# Patient Record
Sex: FEMALE | Race: WHITE | NOT HISPANIC OR LATINO | Employment: OTHER | URBAN - METROPOLITAN AREA
[De-identification: names, ages, dates, MRNs, and addresses within clinical notes are randomized per-mention and may not be internally consistent; named-entity substitution may affect disease eponyms.]

---

## 2017-01-04 ENCOUNTER — TRANSCRIBE ORDERS (OUTPATIENT)
Dept: SLEEP CENTER | Facility: CLINIC | Age: 82
End: 2017-01-04

## 2017-01-04 ENCOUNTER — HOSPITAL ENCOUNTER (OUTPATIENT)
Dept: SLEEP CENTER | Facility: CLINIC | Age: 82
Discharge: HOME | End: 2017-01-04
Payer: MEDICARE

## 2017-01-04 DIAGNOSIS — G47.33 OSA (OBSTRUCTIVE SLEEP APNEA): Primary | ICD-10-CM

## 2017-01-04 DIAGNOSIS — G47.33 OSA ON CPAP: ICD-10-CM

## 2017-01-04 DIAGNOSIS — Z99.89 OSA ON CPAP: ICD-10-CM

## 2017-01-16 ENCOUNTER — GENERIC CONVERSION - ENCOUNTER (OUTPATIENT)
Dept: OTHER | Facility: OTHER | Age: 82
End: 2017-01-16

## 2017-01-25 ENCOUNTER — ALLSCRIPTS OFFICE VISIT (OUTPATIENT)
Dept: OTHER | Facility: OTHER | Age: 82
End: 2017-01-25

## 2017-01-26 ENCOUNTER — GENERIC CONVERSION - ENCOUNTER (OUTPATIENT)
Dept: OTHER | Facility: OTHER | Age: 82
End: 2017-01-26

## 2017-01-28 ENCOUNTER — GENERIC CONVERSION - ENCOUNTER (OUTPATIENT)
Dept: OTHER | Facility: OTHER | Age: 82
End: 2017-01-28

## 2017-02-02 ENCOUNTER — GENERIC CONVERSION - ENCOUNTER (OUTPATIENT)
Dept: OTHER | Facility: OTHER | Age: 82
End: 2017-02-02

## 2017-03-02 ENCOUNTER — HOSPITAL ENCOUNTER (OUTPATIENT)
Dept: SLEEP CENTER | Facility: CLINIC | Age: 82
Discharge: HOME | End: 2017-03-02
Payer: MEDICARE

## 2017-03-02 ENCOUNTER — TRANSCRIBE ORDERS (OUTPATIENT)
Dept: SLEEP CENTER | Facility: CLINIC | Age: 82
End: 2017-03-02

## 2017-03-02 DIAGNOSIS — G47.33 OSA (OBSTRUCTIVE SLEEP APNEA): ICD-10-CM

## 2017-03-02 DIAGNOSIS — G47.33 OBSTRUCTIVE SLEEP APNEA (ADULT) (PEDIATRIC): Primary | ICD-10-CM

## 2017-03-08 ENCOUNTER — GENERIC CONVERSION - ENCOUNTER (OUTPATIENT)
Dept: OTHER | Facility: OTHER | Age: 82
End: 2017-03-08

## 2017-03-20 ENCOUNTER — GENERIC CONVERSION - ENCOUNTER (OUTPATIENT)
Dept: OTHER | Facility: OTHER | Age: 82
End: 2017-03-20

## 2017-04-27 ENCOUNTER — GENERIC CONVERSION - ENCOUNTER (OUTPATIENT)
Dept: OTHER | Facility: OTHER | Age: 82
End: 2017-04-27

## 2017-05-08 ENCOUNTER — HOSPITAL ENCOUNTER (EMERGENCY)
Facility: HOSPITAL | Age: 82
Discharge: HOME | DRG: 062 | End: 2017-05-08
Attending: EMERGENCY MEDICINE | Admitting: EMERGENCY MEDICINE
Payer: MEDICARE

## 2017-05-08 ENCOUNTER — GENERIC CONVERSION - ENCOUNTER (OUTPATIENT)
Dept: OTHER | Facility: OTHER | Age: 82
End: 2017-05-08

## 2017-05-08 ENCOUNTER — APPOINTMENT (EMERGENCY)
Dept: RADIOLOGY | Facility: HOSPITAL | Age: 82
DRG: 062 | End: 2017-05-08
Payer: MEDICARE

## 2017-05-08 VITALS
TEMPERATURE: 98.2 F | OXYGEN SATURATION: 97 % | DIASTOLIC BLOOD PRESSURE: 86 MMHG | HEIGHT: 61 IN | HEART RATE: 69 BPM | WEIGHT: 206 LBS | RESPIRATION RATE: 16 BRPM | BODY MASS INDEX: 38.89 KG/M2 | SYSTOLIC BLOOD PRESSURE: 160 MMHG

## 2017-05-08 DIAGNOSIS — N39.0 UTI (URINARY TRACT INFECTION): Primary | ICD-10-CM

## 2017-05-08 DIAGNOSIS — R03.0 ELEVATED BLOOD PRESSURE READING: ICD-10-CM

## 2017-05-08 LAB
ANION GAP SERPL CALCULATED.3IONS-SCNC: 7 MMOL/L (ref 4–13)
ATRIAL RATE: 57 BPM
ATRIAL RATE: 70 BPM
ATRIAL RATE: 80 BPM
BACTERIA UR QL AUTO: ABNORMAL /HPF
BASOPHILS # BLD AUTO: 0.03 THOUSANDS/ΜL (ref 0–0.1)
BASOPHILS NFR BLD AUTO: 1 % (ref 0–1)
BILIRUB UR QL STRIP: NEGATIVE
BUN SERPL-MCNC: 18 MG/DL (ref 5–25)
CALCIUM SERPL-MCNC: 8.9 MG/DL (ref 8.3–10.1)
CHLORIDE SERPL-SCNC: 104 MMOL/L (ref 100–108)
CLARITY UR: CLEAR
CO2 SERPL-SCNC: 29 MMOL/L (ref 21–32)
COLOR UR: YELLOW
CREAT SERPL-MCNC: 0.82 MG/DL (ref 0.6–1.3)
EOSINOPHIL # BLD AUTO: 0.11 THOUSAND/ΜL (ref 0–0.61)
EOSINOPHIL NFR BLD AUTO: 2 % (ref 0–6)
ERYTHROCYTE [DISTWIDTH] IN BLOOD BY AUTOMATED COUNT: 13.8 % (ref 11.6–15.1)
GFR SERPL CREATININE-BSD FRML MDRD: >60 ML/MIN/1.73SQ M
GLUCOSE SERPL-MCNC: 113 MG/DL (ref 65–140)
GLUCOSE UR STRIP-MCNC: NEGATIVE MG/DL
HCT VFR BLD AUTO: 34.2 % (ref 34.8–46.1)
HGB BLD-MCNC: 11.3 G/DL (ref 11.5–15.4)
HGB UR QL STRIP.AUTO: ABNORMAL
HYALINE CASTS #/AREA URNS LPF: ABNORMAL /LPF
KETONES UR STRIP-MCNC: NEGATIVE MG/DL
LEUKOCYTE ESTERASE UR QL STRIP: ABNORMAL
LYMPHOCYTES # BLD AUTO: 1.79 THOUSANDS/ΜL (ref 0.6–4.47)
LYMPHOCYTES NFR BLD AUTO: 29 % (ref 14–44)
MCH RBC QN AUTO: 33.1 PG (ref 26.8–34.3)
MCHC RBC AUTO-ENTMCNC: 33 G/DL (ref 31.4–37.4)
MCV RBC AUTO: 100 FL (ref 82–98)
MONOCYTES # BLD AUTO: 0.73 THOUSAND/ΜL (ref 0.17–1.22)
MONOCYTES NFR BLD AUTO: 12 % (ref 4–12)
NEUTROPHILS # BLD AUTO: 3.6 THOUSANDS/ΜL (ref 1.85–7.62)
NEUTS SEG NFR BLD AUTO: 56 % (ref 43–75)
NITRITE UR QL STRIP: POSITIVE
NON-SQ EPI CELLS URNS QL MICRO: ABNORMAL /HPF
NRBC BLD AUTO-RTO: 0 /100 WBCS
P AXIS: 95 DEGREES
P AXIS: 99 DEGREES
PH UR STRIP.AUTO: 6.5 [PH] (ref 4.5–8)
PLATELET # BLD AUTO: 240 THOUSANDS/UL (ref 149–390)
PMV BLD AUTO: 9.7 FL (ref 8.9–12.7)
POTASSIUM SERPL-SCNC: 3.7 MMOL/L (ref 3.5–5.3)
PR INTERVAL: 158 MS
PR INTERVAL: 176 MS
PROT UR STRIP-MCNC: NEGATIVE MG/DL
QRS AXIS: -8 DEGREES
QRS AXIS: 11 DEGREES
QRS AXIS: 34 DEGREES
QRSD INTERVAL: 104 MS
QRSD INTERVAL: 142 MS
QRSD INTERVAL: 88 MS
QT INTERVAL: 370 MS
QT INTERVAL: 382 MS
QT INTERVAL: 468 MS
QTC INTERVAL: 412 MS
QTC INTERVAL: 426 MS
QTC INTERVAL: 529 MS
RBC # BLD AUTO: 3.41 MILLION/UL (ref 3.81–5.12)
RBC #/AREA URNS AUTO: ABNORMAL /HPF
SODIUM SERPL-SCNC: 140 MMOL/L (ref 136–145)
SP GR UR STRIP.AUTO: 1.01 (ref 1–1.03)
T WAVE AXIS: -12 DEGREES
T WAVE AXIS: 150 DEGREES
T WAVE AXIS: 37 DEGREES
UROBILINOGEN UR QL STRIP.AUTO: 0.2 E.U./DL
VENTRICULAR RATE: 70 BPM
VENTRICULAR RATE: 77 BPM
VENTRICULAR RATE: 80 BPM
WBC # BLD AUTO: 6.27 THOUSAND/UL (ref 4.31–10.16)
WBC #/AREA URNS AUTO: ABNORMAL /HPF

## 2017-05-08 PROCEDURE — 36415 COLL VENOUS BLD VENIPUNCTURE: CPT | Performed by: EMERGENCY MEDICINE

## 2017-05-08 PROCEDURE — 81001 URINALYSIS AUTO W/SCOPE: CPT

## 2017-05-08 PROCEDURE — 93005 ELECTROCARDIOGRAM TRACING: CPT

## 2017-05-08 PROCEDURE — 99284 EMERGENCY DEPT VISIT MOD MDM: CPT

## 2017-05-08 PROCEDURE — 87086 URINE CULTURE/COLONY COUNT: CPT

## 2017-05-08 PROCEDURE — 87186 SC STD MICRODIL/AGAR DIL: CPT

## 2017-05-08 PROCEDURE — 85025 COMPLETE CBC W/AUTO DIFF WBC: CPT | Performed by: EMERGENCY MEDICINE

## 2017-05-08 PROCEDURE — 93005 ELECTROCARDIOGRAM TRACING: CPT | Performed by: EMERGENCY MEDICINE

## 2017-05-08 PROCEDURE — 87077 CULTURE AEROBIC IDENTIFY: CPT

## 2017-05-08 PROCEDURE — 80048 BASIC METABOLIC PNL TOTAL CA: CPT | Performed by: EMERGENCY MEDICINE

## 2017-05-08 RX ORDER — AMLODIPINE BESYLATE 10 MG/1
10 TABLET ORAL DAILY
Qty: 10 TABLET | Refills: 0 | Status: SHIPPED | OUTPATIENT
Start: 2017-05-08 | End: 2018-02-06 | Stop reason: ALTCHOICE

## 2017-05-08 RX ORDER — NITROFURANTOIN 25; 75 MG/1; MG/1
100 CAPSULE ORAL 2 TIMES DAILY
Qty: 10 CAPSULE | Refills: 0 | Status: SHIPPED | OUTPATIENT
Start: 2017-05-08 | End: 2017-05-15 | Stop reason: HOSPADM

## 2017-05-10 ENCOUNTER — APPOINTMENT (EMERGENCY)
Dept: RADIOLOGY | Facility: HOSPITAL | Age: 82
DRG: 062 | End: 2017-05-10
Payer: MEDICARE

## 2017-05-10 ENCOUNTER — HOSPITAL ENCOUNTER (INPATIENT)
Facility: HOSPITAL | Age: 82
LOS: 5 days | Discharge: NURSING, REHAB OR LONG-TERM CARE | DRG: 062 | End: 2017-05-15
Attending: EMERGENCY MEDICINE | Admitting: EMERGENCY MEDICINE
Payer: MEDICARE

## 2017-05-10 ENCOUNTER — APPOINTMENT (INPATIENT)
Dept: RADIOLOGY | Facility: HOSPITAL | Age: 82
DRG: 062 | End: 2017-05-10
Payer: MEDICARE

## 2017-05-10 DIAGNOSIS — I63.9 CVA (CEREBRAL VASCULAR ACCIDENT) (HCC): Primary | ICD-10-CM

## 2017-05-10 PROBLEM — N39.0 URINARY TRACT INFECTION: Status: ACTIVE | Noted: 2017-05-10

## 2017-05-10 PROBLEM — E66.9 DIABETES MELLITUS TYPE 2 IN OBESE (HCC): Status: ACTIVE | Noted: 2017-05-10

## 2017-05-10 PROBLEM — G47.33 OBSTRUCTIVE SLEEP APNEA: Status: ACTIVE | Noted: 2017-05-10

## 2017-05-10 PROBLEM — E11.69 DIABETES MELLITUS TYPE 2 IN OBESE (HCC): Status: ACTIVE | Noted: 2017-05-10

## 2017-05-10 PROBLEM — E03.9 HYPOTHYROIDISM: Status: ACTIVE | Noted: 2017-05-10

## 2017-05-10 PROBLEM — K21.9 GERD (GASTROESOPHAGEAL REFLUX DISEASE): Status: ACTIVE | Noted: 2017-05-10

## 2017-05-10 PROBLEM — M06.9 RHEUMATOID ARTHRITIS (HCC): Status: ACTIVE | Noted: 2017-05-10

## 2017-05-10 PROBLEM — I10 HYPERTENSION: Status: ACTIVE | Noted: 2017-05-10

## 2017-05-10 LAB
ANION GAP SERPL CALCULATED.3IONS-SCNC: 8 MMOL/L (ref 4–13)
APTT PPP: 31 SECONDS (ref 23–35)
ATRIAL RATE: 96 BPM
BACTERIA UR CULT: NORMAL
BACTERIA UR QL AUTO: ABNORMAL /HPF
BASOPHILS # BLD AUTO: 0.02 THOUSANDS/ΜL (ref 0–0.1)
BASOPHILS NFR BLD AUTO: 0 % (ref 0–1)
BILIRUB UR QL STRIP: NEGATIVE
BUN SERPL-MCNC: 25 MG/DL (ref 5–25)
CALCIUM SERPL-MCNC: 9 MG/DL (ref 8.3–10.1)
CHLORIDE SERPL-SCNC: 102 MMOL/L (ref 100–108)
CLARITY UR: CLEAR
CO2 SERPL-SCNC: 26 MMOL/L (ref 21–32)
COLOR UR: YELLOW
CREAT SERPL-MCNC: 0.88 MG/DL (ref 0.6–1.3)
EOSINOPHIL # BLD AUTO: 0.1 THOUSAND/ΜL (ref 0–0.61)
EOSINOPHIL NFR BLD AUTO: 1 % (ref 0–6)
ERYTHROCYTE [DISTWIDTH] IN BLOOD BY AUTOMATED COUNT: 13.5 % (ref 11.6–15.1)
GFR SERPL CREATININE-BSD FRML MDRD: >60 ML/MIN/1.73SQ M
GLUCOSE SERPL-MCNC: 184 MG/DL (ref 65–140)
GLUCOSE UR STRIP-MCNC: NEGATIVE MG/DL
HCT VFR BLD AUTO: 35.9 % (ref 34.8–46.1)
HGB BLD-MCNC: 11.9 G/DL (ref 11.5–15.4)
HGB UR QL STRIP.AUTO: NEGATIVE
HYALINE CASTS #/AREA URNS LPF: ABNORMAL /LPF
INR PPP: 0.96 (ref 0.86–1.16)
KETONES UR STRIP-MCNC: NEGATIVE MG/DL
LEUKOCYTE ESTERASE UR QL STRIP: ABNORMAL
LYMPHOCYTES # BLD AUTO: 1.88 THOUSANDS/ΜL (ref 0.6–4.47)
LYMPHOCYTES NFR BLD AUTO: 23 % (ref 14–44)
MCH RBC QN AUTO: 33.3 PG (ref 26.8–34.3)
MCHC RBC AUTO-ENTMCNC: 33.1 G/DL (ref 31.4–37.4)
MCV RBC AUTO: 101 FL (ref 82–98)
MONOCYTES # BLD AUTO: 0.77 THOUSAND/ΜL (ref 0.17–1.22)
MONOCYTES NFR BLD AUTO: 9 % (ref 4–12)
NEUTROPHILS # BLD AUTO: 5.43 THOUSANDS/ΜL (ref 1.85–7.62)
NEUTS SEG NFR BLD AUTO: 67 % (ref 43–75)
NITRITE UR QL STRIP: NEGATIVE
NON-SQ EPI CELLS URNS QL MICRO: ABNORMAL /HPF
NRBC BLD AUTO-RTO: 0 /100 WBCS
P AXIS: 58 DEGREES
PH UR STRIP.AUTO: 6 [PH] (ref 4.5–8)
PLATELET # BLD AUTO: 234 THOUSANDS/UL (ref 149–390)
PMV BLD AUTO: 10.5 FL (ref 8.9–12.7)
POTASSIUM SERPL-SCNC: 4.3 MMOL/L (ref 3.5–5.3)
PR INTERVAL: 160 MS
PROT UR STRIP-MCNC: NEGATIVE MG/DL
PROTHROMBIN TIME: 12.8 SECONDS (ref 12.1–14.4)
QRS AXIS: 13 DEGREES
QRSD INTERVAL: 96 MS
QT INTERVAL: 348 MS
QTC INTERVAL: 439 MS
RBC # BLD AUTO: 3.57 MILLION/UL (ref 3.81–5.12)
RBC #/AREA URNS AUTO: ABNORMAL /HPF
SODIUM SERPL-SCNC: 136 MMOL/L (ref 136–145)
SP GR UR STRIP.AUTO: 1.02 (ref 1–1.03)
SPECIMEN SOURCE: NORMAL
T WAVE AXIS: 38 DEGREES
TROPONIN I BLD-MCNC: 0 NG/ML (ref 0–0.08)
UROBILINOGEN UR QL STRIP.AUTO: 0.2 E.U./DL
VENTRICULAR RATE: 96 BPM
WBC # BLD AUTO: 8.27 THOUSAND/UL (ref 4.31–10.16)
WBC #/AREA URNS AUTO: ABNORMAL /HPF

## 2017-05-10 PROCEDURE — 85610 PROTHROMBIN TIME: CPT | Performed by: EMERGENCY MEDICINE

## 2017-05-10 PROCEDURE — 70450 CT HEAD/BRAIN W/O DYE: CPT

## 2017-05-10 PROCEDURE — 3E03317 INTRODUCTION OF OTHER THROMBOLYTIC INTO PERIPHERAL VEIN, PERCUTANEOUS APPROACH: ICD-10-PCS | Performed by: INTERNAL MEDICINE

## 2017-05-10 PROCEDURE — 85730 THROMBOPLASTIN TIME PARTIAL: CPT | Performed by: EMERGENCY MEDICINE

## 2017-05-10 PROCEDURE — 99291 CRITICAL CARE FIRST HOUR: CPT

## 2017-05-10 PROCEDURE — 36415 COLL VENOUS BLD VENIPUNCTURE: CPT | Performed by: EMERGENCY MEDICINE

## 2017-05-10 PROCEDURE — 84484 ASSAY OF TROPONIN QUANT: CPT

## 2017-05-10 PROCEDURE — 80048 BASIC METABOLIC PNL TOTAL CA: CPT | Performed by: EMERGENCY MEDICINE

## 2017-05-10 PROCEDURE — 93005 ELECTROCARDIOGRAM TRACING: CPT

## 2017-05-10 PROCEDURE — 93880 EXTRACRANIAL BILAT STUDY: CPT

## 2017-05-10 PROCEDURE — 85025 COMPLETE CBC W/AUTO DIFF WBC: CPT | Performed by: EMERGENCY MEDICINE

## 2017-05-10 PROCEDURE — 81001 URINALYSIS AUTO W/SCOPE: CPT

## 2017-05-10 RX ORDER — FENTANYL CITRATE 50 UG/ML
25 INJECTION, SOLUTION INTRAMUSCULAR; INTRAVENOUS ONCE
Status: COMPLETED | OUTPATIENT
Start: 2017-05-10 | End: 2017-05-10

## 2017-05-10 RX ORDER — NITROFURANTOIN 25; 75 MG/1; MG/1
100 CAPSULE ORAL 2 TIMES DAILY
Status: DISCONTINUED | OUTPATIENT
Start: 2017-05-10 | End: 2017-05-10

## 2017-05-10 RX ORDER — PANTOPRAZOLE SODIUM 40 MG/1
40 TABLET, DELAYED RELEASE ORAL
Status: DISCONTINUED | OUTPATIENT
Start: 2017-05-11 | End: 2017-05-15 | Stop reason: HOSPADM

## 2017-05-10 RX ORDER — FENTANYL CITRATE 50 UG/ML
INJECTION, SOLUTION INTRAMUSCULAR; INTRAVENOUS
Status: COMPLETED
Start: 2017-05-10 | End: 2017-05-10

## 2017-05-10 RX ORDER — LABETALOL HYDROCHLORIDE 5 MG/ML
10 INJECTION, SOLUTION INTRAVENOUS ONCE
Status: DISCONTINUED | OUTPATIENT
Start: 2017-05-10 | End: 2017-05-12

## 2017-05-10 RX ORDER — LABETALOL HYDROCHLORIDE 5 MG/ML
10 INJECTION, SOLUTION INTRAVENOUS EVERY 4 HOURS PRN
Status: DISCONTINUED | OUTPATIENT
Start: 2017-05-10 | End: 2017-05-15 | Stop reason: HOSPADM

## 2017-05-10 RX ORDER — LEVOTHYROXINE SODIUM 0.07 MG/1
75 TABLET ORAL
Status: DISCONTINUED | OUTPATIENT
Start: 2017-05-11 | End: 2017-05-15 | Stop reason: HOSPADM

## 2017-05-10 RX ORDER — ACETAMINOPHEN 325 MG/1
650 TABLET ORAL ONCE
Status: COMPLETED | OUTPATIENT
Start: 2017-05-10 | End: 2017-05-10

## 2017-05-10 RX ORDER — SODIUM CHLORIDE, SODIUM GLUCONATE, SODIUM ACETATE, POTASSIUM CHLORIDE, MAGNESIUM CHLORIDE, SODIUM PHOSPHATE, DIBASIC, AND POTASSIUM PHOSPHATE .53; .5; .37; .037; .03; .012; .00082 G/100ML; G/100ML; G/100ML; G/100ML; G/100ML; G/100ML; G/100ML
75 INJECTION, SOLUTION INTRAVENOUS CONTINUOUS
Status: DISCONTINUED | OUTPATIENT
Start: 2017-05-10 | End: 2017-05-11

## 2017-05-10 RX ORDER — PRAVASTATIN SODIUM 80 MG/1
80 TABLET ORAL
Status: DISCONTINUED | OUTPATIENT
Start: 2017-05-11 | End: 2017-05-15 | Stop reason: HOSPADM

## 2017-05-10 RX ORDER — SODIUM CHLORIDE 9 MG/ML
100 INJECTION, SOLUTION INTRAVENOUS ONCE
Status: COMPLETED | OUTPATIENT
Start: 2017-05-10 | End: 2017-05-11

## 2017-05-10 RX ADMIN — FENTANYL CITRATE 25 MCG: 50 INJECTION, SOLUTION INTRAMUSCULAR; INTRAVENOUS at 18:03

## 2017-05-10 RX ADMIN — SODIUM CHLORIDE 500 ML: 0.9 INJECTION, SOLUTION INTRAVENOUS at 21:13

## 2017-05-10 RX ADMIN — CEFAZOLIN SODIUM 1000 MG: 1 SOLUTION INTRAVENOUS at 19:23

## 2017-05-10 RX ADMIN — SODIUM CHLORIDE 100 ML/HR: 0.9 INJECTION, SOLUTION INTRAVENOUS at 16:29

## 2017-05-10 RX ADMIN — FENTANYL CITRATE 25 MCG: 50 INJECTION INTRAMUSCULAR; INTRAVENOUS at 18:03

## 2017-05-10 RX ADMIN — ALTEPLASE 76.2 MG: KIT at 14:27

## 2017-05-10 RX ADMIN — ACETAMINOPHEN 650 MG: 325 TABLET, FILM COATED ORAL at 15:37

## 2017-05-10 RX ADMIN — ALTEPLASE 8.5 MG: KIT at 14:20

## 2017-05-10 RX ADMIN — SODIUM CHLORIDE, SODIUM GLUCONATE, SODIUM ACETATE, POTASSIUM CHLORIDE, MAGNESIUM CHLORIDE, SODIUM PHOSPHATE, DIBASIC, AND POTASSIUM PHOSPHATE 75 ML/HR: .53; .5; .37; .037; .03; .012; .00082 INJECTION, SOLUTION INTRAVENOUS at 19:45

## 2017-05-11 ENCOUNTER — APPOINTMENT (INPATIENT)
Dept: NON INVASIVE DIAGNOSTICS | Facility: HOSPITAL | Age: 82
DRG: 062 | End: 2017-05-11
Payer: MEDICARE

## 2017-05-11 ENCOUNTER — APPOINTMENT (INPATIENT)
Dept: RADIOLOGY | Facility: HOSPITAL | Age: 82
DRG: 062 | End: 2017-05-11
Payer: MEDICARE

## 2017-05-11 ENCOUNTER — GENERIC CONVERSION - ENCOUNTER (OUTPATIENT)
Dept: OTHER | Facility: OTHER | Age: 82
End: 2017-05-11

## 2017-05-11 LAB
ANION GAP SERPL CALCULATED.3IONS-SCNC: 8 MMOL/L (ref 4–13)
ATRIAL RATE: 66 BPM
ATRIAL RATE: 68 BPM
BUN SERPL-MCNC: 18 MG/DL (ref 5–25)
CALCIUM SERPL-MCNC: 8.3 MG/DL (ref 8.3–10.1)
CHLORIDE SERPL-SCNC: 104 MMOL/L (ref 100–108)
CHOLEST SERPL-MCNC: 190 MG/DL (ref 50–200)
CO2 SERPL-SCNC: 27 MMOL/L (ref 21–32)
CREAT SERPL-MCNC: 0.64 MG/DL (ref 0.6–1.3)
ERYTHROCYTE [DISTWIDTH] IN BLOOD BY AUTOMATED COUNT: 13.7 % (ref 11.6–15.1)
EST. AVERAGE GLUCOSE BLD GHB EST-MCNC: 137 MG/DL
GFR SERPL CREATININE-BSD FRML MDRD: >60 ML/MIN/1.73SQ M
GLUCOSE SERPL-MCNC: 112 MG/DL (ref 65–140)
GLUCOSE SERPL-MCNC: 131 MG/DL (ref 65–140)
HBA1C MFR BLD: 6.4 % (ref 4.2–6.3)
HCT VFR BLD AUTO: 32.9 % (ref 34.8–46.1)
HDLC SERPL-MCNC: 68 MG/DL (ref 40–60)
HGB BLD-MCNC: 10.9 G/DL (ref 11.5–15.4)
LDLC SERPL CALC-MCNC: 92 MG/DL (ref 0–100)
MAGNESIUM SERPL-MCNC: 2.2 MG/DL (ref 1.6–2.6)
MCH RBC QN AUTO: 33.2 PG (ref 26.8–34.3)
MCHC RBC AUTO-ENTMCNC: 33.1 G/DL (ref 31.4–37.4)
MCV RBC AUTO: 100 FL (ref 82–98)
P AXIS: 57 DEGREES
P AXIS: 75 DEGREES
PHOSPHATE SERPL-MCNC: 3 MG/DL (ref 2.3–4.1)
PLATELET # BLD AUTO: 217 THOUSANDS/UL (ref 149–390)
PMV BLD AUTO: 9.6 FL (ref 8.9–12.7)
POTASSIUM SERPL-SCNC: 3.8 MMOL/L (ref 3.5–5.3)
PR INTERVAL: 167 MS
PR INTERVAL: 171 MS
QRS AXIS: 17 DEGREES
QRS AXIS: 20 DEGREES
QRSD INTERVAL: 100 MS
QRSD INTERVAL: 108 MS
QT INTERVAL: 408 MS
QT INTERVAL: 413 MS
QTC INTERVAL: 433 MS
QTC INTERVAL: 434 MS
RBC # BLD AUTO: 3.28 MILLION/UL (ref 3.81–5.12)
SODIUM SERPL-SCNC: 139 MMOL/L (ref 136–145)
T WAVE AXIS: 31 DEGREES
T WAVE AXIS: 34 DEGREES
T4 FREE SERPL-MCNC: 0.96 NG/DL (ref 0.76–1.46)
TRIGL SERPL-MCNC: 150 MG/DL
TSH SERPL DL<=0.05 MIU/L-ACNC: 0.41 UIU/ML (ref 0.36–3.74)
VENTRICULAR RATE: 66 BPM
VENTRICULAR RATE: 68 BPM
WBC # BLD AUTO: 9.11 THOUSAND/UL (ref 4.31–10.16)

## 2017-05-11 PROCEDURE — 70551 MRI BRAIN STEM W/O DYE: CPT

## 2017-05-11 PROCEDURE — 82948 REAGENT STRIP/BLOOD GLUCOSE: CPT

## 2017-05-11 PROCEDURE — 80048 BASIC METABOLIC PNL TOTAL CA: CPT | Performed by: INTERNAL MEDICINE

## 2017-05-11 PROCEDURE — 92610 EVALUATE SWALLOWING FUNCTION: CPT

## 2017-05-11 PROCEDURE — 94760 N-INVAS EAR/PLS OXIMETRY 1: CPT

## 2017-05-11 PROCEDURE — 80061 LIPID PANEL: CPT | Performed by: INTERNAL MEDICINE

## 2017-05-11 PROCEDURE — 85027 COMPLETE CBC AUTOMATED: CPT | Performed by: INTERNAL MEDICINE

## 2017-05-11 PROCEDURE — G8979 MOBILITY GOAL STATUS: HCPCS | Performed by: PHYSICAL THERAPIST

## 2017-05-11 PROCEDURE — G8978 MOBILITY CURRENT STATUS: HCPCS | Performed by: PHYSICAL THERAPIST

## 2017-05-11 PROCEDURE — 84100 ASSAY OF PHOSPHORUS: CPT | Performed by: INTERNAL MEDICINE

## 2017-05-11 PROCEDURE — 83735 ASSAY OF MAGNESIUM: CPT | Performed by: INTERNAL MEDICINE

## 2017-05-11 PROCEDURE — 93005 ELECTROCARDIOGRAM TRACING: CPT | Performed by: INTERNAL MEDICINE

## 2017-05-11 PROCEDURE — 97167 OT EVAL HIGH COMPLEX 60 MIN: CPT

## 2017-05-11 PROCEDURE — 97163 PT EVAL HIGH COMPLEX 45 MIN: CPT | Performed by: PHYSICAL THERAPIST

## 2017-05-11 PROCEDURE — 83036 HEMOGLOBIN GLYCOSYLATED A1C: CPT | Performed by: INTERNAL MEDICINE

## 2017-05-11 PROCEDURE — 70450 CT HEAD/BRAIN W/O DYE: CPT

## 2017-05-11 PROCEDURE — 84443 ASSAY THYROID STIM HORMONE: CPT | Performed by: INTERNAL MEDICINE

## 2017-05-11 PROCEDURE — 93306 TTE W/DOPPLER COMPLETE: CPT

## 2017-05-11 PROCEDURE — 84439 ASSAY OF FREE THYROXINE: CPT | Performed by: INTERNAL MEDICINE

## 2017-05-11 PROCEDURE — 94660 CPAP INITIATION&MGMT: CPT

## 2017-05-11 PROCEDURE — G8987 SELF CARE CURRENT STATUS: HCPCS

## 2017-05-11 PROCEDURE — G8988 SELF CARE GOAL STATUS: HCPCS

## 2017-05-11 PROCEDURE — 93005 ELECTROCARDIOGRAM TRACING: CPT

## 2017-05-11 RX ORDER — POLYVINYL ALCOHOL 14 MG/ML
1 SOLUTION/ DROPS OPHTHALMIC
Status: DISCONTINUED | OUTPATIENT
Start: 2017-05-11 | End: 2017-05-15 | Stop reason: HOSPADM

## 2017-05-11 RX ORDER — ACETAMINOPHEN 325 MG/1
650 TABLET ORAL EVERY 6 HOURS PRN
Status: DISCONTINUED | OUTPATIENT
Start: 2017-05-11 | End: 2017-05-15 | Stop reason: HOSPADM

## 2017-05-11 RX ADMIN — PRAVASTATIN SODIUM 80 MG: 80 TABLET ORAL at 17:36

## 2017-05-11 RX ADMIN — ACETAMINOPHEN 650 MG: 325 TABLET, FILM COATED ORAL at 10:17

## 2017-05-11 RX ADMIN — LEVOTHYROXINE SODIUM 75 MCG: 75 TABLET ORAL at 05:35

## 2017-05-11 RX ADMIN — POLYVINYL ALCOHOL 1 DROP: 14 SOLUTION/ DROPS OPHTHALMIC at 17:36

## 2017-05-11 RX ADMIN — CEFAZOLIN SODIUM 1000 MG: 1 SOLUTION INTRAVENOUS at 19:13

## 2017-05-11 RX ADMIN — CEFAZOLIN SODIUM 1000 MG: 1 SOLUTION INTRAVENOUS at 05:35

## 2017-05-11 RX ADMIN — PANTOPRAZOLE SODIUM 40 MG: 40 TABLET, DELAYED RELEASE ORAL at 10:09

## 2017-05-12 LAB
ANION GAP SERPL CALCULATED.3IONS-SCNC: 5 MMOL/L (ref 4–13)
BUN SERPL-MCNC: 18 MG/DL (ref 5–25)
CALCIUM SERPL-MCNC: 9 MG/DL (ref 8.3–10.1)
CHLORIDE SERPL-SCNC: 105 MMOL/L (ref 100–108)
CO2 SERPL-SCNC: 31 MMOL/L (ref 21–32)
CREAT SERPL-MCNC: 0.9 MG/DL (ref 0.6–1.3)
ERYTHROCYTE [DISTWIDTH] IN BLOOD BY AUTOMATED COUNT: 13.8 % (ref 11.6–15.1)
GFR SERPL CREATININE-BSD FRML MDRD: >60 ML/MIN/1.73SQ M
GLUCOSE SERPL-MCNC: 111 MG/DL (ref 65–140)
HCT VFR BLD AUTO: 35.4 % (ref 34.8–46.1)
HGB BLD-MCNC: 11.5 G/DL (ref 11.5–15.4)
MAGNESIUM SERPL-MCNC: 2.4 MG/DL (ref 1.6–2.6)
MCH RBC QN AUTO: 33 PG (ref 26.8–34.3)
MCHC RBC AUTO-ENTMCNC: 32.5 G/DL (ref 31.4–37.4)
MCV RBC AUTO: 101 FL (ref 82–98)
PHOSPHATE SERPL-MCNC: 2.6 MG/DL (ref 2.3–4.1)
PLATELET # BLD AUTO: 208 THOUSANDS/UL (ref 149–390)
PMV BLD AUTO: 9.9 FL (ref 8.9–12.7)
POTASSIUM SERPL-SCNC: 3.8 MMOL/L (ref 3.5–5.3)
RBC # BLD AUTO: 3.49 MILLION/UL (ref 3.81–5.12)
SODIUM SERPL-SCNC: 141 MMOL/L (ref 136–145)
WBC # BLD AUTO: 8.43 THOUSAND/UL (ref 4.31–10.16)

## 2017-05-12 PROCEDURE — 85027 COMPLETE CBC AUTOMATED: CPT | Performed by: INTERNAL MEDICINE

## 2017-05-12 PROCEDURE — 97535 SELF CARE MNGMENT TRAINING: CPT

## 2017-05-12 PROCEDURE — 97530 THERAPEUTIC ACTIVITIES: CPT | Performed by: PHYSICAL THERAPIST

## 2017-05-12 PROCEDURE — 80048 BASIC METABOLIC PNL TOTAL CA: CPT | Performed by: INTERNAL MEDICINE

## 2017-05-12 PROCEDURE — 94760 N-INVAS EAR/PLS OXIMETRY 1: CPT

## 2017-05-12 PROCEDURE — 97532 HB COGNITIVE SKILLS DEVELOPMENT: CPT

## 2017-05-12 PROCEDURE — 94660 CPAP INITIATION&MGMT: CPT

## 2017-05-12 PROCEDURE — 97116 GAIT TRAINING THERAPY: CPT | Performed by: PHYSICAL THERAPIST

## 2017-05-12 PROCEDURE — 83735 ASSAY OF MAGNESIUM: CPT | Performed by: INTERNAL MEDICINE

## 2017-05-12 PROCEDURE — 97110 THERAPEUTIC EXERCISES: CPT | Performed by: PHYSICAL THERAPIST

## 2017-05-12 PROCEDURE — 84100 ASSAY OF PHOSPHORUS: CPT | Performed by: INTERNAL MEDICINE

## 2017-05-12 RX ORDER — ASPIRIN 81 MG/1
81 TABLET, CHEWABLE ORAL DAILY
Status: DISCONTINUED | OUTPATIENT
Start: 2017-05-12 | End: 2017-05-15 | Stop reason: HOSPADM

## 2017-05-12 RX ADMIN — LEVOTHYROXINE SODIUM 75 MCG: 75 TABLET ORAL at 05:36

## 2017-05-12 RX ADMIN — PANTOPRAZOLE SODIUM 40 MG: 40 TABLET, DELAYED RELEASE ORAL at 06:56

## 2017-05-12 RX ADMIN — PRAVASTATIN SODIUM 80 MG: 80 TABLET ORAL at 16:03

## 2017-05-12 RX ADMIN — ASPIRIN 81 MG 81 MG: 81 TABLET ORAL at 13:27

## 2017-05-12 RX ADMIN — POLYVINYL ALCOHOL 1 DROP: 14 SOLUTION/ DROPS OPHTHALMIC at 23:20

## 2017-05-12 RX ADMIN — CEFAZOLIN SODIUM 1000 MG: 1 SOLUTION INTRAVENOUS at 05:36

## 2017-05-12 RX ADMIN — ACETAMINOPHEN 650 MG: 325 TABLET, FILM COATED ORAL at 09:47

## 2017-05-12 RX ADMIN — CEFAZOLIN SODIUM 1000 MG: 1 SOLUTION INTRAVENOUS at 17:42

## 2017-05-13 PROCEDURE — 92523 SPEECH SOUND LANG COMPREHEN: CPT

## 2017-05-13 PROCEDURE — 94760 N-INVAS EAR/PLS OXIMETRY 1: CPT

## 2017-05-13 PROCEDURE — 94660 CPAP INITIATION&MGMT: CPT

## 2017-05-13 RX ADMIN — ASPIRIN 81 MG 81 MG: 81 TABLET ORAL at 08:24

## 2017-05-13 RX ADMIN — PANTOPRAZOLE SODIUM 40 MG: 40 TABLET, DELAYED RELEASE ORAL at 05:47

## 2017-05-13 RX ADMIN — CEFAZOLIN SODIUM 1000 MG: 1 SOLUTION INTRAVENOUS at 05:47

## 2017-05-13 RX ADMIN — CEFAZOLIN SODIUM 1000 MG: 1 SOLUTION INTRAVENOUS at 17:44

## 2017-05-13 RX ADMIN — PRAVASTATIN SODIUM 80 MG: 80 TABLET ORAL at 17:43

## 2017-05-13 RX ADMIN — LEVOTHYROXINE SODIUM 75 MCG: 75 TABLET ORAL at 05:47

## 2017-05-14 LAB
ANION GAP SERPL CALCULATED.3IONS-SCNC: 9 MMOL/L (ref 4–13)
BASOPHILS # BLD AUTO: 0.03 THOUSANDS/ΜL (ref 0–0.1)
BASOPHILS NFR BLD AUTO: 0 % (ref 0–1)
BUN SERPL-MCNC: 22 MG/DL (ref 5–25)
CALCIUM SERPL-MCNC: 9.3 MG/DL (ref 8.3–10.1)
CHLORIDE SERPL-SCNC: 105 MMOL/L (ref 100–108)
CO2 SERPL-SCNC: 27 MMOL/L (ref 21–32)
CREAT SERPL-MCNC: 0.8 MG/DL (ref 0.6–1.3)
EOSINOPHIL # BLD AUTO: 0.22 THOUSAND/ΜL (ref 0–0.61)
EOSINOPHIL NFR BLD AUTO: 2 % (ref 0–6)
ERYTHROCYTE [DISTWIDTH] IN BLOOD BY AUTOMATED COUNT: 13.8 % (ref 11.6–15.1)
GFR SERPL CREATININE-BSD FRML MDRD: >60 ML/MIN/1.73SQ M
GLUCOSE SERPL-MCNC: 125 MG/DL (ref 65–140)
HCT VFR BLD AUTO: 34.5 % (ref 34.8–46.1)
HGB BLD-MCNC: 11.6 G/DL (ref 11.5–15.4)
LYMPHOCYTES # BLD AUTO: 2.38 THOUSANDS/ΜL (ref 0.6–4.47)
LYMPHOCYTES NFR BLD AUTO: 20 % (ref 14–44)
MCH RBC QN AUTO: 33.6 PG (ref 26.8–34.3)
MCHC RBC AUTO-ENTMCNC: 33.6 G/DL (ref 31.4–37.4)
MCV RBC AUTO: 100 FL (ref 82–98)
MONOCYTES # BLD AUTO: 1.01 THOUSAND/ΜL (ref 0.17–1.22)
MONOCYTES NFR BLD AUTO: 9 % (ref 4–12)
NEUTROPHILS # BLD AUTO: 8.07 THOUSANDS/ΜL (ref 1.85–7.62)
NEUTS SEG NFR BLD AUTO: 69 % (ref 43–75)
NRBC BLD AUTO-RTO: 0 /100 WBCS
PLATELET # BLD AUTO: 225 THOUSANDS/UL (ref 149–390)
PMV BLD AUTO: 10.2 FL (ref 8.9–12.7)
POTASSIUM SERPL-SCNC: 3.9 MMOL/L (ref 3.5–5.3)
RBC # BLD AUTO: 3.45 MILLION/UL (ref 3.81–5.12)
SODIUM SERPL-SCNC: 141 MMOL/L (ref 136–145)
WBC # BLD AUTO: 11.74 THOUSAND/UL (ref 4.31–10.16)

## 2017-05-14 PROCEDURE — 85025 COMPLETE CBC W/AUTO DIFF WBC: CPT | Performed by: INTERNAL MEDICINE

## 2017-05-14 PROCEDURE — 80048 BASIC METABOLIC PNL TOTAL CA: CPT | Performed by: INTERNAL MEDICINE

## 2017-05-14 PROCEDURE — 94760 N-INVAS EAR/PLS OXIMETRY 1: CPT

## 2017-05-14 PROCEDURE — 94660 CPAP INITIATION&MGMT: CPT

## 2017-05-14 RX ADMIN — ACETAMINOPHEN 650 MG: 325 TABLET, FILM COATED ORAL at 21:20

## 2017-05-14 RX ADMIN — CEFAZOLIN SODIUM 1000 MG: 1 SOLUTION INTRAVENOUS at 05:15

## 2017-05-14 RX ADMIN — CEFAZOLIN SODIUM 1000 MG: 1 SOLUTION INTRAVENOUS at 17:27

## 2017-05-14 RX ADMIN — LEVOTHYROXINE SODIUM 75 MCG: 75 TABLET ORAL at 05:14

## 2017-05-14 RX ADMIN — PANTOPRAZOLE SODIUM 40 MG: 40 TABLET, DELAYED RELEASE ORAL at 05:15

## 2017-05-14 RX ADMIN — APIXABAN 5 MG: 5 TABLET, FILM COATED ORAL at 21:20

## 2017-05-14 RX ADMIN — ASPIRIN 81 MG 81 MG: 81 TABLET ORAL at 09:45

## 2017-05-14 RX ADMIN — PRAVASTATIN SODIUM 80 MG: 80 TABLET ORAL at 17:27

## 2017-05-14 RX ADMIN — APIXABAN 5 MG: 5 TABLET, FILM COATED ORAL at 12:28

## 2017-05-15 VITALS
SYSTOLIC BLOOD PRESSURE: 162 MMHG | BODY MASS INDEX: 37.25 KG/M2 | HEART RATE: 76 BPM | HEIGHT: 61 IN | DIASTOLIC BLOOD PRESSURE: 66 MMHG | TEMPERATURE: 97.3 F | OXYGEN SATURATION: 96 % | WEIGHT: 197.31 LBS | RESPIRATION RATE: 18 BRPM

## 2017-05-15 PROBLEM — E03.9 HYPOTHYROIDISM: Status: RESOLVED | Noted: 2017-05-10 | Resolved: 2017-05-15

## 2017-05-15 LAB
ANION GAP SERPL CALCULATED.3IONS-SCNC: 7 MMOL/L (ref 4–13)
BASOPHILS # BLD AUTO: 0.02 THOUSANDS/ΜL (ref 0–0.1)
BASOPHILS NFR BLD AUTO: 0 % (ref 0–1)
BUN SERPL-MCNC: 23 MG/DL (ref 5–25)
CALCIUM SERPL-MCNC: 8.9 MG/DL (ref 8.3–10.1)
CHLORIDE SERPL-SCNC: 107 MMOL/L (ref 100–108)
CO2 SERPL-SCNC: 29 MMOL/L (ref 21–32)
CREAT SERPL-MCNC: 0.74 MG/DL (ref 0.6–1.3)
EOSINOPHIL # BLD AUTO: 0.16 THOUSAND/ΜL (ref 0–0.61)
EOSINOPHIL NFR BLD AUTO: 2 % (ref 0–6)
ERYTHROCYTE [DISTWIDTH] IN BLOOD BY AUTOMATED COUNT: 13.6 % (ref 11.6–15.1)
GFR SERPL CREATININE-BSD FRML MDRD: >60 ML/MIN/1.73SQ M
GLUCOSE SERPL-MCNC: 107 MG/DL (ref 65–140)
HCT VFR BLD AUTO: 32.4 % (ref 34.8–46.1)
HGB BLD-MCNC: 10.8 G/DL (ref 11.5–15.4)
LYMPHOCYTES # BLD AUTO: 2.01 THOUSANDS/ΜL (ref 0.6–4.47)
LYMPHOCYTES NFR BLD AUTO: 22 % (ref 14–44)
MCH RBC QN AUTO: 33.6 PG (ref 26.8–34.3)
MCHC RBC AUTO-ENTMCNC: 33.3 G/DL (ref 31.4–37.4)
MCV RBC AUTO: 101 FL (ref 82–98)
MONOCYTES # BLD AUTO: 1.11 THOUSAND/ΜL (ref 0.17–1.22)
MONOCYTES NFR BLD AUTO: 12 % (ref 4–12)
NEUTROPHILS # BLD AUTO: 5.74 THOUSANDS/ΜL (ref 1.85–7.62)
NEUTS SEG NFR BLD AUTO: 64 % (ref 43–75)
NRBC BLD AUTO-RTO: 0 /100 WBCS
PLATELET # BLD AUTO: 189 THOUSANDS/UL (ref 149–390)
PMV BLD AUTO: 10.1 FL (ref 8.9–12.7)
POTASSIUM SERPL-SCNC: 4 MMOL/L (ref 3.5–5.3)
RBC # BLD AUTO: 3.21 MILLION/UL (ref 3.81–5.12)
SODIUM SERPL-SCNC: 143 MMOL/L (ref 136–145)
WBC # BLD AUTO: 9.07 THOUSAND/UL (ref 4.31–10.16)

## 2017-05-15 PROCEDURE — 97535 SELF CARE MNGMENT TRAINING: CPT

## 2017-05-15 PROCEDURE — 80048 BASIC METABOLIC PNL TOTAL CA: CPT | Performed by: INTERNAL MEDICINE

## 2017-05-15 PROCEDURE — 97110 THERAPEUTIC EXERCISES: CPT

## 2017-05-15 PROCEDURE — 85025 COMPLETE CBC W/AUTO DIFF WBC: CPT | Performed by: INTERNAL MEDICINE

## 2017-05-15 PROCEDURE — 97532 HB COGNITIVE SKILLS DEVELOPMENT: CPT

## 2017-05-15 PROCEDURE — 97116 GAIT TRAINING THERAPY: CPT

## 2017-05-15 RX ORDER — AMLODIPINE BESYLATE 10 MG/1
10 TABLET ORAL DAILY
Status: DISCONTINUED | OUTPATIENT
Start: 2017-05-15 | End: 2017-05-15 | Stop reason: HOSPADM

## 2017-05-15 RX ORDER — HYDROCODONE BITARTRATE AND ACETAMINOPHEN 5; 325 MG/1; MG/1
1 TABLET ORAL EVERY 6 HOURS PRN
Qty: 30 TABLET | Refills: 0 | Status: SHIPPED | OUTPATIENT
Start: 2017-05-15 | End: 2017-05-25

## 2017-05-15 RX ORDER — FUROSEMIDE 20 MG/1
20 TABLET ORAL DAILY
Status: DISCONTINUED | OUTPATIENT
Start: 2017-05-15 | End: 2017-05-15 | Stop reason: HOSPADM

## 2017-05-15 RX ORDER — PRAVASTATIN SODIUM 80 MG/1
80 TABLET ORAL
Qty: 30 TABLET | Refills: 0 | Status: SHIPPED | OUTPATIENT
Start: 2017-05-15 | End: 2018-02-20 | Stop reason: SDUPTHER

## 2017-05-15 RX ORDER — ASPIRIN 81 MG/1
81 TABLET, CHEWABLE ORAL DAILY
Qty: 30 TABLET | Refills: 0 | Status: SHIPPED | OUTPATIENT
Start: 2017-05-15 | End: 2018-02-05 | Stop reason: CLARIF

## 2017-05-15 RX ADMIN — FUROSEMIDE 20 MG: 20 TABLET ORAL at 11:43

## 2017-05-15 RX ADMIN — LOSARTAN POTASSIUM: 50 TABLET, FILM COATED ORAL at 11:43

## 2017-05-15 RX ADMIN — AMLODIPINE BESYLATE 10 MG: 10 TABLET ORAL at 11:43

## 2017-05-15 RX ADMIN — LABETALOL HYDROCHLORIDE 10 MG: 5 INJECTION, SOLUTION INTRAVENOUS at 08:07

## 2017-05-15 RX ADMIN — CEFAZOLIN SODIUM 1000 MG: 1 SOLUTION INTRAVENOUS at 05:25

## 2017-05-15 RX ADMIN — APIXABAN 5 MG: 5 TABLET, FILM COATED ORAL at 08:06

## 2017-05-15 RX ADMIN — LEVOTHYROXINE SODIUM 75 MCG: 75 TABLET ORAL at 05:24

## 2017-05-15 RX ADMIN — POLYVINYL ALCOHOL 1 DROP: 14 SOLUTION/ DROPS OPHTHALMIC at 06:25

## 2017-05-15 RX ADMIN — ASPIRIN 81 MG 81 MG: 81 TABLET ORAL at 08:06

## 2017-05-15 RX ADMIN — APIXABAN 5 MG: 5 TABLET, FILM COATED ORAL at 18:09

## 2017-05-15 RX ADMIN — PANTOPRAZOLE SODIUM 40 MG: 40 TABLET, DELAYED RELEASE ORAL at 05:24

## 2017-05-15 RX ADMIN — PRAVASTATIN SODIUM 80 MG: 80 TABLET ORAL at 17:30

## 2017-05-15 RX ADMIN — ACETAMINOPHEN 650 MG: 325 TABLET, FILM COATED ORAL at 18:09

## 2017-05-25 ENCOUNTER — GENERIC CONVERSION - ENCOUNTER (OUTPATIENT)
Dept: OTHER | Facility: OTHER | Age: 82
End: 2017-05-25

## 2017-06-02 ENCOUNTER — ALLSCRIPTS OFFICE VISIT (OUTPATIENT)
Dept: OTHER | Facility: OTHER | Age: 82
End: 2017-06-02

## 2017-06-02 DIAGNOSIS — R47.9 SPEECH DISTURBANCE: ICD-10-CM

## 2017-06-09 ENCOUNTER — GENERIC CONVERSION - ENCOUNTER (OUTPATIENT)
Dept: OTHER | Facility: OTHER | Age: 82
End: 2017-06-09

## 2017-07-03 ENCOUNTER — ALLSCRIPTS OFFICE VISIT (OUTPATIENT)
Dept: OTHER | Facility: OTHER | Age: 82
End: 2017-07-03

## 2017-07-06 ENCOUNTER — ALLSCRIPTS OFFICE VISIT (OUTPATIENT)
Dept: OTHER | Facility: OTHER | Age: 82
End: 2017-07-06

## 2017-07-12 ENCOUNTER — GENERIC CONVERSION - ENCOUNTER (OUTPATIENT)
Dept: OTHER | Facility: OTHER | Age: 82
End: 2017-07-12

## 2017-07-14 ENCOUNTER — GENERIC CONVERSION - ENCOUNTER (OUTPATIENT)
Dept: OTHER | Facility: OTHER | Age: 82
End: 2017-07-14

## 2017-07-18 ENCOUNTER — ALLSCRIPTS OFFICE VISIT (OUTPATIENT)
Dept: OTHER | Facility: OTHER | Age: 82
End: 2017-07-18

## 2017-07-18 LAB — HBA1C MFR BLD HPLC: 6.1 %

## 2017-07-20 ENCOUNTER — HOSPITAL ENCOUNTER (EMERGENCY)
Facility: HOSPITAL | Age: 82
Discharge: HOME | End: 2017-07-20
Admitting: EMERGENCY MEDICINE
Payer: MEDICARE

## 2017-07-20 VITALS
DIASTOLIC BLOOD PRESSURE: 76 MMHG | TEMPERATURE: 97.4 F | RESPIRATION RATE: 16 BRPM | HEART RATE: 90 BPM | BODY MASS INDEX: 39.87 KG/M2 | OXYGEN SATURATION: 98 % | WEIGHT: 211 LBS | SYSTOLIC BLOOD PRESSURE: 118 MMHG

## 2017-07-20 DIAGNOSIS — T50.905A ITCHING DUE TO DRUG: Primary | ICD-10-CM

## 2017-07-20 DIAGNOSIS — L29.8 ITCHING DUE TO DRUG: Primary | ICD-10-CM

## 2017-07-20 PROCEDURE — 99282 EMERGENCY DEPT VISIT SF MDM: CPT

## 2017-07-26 ENCOUNTER — ALLSCRIPTS OFFICE VISIT (OUTPATIENT)
Dept: OTHER | Facility: OTHER | Age: 82
End: 2017-07-26

## 2017-07-31 ENCOUNTER — GENERIC CONVERSION - ENCOUNTER (OUTPATIENT)
Dept: OTHER | Facility: OTHER | Age: 82
End: 2017-07-31

## 2017-08-08 ENCOUNTER — ALLSCRIPTS OFFICE VISIT (OUTPATIENT)
Dept: OTHER | Facility: OTHER | Age: 82
End: 2017-08-08

## 2017-08-10 ENCOUNTER — GENERIC CONVERSION - ENCOUNTER (OUTPATIENT)
Dept: OTHER | Facility: OTHER | Age: 82
End: 2017-08-10

## 2017-08-29 ENCOUNTER — APPOINTMENT (OUTPATIENT)
Dept: LAB | Facility: HOSPITAL | Age: 82
End: 2017-08-29
Attending: FAMILY MEDICINE
Payer: MEDICARE

## 2017-08-29 ENCOUNTER — ALLSCRIPTS OFFICE VISIT (OUTPATIENT)
Dept: OTHER | Facility: OTHER | Age: 82
End: 2017-08-29

## 2017-08-29 DIAGNOSIS — M79.675 PAIN OF TOE OF LEFT FOOT: ICD-10-CM

## 2017-08-29 DIAGNOSIS — N39.0 URINARY TRACT INFECTION: ICD-10-CM

## 2017-08-29 DIAGNOSIS — R31.9 HEMATURIA: ICD-10-CM

## 2017-08-29 LAB
BACTERIA UR QL AUTO: ABNORMAL /HPF
BILIRUB UR QL STRIP: NEGATIVE
BILIRUB UR QL STRIP: NEGATIVE
CLARITY UR: ABNORMAL
CLARITY UR: NORMAL
COLOR UR: YELLOW
COLOR UR: YELLOW
GLUCOSE (HISTORICAL): NEGATIVE
GLUCOSE UR STRIP-MCNC: NEGATIVE MG/DL
HGB UR QL STRIP.AUTO: ABNORMAL
HGB UR QL STRIP.AUTO: NORMAL
KETONES UR STRIP-MCNC: NEGATIVE MG/DL
KETONES UR STRIP-MCNC: NEGATIVE MG/DL
LEUKOCYTE ESTERASE UR QL STRIP: ABNORMAL
LEUKOCYTE ESTERASE UR QL STRIP: NORMAL
NITRITE UR QL STRIP: POSITIVE
NITRITE UR QL STRIP: POSITIVE
NON-SQ EPI CELLS URNS QL MICRO: ABNORMAL /HPF
PH UR STRIP.AUTO: 5 [PH]
PH UR STRIP.AUTO: 6 [PH] (ref 4.5–8)
PROT UR STRIP-MCNC: NEGATIVE MG/DL
PROT UR STRIP-MCNC: NORMAL MG/DL
RBC #/AREA URNS AUTO: ABNORMAL /HPF
SP GR UR STRIP.AUTO: 1.01
SP GR UR STRIP.AUTO: 1.01 (ref 1–1.03)
UROBILINOGEN UR QL STRIP.AUTO: 0.2
UROBILINOGEN UR QL STRIP.AUTO: 0.2 E.U./DL
WBC #/AREA URNS AUTO: ABNORMAL /HPF

## 2017-08-29 PROCEDURE — 87186 SC STD MICRODIL/AGAR DIL: CPT

## 2017-08-29 PROCEDURE — 81001 URINALYSIS AUTO W/SCOPE: CPT

## 2017-08-29 PROCEDURE — 87086 URINE CULTURE/COLONY COUNT: CPT

## 2017-08-29 PROCEDURE — 87077 CULTURE AEROBIC IDENTIFY: CPT

## 2017-08-30 ENCOUNTER — GENERIC CONVERSION - ENCOUNTER (OUTPATIENT)
Dept: OTHER | Facility: OTHER | Age: 82
End: 2017-08-30

## 2017-08-31 LAB — BACTERIA UR CULT: NORMAL

## 2017-09-01 ENCOUNTER — GENERIC CONVERSION - ENCOUNTER (OUTPATIENT)
Dept: OTHER | Facility: OTHER | Age: 82
End: 2017-09-01

## 2017-09-06 ENCOUNTER — GENERIC CONVERSION - ENCOUNTER (OUTPATIENT)
Dept: OTHER | Facility: OTHER | Age: 82
End: 2017-09-06

## 2017-09-15 ENCOUNTER — APPOINTMENT (OUTPATIENT)
Dept: LAB | Facility: HOSPITAL | Age: 82
End: 2017-09-15
Payer: MEDICARE

## 2017-09-15 ENCOUNTER — ALLSCRIPTS OFFICE VISIT (OUTPATIENT)
Dept: OTHER | Facility: OTHER | Age: 82
End: 2017-09-15

## 2017-09-15 DIAGNOSIS — R31.9 HEMATURIA: ICD-10-CM

## 2017-09-15 LAB
BACTERIA UR QL AUTO: ABNORMAL /HPF
BILIRUB UR QL STRIP: NEGATIVE
BILIRUB UR QL STRIP: NORMAL
CLARITY UR: CLEAR
CLARITY UR: NORMAL
COLOR UR: YELLOW
COLOR UR: YELLOW
GLUCOSE (HISTORICAL): NORMAL
GLUCOSE UR STRIP-MCNC: NEGATIVE MG/DL
HGB UR QL STRIP.AUTO: ABNORMAL
HGB UR QL STRIP.AUTO: NORMAL
HYALINE CASTS #/AREA URNS LPF: ABNORMAL /LPF
KETONES UR STRIP-MCNC: NEGATIVE MG/DL
KETONES UR STRIP-MCNC: NORMAL MG/DL
LEUKOCYTE ESTERASE UR QL STRIP: ABNORMAL
LEUKOCYTE ESTERASE UR QL STRIP: NORMAL
NITRITE UR QL STRIP: NEGATIVE
NITRITE UR QL STRIP: NORMAL
NON-SQ EPI CELLS URNS QL MICRO: ABNORMAL /HPF
PH UR STRIP.AUTO: 5 [PH]
PH UR STRIP.AUTO: 6 [PH] (ref 4.5–8)
PROT UR STRIP-MCNC: NEGATIVE MG/DL
PROT UR STRIP-MCNC: NORMAL MG/DL
RBC #/AREA URNS AUTO: ABNORMAL /HPF
SP GR UR STRIP.AUTO: 1.01
SP GR UR STRIP.AUTO: 1.01 (ref 1–1.03)
UROBILINOGEN UR QL STRIP.AUTO: 0.2
UROBILINOGEN UR QL STRIP.AUTO: 0.2 E.U./DL
WBC #/AREA URNS AUTO: ABNORMAL /HPF

## 2017-09-15 PROCEDURE — 81001 URINALYSIS AUTO W/SCOPE: CPT

## 2017-09-18 ENCOUNTER — GENERIC CONVERSION - ENCOUNTER (OUTPATIENT)
Dept: OTHER | Facility: OTHER | Age: 82
End: 2017-09-18

## 2017-09-18 ENCOUNTER — TRANSCRIBE ORDERS (OUTPATIENT)
Dept: ADMINISTRATIVE | Facility: HOSPITAL | Age: 82
End: 2017-09-18

## 2017-09-18 ENCOUNTER — APPOINTMENT (OUTPATIENT)
Dept: LAB | Facility: MEDICAL CENTER | Age: 82
End: 2017-09-18
Payer: MEDICARE

## 2017-09-18 DIAGNOSIS — M79.675 PAIN OF TOE OF LEFT FOOT: ICD-10-CM

## 2017-09-18 LAB — URATE SERPL-MCNC: 5.5 MG/DL (ref 2–6.8)

## 2017-09-18 PROCEDURE — 84550 ASSAY OF BLOOD/URIC ACID: CPT

## 2017-09-18 PROCEDURE — 36415 COLL VENOUS BLD VENIPUNCTURE: CPT

## 2017-09-21 ENCOUNTER — GENERIC CONVERSION - ENCOUNTER (OUTPATIENT)
Dept: OTHER | Facility: OTHER | Age: 82
End: 2017-09-21

## 2017-09-28 ENCOUNTER — GENERIC CONVERSION - ENCOUNTER (OUTPATIENT)
Dept: OTHER | Facility: OTHER | Age: 82
End: 2017-09-28

## 2017-09-29 ENCOUNTER — GENERIC CONVERSION - ENCOUNTER (OUTPATIENT)
Dept: OTHER | Facility: OTHER | Age: 82
End: 2017-09-29

## 2017-10-03 ENCOUNTER — GENERIC CONVERSION - ENCOUNTER (OUTPATIENT)
Dept: OTHER | Facility: OTHER | Age: 82
End: 2017-10-03

## 2017-10-10 ENCOUNTER — ALLSCRIPTS OFFICE VISIT (OUTPATIENT)
Dept: OTHER | Facility: OTHER | Age: 82
End: 2017-10-10

## 2017-10-10 ENCOUNTER — GENERIC CONVERSION - ENCOUNTER (OUTPATIENT)
Dept: OTHER | Facility: OTHER | Age: 82
End: 2017-10-10

## 2017-10-13 ENCOUNTER — TRANSCRIBE ORDERS (OUTPATIENT)
Dept: ADMINISTRATIVE | Facility: HOSPITAL | Age: 82
End: 2017-10-13

## 2017-10-13 ENCOUNTER — GENERIC CONVERSION - ENCOUNTER (OUTPATIENT)
Dept: OTHER | Facility: OTHER | Age: 82
End: 2017-10-13

## 2017-10-13 DIAGNOSIS — E87.1 HYPO-OSMOLALITY AND HYPONATREMIA (CODE): ICD-10-CM

## 2017-10-13 DIAGNOSIS — E03.9 HYPOTHYROIDISM: ICD-10-CM

## 2017-10-13 DIAGNOSIS — Z86.39 PERSONAL HISTORY OF OTHER ENDOCRINE, NUTRITIONAL AND METABOLIC DISEASE: ICD-10-CM

## 2017-10-13 DIAGNOSIS — R73.03 PREDIABETES: ICD-10-CM

## 2017-10-13 DIAGNOSIS — M79.606 PAIN OF LOWER EXTREMITY, UNSPECIFIED LATERALITY: Primary | ICD-10-CM

## 2017-10-17 ENCOUNTER — GENERIC CONVERSION - ENCOUNTER (OUTPATIENT)
Dept: OTHER | Facility: OTHER | Age: 82
End: 2017-10-17

## 2017-10-19 ENCOUNTER — ALLSCRIPTS OFFICE VISIT (OUTPATIENT)
Dept: OTHER | Facility: OTHER | Age: 82
End: 2017-10-19

## 2017-10-24 ENCOUNTER — GENERIC CONVERSION - ENCOUNTER (OUTPATIENT)
Dept: OTHER | Facility: OTHER | Age: 82
End: 2017-10-24

## 2017-11-01 ENCOUNTER — HOSPITAL ENCOUNTER (OUTPATIENT)
Dept: NEUROLOGY | Facility: CLINIC | Age: 82
Discharge: HOME | End: 2017-11-01
Payer: MEDICARE

## 2017-11-01 DIAGNOSIS — M79.606 PAIN OF LOWER EXTREMITY, UNSPECIFIED LATERALITY: ICD-10-CM

## 2017-11-01 DIAGNOSIS — G62.9 NEUROPATHY: ICD-10-CM

## 2017-11-01 PROCEDURE — 95911 NRV CNDJ TEST 9-10 STUDIES: CPT

## 2017-11-06 ENCOUNTER — ALLSCRIPTS OFFICE VISIT (OUTPATIENT)
Dept: OTHER | Facility: OTHER | Age: 82
End: 2017-11-06

## 2017-11-07 ENCOUNTER — GENERIC CONVERSION - ENCOUNTER (OUTPATIENT)
Dept: OTHER | Facility: OTHER | Age: 82
End: 2017-11-07

## 2017-11-09 ENCOUNTER — ALLSCRIPTS OFFICE VISIT (OUTPATIENT)
Dept: OTHER | Facility: OTHER | Age: 82
End: 2017-11-09

## 2017-11-09 ENCOUNTER — LAB REQUISITION (OUTPATIENT)
Dept: LAB | Facility: HOSPITAL | Age: 82
End: 2017-11-09
Payer: MEDICARE

## 2017-11-09 DIAGNOSIS — N39.41 URGE INCONTINENCE: ICD-10-CM

## 2017-11-09 LAB
BILIRUB UR QL STRIP: NEGATIVE
CLARITY UR: NORMAL
COLOR UR: NORMAL
GLUCOSE (HISTORICAL): NEGATIVE
HGB UR QL STRIP.AUTO: NORMAL
KETONES UR STRIP-MCNC: NEGATIVE MG/DL
LEUKOCYTE ESTERASE UR QL STRIP: NEGATIVE
NITRITE UR QL STRIP: NEGATIVE
PH UR STRIP.AUTO: 6 [PH]
PROT UR STRIP-MCNC: 15 MG/DL
SP GR UR STRIP.AUTO: 1.01
UROBILINOGEN UR QL STRIP.AUTO: 0.2

## 2017-11-09 PROCEDURE — 81001 URINALYSIS AUTO W/SCOPE: CPT | Performed by: FAMILY MEDICINE

## 2017-11-09 PROCEDURE — 87086 URINE CULTURE/COLONY COUNT: CPT | Performed by: FAMILY MEDICINE

## 2017-11-10 LAB
BACTERIA UR QL AUTO: ABNORMAL /HPF
BILIRUB UR QL STRIP: NEGATIVE
CLARITY UR: CLEAR
COLOR UR: YELLOW
GLUCOSE UR STRIP-MCNC: NEGATIVE MG/DL
HGB UR QL STRIP.AUTO: ABNORMAL
HYALINE CASTS #/AREA URNS LPF: ABNORMAL /LPF
KETONES UR STRIP-MCNC: NEGATIVE MG/DL
LEUKOCYTE ESTERASE UR QL STRIP: NEGATIVE
NITRITE UR QL STRIP: NEGATIVE
NON-SQ EPI CELLS URNS QL MICRO: ABNORMAL /HPF
PH UR STRIP.AUTO: 6 [PH] (ref 4.5–8)
PROT UR STRIP-MCNC: NEGATIVE MG/DL
RBC #/AREA URNS AUTO: ABNORMAL /HPF
SP GR UR STRIP.AUTO: 1.01 (ref 1–1.03)
UROBILINOGEN UR QL STRIP.AUTO: 0.2 E.U./DL
WBC #/AREA URNS AUTO: ABNORMAL /HPF

## 2017-11-11 LAB — BACTERIA UR CULT: NORMAL

## 2017-11-12 ENCOUNTER — GENERIC CONVERSION - ENCOUNTER (OUTPATIENT)
Dept: OTHER | Facility: OTHER | Age: 82
End: 2017-11-12

## 2017-11-15 ENCOUNTER — GENERIC CONVERSION - ENCOUNTER (OUTPATIENT)
Dept: OTHER | Facility: OTHER | Age: 82
End: 2017-11-15

## 2017-11-15 ENCOUNTER — LAB CONVERSION - ENCOUNTER (OUTPATIENT)
Dept: OTHER | Facility: OTHER | Age: 82
End: 2017-11-15

## 2017-11-15 LAB
A/G RATIO (HISTORICAL): 1.7 (CALC) (ref 1–2.5)
ALBUMIN SERPL BCP-MCNC: 4 G/DL (ref 3.6–5.1)
ALP SERPL-CCNC: 95 U/L (ref 33–130)
ALT SERPL W P-5'-P-CCNC: 19 U/L (ref 6–29)
AST SERPL W P-5'-P-CCNC: 24 U/L (ref 10–35)
BASOPHILS # BLD AUTO: 0.3 %
BASOPHILS # BLD AUTO: 18 CELLS/UL (ref 0–200)
BILIRUB SERPL-MCNC: 1.2 MG/DL (ref 0.2–1.2)
BUN SERPL-MCNC: 18 MG/DL (ref 7–25)
BUN/CREA RATIO (HISTORICAL): ABNORMAL (CALC) (ref 6–22)
CALCIUM SERPL-MCNC: 8.9 MG/DL (ref 8.6–10.4)
CHLORIDE SERPL-SCNC: 104 MMOL/L (ref 98–110)
CO2 SERPL-SCNC: 29 MMOL/L (ref 20–31)
CREAT SERPL-MCNC: 0.88 MG/DL (ref 0.6–0.88)
DEPRECATED RDW RBC AUTO: 13.9 % (ref 11–15)
EGFR AFRICAN AMERICAN (HISTORICAL): 71 ML/MIN/1.73M2
EGFR-AMERICAN CALC (HISTORICAL): 62 ML/MIN/1.73M2
EOSINOPHIL # BLD AUTO: 177 CELLS/UL (ref 15–500)
EOSINOPHIL # BLD AUTO: 3 %
GAMMA GLOBULIN (HISTORICAL): 2.3 G/DL (CALC) (ref 1.9–3.7)
GLUCOSE (HISTORICAL): 140 MG/DL (ref 65–99)
HBA1C MFR BLD HPLC: 6.6 % OF TOTAL HGB
HCT VFR BLD AUTO: 28.5 % (ref 35–45)
HGB BLD-MCNC: 9.5 G/DL (ref 11.7–15.5)
LYMPHOCYTES # BLD AUTO: 1676 CELLS/UL (ref 850–3900)
LYMPHOCYTES # BLD AUTO: 28.4 %
MCH RBC QN AUTO: 32.5 PG (ref 27–33)
MCHC RBC AUTO-ENTMCNC: 33.3 G/DL (ref 32–36)
MCV RBC AUTO: 97.6 FL (ref 80–100)
MONOCYTES # BLD AUTO: 649 CELLS/UL (ref 200–950)
MONOCYTES (HISTORICAL): 11 %
NEUTROPHILS # BLD AUTO: 3381 CELLS/UL (ref 1500–7800)
NEUTROPHILS # BLD AUTO: 57.3 %
PLATELET # BLD AUTO: 235 THOUSAND/UL (ref 140–400)
PMV BLD AUTO: 10.7 FL (ref 7.5–12.5)
POTASSIUM SERPL-SCNC: 3.7 MMOL/L (ref 3.5–5.3)
RBC # BLD AUTO: 2.92 MILLION/UL (ref 3.8–5.1)
SODIUM SERPL-SCNC: 140 MMOL/L (ref 135–146)
TOTAL PROTEIN (HISTORICAL): 6.3 G/DL (ref 6.1–8.1)
TSH SERPL DL<=0.05 MIU/L-ACNC: 1.07 MIU/L (ref 0.4–4.5)
WBC # BLD AUTO: 5.9 THOUSAND/UL (ref 3.8–10.8)

## 2017-11-16 DIAGNOSIS — Z12.11 ENCOUNTER FOR SCREENING FOR MALIGNANT NEOPLASM OF COLON: ICD-10-CM

## 2017-11-16 DIAGNOSIS — D63.8 ANEMIA IN OTHER CHRONIC DISEASES CLASSIFIED ELSEWHERE: ICD-10-CM

## 2017-11-20 ENCOUNTER — ALLSCRIPTS OFFICE VISIT (OUTPATIENT)
Dept: OTHER | Facility: OTHER | Age: 82
End: 2017-11-20

## 2017-11-20 ENCOUNTER — APPOINTMENT (OUTPATIENT)
Dept: LAB | Facility: CLINIC | Age: 82
End: 2017-11-20
Payer: MEDICARE

## 2017-11-20 ENCOUNTER — TRANSCRIBE ORDERS (OUTPATIENT)
Dept: LAB | Facility: CLINIC | Age: 82
End: 2017-11-20

## 2017-11-20 ENCOUNTER — GENERIC CONVERSION - ENCOUNTER (OUTPATIENT)
Dept: OTHER | Facility: OTHER | Age: 82
End: 2017-11-20

## 2017-11-20 DIAGNOSIS — D63.8 ANEMIA IN OTHER CHRONIC DISEASES CLASSIFIED ELSEWHERE: ICD-10-CM

## 2017-11-20 DIAGNOSIS — D64.9 ANEMIA, UNSPECIFIED TYPE: Primary | ICD-10-CM

## 2017-11-20 DIAGNOSIS — Z12.11 ENCOUNTER FOR SCREENING FOR MALIGNANT NEOPLASM OF COLON: ICD-10-CM

## 2017-11-20 LAB — HEMOCCULT STL QL IA: POSITIVE

## 2017-11-20 PROCEDURE — G0328 FECAL BLOOD SCRN IMMUNOASSAY: HCPCS

## 2017-11-27 DIAGNOSIS — D63.8 ANEMIA IN OTHER CHRONIC DISEASES CLASSIFIED ELSEWHERE: ICD-10-CM

## 2017-11-27 DIAGNOSIS — R60.9 EDEMA: ICD-10-CM

## 2017-11-28 ENCOUNTER — ALLSCRIPTS OFFICE VISIT (OUTPATIENT)
Dept: OTHER | Facility: OTHER | Age: 82
End: 2017-11-28

## 2017-11-29 ENCOUNTER — GENERIC CONVERSION - ENCOUNTER (OUTPATIENT)
Dept: OTHER | Facility: OTHER | Age: 82
End: 2017-11-29

## 2017-11-29 ENCOUNTER — APPOINTMENT (OUTPATIENT)
Dept: LAB | Facility: CLINIC | Age: 82
End: 2017-11-29
Payer: MEDICARE

## 2017-11-29 DIAGNOSIS — D63.8 ANEMIA IN OTHER CHRONIC DISEASES CLASSIFIED ELSEWHERE: ICD-10-CM

## 2017-11-29 LAB
BASOPHILS # BLD AUTO: 0.01 THOUSANDS/ΜL (ref 0–0.1)
BASOPHILS NFR BLD AUTO: 0 % (ref 0–1)
EOSINOPHIL # BLD AUTO: 0.21 THOUSAND/ΜL (ref 0–0.61)
EOSINOPHIL NFR BLD AUTO: 3 % (ref 0–6)
ERYTHROCYTE [DISTWIDTH] IN BLOOD BY AUTOMATED COUNT: 14.6 % (ref 11.6–15.1)
FERRITIN SERPL-MCNC: 86 NG/ML (ref 8–388)
HCT VFR BLD AUTO: 28.9 % (ref 34.8–46.1)
HGB BLD-MCNC: 9.7 G/DL (ref 11.5–15.4)
IRON SATN MFR SERPL: 16 %
IRON SERPL-MCNC: 46 UG/DL (ref 50–170)
LYMPHOCYTES # BLD AUTO: 2.25 THOUSANDS/ΜL (ref 0.6–4.47)
LYMPHOCYTES NFR BLD AUTO: 33 % (ref 14–44)
MCH RBC QN AUTO: 33.1 PG (ref 26.8–34.3)
MCHC RBC AUTO-ENTMCNC: 33.6 G/DL (ref 31.4–37.4)
MCV RBC AUTO: 99 FL (ref 82–98)
MONOCYTES # BLD AUTO: 0.75 THOUSAND/ΜL (ref 0.17–1.22)
MONOCYTES NFR BLD AUTO: 11 % (ref 4–12)
NEUTROPHILS # BLD AUTO: 3.61 THOUSANDS/ΜL (ref 1.85–7.62)
NEUTS SEG NFR BLD AUTO: 53 % (ref 43–75)
NRBC BLD AUTO-RTO: 0 /100 WBCS
PLATELET # BLD AUTO: 215 THOUSANDS/UL (ref 149–390)
PMV BLD AUTO: 10.5 FL (ref 8.9–12.7)
RBC # BLD AUTO: 2.93 MILLION/UL (ref 3.81–5.12)
TIBC SERPL-MCNC: 293 UG/DL (ref 250–450)
WBC # BLD AUTO: 6.85 THOUSAND/UL (ref 4.31–10.16)

## 2017-11-29 PROCEDURE — 82728 ASSAY OF FERRITIN: CPT

## 2017-11-29 PROCEDURE — 36415 COLL VENOUS BLD VENIPUNCTURE: CPT

## 2017-11-29 PROCEDURE — 83550 IRON BINDING TEST: CPT

## 2017-11-29 PROCEDURE — 83540 ASSAY OF IRON: CPT

## 2017-11-29 PROCEDURE — 85025 COMPLETE CBC W/AUTO DIFF WBC: CPT

## 2017-11-29 NOTE — CONSULTS
Assessment  1  Positive FIT (fecal immunochemical test) (792 1) (R19 5)   2  Anemia of chronic disease (285 29) (D63 8)    Plan  Anemia of chronic disease    · (1) IRON PANEL; Status:Active; Requested for:28Nov2017;    Perform:[facility]; KMQ:[id number]; Ordered; For:Anemia of chronic disease; Ordered By:Martinez Juarez;  Positive FIT (fecal immunochemical test)    · COLONOSCOPY (GI, SURG); Status:Hold For - Scheduling; Requested for:28Nov2017;    Perform:[facility]; [place] Comments[facility] [place] [facility]; VLW:[id number]; Ordered; For:Positive FIT (fecal immunochemical test); Ordered By:Martinez Juarez;   · EGD; Status:Hold For - Scheduling; Requested for:28Nov2017;    Perform:[facility]; Due:48Afm5837;Ordered;FIT (fecal immunochemical test); Ordered By:Martinez Juarez;   · Follow-up visit in 1 month Evaluation and Treatment  Follow-up  Status: Hold For -Scheduling  Requested for: 45IOU0990   Ordered;Positive FIT (fecal immunochemical test); Ordered By: Saunders Gosselin Performed:  Due: 48VKI6057    Discussion/Summary  Discussion Summary:   52-year-old female here for evaluation of anemia and positive fecal immunochemical test while on Xarelto  She denies melena or hematochezia  She had 1 -2 g drop in hemoglobin concerning for occult GI lesion  Differential diagnosis include hemorrhoidal bleeding, advanced adenoma, AVMs or peptic ulcer disease  I will schedule for EGD and colonoscopy for further evaluation of underlying GI lesion  I had an extensive discussion with the patient and her  regarding risks and benefits of EGD and colonoscopy  Risks include but not limited to infection, bleeding, perforation, missed lesion  We also discussed risk related to anesthesia including cardiopulmonary complication and stroke  Patient verbalized understanding and agreed to proceed with EGD and colonoscopy  I asked her to hold Xarelto for 2 days prior to her procedure   I will see her back in the office in 1 month  Counseling Documentation With Imm: The patient was counseled regarding prognosis,-- patient and family education,-- impressions  Goals and Barriers: The patient has the current Goals: See above  The patent has the current Barriers: None  Chief Complaint  Chief Complaint Free Text Note Form: Patient consult for positive FIT  History of Present Illness  HPI: 59-year-old female with 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, 2017 here for evaluation of anemia and positive fecal immunochemical test  She is found to have hemoglobin of 9 5 on November 14, 2016  Then fit test was ordered and came back positive  She denies melena, hematochezia, hematemesis, abdominal pain, nausea, vomiting or diarrhea  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  baseline hemoglobin ranges between 10-11 g/dl  had EGD and colonoscopy few years ago  Her EGD was in 2015 which showed gastritis  Her colonoscopy was in 2013 had a single polyp removed from sigmoid colon  He was recommended for her to have follow-up colonoscopy in 2016 but she did not have 1 done  Review of Systems  Complete-Female GI Adult:  Constitutional: No fever, no chills, feels well, no tiredness, no recent weight gain or weight loss  Eyes: No complaints of eye pain, no red eyes, no eyesight problems, no discharge, no dry eyes, no itching of eyes  ENT: no complaints of earache, no loss of hearing, no nose bleeds, no nasal discharge, no sore throat, no hoarseness  Cardiovascular: No complaints of slow heart rate, no fast heart rate, no chest pain, no palpitations, no leg claudication, no lower extremity edema  Respiratory: No complaints of shortness of breath, no wheezing, no cough, no SOB on exertion, no orthopnea, no PND  Gastrointestinal: change in bowels, but-- No complaints of abdominal pain, no constipation, no nausea or vomiting, no diarrhea, no bloody stools    Genitourinary: No complaints of dysuria, no incontinence, no pelvic pain, no dysmenorrhea, no vaginal discharge or bleeding  Musculoskeletal: No complaints of arthralgias, no myalgias, no joint swelling or stiffness, no limb pain or swelling  Integumentary: No complaints of skin rash or lesions, no itching, no skin wounds, no breast pain or lump  Neurological: No complaints of headache, no confusion, no convulsions, no numbness, no dizziness or fainting, no tingling, no limb weakness, no difficulty walking  Psychiatric: Not suicidal, no sleep disturbance, no anxiety or depression, no change in personality, no emotional problems  Endocrine: No complaints of proptosis, no hot flashes, no muscle weakness, no deepening of the voice, no feelings of weakness  Hematologic/Lymphatic: No complaints of swollen glands, no swollen glands in the neck, does not bleed easily, does not bruise easily  ROS Reviewed:   ROS reviewed  Active Problems  1  Anemia of chronic disease (285 29) (D63 8)   2  Benign essential hypertension (401 1) (I10)   3  Carpal tunnel syndrome, left (354 0) (G56 02)   4  History of Cellulitis of left lower leg (682 6) (L03 116)   5  Chronic diarrhea (787 91) (K52 9)   6  Colonoscopy (Fiberoptic) Screening   7  CVA (cerebral vascular accident) (434 91) (I63 9)   8  Edema (782 3) (R60 9)   9  Encounter for FIT (fecal immunochemical test) screening (V76 51) (Z12 11)   10  Encounter for preventive health examination (V70 0) (Z00 00)   11  Encounter for screening mammogram for breast cancer (V76 12) (Z12 31)   12  Esophageal reflux (530 81) (K21 9)   13  Flu vaccine need (V04 81) (Z23)   14  Hematuria (599 70) (R31 9)   15  History of type 2 diabetes mellitus (V12 29) (Z86 39)   16  Hyperlipidemia (272 4) (E78 5)   17  Hypertension (401 9) (I10)   18  Hyponatremia (276 1) (E87 1)   19  Hypothyroidism (244 9) (E03 9)   20  Increased frequency of urination (788 41) (R35 0)   21  Increased frequency of urination (788 41) (R35 0)   22   Increased frequency of urination (788 41) (R35 0)   23  Lower extremity pain, anterior, unspecified laterality (729 5) (M79 606)   24  Lymphedema (457 1) (I89 0)   25  Medicare annual wellness visit, initial (V70 0) (Z00 00)   26  Multiple falls (V15 88) (R29 6)   27  Myalgia (729 1) (M79 1)   28  Need for influenza vaccination (V04 81) (Z23)   29  Need for pneumococcal vaccination (V03 82) (Z23)   30  Obesity (278 00) (E66 9)   31  Overactive bladder (596 51) (N32 81)   32  Peripheral neuropathy (356 9) (G62 9)   33  Positive FIT (fecal immunochemical test) (792 1) (R19 5)   34  Prediabetes (790 29) (R73 03)   35  Primary osteoarthritis of left knee (715 16) (M17 12)   36  Restless legs syndrome (333 94) (G25 81)   37  Rheumatoid arthritis (714 0) (M06 9)   38  Right lumbar radiculopathy (724 4) (M54 16)   39  Screening for glaucoma (V80 1) (Z13 5)   40  Sensory urge incontinence (788 31) (N39 41)   41  Sleep apnea (780 57) (G47 30)   42  Speech complaints (784 59) (R47 9)   43  Spinal stenosis (724 00) (M48 00)   44  Tinea cruris (110 3) (B35 6)   45  Urge incontinence of urine (788 31) (N39 41)   46  Urinary frequency (788 41) (R35 0)   47  Urinary tract infection (599 0) (N39 0)   48  Urinary urgency (788 63) (R39 15)   49  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)   50  Yeast vaginitis (112 1) (B37 3)    Past Medical History  1  History of Ankle sprain (845 00) (S93 409A)   2  History of Benign essential hypertension (401 1) (I10)   3  History of Cellulitis of left lower leg (682 6) (L03 116)   4  History of Colon, diverticulosis (562 10) (K57 30)   5  History of Early satiety (780 94) (R68 81)   6  History of Female cystocele (618 01) (N81 10)   7  History of Female stress incontinence (625 6) (N39 3)   8  History of abdominal pain (V13 89) (Z87 898)   9  History of arthritis (V13 4) (Z87 39)   10  History of cataract (V12 49) (Z86 69)   11   History of chronic cystitis (V13 00) (Z87 448)   12  History of colonic polyps (V12 72) (Z86 010)   13  History of diverticulitis of colon (V12 79) (Z87 19)   14  History of gastritis (V12 79) (Z87 19)   15  History of gastroesophageal reflux (GERD) (V12 79) (Z87 19)   16  History of gout (V12 29) (Z87 39)   17  History of hearing loss (V12 49) (Z86 69)   18  History of hemorrhoids (V13 89) (Z87 19)   19  History of hiatal hernia (V12 79) (Z87 19)   20  History of hypertension (V12 59) (Z86 79)   21  History of hypothyroidism (V12 29) (Z86 39)   22  History of impacted cerumen (V12 49) (Z86 69)   23  History of rheumatoid arthritis (V13 4) (Z87 39)   24  History of type 2 diabetes mellitus (V12 29) (Z86 39)   25  History of urinary tract infection (V13 02) (Z87 440)   26  History of urinary urgency (V13 00) (Z87 448)   27  History of Impaired fasting glucose (790 21) (R73 01)   28  History of Left breast mass (611 72) (N63 20)   29  History of Microhematuria (599 72) (R31 29)   30  History of Pelvic pain (R10 2)   31  History of Pneumonia of left lower lobe due to infectious organism (486) (J18 1)   32  History of Poorly controlled type 2 diabetes mellitus (250 00) (E11 65)   33  History of Weight loss, non-intentional (783 21) (R63 4)  Active Problems And Past Medical History Reviewed: The active problems and past medical history were reviewed and updated today  Surgical History  1  History of Appendectomy   2  History of Bladder Surgery   3  History of Cholecystectomy   4  History of Colonoscopy (Fiberoptic)   5  History of Cystoscopy (Diagnostic) Bladder   6  History of Diagnostic Esophagogastroduodenoscopy   7  History of Hysterectomy   8  History of Knee Replacement   9  History of Neurostimulation Of Posterior Tibial Nerve By Percutaneous Needle Electrode   10  History of Nose Surgery   11  History of Simple Bunion Exostectomy (Silver Procedure)   12  History of Treatment Of Forearm Fracture   13   History of Wrist Arthroscopy With Release Of Transverse Carpal Ligament  Surgical History Reviewed: The surgical history was reviewed and updated today  Family History  Mother    1  Family history of Epilepsy  Father    2  Family history of Glaucoma   3  Family history of Silent Stroke  Brother    4  Family history of cardiac disorder (V17 49) (Z82 49)  Family History Reviewed: The family history was reviewed and updated today  Social History     · Alcohol Use (History)   · Does not use illicit drugs (B60 63) (Z78 9)   · Former smoker, stopped smoking in distant past (V15 82) (Z87 891)   ·    · Never a smoker   · No caffeine use   · Social drinker (V49 89) (Z78 9)  Social History Reviewed: The social history was reviewed and updated today  The social history was reviewed and is unchanged  Current Meds   1  Calcium + D TABS; TAKE 1 TABLET TWICE DAILY; Therapy: (Recorded:11Aug2015) to Recorded   2  Cimzia Prefilled KIT; Therapy: (Recorded:02Jun2017) to Recorded   3  Daily Multivitamin TABS; Take 1 tablet daily Recorded   4  Diclofenac Sodium 1 % Transdermal Gel; apply 4 g up to QID; Therapy: 40YGW1732 to (Last [place])  Requested for: 69REY8935 Ordered   5  Furosemide 20 MG Oral Tablet; TAKE 1 TABLET DAILY  Take twice daily as needed for increased edema; Therapy: 11Aug2016 to (Maria Teresa Raffy)  Requested for: 63UEB4135 Recorded   6  Gabapentin 300 MG Oral Capsule; take 1 po day 1 then 1 po BID, then 1 po TID; Therapy: 33VSB1077 to (Last MK:29MTN3611)  Requested for: 12OMD5246 Ordered   7  Leucovorin Calcium 5 MG Oral Tablet; TAKE 2 TABLETS ONCE WEEKLY; Therapy: 66IWG9249 to (Evaluate:17Aug2017); Last Rx:71Qtc9391 Ordered   8  Levothyroxine Sodium 75 MCG Oral Tablet; take 1 tablet every day; Therapy: 04TEA7240 to (Evaluate:03Yxu5388)  Requested for: 35Ith3038; Last Rx:27Hkd0905 Ordered   9  Losartan Potassium 100 MG Oral Tablet; Take 1 tablet daily;  Therapy: 89XCA2585 to 070 0923 1425)  Requested for: 63GNT7790; Last Rx:05Nov2017 Ordered   10  Lovastatin TABS; TAKE 1 TABLET DAILY; Therapy: (Recorded:21Gfo1443) to Recorded   11  Methotrexate 2 5 MG Oral Tablet; TAKE 8 TABLETS PER WEEK; Therapy: 79VQM2616 to (Evaluate:72Lzl9353)  Requested for: 11Aug2015 Recorded   12  Mometasone Furoate 0 1 % External Solution; Apply to ears daily as needed; Therapy: 75YEI2733 to (Last Rexie Muse)  Requested for: 16SDL9196 Ordered   13  Nitrofurantoin Macrocrystal 100 MG Oral Capsule; TAKE 1 CAPSULE EVERY 12 HOURS  DAILY; Therapy: 94BIW2788 to (Evaluate:14Nov2017)  Requested for: 55KTJ8078; Last  Rx:09Nov2017 Ordered   14  Omeprazole 20 MG Oral Capsule Delayed Release; TAKE 1 CAPSULE DAILY as needed  for stomach acid; Therapy: 99NVG5388 to (Evaluate:47Naz9160)  Requested for: 07SIF3800; Last  Rx:54Owa3962 Ordered   15  Pravastatin Sodium 80 MG Oral Tablet; TAKE 1 TABLET DAILY AT 5PM;  Therapy: 14VOF3447 to (Evaluate:18Mar2018)  Requested for: 31KCX6909; Last  Rx:66Hxh7584 Ordered   16  ROPINIRole HCl - 0 5 MG Oral Tablet; TAKE 2 TABLETS AT BEDTIME AND 1 TABLET IN  THE MORNING; Therapy: 89ACE3183 to 070 0373 1425)  Requested for: 38AQB3404; Last  Rx:05Nov2017 Ordered   17  Tylenol Arthritis Pain TBCR; TAKE 1 TABLET 4 TIMES DAILY AS NEEDED Recorded   18  Xarelto 20 MG Oral Tablet; take 1 tablet every day; Therapy: 22Spk8460 to (Evaluate:62Fku6844); Last Rx:17Nov2017 Ordered  Medication List Reviewed: The medication list was reviewed and updated today  Allergies  1  Darvocet-N 100 TABS   2  Lipitor TABS   3  Sulfa Drugs   4  Tramadol   5  Zithromax TABS  Denied    6  Macrobid CAPS    Vitals  Vital Signs    Recorded: 91DMM4417 04:34PM   Heart Rate 80   Systolic 514, LUE, Sitting   Diastolic 80, LUE, Sitting   Height 5 ft 1 in   Weight 223 lb 8 0 oz   BMI Calculated 42 23   BSA Calculated 1 98   O2 Saturation 98       Physical Exam   Constitutional  General appearance: No acute distress, well appearing and well nourished     Eyes Conjunctiva and lids: No swelling, erythema or discharge  Ears, Nose, Mouth, and Throat  Oropharynx: Normal with no erythema, edema, exudate or lesions  Pulmonary  Respiratory effort: No increased work of breathing or signs of respiratory distress  Auscultation of lungs: Clear to auscultation  Cardiovascular  Auscultation of heart: Normal rate and rhythm, normal S1 and S2, without murmurs  Abdomen  Abdomen: Non-tender, no masses  Liver and spleen: No hepatomegaly or splenomegaly  Lymphatic  Palpation of lymph nodes in neck: No lymphadenopathy  Musculoskeletal  Digits and nails: Normal without clubbing or cyanosis  Skin  Skin and subcutaneous tissue: Normal without rashes or lesions  Neurologic  Cranial nerves: Cranial nerves 2-12 intact  Psychiatric  Orientation to person, place, and time: Normal          Results/Data  (1) OCCULT BLOOD, FECAL IMMUNOCHEMICAL TEST 20Nov2017 02:22PM [facility] Order Number: [id number]     Test Name Result Flag Reference   OCCULT BLD, FECAL IMMUNOLOGICAL Positive A Negative   Performed by Fecal Immunochemical Test      (1) COMPREHENSIVE METABOLIC PANEL 01EBI5529 57:67GB [place]     Test Name Result Flag Reference   GLUCOSE 140 mg/dL H 65-99     Fasting reference interval   For someone without known diabetes, a glucose value >125 mg/dL indicates that they may have diabetes and this should be confirmed with a follow-up test    UREA NITROGEN (BUN) 18 mg/dL  7-25   CREATININE 0 88 mg/dL  0 60-0 88     For patients >52years of age, the reference limit for Creatinine is approximately 13% higher for people identified as -American  eGFR NON-AFR   AMERICAN 62 mL/min/1 73m2  > OR = 60   eGFR AFRICAN AMERICAN 71 mL/min/1 73m2  > OR = 60   BUN/CREATININE RATIO   9-37   NOT APPLICABLE (calc)   SODIUM 140 mmol/L  135-146   POTASSIUM 3 7 mmol/L  3 5-5 3   CHLORIDE 104 mmol/L     CARBON DIOXIDE 29 mmol/L  20-31   CALCIUM 8 9 mg/dL 8  6-10 4   PROTEIN, TOTAL 6 3 g/dL  6 1-8 1   ALBUMIN 4 0 g/dL  3 6-5 1   GLOBULIN 2 3 g/dL (calc)  1 9-3 7   ALBUMIN/GLOBULIN RATIO 1 7 (calc)  1 0-2 5   BILIRUBIN, TOTAL 1 2 mg/dL  0 2-1 2   ALKALINE PHOSPHATASE 95 U/L     AST 24 U/L  10-35   ALT 19 U/L  6-29     Future Appointments    Date/Time Provider Specialty Site   03/26/2018 11:30 AM Ariella Lyle MD Neurology [facility] 21   12/21/2017 01:45 PM [facility] 98   01/10/2018 01:00 PM Jhon Hickey MD Gastroenterology Adult [facility] 9   01/03/2018 03:45 PM GLORIA Scott   Family Medicine [address] [place] [address]   12/06/2017 02:20 PM Mary Anne Fulton MD Family Medicine [address] [address]   Electronically signed by : Aileen Santiago MD; Nov 28 2017  6:01PM EST                       (Author)

## 2017-12-05 ENCOUNTER — APPOINTMENT (OUTPATIENT)
Dept: LAB | Facility: CLINIC | Age: 82
End: 2017-12-05
Payer: MEDICARE

## 2017-12-05 DIAGNOSIS — R60.9 EDEMA: ICD-10-CM

## 2017-12-05 LAB
ANION GAP SERPL CALCULATED.3IONS-SCNC: 6 MMOL/L (ref 4–13)
BUN SERPL-MCNC: 17 MG/DL (ref 5–25)
CALCIUM SERPL-MCNC: 9 MG/DL (ref 8.3–10.1)
CHLORIDE SERPL-SCNC: 106 MMOL/L (ref 100–108)
CO2 SERPL-SCNC: 29 MMOL/L (ref 21–32)
CREAT SERPL-MCNC: 0.87 MG/DL (ref 0.6–1.3)
GFR SERPL CREATININE-BSD FRML MDRD: 63 ML/MIN/1.73SQ M
GLUCOSE SERPL-MCNC: 114 MG/DL (ref 65–140)
POTASSIUM SERPL-SCNC: 3.6 MMOL/L (ref 3.5–5.3)
SODIUM SERPL-SCNC: 141 MMOL/L (ref 136–145)

## 2017-12-05 PROCEDURE — 80048 BASIC METABOLIC PNL TOTAL CA: CPT

## 2017-12-05 PROCEDURE — 36415 COLL VENOUS BLD VENIPUNCTURE: CPT

## 2017-12-06 ENCOUNTER — GENERIC CONVERSION - ENCOUNTER (OUTPATIENT)
Dept: OTHER | Facility: OTHER | Age: 82
End: 2017-12-06

## 2017-12-14 ENCOUNTER — APPOINTMENT (OUTPATIENT)
Dept: LAB | Facility: CLINIC | Age: 82
End: 2017-12-14
Payer: MEDICARE

## 2017-12-14 DIAGNOSIS — I89.0 OBLITERATION OF LYMPHATIC VESSEL: ICD-10-CM

## 2017-12-14 DIAGNOSIS — M79.89 SWELLING OF LIMB: ICD-10-CM

## 2017-12-14 DIAGNOSIS — Z79.899 NEED FOR PROPHYLACTIC CHEMOTHERAPY: Primary | ICD-10-CM

## 2017-12-14 DIAGNOSIS — M05.79 SEROPOSITIVE RHEUMATOID ARTHRITIS OF MULTIPLE SITES (HCC): ICD-10-CM

## 2017-12-14 DIAGNOSIS — R53.83 FATIGUE, UNSPECIFIED TYPE: ICD-10-CM

## 2017-12-14 DIAGNOSIS — Z51.81 ENCOUNTER FOR THERAPEUTIC DRUG MONITORING: ICD-10-CM

## 2017-12-14 DIAGNOSIS — D64.9 ANEMIA, UNSPECIFIED TYPE: ICD-10-CM

## 2017-12-14 LAB
ALBUMIN SERPL BCP-MCNC: 3.8 G/DL (ref 3.5–5)
ALP SERPL-CCNC: 115 U/L (ref 46–116)
ALT SERPL W P-5'-P-CCNC: 24 U/L (ref 12–78)
ANION GAP SERPL CALCULATED.3IONS-SCNC: 6 MMOL/L (ref 4–13)
AST SERPL W P-5'-P-CCNC: 20 U/L (ref 5–45)
BASOPHILS # BLD AUTO: 0.02 THOUSANDS/ΜL (ref 0–0.1)
BASOPHILS NFR BLD AUTO: 0 % (ref 0–1)
BILIRUB SERPL-MCNC: 0.86 MG/DL (ref 0.2–1)
BUN SERPL-MCNC: 17 MG/DL (ref 5–25)
CALCIUM SERPL-MCNC: 9 MG/DL (ref 8.3–10.1)
CHLORIDE SERPL-SCNC: 105 MMOL/L (ref 100–108)
CO2 SERPL-SCNC: 30 MMOL/L (ref 21–32)
CREAT SERPL-MCNC: 0.92 MG/DL (ref 0.6–1.3)
CRP SERPL QL: <3 MG/L
EOSINOPHIL # BLD AUTO: 0.19 THOUSAND/ΜL (ref 0–0.61)
EOSINOPHIL NFR BLD AUTO: 3 % (ref 0–6)
ERYTHROCYTE [DISTWIDTH] IN BLOOD BY AUTOMATED COUNT: 14.7 % (ref 11.6–15.1)
ERYTHROCYTE [SEDIMENTATION RATE] IN BLOOD: 28 MM/HOUR (ref 0–20)
GFR SERPL CREATININE-BSD FRML MDRD: 59 ML/MIN/1.73SQ M
GLUCOSE SERPL-MCNC: 150 MG/DL (ref 65–140)
HCT VFR BLD AUTO: 30.9 % (ref 34.8–46.1)
HGB BLD-MCNC: 10.1 G/DL (ref 11.5–15.4)
LYMPHOCYTES # BLD AUTO: 1.79 THOUSANDS/ΜL (ref 0.6–4.47)
LYMPHOCYTES NFR BLD AUTO: 24 % (ref 14–44)
MCH RBC QN AUTO: 32.6 PG (ref 26.8–34.3)
MCHC RBC AUTO-ENTMCNC: 32.7 G/DL (ref 31.4–37.4)
MCV RBC AUTO: 100 FL (ref 82–98)
MONOCYTES # BLD AUTO: 1 THOUSAND/ΜL (ref 0.17–1.22)
MONOCYTES NFR BLD AUTO: 14 % (ref 4–12)
NEUTROPHILS # BLD AUTO: 4.4 THOUSANDS/ΜL (ref 1.85–7.62)
NEUTS SEG NFR BLD AUTO: 59 % (ref 43–75)
NRBC BLD AUTO-RTO: 0 /100 WBCS
PLATELET # BLD AUTO: 235 THOUSANDS/UL (ref 149–390)
PMV BLD AUTO: 9.9 FL (ref 8.9–12.7)
POTASSIUM SERPL-SCNC: 3.6 MMOL/L (ref 3.5–5.3)
PROT SERPL-MCNC: 7.2 G/DL (ref 6.4–8.2)
RBC # BLD AUTO: 3.1 MILLION/UL (ref 3.81–5.12)
SODIUM SERPL-SCNC: 141 MMOL/L (ref 136–145)
WBC # BLD AUTO: 7.41 THOUSAND/UL (ref 4.31–10.16)

## 2017-12-14 PROCEDURE — 36415 COLL VENOUS BLD VENIPUNCTURE: CPT

## 2017-12-14 PROCEDURE — 85652 RBC SED RATE AUTOMATED: CPT

## 2017-12-14 PROCEDURE — 86140 C-REACTIVE PROTEIN: CPT

## 2017-12-14 PROCEDURE — 80053 COMPREHEN METABOLIC PANEL: CPT

## 2017-12-14 PROCEDURE — 85025 COMPLETE CBC W/AUTO DIFF WBC: CPT

## 2017-12-19 ENCOUNTER — ANESTHESIA EVENT (OUTPATIENT)
Dept: GASTROENTEROLOGY | Facility: HOSPITAL | Age: 82
End: 2017-12-19
Payer: MEDICARE

## 2017-12-20 ENCOUNTER — GENERIC CONVERSION - ENCOUNTER (OUTPATIENT)
Dept: GASTROENTEROLOGY | Facility: CLINIC | Age: 82
End: 2017-12-20

## 2017-12-20 ENCOUNTER — ANESTHESIA (OUTPATIENT)
Dept: GASTROENTEROLOGY | Facility: HOSPITAL | Age: 82
End: 2017-12-20
Payer: MEDICARE

## 2017-12-20 ENCOUNTER — HOSPITAL ENCOUNTER (OUTPATIENT)
Facility: HOSPITAL | Age: 82
Setting detail: OUTPATIENT SURGERY
Discharge: HOME | End: 2017-12-20
Attending: INTERNAL MEDICINE | Admitting: INTERNAL MEDICINE
Payer: MEDICARE

## 2017-12-20 VITALS
SYSTOLIC BLOOD PRESSURE: 138 MMHG | HEART RATE: 77 BPM | DIASTOLIC BLOOD PRESSURE: 62 MMHG | BODY MASS INDEX: 42.1 KG/M2 | OXYGEN SATURATION: 99 % | TEMPERATURE: 97.8 F | WEIGHT: 223 LBS | HEIGHT: 61 IN | RESPIRATION RATE: 16 BRPM

## 2017-12-20 RX ORDER — GABAPENTIN 100 MG/1
100 CAPSULE ORAL 3 TIMES DAILY
COMMUNITY
End: 2018-02-12 | Stop reason: SDUPTHER

## 2017-12-20 RX ORDER — LIDOCAINE HYDROCHLORIDE 10 MG/ML
INJECTION, SOLUTION EPIDURAL; INFILTRATION; INTRACAUDAL; PERINEURAL AS NEEDED
Status: DISCONTINUED | OUTPATIENT
Start: 2017-12-20 | End: 2017-12-20 | Stop reason: SURG

## 2017-12-20 RX ORDER — PROPOFOL 10 MG/ML
INJECTION, EMULSION INTRAVENOUS CONTINUOUS PRN
Status: DISCONTINUED | OUTPATIENT
Start: 2017-12-20 | End: 2017-12-20 | Stop reason: SURG

## 2017-12-20 RX ORDER — OMEPRAZOLE 20 MG/1
20 CAPSULE, DELAYED RELEASE ORAL DAILY
COMMUNITY
End: 2018-03-19 | Stop reason: SDUPTHER

## 2017-12-20 RX ORDER — NITROFURANTOIN MACROCRYSTALS 100 MG/1
100 CAPSULE ORAL 2 TIMES DAILY
COMMUNITY
End: 2018-02-06 | Stop reason: ALTCHOICE

## 2017-12-20 RX ORDER — ROPINIROLE 0.5 MG/1
0.5 TABLET, FILM COATED ORAL 2 TIMES DAILY
COMMUNITY
End: 2018-09-05 | Stop reason: SDUPTHER

## 2017-12-20 RX ORDER — LEVOTHYROXINE SODIUM 0.07 MG/1
75 TABLET ORAL DAILY
COMMUNITY
End: 2018-10-17 | Stop reason: SDUPTHER

## 2017-12-20 RX ORDER — PROPOFOL 10 MG/ML
INJECTION, EMULSION INTRAVENOUS AS NEEDED
Status: DISCONTINUED | OUTPATIENT
Start: 2017-12-20 | End: 2017-12-20 | Stop reason: SURG

## 2017-12-20 RX ORDER — SODIUM CHLORIDE 9 MG/ML
125 INJECTION, SOLUTION INTRAVENOUS CONTINUOUS
Status: DISCONTINUED | OUTPATIENT
Start: 2017-12-20 | End: 2017-12-20 | Stop reason: HOSPADM

## 2017-12-20 RX ADMIN — PROPOFOL 20 MG: 10 INJECTION, EMULSION INTRAVENOUS at 10:18

## 2017-12-20 RX ADMIN — PROPOFOL 20 MG: 10 INJECTION, EMULSION INTRAVENOUS at 10:24

## 2017-12-20 RX ADMIN — PROPOFOL 20 MG: 10 INJECTION, EMULSION INTRAVENOUS at 10:09

## 2017-12-20 RX ADMIN — PROPOFOL 30 MG: 10 INJECTION, EMULSION INTRAVENOUS at 09:52

## 2017-12-20 RX ADMIN — PROPOFOL 20 MG: 10 INJECTION, EMULSION INTRAVENOUS at 10:21

## 2017-12-20 RX ADMIN — LIDOCAINE HYDROCHLORIDE 50 MG: 10 INJECTION, SOLUTION EPIDURAL; INFILTRATION; INTRACAUDAL; PERINEURAL at 09:50

## 2017-12-20 RX ADMIN — SODIUM CHLORIDE 125 ML/HR: 0.9 INJECTION, SOLUTION INTRAVENOUS at 09:37

## 2017-12-20 RX ADMIN — PROPOFOL 30 MCG/KG/MIN: 10 INJECTION, EMULSION INTRAVENOUS at 09:52

## 2017-12-20 RX ADMIN — PROPOFOL 50 MG: 10 INJECTION, EMULSION INTRAVENOUS at 09:50

## 2017-12-20 NOTE — ANESTHESIA POSTPROCEDURE EVALUATION
Post-Op Assessment Note      CV Status:  Stable    Mental Status:  Alert and awake    Hydration Status:  Euvolemic    PONV Controlled:  Controlled    Airway Patency:  Patent    Post Op Vitals Reviewed: Yes          Staff: Anesthesiologist, CRNA           BP   107/64   Temp      Pulse  75   Resp   14   SpO2   98

## 2017-12-20 NOTE — OP NOTE
**** GI/ENDOSCOPY REPORT ****     PATIENT NAME: CONSTANCE KIMBROUGH - VISIT ID:  Patient ID: [id number]   YOB: 1935     INTRODUCTION: Esophagogastroduodenoscopy - A 81 female patient presents   for an outpatient Esophagogastroduodenoscopy at [address] [place] [address]  INDICATIONS: Anemia  Fecal occult blood positive  CONSENT: The benefits, risks, and alternatives to the procedure were   discussed and informed consent was obtained from the patient  PREPARATION:  EKG, pulse, pulse oximetry and blood pressure were monitored   throughout the procedure  Airway Assessment Classification: Airway class 2   - Visualization of the soft palate, fauces and uvula  ASA Classification:   Class 3 - Patient has severe systemic disturbance that may or may not be   related to the disorder requiring surgery  MEDICATIONS: Anesthesia-check records     PROCEDURE:  The endoscope was passed without difficulty through the mouth   under direct visualization and advanced to the 2nd portion of the   duodenum  The scope was withdrawn and the mucosa was carefully examined  FINDINGS:   Esophagus: The esophagus appeared to be normal   Stomach: The   stomach appeared to be normal   Duodenum: The duodenal bulb, 2nd portion   of the duodenum, and major papilla appeared to be normal      COMPLICATIONS: There were no complications  IMPRESSIONS: Normal esophagus  Normal stomach  Normal duodenal bulb, 2nd   portion of the duodenum, and major papilla  RECOMMENDATIONS: Colonoscopy     ESTIMATED BLOOD LOSS:     PATHOLOGY SPECIMENS:     PROCEDURE CODES:     ICD-9 Codes: 792 1 Nonspecific abnormal findings in stool contents     ICD-10 Codes: D64 9 Anemia, unspecified R19 5 Other fecal abnormalities     PERFORMED BY: GLORIA Dunn  on 12/20/2017  Version 1, electronically signed by GLORIA Guzman  on 12/20/2017   at 10:01

## 2017-12-20 NOTE — OP NOTE
**** GI/ENDOSCOPY REPORT ****     PATIENT NAME: CONSTANCE KIMBROUGH ------ VISIT ID:  Patient ID:   [id number] YOB: 1935     INTRODUCTION: Colonoscopy - A 81 female patient presents for an outpatient   Colonoscopy at [address] [place] [address]  PREVIOUS COLONOSCOPY:  Patient's last colonoscopy was 4 years ago  INDICATIONS: Surveillance  Anemia  Fecal occult blood positive  CONSENT:  The benefits, risks, and alternatives to the procedure were   discussed and informed consent was obtained from the patient  PREPARATION: EKG, pulse, pulse oximetry and blood pressure were monitored   throughout the procedure  The patient was identified by myself both   verbally and by visual inspection of ID band  Airway Assessment   Classification: Airway class 2 - Visualization of the soft palate, fauces   and uvula  ASA Classification: Class 3 - Patient has severe systemic   disturbance that may or may not be related to the disorder requiring   surgery  MEDICATIONS: Anesthesia-check records     PROCEDURE:  The endoscope was passed without difficulty through the anus   under direct visualization and advanced to the cecum, confirmed by   appendiceal orifice and ileocecal valve  The scope was withdrawn and the   mucosa was carefully examined  The quality of the preparation was good  RECTAL EXAM: Normal rectal exam      FINDINGS:  The entire colon and terminal ileum appeared to be normal    Medium-sized internal hemorrhoids were found  The hemorrhoids were not   bleeding  COMPLICATIONS: There were no complications  IMPRESSIONS: Normal entire colon and terminal ileum  Internal hemorrhoids  RECOMMENDATIONS: Discharge home when standard parameters are met  Follow a   diet as advised  Colonoscopy recommended in as needed per clinical   indication in the future  Monitor Hb  If anemia persists consider capsule   endoscopy  Resume anticoagulation       ESTIMATED BLOOD LOSS: PATHOLOGY SPECIMENS:     PROCEDURE CODES:     ICD-9 Codes: 792 1 Nonspecific abnormal findings in stool contents 455 0   Internal hemorrhoids without mention of complication     THK-63 Codes: D64 9 Anemia, unspecified R19 5 Other fecal abnormalities   K64 9 Unspecified hemorrhoids     PERFORMED BY: GLORIA Hernandez  on 12/20/2017  Version 1, electronically signed by GLORIA Fierro  on 12/20/2017   at 10:54

## 2017-12-20 NOTE — ANESTHESIA PREPROCEDURE EVALUATION
Review of Systems/Medical History  Patient summary reviewed  Chart reviewed      Cardiovascular  Hyperlipidemia, Hypertension ,    Pulmonary  Sleep apnea , ,        GI/Hepatic    GERD well controlled,   Comment: PONV     Negative  ROS        Endo/Other  Diabetes type 2 Diet controlled, Arthritis     GYN       Hematology  Anemia ,     Musculoskeletal  Obesity  morbid obesity, Rheumatoid arthritis ,        Neurology    Neuromuscular disease , CVA ,    Psychology   Negative psychology ROS            Physical Exam    Airway    Mallampati score: II  TM Distance: <3 FB  Neck ROM: full     Dental   No notable dental hx     Cardiovascular  Cardiovascular exam normal    Pulmonary  Pulmonary exam normal     Other Findings        Anesthesia Plan  ASA Score- 3       Anesthesia Type- IV sedation with anesthesia with ASA Monitors  Additional Monitors:   Airway Plan:     Comment: IV SEDATION FOR EGD RADHA COLONOSCOPY FOR HX ANEMIA AND GI BLEEDING  Induction- intravenous  Informed Consent- Anesthetic plan and risks discussed with patient

## 2017-12-29 DIAGNOSIS — R31.9 HEMATURIA: ICD-10-CM

## 2017-12-29 DIAGNOSIS — N39.0 URINARY TRACT INFECTION: ICD-10-CM

## 2017-12-29 DIAGNOSIS — I10 ESSENTIAL (PRIMARY) HYPERTENSION: ICD-10-CM

## 2017-12-29 DIAGNOSIS — D63.8 ANEMIA IN OTHER CHRONIC DISEASES CLASSIFIED ELSEWHERE: ICD-10-CM

## 2017-12-29 DIAGNOSIS — R60.9 EDEMA: ICD-10-CM

## 2018-01-03 ENCOUNTER — GENERIC CONVERSION - ENCOUNTER (OUTPATIENT)
Dept: OTHER | Facility: OTHER | Age: 83
End: 2018-01-03

## 2018-01-03 ENCOUNTER — LAB REQUISITION (OUTPATIENT)
Dept: LAB | Facility: HOSPITAL | Age: 83
End: 2018-01-03
Payer: MEDICARE

## 2018-01-03 ENCOUNTER — APPOINTMENT (OUTPATIENT)
Dept: LAB | Facility: CLINIC | Age: 83
End: 2018-01-03
Payer: MEDICARE

## 2018-01-03 ENCOUNTER — TRANSCRIBE ORDERS (OUTPATIENT)
Dept: LAB | Facility: CLINIC | Age: 83
End: 2018-01-03

## 2018-01-03 ENCOUNTER — ALLSCRIPTS OFFICE VISIT (OUTPATIENT)
Dept: OTHER | Facility: OTHER | Age: 83
End: 2018-01-03

## 2018-01-03 DIAGNOSIS — R35.0 FREQUENCY OF MICTURITION: ICD-10-CM

## 2018-01-03 DIAGNOSIS — D63.8 ANEMIA IN OTHER CHRONIC DISEASES CLASSIFIED ELSEWHERE: ICD-10-CM

## 2018-01-03 DIAGNOSIS — R31.9 HEMATURIA: ICD-10-CM

## 2018-01-03 DIAGNOSIS — R60.9 EDEMA: ICD-10-CM

## 2018-01-03 DIAGNOSIS — I10 ESSENTIAL (PRIMARY) HYPERTENSION: ICD-10-CM

## 2018-01-03 LAB
BACTERIA UR QL AUTO: ABNORMAL /HPF
BASOPHILS # BLD AUTO: 0.02 THOUSANDS/ΜL (ref 0–0.1)
BASOPHILS NFR BLD AUTO: 0 % (ref 0–1)
BILIRUB UR QL STRIP: ABNORMAL
BILIRUB UR QL STRIP: NORMAL
CLARITY UR: ABNORMAL
CLARITY UR: NORMAL
COLOR UR: ABNORMAL
COLOR UR: NORMAL
EOSINOPHIL # BLD AUTO: 0.23 THOUSAND/ΜL (ref 0–0.61)
EOSINOPHIL NFR BLD AUTO: 3 % (ref 0–6)
ERYTHROCYTE [DISTWIDTH] IN BLOOD BY AUTOMATED COUNT: 14.9 % (ref 11.6–15.1)
FERRITIN SERPL-MCNC: 68 NG/ML (ref 8–388)
FOLATE SERPL-MCNC: >20 NG/ML (ref 3.1–17.5)
GLUCOSE (HISTORICAL): NEGATIVE
GLUCOSE UR STRIP-MCNC: NEGATIVE MG/DL
HBA1C MFR BLD HPLC: 6.2 %
HCT VFR BLD AUTO: 30.5 % (ref 34.8–46.1)
HGB BLD-MCNC: 10 G/DL (ref 11.5–15.4)
HGB UR QL STRIP.AUTO: ABNORMAL
HGB UR QL STRIP.AUTO: NORMAL
KETONES UR STRIP-MCNC: ABNORMAL MG/DL
KETONES UR STRIP-MCNC: NORMAL MG/DL
LEUKOCYTE ESTERASE UR QL STRIP: ABNORMAL
LEUKOCYTE ESTERASE UR QL STRIP: NORMAL
LYMPHOCYTES # BLD AUTO: 1.74 THOUSANDS/ΜL (ref 0.6–4.47)
LYMPHOCYTES NFR BLD AUTO: 25 % (ref 14–44)
MCH RBC QN AUTO: 32.8 PG (ref 26.8–34.3)
MCHC RBC AUTO-ENTMCNC: 32.8 G/DL (ref 31.4–37.4)
MCV RBC AUTO: 100 FL (ref 82–98)
MONOCYTES # BLD AUTO: 0.64 THOUSAND/ΜL (ref 0.17–1.22)
MONOCYTES NFR BLD AUTO: 9 % (ref 4–12)
NEUTROPHILS # BLD AUTO: 4.38 THOUSANDS/ΜL (ref 1.85–7.62)
NEUTS SEG NFR BLD AUTO: 63 % (ref 43–75)
NITRITE UR QL STRIP: POSITIVE
NITRITE UR QL STRIP: POSITIVE
NON-SQ EPI CELLS URNS QL MICRO: ABNORMAL /HPF
NRBC BLD AUTO-RTO: 0 /100 WBCS
PH UR STRIP.AUTO: 5 [PH]
PH UR STRIP.AUTO: 6 [PH] (ref 4.5–8)
PLATELET # BLD AUTO: 236 THOUSANDS/UL (ref 149–390)
PMV BLD AUTO: 10.5 FL (ref 8.9–12.7)
PROT UR STRIP-MCNC: ABNORMAL MG/DL
PROT UR STRIP-MCNC: NORMAL MG/DL
RBC # BLD AUTO: 3.05 MILLION/UL (ref 3.81–5.12)
RBC #/AREA URNS AUTO: ABNORMAL /HPF
SP GR UR STRIP.AUTO: 1.02
SP GR UR STRIP.AUTO: 1.02 (ref 1–1.03)
TIBC SERPL-MCNC: 303 UG/DL (ref 250–450)
UROBILINOGEN UR QL STRIP.AUTO: 0.2
UROBILINOGEN UR QL STRIP.AUTO: 1 E.U./DL
VIT B12 SERPL-MCNC: 350 PG/ML (ref 100–900)
WBC # BLD AUTO: 7.03 THOUSAND/UL (ref 4.31–10.16)
WBC #/AREA URNS AUTO: ABNORMAL /HPF

## 2018-01-03 PROCEDURE — 83550 IRON BINDING TEST: CPT

## 2018-01-03 PROCEDURE — 82728 ASSAY OF FERRITIN: CPT

## 2018-01-03 PROCEDURE — 82607 VITAMIN B-12: CPT

## 2018-01-03 PROCEDURE — 36415 COLL VENOUS BLD VENIPUNCTURE: CPT

## 2018-01-03 PROCEDURE — 85025 COMPLETE CBC W/AUTO DIFF WBC: CPT

## 2018-01-03 PROCEDURE — 87086 URINE CULTURE/COLONY COUNT: CPT | Performed by: FAMILY MEDICINE

## 2018-01-03 PROCEDURE — 87147 CULTURE TYPE IMMUNOLOGIC: CPT | Performed by: FAMILY MEDICINE

## 2018-01-03 PROCEDURE — 81001 URINALYSIS AUTO W/SCOPE: CPT | Performed by: FAMILY MEDICINE

## 2018-01-03 PROCEDURE — 82746 ASSAY OF FOLIC ACID SERUM: CPT

## 2018-01-05 LAB — BACTERIA UR CULT: NORMAL

## 2018-01-08 ENCOUNTER — GENERIC CONVERSION - ENCOUNTER (OUTPATIENT)
Dept: OTHER | Facility: OTHER | Age: 83
End: 2018-01-08

## 2018-01-10 ENCOUNTER — ALLSCRIPTS OFFICE VISIT (OUTPATIENT)
Dept: OTHER | Facility: OTHER | Age: 83
End: 2018-01-10

## 2018-01-10 NOTE — RESULT NOTES
Verified Results  (1) BASIC METABOLIC PROFILE 34ZGK8919 09:28AM Lina Rosado   REPORT COMMENT:  FASTING:YES     Test Name Result Flag Reference   GLUCOSE 112 mg/dL H 65-99   Fasting reference interval   UREA NITROGEN (BUN) 12 mg/dL  7-25   CREATININE 0 90 mg/dL  0 60-0 93   For patients >52years of age, the reference limit  for Creatinine is approximately 13% higher for people  identified as -American  eGFR NON-AFR   AMERICAN 61 mL/min/1 73m2  > OR = 60   eGFR AFRICAN AMERICAN 70 mL/min/1 73m2  > OR = 60   BUN/CREATININE RATIO   9-01   NOT APPLICABLE (calc)   SODIUM 134 mmol/L L 135-146   POTASSIUM 4 3 mmol/L  3 5-5 3   CHLORIDE 99 mmol/L     CARBON DIOXIDE 26 mmol/L  20-31   CALCIUM 8 9 mg/dL  8 6-10 4

## 2018-01-11 NOTE — RESULT NOTES
Verified Results  (1) URINE MICROSCOPIC [id number] 04:02PM Jenaro Schaeffer   TW Order Number: [id number]     Test Name Result Flag Reference   CLINICAL REPORT (Report)     Test:        Urine culture  Specimen Type:   Urine  Specimen Date:   8/29/2017 4:02 PM  Result Date:    8/31/2017 5:40 PM  Result Status:   Final result  Resulting Lab:   [place] [address]            Tel: [phone]      CULTURE                                       ------------------                                   >100,000 cfu/ml Klebsiella pneumoniae      SUSCEPTIBILITY                                   ------------------                                                       Klebsiella pneumoniae  METHOD                 RENUKA  -------------------------------------  --------------------------  AMPICILLIN ($$)             >16 00 ug/ml Resistant  AMPICILLIN + SULBACTAM ($)       <=8/4 ug/ml  Susceptible  AZTREONAM ($$$)             <=8 ug/ml   Susceptible  CEFAZOLIN ($)              <=8 00 ug/ml Susceptible  CIPROFLOXACIN ($)            <=1 00 ug/ml Susceptible  GENTAMICIN ($$)             <=4 ug/ml   Susceptible  LEVOFLOXACIN ($)            <=2 00 ug/ml Susceptible  NITROFURANTOIN             >64 ug/ml   Resistant  PIPERACILLIN + TAZOBACTAM ($$$)     <=16 ug/ml  Susceptible  TETRACYCLINE              8 ug/ml    Intermediate  TOBRAMYCIN ($)             <=4 ug/ml   Susceptible  TRIMETHOPRIM + SULFAMETHOXAZOLE ($$$)  <=2/38 ug/ml Susceptible

## 2018-01-11 NOTE — PROGRESS NOTES
Assessment   1  CVA (cerebral vascular accident) (434 91) (I63 9)    Discussion/Summary   Discussion Summary:    Patient reports she is overall doing well since her last visit  Her Eliquis was switched to Xarelto due to cost  She is tolerating the Xarelto well, taking it with food  She remains on pravastatin 80 mg  She did stop her aspirin as we had discussed at the last visit signs or symptoms or recurrent CVA/TIA had a workup with her cardiologist this summer and wore a 1 week event monitor  She reports that there were no abnormalities and he decided to hold off on any longer monitoring because she was doing so well  She will cont to follow with him  She denies any palpitations, SOB, CP cont Xarelto and Pravastatin for secondary stroke prevention  Discussed continuing to maintain good control of her cardiovascular risk factors including blood pressure, lipids, blood sugar is stable up in 4 months with Dr Torres Chuy for any new symptoms discussed with patient signs and symptoms of stroke/ TIA and when to call 911  Counseling Documentation With Imm: The patient was counseled regarding diagnostic results,-- instructions for management,-- risk factor reductions,-- prognosis,-- patient and family education,-- impressions,-- risks and benefits of treatment options,-- importance of compliance with treatment  total time of encounter was 25 minutes-- and-- >50% minutes was spent counseling  Chief Complaint   Chief Complaint Free Text Note Form: Patient presents today with follow up for CVA      History of Present Illness   HPI: Patient is an 81year old female with PMH of HTN, RA, DM2, NICK on CPAP, GERD, who presents today for a follow up, last seen in July 2017  presented to the ER on 5/10/17 with left facial numbness and left arm numbness  She thought it may be due to her left CTS  Prior to this, she had multiple episodes of elevated BP with systolic in the 196Z and also a UTI   Symptoms actually resolved quickly at CT head unremarkable, but then symptoms reoccurred in the ER and she was noted to be dysarthric as well  She was seen by Dr Dorothea Solo  Repeat CT at that time was generally unremarkable and it was decided that the patient would be appropriate for tPA  tPA was initiated and the patient had resolution of her dysarthria  At that time however, she developed a moderate headache  Due to concern for hemorrhage, the tPA was held and a third CT was obtained which showed no acute hemorrhage and the tPA was restarted  Symptoms waxed and waned over her time in the hospital  MRI brain No acute intracranial hemorrhage seen  Bilateral predominant small cortical infarct with right greater than left, in the distribution of the cortical branches of the MCA  Deep small left subinsular and the right paraventricular infarctions  It was felt this pattern was clearly cardioembolic  Echo showed left atrial enlargement  Carotid US normal  She was started on ASA 81mg and then transitioned to Eliquis on post-stroke day 3  Lipid panel with LDL 92  A1C 6 4  She was given pravastatin 80mg  she reports she is overall doing well  Patient reports that her Eliquis was changed to Xarelto due to cost  She is tolerating the Xarelto well, reports that she does take this with food  She follows with cardiology, Dr Venice Rodriguez at [facility]  Patient reports that she saw him over the summer and he did a one-week event monitor for her, and she does not believe that there were any abnormalities found  She reports that because she was doing so well, ie no palpitations, he did not feel that any additional monitoring was needed, especially since she is on Xarelto already  She continues on her statin as well, denies any issues with this medication  She did stop aspirin as we had discussed at the last visit  Patient denies any new neurologic symptoms that would indicate recurrent stroke or TIA   She reports some issues with her memory, especially remembering names of people or forgetting where she puts things  She has no difficulty with ADLs  Review of Systems   Neurological ROS:      Constitutional: recent weight gain,-- fatigue-- and-- appetite changes  HEENT: dryness of the eyes-- and-- hearing loss  Cardiovascular:  no chest pain or pressure, no palpitations present, the heart rate was not rapid or irregular, no swelling in the arms or legs, no poor circulation  Respiratory:  no unusual or persistant cough, no shortness of breath with or without exertion  Gastrointestinal:  no nausea, no vomiting, no diarrhea, no abdominal pain, no changes in bowel habits, no melena, no loss of bowel control  Genitourinary: incontinence  Musculoskeletal: arthralgias,-- myalgias,-- head/neck/back pain-- and-- pain while walking  Integumentary  no masses, no rash, no skin lesions, no livedo reticularis  Psychiatric:  no anxiety, no depression, no mood swings, no psychiatric hospitalizations, no sleep problems  Endocrine  no unusual weight loss or gain, no excessive urination, no excessive thirst, no hair loss or gain, no hot or cold intolerance, no menstrual period change or irregularity, no loss of sexual ability or drive, no erection difficulty, no nipple discharge  Hematologic/Lymphatic:  no unusual bleeding, no tendency for easy bruising, no clotting skin or lumps  Neurological General:  no headache, no nausea or vomiting, no lightheadedness, no convulsions, no blackouts, no syncope, no trauma, no photopsia, no increased sleepiness, no trouble falling asleep, no snoring, no awakening at night  Neurological Mental Status: memory problems  Neurological Cranial Nerves:  no blurry or double vision, no loss of vision, no face drooping, no facial numbness or weakness, no taste or smell loss/changes, no hearing loss or ringing, no vertigo or dizziness, no dysphagia, no slurred speech        Neurological Motor findings include: pain when walking  Neurological Coordination: balance difficulties  Neurological Gait: difficulty walking  ROS Reviewed:    ROS reviewed  Active Problems    1  History of Cellulitis of left lower leg (682 6) (L03 116)   2  Chronic diarrhea (787 91) (K52 9)   3  Flu vaccine need (V04 81) (Z23)   4  Hematuria (599 70) (R31 9)   5  History of type 2 diabetes mellitus (V12 29) (Z86 39)   6  Hyperlipidemia (272 4) (E78 5)   7  Hyponatremia (276 1) (E87 1)   8  Hypothyroidism (244 9) (E03 9)   9  Increased frequency of urination (788 41) (R35 0)   10  Increased frequency of urination (788 41) (R35 0)   11  Lower extremity pain, anterior, unspecified laterality (729 5) (M79 606)   12  Lymphedema (457 1) (I89 0)   13  Multiple falls (V15 88) (R29 6)   14  Myalgia (729 1) (M79 1)   15  Need for influenza vaccination (V04 81) (Z23)   16  Need for pneumococcal vaccination (V03 82) (Z23)   17  Obesity (278 00) (E66 9)   18  Overactive bladder (596 51) (N32 81)   19  Prediabetes (790 29) (R73 03)   20  Primary osteoarthritis of left knee (715 16) (M17 12)   21  Restless legs syndrome (333 94) (G25 81)   22  Rheumatoid arthritis (714 0) (M06 9)   23  Screening for glaucoma (V80 1) (Z13 5)   24  Speech complaints (784 59) (R47 9)   25  Spinal stenosis (724 00) (M48 00)   26  Tinea cruris (110 3) (B35 6)   27  Urge incontinence of urine (788 31) (N39 41)   28   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)      Benign essential hypertension (401 1) (I10)             Esophageal reflux (530 81) (K21 9)             Anemia of chronic disease (285 29) (D63 8)             Urinary urgency (788 63) (R39 15)             Colonoscopy (Fiberoptic) Screening             Hypertension (401 9) (I10)             Edema (782 3) (R60 9)        - [facility]             Encounter for screening mammogram for breast cancer (V76 12) (Z12 31)             Medicare annual wellness visit, initial (V70 0) (Z00 00)             Encounter for preventive health examination (V70 0) (Z00 00)             Sleep apnea (780 57) (G47 30)             CVA (cerebral vascular accident) (434 91) (I63 9)             Yeast vaginitis (112 1) (B37 3)             Increased frequency of urination (788 41) (R35 0)             Sensory urge incontinence (788 31) (N39 41)             Carpal tunnel syndrome, left (354 0) (G56 02)               Past Medical History   1  History of Ankle sprain (845 00) (S93 409A)   2  History of Benign essential hypertension (401 1) (I10)   3  History of Cellulitis of left lower leg (682 6) (L03 116)   4  History of Colon, diverticulosis (562 10) (K57 30)   5  History of Early satiety (780 94) (R68 81)   6  History of Female cystocele (618 01) (N81 10)   7  History of Female stress incontinence (625 6) (N39 3)   8  History of abdominal pain (V13 89) (Z87 898)   9  History of arthritis (V13 4) (Z87 39)   10  History of cataract (V12 49) (Z86 69)   11  History of chronic cystitis (V13 00) (Z87 448)   12  History of colonic polyps (V12 72) (Z86 010)   13  History of diverticulitis of colon (V12 79) (Z87 19)   14  History of gastritis (V12 79) (Z87 19)   15  History of gastroesophageal reflux (GERD) (V12 79) (Z87 19)   16  History of gout (V12 29) (Z87 39)   17  History of hearing loss (V12 49) (Z86 69)   18  History of hemorrhoids (V13 89) (Z87 19)   19  History of hiatal hernia (V12 79) (Z87 19)   20  History of hypertension (V12 59) (Z86 79)   21  History of hypothyroidism (V12 29) (Z86 39)   22  History of impacted cerumen (V12 49) (Z86 69)   23  History of rheumatoid arthritis (V13 4) (Z87 39)   24  History of type 2 diabetes mellitus (V12 29) (Z86 39)   25  History of urinary tract infection (V13 02) (Z87 440)   26  History of urinary urgency (V13 00) (Z87 448)   27  History of Impaired fasting glucose (790 21) (R73 01)   28  History of Left breast mass (611 72) (N63 20)   29   History of Microhematuria (599 72) (R31 29)   30  History of Pelvic pain (R10 2)   31  History of Pneumonia of left lower lobe due to infectious organism (486) (J18 1)   32  History of Poorly controlled type 2 diabetes mellitus (250 00) (E11 65)   33  History of Weight loss, non-intentional (783 21) (R63 4)  Active Problems And Past Medical History Reviewed: The active problems and past medical history were reviewed and updated today  Surgical History   1  History of Appendectomy   2  History of Bladder Surgery   3  History of Cholecystectomy   4  History of Colonoscopy (Fiberoptic)   5  History of Cystoscopy (Diagnostic) Bladder   6  History of Diagnostic Esophagogastroduodenoscopy   7  History of Hysterectomy   8  History of Knee Replacement   9  History of Neurostimulation Of Posterior Tibial Nerve By Percutaneous Needle Electrode   10  History of Nose Surgery   11  History of Simple Bunion Exostectomy (Silver Procedure)   12  History of Treatment Of Forearm Fracture   13  History of Wrist Arthroscopy With Release Of Transverse Carpal Ligament  Surgical History Reviewed: The surgical history was reviewed and updated today  Family History   Mother    1  Family history of Epilepsy  Father    2  Family history of Glaucoma   3  Family history of Silent Stroke  Brother    4  Family history of cardiac disorder (V17 49) (Z82 49)  Family History Reviewed: The family history was reviewed and updated today  Social History    · Alcohol Use (History)   · Does not use illicit drugs (J95 30) (Z78 9)   · Former smoker, stopped smoking in distant past (V15 82) (Z87 891)   ·    · Never a smoker   · No caffeine use   · Social drinker (V49 89) (Z78 9)  Social History Reviewed: The social history was reviewed and updated today  Current Meds    1  Calcium + D TABS; TAKE 1 TABLET TWICE DAILY; Therapy: (Recorded:11Aug2015) to Recorded   2  Cimzia Prefilled KIT; Therapy: (Recorded:02Jun2017) to Recorded   3   Daily Multivitamin TABS; Take 1 tablet daily Recorded   4  Diclofenac Sodium 1 % Transdermal Gel; apply 4 g up to QID; Therapy: 46MEX8786 to (Last Xena Rodriguez)  Requested for: 20DEC6348 Ordered   5  Furosemide 20 MG Oral Tablet; TAKE 1 TABLET DAILY  Take twice daily as needed for     increased edema; Therapy: 11Aug2016 to (Keisha Martinez)  Requested for: 91FJH5051 Recorded   6  Leucovorin Calcium 5 MG Oral Tablet; TAKE 2 TABLETS ONCE WEEKLY; Therapy: 76HNB2304 to (Evaluate:17Aug2017); Last Rx:65Ygc1894 Ordered   7  Levothyroxine Sodium 75 MCG Oral Tablet; take 1 tablet every day; Therapy: 78DDC5959 to (Evaluate:14Jpk8406)  Requested for: 92Lsd5841; Last     Rx:19Ybw4658 Ordered   8  Losartan Potassium 100 MG Oral Tablet; Take 1 tablet daily; Therapy: 48QKT2839 to 070 0373 1425)  Requested for: 29FEI1067; Last     Rx:05Nov2017 Ordered   9  Lovastatin TABS; TAKE 1 TABLET DAILY; Therapy: (Recorded:85Gvs4729) to Recorded   10  Methotrexate 2 5 MG Oral Tablet; TAKE 8 TABLETS PER WEEK; Therapy: 77IRF9993 to (Evaluate:81Odw7065)  Requested for: 11Aug2015 Recorded   11  Mometasone Furoate 0 1 % External Solution; Apply to ears daily as needed; Therapy: 42FJN0793 to (Last Xena Rodriguez)  Requested for: 68HSD6319 Ordered   12  Omeprazole 20 MG Oral Capsule Delayed Release; TAKE 1 CAPSULE DAILY as needed      for stomach acid; Therapy: 36RPI5994 to (Evaluate:31Hkd5324)  Requested for: 27HUU4906; Last      Rx:59Xeq3906 Ordered   13  Pravastatin Sodium 80 MG Oral Tablet; TAKE 1 TABLET DAILY AT 5PM;      Therapy: 02BGN3266 to (Evaluate:18Mar2018)  Requested for: 75QML5583; Last      Rx:24Gwz9986 Ordered   14  ROPINIRole HCl - 0 5 MG Oral Tablet; TAKE 2 TABLETS AT BEDTIME AND 1 TABLET IN      THE MORNING; Therapy: 47XVV1365 to 070 0373 1425)  Requested for: 14SLM5834; Last      Rx:55Ygh5900 Ordered   15   Tylenol Arthritis Pain TBCR; TAKE 1 TABLET 4 TIMES DAILY AS NEEDED Recorded   16  Xarelto 20 MG Oral Tablet; take 1 tablet every day; Therapy: 54Eab6700 to (Evaluate:43Zhl6373) Recorded  Medication List Reviewed: The medication list was reviewed and updated today  Allergies   1  Darvocet-N 100 TABS   2  Lipitor TABS   3  Sulfa Drugs   4  Tramadol   5  Zithromax TABS  Denied    6  Macrobid CAPS    Vitals   Signs   Recorded: 90PNA7335 02:21PM   Heart Rate: 79  Systolic: 482, LUE, Sitting  Diastolic: 68, LUE, Sitting  Height: 5 ft 1 in  Weight: 218 lb 4 oz  BMI Calculated: 41 24  BSA Calculated: 1 96    Physical Exam        Constitutional      General appearance: No acute distress, well appearing and well nourished  Eyes      Ophthalmoscopic examination: Vision is grossly normal  Gross visual field testing by confrontation shows no abnormalities  EOMI in both eyes  Conjunctivae clear  Eyelids normal palpebral fissures equal  Orbits exhibit normal position  No discharge from the eyes  PERRL  Musculoskeletal      Gait and station: Normal gait, stance and balance  Muscle strength: Abnormal  -- 5/5 throughout except left  strength 4+/5  Muscle tone: No atrophy, abnormal movements, flaccidity, cogwheeling or spasticity  Neurologic      Orientation to person, place, and time: Normal        Recent and remote memory: Demonstrates normal memory  Attention span and concentration: Normal thought process and attention span  Language: Names objects, able to repeat phrases and speaks spontaneously  2nd cranial nerve: Normal        3rd, 4th, and 6th cranial nerves: Normal        5th cranial nerve: Normal        7th cranial nerve: Normal        8th cranial nerve: Normal        9th cranial nerve: Normal        11th cranial nerve: Normal        12th cranial nerve: Normal        Sensation: Normal        Reflexes: Normal        Judgment and insight: Normal        Mood and affect: Normal        Results/Data   Diagnostic Studies Reviewed:  I personally reviewed the films/images/results in the office today  My interpretation follows        Future Appointments      Date/Time Provider Specialty Site   02/05/2018 08:00 AM Amna Jacobo MD Neurology [facility] NEUROLOGY [facility]   02/06/2018 02:00 PM Kym Ordaz MD Family Medicine [facility] MEDICINE     Signatures    Electronically signed by : Bud Bagley [facility]; Nov 6 2017  3:55PM EST                       (Author)     Electronically signed by : Shannan Jackson MD; Jan 29 2018  3:55PM EST                       (Co-author)

## 2018-01-12 VITALS
BODY MASS INDEX: 39.13 KG/M2 | WEIGHT: 207.25 LBS | SYSTOLIC BLOOD PRESSURE: 144 MMHG | HEIGHT: 61 IN | DIASTOLIC BLOOD PRESSURE: 78 MMHG

## 2018-01-12 VITALS
HEART RATE: 79 BPM | WEIGHT: 208.8 LBS | DIASTOLIC BLOOD PRESSURE: 64 MMHG | SYSTOLIC BLOOD PRESSURE: 108 MMHG | RESPIRATION RATE: 14 BRPM | HEIGHT: 61 IN | OXYGEN SATURATION: 98 % | BODY MASS INDEX: 39.42 KG/M2

## 2018-01-12 VITALS
OXYGEN SATURATION: 98 % | WEIGHT: 208 LBS | TEMPERATURE: 98.5 F | RESPIRATION RATE: 18 BRPM | SYSTOLIC BLOOD PRESSURE: 142 MMHG | HEART RATE: 82 BPM | BODY MASS INDEX: 39.27 KG/M2 | DIASTOLIC BLOOD PRESSURE: 84 MMHG | HEIGHT: 61 IN

## 2018-01-12 NOTE — PROGRESS NOTES
Active Problems    1  Anemia of chronic disease (285 29) (D63 8)   2  Benign essential hypertension (401 1) (I10)   3  Cellulitis of left lower leg (682 6) (L03 116)   4  Chronic diarrhea (787 91) (K52 9)   5  Colonoscopy (Fiberoptic) Screening   6  CVA (cerebral vascular accident) (434 91) (I63 9)   7  Dermatitis (692 9) (L30 9)   8  Edema (782 3) (R60 9)   9  Encounter for preventive health examination (V70 0) (Z00 00)   10  Encounter for screening mammogram for breast cancer (V76 12) (Z12 31)   11  Esophageal reflux (530 81) (K21 9)   12  Flu vaccine need (V04 81) (Z23)   13  History of type 2 diabetes mellitus (V12 29) (Z86 39)   14  Hyperlipidemia (272 4) (E78 5)   15  Hypertension (401 9) (I10)   16  Hyponatremia (276 1) (E87 1)   17  Hypothyroidism (244 9) (E03 9)   18  Left cataract (366 9) (H26 9)   19  Medicare annual wellness visit, initial (V70 0) (Z00 00)   20  Multiple falls (V15 88) (R29 6)   21  Myalgia (729 1) (M79 1)   22  Need for influenza vaccination (V04 81) (Z23)   23  Need for pneumococcal vaccination (V03 82) (Z23)   24  Obesity (278 00) (E66 9)   25  Overactive bladder (596 51) (N32 81)   26  Pneumonia of left lower lobe due to infectious organism (486) (J18 1)   27  Prediabetes (790 29) (R73 03)   28  Restless legs syndrome (333 94) (G25 81)   29  Rheumatoid arthritis (714 0) (M06 9)   30  Sleep apnea (780 57) (G47 30)   31  Speech complaints (784 59) (R47 9)   32  Spinal stenosis (724 00) (M48 00)   33  Tinea cruris (110 3) (B35 6)   34  Urge incontinence of urine (788 31) (N39 41)   35  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)    Current Meds   1  AmLODIPine Besylate 10 MG Oral Tablet; TAKE 1 TABLET DAILY; Therapy: 49GSS1285 to (Evaluate:04Nov2017); Last GC:67AHP6541 Ordered   2  Aspirin 81 MG Oral Tablet Chewable; 1 tablet in the AM and PM;   Therapy: (Recorded:70Rqh1255) to Recorded   3  Calcium + D TABS; TAKE 1 TABLET TWICE DAILY;    Therapy: (Recorded:11Aug2015) to Recorded   4  Cimzia Prefilled KIT; Therapy: (Recorded:02Jun2017) to Recorded   5  Daily Multivitamin TABS; Take 1 tablet daily Recorded   6  Eliquis 5 MG Oral Tablet; TAKE 1 TABLET TWICE DAILY; Therapy: 78UVK5991 to Recorded   7  Furosemide 20 MG Oral Tablet; TAKE 1 TABLET DAILY; Therapy: 11Aug2016 to (Evaluate:29Nov2017)  Requested for: 59DHJ9375; Last   Rx:02Jun2017 Ordered   8  Ketoconazole 2 % External Cream; CREA EX X 5; Therapy: 98XRB9690 to (Evaluate:58Tfo8504)  Requested for: 44QAC8892; Last   Rx:04Jan2016 Ordered   9  Levothyroxine Sodium 75 MCG Oral Tablet; take 1 tablet every day; Therapy: 22BIV0152 to (Evaluate:09Jsx9774)  Requested for: 63Mgs0621; Last   Rx:10Ojb5528 Ordered   10  Losartan Potassium 100 MG Oral Tablet; Take 1 tablet daily; Therapy: 05VZB5678 to (Evaluate:16Nov2017)  Requested for: 21Nov2016; Last    Rx:21Nov2016 Ordered   11  Methotrexate 2 5 MG Oral Tablet; TAKE 8 TABLETS PER WEEK; Therapy: 41JQQ8979 to (Evaluate:59Alj8131)  Requested for: 11Aug2015 Recorded   12  Omeprazole 20 MG Oral Capsule Delayed Release; TAKE 1 CAPSULE DAILY as    needed for stomach acid; Therapy: 10EXC5669 to (Evaluate:87Dno7430)  Requested for: 02IYP7619; Last    Rx:28Nun0304 Ordered   13  Pravastatin Sodium 80 MG Oral Tablet; TAKE 1 TABLET DAILY; Therapy: 79IPF0686 to Recorded   14  ROPINIRole HCl - 0 5 MG Oral Tablet; TAKE 2 TABLETS AT BEDTIME AND 1 TABLET IN    THE MORNING; Therapy: 04FPS4543 to ([place])  Requested for: 62YGA6646 Recorded   15  Tylenol Arthritis Pain TBCR; TAKE 1 TABLET 4 TIMES DAILY AS NEEDED Recorded    Allergies    1  Darvocet-N 100 TABS   2  Lipitor TABS   3  Macrobid CAPS   4  Penicillins   5  Sulfa Drugs   6  Tramadol    Assessment    1  Urgency of urination (788 63) (R39 15)   2  Urinary frequency (788 41) (R35 0)   3   Urge incontinence of urine (788 31) (N39 41)    Future Appointments    Date/Time Provider Specialty Site 03/26/2018 11:30 AM Shahida Heath MD Neurology [facility] [place] 21   08/08/2017 01:00 PM [facility] 98   11/06/2017 02:00 PM Addi Crump [facility] Neurology [address]   07/18/2017 02:00 PM Ruddy Burt MD Family Medicine [facility] 19   Electronically signed by : Jennifer Davis RN; Jul 6 2017  2:45PM EST                       (Author)    Electronically signed by : Denise Gibbs MD; Jul 7 2017  8:57AM EST                       (Author)

## 2018-01-12 NOTE — MISCELLANEOUS
Message   Recorded as Task   Date: 02/05/2016 04:13 PM, Created By: Francis Murrieta   Task Name: Medical Complaint Callback   Assigned To: Francis Murrieta   Regarding Patient: Simon Thornton, Status: Active   Comment:    [place] Appl - 05 Feb 2016 4:13 PM     TASK CREATED  Caller: Self; Medical Complaint; [phone] (Home)  Pt called and l/m stating she could not tolerate the oxytrol patch for her stress incontinence  Per message she also has had trouble with the oxybutrin and questions what she should do now? I called her to clarify, but had to l/m  Endy Carrillo Jr - 05 Feb 2016 4:55 PM     TASK REPLIED TO: Previously Assigned To Endy Carrillo Jr  I would suggest an evaluation with Dr Grace Ferreira or  Guera Vaca - 09 Feb 2016 9:22 AM     TASK EDITED  Pt already seen uro gyn in past and she will follow with them  Active Problems    1  Anemia of chronic disease (285 29) (D63 8)   2  Benign essential hypertension (401 1) (I10)   3  Chronic diarrhea (787 91) (K52 9)   4  Colonoscopy (Fiberoptic) Screening   5  Early satiety (780 94) (R68 81)   6  Edema (782 3) (R60 9)   7  Encounter for screening mammogram for breast cancer (V76 12) (Z12 31)   8  Esophageal reflux (530 81) (K21 9)   9  Hyperlipidemia (272 4) (E78 5)   10  Hypertension (401 9) (I10)   11  Hyponatremia (276 1) (E87 1)   12  Hypothyroidism (244 9) (E03 9)   13  Left breast mass (611 72) (N63)   14  Medicare annual wellness visit, initial (V70 0) (Z00 00)   15  Multiple falls (V15 88) (R29 6)   16  Muscle cramps (729 82) (R25 2)   17  Myalgia (729 1) (M79 1)   18  Need for influenza vaccination (V04 81) (Z23)   19  Need for pneumococcal vaccination (V03 82) (Z23)   20  Obesity (278 00) (E66 9)   21  Restless legs syndrome (333 94) (G25 81)   22  Rheumatoid arthritis (714 0) (M06 9)   23  Spinal stenosis (724 00) (M48 00)   24  Tinea cruris (110 3) (B35 6)   25  Type 2 diabetes mellitus (250 00) (E11 9)   26   Urge incontinence of urine (788 31) (N39 41)   27  Urgency of urination (788 63) (R39 15)   28  Weight loss, non-intentional (783 21) (R63 4)    Current Meds   1  Calcium + D TABS; TAKE 1 TABLET TWICE DAILY; Therapy: (Recorded:11Aug2015) to Recorded   2  Daily Multivitamin TABS; Take 1 tablet daily Recorded   3  Fish Oil 1000 MG Oral Capsule; Take 1 caps BID; Therapy: (Recorded:61Xqi0102) to Recorded   4  Ketoconazole 2 % External Cream; CREA EX X 5; Therapy: 32DGT4647 to (Evaluate:93Waa3123)  Requested for: 09MIE4204; Last   Rx:04Jan2016 Ordered   5  Leucovorin Calcium 5 MG Oral Tablet; Therapy: 45GYQ1439 to (Last [place] Lacy)  Requested for: 26Apr2011 Ordered   6  Levothyroxine Sodium 75 MCG Oral Tablet; take 1 tab daily; Therapy: 39EVF6927 to (Evaluate:12Mar2016)  Requested for: 83Erd5458; Last   Rx:58Ehe9434 Ordered   7  Losartan Potassium 100 MG Oral Tablet; TAKE 1 TABLET DAILY; Therapy: 45LNZ9308 to (Evaluate:12Jan2017)  Requested for: 46ZBN6690; Last   Rx:18Jan2016 Ordered   8  Methotrexate 2 5 MG Oral Tablet; TAKE 8 TABLETS PER WEEK; Therapy: 32MVA1615 to (Evaluate:35Qzc8646)  Requested for: 11Aug2015 Recorded   9  Metoprolol Tartrate 50 MG Oral Tablet; TAKE 1/2 TABLET EVERY EVENING; Therapy: 01LMC3853 to (Evaluate:11Nov2015)  Requested for: 73Lig2760; Last   Rx:85Ilc5453 Ordered   10  Omeprazole 20 MG Oral Capsule Delayed Release; TAKE 1 CAPSULE DAILY as    needed for stomach acid; Therapy: 63TWT5568 to (Evaluate:12Mar2016)  Requested for: 38Dnb8851; Last    Rx:12Woo5257 Ordered   11  Oxytrol 3 9 MG/24HR Transdermal Patch Twice Weekly; Therapy: 51XPP7845 to Recorded   12  Remicade 100 MG Intravenous Solution Reconstituted; Infuse once every 5 weeks; Therapy: 26PYK9200 to Recorded   13  ROPINIRole HCl - 0 5 MG Oral Tablet; Take 2 tabs at bedtime and one tab in the    morning; Therapy: 10POX1115 to (Last Rx:18Jan2016)  Requested for: 45GQJ6078 Ordered   14   Tylenol Arthritis Pain TBCR; TAKE 1 TABLET 4 TIMES DAILY AS NEEDED Recorded    Allergies    1  Darvocet-N 100 TABS   2  Lipitor TABS   3  Macrobid CAPS   4  Penicillins   5  Sulfa Drugs   6   Tramadol    Signatures   Electronically signed by : Aranza Navarro, ; Feb 9 2016  9:22AM EST                       (Author)

## 2018-01-12 NOTE — PROGRESS NOTES
Assessment   1  Anemia of chronic disease (285 29) (D63 8)   2  Chronic GERD (530 81) (K21 9)    Discussion/Summary   Discussion Summary:    1  Anemia etiology multifactorial including chronic blood loss from hemorrhoids, hematuria, vitamin B12 deficiency and anemia of chronic disease  EGD and colonoscopy were unremarkable except internal hemorrhoid  Her hemoglobin is improving  She has no melena or hematochezia  Continue iron supplementation  I also recommend vitamin B12 supplementation  Start taking 1000 mcg daily  to monitor hemoglobin  If she continues to have further drop in hemoglobin capsule endoscopy can be considered  Gastroesophageal reflux disease-EGD negative for Rendon's esophagus  Continue omeprazole 20 mg daily  can follow up with us on as-needed basis  Patient verbalized understanding and agreed with the plan  Counseling Documentation With Imm: The patient, patient's family was counseled regarding prognosis,-- patient and family education,-- impressions  Goals and Barriers: The patient has the current Goals: See above  The patent has the current Barriers: None  Patient's Capacity to Self-Care: Patient is able to Self-Care  Chief Complaint   Chief Complaint Free Text Note Form: pt follow up for anemia of chronic disease and colon/EGD procedure      History of Present Illness   HPI: 50-year-old female here for follow-up visit to her EGD and colonoscopy  We reviewed her EGD and colonoscopy report  No significant source for her anemia was found  Patient had positive fecal occult blood test  She is also having gross hematuria likely secondary to urinary tract infection  She was prescribed antibiotics   vitamin B12 is low normal 350) normal 100-900  has no melena or hematochezia   most recent hemoglobin is 10 ( baseline hemoglobin around 11)      Review of Systems   Complete-Female GI Adult:      Constitutional: No fever, no chills, feels well, no tiredness, no recent weight gain or weight loss  Eyes: No complaints of eye pain, no red eyes, no eyesight problems, no discharge, no dry eyes, no itching of eyes  ENT: no complaints of earache, no loss of hearing, no nose bleeds, no nasal discharge, no sore throat, no hoarseness  Cardiovascular: No complaints of slow heart rate, no fast heart rate, no chest pain, no palpitations, no leg claudication, no lower extremity edema  Respiratory: No complaints of shortness of breath, no wheezing, no cough, no SOB on exertion, no orthopnea, no PND  Gastrointestinal: constipation,-- diarrhea,-- bloody stools-- and-- acid reflux controlled by medication, but-- no abdominal pain,-- no nausea-- and-- no vomiting  Genitourinary: No complaints of dysuria, no incontinence, no pelvic pain, no dysmenorrhea, no vaginal discharge or bleeding  Musculoskeletal: No complaints of arthralgias, no myalgias, no joint swelling or stiffness, no limb pain or swelling  Integumentary: No complaints of skin rash or lesions, no itching, no skin wounds, no breast pain or lump  Neurological: No complaints of headache, no confusion, no convulsions, no numbness, no dizziness or fainting, no tingling, no limb weakness, no difficulty walking  Psychiatric: Not suicidal, no sleep disturbance, no anxiety or depression, no change in personality, no emotional problems  Endocrine: No complaints of proptosis, no hot flashes, no muscle weakness, no deepening of the voice, no feelings of weakness  Hematologic/Lymphatic: No complaints of swollen glands, no swollen glands in the neck, does not bleed easily, does not bruise easily  ROS Reviewed:    ROS reviewed  Active Problems   1  Anemia of chronic disease (285 29) (D63 8)   2  Benign essential hypertension (401 1) (I10)   3  Carpal tunnel syndrome, left (354 0) (G56 02)   4  History of Cellulitis of left lower leg (682 6) (L03 116)   5  Chronic diarrhea (787 91) (K52 9)   6  Chronic GERD (530 81) (K21 9)   7  CVA (cerebral vascular accident) (434 91) (I63 9)   8  Edema (782 3) (R60 9)   9  Flu vaccine need (V04 81) (Z23)   10  Hematuria (599 70) (R31 9)   11  History of type 2 diabetes mellitus (V12 29) (Z86 39)   12  Hyperglycemia (790 29) (R73 9)   13  Hyperlipidemia (272 4) (E78 5)   14  Hypertension (401 9) (I10)   15  Hyponatremia (276 1) (E87 1)   16  Hypothyroidism (244 9) (E03 9)   17  Increased frequency of urination (788 41) (R35 0)   18  Increased frequency of urination (788 41) (R35 0)   19  Lower extremity pain, anterior, unspecified laterality (729 5) (M79 606)   20  Lymphedema (457 1) (I89 0)   21  Multiple falls (V15 88) (R29 6)   22  Myalgia (729 1) (M79 1)   23  Need for influenza vaccination (V04 81) (Z23)   24  Need for pneumococcal vaccination (V03 82) (Z23)   25  Obesity (278 00) (E66 9)   26  Overactive bladder (596 51) (N32 81)   27  Peripheral neuropathy (356 9) (G62 9)   28  Prediabetes (790 29) (R73 03)   29  Primary osteoarthritis of left knee (715 16) (M17 12)   30  Restless legs syndrome (333 94) (G25 81)   31  Rheumatoid arthritis (714 0) (M06 9)   32  Right lumbar radiculopathy (724 4) (M54 16)   33  Screening for glaucoma (V80 1) (Z13 5)   34  Sensory urge incontinence (788 31) (N39 41)   35  Sinus arrhythmia (427 9) (I49 9)   36  Sleep apnea (780 57) (G47 30)   37  Speech complaints (784 59) (R47 9)   38  Spinal stenosis (724 00) (M48 00)   39  Tinea cruris (110 3) (B35 6)   40  Urge incontinence of urine (788 31) (N39 41)   41  Urinary frequency (788 41) (R35 0)   42  Urinary tract infection (599 0) (N39 0)   43  Urinary urgency (788 63) (R39 15)   44  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)    Past Medical History   1  History of Ankle sprain (845 00) (S93 409A)   2  History of Benign essential hypertension (401 1) (I10)   3  History of Cellulitis of left lower leg (682 6) (L03 116)   4   History of Colon, diverticulosis (562 10) (K57 30)   5  History of Early satiety (780 94) (R68 81)   6  History of Female cystocele (618 01) (N81 10)   7  History of Female stress incontinence (625 6) (N39 3)   8  History of abdominal pain (V13 89) (Z87 898)   9  History of arthritis (V13 4) (Z87 39)   10  History of cataract (V12 49) (Z86 69)   11  History of chronic cystitis (V13 00) (Z87 448)   12  History of colonic polyps (V12 72) (Z86 010)   13  History of diverticulitis of colon (V12 79) (Z87 19)   14  History of gastritis (V12 79) (Z87 19)   15  History of gastroesophageal reflux (GERD) (V12 79) (Z87 19)   16  History of gout (V12 29) (Z87 39)   17  History of hearing loss (V12 49) (Z86 69)   18  History of hemorrhoids (V13 89) (Z87 19)   19  History of hiatal hernia (V12 79) (Z87 19)   20  History of hypertension (V12 59) (Z86 79)   21  History of hypothyroidism (V12 29) (Z86 39)   22  History of impacted cerumen (V12 49) (Z86 69)   23  History of rheumatoid arthritis (V13 4) (Z87 39)   24  History of type 2 diabetes mellitus (V12 29) (Z86 39)   25  History of urinary tract infection (V13 02) (Z87 440)   26  History of urinary tract infection (V13 02) (Z87 440)   27  History of urinary urgency (V13 00) (Z87 448)   28  History of Impaired fasting glucose (790 21) (R73 01)   29  History of Left breast mass (611 72) (N63 20)   30  History of Microhematuria (599 72) (R31 29)   31  History of Pelvic pain (R10 2)   32  History of Pneumonia of left lower lobe due to infectious organism (486) (J18 1)   33  History of Poorly controlled type 2 diabetes mellitus (250 00) (E11 65)   34  History of Weight loss, non-intentional (783 21) (R63 4)  Active Problems And Past Medical History Reviewed: The active problems and past medical history were reviewed and updated today  Surgical History   1  History of Appendectomy   2  History of Bladder Surgery   3  History of Cholecystectomy   4  History of Colonoscopy (Fiberoptic)   5  History of Cystoscopy (Diagnostic) Bladder   6  History of Diagnostic Esophagogastroduodenoscopy   7  History of Hysterectomy   8  History of Knee Replacement   9  History of Neurostimulation Of Posterior Tibial Nerve By Percutaneous Needle Electrode   10  History of Nose Surgery   11  History of Simple Bunion Exostectomy (Silver Procedure)   12  History of Treatment Of Forearm Fracture   13  History of Wrist Arthroscopy With Release Of Transverse Carpal Ligament  Surgical History Reviewed: The surgical history was reviewed and updated today  Family History   Mother    1  Family history of Epilepsy  Father    2  Family history of Glaucoma   3  Family history of Silent Stroke  Brother    4  Family history of cardiac disorder (V17 49) (Z82 49)  Family History Reviewed: The family history was reviewed and updated today  Social History    · Alcohol Use (History)   · Does not use illicit drugs (N80 28) (Z78 9)   · Former smoker, stopped smoking in distant past (V15 82) (Z87 891)   ·    · Never a smoker   · No caffeine use   · Social drinker (V49 89) (Z78 9)  Social History Reviewed: The social history was reviewed and updated today  The social history was reviewed and is unchanged  Current Meds    1  Calcium + D TABS; TAKE 1 TABLET TWICE DAILY; Therapy: (Recorded:11Aug2015) to Recorded   2  Cimzia Prefilled KIT; Therapy: (Recorded:02Jun2017) to Recorded   3  Daily Multivitamin TABS; Take 1 tablet daily Recorded   4  Diclofenac Sodium 1 % Transdermal Gel; apply 4 g up to QID; Therapy: 11WUL5693 to (Last Winda Meridian)  Requested for: 86RWP2543 Ordered   5  Furosemide 20 MG Oral Tablet; TAKE 1 TABLET DAILY  Take twice daily as needed for     increased edema; Therapy: 11Aug2016 to (Mali Arora)  Requested for: 03AAC0030; Last     Rx:09Jat3387 Ordered   6   Gabapentin 100 MG Oral Capsule; TAKE 1 OR 2 TABLETS UP TO 3 TIMES DAILY AS YOU     WEAN FROM 300 MG TABLETS; Therapy: 15BEH7958 to (Evaluate:14Jan2018)  Requested for: 27Pxa9077; Last     Rx:60Sgl5791 Ordered   7  Leucovorin Calcium 5 MG Oral Tablet; TAKE 2 TABLETS ONCE WEEKLY; Therapy: 28HSF6807 to (Evaluate:17Aug2017); Last Rx:20Zur6728 Ordered   8  Levothyroxine Sodium 75 MCG Oral Tablet; take 1 tablet every day; Therapy: 41ZUI6812 to (Zoie Sanchez)  Requested for: 16GFK7147; Last     Rx:02Jan2018 Ordered   9  Losartan Potassium 100 MG Oral Tablet; Take 1 tablet daily; Therapy: 33SVD7037 to ([id number])  Requested for: 75MFY9240; Last     Rx:05Nov2017 Ordered   10  Lovastatin TABS; TAKE 1 TABLET DAILY; Therapy: (Recorded:11Sep2017) to Recorded   11  Methotrexate 2 5 MG Oral Tablet; TAKE 8 TABLETS PER WEEK; Therapy: 23JGX7043 to (Evaluate:64Ity6878)  Requested for: 11Aug2015 Recorded   12  Mometasone Furoate 0 1 % External Solution; Apply to ears daily as needed; Therapy: 20VEN0220 to (Last Dano Spittle)  Requested for: 93FTI8756 Ordered   13  Nitrofurantoin Macrocrystal 100 MG Oral Capsule; TAKE 1 CAPSULE EVERY 12 HOURS      DAILY; Therapy: 83UZL5448 to (Evaluate:14Nov2017)  Requested for: 65SZR9775; Last      Rx:09Nov2017 Ordered   14  Nitrofurantoin Macrocrystal 100 MG Oral Capsule; Take 1 po bid x 7 days; Therapy: 68AYL8396 to (Last YZ:40BCC2561)  Requested for: 16WUO3997 Ordered   15  Omeprazole 20 MG Oral Capsule Delayed Release; TAKE 1 CAPSULE DAILY as needed      for stomach acid; Therapy: 75ZLK2382 to (Evaluate:01Feb2018)  Requested for: 72YIZ2441; Last      Rx:60Grx6313 Ordered   16  Pravastatin Sodium 80 MG Oral Tablet; TAKE 1 TABLET DAILY AT 5PM;      Therapy: 00NLZ5261 to (Evaluate:18Mar2018)  Requested for: 42RWW0942; Last      Rx:94Xpz0256 Ordered   17  ROPINIRole HCl - 0 5 MG Oral Tablet; TAKE 2 TABLETS AT BEDTIME AND 1 TABLET IN      THE MORNING;       Therapy: 84FAE8231 to [id number])  Requested for: 86PJP7890; Last      SA:77GWG2535 Ordered   18  Tylenol Arthritis Pain TBCR; TAKE 1 TABLET 4 TIMES DAILY AS NEEDED Recorded   19  Xarelto 20 MG Oral Tablet; take 1 tablet every day; Therapy: 69Gsx8061 to (Evaluate:97Abw3031); Last Rx:17Nov2017 Ordered  Medication List Reviewed: The medication list was reviewed and updated today  Allergies   1  Darvocet-N 100 TABS   2  Lipitor TABS   3  Sulfa Drugs   4  Tramadol   5  Zithromax TABS  Denied    6  Macrobid CAPS    Vitals   Vital Signs    Recorded: 44TCJ9177 01:13PM   Temperature 98 F, Tympanic   Heart Rate 78   Systolic 647, LUE, Sitting   Diastolic 80, LUE, Sitting   Height 5 ft 1 in   Weight 218 lb 6 0 oz   BMI Calculated 41 26   BSA Calculated 1 96   O2 Saturation 97, RA     Physical Exam        Constitutional      General appearance: No acute distress, well appearing and well nourished  Ears, Nose, Mouth, and Throat      Oropharynx: Normal with no erythema, edema, exudate or lesions  Pulmonary      Respiratory effort: No increased work of breathing or signs of respiratory distress  Auscultation of lungs: Clear to auscultation  Cardiovascular      Auscultation of heart: Normal rate and rhythm, normal S1 and S2, without murmurs  Abdomen      Abdomen: Non-tender, no masses  Liver and spleen: No hepatomegaly or splenomegaly  Lymphatic      Palpation of lymph nodes in neck: No lymphadenopathy  Musculoskeletal      Inspection/palpation of joints, bones, and muscles: Normal        Skin      Skin and subcutaneous tissue: Normal without rashes or lesions  Neurologic      Reflexes: 2+ and symmetric         Psychiatric      Orientation to person, place, and time: Normal           Results/Data   (1) CBC/PLT/DIFF 79DKF6016 11:06AM Tom Hunter Order Number: [id number]      Test Name Result Flag Reference   WBC COUNT 7 03 Thousand/uL  4 31-10 16   RBC COUNT 3 05 Million/uL L 3 81-5 12   HEMOGLOBIN 10 0 g/dL L 11 5-15 4 HEMATOCRIT 30 5 % L 34 8-46  1    fL H 82-98   MCH 32 8 pg  26 8-34 3   MCHC 32 8 g/dL  31 4-37 4   RDW 14 9 %  11 6-15 1   MPV 10 5 fL  8 9-12 7   PLATELET COUNT 247 Thousands/uL  149-390   nRBC AUTOMATED 0 /100 WBCs     NEUTROPHILS RELATIVE PERCENT 63 %  43-75   LYMPHOCYTES RELATIVE PERCENT 25 %  14-44   MONOCYTES RELATIVE PERCENT 9 %  4-12   EOSINOPHILS RELATIVE PERCENT 3 %  0-6   BASOPHILS RELATIVE PERCENT 0 %  0-1   NEUTROPHILS ABSOLUTE COUNT 4 38 Thousands/? ??L  1 85-7 62   LYMPHOCYTES ABSOLUTE COUNT 1 74 Thousands/? ??L  0 60-4 47   MONOCYTES ABSOLUTE COUNT 0 64 Thousand/? ??L  0 17-1 22   EOSINOPHILS ABSOLUTE COUNT 0 23 Thousand/? ??L  0 00-0 61   BASOPHILS ABSOLUTE COUNT 0 02 Thousands/? ??L  0 00-0 10      (1) FERRITIN 61RFK9871 11:06AM Oanh Dear Order Number: [id number]      Test Name Result Flag Reference   FERRITIN 68 ng/mL  8-388      (1) VITAMIN B12 41XRT7196 11:06AM Oanh Dear Order Number: [id number]      Test Name Result Flag Reference   VITAMIN B12 350 pg/mL  100-900      (1) FOLATE 92UTZ3317 11:06AM Oanh Dear Order Number: [id number]      Test Name Result Flag Reference   FOLATE >20 0 ng/mL H 3 1-17 5      Future Appointments      Date/Time Provider Specialty Site   01/25/2018 01:45 PM [facility], [place] [address]   02/05/2018 08:00 AM [email or web address]   01/12/2018 01:20 PM GLORIA Lee , PhD Cardiology [facility]   02/06/2018 02:00 PM Tonny Vogel MD Family Medicine [facility] 876   01/16/2018 09:00 AM Saul Lam, DO Pain Management [address]     Signatures    Electronically signed by : Dara Zambrano MD; Sincere 10 2018  5:56PM EST                       (Author)

## 2018-01-12 NOTE — MISCELLANEOUS
Message   Recorded as Task   Date: 08/25/2016 11:30 AM, Created By: Latasha Newton   Task Name: Medical Complaint Callback   Assigned To: Latasha Newton   Regarding Patient: Joaquin Fraser, Status: Active   Comment:    Latasha Newton - 25 Aug 2016 11:30 AM     TASK CREATED  Caller: Self; Medical Complaint; [phone] (Home)  Pt states her legs are still swollen and painful and states she saw a spec yesterday who discussed lymphedema therapy  Evidently per pt this is a chronic condition in her legs that has been going on for years  Nyasia Aponte - 25 Aug 2016 12:27 PM     TASK REPLIED TO: Previously Assigned To Nyasia Aponte  She can try this  Please refer her if the specialist did not already do so  Guera Abbasi - 25 Aug 2016 4:27 PM     TASK EDITED                 L/m for pt to call office  Latasha Newton - 25 Aug 2016 4:29 PM     TASK EDITED                 Placed order, pt needs to go to [place] for therapy  Latasha Newton - 26 Aug 2016 8:16 AM     TASK EDITED                 Pt notified and she will  rx  Active Problems    1  Anemia of chronic disease (285 29) (D63 8)   2  Benign essential hypertension (401 1) (I10)   3  Chronic diarrhea (787 91) (K52 9)   4  Colonoscopy (Fiberoptic) Screening   5  Edema (782 3) (R60 9)   6  Encounter for screening mammogram for breast cancer (V76 12) (Z12 31)   7  Esophageal reflux (530 81) (K21 9)   8  History of type 2 diabetes mellitus (V12 29) (Z86 39)   9  Hyperlipidemia (272 4) (E78 5)   10  Hypertension (401 9) (I10)   11  Hyponatremia (276 1) (E87 1)   12  Hypothyroidism (244 9) (E03 9)   13  Left cataract (366 9) (H26 9)   14  Medicare annual wellness visit, initial (V70 0) (Z00 00)   15  Multiple falls (V15 88) (R29 6)   16  Myalgia (729 1) (M79 1)   17  Need for influenza vaccination (V04 81) (Z23)   18  Need for pneumococcal vaccination (V03 82) (Z23)   19  Obesity (278 00) (E66 9)   20  Overactive bladder (596 51) (N32 81)   21  Prediabetes (790 29) (R73 09)   22  Restless legs syndrome (333 94) (G25 81)   23  Rheumatoid arthritis (714 0) (M06 9)   24  Spinal stenosis (724 00) (M48 00)   25  Tinea cruris (110 3) (B35 6)   26  Urge incontinence of urine (788 31) (N39 41)    Current Meds   1  Calcium + D TABS; TAKE 1 TABLET TWICE DAILY; Therapy: (Recorded:11Aug2015) to Recorded   2  Daily Multivitamin TABS; Take 1 tablet daily Recorded   3  Furosemide 20 MG Oral Tablet; TAKE 1 TABLET DAILY AS NEEDED FOR LEG EDEMA; Therapy: 11Aug2016 to (Evaluate:48Ynx0992)  Requested for: 86Fij7532; Last   Rx:22Pio5391 Ordered   4  Ketoconazole 2 % External Cream; CREA EX X 5; Therapy: 01HJB5309 to (Evaluate:24Wco7364)  Requested for: 23CTZ7773; Last   Rx:04Jan2016 Ordered   5  Leucovorin Calcium 5 MG Oral Tablet; Therapy: 55CVU0661 to (Last Lanre Caceresbush)  Requested for: 26Apr2011 Ordered   6  Levothyroxine Sodium 75 MCG Oral Tablet; take 1 tab daily; Therapy: 71PDU7092 to (Evaluate:18Icm7545)  Requested for: 99Cbd3994; Last   Rx:21Rsf1680 Ordered   7  Losartan Potassium 100 MG Oral Tablet; TAKE 1 TABLET DAILY; Therapy: 41YLB6120 to (Evaluate:12Jan2017)  Requested for: 34NAP5014; Last   Rx:18Jan2016 Ordered   8  Methotrexate 2 5 MG Oral Tablet; TAKE 8 TABLETS PER WEEK; Therapy: 39AYA5564 to (Evaluate:92Xny7820)  Requested for: 11Aug2015 Recorded   9  Omeprazole 20 MG Oral Capsule Delayed Release; TAKE 1 CAPSULE DAILY as   needed for stomach acid; Therapy: 98AZS7563 to (Evaluate:15Qwf7973)  Requested for: 51Jgf3257; Last   Rx:08Nvg8883 Ordered   10  Remicade 100 MG Intravenous Solution Reconstituted; Infuse once every 5 weeks; Therapy: 58HFO1995 to Recorded   11  ROPINIRole HCl - 0 5 MG Oral Tablet; Take 2 tabs at bedtime and one tab in the    morning; Therapy: 40LMJ3010 to (Last Rx:18Jan2016)  Requested for: 21SQP3511 Ordered   12  Spironolactone 25 MG Oral Tablet; TAKE 1 TABLET DAILY;     Therapy: 74MNC4005 to (Evaluate:52Eyx8109) Requested for: 61Raj3555; Last    Rx:64Nyy0124 Ordered   13  Tylenol Arthritis Pain TBCR; TAKE 1 TABLET 4 TIMES DAILY AS NEEDED Recorded    Allergies    1  Darvocet-N 100 TABS   2  Lipitor TABS   3  Macrobid CAPS   4  Penicillins   5  Sulfa Drugs   6   Tramadol    Signatures   Electronically signed by : Aranza Navarro, ; Aug 26 2016  8:20AM EST                       (Author)

## 2018-01-12 NOTE — MISCELLANEOUS
Message   Recorded as Task   Date: 06/09/2017 12:48 PM, Created By: Cinthia Anders   Task Name: Medical Complaint Callback   Assigned To: Amy Campbell   Regarding Patient: Lina Beasley, Status: Active   Comment:    Cinthia Anders - 09 Jun 2017 12:48 PM     TASK CREATED  Caller: Miriam Hopkins; Medical Complaint; [phone]  Nurse called and states that the ropinorole was not restarted after pt's discharge from hospital   Per notes the med was stopped on Jun 2  L/m and notified nurse and instructed to call back if pt is stating she needs this med again  Guera Abbasi - 09 Jun 2017 2:46 PM     TASK EDITED  Dave Julien called back and pt does want to restart her ropinorole, may we give her rx? Nyasia Aponte - 09 Jun 2017 4:25 PM     TASK REPLIED TO: Previously Assigned To Nyasia Aponte  That is fine  My notes states that she would let us know if her symptoms restarted and she felt she needed it  Guera Abbasi - 09 Jun 2017 4:55 PM     TASK EDITED  Pt was notified and she has enough med at home, updated medlist and l/m and notifed nurse  Active Problems    1  Anemia of chronic disease (285 29) (D63 8)   2  Benign essential hypertension (401 1) (I10)   3  Cellulitis of left lower leg (682 6) (L03 116)   4  Chronic diarrhea (787 91) (K52 9)   5  Colonoscopy (Fiberoptic) Screening   6  CVA (cerebral vascular accident) (434 91) (I63 9)   7  Dermatitis (692 9) (L30 9)   8  Edema (782 3) (R60 9)   9  Encounter for preventive health examination (V70 0) (Z00 00)   10  Encounter for screening mammogram for breast cancer (V76 12) (Z12 31)   11  Esophageal reflux (530 81) (K21 9)   12  Flu vaccine need (V04 81) (Z23)   13  History of type 2 diabetes mellitus (V12 29) (Z86 39)   14  Hyperlipidemia (272 4) (E78 5)   15  Hypertension (401 9) (I10)   16  Hyponatremia (276 1) (E87 1)   17  Hypothyroidism (244 9) (E03 9)   18  Left cataract (366 9) (H26 9)   19   Medicare annual wellness visit, initial (V70 0) (Z00 00)   20  Multiple falls (V15 88) (R29 6)   21  Myalgia (729 1) (M79 1)   22  Need for influenza vaccination (V04 81) (Z23)   23  Need for pneumococcal vaccination (V03 82) (Z23)   24  Obesity (278 00) (E66 9)   25  Overactive bladder (596 51) (N32 81)   26  Pneumonia of left lower lobe due to infectious organism (486) (J18 1)   27  Prediabetes (790 29) (R73 03)   28  Restless legs syndrome (333 94) (G25 81)   29  Rheumatoid arthritis (714 0) (M06 9)   30  Sleep apnea (780 57) (G47 30)   31  Speech complaints (784 59) (R47 9)   32  Spinal stenosis (724 00) (M48 00)   33  Tinea cruris (110 3) (B35 6)   34  Urge incontinence of urine (788 31) (N39 41)   35  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)    Current Meds   1  AmLODIPine Besylate 10 MG Oral Tablet; TAKE 1 TABLET DAILY; Therapy: 69HCX8623 to (Evaluate:04Nov2017); Last CI:59FDV0210 Ordered   2  Calcium + D TABS; TAKE 1 TABLET TWICE DAILY; Therapy: (Recorded:11Aug2015) to Recorded   3  Cimzia Prefilled KIT; Therapy: (Recorded:02Jun2017) to Recorded   4  Daily Multivitamin TABS; Take 1 tablet daily Recorded   5  Eliquis 5 MG Oral Tablet; TAKE 1 TABLET TWICE DAILY; Therapy: 34EHF0466 to Recorded   6  Furosemide 20 MG Oral Tablet; TAKE 1 TABLET DAILY; Therapy: 11Aug2016 to (Evaluate:29Nov2017)  Requested for: 86EZB4765; Last   Rx:02Jun2017 Ordered   7  Ketoconazole 2 % External Cream; CREA EX X 5; Therapy: 88STO7464 to (Evaluate:23Ogk8784)  Requested for: 78OZD6530; Last   Rx:04Jan2016 Ordered   8  Leucovorin Calcium 5 MG Oral Tablet; Therapy: 81PTN5878 to (Last Christina Mitchell)  Requested for: 26Apr2011 Ordered   9  Levothyroxine Sodium 75 MCG Oral Tablet; take 1 tablet every day; Therapy: 75PFE2619 to (Evaluate:08Swp1911)  Requested for: 02Vlx6853; Last   Rx:94Afg4398 Ordered   10  Losartan Potassium 100 MG Oral Tablet; Take 1 tablet daily;     Therapy: 09YCJ3248 to (Evaluate:79Qxw1109)  Requested for: 36YUM3793; Last    Rx:21Nov2016 Ordered   11  Methotrexate 2 5 MG Oral Tablet; TAKE 8 TABLETS PER WEEK; Therapy: 08QLX8245 to (Evaluate:41Dyg8888)  Requested for: 43Ege0264 Recorded   12  MethylPREDNISolone 4 MG Oral Tablet Therapy Pack; as needed for RA; Therapy: 03DEA8168 to Recorded   13  Omeprazole 20 MG Oral Capsule Delayed Release; TAKE 1 CAPSULE DAILY as    needed for stomach acid; Therapy: 50PEH6587 to (Evaluate:65Kov9603)  Requested for: 48WPG6489; Last    Rx:79Sql7686 Ordered   14  Pravastatin Sodium 80 MG Oral Tablet; TAKE 1 TABLET DAILY; Therapy: 08SKZ7352 to Recorded   15  ROPINIRole HCl - 0 5 MG Oral Tablet; TAKE 2 TABLETS AT BEDTIME AND 1 TABLET IN    THE MORNING; Therapy: 53QEM7200 to ([phone])  Requested for: 67HYK5652 Recorded   16  Tylenol Arthritis Pain TBCR; TAKE 1 TABLET 4 TIMES DAILY AS NEEDED Recorded    Allergies    1  Darvocet-N 100 TABS   2  Lipitor TABS   3  Macrobid CAPS   4  Penicillins   5  Sulfa Drugs   6   Tramadol    Signatures   Electronically signed by : Miah Palomo, ; Jun 9 2017  4:55PM EST                       (Author)

## 2018-01-13 VITALS
TEMPERATURE: 97.1 F | SYSTOLIC BLOOD PRESSURE: 130 MMHG | DIASTOLIC BLOOD PRESSURE: 70 MMHG | RESPIRATION RATE: 14 BRPM | HEART RATE: 75 BPM | WEIGHT: 213.05 LBS | HEIGHT: 61 IN | BODY MASS INDEX: 40.22 KG/M2

## 2018-01-13 VITALS
SYSTOLIC BLOOD PRESSURE: 142 MMHG | WEIGHT: 223.5 LBS | OXYGEN SATURATION: 98 % | DIASTOLIC BLOOD PRESSURE: 80 MMHG | HEIGHT: 61 IN | BODY MASS INDEX: 42.2 KG/M2 | HEART RATE: 80 BPM

## 2018-01-13 VITALS
SYSTOLIC BLOOD PRESSURE: 120 MMHG | RESPIRATION RATE: 18 BRPM | DIASTOLIC BLOOD PRESSURE: 64 MMHG | HEART RATE: 72 BPM | HEIGHT: 61 IN | WEIGHT: 214 LBS | BODY MASS INDEX: 40.4 KG/M2 | TEMPERATURE: 98 F

## 2018-01-13 NOTE — MISCELLANEOUS
Assessment    1  Speech complaints (784 59) (R47 9)   2  CVA (cerebral vascular accident) (434 91) (I63 9)   3  Benign essential hypertension (401 1) (I10)   4  Edema (782 3) (R60 9)   5  Overactive bladder (596 51) (N32 81)   6  Restless legs syndrome (333 94) (G25 81)   7  Prediabetes (790 29) (R73 03)    Plan  CVA (cerebral vascular accident)    · *1 - [facility]  *  Status: Active  Requested for: 43BOO6328   Ordered; For: CVA (cerebral vascular accident); Ordered By: Elyssa Infante Performed:  Due: 46NLK5603  Care Summary provided  : Yes  Edema    · Furosemide 20 MG Oral Tablet; TAKE 1 TABLET DAILY   Rx By: Elyssa Infante; Dispense: 30 Days ; #:30 Tablet; Refill: 5; For: Edema; RADHA = N; Sent To: [facility]/PHARMACY #[id number] Restless legs syndrome    · ROPINIRole HCl - 0 5 MG Oral Tablet   Rx By: Anthony Petit; Dispense: 90 Days ; #:270 TAB; Refill: 3; For: Restless legs syndrome; RADHA = N; Sent To: [facility]; Last Updated By: Elyssa Infante; 6/2/2017 10:55:14 AM  Rheumatoid arthritis    · Remicade 100 MG Intravenous Solution Reconstituted   Rx By: Elyssa Infante; Dispense: 0 Days ; #: Sufficient Solution Reconstituted; Refill: 0; For: Rheumatoid arthritis; RADHA = N; Record  Speech complaints    · *1 - [facility]  *  Status: Active  Requested for: 18Abt3569   Ordered; For: Speech complaints; Ordered By: Elyssa Infante Performed:  Due: 71FVV9565  Care Summary provided  : Yes    Discussion/Summary  Discussion Summary:   1  Status post CVA - the patient appears to be recovering well  She will be set up for follow-up with neurology as above  2  Speech complaints - mostly at the end of the day - the patient was referred to speech therapy for assistance with her speech as well as mild memory difficulties since the stroke  3  Hypertension - well-controlled - continue amlodipine and losartan daily      4  Edema - improved - she'll continue with furosemide daily as well as her compressive device that she uses at home for a few hours a day  5  Overactive bladder -patient notes that she is no longer requiring medication since she has got acupuncture to help with this  6  Restless leg syndrome - the patient states that this has improved since her stroke and is not requiring Requip anymore  She will let us know if this changes  7  Prediabetes - she had an A1c of 6 4% when she was at [facility]  She was encouraged to continue to try to limit her carbohydrate intake  She will follow-up for her next visit as already scheduled next month  Medication SE Review and Pt Understands Tx: Possible side effects of new medications were reviewed with the patient/guardian today  The treatment plan was reviewed with the patient/guardian  The patient/guardian understands and agrees with the treatment plan      Chief Complaint  Chief Complaint Free Text Note Form: [address] Follow-Up: The patient is being seen for follow-up after hospitalization  Hospital records were reviewed  She was hospitalized at [facility]  The date of admission: 5/10/17, date of discharge: 5/15/17, then went to [facility] from 5/15-27/17  Diagnosis: CVA  Hospital course: The patient went to the hospital for facial drooping and numbness as well as difficulty with her speech  She was treated with TPA and kept at [facility] for 5 days  She was then transitioned to [facility] for rehabilitation for 10 days  While she was there she received physical, occupational, and cognitive speech therapy  She had her medications and labs monitored  She was given Tums and Pepcid as needed for worse symptomatic heartburn  She was treated with ciprofloxacin for UTI and completed the course of antibiotics on May 25  She was discharged home with visiting nurses   She was discharged to home, has VNA to help with meds - notes that she was cleared with PT  She was discharged on the following medications: see updated list  Follow-up appointments: no neuro appt set up yet  Symptoms:   notes that she slurs when gets sleepy, but no fever, no cough, no shortness of breath, no chest pain, no nausea, no vomiting, no loose stools, no constipation and no dysuria    The patient presents with complaints of fatigue (gets tired easily - waiting for RA shots to improve)  TCM Communication [facility]: The patient is being contacted for follow-up after hospitalization  Hospital records were not available  She was hospitalized at [facility]  The date of admission: 5/15/2017, date of discharge: 5/25/2017, PREVIOUSLY AT [facility] 5/10/17-5/15/2017  Diagnosis: CVA  She was discharged to home  Medications were not reviewed today  Follow-up appointments with other specialists: Jose Feliciano  The patient is currently asymptomatic  DOING WELL W WALKER, SOME MINOR REMAINING APHAGIA Counseling was provided to patient's caretaker  DR Colleen JONES AT [facility]  Communication performed and completed by MICAH MTZ [facility] RN      Review of Systems  Complete-Female:   Constitutional: feeling tired, but as noted in HPI  Cardiovascular: no chest pain and no palpitations  Respiratory: no shortness of breath  Gastrointestinal: no nausea and no vomiting  Genitourinary: no dysuria  Musculoskeletal: arthralgias  Neurological: no headache and no dizziness  Active Problems    1  Anemia of chronic disease (285 29) (D63 8)   2  Benign essential hypertension (401 1) (I10)   3  Cellulitis of left lower leg (682 6) (L03 116)   4  Chronic diarrhea (787 91) (K52 9)   5  Colonoscopy (Fiberoptic) Screening   6  Dermatitis (692 9) (L30 9)   7  Edema (782 3) (R60 9)   8  Encounter for preventive health examination (V70 0) (Z00 00)   9  Encounter for screening mammogram for breast cancer (V76 12) (Z12 31)   10  Esophageal reflux (530 81) (K21 9)   11  Flu vaccine need (V04 81) (Z23)   12   History of type 2 diabetes mellitus (V12 29) (Z86 39)   13  Hyperlipidemia (272 4) (E78 5)   14  Hypertension (401 9) (I10)   15  Hyponatremia (276 1) (E87 1)   16  Hypothyroidism (244 9) (E03 9)   17  Left cataract (366 9) (H26 9)   18  Medicare annual wellness visit, initial (V70 0) (Z00 00)   19  Multiple falls (V15 88) (R29 6)   20  Myalgia (729 1) (M79 1)   21  Need for influenza vaccination (V04 81) (Z23)   22  Need for pneumococcal vaccination (V03 82) (Z23)   23  Obesity (278 00) (E66 9)   24  Overactive bladder (596 51) (N32 81)   25  Pneumonia of left lower lobe due to infectious organism (486) (J18 1)   26  Prediabetes (790 29) (R73 09)   27  Restless legs syndrome (333 94) (G25 81)   28  Rheumatoid arthritis (714 0) (M06 9)   29  Sleep apnea (780 57) (G47 30)   30  Spinal stenosis (724 00) (M48 00)   31  Tinea cruris (110 3) (B35 6)   32  Urge incontinence of urine (788 31) (N39 41)   33  Varicose veins of bilateral lower extremities with pain (454 8) (M39 020)    Past Medical History    1  History of Acute pain of left knee (719 46) (M25 562)   2  History of Ankle sprain (845 00) (S93 409A)   3  History of Benign essential hypertension (401 1) (I10)   4  History of Colon, diverticulosis (562 10) (K57 30)   5  History of Early satiety (780 94) (R68 81)   6  History of colonic polyps (V12 72) (Z86 010)   7  History of diverticulitis of colon (V12 79) (Z87 19)   8  History of gastritis (V12 79) (Z87 19)   9  History of gastroesophageal reflux (GERD) (V12 79) (Z87 19)   10  History of hemorrhoids (V13 89) (Z87 19)   11  History of hiatal hernia (V12 79) (Z87 19)   12  History of impacted cerumen (V12 49) (Z86 69)   13  History of type 2 diabetes mellitus (V12 29) (Z86 39)   14  History of urinary tract infection (V13 02) (Z87 440)   15  History of Impaired fasting glucose (790 21) (R73 01)   16  History of Left breast mass (611 72) (N63)   17   History of Poorly controlled type 2 diabetes mellitus (250 00) (E11 65)   18  History of Urgency of urination (788 63) (R39 15)   19  History of Weight loss, non-intentional (783 21) (R63 4)    Surgical History    1  History of Appendectomy   2  History of Bladder Surgery   3  History of Cholecystectomy   4  History of Colonoscopy (Fiberoptic)   5  History of Diagnostic Esophagogastroduodenoscopy   6  History of Hysterectomy   7  History of Knee Replacement   8  History of Nose Surgery   9  History of Simple Bunion Exostectomy (Silver Procedure)   10  History of Treatment Of Forearm Fracture   11  History of Wrist Arthroscopy With Release Of Transverse Carpal Ligament    Family History  Mother    1  Family history of Epilepsy  Father    2  Family history of Glaucoma   3  Family history of Silent Stroke    Social History    · Alcohol Use (History)   · Never smoker   · No alcohol use    Current Meds   1  AmLODIPine Besylate 10 MG Oral Tablet; TAKE 1 TABLET DAILY; Therapy: 46FMV3270 to (Evaluate:04Nov2017); Last MP:39HWQ9668 Ordered   2  Azithromycin 250 MG Oral Tablet; TAKE 2 TABLETS ON DAY 1 THEN TAKE 1 TABLET A   DAY FOR 4 DAYS; Therapy: 55PNE3273 to (Last Rx:19Oct1)  Requested for: 21Oct2016 Ordered   3  Calcium + D TABS; TAKE 1 TABLET TWICE DAILY; Therapy: (Recorded:11Aug2015) to Recorded   4  Daily Multivitamin TABS; Take 1 tablet daily Recorded   5  Furosemide 20 MG Oral Tablet; TAKE 1 TABLET DAILY AS NEEDED FOR LEG EDEMA; Therapy: 19Yqe5467 to (Evaluate:03Ghg9050)  Requested for: 47Fxb6698; Last   Rx:08Wut2025 Ordered   6  Hydrocodone-Acetaminophen 5-325 MG Oral Tablet; TAKE 1 TABLET EVERY 4 TO 6   HOURS AS NEEDED FOR PAIN;   Therapy: 47CGU3613 to (Evaluate:42Lcu1256); Last Rx:30Nov2016 Ordered   7  Ketoconazole 2 % External Cream; CREA EX X 5; Therapy: 59NBX0437 to (Evaluate:26Btr7207)  Requested for: 02YKB7918; Last   Rx:04Jan2016 Ordered   8  Leucovorin Calcium 5 MG Oral Tablet;    Therapy: 79YUI6282 to (Last Jovon Glimpse)  Requested for: 69Cpe2815 Ordered 9  Levothyroxine Sodium 75 MCG Oral Tablet; take 1 tablet every day; Therapy: 64YVG7827 to (Evaluate:48Eyk0607)  Requested for: 03Qkc7930; Last   Rx:06Qjs8040 Ordered   10  Losartan Potassium 100 MG Oral Tablet; Take 1 tablet daily; Therapy: 20NSC3539 to (Evaluate:16Nov2017)  Requested for: 21Nov2016; Last    Rx:21Nov2016 Ordered   11  Methotrexate 2 5 MG Oral Tablet; TAKE 8 TABLETS PER WEEK; Therapy: 14MYF2704 to (Evaluate:90Ljs2160)  Requested for: 11Aug2015 Recorded   12  Omeprazole 20 MG Oral Capsule Delayed Release; TAKE 1 CAPSULE DAILY as needed    for stomach acid; Therapy: 72VPU6973 to (Evaluate:83Ajq2335)  Requested for: 91ZDN4117; Last    Rx:81Idw0921 Ordered   13  Oxybutynin Chloride 5 MG Oral Tablet; Therapy: 15ZCM2212 to Recorded   14  Remicade 100 MG Intravenous Solution Reconstituted; Infuse once every 5 weeks; Therapy: 44QNK3659 to Recorded   15  ROPINIRole HCl - 0 5 MG Oral Tablet; TAKE 2 TABLETS AT BEDTIME AND 1 TABLET IN    THE MORNING; Therapy: 86AVT9207 to (Evaluate:43Asx8575)  Requested for: 74Lcd0694; Last    Rx:86Qbs5285 Ordered   16  Triamcinolone Acetonide 0 1 % External Cream; Apply to affected skin BID prn; Therapy: 75OON3738 to (Last Rx:30Nov2016)  Requested for: 10DLQ4901 Ordered   17  Tylenol Arthritis Pain TBCR; TAKE 1 TABLET 4 TIMES DAILY AS NEEDED Recorded    Allergies    1  Darvocet-N 100 TABS   2  Lipitor TABS   3  Macrobid CAPS   4  Penicillins   5  Sulfa Drugs   6   Tramadol    Vitals  Signs   Recorded: 19RPZ4199 39:85DR   Systolic: 264  Diastolic: 72  Vitals   Recorded: 62EEZ7575 37:70TP   Systolic: 768  Diastolic: 72  Recorded: 55RQS8942 73:48BT   Systolic: 306  Diastolic: 84  Temperature: 98 5 F  Heart Rate: 82  Respiration: 18  Height: 5 ft 1 in  Weight: 208 lb   BMI Calculated: 39 3  BSA Calculated: 1 92  O2 Saturation: 98  Recorded: 30QVY6412 85:42FI   Systolic: 666, LUE, Sitting  Diastolic: 78, LUE, Sitting  Height: 5 ft 1 in  Weight: 207 lb 4 00 oz  BMI Calculated: 39 16  BSA Calculated: 1 91  Recorded: 61REV1211 62:46NI   Systolic: 110  Diastolic: 80  Heart Rate: 72  Respiration: 16  Height: 5 ft 1 in  Weight: 218 lb   BMI Calculated: 41 19  BSA Calculated: 1 96  Recorded: 02AQF6567 53:67LN   Systolic: 904  Diastolic: 68  Temperature: 98 9 F  Heart Rate: 80  Respiration: 12  Height: 5 ft 1 in  Weight: 212 lb 4 oz  BMI Calculated: 40 1  BSA Calculated: 1 94  Recorded: 69JIZ5762 91:12LB   Systolic: 145  Diastolic: 60  Temperature: 97 8 F  Heart Rate: 72  Height: 5 ft 1 in  Weight: 211 lb 6 08 oz  BMI Calculated: 39 94  BSA Calculated: 1 93  Recorded: 54MYO1902 32:36ZB   Systolic: 604  Diastolic: 82  Heart Rate: 76  Respiration: 16  Height: 5 ft 1 in  Weight: 207 lb 4 00 oz  BMI Calculated: 39 16  BSA Calculated: 1 91  Recorded: 70MNG1414 15:89OJ   Systolic: 071  Diastolic: 70  Temperature: 98 9 F  Heart Rate: 72  Height: 5 ft 1 in  Weight: 208 lb 6 08 oz  BMI Calculated: 39 37  BSA Calculated: 1 92  Recorded: 79TAX3778 67:32XK   Systolic: 477  Diastolic: 70  Height: 5 ft 1 in  Weight: 207 lb   BMI Calculated: 39 11  BSA Calculated: 1 92  Recorded: 17AFT7536 89:02KF   Systolic: 261  Diastolic: 66  Height: 5 ft 1 in  Weight: 204 lb   BMI Calculated: 38 55  BSA Calculated: 1 91  Recorded: 73ZOE9729 64:81KF   Systolic: 721  Diastolic: 80  Heart Rate: 80  Respiration: 16  Height: 5 ft 1 in  Weight: 205 lb 2 08 oz  BMI Calculated: 38 76  BSA Calculated: 1 91  Recorded: 78FVY6200 40:55DS   Systolic: 348  Diastolic: 70  Heart Rate: 72  Height: 5 ft 1 in  Weight: 208 lb   BMI Calculated: 39 3  BSA Calculated: 1 92  Recorded: 34ODP6426 76:66JJ   Systolic: 287  Diastolic: 80  Heart Rate: 72  Respiration: 16  Height: 5 ft 1 in  Weight: 207 lb   BMI Calculated: 39 11  BSA Calculated: 1 91  Recorded: 99TRD0167 28:32AN   Systolic: 496  Diastolic: 68  Recorded: 42RVI7762 50:30RZ   Systolic: 389  Diastolic: 79  Heart Rate: 68  Height: 5 ft 1 in  Weight: 208 lb 4 oz  BMI Calculated: 39 35  BSA Calculated: 1 92  Recorded: 53ITP0950 25:20YD   Systolic: 238, LUE, Sitting  Diastolic: 80, LUE, Sitting  Recorded: 29HRO6429 55:32WA   Systolic: 969  Diastolic: 74  Temperature: 98 4 F  Heart Rate: 80  Height: 5 ft 1 in  Weight: 206 lb 8 0 oz  BMI Calculated: 39 02  BSA Calculated: 1 91  Recorded: 89WHA2523 41:45OT   Systolic: 529  Diastolic: 82  Temperature: 98 6 F  Heart Rate: 64  Respiration: 16  Height: 5 ft 1 in  Weight: 205 lb 2 oz  BMI Calculated: 38 76  BSA Calculated: 1 91  Recorded: 85ZUV0035 18:68UF   Systolic: 281  Diastolic: 78  Heart Rate: 68  Height: 5 ft 1 in  Weight: 209 lb 6 08 oz  BMI Calculated: 39 56  BSA Calculated: 1 92  Recorded: 70RIM9493 93:21RH   Systolic: 550, LUE, Sitting  Diastolic: 80, LUE, Sitting  Temperature: 98 31 F, Tympanic  Heart Rate: 72, L Radial  Pulse Quality: Regular, L Radial  Respiration: 16  Height: 5 ft 1 in  Weight: 210 lb 12 8 oz  BMI Calculated: 39 83  BSA Calculated: 1 93  Recorded: 34BUE1938 07:78GT   Systolic: 672  Diastolic: 67  Heart Rate: 64  Respiration: 12  Height: 5 ft 1 in  Weight: 212 lb 4 oz  BMI Calculated: 40 1  BSA Calculated: 1 94  Recorded: 72EEH0902 38:01VI   Systolic: 094  Diastolic: 64  Heart Rate: 86  Respiration: 20  Height: 5 ft 1 in  Weight: 214 lb   BMI Calculated: 40 43  BSA Calculated: 1 94  O2 Saturation: 97  Recorded: 49AFU0860 70:70VO   Systolic: 115, RUE, Sitting  Diastolic: 78, RUE, Sitting  Temperature: 97 4 F, Tympanic  Heart Rate: 72, R Radial  Respiration: 18  Height: 5 ft 1 in  Weight: 210 lb   BMI Calculated: 39 68  BSA Calculated: 1 93  Recorded: 40DRI9167 65:15NS   Systolic: 634, RUE, Sitting  Diastolic: 78, RUE, Sitting  Temperature: 97 4 F, Tympanic  Heart Rate: 72, R Radial  Respiration: 18  Height: 5 ft 1 in  Weight: 210 lb   BMI Calculated: 39 68  BSA Calculated: 1 93  Recorded: 98JLH8349 89:72CC   Systolic: 941  Diastolic: 70  Heart Rate: 72  Respiration: 12  Height: 5 ft 1 in  Weight: 220 lb 9 oz  BMI Calculated: 41 67  BSA Calculated: 1 97  Recorded: 72AKD0515 28:78BC   Systolic: 801  Diastolic: 78  Recorded: 61SXV7868 10:23WX   Systolic: 494  Diastolic: 78  Temperature: 96 2 F  Heart Rate: 70  Respiration: 18  Height: 5 ft 1 in  Weight: 217 lb 2 08 oz  BMI Calculated: 41 03  Recorded: 67XUM4361 75:40EP   Systolic: 136  Diastolic: 78  Recorded: 33JSO6521 84:40KF   Systolic: 200  Diastolic: 70  Temperature: 95 3 F  Heart Rate: 68  Respiration: 18  Height: 5 ft 1 in  Weight: 214 lb   BMI Calculated: 40 43    Physical Exam    Constitutional   General appearance: No acute distress, well appearing and well nourished  Ears, Nose, Mouth, and Throat   Hearing: Normal     Neck   Neck: Supple, symmetric, trachea midline, no masses  Thyroid: Normal, no thyromegaly  Pulmonary   Respiratory effort: No increased work of breathing or signs of respiratory distress  Auscultation of lungs: Clear to auscultation  Cardiovascular   Auscultation of heart: Normal rate and rhythm, normal S1 and S2, no murmurs  Peripheral vascular exam: Normal   Carotid: no bruit on the right and no bruit on the left  Radial: right 2+ and left 2+  Posterior tibialis: right 2+ and left 2+  Examination of extremities for edema and/or varicosities: Normal   no edema  Abdomen   Abdomen: Non-tender, no masses  Liver and spleen: No hepatomegaly or splenomegaly  Lymphatic   Palpation of lymph nodes in neck: No lymphadenopathy  Musculoskeletal   Gait and station: Abnormal   using walker  Skin   Skin and subcutaneous tissue: Normal without rashes or lesions      Psychiatric   Mood and affect: Normal        Future Appointments    Date/Time Provider Specialty Site   07/18/2017 02:00 PM Yumiko Ponce MD Family Medicine [address] FAMILY MEDICINE     Signatures   Electronically signed by : Viviana Palacios, ; May 25 2017  1:41PM EST                       (Author)    Electronically signed by : Ruth [facility]; Jun 2 2017 5:21PM EST                       (Author)    Electronically signed by : GLORIA Niño ; Jun 5 2017 10:56PM EST

## 2018-01-13 NOTE — PROCEDURES
Assessment    1  Urge incontinence of urine (788 31) (N39 41)   2  Urinary frequency (788 41) (R35 0)   3  Urgency of urination (788 63) (R39 15)    Plan  Urge incontinence of urine, Urgency of urination, Urinary frequency    · Follow-up visit in 1 month Evaluation and Treatment  Follow-up  Status: Complete  Done:  46BCN1614   Ordered; For: Urge incontinence of urine, Urgency of urination, Urinary frequency; Ordered By: Bravo Smith Performed:  Due: 66BTU1087    Active Problems    1  Allergic urticaria (708 0) (L50 0)   2  Anemia of chronic disease (285 29) (D63 8)   3  Benign essential hypertension (401 1) (I10)   4  Cellulitis of left lower leg (682 6) (L03 116)   5  Chronic diarrhea (787 91) (K52 9)   6  Colonoscopy (Fiberoptic) Screening   7  CVA (cerebral vascular accident) (434 91) (I63 9)   8  Dermatitis (692 9) (L30 9)   9  Edema (782 3) (R60 9)   10  Encounter for preventive health examination (V70 0) (Z00 00)   11  Encounter for screening mammogram for breast cancer (V76 12) (Z12 31)   12  Esophageal reflux (530 81) (K21 9)   13  Exercise counseling (V65 41) (Z71 89)   14  Flu vaccine need (V04 81) (Z23)   15  History of type 2 diabetes mellitus (V12 29) (Z86 39)   16  Hyperlipidemia (272 4) (E78 5)   17  Hypertension (401 9) (I10)   18  Hyponatremia (276 1) (E87 1)   19  Hypothyroidism (244 9) (E03 9)   20  Left cataract (366 9) (H26 9)   21  Medicare annual wellness visit, initial (V70 0) (Z00 00)   22  Multiple falls (V15 88) (R29 6)   23  Myalgia (729 1) (M79 1)   24  Need for influenza vaccination (V04 81) (Z23)   25  Need for pneumococcal vaccination (V03 82) (Z23)   26  Obesity (278 00) (E66 9)   27  Overactive bladder (596 51) (N32 81)   28  Pneumonia of left lower lobe due to infectious organism (486) (J18 1)   29  Prediabetes (790 29) (R73 03)   30  Restless legs syndrome (333 94) (G25 81)   31  Rheumatoid arthritis (714 0) (M06 9)   32  Screening for glaucoma (V80 1) (Z13 5)   33   Sleep apnea (780 57) (G47 30)   34  Speech complaints (784 59) (R47 9)   35  Spinal stenosis (724 00) (M48 00)   36  Tinea cruris (110 3) (B35 6)   37  Urge incontinence of urine (788 31) (N39 41)   38  Urgency of urination (788 63) (R39 15)   39  Urinary frequency (788 41) (R35 0)   40  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)    Past Medical History    1  History of Acute pain of left knee (719 46) (M25 562)   2  History of Ankle sprain (845 00) (S93 409A)   3  History of Benign essential hypertension (401 1) (I10)   4  History of Colon, diverticulosis (562 10) (K57 30)   5  History of Early satiety (780 94) (R68 81)   6  History of colonic polyps (V12 72) (Z86 010)   7  History of diverticulitis of colon (V12 79) (Z87 19)   8  History of gastritis (V12 79) (Z87 19)   9  History of gastroesophageal reflux (GERD) (V12 79) (Z87 19)   10  History of hemorrhoids (V13 89) (Z87 19)   11  History of hiatal hernia (V12 79) (Z87 19)   12  History of impacted cerumen (V12 49) (Z86 69)   13  History of type 2 diabetes mellitus (V12 29) (Z86 39)   14  History of urinary tract infection (V13 02) (Z87 440)   15  History of Impaired fasting glucose (790 21) (R73 01)   16  History of Left breast mass (611 72) (N63)   17  History of Poorly controlled type 2 diabetes mellitus (250 00) (E11 65)   18  History of Weight loss, non-intentional (783 21) (R63 4)    Surgical History    1  History of Appendectomy   2  History of Bladder Surgery   3  History of Cholecystectomy   4  History of Colonoscopy (Fiberoptic)   5  History of Diagnostic Esophagogastroduodenoscopy   6  History of Hysterectomy   7  History of Knee Replacement   8  History of Nose Surgery   9  History of Simple Bunion Exostectomy (Silver Procedure)   10  History of Treatment Of Forearm Fracture   11  History of Wrist Arthroscopy With Release Of Transverse Carpal Ligament    Family History  Mother    1  Family history of Epilepsy  Father    2   Family history of Glaucoma   3  Family history of Silent Stroke    Social History    · Alcohol Use (History)   · Never smoker   · No alcohol use    Current Meds   1  AmLODIPine Besylate 10 MG Oral Tablet; TAKE 1 TABLET DAILY; Therapy: 98HHF0824 to (Evaluate:04Nov2017); Last GP:12LKK3095 Ordered   2  Calcium + D TABS; TAKE 1 TABLET TWICE DAILY; Therapy: (Recorded:11Aug2015) to Recorded   3  Cimzia Prefilled KIT; Therapy: (Recorded:02Jun2017) to Recorded   4  Daily Multivitamin TABS; Take 1 tablet daily Recorded   5  Ditropan XL 10 MG Oral Tablet Extended Release 24 Hour; Therapy: 81Ugz8837 to Recorded   6  DrRx Zithromax Z-Saman 250 MG #6 pill pack; take as directed; Therapy: 20USK5622 to (Last Rx:08Pee1353) Ordered   7  Eliquis 5 MG Oral Tablet; TAKE 1 TABLET TWICE DAILY; Therapy: 04YER4682 to Recorded   8  Furosemide 20 MG Oral Tablet; TAKE 1 TABLET DAILY; Therapy: 11Aug2016 to (Evaluate:29Nov2017)  Requested for: 64MBO7861; Last   Rx:02Jun2017 Ordered   9  Ketoconazole 2 % External Cream; CREA EX X 5; Therapy: 33JSS1113 to (Evaluate:91Jya2709)  Requested for: 00CWO6600; Last   Rx:04Jan2016 Ordered   10  Leucovorin Calcium 5 MG Oral Tablet; TAKE 2 TABLETS ONCE WEEKLY; Therapy: 02BAO7853 to (Evaluate:17Aug2017); Last Rx:93Pce5893 Ordered   11  Levothyroxine Sodium 75 MCG Oral Tablet; take 1 tablet every day; Therapy: 94MDS4881 to (Evaluate:79Yvt4511)  Requested for: 12Nnm9220; Last    Rx:81Nbw8351 Ordered   12  Losartan Potassium 100 MG Oral Tablet; Take 1 tablet daily; Therapy: 83OAN8379 to (Evaluate:16Nov2017)  Requested for: 21Nov2016; Last    Rx:21Nov2016 Ordered   13  Methotrexate 2 5 MG Oral Tablet; TAKE 8 TABLETS PER WEEK; Therapy: 90XZI1803 to (Evaluate:03Pgh4599)  Requested for: 11Aug2015 Recorded   14  Omeprazole 20 MG Oral Capsule Delayed Release; TAKE 1 CAPSULE DAILY as needed    for stomach acid;     Therapy: 15WCK0912 to (Evaluate:36Flp5966)  Requested for: 60QLD3953; Last Rx:37Wif1463 Ordered   15  Pravastatin Sodium 80 MG Oral Tablet; TAKE 1 TABLET DAILY; Therapy: 98BDO9625 to Recorded   16  PredniSONE 10 MG Oral Tablet; Take 4 tablets per day for 2 days, then 3 tabs daily for 2    days, then 2 tabs per day for 2 days, then 1 tab per day for 2 days; Therapy: 43NBG7032 to (Last Rx:30Spw0696)  Requested for: 29Wxq2307 Ordered   17  ROPINIRole HCl - 0 5 MG Oral Tablet; TAKE 2 TABLETS AT BEDTIME AND 1 TABLET IN    THE MORNING; Therapy: 87MKP8074 to [phone]  Requested for: 02YLJ8695 Recorded   18  Tylenol Arthritis Pain TBCR; TAKE 1 TABLET 4 TIMES DAILY AS NEEDED Recorded   19  Uribel 118 MG Oral Capsule; Therapy: 26Lzg0685 to Recorded   20  Vagisil 5-2 % Vaginal Cream;    Therapy: 62Kei1547 to Recorded    Allergies    1  Darvocet-N 100 TABS   2  Lipitor TABS   3  Macrobid CAPS   4  Penicillins   5  Sulfa Drugs   6  Tramadol   7  Zithromax TABS    Procedure  PTNS  See report  Future Appointments    Date/Time Provider Specialty Site   03/26/2018 11:30 AM Leisa Champion MD Neurology [facility] 21   09/12/2017 01:45 [place] [facility] [place] [address]   11/06/2017 02:00 PM Woody Antunez [facility] Neurology [facility] 5156   10/24/2017 01:00 PM Jacinto Angeles MD Family Medicine [address] FAMILY MEDICINE     Signatures  Electronically signed by : Gage Boudreaux RN; Aug  8 2017  2:04PM EST                       (Author)  Electronically signed by : SANTOSH Alan;  Aug  9 2017 10:40AM EST                       (Author)

## 2018-01-13 NOTE — RESULT NOTES
Verified Results  (1) COMPREHENSIVE METABOLIC PANEL 65ZYZ8197 78:72BL Natalee Glow     Test Name Result Flag Reference   GLUCOSE 140 mg/dL H 65-99   Fasting reference interval     For someone without known diabetes, a glucose  value >125 mg/dL indicates that they may have  diabetes and this should be confirmed with a  follow-up test    UREA NITROGEN (BUN) 18 mg/dL  7-25   CREATININE 0 88 mg/dL  0 60-0 88   For patients >52years of age, the reference limit  for Creatinine is approximately 13% higher for people  identified as -American  eGFR NON-AFR   AMERICAN 62 mL/min/1 73m2  > OR = 60   eGFR AFRICAN AMERICAN 71 mL/min/1 73m2  > OR = 60   BUN/CREATININE RATIO   6-98   NOT APPLICABLE (calc)   SODIUM 140 mmol/L  135-146   POTASSIUM 3 7 mmol/L  3 5-5 3   CHLORIDE 104 mmol/L     CARBON DIOXIDE 29 mmol/L  20-31   CALCIUM 8 9 mg/dL  8 6-10 4   PROTEIN, TOTAL 6 3 g/dL  6 1-8 1   ALBUMIN 4 0 g/dL  3 6-5 1   GLOBULIN 2 3 g/dL (calc)  1 9-3 7   ALBUMIN/GLOBULIN RATIO 1 7 (calc)  1 0-2 5   BILIRUBIN, TOTAL 1 2 mg/dL  0 2-1 2   ALKALINE PHOSPHATASE 95 U/L     AST 24 U/L  10-35   ALT 19 U/L  6-29     (1) CBC/PLT/DIFF 38ZQU9933 11:51AM Natalee Glow     Test Name Result Flag Reference   WHITE BLOOD CELL COUNT 5 9 Thousand/uL  3 8-10 8   RED BLOOD CELL COUNT 2 92 Million/uL L 3 80-5 10   HEMOGLOBIN 9 5 g/dL L 11 7-15 5   HEMATOCRIT 28 5 % L 35 0-45 0   MCV 97 6 fL  80 0-100 0   MCH 32 5 pg  27 0-33 0   MCHC 33 3 g/dL  32 0-36 0   RDW 13 9 %  11 0-15 0   PLATELET COUNT 121 Thousand/uL  140-400   ABSOLUTE NEUTROPHILS 3381 cells/uL  6771-4173   ABSOLUTE LYMPHOCYTES 1676 cells/uL  850-3900   ABSOLUTE MONOCYTES 649 cells/uL  200-950   ABSOLUTE EOSINOPHILS 177 cells/uL     ABSOLUTE BASOPHILS 18 cells/uL  0-200   NEUTROPHILS 57 3 %     LYMPHOCYTES 28 4 %     MONOCYTES 11 0 %     EOSINOPHILS 3 0 %     BASOPHILS 0 3 %     MPV 10 7 fL  7 5-12 5     (Q) TSH, 3RD GENERATION W/REFLEX TO FT4 81RYM3010 11: 51AM Balbir Case     Test Name Result Flag Reference   TSH W/REFLEX TO FT4 1 07 mIU/L  0 40-4 50     (Q) HEMOGLOBIN A1c 55COP8589 11:51AM Balbir Case   REPORT COMMENT:  FASTING:NO     Test Name Result Flag Reference   HEMOGLOBIN A1c 6 6 % of total Hgb H <5 7   For someone without known diabetes, a hemoglobin A1c  value of 6 5% or greater indicates that they may have   diabetes and this should be confirmed with a follow-up   test      For someone with known diabetes, a value <7% indicates   that their diabetes is well controlled and a value   greater than or equal to 7% indicates suboptimal   control  A1c targets should be individualized based on   duration of diabetes, age, comorbid conditions, and   other considerations  Currently, no consensus exists regarding use of  hemoglobin A1c for diagnosis of diabetes for children

## 2018-01-14 VITALS
HEART RATE: 70 BPM | BODY MASS INDEX: 39.55 KG/M2 | RESPIRATION RATE: 18 BRPM | SYSTOLIC BLOOD PRESSURE: 132 MMHG | WEIGHT: 209.5 LBS | TEMPERATURE: 98.2 F | DIASTOLIC BLOOD PRESSURE: 78 MMHG | HEIGHT: 61 IN

## 2018-01-14 VITALS
BODY MASS INDEX: 39.86 KG/M2 | WEIGHT: 211.13 LBS | DIASTOLIC BLOOD PRESSURE: 66 MMHG | SYSTOLIC BLOOD PRESSURE: 132 MMHG | HEIGHT: 61 IN

## 2018-01-14 VITALS
WEIGHT: 217.38 LBS | DIASTOLIC BLOOD PRESSURE: 72 MMHG | HEIGHT: 61 IN | BODY MASS INDEX: 41.04 KG/M2 | SYSTOLIC BLOOD PRESSURE: 156 MMHG

## 2018-01-14 VITALS
DIASTOLIC BLOOD PRESSURE: 68 MMHG | HEART RATE: 79 BPM | WEIGHT: 218.25 LBS | HEIGHT: 61 IN | BODY MASS INDEX: 41.21 KG/M2 | SYSTOLIC BLOOD PRESSURE: 158 MMHG

## 2018-01-14 NOTE — PROGRESS NOTES
Active Problems     1  History of Cellulitis of left lower leg (682 6) (L03 116)   2  Chronic diarrhea (787 91) (K52 9)   3  Colonoscopy (Fiberoptic) Screening   4  CVA (cerebral vascular accident) (434 91) (I63 9)   5  Encounter for FIT (fecal immunochemical test) screening (V76 51) (Z12 11)   6  Encounter for preventive health examination (V70 0) (Z00 00)   7  Encounter for screening mammogram for breast cancer (V76 12) (Z12 31)   8  Esophageal reflux (530 81) (K21 9)   9  Flu vaccine need (V04 81) (Z23)   10  Hematuria (599 70) (R31 9)   11  History of type 2 diabetes mellitus (V12 29) (Z86 39)   12  Hyperlipidemia (272 4) (E78 5)   13  Hypertension (401 9) (I10)   14  Hyponatremia (276 1) (E87 1)   15  Hypothyroidism (244 9) (E03 9)   16  Increased frequency of urination (788 41) (R35 0)   17  Increased frequency of urination (788 41) (R35 0)   18  Increased frequency of urination (788 41) (R35 0)   19  Lower extremity pain, anterior, unspecified laterality (729 5) (M79 606)   20  Lymphedema (457 1) (I89 0)   21  Medicare annual wellness visit, initial (V70 0) (Z00 00)   22  Multiple falls (V15 88) (R29 6)   23  Myalgia (729 1) (M79 1)   24  Need for influenza vaccination (V04 81) (Z23)   25  Need for pneumococcal vaccination (V03 82) (Z23)   26  Obesity (278 00) (E66 9)   27  Overactive bladder (596 51) (N32 81)   28  Prediabetes (790 29) (R73 03)   29  Primary osteoarthritis of left knee (715 16) (M17 12)   30  Restless legs syndrome (333 94) (G25 81)   31  Rheumatoid arthritis (714 0) (M06 9)   32  Right lumbar radiculopathy (724 4) (M54 16)   33  Screening for glaucoma (V80 1) (Z13 5)   34  Sensory urge incontinence (788 31) (N39 41)   35  Speech complaints (784 59) (R47 9)   36  Spinal stenosis (724 00) (M48 00)   37  Tinea cruris (110 3) (B35 6)   38  Urge incontinence of urine (788 31) (N39 41)   39  Urinary tract infection (599 0) (N39 0)   40  Urinary urgency (788 63) (R39 15)   41   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)   42  Yeast vaginitis (112 1) (B37 3)    Benign essential hypertension (401 1) (I10)       Anemia of chronic disease (285 29) (D63 8)       Edema (782 3) (R60 9)     - BL LE       Sleep apnea (780 57) (G47 30)       Carpal tunnel syndrome, left (354 0) (G56 02)       Peripheral neuropathy (356 9) (G62 9)          Current Meds   1  Calcium + D TABS; TAKE 1 TABLET TWICE DAILY; Therapy: (Recorded:11Aug2015) to Recorded   2  Cimzia Prefilled KIT; Therapy: (Recorded:02Jun2017) to Recorded   3  Daily Multivitamin TABS; Take 1 tablet daily Recorded   4  Diclofenac Sodium 1 % Transdermal Gel; apply 4 g up to QID; Therapy: 67AAI3893 to (Last Westhoff Jm)  Requested for: 46ZLP3444 Ordered   5  Furosemide 20 MG Oral Tablet; TAKE 1 TABLET DAILY  Take twice daily as needed for   increased edema; Therapy: 11Aug2016 to (Alin Ferreira)  Requested for: 66ZQE4008 Recorded   6  Gabapentin 300 MG Oral Capsule; take 1 po day 1 then 1 po BID, then 1 po TID; Therapy: 97TFO8069 to (Last SH:19YUM1621)  Requested for: 14VLE9493 Ordered   7  Leucovorin Calcium 5 MG Oral Tablet; TAKE 2 TABLETS ONCE WEEKLY; Therapy: 34ASK4416 to (Evaluate:17Aug2017); Last Rx:85Gyi4658 Ordered   8  Levothyroxine Sodium 75 MCG Oral Tablet; take 1 tablet every day; Therapy: 88NMD3532 to (Evaluate:18Twq4534)  Requested for: 46Qzc5569; Last   Rx:86Flv4838 Ordered   9  Losartan Potassium 100 MG Oral Tablet; Take 1 tablet daily; Therapy: 96ZQC1506 to ([phone])  Requested for: 43YQQ0917; Last   Rx:05Nov2017 Ordered   10  Lovastatin TABS; TAKE 1 TABLET DAILY; Therapy: (Recorded:28Cti9432) to Recorded   11  Methotrexate 2 5 MG Oral Tablet; TAKE 8 TABLETS PER WEEK; Therapy: 45FJN1153 to (Evaluate:96Oqg1529)  Requested for: 11Aug2015 Recorded   12  Mometasone Furoate 0 1 % External Solution; Apply to ears daily as needed;     Therapy: 81RYK1883 to (Last Westhoff Everts)  Requested for: 98QML6864 Ordered   13  Nitrofurantoin Macrocrystal 100 MG Oral Capsule; TAKE 1 CAPSULE EVERY 12 HOURS    DAILY; Therapy: 31WZP7850 to (Evaluate:14Nov2017)  Requested for: 30EKZ6328; Last    Rx:09Nov2017 Ordered   14  Omeprazole 20 MG Oral Capsule Delayed Release; TAKE 1 CAPSULE DAILY as    needed for stomach acid; Therapy: 81ITF7223 to (Evaluate:38Zwc8851)  Requested for: 83HHE9613; Last    Rx:99Erj8721 Ordered   15  Pravastatin Sodium 80 MG Oral Tablet; TAKE 1 TABLET DAILY AT 5PM;    Therapy: 08GAJ7317 to (Evaluate:18Mar2018)  Requested for: 56NJK4072; Last    Rx:78Iql9141 Ordered   16  ROPINIRole HCl - 0 5 MG Oral Tablet; TAKE 2 TABLETS AT BEDTIME AND 1 TABLET IN    THE MORNING; Therapy: 47LBC3590 to [phone])  Requested for: 70WCA4360; Last    Rx:05Nov2017 Ordered   17  Tylenol Arthritis Pain TBCR; TAKE 1 TABLET 4 TIMES DAILY AS NEEDED Recorded   18  Xarelto 20 MG Oral Tablet; take 1 tablet every day; Therapy: 74Ohb4232 to (Evaluate:53Pxj3572); Last Rx:17Nov2017 Ordered    Allergies    1  Darvocet-N 100 TABS   2  Lipitor TABS   3  Sulfa Drugs   4  Tramadol   5  Zithromax TABS  Denied    6  Macrobid CAPS    Procedure    Procedure: Percutaneous Tibial Nerve Stimulation (PTNS)   Date onset of symptoms A LONG TIME  No behavior modification  No biofeedback  No E-Stim  Drug 1: NUMEROUS OTHER ANTICHOLINERGICS DIDN'T WORK  Session number: 2 month f/u     Patient Goals and Progress   Decreased urgency  Decreased frequency  No accidents  Sleeps through the night  No related health and social factors  Caffeine - cups per day: 0-2  Alcohol - number of drinks per day: 0-1  Daytime voids - number per day: 8-11  Nighttime voids - number per night: 1  Urgency: 0-2  Incontinence - episodes per day: 0-1  Treatment Plan   Do not increase fluids  Decrease fluids  Decrease caffeine  Urge reduction techniques:   No kegels  Toilet every 2-3 hours  No fluids before bed  Ankle Used: Left     Treatment settin  Duration of treatment: 30 MINUTES  Lead lot #: [id number]  Expiration Date: 2019  Feeling/Response: Toe flex and foot sensation      Assessment    1  Sensory urge incontinence (788 31) (N39 41)   2  Urinary frequency (788 41) (R35 0)   3  Urinary urgency (788 63) (R39 15)    Plan  Urge incontinence of urine, Urinary frequency, Urinary urgency    · Follow-up visit in 1 month Evaluation and Treatment  Follow-up  Status: Complete  Done:  82VBQ2646   Ordered; For: Urge incontinence of urine, Urinary frequency, Urinary urgency; Ordered By: Karley Colin Performed:  Due: 55QZE6021    Future Appointments    Date/Time Provider Specialty Site   2018 11:30 AM Kenan Capellan MD Neurology [facility] 21   2017 01:45 PM [facility] [place] [address]   01/10/2018 01:00 PM Lucien Liriano MD Gastroenterology Adult [facility] 9   2018 03:45 PM GLORIA Richardson   Family Medicine [address] FAMILY MEDICINE     Signatures   Electronically signed by : Joel Hannon RN; 2017  1:49PM EST                       (Author)    Electronically signed by : Adrain Phalen, MD; Dec  8 2017  9:11AM EST                       (Author)

## 2018-01-15 VITALS — SYSTOLIC BLOOD PRESSURE: 118 MMHG | DIASTOLIC BLOOD PRESSURE: 72 MMHG

## 2018-01-15 NOTE — MISCELLANEOUS
Message   Recorded as Task   Date: 11/02/2016 10:15 AM, Created By: Juni Strange   Task Name: Follow Up   Assigned To: Yuni Reyes   Regarding Patient: Payton Reese, Status: Active   CommentVal Hausen - 02 Nov 2016 10:15 AM     TASK CREATED  Caller: Self; General Medical Question; [phone] (Home)  Patient was just in Oct  21 and said she is feeling better  She also wants to know if you would clear her to go back and get her leg therapy at [facility]  If you do, the fax is [phone]   Celsokeira Endy - 02 Nov 2016 10:28 PM     TASK REPLIED TO: Previously Assigned To Mona Strange - 03 Nov 2016 1:03 PM     TASK REASSIGNED: Previously Assigned To Nicci Kirby - 30 Nov 2016 1:36 PM     TASK EDITED                 FAXED RX FROM [place]        Active Problems    1  Anemia of chronic disease (285 29) (D63 8)   2  Benign essential hypertension (401 1) (I10)   3  Cellulitis of left lower leg (682 6) (L03 116)   4  Chronic diarrhea (787 91) (K52 9)   5  Colonoscopy (Fiberoptic) Screening   6  Edema (782 3) (R60 9)   7  Encounter for screening mammogram for breast cancer (V76 12) (Z12 31)   8  Esophageal reflux (530 81) (K21 9)   9  Flu vaccine need (V04 81) (Z23)   10  History of type 2 diabetes mellitus (V12 29) (Z86 39)   11  Hyperlipidemia (272 4) (E78 5)   12  Hypertension (401 9) (I10)   13  Hyponatremia (276 1) (E87 1)   14  Hypothyroidism (244 9) (E03 9)   15  Left cataract (366 9) (H26 9)   16  Medicare annual wellness visit, initial (V70 0) (Z00 00)   17  Multiple falls (V15 88) (R29 6)   18  Myalgia (729 1) (M79 1)   19  Need for influenza vaccination (V04 81) (Z23)   20  Need for pneumococcal vaccination (V03 82) (Z23)   21  Obesity (278 00) (E66 9)   22  Overactive bladder (596 51) (N32 81)   23  Pneumonia of left lower lobe due to infectious organism (483 8) (J18 9)   24  Prediabetes (790 29) (R73 09)   25   Restless legs syndrome (333 94) (G25 81)   26  Rheumatoid arthritis (714 0) (M06 9)   27  Spinal stenosis (724 00) (M48 00)   28  Tinea cruris (110 3) (B35 6)   29  Urge incontinence of urine (788 31) (N39 41)   30  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)    Current Meds   1  Azithromycin 250 MG Oral Tablet; TAKE 2 TABLETS ON DAY 1 THEN TAKE 1 TABLET A   DAY FOR 4 DAYS; Therapy: 47DVH8132 to (Last Steve Velasquez)  Requested for: 21Oct2016 Ordered   2  Calcium + D TABS; TAKE 1 TABLET TWICE DAILY; Therapy: (Recorded:11Aug2015) to Recorded   3  Daily Multivitamin TABS; Take 1 tablet daily Recorded   4  Furosemide 20 MG Oral Tablet; TAKE 1 TABLET DAILY AS NEEDED FOR LEG EDEMA; Therapy: 11Aug2016 to (Evaluate:97Wen0831)  Requested for: 63Vyk4515; Last   Rx:71Pxh1109 Ordered   5  Hydrocodone-Acetaminophen 5-325 MG Oral Tablet; TAKE 1 TABLET EVERY 4 TO 6   HOURS AS NEEDED FOR PAIN;   Therapy: 40UFQ8657 to (Evaluate:10Oct2016); Last Rx:06Oct2016 Ordered   6  Ketoconazole 2 % External Cream; CREA EX X 5; Therapy: 57ZPV2073 to (Evaluate:19Gle5492)  Requested for: 44WFX1285; Last   Rx:04Jan2016 Ordered   7  Leucovorin Calcium 5 MG Oral Tablet; Therapy: 27VKA3709 to (Last Chad Delgado)  Requested for: 31Zxu7838 Ordered   8  Levothyroxine Sodium 75 MCG Oral Tablet; take 1 tab daily; Therapy: 74QYN8395 to (Evaluate:08Hhx1625)  Requested for: 70Adb3953; Last   Rx:63Ctj0795 Ordered   9  Losartan Potassium 100 MG Oral Tablet; TAKE 1 TABLET DAILY; Therapy: 45BAM0962 to (Evaluate:12Jan2017)  Requested for: 42UKV6401; Last   Rx:18Jan2016 Ordered   10  Methotrexate 2 5 MG Oral Tablet; TAKE 8 TABLETS PER WEEK; Therapy: 04MLO6523 to (Evaluate:69Lki2646)  Requested for: 11Aug2015 Recorded   11  Omeprazole 20 MG Oral Capsule Delayed Release; TAKE 1 CAPSULE DAILY as    needed for stomach acid; Therapy: 98QHK3644 to (Evaluate:56Hct7285)  Requested for: 92Pbw8484; Last    Rx:70Irw0940 Ordered   12  Oxybutynin Chloride 5 MG Oral Tablet; Therapy: 40UAV5821 to Recorded   13  Remicade 100 MG Intravenous Solution Reconstituted; Infuse once every 5 weeks; Therapy: 29ZBJ7535 to Recorded   14  ROPINIRole HCl - 0 5 MG Oral Tablet; Take 2 tabs at bedtime and one tab in the    morning; Therapy: 19ZDT5705 to (Last Rx:18Jan2016)  Requested for: 91FCJ5044 Ordered   15  Tylenol Arthritis Pain TBCR; TAKE 1 TABLET 4 TIMES DAILY AS NEEDED Recorded    Allergies    1  Darvocet-N 100 TABS   2  Lipitor TABS   3  Macrobid CAPS   4  Penicillins   5  Sulfa Drugs   6   Tramadol    Signatures   Electronically signed by : Jeannette Jerome, ; Nov  3 2016  1:36PM EST                       (Author)

## 2018-01-15 NOTE — RESULT NOTES
Verified Results  (1) [facility] 95[facility]WS2214 [facility]    Order Number: [id number]     Test Name Result Flag Reference   CLINICAL REPORT (Report)     Test:        Urine culture  Specimen Type:   Urine  Specimen Date:   11/9/2017 2:43 PM  Result Date:    11/11/2017 11:37 AM  Result Status:   Final result  Resulting Lab:   [address] [address]            Tel: [phone]      CULTURE                                       ------------------                                   <10,000 cfu/ml      *** Mixed Contaminants X2 ***

## 2018-01-15 NOTE — PROGRESS NOTES
Chief Complaint  Urinary Urgency  Urge Incontinence      Active Problems    1  Anemia of chronic disease (285 29) (D63 8)   2  Benign essential hypertension (401 1) (I10)   3  Cellulitis of left lower leg (682 6) (L03 116)   4  Chronic diarrhea (787 91) (K52 9)   5  Colonoscopy (Fiberoptic) Screening   6  CVA (cerebral vascular accident) (434 91) (I63 9)   7  Dermatitis (692 9) (L30 9)   8  Edema (782 3) (R60 9)   9  Encounter for preventive health examination (V70 0) (Z00 00)   10  Encounter for screening mammogram for breast cancer (V76 12) (Z12 31)   11  Esophageal reflux (530 81) (K21 9)   12  Flu vaccine need (V04 81) (Z23)   13  History of type 2 diabetes mellitus (V12 29) (Z86 39)   14  Hyperlipidemia (272 4) (E78 5)   15  Hypertension (401 9) (I10)   16  Hyponatremia (276 1) (E87 1)   17  Hypothyroidism (244 9) (E03 9)   18  Left cataract (366 9) (H26 9)   19  Medicare annual wellness visit, initial (V70 0) (Z00 00)   20  Multiple falls (V15 88) (R29 6)   21  Myalgia (729 1) (M79 1)   22  Need for influenza vaccination (V04 81) (Z23)   23  Need for pneumococcal vaccination (V03 82) (Z23)   24  Obesity (278 00) (E66 9)   25  Overactive bladder (596 51) (N32 81)   26  Pneumonia of left lower lobe due to infectious organism (486) (J18 1)   27  Prediabetes (790 29) (R73 03)   28  Restless legs syndrome (333 94) (G25 81)   29  Rheumatoid arthritis (714 0) (M06 9)   30  Sleep apnea (780 57) (G47 30)   31  Speech complaints (784 59) (R47 9)   32  Spinal stenosis (724 00) (M48 00)   33  Tinea cruris (110 3) (B35 6)   34  Urge incontinence of urine (788 31) (N39 41)   35  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)    Current Meds   1  AmLODIPine Besylate 10 MG Oral Tablet; TAKE 1 TABLET DAILY; Therapy: 56IXB5863 to (Evaluate:04Nov2017); Last TZ:74AEO1013 Ordered   2  Aspirin 81 MG Oral Tablet Chewable; 1 tablet in the AM and PM;   Therapy: (Recorded:75Ekg4871) to Recorded   3  Calcium + D TABS; TAKE 1 TABLET TWICE DAILY; Therapy: (Recorded:11Aug2015) to Recorded   4  Cimzia Prefilled KIT; Therapy: (Recorded:02Jun2017) to Recorded   5  Daily Multivitamin TABS; Take 1 tablet daily Recorded   6  Eliquis 5 MG Oral Tablet; TAKE 1 TABLET TWICE DAILY; Therapy: 03WPZ5118 to Recorded   7  Furosemide 20 MG Oral Tablet; TAKE 1 TABLET DAILY; Therapy: 11Aug2016 to (Evaluate:29Nov2017)  Requested for: 95NNF0783; Last   Rx:02Jun2017 Ordered   8  Ketoconazole 2 % External Cream; CREA EX X 5; Therapy: 04SLK2791 to (Evaluate:65Cvw2730)  Requested for: 17TQM0468; Last   Rx:04Jan2016 Ordered   9  Levothyroxine Sodium 75 MCG Oral Tablet; take 1 tablet every day; Therapy: 90DRR9619 to (Evaluate:02Zhe9412)  Requested for: 17Nqy4198; Last   Rx:57Mog0238 Ordered   10  Losartan Potassium 100 MG Oral Tablet; Take 1 tablet daily; Therapy: 58XZR0517 to (Evaluate:16Nov2017)  Requested for: 21Nov2016; Last    Rx:21Nov2016 Ordered   11  Methotrexate 2 5 MG Oral Tablet; TAKE 8 TABLETS PER WEEK; Therapy: 26RID4166 to (Evaluate:69Sky7274)  Requested for: 11Aug2015 Recorded   12  Omeprazole 20 MG Oral Capsule Delayed Release; TAKE 1 CAPSULE DAILY as    needed for stomach acid; Therapy: 59RCP8121 to (Evaluate:02Yem5765)  Requested for: 68ZCJ9192; Last    Rx:79Ilv6208 Ordered   13  Pravastatin Sodium 80 MG Oral Tablet; TAKE 1 TABLET DAILY; Therapy: 62TCW3438 to Recorded   14  ROPINIRole HCl - 0 5 MG Oral Tablet; TAKE 2 TABLETS AT BEDTIME AND 1 TABLET IN    THE MORNING; Therapy: 33BAL0058 to ([phone] 860503)  Requested for: 05NDD7081 Recorded   15  Tylenol Arthritis Pain TBCR; TAKE 1 TABLET 4 TIMES DAILY AS NEEDED Recorded    Allergies    1  Darvocet-N 100 TABS   2  Lipitor TABS   3  Macrobid CAPS   4  Penicillins   5  Sulfa Drugs   6  Tramadol    Assessment    1  Urgency of urination (788 63) (R39 15)   2  Urinary frequency (788 41) (R35 0)   3   Urge incontinence of urine (788 31) (N39 41)    Future Appointments    Date/Time Provider Specialty Site   03/26/2018 11:30 AM Lele Aguilar MD Neurology [address]   08/08/2017 01:00 PM [facility], [address]   11/06/2017 02:00 PM Robert Dai [facility] Neurology [facility] NEUROLOGY ASS  [address]   07/18/2017 02:00 PM Vicky Centeno MD Family Medicine [address] FAMILY MEDICINE     Signatures   Electronically signed by : Cayla Ward MD; Jul 7 2017  6:10PM EST                       (Author)

## 2018-01-15 NOTE — RESULT NOTES
Verified Results  (1) CBC/PLT/DIFF 63BEY0072 01:26PM Alex Hyman Order Number: [id number]     Test Name Result Flag Reference   WBC COUNT 6 85 Thousand/uL  4 31-10 16   RBC COUNT 2 93 Million/uL L 3 81-5 12   HEMOGLOBIN 9 7 g/dL L 11 5-15 4   HEMATOCRIT 28 9 % L 34 8-46  1   MCV 99 fL H 82-98   MCH 33 1 pg  26 8-34 3   MCHC 33 6 g/dL  31 4-37 4   RDW 14 6 %  11 6-15 1   MPV 10 5 fL  8 9-12 7   PLATELET COUNT 523 Thousands/uL  149-390   nRBC AUTOMATED 0 /100 WBCs     NEUTROPHILS RELATIVE PERCENT 53 %  43-75   LYMPHOCYTES RELATIVE PERCENT 33 %  14-44   MONOCYTES RELATIVE PERCENT 11 %  4-12   EOSINOPHILS RELATIVE PERCENT 3 %  0-6   BASOPHILS RELATIVE PERCENT 0 %  0-1   NEUTROPHILS ABSOLUTE COUNT 3 61 Thousands/? ??L  1 85-7 62   LYMPHOCYTES ABSOLUTE COUNT 2 25 Thousands/? ??L  0 60-4 47   MONOCYTES ABSOLUTE COUNT 0 75 Thousand/? ??L  0 17-1 22   EOSINOPHILS ABSOLUTE COUNT 0 21 Thousand/? ??L  0 00-0 61   BASOPHILS ABSOLUTE COUNT 0 01 Thousands/? ??L  0 00-0 10

## 2018-01-15 NOTE — RESULT NOTES
Verified Results  (1) BASIC METABOLIC PROFILE 98BLL3014 10:41AM Aisha Eisenmenger Order Number: [id number]  TW Order Number: [id number] Order Number: [id number]     Test Name Result Flag Reference   GLUCOSE,RANDM 92 mg/dL     If the patient is fasting, the ADA then defines impaired fasting glucose as > 100 mg/dL and diabetes as > or equal to 123 mg/dL  SODIUM 136 mmol/L  136-145   POTASSIUM 4 2 mmol/L  3 5-5 3   CHLORIDE 101 mmol/L  100-108   CARBON DIOXIDE 28 mmol/L  21-32   ANION GAP (CALC) 7 mmol/L  4-13   BLOOD UREA NITROGEN 18 mg/dL  5-25   CREATININE 0 84 mg/dL  0 60-1 30   Standardized to IDMS reference method   CALCIUM 9 0 mg/dL  8 3-10 1   eGFR Non-African American      >60 0 ml/min/1 73sq m   [facility] Disease Education Program recommendations are as follows:  GFR calculation is accurate only with a steady state creatinine  Chronic Kidney disease less than 60 ml/min/1 73 sq  meters  Kidney failure less than 15 ml/min/1 73 sq  meters  (1) TSH WITH FT4 REFLEX 88CZR3980 10:41AM Natacha Lombardoisi Order Number: [id number]  TW Order Number: [id number] Order Number: [id number]     Test Name Result Flag Reference   TSH 0 941 uIU/mL  0 358-3 740   The recommended reference ranges for TSH during pregnancy are as follows:  First trimester 0 1 to 2 5 uIU/mL  Second trimester  0 2 to 3 0 uIU/mL  Third trimester 0 3 to 3 0 uIU/m     (1) HEMOGLOBIN A1C 18JII3602 10:41AM Natachasolomon Quigleybobby Order Number: [id number]     Test Name Result Flag Reference   HEMOGLOBIN A1C 6 3 % H 4 0-5 6   EST  AVG   GLUCOSE 134 mg/dl     5 7-6 4% impaired fasting glucose  >=6 5% diagnosis of diabetes    Falsely low levels are seen in conditions linked to short RBC life span-  hemolytic anemia, and splenomegaly  Falsely elevated levels are seen in situations where there is an increased production of RBC- receipt of erythropoietin or blood transfusions  Adopted from ADA-Clinical Practice Recommendations

## 2018-01-16 ENCOUNTER — CLINICAL SUPPORT (OUTPATIENT)
Dept: OTHER | Facility: OTHER | Age: 83
End: 2018-01-16

## 2018-01-16 NOTE — RESULT NOTES
Verified Results  (1) IRON PANEL 47Axg6799 01:26PM Jose Miguel Reveal Order Number: [id number]     Test Name Result Flag Reference   IRON 46 ug/dL L    Patients treated with metal-binding drugs (ie  Deferoxamine) may have depressed iron values     FERRITIN 86 ng/mL  8-388   TOTAL IRON BINDING CAPACITY 293 ug/dL  250-450   IRON SATURATION 16 %

## 2018-01-16 NOTE — RESULT NOTES
Verified Results  (1) URIC ACID 38Ewl4100 03:49PM Lamaradilene Ritesh    Order Number: [id number]     Test Name Result Flag Reference   URIC ACID 5 5 mg/dL  2 0-6 8   Specimen collection should occur prior to Metamizole administration due to the potential for falsely depressed results

## 2018-01-16 NOTE — PROGRESS NOTES
Active Problems    1  Anemia of chronic disease (285 29) (D63 8)   2  Benign essential hypertension (401 1) (I10)   3  Carpal tunnel syndrome, left (354 0) (G56 02)   4  History of Cellulitis of left lower leg (682 6) (L03 116)   5  Chronic diarrhea (787 91) (K52 9)   6  Colonoscopy (Fiberoptic) Screening   7  CVA (cerebral vascular accident) (434 91) (I63 9)   8  Edema (782 3) (R60 9)   9  Encounter for preventive health examination (V70 0) (Z00 00)   10  Encounter for screening mammogram for breast cancer (V76 12) (Z12 31)   11  Esophageal reflux (530 81) (K21 9)   12  Flu vaccine need (V04 81) (Z23)   13  Hematuria (599 70) (R31 9)   14  History of type 2 diabetes mellitus (V12 29) (Z86 39)   15  Hyperlipidemia (272 4) (E78 5)   16  Hypertension (401 9) (I10)   17  Hyponatremia (276 1) (E87 1)   18  Hypothyroidism (244 9) (E03 9)   19  Lower extremity pain, anterior, unspecified laterality (729 5) (M79 606)   20  Lymphedema (457 1) (I89 0)   21  Medicare annual wellness visit, initial (V70 0) (Z00 00)   22  Multiple falls (V15 88) (R29 6)   23  Myalgia (729 1) (M79 1)   24  Need for influenza vaccination (V04 81) (Z23)   25  Need for pneumococcal vaccination (V03 82) (Z23)   26  Obesity (278 00) (E66 9)   27  Overactive bladder (596 51) (N32 81)   28  Prediabetes (790 29) (R73 03)   29  Primary osteoarthritis of left knee (715 16) (M17 12)   30  Restless legs syndrome (333 94) (G25 81)   31  Rheumatoid arthritis (714 0) (M06 9)   32  Screening for glaucoma (V80 1) (Z13 5)   33  Sleep apnea (780 57) (G47 30)   34  Speech complaints (784 59) (R47 9)   35  Spinal stenosis (724 00) (M48 00)   36  Tinea cruris (110 3) (B35 6)   37  Urge incontinence of urine (788 31) (N39 41)   38  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)   39  Yeast vaginitis (112 1) (B37 3)    Current Meds   1  Calcium + D TABS; TAKE 1 TABLET TWICE DAILY; Therapy: (Recorded:11Aug2015) to Recorded   2   Cimzia Prefilled KIT; Therapy: (Recorded:02Jun2017) to Recorded   3  Daily Multivitamin TABS; Take 1 tablet daily Recorded   4  Diclofenac Sodium 1 % Transdermal Gel; apply 4 g up to QID; Therapy: 05TWQ6141 to (Last Davian Strickland)  Requested for: 17FBY8515 Ordered   5  Furosemide 20 MG Oral Tablet; TAKE 1 TABLET DAILY  Take twice daily as needed for   increased edema; Therapy: 11Aug2016 to (Eliazar Gale)  Requested for: 46WGS6904 Recorded   6  Leucovorin Calcium 5 MG Oral Tablet; TAKE 2 TABLETS ONCE WEEKLY; Therapy: 02OCB6028 to (Evaluate:17Aug2017); Last Rx:07Oqy7311 Ordered   7  Levothyroxine Sodium 75 MCG Oral Tablet; take 1 tablet every day; Therapy: 69DIQ4975 to (Evaluate:77Udu7637)  Requested for: 68Wnl2974; Last   Rx:85Wto7250 Ordered   8  Losartan Potassium 100 MG Oral Tablet; Take 1 tablet daily; Therapy: 23WQL8358 to (Evaluate:16Nov2017)  Requested for: 21Nov2016; Last   Rx:21Nov2016 Ordered   9  Lovastatin TABS; TAKE 1 TABLET DAILY; Therapy: (Recorded:97Zpu7219) to Recorded   10  Methotrexate 2 5 MG Oral Tablet; TAKE 8 TABLETS PER WEEK; Therapy: 52QCQ3224 to (Evaluate:48Ehz0587)  Requested for: 11Aug2015 Recorded   11  Mometasone Furoate 0 1 % External Solution; Apply to ears daily as needed; Therapy: 82RAD0114 to (Rony Strickland)  Requested for: 36VZA4878 Ordered   12  Omeprazole 20 MG Oral Capsule Delayed Release; TAKE 1 CAPSULE DAILY as    needed for stomach acid; Therapy: 66ZLU7699 to (Evaluate:37Cqg8425)  Requested for: 81ZKW0142; Last    Rx:02Fsq4037 Ordered   13  Pravastatin Sodium 80 MG Oral Tablet; TAKE 1 TABLET DAILY AT 5PM;    Therapy: 77JYU4036 to (Evaluate:18Mar2018)  Requested for: 56NHK9579; Last    Rx:[address] Ordered   14  PredniSONE 10 MG Oral Tablet; 3 tabs x 2 days, 2 tabs x 3days, 1 tab x 3 days; Therapy: 41NGD9989 to ([address])  Requested for: 26AVT8408; Last    Rx:13Oct2017 Ordered   13   ROPINIRole HCl - 0 5 MG Oral Tablet; TAKE 2 TABLETS AT BEDTIME AND 1 TABLET IN    THE MORNING; Therapy: 03OES9826 to (Faith Terry)  Requested for: 15FLY1951 Recorded   16  Tylenol Arthritis Pain TBCR; TAKE 1 TABLET 4 TIMES DAILY AS NEEDED Recorded   17  Xarelto 20 MG Oral Tablet; take 1 tablet every day; Therapy: 21Yma9953 to (Evaluate:51Jld8564) Recorded    Allergies    1  Darvocet-N 100 TABS   2  Lipitor TABS   3  Sulfa Drugs   4  Tramadol   5  Zithromax TABS  Denied    6  Macrobid CAPS    Procedure    Procedure: Percutaneous Tibial Nerve Stimulation (PTNS)   Date onset of symptoms A LONG TIME AGO  No behavior modification  No biofeedback  No E-Stim  Drug 1: NUMEROUS ANTICHOLINERGICS DIDN'T WORK  Session number: 1 month f/u     Patient Goals and Progress   Decreased urgency  Decreased frequency  No accidents  Sleeps through the night  No related health and social factors  Caffeine - cups per day: 0  Alcohol - number of drinks per day: 0  Daytime voids - number per day: 7-11  Nighttime voids - number per night: 1-2  Urgency: 0-3  Incontinence - episodes per day: 0-2  Treatment Plan   Do not increase fluids  Decrease fluids  Do not decrease caffeine  Urge reduction techniques:   No kegels  Toilet every 3-4 hours  No fluids before bed  Ankle Used: Right     Treatment settin  Duration of treatment: 30 MINUTES  Lead lot #: [id number]  Expiration Date: 2019  Feeling/Response: Toe flex and foot sensation      Assessment    1  Sensory urge incontinence (788 31) (N39 41)   2  Urinary urgency (788 63) (R39 15)   3  Increased frequency of urination (788 41) (R35 0)    Plan  Increased frequency of urination, Urge incontinence of urine, Urinary urgency    · Follow-up visit in 1 month Evaluation and Treatment  Follow-up  Status: Complete  Done:  28AXW8354   Ordered; For: Increased frequency of urination, Urge incontinence of urine, Urinary urgency;  Ordered By: Radu Burkett Performed: Due: 58EST2898    Future Appointments    Date/Time Provider Specialty Site   03/26/2018 11:30 AM Boris Darling MD Neurology [address]   11/20/2017 01:00 PM [facility], [place] [address]   11/06/2017 02:00 [facility] [address]   01/03/2018 03:45 PM GLORIA Wiggins   Family Medicine [address] FAMILY MEDICINE     Signatures   Electronically signed by : Liane Ace RN; Oct 24 2017 11:12AM EST                       (Author)    Electronically signed by : SANTOSH Mckeon; Oct 24 2017 11:16AM EST                       (Author)

## 2018-01-16 NOTE — RESULT NOTES
Verified Results  (1) OCCULT BLOOD, FECAL IMMUNOCHEMICAL TEST 20Nov2017 02:22PM Luz Rose Order Number: [id number]     Test Name Result Flag Reference   OCCULT BLD, FECAL IMMUNOLOGICAL Positive A Negative   Performed by Fecal Immunochemical Test

## 2018-01-16 NOTE — RESULT NOTES
Verified Results  VAS LOWER LIMB VENOUS DUPLEX STUDY, COMPLETE BILATERAL 52Bws9481 12:44PM Samara Gurrola    Order Number: [id number]    - Patient Instructions: To schedule this appointment, please contact Central Scheduling at [phone]   Order Number: [id number]    - Patient Instructions: To schedule this appointment, please contact Central Scheduling at [phone]  Test Name Result Flag Reference   VAS LOWER LIMB VENOUS DUPLEX STUDY, COMPLETE BILATERAL (Report)     [facility] REPORT   CLINICAL:   Indications: Bilateral Swelling of Limb [R22 4]  Bilateral Limb Pain [M79 609]  Patient has had chronic bouts of cellulitis and complains of swelling and pain   in both calves  Operative History:   Bladder suspension 2010   Left Knee surgery 2010            CONCLUSION:   Impression:   RIGHT LOWER LIMB:   No evidence of acute or occlusive deep vein thrombosis  No evidence of superficial thrombophlebitis noted  Doppler evaluation shows a normal response to augmentation maneuvers  Popliteal, posterior tibial and anterior tibial arterial Doppler waveforms are   monophasic  There is a cystic structure in the popliteal fossa measuring 3 86 cm  by 2 02   cm  in a transverse plane, consistent with a Baker's cyst       LEFT LOWER LIMB:   No evidence of acute or occlusive deep vein thrombosis  No evidence of superficial thrombophlebitis noted  Doppler evaluation shows a normal response to augmentation maneuvers  Popliteal, posterior tibial and anterior tibial arterial Doppler waveforms are   monophasic  In comparison to the study of 5/18/2011, there is no significant interval   change in the disease process        SIGNATURE:   Electronically Signed by: Eneida Jarrett MD on 2016-10-17 04:20:26 PM

## 2018-01-17 NOTE — MISCELLANEOUS
Message  I received a call from the emergency room at bedtime  The patient is there with UTI  She also has very high blood pressure  I suggested adding amlodipine 10 mg to her current regimen which they will do and she will follow-up with us this week         Plan  Hypertension    · AmLODIPine Besylate 10 MG Oral Tablet; TAKE 1 TABLET DAILY    Signatures   Electronically signed by : GLORIA Pelaez ; May  8 2017  1:05PM EST                       (Author)

## 2018-01-19 ENCOUNTER — TRANSCRIBE ORDERS (OUTPATIENT)
Dept: RADIOLOGY | Facility: HOSPITAL | Age: 83
End: 2018-01-19

## 2018-01-19 ENCOUNTER — GENERIC CONVERSION - ENCOUNTER (OUTPATIENT)
Dept: OTHER | Facility: OTHER | Age: 83
End: 2018-01-19

## 2018-01-19 ENCOUNTER — HOSPITAL ENCOUNTER (OUTPATIENT)
Dept: RADIOLOGY | Facility: HOSPITAL | Age: 83
Discharge: HOME | End: 2018-01-19
Attending: FAMILY MEDICINE
Payer: MEDICARE

## 2018-01-19 DIAGNOSIS — N39.0 URINARY TRACT INFECTION: ICD-10-CM

## 2018-01-19 DIAGNOSIS — R31.9 HEMATURIA: ICD-10-CM

## 2018-01-19 PROCEDURE — 76770 US EXAM ABDO BACK WALL COMP: CPT

## 2018-01-22 VITALS
RESPIRATION RATE: 20 BRPM | HEART RATE: 83 BPM | SYSTOLIC BLOOD PRESSURE: 140 MMHG | WEIGHT: 212.38 LBS | BODY MASS INDEX: 40.1 KG/M2 | OXYGEN SATURATION: 98 % | HEIGHT: 61 IN | DIASTOLIC BLOOD PRESSURE: 60 MMHG

## 2018-01-22 VITALS — WEIGHT: 216.13 LBS | HEIGHT: 61 IN | BODY MASS INDEX: 40.8 KG/M2

## 2018-01-22 VITALS
TEMPERATURE: 98 F | HEART RATE: 78 BPM | OXYGEN SATURATION: 97 % | WEIGHT: 218.38 LBS | SYSTOLIC BLOOD PRESSURE: 124 MMHG | BODY MASS INDEX: 41.23 KG/M2 | HEIGHT: 61 IN | DIASTOLIC BLOOD PRESSURE: 80 MMHG

## 2018-01-22 VITALS
HEART RATE: 80 BPM | RESPIRATION RATE: 20 BRPM | TEMPERATURE: 99.3 F | BODY MASS INDEX: 40.64 KG/M2 | SYSTOLIC BLOOD PRESSURE: 138 MMHG | DIASTOLIC BLOOD PRESSURE: 86 MMHG | HEIGHT: 61 IN | WEIGHT: 215.25 LBS

## 2018-01-22 VITALS
SYSTOLIC BLOOD PRESSURE: 128 MMHG | HEIGHT: 61 IN | HEART RATE: 78 BPM | DIASTOLIC BLOOD PRESSURE: 78 MMHG | RESPIRATION RATE: 20 BRPM | TEMPERATURE: 98 F | WEIGHT: 213.25 LBS | BODY MASS INDEX: 40.26 KG/M2

## 2018-01-23 VITALS
WEIGHT: 215.38 LBS | TEMPERATURE: 98.5 F | HEIGHT: 61 IN | SYSTOLIC BLOOD PRESSURE: 158 MMHG | RESPIRATION RATE: 18 BRPM | HEART RATE: 98 BPM | BODY MASS INDEX: 40.66 KG/M2 | DIASTOLIC BLOOD PRESSURE: 80 MMHG

## 2018-01-23 NOTE — RESULT NOTES
Verified Results  (1) CBC/PLT/DIFF 35BYA1858 11:06AM [facility] Order Number: [id number]     Test Name Result Flag Reference   WBC COUNT 7 03 Thousand/uL  4 31-10 16   RBC COUNT 3 05 Million/uL L 3 81-5 12   HEMOGLOBIN 10 0 g/dL L 11 5-15 4   HEMATOCRIT 30 5 % L 34 8-46  1    fL H 82-98   MCH 32 8 pg  26 8-34 3   MCHC 32 8 g/dL  31 4-37 4   RDW 14 9 %  11 6-15 1   MPV 10 5 fL  8 9-12 7   PLATELET COUNT 107 Thousands/uL  149-390   nRBC AUTOMATED 0 /100 WBCs     NEUTROPHILS RELATIVE PERCENT 63 %  43-75   LYMPHOCYTES RELATIVE PERCENT 25 %  14-44   MONOCYTES RELATIVE PERCENT 9 %  4-12   EOSINOPHILS RELATIVE PERCENT 3 %  0-6   BASOPHILS RELATIVE PERCENT 0 %  0-1   NEUTROPHILS ABSOLUTE COUNT 4 38 Thousands/? ??L  1 85-7 62   LYMPHOCYTES ABSOLUTE COUNT 1 74 Thousands/? ??L  0 60-4 47   MONOCYTES ABSOLUTE COUNT 0 64 Thousand/? ??L  0 17-1 22   EOSINOPHILS ABSOLUTE COUNT 0 23 Thousand/? ??L  0 00-0 61   BASOPHILS ABSOLUTE COUNT 0 02 Thousands/? ??L  0 00-0 10     (1) FERRITIN 28AMN5504 11:06AM [facility] Order Number: [id number]     Test Name Result Flag Reference   FERRITIN 68 ng/mL  8-388     (1) TIBC 80IEJ8337 11:06[facility] Order Number: [id number]     Test Name Result Flag Reference   TOTAL IRON BINDING CAPACITY 303 ug/dL  250-450     (1) VITAMIN B12 74ELX8859 11:06[facility] Order Number: [id number]     Test Name Result Flag Reference   VITAMIN B12 350 pg/mL  100-900     (1) FOLATE 22MJU2435 11:06[facility] Order Number: [id number]     Test Name Result Flag Reference   FOLATE >20 0 ng/mL H 3 1-17 5

## 2018-01-23 NOTE — RESULT NOTES
Verified Results  (1) BASIC METABOLIC PROFILE 14YKN1720 02:59PM Jason Castellanos Order Number: [id number]     Test Name Result Flag Reference   GLUCOSE,RANDM 114 mg/dL     If the patient is fasting, the ADA then defines impaired fasting glucose as > 100 mg/dL and diabetes as > or equal to 123 mg/dL  Specimen collection should occur prior to Sulfasalazine administration due to the potential for falsely depressed results  Specimen collection should occur prior to Sulfapyridine administration due to the potential for falsely elevated results  SODIUM 141 mmol/L  136-145   POTASSIUM 3 6 mmol/L  3 5-5 3   CHLORIDE 106 mmol/L  100-108   CARBON DIOXIDE 29 mmol/L  21-32   ANION GAP (CALC) 6 mmol/L  4-13   BLOOD UREA NITROGEN 17 mg/dL  5-25   CREATININE 0 87 mg/dL  0 60-1 30   Standardized to IDMS reference method   CALCIUM 9 0 mg/dL  8 3-10 1   eGFR 63 ml/min/1 73sq [facility] Disease Education Program recommendations are as follows:  GFR calculation is accurate only with a steady state creatinine  Chronic Kidney disease less than 60 ml/min/1 73 sq  meters  Kidney failure less than 15 ml/min/1 73 sq  meters

## 2018-01-23 NOTE — RESULT NOTES
Verified Results  [facility] [place] 15LBD9103 08:16AM [facility] Order Number: [id number]    - Patient Instructions: To schedule this appointment, please contact Central Scheduling at [phone]  Test Name Result Flag Reference   US KIDNEY AND BLADDER (Report)     RENAL ULTRASOUND     INDICATION: R31 9: Hematuria, unspecified   N39 0: Urinary tract infection, site not specified  History taken directly from the electronic ordering system  COMPARISON: CT 9/28/2015     TECHNIQUE:  Ultrasound of the retroperitoneum was performed with a curvilinear transducer utilizing volumetric sweeps and still imaging techniques  FINDINGS:     KIDNEYS:   Symmetric and normal size  Right kidney: 10 1 x 3 6 cm  Normal echogenicity and contour  No suspicious masses detected  No hydronephrosis  No shadowing calculi  No perinephric fluid collections  Left kidney: 10 3 x 5 1 cm  Normal echogenicity and contour  No suspicious masses detected  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

## 2018-01-25 ENCOUNTER — PROCEDURE VISIT (OUTPATIENT)
Dept: UROLOGY | Facility: MEDICAL CENTER | Age: 83
End: 2018-01-25
Payer: MEDICARE

## 2018-01-25 ENCOUNTER — TELEPHONE (OUTPATIENT)
Dept: FAMILY MEDICINE CLINIC | Facility: CLINIC | Age: 83
End: 2018-01-25

## 2018-01-25 DIAGNOSIS — R31.9 HEMATURIA, UNSPECIFIED TYPE: Primary | ICD-10-CM

## 2018-01-25 DIAGNOSIS — D49.519 RENAL NEOPLASM: ICD-10-CM

## 2018-01-25 DIAGNOSIS — N39.41 URGE INCONTINENCE: ICD-10-CM

## 2018-01-25 DIAGNOSIS — N39.41 URGENCY INCONTINENCE: ICD-10-CM

## 2018-01-25 DIAGNOSIS — R35.0 FREQUENCY OF URINATION: ICD-10-CM

## 2018-01-25 PROCEDURE — 64566 NEUROELTRD STIM POST TIBIAL: CPT

## 2018-01-25 NOTE — H&P
Procedure: Percutaneous Tibial Nerve Stimulation (PTNS)   Date onset of symptoms A LONG TIME AGO  Behavior modification  NO  Biofeedback  NO  E-Stim  NO  Drugs  NUMEROUS ANTICHOLINERGICS DIDN'T WORK  Other  Session number: ONE MONTH F/U     Patient Goals and Progress   Decreased urgency   YES  Decreased frequency   YES  No accidents   YES  Sleeps through the night   YES  No related health and social factors  YES     Caffeine - cups per day: 0  Alcohol - number of drinks per day:  0  Daytime voids - number per day: 5-10  Nighttime voids - number per night: 1  Urgency: 0-2  Incontinence - episodes per day: 0-2      Treatment Plan   Increase fluids  NO  Decrease fluids   YES  Decrease caffeine   NO  Urge reduction techniques:  YES  Kegels  NO  Toilet every 3-4 hours     No fluids before bed  YES     Ankle Used: RIGHT     Treatment settin  Duration of treatment: 30 MINUTES  Lead lot #: [id number]  Expiration Date: 2020       Feeling/Response:  Toe flex and foot sensation

## 2018-01-26 ENCOUNTER — TELEPHONE (OUTPATIENT)
Dept: UROLOGY | Facility: AMBULATORY SURGERY CENTER | Age: 83
End: 2018-01-26

## 2018-01-26 DIAGNOSIS — R31.9 HEMATURIA, UNSPECIFIED TYPE: Primary | ICD-10-CM

## 2018-01-29 NOTE — TELEPHONE ENCOUNTER
Patient called back she advised she received Katia's message can patient please receive another call back thank you

## 2018-01-31 ENCOUNTER — EVALUATION (OUTPATIENT)
Dept: PHYSICAL THERAPY | Facility: REHABILITATION | Age: 83
End: 2018-01-31
Payer: MEDICARE

## 2018-01-31 VITALS — SYSTOLIC BLOOD PRESSURE: 160 MMHG | DIASTOLIC BLOOD PRESSURE: 70 MMHG

## 2018-01-31 DIAGNOSIS — R29.6 FREQUENT FALLS: Primary | ICD-10-CM

## 2018-01-31 DIAGNOSIS — M05.79 RHEUMATOID ARTHRITIS INVOLVING MULTIPLE SITES WITH POSITIVE RHEUMATOID FACTOR (HCC): ICD-10-CM

## 2018-01-31 PROCEDURE — G8979 MOBILITY GOAL STATUS: HCPCS | Performed by: PHYSICAL THERAPIST

## 2018-01-31 PROCEDURE — 97162 PT EVAL MOD COMPLEX 30 MIN: CPT | Performed by: PHYSICAL THERAPIST

## 2018-01-31 PROCEDURE — G8978 MOBILITY CURRENT STATUS: HCPCS | Performed by: PHYSICAL THERAPIST

## 2018-01-31 NOTE — LETTER
2018    Sharona Carey PA-C  [address] [address]    Patient: Shayla Gaines   YOB: 1935   Date of Visit: 2018       Dear Dr Nath Levo:    Please review the attached summary of Woodard Gottron Amelio's progress and our plan for continued therapy, and verify that you agree therapy should continue by signing the attached document and sending it back to our office  If you have any questions or concerns, please don't hesitate to call  Sincerely,      Lucia Lopez, PT        Referring Provider:      Based upon review of the patient's progress and continued therapy plan, it is my medical opinion that Priyanka Baumann should continue physical therapy treatment at the [facility]:                    Sharona Carey PA-C  [address],  [address] [facility] [address]  VIA Facsimile: [phone]          PT Evaluation     Today's date: 2018  Patient name: Shayla Gaines  : 1935  MRN: [id number]  Referring provider: Richard Hodgkins, PA-C  Dx:   Encounter Diagnoses   Name Primary?  Frequent falls Yes    Rheumatoid arthritis involving multiple sites with positive rheumatoid factor ([facility] )        Start Time: 1650  Stop Time: 1745  Total time in clinic (min): 55 minutes    Assessment  Impairments: abnormal coordination, abnormal gait, abnormal muscle firing, abnormal or restricted ROM, abnormal movement, activity intolerance, impaired balance, impaired physical strength, lacks appropriate home exercise program, pain with function, safety issue and weight-bearing intolerance    Assessment details: Shayla Gaines is a 81 y o  female who presents with pain, decreased strength and ambulatory dysfunction  Due to these impairments, Patient has difficulty performing a/iadls and engaging in social activities   Patient's clinical presentation is consistent with their referring diagnosis of gait abnormality with rheumatoid arthritis  Patient would benefit from skilled physical therapy to address their aforementioned impairments, improve their level of function and to improve their overall quality of life  Understanding of Dx/Px/POC: excellent  Goals  Short Term Goals: to be achieved in 4 weeks  1) Patient to be independent with basic HEP  2) Decrease pain at it's worst to 4/10 on VAS  3) Increase LE strength by 1/2 MMT grade in all deficient planes  4) Patient to report decreased sleep interruption secondary to pain  5) Patient to negotiate steps with a step-to pattern with use of HR  6) Increase ambulatory tolerance to 30 minutes  Long Term Goals: to be achieved by discharge  1) FOTO equal to or greater than   2) Patient to be independent with comprehensive HEP  3) Abolish pain for improved quality of life  4) Increase LE strength to 5/5 MMT grade in all planes to improve A/IADLS  5) Patient to negotiate steps with a reciprocal pattern with use of Hr  6) Increase ambulatory tolerance to 40 minutes  7) Patient to report no sleep interruption secondary to pain  Plan  Patient would benefit from: skilled PT  Planned modality interventions: biofeedback, cryotherapy, hydrotherapy, TENS and unattended electrical stimulation  Planned therapy interventions: activity modification, ADL retraining, balance, balance/weight bearing training, behavior modification, body mechanics training, functional ROM exercises, gait training, home exercise program, IADL retraining, joint mobilization, manual therapy, massage, neuromuscular re-education, patient education, strengthening, stretching, therapeutic activities, therapeutic exercise and transfer training  Frequency: 2-3x week    Duration in weeks: 12  Treatment plan discussed with: patient        Subjective Evaluation    History of Present Illness  Mechanism of injury: Patient reports having weakness of her lower extremities bilaterally with a history of rheumatoid arthritis  Patient feels unstable on her feet and has fallen 2 times over the past year  Patient's first fall occurred while she was sleeping in bed when she rolled out of bed  The 2nd time she fell was in December when she was getting up from a recliner and fell forward  Patient was unable to return to standing independently and required assistance  Patient feels more unsteady when walking over uneven ground, walking in the snow and while negotiating steps  Patient uses a SPC while ambulating  Patient experiences increased b/l knee stiffness with prolonged sitting  Increased knee stiffness with prolonged sitting  Patient participated in physical therapy approximately 2 years ago, which included aquatic therapy, which helped to improve her function  Patient has been diagnosed with lymphedema in both legs and uses a compression stocking intermittently  Recurrent probem  Pain  Current pain ratin  At best pain ratin  At worst pain ratin  Quality: sharp  Relieving factors: change in position    Social Support  Lives with: spouse      Diagnostic Tests  X-ray: normal (per Patient report, severe b/l knee DJD)  Treatments  Previous treatment: physical therapy  Current treatment: injection treatment  Patient Goals  Patient goals for therapy: decreased pain, improved balance, increased motion, independence with ADLs/IADLs and increased strength          Objective     Strength/Myotome Testing     Left Hip   Planes of Motion   Flexion: 4    Right Hip   Planes of Motion   Flexion: 4    Left Knee   Flexion: 4  Extension: 4    Right Knee   Flexion: 4  Extension: 4    Left Ankle/Foot   Dorsiflexion: 5    Right Ankle/Foot   Dorsiflexion: 5    Ambulation   Weight-Bearing Status   Assistive device used: single point cane    Observational Gait   Gait: antalgic     Functional Assessment     Single Leg Stance   Left single leg stance time: unable to perform    Right single leg stance time: unable to perform      Comments  Timed Up and Go (TUG): 16 90 sec, Mod I ambulation with SPC, Mod I sit to/from stand with arm rests, poor eccentric control  Five Times Sit to Stand Test: 17 74 sec, Mod I with arm rests, poor eccentric control  Dynamic Gait Index: 12    Precautions: h/o stroke, HTN, DM    Daily Treatment Diary     Manual  1/31                                                                                 Exercise Diary              Recumbent bike             VG             Squats             FSU             Tandem stance             Balance: feet together EO on Airex             Balance: feet together EC             Multidirectional stepping             Stool taps             3-way SLR with TB in standing                                                                                                                                                   Modalities                                                               Flowsheet Rows    Flowsheet Row Most Recent Value   PT/OT G-Codes   Current Score  45   Projected Score  55   FOTO information reviewed  Yes   Assessment Type  Evaluation   G code set  Mobility: Walking & Moving Around   Mobility: Walking and Moving Around Current Status ()  CK   Mobility: Walking and Moving Around Goal Status ()  CJ

## 2018-01-31 NOTE — PROGRESS NOTES
PT Evaluation     Today's date: 2018  Patient name: Prabhakar Watson  : 1935  MRN: [id number]  Referring provider: Ariel Fisher PA-C  Dx:   Encounter Diagnoses   Name Primary?  Frequent falls Yes    Rheumatoid arthritis involving multiple sites with positive rheumatoid factor ([facility] )        Start Time: 1650  Stop Time: 1745  Total time in clinic (min): 55 minutes    Assessment  Impairments: abnormal coordination, abnormal gait, abnormal muscle firing, abnormal or restricted ROM, abnormal movement, activity intolerance, impaired balance, impaired physical strength, lacks appropriate home exercise program, pain with function, safety issue and weight-bearing intolerance    Assessment details: Prabhakar Watson is a 81 y o  female who presents with pain, decreased strength and ambulatory dysfunction  Due to these impairments, Patient has difficulty performing a/iadls and engaging in social activities  Patient's clinical presentation is consistent with their referring diagnosis of gait abnormality with rheumatoid arthritis  Patient would benefit from skilled physical therapy to address their aforementioned impairments, improve their level of function and to improve their overall quality of life  Understanding of Dx/Px/POC: excellent  Goals  Short Term Goals: to be achieved in 4 weeks  1) Patient to be independent with basic HEP  2) Decrease pain at it's worst to 4/10 on VAS  3) Increase LE strength by 1/2 MMT grade in all deficient planes  4) Patient to report decreased sleep interruption secondary to pain  5) Patient to negotiate steps with a step-to pattern with use of HR  6) Increase ambulatory tolerance to 30 minutes  Long Term Goals: to be achieved by discharge  1) FOTO equal to or greater than   2) Patient to be independent with comprehensive HEP  3) Abolish pain for improved quality of life  4) Increase LE strength to 5/5 MMT grade in all planes to improve A/IADLS    5) Patient to negotiate steps with a reciprocal pattern with use of Hr  6) Increase ambulatory tolerance to 40 minutes  7) Patient to report no sleep interruption secondary to pain  Plan  Patient would benefit from: skilled PT  Planned modality interventions: biofeedback, cryotherapy, hydrotherapy, TENS and unattended electrical stimulation  Planned therapy interventions: activity modification, ADL retraining, balance, balance/weight bearing training, behavior modification, body mechanics training, functional ROM exercises, gait training, home exercise program, IADL retraining, joint mobilization, manual therapy, massage, neuromuscular re-education, patient education, strengthening, stretching, therapeutic activities, therapeutic exercise and transfer training  Frequency: 2-3x week  Duration in weeks: 12  Treatment plan discussed with: patient        Subjective Evaluation    History of Present Illness  Mechanism of injury: Patient reports having weakness of her lower extremities bilaterally with a history of rheumatoid arthritis  Patient feels unstable on her feet and has fallen 2 times over the past year  Patient's first fall occurred while she was sleeping in bed when she rolled out of bed  The 2nd time she fell was in December when she was getting up from a recliner and fell forward  Patient was unable to return to standing independently and required assistance  Patient feels more unsteady when walking over uneven ground, walking in the snow and while negotiating steps  Patient uses a SPC while ambulating  Patient experiences increased b/l knee stiffness with prolonged sitting  Increased knee stiffness with prolonged sitting  Patient participated in physical therapy approximately 2 years ago, which included aquatic therapy, which helped to improve her function  Patient has been diagnosed with lymphedema in both legs and uses a compression stocking intermittently      Recurrent probem  Pain  Current pain ratin  At best pain ratin  At worst pain ratin  Quality: sharp  Relieving factors: change in position    Social Support  Lives with: spouse      Diagnostic Tests  X-ray: normal (per Patient report, severe b/l knee DJD)  Treatments  Previous treatment: physical therapy  Current treatment: injection treatment  Patient Goals  Patient goals for therapy: decreased pain, improved balance, increased motion, independence with ADLs/IADLs and increased strength          Objective     Strength/Myotome Testing     Left Hip   Planes of Motion   Flexion: 4    Right Hip   Planes of Motion   Flexion: 4    Left Knee   Flexion: 4  Extension: 4    Right Knee   Flexion: 4  Extension: 4    Left Ankle/Foot   Dorsiflexion: 5    Right Ankle/Foot   Dorsiflexion: 5    Ambulation   Weight-Bearing Status   Assistive device used: single point cane    Observational Gait   Gait: antalgic     Functional Assessment     Single Leg Stance   Left single leg stance time: unable to perform  Right single leg stance time: unable to perform      Comments  Timed Up and Go (TUG): 16 90 sec, Mod I ambulation with SPC, Mod I sit to/from stand with arm rests, poor eccentric control  Five Times Sit to Stand Test: 17 74 sec, Mod I with arm rests, poor eccentric control  Dynamic Gait Index: 12    Precautions: h/o stroke, HTN, DM    Daily Treatment Diary     Manual                                                                                   Exercise Diary              Recumbent bike             VG             Squats             FSU             Tandem stance             Balance: feet together EO on Airex             Balance: feet together EC             Multidirectional stepping             Stool taps             3-way SLR with TB in standing                                                                                                                                                   Modalities Flowsheet Rows    Flowsheet Row Most Recent Value   PT/OT G-Codes   Current Score  45   Projected Score  55   FOTO information reviewed  Yes   Assessment Type  Evaluation   G code set  Mobility: Walking & Moving Around   Mobility: Walking and Moving Around Current Status ()  CK   Mobility: Walking and Moving Around Goal Status ()  CJ

## 2018-02-01 ENCOUNTER — TRANSCRIBE ORDERS (OUTPATIENT)
Dept: PHYSICAL THERAPY | Facility: REHABILITATION | Age: 83
End: 2018-02-01

## 2018-02-01 DIAGNOSIS — M19.042 ARTHRITIS OF LEFT HAND: Primary | ICD-10-CM

## 2018-02-01 DIAGNOSIS — M05.79 SEROPOSITIVE RHEUMATOID ARTHRITIS OF MULTIPLE SITES (HCC): ICD-10-CM

## 2018-02-01 DIAGNOSIS — R29.6 FALLS FREQUENTLY: ICD-10-CM

## 2018-02-01 DIAGNOSIS — M19.041 ARTHRITIS OF RIGHT HAND: ICD-10-CM

## 2018-02-02 ENCOUNTER — HOSPITAL ENCOUNTER (OUTPATIENT)
Dept: RADIOLOGY | Facility: HOSPITAL | Age: 83
Discharge: HOME | End: 2018-02-02
Attending: FAMILY MEDICINE
Payer: MEDICARE

## 2018-02-02 DIAGNOSIS — R31.9 HEMATURIA, UNSPECIFIED TYPE: ICD-10-CM

## 2018-02-02 PROCEDURE — 74178 CT ABD&PLV WO CNTR FLWD CNTR: CPT

## 2018-02-02 RX ORDER — UBIDECARENONE 75 MG
100 CAPSULE ORAL DAILY
COMMUNITY

## 2018-02-02 RX ADMIN — IOHEXOL 100 ML: 350 INJECTION, SOLUTION INTRAVENOUS at 11:15

## 2018-02-05 ENCOUNTER — OFFICE VISIT (OUTPATIENT)
Dept: NEUROLOGY | Facility: CLINIC | Age: 83
End: 2018-02-05
Payer: MEDICARE

## 2018-02-05 VITALS
SYSTOLIC BLOOD PRESSURE: 182 MMHG | WEIGHT: 215 LBS | HEART RATE: 80 BPM | DIASTOLIC BLOOD PRESSURE: 77 MMHG | BODY MASS INDEX: 40.62 KG/M2 | RESPIRATION RATE: 16 BRPM

## 2018-02-05 DIAGNOSIS — I63.9 CEREBROVASCULAR ACCIDENT (CVA), UNSPECIFIED MECHANISM (HCC): Primary | ICD-10-CM

## 2018-02-05 DIAGNOSIS — I10 HYPERTENSION, UNSPECIFIED TYPE: ICD-10-CM

## 2018-02-05 PROBLEM — G47.30 SLEEP APNEA: Status: ACTIVE | Noted: 2017-05-10

## 2018-02-05 PROBLEM — I89.0 LYMPHEDEMA: Status: ACTIVE | Noted: 2017-09-15

## 2018-02-05 PROBLEM — N39.41 SENSORY URGE INCONTINENCE: Status: ACTIVE | Noted: 2017-09-11

## 2018-02-05 PROBLEM — G56.02 CARPAL TUNNEL SYNDROME, LEFT: Status: ACTIVE | Noted: 2017-09-15

## 2018-02-05 PROBLEM — R31.9 HEMATURIA: Status: ACTIVE | Noted: 2017-09-15

## 2018-02-05 PROBLEM — M17.12 PRIMARY OSTEOARTHRITIS OF LEFT KNEE: Status: ACTIVE | Noted: 2017-10-13

## 2018-02-05 PROBLEM — I49.8 SINUS ARRHYTHMIA: Status: ACTIVE | Noted: 2018-01-03

## 2018-02-05 PROBLEM — M54.16 RIGHT LUMBAR RADICULOPATHY: Status: ACTIVE | Noted: 2017-11-09

## 2018-02-05 PROBLEM — G62.9 PERIPHERAL NEUROPATHY: Status: ACTIVE | Noted: 2017-11-09

## 2018-02-05 PROCEDURE — 99213 OFFICE O/P EST LOW 20 MIN: CPT | Performed by: PSYCHIATRY & NEUROLOGY

## 2018-02-05 NOTE — PROGRESS NOTES
Patient ID: Clay Giron is a 81 y o  female  Assessment/Plan: This is a 81year old female who is here as a stroke follow up  Patient currently on Xarelto  She had a monitor for 1 week and no abnormalities were noted on examination  She says she has tingling/nbumbness in her hands and feet  She does not have any deficits from her stroke  Neurologic examination today is grossly normal   She is stable and doing well overall, without any new symptoms  PLAN:  -discontinued aspirin 11/17   -Continue with Xarelto for secondary stroke prevention    -checks BP at home    -monitor normoglycemic levels   -defer to PCP for BP and DM management  Follow up with me in 6 months  Problem List Items Addressed This Visit     CVA (cerebral vascular accident) ([facility] ) - Primary    Hypertension             Subjective:    HPI    This is a 81year old female who is here as a follow up for stroke  She is on Xarelto and is compliant with her medications  No bleeding/bruising  No new stroke like symptoms  She had a few falls, and she was getting out of her couch, and she says it was a blur, and lost her vision, and then passed out  She said she saw cardiology, and she said that rhythm was off and then had a 1 week monitor which did not show anything  She had not had any falls recently  She is doing well overall  She has been using a cane for a while and does not have any deficits from a stroke standpoint  The following portions of the patient's history were reviewed and updated as appropriate:   She  has a past medical history of Anemia; Arthritis; Diabetes mellitus ([address] ); Hyperlipidemia; Hypertension; Overactive bladder; Sleep apnea; and Stroke ([facility] )  She  does not have any pertinent problems on file  She  has a past surgical history that includes Hysterectomy; Cholecystectomy; Appendectomy; Bladder surgery; Knee surgery (Left); Colonoscopy; CYSTOSCOPY;  Forearm surgery (Right); Bunionectomy (Bilateral); and EGD AND COLONOSCOPY (N/A, 12/20/2017)  Her family history is not on file  She  reports that she has quit smoking  She has quit using smokeless tobacco  She reports that she drinks alcohol  She reports that she does not use drugs  Current Outpatient Prescriptions   Medication Sig Dispense Refill    Calcium Citrate-Vitamin D (CALCIUM + D PO) Take 1 tablet by mouth 2 (two) times a day      Certolizumab Pegol (CIMZIA PREFILLED SC) Inject under the skin      cyanocobalamin (VITAMIN B-12) 100 mcg tablet Take by mouth      furosemide (LASIX) 20 mg tablet Take 20 mg by mouth daily   gabapentin (NEURONTIN) 100 mg capsule Take 100 mg by mouth 3 (three) times a day      levothyroxine 75 mcg tablet Take 75 mcg by mouth daily      losartan-hydrochlorothiazide (HYZAAR) 100-12 5 MG per tablet Take 1 tablet by mouth daily   methotrexate (RHEUMATREX) 2 5 MG tablet Take 2 5 mg by mouth every 12 hours for 3 doses only each week      Multiple Vitamin (MULTIVITAMINS PO) Take 1 tablet by mouth daily      omeprazole (PriLOSEC) 20 mg delayed release capsule Take 20 mg by mouth daily      rivaroxaban (XARELTO) 20 mg tablet Take 20 mg by mouth daily with breakfast      rOPINIRole (REQUIP) 0 5 mg tablet Take 0 5 mg by mouth 3 (three) times a day      amLODIPine (NORVASC) 10 mg tablet Take 1 tablet by mouth daily for 30 days 10 tablet 0    ciprofloxacin (CIPRO) 500 mg tablet Take 500 mg by mouth 2 (two) times a day   Diclofenac Sodium 1 % CREA Place 4 g/day on the skin 4 (four) times a day      LEUCOVORIN CALCIUM PO Take 10 mg by mouth once a week      nabumetone (RELAFEN) 500 mg tablet Take 500 mg by mouth 2 (two) times a day   nitrofurantoin (MACRODANTIN) 100 mg capsule Take 100 mg by mouth 2 (two) times a day      pravastatin (PRAVACHOL) 80 mg tablet Take 1 tablet by mouth daily with dinner for 30 days 30 tablet 0    predniSONE 10 mg tablet Take 4 tablets by mouth daily   16 tablet 0 No current facility-administered medications for this visit  Current Outpatient Prescriptions on File Prior to Visit   Medication Sig    Calcium Citrate-Vitamin D (CALCIUM + D PO) Take 1 tablet by mouth 2 (two) times a day    Certolizumab Pegol (CIMZIA PREFILLED SC) Inject under the skin    furosemide (LASIX) 20 mg tablet Take 20 mg by mouth daily   gabapentin (NEURONTIN) 100 mg capsule Take 100 mg by mouth 3 (three) times a day    levothyroxine 75 mcg tablet Take 75 mcg by mouth daily    losartan-hydrochlorothiazide (HYZAAR) 100-12 5 MG per tablet Take 1 tablet by mouth daily   methotrexate (RHEUMATREX) 2 5 MG tablet Take 2 5 mg by mouth every 12 hours for 3 doses only each week    Multiple Vitamin (MULTIVITAMINS PO) Take 1 tablet by mouth daily    omeprazole (PriLOSEC) 20 mg delayed release capsule Take 20 mg by mouth daily    rivaroxaban (XARELTO) 20 mg tablet Take 20 mg by mouth daily with breakfast    rOPINIRole (REQUIP) 0 5 mg tablet Take 0 5 mg by mouth 3 (three) times a day    amLODIPine (NORVASC) 10 mg tablet Take 1 tablet by mouth daily for 30 days    ciprofloxacin (CIPRO) 500 mg tablet Take 500 mg by mouth 2 (two) times a day   Diclofenac Sodium 1 % CREA Place 4 g/day on the skin 4 (four) times a day    LEUCOVORIN CALCIUM PO Take 10 mg by mouth once a week    nabumetone (RELAFEN) 500 mg tablet Take 500 mg by mouth 2 (two) times a day   nitrofurantoin (MACRODANTIN) 100 mg capsule Take 100 mg by mouth 2 (two) times a day    pravastatin (PRAVACHOL) 80 mg tablet Take 1 tablet by mouth daily with dinner for 30 days    predniSONE 10 mg tablet Take 4 tablets by mouth daily   [DISCONTINUED] aspirin 81 mg chewable tablet Chew 1 tablet daily for 30 days     No current facility-administered medications on file prior to visit        She is allergic to acetazolamide; aspirin; atorvastatin; azithromycin; nitrofurantoin; other; penicillins; propoxyphene; shellfish-derived products; sulfa antibiotics; and tramadol       Objective:    Blood pressure (!) 182/77, pulse 80, resp  rate 16, weight 97 5 kg (215 lb)  Physical Exam  General: alert, awake, follows commands  HEENT: normocephalic, atraumatic  Chest: clear to ausculation bilaterally  Heart: normal S1, S2, no murmurs, rubs or gallops    Neurological Exam  Mental status: alert, awake, oriented to time, place and person  Speech: fluent, no dysarthria noted  Cranial nerves: EOMI, no facial droop noted, + shoulder shrug present  Motor 5/5 throughout      ROS:    Review of Systems   Constitutional: Positive for appetite change  HENT:        Dry Eyes   Eyes: Negative  Respiratory: Negative  Cardiovascular: Negative  Gastrointestinal: Negative  Endocrine: Negative  Genitourinary: Positive for urgency  Musculoskeletal: Positive for back pain and neck pain  Muscle pain   Skin: Negative  Allergic/Immunologic: Negative  Neurological: Positive for syncope  Memory Problems,Clumsiness, Balance difficulties, Difficulty walking  Hematological: Negative      Psychiatric/Behavioral:        Anxiety

## 2018-02-06 ENCOUNTER — OFFICE VISIT (OUTPATIENT)
Dept: FAMILY MEDICINE CLINIC | Facility: CLINIC | Age: 83
End: 2018-02-06
Payer: MEDICARE

## 2018-02-06 VITALS
WEIGHT: 215.2 LBS | HEART RATE: 70 BPM | DIASTOLIC BLOOD PRESSURE: 68 MMHG | BODY MASS INDEX: 40.66 KG/M2 | OXYGEN SATURATION: 97 % | SYSTOLIC BLOOD PRESSURE: 114 MMHG

## 2018-02-06 DIAGNOSIS — Z00.00 MEDICARE ANNUAL WELLNESS VISIT, SUBSEQUENT: Primary | ICD-10-CM

## 2018-02-06 DIAGNOSIS — M06.09 RHEUMATOID ARTHRITIS OF MULTIPLE SITES WITH NEGATIVE RHEUMATOID FACTOR (HCC): ICD-10-CM

## 2018-02-06 DIAGNOSIS — I10 ESSENTIAL HYPERTENSION: ICD-10-CM

## 2018-02-06 DIAGNOSIS — D49.519 RENAL NEOPLASM: ICD-10-CM

## 2018-02-06 DIAGNOSIS — R73.03 PREDIABETES: ICD-10-CM

## 2018-02-06 PROCEDURE — G0439 PPPS, SUBSEQ VISIT: HCPCS | Performed by: FAMILY MEDICINE

## 2018-02-06 PROCEDURE — 99214 OFFICE O/P EST MOD 30 MIN: CPT | Performed by: FAMILY MEDICINE

## 2018-02-06 NOTE — PROGRESS NOTES
HPI:  Ирина Fulton is a 81 y o  female here for her Subsequent Wellness Visit      Patient Active Problem List   Diagnosis    [facility]    Hypertension    Rheumatoid arthritis [facility]    Diabetes mellitus type 2 in obese ([facility]    Urinary tract infection    Sleep apnea    Hypothyroidism    Chronic GERD    Anemia of chronic disease    Benign essential hypertension    Carpal tunnel syndrome, left    Diverticulosis of colon    Edema    Hematuria    Hypercholesterolemia    Lymphedema    Obesity    Overactive bladder    Peripheral neuropathy    Prediabetes    Primary osteoarthritis of left knee    Restless legs syndrome    Right lumbar radiculopathy    Sensorineural hearing loss    Sensory urge incontinence    Sinus arrhythmia    Renal neoplasm     Past Medical History:   Diagnosis Date    Anemia     Arthritis     rheumatoid    Benign essential hypertension     Cataract     Chronic cystitis     Colon, diverticulosis     Diabetes mellitus [facility]     Diverticulitis of colon     Early satiety     GERD (gastroesophageal reflux disease)     Gout     Hearing loss     Hemorrhoids     Hiatal hernia     History of colonic polyps     Hyperlipidemia     Hypertension     Hypertension     Hypothyroidism     Impaired fasting glucose     Left breast mass     Microhematuria     Overactive bladder     Pneumonia of left lower lobe due to infectious organism ([facility]     Rheumatoid arthritis [facility]     Sleep apnea     uses cpap    [facility]     Type 2 diabetes mellitus ([facility]     Urinary frequency     Urinary urgency      Past Surgical History:   Procedure Laterality Date    APPENDECTOMY      ARTHROSCOPY WRIST Right     with release of transverse carpal ligament     BLADDER SURGERY      BLADDER SURGERY      BUNIONECTOMY Bilateral     CHOLECYSTECTOMY      COLONOSCOPY      CYSTOSCOPY      EGD AND COLONOSCOPY N/A 12/20/2017    Procedure: EGD AND COLONOSCOPY;  Surgeon: Raul Oscar MD;  Location: [facility]; Service: Gastroenterology    ESOPHAGOGASTRODUODENOSCOPY      diagnostic    FOREARM SURGERY Right     fracture repair    HYSTERECTOMY      KNEE SURGERY Left     meniscus tear    NOSE SURGERY      REPLACEMENT TOTAL KNEE BILATERAL Bilateral      Family History   Problem Relation Age of Onset    Other Mother      epilepsy   Aicha Nine Glaucoma Father     Stroke Father      silent    Other Brother      cardiac disorder     History   Smoking Status    Former Smoker   Smokeless Tobacco    Former User     Comment: stopped smoking in the distant past, never smoker (as per [employer])      History   Alcohol Use    Yes     Comment: (history), social drinker      History   Drug Use No       Current Outpatient Prescriptions   Medication Sig Dispense Refill    Calcium Citrate-Vitamin D (CALCIUM + D PO) Take 1 tablet by mouth 2 (two) times a day      Certolizumab Pegol (CIMZIA PREFILLED SC) Inject under the skin      cyanocobalamin (VITAMIN B-12) 100 mcg tablet Take by mouth      gabapentin (NEURONTIN) 100 mg capsule Take 100 mg by mouth 3 (three) times a day      LEUCOVORIN CALCIUM PO Take 10 mg by mouth once a week      levothyroxine 75 mcg tablet Take 75 mcg by mouth daily      losartan-hydrochlorothiazide (HYZAAR) 100-12 5 MG per tablet Take 1 tablet by mouth daily        methotrexate (RHEUMATREX) 2 5 MG tablet Take 2 5 mg by mouth every 12 hours for 3 doses only each week      Multiple Vitamin (MULTIVITAMINS PO) Take 1 tablet by mouth daily      omeprazole (PriLOSEC) 20 mg delayed release capsule Take 20 mg by mouth daily      pravastatin (PRAVACHOL) 80 mg tablet Take 1 tablet by mouth daily with dinner for 30 days 30 tablet 0    rivaroxaban (XARELTO) 20 mg tablet Take 20 mg by mouth daily with breakfast      rOPINIRole (REQUIP) 0 5 mg tablet Take 0 5 mg by mouth 3 (three) times a day      Diclofenac Sodium 1 % CREA Place 4 g/day on the skin 4 (four) times a day      furosemide (LASIX) 20 mg tablet Take 20 mg by mouth daily  No current facility-administered medications for this visit  Allergies   Allergen Reactions    Acetazolamide Other (See Comments)     Other reaction(s): Unknown Allergic Reaction    Aspirin      Other reaction(s): Other (See Comments)  High Dose ASA-stomach ache, rachel  ASA 81 m    Atorvastatin      Other reaction(s): Muscle Pain, Myalgia    Azithromycin     Nitrofurantoin      Other reaction(s): Unknown Allergic Reaction  Other reaction(s): Other (See Comments)  HIVES * pt denies    Other Other (See Comments)     red and itching  Other reaction(s): Other (See Comments)  red and itching    Penicillins Other (See Comments)     Other reaction(s):  Other (See Comments)  HIVES; tolerates Ancef  HIVES; tolerates Ancef    Propoxyphene Other (See Comments)     Other reaction(s): Unknown Allergic Reaction    Shellfish-Derived Products     Sulfa Antibiotics Other (See Comments)    Tramadol      Immunization History   Administered Date(s) Administered    Influenza 12/26/2012, 09/05/2013, 10/01/2014, 09/11/2015, 10/31/2016    Influenza Split High Dose Preservative Free IM 09/11/2015, 09/27/2016, 09/01/2017    Influenza TIV (IM) 09/01/2009, 10/01/2010, 10/01/2011, 11/29/2012    Pneumococcal Conjugate 13-Valent 08/11/2015    Pneumococcal Polysaccharide PPV23 10/13/2006    Tdap 04/25/2014    Zoster 01/01/2015       Patient Care Team:  Arlene Cherry MD as PCP - General  YOANA Goetz MD Erla Setting, MD      Medicare Screening Tests and Risk Assessments:  AWV Clinical     ISAR:   Previous hospitalizations?:  No       Once in a Lifetime Medicare Screening:   EKG performed?:  No    AAA screening performed? (if performed, please add date to Health Maintenance):  No       Medicare Screening Tests and Risk Assessment:   AAA Risk Assessment    None Indicated:  Yes    Osteoporosis Risk Assessment Female:  Yes   :  Yes :  No   Age over 48:  Yes Low body weight (<127lbs):  No   Tobacco use:  No Alcohol use:  No   Low calcium diet:  No PMHX of fractures:  No   FHX of fractures:  No    HIV Risk Assessment    None indicated:  Yes        Drug and Alcohol Use:   Tobacco use    Cigarettes:  never smoker    Tobacco use duration    Tobacco Cessation Readiness    Alcohol use    Alcohol use:  never    Alcohol Treatment Readiness   Illicit Drug Use    Drug use:  never        Diet & Exercise:   Diet   What is your diet?:  No Added Salt   How many servings a day of the following:   Fruits and Vegetables:  3-4 Meat:  1-2   Whole Grains:  0 Simple Carbs:  1   Dairy:  0 Soda:  1   Coffee:  0 Tea:  2, 3   Exercise    Do you currently exercise?:  currently not exercising       Cognitive Impairment Screening:   Anxiety screenings preformed:  Yes    Depression screening preformed:  Yes    Depression screening results:  negative for symptoms   Cognitive Impairment Screening    Do you have difficulty learning or retaining new information?:  No Do you have difficulty handling new tasks?:  Yes   Do you have difficulty with reasoning?:  No Do you have difficulty with spatial ability and orientation?:  No   Do you have difficulty with language?:  No Do you have difficulty with behavior?:  No       Functional Ability/Level of Safety:   Hearing    Hearing difficulties:  Yes Bilateral:  significantly decreased   Left:  significantly decreased Right:  significantly decreased   Hearing aid:  Yes    Hearing Impairment Assessment    Hearing status:  Hard of hearing   Current Activities    Help needed with the folllowing:    Using the phone:  Yes Transportation:  No   Shopping:  Yes Preparing Meals:  Yes   Doing Housework:  Yes Doing Laundry:  Yes   Managing Medications:  No Managing Money:  No   ADL    Fall Risk   Have you fallen in the last 12 months?:  Yes    How many times?:  2    Injury History       Home Safety:   Home Safety Risk Factors   Unfamilar with surroundings:  No Uneven floors:  No   Stairs or handrail saftey risk:  No Loose rugs:  No   Household clutter:  No Poor household lighting:  No   No grab bars in bathroom:  No Further evaluation needed:  No       Advanced Directives:   Advanced Directives    Living Will:  Yes Durable POA for healthcare: Yes   Advanced directive:  Yes    Patient's End of Life Decisions        Urinary Incontinence:   Do you have urinary incontinence?:  Yes Do you have incomplete emptying?:  No   Do you urinate frequently?:  Yes Do you have urinary urgency?:  Yes   Do you have urinary hesitancy?:  No Do you have dysuria (painful and/or difficult urination)?:  No   Do you have nocturia (waking up to urinate)?:  Yes Do you strain when urinating (have to push to urinate)?:  No   Do you have a weak stream when urinating?:  No Do you have intermittent streaming when urinating?:  No   Do you dribble urine after finishing?:  No    Do you have vaginal pressure?:  No Do you have vaginal dryness?:  No       Glaucoma:            Provider Screening    No data filed        No exam data present  Reviewed Updated [facility] Prior Wellness Visits:   Last Medicare wellness visit information was reviewed, patient interviewed , no change since last AWVyes  Last Medicare wellness visit information was reviewed, patient interviewed and updates made to the record today yes    Assessment and Plan:  1  Prediabetes     2  Rheumatoid arthritis of multiple sites with negative rheumatoid factor ([facility]     3  Essential hypertension     4   Renal neoplasm         Health Maintenance Due   Topic Date Due    PT PLAN OF CARE  1935    URINE MICROALBUMIN  10/04/1946    DTaP,Tdap,and Td Vaccines (2 - Td) 05/23/2014    OPHTHALMOLOGY EXAM  05/12/2016

## 2018-02-06 NOTE — PROGRESS NOTES
Assessment/Plan:    Sai Leslie is here for annual Wellness visit and follow-up of chronic problems  She is generally feeling well  She tries to eat a healthy diet  She has very limited exercise due to her rheumatoid arthritis  She is up-to-date with her immunizations and health maintenance issues  Her recent renal CT revealed filling defect on the right side, likely neoplasm  She will be going to her urologist in 2 days for further workup  Her chronic problems including hypertension, rheumatoid arthritis and pre diabetes have been stable  She will continue with her Rheumatology visits  Will plan follow-up here in about 3 months  No problem-specific Assessment & Plan notes found for this encounter  Diagnoses and all orders for this visit:    Prediabetes    Rheumatoid arthritis of multiple sites with negative rheumatoid factor ([facility]    Essential hypertension    Other orders  -     cyanocobalamin (VITAMIN B-12) 100 mcg tablet; Take by mouth          Subjective:      Patient ID: Peter Long is a 81 y o  female  Here for Annual wellness visit and follow-up of chronic problems  She had been having gross hematuria, so a renal CT scan last week was done which did reveal a filling defect on the right side  Which is concerning for a neoplasm  She to is schedule with her urologist in 2 days and they will arrange for retrograde pyelogram and biopsy  Her edema is much improved recently  She diuresed even though she stopped taking Lasix  The following portions of the patient's history were reviewed and updated as appropriate: allergies, current medications, past family history, past medical history, past social history, past surgical history and problem list     Review of Systems   Constitutional: Negative for activity change, chills, fatigue and fever  HENT: Negative for congestion, ear pain, sinus pressure and sore throat  Eyes: Negative for pain and visual disturbance  Respiratory: Negative for cough, chest tightness, shortness of breath and wheezing  Cardiovascular: Negative for chest pain, palpitations and leg swelling  Gastrointestinal: Negative for abdominal pain, blood in stool, constipation, diarrhea, nausea and vomiting  Endocrine: Negative for polydipsia and polyuria  Genitourinary: Positive for urgency  Negative for difficulty urinating, dysuria and frequency  Musculoskeletal: Negative for arthralgias, joint swelling and myalgias  Skin: Negative for rash  Neurological: Negative for dizziness, weakness, numbness and headaches  Hematological: Negative for adenopathy  Does not bruise/bleed easily  Psychiatric/Behavioral: Negative for dysphoric mood  The patient is not nervous/anxious  Objective:     Physical Exam   Constitutional: She is oriented to person, place, and time  She appears well-developed and well-nourished  obese   HENT:   Head: Normocephalic and atraumatic  Right Ear: External ear normal    Left Ear: External ear normal    Mouth/Throat: Oropharynx is clear and moist    Eyes: EOM are normal  Pupils are equal, round, and reactive to light  Neck: Normal range of motion  Neck supple  No thyromegaly present  Cardiovascular: Normal rate and regular rhythm  Occasional ectopic   Pulmonary/Chest: Effort normal and breath sounds normal    Abdominal: Soft  Bowel sounds are normal    Musculoskeletal:   Trace ankle edema bilaterally   Lymphadenopathy:     She has no cervical adenopathy  Neurological: She is alert and oriented to person, place, and time  Skin: Skin is warm and dry  Very faint pink left lower leg   Psychiatric: She has a normal mood and affect  Her behavior is normal    Nursing note and vitals reviewed

## 2018-02-07 ENCOUNTER — APPOINTMENT (OUTPATIENT)
Dept: PHYSICAL THERAPY | Facility: REHABILITATION | Age: 83
End: 2018-02-07
Payer: MEDICARE

## 2018-02-08 ENCOUNTER — OFFICE VISIT (OUTPATIENT)
Dept: UROLOGY | Facility: MEDICAL CENTER | Age: 83
End: 2018-02-08
Payer: MEDICARE

## 2018-02-08 VITALS
WEIGHT: 215 LBS | DIASTOLIC BLOOD PRESSURE: 82 MMHG | SYSTOLIC BLOOD PRESSURE: 142 MMHG | BODY MASS INDEX: 40.59 KG/M2 | HEIGHT: 61 IN

## 2018-02-08 DIAGNOSIS — R31.0 GROSS HEMATURIA: ICD-10-CM

## 2018-02-08 DIAGNOSIS — R39.15 URINARY URGENCY: Primary | ICD-10-CM

## 2018-02-08 DIAGNOSIS — D49.519 RENAL NEOPLASM: ICD-10-CM

## 2018-02-08 LAB
SL AMB  POCT GLUCOSE, UA: NEGATIVE
SL AMB LEUKOCYTE ESTERASE,UA: ABNORMAL
SL AMB POCT BILIRUBIN,UA: NEGATIVE
SL AMB POCT BLOOD,UA: ABNORMAL
SL AMB POCT CLARITY,UA: CLEAR
SL AMB POCT COLOR,UA: YELLOW
SL AMB POCT KETONES,UA: NEGATIVE
SL AMB POCT NITRITE,UA: NEGATIVE
SL AMB POCT PH,UA: 5.5
SL AMB POCT SPECIFIC GRAVITY,UA: 1.01
SL AMB POCT URINE PROTEIN: NEGATIVE
SL AMB POCT UROBILINOGEN: 0.2

## 2018-02-08 PROCEDURE — 81003 URINALYSIS AUTO W/O SCOPE: CPT | Performed by: UROLOGY

## 2018-02-08 PROCEDURE — 99215 OFFICE O/P EST HI 40 MIN: CPT | Performed by: UROLOGY

## 2018-02-08 RX ORDER — LEVOFLOXACIN 5 MG/ML
750 INJECTION, SOLUTION INTRAVENOUS ONCE
Status: CANCELLED | OUTPATIENT
Start: 2018-02-08

## 2018-02-08 NOTE — LETTER
2018     Gabe Sanchez MD  [id number]   [address] [facility]    Patient: Karyle Cash   YOB: 1935   Date of Visit: 2018       Dear Dr Miguel A Mckinney: Thank you for referring Pamela Carl to me for evaluation  Below are my notes for this consultation  If you have questions, please do not hesitate to call me  I look forward to following your patient along with you  Sincerely,        Jamia Bautista MD        CC: No Recipients  Jamia Bautista MD  2018  1:53 PM  Sign at close encounter  [address] [facility]  [address], [address] [place] [address]  [phone]  www  [facility]  org      NAME: Lilliam Ramey  AGE: 81 y o  SEX: female  : 1935   MRN: [id number]    DATE: 2018  TIME: 1:16 PM    Assessment and Plan:  Gross hematuria with right renal pelvic nodule:  Need cystoscopy, right ureteroscopy possible biopsy if something is seen, and right ureteral stent placement  See history and physical            Return to office in:  Schedule surgery    Chief Complaint   No chief complaint on file  History of Present Illness   Long history of urge incontinence, and has been undergoing PT NS  She developed gross hematuria and underwent a renal ultrasound which showed some nodularity in the right renal pelvis  I personally reviewed the films to include her CT scan and while I do not see anything overt, the radiologist is calling it  These are both enhanced and unenhanced images  She had no had no further episodes of gross hematuria  Today's dipstick shows moderate blood and a small amount of leukocyte esterase  She has constant urgency  She has a long history of recurrent urinary tract infections        The following portions of the patient's history were reviewed and updated as appropriate: allergies, current medications, past family history, past medical history, past social history, past surgical history and problem list     Review of Systems   Review of Systems    Active Problem List     Patient Active Problem List   Diagnosis    [facility]    Hypertension    Rheumatoid arthritis [facility]    Diabetes mellitus type 2 in obese ([facility]    Urinary tract infection    Sleep apnea    Hypothyroidism    Chronic GERD    Anemia of chronic disease    Benign essential hypertension    Carpal tunnel syndrome, left    Diverticulosis of colon    Edema    Hematuria    Hypercholesterolemia    Lymphedema    Obesity    Overactive bladder    Peripheral neuropathy    Prediabetes    Primary osteoarthritis of left knee    Restless legs syndrome    Right lumbar radiculopathy    Sensorineural hearing loss    Sensory urge incontinence    Sinus arrhythmia    Renal neoplasm       Objective   There were no vitals taken for this visit      Physical Exam    Pertinent Laboratory/Diagnostic Studies:  CBC:   Lab Results   Component Value Date/Time    WBC 7 03 01/03/2018 11:06 AM    WBC 5 9 11/14/2017 11:51 AM    RBC 3 05 (L) 01/03/2018 11:06 AM    RBC 2 92 (L) 11/14/2017 11:51 AM    HGB 10 0 (L) 01/03/2018 11:06 AM    HGB 9 5 (L) 11/14/2017 11:51 AM    HCT 30 5 (L) 01/03/2018 11:06 AM    HCT 28 5 (L) 11/14/2017 11:51 AM     (H) 01/03/2018 11:06 AM    MCV 97 6 11/14/2017 11:51 AM    MCH 32 8 01/03/2018 11:06 AM    MCH 32 5 11/14/2017 11:51 AM    MCHC 32 8 01/03/2018 11:06 AM    MCHC 33 3 11/14/2017 11:51 AM    RDW 14 9 01/03/2018 11:06 AM    RDW 13 9 11/14/2017 11:51 AM    MPV 10 5 01/03/2018 11:06 AM    MPV 10 7 11/14/2017 11:51 AM     01/03/2018 11:06 AM     11/14/2017 11:51 AM    NRBC 0 01/03/2018 11:06 AM    NEUTOPHILPCT 63 01/03/2018 11:06 AM    NEUTOPHILPCT 56 01/04/2016 05:58 PM    LYMPHOPCT 25 01/03/2018 11:06 AM    LYMPHOPCT 32 01/04/2016 05:58 PM    MONOPCT 9 01/03/2018 11:06 AM    MONOPCT 11 01/04/2016 05:58 PM    EOSPCT 3 01/03/2018 11:06 AM    EOSPCT 1 01/04/2016 05:58 PM    BASOPCT 0 01/03/2018 11:06 AM    BASOPCT 0 3 11/14/2017 11:51 AM    NEUTROABS 4 38 01/03/2018 11:06 AM    NEUTROABS 3381 11/14/2017 11:51 AM    NEUTROABS 57 3 11/14/2017 11:51 AM    LYMPHSABS 1 74 01/03/2018 11:06 AM    LYMPHSABS 1676 11/14/2017 11:51 AM    LYMPHSABS 28 4 11/14/2017 11:51 AM    MONOSABS 0 64 01/03/2018 11:06 AM    MONOSABS 649 11/14/2017 11:51 AM    EOSABS 0 23 01/03/2018 11:06 AM    EOSABS 177 11/14/2017 11:51 AM    EOSABS 3 0 11/14/2017 11:51 AM     Chemistry Profile:   Lab Results   Component Value Date/Time     12/14/2017 02:28 PM     11/14/2017 11:51 AM    K 3 6 12/14/2017 02:28 PM    K 3 7 11/14/2017 11:51 AM     12/14/2017 02:28 PM     11/14/2017 11:51 AM    CO2 30 12/14/2017 02:28 PM    CO2 29 11/14/2017 11:51 AM    ANIONGAP 6 12/14/2017 02:28 PM    ANIONGAP 9 01/04/2016 05:58 PM    BUN 17 12/14/2017 02:28 PM    BUN 18 11/14/2017 11:51 AM    CREATININE 0 92 12/14/2017 02:28 PM    CREATININE 0 88 11/14/2017 11:51 AM    GLUCOSE 150 (H) 12/14/2017 02:28 PM    GLUCOSE 95 01/04/2016 05:58 PM    SLAMBGLUCOSE negative 02/08/2018 01:21 PM    CALCIUM 9 0 12/14/2017 02:28 PM    CALCIUM 8 9 11/14/2017 11:51 AM    MG 2 4 05/12/2017 05:30 AM    MG 2 1 01/04/2016 05:58 PM    PHOS 2 6 05/12/2017 05:30 AM    AST 20 12/14/2017 02:28 PM    AST 24 11/14/2017 11:51 AM    ALT 24 12/14/2017 02:28 PM    ALT 19 11/14/2017 11:51 AM    ALKPHOS 115 12/14/2017 02:28 PM    ALKPHOS 95 11/14/2017 11:51 AM    PROT 7 2 12/14/2017 02:28 PM    PROT 6 3 11/14/2017 11:51 AM    BILITOT 0 86 12/14/2017 02:28 PM    BILITOT 1 2 11/14/2017 11:51 AM    EGFR 59 12/14/2017 02:28 PM       Current Medications     Current Outpatient Prescriptions:     Calcium Citrate-Vitamin D (CALCIUM + D PO), Take 1 tablet by mouth 2 (two) times a day, Disp: , Rfl:     Certolizumab Pegol (CIMZIA PREFILLED SC), Inject under the skin, Disp: , Rfl:     cyanocobalamin (VITAMIN B-12) 100 mcg tablet, Take by mouth, Disp: , Rfl:     Diclofenac Sodium 1 % CREA, Place 4 g/day on the skin 4 (four) times a day, Disp: , Rfl:     furosemide (LASIX) 20 mg tablet, Take 20 mg by mouth daily  , Disp: , Rfl:     gabapentin (NEURONTIN) 100 mg capsule, Take 100 mg by mouth 3 (three) times a day, Disp: , Rfl:     LEUCOVORIN CALCIUM PO, Take 10 mg by mouth once a week, Disp: , Rfl:     levothyroxine 75 mcg tablet, Take 75 mcg by mouth daily, Disp: , Rfl:     losartan-hydrochlorothiazide (HYZAAR) 100-12 5 MG per tablet, Take 1 tablet by mouth daily  , Disp: , Rfl:     methotrexate (RHEUMATREX) 2 5 MG tablet, Take 2 5 mg by mouth every 12 hours for 3 doses only each week, Disp: , Rfl:     Multiple Vitamin (MULTIVITAMINS PO), Take 1 tablet by mouth daily, Disp: , Rfl:     omeprazole (PriLOSEC) 20 mg delayed release capsule, Take 20 mg by mouth daily, Disp: , Rfl:     pravastatin (PRAVACHOL) 80 mg tablet, Take 1 tablet by mouth daily with dinner for 30 days, Disp: 30 tablet, Rfl: 0    rivaroxaban (XARELTO) 20 mg tablet, Take 20 mg by mouth daily with breakfast, Disp: , Rfl:     rOPINIRole (REQUIP) 0 5 mg tablet, Take 0 5 mg by mouth 3 (three) times a day, Disp: , Rfl:         Jordan Kidd MD

## 2018-02-08 NOTE — H&P
H&P Exam - Urology   Bebo Cobos 81 y o  female MRN: [id number]  Unit/Bed#:  Encounter: [id number]    Assessment/Plan     Assessment:  Right renal collecting system nodule with gross hematuria   Plan:  Cystoscopy, right retrograde pyelography, right ureteroscopy with possible biopsy if something is found, right ureteral stent placement  The risks of bleeding infection damage to the urinary tract have been explained  She will stay on her Xarelto  History of Present Illness   HPI:  Bebo Cobos is a 81 y o  female who presents with gross and microscopic hematuria and ultrasound and CT scan show an abnormality of the right renal pelvis  This appears to be a nodule and needs to be ruled out for transitional cell carcinoma  It may simply be inflammatory  She does have a history of recurrent urinary tract infections       Review of Systems no recent chest pain shortness of breath      Historical Information   Past Medical History:   Diagnosis Date    Anemia     Arthritis     rheumatoid    Benign essential hypertension     Cataract     Chronic cystitis     Colon, diverticulosis     Diabetes mellitus ([address] )     Diverticulitis of colon     Early satiety     GERD (gastroesophageal reflux disease)     Gout     Hearing loss     Hemorrhoids     Hiatal hernia     History of colonic polyps     Hyperlipidemia     Hypertension     Hypertension     Hypothyroidism     Impaired fasting glucose     Left breast mass     Microhematuria     Overactive bladder     Pneumonia of left lower lobe due to infectious organism (HCC)     Rheumatoid arthritis (HCC)     Sleep apnea     uses cpap    Stroke [facility]     Type 2 diabetes mellitus (HCC)     Urinary frequency     Urinary urgency      Past Surgical History:   Procedure Laterality Date    APPENDECTOMY      ARTHROSCOPY WRIST Right     with release of transverse carpal ligament     BLADDER SURGERY      BLADDER SURGERY      BUNIONECTOMY Bilateral  CHOLECYSTECTOMY      COLONOSCOPY      CYSTOSCOPY      EGD AND COLONOSCOPY N/A 12/20/2017    Procedure: EGD AND COLONOSCOPY;  Surgeon: Lamonte Elizalde MD;  Location: [facility]; Service: Gastroenterology    ESOPHAGOGASTRODUODENOSCOPY      diagnostic    FOREARM SURGERY Right     fracture repair    HYSTERECTOMY      KNEE SURGERY Left     meniscus tear    NOSE SURGERY      REPLACEMENT TOTAL KNEE BILATERAL Bilateral      Social History   History   Alcohol Use    Yes     Comment: (history), social drinker     History   Drug Use No     History   Smoking Status    Former Smoker   Smokeless Tobacco    Former User     Comment: stopped smoking in the distant past, never smoker (as per [employer])      Family History: non-contributory    Meds/Allergies   PTA meds:   Cannot display prior to admission medications because the patient has not been admitted in this contact  Allergies   Allergen Reactions    Acetazolamide Other (See Comments)     Other reaction(s): Unknown Allergic Reaction    Aspirin      Other reaction(s): Other (See Comments)  High Dose ASA-stomach ache, rachel  ASA 82 m    Atorvastatin      Other reaction(s): Muscle Pain, Myalgia    Azithromycin     Nitrofurantoin      Other reaction(s): Unknown Allergic Reaction  Other reaction(s): Other (See Comments)  HIVES * pt denies    Other Other (See Comments)     red and itching  Other reaction(s): Other (See Comments)  red and itching    Penicillins Other (See Comments)     Other reaction(s): Other (See Comments)  HIVES; tolerates Ancef  HIVES; tolerates Ancef    Propoxyphene Other (See Comments)     Other reaction(s): Unknown Allergic Reaction    Shellfish-Derived Products     Sulfa Antibiotics Other (See Comments)    Tramadol        Objective   Vitals: Blood pressure 142/82, height 5' 1" (1 549 m), weight 97 5 kg (215 lb)      [unfilled]    Invasive Devices     Peripheral Intravenous Line            Peripheral IV 12/20/17 Right Arm 50 days Physical Exam    Lab Results: I have personally reviewed pertinent reports  CBC: No results found for: WBC, HGB, HCT, MCV, PLT, ADJUSTEDWBC, MCH, MCHC, RDW, MPV, NRBC  CMP: No results found for: NA, CL, CO2, ANIONGAP, BUN, CREATININE, GLUCOSE, CALCIUM, AST, ALT, ALKPHOS, PROT, ALBUMIN, BILITOT, EGFR  Imaging: I have personally reviewed pertinent reports  and I have personally reviewed pertinent films in PACS  EKG, Pathology, and Other Studies: I have personally reviewed pertinent reports      VTE Prophylaxis: Sequential compression device Merceda Booty)     Code Status:  Full  Advance Directive and Living Will: Yes    Power of :    POLST:

## 2018-02-08 NOTE — PROGRESS NOTES
[address] [facility]  [facility]  [address] [facility]  [place], [address] [place] [address]  [phone]  www  [facility]  org      NAME: Lilliam Ramey  AGE: 81 y o  SEX: female  : 1935   MRN: [id number]    DATE: 2018  TIME: 1:16 PM    Assessment and Plan:  Gross hematuria with right renal pelvic nodule:  Need cystoscopy, right ureteroscopy possible biopsy if something is seen, and right ureteral stent placement  See history and physical            Return to office in:  Schedule surgery    Chief Complaint   No chief complaint on file  History of Present Illness   Long history of urge incontinence, and has been undergoing PT NS  She developed gross hematuria and underwent a renal ultrasound which showed some nodularity in the right renal pelvis  I personally reviewed the films to include her CT scan and while I do not see anything overt, the radiologist is calling it  These are both enhanced and unenhanced images  She had no had no further episodes of gross hematuria  Today's dipstick shows moderate blood and a small amount of leukocyte esterase  She has constant urgency  She has a long history of recurrent urinary tract infections        The following portions of the patient's history were reviewed and updated as appropriate: allergies, current medications, past family history, past medical history, past social history, past surgical history and problem list     Review of Systems   Review of Systems    Active Problem List     Patient Active Problem List   Diagnosis    [facility]rebral [facility]) [facility]    Hypertension    Rheumatoid arthritis ([facility]    Diabetes mellitus type 2 in obese ([facility] )    Urinary tract infection    Sleep apnea    Hypothyroidism    Chronic GERD    Anemia of chronic disease    Benign essential hypertension    Carpal tunnel syndrome, left    Diverticulosis of colon    Edema    Hematuria    Hypercholesterolemia    Lymphedema    Obesity    Overactive bladder    Peripheral neuropathy    Prediabetes    Primary osteoarthritis of left knee    Restless legs syndrome    Right lumbar radiculopathy    Sensorineural hearing loss    Sensory urge incontinence    Sinus arrhythmia    Renal neoplasm       Objective   There were no vitals taken for this visit      Physical Exam    Pertinent Laboratory/Diagnostic Studies:  CBC:   Lab Results   Component Value Date/Time    WBC 7 03 01/03/2018 11:06 AM    WBC 5 9 11/14/2017 11:51 AM    RBC 3 05 (L) 01/03/2018 11:06 AM    RBC 2 92 (L) 11/14/2017 11:51 AM    HGB 10 0 (L) 01/03/2018 11:06 AM    HGB 9 5 (L) 11/14/2017 11:51 AM    HCT 30 5 (L) 01/03/2018 11:06 AM    HCT 28 5 (L) 11/14/2017 11:51 AM     (H) 01/03/2018 11:06 AM    MCV 97 6 11/14/2017 11:51 AM    MCH 32 8 01/03/2018 11:06 AM    MCH 32 5 11/14/2017 11:51 AM    MCHC 32 8 01/03/2018 11:06 AM    MCHC 33 3 11/14/2017 11:51 AM    RDW 14 9 01/03/2018 11:06 AM    RDW 13 9 11/14/2017 11:51 AM    MPV 10 5 01/03/2018 11:06 AM    MPV 10 7 11/14/2017 11:51 AM     01/03/2018 11:06 AM     11/14/2017 11:51 AM    NRBC 0 01/03/2018 11:06 AM    NEUTOPHILPCT 63 01/03/2018 11:06 AM    NEUTOPHILPCT 56 01/04/2016 05:58 PM    LYMPHOPCT 25 01/03/2018 11:06 AM    LYMPHOPCT 32 01/04/2016 05:58 PM    MONOPCT 9 01/03/2018 11:06 AM    MONOPCT 11 01/04/2016 05:58 PM    EOSPCT 3 01/03/2018 11:06 AM    EOSPCT 1 01/04/2016 05:58 PM    BASOPCT 0 01/03/2018 11:06 AM    BASOPCT 0 3 11/14/2017 11:51 AM    NEUTROABS 4 38 01/03/2018 11:06 AM    NEUTROABS 3381 11/14/2017 11:51 AM    NEUTROABS 57 3 11/14/2017 11:51 AM    LYMPHSABS 1 74 01/03/2018 11:06 AM    LYMPHSABS 1676 11/14/2017 11:51 AM    LYMPHSABS 28 4 11/14/2017 11:51 AM    MONOSABS 0 64 01/03/2018 11:06 AM    MONOSABS 649 11/14/2017 11:51 AM    EOSABS 0 23 01/03/2018 11:06 AM    EOSABS 177 11/14/2017 11:51 AM    EOSABS 3 0 11/14/2017 11:51 AM     Chemistry Profile:   Lab Results Component Value Date/Time     12/14/2017 02:28 PM     11/14/2017 11:51 AM    K 3 6 12/14/2017 02:28 PM    K 3 7 11/14/2017 11:51 AM     12/14/2017 02:28 PM     11/14/2017 11:51 AM    CO2 30 12/14/2017 02:28 PM    CO2 29 11/14/2017 11:51 AM    ANIONGAP 6 12/14/2017 02:28 PM    ANIONGAP 9 01/04/2016 05:58 PM    BUN 17 12/14/2017 02:28 PM    BUN 18 11/14/2017 11:51 AM    CREATININE 0 92 12/14/2017 02:28 PM    CREATININE 0 88 11/14/2017 11:51 AM    GLUCOSE 150 (H) 12/14/2017 02:28 PM    GLUCOSE 95 01/04/2016 05:58 PM    SLAMBGLUCOSE negative 02/08/2018 01:21 PM    CALCIUM 9 0 12/14/2017 02:28 PM    CALCIUM 8 9 11/14/2017 11:51 AM    MG 2 4 05/12/2017 05:30 AM    MG 2 1 01/04/2016 05:58 PM    PHOS 2 6 05/12/2017 05:30 AM    AST 20 12/14/2017 02:28 PM    AST 24 11/14/2017 11:51 AM    ALT 24 12/14/2017 02:28 PM    ALT 19 11/14/2017 11:51 AM    ALKPHOS 115 12/14/2017 02:28 PM    ALKPHOS 95 11/14/2017 11:51 AM    PROT 7 2 12/14/2017 02:28 PM    PROT 6 3 11/14/2017 11:51 AM    BILITOT 0 86 12/14/2017 02:28 PM    BILITOT 1 2 11/14/2017 11:51 AM    EGFR 59 12/14/2017 02:28 PM       Current Medications     Current Outpatient Prescriptions:     Calcium Citrate-Vitamin D (CALCIUM + D PO), Take 1 tablet by mouth 2 (two) times a day, Disp: , Rfl:     Certolizumab Pegol (CIMZIA PREFILLED SC), Inject under the skin, Disp: , Rfl:     cyanocobalamin (VITAMIN B-12) 100 mcg tablet, Take by mouth, Disp: , Rfl:     Diclofenac Sodium 1 % CREA, Place 4 g/day on the skin 4 (four) times a day, Disp: , Rfl:     furosemide (LASIX) 20 mg tablet, Take 20 mg by mouth daily  , Disp: , Rfl:     gabapentin (NEURONTIN) 100 mg capsule, Take 100 mg by mouth 3 (three) times a day, Disp: , Rfl:     LEUCOVORIN CALCIUM PO, Take 10 mg by mouth once a week, Disp: , Rfl:     levothyroxine 75 mcg tablet, Take 75 mcg by mouth daily, Disp: , Rfl:     losartan-hydrochlorothiazide (HYZAAR) 100-12 5 MG per tablet, Take 1 tablet by mouth daily  , Disp: , Rfl:     methotrexate (RHEUMATREX) 2 5 MG tablet, Take 2 5 mg by mouth every 12 hours for 3 doses only each week, Disp: , Rfl:     Multiple Vitamin (MULTIVITAMINS PO), Take 1 tablet by mouth daily, Disp: , Rfl:     omeprazole (PriLOSEC) 20 mg delayed release capsule, Take 20 mg by mouth daily, Disp: , Rfl:     pravastatin (PRAVACHOL) 80 mg tablet, Take 1 tablet by mouth daily with dinner for 30 days, Disp: 30 tablet, Rfl: 0    rivaroxaban (XARELTO) 20 mg tablet, Take 20 mg by mouth daily with breakfast, Disp: , Rfl:     rOPINIRole (REQUIP) 0 5 mg tablet, Take 0 5 mg by mouth 3 (three) times a day, Disp: , Rfl:         Contreras Diaz MD

## 2018-02-09 ENCOUNTER — OFFICE VISIT (OUTPATIENT)
Dept: PHYSICAL THERAPY | Facility: REHABILITATION | Age: 83
End: 2018-02-09
Payer: MEDICARE

## 2018-02-09 DIAGNOSIS — M05.79 RHEUMATOID ARTHRITIS INVOLVING MULTIPLE SITES WITH POSITIVE RHEUMATOID FACTOR (HCC): ICD-10-CM

## 2018-02-09 DIAGNOSIS — R29.6 FREQUENT FALLS: Primary | ICD-10-CM

## 2018-02-09 PROCEDURE — 97112 NEUROMUSCULAR REEDUCATION: CPT | Performed by: PHYSICAL THERAPIST

## 2018-02-09 PROCEDURE — 97110 THERAPEUTIC EXERCISES: CPT | Performed by: PHYSICAL THERAPIST

## 2018-02-09 NOTE — PROGRESS NOTES
Daily Note     Today's date: 2018  Patient name: Tere Tang  : 1935  MRN: [id number]  Referring provider: Mary Kim PA-C  Dx:   Encounter Diagnoses   Name Primary?  Frequent falls Yes    Rheumatoid arthritis involving multiple sites with positive rheumatoid factor (HCC)                   Subjective: Patient reports improved ambulation in her home environment since last tx session and states that she has been able to ambulate safely without her cane for the "first time in a long time"  Objective: See treatment diary below      Assessment: Tolerated treatment well  Patient demonstrated fatigue post treatment and exhibited good technique with therapeutic exercises  Began balance based exercises and patient demonstrated increased trunk sway with a small cone of stability  Improved recruitment noted secondary to early undershooting objects with step ups  Plan: Continue per plan of care       Precautions: h/o stroke, HTN, DM    Daily Treatment Diary     Manual                                                                                  Exercise Diary             Recumbent bike  4'           VG  4'           Squats  2x10           FSU  2" foam  2x10           Tandem stance with EC  4x30"           Balance: feet together EO on Airex  3x30"           Balance: feet together EC             Multidirectional stepping  x8 B/L           Stool taps             3-way SLR with TB in standing                                                                                                                                                   Modalities

## 2018-02-12 ENCOUNTER — CLINICAL SUPPORT (OUTPATIENT)
Dept: PAIN MEDICINE | Facility: CLINIC | Age: 83
End: 2018-02-12
Payer: MEDICARE

## 2018-02-12 VITALS
HEIGHT: 61 IN | TEMPERATURE: 97.3 F | BODY MASS INDEX: 40.86 KG/M2 | HEART RATE: 83 BPM | WEIGHT: 216.4 LBS | DIASTOLIC BLOOD PRESSURE: 75 MMHG | SYSTOLIC BLOOD PRESSURE: 150 MMHG

## 2018-02-12 DIAGNOSIS — M54.6 CHRONIC BILATERAL THORACIC BACK PAIN: Primary | ICD-10-CM

## 2018-02-12 DIAGNOSIS — G89.29 CHRONIC BILATERAL THORACIC BACK PAIN: Primary | ICD-10-CM

## 2018-02-12 DIAGNOSIS — M51.34 THORACIC DEGENERATIVE DISC DISEASE: ICD-10-CM

## 2018-02-12 DIAGNOSIS — M05.9 RHEUMATOID ARTHRITIS WITH POSITIVE RHEUMATOID FACTOR, INVOLVING UNSPECIFIED SITE (HCC): ICD-10-CM

## 2018-02-12 DIAGNOSIS — G56.02 CARPAL TUNNEL SYNDROME, LEFT: ICD-10-CM

## 2018-02-12 DIAGNOSIS — G62.9 PERIPHERAL POLYNEUROPATHY: ICD-10-CM

## 2018-02-12 PROCEDURE — 99204 OFFICE O/P NEW MOD 45 MIN: CPT | Performed by: ANESTHESIOLOGY

## 2018-02-12 RX ORDER — GABAPENTIN 100 MG/1
100 CAPSULE ORAL 3 TIMES DAILY
Qty: 150 CAPSULE | Refills: 1 | Status: SHIPPED | OUTPATIENT
Start: 2018-02-12 | End: 2018-06-13 | Stop reason: SDUPTHER

## 2018-02-12 NOTE — PROGRESS NOTES
Assessment:  1  Chronic bilateral thoracic back pain    2  Thoracic degenerative disc disease    3  Rheumatoid arthritis with positive rheumatoid factor, involving unspecified site ([facility] [address] )    4  Peripheral polyneuropathy    5  Carpal tunnel syndrome, left        Plan:  81-year-old female with a history of rheumatoid arthritis, diabetes complicated with neuropathy, carpal tunnel syndrome, and restless leg syndrome presenting for initial consultation regarding axial thoracic back pain which seems to be largely myofascial and facet mediated in nature  The patient does have an x-ray of her thoracic spine revealing degenerative disc disease from 2015  She does not demonstrate any radiculopathy on physical exam   The patient's neuropathic symptoms in her upper and lower extremities are consistent with neuropathy as they follow more of the glove and stocking like distribution  The patient also has carpal tunnel syndrome on the left further compounding the neuropathic symptoms into her left hand  She is currently taking gabapentin 100 mg t i d  with mild relief of her neuropathic symptoms, however the neuropathic symptoms in her hand seem to be worsening and her not as well controlled as they once were  Her polyarthralgias are largely secondary to osteoarthritis and rheumatoid arthritis  The patient does follow with rheumatology and is getting Cimzia injections weekly and methotrexate  She is unable to take NSAIDs secondary to anticoagulation  She has previously had some favorable responses to Tylenol with codeine in the past and is unable to take tramadol secondary to side effects  1   I will prescribe aquatic therapy to reduce pain and improve function  2  The patient may benefit from a mild opioid analgesic for her chronic daily pain secondary to numerous factors such as a Butrans patch  We will obtain an oral drug screen at today's visit to determine for appropriateness of opioid prescriptions    3   I will increase the patient's gabapentin to 100 mg in the morning and afternoon and 300 mg at bedtime for neuropathic complaints  The patient was given a titration schedule at today's visit  4   I will order an x-ray of the patient's thoracic spine to evaluate if there is any progression of disease or new compression fractures  5  I will follow up the patient in 6-8 weeks  6  Will avoid NSAIDs secondary to anticoagulation    An oral drug screen swab was collected at today's office visit  The swab will be sent for confirmatory testing  The drug screen is medically necessary because the patient is either dependent on opioid medication or is being considered for opioid medication therapy and the results could impact ongoing or future treatment  The drug screen is to evaluate for the presences or absence of prescribed, non-prescribed, and/or illicit drugs/substances  [place] Prescription Drug Monitoring Program report was reviewed and was appropriate       My impressions and treatment recommendations were discussed in detail with the patient who verbalized understanding and had no further questions  Discharge instructions were provided  I personally saw and examined the patient and I agree with the above discussed plan of care  No orders of the defined types were placed in this encounter  No orders of the defined types were placed in this encounter  History of Present Illness:    Suzanne Sales is a 81 y o  female  with a history of rheumatoid arthritis, diabetes complicated with neuropathy, carpal tunnel syndrome, and restless leg syndrome presenting for initial consultation regarding axial thoracic back pain  She denies any radicular symptoms into her thorax, abdomen, or lower extremities  She denies any bladder or bowel incontinence or saddle anesthesia  The patient does have a secondary complaint of poly arthralgias involving mainly her major joints including her knees, hips, and shoulders  The patient does follow with rheumatology who has the patient on Cimzia and methotrexate  She states that her polyarthralgias are essentially at baseline  The patient also complains of numbness and paresthesias in her hands and lower extremities from mid shin to the feet in a stocking and glove distribution  She denies any subjective weakness in her upper lower extremities  She is currently taking gabapentin 100 mg t i d  for this and has noted some good relief, however her hand symptoms are worsening in the past few months  The patient does have an x-ray of her thoracic spine revealing degenerative disc disease without any compression fractures or subluxations  The patient rates her pain as 6 to 8/10 depending upon what she is doing  The pain is nearly constant and is worse in the afternoon and at night  The pain is described as shooting, numbness, sharp, and pins and needles  The pain is increased with standing, sitting, walking, and exercise  The pain is alleviated with sitting sometimes and relaxation  She has not gotten any relief from exercise or chiropractic treatment  I have personally reviewed and/or updated the patient's past medical history, past surgical history, family history, social history, current medications, allergies, and vital signs today  Other than as stated above, the patient denies any interval changes in medications, medical condition, mental condition, symptoms, or allergies since the last office visit  Review of Systems:    Review of Systems   Constitutional: Positive for unexpected weight change (Water weight )  Negative for fever  HENT: Negative for trouble swallowing  Eyes: Negative for visual disturbance  Respiratory: Negative for shortness of breath and wheezing  Cardiovascular: Negative for chest pain and palpitations  Gastrointestinal: Positive for constipation and diarrhea  Negative for nausea and vomiting     Endocrine: Negative for cold intolerance, heat intolerance and polydipsia  Genitourinary: Positive for difficulty urinating and frequency  Musculoskeletal: Positive for gait problem  Negative for arthralgias, joint swelling and myalgias  Difficulty walking, Joint stiffness    Skin: Positive for rash  Neurological: Negative for dizziness, seizures, syncope, weakness and headaches  Hematological: Does not bruise/bleed easily  Psychiatric/Behavioral: Negative for dysphoric mood  All other systems reviewed and are negative        Patient Active Problem List   Diagnosis    CVA (cerebral vascular accident) ([facility] )    Hypertension    Rheumatoid arthritis ([facility] )    Diabetes mellitus type 2 in obese ([facility]    Urinary tract infection    Sleep apnea    Hypothyroidism    Chronic GERD    Anemia of chronic disease    Benign essential hypertension    Carpal tunnel syndrome, left    Diverticulosis of colon    Edema    Hematuria    Hypercholesterolemia    Lymphedema    Obesity    Overactive bladder    Peripheral neuropathy    Prediabetes    Primary osteoarthritis of left knee    Restless legs syndrome    Right lumbar radiculopathy    Sensorineural hearing loss    Sensory urge incontinence    Sinus arrhythmia    Renal neoplasm    Gross hematuria       Past Medical History:   Diagnosis Date    Anemia     Arthritis     rheumatoid    Benign essential hypertension     Cataract     Chronic cystitis     Colon, diverticulosis     Diabetes mellitus ([facility] )     Diverticulitis of colon     Early satiety     GERD (gastroesophageal reflux disease)     Gout     Hearing loss     Hemorrhoids     Hiatal hernia     History of colonic polyps     Hyperlipidemia     Hypertension     Hypertension     Hypothyroidism     Impaired fasting glucose     Left breast mass     Microhematuria     Overactive bladder     Pneumonia of left lower lobe due to infectious organism ([facility] )     Rheumatoid arthritis ([facility] )     Sleep apnea uses cpap    Stroke [facility])     Type 2 diabetes mellitus ([address] )     Urinary frequency     Urinary urgency        Past Surgical History:   Procedure Laterality Date    APPENDECTOMY      ARTHROSCOPY WRIST Right     with release of transverse carpal ligament     BLADDER SURGERY      BLADDER SURGERY      BUNIONECTOMY Bilateral     CHOLECYSTECTOMY      COLONOSCOPY      CYSTOSCOPY      EGD AND COLONOSCOPY N/A 12/20/2017    Procedure: EGD AND COLONOSCOPY;  Surgeon: Daniel Aggarwal MD;  Location:  GI LAB; Service: Gastroenterology    ESOPHAGOGASTRODUODENOSCOPY      diagnostic    FOREARM SURGERY Right     fracture repair    HYSTERECTOMY      KNEE SURGERY Left     meniscus tear    NOSE SURGERY      REPLACEMENT TOTAL KNEE BILATERAL Bilateral        Family History   Problem Relation Age of Onset    Other Mother      epilepsy   [de-identified] Glaucoma Father     Stroke Father      silent    Other Brother      cardiac disorder       Social History     Occupational History    Not on file  Social History Main Topics    Smoking status: Former Smoker    Smokeless tobacco: Former User      Comment: stopped smoking in the distant past, never smoker (as per [employer])     Alcohol use Yes      Comment: (history), social drinker    Drug use: No    Sexual activity: Not on file       Current Outpatient Prescriptions on File Prior to Visit   Medication Sig    Calcium Citrate-Vitamin D (CALCIUM + D PO) Take 1 tablet by mouth 2 (two) times a day    Certolizumab Pegol (CIMZIA PREFILLED SC) Inject under the skin    cyanocobalamin (VITAMIN B-12) 100 mcg tablet Take by mouth    Diclofenac Sodium 1 % CREA Place 4 g/day on the skin 4 (four) times a day    furosemide (LASIX) 20 mg tablet Take 20 mg by mouth daily      gabapentin (NEURONTIN) 100 mg capsule Take 100 mg by mouth 3 (three) times a day    LEUCOVORIN CALCIUM PO Take 10 mg by mouth once a week    levothyroxine 75 mcg tablet Take 75 mcg by mouth daily    losartan-hydrochlorothiazide (HYZAAR) 100-12 5 MG per tablet Take 1 tablet by mouth daily   methotrexate (RHEUMATREX) 2 5 MG tablet Take 2 5 mg by mouth every 12 hours for 3 doses only each week    Multiple Vitamin (MULTIVITAMINS PO) Take 1 tablet by mouth daily    omeprazole (PriLOSEC) 20 mg delayed release capsule Take 20 mg by mouth daily    pravastatin (PRAVACHOL) 80 mg tablet Take 1 tablet by mouth daily with dinner for 30 days    rivaroxaban (XARELTO) 20 mg tablet Take 20 mg by mouth daily with breakfast    rOPINIRole (REQUIP) 0 5 mg tablet Take 0 5 mg by mouth 3 (three) times a day     No current facility-administered medications on file prior to visit  Allergies   Allergen Reactions    Acetazolamide Other (See Comments)     Other reaction(s): Unknown Allergic Reaction    Aspirin      Other reaction(s): Other (See Comments)  High Dose ASA-stomach ache, rachel  ASA 81 m    Atorvastatin      Other reaction(s): Muscle Pain, Myalgia    Azithromycin     Nitrofurantoin      Other reaction(s): Unknown Allergic Reaction  Other reaction(s): Other (See Comments)  HIVES * pt denies    Other Other (See Comments)     red and itching  Other reaction(s): Other (See Comments)  red and itching    Penicillins Other (See Comments)     Other reaction(s):  Other (See Comments)  HIVES; tolerates Ancef  HIVES; tolerates Ancef    Propoxyphene Other (See Comments)     Other reaction(s): Unknown Allergic Reaction    Shellfish-Derived Products     Sulfa Antibiotics Other (See Comments)    Tramadol        Physical Exam:    /75   Pulse 83   Temp (!) 97 3 °F (36 3 °C)   Ht 5' 1" (1 549 m)   Wt 98 2 kg (216 lb 6 4 oz)   BMI 40 89 kg/m²     Constitutional: obese  Eyes: anicteric  HEENT: grossly intact and And wearing hearing aids  Neck: supple, symmetric, trachea midline and no masses   Pulmonary:even and unlabored  Cardiovascular:No edema or pitting edema present  Skin:Normal without rashes or lesions and well hydrated  Psychiatric:Mood and affect appropriate  Neurologic:Cranial Nerves II-XII grossly intact  Musculoskeletal:antalgic gait  Bilateral thoracic paraspinal musculature tender to palpation from about T7-T10  Bilateral lumbar paraspinal musculature nontender to palpation  Negative straight leg raise bilaterally  Bilateral Leon's tests equivocal   Bilateral patellar and Achilles reflexes were 1/4 and symmetrical   Bilateral lower extremity strength was 5/5 in all muscle groups  Bilateral knees with anterior tenderness to palpation inferior and medial and inferior and lateral to the patellas bilaterally  There is no ligamentous laxity or effusions noted bilaterally  Tenderness to palpation along the anterior and superior glenohumeral joints bilaterally  Limited range of motion with flexion and abduction greater than 90° of both shoulders secondary to pain  Limited range of motion with internal rotation of bilateral hips without any significant pain produced  Positive Tinel sign over the left flexor retinaculum  Negative Tinel sign over right flexor retinaculum  Imaging:     X-Ray Lumbar spine dated 7/29/2014    Lateral view shows narrowing of the L5-S1 of lumbosacral spine, diffuse osteopenia is seen, calcification of the aorta is seen  Facet osteoarthritis is noted  No other abnormalities are notes at this point

## 2018-02-13 ENCOUNTER — TRANSCRIBE ORDERS (OUTPATIENT)
Dept: RADIOLOGY | Facility: HOSPITAL | Age: 83
End: 2018-02-13

## 2018-02-13 ENCOUNTER — HOSPITAL ENCOUNTER (OUTPATIENT)
Dept: RADIOLOGY | Facility: HOSPITAL | Age: 83
Discharge: HOME | End: 2018-02-13
Attending: ANESTHESIOLOGY
Payer: MEDICARE

## 2018-02-13 DIAGNOSIS — G89.29 CHRONIC LOW BACK PAIN WITHOUT SCIATICA, UNSPECIFIED BACK PAIN LATERALITY: ICD-10-CM

## 2018-02-13 DIAGNOSIS — M54.50 CHRONIC LOW BACK PAIN WITHOUT SCIATICA, UNSPECIFIED BACK PAIN LATERALITY: ICD-10-CM

## 2018-02-13 DIAGNOSIS — M54.6 PAIN IN THORACIC SPINE: ICD-10-CM

## 2018-02-13 DIAGNOSIS — M54.6 PAIN IN THORACIC SPINE: Primary | ICD-10-CM

## 2018-02-13 PROCEDURE — 72072 X-RAY EXAM THORAC SPINE 3VWS: CPT

## 2018-02-14 ENCOUNTER — APPOINTMENT (OUTPATIENT)
Dept: PHYSICAL THERAPY | Facility: REHABILITATION | Age: 83
End: 2018-02-14
Payer: MEDICARE

## 2018-02-15 ENCOUNTER — APPOINTMENT (OUTPATIENT)
Dept: LAB | Facility: CLINIC | Age: 83
End: 2018-02-15
Payer: MEDICARE

## 2018-02-15 ENCOUNTER — TRANSCRIBE ORDERS (OUTPATIENT)
Dept: LAB | Facility: CLINIC | Age: 83
End: 2018-02-15

## 2018-02-15 DIAGNOSIS — R53.83 OTHER FATIGUE: Primary | ICD-10-CM

## 2018-02-15 PROCEDURE — 87086 URINE CULTURE/COLONY COUNT: CPT | Performed by: UROLOGY

## 2018-02-15 PROCEDURE — 87186 SC STD MICRODIL/AGAR DIL: CPT | Performed by: UROLOGY

## 2018-02-15 PROCEDURE — 87077 CULTURE AEROBIC IDENTIFY: CPT | Performed by: UROLOGY

## 2018-02-15 PROCEDURE — 87147 CULTURE TYPE IMMUNOLOGIC: CPT | Performed by: UROLOGY

## 2018-02-16 ENCOUNTER — OFFICE VISIT (OUTPATIENT)
Dept: PHYSICAL THERAPY | Facility: REHABILITATION | Age: 83
End: 2018-02-16
Payer: MEDICARE

## 2018-02-16 DIAGNOSIS — R29.6 FREQUENT FALLS: Primary | ICD-10-CM

## 2018-02-16 PROCEDURE — 97112 NEUROMUSCULAR REEDUCATION: CPT | Performed by: PHYSICAL THERAPIST

## 2018-02-16 PROCEDURE — 97150 GROUP THERAPEUTIC PROCEDURES: CPT | Performed by: PHYSICAL THERAPIST

## 2018-02-16 PROCEDURE — 97110 THERAPEUTIC EXERCISES: CPT | Performed by: PHYSICAL THERAPIST

## 2018-02-16 NOTE — PROGRESS NOTES
Daily Note     Today's date: 2018  Patient name: Marisol Wilder  : 1935  MRN: [id number]  Referring provider: Rikki Fletcher PA-C  Dx:   Encounter Diagnosis     ICD-10-CM    1  Frequent falls R29 6                   Subjective: Patient reports improved ambulation and that she feels steadier on her feet  Patient reports that she is beginning to ambulate her home environment without the cane and was educated to continue ambulating with it for safety  Objective: See treatment diary below      Assessment: Tolerated treatment well  Patient demonstrated fatigue post treatment and exhibited good technique with therapeutic exercises  Patient demonstrated impaired neuromuscular control with balance tasks requiring LE mobility  All stationary balance tasks patient demonstrated improved cone of stability  Plan: Continue per plan of care        Precautions: h/o stroke, HTN, DM    Daily Treatment Diary     Manual                                                                                  Exercise Diary            Recumbent bike  4' 6'          VG  4'           Squats  2x10 2x10          FSU  2" foam  2x10 1x10"  2" foam          Tandem stance with EC  4x30" 4x30"          Balance: feet together EO on Airex  3x30" 4x30"          Balance: feet together EC             Multidirectional stepping  x8 B/L x8 B/L          Stool taps             3-way SLR with TB in standing             Standing hip abduction + flexion   2x10 B/L - resistance nv                                                                                                                                   Modalities

## 2018-02-17 LAB — BACTERIA UR CULT: ABNORMAL

## 2018-02-19 ENCOUNTER — APPOINTMENT (OUTPATIENT)
Dept: PHYSICAL THERAPY | Facility: REHABILITATION | Age: 83
End: 2018-02-19
Payer: MEDICARE

## 2018-02-19 DIAGNOSIS — N30.01 ACUTE CYSTITIS WITH HEMATURIA: Primary | ICD-10-CM

## 2018-02-19 RX ORDER — LEVOFLOXACIN 250 MG/1
250 TABLET ORAL DAILY
Qty: 5 TABLET | Refills: 0 | Status: SHIPPED | OUTPATIENT
Start: 2018-02-19 | End: 2018-02-24

## 2018-02-20 ENCOUNTER — TELEPHONE (OUTPATIENT)
Dept: PAIN MEDICINE | Facility: CLINIC | Age: 83
End: 2018-02-20

## 2018-02-20 DIAGNOSIS — E78.00 PURE HYPERCHOLESTEROLEMIA: Primary | ICD-10-CM

## 2018-02-20 RX ORDER — PRAVASTATIN SODIUM 80 MG/1
TABLET ORAL
Qty: 90 TABLET | Refills: 3 | Status: SHIPPED | OUTPATIENT
Start: 2018-02-20 | End: 2019-04-01 | Stop reason: SDUPTHER

## 2018-02-20 NOTE — TELEPHONE ENCOUNTER
----- Message from Colette Morataya DO sent at 2/20/2018  9:09 AM EST -----  Please notify the patient the x-ray of her thoracic spine showed mild S shaped scoliosis without any acute fractures    Chronic degenerative changes are seen throughout the thoracic spine

## 2018-02-21 ENCOUNTER — OFFICE VISIT (OUTPATIENT)
Dept: PHYSICAL THERAPY | Facility: REHABILITATION | Age: 83
End: 2018-02-21
Payer: MEDICARE

## 2018-02-21 ENCOUNTER — APPOINTMENT (OUTPATIENT)
Dept: PREADMISSION TESTING | Facility: HOSPITAL | Age: 83
End: 2018-02-21
Payer: MEDICARE

## 2018-02-21 ENCOUNTER — ANESTHESIA EVENT (OUTPATIENT)
Dept: PERIOP | Facility: HOSPITAL | Age: 83
End: 2018-02-21
Payer: MEDICARE

## 2018-02-21 DIAGNOSIS — R29.6 FREQUENT FALLS: Primary | ICD-10-CM

## 2018-02-21 DIAGNOSIS — M05.79 RHEUMATOID ARTHRITIS INVOLVING MULTIPLE SITES WITH POSITIVE RHEUMATOID FACTOR (HCC): ICD-10-CM

## 2018-02-21 PROCEDURE — G8980 MOBILITY D/C STATUS: HCPCS | Performed by: PHYSICAL THERAPIST

## 2018-02-21 PROCEDURE — G8979 MOBILITY GOAL STATUS: HCPCS | Performed by: PHYSICAL THERAPIST

## 2018-02-21 PROCEDURE — 97112 NEUROMUSCULAR REEDUCATION: CPT | Performed by: PHYSICAL THERAPIST

## 2018-02-21 PROCEDURE — 97110 THERAPEUTIC EXERCISES: CPT | Performed by: PHYSICAL THERAPIST

## 2018-02-21 RX ORDER — SODIUM CHLORIDE 9 MG/ML
125 INJECTION, SOLUTION INTRAVENOUS CONTINUOUS
Status: CANCELLED | OUTPATIENT
Start: 2018-02-28

## 2018-02-21 NOTE — PRE-PROCEDURE INSTRUCTIONS
Pre-Surgery Instructions:   Medication Instructions    Calcium Citrate-Vitamin D (CALCIUM + D PO) Patient was instructed by Physician and understands   Certolizumab Pegol (CIMZIA PREFILLED SC) Patient was instructed by Physician and understands   cyanocobalamin (VITAMIN B-12) 100 mcg tablet Patient was instructed by Physician and understands   Diclofenac Sodium 1 % CREA Patient was instructed by Physician and understands   furosemide (LASIX) 20 mg tablet Patient was instructed by Physician and understands   gabapentin (NEURONTIN) 100 mg capsule Patient was instructed by Physician and understands   LEUCOVORIN CALCIUM PO Patient was instructed by Physician and understands   levofloxacin (LEVAQUIN) 250 mg tablet Patient was instructed by Physician and understands   levothyroxine 75 mcg tablet Patient was instructed by Physician and understands   losartan-hydrochlorothiazide (HYZAAR) 100-12 5 MG per tablet Patient was instructed by Physician and understands   methotrexate (RHEUMATREX) 2 5 MG tablet Patient was instructed by Physician and understands   Multiple Vitamin (MULTIVITAMINS PO) Patient was instructed by Physician and understands   omeprazole (PriLOSEC) 20 mg delayed release capsule Patient was instructed by Physician and understands   pravastatin (PRAVACHOL) 80 mg tablet Patient was instructed by Physician and understands   rivaroxaban (XARELTO) 20 mg tablet Patient was instructed by Physician and understands   rOPINIRole (REQUIP) 0 5 mg tablet Patient was instructed by Physician and understands  Dr Fernanda Rodriguez saw the patient  Instructions were given to take her omeprazole, Levothyroxine, and gabapentin with a small sip of water the morning of surgery  No NASIDS, ASA, supplements or vitamins one week prior to surgery  Pre Op instructions given with bottle of chlorhexidine

## 2018-02-21 NOTE — ANESTHESIA PREPROCEDURE EVALUATION
Review of Systems/Medical History  Patient summary reviewed  Chart reviewed      Cardiovascular  Exercise tolerance: good,  Hyperlipidemia, Hypertension controlled,    Pulmonary  Pneumonia,        GI/Hepatic    GERD well controlled,  Hiatal hernia,             Endo/Other  Diabetes well controlled type 2 , History of thyroid disease , hypothyroidism,      GYN       Hematology  Anemia ,     Musculoskeletal  Rheumatoid arthritis Severity: moderate,   Arthritis     Neurology    Neuromuscular disease , CVA , no residual symptoms,    Psychology   Negative psychology ROS              Physical Exam    Airway    Mallampati score: II  TM Distance: <3 FB  Neck ROM: full     Dental       Cardiovascular  Rhythm: regular, Rate: normal,     Pulmonary  Breath sounds clear to auscultation,     Other Findings        Anesthesia Plan  ASA Score- 3     Anesthesia Type- general with ASA Monitors  Additional Monitors:   Airway Plan:         Plan Factors- Patient instructed to abstain from smoking on day of procedure  Patient did not smoke on day of surgery  Induction- intravenous  Postoperative Plan-     Informed Consent- Anesthetic plan and risks discussed with patient

## 2018-02-21 NOTE — PROGRESS NOTES
Daily Note     Today's date: 2018  Patient name: Chen Shore  : 1935  MRN: [id number]  Referring provider: Khadar Zhang PA-C  Dx:   Encounter Diagnosis     ICD-10-CM    1  Frequent falls R29 6    2  Rheumatoid arthritis involving multiple sites with positive rheumatoid factor ([facility] [address] ) M05 [id number]        Start Time: 1615  Stop Time: 1710  Total time in clinic (min): 55 minutes    Subjective: Patient reports no significant changes to overall status since previous treatment session  Objective: See treatment diary below      Assessment: Tolerated treatment well  Patient demonstrated fatigue post treatment and would benefit from continued PT  Patient demonstrated improved static and dynamic balance, but does continue to require CGA to Min A to prevent LOB and subsequent fall  Plan: Continue per plan of care  Progress treatment as tolerated        Precautions: h/o stroke, HTN, DM    Daily Treatment Diary     Manual                                                                                Exercise Diary           Recumbent bike  4' 6' 6'         VG  4'  7'         Squats  2x10 2x10          FSU  2" foam  2x10 1x10"  2" foam 1x15 foam b/l         Tandem stance with EC  4x30" 4x30" 4x30" EO         Balance: feet together EO on Airex  3x30" 4x30" 4x30"         Balance: feet together EC    4x30"         Multidirectional stepping  x8 B/L x8 B/L x10 b/l         Stool taps             2-way SLR with TB in standing             Standing hip abduction + flexion   2x10 B/L - resistance nv 2x10 OTB (abd ), 2# (flex)                                                                                                                                  Modalities

## 2018-02-26 ENCOUNTER — APPOINTMENT (OUTPATIENT)
Dept: PHYSICAL THERAPY | Facility: REHABILITATION | Age: 83
End: 2018-02-26
Payer: MEDICARE

## 2018-02-27 ENCOUNTER — PROCEDURE VISIT (OUTPATIENT)
Dept: UROLOGY | Facility: MEDICAL CENTER | Age: 83
End: 2018-02-27
Payer: MEDICARE

## 2018-02-27 DIAGNOSIS — R35.0 URINARY FREQUENCY: ICD-10-CM

## 2018-02-27 DIAGNOSIS — N39.41 SENSORY URGE INCONTINENCE: ICD-10-CM

## 2018-02-27 DIAGNOSIS — R39.15 URGENCY OF URINATION: ICD-10-CM

## 2018-02-27 NOTE — PROGRESS NOTES
Procedure: Percutaneous Tibial Nerve Stimulation (PTNS)   Date onset of symptoms A LONG TIME AGO  Behavior modification: NO  Biofeedback: NO  E-Stim: NO  Drugs: NUMEROUS ANTICHOLINERGICS DIDN'T WORK    Other: NO  Session number: ONE MONTH F/U     Patient Goals and Progress   Decreased urgency: YES  Decreased frequency: YES  No accidents: NO  Sleeps through the night: YES   No related health and social factors: NO      Caffeine - cups per day: 0  Alcohol - number of drinks per day: 0  Daytime voids - number per day: 7-12   Nighttime voids - number per night: 1  Urgency: 1-2  Incontinence - episodes per day: 1-2     Treatment Plan   Increase fluids: NO  Decrease fluids:   YES  Decrease caffeine:   Urge reduction techniques: YES  Kegels: NO  Toilet every 3-4 hours     No fluids before bed YES     Ankle Used: LEFT     Treatment settin  Duration of treatment: 30 MINUTES  Lead lot #: [id number]  Expiration Date: 2020       Feeling/Response:  Toe flex and foot sensation

## 2018-02-28 ENCOUNTER — ANESTHESIA (OUTPATIENT)
Dept: PERIOP | Facility: HOSPITAL | Age: 83
End: 2018-02-28
Payer: MEDICARE

## 2018-02-28 ENCOUNTER — HOSPITAL ENCOUNTER (OUTPATIENT)
Facility: HOSPITAL | Age: 83
Setting detail: OUTPATIENT SURGERY
Discharge: HOME | End: 2018-02-28
Attending: UROLOGY | Admitting: UROLOGY
Payer: MEDICARE

## 2018-02-28 ENCOUNTER — APPOINTMENT (OUTPATIENT)
Dept: PHYSICAL THERAPY | Facility: REHABILITATION | Age: 83
End: 2018-02-28
Payer: MEDICARE

## 2018-02-28 ENCOUNTER — HOSPITAL ENCOUNTER (OUTPATIENT)
Dept: RADIOLOGY | Facility: HOSPITAL | Age: 83
Setting detail: OUTPATIENT SURGERY
Discharge: HOME | End: 2018-02-28
Payer: MEDICARE

## 2018-02-28 VITALS
OXYGEN SATURATION: 97 % | TEMPERATURE: 97.7 F | SYSTOLIC BLOOD PRESSURE: 163 MMHG | RESPIRATION RATE: 20 BRPM | HEART RATE: 73 BPM | BODY MASS INDEX: 40.22 KG/M2 | HEIGHT: 61 IN | DIASTOLIC BLOOD PRESSURE: 74 MMHG | WEIGHT: 213 LBS

## 2018-02-28 DIAGNOSIS — R31.0 GROSS HEMATURIA: ICD-10-CM

## 2018-02-28 DIAGNOSIS — D49.519 RENAL NEOPLASM: ICD-10-CM

## 2018-02-28 PROCEDURE — 64566 NEUROELTRD STIM POST TIBIAL: CPT

## 2018-02-28 PROCEDURE — 88112 CYTOPATH CELL ENHANCE TECH: CPT | Performed by: UROLOGY

## 2018-02-28 PROCEDURE — C2625 STENT, NON-COR, TEM W/DEL SY: HCPCS | Performed by: UROLOGY

## 2018-02-28 PROCEDURE — 52354 CYSTOURETERO W/BIOPSY: CPT | Performed by: UROLOGY

## 2018-02-28 PROCEDURE — 88112 CYTOPATH CELL ENHANCE TECH: CPT | Performed by: PATHOLOGY

## 2018-02-28 PROCEDURE — 52332 CYSTOSCOPY AND TREATMENT: CPT | Performed by: UROLOGY

## 2018-02-28 PROCEDURE — C1758 CATHETER, URETERAL: HCPCS | Performed by: UROLOGY

## 2018-02-28 PROCEDURE — C1769 GUIDE WIRE: HCPCS | Performed by: UROLOGY

## 2018-02-28 PROCEDURE — 74450 X-RAY URETHRA/BLADDER: CPT

## 2018-02-28 PROCEDURE — C1894 INTRO/SHEATH, NON-LASER: HCPCS | Performed by: UROLOGY

## 2018-02-28 DEVICE — STENT KWART RETRO INJECT SET 6FR 145CM: Type: IMPLANTABLE DEVICE | Site: URETER | Status: FUNCTIONAL

## 2018-02-28 RX ORDER — OXYBUTYNIN CHLORIDE 5 MG/1
10 TABLET, EXTENDED RELEASE ORAL DAILY
Status: DISCONTINUED | OUTPATIENT
Start: 2018-02-28 | End: 2018-02-28 | Stop reason: HOSPADM

## 2018-02-28 RX ORDER — LEVOFLOXACIN 250 MG/1
250 TABLET ORAL EVERY 24 HOURS
Qty: 3 TABLET | Refills: 0 | Status: SHIPPED | OUTPATIENT
Start: 2018-02-28 | End: 2018-03-03 | Stop reason: HOSPADM

## 2018-02-28 RX ORDER — HYDROCODONE BITARTRATE AND ACETAMINOPHEN 5; 325 MG/1; MG/1
1-2 TABLET ORAL EVERY 6 HOURS PRN
Qty: 20 TABLET | Refills: 0 | Status: SHIPPED | OUTPATIENT
Start: 2018-02-28 | End: 2018-03-10

## 2018-02-28 RX ORDER — FENTANYL CITRATE/PF 50 MCG/ML
25 SYRINGE (ML) INJECTION
Status: DISCONTINUED | OUTPATIENT
Start: 2018-02-28 | End: 2018-02-28 | Stop reason: HOSPADM

## 2018-02-28 RX ORDER — LIDOCAINE HYDROCHLORIDE 10 MG/ML
INJECTION, SOLUTION INFILTRATION; PERINEURAL AS NEEDED
Status: DISCONTINUED | OUTPATIENT
Start: 2018-02-28 | End: 2018-02-28 | Stop reason: SURG

## 2018-02-28 RX ORDER — OXYBUTYNIN CHLORIDE 10 MG/1
10 TABLET, EXTENDED RELEASE ORAL
Qty: 3 TABLET | Refills: 0 | Status: SHIPPED | OUTPATIENT
Start: 2018-02-28 | End: 2018-04-10

## 2018-02-28 RX ORDER — LEVOFLOXACIN 5 MG/ML
750 INJECTION, SOLUTION INTRAVENOUS ONCE
Status: COMPLETED | OUTPATIENT
Start: 2018-02-28 | End: 2018-02-28

## 2018-02-28 RX ORDER — ONDANSETRON 2 MG/ML
INJECTION INTRAMUSCULAR; INTRAVENOUS AS NEEDED
Status: DISCONTINUED | OUTPATIENT
Start: 2018-02-28 | End: 2018-02-28 | Stop reason: SURG

## 2018-02-28 RX ORDER — PROPOFOL 10 MG/ML
INJECTION, EMULSION INTRAVENOUS AS NEEDED
Status: DISCONTINUED | OUTPATIENT
Start: 2018-02-28 | End: 2018-02-28 | Stop reason: SURG

## 2018-02-28 RX ORDER — SODIUM CHLORIDE 9 MG/ML
125 INJECTION, SOLUTION INTRAVENOUS CONTINUOUS
Status: DISCONTINUED | OUTPATIENT
Start: 2018-02-28 | End: 2018-02-28 | Stop reason: HOSPADM

## 2018-02-28 RX ORDER — ONDANSETRON 2 MG/ML
4 INJECTION INTRAMUSCULAR; INTRAVENOUS ONCE AS NEEDED
Status: DISCONTINUED | OUTPATIENT
Start: 2018-02-28 | End: 2018-02-28 | Stop reason: HOSPADM

## 2018-02-28 RX ORDER — MEPERIDINE HYDROCHLORIDE 50 MG/ML
12.5 INJECTION INTRAMUSCULAR; INTRAVENOUS; SUBCUTANEOUS
Status: DISCONTINUED | OUTPATIENT
Start: 2018-02-28 | End: 2018-02-28 | Stop reason: HOSPADM

## 2018-02-28 RX ORDER — HYDROCODONE BITARTRATE AND ACETAMINOPHEN 5; 325 MG/1; MG/1
1 TABLET ORAL EVERY 6 HOURS PRN
Status: DISCONTINUED | OUTPATIENT
Start: 2018-02-28 | End: 2018-02-28 | Stop reason: HOSPADM

## 2018-02-28 RX ORDER — MAGNESIUM HYDROXIDE 1200 MG/15ML
LIQUID ORAL AS NEEDED
Status: DISCONTINUED | OUTPATIENT
Start: 2018-02-28 | End: 2018-02-28 | Stop reason: HOSPADM

## 2018-02-28 RX ADMIN — LIDOCAINE HYDROCHLORIDE 50 MG: 10 INJECTION, SOLUTION INFILTRATION; PERINEURAL at 10:06

## 2018-02-28 RX ADMIN — LEVOFLOXACIN: 5 INJECTION, SOLUTION INTRAVENOUS at 09:52

## 2018-02-28 RX ADMIN — OXYBUTYNIN CHLORIDE 10 MG: 5 TABLET, FILM COATED, EXTENDED RELEASE ORAL at 12:22

## 2018-02-28 RX ADMIN — SODIUM CHLORIDE 125 ML/HR: 0.9 INJECTION, SOLUTION INTRAVENOUS at 08:47

## 2018-02-28 RX ADMIN — ONDANSETRON HYDROCHLORIDE 4 MG: 2 INJECTION, SOLUTION INTRAVENOUS at 10:09

## 2018-02-28 RX ADMIN — DEXAMETHASONE SODIUM PHOSPHATE 4 MG: 10 INJECTION INTRAMUSCULAR; INTRAVENOUS at 10:09

## 2018-02-28 RX ADMIN — PROPOFOL 150 MG: 10 INJECTION, EMULSION INTRAVENOUS at 10:06

## 2018-02-28 NOTE — H&P (VIEW-ONLY)
H&P Exam - Urology   Deedee Jackson 81 y o  female MRN: [id number]  Unit/Bed#:  Encounter: [id number]    Assessment/Plan     Assessment:  Right renal collecting system nodule with gross hematuria   Plan:  Cystoscopy, right retrograde pyelography, right ureteroscopy with possible biopsy if something is found, right ureteral stent placement  The risks of bleeding infection damage to the urinary tract have been explained  She will stay on her Xarelto  History of Present Illness   HPI:  Deedee Jackson is a 81 y o  female who presents with gross and microscopic hematuria and ultrasound and CT scan show an abnormality of the right renal pelvis  This appears to be a nodule and needs to be ruled out for transitional cell carcinoma  It may simply be inflammatory  She does have a history of recurrent urinary tract infections       Review of Systems no recent chest pain shortness of breath      Historical Information   Past Medical History:   Diagnosis Date    Anemia     Arthritis     rheumatoid    Benign essential hypertension     Cataract     Chronic cystitis     Colon, diverticulosis     Diabetes mellitus ([address] )     Diverticulitis of colon     Early satiety     GERD (gastroesophageal reflux disease)     Gout     Hearing loss     Hemorrhoids     Hiatal hernia     History of colonic polyps     Hyperlipidemia     Hypertension     Hypertension     Hypothyroidism     Impaired fasting glucose     Left breast mass     Microhematuria     Overactive bladder     Pneumonia of left lower lobe due to infectious organism (HCC)     Rheumatoid arthritis (HCC)     Sleep apnea     uses cpap    Stroke [address] [address]     Type 2 diabetes mellitus (HCC)     Urinary frequency     Urinary urgency      Past Surgical History:   Procedure Laterality Date    APPENDECTOMY      ARTHROSCOPY WRIST Right     with release of transverse carpal ligament     BLADDER SURGERY      BLADDER SURGERY      BUNIONECTOMY Bilateral  CHOLECYSTECTOMY      COLONOSCOPY      CYSTOSCOPY      EGD AND COLONOSCOPY N/A 12/20/2017    Procedure: EGD AND COLONOSCOPY;  Surgeon: Bunny Anton MD;  [facility]: [facility]; Service: Gastroenterology    ESOPHAGOGASTRODUODENOSCOPY      diagnostic    FOREARM SURGERY Right     fracture repair    HYSTERECTOMY      KNEE SURGERY Left     meniscus tear    NOSE SURGERY      REPLACEMENT TOTAL KNEE BILATERAL Bilateral      Social History   History   Alcohol Use    Yes     Comment: (history), social drinker     History   Drug Use No     History   Smoking Status    Former Smoker   Smokeless Tobacco    Former User     Comment: stopped smoking in the distant past, never smoker (as per [employer])      Family History: non-contributory    Meds/Allergies   PTA meds:   Cannot display prior to admission medications because the patient has not been admitted in this contact  Allergies   Allergen Reactions    Acetazolamide Other (See Comments)     Other reaction(s): Unknown Allergic Reaction    Aspirin      Other reaction(s): Other (See Comments)  High Dose ASA-stomach ache, rachel  ASA 81 m    Atorvastatin      Other reaction(s): Muscle Pain, Myalgia    Azithromycin     Nitrofurantoin      Other reaction(s): Unknown Allergic Reaction  Other reaction(s): Other (See Comments)  HIVES * pt denies    Other Other (See Comments)     red and itching  Other reaction(s): Other (See Comments)  red and itching    Penicillins Other (See Comments)     Other reaction(s): Other (See Comments)  HIVES; tolerates Ancef  HIVES; tolerates Ancef    Propoxyphene Other (See Comments)     Other reaction(s): Unknown Allergic Reaction    Shellfish-Derived Products     Sulfa Antibiotics Other (See Comments)    Tramadol        Objective   Vitals: Blood pressure 142/82, height 5' 1" (1 549 m), weight 97 5 kg (215 lb)      [unfilled]    Invasive Devices     Peripheral Intravenous Line            Peripheral IV 12/20/17 Right Arm 50 days Physical Exam    Lab Results: I have personally reviewed pertinent reports  CBC: No results found for: WBC, HGB, HCT, MCV, PLT, ADJUSTEDWBC, MCH, MCHC, RDW, MPV, NRBC  CMP: No results found for: NA, CL, CO2, ANIONGAP, BUN, CREATININE, GLUCOSE, CALCIUM, AST, ALT, ALKPHOS, PROT, ALBUMIN, BILITOT, EGFR  Imaging: I have personally reviewed pertinent reports  and I have personally reviewed pertinent films in PACS  EKG, Pathology, and Other Studies: I have personally reviewed pertinent reports      VTE Prophylaxis: Sequential compression device [place])     Code Status:  Full  Advance Directive and Living Will: Yes    Power of :    POLST:

## 2018-02-28 NOTE — DISCHARGE INSTRUCTIONS
Expect to see blood in the urine, and to experience urgency/frequency/burning with urination and dribbling  Call for fever greater that 101 5, inability to urinate, or severe pain not relieved by pain meds  Keep stent string taped- if it becomes dislodged or gets pulled out early, that is OK- simply remove it completely by pulling on string until it is completely out  No driving/operating machinery for 24 hours, and while taking narcotics  Take over the counter remedy of choice to avoid constipation  Drink plenty of fluids  A suspicious lesion was found in your right kidney  This looks like it is malignant, and I am awaiting the final cytology reports, but further surgery will need to be planned when I see you next week

## 2018-02-28 NOTE — ANESTHESIA POSTPROCEDURE EVALUATION
Post-Op Assessment Note      CV Status:  Stable    Mental Status:  Alert and awake    Hydration Status:  Euvolemic    PONV Controlled:  Controlled    Airway Patency:  Patent    Post Op Vitals Reviewed: Yes          Staff: Anesthesiologist           /61 (02/28/18 1110)    Temp 97 7 °F (36 5 °C) (02/28/18 1110)    Pulse 76 (02/28/18 1110)   Resp 16 (02/28/18 1110)    SpO2 96 % (02/28/18 1110)

## 2018-02-28 NOTE — OP NOTE
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     * Gross hematuria [R31 0]    Procedure(s) (LRB):  CYSTOSCOPY RIGHT URETEROSCOPY, RIGHT RETROGRADE PYELOGRAM AND INSERTION  RIGHT STENT URETERAL, RIGHT RENAL PELVIC WASHING FOR CYTOLOGY (Right)    Specimen(s):  Cytology/right renal pelvic washings    Estimated Blood Loss:   Minimal    Drains:  None       Anesthesia Type:   General    Operative Indications:  Renal neoplasm [D49 519]  Gross hematuria [R31 0]      Operative Findings:  Papillary lesions, diffuse of right renal pelvis and proximal right ureter  Complications:   None    Procedure and Technique:  The patient is an 50-year-old lady with gross hematuria and workup for this reveals thickening of the right renal pelvis consistent with possible urothelial neoplasm  I have set her up for cystoscopy right ureteroscopy right retrograde pyelography to work this up  The risks of bleeding infection damage to the urinary tract were explained to the patient gives informed consent  Patient was brought to the operating room and identified properly  LMA was induced patient was prepped and draped in the dorsal lithotomy position in the usual fashion  A time-out was performed and cystoscopy was carried out with a 22 Welsh cystoscopy sheath and 30 degree lens  Patient is a 30 degree cystocele  Her urethra was otherwise normal and she has mild cystitis cystica bladder  The orifices were intact and were somewhat deviated laterally due to the cystocele  I localized the right ureteral orifice placed an open-ended catheter and shot a retrograde 50% Conray    The ureter was normal and I saw no filling defects in the right pelvis  These images were saved  I then placed a wire up the right ureter up into the renal pelvis fluoroscopically and then placed a dual-lumen catheter to place a second wire up into the right renal pelvis  I then placed the ureteral access sheath and then placed a flexible ureteral scope up into the proximal ureter and right renal pelvis  I saw papillary lesions that appeared to be superficial of the proximal right ureter and pelvis and they were diffuse  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  I did not have a biopsy forceps capable of actually grabbing the specimen, so I took a 10 cc syringe and aspirated washings directly over the lesions and sent to cytology  I then withdrew the scope and placed a 6 Kazakh double-J stent over wire and coils were established in the renal pelvis in the bladder  Bladder was drained with the cystoscopy sheath and the stent string was externalized to the mons pubis and taped     I was present for the entire procedure and A qualified resident physician was not available    Patient Disposition:  PACU  and extubated and stable    SIGNATURE: Jimmy Hennessy MD  DATE: February 28, 2018  TIME: 10:39 AM

## 2018-03-01 NOTE — PROGRESS NOTES
Andres Mariano has attended a total of 3 and 4 physical therapy appointments to date  Patient was last treated on 2/21/18 and has no remaining appointments scheduled  Goals, objective and subjective information unable to be updated at this time  Patient will be discharged from physical therapy secondary to undergoing surgery

## 2018-03-02 ENCOUNTER — HOSPITAL ENCOUNTER (OUTPATIENT)
Facility: HOSPITAL | Age: 83
Setting detail: OBSERVATION
Discharge: HOME | End: 2018-03-03
Attending: EMERGENCY MEDICINE | Admitting: INTERNAL MEDICINE
Payer: MEDICARE

## 2018-03-02 ENCOUNTER — TELEPHONE (OUTPATIENT)
Dept: UROLOGY | Facility: MEDICAL CENTER | Age: 83
End: 2018-03-02

## 2018-03-02 DIAGNOSIS — R31.0 GROSS HEMATURIA: Primary | ICD-10-CM

## 2018-03-02 DIAGNOSIS — R31.9 HEMATURIA, UNSPECIFIED TYPE: ICD-10-CM

## 2018-03-02 DIAGNOSIS — D49.519 RENAL NEOPLASM: ICD-10-CM

## 2018-03-02 PROBLEM — R33.8 ACUTE URINARY RETENTION: Status: ACTIVE | Noted: 2018-03-02

## 2018-03-02 PROBLEM — R91.1 LUNG NODULE < 6CM ON CT: Status: ACTIVE | Noted: 2018-03-02

## 2018-03-02 PROBLEM — C68.9 UROTHELIAL CANCER (HCC): Status: ACTIVE | Noted: 2018-03-02

## 2018-03-02 LAB
ANION GAP SERPL CALCULATED.3IONS-SCNC: 6 MMOL/L (ref 4–13)
APTT PPP: 41 SECONDS (ref 23–35)
BASOPHILS # BLD AUTO: 0.01 THOUSANDS/ΜL (ref 0–0.1)
BASOPHILS NFR BLD AUTO: 0 % (ref 0–1)
BUN SERPL-MCNC: 27 MG/DL (ref 5–25)
CALCIUM SERPL-MCNC: 8.9 MG/DL (ref 8.3–10.1)
CHLORIDE SERPL-SCNC: 106 MMOL/L (ref 100–108)
CO2 SERPL-SCNC: 30 MMOL/L (ref 21–32)
CREAT SERPL-MCNC: 1.07 MG/DL (ref 0.6–1.3)
EOSINOPHIL # BLD AUTO: 0.1 THOUSAND/ΜL (ref 0–0.61)
EOSINOPHIL NFR BLD AUTO: 1 % (ref 0–6)
ERYTHROCYTE [DISTWIDTH] IN BLOOD BY AUTOMATED COUNT: 15.3 % (ref 11.6–15.1)
GFR SERPL CREATININE-BSD FRML MDRD: 49 ML/MIN/1.73SQ M
GLUCOSE SERPL-MCNC: 109 MG/DL (ref 65–140)
HCT VFR BLD AUTO: 27.2 % (ref 34.8–46.1)
HCT VFR BLD AUTO: 29.2 % (ref 34.8–46.1)
HGB BLD-MCNC: 8.9 G/DL (ref 11.5–15.4)
HGB BLD-MCNC: 9.7 G/DL (ref 11.5–15.4)
INR PPP: 2.41 (ref 0.86–1.16)
LYMPHOCYTES # BLD AUTO: 2.4 THOUSANDS/ΜL (ref 0.6–4.47)
LYMPHOCYTES NFR BLD AUTO: 21 % (ref 14–44)
MCH RBC QN AUTO: 31.7 PG (ref 26.8–34.3)
MCHC RBC AUTO-ENTMCNC: 32.7 G/DL (ref 31.4–37.4)
MCV RBC AUTO: 97 FL (ref 82–98)
MONOCYTES # BLD AUTO: 1.35 THOUSAND/ΜL (ref 0.17–1.22)
MONOCYTES NFR BLD AUTO: 12 % (ref 4–12)
NEUTROPHILS # BLD AUTO: 7.32 THOUSANDS/ΜL (ref 1.85–7.62)
NEUTS SEG NFR BLD AUTO: 66 % (ref 43–75)
NRBC BLD AUTO-RTO: 0 /100 WBCS
PLATELET # BLD AUTO: 178 THOUSANDS/UL (ref 149–390)
PMV BLD AUTO: 9.4 FL (ref 8.9–12.7)
POTASSIUM SERPL-SCNC: 3.8 MMOL/L (ref 3.5–5.3)
PROTHROMBIN TIME: 26.5 SECONDS (ref 12.1–14.4)
RBC # BLD AUTO: 2.81 MILLION/UL (ref 3.81–5.12)
SODIUM SERPL-SCNC: 142 MMOL/L (ref 136–145)
WBC # BLD AUTO: 11.23 THOUSAND/UL (ref 4.31–10.16)

## 2018-03-02 PROCEDURE — 99220 PR INITIAL OBSERVATION CARE/DAY 70 MINUTES: CPT | Performed by: INTERNAL MEDICINE

## 2018-03-02 PROCEDURE — 80048 BASIC METABOLIC PNL TOTAL CA: CPT | Performed by: EMERGENCY MEDICINE

## 2018-03-02 PROCEDURE — 85014 HEMATOCRIT: CPT | Performed by: INTERNAL MEDICINE

## 2018-03-02 PROCEDURE — 51798 US URINE CAPACITY MEASURE: CPT

## 2018-03-02 PROCEDURE — 99285 EMERGENCY DEPT VISIT HI MDM: CPT

## 2018-03-02 PROCEDURE — 99214 OFFICE O/P EST MOD 30 MIN: CPT | Performed by: NURSE PRACTITIONER

## 2018-03-02 PROCEDURE — 85730 THROMBOPLASTIN TIME PARTIAL: CPT | Performed by: EMERGENCY MEDICINE

## 2018-03-02 PROCEDURE — 36415 COLL VENOUS BLD VENIPUNCTURE: CPT | Performed by: EMERGENCY MEDICINE

## 2018-03-02 PROCEDURE — 85025 COMPLETE CBC W/AUTO DIFF WBC: CPT | Performed by: EMERGENCY MEDICINE

## 2018-03-02 PROCEDURE — 85610 PROTHROMBIN TIME: CPT | Performed by: EMERGENCY MEDICINE

## 2018-03-02 PROCEDURE — 85018 HEMOGLOBIN: CPT | Performed by: INTERNAL MEDICINE

## 2018-03-02 RX ORDER — HYDROCODONE BITARTRATE AND ACETAMINOPHEN 5; 325 MG/1; MG/1
1 TABLET ORAL EVERY 6 HOURS PRN
Status: DISCONTINUED | OUTPATIENT
Start: 2018-03-02 | End: 2018-03-03 | Stop reason: HOSPADM

## 2018-03-02 RX ORDER — LEVOTHYROXINE SODIUM 0.07 MG/1
75 TABLET ORAL DAILY
Status: DISCONTINUED | OUTPATIENT
Start: 2018-03-03 | End: 2018-03-03 | Stop reason: HOSPADM

## 2018-03-02 RX ORDER — PRAVASTATIN SODIUM 80 MG/1
80 TABLET ORAL
Status: DISCONTINUED | OUTPATIENT
Start: 2018-03-02 | End: 2018-03-03 | Stop reason: HOSPADM

## 2018-03-02 RX ORDER — GABAPENTIN 100 MG/1
100 CAPSULE ORAL 3 TIMES DAILY
Status: DISCONTINUED | OUTPATIENT
Start: 2018-03-02 | End: 2018-03-03 | Stop reason: HOSPADM

## 2018-03-02 RX ORDER — OXYBUTYNIN CHLORIDE 5 MG/1
10 TABLET, EXTENDED RELEASE ORAL
Status: DISCONTINUED | OUTPATIENT
Start: 2018-03-02 | End: 2018-03-03 | Stop reason: HOSPADM

## 2018-03-02 RX ORDER — LEVOFLOXACIN 250 MG/1
250 TABLET ORAL EVERY 24 HOURS
Status: COMPLETED | OUTPATIENT
Start: 2018-03-02 | End: 2018-03-02

## 2018-03-02 RX ORDER — FUROSEMIDE 20 MG/1
20 TABLET ORAL DAILY
Status: DISCONTINUED | OUTPATIENT
Start: 2018-03-02 | End: 2018-03-03 | Stop reason: HOSPADM

## 2018-03-02 RX ORDER — PANTOPRAZOLE SODIUM 40 MG/1
40 TABLET, DELAYED RELEASE ORAL
Status: DISCONTINUED | OUTPATIENT
Start: 2018-03-03 | End: 2018-03-03 | Stop reason: HOSPADM

## 2018-03-02 RX ORDER — ROPINIROLE 0.25 MG/1
0.5 TABLET, FILM COATED ORAL 3 TIMES DAILY
Status: DISCONTINUED | OUTPATIENT
Start: 2018-03-02 | End: 2018-03-03 | Stop reason: HOSPADM

## 2018-03-02 RX ADMIN — ROPINIROLE 0.5 MG: 0.25 TABLET, FILM COATED ORAL at 16:56

## 2018-03-02 RX ADMIN — LOSARTAN POTASSIUM: 50 TABLET, FILM COATED ORAL at 15:35

## 2018-03-02 RX ADMIN — ROPINIROLE 0.5 MG: 0.25 TABLET, FILM COATED ORAL at 21:35

## 2018-03-02 RX ADMIN — GABAPENTIN 100 MG: 100 CAPSULE ORAL at 21:35

## 2018-03-02 RX ADMIN — LEVOFLOXACIN 250 MG: 250 TABLET, FILM COATED ORAL at 16:56

## 2018-03-02 RX ADMIN — GABAPENTIN 100 MG: 100 CAPSULE ORAL at 15:26

## 2018-03-02 RX ADMIN — PRAVASTATIN SODIUM 80 MG: 80 TABLET ORAL at 18:44

## 2018-03-02 RX ADMIN — FUROSEMIDE 20 MG: 20 TABLET ORAL at 15:26

## 2018-03-02 RX ADMIN — OXYBUTYNIN CHLORIDE 10 MG: 5 TABLET, FILM COATED, EXTENDED RELEASE ORAL at 21:35

## 2018-03-02 NOTE — ED NOTES
Patient bladder scanned post void with a maximum residual amount found of 92mL     Olayinka Gabriel RN  03/02/18 0727

## 2018-03-02 NOTE — ASSESSMENT & PLAN NOTE
Most likely due to hematuria and clots and also probably due to migration of the ureteric stent  Patient now urinating without any problems  She also feels that the pressure is relieved now  Will put the patient on urinary retention protocol  Monitor

## 2018-03-02 NOTE — ASSESSMENT & PLAN NOTE
In setting of recent urological procedure - right ureteral stent and being on Xarelto  Stent was removed in the Er  Hemoglobin dropped from 10-8  9  Evaluated by Neurology in the ER  No further urological procedures at this point  Continue to monitor  Stop Xarelto for now  Continue levofloxacin which  patient was supposed to be on for 1 more day following the procedure

## 2018-03-02 NOTE — H&P
H&P- [facility] 1935, 81 y o  female MRN: [id number]    Unit/Bed#: [facility] 038-84 Encounter: [id number]    Primary Care Provider: Michael Haskins MD   Date and time admitted to hospital: 3/2/2018  6:12 AM        * Hematuria   Assessment & Plan    In setting of recent urological procedure - right ureteral stent and being on Xarelto  Stent was removed in the Er  Hemoglobin dropped from 10-8  9  Evaluated by Neurology in the ER  No further urological procedures at this point  Continue to monitor  Stop Xarelto for now  Continue levofloxacin which  patient was supposed to be on for 1 more day following the procedure  Acute urinary retention   Assessment & Plan    Most likely due to hematuria and clots and also probably due to migration of the ureteric stent  Patient now urinating without any problems  She also feels that the pressure is relieved now  Will put the patient on urinary retention protocol  Monitor  Acute blood loss anemia   Assessment & Plan    Hemoglobin dropped from 10 yesterday to 8 9  Probably due to hematuria  Continue to monitor  Repeat hemoglobin at 1800  Transfuse for hemoglobin less than 7  Lung nodule < 6cm on CT   Assessment & Plan    Right lower lobe seen on the CT  Repeat CT in 12 months  Urothelial cancer [facility])   Assessment & Plan    Following up with Urology  Also seen on the CT done recently  Outpatient follow-up after discharge  CVA (cerebral vascular accident) [facility])   Assessment & Plan    Patient does not have any residual weakness  Had stroke 1 year ago  Currently on Xarelto for that  Will stop Xarelto for now  Resume once cleared by Urology  VTE Prophylaxis: Pharmacologic VTE Prophylaxis contraindicated due to Hematuria  / sequential compression device   Code Status: FUll  POLST: POLST form is not discussed and not completed at this time    Discussion with family: patient    Anticipated Length of Stay: Patient will be admitted on an Observation basis with an anticipated length of stay of  < 2 midnights  Justification for Hospital Stay: above    Total Time for Visit, including Counseling / Coordination of Care: 45 minutes  Greater than 50% of this total time spent on direct patient counseling and coordination of care  Chief Complaint:   Hematuria for last 3 days which worsened this morning  History of Present Illness:    Brad Boyce is a 81 y o  female who presents with hematuria which she has had for last 3 days since she had right ureteric stent placed by Dr Judith Amos  She is on Xarelto for the history of stroke  She was advised that she might have some bleeding after the procedure  This morning patient noticed a lot of bleeding with clots and she was also having some pressure sensation in her lower abdomen  So she decided to come to the Er  She has known history of recurrent UTI and refractory longstanding hematuria status post cystoscopy, right ureteroscopy, right retrograde pyelography and insertion of right ureteral stent with right renal pelvic washing for cytology secondary to high suspicion of urothelial neoplasm involving the right renal pelvis  In the ER she was noted to have or bleeding with protruding ureteral stent  This was removed by Urology  After removal of the stent patient's lower abdominal pressure improved  She is still having hematuria but as per the patient it appears that it has slowed down  Also she denies any trouble urinating now  Review of Systems:    Review of Systems   Constitutional: Negative for activity change, appetite change and fever  HENT: Negative for congestion and sore throat  Eyes: Negative for photophobia and visual disturbance  Respiratory: Negative for cough and shortness of breath  Cardiovascular: Negative for chest pain and palpitations  Gastrointestinal: Positive for abdominal pain  Negative for diarrhea, nausea and vomiting  Endocrine: Negative for polyuria  Genitourinary: Positive for difficulty urinating, dysuria and hematuria  Negative for flank pain and frequency  Musculoskeletal: Negative for arthralgias and myalgias  Skin: Negative for rash  Neurological: Negative for dizziness, weakness and light-headedness  Psychiatric/Behavioral: Negative for agitation and behavioral problems  Past Medical and Surgical History:     Past Medical History:   Diagnosis Date    Anemia     Arthritis     rheumatoid    Benign essential hypertension     Cataract     Chronic cystitis     Colon, diverticulosis     CPAP (continuous positive airway pressure) dependence     Diabetes mellitus (HCC)     Diverticulitis of colon     Early satiety     GERD (gastroesophageal reflux disease)     Gout     Hearing loss     Hemorrhoids     Hiatal hernia     History of colonic polyps     Hyperlipidemia     Hypertension     Hypertension     Hypothyroidism     Impaired fasting glucose     Left breast mass     Microhematuria     Migraine     occular    Neuropathy     lower and upper extermities    Overactive bladder     Pneumonia of left lower lobe due to infectious organism (HCC)     RA (rheumatoid arthritis) (HCC)     Rheumatoid arthritis (HCC)     Sleep apnea     uses cpap    Stroke [address] [address]     Type 2 diabetes mellitus (HCC)     Urinary frequency     Urinary urgency     Use of cane as ambulatory aid        Past Surgical History:   Procedure Laterality Date    APPENDECTOMY      ARTHROSCOPY WRIST Right     with release of transverse carpal ligament     BLADDER SURGERY      BLADDER SURGERY      BREAST SURGERY      left breast    BUNIONECTOMY Bilateral     CATARACT EXTRACTION      CHOLECYSTECTOMY      COLONOSCOPY      CYSTOSCOPY      EGD AND COLONOSCOPY N/A 12/20/2017    Procedure: EGD AND COLONOSCOPY;  Surgeon: Rakesh Rust MD;  [facility];   Service: Gastroenterology    ESOPHAGOGASTRODUODENOSCOPY diagnostic    FOREARM SURGERY Right     fracture repair    HYSTERECTOMY      KNEE SURGERY Left     meniscus tear    NOSE SURGERY      FL CYSTO/URETERO W/LITHOTRIPSY &INDWELL STENT INSRT Right 2/28/2018    Procedure: CYSTOSCOPY RIGHT URETEROSCOPY, RIGHT RETROGRADE PYELOGRAM AND INSERTION  RIGHT STENT URETERAL, RIGHT RENAL PELVIC WASHING FOR CYTOLOGY;  Surgeon: Darryl Carrillo MD;  [facility];  Service: Urology    REPLACEMENT TOTAL KNEE BILATERAL Bilateral        Meds/Allergies:    Prior to Admission medications    Medication Sig Start Date End Date Taking? Authorizing Provider   Calcium Citrate-Vitamin D (CALCIUM + D PO) Take 1 tablet by mouth 2 (two) times a day   Yes Historical Provider, MD   Certolizumab Pegol (CIMZIA PREFILLED SC) Inject under the skin every 30 (thirty) days     Yes Historical Provider, MD   cyanocobalamin (VITAMIN B-12) 100 mcg tablet Take by mouth   Yes Historical Provider, MD   furosemide (LASIX) 20 mg tablet Take 20 mg by mouth daily  Yes Historical Provider, MD   gabapentin (NEURONTIN) 100 mg capsule Take 1 capsule (100 mg total) by mouth 3 (three) times a day Take 1 cap in the am, 1 cap in the afternoon, and 3 cap at bedtime 2/12/18  Yes Marie Costello DO   HYDROcodone-acetaminophen [facility]) 5-325 mg per tablet Take 1-2 tablets by mouth every 6 (six) hours as needed for pain for up to 10 days Max Daily Amount: 8 tablets 2/28/18 3/10/18 Yes Darryl Carrillo MD   LEUCOVORIN CALCIUM PO Take 10 mg by mouth once a week   Yes Historical Provider, MD   levofloxacin (LEVAQUIN) 250 mg tablet Take 1 tablet (250 mg total) by mouth every 24 hours for 3 days 2/28/18 3/3/18 Yes Darryl Carrillo MD   levothyroxine 75 mcg tablet Take 75 mcg by mouth daily   Yes Historical Provider, MD   losartan-hydrochlorothiazide (HYZAAR) 100-12 5 MG per tablet Take 1 tablet by mouth daily     Yes Historical Provider, MD   methotrexate (RHEUMATREX) 2 5 MG tablet Take 2 5 mg by mouth once a week     Yes Historical Provider, MD   Multiple Vitamin (MULTIVITAMINS PO) Take 1 tablet by mouth daily   Yes Historical Provider, MD   omeprazole (PriLOSEC) 20 mg delayed release capsule Take 20 mg by mouth daily   Yes Historical Provider, MD   oxybutynin (DITROPAN-XL) 10 MG 24 hr tablet Take 1 tablet (10 mg total) by mouth daily at bedtime for 3 days 2/28/18 3/3/18 Yes Sherryle Alice, MD   pravastatin (PRAVACHOL) 80 mg tablet TAKE 1 TABLET EVERY DAY 2/20/18  Yes Haley Mcclellan MD   rivaroxaban (XARELTO) 20 mg tablet Take 20 mg by mouth daily with dinner     Yes Historical Provider, MD   rOPINIRole (REQUIP) 0 5 mg tablet Take 0 5 mg by mouth 3 (three) times a day   Yes Historical Provider, MD   Diclofenac Sodium 1 % CREA Place 4 g/day on the skin 4 (four) times a day  3/2/18  Historical Provider, MD     I have reviewed home medications with patient personally  Allergies: Allergies   Allergen Reactions    Acetazolamide Other (See Comments)     Other reaction(s): Unknown Allergic Reaction    Aspirin      Other reaction(s): Other (See Comments)  High Dose ASA-stomach ache, rachel  ASA 81 m    Atorvastatin      Other reaction(s): Muscle Pain, Myalgia    Azithromycin     Nitrofurantoin      Other reaction(s): Unknown Allergic Reaction  Other reaction(s): Other (See Comments)  HIVES * pt denies    Other Other (See Comments)     red and itching  Other reaction(s):  Other (See Comments)  red and itching    Propoxyphene Other (See Comments)     hives    Shellfish-Derived Products Other (See Comments)     Patient got Gout following eating shell fish    Sulfa Antibiotics Other (See Comments)    Tramadol Diarrhea and Vomiting       Social History:     Marital Status: /Civil Union   Occupation:   Patient Pre-hospital Living Situation:   Patient Pre-hospital Level of Mobility:   Patient Pre-hospital Diet Restrictions:   Substance Use History:   History   Alcohol Use    Yes     Comment: socially     History   Smoking Status    Never Smoker   Smokeless Tobacco    Never Used     Comment: stopped smoking in the distant past, never smoker (as per [employer])      History   Drug Use No       Family History:    Family History   Problem Relation Age of Onset    Other Mother      epilepsy   Yolanda Baxter Glaucoma Father     Stroke Father      silent    Other Brother      cardiac disorder       Physical Exam:     Vitals:   Blood Pressure: 124/66 (03/02/18 1331)  Pulse: 73 (03/02/18 1331)  Temperature: 97 8 °F (36 6 °C) (03/02/18 1331)  Temp Source: Oral (03/02/18 1331)  Respirations: 20 (03/02/18 1331)  Height: 5' 1" (154 9 cm) (03/02/18 1331)  Weight - Scale: 96 8 kg (213 lb 4 8 oz) (03/02/18 1331)  SpO2: 93 % (03/02/18 1331)    Physical Exam    Constitutional: Pt appears well-developed and well-nourished  Not in any acute distress  HENT:   Head: Normocephalic and atraumatic  Eyes: EOM are normal    Neck: Neck supple  Cardiovascular: Normal rate, regular rhythm, normal heart sounds and intact distal pulses  Exam reveals no gallop and no friction rub  No murmur heard  Pulmonary/Chest: Effort normal and breath sounds normal  No respiratory distress  Pt has no wheezes or rales  Abdominal: Soft  Non-distended, Non-tender  Bowel sounds are normal    Musculoskeletal: Normal range of motion  Neurological: alert and oriented to person, place, and time  Normal strength and sensations  Psychiatric: normal mood and affect  Additional Data:     Lab Results: I have personally reviewed pertinent reports          Results from last 7 days  Lab Units 03/02/18  0708   WBC Thousand/uL 11 23*   HEMOGLOBIN g/dL 8 9*   HEMATOCRIT % 27 2*   PLATELETS Thousands/uL 178   NEUTROS PCT % 66   LYMPHS PCT % 21   MONOS PCT % 12   EOS PCT % 1       Results from last 7 days  Lab Units 03/02/18  0909   SODIUM mmol/L 142   POTASSIUM mmol/L 3 8   CHLORIDE mmol/L 106   CO2 mmol/L 30   BUN mg/dL 27*   CREATININE mg/dL 1 07   CALCIUM mg/dL 8 9   GLUCOSE RANDOM mg/dL 109 Results from last 7 days  Lab Units 03/02/18  0909   INR  2 41*       Imaging: I have personally reviewed pertinent reports  No orders to display       EKG, Pathology, and Other Studies Reviewed on Admission:   · EKG:     Allscripts / Epic Records Reviewed: Yes     ** Please Note: This note has been constructed using a voice recognition system   **

## 2018-03-02 NOTE — TELEPHONE ENCOUNTER
Message from Answering Service, Pt called at [phone] with bleeding, msg not delivered  LMOM for pt to return call  Pt had Lsaia-Nzhmtajgod-Olhiy placement on 2/28

## 2018-03-02 NOTE — ED CARE HANDOFF
Emergency Department Sign Out Note        ED Course as of Mar 02 1922   Carter Mera Mar 02, 2018   0708 Singout from Dr Pio Chaudhry  S/P cysto with stenting 3 days ago  On Xeralto  Increased urinary bleeding last night  Benign exam, some oozing around stent  F/U H/H and discuss with urology  0815 Hemoglobin: (!) 8 9   0945 Hemoglobin is mildly decreased from 1 month ago  Discussed this with the patient and examined the patient  She does have her ureteral stent protruding from her urethra  Gentle pressure around the urethral meatus expresses blood tinged urine  The case was discussed with the Urology advance practitioner, Anna Marie Mcleod, as well as with Dr Tommy Barnett  They will come to evaluate the patient  1140 Paged [facility] for Obs admission  Procedures  MDM  Number of Diagnoses or Management Options  Gross hematuria:   Renal neoplasm:   Diagnosis management comments: This is an 71-year-old female on Xarelto presents to the emergency department with gross hematuria that has worsened since she had a cystoscopy with ureteral stent placing 3 days ago  Her hemoglobin is slightly decreased at 8 9 today from 10 a month ago  Urology was consulted in the emergency department and they came and evaluated the patient  They removed the stent which was migrated down into the patient's urethra  It did not wish to replace the stent but would like the patient to have postvoid residuals to ensure that she does not developed a obstruction from blood clots  The case was discussed with KANU who agreed to admit the patient for observation overnight      CritCare Time      Disposition  Final diagnoses:   Gross hematuria   Renal neoplasm     Time reflects when diagnosis was documented in both MDM as applicable and the Disposition within this note     Time User Action Codes Description Comment    3/2/2018  9:41 AM Amber Douglas Add [R31 0] Gross hematuria     3/2/2018  9:41 AM Amber Douglas Modify [R31 0] Gross hematuria     3/2/2018  9:41 AM Radha Paiz [R31 0] Gross hematuria     3/2/2018  9:41 AM Meena Hallman Add [P63 [id number]] Renal neoplasm     3/2/2018  9:41 AM Meena Hallman Modify [A66 [id number]] Renal neoplasm       ED Disposition     ED Disposition Condition Comment    Admit  Case was discussed with KANU and the patient's admission status was agreed to be Admission Status: observation status to the service of Dr Ines Nowak   Follow-up Information    None       Current Discharge Medication List      CONTINUE these medications which have NOT CHANGED    Details   Calcium Citrate-Vitamin D (CALCIUM + D PO) Take 1 tablet by mouth 2 (two) times a day      Certolizumab Pegol (CIMZIA PREFILLED SC) Inject under the skin every 30 (thirty) days        cyanocobalamin (VITAMIN B-12) 100 mcg tablet Take by mouth      furosemide (LASIX) 20 mg tablet Take 20 mg by mouth daily  gabapentin (NEURONTIN) 100 mg capsule Take 1 capsule (100 mg total) by mouth 3 (three) times a day Take 1 cap in the am, 1 cap in the afternoon, and 3 cap at bedtime  Qty: 150 capsule, Refills: 1    Associated Diagnoses: Peripheral polyneuropathy      HYDROcodone-acetaminophen (NORCO) 5-325 mg per tablet Take 1-2 tablets by mouth every 6 (six) hours as needed for pain for up to 10 days Max Daily Amount: 8 tablets  Qty: 20 tablet, Refills: 0    Associated Diagnoses: Renal neoplasm      LEUCOVORIN CALCIUM PO Take 10 mg by mouth once a week      levofloxacin (LEVAQUIN) 250 mg tablet Take 1 tablet (250 mg total) by mouth every 24 hours for 3 days  Qty: 3 tablet, Refills: 0    Associated Diagnoses: Renal neoplasm      levothyroxine 75 mcg tablet Take 75 mcg by mouth daily      losartan-hydrochlorothiazide (HYZAAR) 100-12 5 MG per tablet Take 1 tablet by mouth daily        methotrexate (RHEUMATREX) 2 5 MG tablet Take 2 5 mg by mouth once a week        Multiple Vitamin (MULTIVITAMINS PO) Take 1 tablet by mouth daily      omeprazole (PriLOSEC) 20 mg delayed release capsule Take 20 mg by mouth daily      oxybutynin (DITROPAN-XL) 10 MG 24 hr tablet Take 1 tablet (10 mg total) by mouth daily at bedtime for 3 days  Qty: 3 tablet, Refills: 0    Associated Diagnoses: Gross hematuria      pravastatin (PRAVACHOL) 80 mg tablet TAKE 1 TABLET EVERY DAY  Qty: 90 tablet, Refills: 3    Associated Diagnoses: Pure hypercholesterolemia      rivaroxaban (XARELTO) 20 mg tablet Take 20 mg by mouth daily with dinner        rOPINIRole (REQUIP) 0 5 mg tablet Take 0 5 mg by mouth 3 (three) times a day           No discharge procedures on file         ED Provider  Electronically Signed by     Nirali Heath MD  03/02/18 Loretta Kaufman

## 2018-03-02 NOTE — ED PROVIDER NOTES
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  CT of the abdomen and pelvis was performed on February 2nd which demonstrated a mass in the right renal pelvis; this study was ordered secondary to gross hematuria  Patient states that the day after the procedure she past numerous blood clots in her urine but assumed that this was normal  Patient reports the day after her procedure the bleeding slowed down but was still present  On the day of presentation, patient states the bleeding became heavier and she was concerned that the bleeding was abnormal   Of note, patient is on Xarelto  She did not stop the Xarelto in advance the procedure per the advice of her Urologist, Dr Kelli Robertson  Patient does not have any other symptoms  She denies lightheadedness, dizziness, near-syncope, syncope, and generalized weakness  There is no chest pain or shortness of breath  Patient denies a history of cancer of the uterus or cervix  Plan:  CBC to evaluate for acute blood-loss anemia  We will speak with Urology regarding the patient  Prior to Admission Medications   Prescriptions Last Dose Informant Patient Reported? Taking?    Calcium Citrate-Vitamin D (CALCIUM + D PO) 3/1/2018 at Unknown time  Yes Yes   Sig: Take 1 tablet by mouth 2 (two) times a day   Certolizumab Pegol (CIMZIA PREFILLED SC)   Yes Yes   Sig: Inject under the skin every 30 (thirty) days     HYDROcodone-acetaminophen (NORCO) 5-325 mg per tablet 3/1/2018 at Unknown time  No Yes   Sig: Take 1-2 tablets by mouth every 6 (six) hours as needed for pain for up to 10 days Max Daily Amount: 8 tablets   LEUCOVORIN CALCIUM PO 3/1/2018 at Unknown time  Yes Yes   Sig: Take 10 mg by mouth once a week   Multiple Vitamin (MULTIVITAMINS PO) 3/1/2018 at Unknown time  Yes Yes   Sig: Take 1 tablet by mouth daily   cyanocobalamin (VITAMIN B-12) 100 mcg tablet 3/1/2018 at Unknown time  Yes Yes   Sig: Take by mouth   furosemide (LASIX) 20 mg tablet 3/1/2018 at Unknown time  Yes Yes   Sig: Take 20 mg by mouth daily  gabapentin (NEURONTIN) 100 mg capsule 3/1/2018 at Unknown time  No Yes   Sig: Take 1 capsule (100 mg total) by mouth 3 (three) times a day Take 1 cap in the am, 1 cap in the afternoon, and 3 cap at bedtime   levofloxacin (LEVAQUIN) 250 mg tablet 3/1/2018 at Unknown time  No Yes   Sig: Take 1 tablet (250 mg total) by mouth every 24 hours for 3 days   levothyroxine 75 mcg tablet 3/1/2018 at Unknown time  Yes Yes   Sig: Take 75 mcg by mouth daily   losartan-hydrochlorothiazide (HYZAAR) 100-12 5 MG per tablet 3/1/2018 at Unknown time  Yes Yes   Sig: Take 1 tablet by mouth daily     methotrexate (RHEUMATREX) 2 5 MG tablet Past Week at Unknown time  Yes Yes   Sig: Take 2 5 mg by mouth once a week     omeprazole (PriLOSEC) 20 mg delayed release capsule 3/1/2018 at Unknown time  Yes Yes   Sig: Take 20 mg by mouth daily   oxybutynin (DITROPAN-XL) 10 MG 24 hr tablet 3/1/2018 at Unknown time  No Yes   Sig: Take 1 tablet (10 mg total) by mouth daily at bedtime for 3 days   pravastatin (PRAVACHOL) 80 mg tablet 3/1/2018 at Unknown time  No Yes   Sig: TAKE 1 TABLET EVERY DAY   rOPINIRole (REQUIP) 0 5 mg tablet 3/1/2018 at Unknown time  Yes Yes   Sig: Take 0 5 mg by mouth 3 (three) times a day   rivaroxaban (XARELTO) 20 mg tablet 3/1/2018 at Unknown time  Yes Yes   Sig: Take 20 mg by mouth daily with dinner        Facility-Administered Medications: None       Past Medical History: Diagnosis Date    Anemia     Arthritis     rheumatoid    Benign essential hypertension     Cataract     Chronic cystitis     Colon, diverticulosis     CPAP (continuous positive airway pressure) dependence     Diabetes mellitus (HCC)     Diverticulitis of colon     Early satiety     GERD (gastroesophageal reflux disease)     Gout     Hearing loss     Hemorrhoids     Hiatal hernia     History of colonic polyps     Hyperlipidemia     Hypertension     Hypertension     Hypothyroidism     Impaired fasting glucose     Left breast mass     Microhematuria     Migraine     occular    Neuropathy     lower and upper extermities    Overactive bladder     Pneumonia of left lower lobe due to infectious organism (HCC)     RA (rheumatoid arthritis) (HCC)     Rheumatoid arthritis (HCC)     Sleep apnea     uses cpap    Stroke ([facility] [address] )     Type 2 diabetes mellitus (HCC)     Urinary frequency     Urinary urgency     Use of cane as ambulatory aid        Past Surgical History:   Procedure Laterality Date    APPENDECTOMY      ARTHROSCOPY WRIST Right     with release of transverse carpal ligament     BLADDER SURGERY      BLADDER SURGERY      BREAST SURGERY      left breast    BUNIONECTOMY Bilateral     CATARACT EXTRACTION      CHOLECYSTECTOMY      COLONOSCOPY      CYSTOSCOPY      EGD AND COLONOSCOPY N/A 12/20/2017    Procedure: EGD AND COLONOSCOPY;  Surgeon: Marelyn Hammans, MD;  Location: [facility];   Service: Gastroenterology    ESOPHAGOGASTRODUODENOSCOPY      diagnostic    FOREARM SURGERY Right     fracture repair    HYSTERECTOMY      KNEE SURGERY Left     meniscus tear    NOSE SURGERY      OK CYSTO/URETERO W/LITHOTRIPSY &INDWELL STENT INSRT Right 2/28/2018    Procedure: CYSTOSCOPY RIGHT URETEROSCOPY, RIGHT RETROGRADE PYELOGRAM AND INSERTION  RIGHT STENT URETERAL, RIGHT RENAL PELVIC WASHING FOR CYTOLOGY;  Surgeon: Hernán Kendrick MD;  Location: [facility];  Service: Urology    REPLACEMENT TOTAL KNEE BILATERAL Bilateral        Family History   Problem Relation Age of Onset    Other Mother      epilepsy   [facility] Glaucoma Father     Stroke Father      silent    Other Brother      cardiac disorder     I have reviewed and agree with the history as documented  Social History   Substance Use Topics    Smoking status: Never Smoker    Smokeless tobacco: Never Used      Comment: stopped smoking in the distant past, never smoker (as per [employer])     Alcohol use Yes      Comment: socially        Review of Systems   Constitutional: Negative for diaphoresis, fever and unexpected weight change  HENT: Negative for congestion, rhinorrhea and sore throat  Eyes: Negative for pain, discharge and visual disturbance  Respiratory: Negative for cough, shortness of breath and wheezing  Cardiovascular: Negative for chest pain, palpitations and leg swelling  Gastrointestinal: Negative for abdominal pain, blood in stool, constipation, diarrhea, nausea and vomiting  Genitourinary: Positive for hematuria  Negative for dysuria and flank pain  Musculoskeletal: Negative for arthralgias and joint swelling  Skin: Negative for rash and wound  Allergic/Immunologic: Negative for environmental allergies and food allergies  Neurological: Negative for dizziness, seizures, weakness and numbness  Hematological: Negative for adenopathy  Psychiatric/Behavioral: Negative for confusion and hallucinations         Physical Exam  ED Triage Vitals   Temperature Pulse Respirations Blood Pressure SpO2   03/02/18 0613 03/02/18 0613 03/02/18 0613 03/02/18 0613 03/02/18 0613   97 7 °F (36 5 °C) 74 18 162/70 97 %      Temp Source Heart Rate Source Patient Position - Orthostatic VS BP Location FiO2 (%)   03/02/18 0613 03/02/18 0613 03/02/18 0613 03/02/18 0613 --   Oral Monitor Lying Right arm       Pain Score       03/02/18 0645       No Pain           Orthostatic Vital Signs  Vitals:    03/02/18 1045 03/02/18 1145 03/02/18 1331 03/02/18 1500   BP: 146/67 (!) 146/112 124/66 135/68   Pulse: 58 64 73 73   Patient Position - Orthostatic VS: Lying Lying Lying Lying       Physical Exam   Constitutional: She is oriented to person, place, and time  She appears well-developed and well-nourished  No distress  HENT:   Head: Normocephalic and atraumatic  Right Ear: External ear normal    Left Ear: External ear normal    Eyes: Conjunctivae and EOM are normal  Pupils are equal, round, and reactive to light  Neck: Normal range of motion  Neck supple  Cardiovascular: Normal rate, regular rhythm and normal heart sounds  No murmur heard  Pulmonary/Chest: Effort normal and breath sounds normal  No respiratory distress  She has no wheezes  She has no rales  Abdominal: Soft  Bowel sounds are normal  She exhibits no distension  There is no tenderness  There is no guarding  Genitourinary:   Genitourinary Comments: Ureteral stent is in place  There appears to be leakage of urine around the stent  The urine is pink colored  No obvious vaginal bleeding  No bright red blood or blood clots  Musculoskeletal: Normal range of motion  She exhibits no deformity  Neurological: She is alert and oriented to person, place, and time  No gross motor deficits noted  Cranial nerves II-XII are intact  Speech is fluent without dysarthria or aphasia  Skin: Skin is warm and dry  Psychiatric: She has a normal mood and affect  Her behavior is normal  Thought content normal    Nursing note and vitals reviewed  ED Medications  Medications   furosemide (LASIX) tablet 20 mg (20 mg Oral Given 3/2/18 1526)   gabapentin (NEURONTIN) capsule 100 mg (100 mg Oral Given 3/2/18 1526)   levothyroxine tablet 75 mcg (not administered)   losartan potassium-hydrochlorothiazide (HYZAAR 100/12  5) combo dose ( Oral Given 3/2/18 1535)   pantoprazole (PROTONIX) EC tablet 40 mg (not administered)   oxybutynin (DITROPAN-XL) 24 hr tablet 10 mg (not administered) rOPINIRole (REQUIP) tablet 0 5 mg (0 5 mg Oral Given 3/2/18 1656)   HYDROcodone-acetaminophen (NORCO) 5-325 mg per tablet 1 tablet (not administered)   pravastatin (PRAVACHOL) tablet 80 mg (80 mg Oral Given 3/2/18 1844)   levofloxacin (LEVAQUIN) tablet 250 mg (250 mg Oral Given 3/2/18 1656)       Diagnostic Studies  Results Reviewed     Procedure Component Value Units Date/Time    Basic metabolic panel [[id number]]  (Abnormal) Collected:  03/02/18 0909    Lab Status:  Final result Specimen:  Blood from Arm, Right Updated:  03/02/18 0939     Sodium 142 mmol/L      Potassium 3 8 mmol/L      Chloride 106 mmol/L      CO2 30 mmol/L      Anion Gap 6 mmol/L      BUN 27 (H) mg/dL      Creatinine 1 07 mg/dL      Glucose 109 mg/dL      Calcium 8 9 mg/dL      eGFR 49 ml/min/1 73sq m     Narrative:         National Kidney Disease Education Program recommendations are as follows:  GFR calculation is accurate only with a steady state creatinine  Chronic Kidney disease less than 60 ml/min/1 73 sq  meters  Kidney failure less than 15 ml/min/1 73 sq  meters  Protime-INR [[id number]]  (Abnormal) Collected:  03/02/18 0909    Lab Status:  Final result Specimen:  Blood from Arm, Right Updated:  03/02/18 0934     Protime 26 5 (H) seconds      INR 2 41 (H)    APTT [[id number]]  (Abnormal) Collected:  03/02/18 0909    Lab Status:  Final result Specimen:  Blood from Arm, Right Updated:  03/02/18 0933     PTT 41 (H) seconds     Narrative:          Therapeutic Heparin Range = 60-90 seconds    CBC and differential [[id number]]  (Abnormal) Collected:  03/02/18 0708    Lab Status:  Final result Specimen:  Blood from Arm, Left Updated:  03/02/18 0719     WBC 11 23 (H) Thousand/uL      RBC 2 81 (L) Million/uL      Hemoglobin 8 9 (L) g/dL      Hematocrit 27 2 (L) %      MCV 97 fL      MCH 31 7 pg      MCHC 32 7 g/dL      RDW 15 3 (H) %      MPV 9 4 fL      Platelets 837 Thousands/uL      nRBC 0 /100 WBCs      Neutrophils Relative 66 %      Lymphocytes Relative 21 %      Monocytes Relative 12 %      Eosinophils Relative 1 %      Basophils Relative 0 %      Neutrophils Absolute 7 32 Thousands/µL      Lymphocytes Absolute 2 40 Thousands/µL      Monocytes Absolute 1 35 (H) Thousand/µL      Eosinophils Absolute 0 10 Thousand/µL      Basophils Absolute 0 01 Thousands/µL                  No orders to display         Procedures  Procedures      Phone Consults  ED Phone Contact    ED Course  ED Course as of Mar 02 2034   Fri Mar 02, 2018   0619 Blood Pressure: 162/70   0619 Temperature: 97 7 °F (36 5 °C)   0619 Pulse: 74   0619 Respirations: 18   0619 SpO2: 97 %                               MDM  Number of Diagnoses or Management Options  Gross hematuria: established and worsening  Renal neoplasm: established and worsening  Diagnosis management comments:     71-year-old female presenting with hematuria after urologic procedure 3 days prior  Suspected 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  No other acute complaints  No symptoms of acute blood-loss anemia  Vital signs were with benign  Physical examination was unremarkable apart from ureteral stent protruding from the urethra with leakage of blood tinged urine around the stent  CBC was ordered to evaluate for acute blood-loss anemia  Patient was signed out to Dr Shanti Rich pending CBC results  CBC demonstrated mild decrease in hemoglobin from 1 month prior  Urology was contacted  They removed the ureteral stent  Patient was admitted for observation overnight  Portions of the chart may have been created with voice recognition software  Occasional wrong word or "sound a like" substitutions may have occurred due to the inherent limitations of voice recognition software  Please read the chart carefully and recognize, using context, where substitutions have occurred         Amount and/or Complexity of Data Reviewed  Clinical lab tests: ordered and reviewed  Decide to obtain previous medical records or to obtain history from someone other than the patient: yes  Obtain history from someone other than the patient: yes  Review and summarize past medical records: yes  Discuss the patient with other providers: yes    Risk of Complications, Morbidity, and/or Mortality  Presenting problems: low  Diagnostic procedures: minimal  Management options: minimal    Patient Progress  Patient progress: stable    CritCare Time    Disposition  Final diagnoses:   Gross hematuria   Renal neoplasm     Time reflects when diagnosis was documented in both MDM as applicable and the Disposition within this note     Time User Action Codes Description Comment    3/2/2018  9:41 AM Misael, Jose Liner Add [R31 0] Gross hematuria     3/2/2018  9:41 AM Misael, Jose Liner Modify [R31 0] Gross hematuria     3/2/2018  9:41 AM [place], Jose Liner Modify [R31 0] Gross hematuria     3/2/2018  9:41 AM Yane Mejia Add [[id number]] Renal neoplasm     3/2/2018  9:41 AM Yane Mejia Modify [A30 732] Renal neoplasm       ED Disposition     ED Disposition Condition Comment    Admit  Case was discussed with KANU and the patient's admission status was agreed to be Admission Status: observation status to the service of Dr Jhonny Shaver   Follow-up Information    None       Current Discharge Medication List      CONTINUE these medications which have NOT CHANGED    Details   Calcium Citrate-Vitamin D (CALCIUM + D PO) Take 1 tablet by mouth 2 (two) times a day      Certolizumab Pegol (CIMZIA PREFILLED SC) Inject under the skin every 30 (thirty) days        cyanocobalamin (VITAMIN B-12) 100 mcg tablet Take by mouth      furosemide (LASIX) 20 mg tablet Take 20 mg by mouth daily        gabapentin (NEURONTIN) 100 mg capsule Take 1 capsule (100 mg total) by mouth 3 (three) times a day Take 1 cap in the am, 1 cap in the afternoon, and 3 cap at bedtime  Qty: 150 capsule, Refills: 1    Associated Diagnoses: Peripheral polyneuropathy      HYDROcodone-acetaminophen (NORCO) 5-325 mg per tablet Take 1-2 tablets by mouth every 6 (six) hours as needed for pain for up to 10 days Max Daily Amount: 8 tablets  Qty: 20 tablet, Refills: 0    Associated Diagnoses: Renal neoplasm      LEUCOVORIN CALCIUM PO Take 10 mg by mouth once a week      levofloxacin (LEVAQUIN) 250 mg tablet Take 1 tablet (250 mg total) by mouth every 24 hours for 3 days  Qty: 3 tablet, Refills: 0    Associated Diagnoses: Renal neoplasm      levothyroxine 75 mcg tablet Take 75 mcg by mouth daily      losartan-hydrochlorothiazide (HYZAAR) 100-12 5 MG per tablet Take 1 tablet by mouth daily  methotrexate (RHEUMATREX) 2 5 MG tablet Take 2 5 mg by mouth once a week        Multiple Vitamin (MULTIVITAMINS PO) Take 1 tablet by mouth daily      omeprazole (PriLOSEC) 20 mg delayed release capsule Take 20 mg by mouth daily      oxybutynin (DITROPAN-XL) 10 MG 24 hr tablet Take 1 tablet (10 mg total) by mouth daily at bedtime for 3 days  Qty: 3 tablet, Refills: 0    Associated Diagnoses: Gross hematuria      pravastatin (PRAVACHOL) 80 mg tablet TAKE 1 TABLET EVERY DAY  Qty: 90 tablet, Refills: 3    Associated Diagnoses: Pure hypercholesterolemia      rivaroxaban (XARELTO) 20 mg tablet Take 20 mg by mouth daily with dinner        rOPINIRole (REQUIP) 0 5 mg tablet Take 0 5 mg by mouth 3 (three) times a day           No discharge procedures on file  ED Provider  Attending physically available and evaluated Kevinkurt Mónica NICHOLS managed the patient along with the ED Attending      Electronically Signed by         Shari Jordan MD  03/02/18 3961

## 2018-03-02 NOTE — CONSULTS
CONSULT    Patient Name: Surekha Murphy  Patient MRN: [id number]  Date of Service: 3/2/2018   Date / Time Note Created: 3/2/2018 11:04 AM   Referring Provider: Dr Hill General  Provider Creating Note: SANTOSH Foster    PCP: Tianna Lawson  Attending Provider:  Hanna Callaway MD    Reason for Consult: Hematuria    HPI--Ms Candice Maurer is a very pleasant but comorbid 45-year-old female evaluated in our office for history of recurrent UTI and refractory long-standing gross hematuria status post cystoscopy, right ureteroscopy, right retrograde pyelography and insertion of right ureteral stent with right renal pelvic washing for cytology secondary to high suspicion of urothelial neoplasm involving the right renal pelvis  Patient was instructed or rather informed that she would have some postoperative gross hematuria and presented to the emergency room with complaints of progressive worsening voiding symptoms and gross hematuria with clots; not accompanied by fever, chills, flank or groin pain, bladder spasms, or dysuria  Clinical scenario complicated by patient's requirement for chronic anticoagulation with Xarelto for history of CVA 1 year ago  On examination, patient presented with mildly distended suprapubic area and urethral meatal discharge with clots and what appeared to be protruding ureteral stent  Urologic consultation was requested urgently for our opinion thereof and further management recommendations    Source:the patient and chart         Active Problems:    Patient Active Problem List   Diagnosis    [facility]cere[facility] [address] [facility]    Hypertension    Rheumatoid arthritis ([facility] [address] [facility]    Diabetes mellitus type 2 in obese ([facility] [address] )    Urinary tract infection    Sleep apnea    Hypothyroidism    Chronic GERD    Anemia of chronic disease    Benign essential hypertension    Carpal tunnel syndrome, left    Diverticulosis of colon    Edema    Hematuria    Hypercholesterolemia    Lymphedema  Obesity    Overactive bladder    Peripheral neuropathy    Prediabetes    Primary osteoarthritis of left knee    Restless legs syndrome    Right lumbar radiculopathy    Sensorineural hearing loss    Sensory urge incontinence    Sinus arrhythmia    Renal neoplasm    Gross hematuria    Chronic bilateral thoracic back pain    Thoracic degenerative disc disease           Impressions  · Hematuria secondary to right renal pelvis mass /papillary lesion  suspicious for transitional cell carcinoma and requirement for anticoagulation  · Status post cystoscopy, retrograde pyelography, right ureteroscopy, washings and right ureteral stent placement secondary to previous  · Stent migration    Patient is chronically anemic and otherwise non symptomatic with hemoglobin of 8 9  Bladder scan was obtained at the bedside for only 92 mL postvoid residual   Gently removed stent  Does not require surgical replacement  Patient and spouse engaged in long discussion with Dr Jennifer Chery in the office regarding necessity for right nephroureterectomy and were in the process of scheduling electively pending final pathology reports  Recommendations  As mentioned previously, stent removed with incident  Monitor PVR  Patient will be kept in the emergency room the next few hours to observe voiding pattern; as well as obtain repeat hemoglobin determine  If + significant blood loss, patient will require admission to the medical team   At this particular time, patient does not require catheter insertion or any aggressive bladder irrigation due to clot retention  Patient is asymptomatic from anemia at this time without evidence of shortness of breath, tachycardia or other compensatory signs  If hemoglobin is acceptable, may be discharged per the emergency room staff to follow up with Dr Jennifer Chery as previously scheduled  Would refrain from Xarelto for at least several days before restarting    Will ask Dr Jennifer Chery to have conversation with cardiologist regarding the safety of abstinence from anticoagulation  Emergent  surgical instrumentation is not indicated  Past Medical History:   Diagnosis Date    Anemia     Arthritis     rheumatoid    Benign essential hypertension     Cataract     Chronic cystitis     Colon, diverticulosis     CPAP (continuous positive airway pressure) dependence     Diabetes mellitus (HCC)     Diverticulitis of colon     Early satiety     GERD (gastroesophageal reflux disease)     Gout     Hearing loss     Hemorrhoids     Hiatal hernia     History of colonic polyps     Hyperlipidemia     Hypertension     Hypertension     Hypothyroidism     Impaired fasting glucose     Left breast mass     Microhematuria     Migraine     occular    Neuropathy     lower and upper extermities    Overactive bladder     Pneumonia of left lower lobe due to infectious organism (HCC)     RA (rheumatoid arthritis) (HCC)     Rheumatoid arthritis (HCC)     Sleep apnea     uses cpap    Stroke ([facility] [address] [facility]     Type 2 diabetes mellitus (HCC)     Urinary frequency     Urinary urgency     Use of cane as ambulatory aid        Past Surgical History:   Procedure Laterality Date    APPENDECTOMY      ARTHROSCOPY WRIST Right     with release of transverse carpal ligament     BLADDER SURGERY      BLADDER SURGERY      BREAST SURGERY      left breast    BUNIONECTOMY Bilateral     CATARACT EXTRACTION      CHOLECYSTECTOMY      COLONOSCOPY      CYSTOSCOPY      EGD AND COLONOSCOPY N/A 12/20/2017    Procedure: EGD AND COLONOSCOPY;  Surgeon: Bunny Anton MD;  [facility]: [facility];   Service: Gastroenterology    ESOPHAGOGASTRODUODENOSCOPY      diagnostic    FOREARM SURGERY Right     fracture repair    HYSTERECTOMY      KNEE SURGERY Left     meniscus tear    NOSE SURGERY      PA CYSTO/URETERO W/LITHOTRIPSY &INDWELL STENT INSRT Right 2/28/2018    Procedure: CYSTOSCOPY RIGHT URETEROSCOPY, RIGHT RETROGRADE PYELOGRAM AND INSERTION  RIGHT STENT URETERAL, RIGHT RENAL PELVIC WASHING FOR CYTOLOGY;  Surgeon: Earl Maradiaga MD;  [facility];  Service: Urology    REPLACEMENT TOTAL KNEE BILATERAL Bilateral        Family History   Problem Relation Age of Onset    Other Mother      epilepsy   Brenton Bunny Glaucoma Father     Stroke Father      silent    Other Brother      cardiac disorder       Social History     Social History    Marital status: /Civil Union     Spouse name: N/A    Number of children: N/A    Years of education: N/A     Occupational History    Not on file  Social History Main Topics    Smoking status: Never Smoker    Smokeless tobacco: Never Used      Comment: stopped smoking in the distant past, never smoker (as per [employer])     Alcohol use Yes      Comment: socially    Drug use: No    Sexual activity: Not on file     Other Topics Concern    Not on file     Social History Narrative    No caffeine use       Allergies   Allergen Reactions    Acetazolamide Other (See Comments)     Other reaction(s): Unknown Allergic Reaction    Aspirin      Other reaction(s): Other (See Comments)  High Dose ASA-stomach ache, rachel  ASA 81 m    Atorvastatin      Other reaction(s): Muscle Pain, Myalgia    Azithromycin     Nitrofurantoin      Other reaction(s): Unknown Allergic Reaction  Other reaction(s): Other (See Comments)  HIVES * pt denies    Other Other (See Comments)     red and itching  Other reaction(s):  Other (See Comments)  red and itching    Propoxyphene Other (See Comments)     Other reaction(s): Unknown Allergic Reaction    Shellfish-Derived Products Other (See Comments)     Patient got Gout following eating shell fish    Sulfa Antibiotics Other (See Comments)    Tramadol Diarrhea and Vomiting       Review of Systems  10 point review of systems negative except as noted in HPI     Chart Review   Allergies, current medications, history, problem list    Vital Signs  /60 (BP Location: Right arm)   Pulse 58   Temp 97 7 °F (36 5 °C) (Oral)   Resp 18   Ht 5' 1" (1 549 m)   Wt 96 6 kg (213 lb)   LMP  (LMP Unknown)   SpO2 97%   BMI 40 25 kg/m²     Physical Exam  General appearance: alert and oriented, in no acute distress, appears stated age, cooperative and morbidly obese  Head: Normocephalic, without obvious abnormality, atraumatic  Neck: no adenopathy, no carotid bruit, no JVD, supple, symmetrical, trachea midline and thyroid not enlarged, symmetric, no tenderness/mass/nodules  Lungs: clear to auscultation bilaterally  Heart: regular rate and rhythm, S1, S2 normal, no murmur, click, rub or gallop  Abdomen: soft, non-tender; bowel sounds normal; no masses,  no organomegaly  Extremities: extremities normal, warm and well-perfused; no cyanosis, clubbing, or edema  Pulses: 2+ and symmetric  Neurologic: Grossly normal  Bright red urine     Laboratory Studies  Lab Results   Component Value Date    HGBA1C 6 2 01/03/2018     03/02/2018     11/14/2017    K 3 8 03/02/2018    K 3 7 11/14/2017     03/02/2018     11/14/2017    CO2 30 03/02/2018    CO2 29 11/14/2017    GLUCOSE 109 03/02/2018    GLUCOSE 95 01/04/2016    CREATININE 1 07 03/02/2018    CREATININE 0 88 11/14/2017    BUN 27 (H) 03/02/2018    BUN 18 11/14/2017    MG 2 4 05/12/2017    MG 2 1 01/04/2016    PHOS 2 6 05/12/2017     Lab Results   Component Value Date    WBC 11 23 (H) 03/02/2018    WBC 5 9 11/14/2017    RBC 2 81 (L) 03/02/2018    RBC 2 92 (L) 11/14/2017    HGB 8 9 (L) 03/02/2018    HGB 9 5 (L) 11/14/2017    HCT 27 2 (L) 03/02/2018    HCT 28 5 (L) 11/14/2017    MCV 97 03/02/2018    MCV 97 6 11/14/2017    MCH 31 7 03/02/2018    MCH 32 5 11/14/2017    RDW 15 3 (H) 03/02/2018    RDW 13 9 11/14/2017     03/02/2018     11/14/2017       Imaging and Other Studies  )Xr Spine Thoracic 3 Vw    Result Date: 2/19/2018  Narrative: THORACIC SPINE INDICATION: Back pain   COMPARISON: None VIEWS:  AP, lateral and coned-down projections IMAGES:  3 FINDINGS: There is mild S-shaped scoliosis of the thoracic spine Thoracic vertebrae demonstrate normal stature and alignment  There is no fracture or pathologic bone lesion  There is no displacement of the paraspinal line  The pedicles are intact  Impression: No acute displaced fracture seen Degenerative changes are seen within the thoracic spine Workstation performed: [id number]     Ct Renal Protocol    Result Date: 2/5/2018  Narrative: CT ABDOMEN AND PELVIS WITH AND WITHOUT IV CONTRAST INDICATION:  Hematuria  COMPARISON:  9/28/2015 TECHNIQUE: CT of the kidneys was performed without intravenous contrast   Dynamic postcontrast CT evaluation of the abdomen and pelvis was performed in both nephrographic and delayed phases after the administration of intravenous contrast   Reformatted images were created in axial, sagittal, and coronal planes  Radiation dose length product (DLP) for this visit:  5120 mGy-cm   This examination, like all CT scans performed in the [place], was performed utilizing techniques to minimize radiation dose exposure, including the use of iterative reconstruction and automated exposure control  IV Contrast:  100 mL of iohexol (OMNIPAQUE) Enteric Contrast:  Not administered FINDINGS: ABDOMEN RIGHT KIDNEY AND URETER: No solid renal mass  There is irregularity of the right renal pelvis and proximal ureter with nodular appearing filling defect along the wall on delayed images  No hydronephrosis or hydroureter  No urinary tract calculi  No perinephric collection  LEFT KIDNEY AND URETER: No solid renal mass  No detectable ureteral mass  No hydronephrosis or hydroureter  No urinary tract calculi  No perinephric collection  URINARY BLADDER: No bladder wall mass  No calculi  There is a cystocele  LUNG BASES:  There is a 4 mm right lower lobe nodule on series 3, image 11, not present previously  There is a small hiatal hernia   LIVER/BILIARY TREE:  Unremarkable  GALLBLADDER:  Gallbladder is surgically absent  SPLEEN:  Unremarkable  PANCREAS:  There is a 7 x 10 mm pancreatic cyst, not present previously  ADRENAL GLANDS:  Unremarkable  STOMACH, BOWEL AND APPENDIX:  Unremarkable  ABDOMINOPELVIC CAVITY:  No ascites  No free intraperitoneal air  No lymphadenopathy  VESSELS:  Unremarkable for patient's age  PELVIS REPRODUCTIVE ORGANS:  Patient is status post hysterectomy  ABDOMINAL WALL/INGUINAL REGIONS:  Unremarkable  OSSEOUS STRUCTURES:  No acute fracture or destructive osseous lesion  Impression: 1  Nodular filling defects along the wall of the right renal pelvis and proximal ureter suspicious for neoplasm  Retrograde pyelogram and biopsy recommended  2   10 mm pancreatic cyst, not present previously  Recommend characterization and establishment of baseline now  Preferred modality is Abdomen MRI with and without IV contrast/MRCP  First alternative is pancreatic protocol abdomen and pelvic CT with contrast  Second is abdomen MRI without contrast/MRCP  3   4 mm right lower lobe nodule  Based on current Fleischner Society 2017 Guidelines on incidental pulmonary nodule, no routine follow-up is needed if the patient is considered low risk for lung cancer  If the patient is considered high risk for lung cancer, 12 month follow-up non-contrast chest CT is recommended  If there is a primary malignancy, three-month follow-up is recommended to exclude metastasis  Considerations related to the patient's age and/or comorbidities may be used to alter these recommendations, particularly the recommendation regarding the pancreatic cyst    I personally discussed this study with Devonte Gomez on 2/5/2018 3:41 PM  Workstation performed: [id number]         Medications   No current facility-administered medications on file prior to encounter        Current Outpatient Prescriptions on File Prior to Encounter   Medication Sig Dispense Refill    Calcium Citrate-Vitamin D (CALCIUM + D PO) Take 1 tablet by mouth 2 (two) times a day      Certolizumab Pegol (CIMZIA PREFILLED SC) Inject under the skin every 30 (thirty) days        cyanocobalamin (VITAMIN B-12) 100 mcg tablet Take by mouth      furosemide (LASIX) 20 mg tablet Take 20 mg by mouth daily   gabapentin (NEURONTIN) 100 mg capsule Take 1 capsule (100 mg total) by mouth 3 (three) times a day Take 1 cap in the am, 1 cap in the afternoon, and 3 cap at bedtime 150 capsule 1    HYDROcodone-acetaminophen (NORCO) 5-325 mg per tablet Take 1-2 tablets by mouth every 6 (six) hours as needed for pain for up to 10 days Max Daily Amount: 8 tablets 20 tablet 0    LEUCOVORIN CALCIUM PO Take 10 mg by mouth once a week      levofloxacin (LEVAQUIN) 250 mg tablet Take 1 tablet (250 mg total) by mouth every 24 hours for 3 days 3 tablet 0    levothyroxine 75 mcg tablet Take 75 mcg by mouth daily      losartan-hydrochlorothiazide (HYZAAR) 100-12 5 MG per tablet Take 1 tablet by mouth daily   methotrexate (RHEUMATREX) 2 5 MG tablet Take 2 5 mg by mouth once a week        Multiple Vitamin (MULTIVITAMINS PO) Take 1 tablet by mouth daily      omeprazole (PriLOSEC) 20 mg delayed release capsule Take 20 mg by mouth daily      oxybutynin (DITROPAN-XL) 10 MG 24 hr tablet Take 1 tablet (10 mg total) by mouth daily at bedtime for 3 days 3 tablet 0    pravastatin (PRAVACHOL) 80 mg tablet TAKE 1 TABLET EVERY DAY 90 tablet 3    rivaroxaban (XARELTO) 20 mg tablet Take 20 mg by mouth daily with dinner        rOPINIRole (REQUIP) 0 5 mg tablet Take 0 5 mg by mouth 3 (three) times a day      [DISCONTINUED] Diclofenac Sodium 1 % CREA Place 4 g/day on the skin 4 (four) times a day             Total time spent with patient 30 minutes, >50% spent counseling and/or coordination of care           SANTOSH Serrano

## 2018-03-02 NOTE — ASSESSMENT & PLAN NOTE
Hemoglobin dropped from 10 yesterday to 8 9  Probably due to hematuria  Continue to monitor  Repeat hemoglobin at 1800  Transfuse for hemoglobin less than 7

## 2018-03-02 NOTE — ED ATTENDING ATTESTATION
Douglas Fournier MD, saw and evaluated the patient  I have discussed the patient with the resident/non-physician practitioner and agree with the resident's/non-physician practitioner's findings, Plan of Care, and MDM as documented in the resident's/non-physician practitioner's note, except where noted  All available labs and Radiology studies were reviewed  At this point I agree with the current assessment done in the Emergency Department  I have conducted an independent evaluation of this patient including a focused history of:    Emergency Department Note- Moose Lira 81 y o  female MRN: [id number]    Unit/Bed#: ED 12 Encounter: [id number]    Moose Lira is a 81 y o  female who presents with   Chief Complaint   Patient presents with    Vaginal Bleeding     Pt went for a kidney procedure 3 days ago and has had vaginal bleeding ever since  Pt states that she still is on blood thinners  History of Present Illness   HPI:  Moose Lira is a 81 y o  female who presents for evaluation of:  A recrudescence of gross hematuria 3 days after having had cystoscopy, right renal mass biopsy, and right ureteral stent placement  The patient has noted bleeding after the procedure around the stent  The bleeding resolved yesterday  However, since 3:00 a m , the bleeding has recurred which is concerning to the patient  The patient denies associated pain  The patient is on rivaroxaban as an anticoagulant  Review of Systems   Constitutional: Negative for fatigue and fever  Genitourinary: Positive for hematuria  Negative for flank pain  Neurological: Negative for weakness and light-headedness  All other systems reviewed and are negative        Historical Information   Past Medical History:   Diagnosis Date    Anemia     Arthritis     rheumatoid    Benign essential hypertension     Cataract     Chronic cystitis     Colon, diverticulosis     CPAP (continuous positive airway pressure) dependence     Diabetes mellitus ([facility] [address] )     Diverticulitis of colon     Early satiety     GERD (gastroesophageal reflux disease)     Gout     Hearing loss     Hemorrhoids     Hiatal hernia     History of colonic polyps     Hyperlipidemia     Hypertension     Hypertension     Hypothyroidism     Impaired fasting glucose     Left breast mass     Microhematuria     Migraine     occular    Neuropathy     lower and upper extermities    Overactive bladder     Pneumonia of left lower lobe due to infectious organism (HCC)     RA (rheumatoid arthritis) (HCC)     Rheumatoid arthritis (HCC)     Sleep apnea     uses cpap    Stroke (HCC)     Type 2 diabetes mellitus (HCC)     Urinary frequency     Urinary urgency     Use of cane as ambulatory aid      Past Surgical History:   Procedure Laterality Date    APPENDECTOMY      ARTHROSCOPY WRIST Right     with release of transverse carpal ligament     BLADDER SURGERY      BLADDER SURGERY      BREAST SURGERY      left breast    BUNIONECTOMY Bilateral     CATARACT EXTRACTION      CHOLECYSTECTOMY      COLONOSCOPY      CYSTOSCOPY      EGD AND COLONOSCOPY N/A 12/20/2017    Procedure: EGD AND COLONOSCOPY;  Surgeon: Charley Strong MD;  [facility];   Service: Gastroenterology    ESOPHAGOGASTRODUODENOSCOPY      diagnostic    FOREARM SURGERY Right     fracture repair    HYSTERECTOMY      KNEE SURGERY Left     meniscus tear    NOSE SURGERY      RI CYSTO/URETERO W/LITHOTRIPSY &INDWELL STENT INSRT Right 2/28/2018    Procedure: CYSTOSCOPY RIGHT URETEROSCOPY, RIGHT RETROGRADE PYELOGRAM AND INSERTION  RIGHT STENT URETERAL, RIGHT RENAL PELVIC WASHING FOR CYTOLOGY;  Surgeon: Erma Cnacino MD;  [facility];  Service: Urology    REPLACEMENT TOTAL KNEE BILATERAL Bilateral      Social History   History   Alcohol Use    Yes     Comment: socially     History   Drug Use No     History   Smoking Status    Never Smoker   Smokeless Tobacco    Never Used     Comment: stopped smoking in the distant past, never smoker (as per [employer])      Family History: non-contributory    Meds/Allergies   all medications and allergies reviewed  Allergies   Allergen Reactions    Acetazolamide Other (See Comments)     Other reaction(s): Unknown Allergic Reaction    Aspirin      Other reaction(s): Other (See Comments)  High Dose ASA-stomach ache, rachel  ASA 81 m    Atorvastatin      Other reaction(s): Muscle Pain, Myalgia    Azithromycin     Nitrofurantoin      Other reaction(s): Unknown Allergic Reaction  Other reaction(s): Other (See Comments)  HIVES * pt denies    Other Other (See Comments)     red and itching  Other reaction(s): Other (See Comments)  red and itching    Propoxyphene Other (See Comments)     Other reaction(s): Unknown Allergic Reaction    Shellfish-Derived Products Other (See Comments)     Patient got Gout following eating shell fish    Sulfa Antibiotics Other (See Comments)    Tramadol Diarrhea and Vomiting       Objective   First Vitals:   Blood Pressure: 162/70 (03/02/18 0613)  Pulse: 74 (03/02/18 0613)  Temperature: 97 7 °F (36 5 °C) (03/02/18 0613)  Temp Source: Oral (03/02/18 8770)  Respirations: 18 (03/02/18 1994)  Height: 5' 1" (154 9 cm) (03/02/18 8475)  Weight - Scale: 96 6 kg (213 lb) (03/02/18 0613)  SpO2: 97 % (03/02/18 0613)    Current Vitals:   Blood Pressure: 162/70 (03/02/18 0613)  Pulse: 74 (03/02/18 0613)  Temperature: 97 7 °F (36 5 °C) (03/02/18 0613)  Temp Source: Oral (03/02/18 6059)  Respirations: 18 (03/02/18 0828)  Height: 5' 1" (154 9 cm) (03/02/18 8715)  Weight - Scale: 96 6 kg (213 lb) (03/02/18 0613)  SpO2: 97 % (03/02/18 0613)    No intake or output data in the 24 hours ending 03/02/18 0633    Invasive Devices     Drain            Ureteral Drain/Stent Right ureter 6 Fr  1 day                Physical Exam   Constitutional: She is oriented to person, place, and time  She appears well-developed and well-nourished     HENT: Head: Normocephalic and atraumatic  Pulmonary/Chest: Effort normal and breath sounds normal    Abdominal: Soft  Bowel sounds are normal    Musculoskeletal: Normal range of motion  She exhibits no deformity  Neurological: She is alert and oriented to person, place, and time  Skin: Skin is warm and dry  Psychiatric: She has a normal mood and affect  Her behavior is normal  Judgment and thought content normal    Nursing note and vitals reviewed  Medical Decision Makin  Gross hematuria post stent with renal biopsy:  Plan to obtain CBC to rule out anemia; plan to discuss the management of the patient with the urological surgeon, Dr Moni Addison  2   Anticoagulation with rivaroxaban    No results found for this or any previous visit (from the past 36 hour(s))  No orders to display         Portions of the record may have been created with voice recognition software  Occasional wrong word or "sound a like" substitutions may have occurred due to the inherent limitations of voice recognition software  Read the chart carefully and recognize, using context, where substitutions have occurred

## 2018-03-02 NOTE — ASSESSMENT & PLAN NOTE
Patient does not have any residual weakness  Had stroke 1 year ago  Currently on Xarelto for that  Will stop Xarelto for now  Resume once cleared by Urology

## 2018-03-03 VITALS
HEART RATE: 80 BPM | RESPIRATION RATE: 20 BRPM | DIASTOLIC BLOOD PRESSURE: 82 MMHG | OXYGEN SATURATION: 98 % | SYSTOLIC BLOOD PRESSURE: 138 MMHG | TEMPERATURE: 97.8 F | HEIGHT: 61 IN | WEIGHT: 213.3 LBS | BODY MASS INDEX: 40.27 KG/M2

## 2018-03-03 PROBLEM — R33.8 ACUTE URINARY RETENTION: Status: RESOLVED | Noted: 2018-03-02 | Resolved: 2018-03-03

## 2018-03-03 LAB
ANION GAP SERPL CALCULATED.3IONS-SCNC: 6 MMOL/L (ref 4–13)
BUN SERPL-MCNC: 23 MG/DL (ref 5–25)
CALCIUM SERPL-MCNC: 8.5 MG/DL (ref 8.3–10.1)
CHLORIDE SERPL-SCNC: 106 MMOL/L (ref 100–108)
CO2 SERPL-SCNC: 30 MMOL/L (ref 21–32)
CREAT SERPL-MCNC: 1 MG/DL (ref 0.6–1.3)
ERYTHROCYTE [DISTWIDTH] IN BLOOD BY AUTOMATED COUNT: 15.5 % (ref 11.6–15.1)
GFR SERPL CREATININE-BSD FRML MDRD: 53 ML/MIN/1.73SQ M
GLUCOSE SERPL-MCNC: 116 MG/DL (ref 65–140)
HCT VFR BLD AUTO: 28.3 % (ref 34.8–46.1)
HGB BLD-MCNC: 9.3 G/DL (ref 11.5–15.4)
MCH RBC QN AUTO: 32 PG (ref 26.8–34.3)
MCHC RBC AUTO-ENTMCNC: 32.9 G/DL (ref 31.4–37.4)
MCV RBC AUTO: 97 FL (ref 82–98)
PLATELET # BLD AUTO: 202 THOUSANDS/UL (ref 149–390)
PMV BLD AUTO: 10 FL (ref 8.9–12.7)
POTASSIUM SERPL-SCNC: 3.8 MMOL/L (ref 3.5–5.3)
RBC # BLD AUTO: 2.91 MILLION/UL (ref 3.81–5.12)
SODIUM SERPL-SCNC: 142 MMOL/L (ref 136–145)
WBC # BLD AUTO: 8.9 THOUSAND/UL (ref 4.31–10.16)

## 2018-03-03 PROCEDURE — 85027 COMPLETE CBC AUTOMATED: CPT | Performed by: INTERNAL MEDICINE

## 2018-03-03 PROCEDURE — 94760 N-INVAS EAR/PLS OXIMETRY 1: CPT

## 2018-03-03 PROCEDURE — 99233 SBSQ HOSP IP/OBS HIGH 50: CPT | Performed by: UROLOGY

## 2018-03-03 PROCEDURE — 94660 CPAP INITIATION&MGMT: CPT

## 2018-03-03 PROCEDURE — 99217 PR OBSERVATION CARE DISCHARGE MANAGEMENT: CPT | Performed by: INTERNAL MEDICINE

## 2018-03-03 PROCEDURE — 80048 BASIC METABOLIC PNL TOTAL CA: CPT | Performed by: INTERNAL MEDICINE

## 2018-03-03 RX ADMIN — HYDROCODONE BITARTRATE AND ACETAMINOPHEN 1 TABLET: 5; 325 TABLET ORAL at 05:58

## 2018-03-03 RX ADMIN — GABAPENTIN 100 MG: 100 CAPSULE ORAL at 11:39

## 2018-03-03 RX ADMIN — LOSARTAN POTASSIUM: 50 TABLET, FILM COATED ORAL at 11:39

## 2018-03-03 RX ADMIN — FUROSEMIDE 20 MG: 20 TABLET ORAL at 11:39

## 2018-03-03 RX ADMIN — ROPINIROLE 0.5 MG: 0.25 TABLET, FILM COATED ORAL at 11:38

## 2018-03-03 RX ADMIN — PANTOPRAZOLE SODIUM 40 MG: 40 TABLET, DELAYED RELEASE ORAL at 06:54

## 2018-03-03 RX ADMIN — LEVOTHYROXINE SODIUM 75 MCG: 75 TABLET ORAL at 05:58

## 2018-03-03 NOTE — DISCHARGE SUMMARY
Discharge Summary - [facility] Internal Medicine    Patient Information: Shayla Gaines 81 y o  female MRN: [id number]  Unit/Bed#: [facility] 418-13 Encounter: [id number]    Discharging Physician / Practitioner: Elisha Jerry MD  PCP: Kristi Boucher MD  Admission Date: 3/2/2018  Discharge Date: 03/03/18    Reason for Admission:     Discharge Diagnoses:     Principal Problem:    Hematuria  Active Problems:    CVA (cerebral vascular accident) ([facility] [address] )    Acute blood loss anemia    [facility] ([facility] )    Lung nodule < 6cm on CT  Resolved Problems:    Acute urinary retention      Consultations During Hospital Stay:  · Dr Matt Linares Urology    Procedures Performed:     · None      Incidental Findings:   · None    Test Results Pending at Discharge (will require follow up): · None     Outpatient Tests Requested:  · None    Complications: none    Hospital Course:     Shayla Gaines is a 81 y o  female patient who originally presented to the hospital on 3/2/2018 due to gross hematuria in context of known Urothelial Carcinoma and ureteral stent placement  Pt is also anticipated to have a nephrectomy done by Dr Mendel Morales in near future  She had serial Hb done that was not bad compared to baseline  She continues to have mild on and off hematuria  Rx was given for rpt cbc in 2-3 days and results to PCP for monitoring  She and  were advised to come back to ER if any significant worsening of bleeding  Condition at Discharge: fair     Discharge Day Visit / Exam:     Vitals: Blood Pressure: 108/54 (03/03/18 0700)  Pulse: 67 (03/03/18 0700)  Temperature: 98 2 °F (36 8 °C) (03/03/18 0700)  Temp Source: Oral (03/03/18 0700)  Respirations: 20 (03/03/18 0700)  Height: 5' 1" (154 9 cm) (03/02/18 1331)  Weight - Scale: 96 8 kg (213 lb 4 8 oz) (03/02/18 1331)  SpO2: 97 % (03/03/18 0700)  Exam:   Physical Exam   HENT:   Head: Normocephalic  Eyes: Pupils are equal, round, and reactive to light     Cardiovascular: Normal heart sounds  Pulmonary/Chest: Effort normal    Abdominal: Soft  Neurological: She is alert  Skin: Skin is warm  Discharge instructions/Information to patient and family:   See after visit summary for information provided to patient and family  Provisions for Follow-Up Care:  See after visit summary for information related to follow-up care and any pertinent home health orders  Disposition: Home     Discharge Statement:  I spent 35 minutes discharging the patient  This time was spent on the day of discharge  I had direct contact with the patient on the day of discharge  Greater than 50% of the total time was spent examining patient, answering all patient questions, arranging and discussing plan of care with patient as well as directly providing post-discharge instructions  Additional time then spent on discharge activities  Discharge Medications:  See after visit summary for reconciled discharge medications provided to patient and family  ** Please Note: Dragon 360 Dictation voice to text software may have been used in the creation of this document   **

## 2018-03-03 NOTE — CASE MANAGEMENT
Initial Clinical Review    Admission: Date/Time/Statement: 3/2 @ 1144    Orders Placed This Encounter   Procedures    Place in Observation (expected length of stay for this patient is less than two midnights)     Standing Status:   Standing     Number of Occurrences:   1     Order Specific Question:   Admitting Physician     Answer:   Vicky Medrano [[id number]]     Order Specific Question:   Level of Care     Answer:   Med Surg [[id number]]         ED: Date/Time/Mode of Arrival:   ED Arrival Information     Expected Arrival Acuity Means of Arrival Escorted By Service Admission Type    3/2/2018 06:00 3/2/2018 06:11 Urgent Ambulance [address] [facility] General Medicine Urgent    Arrival Complaint    Vaginal Bleeding          Chief Complaint:   Chief Complaint   Patient presents with    Vaginal Bleeding     Pt went for a kidney procedure 3 days ago and has had vaginal bleeding ever since  Pt states that she still is on blood thinners  History of Illness: Elina Pineda is a 81 y o  female who presents with hematuria which she has had for last 3 days since she had right ureteric stent placed by Dr Graham Ojeda  She is on Xarelto for the history of stroke  She was advised that she might have some bleeding after the procedure  This morning patient noticed a lot of bleeding with clots and she was also having some pressure sensation in her lower abdomen  So she decided to come to the Er      She has known history of recurrent UTI and refractory longstanding hematuria status post cystoscopy, right ureteroscopy, right retrograde pyelography and insertion of right ureteral stent with right renal pelvic washing for cytology secondary to high suspicion of urothelial neoplasm involving the right renal pelvis       In the ER she was noted to have or bleeding with protruding ureteral stent  This was removed by Urology  After removal of the stent patient's lower abdominal pressure improved    She is still having hematuria but as per the patient it appears that it has slowed down  Also she denies any trouble urinating now        ED Vital Signs:   ED Triage Vitals   Temperature Pulse Respirations Blood Pressure SpO2   03/02/18 0613 03/02/18 0613 03/02/18 0613 03/02/18 0613 03/02/18 0613   97 7 °F (36 5 °C) 74 18 162/70 97 %      Temp Source Heart Rate Source Patient Position - Orthostatic VS BP Location FiO2 (%)   03/02/18 0613 03/02/18 0613 03/02/18 0613 03/02/18 0613 --   Oral Monitor Lying Right arm       Pain Score       03/02/18 0645       No Pain        Wt Readings from Last 1 Encounters:   03/02/18 96 8 kg (213 lb 4 8 oz)       Vital Signs (abnormal): /112    Abnormal Labs/Diagnostic Test Results:    WBC 11 23 (H) 4 31 - 10 16 Thousand/uL     RBC 2 81 (L) 3 81 - 5 12 Million/uL     Hemoglobin 8 9 (L) 11 5 - 15 4 g/dL     Hematocrit 27 2 (L) 34 8 - 46 1 %      Repeat H/H 3/2 1848 9 7/29 2  PT 26 5 INR 2 41  BUN 27    ED Treatment:   Medication Administration from 03/02/2018 0600 to 03/02/2018 1320     None          Past Medical/Surgical History:    Active Ambulatory Problems     Diagnosis Date Noted    CVA (cerebral vascular accident) ([facility] ) 05/10/2017    Hypertension 05/10/2017    Rheumatoid arthritis ([facility] ) 05/10/2017    Diabetes mellitus type 2 in obese ([facility] ) 05/10/2017    Urinary tract infection 05/10/2017    Sleep apnea 05/10/2017    Hypothyroidism 05/10/2017    Chronic GERD 05/10/2017    Acute blood loss anemia 12/13/2012    Benign essential hypertension 07/09/2012    Carpal tunnel syndrome, left 09/15/2017    Diverticulosis of colon 08/29/2013    Edema 05/14/2013    Hematuria 09/15/2017    Hypercholesterolemia 08/29/2013    Lymphedema 09/15/2017    Obesity 07/18/2013    Overactive bladder 05/24/2016    Peripheral neuropathy 11/09/2017    Prediabetes 05/24/2016    Primary osteoarthritis of left knee 10/13/2017    Restless legs syndrome 01/05/2016    Right lumbar radiculopathy 11/09/2017  Sensorineural hearing loss 10/12/2014    Sensory urge incontinence 09/11/2017    Sinus arrhythmia 01/03/2018    Renal neoplasm 02/06/2018   [place] hematuria 02/08/2018    Chronic bilateral thoracic back pain 02/12/2018    Thoracic degenerative disc disease 02/12/2018     Resolved Ambulatory Problems     Diagnosis Date Noted    No Resolved Ambulatory Problems     Past Medical History:   Diagnosis Date    Anemia     Arthritis     Benign essential hypertension     Cataract     Chronic cystitis     Colon, diverticulosis     CPAP (continuous positive airway pressure) dependence     Diabetes mellitus (HCC)     Diverticulitis of colon     Early satiety     GERD (gastroesophageal reflux disease)     Gout     Hearing loss     Hemorrhoids     Hiatal hernia     History of colonic polyps     Hyperlipidemia     Hypertension     Hypertension     Hypothyroidism     Impaired fasting glucose     Left breast mass     Microhematuria     Migraine     Neuropathy     Overactive bladder     Pneumonia of left lower lobe due to infectious organism (HCC)     RA (rheumatoid arthritis) ([facility] [address] )     Rheumatoid arthritis ([facility] [address] )     Sleep apnea     Stroke ([facility] )     Type 2 diabetes mellitus (HCC)     Urinary frequency     Urinary urgency     Use of cane as ambulatory aid        Admitting Diagnosis: Vaginal bleeding [N93 9]  Renal neoplasm [D49 519]  Gross hematuria [R31 0]    Age/Sex: 81 y o  female    Assessment/Plan:   Assessment & Plan     In setting of recent urological procedure - right ureteral stent and being on Xarelto  Stent was removed in the Er  Hemoglobin dropped from 10-8  9  Evaluated by Neurology in the ER  No further urological procedures at this point  Continue to monitor  Stop Xarelto for now    Continue levofloxacin which  patient was supposed to be on for 1 more day following the procedure              Acute urinary retention   Assessment & Plan     Most likely due to hematuria and clots and also probably due to migration of the ureteric stent  Patient now urinating without any problems  She also feels that the pressure is relieved now  Will put the patient on urinary retention protocol  Monitor           Acute blood loss anemia   Assessment & Plan     Hemoglobin dropped from 10 yesterday to 8 9  Probably due to hematuria  Continue to monitor  Repeat hemoglobin at 1800  Transfuse for hemoglobin less than 7           Lung nodule < 6cm on CT   Assessment & Plan     Right lower lobe seen on the CT  Repeat CT in 12 months           Urothelial cancer [facility])   Assessment & Plan     Following up with Urology  Also seen on the CT done recently  Outpatient follow-up after discharge           CVA (cerebral vascular accident) [facility])   Assessment & Plan     Patient does not have any residual weakness  Had stroke 1 year ago  Currently on Xarelto for that  Will stop Xarelto for now  Resume once cleared by Urology                 VTE Prophylaxis: Pharmacologic VTE Prophylaxis contraindicated due to Hematuria  / sequential compression device      Anticipated Length of Stay:  Patient will be admitted on an Observation basis with an anticipated length of stay of  < 2 midnights  Justification for Hospital Stay: above      Admission Orders:  Scheduled Meds:   Current Facility-Administered Medications:  furosemide 20 mg Oral Daily Lyssa Simmons MD   gabapentin 100 mg Oral TID Lyssa Simmons MD   HYDROcodone-acetaminophen 1 tablet Oral Q6H PRN Lyssa Simmons MD   levothyroxine 75 mcg Oral Daily Lyssa Simmons MD   losartan potassium-hydrochlorothiazide (HYZAAR 100/12  5) combo dose  Oral Daily Lyssaarely Simmons MD   oxybutynin 10 mg Oral HS Lyssaarely Simmons MD   pantoprazole 40 mg Oral Early Morning Lyssaarely Simmons MD   pravastatin 80 mg Oral Daily With Inna Jha MD   rOPINIRole 0 5 mg Oral TID Lyssaarely Simmons MD     Continuous Infusions:    PRN Meds: HYDROcodone-acetaminophen    Serial H/H (3/3 am = 9 3/28  3)  Consult urology    ---------------------------------------------------------------------------------------------------------------------------------  Urology progress note  HPI--Ms Candice Maurer is a very pleasant but comorbid 81-year-old female evaluated in our office for history of recurrent UTI and refractory long-standing gross hematuria status post cystoscopy, right ureteroscopy, right retrograde pyelography and insertion of right ureteral stent with right renal pelvic washing for cytology secondary to high suspicion of urothelial neoplasm involving the right renal pelvis  Patient was instructed or rather informed that she would have some postoperative gross hematuria and presented to the emergency room with complaints of progressive worsening voiding symptoms and gross hematuria with clots; not accompanied by fever, chills, flank or groin pain, bladder spasms, or dysuria  Clinical scenario complicated by patient's requirement for chronic anticoagulation with Xarelto for history of CVA 1 year ago  On examination, patient presented with mildly distended suprapubic area and urethral meatal discharge with clots and what appeared to be protruding ureteral stent  Urologic consultation was requested urgently for our opinion thereof and further management recommendations  Source:the patient and chart      Impressions  · Hematuria secondary to right renal pelvis mass /papillary lesion  suspicious for transitional cell carcinoma and requirement for anticoagulation  · Status post cystoscopy, retrograde pyelography, right ureteroscopy, washings and right ureteral stent placement secondary to previous  · Stent migration     Patient is chronically anemic and otherwise non symptomatic with hemoglobin of 8 9  Bladder scan was obtained at the bedside for only 92 mL postvoid residual   Gently removed stent  Does not require surgical replacement    Patient and spouse engaged in long discussion with Dr Dixie Estevez in the office regarding necessity for right nephroureterectomy and were in the process of scheduling electively pending final pathology reports         Recommendations  As mentioned previously, stent removed with incident  Monitor PVR  Patient will be kept in the emergency room the next few hours to observe voiding pattern; as well as obtain repeat hemoglobin determine  If + significant blood loss, patient will require admission to the medical team   At this particular time, patient does not require catheter insertion or any aggressive bladder irrigation due to clot retention  Patient is asymptomatic from anemia at this time without evidence of shortness of breath, tachycardia or other compensatory signs  If hemoglobin is acceptable, may be discharged per the emergency room staff to follow up with Dr Dixie Estevez as previously scheduled  Would refrain from Xarelto for at least several days before restarting  Will ask Dr Dixie Estevez to have conversation with cardiologist regarding the safety of abstinence from anticoagulation    Emergent  surgical instrumentation is not indicated

## 2018-03-03 NOTE — PROGRESS NOTES
UROLOGY PROGRESS NOTE   Patient Identifiers: Rick Locke (MRN [id number])  Date of Service: 3/3/2018        Assessment:   - recently diagnosed upper tract urothelial carcinoma  - gross hematuria, expected, improving  - anemia stable    Plan:   - patient is stable for discharge home from a urologic standpoint  - she will follow-up as scheduled with my partner Dr Nevin Arellano for discussion of management options for her upper tract urothelial carcinoma    Thank you for allowing us to participate in the patient's care        Subjective:     24 HR EVENTS:   no significant events  Patient has  no complaints  Reports her hematuria has improved    Objective:     VITALS:    Vitals:    03/03/18 0700   BP: 108/54   Pulse: 67   Resp: 20   Temp: 98 2 °F (36 8 °C)   SpO2: 97%       INS & OUTS:  [unfilled]    LABS:  Lab Results   Component Value Date    HGB 9 3 (L) 03/03/2018    HCT 28 3 (L) 03/03/2018    WBC 8 90 03/03/2018     03/03/2018   ]    Lab Results   Component Value Date     03/03/2018    K 3 8 03/03/2018     03/03/2018    CO2 30 03/03/2018    BUN 23 03/03/2018    CREATININE 1 00 03/03/2018    CALCIUM 8 5 03/03/2018    GLUCOSE 116 03/03/2018   ]    INPATIENT MEDS:    Current Facility-Administered Medications:     furosemide (LASIX) tablet 20 mg, 20 mg, Oral, Daily, Alex Lowry MD, 20 mg at 03/02/18 1526    gabapentin (NEURONTIN) capsule 100 mg, 100 mg, Oral, TID, Alex Lowry MD, 100 mg at 03/02/18 2135    HYDROcodone-acetaminophen (NORCO) 5-325 mg per tablet 1 tablet, 1 tablet, Oral, Q6H PRN, Alex Lowry MD, 1 tablet at 03/03/18 0558    levothyroxine tablet 75 mcg, 75 mcg, Oral, Daily, Alex Lowry MD, 75 mcg at 03/03/18 0558    losartan potassium-hydrochlorothiazide (HYZAAR 100/12  5) combo dose, , Oral, Daily, Alex Lowry MD    oxybutynin (DITROPAN-XL) 24 hr tablet 10 mg, 10 mg, Oral, HS, Alex Lowry MD, 10 mg at 03/02/18 2561   pantoprazole (PROTONIX) EC tablet 40 mg, 40 mg, Oral, Early Morning, Lyssa Simmons MD, 40 mg at 03/03/18 0654    pravastatin (PRAVACHOL) tablet 80 mg, 80 mg, Oral, Daily With Inna Jha MD, 80 mg at 03/02/18 1844    rOPINIRole (REQUIP) tablet 0 5 mg, 0 5 mg, Oral, TID, Lyssa Simmons MD, 0 5 mg at 03/02/18 2135      Physical Exam:   /54   Pulse 67   Temp 98 2 °F (36 8 °C) (Oral)   Resp 20   Ht 5' 1" (1 549 m)   Wt 96 8 kg (213 lb 4 8 oz)   LMP  (LMP Unknown)   SpO2 97%   BMI 40 30 kg/m²   GEN: alert and oriented x 3    RESP: breathing comfortably with no accessory muscle use    ABD: soft, appropriately tender to palpation, non-distended   EXT: bilateral peripheral edema   DRAINS: none  UGARTE:none

## 2018-03-03 NOTE — PLAN OF CARE
GENITOURINARY - ADULT     Maintains or returns to baseline urinary function Progressing     Absence of urinary retention Progressing        HEMATOLOGIC - ADULT     Maintains hematologic stability Progressing        MUSCULOSKELETAL - ADULT     Maintain or return mobility to safest level of function Progressing        Potential for Falls     Patient will remain free of falls Progressing

## 2018-03-03 NOTE — RESPIRATORY THERAPY NOTE
RT Protocol Note  Elina Pineda 81 y o  female MRN: [id number]  Unit/Bed#: [facility] 806-82 Encounter: [id number]    Assessment    Principal Problem:    Hematuria  Active Problems:    CVA (cerebral vascular accident) ([place] [id number] )    Acute blood loss anemia    Acute urinary retention    Urothelial cancer ([facility] )    Lung nodule < 6cm on CT      Home Pulmonary Medications:  na    Past Medical History:   Diagnosis Date    Anemia     Arthritis     rheumatoid    Benign essential hypertension     Cataract     Chronic cystitis     Colon, diverticulosis     CPAP (continuous positive airway pressure) dependence     Diabetes mellitus (HCC)     Diverticulitis of colon     Early satiety     GERD (gastroesophageal reflux disease)     Gout     Hearing loss     Hemorrhoids     Hiatal hernia     History of colonic polyps     Hyperlipidemia     Hypertension     Hypertension     Hypothyroidism     Impaired fasting glucose     Left breast mass     Microhematuria     Migraine     occular    Neuropathy     lower and upper extermities    Overactive bladder     Pneumonia of left lower lobe due to infectious organism (HCC)     RA (rheumatoid arthritis) (HCC)     Rheumatoid arthritis ([place] [address] )     Sleep apnea     uses cpap    Stroke ([place] [address] )     Type 2 diabetes mellitus [facility]     Urinary frequency     Urinary urgency     Use of cane as ambulatory aid      Social History     Social History    Marital status: /Civil Union     Spouse name: N/A    Number of children: N/A    Years of education: N/A     Social History Main Topics    Smoking status: Never Smoker    Smokeless tobacco: Never Used      Comment: stopped smoking in the distant past, never smoker (as per [employer])     Alcohol use Yes      Comment: socially    Drug use: No    Sexual activity: Not Asked     Other Topics Concern    None     Social History Narrative    No caffeine use       Subjective         Objective    Physical Exam:   Assessment Type: Assess only  General Appearance: Alert, Awake  Respiratory Pattern: Normal  Chest Assessment: Chest expansion symmetrical  Bilateral Breath Sounds: Clear, Diminished  Cough: None  O2 Device: Room Air    Vitals:  Blood pressure 132/64, pulse 73, temperature 98 °F (36 7 °C), temperature source Oral, resp  rate 18, height 5' 1" (1 549 m), weight 96 8 kg (213 lb 4 8 oz), SpO2 98 %  Imaging and other studies: I have personally reviewed pertinent reports  O2 Device: Room Air     Plan    Respiratory Plan: Discontinue Protocol        Resp Comments: Pt  evaluated at bedside per Respiratory Protocol  Pt  denies any Pulmonary hx  but staes she has NICK and wears CPAP @ HS  Pt  Breath Sounds diminished but clear at this time  Pt  is in no distress and Respiratory Ptotocol will be D/C'd at this time

## 2018-03-03 NOTE — NURSING NOTE
Reviewed d/c instructions and meds with pt  And spouse  D/c'd IV site  Pt  Taken down via wheelchair by PCA  All questions answered to the best of my ability  Pt  And spouse have full understanding of teaching

## 2018-03-05 ENCOUNTER — TELEPHONE (OUTPATIENT)
Dept: UROLOGY | Facility: MEDICAL CENTER | Age: 83
End: 2018-03-05

## 2018-03-05 ENCOUNTER — TELEPHONE (OUTPATIENT)
Dept: FAMILY MEDICINE CLINIC | Facility: CLINIC | Age: 83
End: 2018-03-05

## 2018-03-05 NOTE — TELEPHONE ENCOUNTER
Pt states she currently is not bleeding  Questioned if she should be seen earlier  Pt instructed to keep 3/8 appt  If bleeding starts to call office

## 2018-03-05 NOTE — TELEPHONE ENCOUNTER
Pt called today to let us know that she was admitted to the hospital on Friday  She said she is pretty much on bed rest and is also calling the nephrologist to see if they want to see her for a follow up   She wants to know if you want her to follow up with you as well

## 2018-03-06 ENCOUNTER — APPOINTMENT (OUTPATIENT)
Dept: LAB | Facility: CLINIC | Age: 83
End: 2018-03-06
Payer: MEDICARE

## 2018-03-06 DIAGNOSIS — R53.83 OTHER FATIGUE: ICD-10-CM

## 2018-03-06 LAB
ALBUMIN SERPL BCP-MCNC: 3.6 G/DL (ref 3.5–5)
ALP SERPL-CCNC: 108 U/L (ref 46–116)
ALT SERPL W P-5'-P-CCNC: 18 U/L (ref 12–78)
ANION GAP SERPL CALCULATED.3IONS-SCNC: 8 MMOL/L (ref 4–13)
AST SERPL W P-5'-P-CCNC: 15 U/L (ref 5–45)
BASOPHILS # BLD AUTO: 0.01 THOUSANDS/ΜL (ref 0–0.1)
BASOPHILS NFR BLD AUTO: 0 % (ref 0–1)
BILIRUB SERPL-MCNC: 0.8 MG/DL (ref 0.2–1)
BUN SERPL-MCNC: 22 MG/DL (ref 5–25)
CALCIUM SERPL-MCNC: 8.6 MG/DL (ref 8.3–10.1)
CHLORIDE SERPL-SCNC: 102 MMOL/L (ref 100–108)
CO2 SERPL-SCNC: 30 MMOL/L (ref 21–32)
CREAT SERPL-MCNC: 1.01 MG/DL (ref 0.6–1.3)
EOSINOPHIL # BLD AUTO: 0.16 THOUSAND/ΜL (ref 0–0.61)
EOSINOPHIL NFR BLD AUTO: 2 % (ref 0–6)
ERYTHROCYTE [DISTWIDTH] IN BLOOD BY AUTOMATED COUNT: 15.1 % (ref 11.6–15.1)
GFR SERPL CREATININE-BSD FRML MDRD: 52 ML/MIN/1.73SQ M
GLUCOSE SERPL-MCNC: 175 MG/DL (ref 65–140)
HCT VFR BLD AUTO: 28.2 % (ref 34.8–46.1)
HGB BLD-MCNC: 9.4 G/DL (ref 11.5–15.4)
LYMPHOCYTES # BLD AUTO: 2.03 THOUSANDS/ΜL (ref 0.6–4.47)
LYMPHOCYTES NFR BLD AUTO: 31 % (ref 14–44)
MCH RBC QN AUTO: 32.3 PG (ref 26.8–34.3)
MCHC RBC AUTO-ENTMCNC: 33.3 G/DL (ref 31.4–37.4)
MCV RBC AUTO: 97 FL (ref 82–98)
MONOCYTES # BLD AUTO: 0.63 THOUSAND/ΜL (ref 0.17–1.22)
MONOCYTES NFR BLD AUTO: 10 % (ref 4–12)
NEUTROPHILS # BLD AUTO: 3.71 THOUSANDS/ΜL (ref 1.85–7.62)
NEUTS SEG NFR BLD AUTO: 57 % (ref 43–75)
NRBC BLD AUTO-RTO: 0 /100 WBCS
PLATELET # BLD AUTO: 222 THOUSANDS/UL (ref 149–390)
PMV BLD AUTO: 10 FL (ref 8.9–12.7)
POTASSIUM SERPL-SCNC: 3.4 MMOL/L (ref 3.5–5.3)
PROT SERPL-MCNC: 6.7 G/DL (ref 6.4–8.2)
RBC # BLD AUTO: 2.91 MILLION/UL (ref 3.81–5.12)
SODIUM SERPL-SCNC: 140 MMOL/L (ref 136–145)
WBC # BLD AUTO: 6.56 THOUSAND/UL (ref 4.31–10.16)

## 2018-03-06 PROCEDURE — 36415 COLL VENOUS BLD VENIPUNCTURE: CPT

## 2018-03-06 PROCEDURE — 80053 COMPREHEN METABOLIC PANEL: CPT

## 2018-03-06 PROCEDURE — 85025 COMPLETE CBC W/AUTO DIFF WBC: CPT

## 2018-03-06 NOTE — TELEPHONE ENCOUNTER
Please check and see how she is feeling    We can certainly see her when she comes out of the hospital

## 2018-03-08 ENCOUNTER — OFFICE VISIT (OUTPATIENT)
Dept: UROLOGY | Facility: MEDICAL CENTER | Age: 83
End: 2018-03-08
Payer: MEDICARE

## 2018-03-08 VITALS
DIASTOLIC BLOOD PRESSURE: 82 MMHG | SYSTOLIC BLOOD PRESSURE: 160 MMHG | BODY MASS INDEX: 40.78 KG/M2 | HEIGHT: 61 IN | WEIGHT: 216 LBS

## 2018-03-08 DIAGNOSIS — C65.1 MALIGNANT TUMOR OF RENAL PELVIS, RIGHT (HCC): Primary | ICD-10-CM

## 2018-03-08 PROCEDURE — 99214 OFFICE O/P EST MOD 30 MIN: CPT | Performed by: UROLOGY

## 2018-03-08 NOTE — PATIENT INSTRUCTIONS
Will schedule a referral to Dr Glynn Whitney and colleagues for right robotic laparoscopic nephrectomy

## 2018-03-08 NOTE — LETTER
2018     Alejandrina Browning MD  [id number]   [address] [facility]    Patient: Zulema Cardenas   YOB: 1935   Date of Visit: 3/8/2018       Dear Dr Ema Haynes: Thank you for referring Miguel Raphael to me for evaluation  Below are my notes for this consultation  If you have questions, please do not hesitate to call me  I look forward to following your patient along with you  Sincerely,        Melba Prado MD        CC: MD Melba Curry MD  3/8/2018 11:20 AM  Incomplete  [address] 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  This was directly visualized  is causing gross hematuria  Plan robotic assisted laparoscopic right radical nephrectomy with at least partial ureterectomy  Chief Complaint   No chief complaint on file  History of Present Illness   Status post cystoscopy right ureteroscopy and obtaining of renal pelvic washings for suspected urothelial CA(TCC) of right renal pelvis  This was seen in the form of papillary frondular lesions in the right renal pelvis; although the washings cytology shows only atypia  She continues to have gross hematuria due to this  With the visual diagnosis of 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, she needs right nephro ureterectomy  The amount of ureter to be obtained will be determined by the surgeon performing the robotic case, and consideration needs to be taken in terms of her comorbidities and age, because aggressive ureterectomy could cause more harm  Her stent came out over the weekend by accidental pulling on the string    She has had no further gross hematuria and she has no pain  The following portions of the patient's history were reviewed and updated as appropriate: allergies, current medications, past family history, past medical history, past social history, past surgical history and problem list     Review of Systems   Review of Systems    Active Problem List     Patient Active Problem List   Diagnosis    CVA (cerebral vascular accident) ([facility] )    Hypertension    Rheumatoid arthritis ([facility] )    Diabetes mellitus type 2 in obese ([facility] )    Urinary tract infection    Sleep apnea    Hypothyroidism    Chronic GERD    Acute blood loss anemia    Benign essential hypertension    Carpal tunnel syndrome, left    Diverticulosis of colon    Edema    Hematuria    Hypercholesterolemia    Lymphedema    Obesity    Overactive bladder    Peripheral neuropathy    Prediabetes    Primary osteoarthritis of left knee    Restless legs syndrome    Right lumbar radiculopathy    Sensorineural hearing loss    Sensory urge incontinence    Sinus arrhythmia    Renal neoplasm    Gross hematuria    Chronic bilateral thoracic back pain    Thoracic degenerative disc disease    Urothelial cancer ([place] [id number] )    Lung nodule < 6cm on CT       Objective   LMP  (LMP Unknown)     Physical Exam   Constitutional: She is oriented to person, place, and time  She appears well-developed and well-nourished  HENT:   Head: Normocephalic and atraumatic  Eyes: EOM are normal    Neck: Normal range of motion  Neck supple  Cardiovascular: Normal rate, regular rhythm and normal heart sounds  Exam reveals no gallop  No murmur heard  Pulmonary/Chest: Effort normal and breath sounds normal    Abdominal: Soft  She exhibits no distension and no mass  There is no tenderness  There is no rebound and no guarding  Musculoskeletal: Normal range of motion  Neurological: She is alert and oriented to person, place, and time  Skin: Skin is warm and dry     Psychiatric: She has a normal mood and affect   Her behavior is normal  Judgment and thought content normal        Pertinent Laboratory/Diagnostic Studies:  CBC:   Lab Results   Component Value Date/Time    WBC 6 56 03/06/2018 02:23 PM    WBC 5 9 11/14/2017 11:51 AM    RBC 2 91 (L) 03/06/2018 02:23 PM    RBC 2 92 (L) 11/14/2017 11:51 AM    HGB 9 4 (L) 03/06/2018 02:23 PM    HGB 9 5 (L) 11/14/2017 11:51 AM    HCT 28 2 (L) 03/06/2018 02:23 PM    HCT 28 5 (L) 11/14/2017 11:51 AM    MCV 97 03/06/2018 02:23 PM    MCV 97 6 11/14/2017 11:51 AM    MCH 32 3 03/06/2018 02:23 PM    MCH 32 5 11/14/2017 11:51 AM    MCHC 33 3 03/06/2018 02:23 PM    MCHC 33 3 11/14/2017 11:51 AM    RDW 15 1 03/06/2018 02:23 PM    RDW 13 9 11/14/2017 11:51 AM    MPV 10 0 03/06/2018 02:23 PM    MPV 10 7 11/14/2017 11:51 AM     03/06/2018 02:23 PM     11/14/2017 11:51 AM    NRBC 0 03/06/2018 02:23 PM    NEUTOPHILPCT 57 03/06/2018 02:23 PM    NEUTOPHILPCT 56 01/04/2016 05:58 PM    LYMPHOPCT 31 03/06/2018 02:23 PM    LYMPHOPCT 32 01/04/2016 05:58 PM    MONOPCT 10 03/06/2018 02:23 PM    MONOPCT 11 01/04/2016 05:58 PM    EOSPCT 2 03/06/2018 02:23 PM    EOSPCT 1 01/04/2016 05:58 PM    BASOPCT 0 03/06/2018 02:23 PM    BASOPCT 0 3 11/14/2017 11:51 AM    NEUTROABS 3 71 03/06/2018 02:23 PM    Mimi Pronto 0667 11/14/2017 11:51 AM    NEUTROABS 57 3 11/14/2017 11:51 AM    LYMPHSABS 2 03 03/06/2018 02:23 PM    LYMPHSABS 1676 11/14/2017 11:51 AM    LYMPHSABS 28 4 11/14/2017 11:51 AM    MONOSABS 0 63 03/06/2018 02:23 PM    MONOSABS 649 11/14/2017 11:51 AM    EOSABS 0 16 03/06/2018 02:23 PM    EOSABS 177 11/14/2017 11:51 AM    EOSABS 3 0 11/14/2017 11:51 AM       Current Medications     Current Outpatient Prescriptions:     Calcium Citrate-Vitamin D (CALCIUM + D PO), Take 1 tablet by mouth 2 (two) times a day, Disp: , Rfl:     Certolizumab Pegol (CIMZIA PREFILLED SC), Inject under the skin every 30 (thirty) days  , Disp: , Rfl:     cyanocobalamin (VITAMIN B-12) 100 mcg tablet, Take by mouth, Disp: , Rfl:     furosemide (LASIX) 20 mg tablet, Take 20 mg by mouth daily  , Disp: , Rfl:     gabapentin (NEURONTIN) 100 mg capsule, Take 1 capsule (100 mg total) by mouth 3 (three) times a day Take 1 cap in the am, 1 cap in the afternoon, and 3 cap at bedtime, Disp: 150 capsule, Rfl: 1    HYDROcodone-acetaminophen (NORCO) 5-325 mg per tablet, Take 1-2 tablets by mouth every 6 (six) hours as needed for pain for up to 10 days Max Daily Amount: 8 tablets, Disp: 20 tablet, Rfl: 0    LEUCOVORIN CALCIUM PO, Take 10 mg by mouth once a week, Disp: , Rfl:     levothyroxine 75 mcg tablet, Take 75 mcg by mouth daily, Disp: , Rfl:     losartan-hydrochlorothiazide (HYZAAR) 100-12 5 MG per tablet, Take 1 tablet by mouth daily  , Disp: , Rfl:     methotrexate (RHEUMATREX) 2 5 MG tablet, Take 2 5 mg by mouth once a week  , Disp: , Rfl:     Multiple Vitamin (MULTIVITAMINS PO), Take 1 tablet by mouth daily, Disp: , Rfl:     omeprazole (PriLOSEC) 20 mg delayed release capsule, Take 20 mg by mouth daily, Disp: , Rfl:     oxybutynin (DITROPAN-XL) 10 MG 24 hr tablet, Take 1 tablet (10 mg total) by mouth daily at bedtime for 3 days, Disp: 3 tablet, Rfl: 0    pravastatin (PRAVACHOL) 80 mg tablet, TAKE 1 TABLET EVERY DAY, Disp: 90 tablet, Rfl: 3    [START ON 3/13/2018] rivaroxaban (XARELTO) 20 mg tablet, Take 1 tablet (20 mg total) by mouth daily with dinner, Disp: 30 tablet, Rfl: 0    rOPINIRole (REQUIP) 0 5 mg tablet, Take 0 5 mg by mouth 3 (three) times a day, Disp: , Rfl:         Swathi Verduzco MD

## 2018-03-09 ENCOUNTER — OFFICE VISIT (OUTPATIENT)
Dept: UROLOGY | Facility: CLINIC | Age: 83
End: 2018-03-09
Payer: MEDICARE

## 2018-03-09 VITALS
WEIGHT: 215.8 LBS | HEIGHT: 61 IN | DIASTOLIC BLOOD PRESSURE: 80 MMHG | HEART RATE: 100 BPM | SYSTOLIC BLOOD PRESSURE: 160 MMHG | BODY MASS INDEX: 40.75 KG/M2

## 2018-03-09 DIAGNOSIS — N32.81 OVERACTIVE BLADDER: ICD-10-CM

## 2018-03-09 DIAGNOSIS — R31.0 GROSS HEMATURIA: ICD-10-CM

## 2018-03-09 DIAGNOSIS — C68.9 UROTHELIAL CANCER (HCC): ICD-10-CM

## 2018-03-09 DIAGNOSIS — C64.9 MALIGNANT NEOPLASM OF KIDNEY, UNSPECIFIED LATERALITY (HCC): Primary | ICD-10-CM

## 2018-03-09 DIAGNOSIS — N30.01 ACUTE CYSTITIS WITH HEMATURIA: ICD-10-CM

## 2018-03-09 PROBLEM — D49.519 RENAL NEOPLASM: Status: RESOLVED | Noted: 2018-02-06 | Resolved: 2018-03-09

## 2018-03-09 LAB
SL AMB  POCT GLUCOSE, UA: NORMAL
SL AMB POCT BILIRUBIN,UA: NORMAL
SL AMB POCT BLOOD,UA: NORMAL
SL AMB POCT CLARITY,UA: CLEAR
SL AMB POCT COLOR,UA: YELLOW
SL AMB POCT KETONES,UA: NORMAL
SL AMB POCT NITRITE,UA: NORMAL
SL AMB POCT PH,UA: 6
SL AMB POCT SPECIFIC GRAVITY,UA: 1.03
SL AMB POCT URINE PROTEIN: NORMAL
SL AMB POCT UROBILINOGEN: NORMAL

## 2018-03-09 PROCEDURE — 88112 CYTOPATH CELL ENHANCE TECH: CPT | Performed by: PATHOLOGY

## 2018-03-09 PROCEDURE — 99215 OFFICE O/P EST HI 40 MIN: CPT | Performed by: UROLOGY

## 2018-03-09 PROCEDURE — 81002 URINALYSIS NONAUTO W/O SCOPE: CPT | Performed by: UROLOGY

## 2018-03-09 NOTE — PROGRESS NOTES
UROLOGY SECOND OPINION FOR SURGERY NOTE     History of Present Illness:   Dejah Pacheco is a 81 y o  female known to Dr Jordan Kidd with a long history of urge incontinence who has been undergoing PTNS  She developed recurrent urinary tract infections and gross hematuria  Her primary care team ordered a renal ultrasound and followup CT scan which showed some nodularity in the right renal pelvis  Patient underwent a cystoscopy with ureteroscopy on 2/28/18  From the dictated operative report Dr Humberto Hawkins: "placed a flexible ureteral scope up into the proximal ureter and right renal pelvis  I saw papillary lesions that appeared to be superficial of the proximal right ureter and pelvis and they were diffuse  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  I did not have a biopsy forceps capable of actually grabbing the specimen, so I took a 10 cc syringe and aspirated washings directly over the lesions and sent to cytology "    Patient is being evaluated for a possible nephroureterectomy to both control her multifocal papillary lesions and her recurrent gross hematuria  She does have significant comorbidities including obesity BMI>40, history of CVA on Xarelto, rheumatoid arthritis on methrotrexate/certolizumab  She is inclined towards removal of the kidney due to a desire to avoid recurrent refractory hematuria and concern about spread of malignancy      Past Medical History:     Past Medical History:   Diagnosis Date    Anemia     Arthritis     rheumatoid    Benign essential hypertension     Cataract     Chronic cystitis     Colon, diverticulosis     CPAP (continuous positive airway pressure) dependence     Diabetes mellitus (HCC)     Diverticulitis of colon     Early satiety     GERD (gastroesophageal reflux disease)     Gout     Hearing loss     Hemorrhoids     Hiatal hernia     History of colonic polyps     Hyperlipidemia     Hypertension     Hypertension     Hypothyroidism     Impaired fasting glucose     Left breast mass     Microhematuria     Migraine     occular    Neuropathy     lower and upper extermities    Overactive bladder     Pneumonia of left lower lobe due to infectious organism (HCC)     [facility]oid [facility]     Rheumatoid arthritis [facility]     Sleep apnea     uses cpap    [facility]     Type 2 diabetes mellitus (HCC)     Urinary frequency     Urinary urgency     Use of cane as ambulatory aid        PAST SURGICAL HISTORY:     Past Surgical History:   Procedure Laterality Date    APPENDECTOMY      ARTHROSCOPY WRIST Right     with release of transverse carpal ligament     BLADDER SURGERY      BLADDER SURGERY      BREAST SURGERY      left breast    BUNIONECTOMY Bilateral     CATARACT EXTRACTION      CHOLECYSTECTOMY      COLONOSCOPY      CYSTOSCOPY      EGD AND COLONOSCOPY N/A 12/20/2017    Procedure: EGD AND COLONOSCOPY;  Surgeon: Alin Jarrell MD;  Location:  GI LAB; Service: Gastroenterology    ESOPHAGOGASTRODUODENOSCOPY      diagnostic    FOREARM SURGERY Right     fracture repair    HYSTERECTOMY      KNEE SURGERY Left     meniscus tear    NOSE SURGERY      SD CYSTO/URETERO W/LITHOTRIPSY &INDWELL STENT INSRT Right 2/28/2018    Procedure: CYSTOSCOPY RIGHT URETEROSCOPY, RIGHT RETROGRADE PYELOGRAM AND INSERTION  RIGHT STENT URETERAL, RIGHT RENAL PELVIC WASHING FOR CYTOLOGY;  Surgeon: Kai Luther MD;  Location: [facility] OR;  Service: Urology    REPLACEMENT TOTAL KNEE BILATERAL Bilateral        CURRENT MEDICATIONS:     Current Outpatient Prescriptions   Medication Sig Dispense Refill    Calcium Citrate-Vitamin D (CALCIUM + D PO) Take 1 tablet by mouth 2 (two) times a day      Certolizumab Pegol (CIMZIA PREFILLED SC) Inject under the skin every 30 (thirty) days        cyanocobalamin (VITAMIN B-12) 100 mcg tablet Take by mouth      furosemide (LASIX) 20 mg tablet Take 20 mg by mouth daily        gabapentin (NEURONTIN) 100 mg capsule Take 1 capsule (100 mg total) by mouth 3 (three) times a day Take 1 cap in the am, 1 cap in the afternoon, and 3 cap at bedtime 150 capsule 1    HYDROcodone-acetaminophen (NORCO) 5-325 mg per tablet Take 1-2 tablets by mouth every 6 (six) hours as needed for pain for up to 10 days Max Daily Amount: 8 tablets 20 tablet 0    LEUCOVORIN CALCIUM PO Take 10 mg by mouth once a week      levothyroxine 75 mcg tablet Take 75 mcg by mouth daily      losartan-hydrochlorothiazide (HYZAAR) 100-12 5 MG per tablet Take 1 tablet by mouth daily   methotrexate (RHEUMATREX) 2 5 MG tablet Take 2 5 mg by mouth once a week        Multiple Vitamin (MULTIVITAMINS PO) Take 1 tablet by mouth daily      omeprazole (PriLOSEC) 20 mg delayed release capsule Take 20 mg by mouth daily      oxybutynin (DITROPAN-XL) 10 MG 24 hr tablet Take 1 tablet (10 mg total) by mouth daily at bedtime for 3 days 3 tablet 0    pravastatin (PRAVACHOL) 80 mg tablet TAKE 1 TABLET EVERY DAY 90 tablet 3    [START ON 3/13/2018] rivaroxaban (XARELTO) 20 mg tablet Take 1 tablet (20 mg total) by mouth daily with dinner 30 tablet 0    rOPINIRole (REQUIP) 0 5 mg tablet Take 0 5 mg by mouth 3 (three) times a day       No current facility-administered medications for this visit  ALLERGIES:     Allergies   Allergen Reactions    Acetazolamide Other (See Comments)     Other reaction(s): Unknown Allergic Reaction    Aspirin      Other reaction(s): Other (See Comments)  High Dose ASA-stomach ache, rachel  ASA 81 m    Atorvastatin      Other reaction(s): Muscle Pain, Myalgia    Azithromycin     Nitrofurantoin      Other reaction(s): Unknown Allergic Reaction  Other reaction(s): Other (See Comments)  HIVES * pt denies    Other Other (See Comments)     red and itching  Other reaction(s):  Other (See Comments)  red and itching    Propoxyphene Other (See Comments)     hives    Shellfish-Derived Products Other (See Comments)     Patient got Gout following eating shell fish    Sulfa Antibiotics Other (See Comments)    Tramadol Diarrhea and Vomiting       SOCIAL HISTORY:     Social History     Social History    Marital status: /Civil Union     Spouse name: N/A    Number of children: N/A    Years of education: N/A     Social History Main Topics    Smoking status: Never Smoker    Smokeless tobacco: Never Used      Comment: stopped smoking in the distant past, never smoker (as per [employer])     Alcohol use Yes      Comment: socially    Drug use: No    Sexual activity: Not on file     Other Topics Concern    Not on file     Social History Narrative    No caffeine use       SOCIAL HISTORY:     Family History   Problem Relation Age of Onset    Other Mother      epilepsy   [employer] Glaucoma Father     Stroke Father      silent    Other Brother      cardiac disorder       REVIEW OF SYSTEMS:     General: negative for chills, fatigue, fever, significant unplanned weight changed  Psychological: negative for anxiety, depression, concentration or memory difficulties, irritability, mood swings, sleep disturbances  Ophthalmic: negative for blurry vision or double  ENT: negative for hearing difficulties, tinnitus, vertigo  Hematological and Lymphatic: negative for bleeding problems, blood clots, bruising, swollen lymph nodes  Respiratory: negative for shortness of breath, cough, hemoptysis, orthopnea, tachypnea or wheezing  Cardiovascular: negative for chest pain, dyspnea on exertion, edema, irregular or rapid heartbeat, paroxysmal nocturnal dyspnea  Gastrointestinal: negative for abdominal pain, bright red blood in stools, change in stools, constipation, diarrhea, nausea/vomiting, stool incontinence    GENITOURINARY: see HPI    Musculoskeletal: arthritis symptoms  Dermatological: negative for rash or skin lesion changes  Neurological: some residual short term memory loss from CVA    PHYSICAL EXAM:     LMP  (LMP Unknown)   General:  Elderly female in no acute distress  They have a normal affect  There is not appear to be any gross neurologic defects or abnormalities  HEENT:  Normocephalic, atraumatic  Neck is supple without any palpable lymphadenopathy  Cardiovascular:  Patient has normal palpable distal radial pulses  There is no significant peripheral edema  No JVD is noted  Respiratory:  Patient has unlabored respirations  There is no audible wheeze or rhonchi  Abdomen:  Abdomen with healed surgical scars (appy/LILLY)  Abdomen is soft and nontender  Obese with large pannus  There is no tympany  Inguinal and umbilical hernia are not appreciated  Musculoskeletal:  Patient does not have significant CVA tenderness with palpation or percussion  They full range of motion in all 4 extremities  Walks with a cane  Dermatologic:  Patient has no skin abnormalities or rashes  LABS:     CBC:   Lab Results   Component Value Date    WBC 6 56 03/06/2018    HGB 9 4 (L) 03/06/2018    HCT 28 2 (L) 03/06/2018    MCV 97 03/06/2018     03/06/2018       BMP:   Lab Results   Component Value Date    GLUCOSE 175 (H) 03/06/2018    CALCIUM 8 6 03/06/2018     03/06/2018    K 3 4 (L) 03/06/2018    CO2 30 03/06/2018     03/06/2018    BUN 22 03/06/2018    CREATININE 1 01 03/06/2018       IMAGING:     CT ABDOMEN AND PELVIS WITH AND WITHOUT IV CONTRAST     INDICATION:  Hematuria      COMPARISON:  9/28/2015     TECHNIQUE: CT of the kidneys was performed without intravenous contrast   Dynamic postcontrast CT evaluation of the abdomen and pelvis was performed in both nephrographic and delayed phases after the administration of intravenous contrast   Reformatted   images were created in axial, sagittal, and coronal planes        Radiation dose length product (DLP) for this visit:  3011 mGy-cm     This examination, like all CT scans performed in the [facility], was performed utilizing techniques to minimize radiation dose exposure, including the use of iterative   reconstruction and automated exposure control      IV Contrast:  100 mL of iohexol (OMNIPAQUE)  Enteric Contrast:  Not administered     FINDINGS:     ABDOMEN     RIGHT KIDNEY AND URETER:  No solid renal mass  There is irregularity of the right renal pelvis and proximal ureter with nodular appearing filling defect along the wall on delayed images  No hydronephrosis or hydroureter  No urinary tract calculi  No perinephric collection      LEFT KIDNEY AND URETER:  No solid renal mass  No detectable ureteral mass  No hydronephrosis or hydroureter  No urinary tract calculi  No perinephric collection      URINARY BLADDER:  No bladder wall mass  No calculi  There is a cystocele      LUNG BASES:  There is a 4 mm right lower lobe nodule on series 3, image 11, not present previously  There is a small hiatal hernia      LIVER/BILIARY TREE:  Unremarkable      GALLBLADDER:  Gallbladder is surgically absent      SPLEEN:  Unremarkable      PANCREAS:  There is a 7 x 10 mm pancreatic cyst, not present previously      ADRENAL GLANDS:  Unremarkable      STOMACH, BOWEL AND APPENDIX:  Unremarkable      ABDOMINOPELVIC CAVITY:  No ascites  No free intraperitoneal air  No lymphadenopathy      VESSELS:  Unremarkable for patient's age      PELVIS     REPRODUCTIVE ORGANS:  Patient is status post hysterectomy      ABDOMINAL WALL/INGUINAL REGIONS:  Unremarkable      OSSEOUS STRUCTURES:  No acute fracture or destructive osseous lesion      IMPRESSION:     1  Nodular filling defects along the wall of the right renal pelvis and proximal ureter suspicious for neoplasm  Retrograde pyelogram and biopsy recommended      2   10 mm pancreatic cyst, not present previously  Recommend characterization and establishment of baseline now  Preferred modality is Abdomen MRI with and without IV contrast/MRCP  First alternative is pancreatic protocol abdomen and pelvic CT with   contrast  Second is abdomen MRI without contrast/MRCP     3   4 mm right lower lobe nodule  Based on current Fleischner Society 2017 Guidelines on incidental pulmonary nodule, no routine follow-up is needed if the patient is considered low risk for lung cancer  If the patient is considered high risk for lung   cancer, 12 month follow-up non-contrast chest CT is recommended  If there is a primary malignancy, three-month follow-up is recommended to exclude metastasis      Considerations related to the patient's age and/or comorbidities may be used to alter these recommendations, particularly the recommendation regarding the pancreatic cyst             PATHOLOGY:     2/28/18  Case Report   Non-gynecologic Cytology                          Case: [id number]                                   Authorizing Provider: Sharon Kirkpatrick MD            Collected:           02/28/2018 1032               Ordering Location:     [facility]        Received:            02/28/2018 [address] [place] [address] Operating Room                                                      Pathologist:           Emir Wu MD                                                         Specimen:    Renal Washing, Right                                                                       Final Diagnosis   A  Kidney, right, renal washing (ThinPrep): Atypical urothelial cells (AUC) - see comment  Clusters of urothelial cells  Some with mild atypia  Few mixed inflammatory cells      Satisfactory for evaluation  PVR:      7cc    ASSESSMENT:     81 y o  female with RIGHT renal pelvis multifocal papillary lesions 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 and recurrent symptomatic gross hematuria    PLAN:     Patient cell phone [phone]    I personally discussed the case with the patient's primary urologist, Dr Sharon Kirkpatrick  The patient has symptomatic hematuria from the lesions in her right upper tract    Although we do not have definitive pathology or cytology, Dr Lucia Salinas, the operative urologist, has significant concerned this does represent urothelial carcinoma  I discussed with the patient's options which include returning to the operating room for ureteroscopy and possible attempt at biopsy  It sounds as the lesion is too large, greater than 2 cm, and is not amenable to endoscopic resection or fulguration  The option for nephro ureterectomy is certainly a possibility to control likely cancer and symptomatic bleeding  However, the patient is at an advanced age and has some medical comorbidities  Although she was cleared for her recent ureteroscopy, nephro ureterectomy carries with it some higher risk profile and if we were going to consider this as an option, I would need clearance from her cardiologist and a plan for stoppage of her Xarelto  Additionally, the patient is on DMARDS for her rheumatoid arthritis and these can significantly impact surgery and wound healing  We would need to consult with her Rheumatology team and assess for stoppage of these before surgery  I have spent a considerable amount of time discussing how the surgery is performed with the patient and her   I have also spent a considerable amount of time discussing the risks inherent to surgery including bleeding, bleeding requiring transfusion, the increased risk of this given the patient's anticoagulation, the risk of intraoperative and postoperative cardiac events, the risks of damage to surrounding structures, the risk of poor healing of the bladder cuff incision and urinary leak requiring prolonged catheterization, the risk of contralateral kidney dysfunction following nephrectomy and the need for potential temporary or permanent dialysis  Patient understands these risks  Given her functional status which is in ECOG 1, the patient would also need careful postoperative evaluation with the physical and occupational therapy teams    We discussed the potential need for rehab placement or home therapy following surgery  We have tentatively consented the patient today for a right robotic assisted laparoscopic nephroureterectomy with bladder cuff  I discussed with her that before I would be willing to schedule the surgery, I would need to converse with her physician teams  Once I have a better understanding of her ability to be cleared for surgery, we can move forward with potential scheduling  If the patient is not acceptable risk for a nephroureterectomy, we will have to consider alternative options such as repeat ureteroscopy with biopsy and fulguration or staged endoscopic management  On a separate note, I have discussed with the patient her elevated postvoid residual in the office today  Although this could be related to her recent operation and surgery, I would be concerned given her history of incontinence and recurrent infections that an elevated residual is a risk factor for continued problems  We discussed the options for treatment which include catheterization or CIC teaching  If the patient has persistently elevated postvoid residuals, we will have to consider this

## 2018-03-09 NOTE — LETTER
March 9, 2018     An Rangel MD  [address] [address]    Patient: Branden Browne   YOB: 1935   Date of Visit: 3/9/2018       Dear Dr Demetrice Bundy: Thank you for referring Laya De Los Santos to me for evaluation  Below are my notes for this consultation  If you have questions, please do not hesitate to call me  I look forward to following your patient along with you  Sincerely,        Karuna Malave MD        CC: Bula Leeds, MD Silvester Munster, MD Jarome Habermann, MD Bernadette Ewing, MD  3/9/2018  3:50 PM  Incomplete    UROLOGY SECOND OPINION FOR SURGERY NOTE     History of Present Illness:   Branden Browne is a 81 y o  female known to Dr Contreras Diaz with a long history of urge incontinence who has been undergoing PTNS  She developed recurrent urinary tract infections and gross hematuria  Her primary care team ordered a renal ultrasound and followup CT scan which showed some nodularity in the right renal pelvis  Patient underwent a cystoscopy with ureteroscopy on 2/28/18  From the dictated operative report Dr Lucia Salinas: "placed a flexible ureteral scope up into the proximal ureter and right renal pelvis  I saw papillary lesions that appeared to be superficial of the proximal right ureter and pelvis and they were diffuse  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  I did not have a biopsy forceps capable of actually grabbing the specimen, so I took a 10 cc syringe and aspirated washings directly over the lesions and sent to cytology "    Patient is being evaluated for a possible nephroureterectomy to both control her multifocal papillary lesions and her recurrent gross hematuria  She does have significant comorbidities including obesity BMI>40, history of CVA on Xarelto, rheumatoid arthritis on methrotrexate/certolizumab      She is inclined towards removal of the kidney due to a desire to avoid recurrent refractory hematuria and concern about spread of malignancy  Past Medical History:     Past Medical History:   Diagnosis Date    Anemia     Arthritis     rheumatoid    Benign essential hypertension     Cataract     Chronic cystitis     Colon, diverticulosis     CPAP (continuous positive airway pressure) dependence     Diabetes mellitus (HCC)     Diverticulitis of colon     Early satiety     GERD (gastroesophageal reflux disease)     Gout     Hearing loss     Hemorrhoids     Hiatal hernia     History of colonic polyps     Hyperlipidemia     Hypertension     Hypertension     Hypothyroidism     Impaired fasting glucose     Left breast mass     Microhematuria     Migraine     occular    Neuropathy     lower and upper extermities    Overactive bladder     Pneumonia of left lower lobe due to infectious organism (HCC)     RA (rheumatoid arthritis) (HCC)     Rheumatoid arthritis (HCC)     Sleep apnea     uses cpap    Stroke [address] [address]     Type 2 diabetes mellitus (HCC)     Urinary frequency     Urinary urgency     Use of cane as ambulatory aid        PAST SURGICAL HISTORY:     Past Surgical History:   Procedure Laterality Date    APPENDECTOMY      ARTHROSCOPY WRIST Right     with release of transverse carpal ligament     BLADDER SURGERY      BLADDER SURGERY      BREAST SURGERY      left breast    BUNIONECTOMY Bilateral     CATARACT EXTRACTION      CHOLECYSTECTOMY      COLONOSCOPY      CYSTOSCOPY      EGD AND COLONOSCOPY N/A 12/20/2017    Procedure: EGD AND COLONOSCOPY;  Surgeon: Zane Hernandez MD;  Location: [facility];   Service: Gastroenterology    ESOPHAGOGASTRODUODENOSCOPY      diagnostic    FOREARM SURGERY Right     fracture repair    HYSTERECTOMY      KNEE SURGERY Left     meniscus tear    NOSE SURGERY      DC CYSTO/URETERO W/LITHOTRIPSY &INDWELL STENT INSRT Right 2/28/2018    Procedure: CYSTOSCOPY RIGHT URETEROSCOPY, RIGHT RETROGRADE PYELOGRAM AND INSERTION  RIGHT STENT URETERAL, RIGHT RENAL PELVIC WASHING FOR CYTOLOGY;  Surgeon: Jimmy Hennessy MD;  [facility]: [facility];  Service: Urology    REPLACEMENT TOTAL KNEE BILATERAL Bilateral        CURRENT MEDICATIONS:     Current Outpatient Prescriptions   Medication Sig Dispense Refill    Calcium Citrate-Vitamin D (CALCIUM + D PO) Take 1 tablet by mouth 2 (two) times a day      Certolizumab Pegol (CIMZIA PREFILLED SC) Inject under the skin every 30 (thirty) days        cyanocobalamin (VITAMIN B-12) 100 mcg tablet Take by mouth      furosemide (LASIX) 20 mg tablet Take 20 mg by mouth daily   gabapentin (NEURONTIN) 100 mg capsule Take 1 capsule (100 mg total) by mouth 3 (three) times a day Take 1 cap in the am, 1 cap in the afternoon, and 3 cap at bedtime 150 capsule 1    HYDROcodone-acetaminophen (NORCO) 5-325 mg per tablet Take 1-2 tablets by mouth every 6 (six) hours as needed for pain for up to 10 days Max Daily Amount: 8 tablets 20 tablet 0    LEUCOVORIN CALCIUM PO Take 10 mg by mouth once a week      levothyroxine 75 mcg tablet Take 75 mcg by mouth daily      losartan-hydrochlorothiazide (HYZAAR) 100-12 5 MG per tablet Take 1 tablet by mouth daily   methotrexate (RHEUMATREX) 2 5 MG tablet Take 2 5 mg by mouth once a week        Multiple Vitamin (MULTIVITAMINS PO) Take 1 tablet by mouth daily      omeprazole (PriLOSEC) 20 mg delayed release capsule Take 20 mg by mouth daily      oxybutynin (DITROPAN-XL) 10 MG 24 hr tablet Take 1 tablet (10 mg total) by mouth daily at bedtime for 3 days 3 tablet 0    pravastatin (PRAVACHOL) 80 mg tablet TAKE 1 TABLET EVERY DAY 90 tablet 3    [START ON 3/13/2018] rivaroxaban (XARELTO) 20 mg tablet Take 1 tablet (20 mg total) by mouth daily with dinner 30 tablet 0    rOPINIRole (REQUIP) 0 5 mg tablet Take 0 5 mg by mouth 3 (three) times a day       No current facility-administered medications for this visit          ALLERGIES:     Allergies   Allergen Reactions    Acetazolamide Other (See Comments)     Other reaction(s): Unknown Allergic Reaction    Aspirin      Other reaction(s): Other (See Comments)  High Dose ASA-stomach ache, rachel  ASA 81 m    Atorvastatin      Other reaction(s): Muscle Pain, Myalgia    Azithromycin     Nitrofurantoin      Other reaction(s): Unknown Allergic Reaction  Other reaction(s): Other (See Comments)  HIVES * pt denies    Other Other (See Comments)     red and itching  Other reaction(s):  Other (See Comments)  red and itching    Propoxyphene Other (See Comments)     hives    Shellfish-Derived Products Other (See Comments)     Patient got Gout following eating shell fish    Sulfa Antibiotics Other (See Comments)    Tramadol Diarrhea and Vomiting       SOCIAL HISTORY:     Social History     Social History    Marital status: /Civil Union     Spouse name: N/A    Number of children: N/A    Years of education: N/A     Social History Main Topics    Smoking status: Never Smoker    Smokeless tobacco: Never Used      Comment: stopped smoking in the distant past, never smoker (as per [employer])     Alcohol use Yes      Comment: socially    Drug use: No    Sexual activity: Not on file     Other Topics Concern    Not on file     Social History Narrative    No caffeine use       SOCIAL HISTORY:     Family History   Problem Relation Age of Onset    Other Mother      epilepsy   Sissy Courser Glaucoma Father     Stroke Father      silent    Other Brother      cardiac disorder       REVIEW OF SYSTEMS:     General: negative for chills, fatigue, fever, significant unplanned weight changed  Psychological: negative for anxiety, depression, concentration or memory difficulties, irritability, mood swings, sleep disturbances  Ophthalmic: negative for blurry vision or double  ENT: negative for hearing difficulties, tinnitus, vertigo  Hematological and Lymphatic: negative for bleeding problems, blood clots, bruising, swollen lymph nodes  Respiratory: negative for shortness of breath, cough, hemoptysis, orthopnea, tachypnea or wheezing  Cardiovascular: negative for chest pain, dyspnea on exertion, edema, irregular or rapid heartbeat, paroxysmal nocturnal dyspnea  Gastrointestinal: negative for abdominal pain, bright red blood in stools, change in stools, constipation, diarrhea, nausea/vomiting, stool incontinence    GENITOURINARY: see HPI    Musculoskeletal: arthritis symptoms  Dermatological: negative for rash or skin lesion changes  Neurological: some residual short term memory loss from CVA    PHYSICAL EXAM:     LMP  (LMP Unknown)   General:  Elderly female in no acute distress  They have a normal affect  There is not appear to be any gross neurologic defects or abnormalities  HEENT:  Normocephalic, atraumatic  Neck is supple without any palpable lymphadenopathy  Cardiovascular:  Patient has normal palpable distal radial pulses  There is no significant peripheral edema  No JVD is noted  Respiratory:  Patient has unlabored respirations  There is no audible wheeze or rhonchi  Abdomen:  Abdomen with healed surgical scars (appy/LILLY)  Abdomen is soft and nontender  Obese with large pannus  There is no tympany  Inguinal and umbilical hernia are not appreciated  Musculoskeletal:  Patient does not have significant CVA tenderness with palpation or percussion  They full range of motion in all 4 extremities  Walks with a cane  Dermatologic:  Patient has no skin abnormalities or rashes       LABS:     CBC:   Lab Results   Component Value Date    WBC 6 56 03/06/2018    HGB 9 4 (L) 03/06/2018    HCT 28 2 (L) 03/06/2018    MCV 97 03/06/2018     03/06/2018       BMP:   Lab Results   Component Value Date    GLUCOSE 175 (H) 03/06/2018    CALCIUM 8 6 03/06/2018     03/06/2018    K 3 4 (L) 03/06/2018    CO2 30 03/06/2018     03/06/2018    BUN 22 03/06/2018    CREATININE 1 01 03/06/2018       IMAGING:     CT ABDOMEN AND PELVIS WITH AND WITHOUT IV CONTRAST     INDICATION: Hematuria      COMPARISON:  9/28/2015     TECHNIQUE: CT of the kidneys was performed without intravenous contrast   Dynamic postcontrast CT evaluation of the abdomen and pelvis was performed in both nephrographic and delayed phases after the administration of intravenous contrast   Reformatted   images were created in axial, sagittal, and coronal planes        Radiation dose length product (DLP) for this visit:  2387 mGy-cm   This examination, like all CT scans performed in the [facility], was performed utilizing techniques to minimize radiation dose exposure, including the use of iterative   reconstruction and automated exposure control      IV Contrast:  100 mL of iohexol (OMNIPAQUE)  Enteric Contrast:  Not administered     FINDINGS:     ABDOMEN     RIGHT KIDNEY AND URETER:  No solid renal mass  There is irregularity of the right renal pelvis and proximal ureter with nodular appearing filling defect along the wall on delayed images  No hydronephrosis or hydroureter  No urinary tract calculi  No perinephric collection      LEFT KIDNEY AND URETER:  No solid renal mass  No detectable ureteral mass  No hydronephrosis or hydroureter  No urinary tract calculi  No perinephric collection      URINARY BLADDER:  No bladder wall mass  No calculi  There is a cystocele      LUNG BASES:  There is a 4 mm right lower lobe nodule on series 3, image 11, not present previously  There is a small hiatal hernia      LIVER/BILIARY TREE:  Unremarkable      GALLBLADDER:  Gallbladder is surgically absent      SPLEEN:  Unremarkable      PANCREAS:  There is a 7 x 10 mm pancreatic cyst, not present previously      ADRENAL GLANDS:  Unremarkable      STOMACH, BOWEL AND APPENDIX:  Unremarkable      ABDOMINOPELVIC CAVITY:  No ascites  No free intraperitoneal air   No lymphadenopathy      VESSELS:  Unremarkable for patient's age      PELVIS     REPRODUCTIVE ORGANS:  Patient is status post hysterectomy      ABDOMINAL WALL/INGUINAL REGIONS:  Unremarkable      OSSEOUS STRUCTURES:  No acute fracture or destructive osseous lesion      IMPRESSION:     1  Nodular filling defects along the wall of the right renal pelvis and proximal ureter suspicious for neoplasm  Retrograde pyelogram and biopsy recommended      2   10 mm pancreatic cyst, not present previously  Recommend characterization and establishment of baseline now  Preferred modality is Abdomen MRI with and without IV contrast/MRCP  First alternative is pancreatic protocol abdomen and pelvic CT with   contrast  Second is abdomen MRI without contrast/MRCP       3   4 mm right lower lobe nodule  Based on current Fleischner Society 2017 Guidelines on incidental pulmonary nodule, no routine follow-up is needed if the patient is considered low risk for lung cancer  If the patient is considered high risk for lung   cancer, 12 month follow-up non-contrast chest CT is recommended  If there is a primary malignancy, three-month follow-up is recommended to exclude metastasis      Considerations related to the patient's age and/or comorbidities may be used to alter these recommendations, particularly the recommendation regarding the pancreatic cyst             PATHOLOGY:     2/28/18  Case Report   Non-gynecologic Cytology                          Case: [id number]                                   Authorizing Provider: Leigha Almaraz MD            Collected:           02/28/2018 1032               Ordering Location:     [facility]        Received:            02/28/2018 [address] [place] [address] Operating Room                                                      Pathologist:           Alex Bright MD                                                         Specimen:    Renal Washing, Right                                                                       Final Diagnosis   A  Kidney, right, renal washing (ThinPrep):   Atypical urothelial cells (AUC) - see comment  Clusters of urothelial cells  Some with mild atypia  Few mixed inflammatory cells      Satisfactory for evaluation  PVR:      7cc    ASSESSMENT:     81 y o  female with RIGHT renal pelvis multifocal papillary lesions 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 and recurrent symptomatic gross hematuria    PLAN:     Patient cell phone [phone]          Karuna Malave MD  3/9/2018  2:08 PM  Sign at close encounter    UROLOGY SECOND OPINION FOR SURGERY NOTE     History of Present Illness:   Branden Browne is a 81 y o  female known to Dr Jaclyn Hernandez with a long history of urge incontinence who has been undergoing PTNS  She developed gross hematuria and underwent a renal ultrasound and followup CT scan which showed some nodularity in the right renal pelvis  Patient underwent a cystoscopy with ureteroscopy on 2/28/18  From the dictated operative report: "placed a flexible ureteral scope up into the proximal ureter and right renal pelvis  I saw papillary lesions that appeared to be superficial of the proximal right ureter and pelvis and they were diffuse  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  I did not have a biopsy forceps capable of actually grabbing the specimen, so I took a 10 cc syringe and aspirated washings directly over the lesions and sent to cytology "    Patient is being evaluated for a possible nephroureterectomy to both control her multifocal papillary lesions and her recurrent gross hematuria      Past Medical History:     Past Medical History:   Diagnosis Date    Anemia     Arthritis     rheumatoid    Benign essential hypertension     Cataract     Chronic cystitis     Colon, diverticulosis     CPAP (continuous positive airway pressure) dependence     Diabetes mellitus (HCC)     Diverticulitis of colon     Early satiety     GERD (gastroesophageal reflux disease)     Gout     Hearing loss     Hemorrhoids     Hiatal hernia     History of colonic polyps     Hyperlipidemia     Hypertension     Hypertension     Hypothyroidism     Impaired fasting glucose     Left breast mass     Microhematuria     Migraine     occular    Neuropathy     lower and upper extermities    Overactive bladder     Pneumonia of left lower lobe due to infectious organism (HCC)     [facility]) [facility] [address]     Rheumatoid arthritis ([facility] [address]     Sleep apnea     uses cpap    [facility]     Type 2 diabetes mellitus (HCC)     Urinary frequency     Urinary urgency     Use of cane as ambulatory aid        PAST SURGICAL HISTORY:     Past Surgical History:   Procedure Laterality Date    APPENDECTOMY      ARTHROSCOPY WRIST Right     with release of transverse carpal ligament     BLADDER SURGERY      BLADDER SURGERY      BREAST SURGERY      left breast    BUNIONECTOMY Bilateral     CATARACT EXTRACTION      CHOLECYSTECTOMY      COLONOSCOPY      CYSTOSCOPY      EGD AND COLONOSCOPY N/A 12/20/2017    Procedure: EGD AND COLONOSCOPY;  Surgeon: Lucien Hays MD;  Location:  GI [facility];   Service: Gastroenterology    ESOPHAGOGASTRODUODENOSCOPY      diagnostic    FOREARM SURGERY Right     fracture repair    HYSTERECTOMY      KNEE SURGERY Left     meniscus tear    NOSE SURGERY      MT CYSTO/URETERO W/LITHOTRIPSY &INDWELL STENT INSRT Right 2/28/2018    Procedure: CYSTOSCOPY RIGHT URETEROSCOPY, RIGHT RETROGRADE PYELOGRAM AND INSERTION  RIGHT STENT URETERAL, RIGHT RENAL PELVIC WASHING FOR CYTOLOGY;  Surgeon: Danica Jerez MD;  [facility];  Service: Urology    REPLACEMENT TOTAL KNEE BILATERAL Bilateral        CURRENT MEDICATIONS:     Current Outpatient Prescriptions   Medication Sig Dispense Refill    Calcium Citrate-Vitamin D (CALCIUM + D PO) Take 1 tablet by mouth 2 (two) times a day      Certolizumab Pegol (CIMZIA PREFILLED SC) Inject under the skin every 30 (thirty) days        cyanocobalamin (VITAMIN B-12) 100 mcg tablet Take by mouth      furosemide (LASIX) 20 mg tablet Take 20 mg by mouth daily   gabapentin (NEURONTIN) 100 mg capsule Take 1 capsule (100 mg total) by mouth 3 (three) times a day Take 1 cap in the am, 1 cap in the afternoon, and 3 cap at bedtime 150 capsule 1    HYDROcodone-acetaminophen (NORCO) 5-325 mg per tablet Take 1-2 tablets by mouth every 6 (six) hours as needed for pain for up to 10 days Max Daily Amount: 8 tablets 20 tablet 0    LEUCOVORIN CALCIUM PO Take 10 mg by mouth once a week      levothyroxine 75 mcg tablet Take 75 mcg by mouth daily      losartan-hydrochlorothiazide (HYZAAR) 100-12 5 MG per tablet Take 1 tablet by mouth daily   methotrexate (RHEUMATREX) 2 5 MG tablet Take 2 5 mg by mouth once a week        Multiple Vitamin (MULTIVITAMINS PO) Take 1 tablet by mouth daily      omeprazole (PriLOSEC) 20 mg delayed release capsule Take 20 mg by mouth daily      oxybutynin (DITROPAN-XL) 10 MG 24 hr tablet Take 1 tablet (10 mg total) by mouth daily at bedtime for 3 days 3 tablet 0    pravastatin (PRAVACHOL) 80 mg tablet TAKE 1 TABLET EVERY DAY 90 tablet 3    [START ON 3/13/2018] rivaroxaban (XARELTO) 20 mg tablet Take 1 tablet (20 mg total) by mouth daily with dinner 30 tablet 0    rOPINIRole (REQUIP) 0 5 mg tablet Take 0 5 mg by mouth 3 (three) times a day       No current facility-administered medications for this visit  ALLERGIES:     Allergies   Allergen Reactions    Acetazolamide Other (See Comments)     Other reaction(s): Unknown Allergic Reaction    Aspirin      Other reaction(s): Other (See Comments)  High Dose ASA-stomach ache, rachel  ASA 81 m    Atorvastatin      Other reaction(s): Muscle Pain, Myalgia    Azithromycin     Nitrofurantoin      Other reaction(s): Unknown Allergic Reaction  Other reaction(s): Other (See Comments)  HIVES * pt denies    Other Other (See Comments)     red and itching  Other reaction(s):  Other (See Comments)  red and itching    Propoxyphene Other (See Comments)     hives    Shellfish-Derived Products Other (See Comments)     Patient got Gout following eating shell fish    Sulfa Antibiotics Other (See Comments)    Tramadol Diarrhea and Vomiting       SOCIAL HISTORY:     Social History     Social History    Marital status: /Civil Union     Spouse name: N/A    Number of children: N/A    Years of education: N/A     Social History Main Topics    Smoking status: Never Smoker    Smokeless tobacco: Never Used      Comment: stopped smoking in the distant past, never smoker (as per [employer])     Alcohol use Yes      Comment: socially    Drug use: No    Sexual activity: Not on file     Other Topics Concern    Not on file     Social History Narrative    No caffeine use       SOCIAL HISTORY:     Family History   Problem Relation Age of Onset    Other Mother      epilepsy   [facility] Glaucoma Father     Stroke Father      silent    Other Brother      cardiac disorder       REVIEW OF SYSTEMS:     General: negative for chills, fatigue, fever, significant unplanned weight changed  Psychological: negative for anxiety, depression, concentration or memory difficulties, irritability, mood swings, sleep disturbances  Ophthalmic: negative for blurry vision or double  ENT: negative for hearing difficulties, tinnitus, vertigo  Hematological and Lymphatic: negative for bleeding problems, blood clots, bruising, swollen lymph nodes  Respiratory: negative for shortness of breath, cough, hemoptysis, orthopnea, tachypnea or wheezing  Cardiovascular: negative for chest pain, dyspnea on exertion, edema, irregular or rapid heartbeat, paroxysmal nocturnal dyspnea  Gastrointestinal: negative for abdominal pain, bright red blood in stools, change in stools, constipation, diarrhea, nausea/vomiting, stool incontinence    GENITOURINARY: see HPI    Musculoskeletal: negative for gait disturbance, joint pain, joint stiffness/sweeling/pain, muscular weakness  Dermatological: negative for rash or skin lesion changes  Neurological: negative for confusion, dizziness, headaches, memory loss, numbness/tingling, seizures, speech problems, tremors or weakness    PHYSICAL EXAM:     LMP  (LMP Unknown)   General:  Elderly female in no acute distress  They have a normal affect  There is not appear to be any gross neurologic defects or abnormalities  HEENT:  Normocephalic, atraumatic  Neck is supple without any palpable lymphadenopathy  Cardiovascular:  Patient has normal palpable distal radial pulses  There is no significant peripheral edema  No JVD is noted  Respiratory:  Patient has unlabored respirations  There is no audible wheeze or rhonchi  Abdomen:  Abdomen is without surgical scars  Abdomen is soft and nontender  There is no tympany  Inguinal and umbilical hernia are not appreciated  Musculoskeletal:  Patient does not have significant CVA tenderness with palpation or percussion  They full range of motion in all 4 extremities  Strength in all 4 extremities appears congruent  Patient is able to ambulate without assistance or difficulty  Dermatologic:  Patient has no skin abnormalities or rashes       LABS:     CBC:   Lab Results   Component Value Date    WBC 6 56 03/06/2018    HGB 9 4 (L) 03/06/2018    HCT 28 2 (L) 03/06/2018    MCV 97 03/06/2018     03/06/2018       BMP:   Lab Results   Component Value Date    GLUCOSE 175 (H) 03/06/2018    CALCIUM 8 6 03/06/2018     03/06/2018    K 3 4 (L) 03/06/2018    CO2 30 03/06/2018     03/06/2018    BUN 22 03/06/2018    CREATININE 1 01 03/06/2018       IMAGING:     CT ABDOMEN AND PELVIS WITH AND WITHOUT IV CONTRAST     INDICATION:  Hematuria      COMPARISON:  9/28/2015     TECHNIQUE: CT of the kidneys was performed without intravenous contrast   Dynamic postcontrast CT evaluation of the abdomen and pelvis was performed in both nephrographic and delayed phases after the administration of intravenous contrast  Reformatted   images were created in axial, sagittal, and coronal planes        Radiation dose length product (DLP) for this visit:  0432 mGy-cm   This examination, like all CT scans performed in the [facility], was performed utilizing techniques to minimize radiation dose exposure, including the use of iterative   reconstruction and automated exposure control      IV Contrast:  100 mL of iohexol (OMNIPAQUE)  Enteric Contrast:  Not administered     FINDINGS:     ABDOMEN     RIGHT KIDNEY AND URETER:  No solid renal mass  There is irregularity of the right renal pelvis and proximal ureter with nodular appearing filling defect along the wall on delayed images  No hydronephrosis or hydroureter  No urinary tract calculi  No perinephric collection      LEFT KIDNEY AND URETER:  No solid renal mass  No detectable ureteral mass  No hydronephrosis or hydroureter  No urinary tract calculi  No perinephric collection      URINARY BLADDER:  No bladder wall mass  No calculi  There is a cystocele      LUNG BASES:  There is a 4 mm right lower lobe nodule on series 3, image 11, not present previously  There is a small hiatal hernia      LIVER/BILIARY TREE:  Unremarkable      GALLBLADDER:  Gallbladder is surgically absent      SPLEEN:  Unremarkable      PANCREAS:  There is a 7 x 10 mm pancreatic cyst, not present previously      ADRENAL GLANDS:  Unremarkable      STOMACH, BOWEL AND APPENDIX:  Unremarkable      ABDOMINOPELVIC CAVITY:  No ascites  No free intraperitoneal air  No lymphadenopathy      VESSELS:  Unremarkable for patient's age      PELVIS     REPRODUCTIVE ORGANS:  Patient is status post hysterectomy      ABDOMINAL WALL/INGUINAL REGIONS:  Unremarkable      OSSEOUS STRUCTURES:  No acute fracture or destructive osseous lesion      IMPRESSION:     1  Nodular filling defects along the wall of the right renal pelvis and proximal ureter suspicious for neoplasm    Retrograde pyelogram and biopsy recommended      2   10 mm pancreatic cyst, not present previously  Recommend characterization and establishment of baseline now  Preferred modality is Abdomen MRI with and without IV contrast/MRCP  First alternative is pancreatic protocol abdomen and pelvic CT with   contrast  Second is abdomen MRI without contrast/MRCP       3   4 mm right lower lobe nodule  Based on current Fleischner Society 2017 Guidelines on incidental pulmonary nodule, no routine follow-up is needed if the patient is considered low risk for lung cancer  If the patient is considered high risk for lung   cancer, 12 month follow-up non-contrast chest CT is recommended  If there is a primary malignancy, three-month follow-up is recommended to exclude metastasis      Considerations related to the patient's age and/or comorbidities may be used to alter these recommendations, particularly the recommendation regarding the pancreatic cyst             PATHOLOGY:     2/28/18  Case Report   Non-gynecologic Cytology                          Case: [id number]                                   Authorizing Provider: Kai Luther MD            Collected:           02/28/2018 1032               Ordering Location:     [facility]        Received:            02/28/2018 [address] [place] [address] Operating Room                                                      Pathologist:           Mackenzie Mnocada MD                                                         Specimen:    Renal Washing, Right                                                                       Final Diagnosis   A  Kidney, right, renal washing (ThinPrep): Atypical urothelial cells (AUC) - see comment  Clusters of urothelial cells  Some with mild atypia  Few mixed inflammatory cells      Satisfactory for evaluation         ASSESSMENT:     81 y o  female with RIGHT renal pelvis multifocal papillary lesions 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 and recurrent symptomatic gross hematuria    PLAN:

## 2018-03-12 ENCOUNTER — PREP FOR PROCEDURE (OUTPATIENT)
Dept: UROLOGY | Facility: CLINIC | Age: 83
End: 2018-03-12

## 2018-03-12 DIAGNOSIS — N39.8 UROTHELIAL LESION: ICD-10-CM

## 2018-03-12 DIAGNOSIS — N39.8 UROTHELIAL LESION: Primary | ICD-10-CM

## 2018-03-12 RX ORDER — LEVOFLOXACIN 5 MG/ML
750 INJECTION, SOLUTION INTRAVENOUS ONCE
Status: CANCELLED | OUTPATIENT
Start: 2018-03-20 | End: 2018-03-20

## 2018-03-13 NOTE — TELEPHONE ENCOUNTER
Called pt and pts states she has been seen by urology regarding her dx and recent ED visit  She is following up with them and will contact with any issues

## 2018-03-14 ENCOUNTER — OFFICE VISIT (OUTPATIENT)
Dept: FAMILY MEDICINE CLINIC | Facility: CLINIC | Age: 83
End: 2018-03-14
Payer: MEDICARE

## 2018-03-14 VITALS
HEIGHT: 61 IN | WEIGHT: 217 LBS | SYSTOLIC BLOOD PRESSURE: 130 MMHG | RESPIRATION RATE: 18 BRPM | HEART RATE: 72 BPM | OXYGEN SATURATION: 97 % | BODY MASS INDEX: 40.97 KG/M2 | DIASTOLIC BLOOD PRESSURE: 82 MMHG

## 2018-03-14 DIAGNOSIS — E11.69 DIABETES MELLITUS TYPE 2 IN OBESE (HCC): ICD-10-CM

## 2018-03-14 DIAGNOSIS — E66.9 DIABETES MELLITUS TYPE 2 IN OBESE (HCC): ICD-10-CM

## 2018-03-14 DIAGNOSIS — D50.9 IRON DEFICIENCY ANEMIA, UNSPECIFIED IRON DEFICIENCY ANEMIA TYPE: ICD-10-CM

## 2018-03-14 DIAGNOSIS — I63.311 CEREBROVASCULAR ACCIDENT (CVA) DUE TO THROMBOSIS OF RIGHT MIDDLE CEREBRAL ARTERY (HCC): ICD-10-CM

## 2018-03-14 DIAGNOSIS — M05.9 RHEUMATOID ARTHRITIS WITH POSITIVE RHEUMATOID FACTOR, INVOLVING UNSPECIFIED SITE (HCC): ICD-10-CM

## 2018-03-14 DIAGNOSIS — I10 ESSENTIAL HYPERTENSION: Primary | ICD-10-CM

## 2018-03-14 DIAGNOSIS — E03.9 ACQUIRED HYPOTHYROIDISM: ICD-10-CM

## 2018-03-14 DIAGNOSIS — C68.9 UROTHELIAL CANCER (HCC): ICD-10-CM

## 2018-03-14 DIAGNOSIS — M54.16 RIGHT LUMBAR RADICULOPATHY: ICD-10-CM

## 2018-03-14 PROBLEM — K86.2 PANCREATIC CYST: Status: ACTIVE | Noted: 2018-03-14

## 2018-03-14 PROCEDURE — 99214 OFFICE O/P EST MOD 30 MIN: CPT | Performed by: FAMILY MEDICINE

## 2018-03-14 NOTE — PROGRESS NOTES
Assessment/Plan:    Urothelial cancer [facility])  Patient is good will candidate for her upcoming biopsy  Continue off Xarelto  Rheumatoid arthritis ([facility] [address] )    She is off of her cimzia  Injections for the past 2 months it is starting to have some increasing stiffness  Hopefully, we will get some more information next week and be able to get her back on her typical rheumatologic treatment  She will continue on methotrexate    Right lumbar radiculopathy   Continue with gabapentin  Essential hypertension    Blood pressure was high upon arrival did come down throughout the visit  Continue to monitor this closely in the perioperative period  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 [facility])    Patient appears quite stable from an element of her stroke  She has not been diagnosed with atrial fibrillation but did have a thrombotic stroke while on aspirin  She will continue to follow closely with Neurology  She is currently off Xarelto while she is awaiting her upcoming biopsy of a renal abnormality  Pancreatic cyst    Patient is currently being worked up for renal cell cancer  Consider repeat  Abdominal CT versus MRI of abdomen once her workup for her renal cell cancer is complete  Diagnoses and all orders for this visit:    Essential hypertension    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 ([facility] [address] )    Right lumbar radiculopathy    Diabetes mellitus type 2 in obese ([address] )    Acquired hypothyroidism    Urothelial cancer ([facility] [address] )          Subjective:      Patient ID: Bebo Cobos is a 81 y o  female  HPI   The patient presents today  Accompanied by her   She recently has been diagnosed with a renal mass consistent with probable renal cell carcinoma  Unfortunately, her initial biopsy was inconclusive  She also had significant bleeding afterwards  She is awaiting an upcoming biopsy next week    She has stopped her Xarelto in preparation for this  She is no longer having gross hematuria  She denies fever or chills  She has been quite fatigued  She has a history of rheumatoid arthritis and remains off of her rheumatoid medications except for methotrexate and she is beginning to have some increasing achiness  Blood pressure was a bit elevated upon arrival   She is obviously quite stress regarding everything going on  After sitting for a few minutes blood pressure did come down  She is not experiencing significant chest pain, palpitations or lightheadedness  She did have some anemia recently but it was stable upon repeat CBC  The following portions of the patient's history were reviewed and updated as appropriate: allergies, current medications, past family history, past medical history, past social history, past surgical history and problem list     Review of Systems   Constitutional: Positive for fatigue  Negative for appetite change, chills, fever and unexpected weight change  HENT: Negative for trouble swallowing  Eyes: Negative for visual disturbance  Respiratory: Negative for cough, chest tightness, shortness of breath and wheezing  Cardiovascular: Negative for chest pain  Gastrointestinal: Negative for abdominal pain, constipation and diarrhea  Endocrine: Negative for polyuria  Genitourinary: Negative for difficulty urinating and flank pain  Musculoskeletal: Negative for arthralgias and myalgias  Skin: Negative for rash  Neurological: Negative for dizziness and light-headedness  Hematological: Negative for adenopathy  Does not bruise/bleed easily  Psychiatric/Behavioral: Positive for dysphoric mood  Negative for sleep disturbance           Objective:      /82   Pulse 72   Resp 18   Ht 5' 1" (1 549 m)   Wt 98 4 kg (217 lb)   LMP  (LMP Unknown)   SpO2 97%   BMI 41 00 kg/m²          Physical Exam

## 2018-03-14 NOTE — ASSESSMENT & PLAN NOTE
She is off of her cimzia  Injections for the past 2 months it is starting to have some increasing stiffness  Hopefully, we will get some more information next week and be able to get her back on her typical rheumatologic treatment    She will continue on methotrexate

## 2018-03-14 NOTE — ASSESSMENT & PLAN NOTE
Patient appears quite stable from an element of her stroke  She has not been diagnosed with atrial fibrillation but did have a thrombotic stroke while on aspirin  She will continue to follow closely with Neurology  She is currently off Xarelto while she is awaiting her upcoming biopsy of a renal abnormality

## 2018-03-14 NOTE — ASSESSMENT & PLAN NOTE
Patient is currently being worked up for renal cell cancer  Consider repeat  Abdominal CT versus MRI of abdomen once her workup for her renal cell cancer is complete

## 2018-03-14 NOTE — ASSESSMENT & PLAN NOTE
Blood pressure was high upon arrival did come down throughout the visit  Continue to monitor this closely in the perioperative period

## 2018-03-16 ENCOUNTER — ANESTHESIA EVENT (OUTPATIENT)
Dept: PERIOP | Facility: HOSPITAL | Age: 83
End: 2018-03-16
Payer: MEDICARE

## 2018-03-19 DIAGNOSIS — K21.9 CHRONIC GERD: Primary | ICD-10-CM

## 2018-03-19 RX ORDER — OMEPRAZOLE 20 MG/1
CAPSULE, DELAYED RELEASE ORAL
Qty: 90 CAPSULE | Refills: 3 | Status: SHIPPED | OUTPATIENT
Start: 2018-03-19 | End: 2018-12-13 | Stop reason: SDUPTHER

## 2018-03-20 ENCOUNTER — HOSPITAL ENCOUNTER (OUTPATIENT)
Facility: HOSPITAL | Age: 83
Setting detail: OBSERVATION
Discharge: HOME | End: 2018-03-22
Attending: UROLOGY | Admitting: UROLOGY
Payer: MEDICARE

## 2018-03-20 ENCOUNTER — ANESTHESIA (OUTPATIENT)
Dept: PERIOP | Facility: HOSPITAL | Age: 83
End: 2018-03-20
Payer: MEDICARE

## 2018-03-20 ENCOUNTER — HOSPITAL ENCOUNTER (OUTPATIENT)
Dept: RADIOLOGY | Facility: HOSPITAL | Age: 83
Setting detail: OUTPATIENT SURGERY
End: 2018-03-20
Attending: UROLOGY | Admitting: UROLOGY
Payer: MEDICARE

## 2018-03-20 DIAGNOSIS — N39.8 UROTHELIAL LESION: ICD-10-CM

## 2018-03-20 LAB — GLUCOSE SERPL-MCNC: 124 MG/DL (ref 65–140)

## 2018-03-20 PROCEDURE — 88305 TISSUE EXAM BY PATHOLOGIST: CPT | Performed by: PATHOLOGY

## 2018-03-20 PROCEDURE — 52354 CYSTOURETERO W/BIOPSY: CPT | Performed by: UROLOGY

## 2018-03-20 PROCEDURE — 94660 CPAP INITIATION&MGMT: CPT

## 2018-03-20 PROCEDURE — C1758 CATHETER, URETERAL: HCPCS | Performed by: UROLOGY

## 2018-03-20 PROCEDURE — 94760 N-INVAS EAR/PLS OXIMETRY 1: CPT

## 2018-03-20 PROCEDURE — 88112 CYTOPATH CELL ENHANCE TECH: CPT | Performed by: PATHOLOGY

## 2018-03-20 PROCEDURE — 82948 REAGENT STRIP/BLOOD GLUCOSE: CPT

## 2018-03-20 PROCEDURE — 74450 X-RAY URETHRA/BLADDER: CPT

## 2018-03-20 PROCEDURE — C2617 STENT, NON-COR, TEM W/O DEL: HCPCS | Performed by: UROLOGY

## 2018-03-20 PROCEDURE — C1769 GUIDE WIRE: HCPCS | Performed by: UROLOGY

## 2018-03-20 DEVICE — INLAY OPTIMA URETERAL STENT W/O GUIDEWIRE
Type: IMPLANTABLE DEVICE | Site: URETER | Status: FUNCTIONAL
Brand: BARD® INLAY OPTIMA® URETERAL STENT

## 2018-03-20 RX ORDER — PHENAZOPYRIDINE HYDROCHLORIDE 100 MG/1
100 TABLET, FILM COATED ORAL 3 TIMES DAILY PRN
Qty: 10 TABLET | Refills: 0 | Status: SHIPPED | OUTPATIENT
Start: 2018-03-20 | End: 2018-04-06 | Stop reason: HOSPADM

## 2018-03-20 RX ORDER — ROCURONIUM BROMIDE 10 MG/ML
INJECTION, SOLUTION INTRAVENOUS AS NEEDED
Status: DISCONTINUED | OUTPATIENT
Start: 2018-03-20 | End: 2018-03-20 | Stop reason: SURG

## 2018-03-20 RX ORDER — PROPOFOL 10 MG/ML
INJECTION, EMULSION INTRAVENOUS AS NEEDED
Status: DISCONTINUED | OUTPATIENT
Start: 2018-03-20 | End: 2018-03-20 | Stop reason: SURG

## 2018-03-20 RX ORDER — ONDANSETRON 2 MG/ML
INJECTION INTRAMUSCULAR; INTRAVENOUS AS NEEDED
Status: DISCONTINUED | OUTPATIENT
Start: 2018-03-20 | End: 2018-03-20 | Stop reason: SURG

## 2018-03-20 RX ORDER — MAGNESIUM HYDROXIDE 1200 MG/15ML
LIQUID ORAL AS NEEDED
Status: DISCONTINUED | OUTPATIENT
Start: 2018-03-20 | End: 2018-03-20 | Stop reason: HOSPADM

## 2018-03-20 RX ORDER — DOCUSATE SODIUM 100 MG/1
100 CAPSULE, LIQUID FILLED ORAL 2 TIMES DAILY
Qty: 60 CAPSULE | Refills: 0 | Status: ON HOLD | OUTPATIENT
Start: 2018-03-20 | End: 2018-04-23

## 2018-03-20 RX ORDER — SODIUM CHLORIDE 9 MG/ML
INJECTION, SOLUTION INTRAVENOUS AS NEEDED
Status: DISCONTINUED | OUTPATIENT
Start: 2018-03-20 | End: 2018-03-20 | Stop reason: HOSPADM

## 2018-03-20 RX ORDER — LEVOFLOXACIN 5 MG/ML
750 INJECTION, SOLUTION INTRAVENOUS ONCE
Status: COMPLETED | OUTPATIENT
Start: 2018-03-20 | End: 2018-03-21

## 2018-03-20 RX ORDER — PHENAZOPYRIDINE HYDROCHLORIDE 100 MG/1
100 TABLET, FILM COATED ORAL ONCE
Status: COMPLETED | OUTPATIENT
Start: 2018-03-20 | End: 2018-03-20

## 2018-03-20 RX ORDER — ONDANSETRON 2 MG/ML
4 INJECTION INTRAMUSCULAR; INTRAVENOUS ONCE
Status: COMPLETED | OUTPATIENT
Start: 2018-03-20 | End: 2018-03-20

## 2018-03-20 RX ORDER — ACETAMINOPHEN AND CODEINE PHOSPHATE 300; 30 MG/1; MG/1
1 TABLET ORAL EVERY 4 HOURS PRN
Qty: 30 TABLET | Refills: 0 | Status: SHIPPED | OUTPATIENT
Start: 2018-03-20 | End: 2018-04-06 | Stop reason: HOSPADM

## 2018-03-20 RX ORDER — ACETAMINOPHEN 325 MG/1
650 TABLET ORAL EVERY 6 HOURS PRN
Status: CANCELLED | OUTPATIENT
Start: 2018-03-20

## 2018-03-20 RX ORDER — SODIUM CHLORIDE 9 MG/ML
125 INJECTION, SOLUTION INTRAVENOUS CONTINUOUS
Status: DISCONTINUED | OUTPATIENT
Start: 2018-03-20 | End: 2018-03-21

## 2018-03-20 RX ORDER — FENTANYL CITRATE/PF 50 MCG/ML
25 SYRINGE (ML) INJECTION
Status: DISCONTINUED | OUTPATIENT
Start: 2018-03-20 | End: 2018-03-20 | Stop reason: HOSPADM

## 2018-03-20 RX ORDER — MIDAZOLAM HYDROCHLORIDE 1 MG/ML
INJECTION INTRAMUSCULAR; INTRAVENOUS AS NEEDED
Status: DISCONTINUED | OUTPATIENT
Start: 2018-03-20 | End: 2018-03-20 | Stop reason: SURG

## 2018-03-20 RX ORDER — ACETAMINOPHEN 325 MG/1
650 TABLET ORAL EVERY 6 HOURS PRN
Status: DISCONTINUED | OUTPATIENT
Start: 2018-03-20 | End: 2020-01-01

## 2018-03-20 RX ORDER — HYDROCODONE BITARTRATE AND ACETAMINOPHEN 5; 325 MG/1; MG/1
1 TABLET ORAL EVERY 6 HOURS PRN
Status: DISCONTINUED | OUTPATIENT
Start: 2018-03-20 | End: 2018-03-22 | Stop reason: HOSPADM

## 2018-03-20 RX ORDER — FENTANYL CITRATE 50 UG/ML
INJECTION, SOLUTION INTRAMUSCULAR; INTRAVENOUS AS NEEDED
Status: DISCONTINUED | OUTPATIENT
Start: 2018-03-20 | End: 2018-03-20 | Stop reason: SURG

## 2018-03-20 RX ORDER — ONDANSETRON 2 MG/ML
4 INJECTION INTRAMUSCULAR; INTRAVENOUS ONCE AS NEEDED
Status: DISCONTINUED | OUTPATIENT
Start: 2018-03-20 | End: 2018-03-20 | Stop reason: HOSPADM

## 2018-03-20 RX ORDER — CIPROFLOXACIN 250 MG/1
500 TABLET, FILM COATED ORAL EVERY 12 HOURS SCHEDULED
Qty: 6 TABLET | Refills: 0 | Status: SHIPPED | OUTPATIENT
Start: 2018-03-20 | End: 2018-03-23

## 2018-03-20 RX ORDER — GLYCOPYRROLATE 0.2 MG/ML
INJECTION INTRAMUSCULAR; INTRAVENOUS AS NEEDED
Status: DISCONTINUED | OUTPATIENT
Start: 2018-03-20 | End: 2018-03-20 | Stop reason: SURG

## 2018-03-20 RX ADMIN — SODIUM CHLORIDE 125 ML/HR: 0.9 INJECTION, SOLUTION INTRAVENOUS at 12:57

## 2018-03-20 RX ADMIN — LIDOCAINE HYDROCHLORIDE 80 MG: 20 INJECTION, SOLUTION INTRAVENOUS at 11:25

## 2018-03-20 RX ADMIN — ONDANSETRON 4 MG: 2 INJECTION INTRAMUSCULAR; INTRAVENOUS at 17:30

## 2018-03-20 RX ADMIN — FENTANYL CITRATE 50 MCG: 50 INJECTION, SOLUTION INTRAMUSCULAR; INTRAVENOUS at 11:25

## 2018-03-20 RX ADMIN — ONDANSETRON HYDROCHLORIDE 4 MG: 2 INJECTION, SOLUTION INTRAVENOUS at 11:17

## 2018-03-20 RX ADMIN — ROCURONIUM BROMIDE 30 MG: 10 INJECTION INTRAVENOUS at 11:25

## 2018-03-20 RX ADMIN — FENTANYL CITRATE 50 MCG: 50 INJECTION, SOLUTION INTRAMUSCULAR; INTRAVENOUS at 12:25

## 2018-03-20 RX ADMIN — SODIUM CHLORIDE 125 ML/HR: 0.9 INJECTION, SOLUTION INTRAVENOUS at 11:00

## 2018-03-20 RX ADMIN — GLYCOPYRROLATE 0.6 MG: 0.2 INJECTION, SOLUTION INTRAMUSCULAR; INTRAVENOUS at 12:30

## 2018-03-20 RX ADMIN — HYDROCODONE BITARTRATE AND ACETAMINOPHEN 1 TABLET: 5; 325 TABLET ORAL at 22:00

## 2018-03-20 RX ADMIN — MIDAZOLAM HYDROCHLORIDE 2 MG: 1 INJECTION, SOLUTION INTRAMUSCULAR; INTRAVENOUS at 11:17

## 2018-03-20 RX ADMIN — PROPOFOL 150 MG: 10 INJECTION, EMULSION INTRAVENOUS at 11:25

## 2018-03-20 RX ADMIN — LEVOFLOXACIN: 5 INJECTION, SOLUTION INTRAVENOUS at 11:18

## 2018-03-20 RX ADMIN — SODIUM CHLORIDE 125 ML/HR: 0.9 INJECTION, SOLUTION INTRAVENOUS at 18:50

## 2018-03-20 RX ADMIN — NEOSTIGMINE METHYLSULFATE 3 MG: 1 INJECTION, SOLUTION INTRAMUSCULAR; INTRAVENOUS; SUBCUTANEOUS at 12:30

## 2018-03-20 RX ADMIN — PHENAZOPYRIDINE HYDROCHLORIDE 100 MG: 100 TABLET ORAL at 13:49

## 2018-03-20 NOTE — OP NOTE
OPERATIVE REPORT  PATIENT NAME: Edmond Mcgee    :  1935  MRN: [id number]  Pt Location: [facility] OR ROOM 05    SURGERY DATE: 3/20/2018    Surgeon(s) and Role:     * Ruchi Jacome MD - Primary    Preop Diagnosis:  Urothelial lesion [N39 9]    Post-Op Diagnosis Codes:     * Urothelial lesion [N39 9]    Procedure(s) (LRB):  CYSTOSCOPY, RETROGRADE PYELOGRAM, URETEROSCOPY, BIOPSY, BRUSH, FULGURATION, STENT PLACEMENT, BLADDER BIOPSY WITH FULGURATION (Right)    Specimen(s):  ID Type Source Tests Collected by Time Destination   1 : right renal pelvis urine for cytology  Urine Urine, Renal, Right CYTOLOGY, URINE Ruchi Jacome MD 3/20/2018 1148    2 : RIGHT RENAL PELVIS BRUSHING  Brushing Brushing, Renal Right NON-GYNECOLOGIC CYTOLOGY Ruchi Jacome MD 3/20/2018 1153    3 : RIGHT RENAL PELVIS BIOPSY  Tissue Soft Tissue, Other TISSUE EXAM Ruchi Jacome MD 3/20/2018 1159    4 : right renal pelvis urine for cytology #2  Urine Urine, Renal, Right CYTOLOGY, URINE Ruchi Jacome MD 3/20/2018 1206    5 : POSTERIOR WALL BLADDER BIOPSY  Tissue Urinary Bladder TISSUE EXAM Ruchi Jacome MD 3/20/2018 1219    6 : LEFT LATERAL WALL BIOPSY  Tissue Urinary Bladder TISSUE EXAM Ruchi Jacome MD 3/20/2018 1222        Estimated Blood Loss:   Minimal    Drains:  6x24 RIGHT JJ stent     Anesthesia Type:   General    Operative Indications:  Urothelial lesion [N39 9]    Operative Findings:  1  Right UPJ and diffuse renal pelvis papillary lesions, blanketed throughout the renal pelvic mucosa and UPJ  2  Brushings and multiple biopsies taken  3  Areas of velvetry appearing lesions in the posterior and left lateral bladder wall  4  Significant posterior bladder prolapse, likely leading to outpatient findings of retained urine      Complications:   None    Procedure and Technique:  Edmond Mcgee is a 81y o -year-old female with right upper tract bleeding and a lesion seen on CT scan    She previously underwent ureteroscopy with Dr Flako Barron and diffuse papillary lesions were seen  Unfortunately biopsies were not taken during this procedure and the cytology returned negative  Given the patient's age and medical comorbidities, nephro ureterectomy to address her recurrent bleeding was discussed but we recommended repeat visualization of the right upper tract with biopsy  This was discussed with her primary care team   She is proceeding to the operating room on 3/20/2018 to undergo RIGHT-sided ureteroscopy with biopsy  Risk and benefits of the procedure were discussed and reviewed  Informed consent was obtained  After the smooth induction of general ETA anesthesia, the patient was placed in the dorsal lithotomy position  Her genitalia was prepped and draped in a sterile fashion  Intravenous antibiotics were administered  A timeout was performed with all members of the operative team confirming the patient's identity, procedure to be performed, and laterality of the case  A 22 Georgian rigid cystoscope with 30° lens was inserted  The bladder was thoroughly inspected  Patient had significant posterior prolapse in the bladder trigone was difficult to visualize  A sponge was packed in the patient's vagina to elevate the posterior bladder  Attention was focused on the RIGHT ureteral orifice  Bard Solo Plus wire was then passed through the stent up to the level of the renal pelvis  Stent was removed intact  Over the wire, dual-lumen catheter was placed  A retrograde pyelogram was performed demonstrating mild narrowing of the UPJ but no distinct filling defect  At this point, a second wire was passed  One wire was secured as a "safety" wire while the other was used as a "working" wire  The dual lumen was removed leaving the wires in place  A 12/14 35cm ureteral access sheath was then inserted over the working wire  An Olympus flexible ureteroscope was then passed    The kidney was thoroughly inspected  Who once in the ureteropelvic junction, we noted some diffuse papillary changes  At this point a barbotage was performed and this was sent off as our 1st right renal pelvis urine for cytology  Once inside the renal pelvis, we again salt diffuse blanket papillary changes throughout all of the infundibula and renal pelvis mucosa  A brush biopsy was used to obtain specimen  This was sent off for additional evaluation  We then used a ureteroscopic biopsy forceps to take several samples of tissue  Good tissue was visualized  At the completion, a 2nd barbotage urine was sent for cytology  We then used a ureteroscopic Bugbee to fulgurate any areas of bleeding  Unfortunately we could not completely Bugbee all areas of abnormality given the diffuse nature  Pull-out ureteroscopy was performed and there was no abnormality in the mid to distal ureter  A 6 [place] 24 cm double-J ureteral stent was then placed in the standard fashion  The proximal coil was appreciated in the RIGHT renal pelvis and the distal coil was visualized within the bladder  A string was not left in place  We reinspected the bladder and previously seen posterior and left lateral wall velvety appearing mucosa was sampled and biopsied  This was sent off for tissue analysis  Bugbee electrocautery was used to fulgurate the base of these areas  The pack was removed from the patient's vagina  The bladder was emptied and the cystoscope was removed  2% viscous lidocaine was placed per urethra  Overall the patient tolerated the procedure well and there were no complications  The patient was taken out of dorsal lithotomy, extubated in the operating room and transferred to the PACU in stable condition at the conclusion of the case  PLAN-We will carefully follow results of pathology prior to deciding next step in treatment       I was present for the entire procedure    Patient Disposition:  PACU     SIGNATURE: Naomy Villa MD  DATE: March 20, 2018  TIME: 12:29 PM

## 2018-03-20 NOTE — ANESTHESIA PREPROCEDURE EVALUATION
Review of Systems/Medical History  Patient summary reviewed  Chart reviewed  History of anesthetic complications PONV    Cardiovascular  Exercise tolerance: good,  Hyperlipidemia, Hypertension controlled,    Pulmonary  Pneumonia, Sleep apnea ,        GI/Hepatic    GERD well controlled,  Hiatal hernia,             Endo/Other  Diabetes well controlled type 2 , History of thyroid disease , hypothyroidism,      GYN       Hematology  Anemia ,     Musculoskeletal  Rheumatoid arthritis Severity: moderate,   Arthritis     Neurology    Neuromuscular disease , CVA , no residual symptoms, Headaches,    Psychology   Negative psychology ROS              Physical Exam    Airway    Mallampati score: II  TM Distance: <3 FB  Neck ROM: full     Dental   No notable dental hx     Cardiovascular  Rhythm: regular, Rate: normal, Cardiovascular exam normal    Pulmonary  Pulmonary exam normal Breath sounds clear to auscultation,     Other Findings        Anesthesia Plan  ASA Score- 3     Anesthesia Type- general with ASA Monitors  Additional Monitors:   Airway Plan: ETT and LMA  Comment: Choice  Hx PONV  Plan Factors- Patient instructed to abstain from smoking on day of procedure  Patient did not smoke on day of surgery  Induction- intravenous  Postoperative Plan-     Informed Consent- Anesthetic plan and risks discussed with patient

## 2018-03-20 NOTE — DISCHARGE INSTRUCTIONS
You have a stent in place  Your stone was not addressed and you will absolutely need to return to the office to arrange interval stone surgery  Failure to followup can result in dangerous complication as the ureteral stent cannot remain in place indefinitely  Ureteroscopy   WHAT YOU NEED TO KNOW:   A ureteroscopy is a procedure to examine in the inside of your urinary tract, which includes your urethra, bladder, ureters, and kidneys  A ureteroscope is a small, thin tube with a light and camera on the end  Ureteroscopy can help your healthcare provider diagnose and treat problems in your urinary tract, such as kidney stones  DISCHARGE INSTRUCTIONS:   Medicine:   · Antibiotics  may be given to treat or prevent an infection  · Take your medicine as directed  Contact your healthcare provider if you think your medicine is not helping or if you have side effects  Tell him or her if you are allergic to any medicine  Keep a list of the medicines, vitamins, and herbs you take  Include the amounts, and when and why you take them  Bring the list or the pill bottles to follow-up visits  Carry your medicine list with you in case of an emergency  Follow up with your healthcare provider as directed:  Write down your questions so you remember to ask them during your visits  Drink liquids as directed  Liquids can help prevent kidney stones and urinary tract infections  Drink water and limit the amount of caffeine you drink  Caffeine may be found in coffee, tea, soda, sports drinks, and foods  Ask your healthcare provider how much liquid to drink each day  Contact your healthcare provider if:   · You have a fever  · You cannot urinate  · You have blood in your urine  · You are vomiting  · You have pain in your abdomen or side  · You have questions or concerns about your condition or care    © 2017 [employer] Information is for End User's use only and may not be sold, redistributed or otherwise used for commercial purposes  All illustrations and images included in CareNotes® are the copyrighted property of A D [employer]  or Carlos Juarez  The above information is an  only  It is not intended as medical advice for individual conditions or treatments  Talk to your doctor, nurse or pharmacist before following any medical regimen to see if it is safe and effective for you  Acetaminophen/Codeine (By mouth)   Acetaminophen (j-ajjr-l-MIN-oh-fen), Codeine Phosphate (KOE-miah FOS-fate)  Treats mild to moderately severe pain  This medicine contains a narcotic pain reliever  Brand Name(s): Capital w/Codeine, Tylenol With Codeine No  4, Tylenol w/Codeine #3, Tylenol with Codeine No  3   There may be other brand names for this medicine  When This Medicine Should Not Be Used: This medicine is not right for everyone  Do not use it if you had an allergic reaction to acetaminophen or codeine, or to other narcotic medicines, or if you have stomach or bowel blockage (including paralytic ileus)  How to Use This Medicine:   Capsule, Liquid, Tablet  · Your doctor will tell you how much medicine to use  Do not use more than directed  · You may take this medicine with food or milk if it upsets your stomach  · Measure the oral liquid medicine with a marked measuring spoon, oral syringe, or medicine cup  · Drink plenty of liquids to help avoid constipation  · This medicine should come with a Medication Guide  Ask your pharmacist for a copy if you do not have one  · Missed dose: Take a dose as soon as you remember  If it is almost time for your next dose, wait until then and take a regular dose  Do not take extra medicine to make up for a missed dose  · Store the medicine in a closed container at room temperature, away from heat, moisture, and direct light  Keep the oral liquid in the refrigerator  Do not freeze    Drugs and Foods to Avoid:   Ask your doctor or pharmacist before using any other medicine, including over-the-counter medicines, vitamins, and herbal products  · Do not use this medicine if you are using or have used an MAO inhibitor within the past 14 days  · Some medicines can affect how this medicine works  Tell your doctor if you are using any of the following:   ¨ Amiodarone, carbamazepine, erythromycin, ketoconazole, mirtazapine, phenytoin, quinidine, rifampin, ritonavir, tramadol, trazodone  ¨ Diuretic (water pill)  ¨ Medicine to treat depression (including bupropion, fluoxetine, paroxetine, quinidine)  ¨ Phenothiazine medicine  ¨ Triptan medicine to treat migraine headaches  · Tell your doctor if you use anything else that makes you sleepy  Some examples are allergy medicine, narcotic pain medicine, and alcohol  Tell your doctor if you are using buprenorphine, butorphanol, nalbuphine, pentazocine, or a muscle relaxer  · Do not drink alcohol while you are using this medicine  Acetaminophen can damage your liver, and alcohol can increase this risk  Do not take acetaminophen without asking your doctor if you have 3 or more drinks of alcohol every day  Warnings While Using This Medicine:   · Tell your doctor if you are pregnant or breastfeeding, or if you have kidney disease, liver disease, adrenal problems (such as Taylor disease), asthma, or breathing problems (such as respiratory depression, sleep apnea)  Tell your doctor if you have an enlarged prostate, trouble urinating, stomach problems, an underactive thyroid, or a history of head injury or brain damage  Tell your doctor if you had an allergic reaction to sulfites or if you have a history of drug or alcohol abuse    · This medicine may cause the following problems:  ¨ High risk of overdose, which can lead to death  ¨ Respiratory depression (serious breathing problem that can be life-threatening)  ¨ Serotonin syndrome (when used with certain medicines)  ¨ Serious skin reactions  · This medicine can be habit-forming  Do not use more than your prescribed dose  Call your doctor if you think your medicine is not working  · This medicine contains acetaminophen  Read the labels of all other medicines you are using to see if they also contain acetaminophen, or ask your doctor or pharmacist  Reneus Stevensonpeña not use more than 4 grams (4,000 milligrams) total of acetaminophen in one day  · Do not stop using this medicine suddenly  Your doctor will need to slowly decrease your dose before you stop it completely  · This medicine may make you dizzy or drowsy  Do not drive or do anything that could be dangerous until you know how this medicine affects you  Sit or lie down if you feel dizzy  Stand up carefully  · Tell any doctor or dentist who treats you that you are using this medicine  This medicine may affect certain medical test results  · This medicine could cause infertility  Talk with your doctor before using this medicine if you plan to have children  · This medicine may cause constipation, especially with long-term use  Ask your doctor if you should use a laxative to prevent and treat constipation  · Keep all medicine out of the reach of children  Never share your medicine with anyone    Possible Side Effects While Using This Medicine:   Call your doctor right away if you notice any of these side effects:  · Allergic reaction: Itching or hives, swelling in your face or hands, swelling or tingling in your mouth or throat, chest tightness, trouble breathing  · Anxiety, restlessness, fast heartbeat, fever, sweating, muscle spasms, twitching, diarrhea, seeing or hearing things that are not there  · Dark urine or pale stools, nausea, vomiting, loss of appetite, stomach pain, yellow skin or eyes  · Extreme drowsiness or confusion  · Lightheadedness, dizziness, or fainting  · Seizures  · Trouble breathing, shallow breathing, blue lips or nails  · Unusual bleeding, bruising, or weakness  If you notice these less serious side effects, talk with your doctor:   · Mild dizziness or drowsiness  · Mild nausea or vomiting, constipation  If you notice other side effects that you think are caused by this medicine, tell your doctor  Call your doctor for medical advice about side effects  You may report side effects to FDA at 2-410-FDA-9667  © 2017 [address] [employer] Information is for End User's use only and may not be sold, redistributed or otherwise used for commercial purposes  The above information is an  only  It is not intended as medical advice for individual conditions or treatments  Talk to your doctor, nurse or pharmacist before following any medical regimen to see if it is safe and effective for you  Laxative, Stool Softeners (By mouth)   Treats constipation by helping you have a bowel movement  Brand Name(s): Col-Rite, Colace, Colace Clear, DSS, Diocto, Diocto Liquid, Doc-Q-Lace, Docuprene, Docusil, Dok, Dulcolax, Fleet Sof-Lax, [facility] Docusate Calcium, [facility] Stool Softener, [facility] Stool Softner   There may be other brand names for this medicine  When This Medicine Should Not Be Used: You should not use this medicine if you have severe stomach pain, nausea, or vomiting  Stool softeners should not be used if you have severe stomach pain and do not know the cause  How to Use This Medicine:   Capsule, Tablet, Liquid, Liquid Filled Capsule  · Your doctor will tell you how much medicine to use  Do not use more than directed  · Follow the instructions on the medicine label if you are using this medicine without a prescription  · Drink 6 to 8 glasses of water daily while using any laxative  · To make the oral liquid taste better, you may mix it with one-half glass of milk or fruit juice  · Measure the oral liquid medicine with a marked measuring spoon, oral syringe, medicine cup, or medicine dropper    If a dose is missed:   · Use the missed dose as soon as possible  · If you do not remember the missed dose until the next day, skip the missed dose and go back to your regular dosing schedule  · You should not use two doses at the same time  How to Store and Dispose of This Medicine:   · Store the medicine in a tightly closed container at room temperature, away from heat and moisture  Do not store liquid-filled capsules in the refrigerator  · Keep all medicine out of the reach of children  Drugs and Foods to Avoid:   Ask your doctor or pharmacist before using any other medicine, including over-the-counter medicines, vitamins, and herbal products  · You should not use mineral oil while you are using a stool softener  · You should not use a stool softener within 2 hours before or after taking any other medicines  Laxatives can keep other medicines from working correctly  Warnings While Using This Medicine:   · If you are pregnant or breastfeeding, talk to your doctor before taking this medicine  · Do not give laxatives to children under 10years old unless you talk to your doctor  · You should not use this laxative for longer than 1 week unless approved by your doctor  Laxatives may be habit-forming and can harm your bowels if you use them too long  · Stool softeners usually work in 1 to 2 days, but for some people, results can take as long as 3 to 5 days  Possible Side Effects While Using This Medicine: If you notice these less serious side effects, talk with your doctor:  · Nausea  · Sore throat  · Skin rash  If you notice other side effects that you think are caused by this medicine, tell your doctor  Call your doctor for medical advice about side effects  You may report side effects to FDA at 2-904-FDA-5597  © 2017 [address] [employer] Information is for End User's use only and may not be sold, redistributed or otherwise used for commercial purposes  The above information is an  only   It is not intended as medical advice for individual conditions or treatments  Talk to your doctor, nurse or pharmacist before following any medical regimen to see if it is safe and effective for you  Ciprofloxacin (By mouth)   Ciprofloxacin (qau-stc-SBXD-a-sin)  Treats infections and plague  This medicine is a quinolone antibiotic  Brand Name(s): Cipro   There may be other brand names for this medicine  When This Medicine Should Not Be Used: This medicine is not right for everyone  Do not use it if you had an allergic reaction to ciprofloxacin or to similar medicines  How to Use This Medicine:   Liquid, Tablet, Long Acting Tablet  · Your doctor will tell you how much medicine to use  Do not use more than directed  Take this medicine at the same time each day  · You may take this medicine with or without food  Do not take this medicine with only a source of calcium, such as milk, yogurt, or juice that contains added calcium  You may have foods or drinks that contain calcium as part of a larger meal   · Swallow the extended-release tablet whole  Do not crush, break, or chew it  · Oral liquid: Shake for at least 15 seconds just before each use  The liquid has small beads floating in it  Do not chew the beads when you drink the liquid  Measure the oral liquid medicine with a marked measuring spoon, oral syringe, or medicine cup  · Tablet: Swallow whole  Do not break, crush, or chew it  · Drink extra fluids so you will urinate more often and help prevent kidney problems  · Take all of the medicine in your prescription to clear up your infection, even if you feel better after the first few doses  · This medicine should come with a Medication Guide  Ask your pharmacist for a copy if you do not have one  · Missed dose: Take a dose as soon as you remember  If it is almost time for your next dose, wait until then and take a regular dose  Do not take extra medicine to make up for a missed dose    · Store the medicine in a closed container at room temperature, away from heat, moisture, and direct light  Throw away any leftover liquid medicine after 14 days  Drugs and Foods to Avoid:   Ask your doctor or pharmacist before using any other medicine, including over-the-counter medicines, vitamins, and herbal products  · Do not use this medicine together with tizanidine  · Some foods and medicines can affect how ciprofloxacin works  Tell your doctor if you are using any of the following:  ¨ Clozapine, cyclosporine, duloxetine, lidocaine, methotrexate, olanzapine, pentoxifylline, phenytoin, probenecid, ropinirole, sildenafil, theophylline  ¨ Antibiotic (including azithromycin, clarithromycin, erythromycin)  ¨ Blood thinner (including warfarin)  ¨ Diabetes medicine (including glimepiride, glyburide)  ¨ Medicine for depression or mental illness  ¨ Medicine for heart rhythm problems (including amiodarone, procainamide, quinidine, sotalol)  ¨ NSAID pain medicine (including aspirin, celecoxib, diclofenac, ibuprofen, naproxen)  ¨ Steroid medicine (including hydrocortisone, methylprednisolone, prednisone)  · Take ciprofloxacin at least 2 hours before or 6 hours after you take antacids containing aluminum or magnesium, calcium, zinc, iron, lanthanum, sevelamer, sucralfate, and didanosine  This includes vitamin/mineral supplements  · This medicine slows the digestion of caffeine, so it might affect you for longer than normal   Warnings While Using This Medicine:   · Tell your doctor if you are pregnant or breastfeeding, or if you have kidney disease, liver disease, diabetes, heart disease, myasthenia gravis, or a history of heart rhythm problems (such as prolonged QT interval) or seizures  Tell your doctor if you have ever had tendon or joint problems, including rheumatoid arthritis, or if you have received a transplant    · This medicine may cause the following problems:  ¨ Tendinitis and tendon rupture (may happen after treatment ends)  ¨ Liver damage  ¨ Nerve damage in the arms or legs  ¨ Heart rhythm changes  ¨ Changes in blood sugar levels  · This medicine may make you dizzy, drowsy, or lightheaded  Do not drive or do anything that could be dangerous until you know how this medicine affects you  · This medicine can cause diarrhea  Call your doctor if the diarrhea becomes severe, does not stop, or is bloody  Do not take any medicine to stop diarrhea until you have talked to your doctor  Diarrhea can occur 2 months or more after you stop taking this medicine  · This medicine may make your skin more sensitive to sunlight  Wear sunscreen  Do not use sunlamps or tanning beds  · Call your doctor if your symptoms do not improve or if they get worse  · Keep all medicine out of the reach of children  Never share your medicine with anyone  Possible Side Effects While Using This Medicine:   Call your doctor right away if you notice any of these side effects:  · Allergic reaction: Itching or hives, swelling in your face or hands, swelling or tingling in your mouth or throat, chest tightness, trouble breathing  · Blistering, peeling, red skin rash  · Dark-colored urine or pale stools, nausea, vomiting, loss of appetite, pain in your upper stomach, yellow skin or eyes  · Diarrhea that may contain blood  · Fainting, dizziness, or lightheadedness  · Fast, slow, or uneven heartbeat  · Numbness, tingling, weakness, or burning pain in your hands, arms, legs, or feet  · Pain, stiffness, swelling, or bruises around your ankle, leg, shoulder, or other joint  · Seizures, severe headache, unusual thoughts or behaviors, trouble sleeping, feeling anxious, confused, or depressed, seeing, hearing, or feeling things that are not there  · Unusual bleeding, bruising, or weakness  If you notice other side effects that you think are caused by this medicine, tell your doctor  Call your doctor for medical advice about side effects   You may report side effects to FDA at 6-515-MTN-9699  © 2017 [employer] [address] is for End User's use only and may not be sold, redistributed or otherwise used for commercial purposes  The above information is an  only  It is not intended as medical advice for individual conditions or treatments  Talk to your doctor, nurse or pharmacist before following any medical regimen to see if it is safe and effective for you  Phenazopyridine (By mouth)   Phenazopyridine (xof-ez-nkc-PIR-i-miah)  Relieves symptoms caused by urinary tract infections and other urinary problems  Brand Name(s): Azo Standard, Azo Urinary Pain Relief, Azo Urinary Tract Health, Baridium, Leader Urinary Pain Relief, Preferred Urinary Pain Relief, Pyridium, Quality Choice Azo, [facility] Urinary Pain Relief, Urinary Pain Relief, [facility] UTI Relief   There may be other brand names for this medicine  When This Medicine Should Not Be Used: You should not use this medicine if you have had an allergic reaction to phenazopyridine  How to Use This Medicine:   Tablet  · Your doctor will tell you how much to take and how often  · Swallow the tablets whole, do not crush or chew  · You may take the medicine with food to avoid an upset stomach  If a dose is missed:   · Take the missed dose as soon as possible  · Skip the missed dose if it is almost time for your next regular dose  · You should not use two doses at the same time  How to Store and Dispose of This Medicine:   · Store at room temperature, away from moisture and direct light  · Ask your pharmacist, doctor, or health caregiver about the best way to dispose of any outdated medicine or medicine no longer needed  · Keep all medicine out of the reach of children  Drugs and Foods to Avoid:      Ask your doctor or pharmacist before using any other medicine, including over-the-counter medicines, vitamins, and herbal products        Warnings While Using This Medicine:   · Check with your doctor before taking phenazopyridine if you have kidney or liver problems or are pregnant or breastfeeding  · This medicine can turn urine a red or orange color  This is normal   · This medicine may stain soft contact lenses  You may not want to wear your contacts while taking this medicine  Possible Side Effects While Using This Medicine:   Call your doctor right away if you notice any of these side effects:  · Skin rash or hives, trouble breathing  · Yellowing of the skin or eyes  If you notice these less serious side effects, talk with your doctor:   · Indigestion or stomach upset  · Dizziness  · Headache  If you notice other side effects that you think are caused by this medicine, tell your doctor  Call your doctor for medical advice about side effects  You may report side effects to FDA at 0-833-KUH-0111  © 2017 [address] Information is for End User's use only and may not be sold, redistributed or otherwise used for commercial purposes  The above information is an  only  It is not intended as medical advice for individual conditions or treatments  Talk to your doctor, nurse or pharmacist before following any medical regimen to see if it is safe and effective for you  Medrano Catheter Placement and Care   AMBULATORY CARE:   A Medrano catheter  is a sterile tube that is inserted into your bladder to drain urine  It is also called an indwelling urinary catheter  The tip of the catheter has a small balloon filled with solution that holds the catheter in your bladder  How a Medrano catheter is placed:   · Your healthcare provider will clean your genital area with a sterile solution  He or she will put lubricant jelly on the catheter to help it go in smoothly  The end of the catheter with the deflated balloon will be inserted into your urethra  The catheter will be moved slowly and gently into your bladder   You may be asked to take slow, deep breaths or to push as if you were trying to urinate as the catheter is inserted  · When your healthcare provider sees urine flowing from the catheter, he or she will fill the balloon at the end of the catheter  The balloon holds the catheter in place so it does not come out  Your healthcare provider will attach the open end of the catheter to a sterile drainage bag  Seek care immediately if:   · Your catheter comes out  · You suddenly have material that looks like sand in the tubing or drainage bag  · No urine is draining into the bag and you have checked the system  · You have pain in your hip, back, pelvis, or lower abdomen  · You are confused or cannot think clearly  Contact your healthcare provider if:   · You have a fever  · You have bladder spasms for more than 1 day after the catheter is placed  · You see blood in the tubing or drainage bag  · You have a rash or itching where the catheter tube is secured to your skin  · Urine leaks from or around the catheter, tubing, or drainage bag  · The closed drainage system has accidently come open or apart  · You see a layer of crystals inside the tubing  · You have questions or concerns about your condition or care  Care for your Medrano catheter:   · Clean your genital area 2 times every day  Clean your catheter and the area around where it was inserted  Use soap and water  Clean your anal opening and catheter area after every bowel movement  · Secure the catheter tube  so you do not pull or move the catheter  This helps prevent pain and bladder spasms  Healthcare providers will show you how to use medical tape or a strap to secure the catheter tube to your body  · Keep a closed drainage system  Your Medrano catheter should always be attached to the drainage bag to form a closed system  Do not disconnect any part of the closed system unless you need to change the bag  Care for your drainage bag:   · Ask if a leg bag is right for you    A leg bag can be worn under your clothes  Ask your healthcare provider for more information about a leg bag  · Keep the drainage bag below the level of your waist   This helps stop urine from moving back up the tubing and into your bladder  Do not loop or kink the tubing  This can cause urine to back up and collect in your bladder  Do not let the drainage bag touch or lie on the floor  · Empty the drainage bag when needed  The weight of a full drainage bag can be painful  Empty the drainage bag every 3 to 6 hours or when it is ? full  · Clean and change the drainage bag as directed  Ask your healthcare provider how often you should change the drainage bag and what cleaning solution to use  Wear disposable gloves when you change the bag  Do not allow the end of the catheter or tubing to touch anything  Clean the ends with an alcohol pad before you reconnect them  What to do if problems develop:   · No urine is draining into the bag:      ¨ Check for kinks in the tubing and straighten them out  ¨ Check the tape or strap used to secure the catheter tube to your skin  Make sure it is not blocking the tube  ¨ Make sure you are not sitting or lying on the tubing  ¨ Make sure the urine bag is hanging below the level of your waist     · Urine leaks from or around the catheter, tubing, or drainage bag:  Check if the closed drainage system has accidently come open or apart  Clean the catheter and tubing ends with a new alcohol pad and reconnect them  Prevent an infection:   · Wash your hands often  Wash before and after you touch your catheter, tubing, or drainage bag  Use soap and water  Wear clean disposable gloves when you care for your catheter or disconnect the drainage bag  Wash your hands before you prepare or eat food  · Drink liquids as directed  Ask your healthcare provider how much liquid to drink each day and which liquids are best for you   Liquids will help flush your kidneys and bladder to help prevent infection  Follow up with your healthcare provider as directed:  Write down your questions so you remember to ask them during your visits  © 2017 [address] Information is for End User's use only and may not be sold, redistributed or otherwise used for commercial purposes  All illustrations and images included in CareNotes® are the copyrighted property of A [employer] M [employer]  or [address]  The above information is an  only  It is not intended as medical advice for individual conditions or treatments  Talk to your doctor, nurse or pharmacist before following any medical regimen to see if it is safe and effective for you  Urinary Leg Bag   WHAT YOU NEED TO KNOW:   What is a urinary leg bag? A urinary leg bag holds urine that drains from your catheter  It fits under your clothes and allows you to do your normal daily activities  How do I use a urinary leg bag? · Wash your hands  before and after you touch your catheter, tubing, or drainage bag  Use soap and water  This reduces the risk of infection  · Strap your leg bag to your thigh or calf  Make sure the straps are comfortable  The straps can cause problems with blood flow in your leg if they are too tight  · Clean the tip of the drainage tube with alcohol  before attaching it to your catheter  This helps prevent bacteria from getting into your catheter  · The connecting tube should not pull on your catheter  Skin breakdown can occur if there is constant pulling on the catheter  · Check the tube often to make sure it is not kinked or twisted  Blockage in the tube can cause urine to back up into your bladder  Your urine must flow straight through the tube into your leg bag  · Always keep the leg bag below your bladder  This prevents urine from the bag going back into your bladder, which may cause an infection  · Empty your leg bag when it is ½ full, or every 3 hours    A full bag may break or disconnect from the catheter  · Change to your bedside bag before you go to bed  Your bedside bag can hold more urine  Do not use your leg bag at night because it could become too full or break  · Clean your leg bag after every use  Fill the bag with 2 parts vinegar and 3 parts water  Let it soak for 20 minutes, then rinse and let dry  Follow your healthcare provider's instruction on replacing your leg bag with a new one  CARE AGREEMENT:   You have the right to help plan your care  Learn about your health condition and how it may be treated  Discuss treatment options with your caregivers to decide what care you want to receive  You always have the right to refuse treatment  The above information is an  only  It is not intended as medical advice for individual conditions or treatments  Talk to your doctor, nurse or pharmacist before following any medical regimen to see if it is safe and effective for you  © 2017 [address] Information is for End User's use only and may not be sold, redistributed or otherwise used for commercial purposes  All illustrations and images included in CareNotes® are the copyrighted property of [employer]  or [employer]

## 2018-03-20 NOTE — H&P
The patient was seen and examined  There are no changes from the prior outpatient H/P  Patient is appropriately prepared for surgery today as planned

## 2018-03-20 NOTE — ANESTHESIA POSTPROCEDURE EVALUATION
Post-Op Assessment Note      CV Status:  Stable    Mental Status:  Alert and awake    Hydration Status:  Euvolemic    PONV Controlled:  Controlled    Airway Patency:  Patent    Post Op Vitals Reviewed: Yes          Staff: Anesthesiologist           /64 (03/20/18 1314)    Temp 97 8 °F (36 6 °C) (03/20/18 1314)    Pulse 90 (03/20/18 1314)   Resp 15 (03/20/18 1314)    SpO2 95 % (03/20/18 1314)

## 2018-03-21 PROCEDURE — 94760 N-INVAS EAR/PLS OXIMETRY 1: CPT

## 2018-03-21 PROCEDURE — 52204 CYSTOSCOPY W/BIOPSY(S): CPT | Performed by: UROLOGY

## 2018-03-21 PROCEDURE — 94660 CPAP INITIATION&MGMT: CPT

## 2018-03-21 RX ORDER — GABAPENTIN 100 MG/1
100 CAPSULE ORAL 3 TIMES DAILY
Status: DISCONTINUED | OUTPATIENT
Start: 2018-03-21 | End: 2018-03-22 | Stop reason: HOSPADM

## 2018-03-21 RX ORDER — FUROSEMIDE 20 MG/1
20 TABLET ORAL DAILY
Status: DISCONTINUED | OUTPATIENT
Start: 2018-03-21 | End: 2018-03-22 | Stop reason: HOSPADM

## 2018-03-21 RX ORDER — LEVOFLOXACIN 250 MG/1
250 TABLET ORAL EVERY 24 HOURS
Status: DISCONTINUED | OUTPATIENT
Start: 2018-03-21 | End: 2018-03-22 | Stop reason: HOSPADM

## 2018-03-21 RX ORDER — LEVOTHYROXINE SODIUM 0.07 MG/1
75 TABLET ORAL
Status: DISCONTINUED | OUTPATIENT
Start: 2018-03-21 | End: 2018-03-22 | Stop reason: HOSPADM

## 2018-03-21 RX ORDER — PRAVASTATIN SODIUM 40 MG
80 TABLET ORAL
Status: DISCONTINUED | OUTPATIENT
Start: 2018-03-21 | End: 2018-03-22 | Stop reason: HOSPADM

## 2018-03-21 RX ORDER — PANTOPRAZOLE SODIUM 20 MG/1
20 TABLET, DELAYED RELEASE ORAL
Status: DISCONTINUED | OUTPATIENT
Start: 2018-03-21 | End: 2018-03-22 | Stop reason: HOSPADM

## 2018-03-21 RX ORDER — ROPINIROLE 1 MG/1
0.5 TABLET, FILM COATED ORAL 3 TIMES DAILY
Status: DISCONTINUED | OUTPATIENT
Start: 2018-03-21 | End: 2018-03-22 | Stop reason: HOSPADM

## 2018-03-21 RX ADMIN — LOSARTAN POTASSIUM: 50 TABLET, FILM COATED ORAL at 09:52

## 2018-03-21 RX ADMIN — ROPINIROLE 0.5 MG: 1 TABLET, FILM COATED ORAL at 21:58

## 2018-03-21 RX ADMIN — ROPINIROLE 0.5 MG: 1 TABLET, FILM COATED ORAL at 17:00

## 2018-03-21 RX ADMIN — LEVOTHYROXINE SODIUM 75 MCG: 75 TABLET ORAL at 09:53

## 2018-03-21 RX ADMIN — FUROSEMIDE 20 MG: 20 TABLET ORAL at 09:51

## 2018-03-21 RX ADMIN — HYDROCODONE BITARTRATE AND ACETAMINOPHEN 1 TABLET: 5; 325 TABLET ORAL at 04:00

## 2018-03-21 RX ADMIN — SODIUM CHLORIDE 500 ML: 0.9 INJECTION, SOLUTION INTRAVENOUS at 09:53

## 2018-03-21 RX ADMIN — GABAPENTIN 100 MG: 100 CAPSULE ORAL at 09:51

## 2018-03-21 RX ADMIN — ROPINIROLE 0.5 MG: 1 TABLET, FILM COATED ORAL at 09:52

## 2018-03-21 RX ADMIN — PANTOPRAZOLE SODIUM 20 MG: 20 TABLET, DELAYED RELEASE ORAL at 09:52

## 2018-03-21 RX ADMIN — PRAVASTATIN SODIUM 80 MG: 40 TABLET ORAL at 17:01

## 2018-03-21 RX ADMIN — LEVOFLOXACIN 250 MG: 250 TABLET, FILM COATED ORAL at 09:51

## 2018-03-21 RX ADMIN — HYDROCODONE BITARTRATE AND ACETAMINOPHEN 1 TABLET: 5; 325 TABLET ORAL at 15:13

## 2018-03-21 RX ADMIN — GABAPENTIN 100 MG: 100 CAPSULE ORAL at 22:04

## 2018-03-21 RX ADMIN — GABAPENTIN 100 MG: 100 CAPSULE ORAL at 17:00

## 2018-03-21 NOTE — PROGRESS NOTES
Initially pt voided 200 w/ residual of 606  Then pt voided 175  This RN was going to insert walls cath per order, but pt needed to void again  This time she voided a large amount on the pad, floor and in the bedside commode  The urine collected in the commode was mixed w/ stool, therefore an exact amount of output is undetermined  PVR was measured and found to be 293  Will not insert walls catheter at this time

## 2018-03-21 NOTE — PROGRESS NOTES
UROLOGY PROGRESS NOTE   Patient Identifiers: Beck Flynn (MRN [id number])  Date of Service: 3/21/2018    Assessment:   POD#1 s/p ureteroscopy (RIGHT) with biopsies  Developed post op retention and required admission for management of catheter     Plan:     Patient with visualized large posterior bladder prolapse  Known 250-300cc residual urine volumes  Catheter placed last evening as patient unable to void at all  Will remove today and follow PVR  If <300cc, will leave catheter out  If >300cc, catheter will need to be replaced  Patient will be discharged tomorrow  If plan to d/c with catheter, will need VNA for assistance since patient and  uncomfortable with mangement    Restart home medication  Regular diet    Ambulate  Subjective:     24 HR EVENTS:   Unable to void in PACU  Bladder scan ~300cc  Medrano placed  Family uncomfortable with discharge at late hour      Objective:     VITALS:    Vitals:    03/21/18 0802   BP: 113/56   Pulse: 68   Resp: 18   Temp: 98 6 °F (37 °C)   SpO2: 95%       INS & OUTS:  2975cc/24hours     LABS:  Lab Results   Component Value Date    HGB 9 4 (L) 03/06/2018    HCT 28 2 (L) 03/06/2018    WBC 6 56 03/06/2018     03/06/2018   ]    Lab Results   Component Value Date     03/06/2018    K 3 4 (L) 03/06/2018     03/06/2018    CO2 30 03/06/2018    BUN 22 03/06/2018    CREATININE 1 01 03/06/2018    CALCIUM 8 6 03/06/2018    GLUCOSE 175 (H) 03/06/2018   ]    INPATIENT MEDS:    Current Facility-Administered Medications:     HYDROcodone-acetaminophen (NORCO) 5-325 mg per tablet 1 tablet, 1 tablet, Oral, Q6H PRN, Stiven Rojo MD, 1 tablet at 03/21/18 0400    sodium chloride 0 9 % infusion, 125 mL/hr, Intravenous, Continuous, Cassi Rowan DO, Stopped at 03/20/18 2158    trimethobenzamide (TIGAN) IM injection 200 mg, 200 mg, Intramuscular, Once, Suman Larson DO    Facility-Administered Medications Ordered in Other Encounters:     acetaminophen (TYLENOL) tablet 650 mg, 650 mg, Oral, Q6H PRN, Marciano Suarez PA-C      Physical Exam:   /56 (BP Location: Left arm)   Pulse 68   Temp 98 6 °F (37 °C) (Temporal)   Resp 18   Ht 5' 1" (1 549 m)   Wt 98 4 kg (217 lb)   LMP  (LMP Unknown)   SpO2 95%   BMI 41 00 kg/m²   GEN: no acute distress  , mucous membranes dry  RESP: breathing comfortably with no accessory muscle use    ABD: soft, non-tender, non-distended   EXT: bilateral peripheral edema   UGARTE: in place draining clear yellow urine

## 2018-03-22 VITALS
SYSTOLIC BLOOD PRESSURE: 132 MMHG | BODY MASS INDEX: 40.97 KG/M2 | OXYGEN SATURATION: 96 % | RESPIRATION RATE: 18 BRPM | DIASTOLIC BLOOD PRESSURE: 59 MMHG | HEIGHT: 61 IN | HEART RATE: 66 BPM | TEMPERATURE: 97.7 F | WEIGHT: 217 LBS

## 2018-03-22 PROCEDURE — 94660 CPAP INITIATION&MGMT: CPT

## 2018-03-22 PROCEDURE — 94760 N-INVAS EAR/PLS OXIMETRY 1: CPT

## 2018-03-22 PROCEDURE — 99024 POSTOP FOLLOW-UP VISIT: CPT | Performed by: PHYSICIAN ASSISTANT

## 2018-03-22 RX ADMIN — LOSARTAN POTASSIUM: 50 TABLET, FILM COATED ORAL at 10:00

## 2018-03-22 RX ADMIN — LEVOTHYROXINE SODIUM 75 MCG: 75 TABLET ORAL at 06:17

## 2018-03-22 RX ADMIN — FUROSEMIDE 20 MG: 20 TABLET ORAL at 10:00

## 2018-03-22 RX ADMIN — ROPINIROLE 0.5 MG: 1 TABLET, FILM COATED ORAL at 10:00

## 2018-03-22 RX ADMIN — GABAPENTIN 100 MG: 100 CAPSULE ORAL at 10:00

## 2018-03-22 RX ADMIN — PANTOPRAZOLE SODIUM 20 MG: 20 TABLET, DELAYED RELEASE ORAL at 06:17

## 2018-03-22 RX ADMIN — LEVOFLOXACIN 250 MG: 250 TABLET, FILM COATED ORAL at 10:00

## 2018-03-22 NOTE — CASE MANAGEMENT
Initial Clinical Review    Age/Sex: 81 y o  female    Surgery Date:    3/20/2018    Procedure:   Preop Diagnosis:  Urothelial lesion [N39 9]     Post-Op Diagnosis Codes:     * Urothelial lesion [N39 9]     Procedure(s) (LRB):  CYSTOSCOPY, RETROGRADE PYELOGRAM, URETEROSCOPY, BIOPSY, BRUSH, FULGURATION, STENT PLACEMENT, BLADDER BIOPSY WITH FULGURATION (right)  Operative Findings:  1  Right UPJ and diffuse renal pelvis papillary lesions, blanketed throughout the renal pelvic mucosa and UPJ  2  Brushings and multiple biopsies taken  3  Areas of velvetry appearing lesions in the posterior and left lateral bladder wall  4  Significant posterior bladder prolapse, likely leading to outpatient findings of retained urine    PROGRESS  NOTE  3/21  POD#1 s/p ureteroscopy (RIGHT) with biopsies  Developed post op retention and required admission for management of catheter      Plan:      Patient with visualized large posterior bladder prolapse  Known 250-300cc residual urine volumes  Catheter placed last evening as patient unable to void at all  Will remove today and follow PVR  If <300cc, will leave catheter out      If >300cc, catheter will need to be replaced  Patient will be discharged tomorrow  If plan to d/c with catheter, will need VNA for assistance since patient and  uncomfortable with mangement     Restart home medication  24 HR EVENTS:   Unable to void in PACU  Bladder scan ~300cc  Medrano placed   Family uncomfortable with discharge at late hour             Anesthesia:    general    Admission Orders: Date/Time/Statement:  OP NC     3/20    OBS   ORDER    3/21  @  2144    Orders Placed This Encounter   Procedures    Place in Observation     Standing Status:   Standing     Number of Occurrences:   1     Order Specific Question:   Admitting Physician     Answer:   Mackenzie Fontanez [[id number]]     Order Specific Question:   Level of Care     Answer:   Med Surg [16]       Vital Signs: /59 (BP Location: Right arm)   Pulse 66   Temp 97 7 °F (36 5 °C) (Tympanic)   Resp 18   Ht 5' 1" (1 549 m)   Wt 98 4 kg (217 lb)   LMP  (LMP Unknown)   SpO2 96%   BMI 41 00 kg/m²     Diet:        Diet Orders            Start     Ordered    03/21/18 0843  Diet Regular; Regular House  Diet effective now     Question Answer Comment   Diet Type Regular    Regular Regular House    RD to adjust diet per protocol?  No        03/21/18 0843          Mobility:    As  rachel    DVT Prophylaxis:   SCD'S    Pain Control:   Pain Medications             gabapentin (NEURONTIN) 100 mg capsule Take 1 capsule (100 mg total) by mouth 3 (three) times a day Take 1 cap in the am, 1 cap in the afternoon, and 3 cap at bedtime    acetaminophen-codeine (TYLENOL #3) 300-30 mg per tablet Take 1 tablet by mouth every 4 (four) hours as needed for moderate pain for up to 20 days Chart(s)

## 2018-03-22 NOTE — PROGRESS NOTES
Progress Note - Urology  Karyle Cash 1935, 81 y o  female MRN: [id number]    Unit/Bed#: E2 -01 Encounter: [id number]    * Urothelial lesion   Assessment & Plan    2 Days Post-Op  Procedure(s):  CYSTOSCOPY, RETROGRADE PYELOGRAM, URETEROSCOPY, BIOPSY, BRUSH, FULGURATION, STENT PLACEMENT, BLADDER BIOPSY WITH FULGURATION  Surgeon(s):  Skye Allred MD  3/20/2018    Doing great! Voiding on her own without Medrano catheter  Plan:  Discharge home today  Outpatient follow-up with Dr Ariane Pope as originally planned  Bedside rounds performed with Lizzeth Yancey RN  Subjective/Objective     Subjective:   Patient reports she feels great today  She got lots of rest yesterday, began tolerating a regular diet  Her Medrano catheter was removed and she has been voiding on her own  She denies any dysuria or gross hematuria  She is out of bed to the chair  She denies any pain shortness of breath chest pain or other complaints this morning  Objective:  Vitals: Blood pressure 132/59, pulse 66, temperature 97 7 °F (36 5 °C), temperature source Tympanic, resp  rate 18, height 5' 1" (1 549 m), weight 98 4 kg (217 lb), SpO2 96 %  ,Body mass index is 41 kg/m²  Intake/Output Summary (Last 24 hours) at 03/22/18 1046  Last data filed at 03/22/18 1000   Gross per 24 hour   Intake              860 ml   Output             2300 ml   Net            -1440 ml       Invasive Devices     Peripheral Intravenous Line            Peripheral IV 03/20/18 Left Hand 1 day          Drain            Ureteral Drain/Stent Right ureter 6 Fr  1 day                Physical Exam   Constitutional: She is oriented to person, place, and time  She appears well-developed and well-nourished  No distress  Pleasant elderly obese 80-year-old female about the chair  No acute distress  HENT:   Head: Atraumatic  Eyes: EOM are normal    Neck: Normal range of motion  Neck supple     Cardiovascular: Normal rate, regular rhythm and normal heart sounds  Pulmonary/Chest: Effort normal and breath sounds normal  No respiratory distress  She has no wheezes  Abdominal: Soft  Bowel sounds are normal  She exhibits no distension and no mass  There is no tenderness  There is no rebound and no guarding  Neurological: She is alert and oriented to person, place, and time  Skin: Skin is warm and dry  No rash noted  She is not diaphoretic  No erythema  No pallor  Psychiatric: She has a normal mood and affect  Her behavior is normal  Judgment and thought content normal    Nursing note and vitals reviewed        Chance Shannon PA-C  Date: 3/22/2018 Time: 10:46 AM

## 2018-03-22 NOTE — ASSESSMENT & PLAN NOTE
2 Days Post-Op  Procedure(s):  CYSTOSCOPY, RETROGRADE PYELOGRAM, URETEROSCOPY, BIOPSY, BRUSH, FULGURATION, STENT PLACEMENT, BLADDER BIOPSY WITH FULGURATION  Surgeon(s):  Mimi Washington MD  3/20/2018    Doing great! Voiding on her own without Medrano catheter  Plan:  Discharge home today  Outpatient follow-up with Dr Alex Luong as originally planned

## 2018-03-22 NOTE — DISCHARGE SUMMARY
Discharge Summary - Kacie Valentino 81 y o  female MRN: [id number]    Unit/Bed#: E2 -39 Encounter: [id number]    Admission Date: 3/20/2018     Discharge Date:  03/22/18    HPI:  78-year-old female who presented for elective patient's cystoscopy with Dr Lizz Zabala  Procedure(s):  CYSTOSCOPY, RETROGRADE PYELOGRAM, URETEROSCOPY, BIOPSY, BRUSH, FULGURATION, STENT PLACEMENT, BLADDER BIOPSY WITH FULGURATION  Surgeon(s):  Alida Wise MD  3/20/2018    Hospital Course:  Postoperatively she experience some urinary retention and required admission to the hospital for management of her Medrano catheter  Catheter was subsequently removed and patient began voiding spontaneously without issue  Her PVR measured less than 300 cc and Medrano catheter was left out  She continued to void without issue, tolerated a regular diet, was out of bed to the chair and was eager to be discharged home on postoperative day 2  Discharge Diagnosis: Urothelial lesion    Condition at Discharge: good     Discharge Medications:  See after visit summary for reconciled discharge medications provided to patient and family  Discharge instructions/Information to patient and family:   See after visit summary for information provided to patient and family  Provisions for Follow-Up Care:  See after visit summary for information related to follow-up care and any pertinent home health orders  Disposition: Home    Planned Readmission: No    Discharge Statement   I spent 20 minutes discharging the patient  This time was spent on the day of discharge  I had direct contact with the patient on the day of discharge  Additional documentation is required if more than 30 minutes were spent on discharge       Signature:   Chance Shannon PA-C  Date: 3/22/2018 Time: 10:50 AM

## 2018-03-23 ENCOUNTER — TELEPHONE (OUTPATIENT)
Dept: UROLOGY | Facility: CLINIC | Age: 83
End: 2018-03-23

## 2018-03-23 NOTE — TELEPHONE ENCOUNTER
Patient called  She was started on Xarelto and she developed gross hematuria  She denies passing blood clots when she voids  She has a ureteral stent  Dr Ariane Pope aware  Advised patient to increase PO hydration and call if she passes blood clots when voiding, is unable to void or develops a fever  Patient is scheduled for a follow up visit on 4/4/18

## 2018-03-23 NOTE — PHYSICIAN ADVISOR
Current patient class: Observation  The patient is currently on Hospital Day: 3      The patient was admitted to the hospital  on N/A at N/A for the following diagnosis:  Urothelial lesion [N39 9]     After review of the relevant documentation, labs, vital signs and test results, the patient is most appropriate for OBSERVATION STATUS  The patient was admitted to the hospital without an expected length of stay of at least 2 midnights  Given that the patient is subject to the 2 midnight benchmark as a CMS patient, they are appropriate for observation status at this time  Should the patient remain hospitalized for a second midnight the status should be reevaluated for medical necessity  Rationale is as follows: The patient is a 81 yrs   Female who presented to the ED at 3/20/2018  8:52 AM with a chief complaint of No chief complaint on file      The patients vitals on arrival were ED Triage Vitals   Temperature Pulse Respirations Blood Pressure SpO2   03/20/18 0915 03/20/18 0915 03/20/18 0915 03/20/18 0915 03/20/18 0915   (!) 96 9 °F (36 1 °C) 81 18 (!) 180/79 98 %      Temp Source Heart Rate Source Patient Position - Orthostatic VS BP Location FiO2 (%)   03/20/18 0915 03/20/18 2345 03/20/18 2345 03/20/18 2345 --   Tympanic Monitor Lying Right arm       Pain Score       03/20/18 1055       3           Past Medical History:   Diagnosis Date    Anemia     Arthritis     rheumatoid    Benign essential hypertension     Cataract     Chronic cystitis     Colon, diverticulosis     CPAP (continuous positive airway pressure) dependence     Diabetes mellitus (HCC)     Diverticulitis of colon     Early satiety     GERD (gastroesophageal reflux disease)     Gout     Hearing loss     Hemorrhoids     Hiatal hernia     History of colonic polyps     Hyperlipidemia     Hypertension     Hypertension     Hypothyroidism     Impaired fasting glucose     Left breast mass     Microhematuria     Migraine occular    Neuropathy     lower and upper extermities    Overactive bladder     Pneumonia of left lower lobe due to infectious organism (HCC)     RA (rheumatoid arthritis) (HCC)     Rheumatoid arthritis (HCC)     Sleep apnea     uses cpap    Stroke ([facility] [place] [address] )     Type 2 diabetes mellitus (HCC)     Urinary frequency     Urinary urgency     Use of cane as ambulatory aid      Past Surgical History:   Procedure Laterality Date    APPENDECTOMY      ARTHROSCOPY WRIST Right     with release of transverse carpal ligament     BLADDER SURGERY      BLADDER SURGERY      BREAST SURGERY      left breast    BUNIONECTOMY Bilateral     CATARACT EXTRACTION      CHOLECYSTECTOMY      COLONOSCOPY      CYSTOSCOPY      EGD AND COLONOSCOPY N/A 12/20/2017    Procedure: EGD AND COLONOSCOPY;  Surgeon: Fredi Moncada MD;  Location: [facility];   Service: Gastroenterology    ESOPHAGOGASTRODUODENOSCOPY      diagnostic    FOREARM SURGERY Right     fracture repair    HYSTERECTOMY      KNEE SURGERY Left     meniscus tear    NOSE SURGERY      NE CYSTO/URETERO W/LITHOTRIPSY &INDWELL STENT INSRT Right 2/28/2018    Procedure: CYSTOSCOPY RIGHT URETEROSCOPY, RIGHT RETROGRADE PYELOGRAM AND INSERTION  RIGHT STENT URETERAL, RIGHT RENAL PELVIC WASHING FOR CYTOLOGY;  Surgeon: Suzanne Sanches MD;  Location: [facility];  Service: Urology    REPLACEMENT TOTAL KNEE BILATERAL Bilateral     URETEROSCOPY Right 3/20/2018    Procedure: CYSTOSCOPY, RETROGRADE PYELOGRAM, URETEROSCOPY, BIOPSY, BRUSH, FULGURATION, STENT PLACEMENT, BLADDER BIOPSY WITH FULGURATION;  Surgeon: Solitario Johnson MD;  Location[facility];  Service: Urology           Consults have been placed to:   None    Vitals:    03/21/18 1507 03/21/18 2323 03/22/18 0009 03/22/18 0700   BP: 170/76 159/71  132/59   BP Location: Right arm Right arm  Right arm   Pulse: 86 73  66   Resp: 18 18 18   Temp: 97 9 °F (36 6 °C) 98 2 °F (36 8 °C)  97 7 °F (36 5 °C)   TempSrc: Tympanic Temporal Tympanic   SpO2: 97% 98% 96% 96%   Weight:       Height:           Most recent labs:    No results for input(s): WBC, HGB, HCT, PLT, K, NA, CALCIUM, BUN, CREATININE, LIPASE, AMYLASE, INR, TROPONINI, CKTOTAL, AST, ALT, ALKPHOS, BILITOT in the last 72 hours  Scheduled Meds:  Facility-Administered Medications Ordered in Other Encounters:  acetaminophen 650 mg Oral Q6H PRN Yosi Jefferson PA-C     Continuous Infusions:  No current facility-administered medications for this encounter  PRN Meds:      Surgical procedures (if appropriate):  Procedure(s):  CYSTOSCOPY, RETROGRADE PYELOGRAM, URETEROSCOPY, BIOPSY, BRUSH, FULGURATION, STENT PLACEMENT, BLADDER BIOPSY WITH FULGURATION

## 2018-03-27 ENCOUNTER — TELEPHONE (OUTPATIENT)
Dept: UROLOGY | Facility: CLINIC | Age: 83
End: 2018-03-27

## 2018-03-27 NOTE — TELEPHONE ENCOUNTER
Spoke to Jennyfer and explained the pt is scheduled for surgery with us on 4/23 and requires cardiac clearance  She transferred me to the nurses line, I left a message asking the nurse to speak with Dr Eber Zapien to determine if they can do a cardiac risk assessment for the pt or if they need to see the pt in the office prior to surgery  I asked that she call me back as soon as possible so that if the pt needs an appt one can be made for her

## 2018-03-28 ENCOUNTER — TELEPHONE (OUTPATIENT)
Dept: UROLOGY | Facility: CLINIC | Age: 83
End: 2018-03-28

## 2018-03-28 NOTE — TELEPHONE ENCOUNTER
I reviewed the patient's findings of low-grade urothelial carcinoma  Unfortunately the cancer is diffuse throughout the urothelial upper tract by visual inspection  The patient is at risk for continued growth and symptoms from this lesion and potentially future bleeding  However, she has multiple medical co-morbidities that would make major surgery challenging  We have had at length discussions about this in the past   She will be proceeding to my office on April 4th for further discussion  We have arranged for a full cardiology evaluation on April 6th  The patient is doing well at home currently and her urinary bleeding has subsided  Unfortunately she is contracted bronchitis and is getting treatment for this  She understands the plan and will see me back on the 4th of April

## 2018-04-02 ENCOUNTER — OFFICE VISIT (OUTPATIENT)
Dept: FAMILY MEDICINE CLINIC | Facility: CLINIC | Age: 83
End: 2018-04-02
Payer: MEDICARE

## 2018-04-02 VITALS
OXYGEN SATURATION: 96 % | HEIGHT: 61 IN | HEART RATE: 86 BPM | DIASTOLIC BLOOD PRESSURE: 78 MMHG | SYSTOLIC BLOOD PRESSURE: 144 MMHG | WEIGHT: 220.31 LBS | TEMPERATURE: 99.4 F | BODY MASS INDEX: 41.59 KG/M2

## 2018-04-02 DIAGNOSIS — N39.0 URINARY TRACT INFECTION WITH HEMATURIA, SITE UNSPECIFIED: Primary | ICD-10-CM

## 2018-04-02 DIAGNOSIS — R31.9 URINARY TRACT INFECTION WITH HEMATURIA, SITE UNSPECIFIED: Primary | ICD-10-CM

## 2018-04-02 DIAGNOSIS — J40 BRONCHITIS: ICD-10-CM

## 2018-04-02 LAB
SL AMB  POCT GLUCOSE, UA: NEGATIVE
SL AMB LEUKOCYTE ESTERASE,UA: ABNORMAL
SL AMB POCT BILIRUBIN,UA: NEGATIVE
SL AMB POCT BLOOD,UA: ABNORMAL
SL AMB POCT CLARITY,UA: ABNORMAL
SL AMB POCT COLOR,UA: YELLOW
SL AMB POCT KETONES,UA: NEGATIVE
SL AMB POCT NITRITE,UA: POSITIVE
SL AMB POCT PH,UA: 5
SL AMB POCT SPECIFIC GRAVITY,UA: 1
SL AMB POCT URINE PROTEIN: NEGATIVE
SL AMB POCT UROBILINOGEN: NEGATIVE

## 2018-04-02 PROCEDURE — 87086 URINE CULTURE/COLONY COUNT: CPT | Performed by: PHYSICIAN ASSISTANT

## 2018-04-02 PROCEDURE — 81002 URINALYSIS NONAUTO W/O SCOPE: CPT | Performed by: PHYSICIAN ASSISTANT

## 2018-04-02 PROCEDURE — 99213 OFFICE O/P EST LOW 20 MIN: CPT | Performed by: PHYSICIAN ASSISTANT

## 2018-04-02 RX ORDER — CEPHALEXIN 500 MG/1
500 CAPSULE ORAL EVERY 8 HOURS SCHEDULED
Qty: 21 CAPSULE | Refills: 0 | Status: SHIPPED | OUTPATIENT
Start: 2018-04-02 | End: 2018-04-06 | Stop reason: HOSPADM

## 2018-04-02 RX ORDER — CIPROFLOXACIN 500 MG/1
500 TABLET, FILM COATED ORAL EVERY 12 HOURS
Refills: 0 | COMMUNITY
Start: 2018-03-22 | End: 2018-04-02

## 2018-04-02 RX ORDER — NYSTATIN 100000 [USP'U]/G
POWDER TOPICAL
COMMUNITY
Start: 2014-08-13 | End: 2018-04-06 | Stop reason: HOSPADM

## 2018-04-02 NOTE — PATIENT INSTRUCTIONS
Urinary tract infection  I reviewed with the patient that her urine dip results did show signs of infection  She was started on Keflex and her urine was sent out for culture  We will call her upon receipt of the culture results to let her know if any changes need to be made  She should continue with good fluid intake  She should call if she feels that her symptoms are worsening or failing to improve  She will follow up with her surgeon as already scheduled later this week  We also reviewed that her bronchitis appears to be resolving well  She was encouraged to continue with Mucinex and good fluid intake to help with full resolution of this  We discussed that at this point she is not infectious anymore but is instead experiencing a postinfectious cough  We reviewed that this can take weeks to a month to resolve fully  She urged to call if this worsens at any point or fails to fully resolve

## 2018-04-02 NOTE — ASSESSMENT & PLAN NOTE
I reviewed with the patient that her urine dip results did show signs of infection  She was started on Keflex and her urine was sent out for culture  We will call her upon receipt of the culture results to let her know if any changes need to be made  She should continue with good fluid intake  She should call if she feels that her symptoms are worsening or failing to improve  She will follow up with her surgeon as already scheduled later this week

## 2018-04-02 NOTE — PROGRESS NOTES
[facility]  FAMILY PRACTICE ACUTE OFFICE VISIT       NAME: Leydi Ramey  AGE: 81 y o  SEX: female       : 1935        MRN: [id number]    DATE: 2018  TIME: 6:33 PM    Assessment and Plan     Problem List Items Addressed This Visit     Urinary tract infection - Primary     I reviewed with the patient that her urine dip results did show signs of infection  She was started on Keflex and her urine was sent out for culture  We will call her upon receipt of the culture results to let her know if any changes need to be made  She should continue with good fluid intake  She should call if she feels that her symptoms are worsening or failing to improve  She will follow up with her surgeon as already scheduled later this week  Relevant Medications    cephalexin (KEFLEX) 500 mg capsule    Other Relevant Orders    POCT urine dip (Completed)    Urine culture      Other Visit Diagnoses     Bronchitis                Patient Instructions   Urinary tract infection  I reviewed with the patient that her urine dip results did show signs of infection  She was started on Keflex and her urine was sent out for culture  We will call her upon receipt of the culture results to let her know if any changes need to be made  She should continue with good fluid intake  She should call if she feels that her symptoms are worsening or failing to improve  She will follow up with her surgeon as already scheduled later this week  We also reviewed that her bronchitis appears to be resolving well  She was encouraged to continue with Mucinex and good fluid intake to help with full resolution of this  We discussed that at this point she is not infectious anymore but is instead experiencing a postinfectious cough  We reviewed that this can take weeks to a month to resolve fully  She urged to call if this worsens at any point or fails to fully resolve              Chief Complaint     Chief Complaint   Patient presents with    Possible UTI    Bronchitis       History of Present Illness   Barrera Rg is a 81y o -year-old female who presents for bronchitis  The patient notes that she has bronchitis  She has been taking Flonase, Mucinex and benzonatate  She has been improving  She still has a little bit of green mcus  She notes that she was very tight and short of breath  The chest x-ray was clear  She notes that she is having pressure of itching and burning  She denies bleeding with the urine  She notes that she sees her surgeon on Wednesday for the kidney problems  She notes that she started with the urinary symptoms a few days ago but it is getting worse  She has pressure in the lower area  She denies f/c  She has been drinking extra water  URI    Associated symptoms include coughing, dysuria, headaches (slight pressure) and vomiting (once after eating something - about 4 days ago)  Pertinent negatives include no congestion, diarrhea, ear pain, nausea, rhinorrhea, sore throat or wheezing  Review of Systems   Review of Systems   Constitutional: Negative for chills and fever  HENT: Negative for congestion, ear pain, postnasal drip, rhinorrhea and sore throat  Respiratory: Positive for cough  Negative for chest tightness, shortness of breath and wheezing  Gastrointestinal: Positive for vomiting (once after eating something - about 4 days ago)  Negative for diarrhea and nausea  Genitourinary: Positive for dysuria, frequency and urgency  Neurological: Positive for headaches (slight pressure)         Active Problem List     Patient Active Problem List   Diagnosis    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 ([facility] [address] [facility]    Essential hypertension    Rheumatoid arthritis ([facility] [address] [facility]    Diabetes mellitus type 2 in obese ([facility] [address] [facility]    Urinary tract infection    Sleep apnea    Hypothyroidism    Chronic GERD    Acute blood loss anemia    Carpal tunnel syndrome, left    Diverticulosis of colon    Edema    Hematuria    Hypercholesterolemia    Lymphedema    Obesity    Overactive bladder    Peripheral neuropathy    Prediabetes    Primary osteoarthritis of left knee    Restless legs syndrome    Right lumbar radiculopathy    Sensorineural hearing loss    Sensory urge incontinence    Sinus arrhythmia    Gross hematuria    Chronic bilateral thoracic back pain    Thoracic degenerative disc disease    Urothelial cancer (HCC)    Lung nodule < 6cm on CT    Urothelial lesion    Pancreatic cyst    Iron deficiency anemia         Social History:  Social History     Social History    Marital status: /Civil Union     Spouse name: N/A    Number of children: N/A    Years of education: N/A     Occupational History    Not on file  Social History Main Topics    Smoking status: Never Smoker    Smokeless tobacco: Never Used      Comment: stopped smoking in the distant past, never smoker (as per [employer])     Alcohol use Yes      Comment: socially    Drug use: No    Sexual activity: Not on file     Other Topics Concern    Not on file     Social History Narrative    No caffeine use       Objective     Vitals:    04/02/18 1542   BP: 144/78   Pulse: 86   Temp: 99 4 °F (37 4 °C)   SpO2: 96%     Wt Readings from Last 3 Encounters:   04/02/18 99 9 kg (220 lb 5 oz)   03/20/18 98 4 kg (217 lb)   03/14/18 98 4 kg (217 lb)       Physical Exam   Constitutional: She appears well-developed and well-nourished  HENT:   Head: Normocephalic and atraumatic  Cardiovascular: Normal rate, regular rhythm and normal heart sounds  No murmur heard  Pulses:       Radial pulses are 2+ on the right side, and 2+ on the left side  Pulmonary/Chest: Effort normal and breath sounds normal    Dry cough   Abdominal: Soft  Bowel sounds are normal  She exhibits no distension and no mass  There is no hepatosplenomegaly   There is tenderness (RLQ - no rebound)  There is no CVA tenderness  Neurological: She is alert  Skin: Skin is warm, dry and intact  No rash noted  Psychiatric: She has a normal mood and affect  Her behavior is normal        Pertinent Laboratory/Diagnostic Studies:  Results for orders placed or performed in visit on 04/02/18   POCT urine dip   Result Value Ref Range    LEUKOCYTE ESTERASE,UA large      NITRITE,UA Positive     SL AMB POCT UROBILINOGEN Negative     SL AMB POCT URINE PROTEIN Negative      PH,UA 5 0      BLOOD,UA Large      SPECIFIC GRAVITY,UA 1 005      KETONES,UA negative      BILIRUBIN,UA Negative     GLUCOSE, UA Negative      COLOR,UA yellow      CLARITY,UA Cloudy        Orders Placed This Encounter   Procedures    Urine culture    POCT urine dip       ALLERGIES:  Allergies   Allergen Reactions    Acetazolamide Other (See Comments)     Other reaction(s): Unknown Allergic Reaction    Aspirin      Other reaction(s): Other (See Comments)  High Dose ASA-stomach ache, rachel  ASA 81 m    Atorvastatin      Other reaction(s): Muscle Pain, Myalgia    Azithromycin     Nitrofurantoin      Other reaction(s): Unknown Allergic Reaction  Other reaction(s): Other (See Comments)  HIVES * pt denies    Other Other (See Comments)     Adhesive tape : red and itching  Other reaction(s):  Other (See Comments)  red and itching    Propoxyphene Other (See Comments)     hives    Shellfish-Derived Products Other (See Comments)     Patient got Gout following eating shell fish    Sulfa Antibiotics Other (See Comments)    Tramadol Diarrhea and Vomiting       Current Medications     Current Outpatient Prescriptions   Medication Sig Dispense Refill    Calcium Citrate-Vitamin D (CALCIUM + D PO) Take 1 tablet by mouth 2 (two) times a day      cyanocobalamin (VITAMIN B-12) 100 mcg tablet Take by mouth      docusate sodium (COLACE) 100 mg capsule Take 1 capsule (100 mg total) by mouth 2 (two) times a day for 60 doses 60 capsule 0    furosemide (LASIX) 20 mg tablet Take 20 mg by mouth daily   gabapentin (NEURONTIN) 100 mg capsule Take 1 capsule (100 mg total) by mouth 3 (three) times a day Take 1 cap in the am, 1 cap in the afternoon, and 3 cap at bedtime 150 capsule 1    LEUCOVORIN CALCIUM PO Take 10 mg by mouth once a week      levothyroxine 75 mcg tablet Take 75 mcg by mouth daily      losartan-hydrochlorothiazide (HYZAAR) 100-12 5 MG per tablet Take 1 tablet by mouth daily   methotrexate (RHEUMATREX) 2 5 MG tablet Take 8 tablets (20 mg total) by mouth once a week 12 tablet 0    Multiple Vitamin (MULTIVITAMINS PO) Take 1 tablet by mouth daily      nystatin (MYCOSTATIN) powder AAA PRN      omeprazole (PriLOSEC) 20 mg delayed release capsule TAKE 1 CAPSULE DAILY AS NEEDED FOR STOMACH ACID 90 capsule 3    pravastatin (PRAVACHOL) 80 mg tablet TAKE 1 TABLET EVERY DAY 90 tablet 3    rOPINIRole (REQUIP) 0 5 mg tablet Take 0 5 mg by mouth 3 (three) times a day      acetaminophen-codeine (TYLENOL #3) 300-30 mg per tablet Take 1 tablet by mouth every 4 (four) hours as needed for moderate pain for up to 20 days 30 tablet 0    cephalexin (KEFLEX) 500 mg capsule Take 1 capsule (500 mg total) by mouth every 8 (eight) hours for 7 days 21 capsule 0    Certolizumab Pegol (CIMZIA PREFILLED SC) Inject under the skin every 30 (thirty) days        oxybutynin (DITROPAN-XL) 10 MG 24 hr tablet Take 1 tablet (10 mg total) by mouth daily at bedtime for 3 days 3 tablet 0    phenazopyridine (PYRIDIUM) 100 mg tablet Take 1 tablet (100 mg total) by mouth 3 (three) times a day as needed for bladder spasms 10 tablet 0    rivaroxaban (XARELTO) 20 mg tablet Take 1 tablet (20 mg total) by mouth daily with dinner 30 tablet 0     No current facility-administered medications for this visit        Facility-Administered Medications Ordered in Other Visits   Medication Dose Route Frequency Provider Last Rate Last Dose    acetaminophen (TYLENOL) tablet 650 mg  650 mg Oral Q6H PRN YOANA Solis  4/2/2018 6:33 PM  [facility] and ARELI LINN [facility]

## 2018-04-03 LAB — BACTERIA UR CULT: NORMAL

## 2018-04-04 ENCOUNTER — OFFICE VISIT (OUTPATIENT)
Dept: UROLOGY | Facility: CLINIC | Age: 83
End: 2018-04-04
Payer: MEDICARE

## 2018-04-04 VITALS
HEART RATE: 88 BPM | WEIGHT: 216 LBS | SYSTOLIC BLOOD PRESSURE: 160 MMHG | BODY MASS INDEX: 40.78 KG/M2 | DIASTOLIC BLOOD PRESSURE: 90 MMHG | HEIGHT: 61 IN

## 2018-04-04 DIAGNOSIS — C68.9 UROTHELIAL CANCER (HCC): Primary | ICD-10-CM

## 2018-04-04 PROBLEM — R31.0 GROSS HEMATURIA: Status: RESOLVED | Noted: 2018-02-08 | Resolved: 2018-04-04

## 2018-04-04 PROBLEM — N39.8 UROTHELIAL LESION: Status: RESOLVED | Noted: 2018-03-12 | Resolved: 2018-04-04

## 2018-04-04 PROCEDURE — 99215 OFFICE O/P EST HI 40 MIN: CPT | Performed by: UROLOGY

## 2018-04-04 NOTE — PROGRESS NOTES
UROLOGY PROGRESS NOTE     History of Present Illness:   Marissa Donovan is a 81 y o  female known to Dr Dm Benson with a long history of urge incontinence who has been undergoing PTNS  She developed recurrent urinary tract infections and gross hematuria  Her primary care team ordered a renal ultrasound and followup CT scan which showed some nodularity in the right renal pelvis  Patient underwent a cystoscopy with ureteroscopy on 2/28/18  From the dictated operative report Dr Garo Roe: "placed a flexible ureteral scope up into the proximal ureter and right renal pelvis  I saw papillary lesions that appeared to be superficial of the proximal right ureter and pelvis and they were diffuse  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  I did not have a biopsy forceps capable of actually grabbing the specimen, so I took a 10 cc syringe and aspirated washings directly over the lesions and sent to cytology "    Patient is being evaluated for a possible nephroureterectomy to both control her multifocal papillary lesions and her recurrent gross hematuria  She does have significant comorbidities including obesity BMI>40, history of CVA on Xarelto, rheumatoid arthritis on methrotrexate/certolizumab  The patient return to the operating room on the 20th of March for definitive ureteroscopy  This did confirm diffuse lesions throughout the UPJ and renal pelvis  Pathology returns low-grade cancer  The patient returns with her family including her daughter Ana Cardoso today to discuss potential nephro ureterectomy  We have a tentative surgery date later this month        Past Medical History:     Past Medical History:   Diagnosis Date    Anemia     Arthritis     rheumatoid    Benign essential hypertension     Cataract     Chronic cystitis     Colon, diverticulosis     CPAP (continuous positive airway pressure) dependence     Diabetes mellitus (HCC)     Diverticulitis of colon     Early satiety  GERD (gastroesophageal reflux disease)     Gout     Hearing loss     Hemorrhoids     Hiatal hernia     History of colonic polyps     Hyperlipidemia     Hypertension     Hypertension     Hypothyroidism     Impaired fasting glucose     Left breast mass     Microhematuria     Migraine     occular    Neuropathy     lower and upper extermities    Overactive bladder     Pneumonia of left lower lobe due to infectious organism (HCC)     RA (rheumatoid arthritis) (HCC)     Rheumatoid arthritis (HCC)     Sleep apnea     uses cpap    Stroke ([facility] [address] )     Type 2 diabetes mellitus (HCC)     Urinary frequency     Urinary urgency     Use of cane as ambulatory aid        PAST SURGICAL HISTORY:     Past Surgical History:   Procedure Laterality Date    APPENDECTOMY      ARTHROSCOPY WRIST Right     with release of transverse carpal ligament     BLADDER SURGERY      BLADDER SURGERY      BREAST SURGERY      left breast    BUNIONECTOMY Bilateral     CATARACT EXTRACTION      CHOLECYSTECTOMY      COLONOSCOPY      CYSTOSCOPY      EGD AND COLONOSCOPY N/A 12/20/2017    Procedure: EGD AND COLONOSCOPY;  Surgeon: Joel Calvin MD;  Location: [facility];   Service: Gastroenterology    ESOPHAGOGASTRODUODENOSCOPY      diagnostic    FOREARM SURGERY Right     fracture repair    HYSTERECTOMY      KNEE SURGERY Left     meniscus tear    NOSE SURGERY      UT CYSTO/URETERO W/LITHOTRIPSY &INDWELL STENT INSRT Right 2/28/2018    Procedure: CYSTOSCOPY RIGHT URETEROSCOPY, RIGHT RETROGRADE PYELOGRAM AND INSERTION  RIGHT STENT URETERAL, RIGHT RENAL PELVIC WASHING FOR CYTOLOGY;  Surgeon: Camelia Blair MD;  Location: [facility];  Service: Urology    REPLACEMENT TOTAL KNEE BILATERAL Bilateral     URETEROSCOPY Right 3/20/2018    Procedure: CYSTOSCOPY, RETROGRADE PYELOGRAM, URETEROSCOPY, BIOPSY, BRUSH, FULGURATION, STENT PLACEMENT, BLADDER BIOPSY WITH FULGURATION;  Surgeon: Sameer Crook MD;  Location: [facility]; Service: Urology       CURRENT MEDICATIONS:     Current Outpatient Prescriptions   Medication Sig Dispense Refill    Calcium Citrate-Vitamin D (CALCIUM + D PO) Take 1 tablet by mouth 2 (two) times a day      cephalexin (KEFLEX) 500 mg capsule Take 1 capsule (500 mg total) by mouth every 8 (eight) hours for 7 days 21 capsule 0    cyanocobalamin (VITAMIN B-12) 100 mcg tablet Take by mouth      furosemide (LASIX) 20 mg tablet Take 20 mg by mouth daily   gabapentin (NEURONTIN) 100 mg capsule Take 1 capsule (100 mg total) by mouth 3 (three) times a day Take 1 cap in the am, 1 cap in the afternoon, and 3 cap at bedtime 150 capsule 1    LEUCOVORIN CALCIUM PO Take 10 mg by mouth once a week      levothyroxine 75 mcg tablet Take 75 mcg by mouth daily      losartan-hydrochlorothiazide (HYZAAR) 100-12 5 MG per tablet Take 1 tablet by mouth daily        methotrexate (RHEUMATREX) 2 5 MG tablet Take 8 tablets (20 mg total) by mouth once a week 12 tablet 0    Multiple Vitamin (MULTIVITAMINS PO) Take 1 tablet by mouth daily      nystatin (MYCOSTATIN) powder AAA PRN      omeprazole (PriLOSEC) 20 mg delayed release capsule TAKE 1 CAPSULE DAILY AS NEEDED FOR STOMACH ACID 90 capsule 3    pravastatin (PRAVACHOL) 80 mg tablet TAKE 1 TABLET EVERY DAY 90 tablet 3    rivaroxaban (XARELTO) 20 mg tablet Take 1 tablet (20 mg total) by mouth daily with dinner 30 tablet 0    rOPINIRole (REQUIP) 0 5 mg tablet Take 0 5 mg by mouth 3 (three) times a day      acetaminophen-codeine (TYLENOL #3) 300-30 mg per tablet Take 1 tablet by mouth every 4 (four) hours as needed for moderate pain for up to 20 days 30 tablet 0    Certolizumab Pegol (CIMZIA PREFILLED SC) Inject under the skin every 30 (thirty) days        docusate sodium (COLACE) 100 mg capsule Take 1 capsule (100 mg total) by mouth 2 (two) times a day for 60 doses 60 capsule 0    oxybutynin (DITROPAN-XL) 10 MG 24 hr tablet Take 1 tablet (10 mg total) by mouth daily at bedtime for 3 days 3 tablet 0    phenazopyridine (PYRIDIUM) 100 mg tablet Take 1 tablet (100 mg total) by mouth 3 (three) times a day as needed for bladder spasms 10 tablet 0     No current facility-administered medications for this visit  Facility-Administered Medications Ordered in Other Visits   Medication Dose Route Frequency Provider Last Rate Last Dose    acetaminophen (TYLENOL) tablet 650 mg  650 mg Oral Q6H PRN Vinod Jefferson PA-C           ALLERGIES:     Allergies   Allergen Reactions    Acetazolamide Other (See Comments)     Other reaction(s): Unknown Allergic Reaction    Aspirin      Other reaction(s): Other (See Comments)  High Dose ASA-stomach ache, rachel  ASA 81 m    Atorvastatin      Other reaction(s): Muscle Pain, Myalgia    Azithromycin     Nitrofurantoin      Other reaction(s): Unknown Allergic Reaction  Other reaction(s): Other (See Comments)  HIVES * pt denies    Other Other (See Comments)     Adhesive tape : red and itching  Other reaction(s):  Other (See Comments)  red and itching    Propoxyphene Other (See Comments)     hives    Shellfish-Derived Products Other (See Comments)     Patient got Gout following eating shell fish    Sulfa Antibiotics Other (See Comments)    Tramadol Diarrhea and Vomiting       SOCIAL HISTORY:     Social History     Social History    Marital status: /Civil Union     Spouse name: N/A    Number of children: N/A    Years of education: N/A     Social History Main Topics    Smoking status: Never Smoker    Smokeless tobacco: Never Used      Comment: stopped smoking in the distant past, never smoker (as per [employer])     Alcohol use Yes      Comment: socially    Drug use: No    Sexual activity: Not Asked     Other Topics Concern    None     Social History Narrative    No caffeine use       SOCIAL HISTORY:     Family History   Problem Relation Age of Onset    Other Mother      epilepsy    Glaucoma Father     Stroke Father      silent    Other Brother      cardiac disorder       REVIEW OF SYSTEMS:     General: negative for chills, fatigue, fever, significant unplanned weight changed  Psychological: negative for anxiety, depression, concentration or memory difficulties, irritability, mood swings, sleep disturbances  Ophthalmic: negative for blurry vision or double  ENT: negative for hearing difficulties, tinnitus, vertigo  Hematological and Lymphatic: negative for bleeding problems, blood clots, bruising, swollen lymph nodes  Respiratory: negative for shortness of breath, cough, hemoptysis, orthopnea, tachypnea or wheezing  Cardiovascular: negative for chest pain, dyspnea on exertion, edema, irregular or rapid heartbeat, paroxysmal nocturnal dyspnea  Gastrointestinal: negative for abdominal pain, bright red blood in stools, change in stools, constipation, diarrhea, nausea/vomiting, stool incontinence    GENITOURINARY: see HPI    Musculoskeletal: arthritis symptoms  Dermatological: negative for rash or skin lesion changes  Neurological: some residual short term memory loss from CVA    PHYSICAL EXAM:     /90   Pulse 88   Ht 5' 1" (1 549 m)   Wt 98 kg (216 lb)   LMP  (LMP Unknown)   BMI 40 81 kg/m²   General:  Elderly female in no acute distress  They have a normal affect  There is not appear to be any gross neurologic defects or abnormalities  HEENT:  Normocephalic, atraumatic  Neck is supple without any palpable lymphadenopathy  Cardiovascular:  Patient has normal palpable distal radial pulses  There is no significant peripheral edema  No JVD is noted  Respiratory:  Patient has unlabored respirations  There is no audible wheeze or rhonchi  Abdomen:  Abdomen with healed surgical scars (appy/LILLY)  Abdomen is soft and nontender  Obese with large pannus  There is no tympany  Inguinal and umbilical hernia are not appreciated  Musculoskeletal:  Patient does not have significant CVA tenderness with palpation or percussion    They full range of motion in all 4 extremities  Walks with a cane  Dermatologic:  Patient has no skin abnormalities or rashes  LABS:     CBC:   Lab Results   Component Value Date    WBC 6 56 03/06/2018    HGB 9 4 (L) 03/06/2018    HCT 28 2 (L) 03/06/2018    MCV 97 03/06/2018     03/06/2018       BMP:   Lab Results   Component Value Date    GLUCOSE 175 (H) 03/06/2018    CALCIUM 8 6 03/06/2018     03/06/2018    K 3 4 (L) 03/06/2018    CO2 30 03/06/2018     03/06/2018    BUN 22 03/06/2018    CREATININE 1 01 03/06/2018       PATHOLOGY:     Case Report   Surgical Pathology Report                         Case: [id number]                                    Authorizing Provider: Chayo Vásquez MD   Collected:           03/20/2018 1159               Ordering Location:     [facility]        Received:            03/20/2018 [address] [place] [address] Operating Room                                                      Pathologist:           Stu Tesfaye MD                                                         Specimens:   A) - Urinary Bladder, RIGHT RENAL PELVIS BIOPSY                                                      B) - Urinary Bladder, POSTERIOR WALL BLADDER BIOPSY                                                  C) - Urinary Bladder, LEFT LATERAL WALL BIOPSY                                             Final Diagnosis   A  Right renal pelvis, biopsy:  Low grade papillary urothelial carcinoma, partially cauterized     No definite evidence of lamina propria invasion noted  No muscularis propria identified       B  Posterior bladder wall, biopsy:  Partially denuded urothelial mucosa with chronic inflammation       C  Left lateral wall of bladder, biopsy:  Partially denuded urothelial mucosa with chronic inflammation           IMAGING:     CT ABDOMEN AND PELVIS WITH AND WITHOUT IV CONTRAST     INDICATION:  Hematuria      COMPARISON:  9/28/2015     TECHNIQUE: CT of the kidneys was performed without intravenous contrast   Dynamic postcontrast CT evaluation of the abdomen and pelvis was performed in both nephrographic and delayed phases after the administration of intravenous contrast   Reformatted   images were created in axial, sagittal, and coronal planes        Radiation dose length product (DLP) for this visit:  8249 mGy-cm   This examination, like all CT scans performed in the [facility], was performed utilizing techniques to minimize radiation dose exposure, including the use of iterative   reconstruction and automated exposure control      IV Contrast:  100 mL of iohexol (OMNIPAQUE)  Enteric Contrast:  Not administered     FINDINGS:     ABDOMEN     RIGHT KIDNEY AND URETER:  No solid renal mass  There is irregularity of the right renal pelvis and proximal ureter with nodular appearing filling defect along the wall on delayed images  No hydronephrosis or hydroureter  No urinary tract calculi  No perinephric collection      LEFT KIDNEY AND URETER:  No solid renal mass  No detectable ureteral mass  No hydronephrosis or hydroureter  No urinary tract calculi  No perinephric collection      URINARY BLADDER:  No bladder wall mass  No calculi  There is a cystocele      LUNG BASES:  There is a 4 mm right lower lobe nodule on series 3, image 11, not present previously  There is a small hiatal hernia      LIVER/BILIARY TREE:  Unremarkable      GALLBLADDER:  Gallbladder is surgically absent      SPLEEN:  Unremarkable      PANCREAS:  There is a 7 x 10 mm pancreatic cyst, not present previously      ADRENAL GLANDS:  Unremarkable      STOMACH, BOWEL AND APPENDIX:  Unremarkable      ABDOMINOPELVIC CAVITY:  No ascites  No free intraperitoneal air   No lymphadenopathy      VESSELS:  Unremarkable for patient's age      PELVIS     REPRODUCTIVE ORGANS:  Patient is status post hysterectomy      ABDOMINAL WALL/INGUINAL REGIONS:  Unremarkable      OSSEOUS STRUCTURES: No acute fracture or destructive osseous lesion      IMPRESSION:     1  Nodular filling defects along the wall of the right renal pelvis and proximal ureter suspicious for neoplasm  Retrograde pyelogram and biopsy recommended      2   10 mm pancreatic cyst, not present previously  Recommend characterization and establishment of baseline now  Preferred modality is Abdomen MRI with and without IV contrast/MRCP  First alternative is pancreatic protocol abdomen and pelvic CT with   contrast  Second is abdomen MRI without contrast/MRCP       3   4 mm right lower lobe nodule  Based on current Fleischner Society 2017 Guidelines on incidental pulmonary nodule, no routine follow-up is needed if the patient is considered low risk for lung cancer  If the patient is considered high risk for lung   cancer, 12 month follow-up non-contrast chest CT is recommended  If there is a primary malignancy, three-month follow-up is recommended to exclude metastasis      Considerations related to the patient's age and/or comorbidities may be used to alter these recommendations, particularly the recommendation regarding the pancreatic cyst             PATHOLOGY:     2/28/18  Case Report   Non-gynecologic Cytology                          Case: [id number]                                   Authorizing Provider: Brennan Montero MD            Collected:           02/28/2018 1032               Ordering Location:     [facility]        Received:            02/28/2018 [address] [place] [address] Operating Room                                                      Pathologist:           Mirza Funez MD                                                         Specimen:    Renal Washing, Right                                                                       Final Diagnosis   A  Kidney, right, renal washing (ThinPrep): Atypical urothelial cells (AUC) - see comment  Clusters of urothelial cells    Some with mild atypia  Few mixed inflammatory cells      Satisfactory for evaluation  ASSESSMENT:     81 y o  female with RIGHT renal pelvis multifocal papillary lesions pathology proven Low Grade Ta urothelial cancer    PLAN:     I again had a very detailed conversation with the patient, her  and now her daughter Latasha Fulton who presents  I discussed with her the findings of the pathology which demonstrate diffuse low-grade cancer in the renal pelvis  We discussed management options which include observation for worsening symptoms with her without planned repeat surveillance ureteroscopy  We also discussed the option of definitive extra paid of surgery with a robotic nephroureterectomy  The patient despite the risks and benefits not only of her personally with her comorbidities but also of the surgery itself is inclined to proceed with this treatment option to remove the disease and obviate the risk of potential symptoms  She understands the risk of bleeding, cardiac events due to anesthesia and recovery, deconditioning requiring rehabilitation placement after surgery, possible transient or even permanent dialysis due to poor recovery of the contralateral kidney, damage to the surrounding structures at the time of surgery, poor healing as related to her rheumatologic treatments  She and her family have been given plenty of time to discuss and answer any questions  They know they can reach me should other questions arise  At this point time we will plan to await the opinion of the cardiologist   Patient has the option of proceeding with surgery on April 23rd  She will let me know ultimately what her and her family's decision is  We did sign informed consent today in the event she does want to move forward      I spent more than 45 minutes in the room with the patient and her family in discussion and an additional 15 minutes with documentation

## 2018-04-06 ENCOUNTER — OFFICE VISIT (OUTPATIENT)
Dept: CARDIOLOGY CLINIC | Facility: CLINIC | Age: 83
End: 2018-04-06
Payer: MEDICARE

## 2018-04-06 VITALS
OXYGEN SATURATION: 98 % | DIASTOLIC BLOOD PRESSURE: 70 MMHG | WEIGHT: 216 LBS | HEART RATE: 74 BPM | SYSTOLIC BLOOD PRESSURE: 146 MMHG | BODY MASS INDEX: 40.81 KG/M2

## 2018-04-06 DIAGNOSIS — C68.9 UROTHELIAL CARCINOMA (HCC): ICD-10-CM

## 2018-04-06 DIAGNOSIS — R00.2 PALPITATIONS: Primary | ICD-10-CM

## 2018-04-06 PROCEDURE — 93000 ELECTROCARDIOGRAM COMPLETE: CPT | Performed by: INTERNAL MEDICINE

## 2018-04-06 PROCEDURE — 99213 OFFICE O/P EST LOW 20 MIN: CPT | Performed by: INTERNAL MEDICINE

## 2018-04-06 NOTE — LETTER
April 6, 2018     Danielle Bernal MD  [address]  [address]    Patient: Tiff Magana   YOB: 1935   Date of Visit: 4/6/2018       Dear Dr Bin Thompson:    Thank you for referring Liat Telles to me for evaluation  Below are my notes for this consultation  If you have questions, please do not hesitate to call me  I look forward to following your patient along with you           Sincerely,        Vidal Isidro MD        CC: No Recipients  Vidal Isidro MD  4/6/2018  2:10 PM  Sign at close encounter  Heart Failure Outpatient Progress Note - Tiff Magana 81 y o  female MRN: [id number]    @ Encounter: [id number]  Assessment/Plan:    Patient Active Problem List    Diagnosis Date Noted    Pancreatic cyst 03/14/2018    Iron deficiency anemia 03/14/2018    [facility] 03/02/2018    Lung nodule < 6cm on CT 03/02/2018    Chronic bilateral thoracic back pain 02/12/2018    Thoracic degenerative disc disease 02/12/2018    Sinus arrhythmia 01/03/2018    Peripheral neuropathy 11/09/2017    Right lumbar radiculopathy 11/09/2017    Primary osteoarthritis of left knee 10/13/2017    Carpal tunnel syndrome, left 09/15/2017    Hematuria 09/15/2017    Lymphedema 09/15/2017    Sensory urge incontinence 09/11/2017    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 ([facility] ) 05/10/2017    Essential hypertension 05/10/2017    Rheumatoid arthritis ([facility] ) 05/10/2017    Diabetes mellitus type 2 in obese ([facility] ) 05/10/2017    Urinary tract infection 05/10/2017    Sleep apnea 05/10/2017    Hypothyroidism 05/10/2017    Chronic GERD 05/10/2017    Overactive bladder 05/24/2016    Prediabetes 05/24/2016    Restless legs syndrome 01/05/2016    Sensorineural hearing loss 10/12/2014    Diverticulosis of colon 08/29/2013    Hypercholesterolemia 08/29/2013    Obesity 07/18/2013    Edema 05/14/2013    Acute blood loss anemia 12/13/2012     # Grade II diastolic dysfunction: No JVD on exam  Likely 2/2 to HTN (cLVH on TTE)  --Continue BP control   # HTN: BP elevated today  Asked to use arm cuff as typically more accurate than wrist  Reassess at next visit  # Hx of lymphedema   # Hyperglycemia   # APCs: Continue current mgmt  Had 1 week holter w/o e/o AFib  Currently anticoagulated for CVA so would not   # Hx of CVA/TIA: Currently on Xarelto and pravastatin  She has stopped ASA  HPI: Very pleasant 80 y/o woman w/ PMHx of HTN, HLD, chronic LE edema/lymphedema, anemia of chronic disease p/f establishing care and abnormal ECG  She states she feels well  She was followed at [facility] and now lives closer to [place] [address] so is transferring her care  Denies any cardiac symptoms  Uses lymphedema machine  She had a CVA in 5/2017 and received tPA  Overall feels well from that  She had a 1 week holter that did not show any arrhythmia and has been on NOAC ever since the stroke  Holter showed NSR w/ APCs  She comes in today 4/6/18, for f/u  She had noted hematuria  CT A/P noted a R renal pelvis multifocal papillary lesions with pathology showing low grade Ta urothelial cancer  She comes in for preop evaluation       Past Medical History:   Diagnosis Date    Anemia     Arthritis     rheumatoid    Benign essential hypertension     Cataract     Chronic cystitis     Colon, diverticulosis     CPAP (continuous positive airway pressure) dependence     Diabetes mellitus (HCC)     Diverticulitis of colon     Early satiety     GERD (gastroesophageal reflux disease)     Gout     Hearing loss     Hemorrhoids     Hiatal hernia     History of colonic polyps     Hyperlipidemia     Hypertension     Hypertension     Hypothyroidism     Impaired fasting glucose     Left breast mass     Microhematuria     Migraine     occular    Neuropathy     lower and upper extermities    Overactive bladder     Pneumonia of left lower lobe due to infectious organism [facility] )     RA (rheumatoid arthritis) [facility]     Rheumatoid arthritis ([facility] )     Sleep apnea     uses cpap    [facility]     Type 2 diabetes mellitus (HCC)     Urinary frequency     Urinary urgency     Use of cane as ambulatory aid        Review of Systems - 12 point ROS was done and is negative, except as noted above  Allergies   Allergen Reactions    Acetazolamide Other (See Comments)     Other reaction(s): Unknown Allergic Reaction    Aspirin      Other reaction(s): Other (See Comments)  High Dose ASA-stomach ache, rachel  ASA 81 m    Atorvastatin      Other reaction(s): Muscle Pain, Myalgia    Azithromycin     Nitrofurantoin      Other reaction(s): Unknown Allergic Reaction  Other reaction(s): Other (See Comments)  HIVES * pt denies    Other Other (See Comments)     Adhesive tape : red and itching  Other reaction(s):  Other (See Comments)  red and itching    Propoxyphene Other (See Comments)     hives    Shellfish-Derived Products Other (See Comments)     Patient got Gout following eating shell fish    Sulfa Antibiotics Other (See Comments)    Tramadol Diarrhea and Vomiting       Current Outpatient Prescriptions:     acetaminophen-codeine (TYLENOL #3) 300-30 mg per tablet, Take 1 tablet by mouth every 4 (four) hours as needed for moderate pain for up to 20 days, Disp: 30 tablet, Rfl: 0    Calcium Citrate-Vitamin D (CALCIUM + D PO), Take 1 tablet by mouth 2 (two) times a day, Disp: , Rfl:     cephalexin (KEFLEX) 500 mg capsule, Take 1 capsule (500 mg total) by mouth every 8 (eight) hours for 7 days, Disp: 21 capsule, Rfl: 0    Certolizumab Pegol (CIMZIA PREFILLED SC), Inject under the skin every 30 (thirty) days  , Disp: , Rfl:     cyanocobalamin (VITAMIN B-12) 100 mcg tablet, Take by mouth, Disp: , Rfl:     docusate sodium (COLACE) 100 mg capsule, Take 1 capsule (100 mg total) by mouth 2 (two) times a day for 60 doses, Disp: 60 capsule, Rfl: 0   furosemide (LASIX) 20 mg tablet, Take 20 mg by mouth daily  , Disp: , Rfl:     gabapentin (NEURONTIN) 100 mg capsule, Take 1 capsule (100 mg total) by mouth 3 (three) times a day Take 1 cap in the am, 1 cap in the afternoon, and 3 cap at bedtime, Disp: 150 capsule, Rfl: 1    LEUCOVORIN CALCIUM PO, Take 10 mg by mouth once a week, Disp: , Rfl:     levothyroxine 75 mcg tablet, Take 75 mcg by mouth daily, Disp: , Rfl:     losartan-hydrochlorothiazide (HYZAAR) 100-12 5 MG per tablet, Take 1 tablet by mouth daily  , Disp: , Rfl:     methotrexate (RHEUMATREX) 2 5 MG tablet, Take 8 tablets (20 mg total) by mouth once a week, Disp: 12 tablet, Rfl: 0    Multiple Vitamin (MULTIVITAMINS PO), Take 1 tablet by mouth daily, Disp: , Rfl:     nystatin (MYCOSTATIN) powder, AAA PRN, Disp: , Rfl:     omeprazole (PriLOSEC) 20 mg delayed release capsule, TAKE 1 CAPSULE DAILY AS NEEDED FOR STOMACH ACID, Disp: 90 capsule, Rfl: 3    oxybutynin (DITROPAN-XL) 10 MG 24 hr tablet, Take 1 tablet (10 mg total) by mouth daily at bedtime for 3 days, Disp: 3 tablet, Rfl: 0    phenazopyridine (PYRIDIUM) 100 mg tablet, Take 1 tablet (100 mg total) by mouth 3 (three) times a day as needed for bladder spasms, Disp: 10 tablet, Rfl: 0    pravastatin (PRAVACHOL) 80 mg tablet, TAKE 1 TABLET EVERY DAY, Disp: 90 tablet, Rfl: 3    rivaroxaban (XARELTO) 20 mg tablet, Take 1 tablet (20 mg total) by mouth daily with dinner, Disp: 30 tablet, Rfl: 0    rOPINIRole (REQUIP) 0 5 mg tablet, Take 0 5 mg by mouth 3 (three) times a day, Disp: , Rfl:   No current facility-administered medications for this visit  Facility-Administered Medications Ordered in Other Visits:     acetaminophen (TYLENOL) tablet 650 mg, 650 mg, Oral, Q6H PRN, Kiarra Jackson PA-C    Social History     Social History    Marital status: /Civil Union     Spouse name: N/A    Number of children: N/A    Years of education: N/A     Occupational History    Not on file  Social History Main Topics    Smoking status: Never Smoker    Smokeless tobacco: Never Used      Comment: stopped smoking in the distant past, never smoker (as per [employer])     Alcohol use Yes      Comment: socially    Drug use: No    Sexual activity: Not on file     Other Topics Concern    Not on file     Social History Narrative    No caffeine use       Family History   Problem Relation Age of Onset    Other Mother      epilepsy   Lillian Son Glaucoma Father     Stroke Father      silent    Other Brother      cardiac disorder       Physical Exam:    Vitals: There were no vitals taken for this visit  , There is no height or weight on file to calculate BMI ,   Wt Readings from Last 3 Encounters:   04/04/18 98 kg (216 lb)   04/02/18 99 9 kg (220 lb 5 oz)   03/20/18 98 4 kg (217 lb)       There were no vitals filed for this visit      GEN: Elliott Linares appears well, alert and oriented x 3, pleasant and cooperative   HEENT: pupils equal, round, and reactive to light; extraocular muscles intact  NECK: supple, no carotid bruits   HEART: regular rhythm, normal S1 and S2, no murmurs, clicks, gallops or rubs, JVP is    LUNGS: clear to auscultation bilaterally; no wheezes, rales, or rhonchi   ABDOMEN: normal bowel sounds, soft, no tenderness, no distention  EXTREMITIES: peripheral pulses normal; no clubbing, cyanosis, or edema  NEURO: no focal findings   SKIN: normal without suspicious lesions on exposed skin    Labs & Results:  Lab Results   Component Value Date    WBC 6 56 03/06/2018    HGB 9 4 (L) 03/06/2018    HCT 28 2 (L) 03/06/2018    MCV 97 03/06/2018     03/06/2018       Chemistry        Component Value Date/Time     03/06/2018 1423     11/14/2017 1151    K 3 4 (L) 03/06/2018 1423    K 3 7 11/14/2017 1151     03/06/2018 1423     11/14/2017 1151    CO2 30 03/06/2018 1423    CO2 29 11/14/2017 1151    BUN 22 03/06/2018 1423    BUN 18 11/14/2017 1151    CREATININE 1 01 03/06/2018 1423    CREATININE 0 88 11/14/2017 1151        Component Value Date/Time    CALCIUM 8 6 03/06/2018 1423    CALCIUM 8 9 11/14/2017 1151    ALKPHOS 108 03/06/2018 1423    ALKPHOS 95 11/14/2017 1151    AST 15 03/06/2018 1423    AST 24 11/14/2017 1151    ALT 18 03/06/2018 1423    ALT 19 11/14/2017 1151    BILITOT 0 80 03/06/2018 1423    BILITOT 1 2 11/14/2017 1151        EKG personally reviewed by Teresa Doe MD      Counseling / Coordination of Care  Total floor / unit time spent today 40 minutes  Greater than 50% of total time was spent with the patient and / or family counseling and / or coordination of care  A description of the counseling / coordination of care: 20 min  Thank you for the opportunity to participate in the care of this patient      Teresa Doe MD, PhD   Kofi G[facility]mez

## 2018-04-06 NOTE — PROGRESS NOTES
Heart Failure Outpatient Progress Note - Jaison Terrell 81 y o  female MRN: [id number]    @ Encounter: [id number]  Assessment/Plan:    Patient Active Problem List    Diagnosis Date Noted    Pancreatic cyst 03/14/2018    Iron deficiency anemia 03/14/2018    [facility]ial cancer ([facility] ) 03/02/2018    Lung nodule < 6cm on CT 03/02/2018    Chronic bilateral thoracic back pain 02/12/2018    Thoracic degenerative disc disease 02/12/2018    Sinus arrhythmia 01/03/2018    Peripheral neuropathy 11/09/2017    Right lumbar radiculopathy 11/09/2017    Primary osteoarthritis of left knee 10/13/2017    Carpal tunnel syndrome, left 09/15/2017    Hematuria 09/15/2017    Lymphedema 09/15/2017    Sensory urge incontinence 09/11/2017    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 ([facility] ) 05/10/2017    Essential hypertension 05/10/2017    Rheumatoid arthritis ([facility] ) 05/10/2017    Diabetes mellitus type 2 in obese ([facility] ) 05/10/2017    Urinary tract infection 05/10/2017    Sleep apnea 05/10/2017    Hypothyroidism 05/10/2017    Chronic GERD 05/10/2017    Overactive bladder 05/24/2016    Prediabetes 05/24/2016    Restless legs syndrome 01/05/2016    Sensorineural hearing loss 10/12/2014    Diverticulosis of colon 08/29/2013    Hypercholesterolemia 08/29/2013    Obesity 07/18/2013    Edema 05/14/2013    Acute blood loss anemia 12/13/2012     # Preop evaluation: Moderate risk for surgery  She is able to walk around her home  She is euvolemic (has lymphedema)  BP is controlled  HR is controlled  She has APCs on her ECG (same as prior workup; no definitive documented AFib)  Renal function is stable  She is optimized for surgery  No further testing is indicated  With respect to anticoagulation, will defer to Urologist re: timing of Xarelto cessation  Please minimize time off anticoagulation to decrease risk of stroke       # Grade II diastolic dysfunction: No JVD on exam  Likely 2/2 to HTN (cLVH on TTE)  --Continue BP control   # HTN: BP acceptable today  Asked to use arm cuff as typically more accurate than wrist  Reassess at next visit  # Hx of lymphedema   # Hyperglycemia   # APCs: Continue current mgmt  Had 1 week holter w/o e/o AFib  Currently anticoagulated for CVA so would not   # Hx of CVA/TIA: Currently on Xarelto and pravastatin  She has stopped ASA  # NICK on CPAP    RTC in 2 months    HPI: Very pleasant 80 y/o woman w/ PMHx of HTN, HLD, chronic LE edema/lymphedema, anemia of chronic disease p/f establishing care and abnormal ECG  She states she feels well  She was followed at [facility]  and now lives closer to [place] [id number] so is transferring her care  Denies any cardiac symptoms  Uses lymphedema machine  She had a CVA in 5/2017 and received tPA  Overall feels well from that  She had a 1 week holter that did not show any arrhythmia and has been on NOAC ever since the stroke  Holter showed NSR w/ APCs  She comes in today 4/6/18, for f/u  She had noted hematuria  CT A/P noted a R renal pelvis multifocal papillary lesions with pathology showing low grade Ta urothelial cancer  She comes in for preop evaluation  She continues to deny cardiac symptoms  She is more limited by knee pains  She is able to get around her home without difficulty       Past Medical History:   Diagnosis Date    Anemia     Arthritis     rheumatoid    Benign essential hypertension     Cataract     Chronic cystitis     Colon, diverticulosis     CPAP (continuous positive airway pressure) dependence     Diabetes mellitus (HCC)     Diverticulitis of colon     Early satiety     GERD (gastroesophageal reflux disease)     Gout     Hearing loss     Hemorrhoids     Hiatal hernia     History of colonic polyps     Hyperlipidemia     Hypertension     Hypertension     Hypothyroidism     Impaired fasting glucose     Left breast mass     Microhematuria     Migraine     occular  Neuropathy     lower and upper extermities    Overactive bladder     Pneumonia of left lower lobe due to infectious organism (HCC)     RA (rheumatoid arthritis) (HCC)     Rheumatoid arthritis ([facility]     Sleep apnea     uses cpap    [facility]     Type 2 diabetes mellitus ([facility]     Urinary frequency     Urinary urgency     Use of cane as ambulatory aid        Review of Systems - 12 point ROS was done and is negative, except as noted above  Allergies   Allergen Reactions    Acetazolamide Other (See Comments)     Other reaction(s): Unknown Allergic Reaction    Aspirin      Other reaction(s): Other (See Comments)  High Dose ASA-stomach ache, rachel  ASA 81 m    Atorvastatin      Other reaction(s): Muscle Pain, Myalgia    Azithromycin     Nitrofurantoin      Other reaction(s): Unknown Allergic Reaction  Other reaction(s): Other (See Comments)  HIVES * pt denies    Other Other (See Comments)     Adhesive tape : red and itching  Other reaction(s): Other (See Comments)  red and itching    Propoxyphene Other (See Comments)     hives    Shellfish-Derived Products Other (See Comments)     Patient got Gout following eating shell fish    Sulfa Antibiotics Other (See Comments)    Tramadol Diarrhea and Vomiting       Current Outpatient Prescriptions:     Calcium Citrate-Vitamin D (CALCIUM + D PO), Take 1 tablet by mouth 2 (two) times a day, Disp: , Rfl:     cyanocobalamin (VITAMIN B-12) 100 mcg tablet, Take by mouth, Disp: , Rfl:     docusate sodium (COLACE) 100 mg capsule, Take 1 capsule (100 mg total) by mouth 2 (two) times a day for 60 doses, Disp: 60 capsule, Rfl: 0    furosemide (LASIX) 20 mg tablet, Take 20 mg by mouth daily  , Disp: , Rfl:     gabapentin (NEURONTIN) 100 mg capsule, Take 1 capsule (100 mg total) by mouth 3 (three) times a day Take 1 cap in the am, 1 cap in the afternoon, and 3 cap at bedtime, Disp: 150 capsule, Rfl: 1    LEUCOVORIN CALCIUM PO, Take 10 mg by mouth once a week, Disp: , Rfl:     levothyroxine 75 mcg tablet, Take 75 mcg by mouth daily, Disp: , Rfl:     losartan-hydrochlorothiazide (HYZAAR) 100-12 5 MG per tablet, Take 1 tablet by mouth daily  , Disp: , Rfl:     methotrexate (RHEUMATREX) 2 5 MG tablet, Take 8 tablets (20 mg total) by mouth once a week, Disp: 12 tablet, Rfl: 0    Multiple Vitamin (MULTIVITAMINS PO), Take 1 tablet by mouth daily, Disp: , Rfl:     omeprazole (PriLOSEC) 20 mg delayed release capsule, TAKE 1 CAPSULE DAILY AS NEEDED FOR STOMACH ACID, Disp: 90 capsule, Rfl: 3    pravastatin (PRAVACHOL) 80 mg tablet, TAKE 1 TABLET EVERY DAY, Disp: 90 tablet, Rfl: 3    rivaroxaban (XARELTO) 20 mg tablet, Take 1 tablet (20 mg total) by mouth daily with dinner, Disp: 30 tablet, Rfl: 0    rOPINIRole (REQUIP) 0 5 mg tablet, Take 0 5 mg by mouth 3 (three) times a day, Disp: , Rfl:     oxybutynin (DITROPAN-XL) 10 MG 24 hr tablet, Take 1 tablet (10 mg total) by mouth daily at bedtime for 3 days, Disp: 3 tablet, Rfl: 0  No current facility-administered medications for this visit  Facility-Administered Medications Ordered in Other Visits:     acetaminophen (TYLENOL) tablet 650 mg, 650 mg, Oral, Q6H PRN, Serge Calderon PA-C    Social History     Social History    Marital status: /Civil Union     Spouse name: N/A    Number of children: N/A    Years of education: N/A     Occupational History    Not on file       Social History Main Topics    Smoking status: Never Smoker    Smokeless tobacco: Never Used      Comment: stopped smoking in the distant past, never smoker (as per [employer])     Alcohol use Yes      Comment: socially    Drug use: No    Sexual activity: Not on file     Other Topics Concern    Not on file     Social History Narrative    No caffeine use       Family History   Problem Relation Age of Onset    Other Mother      epilepsy    Glaucoma Father     Stroke Father      silent    Other Brother      cardiac disorder       Physical Exam:    Vitals: Blood pressure 146/70, pulse 74, weight 98 kg (216 lb), SpO2 98 %  , Body mass index is 40 81 kg/m² ,   Wt Readings from Last 3 Encounters:   04/06/18 98 kg (216 lb)   04/04/18 98 kg (216 lb)   04/02/18 99 9 kg (220 lb 5 oz)       Vitals:    04/06/18 1350   BP: 146/70   BP Location: Right arm   Patient Position: Sitting   Cuff Size: Standard   Pulse: 74   SpO2: 98%   Weight: 98 kg (216 lb)       GEN: Lilliam Ramey appears well, alert and oriented x 3, pleasant and cooperative   HEENT: pupils equal, round, and reactive to light; extraocular muscles intact  NECK: supple, no carotid bruits   HEART: regular rhythm, normal S1 and S2, no murmurs, clicks, gallops or rubs, JVD is flat    LUNGS: clear to auscultation bilaterally; no wheezes, rales, or rhonchi   ABDOMEN: normal bowel sounds, soft, no tenderness, no distention  EXTREMITIES: peripheral pulses normal; no clubbing, cyanosis; 2+ B/L LE lymphedema  NEURO: no focal findings   SKIN: normal without suspicious lesions on exposed skin    Labs & Results:  Lab Results   Component Value Date    WBC 6 56 03/06/2018    HGB 9 4 (L) 03/06/2018    HCT 28 2 (L) 03/06/2018    MCV 97 03/06/2018     03/06/2018       Chemistry        Component Value Date/Time     03/06/2018 1423     11/14/2017 1151    K 3 4 (L) 03/06/2018 1423    K 3 7 11/14/2017 1151     03/06/2018 1423     11/14/2017 1151    CO2 30 03/06/2018 1423    CO2 29 11/14/2017 1151    BUN 22 03/06/2018 1423    BUN 18 11/14/2017 1151    CREATININE 1 01 03/06/2018 1423    CREATININE 0 88 11/14/2017 1151        Component Value Date/Time    CALCIUM 8 6 03/06/2018 1423    CALCIUM 8 9 11/14/2017 1151    ALKPHOS 108 03/06/2018 1423    ALKPHOS 95 11/14/2017 1151    AST 15 03/06/2018 1423    AST 24 11/14/2017 1151    ALT 18 03/06/2018 1423    ALT 19 11/14/2017 1151    BILITOT 0 80 03/06/2018 1423    BILITOT 1 2 11/14/2017 1151        EKG personally reviewed by Vidal Isidro MD  Counseling / Coordination of Care  Total floor / unit time spent today 40 minutes  Greater than 50% of total time was spent with the patient and / or family counseling and / or coordination of care  A description of the counseling / coordination of care: 20 min  Thank you for the opportunity to participate in the care of this patient      Brittany Reddy MD, PhD   [place]

## 2018-04-06 NOTE — LETTER
April 6, 2018     Miranda Burgess MD  [address]  [address]    Patient: Arie Castro   YOB: 1935   Date of Visit: 4/6/2018       Dear Dr Marzena Jasso:    Thank you for referring Rena Obregon to me for evaluation  Below are my notes for this consultation  If you have questions, please do not hesitate to call me  I look forward to following your patient along with you           Sincerely,        Claudia Livingston MD        CC: No Recipients  Claudia Livingston MD  4/6/2018  2:10 PM  Sign at close encounter  Heart Failure Outpatient Progress Note - Arie Castro 81 y o  female MRN: [id number]    @ Encounter: [id number]  Assessment/Plan:    Patient Active Problem List    Diagnosis Date Noted    Pancreatic cyst 03/14/2018    Iron deficiency anemia 03/14/2018    [facility] 03/02/2018    Lung nodule < 6cm on CT 03/02/2018    Chronic bilateral thoracic back pain 02/12/2018    Thoracic degenerative disc disease 02/12/2018    Sinus arrhythmia 01/03/2018    Peripheral neuropathy 11/09/2017    Right lumbar radiculopathy 11/09/2017    Primary osteoarthritis of left knee 10/13/2017    Carpal tunnel syndrome, left 09/15/2017    Hematuria 09/15/2017    Lymphedema 09/15/2017    Sensory urge incontinence 09/11/2017    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 ([facility] ) 05/10/2017    Essential hypertension 05/10/2017    Rheumatoid arthritis ([facility] ) 05/10/2017    Diabetes mellitus type 2 in obese ([facility] ) 05/10/2017    Urinary tract infection 05/10/2017    Sleep apnea 05/10/2017    Hypothyroidism 05/10/2017    Chronic GERD 05/10/2017    Overactive bladder 05/24/2016    Prediabetes 05/24/2016    Restless legs syndrome 01/05/2016    Sensorineural hearing loss 10/12/2014    Diverticulosis of colon 08/29/2013    Hypercholesterolemia 08/29/2013    Obesity 07/18/2013    Edema 05/14/2013    Acute blood loss anemia 12/13/2012     # Grade II diastolic dysfunction: No JVD on exam  Likely 2/2 to HTN (cLVH on TTE)  --Continue BP control   # HTN: BP elevated today  Asked to use arm cuff as typically more accurate than wrist  Reassess at next visit  # Hx of lymphedema   # Hyperglycemia   # APCs: Continue current mgmt  Had 1 week holter w/o e/o AFib  Currently anticoagulated for CVA so would not   # Hx of CVA/TIA: Currently on Xarelto and pravastatin  She has stopped ASA  HPI: Very pleasant 82 y/o woman w/ PMHx of HTN, HLD, chronic LE edema/lymphedema, anemia of chronic disease p/f establishing care and abnormal ECG  She states she feels well  She was followed at [facility] ([facility]) and now lives closer to [place] [id number] so is transferring her care  Denies any cardiac symptoms  Uses lymphedema machine  She had a CVA in 5/2017 and received tPA  Overall feels well from that  She had a 1 week holter that did not show any arrhythmia and has been on NOAC ever since the stroke  Holter showed NSR w/ APCs  She comes in today 4/6/18, for f/u  She had noted hematuria  CT A/P noted a R renal pelvis multifocal papillary lesions with pathology showing low grade Ta urothelial cancer  She comes in for preop evaluation       Past Medical History:   Diagnosis Date    Anemia     Arthritis     rheumatoid    Benign essential hypertension     Cataract     Chronic cystitis     Colon, diverticulosis     CPAP (continuous positive airway pressure) dependence     Diabetes mellitus (HCC)     Diverticulitis of colon     Early satiety     GERD (gastroesophageal reflux disease)     Gout     Hearing loss     Hemorrhoids     Hiatal hernia     History of colonic polyps     Hyperlipidemia     Hypertension     Hypertension     Hypothyroidism     Impaired fasting glucose     Left breast mass     Microhematuria     Migraine     occular    Neuropathy     lower and upper extermities    Overactive bladder     Pneumonia of left lower lobe due to infectious organism ([facility]     RA (rheumatoid arthritis) ([facility]     Rheumatoid arthritis ([facility] )     Sleep apnea     uses cpap    [facility]     Type 2 diabetes mellitus (HCC)     Urinary frequency     Urinary urgency     Use of cane as ambulatory aid        Review of Systems - 12 point ROS was done and is negative, except as noted above  Allergies   Allergen Reactions    Acetazolamide Other (See Comments)     Other reaction(s): Unknown Allergic Reaction    Aspirin      Other reaction(s): Other (See Comments)  High Dose ASA-stomach ache, rachel  ASA 81 m    Atorvastatin      Other reaction(s): Muscle Pain, Myalgia    Azithromycin     Nitrofurantoin      Other reaction(s): Unknown Allergic Reaction  Other reaction(s): Other (See Comments)  HIVES * pt denies    Other Other (See Comments)     Adhesive tape : red and itching  Other reaction(s):  Other (See Comments)  red and itching    Propoxyphene Other (See Comments)     hives    Shellfish-Derived Products Other (See Comments)     Patient got Gout following eating shell fish    Sulfa Antibiotics Other (See Comments)    Tramadol Diarrhea and Vomiting       Current Outpatient Prescriptions:     acetaminophen-codeine (TYLENOL #3) 300-30 mg per tablet, Take 1 tablet by mouth every 4 (four) hours as needed for moderate pain for up to 20 days, Disp: 30 tablet, Rfl: 0    Calcium Citrate-Vitamin D (CALCIUM + D PO), Take 1 tablet by mouth 2 (two) times a day, Disp: , Rfl:     cephalexin (KEFLEX) 500 mg capsule, Take 1 capsule (500 mg total) by mouth every 8 (eight) hours for 7 days, Disp: 21 capsule, Rfl: 0    Certolizumab Pegol (CIMZIA PREFILLED SC), Inject under the skin every 30 (thirty) days  , Disp: , Rfl:     cyanocobalamin (VITAMIN B-12) 100 mcg tablet, Take by mouth, Disp: , Rfl:     docusate sodium (COLACE) 100 mg capsule, Take 1 capsule (100 mg total) by mouth 2 (two) times a day for 60 doses, Disp: 60 capsule, Rfl: 0   furosemide (LASIX) 20 mg tablet, Take 20 mg by mouth daily  , Disp: , Rfl:     gabapentin (NEURONTIN) 100 mg capsule, Take 1 capsule (100 mg total) by mouth 3 (three) times a day Take 1 cap in the am, 1 cap in the afternoon, and 3 cap at bedtime, Disp: 150 capsule, Rfl: 1    LEUCOVORIN CALCIUM PO, Take 10 mg by mouth once a week, Disp: , Rfl:     levothyroxine 75 mcg tablet, Take 75 mcg by mouth daily, Disp: , Rfl:     losartan-hydrochlorothiazide (HYZAAR) 100-12 5 MG per tablet, Take 1 tablet by mouth daily  , Disp: , Rfl:     methotrexate (RHEUMATREX) 2 5 MG tablet, Take 8 tablets (20 mg total) by mouth once a week, Disp: 12 tablet, Rfl: 0    Multiple Vitamin (MULTIVITAMINS PO), Take 1 tablet by mouth daily, Disp: , Rfl:     nystatin (MYCOSTATIN) powder, AAA PRN, Disp: , Rfl:     omeprazole (PriLOSEC) 20 mg delayed release capsule, TAKE 1 CAPSULE DAILY AS NEEDED FOR STOMACH ACID, Disp: 90 capsule, Rfl: 3    oxybutynin (DITROPAN-XL) 10 MG 24 hr tablet, Take 1 tablet (10 mg total) by mouth daily at bedtime for 3 days, Disp: 3 tablet, Rfl: 0    phenazopyridine (PYRIDIUM) 100 mg tablet, Take 1 tablet (100 mg total) by mouth 3 (three) times a day as needed for bladder spasms, Disp: 10 tablet, Rfl: 0    pravastatin (PRAVACHOL) 80 mg tablet, TAKE 1 TABLET EVERY DAY, Disp: 90 tablet, Rfl: 3    rivaroxaban (XARELTO) 20 mg tablet, Take 1 tablet (20 mg total) by mouth daily with dinner, Disp: 30 tablet, Rfl: 0    rOPINIRole (REQUIP) 0 5 mg tablet, Take 0 5 mg by mouth 3 (three) times a day, Disp: , Rfl:   No current facility-administered medications for this visit  Facility-Administered Medications Ordered in Other Visits:     acetaminophen (TYLENOL) tablet 650 mg, 650 mg, Oral, Q6H PRN, Jena Maradiaga, PA-C    Social History     Social History    Marital status: /Civil Union     Spouse name: N/A    Number of children: N/A    Years of education: N/A     Occupational History    Not on file  Social History Main Topics    Smoking status: Never Smoker    Smokeless tobacco: Never Used      Comment: stopped smoking in the distant past, never smoker (as per [employer])     Alcohol use Yes      Comment: socially    Drug use: No    Sexual activity: Not on file     Other Topics Concern    Not on file     Social History Narrative    No caffeine use       Family History   Problem Relation Age of Onset    Other Mother      epilepsy   Dash Mari Glaucoma Father     Stroke Father      silent    Other Brother      cardiac disorder       Physical Exam:    Vitals: There were no vitals taken for this visit  , There is no height or weight on file to calculate BMI ,   Wt Readings from Last 3 Encounters:   04/04/18 98 kg (216 lb)   04/02/18 99 9 kg (220 lb 5 oz)   03/20/18 98 4 kg (217 lb)       There were no vitals filed for this visit      GEN: Kimmy Lira appears well, alert and oriented x 3, pleasant and cooperative   HEENT: pupils equal, round, and reactive to light; extraocular muscles intact  NECK: supple, no carotid bruits   HEART: regular rhythm, normal S1 and S2, no murmurs, clicks, gallops or rubs, JVP is    LUNGS: clear to auscultation bilaterally; no wheezes, rales, or rhonchi   ABDOMEN: normal bowel sounds, soft, no tenderness, no distention  EXTREMITIES: peripheral pulses normal; no clubbing, cyanosis, or edema  NEURO: no focal findings   SKIN: normal without suspicious lesions on exposed skin    Labs & Results:  Lab Results   Component Value Date    WBC 6 56 03/06/2018    HGB 9 4 (L) 03/06/2018    HCT 28 2 (L) 03/06/2018    MCV 97 03/06/2018     03/06/2018       Chemistry        Component Value Date/Time     03/06/2018 1423     11/14/2017 1151    K 3 4 (L) 03/06/2018 1423    K 3 7 11/14/2017 1151     03/06/2018 1423     11/14/2017 1151    CO2 30 03/06/2018 1423    CO2 29 11/14/2017 1151    BUN 22 03/06/2018 1423    BUN 18 11/14/2017 1151    CREATININE 1 01 03/06/2018 1423    CREATININE 0 88 11/14/2017 1151        Component Value Date/Time    CALCIUM 8 6 03/06/2018 1423    CALCIUM 8 9 11/14/2017 1151    ALKPHOS 108 03/06/2018 1423    ALKPHOS 95 11/14/2017 1151    AST 15 03/06/2018 1423    AST 24 11/14/2017 1151    ALT 18 03/06/2018 1423    ALT 19 11/14/2017 1151    BILITOT 0 80 03/06/2018 1423    BILITOT 1 2 11/14/2017 1151        EKG personally reviewed by Sue Marquez MD      Counseling / Coordination of Care  Total floor / unit time spent today 40 minutes  Greater than 50% of total time was spent with the patient and / or family counseling and / or coordination of care  A description of the counseling / coordination of care: 20 min  Thank you for the opportunity to participate in the care of this patient      Sue Marquez MD, PhD   Fabricio Morrison

## 2018-04-09 ENCOUNTER — TELEPHONE (OUTPATIENT)
Dept: UROLOGY | Facility: CLINIC | Age: 83
End: 2018-04-09

## 2018-04-09 NOTE — TELEPHONE ENCOUNTER
Spoke with the pt about her upcoming surgery scheduled 4/23 with Dr Renate Ocampo  Explained the tests she needs to have done prior to surgery  She states there is a [facility] location close to her and she will have the test done there on Wednesday  Also advised the pt to stop taking her Xalrelto 48 hour prior to surgery per Dr Les Escobar instructions

## 2018-04-10 ENCOUNTER — LAB REQUISITION (OUTPATIENT)
Dept: LAB | Facility: HOSPITAL | Age: 83
End: 2018-04-10
Payer: MEDICARE

## 2018-04-10 ENCOUNTER — APPOINTMENT (OUTPATIENT)
Dept: LAB | Facility: CLINIC | Age: 83
End: 2018-04-10
Payer: MEDICARE

## 2018-04-10 ENCOUNTER — HOSPITAL ENCOUNTER (OUTPATIENT)
Dept: RADIOLOGY | Facility: HOSPITAL | Age: 83
Discharge: HOME | End: 2018-04-10
Payer: MEDICARE

## 2018-04-10 ENCOUNTER — APPOINTMENT (OUTPATIENT)
Dept: LAB | Facility: HOSPITAL | Age: 83
End: 2018-04-10
Attending: UROLOGY
Payer: MEDICARE

## 2018-04-10 ENCOUNTER — TRANSCRIBE ORDERS (OUTPATIENT)
Dept: LAB | Facility: CLINIC | Age: 83
End: 2018-04-10

## 2018-04-10 DIAGNOSIS — R31.9 URINARY TRACT INFECTION WITH HEMATURIA, SITE UNSPECIFIED: ICD-10-CM

## 2018-04-10 DIAGNOSIS — C68.9 UROTHELIAL CARCINOMA (HCC): ICD-10-CM

## 2018-04-10 DIAGNOSIS — C68.9 MALIGNANT NEOPLASM OF URINARY ORGAN (HCC): ICD-10-CM

## 2018-04-10 DIAGNOSIS — N39.0 URINARY TRACT INFECTION WITH HEMATURIA, SITE UNSPECIFIED: ICD-10-CM

## 2018-04-10 DIAGNOSIS — C68.9 MALIGNANT NEOPLASM OF URINARY ORGAN (HCC): Primary | ICD-10-CM

## 2018-04-10 LAB
ABO GROUP BLD: NORMAL
ANION GAP SERPL CALCULATED.3IONS-SCNC: 6 MMOL/L (ref 4–13)
APTT PPP: 40 SECONDS (ref 23–35)
BASOPHILS # BLD AUTO: 0.03 THOUSANDS/ΜL (ref 0–0.1)
BASOPHILS NFR BLD AUTO: 1 % (ref 0–1)
BLD GP AB SCN SERPL QL: NEGATIVE
BUN SERPL-MCNC: 15 MG/DL (ref 5–25)
CALCIUM SERPL-MCNC: 8.7 MG/DL
CHLORIDE SERPL-SCNC: 110 MMOL/L (ref 100–108)
CO2 SERPL-SCNC: 27 MMOL/L (ref 21–32)
CREAT SERPL-MCNC: 0.94 MG/DL (ref 0.6–1.3)
EOSINOPHIL # BLD AUTO: 0.39 THOUSAND/ΜL (ref 0–0.61)
EOSINOPHIL NFR BLD AUTO: 6 % (ref 0–6)
ERYTHROCYTE [DISTWIDTH] IN BLOOD BY AUTOMATED COUNT: 15.7 % (ref 11.6–15.1)
GFR SERPL CREATININE-BSD FRML MDRD: 57 ML/MIN/1.73SQ M
GLUCOSE P FAST SERPL-MCNC: 129 MG/DL (ref 65–99)
HCT VFR BLD AUTO: 28.3 % (ref 34.8–46.1)
HGB BLD-MCNC: 9 G/DL (ref 11.5–15.4)
INR PPP: 2.88 (ref 0.86–1.16)
LYMPHOCYTES # BLD AUTO: 1.28 THOUSANDS/ΜL (ref 0.6–4.47)
LYMPHOCYTES NFR BLD AUTO: 20 % (ref 14–44)
MCH RBC QN AUTO: 31.8 PG (ref 26.8–34.3)
MCHC RBC AUTO-ENTMCNC: 31.8 G/DL (ref 31.4–37.4)
MCV RBC AUTO: 100 FL (ref 82–98)
MONOCYTES # BLD AUTO: 0.92 THOUSAND/ΜL (ref 0.17–1.22)
MONOCYTES NFR BLD AUTO: 14 % (ref 4–12)
NEUTROPHILS # BLD AUTO: 3.83 THOUSANDS/ΜL (ref 1.85–7.62)
NEUTS SEG NFR BLD AUTO: 59 % (ref 43–75)
NRBC BLD AUTO-RTO: 0 /100 WBCS
PLATELET # BLD AUTO: 235 THOUSANDS/UL (ref 149–390)
PMV BLD AUTO: 9.5 FL (ref 8.9–12.7)
POTASSIUM SERPL-SCNC: 3.7 MMOL/L (ref 3.5–5.3)
PROTHROMBIN TIME: 30.6 SECONDS (ref 12.1–14.4)
RBC # BLD AUTO: 2.83 MILLION/UL (ref 3.81–5.12)
RH BLD: POSITIVE
SODIUM SERPL-SCNC: 143 MMOL/L (ref 136–145)
SPECIMEN EXPIRATION DATE: NORMAL
WBC # BLD AUTO: 6.48 THOUSAND/UL (ref 4.31–10.16)

## 2018-04-10 PROCEDURE — 86901 BLOOD TYPING SEROLOGIC RH(D): CPT | Performed by: UROLOGY

## 2018-04-10 PROCEDURE — 86850 RBC ANTIBODY SCREEN: CPT | Performed by: UROLOGY

## 2018-04-10 PROCEDURE — 87186 SC STD MICRODIL/AGAR DIL: CPT

## 2018-04-10 PROCEDURE — 86900 BLOOD TYPING SEROLOGIC ABO: CPT | Performed by: UROLOGY

## 2018-04-10 PROCEDURE — 87086 URINE CULTURE/COLONY COUNT: CPT

## 2018-04-10 PROCEDURE — 85610 PROTHROMBIN TIME: CPT

## 2018-04-10 PROCEDURE — 85730 THROMBOPLASTIN TIME PARTIAL: CPT

## 2018-04-10 PROCEDURE — 87077 CULTURE AEROBIC IDENTIFY: CPT

## 2018-04-10 PROCEDURE — 80048 BASIC METABOLIC PNL TOTAL CA: CPT

## 2018-04-10 PROCEDURE — 36415 COLL VENOUS BLD VENIPUNCTURE: CPT

## 2018-04-10 PROCEDURE — 85025 COMPLETE CBC W/AUTO DIFF WBC: CPT

## 2018-04-10 PROCEDURE — 71046 X-RAY EXAM CHEST 2 VIEWS: CPT

## 2018-04-12 LAB
BACTERIA UR CULT: ABNORMAL
BACTERIA UR CULT: ABNORMAL

## 2018-04-13 RX ORDER — LEVOFLOXACIN 500 MG/1
500 TABLET, FILM COATED ORAL EVERY 24 HOURS
Qty: 10 TABLET | Refills: 0 | Status: SHIPPED | OUTPATIENT
Start: 2018-04-13 | End: 2018-04-27 | Stop reason: HOSPADM

## 2018-04-13 NOTE — PROGRESS NOTES
Pt notified re: [facility]    Told pt that Rx was called into [facility] in [place] and to call if any questions

## 2018-04-23 ENCOUNTER — HOSPITAL ENCOUNTER (INPATIENT)
Facility: HOSPITAL | Age: 83
LOS: 4 days | Discharge: NURSING, REHAB OR LONG-TERM CARE | DRG: 654 | End: 2018-04-27
Attending: UROLOGY | Admitting: UROLOGY
Payer: MEDICARE

## 2018-04-23 ENCOUNTER — ANESTHESIA (OUTPATIENT)
Dept: PERIOP | Facility: HOSPITAL | Age: 83
DRG: 654 | End: 2018-04-23
Payer: MEDICARE

## 2018-04-23 ENCOUNTER — APPOINTMENT (INPATIENT)
Dept: RADIOLOGY | Facility: HOSPITAL | Age: 83
DRG: 654 | End: 2018-04-23
Payer: MEDICARE

## 2018-04-23 ENCOUNTER — ANESTHESIA EVENT (OUTPATIENT)
Dept: PERIOP | Facility: HOSPITAL | Age: 83
DRG: 654 | End: 2018-04-23
Payer: MEDICARE

## 2018-04-23 DIAGNOSIS — I63.311 CEREBROVASCULAR ACCIDENT (CVA) DUE TO THROMBOSIS OF RIGHT MIDDLE CEREBRAL ARTERY (HCC): Primary | ICD-10-CM

## 2018-04-23 DIAGNOSIS — K21.9 CHRONIC GERD: ICD-10-CM

## 2018-04-23 DIAGNOSIS — M79.604 RIGHT LEG PAIN: ICD-10-CM

## 2018-04-23 DIAGNOSIS — N39.8 UROTHELIAL LESION: ICD-10-CM

## 2018-04-23 DIAGNOSIS — E11.69 DIABETES MELLITUS TYPE 2 IN OBESE (HCC): ICD-10-CM

## 2018-04-23 DIAGNOSIS — E66.9 DIABETES MELLITUS TYPE 2 IN OBESE (HCC): ICD-10-CM

## 2018-04-23 DIAGNOSIS — E66.9 OBESITY WITH SERIOUS COMORBIDITY, UNSPECIFIED CLASSIFICATION, UNSPECIFIED OBESITY TYPE: ICD-10-CM

## 2018-04-23 DIAGNOSIS — C68.9 UROTHELIAL CANCER (HCC): ICD-10-CM

## 2018-04-23 DIAGNOSIS — E03.9 ACQUIRED HYPOTHYROIDISM: ICD-10-CM

## 2018-04-23 DIAGNOSIS — M05.9 RHEUMATOID ARTHRITIS WITH POSITIVE RHEUMATOID FACTOR, INVOLVING UNSPECIFIED SITE (HCC): ICD-10-CM

## 2018-04-23 DIAGNOSIS — G47.30 SLEEP APNEA, UNSPECIFIED TYPE: ICD-10-CM

## 2018-04-23 DIAGNOSIS — I89.0 LYMPHEDEMA: ICD-10-CM

## 2018-04-23 DIAGNOSIS — I10 ESSENTIAL HYPERTENSION: ICD-10-CM

## 2018-04-23 LAB
ABO GROUP BLD: NORMAL
ANION GAP SERPL CALCULATED.3IONS-SCNC: 8 MMOL/L (ref 4–13)
APTT PPP: 32 SECONDS (ref 23–35)
BLD GP AB SCN SERPL QL: NEGATIVE
BUN SERPL-MCNC: 17 MG/DL (ref 5–25)
CALCIUM SERPL-MCNC: 8.4 MG/DL (ref 8.3–10.1)
CHLORIDE SERPL-SCNC: 109 MMOL/L (ref 100–108)
CO2 SERPL-SCNC: 24 MMOL/L (ref 21–32)
CREAT SERPL-MCNC: 0.98 MG/DL (ref 0.6–1.3)
ERYTHROCYTE [DISTWIDTH] IN BLOOD BY AUTOMATED COUNT: 15.7 % (ref 11.6–15.1)
GFR SERPL CREATININE-BSD FRML MDRD: 54 ML/MIN/1.73SQ M
GLUCOSE SERPL-MCNC: 142 MG/DL (ref 65–140)
GLUCOSE SERPL-MCNC: 188 MG/DL (ref 65–140)
GLUCOSE SERPL-MCNC: 206 MG/DL (ref 65–140)
HCT VFR BLD AUTO: 25.9 % (ref 34.8–46.1)
HGB BLD-MCNC: 8.4 G/DL (ref 11.5–15.4)
INR PPP: 1.2 (ref 0.86–1.16)
MCH RBC QN AUTO: 32.1 PG (ref 26.8–34.3)
MCHC RBC AUTO-ENTMCNC: 32.4 G/DL (ref 31.4–37.4)
MCV RBC AUTO: 99 FL (ref 82–98)
PLATELET # BLD AUTO: 183 THOUSANDS/UL (ref 149–390)
PMV BLD AUTO: 8.5 FL (ref 8.9–12.7)
POTASSIUM SERPL-SCNC: 3.5 MMOL/L (ref 3.5–5.3)
PROTHROMBIN TIME: 15.3 SECONDS (ref 12.1–14.4)
RBC # BLD AUTO: 2.62 MILLION/UL (ref 3.81–5.12)
RH BLD: POSITIVE
SODIUM SERPL-SCNC: 141 MMOL/L (ref 136–145)
SPECIMEN EXPIRATION DATE: NORMAL
WBC # BLD AUTO: 7.55 THOUSAND/UL (ref 4.31–10.16)

## 2018-04-23 PROCEDURE — 86900 BLOOD TYPING SEROLOGIC ABO: CPT | Performed by: UROLOGY

## 2018-04-23 PROCEDURE — 80048 BASIC METABOLIC PNL TOTAL CA: CPT | Performed by: UROLOGY

## 2018-04-23 PROCEDURE — 86920 COMPATIBILITY TEST SPIN: CPT

## 2018-04-23 PROCEDURE — 88307 TISSUE EXAM BY PATHOLOGIST: CPT | Performed by: PATHOLOGY

## 2018-04-23 PROCEDURE — 94760 N-INVAS EAR/PLS OXIMETRY 1: CPT

## 2018-04-23 PROCEDURE — 99221 1ST HOSP IP/OBS SF/LOW 40: CPT | Performed by: PHYSICIAN ASSISTANT

## 2018-04-23 PROCEDURE — 71045 X-RAY EXAM CHEST 1 VIEW: CPT

## 2018-04-23 PROCEDURE — 85027 COMPLETE CBC AUTOMATED: CPT | Performed by: UROLOGY

## 2018-04-23 PROCEDURE — 86850 RBC ANTIBODY SCREEN: CPT | Performed by: UROLOGY

## 2018-04-23 PROCEDURE — 8E0W4CZ ROBOTIC ASSISTED PROCEDURE OF TRUNK REGION, PERCUTANEOUS ENDOSCOPIC APPROACH: ICD-10-PCS | Performed by: UROLOGY

## 2018-04-23 PROCEDURE — 50548 LAPARO REMOVE W/URETER: CPT | Performed by: UROLOGY

## 2018-04-23 PROCEDURE — 86901 BLOOD TYPING SEROLOGIC RH(D): CPT | Performed by: UROLOGY

## 2018-04-23 PROCEDURE — 0TT04ZZ RESECTION OF RIGHT KIDNEY, PERCUTANEOUS ENDOSCOPIC APPROACH: ICD-10-PCS | Performed by: UROLOGY

## 2018-04-23 PROCEDURE — 85610 PROTHROMBIN TIME: CPT | Performed by: UROLOGY

## 2018-04-23 PROCEDURE — 82948 REAGENT STRIP/BLOOD GLUCOSE: CPT

## 2018-04-23 PROCEDURE — 0TT64ZZ RESECTION OF RIGHT URETER, PERCUTANEOUS ENDOSCOPIC APPROACH: ICD-10-PCS | Performed by: UROLOGY

## 2018-04-23 PROCEDURE — 0TBB4ZZ EXCISION OF BLADDER, PERCUTANEOUS ENDOSCOPIC APPROACH: ICD-10-PCS | Performed by: UROLOGY

## 2018-04-23 PROCEDURE — 85730 THROMBOPLASTIN TIME PARTIAL: CPT | Performed by: UROLOGY

## 2018-04-23 RX ORDER — ONDANSETRON 2 MG/ML
INJECTION INTRAMUSCULAR; INTRAVENOUS AS NEEDED
Status: DISCONTINUED | OUTPATIENT
Start: 2018-04-23 | End: 2018-04-23 | Stop reason: SURG

## 2018-04-23 RX ORDER — ROCURONIUM BROMIDE 10 MG/ML
INJECTION, SOLUTION INTRAVENOUS AS NEEDED
Status: DISCONTINUED | OUTPATIENT
Start: 2018-04-23 | End: 2018-04-23 | Stop reason: SURG

## 2018-04-23 RX ORDER — LEVOTHYROXINE SODIUM 0.07 MG/1
75 TABLET ORAL
Status: DISCONTINUED | OUTPATIENT
Start: 2018-04-24 | End: 2018-04-27 | Stop reason: HOSPADM

## 2018-04-23 RX ORDER — METOCLOPRAMIDE HYDROCHLORIDE 5 MG/ML
10 INJECTION INTRAMUSCULAR; INTRAVENOUS ONCE AS NEEDED
Status: DISCONTINUED | OUTPATIENT
Start: 2018-04-23 | End: 2018-04-23 | Stop reason: HOSPADM

## 2018-04-23 RX ORDER — SCOLOPAMINE TRANSDERMAL SYSTEM 1 MG/1
1 PATCH, EXTENDED RELEASE TRANSDERMAL ONCE
Status: COMPLETED | OUTPATIENT
Start: 2018-04-23 | End: 2018-04-26

## 2018-04-23 RX ORDER — PROMETHAZINE HYDROCHLORIDE 25 MG/ML
12.5 INJECTION, SOLUTION INTRAMUSCULAR; INTRAVENOUS ONCE AS NEEDED
Status: DISCONTINUED | OUTPATIENT
Start: 2018-04-23 | End: 2018-04-23 | Stop reason: HOSPADM

## 2018-04-23 RX ORDER — LIDOCAINE HYDROCHLORIDE 10 MG/ML
INJECTION, SOLUTION INFILTRATION; PERINEURAL AS NEEDED
Status: DISCONTINUED | OUTPATIENT
Start: 2018-04-23 | End: 2018-04-23 | Stop reason: SURG

## 2018-04-23 RX ORDER — HYDROCODONE BITARTRATE AND ACETAMINOPHEN 5; 325 MG/1; MG/1
1 TABLET ORAL EVERY 6 HOURS PRN
Qty: 30 TABLET | Refills: 0 | Status: SHIPPED | OUTPATIENT
Start: 2018-04-23 | End: 2018-06-13 | Stop reason: ALTCHOICE

## 2018-04-23 RX ORDER — DOCUSATE SODIUM 100 MG/1
100 CAPSULE, LIQUID FILLED ORAL 2 TIMES DAILY
Status: DISCONTINUED | OUTPATIENT
Start: 2018-04-23 | End: 2018-04-27 | Stop reason: HOSPADM

## 2018-04-23 RX ORDER — PANTOPRAZOLE SODIUM 40 MG/1
40 TABLET, DELAYED RELEASE ORAL
Status: DISCONTINUED | OUTPATIENT
Start: 2018-04-24 | End: 2018-04-27 | Stop reason: HOSPADM

## 2018-04-23 RX ORDER — ACETAMINOPHEN 325 MG/1
650 TABLET ORAL 2 TIMES DAILY PRN
COMMUNITY

## 2018-04-23 RX ORDER — HYDROMORPHONE HYDROCHLORIDE 2 MG/ML
INJECTION, SOLUTION INTRAMUSCULAR; INTRAVENOUS; SUBCUTANEOUS AS NEEDED
Status: DISCONTINUED | OUTPATIENT
Start: 2018-04-23 | End: 2018-04-23 | Stop reason: SURG

## 2018-04-23 RX ORDER — ACETAMINOPHEN 325 MG/1
650 TABLET ORAL EVERY 6 HOURS PRN
Status: DISCONTINUED | OUTPATIENT
Start: 2018-04-23 | End: 2018-04-27 | Stop reason: HOSPADM

## 2018-04-23 RX ORDER — BUPIVACAINE HYDROCHLORIDE 5 MG/ML
INJECTION, SOLUTION PERINEURAL AS NEEDED
Status: DISCONTINUED | OUTPATIENT
Start: 2018-04-23 | End: 2018-04-23 | Stop reason: HOSPADM

## 2018-04-23 RX ORDER — FENTANYL CITRATE/PF 50 MCG/ML
50 SYRINGE (ML) INJECTION
Status: DISCONTINUED | OUTPATIENT
Start: 2018-04-23 | End: 2018-04-23 | Stop reason: HOSPADM

## 2018-04-23 RX ORDER — PROPOFOL 10 MG/ML
INJECTION, EMULSION INTRAVENOUS AS NEEDED
Status: DISCONTINUED | OUTPATIENT
Start: 2018-04-23 | End: 2018-04-23 | Stop reason: SURG

## 2018-04-23 RX ORDER — GABAPENTIN 100 MG/1
100 CAPSULE ORAL 3 TIMES DAILY
Status: DISCONTINUED | OUTPATIENT
Start: 2018-04-24 | End: 2018-04-27 | Stop reason: HOSPADM

## 2018-04-23 RX ORDER — SODIUM CHLORIDE, SODIUM LACTATE, POTASSIUM CHLORIDE, CALCIUM CHLORIDE 600; 310; 30; 20 MG/100ML; MG/100ML; MG/100ML; MG/100ML
20 INJECTION, SOLUTION INTRAVENOUS CONTINUOUS
Status: DISCONTINUED | OUTPATIENT
Start: 2018-04-23 | End: 2018-04-23

## 2018-04-23 RX ORDER — MAGNESIUM HYDROXIDE 1200 MG/15ML
LIQUID ORAL AS NEEDED
Status: DISCONTINUED | OUTPATIENT
Start: 2018-04-23 | End: 2018-04-23 | Stop reason: HOSPADM

## 2018-04-23 RX ORDER — FUROSEMIDE 20 MG/1
20 TABLET ORAL DAILY
Status: DISCONTINUED | OUTPATIENT
Start: 2018-04-24 | End: 2018-04-27 | Stop reason: HOSPADM

## 2018-04-23 RX ORDER — ONDANSETRON 2 MG/ML
4 INJECTION INTRAMUSCULAR; INTRAVENOUS ONCE AS NEEDED
Status: DISCONTINUED | OUTPATIENT
Start: 2018-04-23 | End: 2018-04-23 | Stop reason: HOSPADM

## 2018-04-23 RX ORDER — SODIUM CHLORIDE, SODIUM LACTATE, POTASSIUM CHLORIDE, CALCIUM CHLORIDE 600; 310; 30; 20 MG/100ML; MG/100ML; MG/100ML; MG/100ML
50 INJECTION, SOLUTION INTRAVENOUS CONTINUOUS
Status: DISCONTINUED | OUTPATIENT
Start: 2018-04-23 | End: 2018-04-23

## 2018-04-23 RX ORDER — ONDANSETRON 2 MG/ML
4 INJECTION INTRAMUSCULAR; INTRAVENOUS EVERY 6 HOURS PRN
Status: DISCONTINUED | OUTPATIENT
Start: 2018-04-23 | End: 2018-04-27 | Stop reason: HOSPADM

## 2018-04-23 RX ORDER — SODIUM CHLORIDE, SODIUM LACTATE, POTASSIUM CHLORIDE, CALCIUM CHLORIDE 600; 310; 30; 20 MG/100ML; MG/100ML; MG/100ML; MG/100ML
75 INJECTION, SOLUTION INTRAVENOUS CONTINUOUS
Status: DISCONTINUED | OUTPATIENT
Start: 2018-04-23 | End: 2018-04-26

## 2018-04-23 RX ORDER — SODIUM CHLORIDE 9 MG/ML
INJECTION, SOLUTION INTRAVENOUS CONTINUOUS PRN
Status: DISCONTINUED | OUTPATIENT
Start: 2018-04-23 | End: 2018-04-23 | Stop reason: SURG

## 2018-04-23 RX ORDER — DOCUSATE SODIUM 100 MG/1
100 CAPSULE, LIQUID FILLED ORAL 2 TIMES DAILY
Qty: 60 CAPSULE | Refills: 0 | Status: SHIPPED | OUTPATIENT
Start: 2018-04-23 | End: 2018-09-26 | Stop reason: SDUPTHER

## 2018-04-23 RX ORDER — HYDROCODONE BITARTRATE AND ACETAMINOPHEN 5; 325 MG/1; MG/1
1 TABLET ORAL EVERY 6 HOURS PRN
Status: DISCONTINUED | OUTPATIENT
Start: 2018-04-23 | End: 2018-04-27 | Stop reason: HOSPADM

## 2018-04-23 RX ORDER — HEPARIN SODIUM 5000 [USP'U]/ML
5000 INJECTION, SOLUTION INTRAVENOUS; SUBCUTANEOUS EVERY 8 HOURS SCHEDULED
Status: DISCONTINUED | OUTPATIENT
Start: 2018-04-23 | End: 2018-04-27 | Stop reason: HOSPADM

## 2018-04-23 RX ORDER — PRAVASTATIN SODIUM 40 MG
80 TABLET ORAL DAILY
Status: DISCONTINUED | OUTPATIENT
Start: 2018-04-24 | End: 2018-04-27 | Stop reason: HOSPADM

## 2018-04-23 RX ORDER — GLYCOPYRROLATE 0.2 MG/ML
INJECTION INTRAMUSCULAR; INTRAVENOUS AS NEEDED
Status: DISCONTINUED | OUTPATIENT
Start: 2018-04-23 | End: 2018-04-23 | Stop reason: SURG

## 2018-04-23 RX ORDER — ALBUTEROL SULFATE 2.5 MG/3ML
2.5 SOLUTION RESPIRATORY (INHALATION) ONCE AS NEEDED
Status: DISCONTINUED | OUTPATIENT
Start: 2018-04-23 | End: 2018-04-23 | Stop reason: HOSPADM

## 2018-04-23 RX ORDER — LABETALOL HYDROCHLORIDE 5 MG/ML
INJECTION, SOLUTION INTRAVENOUS AS NEEDED
Status: DISCONTINUED | OUTPATIENT
Start: 2018-04-23 | End: 2018-04-23 | Stop reason: SURG

## 2018-04-23 RX ORDER — LOSARTAN POTASSIUM 100 MG/1
100 TABLET ORAL DAILY
COMMUNITY
End: 2018-08-12 | Stop reason: HOSPADM

## 2018-04-23 RX ORDER — ROPINIROLE 0.25 MG/1
0.5 TABLET, FILM COATED ORAL 2 TIMES DAILY
Status: DISCONTINUED | OUTPATIENT
Start: 2018-04-23 | End: 2018-04-24

## 2018-04-23 RX ORDER — SIMETHICONE 80 MG
80 TABLET,CHEWABLE ORAL 4 TIMES DAILY PRN
Status: DISCONTINUED | OUTPATIENT
Start: 2018-04-23 | End: 2018-04-27 | Stop reason: HOSPADM

## 2018-04-23 RX ORDER — FENTANYL CITRATE 50 UG/ML
INJECTION, SOLUTION INTRAMUSCULAR; INTRAVENOUS AS NEEDED
Status: DISCONTINUED | OUTPATIENT
Start: 2018-04-23 | End: 2018-04-23 | Stop reason: SURG

## 2018-04-23 RX ADMIN — DOCUSATE SODIUM 100 MG: 100 CAPSULE, LIQUID FILLED ORAL at 18:58

## 2018-04-23 RX ADMIN — LABETALOL HYDROCHLORIDE 5 MG: 5 INJECTION, SOLUTION INTRAVENOUS at 16:39

## 2018-04-23 RX ADMIN — HYDROMORPHONE HYDROCHLORIDE 0.5 MG: 2 INJECTION, SOLUTION INTRAMUSCULAR; INTRAVENOUS; SUBCUTANEOUS at 17:05

## 2018-04-23 RX ADMIN — SODIUM CHLORIDE: 0.9 INJECTION, SOLUTION INTRAVENOUS at 12:57

## 2018-04-23 RX ADMIN — HEPARIN SODIUM 5000 UNITS: 5000 INJECTION, SOLUTION INTRAVENOUS; SUBCUTANEOUS at 21:22

## 2018-04-23 RX ADMIN — INSULIN HUMAN 10 UNITS: 100 INJECTION, SOLUTION PARENTERAL at 17:34

## 2018-04-23 RX ADMIN — LABETALOL HYDROCHLORIDE 5 MG: 5 INJECTION, SOLUTION INTRAVENOUS at 16:47

## 2018-04-23 RX ADMIN — PHENYLEPHRINE HYDROCHLORIDE 10 MCG/MIN: 10 INJECTION INTRAVENOUS at 15:36

## 2018-04-23 RX ADMIN — ONDANSETRON 4 MG: 2 INJECTION INTRAMUSCULAR; INTRAVENOUS at 16:17

## 2018-04-23 RX ADMIN — SCOPALAMINE 1 PATCH: 1 PATCH, EXTENDED RELEASE TRANSDERMAL at 11:39

## 2018-04-23 RX ADMIN — GLYCOPYRROLATE 0.4 MG: 0.2 INJECTION, SOLUTION INTRAMUSCULAR; INTRAVENOUS at 16:43

## 2018-04-23 RX ADMIN — HYDROCODONE BITARTRATE AND ACETAMINOPHEN 1 TABLET: 5; 325 TABLET ORAL at 19:00

## 2018-04-23 RX ADMIN — FENTANYL CITRATE 100 MCG: 50 INJECTION, SOLUTION INTRAMUSCULAR; INTRAVENOUS at 12:55

## 2018-04-23 RX ADMIN — HEPARIN SODIUM 5000 UNITS: 5000 INJECTION, SOLUTION INTRAVENOUS; SUBCUTANEOUS at 18:58

## 2018-04-23 RX ADMIN — HYDROMORPHONE HYDROCHLORIDE 0.5 MG: 2 INJECTION, SOLUTION INTRAMUSCULAR; INTRAVENOUS; SUBCUTANEOUS at 13:50

## 2018-04-23 RX ADMIN — NEOSTIGMINE METHYLSULFATE 3 MG: 1 INJECTION, SOLUTION INTRAMUSCULAR; INTRAVENOUS; SUBCUTANEOUS at 16:43

## 2018-04-23 RX ADMIN — SODIUM CHLORIDE 3 G: 9 INJECTION, SOLUTION INTRAVENOUS at 13:20

## 2018-04-23 RX ADMIN — ROPINIROLE 0.5 MG: 0.25 TABLET, FILM COATED ORAL at 20:17

## 2018-04-23 RX ADMIN — SODIUM CHLORIDE, SODIUM LACTATE, POTASSIUM CHLORIDE, AND CALCIUM CHLORIDE 20 ML/HR: .6; .31; .03; .02 INJECTION, SOLUTION INTRAVENOUS at 12:24

## 2018-04-23 RX ADMIN — SODIUM CHLORIDE 3 G: 9 INJECTION, SOLUTION INTRAVENOUS at 20:16

## 2018-04-23 RX ADMIN — FENTANYL CITRATE 50 MCG: 50 INJECTION, SOLUTION INTRAMUSCULAR; INTRAVENOUS at 17:32

## 2018-04-23 RX ADMIN — SODIUM CHLORIDE, SODIUM LACTATE, POTASSIUM CHLORIDE, AND CALCIUM CHLORIDE: .6; .31; .03; .02 INJECTION, SOLUTION INTRAVENOUS at 16:12

## 2018-04-23 RX ADMIN — LIDOCAINE HYDROCHLORIDE 50 MG: 10 INJECTION, SOLUTION INFILTRATION; PERINEURAL at 12:55

## 2018-04-23 RX ADMIN — HYDROMORPHONE HYDROCHLORIDE 0.5 MG: 1 INJECTION, SOLUTION INTRAMUSCULAR; INTRAVENOUS; SUBCUTANEOUS at 20:16

## 2018-04-23 RX ADMIN — LABETALOL HYDROCHLORIDE 5 MG: 5 INJECTION, SOLUTION INTRAVENOUS at 17:05

## 2018-04-23 RX ADMIN — ROCURONIUM BROMIDE 50 MG: 10 INJECTION INTRAVENOUS at 12:55

## 2018-04-23 RX ADMIN — LABETALOL HYDROCHLORIDE 5 MG: 5 INJECTION, SOLUTION INTRAVENOUS at 16:30

## 2018-04-23 RX ADMIN — ROCURONIUM BROMIDE 10 MG: 10 INJECTION INTRAVENOUS at 14:45

## 2018-04-23 RX ADMIN — PROPOFOL 200 MG: 10 INJECTION, EMULSION INTRAVENOUS at 12:55

## 2018-04-23 NOTE — H&P (VIEW-ONLY)
UROLOGY PROGRESS NOTE     History of Present Illness:   Odette Curtis is a 81 y o  female known to Dr Camelia Blair with a long history of urge incontinence who has been undergoing PTNS  She developed recurrent urinary tract infections and gross hematuria  Her primary care team ordered a renal ultrasound and followup CT scan which showed some nodularity in the right renal pelvis  Patient underwent a cystoscopy with ureteroscopy on 2/28/18  From the dictated operative report Dr Kelby Pacheco: "placed a flexible ureteral scope up into the proximal ureter and right renal pelvis  I saw papillary lesions that appeared to be superficial of the proximal right ureter and pelvis and they were diffuse  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  I did not have a biopsy forceps capable of actually grabbing the specimen, so I took a 10 cc syringe and aspirated washings directly over the lesions and sent to cytology "    Patient is being evaluated for a possible nephroureterectomy to both control her multifocal papillary lesions and her recurrent gross hematuria  She does have significant comorbidities including obesity BMI>40, history of CVA on Xarelto, rheumatoid arthritis on methrotrexate/certolizumab  The patient return to the operating room on the 20th of March for definitive ureteroscopy  This did confirm diffuse lesions throughout the UPJ and renal pelvis  Pathology returns low-grade cancer  The patient returns with her family including her daughter Myles Lu today to discuss potential nephro ureterectomy  We have a tentative surgery date later this month        Past Medical History:     Past Medical History:   Diagnosis Date    Anemia     Arthritis     rheumatoid    Benign essential hypertension     Cataract     Chronic cystitis     Colon, diverticulosis     CPAP (continuous positive airway pressure) dependence     Diabetes mellitus (HCC)     Diverticulitis of colon     Early satiety  GERD (gastroesophageal reflux disease)     Gout     Hearing loss     Hemorrhoids     Hiatal hernia     History of colonic polyps     Hyperlipidemia     Hypertension     Hypertension     Hypothyroidism     Impaired fasting glucose     Left breast mass     Microhematuria     Migraine     occular    Neuropathy     lower and upper extermities    Overactive bladder     Pneumonia of left lower lobe due to infectious organism (HCC)     RA (rheumatoid arthritis) (HCC)     Rheumatoid arthritis (HCC)     Sleep apnea     uses cpap    Stroke ([facility] [address] )     Type 2 diabetes mellitus (HCC)     Urinary frequency     Urinary urgency     Use of cane as ambulatory aid        PAST SURGICAL HISTORY:     Past Surgical History:   Procedure Laterality Date    APPENDECTOMY      ARTHROSCOPY WRIST Right     with release of transverse carpal ligament     BLADDER SURGERY      BLADDER SURGERY      BREAST SURGERY      left breast    BUNIONECTOMY Bilateral     CATARACT EXTRACTION      CHOLECYSTECTOMY      COLONOSCOPY      CYSTOSCOPY      EGD AND COLONOSCOPY N/A 12/20/2017    Procedure: EGD AND COLONOSCOPY;  Surgeon: Paolo Armendariz MD;  Location: [facility];   Service: Gastroenterology    ESOPHAGOGASTRODUODENOSCOPY      diagnostic    FOREARM SURGERY Right     fracture repair    HYSTERECTOMY      KNEE SURGERY Left     meniscus tear    NOSE SURGERY      ID CYSTO/URETERO W/LITHOTRIPSY &INDWELL STENT INSRT Right 2/28/2018    Procedure: CYSTOSCOPY RIGHT URETEROSCOPY, RIGHT RETROGRADE PYELOGRAM AND INSERTION  RIGHT STENT URETERAL, RIGHT RENAL PELVIC WASHING FOR CYTOLOGY;  Surgeon: Deena Wright MD;  Location: [facility];  Service: Urology    REPLACEMENT TOTAL KNEE BILATERAL Bilateral     URETEROSCOPY Right 3/20/2018    Procedure: CYSTOSCOPY, RETROGRADE PYELOGRAM, URETEROSCOPY, BIOPSY, BRUSH, FULGURATION, STENT PLACEMENT, BLADDER BIOPSY WITH FULGURATION;  Surgeon: Sonali Bradford MD;  Location: [facility]; Service: Urology       CURRENT MEDICATIONS:     Current Outpatient Prescriptions   Medication Sig Dispense Refill    Calcium Citrate-Vitamin D (CALCIUM + D PO) Take 1 tablet by mouth 2 (two) times a day      cephalexin (KEFLEX) 500 mg capsule Take 1 capsule (500 mg total) by mouth every 8 (eight) hours for 7 days 21 capsule 0    cyanocobalamin (VITAMIN B-12) 100 mcg tablet Take by mouth      furosemide (LASIX) 20 mg tablet Take 20 mg by mouth daily   gabapentin (NEURONTIN) 100 mg capsule Take 1 capsule (100 mg total) by mouth 3 (three) times a day Take 1 cap in the am, 1 cap in the afternoon, and 3 cap at bedtime 150 capsule 1    LEUCOVORIN CALCIUM PO Take 10 mg by mouth once a week      levothyroxine 75 mcg tablet Take 75 mcg by mouth daily      losartan-hydrochlorothiazide (HYZAAR) 100-12 5 MG per tablet Take 1 tablet by mouth daily        methotrexate (RHEUMATREX) 2 5 MG tablet Take 8 tablets (20 mg total) by mouth once a week 12 tablet 0    Multiple Vitamin (MULTIVITAMINS PO) Take 1 tablet by mouth daily      nystatin (MYCOSTATIN) powder AAA PRN      omeprazole (PriLOSEC) 20 mg delayed release capsule TAKE 1 CAPSULE DAILY AS NEEDED FOR STOMACH ACID 90 capsule 3    pravastatin (PRAVACHOL) 80 mg tablet TAKE 1 TABLET EVERY DAY 90 tablet 3    rivaroxaban (XARELTO) 20 mg tablet Take 1 tablet (20 mg total) by mouth daily with dinner 30 tablet 0    rOPINIRole (REQUIP) 0 5 mg tablet Take 0 5 mg by mouth 3 (three) times a day      acetaminophen-codeine (TYLENOL #3) 300-30 mg per tablet Take 1 tablet by mouth every 4 (four) hours as needed for moderate pain for up to 20 days 30 tablet 0    Certolizumab Pegol (CIMZIA PREFILLED SC) Inject under the skin every 30 (thirty) days        docusate sodium (COLACE) 100 mg capsule Take 1 capsule (100 mg total) by mouth 2 (two) times a day for 60 doses 60 capsule 0    oxybutynin (DITROPAN-XL) 10 MG 24 hr tablet Take 1 tablet (10 mg total) by mouth daily at bedtime for 3 days 3 tablet 0    phenazopyridine (PYRIDIUM) 100 mg tablet Take 1 tablet (100 mg total) by mouth 3 (three) times a day as needed for bladder spasms 10 tablet 0     No current facility-administered medications for this visit  Facility-Administered Medications Ordered in Other Visits   Medication Dose Route Frequency Provider Last Rate Last Dose    acetaminophen (TYLENOL) tablet 650 mg  650 mg Oral Q6H PRN Meghann Jefferson PA-C           ALLERGIES:     Allergies   Allergen Reactions    Acetazolamide Other (See Comments)     Other reaction(s): Unknown Allergic Reaction    Aspirin      Other reaction(s): Other (See Comments)  High Dose ASA-stomach ache, rachel  ASA 81 m    Atorvastatin      Other reaction(s): Muscle Pain, Myalgia    Azithromycin     Nitrofurantoin      Other reaction(s): Unknown Allergic Reaction  Other reaction(s): Other (See Comments)  HIVES * pt denies    Other Other (See Comments)     Adhesive tape : red and itching  Other reaction(s):  Other (See Comments)  red and itching    Propoxyphene Other (See Comments)     hives    Shellfish-Derived Products Other (See Comments)     Patient got Gout following eating shell fish    Sulfa Antibiotics Other (See Comments)    Tramadol Diarrhea and Vomiting       SOCIAL HISTORY:     Social History     Social History    Marital status: /Civil Union     Spouse name: N/A    Number of children: N/A    Years of education: N/A     Social History Main Topics    Smoking status: Never Smoker    Smokeless tobacco: Never Used      Comment: stopped smoking in the distant past, never smoker (as per [employer])     Alcohol use Yes      Comment: socially    Drug use: No    Sexual activity: Not Asked     Other Topics Concern    None     Social History Narrative    No caffeine use       SOCIAL HISTORY:     Family History   Problem Relation Age of Onset    Other Mother      epilepsy    Glaucoma Father     Stroke Father      silent    Other Brother      cardiac disorder       REVIEW OF SYSTEMS:     General: negative for chills, fatigue, fever, significant unplanned weight changed  Psychological: negative for anxiety, depression, concentration or memory difficulties, irritability, mood swings, sleep disturbances  Ophthalmic: negative for blurry vision or double  ENT: negative for hearing difficulties, tinnitus, vertigo  Hematological and Lymphatic: negative for bleeding problems, blood clots, bruising, swollen lymph nodes  Respiratory: negative for shortness of breath, cough, hemoptysis, orthopnea, tachypnea or wheezing  Cardiovascular: negative for chest pain, dyspnea on exertion, edema, irregular or rapid heartbeat, paroxysmal nocturnal dyspnea  Gastrointestinal: negative for abdominal pain, bright red blood in stools, change in stools, constipation, diarrhea, nausea/vomiting, stool incontinence    GENITOURINARY: see HPI    Musculoskeletal: arthritis symptoms  Dermatological: negative for rash or skin lesion changes  Neurological: some residual short term memory loss from CVA    PHYSICAL EXAM:     /90   Pulse 88   Ht 5' 1" (1 549 m)   Wt 98 kg (216 lb)   LMP  (LMP Unknown)   BMI 40 81 kg/m²   General:  Elderly female in no acute distress  They have a normal affect  There is not appear to be any gross neurologic defects or abnormalities  HEENT:  Normocephalic, atraumatic  Neck is supple without any palpable lymphadenopathy  Cardiovascular:  Patient has normal palpable distal radial pulses  There is no significant peripheral edema  No JVD is noted  Respiratory:  Patient has unlabored respirations  There is no audible wheeze or rhonchi  Abdomen:  Abdomen with healed surgical scars (appy/LILLY)  Abdomen is soft and nontender  Obese with large pannus  There is no tympany  Inguinal and umbilical hernia are not appreciated  Musculoskeletal:  Patient does not have significant CVA tenderness with palpation or percussion    They full range of motion in all 4 extremities  Walks with a cane  Dermatologic:  Patient has no skin abnormalities or rashes  LABS:     CBC:   Lab Results   Component Value Date    WBC 6 56 03/06/2018    HGB 9 4 (L) 03/06/2018    HCT 28 2 (L) 03/06/2018    MCV 97 03/06/2018     03/06/2018       BMP:   Lab Results   Component Value Date    GLUCOSE 175 (H) 03/06/2018    CALCIUM 8 6 03/06/2018     03/06/2018    K 3 4 (L) 03/06/2018    CO2 30 03/06/2018     03/06/2018    BUN 22 03/06/2018    CREATININE 1 01 03/06/2018       PATHOLOGY:     Case Report   Surgical Pathology Report                         Case: [id number]                                    Authorizing Provider: Colton Campbell MD   Collected:           03/20/2018 1159               Ordering Location:     [facility]        Received:            03/20/2018 [address] [place] [address] Operating Room                                                      Pathologist:           Stu Tesfaye MD                                                         Specimens:   A) - Urinary Bladder, RIGHT RENAL PELVIS BIOPSY                                                      B) - Urinary Bladder, POSTERIOR WALL BLADDER BIOPSY                                                  C) - Urinary Bladder, LEFT LATERAL WALL BIOPSY                                             Final Diagnosis   A  Right renal pelvis, biopsy:  Low grade papillary urothelial carcinoma, partially cauterized     No definite evidence of lamina propria invasion noted  No muscularis propria identified       B  Posterior bladder wall, biopsy:  Partially denuded urothelial mucosa with chronic inflammation       C  Left lateral wall of bladder, biopsy:  Partially denuded urothelial mucosa with chronic inflammation           IMAGING:     CT ABDOMEN AND PELVIS WITH AND WITHOUT IV CONTRAST     INDICATION:  Hematuria      COMPARISON:  9/28/2015     TECHNIQUE: CT of the kidneys was performed without intravenous contrast   Dynamic postcontrast CT evaluation of the abdomen and pelvis was performed in both nephrographic and delayed phases after the administration of intravenous contrast   Reformatted   images were created in axial, sagittal, and coronal planes        Radiation dose length product (DLP) for this visit:  9494 mGy-cm   This examination, like all CT scans performed in the [facility], was performed utilizing techniques to minimize radiation dose exposure, including the use of iterative   reconstruction and automated exposure control      IV Contrast:  100 mL of iohexol (OMNIPAQUE)  Enteric Contrast:  Not administered     FINDINGS:     ABDOMEN     RIGHT KIDNEY AND URETER:  No solid renal mass  There is irregularity of the right renal pelvis and proximal ureter with nodular appearing filling defect along the wall on delayed images  No hydronephrosis or hydroureter  No urinary tract calculi  No perinephric collection      LEFT KIDNEY AND URETER:  No solid renal mass  No detectable ureteral mass  No hydronephrosis or hydroureter  No urinary tract calculi  No perinephric collection      URINARY BLADDER:  No bladder wall mass  No calculi  There is a cystocele      LUNG BASES:  There is a 4 mm right lower lobe nodule on series 3, image 11, not present previously  There is a small hiatal hernia      LIVER/BILIARY TREE:  Unremarkable      GALLBLADDER:  Gallbladder is surgically absent      SPLEEN:  Unremarkable      PANCREAS:  There is a 7 x 10 mm pancreatic cyst, not present previously      ADRENAL GLANDS:  Unremarkable      STOMACH, BOWEL AND APPENDIX:  Unremarkable      ABDOMINOPELVIC CAVITY:  No ascites  No free intraperitoneal air   No lymphadenopathy      VESSELS:  Unremarkable for patient's age      PELVIS     REPRODUCTIVE ORGANS:  Patient is status post hysterectomy      ABDOMINAL WALL/INGUINAL REGIONS:  Unremarkable      OSSEOUS STRUCTURES: No acute fracture or destructive osseous lesion      IMPRESSION:     1  Nodular filling defects along the wall of the right renal pelvis and proximal ureter suspicious for neoplasm  Retrograde pyelogram and biopsy recommended      2   10 mm pancreatic cyst, not present previously  Recommend characterization and establishment of baseline now  Preferred modality is Abdomen MRI with and without IV contrast/MRCP  First alternative is pancreatic protocol abdomen and pelvic CT with   contrast  Second is abdomen MRI without contrast/MRCP       3   4 mm right lower lobe nodule  Based on current Fleischner Society 2017 Guidelines on incidental pulmonary nodule, no routine follow-up is needed if the patient is considered low risk for lung cancer  If the patient is considered high risk for lung   cancer, 12 month follow-up non-contrast chest CT is recommended  If there is a primary malignancy, three-month follow-up is recommended to exclude metastasis      Considerations related to the patient's age and/or comorbidities may be used to alter these recommendations, particularly the recommendation regarding the pancreatic cyst             PATHOLOGY:     2/28/18  Case Report   Non-gynecologic Cytology                          Case: [id number]                                   Authorizing Provider: Emily Urbina MD            Collected:           02/28/2018 1032               Ordering Location:     [facility]        Received:            02/28/2018 [address] [place] [address] Operating Room                                                      Pathologist:           Prashanth Villa MD                                                         Specimen:    Renal Washing, Right                                                                       Final Diagnosis   A  Kidney, right, renal washing (ThinPrep): Atypical urothelial cells (AUC) - see comment  Clusters of urothelial cells    Some with mild atypia  Few mixed inflammatory cells      Satisfactory for evaluation  ASSESSMENT:     81 y o  female with RIGHT renal pelvis multifocal papillary lesions pathology proven Low Grade Ta urothelial cancer    PLAN:     I again had a very detailed conversation with the patient, her  and now her daughter Megan Coulter who presents  I discussed with her the findings of the pathology which demonstrate diffuse low-grade cancer in the renal pelvis  We discussed management options which include observation for worsening symptoms with her without planned repeat surveillance ureteroscopy  We also discussed the option of definitive extra paid of surgery with a robotic nephroureterectomy  The patient despite the risks and benefits not only of her personally with her comorbidities but also of the surgery itself is inclined to proceed with this treatment option to remove the disease and obviate the risk of potential symptoms  She understands the risk of bleeding, cardiac events due to anesthesia and recovery, deconditioning requiring rehabilitation placement after surgery, possible transient or even permanent dialysis due to poor recovery of the contralateral kidney, damage to the surrounding structures at the time of surgery, poor healing as related to her rheumatologic treatments  She and her family have been given plenty of time to discuss and answer any questions  They know they can reach me should other questions arise  At this point time we will plan to await the opinion of the cardiologist   Patient has the option of proceeding with surgery on April 23rd  She will let me know ultimately what her and her family's decision is  We did sign informed consent today in the event she does want to move forward      I spent more than 45 minutes in the room with the patient and her family in discussion and an additional 15 minutes with documentation

## 2018-04-23 NOTE — OP NOTE
OPERATIVE REPORT  PATIENT NAME: Odette Curtis    :  1935  MRN: [id number]  Pt Location: [facility] OR ROOM 14    SURGERY DATE: 2018    Surgeon(s) and Role:     * Tejas Renae MD - Primary     * Renzo Conner PA-C - Assisting     * Oleg Méndez MD - Assisting    Preop Diagnosis:  Urothelial cancer ([address] ) [C68 9]    Post-Op Diagnosis Codes:     * Urothelial cancer ([address] ) [C68 9]    Procedure(s) (LRB):  ROBATIC ASSISTED NEPHRO-URETERECTOMY WITH BLADDER CUFF EXCISION (Right)    Specimen(s):  ID Type Source Tests Collected by Time Destination   1 : Kidney, Ureter, and Bladder Cuff - Right Tissue Kidney, Right TISSUE EXAM Tejas Renae MD 2018 1631        Estimated Blood Loss:   200 mL    Drains:  20F 3 way walls, third port clamped     Anesthesia Type:   General    Operative Indications:  Urothelial cancer ([address] ) [C68 9]      Operative Findings:  1  Right kidney, with single artery and vein  2  Complete ureterectomy with bladder cuff    Complications:   None    Procedure and Technique:  Odette Curtis is a 81y o  -year-old female with a history of hematuria  She underwent a ureteroscopy which demonstrated diffuse papillary tumor throughout the right renal pelvis  Biopsy confirmed low-grade papillary tumor  Given the diffuse nature, the tumor was not able to be endoscopically manage  The patient had refractory hematuria  Extensive counseling was performed with the patient and her family in the outpatient setting  Given her age and comorbidities, we were careful to obtain cardiology clearance  The patient and her family ultimately opted for definitive surgical management  The patient opted to undergo da Tiburcio robot-assisted laparoscopic nephro ureterectomy  Informed consent was obtained  The patient was brought to the operating room on 2018  After the smooth induction of general endotracheal anesthesia, Walls catheter was placed    We utilized a 20 [place] 3 way Medrano catheter so intraoperatively, we could instill fluid into the bladder through the 3rd irrigation port  Patient was placed in a modified right lateral decubitus position  Intravenous antibiotics were administered in the form of Unasyn, given preoperative cultures  A timeout was performed with all members of the operative team confirming the patient's identity, procedure to be performed, and laterality of the case  Appropriate anesthetic monitoring lines were placed  2 gel rolls and a foam wedge was placed under the patient's back with her right side up  The bottom leg was gently flexed  ELGIN stockings and venodyne boots had been applied  All pressure points were padded with pink pads  The top leg was placed straight  The right arm was brought across the patient's body and supported with a thoracic arm   An axillary roll was required and the patient's press were appropriately padded    The bed had been flexed and the kidney rest elevated  The patient was secured to the bed with towels padding and tape  A stab skin incision was made just lateral to the umbilicus on the right side  The skin was elevated with towel clips  A Veress needle was placed and a pneumoperitoneum was established  Modified straight line ports were planned ion the diagonal toward the patient's pubic symphsis  An 8 mm camera port was inserted  Additional robotic warts were placed approximately 4 fingerbreadths from the camera port one in the upper midline and the other in the right mid abdomen  An additional 8 mm obese trocar was placed as low as possible in the right lower quadrant just lateral to the anterior superior iliac spine  A 12 mm assistant port was placed between the camera port and the right robotic arm  The patient was rolled to a near vertical position  The robot was docked  Minimal prior abdominal adhesions were noted from the patient's prior history of laparoscopic cholecystectomy as well as appendectomy  Adhesions were taken down sharply with cold scissors  The hepatic flexure was mobilized  The colon was reflected medially along the white line of Toldt  The duodenum was identified and kocherized  Next I worked along the lower pole of the kidney until the ureter was identified  I followed the ureter to the level of the hilum  I was able to skeletonize the hilar structures  The renal vein was identified  Based on the CT scan imaging, the patient had 1 renal artery caudal to the main renal vein  With the renal vein and renal artery exposed, the endoscopic stapler with the vascular load was used to take the hilum en block  We then turned our attention to the lower pole  We were able to skeletonize the right gonadal vein and  from the ureter with the indwelling stent palpated within  Using a robotic instruments with a combination of blunt dissection and focused point cautery, we were able to easily dissect along the ureter down into the patient's deep pelvis  We mobilized towards the patient's bladder  Cautery dissection of the detrusor fibers allowed us to visualize the ureter diving medially towards the [facility] sheath  At this point, 300 cc of fluid were instilled into the bladder via the continuous irrigation setup  We were able to visualize the bladder filling  Using a 3 0 V lock suture, we placed an apical suture at the top of the area of our plan bladder cuff  Sharp dissection was used to excise the circumferential bladder cuff  All of the irrigation was then cleared from the field  The ureter was then completely mobilized distally  We performed a closure of the opening to the bladder cuff with two 3-0 VLock sutures  An additional 200 cc of sterile fluid replaced in the bladder and we confirmed a watertight closure  We then returned our attention to the proximal ureter and kidney  Circumferential mobilization of the lower pole and ureter were performed    We returned our attention to the hilum  There appeared to be an additional vascular structure and so a 2nd staple load was fired along the adrenal bed  We did perform an adrenal sparing procedure  Complete upper pole mobilization was performed electrocautery  The kidney was then freely mobile  10 cc of FloSeal was placed along the adrenal bed and hilar area  With the pneumoperitoneum let down, there was no significant bleeding we reinspected the bladder closure and there was no fluid surrounding  The Endo Catch bag string was brought out through the 12 mm assistant port  The robot was undocked  A #19 Rafat drain was brought out through the right lower quadrant robotic port  Secured to the skin with a  Nylon suture  The two internal port sites were connected and incised to allow removal of the specimen  The fascia in the midline was closed with a running 1-0 PDS suture, times two  The extraction incision was closed in the deep dermal layer with a running 0 Vicryl suture to provide additional bolster  All incisions were then irrigated and the skin was closed with 4-0 Monocryl and histacryl  30 cc of Marcaine plain was utilized for local anesthesia  The patient was placed supine  At the end of the procedure, we performed a gentle irrigation through the catheter to clear any additional clots  The urine was noted to be light pink  Overall the patient tolerated the procedure well and were no complications  Total blood loss was recorded at 150-200 cc  She was extubated in the operating room and transferred to the PACU in stable condition at the conclusion of the case       I was present for the entire procedure    Patient Disposition:  PACU     SIGNATURE: Margaret Amaral MD  DATE: April 23, 2018  TIME: 5:16 PM

## 2018-04-23 NOTE — ANESTHESIA POSTPROCEDURE EVALUATION
Post-Op Assessment Note      CV Status:  Stable    Mental Status:  Alert and awake    Hydration Status:  Euvolemic    PONV Controlled:  Controlled    Airway Patency:  Patent    Post Op Vitals Reviewed: Yes          Staff: HORACE           BP (!) (P) 178/79 (04/23/18 1702)    Temp (P) 98 4 °F (36 9 °C) (04/23/18 1702)    Pulse (P) 73 (04/23/18 1702)   Resp (P) 18 (04/23/18 1702)    SpO2 (P) 100 % (04/23/18 1702)

## 2018-04-23 NOTE — DISCHARGE INSTRUCTIONS
After surgery, you should be active when at home  You will need to take plenty of rest  No heavy lifting (weight limit is approximately a gallon jug of milk held in each hand ) You can shower but do not submerge in water; no tubs/pools/baths  You can walk up and down stairs  Your incision has disolvable sutures and surgical glue  If there is any concern about the incision (redness, drainage, bleeding) please call your surgeon's office  You can eat and drink whatever you like, but monitor for signs of constipation  You should be having daily bowel movements  Take your stool softeners until your bowel begin to function  If you are still constipated, call your surgeon's office to discuss additional medication  **You will keep your catheter for 2 weeks after surgery  You will have a x-ray test performed prior to catheter removal and your appointment in the Urology Office  You will remain off your methotrexate and Xarelto as you recovered from surgery  Nephrectomy   WHAT YOU NEED TO KNOW:   A nephrectomy is surgery to remove part or all of your kidney  The kidneys remove waste from the blood  The waste is flushed from your body through your urine  DISCHARGE INSTRUCTIONS:   Medicines:   · Medicines  may be given to help decrease pain, prevent infection, or prevent constipation  · Take your medicine as directed  Contact your healthcare provider if you think your medicine is not helping or if you have side effects  Tell him if you are allergic to any medicine  Keep a list of the medicines, vitamins, and herbs you take  Include the amounts, and when and why you take them  Bring the list or the pill bottles to follow-up visits  Carry your medicine list with you in case of an emergency  Follow up with your healthcare provider or surgeon as directed: You will need to return to have your wound checked and stitches removed   Write down your questions so you remember to ask them during your visits  Wound care:  Care for your wound as directed  Do not get your stitches wet unless your surgeon says it is okay  When you are allowed to bathe, carefully wash the wound with soap and water  Gently pat the area dry and put on new, clean bandages as directed  Change your bandages when they get wet or dirty  Drink liquids as directed: This will help prevent constipation  Ask your healthcare provider how much liquid to drink each day and which liquids are best for you  Activity:  Do not lift, pull, or push heavy objects  You may also need to limit movement, such as bending your back or twisting  Ask your healthcare provider when to can return to work or play sports  Contact your healthcare provider or surgeon if:   · You have a fever  · You have blood in your urine  · Your wound is red, swollen, or draining pus  · You have chills, a cough, or feel weak and achy  · You have questions or concerns about your condition or care  Seek care immediately or call 911 if:   · Blood soaks through your bandage  · Your stitches come apart  · You cannot urinate  · You have sudden chest pain or trouble breathing  © 2017 [address]  Information is for End User's use only and may not be sold, redistributed or otherwise used for commercial purposes  All illustrations and images included in CareNotes® are the copyrighted property of [employer]  or [employer] Ju[employer]  The above information is an  only  It is not intended as medical advice for individual conditions or treatments  Talk to your doctor, nurse or pharmacist before following any medical regimen to see if it is safe and effective for you

## 2018-04-23 NOTE — NURSING NOTE
Notified by radiology reading room there was a significant finding on chest x-ray from earlier today  Paged surgical PA to notify, awaiting callback

## 2018-04-23 NOTE — ANESTHESIA PREPROCEDURE EVALUATION
Review of Systems/Medical History  Patient summary reviewed  Chart reviewed  History of anesthetic complications PONV    Cardiovascular  EKG reviewed, Hyperlipidemia, Hypertension , Dysrhythmias (on xarelto stopped 4/19/18), atrial fibrillation,   Comment: 2017 ECG:  Normal sinus rhythm  Normal ECG  When compared with ECG of 11-MAY-2017 15:26, (unconfirmed)  No significant change was found  Confirmed by Brigid Reyes ([id number]) on 5/11/2017 8:42:12 PM    Specimen Collected: 05/11/17 15:30  Last Resulted: 05/11/17 20:42      ,  Pulmonary  Pneumonia (bronchitis 3 weeks ago), Sleep apnea ,        GI/Hepatic    GERD ,  Hiatal hernia,             Endo/Other  Diabetes poorly controlled type 2 , History of thyroid disease , hypothyroidism,      GYN       Hematology  Anemia ,     Musculoskeletal  Rheumatoid arthritis , Back pain ,   Arthritis     Neurology    CVA , Headaches,    Psychology           Physical Exam    Airway    Mallampati score: III  TM Distance: >3 FB  Neck ROM: full     Dental   No notable dental hx     Cardiovascular  Cardiovascular exam normal    Pulmonary  Pulmonary exam normal Breath sounds clear to auscultation,     Other Findings        Anesthesia Plan  ASA Score- 3     Anesthesia Type- general with ASA Monitors  Additional Monitors: arterial line  Airway Plan: ETT  Plan Factors- Patient instructed to abstain from smoking on day of procedure       Induction- intravenous  Postoperative Plan- Plan for postoperative opioid use  Planned trial extubation    Informed Consent- Anesthetic plan and risks discussed with patient  I personally reviewed this patient with the CRNA  Discussed and agreed on the Anesthesia Plan with the CRNA             Lab Results   Component Value Date    WBC 6 48 04/10/2018    HGB 9 0 (L) 04/10/2018    HCT 28 3 (L) 04/10/2018     (H) 04/10/2018     04/10/2018     Lab Results   Component Value Date    GLUCOSE 175 (H) 03/06/2018    CALCIUM 8 7 04/10/2018     04/10/2018    K 3 7 04/10/2018    CO2 27 04/10/2018     (H) 04/10/2018    BUN 15 04/10/2018    CREATININE 0 94 04/10/2018     Lab Results   Component Value Date    INR 2 88 (H) 04/10/2018    INR 2 41 (H) 03/02/2018    INR 0 96 05/10/2017    PROTIME 30 6 (H) 04/10/2018    PROTIME 26 5 (H) 03/02/2018    PROTIME 12 8 05/10/2017     Lab Results   Component Value Date    PTT 40 (H) 04/10/2018     Type and Screen:  A        I, Dr Nathalia Francisco, the attending physician, have personally seen and evaluated the patient prior to anesthetic care  I have reviewed the pre-anesthetic record, and other medical records if appropriate to the anesthetic care  If a CRNA is involved in the case, I have reviewed the CRNA assessment, if present, and agree  The patient is in a suitable condition to proceed with my formulated anesthetic plan

## 2018-04-23 NOTE — INTERVAL H&P NOTE
H&P reviewed  After examining the patient I find no changes in the patients condition since the H&P had been written  Patient, family and I again has a lengthy discussion about the plan for surgery and its inherent risks  Plan for RIGHT robotic assisted nephroureterectomy with bladder cuff excision, possible open  We discussed possibility of segmental ureterectomy if for any reason it would be safer for patient intraoperatively  Patient and family understand the increased risk due to patient age and comorbidities  Of note, patient with chronic lymphedema  Will apply ELGIN stockings to lower extremities below the knee and then SCD

## 2018-04-24 LAB
ANION GAP SERPL CALCULATED.3IONS-SCNC: 7 MMOL/L (ref 4–13)
BUN SERPL-MCNC: 18 MG/DL (ref 5–25)
CALCIUM SERPL-MCNC: 8.2 MG/DL (ref 8.3–10.1)
CHLORIDE SERPL-SCNC: 105 MMOL/L (ref 100–108)
CO2 SERPL-SCNC: 26 MMOL/L (ref 21–32)
CREAT SERPL-MCNC: 1.25 MG/DL (ref 0.6–1.3)
ERYTHROCYTE [DISTWIDTH] IN BLOOD BY AUTOMATED COUNT: 15.8 % (ref 11.6–15.1)
EST. AVERAGE GLUCOSE BLD GHB EST-MCNC: 123 MG/DL
GFR SERPL CREATININE-BSD FRML MDRD: 40 ML/MIN/1.73SQ M
GLUCOSE SERPL-MCNC: 156 MG/DL (ref 65–140)
GLUCOSE SERPL-MCNC: 174 MG/DL (ref 65–140)
GLUCOSE SERPL-MCNC: 179 MG/DL (ref 65–140)
GLUCOSE SERPL-MCNC: 187 MG/DL (ref 65–140)
GLUCOSE SERPL-MCNC: 195 MG/DL (ref 65–140)
GLUCOSE SERPL-MCNC: 199 MG/DL (ref 65–140)
HBA1C MFR BLD: 5.9 % (ref 4.2–6.3)
HCT VFR BLD AUTO: 24.3 % (ref 34.8–46.1)
HGB BLD-MCNC: 8 G/DL (ref 11.5–15.4)
MCH RBC QN AUTO: 32.3 PG (ref 26.8–34.3)
MCHC RBC AUTO-ENTMCNC: 32.9 G/DL (ref 31.4–37.4)
MCV RBC AUTO: 98 FL (ref 82–98)
PLATELET # BLD AUTO: 186 THOUSANDS/UL (ref 149–390)
PMV BLD AUTO: 9.2 FL (ref 8.9–12.7)
POTASSIUM SERPL-SCNC: 4 MMOL/L (ref 3.5–5.3)
RBC # BLD AUTO: 2.48 MILLION/UL (ref 3.81–5.12)
SODIUM SERPL-SCNC: 138 MMOL/L (ref 136–145)
WBC # BLD AUTO: 14.32 THOUSAND/UL (ref 4.31–10.16)

## 2018-04-24 PROCEDURE — 94760 N-INVAS EAR/PLS OXIMETRY 1: CPT

## 2018-04-24 PROCEDURE — 83036 HEMOGLOBIN GLYCOSYLATED A1C: CPT | Performed by: PHYSICIAN ASSISTANT

## 2018-04-24 PROCEDURE — 97167 OT EVAL HIGH COMPLEX 60 MIN: CPT

## 2018-04-24 PROCEDURE — 80048 BASIC METABOLIC PNL TOTAL CA: CPT | Performed by: UROLOGY

## 2018-04-24 PROCEDURE — 99222 1ST HOSP IP/OBS MODERATE 55: CPT | Performed by: INTERNAL MEDICINE

## 2018-04-24 PROCEDURE — 82948 REAGENT STRIP/BLOOD GLUCOSE: CPT

## 2018-04-24 PROCEDURE — 99024 POSTOP FOLLOW-UP VISIT: CPT | Performed by: NURSE PRACTITIONER

## 2018-04-24 PROCEDURE — G8988 SELF CARE GOAL STATUS: HCPCS

## 2018-04-24 PROCEDURE — G8987 SELF CARE CURRENT STATUS: HCPCS

## 2018-04-24 PROCEDURE — 85027 COMPLETE CBC AUTOMATED: CPT | Performed by: UROLOGY

## 2018-04-24 RX ORDER — MINERAL OIL AND PETROLATUM 150; 830 MG/G; MG/G
OINTMENT OPHTHALMIC
Status: DISCONTINUED | OUTPATIENT
Start: 2018-04-24 | End: 2018-04-27 | Stop reason: HOSPADM

## 2018-04-24 RX ORDER — ROPINIROLE 1 MG/1
0.5 TABLET, FILM COATED ORAL
Status: DISCONTINUED | OUTPATIENT
Start: 2018-04-25 | End: 2018-04-27 | Stop reason: HOSPADM

## 2018-04-24 RX ORDER — DIPHENHYDRAMINE HCL 25 MG
25 TABLET ORAL EVERY 8 HOURS PRN
Status: DISCONTINUED | OUTPATIENT
Start: 2018-04-24 | End: 2018-04-27 | Stop reason: HOSPADM

## 2018-04-24 RX ORDER — ROPINIROLE 1 MG/1
1 TABLET, FILM COATED ORAL
Status: DISCONTINUED | OUTPATIENT
Start: 2018-04-24 | End: 2018-04-27 | Stop reason: HOSPADM

## 2018-04-24 RX ADMIN — LEVOTHYROXINE SODIUM 75 MCG: 75 TABLET ORAL at 05:04

## 2018-04-24 RX ADMIN — MINERAL OIL AND WHITE PETROLATUM: 150; 830 OINTMENT OPHTHALMIC at 21:06

## 2018-04-24 RX ADMIN — SODIUM CHLORIDE 3 G: 9 INJECTION, SOLUTION INTRAVENOUS at 03:42

## 2018-04-24 RX ADMIN — HEPARIN SODIUM 5000 UNITS: 5000 INJECTION, SOLUTION INTRAVENOUS; SUBCUTANEOUS at 13:02

## 2018-04-24 RX ADMIN — GABAPENTIN 100 MG: 100 CAPSULE ORAL at 08:05

## 2018-04-24 RX ADMIN — SODIUM CHLORIDE, SODIUM LACTATE, POTASSIUM CHLORIDE, AND CALCIUM CHLORIDE 125 ML/HR: .6; .31; .03; .02 INJECTION, SOLUTION INTRAVENOUS at 08:11

## 2018-04-24 RX ADMIN — SODIUM CHLORIDE 3 G: 9 INJECTION, SOLUTION INTRAVENOUS at 11:18

## 2018-04-24 RX ADMIN — INSULIN LISPRO 1 UNITS: 100 INJECTION, SOLUTION INTRAVENOUS; SUBCUTANEOUS at 00:27

## 2018-04-24 RX ADMIN — HYDROCODONE BITARTRATE AND ACETAMINOPHEN 1 TABLET: 5; 325 TABLET ORAL at 11:20

## 2018-04-24 RX ADMIN — HYDROMORPHONE HYDROCHLORIDE 0.5 MG: 1 INJECTION, SOLUTION INTRAMUSCULAR; INTRAVENOUS; SUBCUTANEOUS at 06:44

## 2018-04-24 RX ADMIN — DOCUSATE SODIUM 100 MG: 100 CAPSULE, LIQUID FILLED ORAL at 16:17

## 2018-04-24 RX ADMIN — FUROSEMIDE 20 MG: 20 TABLET ORAL at 08:05

## 2018-04-24 RX ADMIN — INSULIN LISPRO 2 UNITS: 100 INJECTION, SOLUTION INTRAVENOUS; SUBCUTANEOUS at 16:17

## 2018-04-24 RX ADMIN — HEPARIN SODIUM 5000 UNITS: 5000 INJECTION, SOLUTION INTRAVENOUS; SUBCUTANEOUS at 05:05

## 2018-04-24 RX ADMIN — SODIUM CHLORIDE, SODIUM LACTATE, POTASSIUM CHLORIDE, AND CALCIUM CHLORIDE 75 ML/HR: .6; .31; .03; .02 INJECTION, SOLUTION INTRAVENOUS at 20:59

## 2018-04-24 RX ADMIN — HYDROMORPHONE HYDROCHLORIDE 0.5 MG: 1 INJECTION, SOLUTION INTRAMUSCULAR; INTRAVENOUS; SUBCUTANEOUS at 00:17

## 2018-04-24 RX ADMIN — SODIUM CHLORIDE, SODIUM LACTATE, POTASSIUM CHLORIDE, AND CALCIUM CHLORIDE 125 ML/HR: .6; .31; .03; .02 INJECTION, SOLUTION INTRAVENOUS at 00:25

## 2018-04-24 RX ADMIN — HYDROCODONE BITARTRATE AND ACETAMINOPHEN 1 TABLET: 5; 325 TABLET ORAL at 19:26

## 2018-04-24 RX ADMIN — PRAVASTATIN SODIUM 80 MG: 40 TABLET ORAL at 08:05

## 2018-04-24 RX ADMIN — HEPARIN SODIUM 5000 UNITS: 5000 INJECTION, SOLUTION INTRAVENOUS; SUBCUTANEOUS at 21:01

## 2018-04-24 RX ADMIN — HYDROCODONE BITARTRATE AND ACETAMINOPHEN 1 TABLET: 5; 325 TABLET ORAL at 05:08

## 2018-04-24 RX ADMIN — ROPINIROLE 1 MG: 1 TABLET, FILM COATED ORAL at 21:01

## 2018-04-24 RX ADMIN — ACETAMINOPHEN 650 MG: 325 TABLET, FILM COATED ORAL at 08:06

## 2018-04-24 RX ADMIN — ROPINIROLE 0.5 MG: 0.25 TABLET, FILM COATED ORAL at 08:07

## 2018-04-24 RX ADMIN — GABAPENTIN 100 MG: 100 CAPSULE ORAL at 16:16

## 2018-04-24 RX ADMIN — HYDROMORPHONE HYDROCHLORIDE 0.5 MG: 1 INJECTION, SOLUTION INTRAMUSCULAR; INTRAVENOUS; SUBCUTANEOUS at 16:16

## 2018-04-24 RX ADMIN — GABAPENTIN 100 MG: 100 CAPSULE ORAL at 20:55

## 2018-04-24 RX ADMIN — INSULIN LISPRO 2 UNITS: 100 INJECTION, SOLUTION INTRAVENOUS; SUBCUTANEOUS at 11:18

## 2018-04-24 RX ADMIN — INSULIN LISPRO 1 UNITS: 100 INJECTION, SOLUTION INTRAVENOUS; SUBCUTANEOUS at 05:11

## 2018-04-24 RX ADMIN — DIPHENHYDRAMINE HCL 25 MG: 25 TABLET ORAL at 17:21

## 2018-04-24 RX ADMIN — PANTOPRAZOLE SODIUM 40 MG: 40 TABLET, DELAYED RELEASE ORAL at 05:04

## 2018-04-24 RX ADMIN — DOCUSATE SODIUM 100 MG: 100 CAPSULE, LIQUID FILLED ORAL at 08:05

## 2018-04-24 NOTE — PLAN OF CARE
Problem: OCCUPATIONAL THERAPY ADULT  Goal: Performs self-care activities at highest level of function for planned discharge setting  See evaluation for individualized goals  Treatment Interventions: ADL retraining, Functional transfer training, Endurance training, Patient/family training, Equipment evaluation/education, Compensatory technique education, Continued evaluation, Energy conservation, Activityengagement  Equipment Recommended:  (tub transfer bench )       See flowsheet documentation for full assessment, interventions and recommendations  Limitation: Decreased ADL status, Decreased endurance, Decreased self-care trans, Decreased high-level ADLs  Prognosis: Good  Assessment: Pt is an 82 y/o female seen for OT eval s/p adm to [facility] w/ Urothelial cancer  Receiving ROBATIC ASSISTED NEPHRO-URETERECTOMY WITH BLADDER CUFF EXCISION (Right) on 4/23/18  Comorbidities include UTI, gross hematuria, urinary frequency, urinary urgency, and a h/o RA, cataract, diverticulitis, DM, HTN, HLD, hypothyroidism, [place], GERD, NICK on CPAP, neuropathy, and CVA  Pt with active OT orders and up with assistance orders  Pt lives with her  in a single story condo w/ 4 RONNY  Pt was I w/ ADLS and had A for IADLS  Pt reports use of SPC PTA  Pt is currently demonstrating the following occupational deficits: min A UB ADLS, max A LB ADLS, min A functional transfers and min A functional mobility using rw for short in room distances  Pt with deficits and limitations in all baseline areas of occupation 2* significant pain, decreased endurance/activity tolerance, decreased functional forward reach, decreased ADL status, impaired balance, decreased mobility status, impaired gait, deconditioning and generalized weakness  The following Occupational Performance Areas to address include: bathing/shower, toilet hygiene, dressing, functional mobility, community mobility, clothing management, meal prep and household maintenance   Pt scored overall 45/100 on the Barthel Index  Based on the aforementioned OT evaluation, functional performance deficits, and assessments, pt has been identified as a high complexity evaluation  Recommend STR upon D/C at this time pending progress and improvement in symptoms   Pt to continue to benefit from acute immediate OT services to address the following goals 3-5x/wk to  w/in 7-10 days:     OT Discharge Recommendation: Short Term Rehab (pending progress)     Allison Sweet MS, OTR/L

## 2018-04-24 NOTE — POST OP PROGRESS NOTES
UROLOGY PROGRESS NOTE   Patient Identifiers: Liat Telles (MRN [id number])  Date of Service: 4/24/2018    Subjective:     24 HR EVENTS:   no significant events  Patient has  complaints of RLE pain  She states that pain is sometimes sharp, radiating down to foot  She does note that she has history of RA and chronic pain  She denies any post op pain, tolerating clear diet, denies passing gas and has not yet ambulated  Objective:     VITALS:    Vitals:    04/24/18 0508   BP: 138/72   Pulse: 62   Resp:    Temp:    SpO2:        INS & OUTS:  I/O last 24 hours:   In: 1900 [I V :1700; IV Piggyback:200]  Out: 1015 [Urine:750; Drains:65; Blood:200]    LABS:  Lab Results   Component Value Date    HGB 8 0 (L) 04/24/2018    HCT 24 3 (L) 04/24/2018    WBC 14 32 (H) 04/24/2018     04/24/2018   ]    Lab Results   Component Value Date     04/24/2018    K 4 0 04/24/2018     04/24/2018    CO2 26 04/24/2018    BUN 18 04/24/2018    CREATININE 1 25 04/24/2018    CALCIUM 8 2 (L) 04/24/2018    GLUCOSE 156 (H) 04/24/2018   ]    INPATIENT MEDS:    Current Facility-Administered Medications:     acetaminophen (TYLENOL) tablet 650 mg, 650 mg, Oral, Q6H PRN, Danielle Bernal MD, 650 mg at 04/24/18 0806    ampicillin-sulbactam (UNASYN) 3 g in sodium chloride 0 9 % 100 mL IVPB, 3 g, Intravenous, Q8H, Danielle Bernal MD, Stopped at 04/24/18 3525    docusate sodium (COLACE) capsule 100 mg, 100 mg, Oral, BID, Danielle Bernal MD, 100 mg at 04/24/18 0805    furosemide (LASIX) tablet 20 mg, 20 mg, Oral, Daily, Danielle Bernal MD, 20 mg at 04/24/18 0805    gabapentin (NEURONTIN) capsule 100 mg, 100 mg, Oral, TID, Danielle Bernal MD, 100 mg at 04/24/18 0805    heparin (porcine) subcutaneous injection 5,000 Units, 5,000 Units, Subcutaneous, Q8H [address], 5,000 Units at 04/24/18 0505 **AND** Platelet count, , , Once, Danielle eBrnal MD    HYDROcodone-acetaminophen [facility]) 5-325 mg per tablet 1 tablet, 1 tablet, Oral, Q6H PRN, Chayo Vásquez MD, 1 tablet at 04/24/18 0508    HYDROmorphone (DILAUDID) injection 0 5 mg, 0 5 mg, Intravenous, Q3H PRN, Chayo Vásquez MD, 0 5 mg at 04/24/18 0644    insulin lispro (HumaLOG) 100 units/mL subcutaneous injection 1-6 Units, 1-6 Units, Subcutaneous, Q6H [address], 1 Units at 04/24/18 0511 **AND** Fingerstick Glucose (POCT), , , Q6H, Chayo Vásquez MD    lactated ringers infusion, 125 mL/hr, Intravenous, Continuous, Chayo Vásquez MD, Last Rate: 125 mL/hr at 04/24/18 0025, 125 mL/hr at 04/24/18 0025    levothyroxine tablet 75 mcg, 75 mcg, Oral, Early Morning, Chayo Vásquez MD, 75 mcg at 04/24/18 0504    ondansetron (ZOFRAN) injection 4 mg, 4 mg, Intravenous, Q6H PRN, Chayo Vásquez MD    pantoprazole (PROTONIX) EC tablet 40 mg, 40 mg, Oral, Early Morning, Chayo Vásquez MD, 40 mg at 04/24/18 0504    pravastatin (PRAVACHOL) tablet 80 mg, 80 mg, Oral, Daily, Chayo Vásquez MD, 80 mg at 04/24/18 0805    rOPINIRole (REQUIP) tablet 0 5 mg, 0 5 mg, Oral, BID, Chayo Vásquez MD, 0 5 mg at 04/24/18 0807    scopolamine (TRANSDERM-SCOP) 1 5 mg/3 days TD 72 hr patch 1 patch, 1 patch, Transdermal, Once, Rilla Boast, MD, 1 patch at 04/23/18 1139    simethicone (MYLICON) chewable tablet 80 mg, 80 mg, Oral, 4x Daily PRN, Chayo Vásquez MD    Facility-Administered Medications Ordered in Other Encounters:     acetaminophen (TYLENOL) tablet 650 mg, 650 mg, Oral, Q6H PRN, Jena Maradiaga PA-C      Physical Exam:     GEN: alert and oriented x 3    RESP: breathing comfortably with no accessory muscle use    ABD: soft, appropriately tender to palpation, non-distended   INCISION: no erythema, induration, or drainage   EXT: no significant peripheral edema   DRAINS: serosanguineous  UGARTE: draining pink clear urine  no clots        Assessment:   POD 1 robotic assisted nephro-ureterectomy with bladder cuff excision (RIGHT) secondary to urothelial cancer    Plan:   -Post op labs remains stable, WBC 14 32, Hgb 8 0, creatinine 1 25  -YANIRA output 45 cc in last 24 hours  -Maintain adequate pain control, continue incentive spirometer, advance to regular diet, encourage OOB and ambulating  -Begin walls care teaching for discharge      RN participated in rounds with AP  Plan of care discussed with patient and RN

## 2018-04-24 NOTE — CASE MANAGEMENT
Initial Clinical Review    Age/Sex: 81 y o  female    Surgery Date: 4/23/18    Procedure:   Preop Diagnosis:  Urothelial cancer ([facility] [address] ) [C68 9]     Post-Op Diagnosis Codes:     * Urothelial cancer ([facility] [address] ) [C68 9]     Procedure(s) (LRB):  ROBATIC ASSISTED NEPHRO-URETERECTOMY WITH BLADDER CUFF EXCISION (right)    Anesthesia: general     Admission Orders: Date/Time/Statement: 4/23/18 @ 1148     Orders Placed This Encounter   Procedures    Inpatient Admission     Standing Status:   Standing     Number of Occurrences:   1     Order Specific Question:   Admitting Physician     Answer:   Lonnie Rose [[id number]]     Order Specific Question:   Level of Care     Answer:   Med Surg [16]     Order Specific Question:   Estimated length of stay     Answer:   More than 2 Midnights     Order Specific Question:   Certification     Answer:   I certify that inpatient services are medically necessary for this patient for a duration of greater than two midnights  See H&P and MD Progress Notes for additional information about the patient's course of treatment  Vital Signs: /72 (BP Location: Left arm)   Pulse 62   Temp 97 5 °F (36 4 °C) (Oral)   Resp 16   Ht 5' 1" (1 549 m)   Wt 102 kg (225 lb 6 4 oz)   LMP  (LMP Unknown)   SpO2 97%   BMI 42 59 kg/m²     Diet:        Diet Orders            Start     Ordered    04/24/18 0851  Diet Tarun/CHO Controlled; Consistent Carbohydrate Diet Level 2 (5 carb servings/75 grams CHO/meal)  Diet effective now     Question Answer Comment   Diet Type Tarun/CHO Controlled    Tarun/CHO Controlled Consistent Carbohydrate Diet Level 2 (5 carb servings/75 grams CHO/meal)    RD to adjust diet per protocol?  No        04/24/18 0850        Mobility: Up w/ assist     DVT Prophylaxis:    Heparin 5000 u Sq q 8 hr  SCDS    Pain Control:   PRN Meds:   Acetaminophen x1     HYDROcodone-acetaminophen  x2    HYDROmorphone IV x 3    ondansetron    simethicone

## 2018-04-24 NOTE — PROGRESS NOTES
Recently medicated pt with dilaudid for pain; pt complaining of itchiness on my head, arms, and chest"; no rash noted; susan adames aware; will order benadryl

## 2018-04-24 NOTE — ASSESSMENT & PLAN NOTE
· History of cardioembolic CVA, on Xarelto  · Last dose of Xarelto 4/20    · Recommend resuming Xarelto as soon as it is safe to do so from surgical standpoint due to stroke risk

## 2018-04-24 NOTE — SOCIAL WORK
CM met with patient, and  all aware CM role at discharge   Patient lives with  in [place] with 5 RONNY, pt lives on one level   Patient has a cane, walker, crutches and shower chair  Patient independent ADL's prior to admission needing minimal assistance from   Pt was open to [facility] in the past and was at [facility] for snf   Patient denies MH or drug and alcohol treatment  Patient has a prescription plan and uses [facility] [place]  Patient has a living will and her  is ARI Quezada  is patient's  [facility] [phone]  CM will follow for Pt and OT recommendations   CM reviewed d/c planning process including the following: identifying help at home, patient preference for d/c planning needs, Discharge Lounge, [facility] Meds to Bed program, availability of treatment team to discuss questions or concerns patient and/or family may have regarding understanding medications and recognizing signs and symptoms once discharged  CM also encouraged patient to follow up with all recommended appointments after discharge  Patient advised of importance for patient and family to participate in managing patients medical well being  Discharge checklist discussed with patient and family

## 2018-04-24 NOTE — OCCUPATIONAL THERAPY NOTE
[address] Evaluation     Patient Name: Vero Oropeza  LPWCJ'Z Date: 4/24/2018  Problem List  Patient Active Problem List   Diagnosis    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 ([facility]    Essential hypertension    Rheumatoid arthritis ([facility]    Diabetes mellitus type 2 in obese ([facility] )    Urinary tract infection    Sleep apnea    Hypothyroidism    Chronic GERD    Acute blood loss anemia    Carpal tunnel syndrome, left    Diverticulosis of colon    Edema    Hematuria    Hypercholesterolemia    Lymphedema    Obesity    Overactive bladder    Peripheral neuropathy    Prediabetes    Primary osteoarthritis of left knee    Restless legs syndrome    Right lumbar radiculopathy    Sensorineural hearing loss    Sensory urge incontinence    Sinus arrhythmia    Chronic bilateral thoracic back pain    Thoracic degenerative disc disease    Urothelial cancer ([facility]    Lung nodule < 6cm on CT    Pancreatic cyst    Iron deficiency anemia     Past Medical History  Past Medical History:   Diagnosis Date    Anemia     Arthritis     rheumatoid    Benign essential hypertension     Cataract     Chronic cystitis     Colon, diverticulosis     CPAP (continuous positive airway pressure) dependence     Diabetes mellitus ([address] )     Diverticulitis of colon     Early satiety     GERD (gastroesophageal reflux disease)     Gout     Hearing aid worn     bilateral    Hearing loss     Hemorrhoids     Hiatal hernia     History of colonic polyps     Hyperlipidemia     Hypertension     Hypertension     Hypothyroidism     Impaired fasting glucose     Left breast mass     Microhematuria     Migraine     occular    Neuropathy     lower and upper extermities    Overactive bladder     Pneumonia of left lower lobe due to infectious organism (HCC)     RA (rheumatoid arthritis) ([facility]     Rheumatoid arthritis [address]     Sleep apnea     uses cpap    Stroke [facility])     5/2017    Type 2 diabetes mellitus (HCC)     Urinary frequency     Urinary urgency     Use of cane as ambulatory aid      Past Surgical History  Past Surgical History:   Procedure Laterality Date    APPENDECTOMY      ARTHROSCOPY WRIST Right     with release of transverse carpal ligament     BLADDER SURGERY      BLADDER SURGERY      BREAST SURGERY      left breast    BUNIONECTOMY Bilateral     CATARACT EXTRACTION Bilateral     CHOLECYSTECTOMY      COLONOSCOPY      CYSTOSCOPY      EGD AND COLONOSCOPY N/A 12/20/2017    Procedure: EGD AND COLONOSCOPY;  Surgeon: Tye Patterson MD;  [facility]: [facility]; Service: Gastroenterology    ESOPHAGOGASTRODUODENOSCOPY      diagnostic    FOREARM SURGERY Right     fracture repair    HYSTERECTOMY      JOINT REPLACEMENT      KNEE ARTHROSCOPY Left     KNEE SURGERY Left     meniscus tear    NOSE SURGERY      CT CYSTO/URETERO W/LITHOTRIPSY &INDWELL STENT INSRT Right 2/28/2018    Procedure: CYSTOSCOPY RIGHT URETEROSCOPY, RIGHT RETROGRADE PYELOGRAM AND INSERTION  RIGHT STENT URETERAL, RIGHT RENAL PELVIC WASHING FOR CYTOLOGY;  Surgeon: Vinita Bone MD;  [facility];  Service: Urology    CT NEPHRECTOMY, W/PART  URETECTOMY Right 4/23/2018    Procedure: ROBATIC ASSISTED NEPHRO-URETERECTOMY WITH BLADDER CUFF EXCISION;  Surgeon: Rick Fitch MD;  [facility];  Service: Urology    REPLACEMENT TOTAL KNEE BILATERAL Bilateral     URETEROSCOPY Right 3/20/2018    Procedure: CYSTOSCOPY, RETROGRADE PYELOGRAM, URETEROSCOPY, BIOPSY, BRUSH, FULGURATION, STENT PLACEMENT, BLADDER BIOPSY WITH FULGURATION;  Surgeon: Rick Fitch MD;  [facility];  Service: Urology      04/24/18 1605   Note Type   Note type Eval/Treat   Restrictions/Precautions   Weight Bearing Precautions Per Order No   Other Precautions Multiple lines;Telemetry; Fall Risk;Pain;Hard of hearing   Pain Assessment   Pain Assessment 0-10   Pain Score 8   Pain Type Acute pain;Chronic pain   Pain Location Abdomen;Leg   Pain Orientation Right;Left  (R abdomen and L leg )   Hospital Pain Intervention(s) Ambulation/increased activity;Repositioned   Response to Interventions tolerated   Home Living   Type of [address] [facility] [address] One level;Stairs to enter without rails   Bathroom Shower/Tub Walk-in shower  (and a tub shower )   Bathroom Toilet Raised   Bathroom Equipment Grab bars in shower; Shower chair;Grab bars around toilet   [address]   Additional Comments Pt lives with her  in a single story condo w/ 4 RONNY  Prior Function   Level of [place] Independent with ADLs and functional mobility   Lives With Spouse   Receives Help From Family   ADL Assistance Independent   IADLs Needs assistance   Vocational Retired   Comments Pt was I w/ ADLS and had A for IADLS  Pt reports use of SPC PTA  Lifestyle   Autonomy Pt was I w/ ADLS PTA reports only requiring A for compression socks  Pt's  performs most household IADLS, however pt assists as able    Reciprocal Relationships Pt lives with her  who provides some A at baseline   Psychosocial   Psychosocial (WDL) WDL   ADL   Eating Assistance 5  Supervision/Setup   Grooming Assistance 5  Supervision/Setup   UB Bathing Assistance 4  Minimal Assistance   LB [address] 2  Maximal Assistance   [address] 4  Minimal Assistance    [address] 2  Maximal [address]  2  Maximal Assistance   Bed Mobility   Supine to Sit 3  Moderate assistance   Additional items Assist x 1; Increased time required;Verbal cues;LE management   Sit to Supine Unable to assess  (Pt OOB to chair at end of therapy session )   Transfers   Sit to Stand 4  Minimal assistance   Additional items Assist x 1; Increased time required;Verbal cues   Stand to Sit 4  Minimal assistance   Additional items Assist x 1; Increased time required;Verbal cues Additional Comments Pt performs functional transfers w/ min A for verbal cues for encouragement, steadying/balance and increased time to complete   Functional Mobility   Functional Mobility 4  Minimal assistance   Additional Comments Pt performed functional mobility using rw for short in room distances from bed to bedside recliner with min A for steadying/balance and presents w/ an impaired gait   Additional items Rolling walker   Balance   Static Sitting Fair +   Static Standing Fair -   Ambulatory Poor +   Activity Tolerance   Activity Tolerance Patient limited by pain; Patient limited by fatigue   Nurse Made Aware RN My   RUHELLEN Assessment   RUE Assessment WFL   LUE Assessment   LUE Assessment WFL   Hand Function   Gross Motor Coordination Functional   Fine Motor Coordination Functional   Cognition   Overall Cognitive Status WFL   Arousal/Participation Alert; Responsive; Cooperative   Attention Within functional limits   Memory Within functional limits   Following Commands Follows all commands and directions without difficulty   Assessment   Limitation Decreased ADL status; Decreased endurance;Decreased self-care trans;Decreased high-level ADLs   Prognosis Good   Assessment Pt is an 82 y/o female seen for OT eval s/p adm to [facility] w/ Urothelial cancer  Receiving ROBATIC ASSISTED NEPHRO-URETERECTOMY WITH BLADDER CUFF EXCISION (Right) on 4/23/18  Comorbidities include UTI, gross hematuria, urinary frequency, urinary urgency, and a h/o RA, cataract, diverticulitis, DM, HTN, HLD, hypothyroidism, [place], GERD, NICK on CPAP, neuropathy, and CVA  Pt with active OT orders and up with assistance orders  Pt lives with her  in a single story condo w/ 4 RONNY  Pt was I w/ ADLS and had A for IADLS  Pt reports use of SPC PTA  Pt is currently demonstrating the following occupational deficits: min A UB ADLS, max A LB ADLS, min A functional transfers and min A functional mobility using rw for short in room distances   Pt with deficits and limitations in all baseline areas of occupation 2* significant pain, decreased endurance/activity tolerance, decreased functional forward reach, decreased ADL status, impaired balance, decreased mobility status, impaired gait, deconditioning and generalized weakness  The following Occupational Performance Areas to address include: bathing/shower, toilet hygiene, dressing, functional mobility, community mobility, clothing management, meal prep and household maintenance  Pt scored overall 45/100 on the Barthel Index  Based on the aforementioned OT evaluation, functional performance deficits, and assessments, pt has been identified as a high complexity evaluation  Recommend STR upon D/C at this time pending progress and improvement in symptoms  Pt to continue to benefit from acute immediate OT services to address the following goals 3-5x/wk to  w/in 7-10 days:   Goals   Patient Goals to have less pain    Plan   Treatment Interventions ADL retraining;Functional transfer training; Endurance training;Patient/family training;Equipment evaluation/education; Compensatory technique education;Continued evaluation; Energy conservation; Activityengagement   Goal Expiration Date 18   OT Frequency 3-5x/wk   Recommendation   OT Discharge Recommendation Short Term Rehab  (pending progress)   Equipment Recommended (tub transfer bench )   Barthel Index   Feeding 10   Bathing 0   Grooming Score 5   Dressing Score 5   Bladder Score 0  (walls)   Bowels Score 10   Toilet Use Score 5   Transfers (Bed/Chair) Score 10   Mobility (Level Surface) Score 0   Stairs Score 0   Barthel Index Score 45   Modified Trinity Scale   Modified Jaswinder Scale 4       GOALS:    1) Pt will improve activity tolerance to G for min 30 min txment sessions  2) Pt will complete ADLs/self care w/ SBA w/ G hyiene/thoroughness w/ min cues fro cog support  3) Pt will complete toileting w/ SBA w/ G hygiene/thoroughness using DME as needed  4) Pt will improve functional transfers on/off all surfaces using DME as needed w/ G balance/safety including toileting w/ mod I  5) Pt will improve functional mobility during ADL/IADL/leisure tasks using DME as needed w/ g balance/safety w/ mod I  6) Pt will demonstrate G carryover of pt/caregiver education and training as appropriate w/ supervision w/o cues w/ G tolerance  7) Pt will demonstrate 100% carryover of learned E C  techniques s/p skilled education w/o cues t/o functional ADL/ IADL/leisure interest tasks w/ supervision         Ed Oh MS, OTR/L

## 2018-04-24 NOTE — RESPIRATORY THERAPY NOTE
RT Protocol Note  Randy Wise 81 y o  female MRN: [id number]  Unit/Bed#: [facility] 817-01 Encounter: [id number]    Assessment    Principal Problem:    Urothelial cancer ([place] [address] )  Active Problems:    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 ([place] [address] )    Essential hypertension    Rheumatoid arthritis ([place] [address] )    Diabetes mellitus type 2 in obese ([place] [address] )    Sleep apnea      Home Pulmonary Medications:         Past Medical History:   Diagnosis Date    Anemia     Arthritis     rheumatoid    Benign essential hypertension     Cataract     Chronic cystitis     Colon, diverticulosis     CPAP (continuous positive airway pressure) dependence     Diabetes mellitus ([place] [address] )     Diverticulitis of colon     Early satiety     GERD (gastroesophageal reflux disease)     Gout     Hearing aid worn     bilateral    Hearing loss     Hemorrhoids     Hiatal hernia     History of colonic polyps     Hyperlipidemia     Hypertension     Hypertension     Hypothyroidism     Impaired fasting glucose     Left breast mass     Microhematuria     Migraine     occular    Neuropathy     lower and upper extermities    Overactive bladder     Pneumonia of left lower lobe due to infectious organism (HCC)     RA (rheumatoid arthritis) ([place] [address] )     Rheumatoid arthritis ([place] [address] )     Sleep apnea     uses cpap    Stroke ([place] [address] )     5/2017    Type 2 diabetes mellitus (HCC)     Urinary frequency     Urinary urgency     Use of cane as ambulatory aid      Social History     Social History    Marital status: /Civil Union     Spouse name: N/A    Number of children: N/A    Years of education: N/A     Social History Main Topics    Smoking status: Never Smoker    Smokeless tobacco: Never Used      Comment: stopped smoking in the distant past, never smoker (as per [employer])     Alcohol use Yes      Comment: socially/ seldom    Drug use: No    Sexual activity: No     Other Topics Concern    None Social History Narrative    No caffeine use       Subjective         Objective    Physical Exam:   Assessment Type: Assess only  General Appearance: Alert  Respiratory Pattern: Normal  Chest Assessment: Chest expansion symmetrical  Bilateral Breath Sounds: Clear  Cough: None    Vitals:  Blood pressure 149/75, pulse 56, temperature 97 5 °F (36 4 °C), temperature source Oral, resp  rate 16, height 5' 1" (1 549 m), weight 98 kg (216 lb), SpO2 99 %  Imaging and other studies: I have personally reviewed pertinent reports  Plan    Respiratory Plan: No distress/Pulmonary history        Resp Comments: Pt evaluated per resp protocol   Pt has no pulmonary history or has any signs of resp distress, vss and BS clear bilateral  She wears CPAP hs

## 2018-04-24 NOTE — ASSESSMENT & PLAN NOTE
· Diet-controlled  · A1C 6 2 on 1/3/18  Will recheck  · Placed on sliding scale insulin   Will adjust as needed

## 2018-04-24 NOTE — PROGRESS NOTES
Into get pt oob; moderate assist of two to get oob; slightly unsteady on feet; dr Sai Padilla aware; will order physical therapy

## 2018-04-24 NOTE — NUTRITION
04/24/18 1556   Recommendations/Interventions   Malnutrition/BMI Present Yes   BMI Classifications Morbid Obesity 40-44 9   Interventions Other (comment)  (RD does not have protocol to adjust diet/supplements  )   Nutrition Recommendations Other (specify); Lab - consider order (specify)  (Suggest adjust diet to CCD1, Step 1 Cardiac   Monitor electrolytes  )

## 2018-04-24 NOTE — CONSULTS
Consult- Tiff Magana 1935, 81 y o  female MRN: [id number]    Unit/Bed#: [facility] 817-01 Encounter: [id number]    Primary Care Provider: Valery Perez MD   Date and time admitted to hospital: 4/23/2018 10:00 AM      Consults    Restless leg syndrome   Assessment & Plan     Continue with requip        Sleep apnea   Assessment & Plan    · CPAP qhs        Diabetes mellitus type 2 in obese [facility])   Assessment & Plan    · Diet-controlled  · A1C 6 2 on 1/3/18  Will recheck  · Placed on sliding scale insulin  Will adjust as needed        Rheumatoid arthritis ([facility] )   Assessment & Plan    · On methotrexate        Essential hypertension   Assessment & Plan    · On losartan at home- this was held on admission  Can probably resume when patient tolerating diet and if renal function stable  · Patient reports she no longer takes diuretic- need to clarify if she is still on Lasix at home        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 ([facility] )   Assessment & Plan    · History of cardioembolic CVA, on Xarelto  · Last dose of Xarelto 4/20  · Recommend resuming Xarelto as soon as it is safe to do so from surgical standpoint due to stroke risk        * Urothelial cancer [facility])   Assessment & Plan    · S/p right nephro-ureterectomy 4/23/18, POD #1  · Mgmt per urology              VTE Pharmacologic Prophylaxis:   Pharmacologic: Heparin  Mechanical VTE Prophylaxis in Place: Yes    Patient Centered Rounds: I have performed bedside rounds with nursing staff today  Discussions with Specialists or Other Care Team Provider: urology    Education and Discussions with Family / Patient: patient    Time Spent for Care: 45 minutes  More than 50% of total time spent on counseling and coordination of care as described above      Current Length of Stay: 1 day(s)    Current Patient Status: Inpatient   Certification Statement: The patient will continue to require additional inpatient hospital stay due to urothelial ca surgery with drains    Discharge Plan: per primary team    Code Status: Level 1 - Full Code      Subjective:   I am doing well  I want to know how much was done surgery  I have pain in my leg  Objective:     Vitals:   Temp (24hrs), Av °F (36 7 °C), Min:97 5 °F (36 4 °C), Max:98 2 °F (36 8 °C)    HR:  [56-74] 74  Resp:  [16-17] 17  BP: (109-149)/(52-75) 133/61  SpO2:  [96 %-99 %] 96 %  Body mass index is 42 59 kg/m²  Input and Output Summary (last 24 hours): Intake/Output Summary (Last 24 hours) at 18 1841  Last data filed at 18 1400   Gross per 24 hour   Intake              500 ml   Output             1110 ml   Net             -610 ml       Physical Exam:     Physical Exam   Constitutional: She is oriented to person, place, and time  She appears well-developed and well-nourished  No distress  HENT:   Head: Normocephalic and atraumatic  Right Ear: External ear normal    Left Ear: External ear normal    Nose: Nose normal    Mouth/Throat: Oropharynx is clear and moist  No oropharyngeal exudate  Eyes: Conjunctivae and EOM are normal  Pupils are equal, round, and reactive to light  Right eye exhibits no discharge  Left eye exhibits no discharge  No scleral icterus  Neck: Normal range of motion  Neck supple  No JVD present  No tracheal deviation present  No thyromegaly present  Cardiovascular: Normal rate, regular rhythm, normal heart sounds and intact distal pulses  Exam reveals no gallop and no friction rub  No murmur heard  Pulmonary/Chest: Effort normal and breath sounds normal  No stridor  No respiratory distress  She has no wheezes  She has no rales  She exhibits no tenderness  Abdominal: Soft  Bowel sounds are normal  She exhibits no distension and no mass  There is no tenderness  There is no rebound and no guarding  Musculoskeletal: She exhibits no edema, tenderness or deformity  Lymphadenopathy:     She has no cervical adenopathy  Neurological: She is alert and oriented to person, place, and time  She has normal reflexes  She displays normal reflexes  No cranial nerve deficit  She exhibits normal muscle tone  Coordination abnormal    Skin: Skin is warm and dry  No rash noted  She is not diaphoretic  No erythema  No pallor  Psychiatric: She has a normal mood and affect  Her behavior is normal  Judgment and thought content normal    Nursing note and vitals reviewed  Additional Data:     Labs:      Results from last 7 days  Lab Units 04/24/18  0610   WBC Thousand/uL 14 32*   HEMOGLOBIN g/dL 8 0*   HEMATOCRIT % 24 3*   PLATELETS Thousands/uL 186       Results from last 7 days  Lab Units 04/24/18  0610   SODIUM mmol/L 138   POTASSIUM mmol/L 4 0   CHLORIDE mmol/L 105   CO2 mmol/L 26   BUN mg/dL 18   CREATININE mg/dL 1 25   CALCIUM mg/dL 8 2*   GLUCOSE RANDOM mg/dL 156*       Results from last 7 days  Lab Units 04/23/18  1208   INR  1 20*       * I Have Reviewed All Lab Data Listed Above  * Additional Pertinent Lab Tests Reviewed:  [address] Admission Reviewed    Imaging:    Imaging Reports Reviewed Today Include:    Imaging Personally Reviewed by Myself Includes:       Recent Cultures (last 7 days):           Last 24 Hours Medication List:     Current Facility-Administered Medications:  acetaminophen 650 mg Oral Q6H PRN Brenda Eugene MD    artificial tear  Both Eyes HS SANTOSH Diaz    diphenhydrAMINE 25 mg Oral Q8H PRN SANTOSH Tinsley    docusate sodium 100 mg Oral BID Brenda Eugene MD    furosemide 20 mg Oral Daily Brenda Eugene MD    gabapentin 100 mg Oral TID Brenda Eugene MD    heparin (porcine) 5,000 Units Subcutaneous [facility] Brenda Eugene MD    HYDROcodone-acetaminophen 1 tablet Oral Q6H PRN Brenda Eugene MD    HYDROmorphone 0 5 mg Intravenous Q3H PRN Brenda Eugene MD    insulin lispro 1-6 Units Subcutaneous Q6H [address] Brenda Eugene MD lactated ringers 75 mL/hr Intravenous Continuous SANTOSH Burris Last Rate: 75 mL/hr (04/24/18 0935)   levothyroxine 75 mcg Oral Early Morning Rick Fitch MD    ondansetron 4 mg Intravenous Q6H PRN Rick Fitch MD    pantoprazole 40 mg Oral Early Morning Rick Fitch MD    pravastatin 80 mg Oral Daily Rick Fitch MD    [START ON 4/25/2018] rOPINIRole 0 5 mg Oral Early Morning Cha Bernabe MD    rOPINIRole 1 mg Oral HS Cha Bernabe MD    scopolamine 1 patch Transdermal Once Neena Geiger MD    simethicone 80 mg Oral 4x Daily PRN Rick Fitch MD      Facility-Administered Medications Ordered in Other Encounters:  acetaminophen 650 mg Oral Q6H PRN Evangelina Pope PA-C        Today, Patient Was Seen By: Cha Bernbae MD    ** Please Note: Dictation voice to text software may have been used in the creation of this document   **

## 2018-04-24 NOTE — ASSESSMENT & PLAN NOTE
· On losartan at home- this was held on admission   Can probably resume when patient tolerating diet and if renal function stable  · Patient reports she no longer takes diuretic- need to clarify if she is still on Lasix at home

## 2018-04-24 NOTE — CONSULTS
Consult- Sebastien Waite 1935, 81 y o  female MRN: [id number]    Unit/Bed#: [facility] 817-01 Encounter: [id number]    Primary Care Provider: Maggie Jamison MD   Date and time admitted to hospital: 4/23/2018 10:00 AM      Inpatient consult to Internal Medicine  Consult performed by: Zoie Hammond ordered by: Solomon Scott Urothelial cancer [facility])   Assessment & Plan    · S/p right nephro-ureterectomy 4/23/18, POD #0  · Mgmt per urology        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 ([facility] [address] )   Assessment & Plan    · History of cardioembolic CVA, on Xarelto  · Last dose of Xarelto 4/20  · Recommend resuming Xarelto as soon as it is safe to do so from surgical standpoint due to stroke risk        Sleep apnea   Assessment & Plan    · CPAP qhs        Diabetes mellitus type 2 in obese [facility])   Assessment & Plan    · Diet-controlled  · A1C 6 2 on 1/3/18  Will recheck  · Placed on sliding scale insulin  Will adjust as needed        Rheumatoid arthritis ([facility] [address] )   Assessment & Plan    · On methotrexate        Essential hypertension   Assessment & Plan    · On losartan at home- this was held on admission  Can probably resume when patient tolerating diet and if renal function stable  · Patient reports she no longer takes diuretic- need to clarify if she is still on Lasix at home            VTE Prophylaxis: Heparin  / sequential compression device     Recommendations for Discharge:  · Continue home meds  · Will follow    Counseling / Coordination of Care Time: 30 minutes  Greater than 50% of total time spent on patient counseling and coordination of care  Collaboration of Care: Were Recommendations Directly Discussed with Primary Treatment Team? - No     History of Present Illness:    Sebastien Waite is a 81 y o  female who is originally admitted to the urology service for right nephroureterectomy for urothelial cancer   We are consulted for medical management  Patient has multiple chronic medical conditions including HTN, RA, diet-controlled DM, hypothyroidism, sleep apnea, lymphedema, and cardioembolic stroke on Xarelto (although source of embolus unclear)  Patient seen after operation  Very drowsy currently after being given Norco and dilaudid but is easily aroused and answering questions appropriately  Notes pain at surgical site but otherwise doing well without complaints  Review of Systems:    Review of Systems   Constitutional: Negative  HENT: Negative  Eyes: Negative  Respiratory: Negative  Cardiovascular: Negative  Gastrointestinal: Negative  Endocrine: Negative  Genitourinary: Negative  Musculoskeletal: Positive for arthralgias  +RA   Skin: Negative  Allergic/Immunologic: Negative  Neurological: Negative  Hematological: Negative  Psychiatric/Behavioral: Negative          Past Medical and Surgical History:     Past Medical History:   Diagnosis Date    Anemia     Arthritis     rheumatoid    Benign essential hypertension     Cataract     Chronic cystitis     Colon, diverticulosis     CPAP (continuous positive airway pressure) dependence     Diabetes mellitus (HCC)     Diverticulitis of colon     Early satiety     GERD (gastroesophageal reflux disease)     Gout     Hearing aid worn     bilateral    Hearing loss     Hemorrhoids     Hiatal hernia     History of colonic polyps     Hyperlipidemia     Hypertension     Hypertension     Hypothyroidism     Impaired fasting glucose     Left breast mass     Microhematuria     Migraine     occular    Neuropathy     lower and upper extermities    Overactive bladder     Pneumonia of left lower lobe due to infectious organism (HCC)     RA (rheumatoid arthritis) (HCC)     Rheumatoid arthritis ([address] )     Sleep apnea     uses cpap    Stroke ([facility] [address] )     5/2017    Type 2 diabetes mellitus (HCC)     Urinary frequency     Urinary urgency     Use of cane as ambulatory aid        Past Surgical History:   Procedure Laterality Date    APPENDECTOMY      ARTHROSCOPY WRIST Right     with release of transverse carpal ligament     BLADDER SURGERY      BLADDER SURGERY      BREAST SURGERY      left breast    BUNIONECTOMY Bilateral     CATARACT EXTRACTION Bilateral     CHOLECYSTECTOMY      COLONOSCOPY      CYSTOSCOPY      EGD AND COLONOSCOPY N/A 12/20/2017    Procedure: EGD AND COLONOSCOPY;  Surgeon: Viridiana Sykes MD;  Location:  GI LAB; Service: Gastroenterology    ESOPHAGOGASTRODUODENOSCOPY      diagnostic    FOREARM SURGERY Right     fracture repair    HYSTERECTOMY      JOINT REPLACEMENT      KNEE ARTHROSCOPY Left     KNEE SURGERY Left     meniscus tear    NOSE SURGERY      [place] CYSTO/URETERO W/LITHOTRIPSY &INDWELL STENT INSRT Right 2/28/2018    Procedure: CYSTOSCOPY RIGHT URETEROSCOPY, RIGHT RETROGRADE PYELOGRAM AND INSERTION  RIGHT STENT URETERAL, RIGHT RENAL PELVIC WASHING FOR CYTOLOGY;  Surgeon: Rosette Shi MD;  Location: [facility];  Service: Urology    REPLACEMENT TOTAL KNEE BILATERAL Bilateral     URETEROSCOPY Right 3/20/2018    Procedure: CYSTOSCOPY, RETROGRADE PYELOGRAM, URETEROSCOPY, BIOPSY, BRUSH, FULGURATION, STENT PLACEMENT, BLADDER BIOPSY WITH FULGURATION;  Surgeon: Allyson Slade MD;  Location: [facility];  Service: Urology       Meds/Allergies:    all medications and allergies reviewed    Allergies: Allergies   Allergen Reactions    Acetazolamide Other (See Comments)     Other reaction(s): Unknown Allergic Reaction    Aspirin      Other reaction(s): Other (See Comments)  High Dose ASA-stomach ache, rachel  ASA 81 m    Atorvastatin      Other reaction(s): Muscle Pain, Myalgia    Azithromycin     Other Other (See Comments)     Adhesive tape : red and itching  Other reaction(s):  Other (See Comments)  red and itching    Shellfish-Derived Products Other (See Comments)     Patient got Gout following eating shell fish    Sulfa Antibiotics Other (See Comments)     unknown    Tramadol Diarrhea and Vomiting       Social History:     Marital Status: /Civil Union    Substance Use History:   History   Alcohol Use    Yes     Comment: socially/ seldom     History   Smoking Status    Never Smoker   Smokeless Tobacco    Never Used     Comment: stopped smoking in the distant past, never smoker (as per [employer])      History   Drug Use No       Family History:    non-contributory    Physical Exam:     Vitals:   Blood Pressure: 149/75 (04/23/18 1930)  Pulse: 56 (04/23/18 1930)  Temperature: 97 5 °F (36 4 °C) (04/23/18 1900)  Temp Source: Oral (04/23/18 1900)  Respirations: 16 (04/23/18 1930)  Height: 5' 1" (154 9 cm) (04/23/18 1115)  Weight - Scale: 98 kg (216 lb) (04/23/18 1115)  SpO2: 99 % (04/23/18 1930)    Physical Exam   Constitutional: She is oriented to person, place, and time  No distress  HENT:   Head: Normocephalic and atraumatic  Eyes: No scleral icterus  Neck: Normal range of motion  Neck supple  Cardiovascular:   Irregular- occasional extra beats   Pulmonary/Chest: Effort normal and breath sounds normal  No respiratory distress  She has no wheezes  She has no rales  Abdominal: Soft  Bowel sounds are normal  She exhibits no distension  There is no tenderness  There is no rebound  Musculoskeletal:   Lower extremity lymphedema   Neurological: She is alert and oriented to person, place, and time  Skin: Skin is warm and dry  She is not diaphoretic  Psychiatric: She has a normal mood and affect  Her behavior is normal  Thought content normal        Additional Data:     Lab Results: I have personally reviewed pertinent reports          Results from last 7 days  Lab Units 04/23/18  1718   WBC Thousand/uL 7 55   HEMOGLOBIN g/dL 8 4*   HEMATOCRIT % 25 9*   PLATELETS Thousands/uL 183       Results from last 7 days  Lab Units 04/23/18  1718   SODIUM mmol/L 141   POTASSIUM mmol/L 3 5   CHLORIDE mmol/L 109*   CO2 mmol/L 24   BUN mg/dL 17   CREATININE mg/dL 0 98   CALCIUM mg/dL 8 4   GLUCOSE RANDOM mg/dL 188*       Results from last 7 days  Lab Units 04/23/18  1208   INR  1 20*         Lab Results   Component Value Date/Time    HGBA1C 6 2 01/03/2018 01:30 PM    HGBA1C 6 6 (H) 11/14/2017 11:51 AM    HGBA1C 6 1 07/18/2017 03:04 PM       Imaging: I have personally reviewed pertinent reports  XR chest portable   Final Result by Haleigh Rayo MD (04/23 1924)      Mild pulmonary vascular congestion with patchy superimposed atelectasis versus infiltrate at the mid left lung  Continued close follow-up is recommended  No pneumothorax  Workstation performed: [id number]             EKG, Pathology, and Other Studies Reviewed on Admission:   · EKG: NSR with APCs    ** Please Note: This note has been constructed using a voice recognition system   **

## 2018-04-25 ENCOUNTER — TRANSCRIBE ORDERS (OUTPATIENT)
Dept: PHYSICAL THERAPY | Facility: REHABILITATION | Age: 83
End: 2018-04-25

## 2018-04-25 ENCOUNTER — APPOINTMENT (INPATIENT)
Dept: NON INVASIVE DIAGNOSTICS | Facility: HOSPITAL | Age: 83
DRG: 654 | End: 2018-04-25
Payer: MEDICARE

## 2018-04-25 PROBLEM — D64.9 ANEMIA: Status: ACTIVE | Noted: 2018-04-25

## 2018-04-25 PROBLEM — E87.1 HYPONATREMIA: Status: ACTIVE | Noted: 2018-04-25

## 2018-04-25 PROBLEM — N17.9 AKI (ACUTE KIDNEY INJURY) (HCC): Status: ACTIVE | Noted: 2018-04-25

## 2018-04-25 LAB
ANION GAP SERPL CALCULATED.3IONS-SCNC: 6 MMOL/L (ref 4–13)
BASOPHILS # BLD AUTO: 0.01 THOUSANDS/ΜL (ref 0–0.1)
BASOPHILS NFR BLD AUTO: 0 % (ref 0–1)
BUN SERPL-MCNC: 21 MG/DL (ref 5–25)
CALCIUM SERPL-MCNC: 7.9 MG/DL (ref 8.3–10.1)
CHLORIDE SERPL-SCNC: 103 MMOL/L (ref 100–108)
CO2 SERPL-SCNC: 26 MMOL/L (ref 21–32)
CREAT SERPL-MCNC: 1.41 MG/DL (ref 0.6–1.3)
EOSINOPHIL # BLD AUTO: 0.29 THOUSAND/ΜL (ref 0–0.61)
EOSINOPHIL NFR BLD AUTO: 2 % (ref 0–6)
ERYTHROCYTE [DISTWIDTH] IN BLOOD BY AUTOMATED COUNT: 16 % (ref 11.6–15.1)
GFR SERPL CREATININE-BSD FRML MDRD: 35 ML/MIN/1.73SQ M
GLUCOSE SERPL-MCNC: 128 MG/DL (ref 65–140)
GLUCOSE SERPL-MCNC: 163 MG/DL (ref 65–140)
GLUCOSE SERPL-MCNC: 167 MG/DL (ref 65–140)
GLUCOSE SERPL-MCNC: 175 MG/DL (ref 65–140)
GLUCOSE SERPL-MCNC: 178 MG/DL (ref 65–140)
GLUCOSE SERPL-MCNC: 186 MG/DL (ref 65–140)
HCT VFR BLD AUTO: 21.9 % (ref 34.8–46.1)
HCT VFR BLD AUTO: 26.8 % (ref 34.8–46.1)
HGB BLD-MCNC: 6.8 G/DL (ref 11.5–15.4)
HGB BLD-MCNC: 8.7 G/DL (ref 11.5–15.4)
LYMPHOCYTES # BLD AUTO: 1.22 THOUSANDS/ΜL (ref 0.6–4.47)
LYMPHOCYTES NFR BLD AUTO: 9 % (ref 14–44)
MCH RBC QN AUTO: 31.2 PG (ref 26.8–34.3)
MCHC RBC AUTO-ENTMCNC: 31.1 G/DL (ref 31.4–37.4)
MCV RBC AUTO: 101 FL (ref 82–98)
MONOCYTES # BLD AUTO: 1.2 THOUSAND/ΜL (ref 0.17–1.22)
MONOCYTES NFR BLD AUTO: 9 % (ref 4–12)
NEUTROPHILS # BLD AUTO: 10.28 THOUSANDS/ΜL (ref 1.85–7.62)
NEUTS SEG NFR BLD AUTO: 80 % (ref 43–75)
NRBC BLD AUTO-RTO: 0 /100 WBCS
PLATELET # BLD AUTO: 183 THOUSANDS/UL (ref 149–390)
PMV BLD AUTO: 9.2 FL (ref 8.9–12.7)
POTASSIUM SERPL-SCNC: 3.8 MMOL/L (ref 3.5–5.3)
RBC # BLD AUTO: 2.18 MILLION/UL (ref 3.81–5.12)
SODIUM SERPL-SCNC: 135 MMOL/L (ref 136–145)
WBC # BLD AUTO: 13.05 THOUSAND/UL (ref 4.31–10.16)

## 2018-04-25 PROCEDURE — 99232 SBSQ HOSP IP/OBS MODERATE 35: CPT | Performed by: INTERNAL MEDICINE

## 2018-04-25 PROCEDURE — 80048 BASIC METABOLIC PNL TOTAL CA: CPT | Performed by: INTERNAL MEDICINE

## 2018-04-25 PROCEDURE — 97163 PT EVAL HIGH COMPLEX 45 MIN: CPT

## 2018-04-25 PROCEDURE — G8978 MOBILITY CURRENT STATUS: HCPCS

## 2018-04-25 PROCEDURE — 99024 POSTOP FOLLOW-UP VISIT: CPT | Performed by: NURSE PRACTITIONER

## 2018-04-25 PROCEDURE — 85014 HEMATOCRIT: CPT | Performed by: NURSE PRACTITIONER

## 2018-04-25 PROCEDURE — 97535 SELF CARE MNGMENT TRAINING: CPT

## 2018-04-25 PROCEDURE — G8979 MOBILITY GOAL STATUS: HCPCS

## 2018-04-25 PROCEDURE — P9021 RED BLOOD CELLS UNIT: HCPCS

## 2018-04-25 PROCEDURE — 93971 EXTREMITY STUDY: CPT | Performed by: SURGERY

## 2018-04-25 PROCEDURE — 30233N1 TRANSFUSION OF NONAUTOLOGOUS RED BLOOD CELLS INTO PERIPHERAL VEIN, PERCUTANEOUS APPROACH: ICD-10-PCS | Performed by: UROLOGY

## 2018-04-25 PROCEDURE — 85018 HEMOGLOBIN: CPT | Performed by: NURSE PRACTITIONER

## 2018-04-25 PROCEDURE — 82948 REAGENT STRIP/BLOOD GLUCOSE: CPT

## 2018-04-25 PROCEDURE — 99222 1ST HOSP IP/OBS MODERATE 55: CPT | Performed by: PHYSICIAN ASSISTANT

## 2018-04-25 PROCEDURE — 85025 COMPLETE CBC W/AUTO DIFF WBC: CPT | Performed by: INTERNAL MEDICINE

## 2018-04-25 PROCEDURE — 93971 EXTREMITY STUDY: CPT

## 2018-04-25 PROCEDURE — 97530 THERAPEUTIC ACTIVITIES: CPT

## 2018-04-25 RX ADMIN — ACETAMINOPHEN 650 MG: 325 TABLET, FILM COATED ORAL at 22:24

## 2018-04-25 RX ADMIN — INSULIN LISPRO 1 UNITS: 100 INJECTION, SOLUTION INTRAVENOUS; SUBCUTANEOUS at 06:29

## 2018-04-25 RX ADMIN — INSULIN LISPRO 1 UNITS: 100 INJECTION, SOLUTION INTRAVENOUS; SUBCUTANEOUS at 00:41

## 2018-04-25 RX ADMIN — HYDROCODONE BITARTRATE AND ACETAMINOPHEN 1 TABLET: 5; 325 TABLET ORAL at 11:20

## 2018-04-25 RX ADMIN — MINERAL OIL AND WHITE PETROLATUM: 150; 830 OINTMENT OPHTHALMIC at 21:12

## 2018-04-25 RX ADMIN — PANTOPRAZOLE SODIUM 40 MG: 40 TABLET, DELAYED RELEASE ORAL at 06:23

## 2018-04-25 RX ADMIN — HYDROMORPHONE HYDROCHLORIDE 0.5 MG: 1 INJECTION, SOLUTION INTRAMUSCULAR; INTRAVENOUS; SUBCUTANEOUS at 04:38

## 2018-04-25 RX ADMIN — SODIUM CHLORIDE, SODIUM LACTATE, POTASSIUM CHLORIDE, AND CALCIUM CHLORIDE 75 ML/HR: .6; .31; .03; .02 INJECTION, SOLUTION INTRAVENOUS at 10:12

## 2018-04-25 RX ADMIN — HEPARIN SODIUM 5000 UNITS: 5000 INJECTION, SOLUTION INTRAVENOUS; SUBCUTANEOUS at 21:12

## 2018-04-25 RX ADMIN — PRAVASTATIN SODIUM 80 MG: 40 TABLET ORAL at 09:48

## 2018-04-25 RX ADMIN — HEPARIN SODIUM 5000 UNITS: 5000 INJECTION, SOLUTION INTRAVENOUS; SUBCUTANEOUS at 06:24

## 2018-04-25 RX ADMIN — SODIUM CHLORIDE, SODIUM LACTATE, POTASSIUM CHLORIDE, AND CALCIUM CHLORIDE 75 ML/HR: .6; .31; .03; .02 INJECTION, SOLUTION INTRAVENOUS at 21:12

## 2018-04-25 RX ADMIN — DIPHENHYDRAMINE HCL 25 MG: 25 TABLET ORAL at 09:48

## 2018-04-25 RX ADMIN — FUROSEMIDE 20 MG: 20 TABLET ORAL at 09:48

## 2018-04-25 RX ADMIN — INSULIN LISPRO 1 UNITS: 100 INJECTION, SOLUTION INTRAVENOUS; SUBCUTANEOUS at 12:45

## 2018-04-25 RX ADMIN — DOCUSATE SODIUM 100 MG: 100 CAPSULE, LIQUID FILLED ORAL at 16:41

## 2018-04-25 RX ADMIN — HEPARIN SODIUM 5000 UNITS: 5000 INJECTION, SOLUTION INTRAVENOUS; SUBCUTANEOUS at 13:57

## 2018-04-25 RX ADMIN — ROPINIROLE 1 MG: 1 TABLET, FILM COATED ORAL at 21:12

## 2018-04-25 RX ADMIN — ROPINIROLE 0.5 MG: 1 TABLET, FILM COATED ORAL at 06:23

## 2018-04-25 RX ADMIN — HYDROCODONE BITARTRATE AND ACETAMINOPHEN 1 TABLET: 5; 325 TABLET ORAL at 21:13

## 2018-04-25 RX ADMIN — GABAPENTIN 100 MG: 100 CAPSULE ORAL at 16:03

## 2018-04-25 RX ADMIN — HYDROMORPHONE HYDROCHLORIDE 0.5 MG: 1 INJECTION, SOLUTION INTRAMUSCULAR; INTRAVENOUS; SUBCUTANEOUS at 13:59

## 2018-04-25 RX ADMIN — HYDROCODONE BITARTRATE AND ACETAMINOPHEN 1 TABLET: 5; 325 TABLET ORAL at 01:25

## 2018-04-25 RX ADMIN — GABAPENTIN 100 MG: 100 CAPSULE ORAL at 21:12

## 2018-04-25 RX ADMIN — LEVOTHYROXINE SODIUM 75 MCG: 75 TABLET ORAL at 06:23

## 2018-04-25 RX ADMIN — INSULIN LISPRO 1 UNITS: 100 INJECTION, SOLUTION INTRAVENOUS; SUBCUTANEOUS at 16:40

## 2018-04-25 RX ADMIN — DOCUSATE SODIUM 100 MG: 100 CAPSULE, LIQUID FILLED ORAL at 09:48

## 2018-04-25 RX ADMIN — GABAPENTIN 100 MG: 100 CAPSULE ORAL at 09:48

## 2018-04-25 NOTE — PROGRESS NOTES
UROLOGY PROGRESS NOTE   Patient Identifiers: Camden Colvin (MRN [id number])  Date of Service: 4/25/2018    Subjective:     24 HR EVENTS:   no significant events  Patient has  complaints of incisional discomfort  Denies any nausea or vomiting  Tolerating PO  Radha any chest pain or SOB          Objective:     VITALS:    Vitals:    04/25/18 0700   BP: 121/58   Pulse: 69   Resp: 18   Temp: 98 8 °F (37 1 °C)   SpO2: 93%       INS & OUTS:  I/O last 24 hours: In: 2526 3 [P O :675;  I V :1851 3]  Out: 2495 [Urine:2325; Drains:170]    LABS:  Lab Results   Component Value Date    HGB 6 8 (LL) 04/25/2018    HCT 21 9 (L) 04/25/2018    WBC 13 05 (H) 04/25/2018     04/25/2018   ]    Lab Results   Component Value Date     (L) 04/25/2018    K 3 8 04/25/2018     04/25/2018    CO2 26 04/25/2018    BUN 21 04/25/2018    CREATININE 1 41 (H) 04/25/2018    CALCIUM 7 9 (L) 04/25/2018    GLUCOSE 128 04/25/2018   ]    INPATIENT MEDS:    Current Facility-Administered Medications:     acetaminophen (TYLENOL) tablet 650 mg, 650 mg, Oral, Q6H PRN, Emile Mart MD, 650 mg at 04/24/18 0806    artificial tear (LUBRIFRESH P M ) ophthalmic ointment, , Both Eyes, HS, SANTOSH Woody    diphenhydrAMINE (BENADRYL) tablet 25 mg, 25 mg, Oral, Q8H PRN, SANTOSH Romero, 25 mg at 04/24/18 1721    docusate sodium (COLACE) capsule 100 mg, 100 mg, Oral, BID, Emile Mart MD, 100 mg at 04/24/18 1617    furosemide (LASIX) tablet 20 mg, 20 mg, Oral, Daily, Emile Mart MD, 20 mg at 04/24/18 0805    gabapentin (NEURONTIN) capsule 100 mg, 100 mg, Oral, TID, Emile Mart MD, 100 mg at 04/24/18 2055    heparin (porcine) subcutaneous injection 5,000 Units, 5,000 Units, Subcutaneous, Q8H [facility], 5,000 Units at 04/25/18 0624 **AND** Platelet count, , , Once, Emile Mart MD    HYDROcodone-acetaminophen [facility]) 5-325 mg per tablet 1 tablet, 1 tablet, Oral, Q6H PRN, Emile Mart, MD, 1 tablet at 04/25/18 0125    HYDROmorphone (DILAUDID) injection 0 5 mg, 0 5 mg, Intravenous, Q3H PRN, Reina Herrera MD, 0 5 mg at 04/25/18 0438    insulin lispro (HumaLOG) 100 units/mL subcutaneous injection 1-6 Units, 1-6 Units, Subcutaneous, Q6H [address], 1 Units at 04/25/18 0629 **AND** Fingerstick Glucose (POCT), , , Q6H, Reina Herrera MD    lactated ringers infusion, 75 mL/hr, Intravenous, Continuous, SANTOSH Olivares, Last Rate: 75 mL/hr at 04/24/18 2059, 75 mL/hr at 04/24/18 2059    levothyroxine tablet 75 mcg, 75 mcg, Oral, Early Morning, Reina Herrera MD, 75 mcg at 04/25/18 0623    ondansetron (ZOFRAN) injection 4 mg, 4 mg, Intravenous, Q6H PRN, Reina Herrera MD    pantoprazole (PROTONIX) EC tablet 40 mg, 40 mg, Oral, Early Morning, Reina Herrera MD, 40 mg at 04/25/18 3341    pravastatin (PRAVACHOL) tablet 80 mg, 80 mg, Oral, Daily, Reina Herrera MD, 80 mg at 04/24/18 0805    rOPINIRole (REQUIP) tablet 0 5 mg, 0 5 mg, Oral, Early Morning, Gianluca Chavarria MD, 0 5 mg at 04/25/18 5302    rOPINIRole (REQUIP) tablet 1 mg, 1 mg, Oral, HS, Gianluca Chavarria MD, 1 mg at 04/24/18 2101    scopolamine (TRANSDERM-SCOP) 1 5 mg/3 days TD 72 hr patch 1 patch, 1 patch, Transdermal, Once, Ezra Soto MD, 1 patch at 04/23/18 1139    simethicone (MYLICON) chewable tablet 80 mg, 80 mg, Oral, 4x Daily PRN, Reina Herrera MD    Facility-Administered Medications Ordered in Other Encounters:     acetaminophen (TYLENOL) tablet 650 mg, 650 mg, Oral, Q6H PRN, Florette Rubinstein, PA-C      Physical Exam:     GEN: alert and oriented x 3    RESP: breathing comfortably with no accessory muscle use    CARDIO: Regular rate and rhythm  ABD: soft, non-tender, non-distended   INCISION: clean, dry, intact   EXT: no significant peripheral edema   DRAINS: serosanguineous  UGARTE: in place draining clear yellow urine          Assessment:   Urothelial cancer s/p right robotic assisted nephro-ureterectomy with bladder cuff excision POD #2    Plan:   -maintain walls, leg bag teaching and walls care  -BRENNEN secondary to surgery, creatinine 1 41   -acute blood loss anemia secondary to surgery, HG dropped from 8 0 to 6 8  Plan to transfuse with 1 unit of PRBC and recheck CBC after  Consent obtained from patient  -PT/OT consult  -case management consult for rehab placement  -OOB and ambulate  -pain control  -advance diet as tolerated  -discharge planning for tomorrow      RN participated in rounds with AP  Plan of care discussed with patient and RN

## 2018-04-25 NOTE — PLAN OF CARE
Problem: OCCUPATIONAL THERAPY ADULT  Goal: Performs self-care activities at highest level of function for planned discharge setting  See evaluation for individualized goals  Treatment Interventions: ADL retraining, Functional transfer training, Endurance training, Patient/family training, Equipment evaluation/education, Compensatory technique education, Continued evaluation, Energy conservation, Activityengagement  Equipment Recommended:  (tub transfer bench )       See flowsheet documentation for full assessment, interventions and recommendations  Limitation: Decreased ADL status, Decreased endurance, Decreased self-care trans, Decreased high-level ADLs  Prognosis: Good  Assessment: pt seen for OT treatemetn w focus on self care sitting in chair  she is able to self feed meal, able to comb own hair, able to wash own face and hands  Pt S/set up for UB self care , max assist for LB  She needed mod assist of 1-2 for sit to stand due to c/o pain le's, more in the R than L  RN aware and MD notified  Pt pleasant and cooperative w OT treatment  OT will continue to follow and treat as per POC       OT Discharge Recommendation:  (rehab vs home pending progress)

## 2018-04-25 NOTE — PROGRESS NOTES
Progress Note - Fredi Lawson 1935, 81 y o  female MRN: [id number]    Unit/Bed#: [facility] 817-01 Encounter: [id number]    Primary Care Provider: Alan Cordon MD   Date and time admitted to hospital: 4/23/2018 10:00 AM        Hyponatremia   Assessment & Plan    Possibly related to anemia, would continue monitor with AM BMP        BRENNEN (acute kidney injury) ([facility] [address] )   Assessment & Plan    Possibly related anemia and blood pressure  Transfusion and keeping hg >7/24  Monitor CBC daily  Gentle hydration   Avoid nephrotoxins  Highly likely post renal, continue monitor output closely        Anemia   Assessment & Plan    Secondary to surgery  Would continue to monitor cbc  Agree with 1 unit of blood transfusion  Monitoring of vitals        Restless leg syndrome   Assessment & Plan     Continue with requip  Being followed by vascular surgery  No acute intervention at this time  Diabetes mellitus type 2 in obese [facility])   Assessment & Plan    · Diet-controlled  · A1C 6 2 on 1/3/18  Will recheck  · Placed on sliding scale insulin  Will adjust as needed        [facility] )   Assessment & Plan    · On methotrexate        Essential hypertension   Assessment & Plan    · On losartan at home- this was held on admission  Can probably resume when patient tolerating diet and if renal function stable  · Patient reports she no longer takes diuretic- need to clarify if she is still on Lasix at home        * Urothelial cancer [facility])   Assessment & Plan    · S/p right nephro-ureterectomy 4/23/18, POD #1  · Mgmt per urology              VTE Pharmacologic Prophylaxis:   Pharmacologic: Pharmacologic VTE Prophylaxis contraindicated due to s/p surgery  Mechanical VTE Prophylaxis in Place: Yes    Patient Centered Rounds:      Discussions with Specialists or Other Care Team Provider:      Education and Discussions with Family / Patient: patient    Time Spent for Care: 45 minutes    More than 50% of total time spent on counseling and coordination of care as described above  Current Length of Stay: 2 day(s)    Current Patient Status: Inpatient   Certification Statement: The patient will continue to require additional inpatient hospital stay due to anemia    Discharge Plan: per primary team    Code Status: Level 1 - Full Code      Subjective:   No complaints at this time  She has denied sob, cough  Objective:     Vitals:   Temp (24hrs), Av °F (36 7 °C), Min:97 5 °F (36 4 °C), Max:98 8 °F (37 1 °C)    HR:  [65-73] 65  Resp:  [18] 18  BP: (113-151)/(54-71) 119/57  SpO2:  [89 %-96 %] 95 %  Body mass index is 42 59 kg/m²  Input and Output Summary (last 24 hours): Intake/Output Summary (Last 24 hours) at 18 1752  Last data filed at 18 1500   Gross per 24 hour   Intake          3327 25 ml   Output             3075 ml   Net           252 25 ml       Physical Exam:     Physical Exam   Constitutional: She is oriented to person, place, and time  She appears well-developed and well-nourished  HENT:   Head: Normocephalic and atraumatic  Right Ear: External ear normal    Mouth/Throat: Oropharynx is clear and moist  No oropharyngeal exudate  Eyes: Conjunctivae and EOM are normal  Pupils are equal, round, and reactive to light  Right eye exhibits no discharge  Left eye exhibits no discharge  No scleral icterus  Neck: Normal range of motion  Neck supple  No thyromegaly present  Cardiovascular: Normal rate, regular rhythm, normal heart sounds and intact distal pulses  Exam reveals no gallop and no friction rub  No murmur heard  Pulmonary/Chest: No stridor  No respiratory distress  She has no wheezes  She has no rales  She exhibits no tenderness  Abdominal: Bowel sounds are normal  She exhibits distension  There is tenderness  Musculoskeletal: She exhibits edema  She exhibits no tenderness or deformity  Lymphadenopathy:     She has no cervical adenopathy     Neurological: She is alert and oriented to person, place, and time  She has normal reflexes  She exhibits normal muscle tone  Coordination abnormal    Skin: No rash noted  No erythema  No pallor  Psychiatric: She has a normal mood and affect  Her behavior is normal  Judgment and thought content normal    Nursing note and vitals reviewed  Additional Data:     Labs:      Results from last 7 days  Lab Units 04/25/18  1626 04/25/18  0613   WBC Thousand/uL  --  13 05*   HEMOGLOBIN g/dL 8 7* 6 8*   HEMATOCRIT % 26 8* 21 9*   PLATELETS Thousands/uL  --  183   NEUTROS PCT %  --  80*   LYMPHS PCT %  --  9*   MONOS PCT %  --  9   EOS PCT %  --  2       Results from last 7 days  Lab Units 04/25/18  0613   SODIUM mmol/L 135*   POTASSIUM mmol/L 3 8   CHLORIDE mmol/L 103   CO2 mmol/L 26   BUN mg/dL 21   CREATININE mg/dL 1 41*   CALCIUM mg/dL 7 9*   GLUCOSE RANDOM mg/dL 128       Results from last 7 days  Lab Units 04/23/18  1208   INR  1 20*       * I Have Reviewed All Lab Data Listed Above  * Additional Pertinent Lab Tests Reviewed:  [address] Admission Reviewed    Imaging:    Imaging Reports Reviewed Today Include:    Imaging Personally Reviewed by Myself Includes:       Recent Cultures (last 7 days):           Last 24 Hours Medication List:     Current Facility-Administered Medications:  acetaminophen 650 mg Oral Q6H PRN Mayank Durham MD    artificial tear  Both Eyes HS SANTOSH Butler    diphenhydrAMINE 25 mg Oral Q8H PRN SANTOSH Coles    docusate sodium 100 mg Oral BID Mayank Durham MD    furosemide 20 mg Oral Daily Mayank Durham MD    gabapentin 100 mg Oral TID Mayank Durham MD    heparin (porcine) 5,000 Units Subcutaneous Q8H [facility] Mayank Durham MD    HYDROcodone-acetaminophen 1 tablet Oral Q6H PRN Mayank Durham MD    HYDROmorphone 0 5 mg Intravenous Q3H PRN Mayank Durham MD    insulin lispro 1-6 Units Subcutaneous TID AC Melania Saleh MD    lactated ringers 75 mL/hr Intravenous Continuous SANTOSH Tavares Last Rate: 75 mL/hr (04/25/18 1012)   levothyroxine 75 mcg Oral Early Morning Demond Servin MD    ondansetron 4 mg Intravenous Q6H PRN Demond Servin MD    pantoprazole 40 mg Oral Early Morning Demond Servin MD    pravastatin 80 mg Oral Daily Demond Servin MD    rOPINIRole 0 5 mg Oral Early Morning Michelet Marshall MD    rOPINIRole 1 mg Oral HS Michelet Marshall MD    scopolamine 1 patch Transdermal Once Alayna Russell MD    simethicone 80 mg Oral 4x Daily PRN Demond Servin MD      Facility-Administered Medications Ordered in Other Encounters:  acetaminophen 650 mg Oral Q6H PRN Earl Briggs PA-C        Today, Patient Was Seen By: Michelet Marshall MD    ** Please Note: Dictation voice to text software may have been used in the creation of this document   **

## 2018-04-25 NOTE — ASSESSMENT & PLAN NOTE
Secondary to surgery  Would continue to monitor cbc  Agree with 1 unit of blood transfusion  Monitoring of vitals

## 2018-04-25 NOTE — CONSULTS
Consultation - Vascular Surgery   Randy Wise 81 y o  female MRN: [id number]  Unit/Bed#: [facility] 108-35 Encounter: [id number]      Assessment/ Plan:    RLE pain  --? Etiology   --? Multifactorial (Musculoskeletal from recent surgery/ positioning/ edema combined w/ known RA, RLS, Venous insuff/ Lymphedema)  --LEVD neg DVT  -- eval for PAD w/ LEAD-- altho, even if abnormal, likely would not pursue acute intervention given recent surgery and BRENNEN  -- nonthreatened limbs  -- cont SCDs  -- would advocate restart of Xarelto when able from a Urology standpoint given Afib, previous cardioembolic CVA & risk of thromboembolic event  --ASA/ Lipitor allergic      Urothelial CA  -s/p Robot asst R nephroureterectomy, bladder cuff excision  --mgmt per Urology  --cont walls  --YANIRA  PostOp BRENNEN  --gentle hydration  --monitor  PostOp ABLA  --txn 1u PRBC  --monitor  PostOp Leukocytosis  --reactive from surgery- improving  --monitor      --Mgmt per Urology      RLS  -Requip  B/L Venous insuff/ Lymphedema  --Home tx w/ Lymphedema pumps & Compression stockings   DM/ Neuropathy  --Neurontin  RA  HTN  HLD  --Statin (Lipitor) allergy  Morbid Obesity- BMI 42 59        HypoNa  --monitor      --mgmt per [facility]        *d/w Dr Gloria Liu    _______________________________________________________________  Physician Requesting Consult: Edwina Wheat MD (Urology)    Additional consultants: Dallas Fletcher     Reason for Consult / Principal Problem: RLE pain    HPI: Randy Wise is a 81y o  year old female with history of recurrent UTIs, gross hematuria, and urothelial cancer who is currently hospitalized now having undergone robotic assisted right nephro-ureterectomy with bladder cuff excision on 04/23/2018 by Nazanin Laurent and Sebastián Lacey  Earlier today, she complained of RLE pain, mostly in her calf  Physical therapy evaluation was noted to be with positive Homans sign   LEVD was performed and negative but with incidental findings of a monophasic waveforms and concern for possible AIOD  Vascular surgery has been consulted for evaluation of this RLE pain  In speaking with Vu Gonzalez, who is arousable but intermittently somnolent from recent narcotic administration, along with her  at the bedside, she suffers with multiple medical conditions to include the following:  · Lymphedema for which she uses pumps at home  · Venous insufficiency/varicose veins for which she wears compression stockings at home  · Rheumatoid arthritis maintained on weekly injection therapy  · Right knee DJD with recommendation for right knee replacement stating knee is bone on bone"  She notes chronic, intermittent leg pain along with chronic leg swelling  She has followed with a vascular surgeon at [facility]  She denies previous the vascular interventions/surgeries  Today, her pain is mostly in the R calf but sometimes it is in the R thighs and knees at home  She will utilize a walker and/or wheelchair with prolonged ambulation due to fatigue but it does not sound as if her symptoms are consistent with claudication  She denies symptoms to be consistent with rest pain nor relief with of pain with placing the limb dependent  She notes no exacerbating nor relief measures  She is able to move and feel the limb  She denies wounds  She denies numbness or tingling  She does note that her R leg will turn red when dependent      Risk factors for PAD:  · Age  · DM  · HTN  · HLD  · Remote tobacco abuse    Additional PMH of note:  · AFib with history of cardioembolic CVA normally maintained on Xarelto, currently held perioperatively  · RA/ Varicose vein/ Lymphedema- as mentioned above  · Morbid obesity (BMI >40)  · RLS on requip  · DM Neuropathy on Gabapentin    Historical Information   Past Medical History:   Diagnosis Date    Anemia     Arthritis     rheumatoid    Benign essential hypertension     Cataract     Chronic cystitis     CPAP (continuous positive airway pressure) dependence     Diverticulitis of colon     Early satiety     GERD (gastroesophageal reflux disease)     Gout     Hearing aid worn     bilateral    Hearing loss     Hemorrhoids     Hiatal hernia     History of colonic polyps     Hyperlipidemia     Hypothyroidism     Left breast mass     Microhematuria     Migraine     occular    Neuropathy     lower and upper extermities    Overactive bladder     Pneumonia of left lower lobe due to infectious organism (HCC)     [facility]     Sleep apnea     uses cpap    Stroke ([facility]     5/2017    Type 2 diabetes mellitus (HCC)     Urinary frequency     Urinary urgency     Use of cane as ambulatory aid      Past Surgical History:   Procedure Laterality Date    APPENDECTOMY      ARTHROSCOPY WRIST Right     with release of transverse carpal ligament     BLADDER SURGERY      BLADDER SURGERY      BREAST SURGERY      left breast    BUNIONECTOMY Bilateral     CATARACT EXTRACTION Bilateral     CHOLECYSTECTOMY      COLONOSCOPY      CYSTOSCOPY      EGD AND COLONOSCOPY N/A 12/20/2017    Procedure: EGD AND COLONOSCOPY;  Surgeon: Sariah Keller MD;  [facility]; Service: Gastroenterology    ESOPHAGOGASTRODUODENOSCOPY      diagnostic    FOREARM SURGERY Right     fracture repair    HYSTERECTOMY      JOINT REPLACEMENT      KNEE ARTHROSCOPY Left     KNEE SURGERY Left     meniscus tear    NOSE SURGERY      DE CYSTO/URETERO W/LITHOTRIPSY &INDWELL STENT INSRT Right 2/28/2018    Procedure: CYSTOSCOPY RIGHT URETEROSCOPY, RIGHT RETROGRADE PYELOGRAM AND INSERTION  RIGHT STENT URETERAL, RIGHT RENAL PELVIC WASHING FOR CYTOLOGY;  Surgeon: Leslee Broussard MD;  [facility];  Service: Urology    DE NEPHRECTOMY, W/PART   URETECTOMY Right 4/23/2018    Procedure: You Sanchez ASSISTED NEPHRO-URETERECTOMY WITH BLADDER CUFF EXCISION;  Surgeon: Reina Herrera MD;  [facility];  Service: Urology    REPLACEMENT TOTAL KNEE BILATERAL Bilateral     URETEROSCOPY Right 3/20/2018    Procedure: CYSTOSCOPY, RETROGRADE PYELOGRAM, URETEROSCOPY, BIOPSY, BRUSH, FULGURATION, STENT PLACEMENT, BLADDER BIOPSY WITH FULGURATION;  Surgeon: Mayank Durham MD;  [facility];  Service: Urology     Social History   History   Alcohol Use    Yes     Comment: socially/ seldom     History   Drug Use No     History   Smoking Status    Former Smoker   Smokeless Tobacco    Never Used     Comment: stopped smoking in the distant past, never smoker (as per [employer])      Family History:   Family History   Problem Relation Age of Onset    Other Mother      epilepsy    Early death Mother    Eliana Hawley Glaucoma Father     Stroke Father      silent    Other Brother      cardiac disorder       Meds/Allergies   Home meds:   Prior to Admission medications    Medication Sig Start Date End Date Taking? Authorizing Provider   acetaminophen (TYLENOL) 325 mg tablet Take 650 mg by mouth every 6 (six) hours as needed for mild pain   Yes Historical Provider, MD   Calcium Citrate-Vitamin D (CALCIUM + D PO) Take 1 tablet by mouth 2 (two) times a day   Yes Historical Provider, MD   cyanocobalamin (VITAMIN B-12) 100 mcg tablet Take by mouth   Yes Historical Provider, MD   furosemide (LASIX) 20 mg tablet Take 20 mg by mouth daily     Yes Historical Provider, MD   gabapentin (NEURONTIN) 100 mg capsule Take 1 capsule (100 mg total) by mouth 3 (three) times a day Take 1 cap in the am, 1 cap in the afternoon, and 3 cap at bedtime  Patient taking differently: Take 100 mg by mouth 3 (three) times a day Take 1 cap in the am, 1 cap in the afternoon, and 1 cap at bedtime  2/12/18  Yes Kev Bell DO   LEUCOVORIN CALCIUM PO Take 10 mg by mouth once a week   Yes Historical Provider, MD   levothyroxine 75 mcg tablet Take 75 mcg by mouth daily   Yes Historical Provider, MD   losartan (COZAAR) 100 MG tablet Take 100 mg by mouth daily   Yes Historical Provider, MD   methotrexate (RHEUMATREX) 2 5 MG tablet Take 8 tablets (20 mg total) by mouth once a week 3/14/18  Yes Tadeo Soares MD   Multiple Vitamin (MULTIVITAMINS PO) Take 1 tablet by mouth daily   Yes Historical Provider, MD   omeprazole (PriLOSEC) 20 mg delayed release capsule TAKE 1 CAPSULE DAILY AS NEEDED FOR STOMACH ACID 3/19/18  Yes Tania Mckeon PA-C   pravastatin (PRAVACHOL) 80 mg tablet TAKE 1 TABLET EVERY DAY 2/20/18  Yes Tadeo Soares MD   rivaroxaban (XARELTO) 20 mg tablet Take 1 tablet (20 mg total) by mouth daily with dinner 3/13/18  Yes Alan Escobar MD   rOPINIRole (REQUIP) 0 5 mg tablet Take 0 5 mg by mouth 2 (two) times a day     Yes Historical Provider, MD   docusate sodium (COLACE) 100 mg capsule Take 1 capsule (100 mg total) by mouth 2 (two) times a day for 60 doses 4/23/18 5/23/18  Radha Sauceda MD   HYDROcodone-acetaminophen (NORCO) 5-325 mg per tablet Take 1 tablet by mouth every 6 (six) hours as needed for pain for up to 30 doses Max Daily Amount: 4 tablets 4/23/18   Radha Sauceda MD     Scheduled Meds:  Current Facility-Administered Medications:  acetaminophen 650 mg Oral Q6H PRN Radha Sauceda MD    artificial tear  Both Eyes HS Coit Bright, CRNP    diphenhydrAMINE 25 mg Oral Q8H PRN SANTOSH Bansal    docusate sodium 100 mg Oral BID Radha Sauceda MD    furosemide 20 mg Oral Daily Radha Sauceda MD    gabapentin 100 mg Oral TID Radha Sauceda MD    heparin (porcine) 5,000 Units Subcutaneous [facility] Radha Sauceda MD    HYDROcodone-acetaminophen 1 tablet Oral Q6H PRN Radha Sauceda MD    HYDROmorphone 0 5 mg Intravenous Q3H PRN Radha aSuceda MD    insulin lispro 1-6 Units Subcutaneous Q6H [facility] Radha Sauceda MD    lactated ringers 75 mL/hr Intravenous Continuous SANTOSH Manzano Last Rate: 75 mL/hr (04/25/18 1012)   levothyroxine 75 mcg Oral Early Morning Radha Sauceda MD    ondansetron 4 mg Intravenous Q6H PRN Miranda Burgess MD    pantoprazole 40 mg Oral Early Morning Miranda Burgess MD    pravastatin 80 mg Oral Daily Miranda Burgess MD    rOPINIRole 0 5 mg Oral Early Morning James Escobar MD    rOPINIRole 1 mg Oral HS James Escobar MD    scopolamine 1 patch Transdermal Once Ramila Corrales MD    simethicone 80 mg Oral 4x Daily PRN Miranda Burgess MD      Facility-Administered Medications Ordered in Other Encounters:  acetaminophen 650 mg Oral Q6H PRN Birgit Jefferson PA-C     Continuous Infusions:  lactated ringers 75 mL/hr Last Rate: 75 mL/hr (04/25/18 1012)     PRN Meds:    acetaminophen    diphenhydrAMINE    HYDROcodone-acetaminophen    HYDROmorphone    ondansetron    simethicone    ALLERGIES:   Allergies   Allergen Reactions    Acetazolamide Other (See Comments)     Other reaction(s): Unknown Allergic Reaction    Aspirin      Other reaction(s): Other (See Comments)  High Dose ASA-stomach ache, rachel  ASA 81 m    Atorvastatin      Other reaction(s): Muscle Pain, Myalgia    Azithromycin     Other Other (See Comments)     Adhesive tape : red and itching  Other reaction(s): Other (See Comments)  red and itching    Shellfish-Derived Products Other (See Comments)     Patient got Gout following eating shell fish    Sulfa Antibiotics Other (See Comments)     unknown    Tramadol Diarrhea and Vomiting       Review of Systems:  General: positive for  - as noted in HPI  Cardiovascular: no chest pain or dyspnea on exertion   + Afib on Xarelto  Respiratory: no cough, shortness of breath, or wheezing  Gastrointestinal: no abdominal pain, change in bowel habits, or black or bloody stools  Genitourinary: positive for - h/o gross hematuria, incontinence and urinary frequency/urgency    Frequent UTIs  Musculoskeletal: positive for - joint pain, joint stiffness, muscle pain, muscular weakness, pain in leg - bilateral and swelling in leg - bilateral  Neurological: no TIA or stroke symptoms  Hematological and Lymphatic: negative  Dermatological : positive for pruritus  Psychological: negative  Ophthalmic: negative  ENT: negative      Objective   Vitals:  Blood pressure 113/54, pulse 67, temperature 98 4 °F (36 9 °C), temperature source Oral, resp  rate 18, height 5' 1" (1 549 m), weight 102 kg (225 lb 6 4 oz), SpO2 94 %  Body mass index is 42 59 kg/m²  I/Os:  I/O       04/23 0701 - 04/24 0700 04/24 0701 - 04/25 0700 04/25 0701 - 04/26 0700    P  O   675 751    I V  (mL/kg) 1700 (16 7) 1851 3 (18 1)     Blood   350    IV Piggyback 200      Total Intake(mL/kg) 1900 (18 6) 2526 3 (24 8) 1101 (10 8)    Urine (mL/kg/hr) 600 2325 (0 9) 1225 (1 5)    Drains 45 170 (0 1) 75 (0 1)    Stool  0 (0) 0 (0)    Blood 200      Total Output 845 2495 1300    Net +1055 +31 3 -199           Unmeasured Stool Occurrence  0 x           Invasive Lines/Tubes:  Invasive Devices     Peripheral Intravenous Line            Peripheral IV 04/23/18 Left Wrist 2 days    Peripheral IV 04/23/18 Right Wrist 2 days          Drain            Ureteral Drain/Stent Right ureter 6 Fr  36 days    Continuous Bladder Irrigation 2 days    Closed/Suction Drain RLQ Bulb 19 Fr  1 day                Physical Exam  General appearance: appears stated age and cooperative  Arousable but  intermittently somnolent-   RN had reported recent IV narcotics  Obese   & son at bedside  Head: Normocephalic, without obvious abnormality, atraumatic  Eyes: conjunctivae/corneas clear  PERRL, EOM's intact  Throat: lips, mucosa, and tongue normal; teeth and gums normal  Neck: no adenopathy, no carotid bruit, no JVD and supple, symmetrical, trachea midline  Back: deferred  Lungs: clear to auscultation bilaterally, ant-lat  Chest wall: no tenderness  Heart[de-identified] regular rate and rhythm, S1, S2 normal, no murmur, click, rub or gallop  Abdomen: Obese, soft, round  R lateral abdomen/ flank incisions w/ expected ecchymosis    + drain w/ dressing intact-- serosanguinous  R midline/ umbilical surgical scars  BS Hypoactive  Mildly tender along incisions otherwise nontender  (-) guarding/ rebound  Nondistened  Significant pannus  Genitalia: deferred, + Medrano  Rectal: deferred  Extremities:  Nonthreatened  B/L Venous insuff/ varicose veins/ Lymphedema   + Pitting edema w/ visible skin wrinkling from SCD compression  Temperature symmetrical b/l   +M/S  (-) Tissue loss (-) Embolic skin changes  (-) cyanosis/ mottling  Skin: Skin color, texture, turgor normal  No rashes or lesions  Neurologic: Grossly normal    Pulse exam:  Radial: Right: 2+               Left: 2+  Femoral: Right: 1+            *Significantly large pannus                  Left: 2+  Popliteal: Right: 1+                    Left: 1+  DP: Right: doppler signal          Left: 1+ and doppler signal  PT: Right: doppler signal         Left: 1+ and doppler signal    Lab Results and Cultures:   CBC:   Results from last 7 days  Lab Units 04/25/18  0613 04/24/18  0610 04/23/18  1718   WBC Thousand/uL 13 05* 14 32* 7 55   HEMOGLOBIN g/dL 6 8* 8 0* 8 4*   HEMATOCRIT % 21 9* 24 3* 25 9*   PLATELETS Thousands/uL 183 186 183     BMP/CMP:  Results from last 7 days  Lab Units 04/25/18  0613 04/24/18  0610 04/23/18  1718   SODIUM mmol/L 135* 138 141   POTASSIUM mmol/L 3 8 4 0 3 5   CHLORIDE mmol/L 103 105 109*   CO2 mmol/L 26 26 24   BUN mg/dL 21 18 17   CREATININE mg/dL 1 41* 1 25 0 98   GLUCOSE RANDOM mg/dL 128 156* 188*   CALCIUM mg/dL 7 9* 8 2* 8 4     Coags:   Results from last 7 days  Lab Units 04/23/18  1208   INR  1 20*   PTT seconds 32     Lipid panel:     HgbA1c:   Lab Results   Component Value Date    HGBA1C 5 9 04/24/2018       Wound Culure: No results found for: WOUNDCULT  Blood Culture: No results found for: BLOODCX    Imaging Studies:    5/10/17 Carotid duplex- < 50% b/l       4/23 CXR- mild pulm vasc kevin w/ patchy superimposed atel vs infil at mid L lung    4/25 LEVD-   -R neg    Monoph waveforms noted thru RLE sugg severe AIOD    Slow cont arterial flow noted at ankle    -L (pt refused)         EKG, Pathology, and Other Studies:   VTE Prophylaxis: Sequential compression device (Venodyne)  and Heparin - SQ (Xarelto held)     Code Status: Level 1 - Full Code  Advance Directive and Living Will: Yes    Power of :    Segun Deleon PA-C  4/25/2018

## 2018-04-25 NOTE — SOCIAL WORK
Cm met with pt and aware recommendation if for SNF   Pt aware and agreeable and requested referral to [facility]  Cm will follow,  pt not medically clear today getting blood transfusion and duplex study

## 2018-04-25 NOTE — OCCUPATIONAL THERAPY NOTE
Occupational Therapy Treatment Note      Maceo Sandifer    4/25/2018    Patient Active Problem List   Diagnosis    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 ([address] )    Essential hypertension    Rheumatoid arthritis ([address]    Diabetes mellitus type 2 in obese ([address] )    Urinary tract infection    Sleep apnea    Hypothyroidism    Chronic GERD    Acute blood loss anemia    Carpal tunnel syndrome, left    Diverticulosis of colon    Edema    Hematuria    Hypercholesterolemia    Lymphedema    Obesity    Overactive bladder    Peripheral neuropathy    Prediabetes    Primary osteoarthritis of left knee    Restless leg syndrome    Right lumbar radiculopathy    Sensorineural hearing loss    Sensory urge incontinence    Sinus arrhythmia    Chronic bilateral thoracic back pain    Thoracic degenerative disc disease    Urothelial cancer ([address]    Lung nodule < 6cm on CT    Pancreatic cyst    Iron deficiency anemia       Past Medical History:   Diagnosis Date    Anemia     Arthritis     rheumatoid    Benign essential hypertension     Cataract     Chronic cystitis     Colon, diverticulosis     CPAP (continuous positive airway pressure) dependence     Diabetes mellitus ([address]     Diverticulitis of colon     Early satiety     GERD (gastroesophageal reflux disease)     Gout     Hearing aid worn     bilateral    Hearing loss     Hemorrhoids     Hiatal hernia     History of colonic polyps     Hyperlipidemia     Hypertension     Hypertension     Hypothyroidism     Impaired fasting glucose     Left breast mass     Microhematuria     Migraine     occular    Neuropathy     lower and upper extermities    Overactive bladder     Pneumonia of left lower lobe due to infectious organism ([address]     [facility] [address] [facility]     Rheumatoid arthritis ([address]     Sleep apnea     uses cpap    Stroke ([facility] [address] [facility]     5/2017    Type 2 diabetes mellitus ([address] )  Urinary frequency     Urinary urgency     Use of cane as ambulatory aid        Past Surgical History:   Procedure Laterality Date    APPENDECTOMY      ARTHROSCOPY WRIST Right     with release of transverse carpal ligament     BLADDER SURGERY      BLADDER SURGERY      BREAST SURGERY      left breast    BUNIONECTOMY Bilateral     CATARACT EXTRACTION Bilateral     CHOLECYSTECTOMY      COLONOSCOPY      CYSTOSCOPY      EGD AND COLONOSCOPY N/A 12/20/2017    Procedure: EGD AND COLONOSCOPY;  Surgeon: Ayo Allen MD;  [facility]; Service: Gastroenterology    ESOPHAGOGASTRODUODENOSCOPY      diagnostic    FOREARM SURGERY Right     fracture repair    HYSTERECTOMY      JOINT REPLACEMENT      KNEE ARTHROSCOPY Left     KNEE SURGERY Left     meniscus tear    NOSE SURGERY      MI CYSTO/URETERO W/LITHOTRIPSY &INDWELL STENT INSRT Right 2/28/2018    Procedure: CYSTOSCOPY RIGHT URETEROSCOPY, RIGHT RETROGRADE PYELOGRAM AND INSERTION  RIGHT STENT URETERAL, RIGHT RENAL PELVIC WASHING FOR CYTOLOGY;  Surgeon: Florence Carolina MD;  [facility];  Service: Urology    MI NEPHRECTOMY, W/PART   URETECTOMY Right 4/23/2018    Procedure: Jovita Mandaen ASSISTED NEPHRO-URETERECTOMY WITH BLADDER CUFF EXCISION;  Surgeon: Danielle Bernal MD;  [facility];  Service: Urology    REPLACEMENT TOTAL KNEE BILATERAL Bilateral     URETEROSCOPY Right 3/20/2018    Procedure: CYSTOSCOPY, RETROGRADE PYELOGRAM, URETEROSCOPY, BIOPSY, BRUSH, FULGURATION, STENT PLACEMENT, BLADDER BIOPSY WITH FULGURATION;  Surgeon: Danielle Bernal MD;  [facility];  Service: Urology        04/25/18 1140   Restrictions/Precautions   Other Precautions Fall Risk;Multiple lines;Pain   General   Response to Previous Treatment Patient with no complaints from previous session   Pain Assessment   Pain Assessment 0-10   Pain Score 8   Pain Location Leg   Pain Orientation Bilateral   ADL   Where Assessed Chair   Eating Assistance 7 Independent   Grooming Assistance 5  Supervision/Setup   Grooming Deficit Setup; Increased time to complete   UB Bathing Assistance 5  Supervision/Setup   LB Bathing Assistance 2  Maximal Assistance   LB Dressing Assistance 2  Maximal Assistance   Functional Standing Tolerance   Time 3 min   Transfers   Sit to Stand 3  Moderate assistance   Additional items Assist x 1;Assist x 2; Increased time required   Stand to Sit 3  Moderate assistance   Cognition   Overall Cognitive Status [facility]   Arousal/Participation Alert; Cooperative   Attention Within functional limits   Orientation Level Oriented X4   Activity Tolerance   Activity Tolerance Patient limited by pain   Medical Staff Made Aware appropriate to see per RN Antonella   Assessment   Assessment pt seen for OT treatemetn w focus on self care sitting in chair  she is able to self feed meal, able to comb own hair, able to wash own face and hands  Pt S/set up for UB self care , max assist for LB  She needed mod assist of 1-2 for sit to stand due to c/o pain le's, more in the R than L  RN aware and MD notified  Pt pleasant and cooperative w OT treatment  OT will continue to follow and treat as per POC  Plan   Treatment Interventions ADL retraining; Endurance training;Patient/family training; Compensatory technique education; Energy conservation   Goal Expiration Date 05/04/18   Treatment Day 1   OT Frequency 3-5x/wk   Recommendation   OT Discharge Recommendation (rehab vs home pending progress)

## 2018-04-25 NOTE — PHYSICAL THERAPY NOTE
Physical Therapy Evaluation    Patient Name: Unique Bernstein    LCSQH'F Date: 4/25/2018     Problem List  Patient Active Problem List   Diagnosis    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 ([facility]    Essential hypertension    Rheumatoid arthritis ([facility]    Diabetes mellitus type 2 in obese ([facility]    Urinary tract infection    Sleep apnea    Hypothyroidism    Chronic GERD    Acute blood loss anemia    Carpal tunnel syndrome, left    Diverticulosis of colon    Edema    Hematuria    Hypercholesterolemia    Lymphedema    Obesity    Overactive bladder    Peripheral neuropathy    Prediabetes    Primary osteoarthritis of left knee    Restless leg syndrome    Right lumbar radiculopathy    Sensorineural hearing loss    Sensory urge incontinence    Sinus arrhythmia    Chronic bilateral thoracic back pain    Thoracic degenerative disc disease    Urothelial cancer ([facility]    Lung nodule < 6cm on CT    Pancreatic cyst    Iron deficiency anemia        Past Medical History  Past Medical History:   Diagnosis Date    Anemia     Arthritis     rheumatoid    Benign essential hypertension     Cataract     Chronic cystitis     Colon, diverticulosis     CPAP (continuous positive airway pressure) dependence     Diabetes mellitus ([address]     Diverticulitis of colon     Early satiety     GERD (gastroesophageal reflux disease)     Gout     Hearing aid worn     bilateral    Hearing loss     Hemorrhoids     Hiatal hernia     History of colonic polyps     Hyperlipidemia     Hypertension     Hypertension     Hypothyroidism     Impaired fasting glucose     Left breast mass     Microhematuria     Migraine     occular    Neuropathy     lower and upper extermities    Overactive bladder     Pneumonia of left lower lobe due to infectious organism (HCC)     RA (rheumatoid arthritis) ([facility] [facility]     Rheumatoid arthritis ([address]     Sleep apnea     uses cpap    Stroke [facility])     5/2017    Type 2 diabetes mellitus (HCC)     Urinary frequency     Urinary urgency     Use of cane as ambulatory aid         Past Surgical History  Past Surgical History:   Procedure Laterality Date    APPENDECTOMY      ARTHROSCOPY WRIST Right     with release of transverse carpal ligament     BLADDER SURGERY      BLADDER SURGERY      BREAST SURGERY      left breast    BUNIONECTOMY Bilateral     CATARACT EXTRACTION Bilateral     CHOLECYSTECTOMY      COLONOSCOPY      CYSTOSCOPY      EGD AND COLONOSCOPY N/A 12/20/2017    Procedure: EGD AND COLONOSCOPY;  Surgeon: Melecio Khalil MD;  [facility]: [facility]; Service: Gastroenterology    ESOPHAGOGASTRODUODENOSCOPY      diagnostic    FOREARM SURGERY Right     fracture repair    HYSTERECTOMY      JOINT REPLACEMENT      KNEE ARTHROSCOPY Left     KNEE SURGERY Left     meniscus tear    NOSE SURGERY      [place] CYSTO/URETERO W/LITHOTRIPSY &INDWELL STENT INSRT Right 2/28/2018    Procedure: CYSTOSCOPY RIGHT URETEROSCOPY, RIGHT RETROGRADE PYELOGRAM AND INSERTION  RIGHT STENT URETERAL, RIGHT RENAL PELVIC WASHING FOR CYTOLOGY;  Surgeon: Adryan Gracia MD;  [facility];  Service: Urology    NY NEPHRECTOMY, W/PART  URETECTOMY Right 4/23/2018    Procedure: ROBATIC ASSISTED NEPHRO-URETERECTOMY WITH BLADDER CUFF EXCISION;  Surgeon: Mayank Durham MD;  [facility];  Service: Urology    REPLACEMENT TOTAL KNEE BILATERAL Bilateral     URETEROSCOPY Right 3/20/2018    Procedure: CYSTOSCOPY, RETROGRADE PYELOGRAM, URETEROSCOPY, BIOPSY, BRUSH, FULGURATION, STENT PLACEMENT, BLADDER BIOPSY WITH FULGURATION;  Surgeon: Mayank Durham MD;  [facility];  Service: Urology         04/25/18 1152   Pain Assessment   Pain Assessment 0-10   Pain Score 9   Pain Location Leg   Pain Orientation Right   Pain Descriptors Aching; Linda Passer; Shooting;Spasm   Pain Onset Gradual   Clinical Progression Rapidly worsening   Effect of Pain on Daily Activities difficulty weightbearing through R LE during PT eval- RN Dominga Willis) made aware    Hospital Pain Intervention(s) Repositioned; Emotional support;Elevated  (RN aware- paged MD to inform )   Response to Interventions tolerated and improved w/ LE's elevated in chair    Home Living   Type of [address] One level  (4 RONNY)   Home Equipment Cane   Prior Function   Level of [place] Independent with ADLs and functional mobility   Lives With Spouse; Family  (all kids are local and can assist prn )   Receives Help From   [address] in the last 6 months 0  (denies )   Vocational Retired   Comments pt lives w/ her spouse adn reports MI using [facility] PTA; 0 falls; was Indep w/ ADL's PTA- spouse drives and can provide 24/7 A on d/c    Restrictions/Precautions   Weight Bearing Precautions Per Order No   Other Precautions Pain; Fall Risk;Telemetry;Multiple lines  (YANIRA drain; walls)   General   Additional Pertinent History pt noted to have difficulty weightbearing and ambulating on R LE today- 9-10/10 pain in R calf noted/ reported and pt noted to have (+) nandini's sign on R- Antonella RN made aware and MD paged- pt was returned to chair w/ LE's elevated w/ all needs in reach- dopplers ordered      Family/Caregiver Present Yes  (spouse and son )   Cognition   Overall Cognitive Status WFL   Arousal/Participation Alert   Attention Within functional limits   Orientation Level Oriented X4   Memory Within functional limits   Following Commands Follows all commands and directions without difficulty   RUE Assessment   RUE Assessment WFL   LUE Assessment   LUE Assessment WFL   RLE Assessment   RLE Assessment X  (functional and at least 3+/5- pain limited DF w/ (+) Homans )   LLE Assessment   LLE Assessment WFL  (4+/5 throughout )   Coordination   Movements are Fluid and Coordinated 0   Coordination and Movement Description antalgic- pain in R LE   Bed Mobility   Additional Comments OOB on presentation    Transfers   Sit to Stand 3  Moderate assistance   Additional items Assist x 1; Increased time required;Verbal cues   Stand to Sit 3  Moderate assistance   Additional items Assist x 1; Increased time required;Verbal cues   Stand pivot 3  Moderate assistance   Ambulation/Elevation   Gait pattern Improper Weight shift; Antalgic; Forward Flexion;Decreased foot clearance;Decreased R stance   Gait Assistance 3  Moderate assist   Additional items Assist x 1   Assistive Device Rolling walker   Distance 3' w/ RW- increased pain on R LE w/ weightbearing - further ambulation distances deferred 2* pain   Balance   Static Sitting Fair +   Static Standing Poor +   Dynamic Standing Poor +   Ambulatory Poor  (rw- pain limiting )   Endurance Deficit   Endurance Deficit Yes   Activity Tolerance   Activity Tolerance Patient limited by fatigue;Patient limited by pain;Treatment limited secondary to medical complications (Comment)  ((+) nandini's RLE)   Medical Staff Made Aware Angela Fernandez   Nurse Made Aware RN- Merary Penn made aware    Assessment   Prognosis Good   Problem List Decreased strength;Decreased range of motion;Decreased endurance; Impaired balance;Decreased mobility; Decreased coordination;Obesity; Decreased skin integrity;Pain   Assessment Pt is 81 y o  female seen for PT evaluation s/p admit to [facility] on 4/23/2018  Pt dx w/ Urothelial cancer  and see s/p  ROBATIC ASSISTED NEPHRO-URETERECTOMY WITH BLADDER CUFF EXCISION (Right) on 4/23/18  PT now consulted for assessment of mobility and d/c needs  Pt has extensive PMHX (see above for full that is significant for CVA; UTI, gross hematuria, urinary frequency, urinary urgency, and a h/o RA, cataract, diverticulitis, DM, HTN, HLD, hypothyroidism, [place], GERD, NICK on CPAP, neuropathy  Prior to admission, pt lives with her  in a single story condo w/ 4 RONNY and was MI w/ use of SPC for ambulation   Upon evaluation, pt currently is requiring  modA for functional transfers and modA for ambulation w/ RW <3' 2* severe pain in R LE (calf)  w/ DF of R ankle and weightbearing  pt noted to have (+) Nandini's sign on R and further distances were terminated  NIRAV Kirby) notified and MD was paged- pt returned to chair w/ LE's elevated- pain mostly subsided  Son and spouse present and all needs in reach  Pt presents functioning below baseline and currently w/ overall mobility deficits 2* to: post op abdominal pain; R LE/ calf pain;  decreased LE strength/AROM; limited flexibility;  generalized weakness/ deconditioning; decreased endurance; decreased activity tolerance;  impaired balance; gait deviations;; fatigue; multiple lines; YANIRA;  hearing impairment/ visual impairments; obesity- anemia receiving blood transfusions; (+) nandini's sign  Pt currently at risk for falls  (Please find additional objective findings from PT assessment regarding body systems outlined above ) Pt will continue to benefit from skilled PT interventions to address stated impairments; to maximize functional potential; for ongoing pt/ family training; and DME needs  PT is currently deferring d/c recommendation until further progressed w/ mobility  Pt ideally would prefer to d/c home w/ sposue assist  Pt/ family agreeable to plan and goals as stated on evaluation  Barriers to Discharge Inaccessible home environment   Barriers to Discharge Comments RONNY   Goals   Patient Goals have less pain   STG Expiration Date 05/05/18   Short Term Goal #1 In 10 days pt will complete: 1) Bed mobility skills with MI2) Functional transfers with MII 3) Ambulation with least restrictive AD MI' without LOB and stable vitals  4) Stair training up/ down 4 step/s with S  5) Improve balance grades to Good 6) Improve LE strength grades by 1   7) LE HEP independently   8) PT for ongoing pt and family education; DME needs and D/C planning to promote highest level of function in least restrictive environment  Treatment Day 0   Plan   Treatment/Interventions Functional transfer training;LE strengthening/ROM; Elevations; Therapeutic exercise; Endurance training;Patient/family training;Equipment eval/education; Bed mobility;Gait training;Spoke to MD;Spoke to nursing;OT   Recommendation   Recommendation Defer at this time   Equipment Recommended Walker   PT - OK to Discharge No   Modified Hall Scale   Modified Jawsinder Scale 4   Barthel Index   Feeding 10   Bathing 0   Grooming Score 5   Dressing Score 5   Bladder Score 0   Bowels Score 10   Toilet Use Score 5   Transfers (Bed/Chair) Score 5   Mobility (Level Surface) Score 0   Stairs Score 0   Barthel Index Score 40     Romy Patel, PT

## 2018-04-25 NOTE — ASSESSMENT & PLAN NOTE
Possibly related anemia and blood pressure  Transfusion and keeping hg >7/24  Monitor CBC daily  Gentle hydration   Avoid nephrotoxins  Highly likely post renal, continue monitor output closely

## 2018-04-26 ENCOUNTER — TELEPHONE (OUTPATIENT)
Dept: OTHER | Facility: HOSPITAL | Age: 83
End: 2018-04-26

## 2018-04-26 ENCOUNTER — APPOINTMENT (INPATIENT)
Dept: NON INVASIVE DIAGNOSTICS | Facility: HOSPITAL | Age: 83
DRG: 654 | End: 2018-04-26
Payer: MEDICARE

## 2018-04-26 DIAGNOSIS — C64.1 UROTHELIAL CARCINOMA OF KIDNEY, RIGHT (HCC): Primary | ICD-10-CM

## 2018-04-26 PROBLEM — K59.03 THERAPEUTIC OPIOID-INDUCED CONSTIPATION (OIC): Status: ACTIVE | Noted: 2018-04-26

## 2018-04-26 PROBLEM — T40.2X5A THERAPEUTIC OPIOID-INDUCED CONSTIPATION (OIC): Status: ACTIVE | Noted: 2018-04-26

## 2018-04-26 LAB
ABO GROUP BLD BPU: NORMAL
ANION GAP SERPL CALCULATED.3IONS-SCNC: 7 MMOL/L (ref 4–13)
BASOPHILS # BLD AUTO: 0.01 THOUSANDS/ΜL (ref 0–0.1)
BASOPHILS NFR BLD AUTO: 0 % (ref 0–1)
BPU ID: NORMAL
BUN SERPL-MCNC: 20 MG/DL (ref 5–25)
CALCIUM SERPL-MCNC: 8.2 MG/DL (ref 8.3–10.1)
CHLORIDE SERPL-SCNC: 106 MMOL/L (ref 100–108)
CO2 SERPL-SCNC: 27 MMOL/L (ref 21–32)
CREAT SERPL-MCNC: 1.37 MG/DL (ref 0.6–1.3)
CROSSMATCH: NORMAL
EOSINOPHIL # BLD AUTO: 0.46 THOUSAND/ΜL (ref 0–0.61)
EOSINOPHIL NFR BLD AUTO: 5 % (ref 0–6)
ERYTHROCYTE [DISTWIDTH] IN BLOOD BY AUTOMATED COUNT: 17.9 % (ref 11.6–15.1)
GFR SERPL CREATININE-BSD FRML MDRD: 36 ML/MIN/1.73SQ M
GLUCOSE SERPL-MCNC: 112 MG/DL (ref 65–140)
GLUCOSE SERPL-MCNC: 114 MG/DL (ref 65–140)
GLUCOSE SERPL-MCNC: 152 MG/DL (ref 65–140)
GLUCOSE SERPL-MCNC: 180 MG/DL (ref 65–140)
GLUCOSE SERPL-MCNC: 211 MG/DL (ref 65–140)
HCT VFR BLD AUTO: 26.3 % (ref 34.8–46.1)
HGB BLD-MCNC: 8.3 G/DL (ref 11.5–15.4)
LYMPHOCYTES # BLD AUTO: 1.47 THOUSANDS/ΜL (ref 0.6–4.47)
LYMPHOCYTES NFR BLD AUTO: 17 % (ref 14–44)
MCH RBC QN AUTO: 31.4 PG (ref 26.8–34.3)
MCHC RBC AUTO-ENTMCNC: 31.6 G/DL (ref 31.4–37.4)
MCV RBC AUTO: 100 FL (ref 82–98)
MONOCYTES # BLD AUTO: 1.04 THOUSAND/ΜL (ref 0.17–1.22)
MONOCYTES NFR BLD AUTO: 12 % (ref 4–12)
NEUTROPHILS # BLD AUTO: 5.82 THOUSANDS/ΜL (ref 1.85–7.62)
NEUTS SEG NFR BLD AUTO: 66 % (ref 43–75)
NRBC BLD AUTO-RTO: 0 /100 WBCS
PLATELET # BLD AUTO: 174 THOUSANDS/UL (ref 149–390)
PMV BLD AUTO: 9.5 FL (ref 8.9–12.7)
POTASSIUM SERPL-SCNC: 3.7 MMOL/L (ref 3.5–5.3)
RBC # BLD AUTO: 2.64 MILLION/UL (ref 3.81–5.12)
SODIUM SERPL-SCNC: 140 MMOL/L (ref 136–145)
UNIT DISPENSE STATUS: NORMAL
UNIT PRODUCT CODE: NORMAL
UNIT RH: NORMAL
WBC # BLD AUTO: 8.87 THOUSAND/UL (ref 4.31–10.16)

## 2018-04-26 PROCEDURE — 99233 SBSQ HOSP IP/OBS HIGH 50: CPT | Performed by: INTERNAL MEDICINE

## 2018-04-26 PROCEDURE — 85025 COMPLETE CBC W/AUTO DIFF WBC: CPT | Performed by: INTERNAL MEDICINE

## 2018-04-26 PROCEDURE — 97116 GAIT TRAINING THERAPY: CPT

## 2018-04-26 PROCEDURE — 99024 POSTOP FOLLOW-UP VISIT: CPT | Performed by: UROLOGY

## 2018-04-26 PROCEDURE — 97110 THERAPEUTIC EXERCISES: CPT

## 2018-04-26 PROCEDURE — 93923 UPR/LXTR ART STDY 3+ LVLS: CPT

## 2018-04-26 PROCEDURE — 82948 REAGENT STRIP/BLOOD GLUCOSE: CPT

## 2018-04-26 PROCEDURE — 93925 LOWER EXTREMITY STUDY: CPT

## 2018-04-26 PROCEDURE — 80048 BASIC METABOLIC PNL TOTAL CA: CPT | Performed by: INTERNAL MEDICINE

## 2018-04-26 PROCEDURE — 94760 N-INVAS EAR/PLS OXIMETRY 1: CPT

## 2018-04-26 RX ORDER — BISACODYL 10 MG
10 SUPPOSITORY, RECTAL RECTAL ONCE
Status: COMPLETED | OUTPATIENT
Start: 2018-04-26 | End: 2018-04-26

## 2018-04-26 RX ORDER — POLYETHYLENE GLYCOL 3350 17 G/17G
17 POWDER, FOR SOLUTION ORAL DAILY
Status: DISCONTINUED | OUTPATIENT
Start: 2018-04-26 | End: 2018-04-27 | Stop reason: HOSPADM

## 2018-04-26 RX ADMIN — FUROSEMIDE 20 MG: 20 TABLET ORAL at 08:16

## 2018-04-26 RX ADMIN — DOCUSATE SODIUM 100 MG: 100 CAPSULE, LIQUID FILLED ORAL at 08:16

## 2018-04-26 RX ADMIN — HYDROMORPHONE HYDROCHLORIDE 0.5 MG: 1 INJECTION, SOLUTION INTRAMUSCULAR; INTRAVENOUS; SUBCUTANEOUS at 00:54

## 2018-04-26 RX ADMIN — BISACODYL 10 MG: 10 SUPPOSITORY RECTAL at 16:29

## 2018-04-26 RX ADMIN — HYDROCODONE BITARTRATE AND ACETAMINOPHEN 1 TABLET: 5; 325 TABLET ORAL at 08:20

## 2018-04-26 RX ADMIN — GABAPENTIN 100 MG: 100 CAPSULE ORAL at 08:16

## 2018-04-26 RX ADMIN — GABAPENTIN 100 MG: 100 CAPSULE ORAL at 22:18

## 2018-04-26 RX ADMIN — DOCUSATE SODIUM 100 MG: 100 CAPSULE, LIQUID FILLED ORAL at 18:59

## 2018-04-26 RX ADMIN — PRAVASTATIN SODIUM 80 MG: 40 TABLET ORAL at 08:15

## 2018-04-26 RX ADMIN — MINERAL OIL AND WHITE PETROLATUM: 150; 830 OINTMENT OPHTHALMIC at 22:19

## 2018-04-26 RX ADMIN — POLYETHYLENE GLYCOL 3350 17 G: 17 POWDER, FOR SOLUTION ORAL at 14:50

## 2018-04-26 RX ADMIN — ROPINIROLE 0.5 MG: 1 TABLET, FILM COATED ORAL at 05:30

## 2018-04-26 RX ADMIN — ACETAMINOPHEN 650 MG: 325 TABLET, FILM COATED ORAL at 22:20

## 2018-04-26 RX ADMIN — GABAPENTIN 100 MG: 100 CAPSULE ORAL at 18:59

## 2018-04-26 RX ADMIN — LEVOTHYROXINE SODIUM 75 MCG: 75 TABLET ORAL at 05:31

## 2018-04-26 RX ADMIN — ROPINIROLE 1 MG: 1 TABLET, FILM COATED ORAL at 22:19

## 2018-04-26 RX ADMIN — PANTOPRAZOLE SODIUM 40 MG: 40 TABLET, DELAYED RELEASE ORAL at 05:31

## 2018-04-26 RX ADMIN — INSULIN LISPRO 1 UNITS: 100 INJECTION, SOLUTION INTRAVENOUS; SUBCUTANEOUS at 16:30

## 2018-04-26 RX ADMIN — HEPARIN SODIUM 5000 UNITS: 5000 INJECTION, SOLUTION INTRAVENOUS; SUBCUTANEOUS at 05:31

## 2018-04-26 RX ADMIN — HEPARIN SODIUM 5000 UNITS: 5000 INJECTION, SOLUTION INTRAVENOUS; SUBCUTANEOUS at 22:19

## 2018-04-26 RX ADMIN — HEPARIN SODIUM 5000 UNITS: 5000 INJECTION, SOLUTION INTRAVENOUS; SUBCUTANEOUS at 14:50

## 2018-04-26 RX ADMIN — INSULIN LISPRO 1 UNITS: 100 INJECTION, SOLUTION INTRAVENOUS; SUBCUTANEOUS at 12:28

## 2018-04-26 NOTE — PLAN OF CARE
Problem: PHYSICAL THERAPY ADULT  Goal: Performs mobility at highest level of function for planned discharge setting  See evaluation for individualized goals  Treatment/Interventions: Functional transfer training, LE strengthening/ROM, Elevations, Therapeutic exercise, Endurance training, Patient/family training, Bed mobility, Gait training, Spoke to case management  Equipment Recommended: Chika Sheppard       See flowsheet documentation for full assessment, interventions and recommendations  Outcome: Progressing  Prognosis: Good  Problem List: Decreased strength, Decreased endurance, Impaired balance, Decreased mobility, Pain  Assessment: Pt demonstrates progress this session with overall mobility  Continues to be limitd by pain in B/L feet however able to tolerate session and amb increased distances as well as perform bed moblity and transfer with less assist  Will continue to assess/progress patient as able however when medically stable for D/C recommend another level of care for continued skilled therapy sessions at an inpatient facility  Barriers to Discharge: Inaccessible home environment  Barriers to Discharge Comments: RONNY  Recommendation: Short-term skilled PT     PT - OK to Discharge: No    See flowsheet documentation for full assessment

## 2018-04-26 NOTE — PROGRESS NOTES
Progress Note - Priscilla Almendarez 81 y o  female MRN: [id number]    Unit/Bed#: [facility] 817-01 Encounter: [id number]      Ms Pepper Taylor is an 73-year-old female with urothelial carcinoma postoperative day 3 robotic laparoscopic dot bladder cuff dot patient followed by medical and vascular teams  Transfer to short-term rehab pending  Patient offers complaints of constipation with abdominal bloating  MiraLax and Dulcolax suppository  Patient will be discharged once positive bowel evacuation achieved  Discussed with attending and

## 2018-04-26 NOTE — PHYSICAL THERAPY NOTE
Physical Therapy Progress Note     04/26/18 1440   Pain Assessment   Pain Assessment 0-10   Pain Score 4   Pain Location Abdomen   Pain Orientation Bilateral   Hospital Pain Intervention(s) Cold applied;Repositioned; Ambulation/increased activity   Response to Interventions tolerated mobility   Restrictions/Precautions   Weight Bearing Precautions Per Order No   Other Precautions Telemetry; Fall Risk;Pain   General   Chart Reviewed Yes   Additional Pertinent History pt with TEMITOPE US this AM - results pending   Family/Caregiver Present ( and son left room during assessment)   Cognition   Overall Cognitive Status WFL   Arousal/Participation Alert; Cooperative  (pleasant)   Attention Within functional limits   Orientation Level Oriented X4   Memory Within functional limits   Following Commands Follows one step commands without difficulty   Subjective   Subjective My feet hurt but they are somewhat better than the previous day  Bed Mobility   Supine to Sit 5  Supervision   Additional items Verbal cues   Additional Comments pt positioned OOB in bedside chair at end of session   Transfers   Sit to Stand 4  Minimal assistance   Additional items HOB elevated;Assist x 1   Stand to Sit 4  Minimal assistance   Additional items Verbal cues; Assist x 1   Stand pivot 4  Minimal assistance   Additional items Assist x 1;Verbal cues; Increased time required   Ambulation/Elevation   Gait pattern Inconsistent emanuel; Foward flexed; Short stride; Step to;Excessively slow   Gait Assistance 4  Minimal assist   Additional items Assist x 1   Assistive Device Rolling walker   Distance 15ft x2   Stair Management Assistance Not tested   Balance   Static Sitting Fair +   Dynamic Sitting Fair   Static Standing Poor +   Dynamic Standing Poor +   Ambulatory Poor   Endurance Deficit   Endurance Deficit Yes   Endurance Deficit Description pain, generalized decondtioning   Activity Tolerance   Activity Tolerance Patient limited by fatigue;Patient limited by pain   Nurse Made Aware yes   Medical Staff Made Aware yes   Exercises   Heelslides Sitting;10 reps;AROM; Bilateral   Glute Sets Sitting;10 reps   Hip Flexion Sitting;10 reps;AROM; Bilateral   Hip Abduction Sitting;10 reps;AROM; Bilateral   Knee AROM Long Arc Quad Sitting;10 reps;AROM; Bilateral   Ankle Pumps Sitting;20 reps;AROM; Bilateral   Marching Sitting;10 reps;AROM; Bilateral   Assessment   Prognosis Good   Problem List Decreased strength;Decreased endurance; Impaired balance;Decreased mobility;Pain   Assessment Pt demonstrates progress this session with overall mobility  Continues to be limitd by pain in B/L feet however able to tolerate session and amb increased distances as well as perform bed moblity and transfer with less assist  Will continue to assess/progress patient as able however when medically stable for D/C recommend another level of care for continued skilled therapy sessions at an inpatient facility  Barriers to Discharge Inaccessible home environment   Goals   Patient Goals Less pain   STG Expiration Date 05/05/18   Treatment Day 1   Plan   Treatment/Interventions Functional transfer training;LE strengthening/ROM; Elevations; Therapeutic exercise; Endurance training;Patient/family training;Bed mobility;Gait training;Spoke to case management   Progress Progressing toward goals   PT Frequency 5x/wk   Recommendation   Recommendation Short-term skilled PT   Equipment Recommended Carole Peralta   PT - OK to Discharge No     Gilford Hora, PT

## 2018-04-26 NOTE — TELEPHONE ENCOUNTER
Will make arrangements for cystogram and Medrano removal in approximately 2 weeks at the time pathology review

## 2018-04-26 NOTE — TELEPHONE ENCOUNTER
PT is currently inpt, I will check tomorrow to see if discharged to arrange testing  Also, sent to central scheduling work que to schedule

## 2018-04-26 NOTE — PROGRESS NOTES
Rounded with Dr Ulysses Mckoy with urology  Patient will be getting right kady d/c'd soon and iv fluids d/c'd  Will speak with case management for set up with short term rehab  Will keep walls in for 2 weeks

## 2018-04-26 NOTE — PROGRESS NOTES
Rounded with Dr Jacobo Bartholomew with urology  They may wait to d/c walls and recheck the kidney function later today  Dr Jacobo Bartholomew will check with Dr Migel Hernandez  Awaiting possible orders

## 2018-04-26 NOTE — PROGRESS NOTES
Postoperative day 3  Status post robot assisted laparoscopic left nephro ureterectomy with bladder cuff excision  1 unit packed red blood cells transfused secondary to hematocrit of 21  Post transfusion hematocrit stable at 26 3  Leg pain improved    T-max 98 1° 71  145/83  96% room air  Urine output 2850  YANIRA 145    On examination she is in no acute distress  Her abdomen is soft obese nontender nondistended  Incisions appear to be healing well at this time  Medrano catheter is in place draining clear urine  YANIRA in place with minimal drainage      Impression:  Left upper tract urothelial carcinoma status post robot assisted laparoscopic nephro ureterectomy with bladder cuff excision, stable postoperative acute blood loss anemia    Plan:    Await vascular duplex evaluation  Hep-Lock IV fluids  DC YANIRA drain today  Maintain Medrano catheter for 2 weeks postop  Out of bed and ambulate  Continue with PT  DVT prophylaxis  Discharge planning to skilled nursing facility recommended

## 2018-04-26 NOTE — SOCIAL WORK
CM accepted by [facility] cm notified Dr Cornejo Check facility will not accept until has bowel movement   When medically clear pt  will transport to facility

## 2018-04-27 VITALS
WEIGHT: 225.4 LBS | DIASTOLIC BLOOD PRESSURE: 73 MMHG | HEIGHT: 61 IN | BODY MASS INDEX: 42.56 KG/M2 | OXYGEN SATURATION: 96 % | SYSTOLIC BLOOD PRESSURE: 168 MMHG | TEMPERATURE: 98 F | RESPIRATION RATE: 16 BRPM | HEART RATE: 65 BPM

## 2018-04-27 LAB
ANION GAP SERPL CALCULATED.3IONS-SCNC: 4 MMOL/L (ref 4–13)
BUN SERPL-MCNC: 17 MG/DL (ref 5–25)
CALCIUM SERPL-MCNC: 8.4 MG/DL (ref 8.3–10.1)
CHLORIDE SERPL-SCNC: 104 MMOL/L (ref 100–108)
CO2 SERPL-SCNC: 30 MMOL/L (ref 21–32)
CREAT SERPL-MCNC: 1.28 MG/DL (ref 0.6–1.3)
ERYTHROCYTE [DISTWIDTH] IN BLOOD BY AUTOMATED COUNT: 17 % (ref 11.6–15.1)
GFR SERPL CREATININE-BSD FRML MDRD: 39 ML/MIN/1.73SQ M
GLUCOSE SERPL-MCNC: 111 MG/DL (ref 65–140)
GLUCOSE SERPL-MCNC: 123 MG/DL (ref 65–140)
GLUCOSE SERPL-MCNC: 141 MG/DL (ref 65–140)
GLUCOSE SERPL-MCNC: 163 MG/DL (ref 65–140)
HCT VFR BLD AUTO: 27 % (ref 34.8–46.1)
HGB BLD-MCNC: 8.7 G/DL (ref 11.5–15.4)
MCH RBC QN AUTO: 31.6 PG (ref 26.8–34.3)
MCHC RBC AUTO-ENTMCNC: 32.2 G/DL (ref 31.4–37.4)
MCV RBC AUTO: 98 FL (ref 82–98)
PLATELET # BLD AUTO: 217 THOUSANDS/UL (ref 149–390)
PMV BLD AUTO: 9.5 FL (ref 8.9–12.7)
POTASSIUM SERPL-SCNC: 3.4 MMOL/L (ref 3.5–5.3)
RBC # BLD AUTO: 2.75 MILLION/UL (ref 3.81–5.12)
SODIUM SERPL-SCNC: 138 MMOL/L (ref 136–145)
WBC # BLD AUTO: 9.39 THOUSAND/UL (ref 4.31–10.16)

## 2018-04-27 PROCEDURE — 85027 COMPLETE CBC AUTOMATED: CPT | Performed by: UROLOGY

## 2018-04-27 PROCEDURE — 80048 BASIC METABOLIC PNL TOTAL CA: CPT | Performed by: UROLOGY

## 2018-04-27 PROCEDURE — 82948 REAGENT STRIP/BLOOD GLUCOSE: CPT

## 2018-04-27 PROCEDURE — 99024 POSTOP FOLLOW-UP VISIT: CPT | Performed by: UROLOGY

## 2018-04-27 RX ORDER — POTASSIUM CHLORIDE 20 MEQ/1
40 TABLET, EXTENDED RELEASE ORAL ONCE
Status: COMPLETED | OUTPATIENT
Start: 2018-04-27 | End: 2018-04-27

## 2018-04-27 RX ADMIN — ACETAMINOPHEN 650 MG: 325 TABLET, FILM COATED ORAL at 09:31

## 2018-04-27 RX ADMIN — POTASSIUM CHLORIDE 40 MEQ: 1500 TABLET, EXTENDED RELEASE ORAL at 09:09

## 2018-04-27 RX ADMIN — FUROSEMIDE 20 MG: 20 TABLET ORAL at 09:09

## 2018-04-27 RX ADMIN — GABAPENTIN 100 MG: 100 CAPSULE ORAL at 09:09

## 2018-04-27 RX ADMIN — PANTOPRAZOLE SODIUM 40 MG: 40 TABLET, DELAYED RELEASE ORAL at 05:18

## 2018-04-27 RX ADMIN — LEVOTHYROXINE SODIUM 75 MCG: 75 TABLET ORAL at 05:18

## 2018-04-27 RX ADMIN — DOCUSATE SODIUM 100 MG: 100 CAPSULE, LIQUID FILLED ORAL at 09:09

## 2018-04-27 RX ADMIN — HEPARIN SODIUM 5000 UNITS: 5000 INJECTION, SOLUTION INTRAVENOUS; SUBCUTANEOUS at 05:18

## 2018-04-27 RX ADMIN — INSULIN LISPRO 1 UNITS: 100 INJECTION, SOLUTION INTRAVENOUS; SUBCUTANEOUS at 00:15

## 2018-04-27 RX ADMIN — PRAVASTATIN SODIUM 80 MG: 40 TABLET ORAL at 09:09

## 2018-04-27 RX ADMIN — ROPINIROLE 0.5 MG: 1 TABLET, FILM COATED ORAL at 05:18

## 2018-04-27 RX ADMIN — HYDROCODONE BITARTRATE AND ACETAMINOPHEN 1 TABLET: 5; 325 TABLET ORAL at 01:05

## 2018-04-27 NOTE — PROGRESS NOTES
UROLOGY PROGRESS NOTE   Patient Identifiers: Rena Obregon (MRN [id number])  Date of Service: 4/27/2018    Assessment:   80 yo Female s/p RIGHT robotic nephroureterectomy POD#4     Plan:     Patient is doing very well  She had a bowel movement yesterday with the use of MiraLax, Dulcolax suppositories, Colace, prune juice  Her kidney function remains stable with a solitary kidney  Patient has been off intravenous fluids  She is tolerating a diabetic diet  Her pain is reasonably well controlled at the incision sites  The patient does have chronic pain in lower extremities related to her rheumatoid arthritis and neuropathy  I have recommended holding her methotrexate for 1-2 weeks following surgery to allow for healing of the bladder and surgical incisions  Patient's hemoglobin has stabilized after the 1 unit of packed red blood cell transfusion  I am holding the patient's Xarelto  She remains on subcutaneous heparin while in-house for DVT prophylaxis  She also has ELGIN stockings and sequential compression devices in place  Patient has been seen by physical therapy and transfer to rehab facility at [facility] in [place] is pending for today  Patient is stable for discharge from a urologic standpoint  Patient has been cleared by the vascular surgery team   They will follow up her vascular studies and arrange outpatient follow up if necessary  Subjective:     24 HR EVENTS:   no significant events  Patient has  complaints of Lower extremity discomfort related to her chronic rheumatoid arthritis         Objective:     VITALS:    Vitals:    04/27/18 0730   BP: 168/73   Pulse: 65   Resp: 16   Temp: 98 °F (36 7 °C)   SpO2: 96%       INS & OUTS:  4975cc/24hours  Drain 30cc - removed    LABS:  Lab Results   Component Value Date    HGB 8 7 (L) 04/27/2018    HCT 27 0 (L) 04/27/2018    WBC 9 39 04/27/2018     04/27/2018   ]    Lab Results   Component Value Date     04/27/2018    K 3 4 (L) 04/27/2018     04/27/2018    CO2 30 04/27/2018    BUN 17 04/27/2018    CREATININE 1 28 04/27/2018    CALCIUM 8 4 04/27/2018    GLUCOSE 111 04/27/2018   ]    INPATIENT MEDS:    Current Facility-Administered Medications:     acetaminophen (TYLENOL) tablet 650 mg, 650 mg, Oral, Q6H PRN, Steve Pike MD, 650 mg at 04/26/18 2220    artificial tear (LUBRIFRESH P M ) ophthalmic ointment, , Both Eyes, HS, Petra Mcpherson CRNP    diphenhydrAMINE (BENADRYL) tablet 25 mg, 25 mg, Oral, Q8H PRN, SANTOSH Guillaume, 25 mg at 04/25/18 4814    docusate sodium (COLACE) capsule 100 mg, 100 mg, Oral, BID, Steve Pike MD, 100 mg at 04/26/18 1859    furosemide (LASIX) tablet 20 mg, 20 mg, Oral, Daily, Steve iPke MD, 20 mg at 04/26/18 0816    gabapentin (NEURONTIN) capsule 100 mg, 100 mg, Oral, TID, Steve Pike MD, 100 mg at 04/26/18 2218    heparin (porcine) subcutaneous injection 5,000 Units, 5,000 Units, Subcutaneous, Q8H [address], 5,000 Units at 04/27/18 0518 **AND** Platelet count, , , Once, Steve Pike MD    HYDROcodone-acetaminophen [facility]) 5-325 mg per tablet 1 tablet, 1 tablet, Oral, Q6H PRN, Steve Pike MD, 1 tablet at 04/27/18 0105    HYDROmorphone (DILAUDID) injection 0 5 mg, 0 5 mg, Intravenous, Q3H PRN, Steve Pike MD, 0 5 mg at 04/26/18 0054    insulin lispro (HumaLOG) 100 units/mL subcutaneous injection 1-5 Units, 1-5 Units, Subcutaneous, HS, Marielle Zhao PA-C, 1 Units at 04/27/18 0015    insulin lispro (HumaLOG) 100 units/mL subcutaneous injection 1-6 Units, 1-6 Units, Subcutaneous, TID AC, 1 Units at 04/26/18 1630 **AND** Fingerstick Glucose (POCT), , , TID AC, Peri Sinha MD    levothyroxine tablet 75 mcg, 75 mcg, Oral, Early Morning, Steve Pike MD, 75 mcg at 04/27/18 0518    ondansetron (ZOFRAN) injection 4 mg, 4 mg, Intravenous, Q6H PRN, Steve Pike MD    pantoprazole (PROTONIX) EC tablet 40 mg, 40 mg, Oral, Early Morning, Leonard Obregon MD, 40 mg at 04/27/18 0518    polyethylene glycol (MIRALAX) packet 17 g, 17 g, Oral, Daily, Leonard Obregon MD, 17 g at 04/26/18 1450    pravastatin (PRAVACHOL) tablet 80 mg, 80 mg, Oral, Daily, Leonard Obregon MD, 80 mg at 04/26/18 0815    rOPINIRole (REQUIP) tablet 0 5 mg, 0 5 mg, Oral, Early Morning, Margarito Loza MD, 0 5 mg at 04/27/18 0518    rOPINIRole (REQUIP) tablet 1 mg, 1 mg, Oral, HS, Margarito Loza MD, 1 mg at 04/26/18 2219    simethicone (MYLICON) chewable tablet 80 mg, 80 mg, Oral, 4x Daily PRN, Leonard Obregon MD    Facility-Administered Medications Ordered in Other Encounters:     acetaminophen (TYLENOL) tablet 650 mg, 650 mg, Oral, Q6H PRN, Swathi Bro PA-C      Physical Exam:   /73 (BP Location: Left arm)   Pulse 65   Temp 98 °F (36 7 °C) (Oral)   Resp 16   Ht 5' 1" (1 549 m)   Wt 102 kg (225 lb 6 4 oz)   LMP  (LMP Unknown)   SpO2 96%   BMI 42 59 kg/m²   GEN: no acute distress    RESP: breathing comfortably with no accessory muscle use    ABD: soft, appropriately tender to palpation, non-distended   INCISION: clean, dry, intact   EXT: bilateral peripheral edema   DRAINS site is clean with some serous leakage  UGARTE: in place draining clear yellow urine      RADIOLOGY:   Duplex studies from 4/26 and 4/25, review pending by vascular surgery

## 2018-04-27 NOTE — PROGRESS NOTES
Progress Note - Jessica Crocker 1935, 81 y o  female MRN: [id number]    Unit/Bed#: [facility] 817-01 Encounter: [id number]    Primary Care Provider: Linda Cisneros MD   Date and time admitted to hospital: 4/23/2018 10:00 AM        Hyponatremia   Assessment & Plan    Possibly related to anemia, would continue monitor with AM BMP        BRENNEN (acute kidney injury) ([facility] [address] )   Assessment & Plan    Possibly related anemia and blood pressure  Transfusion and keeping hg >7/24  Monitor CBC daily  Gentle hydration   Avoid nephrotoxins  Highly likely post renal, continue monitor output closely        Anemia   Assessment & Plan    Secondary to surgery  Would continue to monitor cbc  s/p 1 unit of transfusion, stable        Restless leg syndrome   Assessment & Plan     Continue with requip  Being followed by vascular surgery  No acute intervention at this time  Sleep apnea   Assessment & Plan    · CPAP qhs        Diabetes mellitus type 2 in obese [facility])   Assessment & Plan    · Diet-controlled  · A1C 6 2 on 1/3/18  Will recheck  · Placed on sliding scale insulin  Will adjust as needed        Rheumatoid arthritis ([facility] )   Assessment & Plan    · On methotrexate        Essential hypertension   Assessment & Plan    · On losartan at home- this was held on admission  Can probably resume when patient tolerating diet and if renal function stable  · Patient reports she no longer takes diuretic- need to clarify if she is still on Lasix at home        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 ([facility] )   Assessment & Plan    · History of cardioembolic CVA, on Xarelto  · Last dose of Xarelto 4/20    · Recommend resuming Xarelto as soon as it is safe to do so from surgical standpoint due to stroke risk        * Urothelial cancer [facility])   Assessment & Plan    · S/p right nephro-ureterectomy 4/23/18, POD #1  · Mgmt per urology              VTE Pharmacologic Prophylaxis:   Pharmacologic: Pharmacologic VTE Prophylaxis contraindicated due to s/p surgery  Mechanical VTE Prophylaxis in Place: Yes    Patient Centered Rounds:      Discussions with Specialists or Other Care Team Provider:      Education and Discussions with Family / Patient: patient    Time Spent for Care: 45 minutes  More than 50% of total time spent on counseling and coordination of care as described above  Current Length of Stay: 3 day(s)    Current Patient Status: Inpatient   Certification Statement: The patient will continue to require additional inpatient hospital stay due to anemia    Discharge Plan: per primary team    Code Status: Level 1 - Full Code      Subjective:   No complaints at this time  She has denied sob, cough  Objective:     Vitals:   Temp (24hrs), Av 2 °F (36 8 °C), Min:98 °F (36 7 °C), Max:98 5 °F (36 9 °C)    HR:  [70-71] 71  Resp:  [16] 16  BP: (135-152)/(62-83) 152/69  SpO2:  [93 %-96 %] 94 %  Body mass index is 42 59 kg/m²  Input and Output Summary (last 24 hours): Intake/Output Summary (Last 24 hours) at 18  Last data filed at 18 1938   Gross per 24 hour   Intake           1498 5 ml   Output             3795 ml   Net          -2296 5 ml       Physical Exam:     Physical Exam   Constitutional: She is oriented to person, place, and time  She appears well-developed and well-nourished  HENT:   Head: Normocephalic and atraumatic  Right Ear: External ear normal    Mouth/Throat: Oropharynx is clear and moist  No oropharyngeal exudate  Eyes: Conjunctivae and EOM are normal  Pupils are equal, round, and reactive to light  Right eye exhibits no discharge  Left eye exhibits no discharge  No scleral icterus  Neck: Normal range of motion  Neck supple  No thyromegaly present  Cardiovascular: Normal rate, regular rhythm, normal heart sounds and intact distal pulses  Exam reveals no gallop and no friction rub  No murmur heard  Pulmonary/Chest: No stridor   No respiratory distress  She has no wheezes  She has no rales  She exhibits no tenderness  Abdominal: Bowel sounds are normal  She exhibits distension  There is tenderness  Musculoskeletal: She exhibits no edema, tenderness or deformity  Lymphadenopathy:     She has no cervical adenopathy  Neurological: She is alert and oriented to person, place, and time  She has normal reflexes  She exhibits normal muscle tone  Coordination abnormal    Skin: No rash noted  No erythema  No pallor  Psychiatric: She has a normal mood and affect  Her behavior is normal  Judgment and thought content normal    Nursing note and vitals reviewed  Additional Data:     Labs:      Results from last 7 days  Lab Units 04/26/18  0633   WBC Thousand/uL 8 87   HEMOGLOBIN g/dL 8 3*   HEMATOCRIT % 26 3*   PLATELETS Thousands/uL 174   NEUTROS PCT % 66   LYMPHS PCT % 17   MONOS PCT % 12   EOS PCT % 5       Results from last 7 days  Lab Units 04/26/18  0633   SODIUM mmol/L 140   POTASSIUM mmol/L 3 7   CHLORIDE mmol/L 106   CO2 mmol/L 27   BUN mg/dL 20   CREATININE mg/dL 1 37*   CALCIUM mg/dL 8 2*   GLUCOSE RANDOM mg/dL 112       Results from last 7 days  Lab Units 04/23/18  1208   INR  1 20*       * I Have Reviewed All Lab Data Listed Above  * Additional Pertinent Lab Tests Reviewed:  [facility] Reviewed    Imaging:    Imaging Reports Reviewed Today Include:    Imaging Personally Reviewed by Myself Includes:       Recent Cultures (last 7 days):           Last 24 Hours Medication List:     Current Facility-Administered Medications:  acetaminophen 650 mg Oral Q6H PRN Maxx Christianson MD   artificial tear  Both Eyes HS Mearl Haste, CRNP   diphenhydrAMINE 25 mg Oral Q8H PRN AVIS CardozoNP   docusate sodium 100 mg Oral BID Maxx Christianson MD   furosemide 20 mg Oral Daily Maxx Christianson MD   gabapentin 100 mg Oral TID Maxx Christianson MD   heparin (porcine) 5,000 Units Subcutaneous [facility] Glenna Alexander Fariha Hawthorne MD   HYDROcodone-acetaminophen 1 tablet Oral Q6H PRN Mayank Durham MD   HYDROmorphone 0 5 mg Intravenous Q3H PRN Mayank Durham MD   insulin lispro 1-6 Units Subcutaneous TID AC Melania Saleh MD   levothyroxine 75 mcg Oral Early Morning Mayank Durham MD   ondansetron 4 mg Intravenous Q6H PRN Mayank Durham MD   pantoprazole 40 mg Oral Early Morning Mayank Durham MD   polyethylene glycol 17 g Oral Daily Mayank Durham MD   pravastatin 80 mg Oral Daily Mayank Durham MD   rOPINIRole 0 5 mg Oral Early Morning Melania Saleh MD   rOPINIRole 1 mg Oral HS Melania Saleh MD   simethicone 80 mg Oral 4x Daily PRN Mayank Durham MD     Facility-Administered Medications Ordered in Other Encounters:  acetaminophen 650 mg Oral Q6H PRN Dave Read PA-C        Today, Patient Was Seen By: Melania Saleh MD    ** Please Note: Dictation voice to text software may have been used in the creation of this document   **

## 2018-04-27 NOTE — DISCHARGE SUMMARY
Discharge Summary - Thoracic Surgery   Oswald Looney 81 y o  female MRN: [id number]  Unit/Bed#: [facility] 721-34 Encounter: [id number]    Admission Date:   Admission Orders     Ordered        04/23/18 1148  Inpatient Admission  Once                Discharge Date: 4/27/18    Admitting Diagnosis: Urothelial cancer [facility]) [C68 9]    Discharge Diagnosis: Urothelial carcinoma    Resolved Problems  Date Reviewed: 4/26/2018    None          Attending: Trey Kebede Physician(s): Vascular surgery, internal medicine, physical therapy    Procedures Performed: RIGHT robotic nephroureterectomy with bladder cuff excision    Pathology: Pending    Hospital Course:  Patient was admitted for resuscitation and recovery after her surgery  She had a Medrano catheter to gravity drainage and a small right lower quadrant closed suction drain  The patient's immediate postoperative labs were stable  Patient was hemodynamically stable  She was started on a diabetic clear liquid the night of surgery  Patient was given pain control medications  She is maintained on subcutaneous heparin and both ELGIN stockings and SCD devices  Internal Medicine was consulted given the patient's host of medical comorbidities  Physical and occupational therapy were also requested given the patient's decreased mobility and ambulation and likely need for rehab placement  Case management was also involved at the earliest stages of her admission  On hospital day 2 , postoperative day 1 , the patient's blood count was noted to decline  Given her history of cardiac issues, we did recommend transfusion of 1 unit packed red blood cells  The patient was hemodynamically stable throughout  The patient's hemoglobin responded nicely throughout the remainder of her hospital course  Patient was also noted to have some swelling of her lower extremities  She has a history of rheumatoid arthritis and neuropathy  She has known peripheral vascular disease  Vascular surgery was consulted  Vascular duplex studies were obtained  There was a question about decreased arterial flow  This was reviewed by the vascular surgery team and he did not feel any urgent or emergent intervention was required  Patient was evaluated by the physical therapy service and they agreed the patient would need rehab placement  Her diet was advanced to a regular diabetic diet, her bowel function improved to the point where she was passing both flatus and bowel movements  On Friday the 27th of April, the patient was felt stable for discharge  Her right lower quadrant YANIRA drain had been removed  Patient was discharged to the rehab facility with her Medrano catheter in place  This will remain in place for 2 weeks at which time she will have a cystogram to ensure bladder healing  Patient will remain off her methotrexate the postsurgical   Will also continue to hold her Xarelto given the recent need for transfusion  New medications at the time of discharge included Norco 5/325 mg as needed for pain every 4-6 hours and Colace 100 mg twice a day for stool softening  Discharge and postoperative care instructions were given to the patient  Condition at Discharge: good     Discharge instructions/Information to patient and family:   See after visit summary for information provided to patient and family  Provisions for Follow-Up Care:  See after visit summary for information related to follow-up care and any pertinent home health orders  Disposition: Short-term rehab at [facility] Readmission: No    Discharge Medications:  See after visit summary for reconciled discharge medications provided to patient and family

## 2018-04-28 PROCEDURE — 93922 UPR/L XTREMITY ART 2 LEVELS: CPT | Performed by: SURGERY

## 2018-04-28 PROCEDURE — 93925 LOWER EXTREMITY STUDY: CPT | Performed by: SURGERY

## 2018-04-30 NOTE — TELEPHONE ENCOUNTER
Left message for PT to call central scheduling to schedule appointment for cystogram  Left our number in case of any questions

## 2018-05-01 ENCOUNTER — TRANSITIONAL CARE MANAGEMENT (OUTPATIENT)
Dept: FAMILY MEDICINE CLINIC | Facility: CLINIC | Age: 83
End: 2018-05-01

## 2018-05-02 NOTE — TELEPHONE ENCOUNTER
Patient is scheduled at [place] [id number] on Wednesday 5/9/18 at 9:00 a m  Spoke to patients  and he is aware

## 2018-05-09 ENCOUNTER — TRANSCRIBE ORDERS (OUTPATIENT)
Dept: RADIOLOGY | Facility: HOSPITAL | Age: 83
End: 2018-05-09

## 2018-05-09 ENCOUNTER — HOSPITAL ENCOUNTER (OUTPATIENT)
Dept: RADIOLOGY | Facility: HOSPITAL | Age: 83
Discharge: HOME | End: 2018-05-09
Attending: UROLOGY
Payer: COMMERCIAL

## 2018-05-09 DIAGNOSIS — C64.1 UROTHELIAL CARCINOMA OF KIDNEY, RIGHT (HCC): ICD-10-CM

## 2018-05-09 PROCEDURE — 74430 CONTRAST X-RAY BLADDER: CPT

## 2018-05-09 RX ADMIN — IOTHALAMATE MEGLUMINE 400 ML: 172 INJECTION URETERAL at 09:45

## 2018-05-11 ENCOUNTER — OFFICE VISIT (OUTPATIENT)
Dept: UROLOGY | Facility: CLINIC | Age: 83
End: 2018-05-11

## 2018-05-11 VITALS
SYSTOLIC BLOOD PRESSURE: 140 MMHG | HEART RATE: 60 BPM | DIASTOLIC BLOOD PRESSURE: 84 MMHG | BODY MASS INDEX: 39.49 KG/M2 | WEIGHT: 209 LBS

## 2018-05-11 DIAGNOSIS — C68.9 UROTHELIAL CANCER (HCC): Primary | ICD-10-CM

## 2018-05-11 PROCEDURE — 99024 POSTOP FOLLOW-UP VISIT: CPT | Performed by: UROLOGY

## 2018-05-11 NOTE — PROGRESS NOTES
UROLOGY PROGRESS NOTE     History of Present Illness:   Philipp Baldwin is a 81 y o  female known to Dr Jesusita Maher with a long history of urge incontinence who has been undergoing PTNS  She developed recurrent urinary tract infections and gross hematuria  Her primary care team ordered a renal ultrasound and followup CT scan which showed some nodularity in the right renal pelvis  Patient underwent ureteroscopic evaluation and subsequent ureteroscopic biopsy which demonstrated diffuse low-grade cancer  After lengthy discussion, the patient agreed to proceed to the operating room for a right robotic nephroureterectomy with bladder cuff which was performed in April  Patient did receive 1 unit of packed red blood cells while hospitalized given asymptomatic anemia but significant cardiac comorbidities  Given her rheumatologic issue she was transferred to a rehab facility following surgery  She is doing very well    She presents today for pathology and catheter removal   Cystogram was negative      Past Medical History:     Past Medical History:   Diagnosis Date    Anemia     Arthritis     rheumatoid    Benign essential hypertension     Cataract     Chronic cystitis     CPAP (continuous positive airway pressure) dependence     Diverticulitis of colon     Early satiety     GERD (gastroesophageal reflux disease)     Gout     Hearing aid worn     bilateral    Hearing loss     Hemorrhoids     Hiatal hernia     History of colonic polyps     Hyperlipidemia     Hypothyroidism     Left breast mass     Microhematuria     Migraine     occular    Neuropathy     lower and upper extermities    Overactive bladder     Pneumonia of left lower lobe due to infectious organism ([address]     RA (rheumatoid arthritis) ([address]     Sleep apnea     uses cpap    Stroke ([address] )     5/2017    Type 2 diabetes mellitus (HCC)     Urinary frequency     Urinary urgency     Urothelial cancer ([address] ) 04/23/2018    Use of cane as ambulatory aid        PAST SURGICAL HISTORY:     Past Surgical History:   Procedure Laterality Date    APPENDECTOMY      ARTHROSCOPY WRIST Right     with release of transverse carpal ligament     BLADDER SURGERY      BLADDER SURGERY      BREAST SURGERY      left breast    BUNIONECTOMY Bilateral     CATARACT EXTRACTION Bilateral     CHOLECYSTECTOMY      COLONOSCOPY      CYSTOSCOPY      EGD AND COLONOSCOPY N/A 12/20/2017    Procedure: EGD AND COLONOSCOPY;  Surgeon: Joel Calvin MD;  Location: [facility]; Service: Gastroenterology    ESOPHAGOGASTRODUODENOSCOPY      diagnostic    FOREARM SURGERY Right     fracture repair    HYSTERECTOMY      JOINT REPLACEMENT      KNEE ARTHROSCOPY Left     KNEE SURGERY Left     meniscus tear    NOSE SURGERY      SD CYSTO/URETERO W/LITHOTRIPSY &INDWELL STENT INSRT Right 2/28/2018    Procedure: CYSTOSCOPY RIGHT URETEROSCOPY, RIGHT RETROGRADE PYELOGRAM AND INSERTION  RIGHT STENT URETERAL, RIGHT RENAL PELVIC WASHING FOR CYTOLOGY;  Surgeon: Camelia Blair MD;  [facility];  Service: Urology    SD NEPHRECTOMY, W/PART   URETECTOMY Right 4/23/2018    Procedure: ROBATIC ASSISTED NEPHRO-URETERECTOMY WITH BLADDER CUFF EXCISION;  Surgeon: Tejas Renae MD;  [facility];  Service: Urology    REPLACEMENT TOTAL KNEE BILATERAL Bilateral     URETEROSCOPY Right 3/20/2018    Procedure: CYSTOSCOPY, RETROGRADE PYELOGRAM, URETEROSCOPY, BIOPSY, BRUSH, FULGURATION, STENT PLACEMENT, BLADDER BIOPSY WITH FULGURATION;  Surgeon: Tejas Renae MD;  [facility];  Service: Urology       CURRENT MEDICATIONS:     Current Outpatient Prescriptions   Medication Sig Dispense Refill    acetaminophen (TYLENOL) 325 mg tablet Take 650 mg by mouth every 6 (six) hours as needed for mild pain      Calcium Citrate-Vitamin D (CALCIUM + D PO) Take 1 tablet by mouth 2 (two) times a day      docusate sodium (COLACE) 100 mg capsule Take 1 capsule (100 mg total) by mouth 2 (two) times a day for 60 doses 60 capsule 0    furosemide (LASIX) 20 mg tablet Take 20 mg by mouth daily   gabapentin (NEURONTIN) 100 mg capsule Take 1 capsule (100 mg total) by mouth 3 (three) times a day Take 1 cap in the am, 1 cap in the afternoon, and 3 cap at bedtime (Patient taking differently: Take 100 mg by mouth 3 (three) times a day Take 1 cap in the am, 1 cap in the afternoon, and 1 cap at bedtime ) 150 capsule 1    levothyroxine 75 mcg tablet Take 75 mcg by mouth daily      losartan (COZAAR) 100 MG tablet Take 100 mg by mouth daily      omeprazole (PriLOSEC) 20 mg delayed release capsule TAKE 1 CAPSULE DAILY AS NEEDED FOR STOMACH ACID 90 capsule 3    pravastatin (PRAVACHOL) 80 mg tablet TAKE 1 TABLET EVERY DAY 90 tablet 3    rOPINIRole (REQUIP) 0 5 mg tablet Take 0 5 mg by mouth 2 (two) times a day        cyanocobalamin (VITAMIN B-12) 100 mcg tablet Take by mouth      HYDROcodone-acetaminophen (NORCO) 5-325 mg per tablet Take 1 tablet by mouth every 6 (six) hours as needed for pain for up to 30 doses Max Daily Amount: 4 tablets 30 tablet 0    LEUCOVORIN CALCIUM PO Take 10 mg by mouth once a week      Multiple Vitamin (MULTIVITAMINS PO) Take 1 tablet by mouth daily       No current facility-administered medications for this visit  Facility-Administered Medications Ordered in Other Visits   Medication Dose Route Frequency Provider Last Rate Last Dose    acetaminophen (TYLENOL) tablet 650 mg  650 mg Oral Q6H PRN Rama Jefferson PA-C           ALLERGIES:     Allergies   Allergen Reactions    Acetazolamide Other (See Comments)     Other reaction(s): Unknown Allergic Reaction    Aspirin      Other reaction(s): Other (See Comments)  High Dose ASA-stomach ache, rachel  ASA 81 m    Atorvastatin      Other reaction(s): Muscle Pain, Myalgia    Azithromycin     Other Other (See Comments)     Adhesive tape : red and itching  Other reaction(s):  Other (See Comments)  red and itching    Shellfish-Derived Products Other (See Comments)     Patient got Gout following eating shell fish    Sulfa Antibiotics Other (See Comments)     unknown    Tramadol Diarrhea and Vomiting       SOCIAL HISTORY:     Social History     Social History    Marital status: /Civil Union     Spouse name: N/A    Number of children: N/A    Years of education: N/A     Social History Main Topics    Smoking status: Former Smoker    Smokeless tobacco: Never Used      Comment: stopped smoking in the distant past, never smoker (as per [employer])     Alcohol use Yes      Comment: socially/ seldom    Drug use: No    Sexual activity: No     Other Topics Concern    None     Social History Narrative    No caffeine use       SOCIAL HISTORY:     Family History   Problem Relation Age of Onset    Other Mother      epilepsy    Early death Mother    Dasha Sicks Glaucoma Father     Stroke Father      silent    Other Brother      cardiac disorder       REVIEW OF SYSTEMS:     General: negative for chills, fatigue, fever, significant unplanned weight changed  Psychological: negative for anxiety, depression, concentration or memory difficulties, irritability, mood swings, sleep disturbances  Ophthalmic: negative for blurry vision or double  ENT: negative for hearing difficulties, tinnitus, vertigo  Hematological and Lymphatic: negative for bleeding problems, blood clots, bruising, swollen lymph nodes  Respiratory: negative for shortness of breath, cough, hemoptysis, orthopnea, tachypnea or wheezing  Cardiovascular: negative for chest pain, dyspnea on exertion, edema, irregular or rapid heartbeat, paroxysmal nocturnal dyspnea  Gastrointestinal: negative for abdominal pain, bright red blood in stools, change in stools, constipation, diarrhea, nausea/vomiting, stool incontinence    GENITOURINARY: see HPI    Musculoskeletal: arthritis symptoms  Dermatological: negative for rash or skin lesion changes  Neurological: some residual short term memory loss from CVA    PHYSICAL EXAM:     /84   Pulse 60   Wt 94 8 kg (209 lb)   LMP  (LMP Unknown)   BMI 39 49 kg/m²   General:  Elderly female in no acute distress  They have a normal affect  There is not appear to be any gross neurologic defects or abnormalities  HEENT:  Normocephalic, atraumatic  Neck is supple without any palpable lymphadenopathy  Cardiovascular:  Patient has normal palpable distal radial pulses  There is no significant peripheral edema  No JVD is noted  Respiratory:  Patient has unlabored respirations  There is no audible wheeze or rhonchi  Abdomen:  Abdomen with healed surgical scars (appy/LILLY) and healing robotic incisions     Abdomen is soft and nontender  Obese with large pannus  There is no tympany  Inguinal and umbilical hernia are not appreciated  Musculoskeletal:  Rheumatologic issues limit mobility  Walks with a cane  Dermatologic:  Patient has no skin abnormalities or rashes  LABS:     CBC:   Lab Results   Component Value Date    WBC 9 39 2018    HGB 8 7 (L) 2018    HCT 27 0 (L) 2018    MCV 98 2018     2018       BMP:   Lab Results   Component Value Date    GLUCOSE 111 2018    CALCIUM 8 4 2018     2018    K 3 4 (L) 2018    CO2 30 2018     2018    BUN 17 2018    CREATININE 1 28 2018     IMAGIN/9/18  Images reviewed and discussed with Dr Pierre Ochoa, no leak visualized  Official report pending      18  CT ABDOMEN AND PELVIS WITH AND WITHOUT IV CONTRAST     INDICATION:  Hematuria      COMPARISON:  2015     TECHNIQUE: CT of the kidneys was performed without intravenous contrast   Dynamic postcontrast CT evaluation of the abdomen and pelvis was performed in both nephrographic and delayed phases after the administration of intravenous contrast   Reformatted   images were created in axial, sagittal, and coronal planes        Radiation dose length product (DLP) for this visit:  9179 mGy-cm   This examination, like all CT scans performed in the [facility], was performed utilizing techniques to minimize radiation dose exposure, including the use of iterative   reconstruction and automated exposure control      IV Contrast:  100 mL of iohexol (OMNIPAQUE)  Enteric Contrast:  Not administered     FINDINGS:     ABDOMEN     RIGHT KIDNEY AND URETER:  No solid renal mass  There is irregularity of the right renal pelvis and proximal ureter with nodular appearing filling defect along the wall on delayed images  No hydronephrosis or hydroureter  No urinary tract calculi  No perinephric collection      LEFT KIDNEY AND URETER:  No solid renal mass  No detectable ureteral mass  No hydronephrosis or hydroureter  No urinary tract calculi  No perinephric collection      URINARY BLADDER:  No bladder wall mass  No calculi  There is a cystocele      LUNG BASES:  There is a 4 mm right lower lobe nodule on series 3, image 11, not present previously  There is a small hiatal hernia      LIVER/BILIARY TREE:  Unremarkable      GALLBLADDER:  Gallbladder is surgically absent      SPLEEN:  Unremarkable      PANCREAS:  There is a 7 x 10 mm pancreatic cyst, not present previously      ADRENAL GLANDS:  Unremarkable      STOMACH, BOWEL AND APPENDIX:  Unremarkable      ABDOMINOPELVIC CAVITY:  No ascites  No free intraperitoneal air  No lymphadenopathy      VESSELS:  Unremarkable for patient's age      PELVIS     REPRODUCTIVE ORGANS:  Patient is status post hysterectomy      ABDOMINAL WALL/INGUINAL REGIONS:  Unremarkable      OSSEOUS STRUCTURES:  No acute fracture or destructive osseous lesion      IMPRESSION:     1  Nodular filling defects along the wall of the right renal pelvis and proximal ureter suspicious for neoplasm  Retrograde pyelogram and biopsy recommended      2   10 mm pancreatic cyst, not present previously   Recommend characterization and establishment of baseline now  Preferred modality is Abdomen MRI with and without IV contrast/MRCP  First alternative is pancreatic protocol abdomen and pelvic CT with   contrast  Second is abdomen MRI without contrast/MRCP       3   4 mm right lower lobe nodule  Based on current Fleischner Society 2017 Guidelines on incidental pulmonary nodule, no routine follow-up is needed if the patient is considered low risk for lung cancer  If the patient is considered high risk for lung   cancer, 12 month follow-up non-contrast chest CT is recommended  If there is a primary malignancy, three-month follow-up is recommended to exclude metastasis      Considerations related to the patient's age and/or comorbidities may be used to alter these recommendations, particularly the recommendation regarding the pancreatic cyst             PATHOLOGY:     4/23/18  Case Report   Surgical Pathology Report                         Case: [id number]                                    Authorizing Provider: Rick Fitch MD   Collected:           04/23/2018 1631               Ordering Location:     [address]      Received:            04/24/2018 43                                      Hospital Operating Room                                                       Pathologist:           Nash Aldana MD                                                                Specimen:    Kidney, Right, Kidney, Ureter, and Bladder Cuff - Right                                    Final Diagnosis   URETER AND RENAL PELVIS STAGING PROTOCOL     1  Specimen identification:     - Procedure: Nephroureterectomy, complete      - Laterality: Right    2   Tumor     - Tumor site: Renal pelvis      - Tumor size (cm): Greatest dimension: 3mm      - Histologic type: Low grade papillary urothelial carcinoma      - Associated epithelial lesions: None      - Histologic grade: Low grade      - Microscopic tumor extension (pTa): Papillary noninvasive carcinoma - Tumor configuration: Papillary   3  Margins: All margins are negative for invasive carcinoma, carcinoma in situ and low-grade urothelial carcinoma/urothelial dysplasia   4  Lymph-vascular invasion: Not identified    5  Regional lymph nodes:  No lymph nodes submitted or found   6  Additional pathologic findings: Foreign body type giant cell response, chronic active pyelitis and ureteritis    7  Pathologic findings in ipsilateral non-neoplastic renal tissue: None    8  8th Ed AJCC Tumor Stage:  at least Stage 0a - pTa, pNx, Low grade      A  Right kidney, ureter and bladder cuff (radical nephrectomy):  - Focal low grade papillary urothelial carcinoma (3mm) with foci suspicious for superficial lamina propria invasion  - Bladder cuff and resection margins negative for carcinoma  - See staging protocol above (pTaNx)     Comment: The entire case was reviewed by Dr Olu Valentine at the [facility]  His diagnosis and comment are indicated below  Electronically signed by Domenico Lowry MD on 5/3/2018 at 10:03 AM   Preliminary result electronically signed by Domenico Lowry MD on 4/27/2018 at 12:00 PM   Note   [address]     Diagnosis  Right Kidney, Radical Nephrectomy:    - Papillary urothelial carcinoma, low grade, pelvicalyceal mucosa, focal areas suspicious for invasion into superficial subepithelial connective tissue (see comment)  Renal hilar and bladder cuff margins, negative for neoplasia  - Marked reactive changes, pelvicalyceal mucosa and ureteral lining  Foreign body type giant cell response, chronic active pyelitis and ureteritis; status post biopsy and catheter placement       3/20/18  Case Report   Surgical Pathology Report                         Case: [id number]                                    Authorizing Provider: Ghazala Segundo MD   Collected:           03/20/2018 1159               Ordering Location:     [facility]        Received:            03/20/2018 6335                                      [facility]                                                      Pathologist:           [id number], MD                                                         Specimens:   A) - Urinary Bladder, RIGHT RENAL PELVIS BIOPSY                                                      B) - Urinary Bladder, POSTERIOR WALL BLADDER BIOPSY                                                  C) - Urinary Bladder, LEFT LATERAL WALL BIOPSY                                             Final Diagnosis   A  Right renal pelvis, biopsy:  Low grade papillary urothelial carcinoma, partially cauterized     No definite evidence of lamina propria invasion noted  No muscularis propria identified       B  Posterior bladder wall, biopsy:  Partially denuded urothelial mucosa with chronic inflammation       C  Left lateral wall of bladder, biopsy:  Partially denuded urothelial mucosa with chronic inflammation  2/28/18  Case Report   Non-gynecologic Cytology                          Case: [id number]                                   Authorizing Provider: Elaina Laura MD            Collected:           02/28/2018 1032               Ordering Location:     [facility]        Received:            02/28/2018 9039                                      [facility]                                                      Pathologist:           Pablo Haynes MD                                                         Specimen:    Renal Washing, Right                                                                       Final Diagnosis   A  Kidney, right, renal washing (ThinPrep): Atypical urothelial cells (AUC) - see comment  Clusters of urothelial cells  Some with mild atypia  Few mixed inflammatory cells      Satisfactory for evaluation       ASSESSMENT:     81 y o  female with low-grade upper tract urothelial carcinoma status post robotic nephro ureterectomy with bladder cuff excision on 4/23/2018    PLAN:     Patient is doing wonderfully  We removed her Medrano catheter today based on her normal cystogram   We reviewed NCCN guidelines for low-grade upper tract urothelial carcinoma following nephro ureterectomy  Patient will need surveillance cystoscopies every 3 months for the 1st year  I will obtain a BMP at her next visit  CT scan imaging is somewhat variable in the recommendations and so I will plan to obtain 1 at 6 months  Patient will be leaving rehab tomorrow  She knows that she should not lift anything heavy for the 1st 4-6 weeks  She knows to expect continued discomfort at the incisions and continued recovery  She will call me with any questions or concerns  At this point, the patient is cleared to restart her Xarelto and all of her rheumatologic medications

## 2018-05-11 NOTE — LETTER
May 11, 2018     MD Rodrigo Molina [address]    Patient: Fe Lal   YOB: 1935   Date of Visit: 5/11/2018       Dear Dr Neeta Sharma: Thank you for referring Lucia Snow to me for evaluation  Below are my notes for this consultation  If you have questions, please do not hesitate to call me  I look forward to following your patient along with you  Sincerely,        Maxx Christianson MD        CC: YOANA Donaldson MD Fredna Deem, MD Markel Pleas, MD  5/11/2018 11:41 AM  Sign at close encounter    UROLOGY PROGRESS NOTE     History of Present Illness:   Fe Lal is a 81 y o  female known to Dr Renetta Basurto with a long history of urge incontinence who has been undergoing PTNS  She developed recurrent urinary tract infections and gross hematuria  Her primary care team ordered a renal ultrasound and followup CT scan which showed some nodularity in the right renal pelvis  Patient underwent ureteroscopic evaluation and subsequent ureteroscopic biopsy which demonstrated diffuse low-grade cancer  After lengthy discussion, the patient agreed to proceed to the operating room for a right robotic nephroureterectomy with bladder cuff which was performed in April  Patient did receive 1 unit of packed red blood cells while hospitalized given asymptomatic anemia but significant cardiac comorbidities  Given her rheumatologic issue she was transferred to a rehab facility following surgery  She is doing very well    She presents today for pathology and catheter removal   Cystogram was negative      Past Medical History:     Past Medical History:   Diagnosis Date    Anemia     Arthritis     rheumatoid    Benign essential hypertension     Cataract     Chronic cystitis     CPAP (continuous positive airway pressure) dependence     Diverticulitis of colon     Early satiety     GERD (gastroesophageal reflux disease)     Gout     Hearing aid worn     bilateral    Hearing loss     Hemorrhoids     Hiatal hernia     History of colonic polyps     Hyperlipidemia     Hypothyroidism     Left breast mass     Microhematuria     Migraine     occular    Neuropathy     lower and upper extermities    Overactive bladder     Pneumonia of left lower lobe due to infectious organism (HCC)     [facility] [address] [facility]     Sleep apnea     uses cpap    Stroke ([facility] [address] [facility]     5/2017    Type 2 diabetes mellitus (HCC)     Urinary frequency     Urinary urgency     Urothelial cancer ([facility] [address] ) 04/23/2018    Use of cane as ambulatory aid        PAST SURGICAL HISTORY:     Past Surgical History:   Procedure Laterality Date    APPENDECTOMY      ARTHROSCOPY WRIST Right     with release of transverse carpal ligament     BLADDER SURGERY      BLADDER SURGERY      BREAST SURGERY      left breast    BUNIONECTOMY Bilateral     CATARACT EXTRACTION Bilateral     CHOLECYSTECTOMY      COLONOSCOPY      CYSTOSCOPY      EGD AND COLONOSCOPY N/A 12/20/2017    Procedure: EGD AND COLONOSCOPY;  Surgeon: Loren Mayers MD;  Location: [facility]; Service: Gastroenterology    ESOPHAGOGASTRODUODENOSCOPY      diagnostic    FOREARM SURGERY Right     fracture repair    HYSTERECTOMY      JOINT REPLACEMENT      KNEE ARTHROSCOPY Left     KNEE SURGERY Left     meniscus tear    NOSE SURGERY      IN CYSTO/URETERO W/LITHOTRIPSY &INDWELL STENT INSRT Right 2/28/2018    Procedure: CYSTOSCOPY RIGHT URETEROSCOPY, RIGHT RETROGRADE PYELOGRAM AND INSERTION  RIGHT STENT URETERAL, RIGHT RENAL PELVIC WASHING FOR CYTOLOGY;  Surgeon: Olivia Jolly MD;  Location: [facility];  Service: Urology    IN NEPHRECTOMY, W/PART   URETECTOMY Right 4/23/2018    Procedure: ROBATIC ASSISTED NEPHRO-URETERECTOMY WITH BLADDER CUFF EXCISION;  Surgeon: Jeovany Preciado MD;  [facility];  Service: Urology    REPLACEMENT TOTAL KNEE BILATERAL Bilateral     URETEROSCOPY Right 3/20/2018    Procedure: CYSTOSCOPY, RETROGRADE PYELOGRAM, URETEROSCOPY, BIOPSY, BRUSH, FULGURATION, STENT PLACEMENT, BLADDER BIOPSY WITH FULGURATION;  Surgeon: Page Stearns MD;  Location: [facility];  Service: Urology       CURRENT MEDICATIONS:     Current Outpatient Prescriptions   Medication Sig Dispense Refill    acetaminophen (TYLENOL) 325 mg tablet Take 650 mg by mouth every 6 (six) hours as needed for mild pain      Calcium Citrate-Vitamin D (CALCIUM + D PO) Take 1 tablet by mouth 2 (two) times a day      docusate sodium (COLACE) 100 mg capsule Take 1 capsule (100 mg total) by mouth 2 (two) times a day for 60 doses 60 capsule 0    furosemide (LASIX) 20 mg tablet Take 20 mg by mouth daily   gabapentin (NEURONTIN) 100 mg capsule Take 1 capsule (100 mg total) by mouth 3 (three) times a day Take 1 cap in the am, 1 cap in the afternoon, and 3 cap at bedtime (Patient taking differently: Take 100 mg by mouth 3 (three) times a day Take 1 cap in the am, 1 cap in the afternoon, and 1 cap at bedtime ) 150 capsule 1    levothyroxine 75 mcg tablet Take 75 mcg by mouth daily      losartan (COZAAR) 100 MG tablet Take 100 mg by mouth daily      omeprazole (PriLOSEC) 20 mg delayed release capsule TAKE 1 CAPSULE DAILY AS NEEDED FOR STOMACH ACID 90 capsule 3    pravastatin (PRAVACHOL) 80 mg tablet TAKE 1 TABLET EVERY DAY 90 tablet 3    rOPINIRole (REQUIP) 0 5 mg tablet Take 0 5 mg by mouth 2 (two) times a day        cyanocobalamin (VITAMIN B-12) 100 mcg tablet Take by mouth      HYDROcodone-acetaminophen (NORCO) 5-325 mg per tablet Take 1 tablet by mouth every 6 (six) hours as needed for pain for up to 30 doses Max Daily Amount: 4 tablets 30 tablet 0    LEUCOVORIN CALCIUM PO Take 10 mg by mouth once a week      Multiple Vitamin (MULTIVITAMINS PO) Take 1 tablet by mouth daily       No current facility-administered medications for this visit        Facility-Administered Medications Ordered in Other Visits   Medication Dose Route Frequency Provider Last Rate Last Dose    acetaminophen (TYLENOL) tablet 650 mg  650 mg Oral Q6H PRN Lam Jefferson PA-C           ALLERGIES:     Allergies   Allergen Reactions    Acetazolamide Other (See Comments)     Other reaction(s): Unknown Allergic Reaction    Aspirin      Other reaction(s): Other (See Comments)  High Dose ASA-stomach ache, rachel  ASA 81 m    Atorvastatin      Other reaction(s): Muscle Pain, Myalgia    Azithromycin     Other Other (See Comments)     Adhesive tape : red and itching  Other reaction(s):  Other (See Comments)  red and itching    Shellfish-Derived Products Other (See Comments)     Patient got Gout following eating shell fish    Sulfa Antibiotics Other (See Comments)     unknown    Tramadol Diarrhea and Vomiting       SOCIAL HISTORY:     Social History     Social History    Marital status: /Civil Union     Spouse name: N/A    Number of children: N/A    Years of education: N/A     Social History Main Topics    Smoking status: Former Smoker    Smokeless tobacco: Never Used      Comment: stopped smoking in the distant past, never smoker (as per [employer])     Alcohol use Yes      Comment: socially/ seldom    Drug use: No    Sexual activity: No     Other Topics Concern    None     Social History Narrative    No caffeine use       SOCIAL HISTORY:     Family History   Problem Relation Age of Onset    Other Mother      epilepsy    Early death Mother     Glaucoma Father     Stroke Father      silent    Other Brother      cardiac disorder       REVIEW OF SYSTEMS:     General: negative for chills, fatigue, fever, significant unplanned weight changed  Psychological: negative for anxiety, depression, concentration or memory difficulties, irritability, mood swings, sleep disturbances  Ophthalmic: negative for blurry vision or double  ENT: negative for hearing difficulties, tinnitus, vertigo  Hematological and Lymphatic: negative for bleeding problems, blood clots, bruising, swollen lymph nodes  Respiratory: negative for shortness of breath, cough, hemoptysis, orthopnea, tachypnea or wheezing  Cardiovascular: negative for chest pain, dyspnea on exertion, edema, irregular or rapid heartbeat, paroxysmal nocturnal dyspnea  Gastrointestinal: negative for abdominal pain, bright red blood in stools, change in stools, constipation, diarrhea, nausea/vomiting, stool incontinence    GENITOURINARY: see HPI    Musculoskeletal: arthritis symptoms  Dermatological: negative for rash or skin lesion changes  Neurological: some residual short term memory loss from CVA    PHYSICAL EXAM:     /84   Pulse 60   Wt 94 8 kg (209 lb)   LMP  (LMP Unknown)   BMI 39 49 kg/m²    General:  Elderly female in no acute distress  They have a normal affect  There is not appear to be any gross neurologic defects or abnormalities  HEENT:  Normocephalic, atraumatic  Neck is supple without any palpable lymphadenopathy  Cardiovascular:  Patient has normal palpable distal radial pulses  There is no significant peripheral edema  No JVD is noted  Respiratory:  Patient has unlabored respirations  There is no audible wheeze or rhonchi  Abdomen:  Abdomen with healed surgical scars (appy/LILLY) and healing robotic incisions     Abdomen is soft and nontender  Obese with large pannus  There is no tympany  Inguinal and umbilical hernia are not appreciated  Musculoskeletal:  Rheumatologic issues limit mobility  Walks with a cane  Dermatologic:  Patient has no skin abnormalities or rashes       LABS:     CBC:   Lab Results   Component Value Date    WBC 9 39 04/27/2018    HGB 8 7 (L) 04/27/2018    HCT 27 0 (L) 04/27/2018    MCV 98 04/27/2018     04/27/2018       BMP:   Lab Results   Component Value Date    GLUCOSE 111 04/27/2018    CALCIUM 8 4 04/27/2018     04/27/2018    K 3 4 (L) 04/27/2018    CO2 30 04/27/2018     04/27/2018    BUN 17 04/27/2018 CREATININE 1 28 2018     IMAGIN/9/18  Images reviewed and discussed with Dr Mayelin Soria, no leak visualized  Official report pending  18  CT ABDOMEN AND PELVIS WITH AND WITHOUT IV CONTRAST     INDICATION:  Hematuria      COMPARISON:  2015     TECHNIQUE: CT of the kidneys was performed without intravenous contrast   Dynamic postcontrast CT evaluation of the abdomen and pelvis was performed in both nephrographic and delayed phases after the administration of intravenous contrast   Reformatted   images were created in axial, sagittal, and coronal planes        Radiation dose length product (DLP) for this visit:  3562 mGy-cm   This examination, like all CT scans performed in the [facility], was performed utilizing techniques to minimize radiation dose exposure, including the use of iterative   reconstruction and automated exposure control      IV Contrast:  100 mL of iohexol (OMNIPAQUE)  Enteric Contrast:  Not administered     FINDINGS:     ABDOMEN     RIGHT KIDNEY AND URETER:  No solid renal mass  There is irregularity of the right renal pelvis and proximal ureter with nodular appearing filling defect along the wall on delayed images  No hydronephrosis or hydroureter  No urinary tract calculi  No perinephric collection      LEFT KIDNEY AND URETER:  No solid renal mass  No detectable ureteral mass  No hydronephrosis or hydroureter  No urinary tract calculi  No perinephric collection      URINARY BLADDER:  No bladder wall mass  No calculi  There is a cystocele      LUNG BASES:  There is a 4 mm right lower lobe nodule on series 3, image 11, not present previously    There is a small hiatal hernia      LIVER/BILIARY TREE:  Unremarkable      GALLBLADDER:  Gallbladder is surgically absent      SPLEEN:  Unremarkable      PANCREAS:  There is a 7 x 10 mm pancreatic cyst, not present previously      ADRENAL GLANDS:  Unremarkable      STOMACH, BOWEL AND APPENDIX: Unremarkable      ABDOMINOPELVIC CAVITY:  No ascites  No free intraperitoneal air  No lymphadenopathy      VESSELS:  Unremarkable for patient's age      PELVIS     REPRODUCTIVE ORGANS:  Patient is status post hysterectomy      ABDOMINAL WALL/INGUINAL REGIONS:  Unremarkable      OSSEOUS STRUCTURES:  No acute fracture or destructive osseous lesion      IMPRESSION:     1  Nodular filling defects along the wall of the right renal pelvis and proximal ureter suspicious for neoplasm  Retrograde pyelogram and biopsy recommended      2   10 mm pancreatic cyst, not present previously  Recommend characterization and establishment of baseline now  Preferred modality is Abdomen MRI with and without IV contrast/MRCP  First alternative is pancreatic protocol abdomen and pelvic CT with   contrast  Second is abdomen MRI without contrast/MRCP       3   4 mm right lower lobe nodule  Based on current Fleischner Society 2017 Guidelines on incidental pulmonary nodule, no routine follow-up is needed if the patient is considered low risk for lung cancer  If the patient is considered high risk for lung   cancer, 12 month follow-up non-contrast chest CT is recommended    If there is a primary malignancy, three-month follow-up is recommended to exclude metastasis      Considerations related to the patient's age and/or comorbidities may be used to alter these recommendations, particularly the recommendation regarding the pancreatic cyst             PATHOLOGY:     4/23/18  Case Report   Surgical Pathology Report                         Case: [id number]                                    Authorizing Provider: Micheal Alexander MD   Collected:           04/23/2018 1631               Ordering Location:     [address]      Received:            04/24/2018 43                                      Hospital Operating Room                                                       Pathologist:           Jaycob Looney MD                                                                Specimen:    Kidney, Right, Kidney, Ureter, and Bladder Cuff - Right                                    Final Diagnosis   URETER AND RENAL PELVIS STAGING PROTOCOL     1  Specimen identification:     - Procedure: Nephroureterectomy, complete      - Laterality: Right    2  Tumor     - Tumor site: Renal pelvis      - Tumor size (cm): Greatest dimension: 3mm      - Histologic type: Low grade papillary urothelial carcinoma      - Associated epithelial lesions: None      - Histologic grade: Low grade      - Microscopic tumor extension (pTa): Papillary noninvasive carcinoma      - Tumor configuration: Papillary   3  Margins: All margins are negative for invasive carcinoma, carcinoma in situ and low-grade urothelial carcinoma/urothelial dysplasia   4  Lymph-vascular invasion: Not identified    5  Regional lymph nodes:  No lymph nodes submitted or found   6  Additional pathologic findings: Foreign body type giant cell response, chronic active pyelitis and ureteritis    7  Pathologic findings in ipsilateral non-neoplastic renal tissue: None    8  8th Ed AJCC Tumor Stage:  at least Stage 0a - pTa, pNx, Low grade      A  Right kidney, ureter and bladder cuff (radical nephrectomy):  - Focal low grade papillary urothelial carcinoma (3mm) with foci suspicious for superficial lamina propria invasion  - Bladder cuff and resection margins negative for carcinoma  - See staging protocol above (pTaNx)     Comment: The entire case was reviewed by Dr Caroline Hunter at the [facility]  His diagnosis and comment are indicated below      Electronically signed by Ramiro Mitchell MD on 5/3/2018 at 10:03 AM   Preliminary result electronically signed by Ramiro Mitchell MD on 4/27/2018 at 12:00 PM   Note   [address]     Diagnosis  Right Kidney, Radical Nephrectomy:    - Papillary urothelial carcinoma, low grade, pelvicalyceal mucosa, focal areas suspicious for invasion into superficial subepithelial connective tissue (see comment)  Renal hilar and bladder cuff margins, negative for neoplasia  - Marked reactive changes, pelvicalyceal mucosa and ureteral lining  Foreign body type giant cell response, chronic active pyelitis and ureteritis; status post biopsy and catheter placement  3/20/18  Case Report   Surgical Pathology Report                         Case: [id number]                                    Authorizing Provider: Saeed Roldan MD   Collected:           03/20/2018 1159               Ordering Location:     [facility]        Received:            03/20/2018 1550                                      [place] [facility]                                                      Pathologist:           Leidy Koch MD                                                         Specimens:   A) - Urinary Bladder, RIGHT RENAL PELVIS BIOPSY                                                      B) - Urinary Bladder, POSTERIOR WALL BLADDER BIOPSY                                                  C) - Urinary Bladder, LEFT LATERAL WALL BIOPSY                                             Final Diagnosis   A  Right renal pelvis, biopsy:  Low grade papillary urothelial carcinoma, partially cauterized     No definite evidence of lamina propria invasion noted  No muscularis propria identified       B  Posterior bladder wall, biopsy:  Partially denuded urothelial mucosa with chronic inflammation       C  Left lateral wall of bladder, biopsy:  Partially denuded urothelial mucosa with chronic inflammation         2/28/18  Case Report   Non-gynecologic Cytology                          Case: [id number]                                   Authorizing Provider: Patti Covarrubias MD            Collected:           02/28/2018 1032               Ordering Location:     [facility]        Received:            02/28/2018 1409                                      [place] [facility]                                                      Pathologist:           Tawanda Singletary MD                                                         Specimen:    Renal Washing, Right                                                                       Final Diagnosis   A  Kidney, right, renal washing (ThinPrep): Atypical urothelial cells (AUC) - see comment  Clusters of urothelial cells  Some with mild atypia  Few mixed inflammatory cells      Satisfactory for evaluation  ASSESSMENT:     81 y o  female with low-grade upper tract urothelial carcinoma status post robotic nephro ureterectomy with bladder cuff excision on 4/23/2018    PLAN:     Patient is doing wonderfully  We removed her Medrano catheter today based on her normal cystogram   We reviewed NCCN guidelines for low-grade upper tract urothelial carcinoma following nephro ureterectomy  Patient will need surveillance cystoscopies every 3 months for the 1st year  I will obtain a BMP at her next visit  CT scan imaging is somewhat variable in the recommendations and so I will plan to obtain 1 at 6 months  Patient will be leaving rehab tomorrow  She knows that she should not lift anything heavy for the 1st 4-6 weeks  She knows to expect continued discomfort at the incisions and continued recovery  She will call me with any questions or concerns  At this point, the patient is cleared to restart her Xarelto and all of her rheumatologic medications

## 2018-06-04 ENCOUNTER — OFFICE VISIT (OUTPATIENT)
Dept: VASCULAR SURGERY | Facility: CLINIC | Age: 83
End: 2018-06-04
Payer: MEDICARE

## 2018-06-04 VITALS
TEMPERATURE: 97.4 F | DIASTOLIC BLOOD PRESSURE: 86 MMHG | RESPIRATION RATE: 18 BRPM | WEIGHT: 214 LBS | HEART RATE: 84 BPM | BODY MASS INDEX: 40.4 KG/M2 | SYSTOLIC BLOOD PRESSURE: 180 MMHG | HEIGHT: 61 IN

## 2018-06-04 DIAGNOSIS — R60.0 BILATERAL EDEMA OF LOWER EXTREMITY: ICD-10-CM

## 2018-06-04 DIAGNOSIS — I73.9 PAD (PERIPHERAL ARTERY DISEASE) (HCC): Primary | ICD-10-CM

## 2018-06-04 DIAGNOSIS — G63 POLYNEUROPATHY ASSOCIATED WITH UNDERLYING DISEASE (HCC): ICD-10-CM

## 2018-06-04 PROCEDURE — 99214 OFFICE O/P EST MOD 30 MIN: CPT | Performed by: SURGERY

## 2018-06-04 RX ORDER — RIVAROXABAN 20 MG/1
TABLET, FILM COATED ORAL
COMMUNITY
Start: 2018-05-22 | End: 2018-08-21 | Stop reason: HOSPADM

## 2018-06-04 RX ORDER — LEUCOVORIN CALCIUM 5 MG/1
TABLET ORAL
COMMUNITY
Start: 2018-05-24 | End: 2018-06-04 | Stop reason: SDUPTHER

## 2018-06-04 NOTE — PROGRESS NOTES
Assessment/Plan:    Pt is an 82 yo F w/ GERD, DM, hypothyroid, EVA, CVA, HTN, HLD, afib (Xarelto), OA, back pain, peripheral neuropathy, PAD, BLE edema, lymphedema  PAD (peripheral artery disease) ([facility])  -     VAS abdominal aorta/iliacs; complete study; Future  -     VAS lower limb arterial duplex, complete bilateral; Future  -noted in hospital to have nonpalp pulses R foot, prompting consult  -reviewed LEADs which show R: 0 34/-/- and L: 1 11/86/76 R femoral monophasic suggestive of iliac disease; mild diffuse disease RLE  -although she does have significant RLE arterial disease, denies claudication, rest pain or ulceration  -no indication for surgical management at this time  -discussed that she should return to care if she develops rest pain or nonhealing wound  -will get LEADs and AOIL in 6 mos for surveillance  -f/u in 1 year    Polyneuropathy associated with underlying disease ([facility] [address] )  -this seems to be a large contributor to her leg pain as well as arthritis; because these symptoms are in both legs, unlikely to be her arterial disease    Bilateral edema of lower extremity  -likely multifactorial in origin with venous disease, lymphedema, possible CHF  -continue to use compression (using daily) and lymphedema pumps (hasn't used since getting out of rehab after surgery) and elevating legs; discussed increased activity, healthy eating and weight loss    Medications  -cont statin for life; on Xarelto for afib    Subjective:      Patient ID: Lucien Lima is a 81 y o  female  CC: Patient had LEV 4/23 and DIMITRY on 4/26  Patient has swelling and pain in the bilat legs  HPI:    Pt presents to discuss leg symptoms  In April, patient was hospitalized for robotic nephrectomy for renal cancer  No further treatment planned  Sees urology again in august   Incision with small open area remaining R flank  While in house, noted to have nonpalp pulses R foot and complaining of leg pain    Possibly related to positioning for surgery  Today, patient notes BLE edema which has been present for about 5 yrs  She wears compression daily  She elevates her legs when she can on her recliner  She wear lymphedema pumps but hasn't used these since dispo from the hospital   She went to rehab initially but is now back at home doing home PT/OT  She had an ingrown R great toenail cut out but there is no wound; she covers this area so that it doesn't rub  She also complains of neuropathy of the B fingers and toes, especially L hand  She has burning of soles of feet at night  She also has joint pain from arthritis and is off of her medication because of surgical wound  The following portions of the patient's history were reviewed and updated as appropriate: allergies, current medications, past family history, past medical history, past social history, past surgical history and problem list     Review of Systems   Constitutional: Positive for chills and fatigue  HENT: Positive for hearing loss  Hoarsness   Eyes: Positive for photophobia  Respiratory: Negative  Cardiovascular: Negative  Gastrointestinal: Negative  Endocrine: Positive for cold intolerance and heat intolerance  Genitourinary: Positive for frequency and hematuria  Musculoskeletal: Positive for arthralgias, back pain, gait problem, joint swelling and neck pain  Allergic/Immunologic: Positive for environmental allergies and food allergies  Neurological: Positive for weakness and numbness  Hematological: Negative  Psychiatric/Behavioral: Positive for sleep disturbance  The patient is nervous/anxious            Objective:      BP (!) 180/86 (BP Location: Right arm, Patient Position: Sitting, Cuff Size: Adult)   Pulse 84   Temp (!) 97 4 °F (36 3 °C) (Tympanic)   Resp 18   Ht 5' 1" (1 549 m)   Wt 97 1 kg (214 lb)   LMP  (LMP Unknown)   BMI 40 43 kg/m²          Physical Exam   Constitutional: She is oriented to person, place, and time  She appears well-developed and well-nourished  HENT:   Head: Normocephalic and atraumatic  Eyes: Conjunctivae are normal    Neck: Normal range of motion  Neck supple  Cardiovascular: Normal rate, regular rhythm and normal heart sounds  No murmur heard  Pulses:       Radial pulses are 2+ on the right side, and 2+ on the left side  Femoral pulses are 0 on the right side, and 2+ on the left side  Popliteal pulses are 0 on the right side, and 0 on the left side  Dorsalis pedis pulses are 0 on the right side, and 2+ on the left side  Posterior tibial pulses are 0 on the right side, and 0 on the left side  No carotid bruits B   Pulmonary/Chest: Effort normal and breath sounds normal    Abdominal: Soft  She exhibits no distension  There is no tenderness  There is no rebound  Musculoskeletal: Normal range of motion  She exhibits edema (3+ pitting edema BLE, mostly foot/ankle/lower leg sparing thighs)  Neurological: She is alert and oriented to person, place, and time  Skin: Skin is warm and dry  No foot wounds  sparse spiders; no large varicosities   Psychiatric: She has a normal mood and affect  Her behavior is normal    Nursing note and vitals reviewed          Vitals:    06/04/18 1342   BP: (!) 180/86   BP Location: Right arm   Patient Position: Sitting   Cuff Size: Adult   Pulse: 84   Resp: 18   Temp: (!) 97 4 °F (36 3 °C)   TempSrc: Tympanic   Weight: 97 1 kg (214 lb)   Height: 5' 1" (1 549 m)       Patient Active Problem List   Diagnosis    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 (HCC)    Essential hypertension    Rheumatoid arthritis ([facility] [address] )    Diabetes mellitus type 2 in obese ([facility] [address] )    Urinary tract infection    Sleep apnea    Hypothyroidism    Chronic GERD    Acute blood loss anemia    Carpal tunnel syndrome, left    Diverticulosis of colon    Edema    Hematuria    Hypercholesterolemia    Lymphedema  Obesity    Overactive bladder    Peripheral neuropathy    Prediabetes    Primary osteoarthritis of left knee    Restless leg syndrome    Right lumbar radiculopathy    Sensorineural hearing loss    Sensory urge incontinence    Sinus arrhythmia    Chronic bilateral thoracic back pain    Thoracic degenerative disc disease    Urothelial cancer (HCC)    Lung nodule < 6cm on CT    Pancreatic cyst    Iron deficiency anemia    Anemia    BRENNEN (acute kidney injury) ([facility] [address] )    Hyponatremia    Therapeutic opioid-induced constipation (OIC)    PAD (peripheral artery disease) (HCC)    Bilateral edema of lower extremity       Past Surgical History:   Procedure Laterality Date    APPENDECTOMY      ARTHROSCOPY WRIST Right     with release of transverse carpal ligament     BLADDER SURGERY      BLADDER SURGERY      BREAST SURGERY      left breast    BUNIONECTOMY Bilateral     CATARACT EXTRACTION Bilateral     CHOLECYSTECTOMY      COLONOSCOPY      CYSTOSCOPY      EGD AND COLONOSCOPY N/A 12/20/2017    Procedure: EGD AND COLONOSCOPY;  Surgeon: Joel Calvin MD;  [facility]: [facility]; Service: Gastroenterology    ESOPHAGOGASTRODUODENOSCOPY      diagnostic    FOREARM SURGERY Right     fracture repair    HYSTERECTOMY      JOINT REPLACEMENT      KNEE ARTHROSCOPY Left     KNEE SURGERY Left     meniscus tear    NOSE SURGERY      KS CYSTO/URETERO W/LITHOTRIPSY &INDWELL STENT INSRT Right 2/28/2018    Procedure: CYSTOSCOPY RIGHT URETEROSCOPY, RIGHT RETROGRADE PYELOGRAM AND INSERTION  RIGHT STENT URETERAL, RIGHT RENAL PELVIC WASHING FOR CYTOLOGY;  Surgeon: Camelia Blair MD;  [facility];  Service: Urology    KS NEPHRECTOMY, W/PART   URETECTOMY Right 4/23/2018    Procedure: ROBATIC ASSISTED NEPHRO-URETERECTOMY WITH BLADDER CUFF EXCISION;  Surgeon: Tejas Renae MD;  [facility];  Service: Urology    REPLACEMENT TOTAL KNEE BILATERAL Bilateral     URETEROSCOPY Right 3/20/2018 Procedure: CYSTOSCOPY, RETROGRADE PYELOGRAM, URETEROSCOPY, BIOPSY, BRUSH, FULGURATION, STENT PLACEMENT, BLADDER BIOPSY WITH FULGURATION;  Surgeon: Jen Cardoso MD;  [facility];  Service: Urology       Family History   Problem Relation Age of Onset    Other Mother      epilepsy    Early death Mother    [de-identified] Glaucoma Father     Stroke Father      silent    Other Brother      cardiac disorder       Social History     Social History    Marital status: /Civil Union     Spouse name: N/A    Number of children: N/A    Years of education: N/A     Occupational History    Not on file  Social History Main Topics    Smoking status: Former Smoker    Smokeless tobacco: Never Used      Comment: stopped smoking in the distant past, never smoker (as per [employer])     Alcohol use Yes      Comment: socially/ seldom    Drug use: No    Sexual activity: No     Other Topics Concern    Not on file     Social History Narrative    No caffeine use       Allergies   Allergen Reactions    Acetazolamide Other (See Comments)     Other reaction(s): Unknown Allergic Reaction    Aspirin      Other reaction(s): Other (See Comments)  High Dose ASA-stomach ache, rachel  ASA 82 m    Atorvastatin      Other reaction(s): Muscle Pain, Myalgia    Azithromycin     Other Other (See Comments)     Adhesive tape : red and itching  Other reaction(s):  Other (See Comments)  red and itching    Shellfish-Derived Products Other (See Comments)     Patient got Gout following eating shell fish    Sulfa Antibiotics Other (See Comments)     unknown    Tramadol Diarrhea and Vomiting         Current Outpatient Prescriptions:     acetaminophen (TYLENOL) 325 mg tablet, Take 650 mg by mouth every 6 (six) hours as needed for mild pain, Disp: , Rfl:     Calcium Citrate-Vitamin D (CALCIUM + D PO), Take 1 tablet by mouth 2 (two) times a day, Disp: , Rfl:     cyanocobalamin (VITAMIN B-12) 100 mcg tablet, Take by mouth, Disp: , Rfl:   furosemide (LASIX) 20 mg tablet, Take 20 mg by mouth daily  , Disp: , Rfl:     gabapentin (NEURONTIN) 100 mg capsule, Take 1 capsule (100 mg total) by mouth 3 (three) times a day Take 1 cap in the am, 1 cap in the afternoon, and 3 cap at bedtime (Patient taking differently: Take 100 mg by mouth 3 (three) times a day Take 1 cap in the am, 1 cap in the afternoon, and 1 cap at bedtime ), Disp: 150 capsule, Rfl: 1    HYDROcodone-acetaminophen (NORCO) 5-325 mg per tablet, Take 1 tablet by mouth every 6 (six) hours as needed for pain for up to 30 doses Max Daily Amount: 4 tablets, Disp: 30 tablet, Rfl: 0    LEUCOVORIN CALCIUM PO, Take 10 mg by mouth once a week, Disp: , Rfl:     levothyroxine 75 mcg tablet, Take 75 mcg by mouth daily, Disp: , Rfl:     losartan (COZAAR) 100 MG tablet, Take 100 mg by mouth daily, Disp: , Rfl:     methotrexate 2 5 mg tablet, , Disp: , Rfl:     Multiple Vitamin (MULTIVITAMINS PO), Take 1 tablet by mouth daily, Disp: , Rfl:     omeprazole (PriLOSEC) 20 mg delayed release capsule, TAKE 1 CAPSULE DAILY AS NEEDED FOR STOMACH ACID, Disp: 90 capsule, Rfl: 3    pravastatin (PRAVACHOL) 80 mg tablet, TAKE 1 TABLET EVERY DAY, Disp: 90 tablet, Rfl: 3    rOPINIRole (REQUIP) 0 5 mg tablet, Take 0 5 mg by mouth 2 (two) times a day  , Disp: , Rfl:     XARELTO 20 MG tablet, , Disp: , Rfl:     docusate sodium (COLACE) 100 mg capsule, Take 1 capsule (100 mg total) by mouth 2 (two) times a day for 60 doses, Disp: 60 capsule, Rfl: 0  No current facility-administered medications for this visit       Facility-Administered Medications Ordered in Other Visits:     acetaminophen (TYLENOL) tablet 650 mg, 650 mg, Oral, Q6H PRN, Gilberto Flores PA-C

## 2018-06-04 NOTE — PATIENT INSTRUCTIONS
1) PAD  -you have arterial disease of the right leg; however, this is not causing your symptoms currently and thus there is no reason to intervene  -if you start having cramping in the leg that is preventing you from walking/rehabing, pain in the foot at night when your legs are up, or wounds on the foot/leg that won't heal, please call and make an appointment right away  These symptoms would be in the right leg only, not the same on both, which is more likely related to your arthritis and neuropathy  -your next ultrasound will be in 6 months  -I will see you again in 1 year    2) Leg swelling  -continue to wear your compression stockings daily and use your lymphedema pumps as much as possible  -try to elevate your legs, stay active, and lose weight to get down to a healthy weight    Leg Edema   WHAT YOU NEED TO KNOW:   Leg edema is swelling caused by fluid buildup  Your legs may swell if you sit or stand for long periods of time, are pregnant, or are injured  Swelling may also occur if you have heart failure or circulation problems  This means that your heart does not pump blood through your body as it should  DISCHARGE INSTRUCTIONS:   Self-care:   · Elevate your legs:  Raise your legs above the level of your heart as often as you can  This will help decrease swelling and pain  Prop your legs on pillows or blankets to keep them elevated comfortably  · Wear pressure stockings: These tight stockings put pressure on your legs to promote blood flow and prevent blood clots  Wear the stockings during the day  Do not wear them while you sleep  · Apply heat:  Heat helps decrease pain and swelling  Apply heat on the area for 20 to 30 minutes every 2 hours for as many days as directed  · Stay active:  Do not stand or sit for long periods of time  Ask your healthcare provider about the best exercise plan for you      · Eat healthy foods:  Healthy foods include fruits, vegetables, whole-grain breads, low-fat dairy products, beans, lean meats, and fish  Ask if you need to be on a special diet  Limit salt  Salt will make your body hold even more fluid  Follow up with your healthcare provider as directed:  Write down your questions so you remember to ask them during your visits  Contact your healthcare provider if:   · You have a fever or feel more tired than usual     · The veins in your legs look larger than usual  They may look full or bulging  · Your legs itch or feel heavy  · You have red or white areas or sores on your legs  The skin may also appear dimpled or have indentations  · You are gaining weight  · You have trouble moving your ankles  · The swelling does not go away, or other parts of your body swell  · You have questions or concerns about your condition or care  Return to the emergency department if:   · You cannot walk  · You feel faint or confused  · Your skin turns blue or gray  · Your leg feels warm, tender, and painful  It may be swollen and red  · You have chest pain or trouble breathing that is worse when you lie down  · You suddenly feel lightheaded and have trouble breathing  · You have new and sudden chest pain  You may have more pain when you take deep breaths or cough  You may also cough up blood  © 2017 [address] Information is for End User's use only and may not be sold, redistributed or otherwise used for commercial purposes  All illustrations and images included in CareNotes® are the copyrighted property of A D A M [employer] Inc  or Carlos Juarez  The above information is an  only  It is not intended as medical advice for individual conditions or treatments  Talk to your doctor, nurse or pharmacist before following any medical regimen to see if it is safe and effective for you

## 2018-06-13 ENCOUNTER — OFFICE VISIT (OUTPATIENT)
Dept: FAMILY MEDICINE CLINIC | Facility: CLINIC | Age: 83
End: 2018-06-13
Payer: MEDICARE

## 2018-06-13 VITALS
BODY MASS INDEX: 39.46 KG/M2 | WEIGHT: 209 LBS | DIASTOLIC BLOOD PRESSURE: 80 MMHG | HEART RATE: 100 BPM | RESPIRATION RATE: 18 BRPM | HEIGHT: 61 IN | OXYGEN SATURATION: 95 % | SYSTOLIC BLOOD PRESSURE: 150 MMHG

## 2018-06-13 DIAGNOSIS — Z23 NEED FOR ZOSTER VACCINE: Primary | ICD-10-CM

## 2018-06-13 DIAGNOSIS — G47.30 SLEEP APNEA, UNSPECIFIED TYPE: ICD-10-CM

## 2018-06-13 DIAGNOSIS — I63.311 CEREBROVASCULAR ACCIDENT (CVA) DUE TO THROMBOSIS OF RIGHT MIDDLE CEREBRAL ARTERY (HCC): ICD-10-CM

## 2018-06-13 DIAGNOSIS — S90.811A ABRASION OF RIGHT FOOT, INITIAL ENCOUNTER: ICD-10-CM

## 2018-06-13 DIAGNOSIS — E11.69 DIABETES MELLITUS TYPE 2 IN OBESE (HCC): ICD-10-CM

## 2018-06-13 DIAGNOSIS — E78.00 HYPERCHOLESTEROLEMIA: ICD-10-CM

## 2018-06-13 DIAGNOSIS — E66.9 DIABETES MELLITUS TYPE 2 IN OBESE (HCC): ICD-10-CM

## 2018-06-13 DIAGNOSIS — I10 ESSENTIAL HYPERTENSION: ICD-10-CM

## 2018-06-13 DIAGNOSIS — R91.1 LUNG NODULE < 6CM ON CT: ICD-10-CM

## 2018-06-13 DIAGNOSIS — D62 ACUTE BLOOD LOSS ANEMIA: ICD-10-CM

## 2018-06-13 DIAGNOSIS — G62.9 PERIPHERAL POLYNEUROPATHY: ICD-10-CM

## 2018-06-13 DIAGNOSIS — D50.9 IRON DEFICIENCY ANEMIA, UNSPECIFIED IRON DEFICIENCY ANEMIA TYPE: ICD-10-CM

## 2018-06-13 DIAGNOSIS — M05.9 RHEUMATOID ARTHRITIS WITH POSITIVE RHEUMATOID FACTOR, INVOLVING UNSPECIFIED SITE (HCC): ICD-10-CM

## 2018-06-13 DIAGNOSIS — C68.9 UROTHELIAL CANCER (HCC): ICD-10-CM

## 2018-06-13 DIAGNOSIS — E03.9 ACQUIRED HYPOTHYROIDISM: ICD-10-CM

## 2018-06-13 DIAGNOSIS — I73.9 PAD (PERIPHERAL ARTERY DISEASE) (HCC): ICD-10-CM

## 2018-06-13 PROBLEM — K59.03 THERAPEUTIC OPIOID-INDUCED CONSTIPATION (OIC): Status: RESOLVED | Noted: 2018-04-26 | Resolved: 2018-06-13

## 2018-06-13 PROBLEM — T40.2X5A THERAPEUTIC OPIOID-INDUCED CONSTIPATION (OIC): Status: RESOLVED | Noted: 2018-04-26 | Resolved: 2018-06-13

## 2018-06-13 PROBLEM — N39.0 URINARY TRACT INFECTION: Status: RESOLVED | Noted: 2017-05-10 | Resolved: 2018-06-13

## 2018-06-13 LAB — SL AMB POCT HEMOGLOBIN AIC: 6.3

## 2018-06-13 PROCEDURE — 83036 HEMOGLOBIN GLYCOSYLATED A1C: CPT | Performed by: FAMILY MEDICINE

## 2018-06-13 PROCEDURE — 99214 OFFICE O/P EST MOD 30 MIN: CPT | Performed by: FAMILY MEDICINE

## 2018-06-13 RX ORDER — GABAPENTIN 100 MG/1
100 CAPSULE ORAL 3 TIMES DAILY
Qty: 270 CAPSULE | Refills: 1 | Status: SHIPPED | OUTPATIENT
Start: 2018-06-13 | End: 2018-06-18 | Stop reason: SDUPTHER

## 2018-06-13 NOTE — PATIENT INSTRUCTIONS
Diabetes mellitus type 2 in obese [facility])  Lab Results   Component Value Date    HGBA1C 5 9 04/24/2018       No results for input(s): POCGLU in the last 72 hours  A1c today was 6 5  Continue to monitor closely  She did receive some insulin while hospitalized, but A1c seems to be holding without any   Medication    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 [facility])    She continues  On Xarelto for history of stroke status post tPA in 2017  She has done quite well  Unfortunately, this medication is quite costly for her, but she would like to avoid warfarin  Sleep apnea    Continue on CPAP    Rheumatoid arthritis (HCC)   Unfortunately, methotrexate has become quite costly  She should review this with her rheumatologist at her upcoming follow-up    due to her healing wounds, she is still off of any injectables for her rheumatoid arthritis but seems to be managing  Urothelial cancer [facility])    Continue close follow-up with Urology  She is due for a visit later this summer

## 2018-06-13 NOTE — PROGRESS NOTES
Assessment/Plan:    Diabetes mellitus type 2 in obese [facility])  Lab Results   Component Value Date    HGBA1C 5 9 04/24/2018       No results for input(s): POCGLU in the last 72 hours  A1c today was 6 5  Continue to monitor closely  She did receive some insulin while hospitalized, but A1c seems to be holding without any   Medication    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 [facility])    She continues  On Xarelto for history of stroke status post tPA in 2017  She has done quite well  Unfortunately, this medication is quite costly for her, but she would like to avoid warfarin  Sleep apnea    Continue on CPAP    Rheumatoid arthritis ([facility])   Unfortunately, methotrexate has become quite costly  She should review this with her rheumatologist at her upcoming follow-up    due to her healing wounds, she is still off of any injectables for her rheumatoid arthritis but seems to be managing  Urothelial cancer [facility])    Continue close follow-up with Urology  She is due for a visit later this summer  Diagnoses and all orders for this visit:    Need for zoster vaccine  -     Zoster Vac Recomb Adjuvanted ([address] [place] [address] [address]) 50 MCG SUSR; Inject 0 5 mL into the shoulder, thigh, or buttocks once for 1 dose    Peripheral polyneuropathy  -     gabapentin (NEURONTIN) 100 mg capsule; Take 1 capsule (100 mg total) by mouth 3 (three) times a day Take 1 cap in the am, 1 cap in the afternoon, and 3 cap at bedtime    Urothelial cancer [facility])    Essential hypertension  -     Comprehensive metabolic panel; Future  -     Lipid Panel with Direct LDL reflex;  Future  -     CBC and differential; Future    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 (HCC)    Sleep apnea, unspecified type    Diabetes mellitus type 2 in obese ([facility])  -     POCT hemoglobin A1c    Acquired hypothyroidism    PAD (peripheral artery disease) ([facility])  -     CBC and differential; Future    Rheumatoid arthritis with positive rheumatoid factor, involving unspecified site ([address] )    Acute blood loss anemia  -     CBC and differential; Future    Lung nodule < 6cm on CT    Hypercholesterolemia  -     Lipid Panel with Direct LDL reflex; Future    Abrasion of right foot, initial encounter    Iron deficiency anemia, unspecified iron deficiency anemia type          Subjective:      Patient ID: Philipp Baldwin is a 81 y o  female  HPI  Patient presents today for follow-up for chronic health issues  She had a kidney cell cancer and has been followed closely by Urology  Her wounds seem to be healing well except for 1 particular wound on her right flank  She denies any fever or chills  She is having no dysuria or hematuria  She has a history of hypertension and denies chest pain, shortness of breath or palpitations  She remains on anticoagulation for history of stroke  She has a history of hypercholesterolemia and denies myalgias with pravastatin  She has a history of rheumatoid arthritis  She does have some chronic persistent arthralgias  She has been holding off injectable with her recent history of renal cell cancer  She has chronic low back pain which seems to be somewhat stable  She does have  intermittent exacerbations of severe pain across her low back  The following portions of the patient's history were reviewed and updated as appropriate: allergies, current medications, past family history, past medical history, past social history, past surgical history and problem list     Review of Systems   Constitutional: Positive for fatigue  Negative for appetite change, chills, fever and unexpected weight change  HENT: Negative for trouble swallowing  Eyes: Negative for visual disturbance  Respiratory: Negative for cough, chest tightness, shortness of breath and wheezing  Cardiovascular: Negative for chest pain     Gastrointestinal: Negative for abdominal distention, abdominal pain, blood in stool, constipation and diarrhea  Endocrine: Negative for polyuria  Genitourinary: Negative for difficulty urinating and flank pain  Musculoskeletal: Negative for arthralgias and myalgias  Skin: Negative for rash  Neurological: Negative for dizziness and light-headedness  Hematological: Negative for adenopathy  Does not bruise/bleed easily  Psychiatric/Behavioral: Negative for sleep disturbance  Objective:      /80   Pulse 100   Resp 18   Ht 5' 1" (1 549 m)   Wt 94 8 kg (209 lb)   LMP  (LMP Unknown)   SpO2 95%   BMI 39 49 kg/m²          Physical Exam   Constitutional: She is oriented to person, place, and time  She appears well-developed and well-nourished  No distress  HENT:   Head: Normocephalic  Eyes: Pupils are equal, round, and reactive to light  Neck: No tracheal deviation present  No thyromegaly present  Cardiovascular: Normal rate, regular rhythm and normal heart sounds  No murmur heard  Pulmonary/Chest: Effort normal  No respiratory distress  She has no wheezes  She has no rales  Abdominal: Soft  She exhibits no distension  There is no tenderness  Musculoskeletal: Normal range of motion  Neurological: She is alert and oriented to person, place, and time  No cranial nerve deficit  Skin: Skin is warm  She is not diaphoretic  Psychiatric: She has a normal mood and affect   Judgment and thought content normal

## 2018-06-13 NOTE — ASSESSMENT & PLAN NOTE
Lab Results   Component Value Date    HGBA1C 5 9 04/24/2018       No results for input(s): POCGLU in the last 72 hours  A1c today was 6 5  Continue to monitor closely   She did receive some insulin while hospitalized, but A1c seems to be holding without any   Medication

## 2018-06-13 NOTE — ASSESSMENT & PLAN NOTE
Unfortunately, methotrexate has become quite costly  She should review this with her rheumatologist at her upcoming follow-up    due to her healing wounds, she is still off of any injectables for her rheumatoid arthritis but seems to be managing

## 2018-06-13 NOTE — ASSESSMENT & PLAN NOTE
She continues  On Xarelto for history of stroke status post tPA in 2017  She has done quite well  Unfortunately, this medication is quite costly for her, but she would like to avoid warfarin

## 2018-06-18 DIAGNOSIS — G62.9 PERIPHERAL POLYNEUROPATHY: ICD-10-CM

## 2018-06-18 RX ORDER — GABAPENTIN 100 MG/1
100 CAPSULE ORAL 3 TIMES DAILY
Qty: 270 CAPSULE | Refills: 0 | Status: SHIPPED | OUTPATIENT
Start: 2018-06-18 | End: 2018-08-15 | Stop reason: SDUPTHER

## 2018-06-19 ENCOUNTER — TELEPHONE (OUTPATIENT)
Dept: FAMILY MEDICINE CLINIC | Facility: CLINIC | Age: 83
End: 2018-06-19

## 2018-06-19 ENCOUNTER — TELEPHONE (OUTPATIENT)
Dept: VASCULAR SURGERY | Facility: CLINIC | Age: 83
End: 2018-06-19

## 2018-06-19 NOTE — TELEPHONE ENCOUNTER
vm from pt stating her toes and feet have been red for several days and she would like to have them checked  Called pt back to obtain more info, arian, left mess to call nursing back

## 2018-06-19 NOTE — TELEPHONE ENCOUNTER
She should initially started with 1 tablet 3 times daily  She can titrate up to 3  at bedtime if she is not able to sleep

## 2018-06-19 NOTE — TELEPHONE ENCOUNTER
Pt called stating her pharmacy called her telling her they need directions for her gabapentin  Pt was ordered gabapentin 100mg TID for 90 day supply #270  But the instructions say take 1 tab in morning, 1 tab in afternoon and 3 tab at night  In which case pt would need #450  What should the directions say?

## 2018-06-20 ENCOUNTER — TELEPHONE (OUTPATIENT)
Dept: UROLOGY | Facility: CLINIC | Age: 83
End: 2018-06-20

## 2018-06-20 DIAGNOSIS — C68.9 UROTHELIAL CARCINOMA (HCC): Primary | ICD-10-CM

## 2018-06-20 NOTE — TELEPHONE ENCOUNTER
Pt managed by Dr Garrett Gonzales at the [facility] office  Pt s/p right robatic assisted nephro-ureterectomy with bladder cuff excision 04/23/2018  Received phone call from pt's [facility] reporting pt's incision distally is open and packing with iodoform  Gauze  Discussed with Dr Garrett Gonzales MD requesting non contrast CT to be performed  Spoke with pt and she can be scheduled any day and any time for CT   Aware CT will be performed at [facility] then appointment with Dr Garrett Gonzales  Pt scheduled with Dr Garrett Gonzales on 6/28/2018 at 1400 at [facility]    Thanks

## 2018-06-20 NOTE — TELEPHONE ENCOUNTER
I called [address] [place] [address]  and made the changes to the instructions to state 1 cap in the morning, 1 cap in the afternoon, and 1-3 cap at night, #450, 0 refills

## 2018-06-20 NOTE — TELEPHONE ENCOUNTER
Called central scheduling and scheduled CT for Saturday 6/23/2018 @1:00PM  I called PT on home phone number and left appointment details of this appointment as well as appointment with Dr Bryn Ashley on 6/28  I gave central scheduling phone number in case PT is unable to make that, and also stated she may call our office with any further questions  Try again to confirm these appointments

## 2018-06-21 ENCOUNTER — OFFICE VISIT (OUTPATIENT)
Dept: FAMILY MEDICINE CLINIC | Facility: CLINIC | Age: 83
End: 2018-06-21
Payer: MEDICARE

## 2018-06-21 VITALS
TEMPERATURE: 98.1 F | BODY MASS INDEX: 39.46 KG/M2 | HEART RATE: 84 BPM | DIASTOLIC BLOOD PRESSURE: 76 MMHG | WEIGHT: 209 LBS | HEIGHT: 61 IN | SYSTOLIC BLOOD PRESSURE: 140 MMHG | RESPIRATION RATE: 16 BRPM

## 2018-06-21 DIAGNOSIS — M54.42 ACUTE LEFT-SIDED LOW BACK PAIN WITH LEFT-SIDED SCIATICA: ICD-10-CM

## 2018-06-21 DIAGNOSIS — M10.071 ACUTE IDIOPATHIC GOUT OF RIGHT FOOT: Primary | ICD-10-CM

## 2018-06-21 PROBLEM — Z90.6 HISTORY OF NEPHROURETERECTOMY: Status: ACTIVE | Noted: 2018-06-21

## 2018-06-21 PROBLEM — Z90.5 HISTORY OF NEPHROURETERECTOMY: Status: ACTIVE | Noted: 2018-06-21

## 2018-06-21 PROCEDURE — 99214 OFFICE O/P EST MOD 30 MIN: CPT | Performed by: FAMILY MEDICINE

## 2018-06-21 RX ORDER — COLCHICINE 0.6 MG/1
TABLET ORAL
Qty: 3 TABLET | Refills: 1 | Status: SHIPPED | OUTPATIENT
Start: 2018-06-21 | End: 2018-08-09

## 2018-06-21 NOTE — PROGRESS NOTES
Assessment/Plan:     Diagnoses and all orders for this visit:    Acute idiopathic gout of right foot  Comments: Will treat with colchicine  She will also try the cherry juice  I have given her a lab slip to check a uric acid along with her CMP  Orders:  -     Uric acid; Future  -     colchicine (COLCRYS) 0 6 mg tablet; Take 2 tablets po stat, then 1 tablet po 1 hour later    Acute left-sided low back pain with left-sided sciatica  Comments:  Recommended Aspercreme with 4% lidocaine patches or cream   She may also use heating pad  Subjective:      Patient ID: Kimmy Lira is a 81 y o  female  Right foot pain started nearly 2 weeks ago  She is concerned about gout  She had hx of gout once before after eating lots of shellfish  She denies recent dietary overindulgence  She had ureternephrectomy in April  She has had difficulty with wound healing  Nurses have been coming to her home daily to change packing  Also left sciatic pain  Using heating pad which helps  Sx for 2 days  Leg Pain    Pertinent negatives include no numbness  The following portions of the patient's history were reviewed and updated as appropriate: allergies, current medications, past family history, past medical history, past social history, past surgical history and problem list     Review of Systems   Constitutional: Positive for fatigue  Negative for activity change, chills and fever  HENT: Negative for congestion, ear pain, sinus pressure and sore throat  Eyes: Negative for pain and visual disturbance  Respiratory: Negative for cough, chest tightness, shortness of breath and wheezing  Cardiovascular: Negative for chest pain, palpitations and leg swelling  Gastrointestinal: Negative for abdominal pain, blood in stool, constipation, diarrhea, nausea and vomiting  Endocrine: Negative for polydipsia and polyuria  Genitourinary: Negative for difficulty urinating, dysuria, frequency and urgency  Musculoskeletal: Negative for joint swelling and myalgias  Skin: Negative for rash  Neurological: Negative for dizziness, weakness, numbness and headaches  Hematological: Negative for adenopathy  Does not bruise/bleed easily  Psychiatric/Behavioral: Negative for dysphoric mood  The patient is not nervous/anxious  Objective:      /76 (BP Location: Left arm, Patient Position: Sitting, Cuff Size: Large)   Pulse 84   Temp 98 1 °F (36 7 °C) (Tympanic)   Resp 16   Ht 5' 1" (1 549 m)   Wt 94 8 kg (209 lb)   LMP  (LMP Unknown)   BMI 39 49 kg/m²          Physical Exam   Constitutional: She is oriented to person, place, and time  She appears well-developed and well-nourished  obese   Cardiovascular: Normal rate and regular rhythm  Pulmonary/Chest: Effort normal and breath sounds normal    Musculoskeletal:   Right big toes appear mildly red, non-tender  Foot is warm with normal pulse  Neurological: She is alert and oriented to person, place, and time  Skin: Skin is warm and dry  Psychiatric: She has a normal mood and affect  Her behavior is normal    Nursing note and vitals reviewed

## 2018-06-21 NOTE — PATIENT INSTRUCTIONS
Problem List Items Addressed This Visit     None      Visit Diagnoses     Acute idiopathic gout of right foot    -  Primary    Will treat with colchicine  She will also try the cherry juice  I have given her a lab slip to check a uric acid along with her CMP  Acute left-sided low back pain with left-sided sciatica        Recommended Aspercreme with 4% lidocaine patches or cream   She may also use heating pad

## 2018-06-23 ENCOUNTER — HOSPITAL ENCOUNTER (OUTPATIENT)
Dept: CT IMAGING | Facility: HOSPITAL | Age: 83
Discharge: HOME | End: 2018-06-23
Attending: UROLOGY
Payer: MEDICARE

## 2018-06-23 DIAGNOSIS — C68.9 UROTHELIAL CARCINOMA (HCC): ICD-10-CM

## 2018-06-23 PROCEDURE — 74176 CT ABD & PELVIS W/O CONTRAST: CPT

## 2018-06-25 ENCOUNTER — APPOINTMENT (OUTPATIENT)
Dept: LAB | Facility: CLINIC | Age: 83
End: 2018-06-25
Payer: MEDICARE

## 2018-06-25 ENCOUNTER — TRANSCRIBE ORDERS (OUTPATIENT)
Dept: LAB | Facility: CLINIC | Age: 83
End: 2018-06-25

## 2018-06-25 DIAGNOSIS — E78.00 PURE HYPERCHOLESTEROLEMIA: ICD-10-CM

## 2018-06-25 DIAGNOSIS — E03.9 ACQUIRED HYPOTHYROIDISM: ICD-10-CM

## 2018-06-25 DIAGNOSIS — E03.9 MYXEDEMA HEART DISEASE: ICD-10-CM

## 2018-06-25 DIAGNOSIS — Z51.81 ENCOUNTER FOR THERAPEUTIC DRUG MONITORING: ICD-10-CM

## 2018-06-25 DIAGNOSIS — Z79.899 NEED FOR PROPHYLACTIC CHEMOTHERAPY: ICD-10-CM

## 2018-06-25 DIAGNOSIS — I10 ESSENTIAL HYPERTENSION, MALIGNANT: ICD-10-CM

## 2018-06-25 DIAGNOSIS — M05.79 SEROPOSITIVE RHEUMATOID ARTHRITIS OF MULTIPLE SITES (HCC): ICD-10-CM

## 2018-06-25 DIAGNOSIS — D50.8 OTHER IRON DEFICIENCY ANEMIA: ICD-10-CM

## 2018-06-25 DIAGNOSIS — M10.071 ACUTE IDIOPATHIC GOUT OF RIGHT FOOT: ICD-10-CM

## 2018-06-25 DIAGNOSIS — I10 ESSENTIAL HYPERTENSION, MALIGNANT: Primary | ICD-10-CM

## 2018-06-25 DIAGNOSIS — D62 ACUTE POSTHEMORRHAGIC ANEMIA: ICD-10-CM

## 2018-06-25 DIAGNOSIS — R29.898 DEFICIENCIES OF LIMBS: ICD-10-CM

## 2018-06-25 DIAGNOSIS — I51.9 MYXEDEMA HEART DISEASE: ICD-10-CM

## 2018-06-25 DIAGNOSIS — L03.90 CELLULITIS OF SKIN WITH LYMPHANGITIS: ICD-10-CM

## 2018-06-25 DIAGNOSIS — I73.9 PERIPHERAL VASCULAR DISEASE, UNSPECIFIED (HCC): ICD-10-CM

## 2018-06-25 DIAGNOSIS — M17.11 OSTEOARTHRITIS OF RIGHT KNEE, UNSPECIFIED OSTEOARTHRITIS TYPE: ICD-10-CM

## 2018-06-25 DIAGNOSIS — D62 ACUTE BLOOD LOSS ANEMIA: ICD-10-CM

## 2018-06-25 DIAGNOSIS — E78.00 HYPERCHOLESTEROLEMIA: ICD-10-CM

## 2018-06-25 DIAGNOSIS — I73.9 PAD (PERIPHERAL ARTERY DISEASE) (HCC): ICD-10-CM

## 2018-06-25 DIAGNOSIS — I10 ESSENTIAL HYPERTENSION: ICD-10-CM

## 2018-06-25 LAB
ALBUMIN SERPL BCP-MCNC: 3.6 G/DL (ref 3.5–5)
ALP SERPL-CCNC: 113 U/L (ref 46–116)
ALT SERPL W P-5'-P-CCNC: 24 U/L (ref 12–78)
ANION GAP SERPL CALCULATED.3IONS-SCNC: 8 MMOL/L (ref 4–13)
AST SERPL W P-5'-P-CCNC: 22 U/L (ref 5–45)
BASOPHILS # BLD AUTO: 0.02 THOUSANDS/ΜL (ref 0–0.1)
BASOPHILS NFR BLD AUTO: 0 % (ref 0–1)
BILIRUB SERPL-MCNC: 0.54 MG/DL (ref 0.2–1)
BUN SERPL-MCNC: 27 MG/DL (ref 5–25)
CALCIUM SERPL-MCNC: 9.3 MG/DL (ref 8.3–10.1)
CHLORIDE SERPL-SCNC: 101 MMOL/L (ref 100–108)
CHOLEST SERPL-MCNC: 122 MG/DL (ref 50–200)
CO2 SERPL-SCNC: 26 MMOL/L (ref 21–32)
CREAT SERPL-MCNC: 1.38 MG/DL (ref 0.6–1.3)
CRP SERPL QL: 9.6 MG/L
EOSINOPHIL # BLD AUTO: 0.3 THOUSAND/ΜL (ref 0–0.61)
EOSINOPHIL NFR BLD AUTO: 5 % (ref 0–6)
ERYTHROCYTE [DISTWIDTH] IN BLOOD BY AUTOMATED COUNT: 15.6 % (ref 11.6–15.1)
ERYTHROCYTE [SEDIMENTATION RATE] IN BLOOD: 55 MM/HOUR (ref 0–20)
GFR SERPL CREATININE-BSD FRML MDRD: 36 ML/MIN/1.73SQ M
GLUCOSE SERPL-MCNC: 108 MG/DL (ref 65–140)
HCT VFR BLD AUTO: 29.7 % (ref 34.8–46.1)
HDLC SERPL-MCNC: 46 MG/DL (ref 40–60)
HGB BLD-MCNC: 9.6 G/DL (ref 11.5–15.4)
IMM GRANULOCYTES # BLD AUTO: 0.02 THOUSAND/UL (ref 0–0.2)
IMM GRANULOCYTES NFR BLD AUTO: 0 % (ref 0–2)
LDLC SERPL CALC-MCNC: 44 MG/DL (ref 0–100)
LDLC SERPL DIRECT ASSAY-MCNC: 59 MG/DL (ref 0–100)
LYMPHOCYTES # BLD AUTO: 1.37 THOUSANDS/ΜL (ref 0.6–4.47)
LYMPHOCYTES NFR BLD AUTO: 21 % (ref 14–44)
MCH RBC QN AUTO: 31.4 PG (ref 26.8–34.3)
MCHC RBC AUTO-ENTMCNC: 32.3 G/DL (ref 31.4–37.4)
MCV RBC AUTO: 97 FL (ref 82–98)
MONOCYTES # BLD AUTO: 0.73 THOUSAND/ΜL (ref 0.17–1.22)
MONOCYTES NFR BLD AUTO: 11 % (ref 4–12)
NEUTROPHILS # BLD AUTO: 4.25 THOUSANDS/ΜL (ref 1.85–7.62)
NEUTS SEG NFR BLD AUTO: 63 % (ref 43–75)
NRBC BLD AUTO-RTO: 0 /100 WBCS
PLATELET # BLD AUTO: 238 THOUSANDS/UL (ref 149–390)
PMV BLD AUTO: 9.9 FL (ref 8.9–12.7)
POTASSIUM SERPL-SCNC: 4.5 MMOL/L (ref 3.5–5.3)
PROT SERPL-MCNC: 6.8 G/DL (ref 6.4–8.2)
RBC # BLD AUTO: 3.06 MILLION/UL (ref 3.81–5.12)
SODIUM SERPL-SCNC: 135 MMOL/L (ref 136–145)
T3 SERPL-MCNC: 0.9 NG/ML (ref 0.6–1.8)
T4 FREE SERPL-MCNC: 0.86 NG/DL (ref 0.76–1.46)
TRIGL SERPL-MCNC: 161 MG/DL
TSH SERPL DL<=0.05 MIU/L-ACNC: 1.42 UIU/ML (ref 0.36–3.74)
URATE SERPL-MCNC: 5.3 MG/DL (ref 2–6.8)
WBC # BLD AUTO: 6.69 THOUSAND/UL (ref 4.31–10.16)

## 2018-06-25 PROCEDURE — 85025 COMPLETE CBC W/AUTO DIFF WBC: CPT

## 2018-06-25 PROCEDURE — 85652 RBC SED RATE AUTOMATED: CPT

## 2018-06-25 PROCEDURE — 86140 C-REACTIVE PROTEIN: CPT

## 2018-06-25 PROCEDURE — 84550 ASSAY OF BLOOD/URIC ACID: CPT

## 2018-06-25 PROCEDURE — 80061 LIPID PANEL: CPT

## 2018-06-25 PROCEDURE — 36415 COLL VENOUS BLD VENIPUNCTURE: CPT

## 2018-06-25 PROCEDURE — 82955 ASSAY OF G6PD ENZYME: CPT

## 2018-06-25 PROCEDURE — 84443 ASSAY THYROID STIM HORMONE: CPT

## 2018-06-25 PROCEDURE — 80053 COMPREHEN METABOLIC PANEL: CPT

## 2018-06-25 PROCEDURE — 84480 ASSAY TRIIODOTHYRONINE (T3): CPT

## 2018-06-25 PROCEDURE — 83721 ASSAY OF BLOOD LIPOPROTEIN: CPT

## 2018-06-25 PROCEDURE — 84439 ASSAY OF FREE THYROXINE: CPT

## 2018-06-26 DIAGNOSIS — R79.89 ELEVATED SERUM CREATININE: Primary | ICD-10-CM

## 2018-06-27 LAB
G6PD BLD QN: 260 U/10E12 RBC (ref 146–376)
RBC # BLD AUTO: 3.1 X10E6/UL (ref 3.77–5.28)

## 2018-06-28 ENCOUNTER — APPOINTMENT (OUTPATIENT)
Dept: LAB | Facility: CLINIC | Age: 83
End: 2018-06-28
Payer: MEDICARE

## 2018-06-28 ENCOUNTER — OFFICE VISIT (OUTPATIENT)
Dept: UROLOGY | Facility: CLINIC | Age: 83
End: 2018-06-28

## 2018-06-28 VITALS
SYSTOLIC BLOOD PRESSURE: 140 MMHG | BODY MASS INDEX: 39.65 KG/M2 | DIASTOLIC BLOOD PRESSURE: 60 MMHG | HEIGHT: 61 IN | RESPIRATION RATE: 20 BRPM | HEART RATE: 72 BPM | WEIGHT: 210 LBS

## 2018-06-28 DIAGNOSIS — R53.83 OTHER FATIGUE: ICD-10-CM

## 2018-06-28 DIAGNOSIS — M10.071 ACUTE IDIOPATHIC GOUT OF RIGHT FOOT: Primary | ICD-10-CM

## 2018-06-28 DIAGNOSIS — D50.9 IRON DEFICIENCY ANEMIA, UNSPECIFIED IRON DEFICIENCY ANEMIA TYPE: ICD-10-CM

## 2018-06-28 DIAGNOSIS — Z90.6 HISTORY OF NEPHROURETERECTOMY: Primary | ICD-10-CM

## 2018-06-28 DIAGNOSIS — Z51.81 ENCOUNTER FOR THERAPEUTIC DRUG MONITORING: ICD-10-CM

## 2018-06-28 DIAGNOSIS — Z90.5 HISTORY OF NEPHROURETERECTOMY: Primary | ICD-10-CM

## 2018-06-28 DIAGNOSIS — M05.79 SEROPOSITIVE RHEUMATOID ARTHRITIS OF MULTIPLE SITES (HCC): ICD-10-CM

## 2018-06-28 DIAGNOSIS — I10 ESSENTIAL HYPERTENSION, MALIGNANT: ICD-10-CM

## 2018-06-28 DIAGNOSIS — K43.2 INCISIONAL HERNIA, WITHOUT OBSTRUCTION OR GANGRENE: ICD-10-CM

## 2018-06-28 DIAGNOSIS — Z79.899 NEED FOR PROPHYLACTIC CHEMOTHERAPY: ICD-10-CM

## 2018-06-28 DIAGNOSIS — M25.50 PAIN IN JOINT, MULTIPLE SITES: ICD-10-CM

## 2018-06-28 LAB
ALBUMIN SERPL BCP-MCNC: 3.5 G/DL (ref 3.5–5)
ALP SERPL-CCNC: 112 U/L (ref 46–116)
ALT SERPL W P-5'-P-CCNC: 19 U/L (ref 12–78)
ANION GAP SERPL CALCULATED.3IONS-SCNC: 9 MMOL/L (ref 4–13)
AST SERPL W P-5'-P-CCNC: 17 U/L (ref 5–45)
BASOPHILS # BLD AUTO: 0.02 THOUSANDS/ΜL (ref 0–0.1)
BASOPHILS NFR BLD AUTO: 0 % (ref 0–1)
BILIRUB SERPL-MCNC: 0.38 MG/DL (ref 0.2–1)
BUN SERPL-MCNC: 29 MG/DL (ref 5–25)
CALCIUM SERPL-MCNC: 9.2 MG/DL (ref 8.3–10.1)
CHLORIDE SERPL-SCNC: 103 MMOL/L (ref 100–108)
CO2 SERPL-SCNC: 25 MMOL/L (ref 21–32)
CREAT SERPL-MCNC: 1.51 MG/DL (ref 0.6–1.3)
CRP SERPL QL: 10.6 MG/L
EOSINOPHIL # BLD AUTO: 0.3 THOUSAND/ΜL (ref 0–0.61)
EOSINOPHIL NFR BLD AUTO: 4 % (ref 0–6)
ERYTHROCYTE [DISTWIDTH] IN BLOOD BY AUTOMATED COUNT: 15.9 % (ref 11.6–15.1)
ERYTHROCYTE [SEDIMENTATION RATE] IN BLOOD: 66 MM/HOUR (ref 0–20)
GFR SERPL CREATININE-BSD FRML MDRD: 32 ML/MIN/1.73SQ M
GLUCOSE SERPL-MCNC: 141 MG/DL (ref 65–140)
HCT VFR BLD AUTO: 28.7 % (ref 34.8–46.1)
HGB BLD-MCNC: 9 G/DL (ref 11.5–15.4)
IMM GRANULOCYTES # BLD AUTO: 0.03 THOUSAND/UL (ref 0–0.2)
IMM GRANULOCYTES NFR BLD AUTO: 0 % (ref 0–2)
LYMPHOCYTES # BLD AUTO: 1.55 THOUSANDS/ΜL (ref 0.6–4.47)
LYMPHOCYTES NFR BLD AUTO: 19 % (ref 14–44)
MCH RBC QN AUTO: 31 PG (ref 26.8–34.3)
MCHC RBC AUTO-ENTMCNC: 31.4 G/DL (ref 31.4–37.4)
MCV RBC AUTO: 99 FL (ref 82–98)
MONOCYTES # BLD AUTO: 0.74 THOUSAND/ΜL (ref 0.17–1.22)
MONOCYTES NFR BLD AUTO: 9 % (ref 4–12)
NEUTROPHILS # BLD AUTO: 5.5 THOUSANDS/ΜL (ref 1.85–7.62)
NEUTS SEG NFR BLD AUTO: 68 % (ref 43–75)
NRBC BLD AUTO-RTO: 0 /100 WBCS
PLATELET # BLD AUTO: 237 THOUSANDS/UL (ref 149–390)
PMV BLD AUTO: 10.1 FL (ref 8.9–12.7)
POTASSIUM SERPL-SCNC: 4.1 MMOL/L (ref 3.5–5.3)
PROT SERPL-MCNC: 7.2 G/DL (ref 6.4–8.2)
RBC # BLD AUTO: 2.9 MILLION/UL (ref 3.81–5.12)
SODIUM SERPL-SCNC: 137 MMOL/L (ref 136–145)
WBC # BLD AUTO: 8.14 THOUSAND/UL (ref 4.31–10.16)

## 2018-06-28 PROCEDURE — 85652 RBC SED RATE AUTOMATED: CPT

## 2018-06-28 PROCEDURE — 99024 POSTOP FOLLOW-UP VISIT: CPT | Performed by: UROLOGY

## 2018-06-28 PROCEDURE — 80053 COMPREHEN METABOLIC PANEL: CPT

## 2018-06-28 PROCEDURE — 85025 COMPLETE CBC W/AUTO DIFF WBC: CPT

## 2018-06-28 PROCEDURE — 86140 C-REACTIVE PROTEIN: CPT

## 2018-06-28 PROCEDURE — 82955 ASSAY OF G6PD ENZYME: CPT

## 2018-06-28 PROCEDURE — 36415 COLL VENOUS BLD VENIPUNCTURE: CPT

## 2018-06-28 NOTE — PROGRESS NOTES
UROLOGY PROGRESS NOTE     History of Present Illness:   Unique Bernstein is a 81 y o  female known to Dr Olivia Jolly with a long history of urge incontinence who has been undergoing PTNS  She developed recurrent urinary tract infections and gross hematuria  Her primary care team ordered a renal ultrasound and followup CT scan which showed some nodularity in the right renal pelvis  Patient underwent ureteroscopic evaluation and subsequent ureteroscopic biopsy which demonstrated diffuse low-grade cancer  After lengthy discussion, the patient agreed to proceed to the operating room for a right robotic nephroureterectomy with bladder cuff which was performed in April  Patient did receive 1 unit of packed red blood cells while hospitalized given asymptomatic anemia but significant cardiac comorbidities  Given her rheumatologic issue she was transferred to a rehab facility following surgery  She has been doing well since surgery but did develop fluid drainage from her RIGHT flank  Visiting nursing has been managing the wound  CT was obtained and reveals incisional hernia  Presents for discussion      Past Medical History:     Past Medical History:   Diagnosis Date    Anemia     Arthritis     rheumatoid    Benign essential hypertension     Cataract     Chronic cystitis     CPAP (continuous positive airway pressure) dependence     Diverticulitis of colon     Early satiety     GERD (gastroesophageal reflux disease)     Gout     Hearing aid worn     bilateral    Hearing loss     Hemorrhoids     Hiatal hernia     History of colonic polyps     Hyperlipidemia     Hypothyroidism     Left breast mass     Microhematuria     Migraine     occular    Neuropathy     lower and upper extermities    Overactive bladder     Pneumonia of left lower lobe due to infectious organism (HCC)     RA (rheumatoid arthritis) ([address]     Sleep apnea     uses cpap    [facility]roke [facility] [address] [facility]     5/2017    Type 2 diabetes mellitus ([facility]     Urinary frequency     Urinary urgency     Urothelial cancer ([facility] ) 04/23/2018    Use of cane as ambulatory aid        PAST SURGICAL HISTORY:     Past Surgical History:   Procedure Laterality Date    APPENDECTOMY      ARTHROSCOPY WRIST Right     with release of transverse carpal ligament     BLADDER SURGERY      BLADDER SURGERY      BREAST SURGERY      left breast    BUNIONECTOMY Bilateral     CATARACT EXTRACTION Bilateral     CHOLECYSTECTOMY      COLONOSCOPY      CYSTOSCOPY      EGD AND COLONOSCOPY N/A 12/20/2017    Procedure: EGD AND COLONOSCOPY;  Surgeon: Darius Nuno MD;  [facility]: [facility]; Service: Gastroenterology    ESOPHAGOGASTRODUODENOSCOPY      diagnostic    FOREARM SURGERY Right     fracture repair    HYSTERECTOMY      JOINT REPLACEMENT      KNEE ARTHROSCOPY Left     KNEE SURGERY Left     meniscus tear    NOSE SURGERY      WV CYSTO/URETERO W/LITHOTRIPSY &INDWELL STENT INSRT Right 2/28/2018    Procedure: CYSTOSCOPY RIGHT URETEROSCOPY, RIGHT RETROGRADE PYELOGRAM AND INSERTION  RIGHT STENT URETERAL, RIGHT RENAL PELVIC WASHING FOR CYTOLOGY;  Surgeon: Jermain Bryson MD;  [facility];  Service: Urology    WV NEPHRECTOMY, W/PART   URETECTOMY Right 4/23/2018    Procedure: ROBATIC ASSISTED NEPHRO-URETERECTOMY WITH BLADDER CUFF EXCISION;  Surgeon: May Fu MD;  [facility];  Service: Urology    REPLACEMENT TOTAL KNEE BILATERAL Bilateral     URETEROSCOPY Right 3/20/2018    Procedure: CYSTOSCOPY, RETROGRADE PYELOGRAM, URETEROSCOPY, BIOPSY, BRUSH, FULGURATION, STENT PLACEMENT, BLADDER BIOPSY WITH FULGURATION;  Surgeon: May Fu MD;  [facility];  Service: Urology       CURRENT MEDICATIONS:     Current Outpatient Prescriptions   Medication Sig Dispense Refill    acetaminophen (TYLENOL) 325 mg tablet Take 650 mg by mouth every 6 (six) hours as needed for mild pain      Calcium Citrate-Vitamin D (CALCIUM + D PO) Take 1 tablet by mouth 2 (two) times a day      colchicine (COLCRYS) 0 6 mg tablet Take 2 tablets po stat, then 1 tablet po 1 hour later 3 tablet 1    cyanocobalamin (VITAMIN B-12) 100 mcg tablet Take by mouth      furosemide (LASIX) 20 mg tablet Take 20 mg by mouth daily   gabapentin (NEURONTIN) 100 mg capsule Take 1 capsule (100 mg total) by mouth 3 (three) times a day Take 1 cap in the am, 1 cap in the afternoon, and 3 cap at bedtime 270 capsule 0    LEUCOVORIN CALCIUM PO Take 10 mg by mouth once a week      levothyroxine 75 mcg tablet Take 75 mcg by mouth daily      losartan (COZAAR) 100 MG tablet Take 100 mg by mouth daily      methotrexate 2 5 mg tablet       Multiple Vitamin (MULTIVITAMINS PO) Take 1 tablet by mouth daily      omeprazole (PriLOSEC) 20 mg delayed release capsule TAKE 1 CAPSULE DAILY AS NEEDED FOR STOMACH ACID 90 capsule 3    pravastatin (PRAVACHOL) 80 mg tablet TAKE 1 TABLET EVERY DAY 90 tablet 3    rOPINIRole (REQUIP) 0 5 mg tablet Take 0 5 mg by mouth 2 (two) times a day        XARELTO 20 MG tablet       docusate sodium (COLACE) 100 mg capsule Take 1 capsule (100 mg total) by mouth 2 (two) times a day for 60 doses 60 capsule 0     No current facility-administered medications for this visit  Facility-Administered Medications Ordered in Other Visits   Medication Dose Route Frequency Provider Last Rate Last Dose    acetaminophen (TYLENOL) tablet 650 mg  650 mg Oral Q6H PRN Dg Jefferson PA-C           ALLERGIES:     Allergies   Allergen Reactions    Acetazolamide Other (See Comments)     Other reaction(s): Unknown Allergic Reaction    Aspirin      Other reaction(s): Other (See Comments)  High Dose ASA-stomach ache, rachel  ASA 81 m    Atorvastatin      Other reaction(s): Muscle Pain, Myalgia    Azithromycin     Other Other (See Comments)     Adhesive tape : red and itching  Other reaction(s):  Other (See Comments)  red and itching    Shellfish-Derived Products Other (See Comments)     Patient got Gout following eating shell fish    Sulfa Antibiotics Other (See Comments)     unknown    Tramadol Diarrhea and Vomiting       SOCIAL HISTORY:     Social History     Social History    Marital status: /Civil Union     Spouse name: N/A    Number of children: N/A    Years of education: N/A     Social History Main Topics    Smoking status: Former Smoker    Smokeless tobacco: Never Used      Comment: stopped smoking in the distant past, never smoker (as per [employer])     Alcohol use Yes      Comment: socially/ seldom    Drug use: No    Sexual activity: No     Other Topics Concern    None     Social History Narrative    No caffeine use       SOCIAL HISTORY:     Family History   Problem Relation Age of Onset    Other Mother         epilepsy    Early death Mother    Zahraa Pressley Glaucoma Father     Stroke Father         silent    Other Brother         cardiac disorder       REVIEW OF SYSTEMS:     General: negative for chills, fatigue, fever, significant unplanned weight changed  Psychological: negative for anxiety, depression, concentration or memory difficulties, irritability, mood swings, sleep disturbances  Ophthalmic: negative for blurry vision or double  ENT: negative for hearing difficulties, tinnitus, vertigo  Hematological and Lymphatic: negative for bleeding problems, blood clots, bruising, swollen lymph nodes  Respiratory: negative for shortness of breath, cough, hemoptysis, orthopnea, tachypnea or wheezing  Cardiovascular: negative for chest pain, dyspnea on exertion, edema, irregular or rapid heartbeat, paroxysmal nocturnal dyspnea  Gastrointestinal: negative for abdominal pain, bright red blood in stools, change in stools, constipation, diarrhea, nausea/vomiting, stool incontinence    GENITOURINARY: see HPI    Musculoskeletal: arthritis symptoms  Dermatological: negative for rash or skin lesion changes  Neurological: some residual short term memory loss from CVA    PHYSICAL EXAM:     /60   Pulse 72   Resp 20   Ht 5' 1" (1 549 m)   Wt 95 3 kg (210 lb)   LMP  (LMP Unknown)   BMI 39 68 kg/m²   General:  Elderly female in no acute distress  They have a normal affect  There is not appear to be any gross neurologic defects or abnormalities  HEENT:  Normocephalic, atraumatic  Neck is supple without any palpable lymphadenopathy  Cardiovascular:  Patient has normal palpable distal radial pulses  There is no significant peripheral edema  No JVD is noted  Respiratory:  Patient has unlabored respirations  There is no audible wheeze or rhonchi  Abdomen:  Abdomen with healed surgical scars (appy/LILLY) and healing robotic incisions     Abdomen is soft and nontender  Obese with large pannus  There is no tympany  There is a large incisional hernia in the right flank around the extraction incision  At the inferior portion there is an open wound that was probed to approximately 2 cm  There is good granulation tissue  This was repacked with iodoform gauze  Musculoskeletal:  Rheumatologic issues limit mobility  Walks with a cane  Dermatologic:  Patient has no skin abnormalities or rashes  LABS:     CBC:   Lab Results   Component Value Date    WBC 6 69 2018    HGB 9 6 (L) 2018    HCT 29 7 (L) 2018    MCV 97 2018     2018       BMP:   Lab Results   Component Value Date    GLUCOSE 108 2018    CALCIUM 9 3 2018     (L) 2018    K 4 5 2018    CO2 26 2018     2018    BUN 27 (H) 2018    CREATININE 1 38 (H) 2018     IMAGIN/23/18  CT ABDOMEN AND PELVIS WITHOUT IV CONTRAST     INDICATION:   C68 9: Malignant neoplasm of urinary organ, unspecified    Status post robotic nephroureterectomy with bladder cuff excision, presenting with concern for wound dehiscence     COMPARISON: CT 2018      TECHNIQUE:  CT examination of the abdomen and pelvis was performed without intravenous contrast   Axial, sagittal, and coronal 2D reformatted images were created from the source data and submitted for interpretation       Radiation dose length product (DLP) for this visit:  930 7 mGy-cm   This examination, like all CT scans performed in the [facility], was performed utilizing techniques to minimize radiation dose exposure, including the use of iterative   reconstruction and automated exposure control       Enteric contrast was administered       FINDINGS:     ABDOMEN     LOWER CHEST:  2 mm right middle lobe nodule is stable dating back to 9/28/2015      LIVER/BILIARY TREE:  Unremarkable      GALLBLADDER:  Gallbladder is surgically absent      SPLEEN:  Scattered calcifications are again seen, consistent with old granulomatous disease      PANCREAS:  Unremarkable      ADRENAL GLANDS:  Unremarkable      KIDNEYS/URETERS:  The right kidney is surgically absent  There is no recurrent mass within the surgical bed      STOMACH AND BOWEL:  There is colonic diverticulosis without evidence of acute diverticulitis      APPENDIX:  No findings to suggest appendicitis      ABDOMINOPELVIC CAVITY:  No ascites or free intraperitoneal air  No lymphadenopathy      VESSELS:  Unremarkable for patient's age      PELVIS     REPRODUCTIVE ORGANS:  Patient is status post hysterectomy      URINARY BLADDER:  A punctate focus of air is seen within the dome of the bladder      ABDOMINAL WALL/INGUINAL REGIONS:  There is a new right paracentral hernia containing large bowel  No evidence of obstruction  There is a small open wound within the lower most lateral right anterior abdominal wall  There is small amount of fluid in   this area without well-formed fluid collection      OSSEOUS STRUCTURES:  No acute fracture or destructive osseous lesion      IMPRESSION:        1    Small wound within the lower most lateral right anterior abdominal wall with trace amount of fluid in this area without well-formed fluid collection  Continued clinical follow-up recommended      2  New right paracentral ventral hernia containing large bowel without evidence of obstruction         3   Punctate focus of air within the dome of the bladder, may be related to instrumentation  If there is no history of instrumentation, correlation with urinalysis should be considered to exclude infection       4   Postsurgical changes of right nephrectomy without evidence of recurrent or metastatic disease  PATHOLOGY:     4/23/18  Case Report   Surgical Pathology Report                         Case: [id number]                                    Authorizing Provider: Chucho Wagoner MD   Collected:           04/23/2018 1631               Ordering Location:     [address]      Received:            04/24/2018 [facility]                                      Hospital Operating Room                                                       Pathologist:           El Gore MD                                                                Specimen:    Kidney, Right, Kidney, Ureter, and Bladder Cuff - Right                                    Final Diagnosis   URETER AND RENAL PELVIS STAGING PROTOCOL     1  Specimen identification:     - Procedure: Nephroureterectomy, complete      - Laterality: Right    2  Tumor     - Tumor site: Renal pelvis      - Tumor size (cm): Greatest dimension: 3mm      - Histologic type: Low grade papillary urothelial carcinoma      - Associated epithelial lesions: None      - Histologic grade: Low grade      - Microscopic tumor extension (pTa): Papillary noninvasive carcinoma      - Tumor configuration: Papillary   3  Margins: All margins are negative for invasive carcinoma, carcinoma in situ and low-grade urothelial carcinoma/urothelial dysplasia   4  Lymph-vascular invasion: Not identified    5  Regional lymph nodes:  No lymph nodes submitted or found   6   Additional pathologic findings: Foreign body type giant cell response, chronic active pyelitis and ureteritis    7  Pathologic findings in ipsilateral non-neoplastic renal tissue: None    8  8th Ed AJCC Tumor Stage:  at least Stage 0a - pTa, pNx, Low grade      A  Right kidney, ureter and bladder cuff (radical nephrectomy):  - Focal low grade papillary urothelial carcinoma (3mm) with foci suspicious for superficial lamina propria invasion  - Bladder cuff and resection margins negative for carcinoma  - See staging protocol above (pTaNx)     Comment: The entire case was reviewed by Dr Chayo Mendoza at the [facility]  His diagnosis and comment are indicated below  Electronically signed by El Gore MD on 5/3/2018 at 10:03 AM   Preliminary result electronically signed by El Gore MD on 4/27/2018 at 12:00 PM   Note   [address]     Diagnosis  Right Kidney, Radical Nephrectomy:    - Papillary urothelial carcinoma, low grade, pelvicalyceal mucosa, focal areas suspicious for invasion into superficial subepithelial connective tissue (see comment)  Renal hilar and bladder cuff margins, negative for neoplasia  - Marked reactive changes, pelvicalyceal mucosa and ureteral lining  Foreign body type giant cell response, chronic active pyelitis and ureteritis; status post biopsy and catheter placement       3/20/18  Case Report   Surgical Pathology Report                         Case: [id number]                                    Authorizing Provider: Chucho Wagoner MD   Collected:           03/20/2018 1159               Ordering Location:     [facility]        Received:            03/20/2018 [address] [place] [address] Operating Room                                                      Pathologist:           [id number], MD                                                         Specimens:   A) - Urinary Bladder, RIGHT RENAL PELVIS BIOPSY                                                      B) - Urinary Bladder, POSTERIOR WALL BLADDER BIOPSY                                                  C) - Urinary Bladder, LEFT LATERAL WALL BIOPSY                                             Final Diagnosis   A  Right renal pelvis, biopsy:  Low grade papillary urothelial carcinoma, partially cauterized     No definite evidence of lamina propria invasion noted  No muscularis propria identified       B  Posterior bladder wall, biopsy:  Partially denuded urothelial mucosa with chronic inflammation       C  Left lateral wall of bladder, biopsy:  Partially denuded urothelial mucosa with chronic inflammation  2/28/18  Case Report   Non-gynecologic Cytology                          Case: [id number]                                   Authorizing Provider: Rin Mooney MD            Collected:           02/28/2018 1032               Ordering Location:     [facility]        Received:            02/28/2018 [address] [place] [address] Operating Room                                                      Pathologist:           Sydney Sweeney MD                                                         Specimen:    Renal Washing, Right                                                                       Final Diagnosis   A  Kidney, right, renal washing (ThinPrep): Atypical urothelial cells (AUC) - see comment  Clusters of urothelial cells  Some with mild atypia  Few mixed inflammatory cells      Satisfactory for evaluation  ASSESSMENT:     81 y o  female with low-grade upper tract urothelial carcinoma status post robotic nephro ureterectomy with bladder cuff excision on 4/23/2018  Now with draining fluid from flank and evidence of incisional hernia    PLAN:     Discussed hernia precautions with patient and   Will refer to General surgery for discussion of incisional hernia  Given age and comorbidities, may opt to simply observe  Will continue wound packing    Given the patient's methotrexate use, healing may be delayed  May consider wound consultation if no improvement    Patient will return to see me for cystoscopy in August

## 2018-06-29 ENCOUNTER — TELEPHONE (OUTPATIENT)
Dept: UROLOGY | Facility: CLINIC | Age: 83
End: 2018-06-29

## 2018-06-29 NOTE — TELEPHONE ENCOUNTER
Pt managed by Dr Kristi Peters at the [facility] office  Received phone call from John [address], [address], [phone] re pt's wound care  Cris asking if wound care should continue to be daily and per MD continue daily wound care until wound heals

## 2018-06-30 LAB
G6PD BLD QN: 265 U/10E12 RBC (ref 146–376)
RBC # BLD AUTO: 2.97 X10E6/UL (ref 3.77–5.28)

## 2018-07-06 ENCOUNTER — TELEPHONE (OUTPATIENT)
Dept: UROLOGY | Facility: AMBULATORY SURGERY CENTER | Age: 83
End: 2018-07-06

## 2018-07-06 NOTE — TELEPHONE ENCOUNTER
Patient requesting a call from [address] [place] [address] to explain what's going on  She said it's been a long time since she saw [address] [place] [address]  Please advise and call her back

## 2018-07-09 NOTE — TELEPHONE ENCOUNTER
Pt would like to resume PTNS txs  Will get clearance for that from Dr Marzena Jasso prior to scheduling

## 2018-07-10 ENCOUNTER — OFFICE VISIT (OUTPATIENT)
Dept: CARDIOLOGY CLINIC | Facility: CLINIC | Age: 83
End: 2018-07-10
Payer: MEDICARE

## 2018-07-10 VITALS
HEART RATE: 77 BPM | SYSTOLIC BLOOD PRESSURE: 128 MMHG | OXYGEN SATURATION: 96 % | DIASTOLIC BLOOD PRESSURE: 52 MMHG | WEIGHT: 208 LBS | BODY MASS INDEX: 39.3 KG/M2

## 2018-07-10 DIAGNOSIS — I50.32 CHRONIC DIASTOLIC CONGESTIVE HEART FAILURE (HCC): Primary | ICD-10-CM

## 2018-07-10 PROCEDURE — 99214 OFFICE O/P EST MOD 30 MIN: CPT | Performed by: INTERNAL MEDICINE

## 2018-07-10 NOTE — PROGRESS NOTES
Heart Failure Outpatient Progress Note - Elliott Diver 81 y o  female MRN: [id number]    @ Encounter: [id number]  Assessment/Plan:    Patient Active Problem List    Diagnosis Date Noted    Incisional hernia 06/28/2018    History of nephroureterectomy 06/21/2018    PAD [facility]peripheral artery disease) ([facility] ) 06/04/2018    Bilateral edema of lower extremity 06/04/2018    Anemia 04/25/2018    BRENNEN (acute kidney injury) ([place] [address] ) 04/25/2018    Hyponatremia 04/25/2018    Pancreatic cyst 03/14/2018    Iron deficiency anemia 03/14/2018    Urothelial cancer ([place] [address] ) 03/02/2018    Lung nodule < 6cm on CT 03/02/2018    Chronic bilateral thoracic back pain 02/12/2018    Thoracic degenerative disc disease 02/12/2018    Sinus arrhythmia 01/03/2018    Peripheral neuropathy 11/09/2017    Right lumbar radiculopathy 11/09/2017    Primary osteoarthritis of left knee 10/13/2017    Carpal tunnel syndrome, left 09/15/2017    Hematuria 09/15/2017    Lymphedema 09/15/2017    Sensory urge incontinence 09/11/2017    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 ([place] [address] ) 05/10/2017    Essential hypertension 05/10/2017    Rheumatoid arthritis ([place] [address] ) 05/10/2017    Diabetes mellitus type 2 in obese ([place] [address] ) 05/10/2017    Sleep apnea 05/10/2017    Acquired hypothyroidism 05/10/2017    Chronic GERD 05/10/2017    Overactive bladder 05/24/2016    Prediabetes 05/24/2016    Restless leg syndrome 01/05/2016    Sensorineural hearing loss 10/12/2014    Diverticulosis of colon 08/29/2013    Hypercholesterolemia 08/29/2013    Obesity 07/18/2013    Edema 05/14/2013    Acute blood loss anemia 12/13/2012     # Grade II diastolic dysfunction: No JVD on exam  Likely 2/2 to HTN (cLVH on TTE)  Dieting now  Has lost ~8 lbs from last visit  --Continue BP control   # HTN: BP ok  Asked to use arm cuff as typically more accurate than wrist   # Hx of lymphedema   # Hyperglycemia   # APCs: Continue current mgmt  Had 1 week holter w/o e/o AFib  Currently anticoagulated for CVA so would not   # Hx of CVA/TIA: Currently on Xarelto and pravastatin  # NICK on CPAP    RTC in 3 months    HPI: Very pleasant 82 y/o woman w/ PMHx of HTN, HLD, chronic LE edema/lymphedema, anemia of chronic disease p/f establishing care and abnormal ECG  She states she feels well  She was followed at [facility] ([facility]) and now lives closer to [facility] so is transferring her care  Denies any cardiac symptoms  Uses lymphedema machine  She had a CVA in 5/2017 and received tPA  Overall feels well from that  She had a 1 week holter that did not show any arrhythmia and has been on NOAC ever since the stroke  Holter showed NSR w/ APCs  She comes in today 4/6/18, for f/u  She had noted hematuria  CT A/P noted a R renal pelvis multifocal papillary lesions with pathology showing low grade Ta urothelial cancer  She comes in for preop evaluation  She continues to deny cardiac symptoms  She is more limited by knee pains  She is able to get around her home without difficulty  Today 7/10/18, she returns for f/u  She had her right robotic nephroureterectomy with bladder cuff  She currently has VNA coming to the home  Moving around better  Feeling stronger  Walking is difficult 2/2 to rheumatoid arthritis       Past Medical History:   Diagnosis Date    Anemia     Arthritis     rheumatoid    Benign essential hypertension     Cataract     Chronic cystitis     CPAP (continuous positive airway pressure) dependence     Diverticulitis of colon     Early satiety     GERD (gastroesophageal reflux disease)     Gout     Hearing aid worn     bilateral    Hearing loss     Hemorrhoids     Hiatal hernia     History of colonic polyps     Hyperlipidemia     Hypothyroidism     Left breast mass     Microhematuria     Migraine     occular    Neuropathy     lower and upper extermities    Overactive bladder     [facility] due to [facility] organism [facility] )     RA [facility]rheumatoid arthritis) [facility] )     Sleep apnea     uses cpap    Stroke ([facility] )     5/2017    Type 2 diabetes mellitus (HCC)     Urinary frequency     Urinary urgency     Urothelial cancer ([facility] ) 04/23/2018    Use of cane as ambulatory aid        Review of Systems - 12 point ROS was done and is negative, except as noted above  Allergies   Allergen Reactions    Acetazolamide Other (See Comments)     Other reaction(s): Unknown Allergic Reaction    Aspirin      Other reaction(s): Other (See Comments)  High Dose ASA-stomach ache, rachel  ASA 81 m    Atorvastatin      Other reaction(s): Muscle Pain, Myalgia    Azithromycin     Other Other (See Comments)     Adhesive tape : red and itching  Other reaction(s): Other (See Comments)  red and itching    Shellfish-Derived Products Other (See Comments)     Patient got Gout following eating shell fish    Sulfa Antibiotics Other (See Comments)     unknown    Tramadol Diarrhea and Vomiting       Current Outpatient Prescriptions:     acetaminophen (TYLENOL) 325 mg tablet, Take 650 mg by mouth every 6 (six) hours as needed for mild pain, Disp: , Rfl:     Calcium Citrate-Vitamin D (CALCIUM + D PO), Take 1 tablet by mouth 2 (two) times a day, Disp: , Rfl:     colchicine (COLCRYS) 0 6 mg tablet, Take 2 tablets po stat, then 1 tablet po 1 hour later, Disp: 3 tablet, Rfl: 1    cyanocobalamin (VITAMIN B-12) 100 mcg tablet, Take by mouth, Disp: , Rfl:     furosemide (LASIX) 20 mg tablet, Take 20 mg by mouth daily  , Disp: , Rfl:     gabapentin (NEURONTIN) 100 mg capsule, Take 1 capsule (100 mg total) by mouth 3 (three) times a day Take 1 cap in the am, 1 cap in the afternoon, and 3 cap at bedtime, Disp: 270 capsule, Rfl: 0    levothyroxine 75 mcg tablet, Take 75 mcg by mouth daily, Disp: , Rfl:     losartan (COZAAR) 100 MG tablet, Take 100 mg by mouth daily, Disp: , Rfl:     methotrexate 2 5 mg tablet, , Disp: , Rfl:    Multiple Vitamin (MULTIVITAMINS PO), Take 1 tablet by mouth daily, Disp: , Rfl:     omeprazole (PriLOSEC) 20 mg delayed release capsule, TAKE 1 CAPSULE DAILY AS NEEDED FOR STOMACH ACID, Disp: 90 capsule, Rfl: 3    pravastatin (PRAVACHOL) 80 mg tablet, TAKE 1 TABLET EVERY DAY, Disp: 90 tablet, Rfl: 3    rOPINIRole (REQUIP) 0 5 mg tablet, Take 0 5 mg by mouth 2 (two) times a day  , Disp: , Rfl:     XARELTO 20 MG tablet, , Disp: , Rfl:     docusate sodium (COLACE) 100 mg capsule, Take 1 capsule (100 mg total) by mouth 2 (two) times a day for 60 doses, Disp: 60 capsule, Rfl: 0    LEUCOVORIN CALCIUM PO, Take 10 mg by mouth once a week, Disp: , Rfl:   No current facility-administered medications for this visit  Facility-Administered Medications Ordered in Other Visits:     acetaminophen (TYLENOL) tablet 650 mg, 650 mg, Oral, Q6H PRN, Florette Rubinstein, PA-C    Social History     Social History    Marital status: /Civil Union     Spouse name: N/A    Number of children: N/A    Years of education: N/A     Occupational History    Not on file  Social History Main Topics    Smoking status: Former Smoker    Smokeless tobacco: Never Used      Comment: stopped smoking in the distant past, never smoker (as per [employer])     Alcohol use Yes      Comment: socially/ seldom    Drug use: No    Sexual activity: No     Other Topics Concern    Not on file     Social History Narrative    No caffeine use       Family History   Problem Relation Age of Onset    Other Mother         epilepsy    Early death Mother    Dash Mari Glaucoma Father     Stroke Father         silent    Other Brother         cardiac disorder       Physical Exam:    Vitals: Blood pressure 128/52, pulse 77, weight 94 3 kg (208 lb), SpO2 96 %  , Body mass index is 39 3 kg/m² ,   Wt Readings from Last 3 Encounters:   07/10/18 94 3 kg (208 lb)   06/28/18 95 3 kg (210 lb)   06/21/18 94 8 kg (209 lb)     Vitals:    07/10/18 1313   BP: 128/52   BP Location: Right arm   Patient Position: Sitting   Cuff Size: Standard   Pulse: 77   SpO2: 96%   Weight: 94 3 kg (208 lb)       GEN: Lilliam Ramey appears well, alert and oriented x 3, pleasant and cooperative   HEENT: pupils equal, round, and reactive to light; extraocular muscles intact  NECK: supple, no carotid bruits   HEART: regular rhythm, normal S1 and S2, no murmurs, clicks, gallops or rubs, JVD is flat    LUNGS: clear to auscultation bilaterally; no wheezes, rales, or rhonchi   ABDOMEN: normal bowel sounds, soft, no tenderness, no distention  EXTREMITIES: peripheral pulses normal; no clubbing, cyanosis, or edema  NEURO: no focal findings   SKIN: normal without suspicious lesions on exposed skin    Labs & Results:  Lab Results   Component Value Date    WBC 8 14 06/28/2018    HGB 9 0 (L) 06/28/2018    HCT 28 7 (L) 06/28/2018    MCV 99 (H) 06/28/2018     06/28/2018       Chemistry        Component Value Date/Time     06/28/2018 1300     11/14/2017 1151    K 4 1 06/28/2018 1300    K 3 7 11/14/2017 1151     06/28/2018 1300     11/14/2017 1151    CO2 25 06/28/2018 1300    CO2 29 11/14/2017 1151    BUN 29 (H) 06/28/2018 1300    BUN 18 11/14/2017 1151    CREATININE 1 51 (H) 06/28/2018 1300    CREATININE 0 88 11/14/2017 1151        Component Value Date/Time    CALCIUM 9 2 06/28/2018 1300    CALCIUM 8 9 11/14/2017 1151    ALKPHOS 112 06/28/2018 1300    ALKPHOS 95 11/14/2017 1151    AST 17 06/28/2018 1300    AST 24 11/14/2017 1151    ALT 19 06/28/2018 1300    ALT 19 11/14/2017 1151    BILITOT 0 38 06/28/2018 1300    BILITOT 1 2 11/14/2017 1151        EKG personally reviewed by Riley Montana MD      Counseling / Coordination of Care  Total floor / unit time spent today 40 minutes  Greater than 50% of total time was spent with the patient and / or family counseling and / or coordination of care  A description of the counseling / coordination of care: 20 min        Thank you for the opportunity to participate in the care of this patient      Safia Harris MD, PhD   Kelsie Magana

## 2018-07-10 NOTE — LETTER
July 10, 2018     Miladis Hollis MD  [address]  [place]     Patient: Philipp Baldwin   YOB: 1935   Date of Visit: 7/10/2018       Dear Dr Tawanda Shannon: Thank you for referring Magaly Hutson to me for evaluation  Below are my notes for this consultation  If you have questions, please do not hesitate to call me  I look forward to following your patient along with you           Sincerely,        Anna Pagan MD        CC: No Recipients  Anna Pagan MD  7/10/2018  1:29 PM  Sign at close encounter  Heart Failure Outpatient Progress Note - Philipp Baldwin 81 y o  female MRN: [id number]    @ Encounter: [id number]  Assessment/Plan:    Patient Active Problem List    Diagnosis Date Noted    Incisional hernia 06/28/2018    History of nephroureterectomy 06/21/2018    PAD (peripheral artery disease) ([facility] ) 06/04/2018    Bilateral edema of lower extremity 06/04/2018    Anemia 04/25/2018    BRENNEN (acute kidney injury) ([facility] ) 04/25/2018    Hyponatremia 04/25/2018    Pancreatic cyst 03/14/2018    Iron deficiency anemia 03/14/2018    Urothelial cancer ([facility] ) 03/02/2018    Lung nodule < 6cm on CT 03/02/2018    Chronic bilateral thoracic back pain 02/12/2018    Thoracic degenerative disc disease 02/12/2018    Sinus arrhythmia 01/03/2018    Peripheral neuropathy 11/09/2017    Right lumbar radiculopathy 11/09/2017    Primary osteoarthritis of left knee 10/13/2017    Carpal tunnel syndrome, left 09/15/2017    Hematuria 09/15/2017    Lymphedema 09/15/2017    Sensory urge incontinence 09/11/2017    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 ([facility] ) 05/10/2017    Essential hypertension 05/10/2017    Rheumatoid arthritis ([facility] ) 05/10/2017    Diabetes mellitus type 2 in obese ([facility] ) 05/10/2017    Sleep apnea 05/10/2017    Acquired hypothyroidism 05/10/2017    Chronic GERD 05/10/2017    Overactive bladder 05/24/2016    Prediabetes 05/24/2016    Restless leg syndrome 01/05/2016    Sensorineural hearing loss 10/12/2014    Diverticulosis of colon 08/29/2013    Hypercholesterolemia 08/29/2013    Obesity 07/18/2013    Edema 05/14/2013    Acute blood loss anemia 12/13/2012     # Grade II diastolic dysfunction: No JVD on exam  Likely 2/2 to HTN (cLVH on TTE)  Dieting now  Has lost ~8 lbs from last visit  --Continue BP control   # HTN: BP ok  Asked to use arm cuff as typically more accurate than wrist   # Hx of lymphedema   # Hyperglycemia   # APCs: Continue current mgmt  Had 1 week holter w/o e/o AFib  Currently anticoagulated for CVA so would not   # Hx of CVA/TIA: Currently on Xarelto and pravastatin  # NICK on CPAP    RTC in 3 months    HPI: Very pleasant 80 y/o woman w/ PMHx of HTN, HLD, chronic LE edema/lymphedema, anemia of chronic disease p/f establishing care and abnormal ECG  She states she feels well  She was followed at [facility] ([facility]) and now lives closer to [place] [address] so is transferring her care  Denies any cardiac symptoms  Uses lymphedema machine  She had a CVA in 5/2017 and received tPA  Overall feels well from that  She had a 1 week holter that did not show any arrhythmia and has been on NOAC ever since the stroke  Holter showed NSR w/ APCs  She comes in today 4/6/18, for f/u  She had noted hematuria  CT A/P noted a R renal pelvis multifocal papillary lesions with pathology showing low grade Ta urothelial cancer  She comes in for preop evaluation  She continues to deny cardiac symptoms  She is more limited by knee pains  She is able to get around her home without difficulty  Today 7/10/18, she returns for f/u  She had her right robotic nephroureterectomy with bladder cuff  She currently has VNA coming to the home  Moving around better  Feeling stronger  Walking is difficult 2/2 to rheumatoid arthritis       Past Medical History:   Diagnosis Date    Anemia     Arthritis rheumatoid    Benign essential hypertension     Cataract     Chronic cystitis     CPAP (continuous positive airway pressure) dependence     Diverticulitis of colon     Early satiety     GERD (gastroesophageal reflux disease)     Gout     Hearing aid worn     bilateral    Hearing loss     Hemorrhoids     Hiatal hernia     History of colonic polyps     Hyperlipidemia     Hypothyroidism     Left breast mass     Microhematuria     Migraine     occular    Neuropathy     lower and upper extermities    Overactive bladder     Pneumonia of left lower lobe due to infectious organism (HCC)     [facility]     Sleep apnea     uses cpap    Stroke ([facility] )     5/2017    Type 2 diabetes mellitus (HCC)     Urinary frequency     Urinary urgency     Urothelial cancer ([facility] ) 04/23/2018    Use of cane as ambulatory aid        Review of Systems - 12 point ROS was done and is negative, except as noted above  Allergies   Allergen Reactions    Acetazolamide Other (See Comments)     Other reaction(s): Unknown Allergic Reaction    Aspirin      Other reaction(s): Other (See Comments)  High Dose ASA-stomach ache, rachel  ASA 81 m    Atorvastatin      Other reaction(s): Muscle Pain, Myalgia    Azithromycin     Other Other (See Comments)     Adhesive tape : red and itching  Other reaction(s):  Other (See Comments)  red and itching    Shellfish-Derived Products Other (See Comments)     Patient got Gout following eating shell fish    Sulfa Antibiotics Other (See Comments)     unknown    Tramadol Diarrhea and Vomiting       Current Outpatient Prescriptions:     acetaminophen (TYLENOL) 325 mg tablet, Take 650 mg by mouth every 6 (six) hours as needed for mild pain, Disp: , Rfl:     Calcium Citrate-Vitamin D (CALCIUM + D PO), Take 1 tablet by mouth 2 (two) times a day, Disp: , Rfl:     colchicine (COLCRYS) 0 6 mg tablet, Take 2 tablets po stat, then 1 tablet po 1 hour later, Disp: 3 tablet, Rfl: 1    cyanocobalamin (VITAMIN B-12) 100 mcg tablet, Take by mouth, Disp: , Rfl:     furosemide (LASIX) 20 mg tablet, Take 20 mg by mouth daily  , Disp: , Rfl:     gabapentin (NEURONTIN) 100 mg capsule, Take 1 capsule (100 mg total) by mouth 3 (three) times a day Take 1 cap in the am, 1 cap in the afternoon, and 3 cap at bedtime, Disp: 270 capsule, Rfl: 0    levothyroxine 75 mcg tablet, Take 75 mcg by mouth daily, Disp: , Rfl:     losartan (COZAAR) 100 MG tablet, Take 100 mg by mouth daily, Disp: , Rfl:     methotrexate 2 5 mg tablet, , Disp: , Rfl:     Multiple Vitamin (MULTIVITAMINS PO), Take 1 tablet by mouth daily, Disp: , Rfl:     omeprazole (PriLOSEC) 20 mg delayed release capsule, TAKE 1 CAPSULE DAILY AS NEEDED FOR STOMACH ACID, Disp: 90 capsule, Rfl: 3    pravastatin (PRAVACHOL) 80 mg tablet, TAKE 1 TABLET EVERY DAY, Disp: 90 tablet, Rfl: 3    rOPINIRole (REQUIP) 0 5 mg tablet, Take 0 5 mg by mouth 2 (two) times a day  , Disp: , Rfl:     XARELTO 20 MG tablet, , Disp: , Rfl:     docusate sodium (COLACE) 100 mg capsule, Take 1 capsule (100 mg total) by mouth 2 (two) times a day for 60 doses, Disp: 60 capsule, Rfl: 0    LEUCOVORIN CALCIUM PO, Take 10 mg by mouth once a week, Disp: , Rfl:   No current facility-administered medications for this visit  Facility-Administered Medications Ordered in Other Visits:     acetaminophen (TYLENOL) tablet 650 mg, 650 mg, Oral, Q6H PRN, Harjinder Mendenhall PA-C    Social History     Social History    Marital status: /Civil Union     Spouse name: N/A    Number of children: N/A    Years of education: N/A     Occupational History    Not on file       Social History Main Topics    Smoking status: Former Smoker    Smokeless tobacco: Never Used      Comment: stopped smoking in the distant past, never smoker (as per [employer])     Alcohol use Yes      Comment: socially/ seldom    Drug use: No    Sexual activity: No     Other Topics Concern    Not on file Social History Narrative    No caffeine use       Family History   Problem Relation Age of Onset    Other Mother         epilepsy    Early death Mother    Lis Hirsch Glaucoma Father     Stroke Father         silent    Other Brother         cardiac disorder       Physical Exam:    Vitals: Blood pressure 128/52, pulse 77, weight 94 3 kg (208 lb), SpO2 96 %  , Body mass index is 39 3 kg/m² ,   Wt Readings from Last 3 Encounters:   07/10/18 94 3 kg (208 lb)   06/28/18 95 3 kg (210 lb)   06/21/18 94 8 kg (209 lb)     Vitals:    07/10/18 1313   BP: 128/52   BP Location: Right arm   Patient Position: Sitting   Cuff Size: Standard   Pulse: 77   SpO2: 96%   Weight: 94 3 kg (208 lb)       GEN: Lilliam Ramey appears well, alert and oriented x 3, pleasant and cooperative   HEENT: pupils equal, round, and reactive to light; extraocular muscles intact  NECK: supple, no carotid bruits   HEART: regular rhythm, normal S1 and S2, no murmurs, clicks, gallops or rubs, JVD is flat    LUNGS: clear to auscultation bilaterally; no wheezes, rales, or rhonchi   ABDOMEN: normal bowel sounds, soft, no tenderness, no distention  EXTREMITIES: peripheral pulses normal; no clubbing, cyanosis, or edema  NEURO: no focal findings   SKIN: normal without suspicious lesions on exposed skin    Labs & Results:  Lab Results   Component Value Date    WBC 8 14 06/28/2018    HGB 9 0 (L) 06/28/2018    HCT 28 7 (L) 06/28/2018    MCV 99 (H) 06/28/2018     06/28/2018       Chemistry        Component Value Date/Time     06/28/2018 1300     11/14/2017 1151    K 4 1 06/28/2018 1300    K 3 7 11/14/2017 1151     06/28/2018 1300     11/14/2017 1151    CO2 25 06/28/2018 1300    CO2 29 11/14/2017 1151    BUN 29 (H) 06/28/2018 1300    BUN 18 11/14/2017 1151    CREATININE 1 51 (H) 06/28/2018 1300    CREATININE 0 88 11/14/2017 1151        Component Value Date/Time    CALCIUM 9 2 06/28/2018 1300    CALCIUM 8 9 11/14/2017 1151    ALKPHOS 112 06/28/2018 1300    ALKPHOS 95 11/14/2017 1151    AST 17 06/28/2018 1300    AST 24 11/14/2017 1151    ALT 19 06/28/2018 1300    ALT 19 11/14/2017 1151    BILITOT 0 38 06/28/2018 1300    BILITOT 1 2 11/14/2017 1151        EKG personally reviewed by Anh Light MD      Counseling / Coordination of Care  Total floor / unit time spent today 40 minutes  Greater than 50% of total time was spent with the patient and / or family counseling and / or coordination of care  A description of the counseling / coordination of care: 20 min  Thank you for the opportunity to participate in the care of this patient      Anh Light MD, PhD   Cherise Mcgarry

## 2018-07-11 LAB
LEFT EYE DIABETIC RETINOPATHY: NORMAL
RIGHT EYE DIABETIC RETINOPATHY: NORMAL

## 2018-07-11 NOTE — TELEPHONE ENCOUNTER
John Montalvo,    Can you give me more details? I saw that she was receiving PTNS previously for bladder symptoms but on my recent exam, she actually has prolapse and intermittent obstruction at the outlet level  Maybe you can provide some of the pre-merge notes/details? Not sure PTNS is indicated or will help her voiding symptoms at this point       Sheryl Gutierrez

## 2018-07-12 ENCOUNTER — TELEPHONE (OUTPATIENT)
Dept: UROLOGY | Facility: CLINIC | Age: 83
End: 2018-07-12

## 2018-07-12 NOTE — TELEPHONE ENCOUNTER
Sure, but we should also perform post void residual evaluations at each visit  If she starts to have higher values, not resolving with treatment, we would need to consider alternative treatment alogrithm

## 2018-07-12 NOTE — TELEPHONE ENCOUNTER
Pt managed by Dr Katy Doll at the [facility] office  [facility]'s [facility] managing pt's wound care  Pt s/p nephroureterectomy April 2018  Pt developed drainage from right flank incision  VNA performing daily wound care  VNA called requesting daily visits to every other day visits    Discussed with Dr Katy Doll and MD approved

## 2018-07-12 NOTE — TELEPHONE ENCOUNTER
Hi Dr Love Lara, I went through Marty Peters' old records  Mention of sling in 2010 and cystocele repair around 2012  No mention of prolapse, even at GYN  So, not sure how new the prolapse is  She does feel the PTNS helps her  Would it be ok to try it and see if her symptoms improve? We were mostly concerned that she is far enough out from her nephrectomy to safely resume txs  I value your opinion and will follow your instructions  Thanks   Mony Kolb

## 2018-07-18 ENCOUNTER — OFFICE VISIT (OUTPATIENT)
Dept: SURGERY | Facility: CLINIC | Age: 83
End: 2018-07-18
Payer: MEDICARE

## 2018-07-18 VITALS
BODY MASS INDEX: 40.44 KG/M2 | TEMPERATURE: 97.2 F | DIASTOLIC BLOOD PRESSURE: 64 MMHG | SYSTOLIC BLOOD PRESSURE: 138 MMHG | HEIGHT: 61 IN | WEIGHT: 214.2 LBS

## 2018-07-18 DIAGNOSIS — K43.2 INCISIONAL HERNIA, WITHOUT OBSTRUCTION OR GANGRENE: ICD-10-CM

## 2018-07-18 PROCEDURE — 99214 OFFICE O/P EST MOD 30 MIN: CPT | Performed by: SURGERY

## 2018-07-18 NOTE — LETTER
July 18, 2018     Dara Case MD  [id number]  [address] [address] [place] [address]    Patient: Mayelin Monaco   YOB: 1935   Date of Visit: 7/18/2018       Dear Dr Magda Desir: Thank you for referring Mukesh Chapman to me for evaluation  Below are my notes for this consultation  If you have questions, please do not hesitate to call me  I look forward to following your patient along with you  Sincerely,        Freddy Calderon MD        CC: MD Freddy Dunne MD  7/18/2018 11:57 PM  Sign at close encounter  Office Visit - [address] [place] [address] MRN: [id number]  Encounter: [id number]    Assessment and Plan    Problem List Items Addressed This Visit        Other    Incisional hernia     81year old female with large right sided incisional hernia  I told her I would not do anything surgically until the wound healed  I said we would ask the [facility] to use AMD packing on Mon-Wed-Fri  I explained that she has a relatively large hernia and that in that location repair can sometimes be difficult since there is no fascia  I told her it would require a large sheet of mesh and may be a long extensive operation  I told her I was a little concerned because of all her comorbidities which would increase her risk significantly  I asked to see her back in 1 month and will re assess at that time  I have spent 40 minutes with Patient and  today in which greater than 50% of this time was spent in counseling regarding the hernia  Chief Complaint:  Mayelin Monaco is a 81 y o  female who presents for Abdominal Pain (Consult incisional hernia)    Subjective  81year old female with multiple medical problems who underwent a robotic assisted right nephrectomy  Post operatively she developed a draining wound and large hernia at the extraction site of the specimen  She has been having dressing changes done by the visiting nurses    A CT scan documented the hernia  She is here for evaluation  Past Medical History  Past Medical History:   Diagnosis Date    Anemia     Arthritis     rheumatoid    Benign essential hypertension     Cataract     Chronic cystitis     CPAP (continuous positive airway pressure) dependence     Diverticulitis of colon     Early satiety     GERD (gastroesophageal reflux disease)     Gout     Hearing aid worn     bilateral    Hearing loss     Hemorrhoids     Hiatal hernia     History of colonic polyps     Hyperlipidemia     Hypothyroidism     Left breast mass     Microhematuria     Migraine     occular    Neuropathy     lower and upper extermities    Overactive bladder     Pneumonia of left lower lobe due to infectious organism (HCC)     [facility]he[facility]oid [facility]) [facility]     Sleep apnea     uses cpap    Stroke [facility] )     5/2017    Type 2 diabetes mellitus (HCC)     Urinary frequency     Urinary urgency     Urothelial cancer ([facility] ) 04/23/2018    Use of cane as ambulatory aid        Past Surgical History  Past Surgical History:   Procedure Laterality Date    APPENDECTOMY      ARTHROSCOPY WRIST Right     with release of transverse carpal ligament     BLADDER SURGERY      BLADDER SURGERY      BREAST SURGERY      left breast    BUNIONECTOMY Bilateral     CATARACT EXTRACTION Bilateral     CHOLECYSTECTOMY      COLONOSCOPY      CYSTOSCOPY      EGD AND COLONOSCOPY N/A 12/20/2017    Procedure: EGD AND COLONOSCOPY;  Surgeon: Loren Mayers MD;  [facility];   Service: Gastroenterology    ESOPHAGOGASTRODUODENOSCOPY      diagnostic    FOREARM SURGERY Right     fracture repair    HYSTERECTOMY      JOINT REPLACEMENT      KNEE ARTHROSCOPY Left     KNEE SURGERY Left     meniscus tear    NOSE SURGERY      GA CYSTO/URETERO W/LITHOTRIPSY &INDWELL STENT INSRT Right 2/28/2018    Procedure: CYSTOSCOPY RIGHT URETEROSCOPY, RIGHT RETROGRADE PYELOGRAM AND INSERTION  RIGHT STENT URETERAL, RIGHT RENAL PELVIC WASHING FOR CYTOLOGY;  Surgeon: Patti Covarrubias MD;  [facility];  Service: Urology    SD NEPHRECTOMY, W/PART  URETECTOMY Right 4/23/2018    Procedure: ROBATIC ASSISTED NEPHRO-URETERECTOMY WITH BLADDER CUFF EXCISION;  Surgeon: Saeed Roldan MD;  [facility];  Service: Urology    REPLACEMENT TOTAL KNEE BILATERAL Bilateral     URETEROSCOPY Right 3/20/2018    Procedure: CYSTOSCOPY, RETROGRADE PYELOGRAM, URETEROSCOPY, BIOPSY, BRUSH, FULGURATION, STENT PLACEMENT, BLADDER BIOPSY WITH FULGURATION;  Surgeon: Saeed Roldan MD;  [facility];  Service: Urology       Family History  Family History   Problem Relation Age of Onset    Other Mother         epilepsy    Early death Mother    Areta Emmer Glaucoma Father     Stroke Father         silent    Other Brother         cardiac disorder       Medications  Current Outpatient Prescriptions on File Prior to Visit   Medication Sig Dispense Refill    acetaminophen (TYLENOL) 325 mg tablet Take 650 mg by mouth every 6 (six) hours as needed for mild pain      Calcium Citrate-Vitamin D (CALCIUM + D PO) Take 1 tablet by mouth 2 (two) times a day      colchicine (COLCRYS) 0 6 mg tablet Take 2 tablets po stat, then 1 tablet po 1 hour later 3 tablet 1    cyanocobalamin (VITAMIN B-12) 100 mcg tablet Take by mouth      furosemide (LASIX) 20 mg tablet Take 20 mg by mouth daily        gabapentin (NEURONTIN) 100 mg capsule Take 1 capsule (100 mg total) by mouth 3 (three) times a day Take 1 cap in the am, 1 cap in the afternoon, and 3 cap at bedtime 270 capsule 0    LEUCOVORIN CALCIUM PO Take 10 mg by mouth once a week      levothyroxine 75 mcg tablet Take 75 mcg by mouth daily      losartan (COZAAR) 100 MG tablet Take 100 mg by mouth daily      methotrexate 2 5 mg tablet       Multiple Vitamin (MULTIVITAMINS PO) Take 1 tablet by mouth daily      omeprazole (PriLOSEC) 20 mg delayed release capsule TAKE 1 CAPSULE DAILY AS NEEDED FOR STOMACH ACID 90 capsule 3    pravastatin (PRAVACHOL) 80 mg tablet TAKE 1 TABLET EVERY DAY 90 tablet 3    rOPINIRole (REQUIP) 0 5 mg tablet Take 0 5 mg by mouth 2 (two) times a day        XARELTO 20 MG tablet       docusate sodium (COLACE) 100 mg capsule Take 1 capsule (100 mg total) by mouth 2 (two) times a day for 60 doses 60 capsule 0     Current Facility-Administered Medications on File Prior to Visit   Medication Dose Route Frequency Provider Last Rate Last Dose    acetaminophen (TYLENOL) tablet 650 mg  650 mg Oral Q6H PRN Peggy Jefferson PA-C           Allergies  Allergies   Allergen Reactions    Acetazolamide Other (See Comments)     Other reaction(s): Unknown Allergic Reaction    Aspirin      Other reaction(s): Other (See Comments)  High Dose ASA-stomach ache, rachel  ASA 81 m    Atorvastatin      Other reaction(s): Muscle Pain, Myalgia    Azithromycin     Other Other (See Comments)     Adhesive tape : red and itching  Other reaction(s): Other (See Comments)  red and itching    Shellfish-Derived Products Other (See Comments)     Patient got Gout following eating shell fish    Sulfa Antibiotics Other (See Comments)     unknown    Tramadol Diarrhea and Vomiting       Review of Systems   Constitutional: Negative for appetite change, chills and fever  HENT: Negative for trouble swallowing and voice change  Eyes: Negative for discharge and visual disturbance  Respiratory: Negative for cough and shortness of breath  Cardiovascular: Positive for leg swelling  Negative for chest pain  Gastrointestinal: Negative for abdominal pain, nausea and vomiting  Endocrine: Negative for cold intolerance, heat intolerance, polydipsia, polyphagia and polyuria  Genitourinary: Negative for difficulty urinating and dysuria  Musculoskeletal: Positive for arthralgias, gait problem and joint swelling  Skin: Positive for wound  Negative for color change  Allergic/Immunologic: Negative for food allergies  Neurological: Negative for seizures, syncope, weakness and headaches  Hematological: Negative for adenopathy  Psychiatric/Behavioral: Negative for confusion  All other systems reviewed and are negative  Objective  Vitals:    07/18/18 1307   BP: 138/64   Temp: (!) 97 2 °F (36 2 °C)       Physical Exam   Constitutional: She is oriented to person, place, and time  She appears well-nourished  Obese white female in no acute distress  HENT:   Head: Normocephalic and atraumatic  Right Ear: External ear normal    Left Ear: External ear normal    Eyes: Conjunctivae are normal  No scleral icterus  Neck: Neck supple  No tracheal deviation present  No thyromegaly present  Cardiovascular: Normal rate, regular rhythm and normal heart sounds  Exam reveals no friction rub  No murmur heard  Pulmonary/Chest: Effort normal and breath sounds normal  No respiratory distress  She has no wheezes  She has no rales  Abdominal: Soft  Bowel sounds are normal  She exhibits mass  There is no tenderness  There is no rebound and no guarding  Lateral abdomen has an open wound about 0 8x0  8x1 5 cm with mostly pink tissue present, almost no slough, edges clean, periwound clean and intact  Large bulging right upper quadrant, no really reducible, non tender, no other masses   Musculoskeletal: Normal range of motion  She exhibits no edema  Lymphadenopathy:     She has no cervical adenopathy  Neurological: She is alert and oriented to person, place, and time  Skin: Skin is warm and dry  Psychiatric: She has a normal mood and affect  Her behavior is normal  Judgment and thought content normal    Nursing note and vitals reviewed

## 2018-07-18 NOTE — PROGRESS NOTES
Office Visit - General Surgery  Elliott Linares MRN: [id number]  Encounter: [id number]    Assessment and Plan    Problem List Items Addressed This Visit        Other    Incisional hernia     81year old female with large right sided incisional hernia  I told her I would not do anything surgically until the wound healed  I said we would ask the [facility] to use AMD packing on Mon-Wed-Fri  I explained that she has a relatively large hernia and that in that location repair can sometimes be difficult since there is no fascia  I told her it would require a large sheet of mesh and may be a long extensive operation  I told her I was a little concerned because of all her comorbidities which would increase her risk significantly  I asked to see her back in 1 month and will re assess at that time  I have spent 40 minutes with Patient and  today in which greater than 50% of this time was spent in counseling regarding the hernia  Chief Complaint:  Elliott Linares is a 81 y o  female who presents for Abdominal Pain (Consult incisional hernia)    Subjective  81year old female with multiple medical problems who underwent a robotic assisted right nephrectomy  Post operatively she developed a draining wound and large hernia at the extraction site of the specimen  She has been having dressing changes done by the visiting nurses  A CT scan documented the hernia  She is here for evaluation      Past Medical History  Past Medical History:   Diagnosis Date    Anemia     Arthritis     rheumatoid    Benign essential hypertension     Cataract     Chronic cystitis     CPAP (continuous positive airway pressure) dependence     Diverticulitis of colon     Early satiety     GERD (gastroesophageal reflux disease)     Gout     Hearing aid worn     bilateral    Hearing loss     Hemorrhoids     Hiatal hernia     History of colonic polyps     Hyperlipidemia     Hypothyroidism     Left breast mass     Microhematuria     Migraine     occular    Neuropathy     lower and upper extermities    Overactive bladder     Pneumonia of left lower lobe due to infectious organism (HCC)     [facility]     Sleep apnea     uses cpap    Stroke ([facility] )     5/2017    Type 2 diabetes mellitus (HCC)     Urinary frequency     Urinary urgency     Urothelial cancer ([facility] ) 04/23/2018    Use of cane as ambulatory aid        Past Surgical History  Past Surgical History:   Procedure Laterality Date    APPENDECTOMY      ARTHROSCOPY WRIST Right     with release of transverse carpal ligament     BLADDER SURGERY      BLADDER SURGERY      BREAST SURGERY      left breast    BUNIONECTOMY Bilateral     CATARACT EXTRACTION Bilateral     CHOLECYSTECTOMY      COLONOSCOPY      CYSTOSCOPY      EGD AND COLONOSCOPY N/A 12/20/2017    Procedure: EGD AND COLONOSCOPY;  Surgeon: Erika Pascual MD;  [facility]; Service: Gastroenterology    ESOPHAGOGASTRODUODENOSCOPY      diagnostic    FOREARM SURGERY Right     fracture repair    HYSTERECTOMY      JOINT REPLACEMENT      KNEE ARTHROSCOPY Left     KNEE SURGERY Left     meniscus tear    NOSE SURGERY      IA CYSTO/URETERO W/LITHOTRIPSY &INDWELL STENT INSRT Right 2/28/2018    Procedure: CYSTOSCOPY RIGHT URETEROSCOPY, RIGHT RETROGRADE PYELOGRAM AND INSERTION  RIGHT STENT URETERAL, RIGHT RENAL PELVIC WASHING FOR CYTOLOGY;  Surgeon: Renetta Basurto MD;  [facility];  Service: Urology    IA NEPHRECTOMY, W/PART   URETECTOMY Right 4/23/2018    Procedure: ROBATIC ASSISTED NEPHRO-URETERECTOMY WITH BLADDER CUFF EXCISION;  Surgeon: Maxx Christianson MD;  [facility];  Service: Urology    REPLACEMENT TOTAL KNEE BILATERAL Bilateral     URETEROSCOPY Right 3/20/2018    Procedure: CYSTOSCOPY, RETROGRADE PYELOGRAM, URETEROSCOPY, BIOPSY, BRUSH, FULGURATION, STENT PLACEMENT, BLADDER BIOPSY WITH FULGURATION;  Surgeon: Maxx Christianson MD;  [facility];  Service: Urology       Family History  Family History   Problem Relation Age of Onset    Other Mother         epilepsy    Early death Mother    [facility] Glaucoma Father     Stroke Father         silent    Other Brother         cardiac disorder       Medications  Current Outpatient Prescriptions on File Prior to Visit   Medication Sig Dispense Refill    acetaminophen (TYLENOL) 325 mg tablet Take 650 mg by mouth every 6 (six) hours as needed for mild pain      Calcium Citrate-Vitamin D (CALCIUM + D PO) Take 1 tablet by mouth 2 (two) times a day      colchicine (COLCRYS) 0 6 mg tablet Take 2 tablets po stat, then 1 tablet po 1 hour later 3 tablet 1    cyanocobalamin (VITAMIN B-12) 100 mcg tablet Take by mouth      furosemide (LASIX) 20 mg tablet Take 20 mg by mouth daily        gabapentin (NEURONTIN) 100 mg capsule Take 1 capsule (100 mg total) by mouth 3 (three) times a day Take 1 cap in the am, 1 cap in the afternoon, and 3 cap at bedtime 270 capsule 0    LEUCOVORIN CALCIUM PO Take 10 mg by mouth once a week      levothyroxine 75 mcg tablet Take 75 mcg by mouth daily      losartan (COZAAR) 100 MG tablet Take 100 mg by mouth daily      methotrexate 2 5 mg tablet       Multiple Vitamin (MULTIVITAMINS PO) Take 1 tablet by mouth daily      omeprazole (PriLOSEC) 20 mg delayed release capsule TAKE 1 CAPSULE DAILY AS NEEDED FOR STOMACH ACID 90 capsule 3    pravastatin (PRAVACHOL) 80 mg tablet TAKE 1 TABLET EVERY DAY 90 tablet 3    rOPINIRole (REQUIP) 0 5 mg tablet Take 0 5 mg by mouth 2 (two) times a day        XARELTO 20 MG tablet       docusate sodium (COLACE) 100 mg capsule Take 1 capsule (100 mg total) by mouth 2 (two) times a day for 60 doses 60 capsule 0     Current Facility-Administered Medications on File Prior to Visit   Medication Dose Route Frequency Provider Last Rate Last Dose    acetaminophen (TYLENOL) tablet 650 mg  650 mg Oral Q6H PRN Og Jefferson PA-C           Allergies  Allergies Allergen Reactions    Acetazolamide Other (See Comments)     Other reaction(s): Unknown Allergic Reaction    Aspirin      Other reaction(s): Other (See Comments)  High Dose ASA-stomach ache, rachel  ASA 81 m    Atorvastatin      Other reaction(s): Muscle Pain, Myalgia    Azithromycin     Other Other (See Comments)     Adhesive tape : red and itching  Other reaction(s): Other (See Comments)  red and itching    Shellfish-Derived Products Other (See Comments)     Patient got Gout following eating shell fish    Sulfa Antibiotics Other (See Comments)     unknown    Tramadol Diarrhea and Vomiting       Review of Systems   Constitutional: Negative for appetite change, chills and fever  HENT: Negative for trouble swallowing and voice change  Eyes: Negative for discharge and visual disturbance  Respiratory: Negative for cough and shortness of breath  Cardiovascular: Positive for leg swelling  Negative for chest pain  Gastrointestinal: Negative for abdominal pain, nausea and vomiting  Endocrine: Negative for cold intolerance, heat intolerance, polydipsia, polyphagia and polyuria  Genitourinary: Negative for difficulty urinating and dysuria  Musculoskeletal: Positive for arthralgias, gait problem and joint swelling  Skin: Positive for wound  Negative for color change  Allergic/Immunologic: Negative for food allergies  Neurological: Negative for seizures, syncope, weakness and headaches  Hematological: Negative for adenopathy  Psychiatric/Behavioral: Negative for confusion  All other systems reviewed and are negative  Objective  Vitals:    07/18/18 1307   BP: 138/64   Temp: (!) 97 2 °F (36 2 °C)       Physical Exam   Constitutional: She is oriented to person, place, and time  She appears well-nourished  Obese white female in no acute distress  HENT:   Head: Normocephalic and atraumatic     Right Ear: External ear normal    Left Ear: External ear normal    Eyes: Conjunctivae are normal  No scleral icterus  Neck: Neck supple  No tracheal deviation present  No thyromegaly present  Cardiovascular: Normal rate, regular rhythm and normal heart sounds  Exam reveals no friction rub  No murmur heard  Pulmonary/Chest: Effort normal and breath sounds normal  No respiratory distress  She has no wheezes  She has no rales  Abdominal: Soft  Bowel sounds are normal  She exhibits mass  There is no tenderness  There is no rebound and no guarding  Lateral abdomen has an open wound about 0 8x0  8x1 5 cm with mostly pink tissue present, almost no slough, edges clean, periwound clean and intact  Large bulging right upper quadrant, no really reducible, non tender, no other masses   Musculoskeletal: Normal range of motion  She exhibits no edema  Lymphadenopathy:     She has no cervical adenopathy  Neurological: She is alert and oriented to person, place, and time  Skin: Skin is warm and dry  Psychiatric: She has a normal mood and affect  Her behavior is normal  Judgment and thought content normal    Nursing note and vitals reviewed

## 2018-07-19 NOTE — ASSESSMENT & PLAN NOTE
81year old female with large right sided incisional hernia  I told her I would not do anything surgically until the wound healed  I said we would ask the [facility] to use AMD packing on Mon-Wed-Fri  I explained that she has a relatively large hernia and that in that location repair can sometimes be difficult since there is no fascia  I told her it would require a large sheet of mesh and may be a long extensive operation  I told her I was a little concerned because of all her comorbidities which would increase her risk significantly  I asked to see her back in 1 month and will re assess at that time

## 2018-07-24 ENCOUNTER — APPOINTMENT (OUTPATIENT)
Dept: LAB | Facility: CLINIC | Age: 83
End: 2018-07-24
Payer: MEDICARE

## 2018-07-24 DIAGNOSIS — R79.89 ELEVATED SERUM CREATININE: ICD-10-CM

## 2018-07-24 LAB
ALBUMIN SERPL BCP-MCNC: 3.5 G/DL (ref 3.5–5)
ALP SERPL-CCNC: 100 U/L (ref 46–116)
ALT SERPL W P-5'-P-CCNC: 21 U/L (ref 12–78)
ANION GAP SERPL CALCULATED.3IONS-SCNC: 7 MMOL/L (ref 4–13)
AST SERPL W P-5'-P-CCNC: 18 U/L (ref 5–45)
BILIRUB SERPL-MCNC: 0.76 MG/DL (ref 0.2–1)
BUN SERPL-MCNC: 26 MG/DL (ref 5–25)
CALCIUM SERPL-MCNC: 8.8 MG/DL (ref 8.3–10.1)
CHLORIDE SERPL-SCNC: 104 MMOL/L (ref 100–108)
CO2 SERPL-SCNC: 26 MMOL/L (ref 21–32)
CREAT SERPL-MCNC: 1.7 MG/DL (ref 0.6–1.3)
GFR SERPL CREATININE-BSD FRML MDRD: 28 ML/MIN/1.73SQ M
GLUCOSE P FAST SERPL-MCNC: 101 MG/DL (ref 65–99)
POTASSIUM SERPL-SCNC: 4.1 MMOL/L (ref 3.5–5.3)
PROT SERPL-MCNC: 7 G/DL (ref 6.4–8.2)
SODIUM SERPL-SCNC: 137 MMOL/L (ref 136–145)

## 2018-07-24 PROCEDURE — 80053 COMPREHEN METABOLIC PANEL: CPT

## 2018-07-24 PROCEDURE — 36415 COLL VENOUS BLD VENIPUNCTURE: CPT

## 2018-07-30 ENCOUNTER — TELEPHONE (OUTPATIENT)
Dept: SURGERY | Facility: CLINIC | Age: 83
End: 2018-07-30

## 2018-07-30 NOTE — TELEPHONE ENCOUNTER
[facility] called just to give an update that the right upper quadrant wound has healed  Reducing visits down to 1 weekly and discharging 8/8/18  Keeping wound open to air

## 2018-08-03 ENCOUNTER — OFFICE VISIT (OUTPATIENT)
Dept: FAMILY MEDICINE CLINIC | Facility: CLINIC | Age: 83
End: 2018-08-03
Payer: MEDICARE

## 2018-08-03 VITALS
OXYGEN SATURATION: 97 % | BODY MASS INDEX: 39.84 KG/M2 | DIASTOLIC BLOOD PRESSURE: 70 MMHG | HEART RATE: 86 BPM | TEMPERATURE: 98.7 F | SYSTOLIC BLOOD PRESSURE: 116 MMHG | HEIGHT: 61 IN | WEIGHT: 211 LBS

## 2018-08-03 DIAGNOSIS — M65.4 TENOSYNOVITIS, DE QUERVAIN: ICD-10-CM

## 2018-08-03 DIAGNOSIS — N30.01 ACUTE CYSTITIS WITH HEMATURIA: Primary | ICD-10-CM

## 2018-08-03 LAB
SL AMB  POCT GLUCOSE, UA: NEGATIVE
SL AMB LEUKOCYTE ESTERASE,UA: ABNORMAL
SL AMB POCT BILIRUBIN,UA: NEGATIVE
SL AMB POCT BLOOD,UA: ABNORMAL
SL AMB POCT CLARITY,UA: ABNORMAL
SL AMB POCT COLOR,UA: YELLOW
SL AMB POCT KETONES,UA: ABNORMAL
SL AMB POCT NITRITE,UA: NEGATIVE
SL AMB POCT PH,UA: 5
SL AMB POCT SPECIFIC GRAVITY,UA: 1.01
SL AMB POCT URINE PROTEIN: NEGATIVE
SL AMB POCT UROBILINOGEN: 0.2

## 2018-08-03 PROCEDURE — 99213 OFFICE O/P EST LOW 20 MIN: CPT | Performed by: PHYSICIAN ASSISTANT

## 2018-08-03 PROCEDURE — 87086 URINE CULTURE/COLONY COUNT: CPT | Performed by: PHYSICIAN ASSISTANT

## 2018-08-03 PROCEDURE — 81002 URINALYSIS NONAUTO W/O SCOPE: CPT | Performed by: PHYSICIAN ASSISTANT

## 2018-08-03 RX ORDER — HYDROXYCHLOROQUINE SULFATE 200 MG/1
200 TABLET, FILM COATED ORAL 2 TIMES DAILY WITH MEALS
COMMUNITY

## 2018-08-03 RX ORDER — CEPHALEXIN 500 MG/1
500 CAPSULE ORAL EVERY 12 HOURS SCHEDULED
Qty: 14 CAPSULE | Refills: 0 | Status: SHIPPED | OUTPATIENT
Start: 2018-08-03 | End: 2018-08-06 | Stop reason: SDUPTHER

## 2018-08-03 NOTE — PROGRESS NOTES
[place] [facility]  FAMILY PRACTICE ACUTE OFFICE VISIT       NAME: Dave Ramey  AGE: 81 y o  SEX: female       : 1935        MRN: [id number]    DATE: 8/3/2018  TIME: 3:39 PM    Assessment and Plan     Problem List Items Addressed This Visit     None      Visit Diagnoses     Acute cystitis with hematuria    -  Primary    Reviewed urine dip positive for leukocytes and blood  Sent for culture  Start Keflex twice daily x7 days  Will recheck at appointment with Dr Alondra Koroma Monday  Relevant Medications    cephalexin (KEFLEX) 500 mg capsule    Other Relevant Orders    POCT urine dip    Tenosynovitis, de Quervain          Appears to be source right thumb pain  Encouraged diet 2-3 times daily for 10 minutes  Encouraged to rest the hand as much as possible  Call if worsens  Chief Complaint     Chief Complaint   Patient presents with    Possible UTI       History of Present Illness   Randy Wise is a 81y o -year-old female who presents for UTI symptoms  Notes that she has been feeling poorly for a few days - began about 2 days ago - notes that she was tired and in bed - notes that she was so weak that she fell yesterday - notes that she has pressure and notes that the urine is more yellow and has a strong odor - had nephrouretectomy on 18       Urinary Tract Infection    Associated symptoms include chills  Pertinent negatives include no flank pain, frequency, hematuria or urgency  Review of Systems   Review of Systems   Constitutional: Positive for appetite change, chills and fatigue  Genitourinary: Negative for dysuria, flank pain, frequency, hematuria, urgency and vaginal discharge     Musculoskeletal:        Hand pain on right        Active Problem List     Patient Active Problem List   Diagnosis    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 (HCC)    Essential hypertension    Rheumatoid arthritis ([facility] [address] )    Diabetes mellitus type 2 in obese (HCC)    Sleep apnea    Acquired hypothyroidism    Chronic GERD    Acute blood loss anemia    Carpal tunnel syndrome, left    Diverticulosis of colon    Edema    Hematuria    Hypercholesterolemia    Lymphedema    Obesity    Overactive bladder    Peripheral neuropathy    Prediabetes    Primary osteoarthritis of left knee    Restless leg syndrome    Right lumbar radiculopathy    Sensorineural hearing loss    Sensory urge incontinence    Sinus arrhythmia    Chronic bilateral thoracic back pain    Thoracic degenerative disc disease    Urothelial cancer (HCC)    Lung nodule < 6cm on CT    Pancreatic cyst    Iron deficiency anemia    Anemia    BRENNEN (acute kidney injury) (HCC)    Hyponatremia    PAD (peripheral artery disease) (HCC)    Bilateral edema of lower extremity    History of nephroureterectomy    Incisional hernia         Social History:  Social History     Social History    Marital status: /Civil Union     Spouse name: N/A    Number of children: N/A    Years of education: N/A     Occupational History    Not on file  Social History Main Topics    Smoking status: Former Smoker    Smokeless tobacco: Never Used      Comment: stopped smoking in the distant past, never smoker (as per [employer])     Alcohol use Yes      Comment: socially/ seldom    Drug use: No    Sexual activity: No     Other Topics Concern    Not on file     Social History Narrative    No caffeine use       Objective     Vitals:    08/03/18 1456   BP: 116/70   Pulse: 86   Temp: 98 7 °F (37 1 °C)   SpO2: 97%     Wt Readings from Last 3 Encounters:   08/03/18 95 7 kg (211 lb)   07/18/18 97 2 kg (214 lb 3 2 oz)   07/10/18 94 3 kg (208 lb)       Physical Exam   Constitutional: She appears well-developed and well-nourished  No distress  Cardiovascular: Normal rate, regular rhythm, normal heart sounds and intact distal pulses  No murmur heard    Pulmonary/Chest: Effort normal and breath sounds normal  She has no wheezes  She has no rales  Abdominal: Soft  Bowel sounds are normal  She exhibits no distension and no mass  There is no tenderness  There is no rebound, no guarding and no CVA tenderness  Musculoskeletal:   Positive Finklestein's on right   Skin: Skin is warm and dry  Psychiatric: She has a normal mood and affect  Her behavior is normal    Vitals reviewed  Pertinent Laboratory/Diagnostic Studies:  Results for orders placed or performed in visit on 07/24/18   Comprehensive metabolic panel   Result Value Ref Range    Sodium 137 136 - 145 mmol/L    Potassium 4 1 3 5 - 5 3 mmol/L    Chloride 104 100 - 108 mmol/L    CO2 26 21 - 32 mmol/L    Anion Gap 7 4 - 13 mmol/L    BUN 26 (H) 5 - 25 mg/dL    Creatinine 1 70 (H) 0 60 - 1 30 mg/dL    Glucose, Fasting 101 (H) 65 - 99 mg/dL    Calcium 8 8 8 3 - 10 1 mg/dL    AST 18 5 - 45 U/L    ALT 21 12 - 78 U/L    Alkaline Phosphatase 100 46 - 116 U/L    Total Protein 7 0 6 4 - 8 2 g/dL    Albumin 3 5 3 5 - 5 0 g/dL    Total Bilirubin 0 76 0 20 - 1 00 mg/dL    eGFR 28 ml/min/1 73sq m       Orders Placed This Encounter   Procedures    POCT urine dip       ALLERGIES:  Allergies   Allergen Reactions    Acetazolamide Other (See Comments)     Other reaction(s): Unknown Allergic Reaction    Aspirin      Other reaction(s): Other (See Comments)  High Dose ASA-stomach ache, rachel  ASA 81 m    Atorvastatin      Other reaction(s): Muscle Pain, Myalgia    Azithromycin     Nitrofurantoin Hives     HIVES * pt denies  Other reaction(s): Unknown Allergic Reaction  Other reaction(s): Other (See Comments)  HIVES * pt denies    Other Other (See Comments)     Adhesive tape : red and itching  Other reaction(s):  Other (See Comments)  red and itching    Shellfish-Derived Products Other (See Comments)     Patient got Gout following eating shell fish    Sulfa Antibiotics Other (See Comments)     unknown    Tramadol Diarrhea and Vomiting Current Medications     Current Outpatient Prescriptions   Medication Sig Dispense Refill    acetaminophen (TYLENOL) 325 mg tablet Take 650 mg by mouth every 6 (six) hours as needed for mild pain      Calcium Citrate-Vitamin D (CALCIUM + D PO) Take 1 tablet by mouth 2 (two) times a day      colchicine (COLCRYS) 0 6 mg tablet Take 2 tablets po stat, then 1 tablet po 1 hour later 3 tablet 1    cyanocobalamin (VITAMIN B-12) 100 mcg tablet Take by mouth      furosemide (LASIX) 20 mg tablet Take 20 mg by mouth daily   gabapentin (NEURONTIN) 100 mg capsule Take 1 capsule (100 mg total) by mouth 3 (three) times a day Take 1 cap in the am, 1 cap in the afternoon, and 3 cap at bedtime 270 capsule 0    hydroxychloroquine (PLAQUENIL) 200 mg tablet Take 200 mg by mouth 2 (two) times a day with meals      LEUCOVORIN CALCIUM PO Take 10 mg by mouth once a week      levothyroxine 75 mcg tablet Take 75 mcg by mouth daily      losartan (COZAAR) 100 MG tablet Take 100 mg by mouth daily      Multiple Vitamin (MULTIVITAMINS PO) Take 1 tablet by mouth daily      omeprazole (PriLOSEC) 20 mg delayed release capsule TAKE 1 CAPSULE DAILY AS NEEDED FOR STOMACH ACID 90 capsule 3    pravastatin (PRAVACHOL) 80 mg tablet TAKE 1 TABLET EVERY DAY 90 tablet 3    rOPINIRole (REQUIP) 0 5 mg tablet Take 0 5 mg by mouth 2 (two) times a day        cephalexin (KEFLEX) 500 mg capsule Take 1 capsule (500 mg total) by mouth every 12 (twelve) hours for 7 days 14 capsule 0    docusate sodium (COLACE) 100 mg capsule Take 1 capsule (100 mg total) by mouth 2 (two) times a day for 60 doses 60 capsule 0    methotrexate 2 5 mg tablet       XARELTO 20 MG tablet        No current facility-administered medications for this visit        Facility-Administered Medications Ordered in Other Visits   Medication Dose Route Frequency Provider Last Rate Last Dose    acetaminophen (TYLENOL) tablet 650 mg  650 mg Oral Q6H PRN Keiko Tyler PA-C [address] Wor[address] PA-C  8/3/2018 3:39 PM  [facility]

## 2018-08-03 NOTE — PATIENT INSTRUCTIONS
Problem List Items Addressed This Visit     None      Visit Diagnoses     Acute cystitis with hematuria    -  Primary    Urine dip positive for leukocytes and blood  Sent for culture  Start Keflex BID x7 days  Will recheck at appointment with Dr Stormy Bobo Monday  UA in 2 weeks  Relevant Medications    cephalexin (KEFLEX) 500 mg capsule    Other Relevant Orders    POCT urine dip    UA w Reflex to Microscopic w Reflex to Culture    Tenosynovitis, de Quervain          Appears to be source right thumb pain  Encouraged diet 2-3 times daily for 10 minutes  Encouraged to rest the hand as much as possible  Call if worsens

## 2018-08-05 LAB — BACTERIA UR CULT: NORMAL

## 2018-08-06 ENCOUNTER — OFFICE VISIT (OUTPATIENT)
Dept: FAMILY MEDICINE CLINIC | Facility: CLINIC | Age: 83
End: 2018-08-06
Payer: MEDICARE

## 2018-08-06 VITALS
RESPIRATION RATE: 18 BRPM | HEIGHT: 61 IN | BODY MASS INDEX: 39.27 KG/M2 | WEIGHT: 208 LBS | OXYGEN SATURATION: 96 % | DIASTOLIC BLOOD PRESSURE: 70 MMHG | HEART RATE: 80 BPM | SYSTOLIC BLOOD PRESSURE: 138 MMHG

## 2018-08-06 DIAGNOSIS — R91.1 LUNG NODULE < 6CM ON CT: ICD-10-CM

## 2018-08-06 DIAGNOSIS — R31.9 HEMATURIA, UNSPECIFIED TYPE: ICD-10-CM

## 2018-08-06 DIAGNOSIS — G63 POLYNEUROPATHY ASSOCIATED WITH UNDERLYING DISEASE (HCC): ICD-10-CM

## 2018-08-06 DIAGNOSIS — I10 ESSENTIAL HYPERTENSION: ICD-10-CM

## 2018-08-06 DIAGNOSIS — N17.9 AKI (ACUTE KIDNEY INJURY) (HCC): ICD-10-CM

## 2018-08-06 DIAGNOSIS — D62 ACUTE BLOOD LOSS ANEMIA: ICD-10-CM

## 2018-08-06 DIAGNOSIS — C68.9 UROTHELIAL CANCER (HCC): Primary | ICD-10-CM

## 2018-08-06 DIAGNOSIS — N30.01 ACUTE CYSTITIS WITH HEMATURIA: ICD-10-CM

## 2018-08-06 DIAGNOSIS — R60.0 LOWER EXTREMITY EDEMA: Primary | ICD-10-CM

## 2018-08-06 DIAGNOSIS — I73.9 PAD (PERIPHERAL ARTERY DISEASE) (HCC): ICD-10-CM

## 2018-08-06 DIAGNOSIS — M05.9 RHEUMATOID ARTHRITIS WITH POSITIVE RHEUMATOID FACTOR, INVOLVING UNSPECIFIED SITE (HCC): ICD-10-CM

## 2018-08-06 DIAGNOSIS — L03.115 CELLULITIS OF RIGHT LEG: ICD-10-CM

## 2018-08-06 LAB
BILIRUB UR QL STRIP: NEGATIVE
CLARITY UR: CLEAR
COLOR UR: YELLOW
GLUCOSE UR STRIP-MCNC: NEGATIVE MG/DL
HGB UR QL STRIP.AUTO: NEGATIVE
KETONES UR STRIP-MCNC: NEGATIVE MG/DL
LEUKOCYTE ESTERASE UR QL STRIP: NEGATIVE
NITRITE UR QL STRIP: NEGATIVE
PH UR STRIP.AUTO: 6 [PH] (ref 4.5–8)
PROT UR STRIP-MCNC: NEGATIVE MG/DL
SP GR UR STRIP.AUTO: 1.01 (ref 1–1.03)
UROBILINOGEN UR QL STRIP.AUTO: 0.2 E.U./DL

## 2018-08-06 PROCEDURE — 99214 OFFICE O/P EST MOD 30 MIN: CPT | Performed by: FAMILY MEDICINE

## 2018-08-06 PROCEDURE — 81003 URINALYSIS AUTO W/O SCOPE: CPT | Performed by: FAMILY MEDICINE

## 2018-08-06 PROCEDURE — 87086 URINE CULTURE/COLONY COUNT: CPT | Performed by: FAMILY MEDICINE

## 2018-08-06 RX ORDER — CEPHALEXIN 500 MG/1
500 CAPSULE ORAL EVERY 8 HOURS SCHEDULED
Qty: 21 CAPSULE | Refills: 0 | Status: SHIPPED | OUTPATIENT
Start: 2018-08-06 | End: 2018-08-12 | Stop reason: HOSPADM

## 2018-08-06 RX ORDER — FUROSEMIDE 20 MG/1
TABLET ORAL
Qty: 90 TABLET | Refills: 3 | Status: SHIPPED | OUTPATIENT
Start: 2018-08-06 | End: 2018-08-21 | Stop reason: HOSPADM

## 2018-08-06 RX ORDER — CEPHALEXIN 500 MG/1
500 CAPSULE ORAL EVERY 8 HOURS SCHEDULED
Qty: 21 CAPSULE | Refills: 0 | Status: SHIPPED | OUTPATIENT
Start: 2018-08-06 | End: 2018-08-06 | Stop reason: SDUPTHER

## 2018-08-06 NOTE — PROGRESS NOTES
Assessment/Plan:    BRENNEN (acute kidney injury) ([facility] )    Check a BMP  This week with CBC    Acute blood loss anemia   Repeat CBC this week    Lung nodule < 6cm on CT   Consider repeat CT in February 2019  Urothelial cancer ([facility] )   Continue to follow with Urology  Diagnoses and all orders for this visit:    Urothelial cancer ([facility] )    BRENNEN (acute kidney injury) ([place] [address] )  -     Basic metabolic panel; Future  -     CBC and differential; Future    Acute cystitis with hematuria  Comments:  Urine dip positive for leukocytes and blood  Sent for culture  Start Keflex BID x7 days  Will recheck at appointment with Dr Rogelio Darling Monday  UA in 2 weeks  Orders:  -     Discontinue: cephalexin (KEFLEX) 500 mg capsule; Take 1 capsule (500 mg total) by mouth every 8 (eight) hours for 7 days  -     cephalexin (KEFLEX) 500 mg capsule; Take 1 capsule (500 mg total) by mouth every 8 (eight) hours for 7 days    Cellulitis of right leg  Comments:   increased cephalexin to 3 times daily  Orders:  -     Basic metabolic panel; Future  -     CBC and differential; Future    Acute blood loss anemia    Polyneuropathy associated with underlying disease (HCC)    Lung nodule < 6cm on CT    Hematuria, unspecified type  -     UA (URINE) with reflex to Microscopic  -     Urine culture          Subjective:      Patient ID: Michael Verma is a 81 y o  female  HPI patient presents today accompanied by her   She has been having some issues ever since having her partial nephrectomy  She had some postoperative bleeding and she has developed a bit of a hernia at the operation site  She is not really having pain from the site but does not like the bulging  She is considering having this repaired by General surgery  She has reflux which remains stable with omeprazole daily  She has history of peripheral arterial disease and is having no current claudication but notes she has been quite sedentary      She is having some ongoing fatigue which she feels is due to her recent surgery and anemia  She denies any depression currently  She has known rheumatoid arthritis  She denies any severe arthralgias currently  She has some chronic pain but this has been stable  The following portions of the patient's history were reviewed and updated as appropriate: allergies, current medications, past family history, past medical history, past social history, past surgical history and problem list     Review of Systems   Constitutional: Negative for appetite change, chills, fatigue, fever and unexpected weight change  HENT: Negative for trouble swallowing  Eyes: Negative for visual disturbance  Respiratory: Negative for cough, chest tightness, shortness of breath and wheezing  Cardiovascular: Negative for chest pain  Gastrointestinal: Negative for abdominal distention, abdominal pain, blood in stool, constipation and diarrhea  Endocrine: Negative for polyuria  Genitourinary: Negative for difficulty urinating and flank pain  Musculoskeletal: Negative for arthralgias and myalgias  Skin: Positive for color change (  She has significant erythemaOf her right anterior leg)  Negative for rash  Neurological: Negative for dizziness and light-headedness  Hematological: Negative for adenopathy  Does not bruise/bleed easily  Psychiatric/Behavioral: Negative for sleep disturbance  Objective:      /70   Pulse 80   Resp 18   Ht 5' 1" (1 549 m)   Wt 94 3 kg (208 lb)   LMP  (LMP Unknown)   SpO2 96%   BMI 39 30 kg/m²          Physical Exam   Constitutional: She is oriented to person, place, and time  She appears well-developed and well-nourished  No distress  HENT:   Head: Normocephalic  Eyes: Pupils are equal, round, and reactive to light  Neck: No tracheal deviation present  No thyromegaly present  Cardiovascular: Normal rate, regular rhythm and normal heart sounds  No murmur heard    Pulmonary/Chest: Effort normal  No respiratory distress  She has no wheezes  She has no rales  Abdominal: Soft  She exhibits no distension  There is no tenderness  Musculoskeletal: Normal range of motion  Neurological: She is alert and oriented to person, place, and time  No cranial nerve deficit  Skin: Skin is warm  No rash noted  She is not diaphoretic  There is erythema [facility] some significant erythema and tenderness to the anterior aspect of her right leg  There is  2+ edema around this erythema )  Psychiatric: She has a normal mood and affect   Judgment and thought content normal

## 2018-08-06 NOTE — PATIENT INSTRUCTIONS
Problem List Items Addressed This Visit     Acute blood loss anemia      Repeat CBC this week         Hematuria    Peripheral neuropathy    Urothelial cancer ([facility] [address] ) - Primary      Continue to follow with Urology  Lung nodule < 6cm on CT      Consider repeat CT in February 2019  BRENNEN (acute kidney injury) ([facility] [address] )       Check a BMP  This week with CBC         Relevant Orders    Basic metabolic panel    CBC and differential      Other Visit Diagnoses     Acute cystitis with hematuria        Urine dip positive for leukocytes and blood  Sent for culture  Start Keflex BID x7 days  Will recheck at appointment with Dr Lopez Sessions Monday  UA in 2 weeks  Relevant Medications    cephalexin (KEFLEX) 500 mg capsule    Cellulitis of right leg         increased cephalexin to 3 times daily      Relevant Orders    Basic metabolic panel    CBC and differential

## 2018-08-07 NOTE — ASSESSMENT & PLAN NOTE
Blood pressure is acceptable at this time  No current changes are needed  Continue current medicines

## 2018-08-08 ENCOUNTER — LAB REQUISITION (OUTPATIENT)
Dept: LAB | Facility: HOSPITAL | Age: 83
End: 2018-08-08
Payer: MEDICARE

## 2018-08-08 DIAGNOSIS — L03.115 CELLULITIS OF RIGHT LOWER EXTREMITY: ICD-10-CM

## 2018-08-08 DIAGNOSIS — N17.9 ACUTE KIDNEY FAILURE (HCC): ICD-10-CM

## 2018-08-08 LAB
ANION GAP SERPL CALCULATED.3IONS-SCNC: 10 MMOL/L (ref 4–13)
BACTERIA UR CULT: NORMAL
BASOPHILS # BLD AUTO: 0.02 THOUSANDS/ΜL (ref 0–0.1)
BASOPHILS NFR BLD AUTO: 0 % (ref 0–1)
BUN SERPL-MCNC: 20 MG/DL (ref 5–25)
CALCIUM SERPL-MCNC: 8.7 MG/DL (ref 8.3–10.1)
CHLORIDE SERPL-SCNC: 103 MMOL/L (ref 100–108)
CO2 SERPL-SCNC: 27 MMOL/L (ref 21–32)
CREAT SERPL-MCNC: 1.41 MG/DL (ref 0.6–1.3)
EOSINOPHIL # BLD AUTO: 0.26 THOUSAND/ΜL (ref 0–0.61)
EOSINOPHIL NFR BLD AUTO: 3 % (ref 0–6)
ERYTHROCYTE [DISTWIDTH] IN BLOOD BY AUTOMATED COUNT: 16.3 % (ref 11.6–15.1)
GFR SERPL CREATININE-BSD FRML MDRD: 35 ML/MIN/1.73SQ M
GLUCOSE P FAST SERPL-MCNC: 91 MG/DL (ref 65–99)
HCT VFR BLD AUTO: 25.2 % (ref 34.8–46.1)
HGB BLD-MCNC: 8.2 G/DL (ref 11.5–15.4)
IMM GRANULOCYTES # BLD AUTO: 0.07 THOUSAND/UL (ref 0–0.2)
IMM GRANULOCYTES NFR BLD AUTO: 1 % (ref 0–2)
LYMPHOCYTES # BLD AUTO: 1.18 THOUSANDS/ΜL (ref 0.6–4.47)
LYMPHOCYTES NFR BLD AUTO: 14 % (ref 14–44)
MCH RBC QN AUTO: 31.8 PG (ref 26.8–34.3)
MCHC RBC AUTO-ENTMCNC: 32.5 G/DL (ref 31.4–37.4)
MCV RBC AUTO: 98 FL (ref 82–98)
MONOCYTES # BLD AUTO: 0.79 THOUSAND/ΜL (ref 0.17–1.22)
MONOCYTES NFR BLD AUTO: 9 % (ref 4–12)
NEUTROPHILS # BLD AUTO: 6.43 THOUSANDS/ΜL (ref 1.85–7.62)
NEUTS SEG NFR BLD AUTO: 73 % (ref 43–75)
NRBC BLD AUTO-RTO: 0 /100 WBCS
PLATELET # BLD AUTO: 227 THOUSANDS/UL (ref 149–390)
PMV BLD AUTO: 9.4 FL (ref 8.9–12.7)
POTASSIUM SERPL-SCNC: 4 MMOL/L (ref 3.5–5.3)
RBC # BLD AUTO: 2.58 MILLION/UL (ref 3.81–5.12)
SODIUM SERPL-SCNC: 140 MMOL/L (ref 136–145)
WBC # BLD AUTO: 8.75 THOUSAND/UL (ref 4.31–10.16)

## 2018-08-08 PROCEDURE — 80048 BASIC METABOLIC PNL TOTAL CA: CPT | Performed by: FAMILY MEDICINE

## 2018-08-08 PROCEDURE — 85025 COMPLETE CBC W/AUTO DIFF WBC: CPT | Performed by: FAMILY MEDICINE

## 2018-08-09 ENCOUNTER — APPOINTMENT (EMERGENCY)
Dept: RADIOLOGY | Facility: HOSPITAL | Age: 83
DRG: 313 | End: 2018-08-09
Payer: MEDICARE

## 2018-08-09 ENCOUNTER — HOSPITAL ENCOUNTER (INPATIENT)
Facility: HOSPITAL | Age: 83
LOS: 3 days | Discharge: HOME HEALTH | DRG: 313 | End: 2018-08-12
Attending: INTERNAL MEDICINE | Admitting: INTERNAL MEDICINE
Payer: MEDICARE

## 2018-08-09 DIAGNOSIS — D72.829 LEUCOCYTOSIS: ICD-10-CM

## 2018-08-09 DIAGNOSIS — R07.89 CHEST PRESSURE: ICD-10-CM

## 2018-08-09 DIAGNOSIS — R60.0 LOWER EXTREMITY EDEMA: ICD-10-CM

## 2018-08-09 DIAGNOSIS — R07.2 PRECORDIAL PAIN: ICD-10-CM

## 2018-08-09 DIAGNOSIS — I50.33 ACUTE ON CHRONIC DIASTOLIC HEART FAILURE (HCC): Primary | ICD-10-CM

## 2018-08-09 PROBLEM — I16.0 HYPERTENSIVE URGENCY: Status: ACTIVE | Noted: 2018-08-09

## 2018-08-09 PROBLEM — R07.9 CHEST PAIN: Status: ACTIVE | Noted: 2018-08-09

## 2018-08-09 LAB
ALBUMIN SERPL BCP-MCNC: 3.3 G/DL (ref 3.5–5)
ALP SERPL-CCNC: 113 U/L (ref 46–116)
ALT SERPL W P-5'-P-CCNC: 17 U/L (ref 12–78)
ANION GAP SERPL CALCULATED.3IONS-SCNC: 5 MMOL/L (ref 4–13)
AST SERPL W P-5'-P-CCNC: 14 U/L (ref 5–45)
ATRIAL RATE: 156 BPM
BASOPHILS # BLD AUTO: 0.01 THOUSANDS/ΜL (ref 0–0.1)
BASOPHILS # BLD AUTO: 0.01 THOUSANDS/ΜL (ref 0–0.1)
BASOPHILS NFR BLD AUTO: 0 % (ref 0–1)
BASOPHILS NFR BLD AUTO: 0 % (ref 0–1)
BILIRUB SERPL-MCNC: 0.76 MG/DL (ref 0.2–1)
BUN SERPL-MCNC: 19 MG/DL (ref 5–25)
CALCIUM SERPL-MCNC: 8.9 MG/DL (ref 8.3–10.1)
CHLORIDE SERPL-SCNC: 104 MMOL/L (ref 100–108)
CO2 SERPL-SCNC: 27 MMOL/L (ref 21–32)
CREAT SERPL-MCNC: 1.45 MG/DL (ref 0.6–1.3)
EOSINOPHIL # BLD AUTO: 0.07 THOUSAND/ΜL (ref 0–0.61)
EOSINOPHIL # BLD AUTO: 0.28 THOUSAND/ΜL (ref 0–0.61)
EOSINOPHIL NFR BLD AUTO: 1 % (ref 0–6)
EOSINOPHIL NFR BLD AUTO: 4 % (ref 0–6)
ERYTHROCYTE [DISTWIDTH] IN BLOOD BY AUTOMATED COUNT: 16.3 % (ref 11.6–15.1)
ERYTHROCYTE [DISTWIDTH] IN BLOOD BY AUTOMATED COUNT: 16.5 % (ref 11.6–15.1)
GFR SERPL CREATININE-BSD FRML MDRD: 34 ML/MIN/1.73SQ M
GLUCOSE SERPL-MCNC: 119 MG/DL (ref 65–140)
HCT VFR BLD AUTO: 25 % (ref 34.8–46.1)
HCT VFR BLD AUTO: 25.8 % (ref 34.8–46.1)
HGB BLD-MCNC: 8.1 G/DL (ref 11.5–15.4)
HGB BLD-MCNC: 8.4 G/DL (ref 11.5–15.4)
IMM GRANULOCYTES # BLD AUTO: 0.11 THOUSAND/UL (ref 0–0.2)
IMM GRANULOCYTES # BLD AUTO: 0.14 THOUSAND/UL (ref 0–0.2)
IMM GRANULOCYTES NFR BLD AUTO: 1 % (ref 0–2)
IMM GRANULOCYTES NFR BLD AUTO: 2 % (ref 0–2)
LYMPHOCYTES # BLD AUTO: 0.95 THOUSANDS/ΜL (ref 0.6–4.47)
LYMPHOCYTES # BLD AUTO: 1.22 THOUSANDS/ΜL (ref 0.6–4.47)
LYMPHOCYTES NFR BLD AUTO: 16 % (ref 14–44)
LYMPHOCYTES NFR BLD AUTO: 8 % (ref 14–44)
MCH RBC QN AUTO: 31.8 PG (ref 26.8–34.3)
MCH RBC QN AUTO: 31.8 PG (ref 26.8–34.3)
MCHC RBC AUTO-ENTMCNC: 32.4 G/DL (ref 31.4–37.4)
MCHC RBC AUTO-ENTMCNC: 32.6 G/DL (ref 31.4–37.4)
MCV RBC AUTO: 98 FL (ref 82–98)
MCV RBC AUTO: 98 FL (ref 82–98)
MONOCYTES # BLD AUTO: 0.95 THOUSAND/ΜL (ref 0.17–1.22)
MONOCYTES # BLD AUTO: 1.55 THOUSAND/ΜL (ref 0.17–1.22)
MONOCYTES NFR BLD AUTO: 13 % (ref 4–12)
MONOCYTES NFR BLD AUTO: 13 % (ref 4–12)
NEUTROPHILS # BLD AUTO: 4.97 THOUSANDS/ΜL (ref 1.85–7.62)
NEUTROPHILS # BLD AUTO: 9.6 THOUSANDS/ΜL (ref 1.85–7.62)
NEUTS SEG NFR BLD AUTO: 65 % (ref 43–75)
NEUTS SEG NFR BLD AUTO: 77 % (ref 43–75)
NRBC BLD AUTO-RTO: 0 /100 WBCS
NRBC BLD AUTO-RTO: 0 /100 WBCS
NT-PROBNP SERPL-MCNC: 2031 PG/ML
PLATELET # BLD AUTO: 200 THOUSANDS/UL (ref 149–390)
PLATELET # BLD AUTO: 215 THOUSANDS/UL (ref 149–390)
PMV BLD AUTO: 9.1 FL (ref 8.9–12.7)
PMV BLD AUTO: 9.3 FL (ref 8.9–12.7)
POTASSIUM SERPL-SCNC: 3.8 MMOL/L (ref 3.5–5.3)
PROT SERPL-MCNC: 7.2 G/DL (ref 6.4–8.2)
QRS AXIS: 9 DEGREES
QRSD INTERVAL: 102 MS
QT INTERVAL: 372 MS
QTC INTERVAL: 434 MS
RBC # BLD AUTO: 2.55 MILLION/UL (ref 3.81–5.12)
RBC # BLD AUTO: 2.64 MILLION/UL (ref 3.81–5.12)
SODIUM SERPL-SCNC: 136 MMOL/L (ref 136–145)
T WAVE AXIS: 34 DEGREES
TROPONIN I SERPL-MCNC: <0.02 NG/ML
VENTRICULAR RATE: 82 BPM
WBC # BLD AUTO: 12.32 THOUSAND/UL (ref 4.31–10.16)
WBC # BLD AUTO: 7.54 THOUSAND/UL (ref 4.31–10.16)

## 2018-08-09 PROCEDURE — 99223 1ST HOSP IP/OBS HIGH 75: CPT | Performed by: INTERNAL MEDICINE

## 2018-08-09 PROCEDURE — 84484 ASSAY OF TROPONIN QUANT: CPT

## 2018-08-09 PROCEDURE — 36415 COLL VENOUS BLD VENIPUNCTURE: CPT

## 2018-08-09 PROCEDURE — 93005 ELECTROCARDIOGRAM TRACING: CPT

## 2018-08-09 PROCEDURE — 83880 ASSAY OF NATRIURETIC PEPTIDE: CPT | Performed by: EMERGENCY MEDICINE

## 2018-08-09 PROCEDURE — 80053 COMPREHEN METABOLIC PANEL: CPT

## 2018-08-09 PROCEDURE — 71046 X-RAY EXAM CHEST 2 VIEWS: CPT

## 2018-08-09 PROCEDURE — 84484 ASSAY OF TROPONIN QUANT: CPT | Performed by: NURSE PRACTITIONER

## 2018-08-09 PROCEDURE — 85025 COMPLETE CBC W/AUTO DIFF WBC: CPT | Performed by: PHYSICIAN ASSISTANT

## 2018-08-09 PROCEDURE — 99222 1ST HOSP IP/OBS MODERATE 55: CPT | Performed by: INTERNAL MEDICINE

## 2018-08-09 PROCEDURE — 85025 COMPLETE CBC W/AUTO DIFF WBC: CPT

## 2018-08-09 PROCEDURE — 93010 ELECTROCARDIOGRAM REPORT: CPT | Performed by: INTERNAL MEDICINE

## 2018-08-09 PROCEDURE — 99285 EMERGENCY DEPT VISIT HI MDM: CPT

## 2018-08-09 RX ORDER — LOSARTAN POTASSIUM 50 MG/1
100 TABLET ORAL DAILY
Status: DISCONTINUED | OUTPATIENT
Start: 2018-08-09 | End: 2018-08-10

## 2018-08-09 RX ORDER — ROPINIROLE 0.25 MG/1
0.5 TABLET, FILM COATED ORAL 2 TIMES DAILY
Status: DISCONTINUED | OUTPATIENT
Start: 2018-08-09 | End: 2018-08-12 | Stop reason: HOSPADM

## 2018-08-09 RX ORDER — PRAVASTATIN SODIUM 80 MG/1
80 TABLET ORAL DAILY
Status: DISCONTINUED | OUTPATIENT
Start: 2018-08-09 | End: 2018-08-12 | Stop reason: HOSPADM

## 2018-08-09 RX ORDER — ACETAMINOPHEN 325 MG/1
650 TABLET ORAL EVERY 6 HOURS PRN
Status: DISCONTINUED | OUTPATIENT
Start: 2018-08-09 | End: 2018-08-12 | Stop reason: HOSPADM

## 2018-08-09 RX ORDER — UBIDECARENONE 75 MG
100 CAPSULE ORAL DAILY
Status: DISCONTINUED | OUTPATIENT
Start: 2018-08-09 | End: 2018-08-12 | Stop reason: HOSPADM

## 2018-08-09 RX ORDER — NITROGLYCERIN 20 MG/100ML
5-200 INJECTION INTRAVENOUS
Status: DISCONTINUED | OUTPATIENT
Start: 2018-08-09 | End: 2018-08-10

## 2018-08-09 RX ORDER — DOCUSATE SODIUM 100 MG/1
100 CAPSULE, LIQUID FILLED ORAL 2 TIMES DAILY
Status: DISCONTINUED | OUTPATIENT
Start: 2018-08-09 | End: 2018-08-12 | Stop reason: HOSPADM

## 2018-08-09 RX ORDER — PANTOPRAZOLE SODIUM 20 MG/1
20 TABLET, DELAYED RELEASE ORAL
Status: DISCONTINUED | OUTPATIENT
Start: 2018-08-10 | End: 2018-08-12 | Stop reason: HOSPADM

## 2018-08-09 RX ORDER — FUROSEMIDE 10 MG/ML
40 INJECTION INTRAMUSCULAR; INTRAVENOUS
Status: DISCONTINUED | OUTPATIENT
Start: 2018-08-09 | End: 2018-08-10

## 2018-08-09 RX ORDER — ACETAMINOPHEN 325 MG/1
650 TABLET ORAL ONCE
Status: COMPLETED | OUTPATIENT
Start: 2018-08-09 | End: 2018-08-09

## 2018-08-09 RX ORDER — LEVOTHYROXINE SODIUM 0.07 MG/1
75 TABLET ORAL DAILY
Status: DISCONTINUED | OUTPATIENT
Start: 2018-08-09 | End: 2018-08-12 | Stop reason: HOSPADM

## 2018-08-09 RX ORDER — LEUCOVORIN CALCIUM 5 MG/1
10 TABLET ORAL WEEKLY
Status: DISCONTINUED | OUTPATIENT
Start: 2018-08-10 | End: 2018-08-12 | Stop reason: HOSPADM

## 2018-08-09 RX ORDER — MORPHINE SULFATE 2 MG/ML
2 INJECTION, SOLUTION INTRAMUSCULAR; INTRAVENOUS EVERY 4 HOURS PRN
Status: DISCONTINUED | OUTPATIENT
Start: 2018-08-09 | End: 2018-08-10

## 2018-08-09 RX ORDER — GABAPENTIN 100 MG/1
100 CAPSULE ORAL 3 TIMES DAILY
Status: DISCONTINUED | OUTPATIENT
Start: 2018-08-09 | End: 2018-08-12 | Stop reason: HOSPADM

## 2018-08-09 RX ORDER — CALCIUM CARBONATE 500(1250)
1 TABLET ORAL 2 TIMES DAILY WITH MEALS
Status: DISCONTINUED | OUTPATIENT
Start: 2018-08-09 | End: 2018-08-12 | Stop reason: HOSPADM

## 2018-08-09 RX ORDER — NITROGLYCERIN 0.4 MG/1
0.4 TABLET SUBLINGUAL
Status: DISCONTINUED | OUTPATIENT
Start: 2018-08-09 | End: 2018-08-12 | Stop reason: HOSPADM

## 2018-08-09 RX ORDER — HYDROXYCHLOROQUINE SULFATE 200 MG/1
200 TABLET, FILM COATED ORAL 2 TIMES DAILY WITH MEALS
Status: DISCONTINUED | OUTPATIENT
Start: 2018-08-09 | End: 2018-08-12 | Stop reason: HOSPADM

## 2018-08-09 RX ORDER — FUROSEMIDE 10 MG/ML
20 INJECTION INTRAMUSCULAR; INTRAVENOUS ONCE
Status: COMPLETED | OUTPATIENT
Start: 2018-08-09 | End: 2018-08-09

## 2018-08-09 RX ADMIN — ACETAMINOPHEN 650 MG: 325 TABLET, FILM COATED ORAL at 20:15

## 2018-08-09 RX ADMIN — VITAM B12 100 MCG: 100 TAB at 17:28

## 2018-08-09 RX ADMIN — GABAPENTIN 100 MG: 100 CAPSULE ORAL at 17:28

## 2018-08-09 RX ADMIN — RIVAROXABAN 20 MG: 20 TABLET, FILM COATED ORAL at 17:28

## 2018-08-09 RX ADMIN — FUROSEMIDE 20 MG: 10 INJECTION, SOLUTION INTRAMUSCULAR; INTRAVENOUS at 10:02

## 2018-08-09 RX ADMIN — LOSARTAN POTASSIUM 100 MG: 50 TABLET ORAL at 15:14

## 2018-08-09 RX ADMIN — HYDROXYCHLOROQUINE SULFATE 200 MG: 200 TABLET, FILM COATED ORAL at 17:28

## 2018-08-09 RX ADMIN — GABAPENTIN 100 MG: 100 CAPSULE ORAL at 20:15

## 2018-08-09 RX ADMIN — Medication 1 TABLET: at 15:14

## 2018-08-09 RX ADMIN — ROPINIROLE 0.5 MG: 0.25 TABLET, FILM COATED ORAL at 17:28

## 2018-08-09 RX ADMIN — CALCIUM 1 TABLET: 500 TABLET ORAL at 17:29

## 2018-08-09 RX ADMIN — NITROGLYCERIN 50 MCG/MIN: 20 INJECTION INTRAVENOUS at 10:05

## 2018-08-09 RX ADMIN — ACETAMINOPHEN 650 MG: 325 TABLET, FILM COATED ORAL at 11:41

## 2018-08-09 RX ADMIN — FUROSEMIDE 40 MG: 10 INJECTION, SOLUTION INTRAMUSCULAR; INTRAVENOUS at 14:38

## 2018-08-09 NOTE — H&P
Progress Note - Odette Curtis 1935, 81 y o  female MRN: [id number]    Unit/Bed#: ED 19 Encounter: [id number]    Primary Care Provider: Astrid Chávez MD   Date and time admitted to hospital: 8/9/2018  8:18 AM        * Chest pain   Assessment & Plan    Reports improvement, monitor troponins  NTG p r n , IV morphine p r n  Acute on chronic diastolic congestive heart failure ([address] )   Assessment & Plan    Will have her on IV Lasix, less than 2 g salt diet, monitor I/O, daily weights  Cardiology consultation        Hypertensive urgency   Assessment & Plan    Monitor blood pressures        Sleep apnea   Assessment & Plan    CPAP, reports compliance        History of nephroureterectomy   Assessment & Plan    Monitor kidney function closely          Right lower extremity cellulitis patient is agreeable to monitor off antibiotics, cellulitis clinically appears resolved    VTE Prophylaxis: Rivaroxaban (Xarelto)  / sequential compression device   Code Status:  Full code  POLST: There is no POLST form on file for this patient (pre-hospital)  Discussion with family:  Discussed with the patient, her  is at bedside  Discussed in detail they are agreeable with ongoing management including consult requested  Anticipated Length of Stay:  Patient will be admitted on an Inpatient basis with an anticipated length of stay of  More than 2 midnights  Justification for Hospital Stay:  Acute on chronic diastolic congestive heart failure requiring IV medications, hypertensive urgency requiring close monitoring        Chief Complaint:       Chest pressure  Shortness of breath    History of Present Illness:    Odette Curtis is a 81 y o  female who presents with chest pressure and shortness of breath    Patient reports she noticed chest pressure yesterday, it has been persistent, pressure is described as 8/10 intensity, retrosternal with radiation all over the chest and upper abdomen, unrelated to exertion, no aggravating or relieving factors, denies associated diaphoresis  She also reports worsening shortness of breath especially on lying flat  She denies palpitations presyncope or fatigue  Denies cough chest tightness wheezing  Denies urinary symptoms  Denies fever chills sweats or constitutional symptoms  Denies abdominal pain nausea vomiting or diarrhea , constipation  Patient has visiting nurses, she was evaluated by them yesterday and recommended to present to the ED for symptoms persist or worsen  Patient with history of lower extremity swelling and has received courses of Keflex for right lower extremity cellulitis, she reports improving lower extremity pain and redness  Review of Systems:    Review of Systems   All other systems reviewed and are negative        Past Medical and Surgical History:     Past Medical History:   Diagnosis Date    Anemia     Arthritis     rheumatoid    Benign essential hypertension     Cataract     Chronic cystitis     CPAP (continuous positive airway pressure) dependence     Diverticulitis of colon     Early satiety     GERD (gastroesophageal reflux disease)     Gout     Hearing aid worn     bilateral    Hearing loss     Hemorrhoids     Hiatal hernia     History of colonic polyps     Hyperlipidemia     Hypothyroidism     Left breast mass     Microhematuria     Migraine     occular    Neuropathy     lower and upper extermities    Overactive bladder     Pneumonia of left lower lobe due to infectious organism (HCC)     RA [facility]heumatoid arthritis) [facility]     Sleep apnea     uses cpap    Stroke ([facility] )     5/2017    Type 2 diabetes mellitus (HCC)     Urinary frequency     Urinary urgency     Urothelial cancer ([facility] [facility] ) 04/23/2018    Use of cane as ambulatory aid        Past Surgical History:   Procedure Laterality Date    APPENDECTOMY      ARTHROSCOPY WRIST Right     with release of transverse carpal ligament     BLADDER SURGERY      BLADDER SURGERY      BREAST SURGERY      left breast    BUNIONECTOMY Bilateral     CATARACT EXTRACTION Bilateral     CHOLECYSTECTOMY      COLONOSCOPY      CYSTOSCOPY      EGD AND COLONOSCOPY N/A 12/20/2017    Procedure: EGD AND COLONOSCOPY;  Surgeon: Saul Schulz MD;  Location: [facility]; Service: Gastroenterology    ESOPHAGOGASTRODUODENOSCOPY      diagnostic    FOREARM SURGERY Right     fracture repair    HYSTERECTOMY      JOINT REPLACEMENT      KNEE ARTHROSCOPY Left     KNEE SURGERY Left     meniscus tear    NOSE SURGERY      [place] CYSTO/URETERO W/LITHOTRIPSY &INDWELL STENT INSRT Right 2/28/2018    Procedure: CYSTOSCOPY RIGHT URETEROSCOPY, RIGHT RETROGRADE PYELOGRAM AND INSERTION  RIGHT STENT URETERAL, RIGHT RENAL PELVIC WASHING FOR CYTOLOGY;  Surgeon: You Jc MD;  Location: [facility];  Service: Urology    NJ NEPHRECTOMY, W/PART  URETECTOMY Right 4/23/2018    Procedure: ROBATIC ASSISTED NEPHRO-URETERECTOMY WITH BLADDER CUFF EXCISION;  Surgeon: Steve Pike MD;  Location: [facility];  Service: Urology    REPLACEMENT TOTAL KNEE BILATERAL Bilateral     URETEROSCOPY Right 3/20/2018    Procedure: CYSTOSCOPY, RETROGRADE PYELOGRAM, URETEROSCOPY, BIOPSY, BRUSH, FULGURATION, STENT PLACEMENT, BLADDER BIOPSY WITH FULGURATION;  Surgeon: Steve Pike MD;  Location: [facility];  Service: Urology       Meds/Allergies:    Prior to Admission medications    Medication Sig Start Date End Date Taking?  Authorizing Provider   acetaminophen (TYLENOL) 325 mg tablet Take 650 mg by mouth every 6 (six) hours as needed for mild pain   Yes Historical Provider, MD   Calcium Citrate-Vitamin D (CALCIUM + D PO) Take 1 tablet by mouth 2 (two) times a day   Yes Historical Provider, MD   cephalexin (KEFLEX) 500 mg capsule Take 1 capsule (500 mg total) by mouth every 8 (eight) hours for 7 days 8/6/18 8/13/18 Yes Michelle Ansari MD   cyanocobalamin (VITAMIN B-12) 100 mcg tablet Take by mouth   Yes Historical Provider, MD   docusate sodium (COLACE) 100 mg capsule Take 1 capsule (100 mg total) by mouth 2 (two) times a day for 60 doses 4/23/18 8/9/18 Yes Colton Campbell MD   furosemide (LASIX) 20 mg tablet TAKE 1 TABLET EVERY DAY 8/6/18  Yes Madelaine Hammer MD   gabapentin (NEURONTIN) 100 mg capsule Take 1 capsule (100 mg total) by mouth 3 (three) times a day Take 1 cap in the am, 1 cap in the afternoon, and 3 cap at bedtime 6/18/18  Yes Madelaine Hammer MD   hydroxychloroquine (PLAQUENIL) 200 mg tablet Take 200 mg by mouth 2 (two) times a day with meals   Yes Historical Provider, MD   LEUCOVORIN CALCIUM PO Take 10 mg by mouth once a week   Yes Historical Provider, MD   levothyroxine 75 mcg tablet Take 75 mcg by mouth daily   Yes Historical Provider, MD   losartan (COZAAR) 100 MG tablet Take 100 mg by mouth daily   Yes Historical Provider, MD   methotrexate 2 5 mg tablet  3/7/18  Yes Historical Provider, MD   Multiple Vitamin (MULTIVITAMINS PO) Take 1 tablet by mouth daily   Yes Historical Provider, MD   omeprazole (PriLOSEC) 20 mg delayed release capsule TAKE 1 CAPSULE DAILY AS NEEDED FOR STOMACH ACID 3/19/18  Yes Delonte Qiu PA-C   pravastatin (PRAVACHOL) 80 mg tablet TAKE 1 TABLET EVERY DAY 2/20/18  Yes Madelaine Hammer MD   rOPINIRole (REQUIP) 0 5 mg tablet Take 0 5 mg by mouth 2 (two) times a day     Yes Historical Provider, MD   XARELTO 20 MG tablet  5/22/18  Yes Historical Provider, MD   colchicine (COLCRYS) 0 6 mg tablet Take 2 tablets po stat, then 1 tablet po 1 hour later 6/21/18 8/9/18 Yes Bandar Miller MD     I have reviewed home medications with patient family member  Allergies: Allergies   Allergen Reactions    Acetazolamide Other (See Comments)     Other reaction(s): Unknown Allergic Reaction    Aspirin      Other reaction(s): Other (See Comments)  High Dose ASA-stomach ache, rachel  ASA 81 m    Atorvastatin      Other reaction(s): Muscle Pain, Myalgia    Azithromycin     Nitrofurantoin Hives     HIVES * pt denies  Other reaction(s): Unknown Allergic Reaction  Other reaction(s): Other (See Comments)  HIVES * pt denies    Other Other (See Comments)     Adhesive tape : red and itching  Other reaction(s):  Other (See Comments)  red and itching    Shellfish-Derived Products Other (See Comments)     Patient got Gout following eating shell fish    Sulfa Antibiotics Other (See Comments)     unknown    Tramadol Diarrhea and Vomiting       Social History:     Marital Status: /Civil Union   Occupation:   Patient Pre-hospital Living Situation:  Home  Patient Pre-hospital Level of Mobility:  Ambulatory dysfunction with a walker and a cane  Patient Pre-hospital Diet Restrictions: no  Substance Use History:   History   Alcohol Use No     Comment: socially/ seldom     History   Smoking Status    Former Smoker   Smokeless Tobacco    Never Used     Comment: stopped smoking in the distant past, never smoker (as per [employer])      History   Drug Use No       Family History:    Family History   Problem Relation Age of Onset    Other Mother         epilepsy    Early death Mother     Glaucoma Father     Stroke Father         silent    Other Brother         cardiac disorder       Physical Exam:     Vitals:   Blood Pressure: (!) 96/48 (08/09/18 1238)  Pulse: 76 (08/09/18 1238)  Temperature: 98 2 °F (36 8 °C) (08/09/18 0912)  Temp Source: Oral (08/09/18 0912)  Respirations: 18 (08/09/18 1238)  Height: 5' 1" (154 9 cm) (08/09/18 1009)  Weight - Scale: 95 7 kg (211 lb) (08/09/18 1009)  SpO2: 99 % (08/09/18 1238)    Physical Exam    Obese  Comfortably lying in bed  Short thick neck  JVD raised  Lungs diminished breath sounds bilaterally, crackles audible  Heart sounds S1-S2 noted distant  Abdomen soft nontender  Pulses present  Pedal edema noted  Awake alert obeys simple commands  No rash  Right lower extremity cellulitis improving    Additional Data:     Lab Results: I have personally reviewed pertinent reports  Results from last 7 days  Lab Units 08/09/18  0842   WBC Thousand/uL 7 54   HEMOGLOBIN g/dL 8 4*   HEMATOCRIT % 25 8*   PLATELETS Thousands/uL 215   NEUTROS PCT % 65   LYMPHS PCT % 16   MONOS PCT % 13*   EOS PCT % 4       Results from last 7 days  Lab Units 08/09/18  0842   SODIUM mmol/L 136   POTASSIUM mmol/L 3 8   CHLORIDE mmol/L 104   CO2 mmol/L 27   BUN mg/dL 19   CREATININE mg/dL 1 45*   CALCIUM mg/dL 8 9   TOTAL PROTEIN g/dL 7 2   BILIRUBIN TOTAL mg/dL 0 76   ALK PHOS U/L 113   ALT U/L 17   AST U/L 14   GLUCOSE RANDOM mg/dL 119                   Imaging: I have personally reviewed pertinent reports  Chest x-ray personally viewed pulmonary congestion noted    X-ray chest 2 views   ED Interpretation by Gianluca Larson MD (87/90 1773)   Mild chf          EKG, Pathology, and Other Studies Reviewed on Admission:   · EKG: sinus rhythm, no ST T wave changes    [employer] / Epic Records Reviewed: Yes     ** Please Note: This note has been constructed using a voice recognition system   **

## 2018-08-09 NOTE — ASSESSMENT & PLAN NOTE
Will have her on IV Lasix, less than 2 g salt diet, monitor I/O, daily weights  Cardiology consultation

## 2018-08-09 NOTE — CONSULTS
Cardiology   Lucien Lima 81 y o  female MRN: [id number]  Unit/Bed#: [facility] 731-01 Encounter: [id number]      Reason for Consult / Principal Problem:  Chest pain/shortness of breath   Chest Pain       Chest pain started yesterday   Pt states that it happens when she breaths in and feels like alot of pressure on the center chest     Physician Requesting Consult:  Dr Anthony Fishman    Cardiologist: Dr Lizeth Gomez    PCP: Dr Greta Chavira:  Unstable angina rule out ACS  -12 lead EKG in the ED reveals normal sinus rhythm, with no significant ST segment abnormalities  -troponin x1 negative; will obtain x3 more sets  -initiation of IV nitroglycerin drip in the ED; had has now been weaned off; patient still with some residual chest pressure rates 5/10; will order p r n  morphine/nitroglycerin  -recommend an inpatient nuclear stress test to assess for cardiac ischemia  -recommend a repeat 2D echocardiogram this admission; assess for regional wall motion abnormalities    Chronic grade 2 diastolic congestive heart failure  -patient appears relatively euvolemic on exam; no JVD, mild right lower lobe crackles, chronic lower extremity lymphedema,her weight has remained stable over the past month  -weight this admission and the 207 lb; most recent cardiology office visit weight in July 2018 was 208 lb  -received 20 mg of IV Lasix x1 in the ED, in addition to a 40 mg IV dose x1; urine response 400 mL; discontinue BID IV Lasix after dose this evening, will start on home dose tomorrow  -started on a IV nitroglycerin drip in ED; since then has been weaned off  -ProBNP 2031; no prior to compare  -strict I&O; daily weights; TLC diet fluid restriction 1800 mL 24 hours  -will reassess 2D echocardiogram    Hypertensive urgency in the setting of known essential hypertension  -BP on arrival to the /77; BP currently 136/69  -on losartan 100 mg daily at home; continue current dose    Hyperlipidemia  -Lipid profile from 06/25/2018; total cholesterol 122, triglycerides 161, HDL 46, LDL direct 59  -continue pravastatin 80 mg daily  Chronic kidney disease stage III  -baseline creatinine approximately 1 0-1 7  -creatinine currently 1 45 this admission  -check a m  BMP    History of CVA/TIA  -continue Xarelto 20 mg daily    History of anemia chronic disease  -baseline creatinine 8 to 9; currently hemoglobin at 8 4  -continue to trend via am CBC    History of obstructive sleep apnea   -compliant with CPAP    HPI: Unique Bernstein 81y o  year old female with a past medical history of hypertension, hyperlipidemia, chronic lower extremity edema/lymphedema, former smoker, anemia of chronic disease, right renal pelvis multifocal lesion/low-grade TA urothelial CA status post nephroureterectomy with bladder cuff, and a prior CVA in May 2017 on Xarelto  Patient routinely follows with Dr Chacha Rodríguez with [facility] cardiology, she was last seen in the outpatient office on 07/10/2018 for a routine follow-up  At that time the patient was stable from a congestive heart failure standpoint; she denied having chest pain at that visit  Patient's current cardiac medication include furosemide 20 mg daily, losartan 100 mg daily, pravastatin 80 mg daily, and Xarelto 20 mg daily  Most recent echocardiogram from 05/11/2017 reveals LVEF 62%, no regional wall motion abnormalities, grade 2 diastolic dysfunction; there was mild concentric hypertrophy, the RV normal size and systolic function, trace aortic valve regurgitation, mild MR, mild TR, LA was mildly dilated, RA normal    **Patient presents to the ED on 08/09/2018 with primary complaints of chest pain and shortness of breath  Patient states that early this morning when she had gotten out of bed, she began to feel mid sternal chest pressure    She had stated that the pain started in the middle of her chest then had spread diffusely across her chest   She rated her pain 9/10 this morning  Patient denied radiation  She stated her stomach felt upset, but denied nausea, vomiting  She denied palpitations, lightheadedness, and or dizziness  She stated that she was short of breath, it worsened with exertion, and unrelieved with rest   She had stated that the pain had persisted warranting her to call EMS  In the ED patient was started on a IV nitroglycerin drip and was given x1 dose of 20 mg IV Lasix and x1 dose of 40 mg IV Lasix      Laboratory data on arrival to the ED:    Na +136, K +3 8, CO2 27, BUN 19, creatinine 1 45, glucose 119, normal LFTs  Cardiac enzymes; troponin x1 <0 02, proBNP 2,031    Imaging:  Initial 12 lead EKG: Normal sinus rhythm, normal intervals, LVH, nonspecific T-wave abnormality  Chest x-ray PA and lateral:     Hemodynamics:  Temp 98 2°, heart rate 78 in normal sinus rhythm, respiratory rate 20, -182 over 69-79 respectively satting 97% on room air    Family History:   Family History   Problem Relation Age of Onset    Other Mother         epilepsy    Early death Mother    [facility] Glaucoma Father     Stroke Father         silent    Other Brother         cardiac disorder     Historical Information   Past Medical History:   Diagnosis Date    Anemia     Arthritis     rheumatoid    Benign essential hypertension     Cataract     Chronic cystitis     CPAP (continuous positive airway pressure) dependence     Diverticulitis of colon     Early satiety     GERD (gastroesophageal reflux disease)     Gout     Hearing aid worn     bilateral    Hearing loss     Hemorrhoids     Hiatal hernia     History of colonic polyps     Hyperlipidemia     Hypothyroidism     Left breast mass     Microhematuria     Migraine     occular    Neuropathy     lower and upper extermities    Overactive bladder     Pneumonia of left lower lobe due to infectious organism (HCC)     [facility]) [facility] [address] [facility]     Sleep apnea     uses cpap    [facility]     [facility]2017    Type 2 diabetes mellitus ([facility] [address] [facility]     Urinary frequency     Urinary urgency     Urothelial cancer ([facility] [address] ) 04/23/2018    Use of cane as ambulatory aid      Past Surgical History:   Procedure Laterality Date    APPENDECTOMY      ARTHROSCOPY WRIST Right     with release of transverse carpal ligament     BLADDER SURGERY      BLADDER SURGERY      BREAST SURGERY      left breast    BUNIONECTOMY Bilateral     CATARACT EXTRACTION Bilateral     CHOLECYSTECTOMY      COLONOSCOPY      CYSTOSCOPY      EGD AND COLONOSCOPY N/A 12/20/2017    Procedure: EGD AND COLONOSCOPY;  Surgeon: Kelsea Balderrama MD;  Location: [facility]; Service: Gastroenterology    ESOPHAGOGASTRODUODENOSCOPY      diagnostic    FOREARM SURGERY Right     fracture repair    HYSTERECTOMY      JOINT REPLACEMENT      KNEE ARTHROSCOPY Left     KNEE SURGERY Left     meniscus tear    NOSE SURGERY      WI CYSTO/URETERO W/LITHOTRIPSY &INDWELL STENT INSRT Right 2/28/2018    Procedure: CYSTOSCOPY RIGHT URETEROSCOPY, RIGHT RETROGRADE PYELOGRAM AND INSERTION  RIGHT STENT URETERAL, RIGHT RENAL PELVIC WASHING FOR CYTOLOGY;  Surgeon: Charan Webb MD;  [facility];  Service: Urology    WI NEPHRECTOMY, W/PART   URETECTOMY Right 4/23/2018    Procedure: ROBATIC ASSISTED NEPHRO-URETERECTOMY WITH BLADDER CUFF EXCISION;  Surgeon: Kandis Landrum MD;  Location: [facility];  Service: Urology    REPLACEMENT TOTAL KNEE BILATERAL Bilateral     URETEROSCOPY Right 3/20/2018    Procedure: CYSTOSCOPY, RETROGRADE PYELOGRAM, URETEROSCOPY, BIOPSY, BRUSH, FULGURATION, STENT PLACEMENT, BLADDER BIOPSY WITH FULGURATION;  Surgeon: Kandis Landrum MD;  [facility];  Service: Urology     Social History   History   Alcohol Use No     Comment: socially/ seldom     History   Drug Use No     History   Smoking Status    Former Smoker   Smokeless Tobacco    Never Used     Comment: stopped smoking in the distant past, never smoker (as per [employer])      Family History:   Family History   Problem Relation Age of Onset    Other Mother         epilepsy    Early death Mother    Charlie Kim Glaucoma Father     Stroke Father         silent    Other Brother         cardiac disorder       Review of Systems:  Review of Systems   Constitutional: Positive for fatigue  Negative for chills and fever  Eyes: Negative for visual disturbance  Respiratory: Positive for chest tightness and shortness of breath  Negative for apnea, cough and choking  Cardiovascular: Positive for chest pain and leg swelling  Negative for palpitations  Gastrointestinal: Negative for abdominal pain, blood in stool, constipation, diarrhea, nausea and vomiting  Genitourinary: Negative for difficulty urinating and dysuria  Skin: Positive for color change and wound  Neurological: Negative for dizziness, light-headedness and headaches  All other systems reviewed and are negative  Except as noted in the HPI and above, a comprehensive 12 point review of systems was negative      Scheduled Meds:    Current Facility-Administered Medications:  acetaminophen 650 mg Oral Q6H PRN Loreta Figueroa MD    calcium carbonate 1 tablet Oral BID With Meals Loreta Figueroa MD    cyanocobalamin 100 mcg Oral Daily Loreta Figueroa MD    docusate sodium 100 mg Oral BID Loreta Figueroa MD    furosemide 40 mg Intravenous BID (diuretic) Loreta Figueroa MD    gabapentin 100 mg Oral TID Loreta Figueroa MD    hydroxychloroquine 200 mg Oral BID With Meals MD Chacha Ware ON 8/10/2018] leucovorin 10 mg Oral Weekly Loreta Figueroa MD    levothyroxine 75 mcg Oral Daily Loreta Figueroa MD    losartan 100 mg Oral Daily MD Chacha Ware ON 8/10/2018] methotrexate 20 mg Oral Weekly Loreta Figueroa MD    multivitamin-minerals 1 tablet Oral Daily Loreta Figueroa MD    nitroGLYcerin 5-200 mcg/min Intravenous Titrated Danika Perez MD Last Rate: Stopped (08/09/18 1128)   [START ON 8/10/2018] pantoprazole 20 mg Oral Early Morning Min Beltran MD    pravastatin 80 mg Oral Daily Min Beltran MD    rivaroxaban 20 mg Oral Daily With Kala Covington MD    rOPINIRole 0 5 mg Oral BID Min Beltran MD      Facility-Administered Medications Ordered in Other Encounters:  acetaminophen 650 mg Oral Q6H PRN Fly Jefferson PA-C     Continuous Infusions:    nitroGLYcerin 5-200 mcg/min Last Rate: Stopped (08/09/18 1128)     PRN Meds:   acetaminophen  all current active meds have been reviewed, current meds:   Current Facility-Administered Medications   Medication Dose Route Frequency    acetaminophen (TYLENOL) tablet 650 mg  650 mg Oral Q6H PRN    calcium carbonate (OYSTER SHELL,OSCAL) 500 mg tablet 1 tablet  1 tablet Oral BID With Meals    cyanocobalamin (VITAMIN B-12) tablet 100 mcg  100 mcg Oral Daily    docusate sodium (COLACE) capsule 100 mg  100 mg Oral BID    furosemide (LASIX) injection 40 mg  40 mg Intravenous BID (diuretic)    gabapentin (NEURONTIN) capsule 100 mg  100 mg Oral TID    hydroxychloroquine (PLAQUENIL) tablet 200 mg  200 mg Oral BID With Meals    [START ON 8/10/2018] leucovorin (WELLCOVORIN) tablet 10 mg  10 mg Oral Weekly    levothyroxine tablet 75 mcg  75 mcg Oral Daily    losartan (COZAAR) tablet 100 mg  100 mg Oral Daily    [START ON 8/10/2018] methotrexate tablet 20 mg  20 mg Oral Weekly    multivitamin-minerals (CENTRUM) tablet 1 tablet  1 tablet Oral Daily    nitroGLYcerin (TRIDIL) 50 mg in 250 mL infusion (premix)  5-200 mcg/min Intravenous Titrated    [START ON 8/10/2018] pantoprazole (PROTONIX) EC tablet 20 mg  20 mg Oral Early Morning    pravastatin (PRAVACHOL) tablet 80 mg  80 mg Oral Daily    rivaroxaban (XARELTO) tablet 20 mg  20 mg Oral Daily With Dinner    rOPINIRole (REQUIP) tablet 0 5 mg  0 5 mg Oral BID     Facility-Administered Medications Ordered in Other Encounters Medication Dose Route Frequency    acetaminophen (TYLENOL) tablet 650 mg  650 mg Oral Q6H PRN    and PTA meds:   Prior to Admission Medications   Prescriptions Last Dose Informant Patient Reported? Taking?    Calcium Citrate-Vitamin D (CALCIUM + D PO) 8/8/2018 at Unknown time Self Yes Yes   Sig: Take 1 tablet by mouth 2 (two) times a day   LEUCOVORIN CALCIUM PO Past Week at Unknown time Self Yes Yes   Sig: Take 10 mg by mouth once a week   Multiple Vitamin (MULTIVITAMINS PO) 8/8/2018 at Unknown time Self Yes Yes   Sig: Take 1 tablet by mouth daily   XARELTO 20 MG tablet 8/8/2018 at Unknown time Self Yes Yes   acetaminophen (TYLENOL) 325 mg tablet 8/9/2018 at Unknown time Self Yes Yes   Sig: Take 650 mg by mouth every 6 (six) hours as needed for mild pain   cephalexin (KEFLEX) 500 mg capsule 8/8/2018 at Unknown time  No Yes   Sig: Take 1 capsule (500 mg total) by mouth every 8 (eight) hours for 7 days   cyanocobalamin (VITAMIN B-12) 100 mcg tablet 8/8/2018 at Unknown time Self Yes Yes   Sig: Take by mouth   docusate sodium (COLACE) 100 mg capsule 8/8/2018 at Unknown time Self No Yes   Sig: Take 1 capsule (100 mg total) by mouth 2 (two) times a day for 60 doses   furosemide (LASIX) 20 mg tablet 8/9/2018 at Unknown time  No Yes   Sig: TAKE 1 TABLET EVERY DAY   gabapentin (NEURONTIN) 100 mg capsule 8/8/2018 at Unknown time Self No Yes   Sig: Take 1 capsule (100 mg total) by mouth 3 (three) times a day Take 1 cap in the am, 1 cap in the afternoon, and 3 cap at bedtime   hydroxychloroquine (PLAQUENIL) 200 mg tablet 8/8/2018 at Unknown time  Yes Yes   Sig: Take 200 mg by mouth 2 (two) times a day with meals   levothyroxine 75 mcg tablet 8/9/2018 at Unknown time Self Yes Yes   Sig: Take 75 mcg by mouth daily   losartan (COZAAR) 100 MG tablet 8/8/2018 at Unknown time Self Yes Yes   Sig: Take 100 mg by mouth daily   methotrexate 2 5 mg tablet Past Week at Unknown time Self Yes Yes   omeprazole (PriLOSEC) 20 mg delayed release capsule 8/9/2018 at Unknown time Self No Yes   Sig: TAKE 1 CAPSULE DAILY AS NEEDED FOR STOMACH ACID   pravastatin (PRAVACHOL) 80 mg tablet 8/8/2018 at Unknown time Self No Yes   Sig: TAKE 1 TABLET EVERY DAY   rOPINIRole (REQUIP) 0 5 mg tablet 8/9/2018 at Unknown time Self Yes Yes   Sig: Take 0 5 mg by mouth 2 (two) times a day        Facility-Administered Medications: None       Allergies   Allergen Reactions    Acetazolamide Other (See Comments)     Other reaction(s): Unknown Allergic Reaction    Aspirin      Other reaction(s): Other (See Comments)  High Dose ASA-stomach ache, rachel  ASA 81 m    Atorvastatin      Other reaction(s): Muscle Pain, Myalgia    Azithromycin     Nitrofurantoin Hives     HIVES * pt denies  Other reaction(s): Unknown Allergic Reaction  Other reaction(s): Other (See Comments)  HIVES * pt denies    Other Other (See Comments)     Adhesive tape : red and itching  Other reaction(s): Other (See Comments)  red and itching    Shellfish-Derived Products Other (See Comments)     Patient got Gout following eating shell fish    Sulfa Antibiotics Other (See Comments)     unknown    Tramadol Diarrhea and Vomiting       Objective   Vitals: Blood pressure 124/61, pulse 88, temperature 98 2 °F (36 8 °C), temperature source Oral, resp  rate 20, height 5' 1" (1 549 m), weight 95 7 kg (211 lb), SpO2 99 %  , Body mass index is 39 87 kg/m² ,   Orthostatic Blood Pressures      Most Recent Value   Blood Pressure  124/61 filed at 08/09/2018 1445   Patient Position - Orthostatic VS  Lying filed at 08/09/2018 1330            Intake/Output Summary (Last 24 hours) at 08/09/18 1526  Last data filed at 08/09/18 1052   Gross per 24 hour   Intake                0 ml   Output              400 ml   Net             -400 ml       Invasive Devices     Peripheral Intravenous Line            Peripheral IV 08/09/18 Left Antecubital less than 1 day          Drain            Ureteral Drain/Stent Right ureter 6 Fr  142 days    Urethral Catheter 108 days                Physical Exam:  Physical Exam   Constitutional: She is oriented to person, place, and time  She appears well-developed and well-nourished  No distress  HENT:   Head: Normocephalic and atraumatic  Mouth/Throat: Oropharynx is clear and moist  No oropharyngeal exudate  Eyes: Conjunctivae are normal  Pupils are equal, round, and reactive to light  No scleral icterus  Neck: Normal range of motion  Neck supple  No JVD present  Cardiovascular: Normal rate, regular rhythm, normal heart sounds and intact distal pulses  Exam reveals no gallop and no friction rub  No murmur heard  Moderate nonpitting bilateral lower extremity edema   Pulmonary/Chest: She has no wheezes  She exhibits tenderness  Poor effort; chest expansion is symmetrical bilaterally; breath sounds are very diminished and clear in the bilateral upper lobes, mild fine crackles at the right lower base   Abdominal: Soft  There is no tenderness  Obese   Musculoskeletal: Normal range of motion  She exhibits edema  Lymphadenopathy:     She has no cervical adenopathy  Neurological: She is alert and oriented to person, place, and time  Skin: Skin is warm and dry  She is not diaphoretic  There is erythema  Right lower quadrant healing wound  Right lower leg redness and swelling   Psychiatric: She has a normal mood and affect       Lab Results:   Recent Results (from the past 24 hour(s))   Basic metabolic panel    Collection Time: 08/08/18  5:35 PM   Result Value Ref Range    Sodium 140 136 - 145 mmol/L    Potassium 4 0 3 5 - 5 3 mmol/L    Chloride 103 100 - 108 mmol/L    CO2 27 21 - 32 mmol/L    Anion Gap 10 4 - 13 mmol/L    BUN 20 5 - 25 mg/dL    Creatinine 1 41 (H) 0 60 - 1 30 mg/dL    Glucose, Fasting 91 65 - 99 mg/dL    Calcium 8 7 8 3 - 10 1 mg/dL    eGFR 35 ml/min/1 73sq m   CBC and differential    Collection Time: 08/08/18  5:35 PM   Result Value Ref Range    WBC 8 75 4 31 - 10 16 Thousand/uL    RBC 2 58 (L) 3 81 - 5 12 Million/uL    Hemoglobin 8 2 (L) 11 5 - 15 4 g/dL    Hematocrit 25 2 (L) 34 8 - 46 1 %    MCV 98 82 - 98 fL    MCH 31 8 26 8 - 34 3 pg    MCHC 32 5 31 4 - 37 4 g/dL    RDW 16 3 (H) 11 6 - 15 1 %    MPV 9 4 8 9 - 12 7 fL    Platelets 142 175 - 378 Thousands/uL    nRBC 0 /100 WBCs    Neutrophils Relative 73 43 - 75 %    Immat GRANS % 1 0 - 2 %    Lymphocytes Relative 14 14 - 44 %    Monocytes Relative 9 4 - 12 %    Eosinophils Relative 3 0 - 6 %    Basophils Relative 0 0 - 1 %    Neutrophils Absolute 6 43 1 85 - 7 62 Thousands/µL    Immature Grans Absolute 0 07 0 00 - 0 20 Thousand/uL    Lymphocytes Absolute 1 18 0 60 - 4 47 Thousands/µL    Monocytes Absolute 0 79 0 17 - 1 22 Thousand/µL    Eosinophils Absolute 0 26 0 00 - 0 61 Thousand/µL    Basophils Absolute 0 02 0 00 - 0 10 Thousands/µL   ECG 12 lead    Collection Time: 08/09/18  8:26 AM   Result Value Ref Range    Ventricular Rate 82 BPM    Atrial Rate 156 BPM    AK Interval  ms    QRSD Interval 102 ms    QT Interval 372 ms    QTC Interval 434 ms    P Axis  degrees    QRS Axis 9 degrees    T Wave Axis 34 degrees   Comprehensive metabolic panel    Collection Time: 08/09/18  8:42 AM   Result Value Ref Range    Sodium 136 136 - 145 mmol/L    Potassium 3 8 3 5 - 5 3 mmol/L    Chloride 104 100 - 108 mmol/L    CO2 27 21 - 32 mmol/L    Anion Gap 5 4 - 13 mmol/L    BUN 19 5 - 25 mg/dL    Creatinine 1 45 (H) 0 60 - 1 30 mg/dL    Glucose 119 65 - 140 mg/dL    Calcium 8 9 8 3 - 10 1 mg/dL    AST 14 5 - 45 U/L    ALT 17 12 - 78 U/L    Alkaline Phosphatase 113 46 - 116 U/L    Total Protein 7 2 6 4 - 8 2 g/dL    Albumin 3 3 (L) 3 5 - 5 0 g/dL    Total Bilirubin 0 76 0 20 - 1 00 mg/dL    eGFR 34 ml/min/1 73sq m   CBC and differential    Collection Time: 08/09/18  8:42 AM   Result Value Ref Range    WBC 7 54 4 31 - 10 16 Thousand/uL    RBC 2 64 (L) 3 81 - 5 12 Million/uL    Hemoglobin 8 4 (L) 11 5 - 15 4 g/dL    Hematocrit 25 8 (L) 34 8 - 46 1 %    MCV 98 82 - 98 fL    MCH 31 8 26 8 - 34 3 pg    MCHC 32 6 31 4 - 37 4 g/dL    RDW 16 3 (H) 11 6 - 15 1 %    MPV 9 1 8 9 - 12 7 fL    Platelets 534 705 - 751 Thousands/uL    nRBC 0 /100 WBCs    Neutrophils Relative 65 43 - 75 %    Immat GRANS % 2 0 - 2 %    Lymphocytes Relative 16 14 - 44 %    Monocytes Relative 13 (H) 4 - 12 %    Eosinophils Relative 4 0 - 6 %    Basophils Relative 0 0 - 1 %    Neutrophils Absolute 4 97 1 85 - 7 62 Thousands/µL    Immature Grans Absolute 0 11 0 00 - 0 20 Thousand/uL    Lymphocytes Absolute 1 22 0 60 - 4 47 Thousands/µL    Monocytes Absolute 0 95 0 17 - 1 22 Thousand/µL    Eosinophils Absolute 0 28 0 00 - 0 61 Thousand/µL    Basophils Absolute 0 01 0 00 - 0 10 Thousands/µL   Troponin I    Collection Time: 08/09/18  8:42 AM   Result Value Ref Range    Troponin I <0 02 <=0 04 ng/mL   BNP    Collection Time: 08/09/18  8:42 AM   Result Value Ref Range    NT-proBNP 2,031 (H) <450 pg/mL   ]    Imaging: I have personally reviewed pertinent films in PACS    EKG:  Normal sinus rhythm, normal intervals, LVH, nonspecific T-wave abnormality  CHEST X-RAY:  Pending final result    Code Status: LVL 1 Full Code

## 2018-08-09 NOTE — ED NOTES
Pt became hypotensive, RN held nitro, placed pt supine and placed 2L of O2 via nasal cannula  Pt mentating appropriately  Pt's BP continues to rise, will continue to monitor  Dr Mere Sanches made aware, pt BP improving immediately           Shaquille Carlson RN  08/09/18 5541

## 2018-08-10 ENCOUNTER — APPOINTMENT (INPATIENT)
Dept: RADIOLOGY | Facility: HOSPITAL | Age: 83
DRG: 313 | End: 2018-08-10
Payer: MEDICARE

## 2018-08-10 ENCOUNTER — APPOINTMENT (INPATIENT)
Dept: NON INVASIVE DIAGNOSTICS | Facility: HOSPITAL | Age: 83
DRG: 313 | End: 2018-08-10
Payer: MEDICARE

## 2018-08-10 ENCOUNTER — TELEPHONE (OUTPATIENT)
Dept: FAMILY MEDICINE CLINIC | Facility: CLINIC | Age: 83
End: 2018-08-10

## 2018-08-10 DIAGNOSIS — D64.9 HEMOGLOBIN LOW: Primary | ICD-10-CM

## 2018-08-10 PROBLEM — D72.829 LEUCOCYTOSIS: Status: ACTIVE | Noted: 2018-08-10

## 2018-08-10 PROBLEM — I50.33 ACUTE ON CHRONIC DIASTOLIC CONGESTIVE HEART FAILURE (HCC): Status: RESOLVED | Noted: 2018-08-09 | Resolved: 2018-08-10

## 2018-08-10 PROBLEM — I20.0 UNSTABLE ANGINA PECTORIS (HCC): Status: ACTIVE | Noted: 2018-08-09

## 2018-08-10 PROBLEM — M79.606 PAIN AND SWELLING OF LOWER EXTREMITY: Status: ACTIVE | Noted: 2018-08-10

## 2018-08-10 PROBLEM — M79.89 PAIN AND SWELLING OF LOWER EXTREMITY: Status: ACTIVE | Noted: 2018-08-10

## 2018-08-10 LAB
ANION GAP SERPL CALCULATED.3IONS-SCNC: 7 MMOL/L (ref 4–13)
ATRIAL RATE: 98 BPM
BASOPHILS # BLD AUTO: 0.02 THOUSANDS/ΜL (ref 0–0.1)
BASOPHILS NFR BLD AUTO: 0 % (ref 0–1)
BUN SERPL-MCNC: 23 MG/DL (ref 5–25)
CALCIUM SERPL-MCNC: 8.7 MG/DL (ref 8.3–10.1)
CHEST PAIN STATEMENT: NORMAL
CHLORIDE SERPL-SCNC: 103 MMOL/L (ref 100–108)
CO2 SERPL-SCNC: 27 MMOL/L (ref 21–32)
CREAT SERPL-MCNC: 1.82 MG/DL (ref 0.6–1.3)
EOSINOPHIL # BLD AUTO: 0.18 THOUSAND/ΜL (ref 0–0.61)
EOSINOPHIL NFR BLD AUTO: 2 % (ref 0–6)
ERYTHROCYTE [DISTWIDTH] IN BLOOD BY AUTOMATED COUNT: 16.9 % (ref 11.6–15.1)
GFR SERPL CREATININE-BSD FRML MDRD: 26 ML/MIN/1.73SQ M
GLUCOSE SERPL-MCNC: 113 MG/DL (ref 65–140)
HCT VFR BLD AUTO: 25.7 % (ref 34.8–46.1)
HGB BLD-MCNC: 8 G/DL (ref 11.5–15.4)
IMM GRANULOCYTES # BLD AUTO: 0.16 THOUSAND/UL (ref 0–0.2)
IMM GRANULOCYTES NFR BLD AUTO: 1 % (ref 0–2)
LYMPHOCYTES # BLD AUTO: 1.46 THOUSANDS/ΜL (ref 0.6–4.47)
LYMPHOCYTES NFR BLD AUTO: 12 % (ref 14–44)
MAGNESIUM SERPL-MCNC: 2.4 MG/DL (ref 1.6–2.6)
MAX DIASTOLIC BP: 79 MMHG
MAX HEART RATE: 127 BPM
MAX PREDICTED HEART RATE: 139 BPM
MAX. SYSTOLIC BP: 158 MMHG
MCH RBC QN AUTO: 31.1 PG (ref 26.8–34.3)
MCHC RBC AUTO-ENTMCNC: 31.1 G/DL (ref 31.4–37.4)
MCV RBC AUTO: 100 FL (ref 82–98)
MONOCYTES # BLD AUTO: 1.59 THOUSAND/ΜL (ref 0.17–1.22)
MONOCYTES NFR BLD AUTO: 13 % (ref 4–12)
NEUTROPHILS # BLD AUTO: 8.74 THOUSANDS/ΜL (ref 1.85–7.62)
NEUTS SEG NFR BLD AUTO: 72 % (ref 43–75)
NRBC BLD AUTO-RTO: 0 /100 WBCS
PLATELET # BLD AUTO: 216 THOUSANDS/UL (ref 149–390)
PMV BLD AUTO: 9.8 FL (ref 8.9–12.7)
POTASSIUM SERPL-SCNC: 3.5 MMOL/L (ref 3.5–5.3)
PROCALCITONIN SERPL-MCNC: 1 NG/ML
PROTOCOL NAME: NORMAL
QRS AXIS: 19 DEGREES
QRSD INTERVAL: 110 MS
QT INTERVAL: 380 MS
QTC INTERVAL: 464 MS
RBC # BLD AUTO: 2.57 MILLION/UL (ref 3.81–5.12)
REASON FOR TERMINATION: NORMAL
SODIUM SERPL-SCNC: 137 MMOL/L (ref 136–145)
T WAVE AXIS: -5 DEGREES
TARGET HR FORMULA: NORMAL
TEST INDICATION: NORMAL
TIME IN EXERCISE PHASE: NORMAL
TROPONIN I SERPL-MCNC: <0.02 NG/ML
VENTRICULAR RATE: 90 BPM
WBC # BLD AUTO: 12.15 THOUSAND/UL (ref 4.31–10.16)

## 2018-08-10 PROCEDURE — 93017 CV STRESS TEST TRACING ONLY: CPT

## 2018-08-10 PROCEDURE — 78452 HT MUSCLE IMAGE SPECT MULT: CPT

## 2018-08-10 PROCEDURE — 94660 CPAP INITIATION&MGMT: CPT

## 2018-08-10 PROCEDURE — 99232 SBSQ HOSP IP/OBS MODERATE 35: CPT | Performed by: INTERNAL MEDICINE

## 2018-08-10 PROCEDURE — 94760 N-INVAS EAR/PLS OXIMETRY 1: CPT

## 2018-08-10 PROCEDURE — 93005 ELECTROCARDIOGRAM TRACING: CPT

## 2018-08-10 PROCEDURE — 84145 PROCALCITONIN (PCT): CPT | Performed by: HOSPITALIST

## 2018-08-10 PROCEDURE — A9502 TC99M TETROFOSMIN: HCPCS

## 2018-08-10 PROCEDURE — 99232 SBSQ HOSP IP/OBS MODERATE 35: CPT | Performed by: HOSPITALIST

## 2018-08-10 PROCEDURE — 85025 COMPLETE CBC W/AUTO DIFF WBC: CPT | Performed by: PHYSICIAN ASSISTANT

## 2018-08-10 PROCEDURE — 83735 ASSAY OF MAGNESIUM: CPT | Performed by: INTERNAL MEDICINE

## 2018-08-10 PROCEDURE — 93010 ELECTROCARDIOGRAM REPORT: CPT | Performed by: INTERNAL MEDICINE

## 2018-08-10 PROCEDURE — 80048 BASIC METABOLIC PNL TOTAL CA: CPT | Performed by: INTERNAL MEDICINE

## 2018-08-10 RX ADMIN — CALCIUM 1 TABLET: 500 TABLET ORAL at 17:34

## 2018-08-10 RX ADMIN — RIVAROXABAN 20 MG: 20 TABLET, FILM COATED ORAL at 17:34

## 2018-08-10 RX ADMIN — HYDROXYCHLOROQUINE SULFATE 200 MG: 200 TABLET, FILM COATED ORAL at 17:34

## 2018-08-10 RX ADMIN — PRAVASTATIN SODIUM 80 MG: 80 TABLET ORAL at 21:44

## 2018-08-10 RX ADMIN — GABAPENTIN 100 MG: 100 CAPSULE ORAL at 21:44

## 2018-08-10 RX ADMIN — DOCUSATE SODIUM 100 MG: 100 CAPSULE, LIQUID FILLED ORAL at 17:34

## 2018-08-10 RX ADMIN — GABAPENTIN 100 MG: 100 CAPSULE ORAL at 17:33

## 2018-08-10 RX ADMIN — VITAM B12 100 MCG: 100 TAB at 10:49

## 2018-08-10 RX ADMIN — LEUCOVORIN CALCIUM 10 MG: 5 TABLET ORAL at 18:23

## 2018-08-10 RX ADMIN — ROPINIROLE 0.5 MG: 0.25 TABLET, FILM COATED ORAL at 10:50

## 2018-08-10 RX ADMIN — GABAPENTIN 100 MG: 100 CAPSULE ORAL at 10:49

## 2018-08-10 RX ADMIN — ROPINIROLE 0.5 MG: 0.25 TABLET, FILM COATED ORAL at 17:34

## 2018-08-10 RX ADMIN — LOSARTAN POTASSIUM 100 MG: 50 TABLET ORAL at 10:49

## 2018-08-10 RX ADMIN — METHOTREXATE SODIUM 20 MG: 2.5 TABLET ORAL at 18:23

## 2018-08-10 RX ADMIN — LEVOTHYROXINE SODIUM 75 MCG: 75 TABLET ORAL at 05:57

## 2018-08-10 RX ADMIN — CALCIUM 1 TABLET: 500 TABLET ORAL at 10:49

## 2018-08-10 RX ADMIN — DOCUSATE SODIUM 100 MG: 100 CAPSULE, LIQUID FILLED ORAL at 10:50

## 2018-08-10 RX ADMIN — REGADENOSON 0.4 MG: 0.08 INJECTION, SOLUTION INTRAVENOUS at 14:00

## 2018-08-10 RX ADMIN — PANTOPRAZOLE SODIUM 20 MG: 20 TABLET, DELAYED RELEASE ORAL at 05:57

## 2018-08-10 RX ADMIN — Medication 1 TABLET: at 10:49

## 2018-08-10 NOTE — PROGRESS NOTES
General Cardiology   Progress Note -  Team One   Sebastien Waite 81 y o  female MRN: [id number]    Unit/Bed#: [facility] 731-01 Encounter: [id number]      Principal Problem:    Chest pain  Active Problems:  Hypertensive urgency  Sleep apnea  Obesity  History of nephroureterectomy    Assessment/ Plan  Chest pain possible angina  -12 lead EKG in the ED reveals normal sinus rhythm, with no significant ST segment abnormalities  -troponin x 4 negative  -inpatient nuclear stress test; revealed no perfusion abnormalities  -recommend a repeat 2D echocardiogram this admission; assess for regional wall motion abnormalities    New onset atrial fibrillation < than 24 hours  -obtained 12 lead EKG; reveals atrial fibrillation rate controlled heart rate 90 bpm  -on Xarelto currently  -currently rate controlled; initiate metoprolol 12 5 mg b i d   -continue to monitor on telem  -replete electrolytes as needed; keep K+ >4, mag > 2 calcium > 7     Chronic grade 2 diastolic congestive heart failure  -LV EF in May 2017 90%; grade 2 diastolic dysfunction; LVH  -chronic bilateral lower extremity lymphedema; dependent edema in the lower extremities appears improved from yesterday  -hold home diuretic today given the elevated creatinine to 1 82 (1 45)  -ProBNP 2031; no prior to compare  -weight 92 kg down from 94 3 kg  -continue strict I&O; daily weights; TLC diet fluid restriction 1800 mL 24 hours    Accelerated HTN; POA in the setting of known essential hypertension  -Now resolved  -One documented episode of hypotension this am as low as 85/54; last recorded /63  -hold losartan the setting of elevated creatinine and hypotension this am; initiating low-dose beta-blocker in the setting of new onset atrial fibrillation     Hyperlipidemia  -Lipid profile from 06/25/2018; total cholesterol 122, triglycerides 161, HDL 46, LDL direct 59  -continue pravastatin 80 mg daily    Chronic kidney disease stage III-IV  -baseline creatinine approximately 1 0-1 7  -creatinine currently 1 82 this am  -hold home lasix; reevaluate tomorrow  -check a m  BMP     History of CVA/TIA  -continue Xarelto 20 mg daily     History of anemia chronic disease  -baseline hgb 8 to 9; currently hemoglobin at 8 0  -continue to trend via am CBC     History of obstructive sleep apnea   -compliant with CPAP; will order this admission      Subjective  Review of Systems   Constitution: Negative for chills and fever  Eyes: Negative for visual disturbance  Cardiovascular: Positive for chest pain, dyspnea on exertion and leg swelling  Negative for claudication, cyanosis, irregular heartbeat, near-syncope, orthopnea, palpitations, paroxysmal nocturnal dyspnea and syncope  Chest pressure   Respiratory: Positive for shortness of breath  Negative for cough  Skin:        B/L lower leg redness  Abdominal wound   Gastrointestinal: Negative for abdominal pain, diarrhea, heartburn, nausea and vomiting  Genitourinary: Negative for dysuria  Neurological: Negative for dizziness, focal weakness, headaches and light-headedness  All other systems reviewed and are negative  Objective:   Vitals: Blood pressure 114/68, pulse 88, temperature 98 8 °F (37 1 °C), temperature source Oral, resp  rate 18, height 5' 1" (1 549 m), weight 92 kg (202 lb 13 2 oz), SpO2 98 %  ,       Body mass index is 38 32 kg/m²  ,     Systolic (49ONY), CZC:145 , Min:85 , ANW:965     Diastolic (30YKW), BAP:64, Min:52, Max:74          Intake/Output Summary (Last 24 hours) at 08/10/18 1245  Last data filed at 08/10/18 1205   Gross per 24 hour   Intake              596 ml   Output              700 ml   Net             -104 ml     Weight (last 2 days)     Date/Time   Weight    08/10/18 0600  92 (202 82)    08/09/18 1530  94 3 (207 89)    08/09/18 1009  95 7 (211)                Telemetry Review: Atrial fibrillation, rate controlled    EKG personally reviewed by SANTOSH Liang       Physical Exam Constitutional: She is oriented to person, place, and time  She appears well-developed and well-nourished  No distress  HENT:   Head: Normocephalic and atraumatic  Mouth/Throat: Oropharynx is clear and moist  No oropharyngeal exudate  Eyes: Conjunctivae are normal  Pupils are equal, round, and reactive to light  No scleral icterus  Neck: Normal range of motion  Neck supple  No JVD present  Cardiovascular: Intact distal pulses  A regularly irregular rhythm present  Exam reveals no gallop and no friction rub  Pulses:       Radial pulses are 2+ on the right side, and 2+ on the left side  Dorsalis pedis pulses are 1+ on the right side, and 1+ on the left side  Mild pitting left lower extremity edema, moderate pitting right lower extremity edema   Pulmonary/Chest:   Poor effort, shallow inspiration, upper lobes are clear diminished, bilateral lower lobes mild fine crackles   Abdominal: Soft  Bowel sounds are normal    Obese   Musculoskeletal: Normal range of motion  She exhibits edema  Lymphadenopathy:     She has no cervical adenopathy  Neurological: She is alert and oriented to person, place, and time  Skin: Skin is warm  She is not diaphoretic  There is pallor  Right lower abdominal wound; healing  Bilateral lower extremity shin erythema; improving   Psychiatric: She has a normal mood and affect       LABORATORY RESULTS    Results from last 7 days  Lab Units 08/09/18  2328 08/09/18  1958 08/09/18  1650   TROPONIN I ng/mL <0 02 <0 02 <0 02     CBC with diff:     Results from last 7 days  Lab Units 08/10/18  0451 08/09/18  2328 08/09/18  0842 08/08/18  1735   WBC Thousand/uL 12 15* 12 32* 7 54 8 75   HEMOGLOBIN g/dL 8 0* 8 1* 8 4* 8 2*   HEMATOCRIT % 25 7* 25 0* 25 8* 25 2*   MCV fL 100* 98 98 98   PLATELETS Thousands/uL 216 200 215 227   MCH pg 31 1 31 8 31 8 31 8   MCHC g/dL 31 1* 32 4 32 6 32 5   RDW % 16 9* 16 5* 16 3* 16 3*   MPV fL 9 8 9 3 9 1 9 4   NRBC AUTO /100 WBCs 0 0 0 0 CMP:    Results from last 7 days  Lab Units 08/10/18  0451 18  0842 18  1735   SODIUM mmol/L 137 136 140   POTASSIUM mmol/L 3 5 3 8 4 0   CHLORIDE mmol/L 103 104 103   CO2 mmol/L 27 27 27   ANION GAP mmol/L 7 5 10   BUN mg/dL 23 19 20   CREATININE mg/dL 1 82* 1 45* 1 41*   GLUCOSE RANDOM mg/dL 113 119  --    CALCIUM mg/dL 8 7 8 9 8 7   AST U/L  --  14  --    ALT U/L  --  17  --    ALK PHOS U/L  --  113  --    TOTAL PROTEIN g/dL  --  7 2  --    BILIRUBIN TOTAL mg/dL  --  0 76  --    EGFR ml/min/1 73sq m 26 34 35       BMP:  Results from last 7 days  Lab Units 08/10/18  0451 18  0842  18  1735   SODIUM mmol/L 137 136  --  140   POTASSIUM mmol/L 3 5 3 8  --  4 0   CHLORIDE mmol/L 103 104  --  103   CO2 mmol/L 27 27  --  27   BUN mg/dL 23 19  --  20   CREATININE mg/dL 1 82* 1 45*  --  1 41*   GLUCOSE RANDOM mg/dL 113 119  < >  --    CALCIUM mg/dL 8 7 8 9  --  8 7   < > = values in this interval not displayed      Lab Results   Component Value Date    NTBNP 2,031 (H) 2018               Results from last 7 days  Lab Units 08/10/18  0451   MAGNESIUM mg/dL 2 4                           Lipid Profile:   Lab Results   Component Value Date    CHOL 122 2018    CHOL 190 2017     Lab Results   Component Value Date    HDL 46 2018    HDL 68 (H) 2017     Lab Results   Component Value Date    LDLCALC 44 2018    LDLCALC 92 2017     Lab Results   Component Value Date    TRIG 161 (H) 2018    TRIG 150 2017       Cardiac testing:   Results for orders placed during the hospital encounter of 05/10/17   Echo complete with contrast if indicated    Nerissa Crews [address]  [address]  [phone]    Transthoracic Echocardiogram  2D, M-mode, Doppler, and Color Doppler    Study date:  11-May-2017    Patient: Anjali Duncan  MR number: [id number]  Account number: [de-identified]  : 1935  Age: [de-identified] years  Gender: Female  Status: Inpatient  Location: Bedside  Height: 61 in  Weight: 209 lb  BP: 159/ 69 mmHg    Indications: TIA    Diagnoses: G45 9 - Transient cerebral ischemic attack, unspecified    Sonographer:  LILIAM Weiss  Primary Physician:  Kaia Rangel MD  Referring Physician:  Ema Kulkarni MD  Group:  [facility]  Cardiology Fellow:  Rosa West MD  Interpreting Physician:  Irina Woo MD    SUMMARY    LEFT VENTRICLE:  Systolic function was normal  Ejection fraction was estimated to be 62 %  There were no regional wall motion abnormalities  Wall thickness was mildly increased  There was mild concentric hypertrophy  Features were consistent with a pseudonormal left ventricular filling pattern, with concomitant abnormal relaxation and increased filling pressure (grade 2 diastolic dysfunction)  LEFT ATRIUM:  The atrium was mildly dilated  MITRAL VALVE:  There was marked annular calcification  There was mild regurgitation  Regurgitation grade was 1+ on a scale of 0 to 4+  AORTIC VALVE:  There was trace regurgitation  TRICUSPID VALVE:  There was mild regurgitation  IVC, HEPATIC VEINS:  The inferior vena cava was mildly dilated  HISTORY: PRIOR HISTORY: CVA, HTN, NICK, DM2, Former smoker    PROCEDURE: The procedure was performed at the bedside  This was a routine study  The transthoracic approach was used  The study included complete 2D imaging, M-mode, complete spectral Doppler, and color Doppler  The heart rate was 68 bpm,  at the start of the study  Images were obtained from the parasternal, apical, subcostal, and suprasternal notch acoustic windows  Image quality was adequate  LEFT VENTRICLE: Size was normal  Systolic function was normal  Ejection fraction was estimated to be 62 %  There were no regional wall motion abnormalities  Wall thickness was mildly increased  There was mild concentric hypertrophy    DOPPLER: Features were consistent with a pseudonormal left ventricular filling pattern, with concomitant abnormal relaxation and increased filling pressure (grade 2 diastolic dysfunction)  VENTRICULAR SEPTUM: There was sigmoid septal appearance  RIGHT VENTRICLE: The size was normal  Systolic function was normal  Wall thickness was normal     LEFT ATRIUM: The atrium was mildly dilated  RIGHT ATRIUM: Size was normal     MITRAL VALVE: There was marked annular calcification  There was normal leaflet separation  DOPPLER: The transmitral velocity was within the normal range  There was no evidence for stenosis  There was mild regurgitation  Regurgitation grade  was 1+ on a scale of 0 to 4+  AORTIC VALVE: The valve was trileaflet  Leaflets exhibited normal cuspal separation and sclerosis  DOPPLER: Transaortic velocity was within the normal range  There was no evidence for stenosis  There was trace regurgitation  TRICUSPID VALVE: The valve structure was normal  There was normal leaflet separation  DOPPLER: The transtricuspid velocity was within the normal range  There was no evidence for stenosis  There was mild regurgitation  Pulmonary artery  systolic pressure was at the upper limits of normal  Estimated peak PA pressure was 34 mmHg  PULMONIC VALVE: Leaflets exhibited normal thickness, no calcification, and normal cuspal separation  DOPPLER: The transpulmonic velocity was within the normal range  There was trace regurgitation  PERICARDIUM: There was no pericardial effusion  The pericardium was normal in appearance  AORTA: The root exhibited normal size  SYSTEMIC VEINS: IVC: The inferior vena cava was mildly dilated   Respirophasic changes were normal     SYSTEM MEASUREMENT TABLES    2D  %FS: 42 11 %  Ao Diam: 2 3 cm  EDV(Teich): 95 97 ml  EF(Teich): 73 2 %  ESV(Teich): 25 72 ml  IVSd: 1 35 cm  LA Area: 23 5 cm2  LA Diam: 4 33 cm  LVEDV MOD A4C: 84 26 ml  LVEF MOD A4C: 59 2 %  LVESV MOD A4C: 34 37 ml  LVIDd: 4 57 cm  LVIDs: 2 65 cm  LVLd A4C: 6 86 cm  LVLs A4C: 5 37 cm  LVPWd: 1 21 cm  RA Area: 12 1 cm2  RVIDd: 3 31 cm  SV MOD A4C: 49 88 ml  SV(Teich): 70 25 ml    CW  HR: 67 09 BPM  MV PHT: 81 11 ms  MV VTI: 34 78 cm  MV Vmax: 1 09 m/s  MV Vmean: 0 68 m/s  MV maxP 72 mmHg  MV meanP 1 mmHg  MVA By PHT: 2 71 cm2  TR Vmax: 2 54 m/s  TR maxP 75 mmHg    MM  TAPSE: 1 79 cm    PW  AVC: 391 01 ms  E': 0 04 m/s  E/E': 26 82  LVOT Env  Ti: 357 75 ms  LVOT VTI: 23 cm  LVOT Vmax: 1 03 m/s  LVOT Vmean: 0 64 m/s  LVOT maxP 26 mmHg  LVOT meanP 99 mmHg  MV A Rashawn: 1 m/s  MV Dec [place]: 4 09 m/s2  MV DecT: 264 84 ms  MV E Rashawn: 1 08 m/s  MV E/A Ratio: 1 08  MV PHT: 76 8 ms  MVA By PHT: 2 86 cm2    [facility] Accredited [facility]chocardi[facility]    Prepared and electronically signed by    Sonam Richey MD  Signed 11-May-2017 12:46:08       No results found for this or any previous visit  No results found for this or any previous visit  No procedure found  No results found for this or any previous visit        Meds/Allergies   all current active meds have been reviewed, current meds:   Current Facility-Administered Medications   Medication Dose Route Frequency    acetaminophen (TYLENOL) tablet 650 mg  650 mg Oral Q6H PRN    calcium carbonate (OYSTER SHELL,OSCAL) 500 mg tablet 1 tablet  1 tablet Oral BID With Meals    cyanocobalamin (VITAMIN B-12) tablet 100 mcg  100 mcg Oral Daily    docusate sodium (COLACE) capsule 100 mg  100 mg Oral BID    gabapentin (NEURONTIN) capsule 100 mg  100 mg Oral TID    hydroxychloroquine (PLAQUENIL) tablet 200 mg  200 mg Oral BID With Meals    leucovorin (WELLCOVORIN) tablet 10 mg  10 mg Oral Weekly    levothyroxine tablet 75 mcg  75 mcg Oral Daily    losartan (COZAAR) tablet 100 mg  100 mg Oral Daily    methotrexate tablet 20 mg  20 mg Oral Weekly    morphine injection 2 mg  2 mg Intravenous Q4H PRN    multivitamin-minerals (CENTRUM) tablet 1 tablet  1 tablet Oral Daily    nitroglycerin (NITROSTAT) SL tablet 0 4 mg  0 4 mg Sublingual Q5 Min PRN    nitroGLYcerin (TRIDIL) 50 mg in 250 mL infusion (premix)  5-200 mcg/min Intravenous Titrated    pantoprazole (PROTONIX) EC tablet 20 mg  20 mg Oral Early Morning    pravastatin (PRAVACHOL) tablet 80 mg  80 mg Oral Daily    regadenoson (LEXISCAN) 0 4 mg/5 mL injection **AcuDose Override Pull**        rivaroxaban (XARELTO) tablet 20 mg  20 mg Oral Daily With Dinner    rOPINIRole (REQUIP) tablet 0 5 mg  0 5 mg Oral BID     Facility-Administered Medications Ordered in Other Encounters   Medication Dose Route Frequency    acetaminophen (TYLENOL) tablet 650 mg  650 mg Oral Q6H PRN    and PTA meds:   Prior to Admission Medications   Prescriptions Last Dose Informant Patient Reported? Taking?    Calcium Citrate-Vitamin D (CALCIUM + D PO) 8/8/2018 at Unknown time Self Yes Yes   Sig: Take 1 tablet by mouth 2 (two) times a day   LEUCOVORIN CALCIUM PO Past Week at Unknown time Self Yes Yes   Sig: Take 10 mg by mouth once a week   Multiple Vitamin (MULTIVITAMINS PO) 8/8/2018 at Unknown time Self Yes Yes   Sig: Take 1 tablet by mouth daily   XARELTO 20 MG tablet 8/8/2018 at Unknown time Self Yes Yes   acetaminophen (TYLENOL) 325 mg tablet 8/9/2018 at Unknown time Self Yes Yes   Sig: Take 650 mg by mouth every 6 (six) hours as needed for mild pain   cephalexin (KEFLEX) 500 mg capsule 8/8/2018 at Unknown time  No Yes   Sig: Take 1 capsule (500 mg total) by mouth every 8 (eight) hours for 7 days   cyanocobalamin (VITAMIN B-12) 100 mcg tablet 8/8/2018 at Unknown time Self Yes Yes   Sig: Take by mouth   docusate sodium (COLACE) 100 mg capsule 8/8/2018 at Unknown time Self No Yes   Sig: Take 1 capsule (100 mg total) by mouth 2 (two) times a day for 60 doses   furosemide (LASIX) 20 mg tablet 8/9/2018 at Unknown time  No Yes   Sig: TAKE 1 TABLET EVERY DAY   gabapentin (NEURONTIN) 100 mg capsule 8/8/2018 at Unknown time Self No Yes   Sig: Take 1 capsule (100 mg total) by mouth 3 (three) times a day Take 1 cap in the am, 1 cap in the afternoon, and 3 cap at bedtime   hydroxychloroquine (PLAQUENIL) 200 mg tablet 8/8/2018 at Unknown time  Yes Yes   Sig: Take 200 mg by mouth 2 (two) times a day with meals   levothyroxine 75 mcg tablet 8/9/2018 at Unknown time Self Yes Yes   Sig: Take 75 mcg by mouth daily   losartan (COZAAR) 100 MG tablet 8/8/2018 at Unknown time Self Yes Yes   Sig: Take 100 mg by mouth daily   methotrexate 2 5 mg tablet Past Week at Unknown time Self Yes Yes   omeprazole (PriLOSEC) 20 mg delayed release capsule 8/9/2018 at Unknown time Self No Yes   Sig: TAKE 1 CAPSULE DAILY AS NEEDED FOR STOMACH ACID   pravastatin (PRAVACHOL) 80 mg tablet 8/8/2018 at Unknown time Self No Yes   Sig: TAKE 1 TABLET EVERY DAY   rOPINIRole (REQUIP) 0 5 mg tablet 8/9/2018 at Unknown time Self Yes Yes   Sig: Take 0 5 mg by mouth 2 (two) times a day        Facility-Administered Medications: None     Prescriptions Prior to Admission   Medication    acetaminophen (TYLENOL) 325 mg tablet    Calcium Citrate-Vitamin D (CALCIUM + D PO)    cephalexin (KEFLEX) 500 mg capsule    cyanocobalamin (VITAMIN B-12) 100 mcg tablet    docusate sodium (COLACE) 100 mg capsule    furosemide (LASIX) 20 mg tablet    gabapentin (NEURONTIN) 100 mg capsule    hydroxychloroquine (PLAQUENIL) 200 mg tablet    LEUCOVORIN CALCIUM PO    levothyroxine 75 mcg tablet    losartan (COZAAR) 100 MG tablet    methotrexate 2 5 mg tablet    Multiple Vitamin (MULTIVITAMINS PO)    omeprazole (PriLOSEC) 20 mg delayed release capsule    pravastatin (PRAVACHOL) 80 mg tablet    rOPINIRole (REQUIP) 0 5 mg tablet    XARELTO 20 MG tablet         nitroGLYcerin 5-200 mcg/min Last Rate: Stopped (08/09/18 1128)       Counseling / Coordination of Care  Total floor / unit time spent today 20 minutes  Greater than 50% of total time was spent with the patient and / or family counseling and / or coordination of care  ** Please Note: Dragon 360 Dictation voice to text software may have been used in the creation of this document   **

## 2018-08-10 NOTE — PLAN OF CARE
CARDIOVASCULAR - ADULT     Maintains optimal cardiac output and hemodynamic stability Progressing     Absence of cardiac dysrhythmias or at baseline rhythm Progressing        INFECTION - ADULT     Absence or prevention of progression during hospitalization Progressing     Absence of fever/infection during neutropenic period Progressing        Knowledge Deficit     Patient/family/caregiver demonstrates understanding of disease process, treatment plan, medications, and discharge instructions Progressing        PAIN - ADULT     Verbalizes/displays adequate comfort level or baseline comfort level Progressing        Potential for Falls     Patient will remain free of falls Progressing        SAFETY ADULT     Maintain or return to baseline ADL function Progressing     Maintain or return mobility status to optimal level Progressing

## 2018-08-10 NOTE — TELEPHONE ENCOUNTER
----- Message from Laverne Rojas MD sent at 8/10/2018 11:27 AM EDT -----  Call patient regarding test   Hemoglobin seems to be dropping a bit  Creatinine looks better    Please repeat CBC and BMP in a week or 2

## 2018-08-10 NOTE — ASSESSMENT & PLAN NOTE
· Resolved  Was briefly on nitroglycerin drip  Now off it  · [address] Will hold for the losartan due to BRENNEN and consider beta-blocker due to AFib    This is already changed by Cardiology

## 2018-08-10 NOTE — ASSESSMENT & PLAN NOTE
· As per the patient the redness is much better now  She was treated as cellulitis as outpatient in the last 3 days  Does have cough tenderness      · check lower extremity duplex

## 2018-08-10 NOTE — PROGRESS NOTES
evening lopressor held d/t bp 100/57, order parameters to hold if sbp <110  Discussed with dr Elliot Pettit   Will cont to monitor

## 2018-08-10 NOTE — ASSESSMENT & PLAN NOTE
· No clinical evidence of CHF per cardio, & no significant response to Lasix along with elevation in creatinine  Hold further diuretics today  Underwent nuclear stress test which is negative for ischemia  After having some episodes of AFib on the monitor, however not correlating with her symptoms  · Symptoms worse on lying flat and better on sitting up    Follow-up 2D echocardiogram to rule out pericarditis  · If cardiac workup negative and procalcitonin elevated consider CT chest to rule out pneumonia  · If everything negative could be musculoskeletal

## 2018-08-10 NOTE — CASE MANAGEMENT
Initial Clinical Review    Admission: Date/Time/Statement: 8/9/18 @ 0956     Orders Placed This Encounter   Procedures    Inpatient Admission (expected length of stay for this patient is greater than two midnights)     Standing Status:   Standing     Number of Occurrences:   1     Order Specific Question:   Admitting Physician     Answer:   Harika Fields [[id number]]     Order Specific Question:   Level of Care     Answer:   Med Surg [16]     Order Specific Question:   Estimated length of stay     Answer:   More than 2 Midnights     Order Specific Question:   Certification     Answer:   I certify that inpatient services are medically necessary for this patient for a duration of greater than two midnights  See H&P and MD Progress Notes for additional information about the patient's course of treatment  ED: Date/Time/Mode of Arrival:   ED Arrival Information     Expected Arrival Acuity Means of Arrival Escorted By Service Admission Type    - 8/9/2018 08:14 Emergent Wheelchair Family Member General Medicine Emergency    Arrival Complaint    chest pain          Chief Complaint:   Chief Complaint   Patient presents with    Chest Pain     Chest pain started yesterday  Pt states that it happens when she breaths in and feels like alot of pressure on the center chest       History of Illness:       Olayinka Albarran is a 81 y o  female who presents with chest pressure and shortness of breath  Patient reports she noticed chest pressure yesterday, it has been persistent, pressure is described as 8/10 intensity, retrosternal with radiation all over the chest and upper abdomen, unrelated to exertion, no aggravating or relieving factors, denies associated diaphoresis  She also reports worsening shortness of breath especially on lying flat    She denies palpitations presyncope or fatigue      ED Vital Signs:   ED Triage Vitals   Temperature Pulse Respirations Blood Pressure SpO2   08/09/18 0912 08/09/18 0845 08/09/18 0845 08/09/18 0845 08/09/18 0845   98 2 °F (36 8 °C) 78 20 (!) 172/69 97 %         Pain Score       6        Wt Readings from Last 1 Encounters:   08/10/18 92 kg (202 lb 13 2 oz)         08/09/18 1117  --  --   87/50  --  --    08/09/18 1115  70  --   76/38  92 %  Nasal cannula    08/09/18 1100  70  22   68/40  93 %  Nasal cannula        Abnormal Labs/Diagnostic Test Results:       Lab Units 08/09/18  0842   HEMOGLOBIN g/dL 8 4*   HEMATOCRIT % 25 8       CREATININE mg/dL 1 45*       ED Treatment:   Medication Administration from 08/09/2018 0814 to 08/09/2018 1523       Date/Time Order Dose Route     08/09/2018 1005 nitroGLYcerin (TRIDIL) 50 mg in 250 mL infusion (premix) 50 mcg/min Intravenous     08/09/2018 1002 furosemide (LASIX) injection 20 mg 20 mg Intravenous     08/09/2018 1141 acetaminophen (TYLENOL) tablet 650 mg 650 mg Oral     08/09/2018 1514 losartan (COZAAR) tablet 100 mg 100 mg Oral     08/09/2018 1514 multivitamin-minerals (CENTRUM) tablet 1 tablet 1 tablet Oral     08/09/2018 1438 furosemide (LASIX) injection 40 mg 40 mg Intravenous           Past Medical History:   Diagnosis Date    Anemia     Arthritis     Benign essential hypertension     Cataract     Chronic cystitis     CPAP (continuous positive airway pressure) dependence     Diverticulitis of colon     Early satiety     GERD (gastroesophageal reflux disease)     Gout     Hearing aid worn     Hearing loss     Hemorrhoids     Hiatal hernia     History of colonic polyps     Hyperlipidemia     Hypothyroidism     Left breast mass     Microhematuria     Migraine     Neuropathy     Overactive bladder     Pneumonia of left lower lobe due to infectious organism (HCC)     RA (rheumatoid arthritis) (HCC)     Sleep apnea     Stroke ([facility] [address] )     Type 2 diabetes mellitus (HCC)     Urinary frequency     Urinary urgency     Urothelial cancer ([facility] [address] ) 04/23/2018    Use of cane as ambulatory aid        Admitting Diagnosis: Acute on chronic diastolic heart failure (HCC) [I50 33]  Chest pain [R07 9]  Chest pressure [R07 89]    Age/Sex: 81 y o  female    Assessment/Plan:     Chest pain   Assessment & Plan     Reports improvement, monitor troponins  NTG p r n , IV morphine p r n           Acute on chronic diastolic congestive heart failure (HCC)   Assessment & Plan     Will have her on IV Lasix, less than 2 g salt diet, monitor I/O, daily weights  Cardiology consultation          Hypertensive urgency   Assessment & Plan     Monitor blood pressures          Sleep apnea   Assessment & Plan     CPAP, reports compliance          History of nephroureterectomy   Assessment & Plan     Monitor kidney function closely            Acute on chronic diastolic congestive heart failure requiring IV medications, hypertensive urgency requiring close monitoring    Admission Orders:    TELEMETRY  SEQUENTIAL COMPRESSION DIVIDE   2 GM NA RESTRICTION    1800 ML FL RESTRICTIONS   CONSULT CARDIOLOGY  PT EVAL AND TREAT   2LO2NC      Scheduled Meds:     acetaminophen 650 mg Oral Q6H PRN   calcium carbonate 1 tablet Oral BID With Meals   cyanocobalamin 100 mcg Oral Daily   docusate sodium 100 mg Oral BID   gabapentin 100 mg Oral TID   hydroxychloroquine 200 mg Oral BID With Meals   leucovorin 10 mg Oral Weekly   levothyroxine 75 mcg Oral Daily   losartan 100 mg Oral Daily   methotrexate 20 mg Oral Weekly   morphine injection 2 mg Intravenous Q4H PRN   multivitamin-minerals 1 tablet Oral Daily   nitroglycerin 0 4 mg Sublingual Q5 Min PRN   nitroGLYcerin 5-200 mcg/min Intravenous Titrated   pantoprazole 20 mg Oral Early Morning   pravastatin 80 mg Oral Daily   regadenoson      rivaroxaban 20 mg Oral Daily With Dinner   rOPINIRole 0 5 mg Oral BID

## 2018-08-10 NOTE — SOCIAL WORK
Met with pt and her  and explained CM role  Pt lives with her  in a 1 story house with 4 RONNY  Pt was independent PTA and does not drive  Pt's  provides her with transportation  Pt's PCP is Dr Stormy Bobo  Pt has a walker, cane, and commode at home  Pt is open to  [facility] for RN services  Pt has a hx of rehab at   No hx of mental health issues or drug or alcohol use  Pt has a prescription plan and uses [facility] in [place] for her prescriptions  Primary contact is pt's Angela Rhodes, contact # [phone]  Pt's  will provide pt with transportation home upon discharge  Pt's dgt Josafat Cooper is her POA, contact # [phone]  Pt would like to continue with  [facility] upon discharge  [address] referral sent for same  Discussed [facility] program with pt and her   They declined and stated that they just got pt's medications filled through [facility]  CM reviewed d/c planning process including the following: identifying help at home, patient preference for d/c planning needs, Discharge Lounge, [facility] to [facility] program, availability of treatment team to discuss questions or concerns patient and/or family may have regarding understanding medications and recognizing signs and symptoms once discharged  CM also encouraged patient to follow up with all recommended appointments after discharge  Patient advised of importance for patient and family to participate in managing patients medical well being  Patient/caregiver received discharge checklist  Content reviewed  Patient/caregiver encouraged to participate in discharge plan of care prior to discharge home

## 2018-08-10 NOTE — MEDICAL STUDENT
Progress Note - Celestine Mitchell 81 y o  female MRN: [id number]    [facility] Encounter: [id number]    Assessment/Plan:  1  Chest pain  2  Chronic grade 2 diastolic CHF  3  Atrial fibrillation  4  BRENNEN on CKD3  5  HTN  6  Hyperlipidemia  7  Anemia of chronic disease  8  Prior TIA  9  NICK    1  Chest pain   - pleuritic, orthopnea, no cough   - differential at this time including pericarditis, pneumonia, hypertension, costochondritis   - afebrile Tmax 100F, /63, WBC 12    - ECG normal sinus rhythm without ST changes   - Troponins negative x 3   - nuclear stress test negative for ischemia, pt was noted to be in atrial fibrillation during study   - Chest X-ray without effusion or consolidation   - plan to check procalcitonin, trend WBC, check coags   - ALEX this admission    2  Chronic grade 2 diastolic CHF   - pt is euvolemic, weight is stable at 207 lbs for 1 month   - echo may 2017 EF 88%, grade 2 diastolic dysfunction   - unlikely to be acute exacerbation   - BNP 2,031   - 1800 cc fluid restriction   - restart home lasix dose 20 mg daily   - recheck TTE    3  Atrial fibrillation   - new onset   - check 12 lead EKG   - pt already on Xarelto   - consider rate control with metoprolol 12 5 mg BID     4  Chronic Kidney Disease, Stage 3 with acute kidney injury    - baseline Cr around 1 5 since April (nephrectomy)   - as patient has 1 kidney, this could be prerenal from hypotension vs  third spacing of fluid   - Cr this admission 1 82 from 1 45   - BUN/Cr ratio 12   - hold home losartan     5  Hypertension   - restart home losartan 100 mg daily    6  Hyperlipidemia   - lipid panel in June, Chol 122, , LDL 59   - continue pravastatin 80 mg daily     7  Anemia of chronic disease    - Hb at baseline 8    - macrocytic     8  History of TIA   - xarelto 20 mg daily   9   NICK   - CPAP    History of Present Illness:   Lorena Rubi is an 81year old female with past medical history of hypertension, hyperlipidemia, chronic grade 2 diastolic CHF on furosemide 20 mg daily, chronic lower extremity lymphedema, anemia of chronic disease, prior CVA in May 2017 on Xarelto, urothelial cancer s/p nephroureterectomy with bladder cuff, rheumatoid arthritis, and NICK who initially presented on 8/9 with complaints of chest pain and shortness of breath  She reports that 2 days prior to admission she began to develop pleuritic chest pressure radiating to her upper abdomen abdomen as well as orthopnea  Her pain progressively worsened and became much more localized substernally on the day of admission  In the ED she was found to be in hypertensive urgency with /77 and was started on a nitro gtt  This was later discontinued due to hypotension with BP 68/40  Initial workup revealed normal sinus rhythm, BNP 2,031, Troponin's negative x 4  She was started on IV lasix and admitted to the floor  Currently she has no chest pain, palpatations, or cough, or fever  She states she feels like she cannot take a deep breath  She went for a nuclear stress test today which was negative for ischemia, but she was noted to be in atrial fibrillation  Patient states that 5 days prior to admission she experienced fever/chills and was intiailyl thought to have a UTI  She was given keflex, and then noticed her right lower extremity was swollen and erythematous  Her PCP increased the dose of keflex  She states the area of erythremia has since improved  Patient was admitted in may of 2017 and found to have a suspected cardioembolic stroke  She received IV tPA  An echocardiogram at that time revealed a atrial dilation and she was started on Xarelto and discharged  Following the stroke she wore a Holter monitor for 1 week which did not reveal any arrhythmia       Vitals:   Temp:  [98 1 °F (36 7 °C)-100 °F (37 8 °C)] 98 8 °F (37 1 °C)  HR:  [18-95] 18  Resp:  [16-22] 18  BP: ()/(38-74) 85/54  SpO2: [] 98% on room air    I/O last 24 hours: In: 476 [P O :476]  Out: 1000 [Urine:1000]    Physical Exam:  GENERAL:resting comfortably in chair  HEENT: mm moist  NECK: no JVD  CARDIAC: normal rate, reg rhythm, no murmur, chest is tender to palpation   LUNGS: slight crackles LLL  ABDOMEN: soft, NT, ND, RLQ ventral hernia    MSK/SKIN: non-pitting edema, R>L along with cellulitis to mid anterior shin      Labs:    Results from last 7 days  Lab Units 08/10/18  0451 08/09/18  2328 08/09/18  0842   WBC Thousand/uL 12 15* 12 32* 7 54   HEMOGLOBIN g/dL 8 0* 8 1* 8 4*   HEMATOCRIT % 25 7* 25 0* 25 8*   PLATELETS Thousands/uL 216 200 215       Results from last 7 days  Lab Units 08/10/18  0451 08/09/18  0842 08/08/18  1735   SODIUM mmol/L 137 136 140   POTASSIUM mmol/L 3 5 3 8 4 0   CHLORIDE mmol/L 103 104 103   CO2 mmol/L 27 27 27   BUN mg/dL 23 19 20   CREATININE mg/dL 1 82* 1 45* 1 41*   CALCIUM mg/dL 8 7 8 9 8 7   TOTAL PROTEIN g/dL  --  7 2  --    BILIRUBIN TOTAL mg/dL  --  0 76  --    ALK PHOS U/L  --  113  --    ALT U/L  --  17  --    AST U/L  --  14  --    GLUCOSE RANDOM mg/dL 113 119  --            Imaging:   X-ray Chest 2 Views    Result Date: 8/9/2018  Impression No acute cardiopulmonary disease  Workstation performed: [id number]     Xr Chest Pa & Lateral    Result Date: 4/13/2018  Impression No acute cardiopulmonary disease  Findings are stable except for right humeral surgery Workstation performed: [id number]     No CTA results available for this patient      Medications:    Current Facility-Administered Medications:     acetaminophen (TYLENOL) tablet 650 mg, 650 mg, Oral, Q6H PRN, Jessie Au MD, 650 mg at 08/09/18 2015    calcium carbonate (OYSTER SHELL,OSCAL) 500 mg tablet 1 tablet, 1 tablet, Oral, BID With Meals, Jessie Au MD, 1 tablet at 08/09/18 1729    cyanocobalamin (VITAMIN B-12) tablet 100 mcg, 100 mcg, Oral, Daily, Jessie Au MD, 100 mcg at 08/09/18 1728    docusate sodium (COLACE) capsule 100 mg, 100 mg, Oral, BID, Glen Rodney MD    furosemide (LASIX) injection 40 mg, 40 mg, Intravenous, BID (diuretic), Glen Rodney MD, 40 mg at 08/09/18 1438    gabapentin (NEURONTIN) capsule 100 mg, 100 mg, Oral, TID, Glen Rodney MD, 100 mg at 08/09/18 2015    hydroxychloroquine (PLAQUENIL) tablet 200 mg, 200 mg, Oral, BID With Meals, Glen Rodney MD, 200 mg at 08/09/18 1728    leucovorin (WELLCOVORIN) tablet 10 mg, 10 mg, Oral, Weekly, Glen Rodney MD    levothyroxine tablet 75 mcg, 75 mcg, Oral, Daily, Glen Rodney MD, 75 mcg at 08/10/18 0557    losartan (COZAAR) tablet 100 mg, 100 mg, Oral, Daily, Glen Rodney MD, 100 mg at 08/09/18 1514    methotrexate tablet 20 mg, 20 mg, Oral, Weekly, Glen Rodney MD    morphine injection 2 mg, 2 mg, Intravenous, Q4H PRN, SANTOSH Mortensne    multivitamin-minerals (CENTRUM) tablet 1 tablet, 1 tablet, Oral, Daily, Glen Rodney MD, 1 tablet at 08/09/18 1514    nitroglycerin (NITROSTAT) SL tablet 0 4 mg, 0 4 mg, Sublingual, Q5 Min PRN, SANTOSH Bravo    nitroGLYcerin (TRIDIL) 50 mg in 250 mL infusion (premix), 5-200 mcg/min, Intravenous, Titrated, Claudia Burroughs MD, Stopped at 08/09/18 1128    pantoprazole (PROTONIX) EC tablet 20 mg, 20 mg, Oral, Early Morning, Glen Rodney MD, 20 mg at 08/10/18 0557    pravastatin (PRAVACHOL) tablet 80 mg, 80 mg, Oral, Daily, Glen Rodney MD, Stopped at 08/09/18 1408    rivaroxaban (XARELTO) tablet 20 mg, 20 mg, Oral, Daily With Dinner, Glen Rodney MD, 20 mg at 08/09/18 1728    rOPINIRole (REQUIP) tablet 0 5 mg, 0 5 mg, Oral, BID, Glen Rodney MD, 0 5 mg at 08/09/18 1728    Facility-Administered Medications Ordered in Other Encounters:     acetaminophen (TYLENOL) tablet 650 mg, 650 mg, Oral, Q6H PRN, Valeriy Douglass PA-C    Current Facility-Administered Medications on File Prior to Encounter Medication    acetaminophen (TYLENOL) tablet 650 mg     Current Outpatient Prescriptions on File Prior to Encounter   Medication Sig    acetaminophen (TYLENOL) 325 mg tablet Take 650 mg by mouth every 6 (six) hours as needed for mild pain    Calcium Citrate-Vitamin D (CALCIUM + D PO) Take 1 tablet by mouth 2 (two) times a day    cephalexin (KEFLEX) 500 mg capsule Take 1 capsule (500 mg total) by mouth every 8 (eight) hours for 7 days    cyanocobalamin (VITAMIN B-12) 100 mcg tablet Take by mouth    docusate sodium (COLACE) 100 mg capsule Take 1 capsule (100 mg total) by mouth 2 (two) times a day for 60 doses    furosemide (LASIX) 20 mg tablet TAKE 1 TABLET EVERY DAY    gabapentin (NEURONTIN) 100 mg capsule Take 1 capsule (100 mg total) by mouth 3 (three) times a day Take 1 cap in the am, 1 cap in the afternoon, and 3 cap at bedtime    hydroxychloroquine (PLAQUENIL) 200 mg tablet Take 200 mg by mouth 2 (two) times a day with meals    LEUCOVORIN CALCIUM PO Take 10 mg by mouth once a week    levothyroxine 75 mcg tablet Take 75 mcg by mouth daily    losartan (COZAAR) 100 MG tablet Take 100 mg by mouth daily    methotrexate 2 5 mg tablet     Multiple Vitamin (MULTIVITAMINS PO) Take 1 tablet by mouth daily    omeprazole (PriLOSEC) 20 mg delayed release capsule TAKE 1 CAPSULE DAILY AS NEEDED FOR STOMACH ACID    pravastatin (PRAVACHOL) 80 mg tablet TAKE 1 TABLET EVERY DAY    rOPINIRole (REQUIP) 0 5 mg tablet Take 0 5 mg by mouth 2 (two) times a day      XARELTO 20 MG tablet      VTE Pharmacologic Prophylaxis: Rivaroxaban/Xarelto  VTE Mechanical Prophylaxis: sequential compression device    Lady Almonte

## 2018-08-10 NOTE — PROGRESS NOTES
Dr Amira Cox notified of procalcitonin elevated at 1 0   Expecting orders for CT chest  Will cont to monitor

## 2018-08-10 NOTE — ASSESSMENT & PLAN NOTE
· Recent outpatient urinalysis was negative  She was treated as right lower extremity cellulitis and placed on oral Keflex on Monday which is being held at this time is cellulitis is better  · Persistent leukocytosis last night as well as this morning    Check procalcitonin if elevated consider CT chest to rule out pneumonia with a given symptoms  · Continue to monitor off antibiotics as she is otherwise stable  · Send blood cultures if fever spikes

## 2018-08-10 NOTE — RESTORATIVE TECHNICIAN NOTE
Restorative Specialist Mobility Note       Activity: Ambulate in chahal, Ambulate in room, Bathroom privileges, Chair, Dangle, Stand at bedside (Educated/encouraged pt to ambulate with assistance 3-4 x's/day  Chair alarm on   Pt callbell, phone/tray within reach )     Assistive Device: Front wheel walker (NC O2 on 1L)          Chloe JOHNSON, Restorative Technician, [employer]

## 2018-08-10 NOTE — PROGRESS NOTES
Progress Note - Randy Wise 1935, 81 y o  female MRN: [id number]    Unit/Bed#: [facility] 731-01 Encounter: [id number]    Primary Care Provider: Марина Sosa MD   Date and time admitted to hospital: 8/9/2018  8:18 AM        * Chest pain   Assessment & Plan    · No clinical evidence of CHF per cardio, & no significant response to Lasix along with elevation in creatinine  Hold further diuretics today  Underwent nuclear stress test which is negative for ischemia  After having some episodes of AFib on the monitor, however not correlating with her symptoms  · Symptoms worse on lying flat and better on sitting up  Follow-up 2D echocardiogram to rule out pericarditis  · If cardiac workup negative and procalcitonin elevated consider CT chest to rule out pneumonia  · If everything negative could be musculoskeletal        Leucocytosis   Assessment & Plan    · Recent outpatient urinalysis was negative  She was treated as right lower extremity cellulitis and placed on oral Keflex on Monday which is being held at this time is cellulitis is better  · Persistent leukocytosis last night as well as this morning  Check procalcitonin if elevated consider CT chest to rule out pneumonia with a given symptoms  · Continue to monitor off antibiotics as she is otherwise stable  · Send blood cultures if fever spikes        Pain and swelling of lower extremity   Assessment & Plan    · As per the patient the redness is much better now  She was treated as cellulitis as outpatient in the last 3 days  Does have cough tenderness  · check lower extremity duplex        Hypertensive urgency   Assessment & Plan    · Resolved  Was briefly on nitroglycerin drip  Now off it  · [address] Will hold for the losartan due to BRENNEN and consider beta-blocker due to AFib    This is already changed by Cardiology        Sleep apnea   Assessment & Plan    CPAP, reports compliance          BRENNEN:  · Baseline creatinine 1-1 2 dropped to 1  4  · Increased to 1 8 today, secondary to Lasix plus losartan  · Hold any further diuretics today, losartan changed to metoprolol  · Follow-up labs in a m  and then consider restarting home Lasix    [facility] Internal Medicine Progress Note  Patient: Oswaldo Arambula 81 y o  female   MRN: [id number]  PCP: Michelle Ansari MD  Unit/Bed#: [facility] 731-01 Encounter: [id number]  Date Of Visit: 08/10/18    Assessment:    Principal Problem:    Chest pain  Active Problems:    Leucocytosis    Sleep apnea    Obesity    History of nephroureterectomy    Hypertensive urgency    Pain and swelling of lower extremity      VTE Pharmacologic Prophylaxis:   Pharmacologic: Rivaroxaban (Xarelto)  Mechanical VTE Prophylaxis in Place: Yes    Patient Centered Rounds: I have performed bedside rounds with nursing staff today  Discussions with Specialists or Other Care Team Provider:     Education and Discussions with Family / Patient:  Patient and     Time Spent for Care: 45 minutes  More than 50% of total time spent on counseling and coordination of care as described above  Current Length of Stay: 1 day(s)    Current Patient Status: Inpatient   Certification Statement: The patient will continue to require additional inpatient hospital stay due to Not ready    Discharge Plan / Estimated Discharge Date:  Based on course in next 1-2 days    Code Status: Level 1 - Full Code      Subjective:   Still has chest discomfort pressure-like, more so when lying down, better when sitting up  Denies any fevers or cough, worse on taking deep breaths  Denies any abdominal pain nausea vomiting    Objective:     Vitals:   Temp (24hrs), Av °F (37 2 °C), Min:98 2 °F (36 8 °C), Max:100 °F (37 8 °C)    HR:  [80-95] 80  Resp:  [16-21] 21  BP: ()/(52-68) 100/57  SpO2:  [95 %-98 %] 95 %  Body mass index is 38 32 kg/m²  Input and Output Summary (last 24 hours):        Intake/Output Summary (Last 24 hours) at 08/10/18 2029  Last data filed at 08/10/18 1601   Gross per 24 hour   Intake              596 ml   Output              725 ml   Net             -129 ml       Physical Exam:     Physical Exam   Constitutional: She is oriented to person, place, and time  She appears well-developed and well-nourished  HENT:   Head: Normocephalic and atraumatic  Eyes: Conjunctivae are normal  No scleral icterus  Cardiovascular: Normal rate, regular rhythm and normal heart sounds  Exam reveals no gallop and no friction rub  No murmur heard  Pulmonary/Chest: Effort normal  No respiratory distress  She has no wheezes  She has rales  Bibasal crackles   Abdominal: Soft  She exhibits no distension  There is no tenderness  There is no rebound  Musculoskeletal: She exhibits no edema  Mild erythema above right ankle   Neurological: She is alert and oriented to person, place, and time  Vitals reviewed  Additional Data:     Labs:      Results from last 7 days  Lab Units 08/10/18  0451   WBC Thousand/uL 12 15*   HEMOGLOBIN g/dL 8 0*   HEMATOCRIT % 25 7*   PLATELETS Thousands/uL 216   NEUTROS PCT % 72   LYMPHS PCT % 12*   MONOS PCT % 13*   EOS PCT % 2       Results from last 7 days  Lab Units 08/10/18  0451 08/09/18  0842   SODIUM mmol/L 137 136   POTASSIUM mmol/L 3 5 3 8   CHLORIDE mmol/L 103 104   CO2 mmol/L 27 27   BUN mg/dL 23 19   CREATININE mg/dL 1 82* 1 45*   CALCIUM mg/dL 8 7 8 9   TOTAL PROTEIN g/dL  --  7 2   BILIRUBIN TOTAL mg/dL  --  0 76   ALK PHOS U/L  --  113   ALT U/L  --  17   AST U/L  --  14   GLUCOSE RANDOM mg/dL 113 119           * I Have Reviewed All Lab Data Listed Above  * Additional Pertinent Lab Tests Reviewed:  All Labs Within Last 24 Hours Reviewed    Imaging:    Imaging Reports Reviewed Today Include:   Imaging Personally Reviewed by Myself Includes:      Recent Cultures (last 7 days):       Results from last 7 days  Lab Units 08/06/18  1621   URINE CULTURE  No Growth <1000 cfu/mL       Last 24 Hours Medication List: Current Facility-Administered Medications:  acetaminophen 650 mg Oral Q6H PRN Duglas Westbrook MD   calcium carbonate 1 tablet Oral BID With Meals Duglas Westbrook MD   cyanocobalamin 100 mcg Oral Daily Duglas Westbrook MD   docusate sodium 100 mg Oral BID Duglas Westbrook MD   gabapentin 100 mg Oral TID Duglas Westbrook MD   hydroxychloroquine 200 mg Oral BID With Meals Duglas Westbrook MD   leucovorin 10 mg Oral Weekly Duglas Westbrook MD   levothyroxine 75 mcg Oral Daily Duglas Westbrook MD   methotrexate 20 mg Oral Weekly Duglas Westbrook MD   metoprolol tartrate 12 5 mg Oral Q12H [address] SANTOSH Perez   multivitamin-minerals 1 tablet Oral Daily Duglas Westbrook MD   nitroglycerin 0 4 mg Sublingual Q5 Min PRN [place] SANTOSH Trinh   pantoprazole 20 mg Oral Early Morning Duglas Westbrook MD   pravastatin 80 mg Oral Daily Duglas Westbrook MD   regadenoson       rivaroxaban 20 mg Oral Daily With Young Delgadillo MD   rOPINIRole 0 5 mg Oral BID Duglas Westbrook MD     Facility-Administered Medications Ordered in Other Encounters:  acetaminophen 650 mg Oral Q6H PRN Wanda Vee PA-C        Today, Patient Was Seen By: Leslie Wagner MD    ** Please Note: This note has been constructed using a voice recognition system   **

## 2018-08-11 ENCOUNTER — APPOINTMENT (INPATIENT)
Dept: NON INVASIVE DIAGNOSTICS | Facility: HOSPITAL | Age: 83
DRG: 313 | End: 2018-08-11
Payer: MEDICARE

## 2018-08-11 ENCOUNTER — APPOINTMENT (INPATIENT)
Dept: RADIOLOGY | Facility: HOSPITAL | Age: 83
DRG: 313 | End: 2018-08-11
Payer: MEDICARE

## 2018-08-11 PROBLEM — I48.91 NEW ONSET A-FIB (HCC): Status: ACTIVE | Noted: 2018-08-11

## 2018-08-11 LAB
ANION GAP SERPL CALCULATED.3IONS-SCNC: 8 MMOL/L (ref 4–13)
BASOPHILS # BLD AUTO: 0.03 THOUSANDS/ΜL (ref 0–0.1)
BASOPHILS NFR BLD AUTO: 0 % (ref 0–1)
BUN SERPL-MCNC: 27 MG/DL (ref 5–25)
CALCIUM SERPL-MCNC: 9 MG/DL (ref 8.3–10.1)
CHLORIDE SERPL-SCNC: 100 MMOL/L (ref 100–108)
CO2 SERPL-SCNC: 27 MMOL/L (ref 21–32)
CREAT SERPL-MCNC: 1.66 MG/DL (ref 0.6–1.3)
EOSINOPHIL # BLD AUTO: 0.35 THOUSAND/ΜL (ref 0–0.61)
EOSINOPHIL NFR BLD AUTO: 3 % (ref 0–6)
ERYTHROCYTE [DISTWIDTH] IN BLOOD BY AUTOMATED COUNT: 16.8 % (ref 11.6–15.1)
GFR SERPL CREATININE-BSD FRML MDRD: 29 ML/MIN/1.73SQ M
GLUCOSE SERPL-MCNC: 125 MG/DL (ref 65–140)
HCT VFR BLD AUTO: 27.8 % (ref 34.8–46.1)
HGB BLD-MCNC: 8.7 G/DL (ref 11.5–15.4)
IMM GRANULOCYTES # BLD AUTO: 0.13 THOUSAND/UL (ref 0–0.2)
IMM GRANULOCYTES NFR BLD AUTO: 1 % (ref 0–2)
L PNEUMO1 AG UR QL IA.RAPID: NEGATIVE
LYMPHOCYTES # BLD AUTO: 1.97 THOUSANDS/ΜL (ref 0.6–4.47)
LYMPHOCYTES NFR BLD AUTO: 14 % (ref 14–44)
MCH RBC QN AUTO: 31.4 PG (ref 26.8–34.3)
MCHC RBC AUTO-ENTMCNC: 31.3 G/DL (ref 31.4–37.4)
MCV RBC AUTO: 100 FL (ref 82–98)
MONOCYTES # BLD AUTO: 1.35 THOUSAND/ΜL (ref 0.17–1.22)
MONOCYTES NFR BLD AUTO: 10 % (ref 4–12)
NEUTROPHILS # BLD AUTO: 9.89 THOUSANDS/ΜL (ref 1.85–7.62)
NEUTS SEG NFR BLD AUTO: 72 % (ref 43–75)
NRBC BLD AUTO-RTO: 0 /100 WBCS
PLATELET # BLD AUTO: 270 THOUSANDS/UL (ref 149–390)
PMV BLD AUTO: 9.5 FL (ref 8.9–12.7)
POTASSIUM SERPL-SCNC: 3.7 MMOL/L (ref 3.5–5.3)
RBC # BLD AUTO: 2.77 MILLION/UL (ref 3.81–5.12)
S PNEUM AG UR QL: NEGATIVE
SODIUM SERPL-SCNC: 135 MMOL/L (ref 136–145)
WBC # BLD AUTO: 13.72 THOUSAND/UL (ref 4.31–10.16)

## 2018-08-11 PROCEDURE — 99232 SBSQ HOSP IP/OBS MODERATE 35: CPT | Performed by: HOSPITALIST

## 2018-08-11 PROCEDURE — 93306 TTE W/DOPPLER COMPLETE: CPT

## 2018-08-11 PROCEDURE — 93306 TTE W/DOPPLER COMPLETE: CPT | Performed by: INTERNAL MEDICINE

## 2018-08-11 PROCEDURE — 87449 NOS EACH ORGANISM AG IA: CPT | Performed by: HOSPITALIST

## 2018-08-11 PROCEDURE — 71250 CT THORAX DX C-: CPT

## 2018-08-11 PROCEDURE — 80048 BASIC METABOLIC PNL TOTAL CA: CPT | Performed by: HOSPITALIST

## 2018-08-11 PROCEDURE — 85025 COMPLETE CBC W/AUTO DIFF WBC: CPT | Performed by: HOSPITALIST

## 2018-08-11 PROCEDURE — 99232 SBSQ HOSP IP/OBS MODERATE 35: CPT | Performed by: INTERNAL MEDICINE

## 2018-08-11 RX ORDER — CEPHALEXIN 250 MG/1
250 CAPSULE ORAL EVERY 8 HOURS SCHEDULED
Status: DISCONTINUED | OUTPATIENT
Start: 2018-08-11 | End: 2018-08-12 | Stop reason: HOSPADM

## 2018-08-11 RX ADMIN — CEPHALEXIN 250 MG: 250 CAPSULE ORAL at 17:55

## 2018-08-11 RX ADMIN — DOCUSATE SODIUM 100 MG: 100 CAPSULE, LIQUID FILLED ORAL at 09:13

## 2018-08-11 RX ADMIN — RIVAROXABAN 20 MG: 20 TABLET, FILM COATED ORAL at 17:18

## 2018-08-11 RX ADMIN — Medication 1 TABLET: at 09:13

## 2018-08-11 RX ADMIN — LEVOTHYROXINE SODIUM 75 MCG: 75 TABLET ORAL at 05:29

## 2018-08-11 RX ADMIN — GABAPENTIN 100 MG: 100 CAPSULE ORAL at 22:56

## 2018-08-11 RX ADMIN — PRAVASTATIN SODIUM 80 MG: 80 TABLET ORAL at 22:56

## 2018-08-11 RX ADMIN — GABAPENTIN 100 MG: 100 CAPSULE ORAL at 17:19

## 2018-08-11 RX ADMIN — ROPINIROLE 0.5 MG: 0.25 TABLET, FILM COATED ORAL at 09:13

## 2018-08-11 RX ADMIN — GABAPENTIN 100 MG: 100 CAPSULE ORAL at 09:13

## 2018-08-11 RX ADMIN — DOCUSATE SODIUM 100 MG: 100 CAPSULE, LIQUID FILLED ORAL at 17:19

## 2018-08-11 RX ADMIN — ROPINIROLE 0.5 MG: 0.25 TABLET, FILM COATED ORAL at 17:18

## 2018-08-11 RX ADMIN — PANTOPRAZOLE SODIUM 20 MG: 20 TABLET, DELAYED RELEASE ORAL at 05:29

## 2018-08-11 RX ADMIN — CALCIUM 1 TABLET: 500 TABLET ORAL at 09:13

## 2018-08-11 RX ADMIN — HYDROXYCHLOROQUINE SULFATE 200 MG: 200 TABLET, FILM COATED ORAL at 17:19

## 2018-08-11 RX ADMIN — METOPROLOL TARTRATE 12.5 MG: 25 TABLET ORAL at 09:12

## 2018-08-11 RX ADMIN — VITAM B12 100 MCG: 100 TAB at 09:13

## 2018-08-11 RX ADMIN — HYDROXYCHLOROQUINE SULFATE 200 MG: 200 TABLET, FILM COATED ORAL at 09:14

## 2018-08-11 RX ADMIN — CALCIUM 1 TABLET: 500 TABLET ORAL at 17:19

## 2018-08-11 NOTE — ASSESSMENT & PLAN NOTE
· No clinical evidence of CHF per cardio, & no significant response to Lasix along with elevation in creatinine  Hold further diuretics today  Underwent nuclear stress test which is negative for ischemia  In AFib on the monitor, however not correlating with her symptoms  · Symptoms worse on lying flat and better on sitting up   2D echocardiogram EF 55% with DD, no pericardial effusion  · With leucocytosis & elevated procalcitonin, perfomed CT chest which was neg for PNA  · Now resolved

## 2018-08-11 NOTE — ASSESSMENT & PLAN NOTE
· Right neprho nephroureterectomy on 4/27/18 for urothelial Ca  · BL Cr 1-1 2  · Went up to 1 8 with IV lasix, now 1 6  · Cont holding lasix per cardio

## 2018-08-11 NOTE — ASSESSMENT & PLAN NOTE
· Recent outpatient urinalysis was negative but office dipstick had WBCs    She was treated as right lower extremity cellulitis and placed on oral Keflex on Monday which was held on admission as cellulitis was better & WBC normal  · Since then WBC going up  · Cellulitis resolved, CT chest neg, no other symptoms  · Will have her complete remaining 5 days of keflex  · Repeat WBC & procal in am  · Send blood cultures if fever spikes

## 2018-08-11 NOTE — PHYSICIAN ADVISOR
Current patient class: Inpatient  The patient is currently on Hospital Day: 2 at [address] [place] [address]      The patient was admitted to the hospital at [id number] on 8/9/18 for the following diagnosis:  Acute on chronic diastolic heart failure (HCC) [I50 33]  Chest pain [R07 9]  Chest pressure [R07 89]       There is documentation in the medical record of an expected length of stay of at least 2 midnights  The patient is therefore expected to satisfy the 2 midnight benchmark and given the 2 midnight presumption is appropriate for INPATIENT ADMISSION  Given this expectation of a satisfying stay, CMS instructs us that the patient is most often appropriate for inpatient admission under part A provided medical necessity is documented in the chart  After review of the relevant documentation, labs, vital signs and test results, the patient is appropriate for INPATIENT ADMISSION  Admission to the hospital as an inpatient is a complex decision making process which requires the practitioner to consider the patients presenting complaint, history and physical examination and all relevant testing  With this in mind, in this case, the patient was deemed appropriate for INPATIENT ADMISSION  After review of the documentation and testing available at the time of the admission I concur with this clinical determination of medical necessity  Rationale is as follows: The patient is a 81 yrs old Female who presented to the ED at 8/9/2018  8:18 AM with a chief complaint of Chest Pain (Chest pain started yesterday  Pt states that it happens when she breaths in and feels like alot of pressure on the center chest)     Given the need for further hospitalization, and along with the documentation of medical necessity present in the chart, the patient is appropriate for inpatient admission  The patient is expected to satisfy the 2 midnight benchmark, and will require further acute medical care   The patient does have comorbid conditions which increases the risk for significant adverse outcome  Given this the patient is appropriate for inpatient admission        The patients vitals on arrival were ED Triage Vitals   Temperature Pulse Respirations Blood Pressure SpO2   08/09/18 0912 08/09/18 0845 08/09/18 0845 08/09/18 0845 08/09/18 0845   98 2 °F (36 8 °C) 78 20 (!) 172/69 97 %      Temp Source Heart Rate Source Patient Position - Orthostatic VS BP Location FiO2 (%)   08/09/18 0912 08/09/18 0930 08/09/18 1012 08/09/18 1012 --   Oral Monitor Lying Right arm       Pain Score       08/09/18 0845       6           Past Medical History:   Diagnosis Date    Anemia     Arthritis     rheumatoid    Benign essential hypertension     Cataract     Chronic cystitis     CPAP (continuous positive airway pressure) dependence     Diverticulitis of colon     Early satiety     GERD (gastroesophageal reflux disease)     Gout     Hearing aid worn     bilateral    Hearing loss     Hemorrhoids     Hiatal hernia     History of colonic polyps     Hyperlipidemia     Hypothyroidism     Left breast mass     Microhematuria     Migraine     occular    Neuropathy     lower and upper extermities    Overactive bladder     Pneumonia of left lower lobe due to infectious organism (HCC)     RA (rheumatoid arthritis) ([address] )     Sleep apnea     uses cpap    Stroke ([address] )     5/2017    Type 2 diabetes mellitus (HCC)     Urinary frequency     Urinary urgency     Urothelial cancer ([address] ) 04/23/2018    Use of cane as ambulatory aid      Past Surgical History:   Procedure Laterality Date    APPENDECTOMY      ARTHROSCOPY WRIST Right     with release of transverse carpal ligament     BLADDER SURGERY      BLADDER SURGERY      BREAST SURGERY      left breast    BUNIONECTOMY Bilateral     CATARACT EXTRACTION Bilateral     CHOLECYSTECTOMY      COLONOSCOPY      CYSTOSCOPY      EGD AND COLONOSCOPY N/A 12/20/2017 Procedure: EGD AND COLONOSCOPY;  Surgeon: Sariah Keller MD;  Location: [facility]; Service: Gastroenterology    ESOPHAGOGASTRODUODENOSCOPY      diagnostic    FOREARM SURGERY Right     fracture repair    HYSTERECTOMY      JOINT REPLACEMENT      KNEE ARTHROSCOPY Left     KNEE SURGERY Left     meniscus tear    NOSE SURGERY      AZ CYSTO/URETERO W/LITHOTRIPSY &INDWELL STENT INSRT Right 2/28/2018    Procedure: CYSTOSCOPY RIGHT URETEROSCOPY, RIGHT RETROGRADE PYELOGRAM AND INSERTION  RIGHT STENT URETERAL, RIGHT RENAL PELVIC WASHING FOR CYTOLOGY;  Surgeon: Leslee Broussard MD;  [facility];  Service: Urology    AZ NEPHRECTOMY, W/PART   URETECTOMY Right 4/23/2018    Procedure: ROBATIC ASSISTED NEPHRO-URETERECTOMY WITH BLADDER CUFF EXCISION;  Surgeon: Reian Herrera MD;  [facility];  Service: Urology    REPLACEMENT TOTAL KNEE BILATERAL Bilateral     URETEROSCOPY Right 3/20/2018    Procedure: CYSTOSCOPY, RETROGRADE PYELOGRAM, URETEROSCOPY, BIOPSY, BRUSH, FULGURATION, STENT PLACEMENT, BLADDER BIOPSY WITH FULGURATION;  Surgeon: Reina Herrera MD;  [facility];  Service: Urology           Consults have been placed to:   IP CONSULT TO CARDIOLOGY    Vitals:    08/10/18 1407 08/10/18 1500 08/10/18 1950 08/10/18 2200   BP: 116/63 100/57 119/60    BP Location: Right arm Right arm Left arm    Pulse: 92 80 85    Resp: 18 21 18    Temp:  98 8 °F (37 1 °C) 98 2 °F (36 8 °C)    TempSrc:  Oral Oral    SpO2: 96% 95% 94% 94%   Weight:       Height:           Most recent labs:    Recent Labs      08/09/18   0842   08/09/18   2328  08/10/18   0451   WBC  7 54   --   12 32*  12 15*   HGB  8 4*   --   8 1*  8 0*   HCT  25 8*   --   25 0*  25 7*   PLT  215   --   200  216   K  3 8   --    --   3 5   NA  136   --    --   137   CALCIUM  8 9   --    --   8 7   BUN  19   --    --   23   CREATININE  1 45*   --    --   1 82*   TROPONINI  <0 02   < >  <0 02   --    AST  14   --    --    --    ALT  17   -- --    --    ALKPHOS  113   --    --    --    BILITOT  0 76   --    --    --     < > = values in this interval not displayed         Scheduled Meds:  Current Facility-Administered Medications:  acetaminophen 650 mg Oral Q6H PRN Derrick Fothergill, MD   calcium carbonate 1 tablet Oral BID With Meals Derrick Fothergill, MD   cyanocobalamin 100 mcg Oral Daily Derrick Fothergill, MD   docusate sodium 100 mg Oral BID Derrick Fothergill, MD   gabapentin 100 mg Oral TID Derrick Fothergill, MD   hydroxychloroquine 200 mg Oral BID With Meals Derrick Fothergill, MD   leucovorin 10 mg Oral Weekly Derrick Fothergill, MD   levothyroxine 75 mcg Oral Daily Derrick Fothergill, MD   methotrexate 20 mg Oral Weekly Derrick Fothergill, MD   metoprolol tartrate 12 5 mg Oral Q12H [facility] SANTOSH Landrum   multivitamin-minerals 1 tablet Oral Daily Derrick Fothergill, MD   nitroglycerin 0 4 mg Sublingual Q5 Min PRN SANTOSH Rodriguez   pantoprazole 20 mg Oral Early Morning Derrick Fothergill, MD   pravastatin 80 mg Oral Daily Derrick Fothergill, MD   regadenoson       rivaroxaban 20 mg Oral Daily With Kristine Livingston MD   rOPINIRole 0 5 mg Oral BID Derrick Fothergill, MD     Facility-Administered Medications Ordered in Other Encounters:  acetaminophen 650 mg Oral Q6H PRN Lam Jefferson PA-C     Continuous Infusions:   PRN Meds:   acetaminophen    nitroglycerin    Surgical procedures (if appropriate):

## 2018-08-11 NOTE — PLAN OF CARE
CARDIOVASCULAR - ADULT     Maintains optimal cardiac output and hemodynamic stability Progressing     Absence of cardiac dysrhythmias or at baseline rhythm Progressing        DISCHARGE PLANNING - CARE MANAGEMENT     Discharge to post-acute care or home with appropriate resources Progressing        INFECTION - ADULT     Absence or prevention of progression during hospitalization Progressing     Absence of fever/infection during neutropenic period Progressing        Knowledge Deficit     Patient/family/caregiver demonstrates understanding of disease process, treatment plan, medications, and discharge instructions Progressing        Nutrition/Hydration-ADULT     Nutrient/Hydration intake appropriate for improving, restoring or maintaining nutritional needs Progressing        PAIN - ADULT     Verbalizes/displays adequate comfort level or baseline comfort level Progressing        Potential for Falls     Patient will remain free of falls Progressing        SAFETY ADULT     Maintain or return to baseline ADL function Progressing     Maintain or return mobility status to optimal level Progressing

## 2018-08-11 NOTE — ASSESSMENT & PLAN NOTE
· Resolved  Was briefly on nitroglycerin drip  Now off it    · Home losartan held to start metoprolol but since BP low holding both

## 2018-08-11 NOTE — PROGRESS NOTES
Cardiology Progress Note - Celestine Mitchell 81 y o  female MRN: [id number]    Unit/Bed#: [facility] 731-01 Encounter: [id number]      Assessment:  Principal Problem:    Chest pain - appears to be noncardiac, has resolved  Active Problems:    New onset a-fib ([facility] [address] ) - heart rates controlled despite no AV shelli blocker therapy  Metoprolol empirically started, but being withheld due to hypotension    Sleep apnea    Obesity    BRENNEN (acute kidney injury) ([facility] [address] ) - following IV diuresis    History of nephroureterectomy    Hypertensive urgency    Leucocytosis    Pain and swelling of lower extremity      Plan:    Patient was initially felt to have acute heart failure, did not appear volume overloaded to our service, who recommended against diuresis, she did developed mild acute kidney injury on after diuresis and diuretics have been withheld  Stress test did not suggest any ischemia    Echocardiogram showed diastolic dysfunction, but normal LV function    Considering AFib was rate controlled without AV shelli blocker therapy, would discontinue metoprolol at this point  With new onset AFib, she is already on Xarelto for a prior history of stroke in therefore anticoagulated appropriately    With ongoing episodes of hypotension, may continue to require inpatient care, plus minus treatment of potential pneumonia by CT scan, awaiting official radiology read    Subjective:   Patient seen and examined  No further chest pain  Discussed with [facility] Medicine regarding leukocytosis, noncontrast CT chest suggested possibly small basilar ground-glass opacity, may be atypical pneumonia  New onset AFib yesterday, heart rates were controlled despite no AV shelli blockers, currently intermittently being withheld because of hypotension    Meds/Allergies   Allergies   Allergen Reactions    Acetazolamide Other (See Comments)     Other reaction(s): Unknown Allergic Reaction    Aspirin      Other reaction(s):  Other (See Comments)  High Dose ASA-stomach ache, rachel  ASA 82 m    Atorvastatin      Other reaction(s): Muscle Pain, Myalgia    Azithromycin     Nitrofurantoin Hives     HIVES * pt denies  Other reaction(s): Unknown Allergic Reaction  Other reaction(s): Other (See Comments)  HIVES * pt denies    Other Other (See Comments)     Adhesive tape : red and itching  Other reaction(s):  Other (See Comments)  red and itching    Shellfish-Derived Products Other (See Comments)     Patient got Gout following eating shell fish    Sulfa Antibiotics Other (See Comments)     unknown    Tramadol Diarrhea and Vomiting       Current Facility-Administered Medications:     acetaminophen (TYLENOL) tablet 650 mg, 650 mg, Oral, Q6H PRN, Loreta Figueroa MD, 650 mg at 08/09/18 2015    calcium carbonate (OYSTER SHELL,OSCAL) 500 mg tablet 1 tablet, 1 tablet, Oral, BID With Meals, Loreta Figueroa MD, 1 tablet at 08/11/18 0913    cyanocobalamin (VITAMIN B-12) tablet 100 mcg, 100 mcg, Oral, Daily, Loreta Figueroa MD, 100 mcg at 08/11/18 0913    docusate sodium (COLACE) capsule 100 mg, 100 mg, Oral, BID, Loreta Figueroa MD, 100 mg at 08/11/18 0913    gabapentin (NEURONTIN) capsule 100 mg, 100 mg, Oral, TID, Loreta Figueroa MD, 100 mg at 08/11/18 0913    hydroxychloroquine (PLAQUENIL) tablet 200 mg, 200 mg, Oral, BID With Meals, Loreta Figueroa MD, 200 mg at 08/11/18 0914    leucovorin (WELLCOVORIN) tablet 10 mg, 10 mg, Oral, Weekly, Loreta Figueroa MD, 10 mg at 08/10/18 1823    levothyroxine tablet 75 mcg, 75 mcg, Oral, Daily, Loreta Figueroa MD, 75 mcg at 08/11/18 0529    methotrexate tablet 20 mg, 20 mg, Oral, Weekly, Loreta Figueroa MD, 20 mg at 08/10/18 1823    metoprolol tartrate (LOPRESSOR) partial tablet 12 5 mg, 12 5 mg, Oral, Q12H [address], Caprice Karvonemalu Persaud, AVISNP, 12 5 mg at 08/11/18 0912    multivitamin-minerals (CENTRUM) tablet 1 tablet, 1 tablet, Oral, Daily, Annitta Keys, MD, 1 tablet at 08/11/18 0913    nitroglycerin (NITROSTAT) SL tablet 0 4 mg, 0 4 mg, Sublingual, Q5 Min PRN, SANTOSH Maki    pantoprazole (PROTONIX) EC tablet 20 mg, 20 mg, Oral, Early Morning, Min Beltran MD, 20 mg at 08/11/18 0529    pravastatin (PRAVACHOL) tablet 80 mg, 80 mg, Oral, Daily, Min Beltran MD, 80 mg at 08/10/18 2144    rivaroxaban (XARELTO) tablet 20 mg, 20 mg, Oral, Daily With Iris Burgos MD, 20 mg at 08/10/18 1734    rOPINIRole (REQUIP) tablet 0 5 mg, 0 5 mg, Oral, BID, Min Beltran MD, 0 5 mg at 08/11/18 0913    Facility-Administered Medications Ordered in Other Encounters:     acetaminophen (TYLENOL) tablet 650 mg, 650 mg, Oral, Q6H PRN, Fly Jefferson PA-C    Objective   Vitals: Blood pressure 94/52, pulse 82, temperature 97 6 °F (36 4 °C), temperature source Oral, resp  rate 18, height 5' 1" (1 549 m), weight 92 kg (202 lb 13 2 oz), SpO2 97 %  , Body mass index is 38 32 kg/m² , Orthostatic Blood Pressures      Most Recent Value   Blood Pressure  94/52 filed at 08/11/2018 1140   Patient Position - Orthostatic VS  Sitting filed at 08/11/2018 1369        Systolic (38LDE), WCP:997 , Min:82 , RRR:042     Diastolic (23VLA), GEV:41, Min:52, Max:76      Intake/Output Summary (Last 24 hours) at 08/11/18 1504  Last data filed at 08/11/18 0490   Gross per 24 hour   Intake              300 ml   Output              975 ml   Net             -675 ml     Weight (last 2 days)     Date/Time   Weight    08/10/18 0600  92 (202 82)    08/09/18 1530  94 3 (207 89)    08/09/18 1009  95 7 (211)            Telemetry shows atrial fibrillation with controlled ventricular response    Physical Exam:    GEN: Lilliam Ramey appears well, alert and oriented x 3, pleasant and cooperative   HEENT: pupils equal, round, and reactive to light; extraocular muscles intact  NECK: supple, no carotid bruits, no JVD or HJR  HEART: normal rate, irregular rhythm, normal S1 and S2, no murmurs, clicks, gallops or rubs   LUNGS: clear to auscultation bilaterally; no wheezes, rales, or rhonchi   ABDOMEN: normal bowel sounds, soft, no tenderness, no distention  EXTREMITIES: peripheral pulses normal; no clubbing, cyanosis, or edema  NEURO: no focal findings   SKIN: warm and well perfused, no suspicious lesions on exposed skin    Laboratory Results:    Results from last 7 days  Lab Units 08/09/18  2328 08/09/18  1958 08/09/18  1650   TROPONIN I ng/mL <0 02 <0 02 <0 02       CBC with diff:   Results from last 7 days  Lab Units 08/11/18  0442 08/10/18  0451 08/09/18  2328 08/09/18  0842 08/08/18  1735   WBC Thousand/uL 13 72* 12 15* 12 32* 7 54 8 75   HEMOGLOBIN g/dL 8 7* 8 0* 8 1* 8 4* 8 2*   HEMATOCRIT % 27 8* 25 7* 25 0* 25 8* 25 2*   MCV fL 100* 100* 98 98 98   PLATELETS Thousands/uL 270 216 200 215 227   MCH pg 31 4 31 1 31 8 31 8 31 8   MCHC g/dL 31 3* 31 1* 32 4 32 6 32 5   RDW % 16 8* 16 9* 16 5* 16 3* 16 3*   MPV fL 9 5 9 8 9 3 9 1 9 4   NRBC AUTO /100 WBCs 0 0 0 0 0         CMP:  Results from last 7 days  Lab Units 08/10/18  0451 08/09/18  0842 08/08/18  1735   SODIUM mmol/L 137 136 140   POTASSIUM mmol/L 3 5 3 8 4 0   CHLORIDE mmol/L 103 104 103   CO2 mmol/L 27 27 27   ANION GAP mmol/L 7 5 10   BUN mg/dL 23 19 20   CREATININE mg/dL 1 82* 1 45* 1 41*   GLUCOSE RANDOM mg/dL 113 119  --    CALCIUM mg/dL 8 7 8 9 8 7   AST U/L  --  14  --    ALT U/L  --  17  --    ALK PHOS U/L  --  113  --    TOTAL PROTEIN g/dL  --  7 2  --    BILIRUBIN TOTAL mg/dL  --  0 76  --    EGFR ml/min/1 73sq m 26 34 35         BMP:  Results from last 7 days  Lab Units 08/10/18  0451 08/09/18  0842  08/08/18  1735   SODIUM mmol/L 137 136  --  140   POTASSIUM mmol/L 3 5 3 8  --  4 0   CHLORIDE mmol/L 103 104  --  103   CO2 mmol/L 27 27  --  27   BUN mg/dL 23 19  --  20   CREATININE mg/dL 1 82* 1 45*  --  1 41*   GLUCOSE RANDOM mg/dL 113 119  < >  --    CALCIUM mg/dL 8 7 8 9  --  8 7   < > = values in this interval not displayed  BNP:  Invalid input(s): NTPROBNP    Magnesium:   Results from last 7 days  Lab Units 08/10/18  0451   MAGNESIUM mg/dL 2 4       Coags:       TSH: @LABRCNTIP(TSH:1)    Hemoglobin A1C )      Lipid Profile:       Cardiac testing:   Results for orders placed during the hospital encounter of 18   Echo complete with contrast if indicated    Narrative [address]  [place], [address] [facility]  [phone]    Transthoracic Echocardiogram  2D, M-mode, Doppler, and Color Doppler    Study date:  11-Aug-2018    Patient: Brant Jacobs  MR number: [id number]  Account number: [de-identified]  : 1935  Age: 81 years  Gender: Female  Status: Inpatient  Location: Bedside  Height: 61 in  Weight: 201 5 lb  BP: 114/ 76 mmHg    Indications: CP/Pressure/Tightness    Diagnoses: R07 9 - Chest pain, unspecified    Sonographer:  LILIAM Turner  Primary Physician:  Angeles Diop MD  Referring Physician:  Geovany Verdin MD  Group:  [facility]  Interpreting Physician:  Elaina Stoll MD    SUMMARY    LEFT VENTRICLE:  Systolic function was normal  Ejection fraction was estimated to be 55 %  There were no regional wall motion abnormalities  LEFT ATRIUM:  The atrium was mildly dilated  MITRAL VALVE:  There was marked annular calcification  There was mild regurgitation  TRICUSPID VALVE:  There was mild regurgitation  Pulmonary artery systolic pressure was within the normal range  IVC, HEPATIC VEINS:  The inferior vena cava was dilated  Respirophasic changes were blunted (less than 50% variation)  HISTORY: PRIOR HISTORY: CP; CVA; PAD; CHF; HTN; HLD; NICK; Dm2; Hypothyroid; Former smoker; Obesity; Anemia    PROCEDURE: The procedure was performed at the bedside  This was a routine study  The transthoracic approach was used  The study included complete 2D imaging, M-mode, complete spectral Doppler, and color Doppler   Echocardiographic views  were limited due to decreased penetration and lung interference  This was a technically difficult study  LEFT VENTRICLE: Size was normal  Systolic function was normal  Ejection fraction was estimated to be 55 %  There were no regional wall motion abnormalities  Wall thickness was normal  DOPPLER: Transmitral flow pattern: atrial fibrillation  The study was not technically sufficient to allow evaluation of LV diastolic function  RIGHT VENTRICLE: The size was normal  Systolic function was normal  Wall thickness was normal     LEFT ATRIUM: The atrium was mildly dilated  RIGHT ATRIUM: Size was normal     MITRAL VALVE: There was marked annular calcification  Valve structure was normal  There was normal leaflet separation  DOPPLER: The transmitral velocity was within the normal range  There was no evidence for stenosis  There was mild  regurgitation  AORTIC VALVE: The valve was trileaflet  Leaflets exhibited normal thickness and normal cuspal separation  DOPPLER: Transaortic velocity was within the normal range  There was no evidence for stenosis  There was no significant  regurgitation  TRICUSPID VALVE: The valve structure was normal  There was normal leaflet separation  DOPPLER: The transtricuspid velocity was within the normal range  There was no evidence for stenosis  There was mild regurgitation  Pulmonary artery  systolic pressure was within the normal range  PULMONIC VALVE: Leaflets exhibited normal thickness, no calcification, and normal cuspal separation  DOPPLER: The transpulmonic velocity was within the normal range  There was no significant regurgitation  PERICARDIUM: There was no pericardial effusion  The pericardium was normal in appearance  AORTA: The root exhibited normal size  SYSTEMIC VEINS: IVC: The inferior vena cava was dilated  Respirophasic changes were blunted (less than 50% variation)      SYSTEM MEASUREMENT TABLES    2D  %FS: 33 47 %  Ao Diam: 2 32 cm  EDV(Teich): 37 29 ml  EF Biplane: 58 5 %  EF(Teich): 63 73 %  ESV(Teich): 13 53 ml  IVSd: 1 1 cm  LA Area: 24 48 cm2  LA Diam: 4 45 cm  LVEDV MOD A2C: 52 7 ml  LVEDV MOD A4C: 87 23 ml  LVEDV MOD BP: 69 84 ml  LVEF MOD A2C: 71 25 %  LVEF MOD A4C: 42 69 %  LVESV MOD A2C: 15 15 ml  LVESV MOD A4C: 49 98 ml  LVESV MOD BP: 28 98 ml  LVIDd: 3 08 cm  LVIDs: 2 05 cm  LVLd A2C: 6 42 cm  LVLd A4C: 6 97 cm  LVLs A2C: 5 75 cm  LVLs A4C: 6 74 cm  LVPWd: 1 2 cm  RA Area: 12 8 cm2  RVIDd: 3 04 cm  SV MOD A2C: 37 55 ml  SV MOD A4C: 37 24 ml  SV(Teich): 23 77 ml    CW  RAP: 10 mmHg  TR Vmax: 2 59 m/s  TR maxP 86 mmHg    MM  TAPSE: 1 69 cm    PW  E': 0 08 m/s  E/E': 15 96  MV A Rashawn: 0 42 m/s  MV Dec Candler: 5 3 m/s2  MV DecT: 237 89 ms  MV E Rashawn: 1 26 m/s  MV E/A Ratio: 2 98  MV PHT: 68 99 ms  MVA By PHT: 3 19 cm2  RVSP: 36 86 mmHg    Inters\[facility]\"" Commission Accredited Echocardiography Laboratory    Prepared and electronically signed by    Bruna Zepeda MD  Signed 11-Aug-2018 13:29:31       No results found for this or any previous visit  No results found for this or any previous visit  No results found for this or any previous visit  No results found for this or any previous visit  Results for orders placed during the hospital encounter of 18   NM Myocardial Perfusion Spect (Pharmacological Induced Stress and/or Rest)    Narrative [facility]  [address]  [phone]    Stress Gated SPECT Myocardial Perfusion Imaging after Regadenoson    ! !ERROR!! End tag 'TR' does not match the start tag 'TD'   Error at or before /TR    Allergies: ACETAZOLAMIDE, ASPIRIN, ATORVASTATIN, AZITHROMYCIN, NITROFURANTOIN, OTHER, SHELLFISH-DERIVED PRODUCTS, SULFA ANTIBIOTICS, TRAMADOL    Diagnosis: R07 89 - Other chest pain    Interpreting Physician:  Drake Atkinson MD  Referring Physician:  Camelia Cadena MD  Primary Physician:  Azam Sahni MD  RN:  Ludwin Awan RN  Group: [facility]  Report Prepared by[de-identified]  Varinder Ruiz RN  RN:  Mario Alberto Leslie RN    INDICATIONS: Detection of CAD    HISTORY: The patient is a 81year old  female  Chest pain status: recent onset chest pain  Other symptoms: dyspnea  Coronary artery disease risk factors: dyslipidemia, hypertension, and smoking  Cardiovascular history: congestive  heart failure and stroke  Medications: a diuretic, a lipid lowering agent, and an antihypertensive agent  PHYSICAL EXAM: Baseline physical exam screening: normal and no wheezes audible  REST ECG: Atrial fibrillation  PROCEDURE: The study was performed in the stress lab  A regadenoson infusion pharmacologic stress test was performed  Gated SPECT myocardial perfusion imaging was performed during stress  Systolic blood pressure was 120 mmHg, at the start  of the study  Diastolic blood pressure was 60 mmHg, at the start of the study  The heart rate was 88 bpm, at the start of the study  IV double checked  Regadenoson protocol:  HR bpm SBP mmHg DBP mmHg Symptoms  Baseline 88 120 60 none  Immediate 115 131 62 nausea  1 min 110 158 79 none  2 min 107 129 48 none  No medications or fluids given  The patient also performed low level exercise with leg lifts  STRESS SUMMARY: Duration of pharmacologic stress was 3 min and 0 sec  Functional capacity could not be adequately assessed  Maximal heart rate during stress was 115 bpm  The rate-pressure product for the peak heart rate and blood pressure  was 53331  There was no chest pain during stress  The stress test was terminated due to protocol completion  Pre oxygen saturation: 97 %  Peak oxygen saturation: 98 %  The stress ECG was negative for ischemia and normal  Arrhythmia during  stress: atrial fibrillation      ISOTOPE ADMINISTRATION:  Resting isotope administration Stress isotope administration  Agent Tetrofosmin Tetrofosmin  Dose 9 5 mCi 31 mCi  Date 08/10/2018 08/10/2018  Injection time 08:42 12:10  Imaging time 09:28 13:15  Injection-image interval 46 min 65 min    The radiopharmaceutical was injected at the peak effect of pharmacologic stress  MYOCARDIAL PERFUSION IMAGING:  The image quality was excellent  Left ventricular size was normal  The TID ratio was 1 09  PERFUSION DEFECTS:  -  There were no perfusion defects  GATED SPECT:  The calculated left ventricular ejection fraction was 73 %  Left ventricular ejection fraction was within normal limits by visual estimate  Ejection fraction was overestimated due to small heart size  There was no left ventricular regional  abnormality  SUMMARY:  -  Stress results: There was no chest pain during stress  -  ECG conclusions: The stress ECG was negative for ischemia and normal  Arrhythmia during stress: atrial fibrillation   -  Perfusion imaging: There were no perfusion defects   -  Gated SPECT: The calculated left ventricular ejection fraction was 73 %  Left ventricular ejection fraction was within normal limits by visual estimate  There was no left ventricular regional abnormality  IMPRESSIONS: Normal study after pharmacologic vasodilation  Myocardial perfusion imaging was normal at rest and with stress  Left ventricular systolic function was normal     Prepared and signed by    Dimitrios Lacy MD  Signed 08/10/2018 14:19:42               Kalie Sethi MD    Portions of the record may have been created with voice recognition software   Occasional wrong word or "sound a like" substitutions may have occurred due to the inherent limitations of voice recognition software   Read the chart carefully and recognize, using context, where substitutions have occurred

## 2018-08-12 ENCOUNTER — APPOINTMENT (INPATIENT)
Dept: NON INVASIVE DIAGNOSTICS | Facility: HOSPITAL | Age: 83
DRG: 313 | End: 2018-08-12
Payer: MEDICARE

## 2018-08-12 VITALS
SYSTOLIC BLOOD PRESSURE: 112 MMHG | TEMPERATURE: 98.2 F | BODY MASS INDEX: 38.09 KG/M2 | WEIGHT: 201.72 LBS | HEART RATE: 77 BPM | HEIGHT: 61 IN | RESPIRATION RATE: 18 BRPM | DIASTOLIC BLOOD PRESSURE: 66 MMHG | OXYGEN SATURATION: 97 %

## 2018-08-12 PROBLEM — N18.30 CKD (CHRONIC KIDNEY DISEASE) STAGE 3, GFR 30-59 ML/MIN (HCC): Status: ACTIVE | Noted: 2018-08-12

## 2018-08-12 LAB
ANION GAP SERPL CALCULATED.3IONS-SCNC: 7 MMOL/L (ref 4–13)
ANION GAP SERPL CALCULATED.3IONS-SCNC: 9 MMOL/L (ref 4–13)
BASOPHILS # BLD AUTO: 0.02 THOUSANDS/ΜL (ref 0–0.1)
BASOPHILS NFR BLD AUTO: 0 % (ref 0–1)
BUN SERPL-MCNC: 27 MG/DL (ref 5–25)
BUN SERPL-MCNC: 28 MG/DL (ref 5–25)
CALCIUM SERPL-MCNC: 8.5 MG/DL (ref 8.3–10.1)
CALCIUM SERPL-MCNC: 8.9 MG/DL (ref 8.3–10.1)
CHLORIDE SERPL-SCNC: 102 MMOL/L (ref 100–108)
CHLORIDE SERPL-SCNC: 103 MMOL/L (ref 100–108)
CO2 SERPL-SCNC: 25 MMOL/L (ref 21–32)
CO2 SERPL-SCNC: 27 MMOL/L (ref 21–32)
CREAT SERPL-MCNC: 1.56 MG/DL (ref 0.6–1.3)
CREAT SERPL-MCNC: 1.58 MG/DL (ref 0.6–1.3)
EOSINOPHIL # BLD AUTO: 0.3 THOUSAND/ΜL (ref 0–0.61)
EOSINOPHIL NFR BLD AUTO: 4 % (ref 0–6)
ERYTHROCYTE [DISTWIDTH] IN BLOOD BY AUTOMATED COUNT: 16.3 % (ref 11.6–15.1)
GFR SERPL CREATININE-BSD FRML MDRD: 30 ML/MIN/1.73SQ M
GFR SERPL CREATININE-BSD FRML MDRD: 31 ML/MIN/1.73SQ M
GLUCOSE SERPL-MCNC: 122 MG/DL (ref 65–140)
GLUCOSE SERPL-MCNC: 83 MG/DL (ref 65–140)
HCT VFR BLD AUTO: 26.7 % (ref 34.8–46.1)
HGB BLD-MCNC: 8.6 G/DL (ref 11.5–15.4)
IMM GRANULOCYTES # BLD AUTO: 0.05 THOUSAND/UL (ref 0–0.2)
IMM GRANULOCYTES NFR BLD AUTO: 1 % (ref 0–2)
LYMPHOCYTES # BLD AUTO: 1.32 THOUSANDS/ΜL (ref 0.6–4.47)
LYMPHOCYTES NFR BLD AUTO: 16 % (ref 14–44)
MCH RBC QN AUTO: 31.9 PG (ref 26.8–34.3)
MCHC RBC AUTO-ENTMCNC: 32.2 G/DL (ref 31.4–37.4)
MCV RBC AUTO: 99 FL (ref 82–98)
MONOCYTES # BLD AUTO: 0.53 THOUSAND/ΜL (ref 0.17–1.22)
MONOCYTES NFR BLD AUTO: 7 % (ref 4–12)
NEUTROPHILS # BLD AUTO: 5.83 THOUSANDS/ΜL (ref 1.85–7.62)
NEUTS SEG NFR BLD AUTO: 72 % (ref 43–75)
NRBC BLD AUTO-RTO: 0 /100 WBCS
PLATELET # BLD AUTO: 260 THOUSANDS/UL (ref 149–390)
PMV BLD AUTO: 8.9 FL (ref 8.9–12.7)
POTASSIUM SERPL-SCNC: 3.8 MMOL/L (ref 3.5–5.3)
POTASSIUM SERPL-SCNC: 3.9 MMOL/L (ref 3.5–5.3)
PROCALCITONIN SERPL-MCNC: 0.53 NG/ML
RBC # BLD AUTO: 2.7 MILLION/UL (ref 3.81–5.12)
SODIUM SERPL-SCNC: 136 MMOL/L (ref 136–145)
SODIUM SERPL-SCNC: 137 MMOL/L (ref 136–145)
WBC # BLD AUTO: 8.05 THOUSAND/UL (ref 4.31–10.16)

## 2018-08-12 PROCEDURE — 80048 BASIC METABOLIC PNL TOTAL CA: CPT | Performed by: HOSPITALIST

## 2018-08-12 PROCEDURE — 84145 PROCALCITONIN (PCT): CPT | Performed by: HOSPITALIST

## 2018-08-12 PROCEDURE — 85025 COMPLETE CBC W/AUTO DIFF WBC: CPT | Performed by: HOSPITALIST

## 2018-08-12 PROCEDURE — 93970 EXTREMITY STUDY: CPT

## 2018-08-12 PROCEDURE — 93970 EXTREMITY STUDY: CPT | Performed by: SURGERY

## 2018-08-12 PROCEDURE — G8978 MOBILITY CURRENT STATUS: HCPCS

## 2018-08-12 PROCEDURE — G8979 MOBILITY GOAL STATUS: HCPCS

## 2018-08-12 PROCEDURE — 99239 HOSP IP/OBS DSCHRG MGMT >30: CPT | Performed by: HOSPITALIST

## 2018-08-12 PROCEDURE — 97162 PT EVAL MOD COMPLEX 30 MIN: CPT

## 2018-08-12 RX ORDER — CEPHALEXIN 250 MG/1
250 CAPSULE ORAL EVERY 8 HOURS SCHEDULED
Qty: 12 CAPSULE | Refills: 0 | Status: SHIPPED | OUTPATIENT
Start: 2018-08-12 | End: 2018-08-16

## 2018-08-12 RX ORDER — DOXYCYCLINE 100 MG/1
100 TABLET ORAL 2 TIMES DAILY
Qty: 10 TABLET | Refills: 0 | Status: SHIPPED | OUTPATIENT
Start: 2018-08-12 | End: 2018-08-17

## 2018-08-12 RX ORDER — ALBUTEROL SULFATE 90 UG/1
2 AEROSOL, METERED RESPIRATORY (INHALATION) EVERY 6 HOURS PRN
Qty: 1 INHALER | Refills: 0 | Status: SHIPPED | OUTPATIENT
Start: 2018-08-12 | End: 2018-09-21 | Stop reason: SDUPTHER

## 2018-08-12 RX ADMIN — CALCIUM 1 TABLET: 500 TABLET ORAL at 09:18

## 2018-08-12 RX ADMIN — Medication 1 TABLET: at 09:19

## 2018-08-12 RX ADMIN — PANTOPRAZOLE SODIUM 20 MG: 20 TABLET, DELAYED RELEASE ORAL at 05:44

## 2018-08-12 RX ADMIN — CEPHALEXIN 250 MG: 250 CAPSULE ORAL at 13:37

## 2018-08-12 RX ADMIN — DOCUSATE SODIUM 100 MG: 100 CAPSULE, LIQUID FILLED ORAL at 09:19

## 2018-08-12 RX ADMIN — VITAM B12 100 MCG: 100 TAB at 09:18

## 2018-08-12 RX ADMIN — HYDROXYCHLOROQUINE SULFATE 200 MG: 200 TABLET, FILM COATED ORAL at 09:20

## 2018-08-12 RX ADMIN — LEVOTHYROXINE SODIUM 75 MCG: 75 TABLET ORAL at 05:43

## 2018-08-12 RX ADMIN — GABAPENTIN 100 MG: 100 CAPSULE ORAL at 09:18

## 2018-08-12 RX ADMIN — CEPHALEXIN 250 MG: 250 CAPSULE ORAL at 05:47

## 2018-08-12 RX ADMIN — ROPINIROLE 0.5 MG: 0.25 TABLET, FILM COATED ORAL at 09:19

## 2018-08-12 NOTE — ASSESSMENT & PLAN NOTE
· Recently went to PCP 4 days before admission with chills like symptoms, urine dipstick up was positive hence the put her on Keflex  On full urinalysis the urine was clean  Also at that time she had noticed small amount of swelling in her bilateral lower extremity with redness on her right leg hence they wanted her to take Keflex for possibility of cellulitis as well  At the time of admission her cellulitis looked better hence Keflex was held  Her leukocyte count on admission was normal why she was on Keflex at home  After discontinuing Keflex her white blood cell count started trending up to 12,013 1000 with elevated procalcitonin of 1  No focal symptoms suggestive of infection cellulitis had completely resolved  CT chest negative for pneumonia  Hence we will restart her Keflex to let her finish her course which she was already prescribed before    Will also give her 5 days of azithromycin for possibility of bronchitis

## 2018-08-12 NOTE — DISCHARGE SUMMARY
Discharge- Sabrina Blankenship 1935, 81 y o  female MRN: [id number]    Unit/Bed#: [facility] 731-01 Encounter: [id number]    Primary Care Provider: Tulio Royal MD   Date and time admitted to hospital: 8/9/2018  8:18 AM    Hospital Course:     Sabrina Blankenship is a 81 y o  female patient who originally presented to the hospital on 8/9/2018 due to chest pressure and shortness of breath  Patient reports she noticed chest pressure yesterday, it has been persistent, pressure is described as 8/10 intensity, retrosternal with radiation all over the chest and upper abdomen, unrelated to exertion, no aggravating or relieving factors, denies associated diaphoresis  She also reports worsening shortness of breath especially on lying flat  She denies palpitations presyncope or fatigue      Denies cough chest tightness wheezing  Denies urinary symptoms  Denies fever chills sweats or constitutional symptoms  Denies abdominal pain nausea vomiting or diarrhea , constipation      Patient has visiting nurses, she was evaluated by them yesterday and recommended to present to the ED for symptoms persist or worsen      Patient with history of lower extremity swelling and has received courses of Keflex for right lower extremity cellulitis, she reports improving lower extremity pain and redness    * Chest pain   Assessment & Plan    · Patient presented with chest pain/pressure more so on lying flat and taking deep breaths  He was admitted for further workup, evaluated by Cardiology  Initially due to elevated BNP and lower extremity swelling with a right leg redness, she was treated with IV diuresis suspecting CHF  However upon evaluation by Cardiology did do not suspect acute CHF in the patient and she developed BRENNEN with IV Lasix hence it was discontinued  She underwent nuclear stress test to rule out anginal pain which was negative    Underwent 2D echocardiogram which showed LVEF of 11% with diastolic dysfunction and no pericardial effusion  Given her symptoms and subsequent increase in her leukocyte count along with elevated procalcitonin to 1, CT chest was performed to rule out possibility of pneumonia which showed small bilateral pleural and small pericardial effusions  Linear consolidation on bilateral bases with finding suggestive of atelectasis  Hence no evidence of pneumonia  · Patient recently treated for with Keflex for 2-3 days as outpatient for possible right lower extremity cellulitis  Upon admission since her cellulitis looked much better Keflex was discontinued and  Initial white blood cell count was normal subsequently it started going up  Hence at this time with her about symptoms that could be possibility of underlying bronchitis hence his given prescription for doxycycline for 5 days  It could be component of reactive airway disease, has history of childhood asthma  Has given prescription to use albuterol inhaler as needed  Also advised to follow up with her doctor for sleep apnea and discuss her symptoms to determine if her settings need to be changed        Leucocytosis   Assessment & Plan    · Recently went to PCP 4 days before admission with chills like symptoms, urine dipstick up was positive hence the put her on Keflex  On full urinalysis the urine was clean  Also at that time she had noticed small amount of swelling in her bilateral lower extremity with redness on her right leg hence they wanted her to take Keflex for possibility of cellulitis as well  At the time of admission her cellulitis looked better hence Keflex was held  Her leukocyte count on admission was normal why she was on Keflex at home  After discontinuing Keflex her white blood cell count started trending up to 12,013 1000 with elevated procalcitonin of 1  No focal symptoms suggestive of infection cellulitis had completely resolved  CT chest negative for pneumonia    Hence we will restart her Keflex to let her finish her course which she was already prescribed before  Will also give her 5 days of doxycycline for possibility of bronchitis        CKD (chronic kidney disease) stage 3, GFR 30-59 ml/min   Assessment & Plan    · Since her head renal surgery in April 2018 her creatinine has been between 1 2-1 4        New onset a-fib [facility])   Assessment & Plan    · Found to be in persistent atrial fibrillation on cardiac monitor during her stay here  However it is rate controlled without any rate control medications  attempted to initiate 12 5 mg metoprolol however due to low blood pressure in 80s this was discontinued  Since she is rate controlled without medications cardiology recommends to keep her off any rate control medications at this time  · Already on xarelto with h/o CVA        Pain and swelling of lower extremity   Assessment & Plan    · When she presented to PCP earlier before admission she did have erythema and redness, after admission over next 24-48 hours this had completely resolved  Lower extremity duplex negative for DVT  Patient taking Xarelto regularly        Hypertensive urgency   Assessment & Plan    · Resolved  Was briefly on nitroglycerin drip  Subsequently she started running low with her blood pressure  Her home losartan was discontinued to place her on metoprolol with new diagnosis of AFib however her blood pressure would drop to 80s  Hence metoprolol was also discontinued  24 hours without any medications or blood pressure was still in 110s to 120s  Hence at this time will continue holding her home losartan and have her follow up with her PCP next week    Discussed with Cardiology and since her AFib is rate controlled without any medications we will continue holding beta-blocker as well at this time        History of nephroureterectomy   Assessment & Plan    In April for urothelial Ca        BRENNEN (acute kidney injury) [facility])   Assessment & Plan    · Right neprho nephroureterectomy on 4/27/18 for urothelial Ca  · BL Cr 1 2 - 1 4 since her renal surgery in April 2018  · Went up to 1 8 with IV lasix, now improved to 1 5  Restart home Lasix 20 mg from tomorrow            Evaluated by Physical therapy  Cleared for discharge home    Discharge Summary - [facility]    Patient Information: Marissa Donovan 81 y o  female MRN: [id number]  [facility] Encounter: [id number]    Discharging Physician / Practitioner: Blanca Armendariz MD  PCP: Jo Fried MD  Admission Date: 8/9/2018  Discharge Date: 08/12/18    Disposition:     Home    Reason for Admission:  Chest pain    Discharge Diagnoses:     Principal Problem:    Chest pain  Active Problems:    Leucocytosis    Sleep apnea    Obesity    BRENNEN (acute kidney injury) ([facility] )    History of nephroureterectomy    Hypertensive urgency    Pain and swelling of lower extremity    [facility] onset [facility] ([facility] )    CKD (chronic kidney disease) stage 3, GFR 30-59 ml/min  Resolved Problems:    Acute on chronic diastolic congestive heart failure ([facility] )      Consultations During Hospital Stay:  · Cardiology    Procedures Performed:     · Nuclear stress test which was negative    Significant Findings / Test Results:     · 2D echocardiogram with EF of 74% and diastolic dysfunction  · CT chest: 1  Small bilateral pleural and pericardial effusions  2   Focal linear to angular consolidations in both lower lobes and the left upper lobe have morphology and volume loss suggestive of atelectasis    Incidental Findings:   · None     Test Results Pending at Discharge (will require follow up): · None     Outpatient Tests Requested:  · Repeat BMP on outpatient follow-up next week    Complications:  None        Condition at Discharge: stable     Discharge Day Visit / Exam:     Subjective:  Feels okay  Walked with physical therapy, does developed some chest tightness with ambulation    But overall stable no new complaints    Vitals: Blood Pressure: 112/66 (08/12/18 1130)  Pulse: 77 (08/12/18 1130)  Temperature: 98 2 °F (36 8 °C) (08/12/18 1130)  Temp Source: Oral (08/12/18 1130)  Respirations: 18 (08/12/18 1130)  Height: 5' 1" (154 9 cm) (08/09/18 1530)  Weight - Scale: 91 5 kg (201 lb 11 5 oz) (08/12/18 0600)  SpO2: 97 % (08/12/18 1130)  Exam:   Physical Exam   Constitutional: She is oriented to person, place, and time  She appears well-developed and well-nourished  HENT:   Head: Normocephalic and atraumatic  Eyes: Conjunctivae are normal  No scleral icterus  Cardiovascular: Normal rate, regular rhythm and normal heart sounds  Exam reveals no gallop and no friction rub  No murmur heard  Pulmonary/Chest: Effort normal and breath sounds normal  No respiratory distress  She has no wheezes  She has no rales  Abdominal: Soft  She exhibits no distension  There is no tenderness  Musculoskeletal: She exhibits no edema  Neurological: She is alert and oriented to person, place, and time  Vitals reviewed  Discussion with Family:   at bedside    Discharge instructions/Information to patient and family:   See after visit summary for information provided to patient and family  Provisions for Follow-Up Care:  See after visit summary for information related to follow-up care and any pertinent home health orders  Planned Readmission: no     Discharge Statement:  I spent 45 minutes discharging the patient  This time was spent on the day of discharge  I had direct contact with the patient on the day of discharge  Greater than 50% of the total time was spent examining patient, answering all patient questions, arranging and discussing plan of care with patient as well as directly providing post-discharge instructions  Additional time then spent on discharge activities  Discharge Medications:  See after visit summary for reconciled discharge medications provided to patient and family        ** Please Note: This note has been constructed using a voice recognition system **

## 2018-08-12 NOTE — ASSESSMENT & PLAN NOTE
· When she presented to PCP earlier before admission she did have erythema and redness, after admission over next 24-48 hours this had completely resolved  Lower extremity duplex negative for DVT    Patient taking Xarelto regularly

## 2018-08-12 NOTE — ASSESSMENT & PLAN NOTE
· Resolved  Was briefly on nitroglycerin drip  Subsequently she started running low with her blood pressure  Her home losartan was discontinued to place her on metoprolol with new diagnosis of AFib however her blood pressure would drop to 80s  Hence metoprolol was also discontinued  24 hours without any medications or blood pressure was still in 110s to 120s  Hence at this time will continue holding her home losartan and have her follow up with her PCP next week    Discussed with Cardiology and since her AFib is rate controlled without any medications we will continue holding beta-blocker as well at this time

## 2018-08-12 NOTE — PHYSICAL THERAPY NOTE
Physical Therapy Evaluation    Patient's Name: Fredi Lawson    Admitting Diagnosis  Acute on chronic diastolic heart failure (HCC) [I50 33]  Chest pain [R07 9]  Chest pressure [R07 89]    Problem List  Patient Active Problem List   Diagnosis    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 ([facility] )    Essential hypertension    Rheumatoid arthritis ([facility] )    Diabetes mellitus type 2 in obese ([facility] )    Sleep apnea    Acquired hypothyroidism    Chronic GERD    Acute blood loss anemia    Carpal tunnel syndrome, left    Diverticulosis of colon    Edema    Hematuria    Hypercholesterolemia    Lymphedema    Obesity    Overactive bladder    Peripheral neuropathy    Prediabetes    Primary osteoarthritis of left knee    Restless leg syndrome    Right lumbar radiculopathy    Sensorineural hearing loss    Sensory urge incontinence    Sinus arrhythmia    Chronic bilateral thoracic back pain    Thoracic degenerative disc disease    Urothelial cancer ([facility] )    Lung nodule < 6cm on CT    Pancreatic cyst    Iron deficiency anemia    Anemia    BRENNEN (acute kidney injury) ([facility] )    Hyponatremia    [facility]peripheral artery [facility]) [facility]    Bilateral edema of lower extremity    History of nephroureterectomy    Incisional hernia    Chest pain    Hypertensive urgency    Leucocytosis    Pain and swelling of lower extremity    New onset a-fib [facility])       Past Medical History  Past Medical History:   Diagnosis Date    Anemia     Arthritis     rheumatoid    Benign essential hypertension     Cataract     Chronic cystitis     CPAP (continuous positive airway pressure) dependence     Diverticulitis of colon     Early satiety     GERD (gastroesophageal reflux disease)     Gout     Hearing aid worn     bilateral    Hearing loss     Hemorrhoids     Hiatal hernia     History of colonic polyps     Hyperlipidemia     Hypothyroidism     Left breast mass     Microhematuria     Migraine     occular    Neuropathy     lower and upper extermities    Overactive bladder     Pneumonia of left lower lobe due to infectious organism (HCC)     RA [facility]rheumatoid arthritis) [facility] )     Sleep apnea     uses cpap    Stroke ([facility] )     5/2017    Type 2 diabetes mellitus (HCC)     Urinary frequency     Urinary urgency     Urothelial cancer ([facility] ) 04/23/2018    Use of cane as ambulatory aid        Past Surgical History  Past Surgical History:   Procedure Laterality Date    APPENDECTOMY      ARTHROSCOPY WRIST Right     with release of transverse carpal ligament     BLADDER SURGERY      BLADDER SURGERY      BREAST SURGERY      left breast    BUNIONECTOMY Bilateral     CATARACT EXTRACTION Bilateral     CHOLECYSTECTOMY      COLONOSCOPY      CYSTOSCOPY      EGD AND COLONOSCOPY N/A 12/20/2017    Procedure: EGD AND COLONOSCOPY;  Surgeon: Alida Cash MD;  Location: [facility]; Service: Gastroenterology    ESOPHAGOGASTRODUODENOSCOPY      diagnostic    FOREARM SURGERY Right     fracture repair    HYSTERECTOMY      JOINT REPLACEMENT      KNEE ARTHROSCOPY Left     KNEE SURGERY Left     meniscus tear    NOSE SURGERY      DC CYSTO/URETERO W/LITHOTRIPSY &INDWELL STENT INSRT Right 2/28/2018    Procedure: CYSTOSCOPY RIGHT URETEROSCOPY, RIGHT RETROGRADE PYELOGRAM AND INSERTION  RIGHT STENT URETERAL, RIGHT RENAL PELVIC WASHING FOR CYTOLOGY;  Surgeon: Tony Hernandez MD;  Location: [facility];  Service: Urology    DC NEPHRECTOMY, W/PART   URETECTOMY Right 4/23/2018    Procedure: ROBATIC ASSISTED NEPHRO-URETERECTOMY WITH BLADDER CUFF EXCISION;  Surgeon: Jen Cardoso MD;  [facility];  Service: Urology    REPLACEMENT TOTAL KNEE BILATERAL Bilateral     URETEROSCOPY Right 3/20/2018    Procedure: CYSTOSCOPY, RETROGRADE PYELOGRAM, URETEROSCOPY, BIOPSY, BRUSH, FULGURATION, STENT PLACEMENT, BLADDER BIOPSY WITH FULGURATION;  Surgeon: Jen Cardoso MD;  [facility];  Service: Urology          08/12/18 1130   Note Type   Note type Eval only   Pain Assessment   Pain Assessment 0-10   Pain Score 2   Pain Type Acute pain;Chronic pain   Pain Location Leg   Pain Orientation Bilateral   Patient's Stated Pain Goal No pain   Hospital Pain Intervention(s) Ambulation/increased activity   Response to Interventions unchanged   Home Living   Type of Home House   Additional Comments Resides w/  in 1 story home, 4 RONNY  Indep self care, ambulates w/ RW   reports has rollator for outside the home   has been doing all driving since recent surgery   Prior Function   Level of [place] Independent with ADLs and functional mobility   Falls in the last 6 months 0   Restrictions/Precautions   Weight Bearing Precautions Per Order No   Other Precautions Multiple lines;Telemetry; Fall Risk;Pain   General   Family/Caregiver Present Yes  ()   Cognition   Overall Cognitive Status WFL   Arousal/Participation Responsive   Orientation Level Oriented X4   Memory Within functional limits   Following Commands Follows all commands and directions without difficulty   RLE Assessment   RLE Assessment (strength 4/5)   LLE Assessment   LLE Assessment (strength 4/5)   Transfers   Sit to Stand 5  Supervision   Additional items Assist x 1   Stand to Sit 5  Supervision   Additional items Assist x 1   Ambulation/Elevation   Gait pattern (slow, short step length, some SOB)   Gait Assistance 5  Supervision  (CGA/S)   Additional items Assist x 1   Assistive Device Rolling walker   Distance 200' total, standing rest x 1 p 160' due to SOB   Balance   Static Sitting Normal   Dynamic Sitting Good   Static Standing Fair   Dynamic Standing Fair   Ambulatory Fair -   Endurance Deficit   Endurance Deficit (mild SOB and fatigue)   Activity Tolerance   Activity Tolerance Patient limited by fatigue;Treatment limited secondary to medical complications (Comment)   Nurse Made Aware yes   Assessment Prognosis Good   Problem List Decreased strength;Decreased endurance; Impaired balance;Decreased mobility   Assessment Pt seen for moderate complexity physical therapy evaluation  Pt is an 82 y/o female w/ history/comorbidities of HTN, NICK, nephroureterectomy, who is now admitted w/ chest pain and SOB  Dx include acute on chronic CHF, BRENNEN, new onset a-fib  Continues to undergo w/u  Due to multiple acute medical issues, fall risk, continued deconditioning, note evolving clinical picture  PT consulted to assess mobility, d/c needs  Pt presents w/ decreased functional mob, standing balance, endurance, B LE strength, barriers at home  will benefit from skilled PT to correct for the above problems  recommend d/c home when stable, doubt she will have home therapy needs   Goals   Patient Goals to feel better and go home   STG Expiration Date 08/26/18   Short Term Goal #1 1-2 wks: bed mob and transfers w/ indep, standing balance to good/normal w/ device, ambulate 200-300 ft w/ RW and mod I, increase B LE strength by 1/2 -1 grade, ambulate 4 RONNY w/ S   Plan   Treatment/Interventions Functional transfer training;LE strengthening/ROM; Endurance training; Therapeutic exercise;Patient/family training;Equipment eval/education;Gait training;Bed mobility   PT Frequency 2-3x/wk;5x/wk  (3-5x/wk)   Recommendation   Recommendation (home when stable)   Equipment Recommended Walker   PT - OK to Discharge (when stable to home)   Modified [place] Scale   Modified [place] Scale 4   Barthel Index   Feeding 10   Bathing 0   Grooming Score 5   Dressing Score 5   Bladder Score 10   Bowels Score 10   Toilet Use Score 5   Transfers (Bed/Chair) Score 10   Mobility (Level Surface) Score 10   Stairs Score 0   Barthel Index Score 65       Arnel Camp PT, DPT, CSRS

## 2018-08-12 NOTE — PLAN OF CARE
CARDIOVASCULAR - ADULT     Maintains optimal cardiac output and hemodynamic stability Completed     Absence of cardiac dysrhythmias or at baseline rhythm Completed        DISCHARGE PLANNING - CARE MANAGEMENT     Discharge to post-acute care or home with appropriate resources Completed        INFECTION - ADULT     Absence or prevention of progression during hospitalization Completed     Absence of fever/infection during neutropenic period Completed        Knowledge Deficit     Patient/family/caregiver demonstrates understanding of disease process, treatment plan, medications, and discharge instructions Completed        Nutrition/Hydration-ADULT     Nutrient/Hydration intake appropriate for improving, restoring or maintaining nutritional needs Completed        PAIN - ADULT     Verbalizes/displays adequate comfort level or baseline comfort level Completed        Potential for Falls     Patient will remain free of falls Completed        Prexisting or High Potential for Compromised Skin Integrity     Skin integrity is maintained or improved Completed        SAFETY ADULT     Maintain or return to baseline ADL function Completed     Maintain or return mobility status to optimal level Completed

## 2018-08-12 NOTE — ASSESSMENT & PLAN NOTE
· Right neprho nephroureterectomy on 4/27/18 for urothelial Ca  · BL Cr 1 2 - 1 4 since her renal surgery in April 2018  · Went up to 1 8 with IV lasix, now improved to 1 5    Restart home Lasix 20 mg from tomorrow

## 2018-08-12 NOTE — ASSESSMENT & PLAN NOTE
· Found to be in persistent atrial fibrillation on cardiac monitor during her stay here  However it is rate controlled without any rate control medications  attempted to initiate 12 5 mg metoprolol however due to low blood pressure in 80s this was discontinued    Since she is rate controlled without medications cardiology recommends to keep her off any rate control medications at this time  · Already on xarelto with h/o CVA

## 2018-08-12 NOTE — PLAN OF CARE
Problem: PHYSICAL THERAPY ADULT  Goal: Performs mobility at highest level of function for planned discharge setting  See evaluation for individualized goals  Treatment/Interventions: Functional transfer training, LE strengthening/ROM, Endurance training, Therapeutic exercise, Patient/family training, Equipment eval/education, Gait training, Bed mobility  Equipment Recommended: Sharlene Goldberg       See flowsheet documentation for full assessment, interventions and recommendations  Prognosis: Good  Problem List: Decreased strength, Decreased endurance, Impaired balance, Decreased mobility  Assessment: Pt seen for moderate complexity physical therapy evaluation  Pt is an 82 y/o female w/ history/comorbidities of HTN, NICK, nephroureterectomy, who is now admitted w/ chest pain and SOB  Dx include acute on chronic CHF, BRENNEN, new onset a-fib  Continues to undergo w/u  Due to multiple acute medical issues, fall risk, continued deconditioning, note evolving clinical picture  PT consulted to assess mobility, d/c needs  Pt presents w/ decreased functional mob, standing balance, endurance, B LE strength, barriers at home  will benefit from skilled PT to correct for the above problems  recommend d/c home when stable, doubt she will have home therapy needs        Recommendation:  (home when stable)     PT - OK to Discharge:  (when stable to home)    See flowsheet documentation for full assessment

## 2018-08-12 NOTE — PLAN OF CARE
CARDIOVASCULAR - ADULT     Maintains optimal cardiac output and hemodynamic stability Progressing     Absence of cardiac dysrhythmias or at baseline rhythm Progressing        DISCHARGE PLANNING - CARE MANAGEMENT     Discharge to post-acute care or home with appropriate resources Progressing        INFECTION - ADULT     Absence or prevention of progression during hospitalization Progressing     Absence of fever/infection during neutropenic period Progressing        Knowledge Deficit     Patient/family/caregiver demonstrates understanding of disease process, treatment plan, medications, and discharge instructions Progressing        Nutrition/Hydration-ADULT     Nutrient/Hydration intake appropriate for improving, restoring or maintaining nutritional needs Progressing        PAIN - ADULT     Verbalizes/displays adequate comfort level or baseline comfort level Progressing        Potential for Falls     Patient will remain free of falls Progressing        Prexisting or High Potential for Compromised Skin Integrity     Skin integrity is maintained or improved Progressing        SAFETY ADULT     Maintain or return to baseline ADL function Progressing     Maintain or return mobility status to optimal level Progressing

## 2018-08-12 NOTE — DISCHARGE INSTR - AVS FIRST PAGE
· Make appointment with the PCP next week  · Follow up with your specialist for sleep apnea  · I am holding your losartan as her blood pressures here have been low    When you follow up with a PCP next week have them recheck your blood pressure and determine the need to restart losartan

## 2018-08-12 NOTE — ASSESSMENT & PLAN NOTE
· Patient presented with chest pain/pressure more so on lying flat and taking deep breaths  He was admitted for further workup, evaluated by Cardiology  Initially due to elevated BNP and lower extremity swelling with a right leg redness, she was treated with IV diuresis suspecting CHF  However upon evaluation by Cardiology did do not suspect acute CHF in the patient and she developed BRENNEN with IV Lasix hence it was discontinued  She underwent nuclear stress test to rule out anginal pain which was negative  Underwent 2D echocardiogram which showed LVEF of 93% with diastolic dysfunction and no pericardial effusion  Given her symptoms and subsequent increase in her leukocyte count along with elevated procalcitonin to 1, CT chest was performed to rule out possibility of pneumonia which showed small bilateral pleural and small pericardial effusions  Linear consolidation on bilateral bases with finding suggestive of atelectasis  Hence no evidence of pneumonia  · Patient recently treated for with Keflex for 2-3 days as outpatient for possible right lower extremity cellulitis  Upon admission since her cellulitis looked much better Keflex was discontinued and  Initial white blood cell count was normal subsequently it started going up  Hence at this time with her about symptoms that could be possibility of underlying bronchitis hence his given prescription for azithromycin 250 mg for 5 days  It could be component of reactive airway disease, has history of childhood asthma  Has given prescription to use albuterol inhaler as needed    Also advised to follow up with her doctor for sleep apnea and discuss her symptoms to determine if her settings need to be changed

## 2018-08-13 ENCOUNTER — TRANSITIONAL CARE MANAGEMENT (OUTPATIENT)
Dept: FAMILY MEDICINE CLINIC | Facility: CLINIC | Age: 83
End: 2018-08-13

## 2018-08-14 ENCOUNTER — TRANSITIONAL CARE MANAGEMENT (OUTPATIENT)
Dept: FAMILY MEDICINE CLINIC | Facility: CLINIC | Age: 83
End: 2018-08-14

## 2018-08-15 DIAGNOSIS — G62.9 PERIPHERAL POLYNEUROPATHY: ICD-10-CM

## 2018-08-15 RX ORDER — GABAPENTIN 100 MG/1
CAPSULE ORAL
Qty: 450 CAPSULE | Refills: 1 | Status: SHIPPED | OUTPATIENT
Start: 2018-08-15 | End: 2019-03-26 | Stop reason: SDUPTHER

## 2018-08-19 ENCOUNTER — HOSPITAL ENCOUNTER (INPATIENT)
Facility: HOSPITAL | Age: 83
LOS: 1 days | Discharge: HOME HEALTH | DRG: 291 | End: 2018-08-21
Attending: EMERGENCY MEDICINE | Admitting: INTERNAL MEDICINE
Payer: MEDICARE

## 2018-08-19 ENCOUNTER — APPOINTMENT (EMERGENCY)
Dept: RADIOLOGY | Facility: HOSPITAL | Age: 83
DRG: 291 | End: 2018-08-19
Payer: MEDICARE

## 2018-08-19 DIAGNOSIS — N18.30 CKD (CHRONIC KIDNEY DISEASE) STAGE 3, GFR 30-59 ML/MIN (HCC): ICD-10-CM

## 2018-08-19 DIAGNOSIS — I50.33 ACUTE ON CHRONIC DIASTOLIC CONGESTIVE HEART FAILURE (HCC): Primary | ICD-10-CM

## 2018-08-19 DIAGNOSIS — R06.02 SHORTNESS OF BREATH: ICD-10-CM

## 2018-08-19 DIAGNOSIS — R06.01 ORTHOPNEA: ICD-10-CM

## 2018-08-19 DIAGNOSIS — I51.89 DIASTOLIC DYSFUNCTION: ICD-10-CM

## 2018-08-19 PROBLEM — I50.9 ACUTE ON CHRONIC CONGESTIVE HEART FAILURE (HCC): Status: ACTIVE | Noted: 2018-08-19

## 2018-08-19 LAB
ALBUMIN SERPL BCP-MCNC: 3.2 G/DL (ref 3.5–5)
ALP SERPL-CCNC: 104 U/L (ref 46–116)
ALT SERPL W P-5'-P-CCNC: 15 U/L (ref 12–78)
ANION GAP SERPL CALCULATED.3IONS-SCNC: 7 MMOL/L (ref 4–13)
APTT PPP: 43 SECONDS (ref 24–36)
AST SERPL W P-5'-P-CCNC: 14 U/L (ref 5–45)
ATRIAL RATE: 340 BPM
BASOPHILS # BLD AUTO: 0.01 THOUSANDS/ΜL (ref 0–0.1)
BASOPHILS NFR BLD AUTO: 0 % (ref 0–1)
BILIRUB SERPL-MCNC: 0.66 MG/DL (ref 0.2–1)
BUN SERPL-MCNC: 31 MG/DL (ref 5–25)
CALCIUM SERPL-MCNC: 8.7 MG/DL (ref 8.3–10.1)
CHLORIDE SERPL-SCNC: 101 MMOL/L (ref 100–108)
CO2 SERPL-SCNC: 25 MMOL/L (ref 21–32)
CREAT SERPL-MCNC: 1.64 MG/DL (ref 0.6–1.3)
EOSINOPHIL # BLD AUTO: 0.11 THOUSAND/ΜL (ref 0–0.61)
EOSINOPHIL NFR BLD AUTO: 1 % (ref 0–6)
ERYTHROCYTE [DISTWIDTH] IN BLOOD BY AUTOMATED COUNT: 16.7 % (ref 11.6–15.1)
GFR SERPL CREATININE-BSD FRML MDRD: 29 ML/MIN/1.73SQ M
GLUCOSE SERPL-MCNC: 148 MG/DL (ref 65–140)
HCT VFR BLD AUTO: 26.6 % (ref 34.8–46.1)
HGB BLD-MCNC: 8.6 G/DL (ref 11.5–15.4)
IMM GRANULOCYTES # BLD AUTO: 0.03 THOUSAND/UL (ref 0–0.2)
IMM GRANULOCYTES NFR BLD AUTO: 0 % (ref 0–2)
INR PPP: 1.8 (ref 0.86–1.17)
LYMPHOCYTES # BLD AUTO: 1.15 THOUSANDS/ΜL (ref 0.6–4.47)
LYMPHOCYTES NFR BLD AUTO: 12 % (ref 14–44)
MCH RBC QN AUTO: 32 PG (ref 26.8–34.3)
MCHC RBC AUTO-ENTMCNC: 32.3 G/DL (ref 31.4–37.4)
MCV RBC AUTO: 99 FL (ref 82–98)
MONOCYTES # BLD AUTO: 0.54 THOUSAND/ΜL (ref 0.17–1.22)
MONOCYTES NFR BLD AUTO: 6 % (ref 4–12)
NEUTROPHILS # BLD AUTO: 7.74 THOUSANDS/ΜL (ref 1.85–7.62)
NEUTS SEG NFR BLD AUTO: 81 % (ref 43–75)
NRBC BLD AUTO-RTO: 0 /100 WBCS
NT-PROBNP SERPL-MCNC: 2615 PG/ML
PLATELET # BLD AUTO: 326 THOUSANDS/UL (ref 149–390)
PMV BLD AUTO: 8.8 FL (ref 8.9–12.7)
POTASSIUM SERPL-SCNC: 3.8 MMOL/L (ref 3.5–5.3)
PROT SERPL-MCNC: 7.2 G/DL (ref 6.4–8.2)
PROTHROMBIN TIME: 21 SECONDS (ref 11.8–14.2)
QRS AXIS: 52 DEGREES
QRSD INTERVAL: 100 MS
QT INTERVAL: 326 MS
QTC INTERVAL: 387 MS
RBC # BLD AUTO: 2.69 MILLION/UL (ref 3.81–5.12)
SODIUM SERPL-SCNC: 133 MMOL/L (ref 136–145)
T WAVE AXIS: -32 DEGREES
TROPONIN I SERPL-MCNC: <0.02 NG/ML
VENTRICULAR RATE: 85 BPM
WBC # BLD AUTO: 9.58 THOUSAND/UL (ref 4.31–10.16)

## 2018-08-19 PROCEDURE — 85025 COMPLETE CBC W/AUTO DIFF WBC: CPT | Performed by: EMERGENCY MEDICINE

## 2018-08-19 PROCEDURE — 99222 1ST HOSP IP/OBS MODERATE 55: CPT | Performed by: INTERNAL MEDICINE

## 2018-08-19 PROCEDURE — 84484 ASSAY OF TROPONIN QUANT: CPT | Performed by: FAMILY MEDICINE

## 2018-08-19 PROCEDURE — 99285 EMERGENCY DEPT VISIT HI MDM: CPT

## 2018-08-19 PROCEDURE — 85730 THROMBOPLASTIN TIME PARTIAL: CPT | Performed by: EMERGENCY MEDICINE

## 2018-08-19 PROCEDURE — 83880 ASSAY OF NATRIURETIC PEPTIDE: CPT | Performed by: EMERGENCY MEDICINE

## 2018-08-19 PROCEDURE — 80053 COMPREHEN METABOLIC PANEL: CPT | Performed by: EMERGENCY MEDICINE

## 2018-08-19 PROCEDURE — 71046 X-RAY EXAM CHEST 2 VIEWS: CPT

## 2018-08-19 PROCEDURE — 85610 PROTHROMBIN TIME: CPT | Performed by: EMERGENCY MEDICINE

## 2018-08-19 PROCEDURE — 93010 ELECTROCARDIOGRAM REPORT: CPT | Performed by: INTERNAL MEDICINE

## 2018-08-19 PROCEDURE — 93005 ELECTROCARDIOGRAM TRACING: CPT

## 2018-08-19 PROCEDURE — 36415 COLL VENOUS BLD VENIPUNCTURE: CPT

## 2018-08-19 PROCEDURE — 84484 ASSAY OF TROPONIN QUANT: CPT | Performed by: EMERGENCY MEDICINE

## 2018-08-19 PROCEDURE — 99220 PR INITIAL OBSERVATION CARE/DAY 70 MINUTES: CPT | Performed by: FAMILY MEDICINE

## 2018-08-19 RX ORDER — PRAVASTATIN SODIUM 80 MG/1
80 TABLET ORAL DAILY
Status: DISCONTINUED | OUTPATIENT
Start: 2018-08-20 | End: 2018-08-21 | Stop reason: HOSPADM

## 2018-08-19 RX ORDER — ACETAMINOPHEN 325 MG/1
650 TABLET ORAL EVERY 6 HOURS PRN
Status: DISCONTINUED | OUTPATIENT
Start: 2018-08-19 | End: 2018-08-21 | Stop reason: HOSPADM

## 2018-08-19 RX ORDER — BUMETANIDE 0.25 MG/ML
2 INJECTION, SOLUTION INTRAMUSCULAR; INTRAVENOUS 2 TIMES DAILY
Status: DISCONTINUED | OUTPATIENT
Start: 2018-08-19 | End: 2018-08-21

## 2018-08-19 RX ORDER — DOCUSATE SODIUM 100 MG/1
100 CAPSULE, LIQUID FILLED ORAL 2 TIMES DAILY
Status: DISCONTINUED | OUTPATIENT
Start: 2018-08-19 | End: 2018-08-21 | Stop reason: HOSPADM

## 2018-08-19 RX ORDER — ROPINIROLE 0.25 MG/1
0.5 TABLET, FILM COATED ORAL DAILY
Status: DISCONTINUED | OUTPATIENT
Start: 2018-08-20 | End: 2018-08-21 | Stop reason: HOSPADM

## 2018-08-19 RX ORDER — LEVOTHYROXINE SODIUM 0.07 MG/1
75 TABLET ORAL DAILY
Status: DISCONTINUED | OUTPATIENT
Start: 2018-08-20 | End: 2018-08-21 | Stop reason: HOSPADM

## 2018-08-19 RX ORDER — ALBUTEROL SULFATE 90 UG/1
2 AEROSOL, METERED RESPIRATORY (INHALATION) EVERY 6 HOURS PRN
Status: DISCONTINUED | OUTPATIENT
Start: 2018-08-19 | End: 2018-08-21 | Stop reason: HOSPADM

## 2018-08-19 RX ORDER — BUMETANIDE 0.25 MG/ML
2 INJECTION, SOLUTION INTRAMUSCULAR; INTRAVENOUS ONCE
Status: COMPLETED | OUTPATIENT
Start: 2018-08-19 | End: 2018-08-19

## 2018-08-19 RX ORDER — PANTOPRAZOLE SODIUM 40 MG/1
40 TABLET, DELAYED RELEASE ORAL
Status: DISCONTINUED | OUTPATIENT
Start: 2018-08-20 | End: 2018-08-21 | Stop reason: HOSPADM

## 2018-08-19 RX ORDER — B-COMPLEX WITH VITAMIN C
1 TABLET ORAL 2 TIMES DAILY WITH MEALS
Status: DISCONTINUED | OUTPATIENT
Start: 2018-08-19 | End: 2018-08-21 | Stop reason: HOSPADM

## 2018-08-19 RX ORDER — SIMETHICONE 80 MG
80 TABLET,CHEWABLE ORAL 4 TIMES DAILY PRN
Status: DISCONTINUED | OUTPATIENT
Start: 2018-08-19 | End: 2018-08-21 | Stop reason: HOSPADM

## 2018-08-19 RX ORDER — HYDROXYCHLOROQUINE SULFATE 200 MG/1
200 TABLET, FILM COATED ORAL 2 TIMES DAILY WITH MEALS
Status: DISCONTINUED | OUTPATIENT
Start: 2018-08-19 | End: 2018-08-21 | Stop reason: HOSPADM

## 2018-08-19 RX ORDER — UBIDECARENONE 75 MG
100 CAPSULE ORAL DAILY
Status: DISCONTINUED | OUTPATIENT
Start: 2018-08-20 | End: 2018-08-21 | Stop reason: HOSPADM

## 2018-08-19 RX ORDER — CALCIUM CARBONATE 200(500)MG
1000 TABLET,CHEWABLE ORAL DAILY PRN
Status: DISCONTINUED | OUTPATIENT
Start: 2018-08-19 | End: 2018-08-21 | Stop reason: HOSPADM

## 2018-08-19 RX ORDER — LEUCOVORIN CALCIUM 5 MG/1
10 TABLET ORAL WEEKLY
Status: DISCONTINUED | OUTPATIENT
Start: 2018-08-19 | End: 2018-08-19 | Stop reason: ALTCHOICE

## 2018-08-19 RX ORDER — ONDANSETRON 2 MG/ML
4 INJECTION INTRAMUSCULAR; INTRAVENOUS EVERY 6 HOURS PRN
Status: DISCONTINUED | OUTPATIENT
Start: 2018-08-19 | End: 2018-08-21 | Stop reason: HOSPADM

## 2018-08-19 RX ORDER — ROPINIROLE 1 MG/1
1 TABLET, FILM COATED ORAL
Status: DISCONTINUED | OUTPATIENT
Start: 2018-08-19 | End: 2018-08-21 | Stop reason: HOSPADM

## 2018-08-19 RX ORDER — GABAPENTIN 100 MG/1
100 CAPSULE ORAL 3 TIMES DAILY
Status: DISCONTINUED | OUTPATIENT
Start: 2018-08-19 | End: 2018-08-21 | Stop reason: HOSPADM

## 2018-08-19 RX ADMIN — HYDROXYCHLOROQUINE SULFATE 200 MG: 200 TABLET, FILM COATED ORAL at 19:56

## 2018-08-19 RX ADMIN — CALCIUM CARBONATE 500 MG (1,250 MG)-VITAMIN D3 200 UNIT TABLET 1 TABLET: at 19:56

## 2018-08-19 RX ADMIN — BUMETANIDE 2 MG: 0.25 INJECTION INTRAMUSCULAR; INTRAVENOUS at 13:53

## 2018-08-19 RX ADMIN — DOCUSATE SODIUM 100 MG: 100 CAPSULE, LIQUID FILLED ORAL at 19:21

## 2018-08-19 RX ADMIN — MICONAZOLE NITRATE 200 MG: 200 SUPPOSITORY VAGINAL at 23:18

## 2018-08-19 RX ADMIN — BUMETANIDE 2 MG: 0.25 INJECTION INTRAMUSCULAR; INTRAVENOUS at 21:30

## 2018-08-19 RX ADMIN — MICONAZOLE NITRATE: KIT VAGINAL at 21:29

## 2018-08-19 RX ADMIN — ROPINIROLE 1 MG: 1 TABLET, FILM COATED ORAL at 21:29

## 2018-08-19 RX ADMIN — GABAPENTIN 100 MG: 100 CAPSULE ORAL at 21:29

## 2018-08-19 RX ADMIN — ACETAMINOPHEN 650 MG: 325 TABLET, FILM COATED ORAL at 19:21

## 2018-08-19 NOTE — CONSULTS
Consultation - Cardiology   Fredi Lawson 81 y o  female MRN: [id number]  Unit/Bed#: ED 03 Encounter: [id number]    Assessment/Plan     Assessment:  Acute Decompensated Diastolic CHF  Persistent Atrial Fibrillation  Urothelial Carcinoma  s/p nephrectomy      Plan:    1  Acute on Chronic Diastolic CHF: Decompensated with rales and significant bilateral LE edema  Start IV diuretics  She takes 20mg of lasix at home and she doesn't seem to be responsive to this  Will treat with IV bumex given low protein  Consider switching her to Torsemide as an outpatient  Follow renal function carefully given relatively recent nephrectomy  2  Atrial Fibrillation: Continue anticoagulation with Xarelto and would dose this at 15mg daily given her CrCl less than 50  History of Present Illness   Physician Requesting Consult: Williams Luz MD  Reason for Consult / Principal Problem: CHF  HPI: Fredi Lwason is a 81y o  year old female who presents with worsening shortness of breath, orthopnea, chest pain and LE edema  She was just discharged on August 12th when she was here for atrial fibrillation and heart failure  She was recently enrolled in the heart failure program and just received a scale  She has been adherent with her medical therapy  She has left sided pleuritic chest pain at rest, orthopnea and she has signifciant pitting lower extremity edema  Consult to cardiology  Consult performed by: Sonya Ruiz  Consult ordered by: Janelle Fitch          Review of Systems   Constitutional: Positive for fatigue  HENT: Negative  Eyes: Negative  Respiratory: Positive for shortness of breath  Cardiovascular: Positive for chest pain and leg swelling  Gastrointestinal: Negative  Endocrine: Negative  Genitourinary: Negative  Musculoskeletal: Negative  Skin: Negative  Allergic/Immunologic: Negative  Neurological: Negative  Hematological: Negative  Psychiatric/Behavioral: Negative  Historical Information   Past Medical History:   Diagnosis Date    Anemia     Arthritis     rheumatoid    Benign essential hypertension     Cataract     Chronic cystitis     CPAP (continuous positive airway pressure) dependence     Diverticulitis of colon     Early satiety     GERD (gastroesophageal reflux disease)     Gout     Hearing aid worn     bilateral    Hearing loss     Hemorrhoids     Hiatal hernia     History of colonic polyps     Hyperlipidemia     Hypothyroidism     Left breast mass     Microhematuria     Migraine     occular    Neuropathy     lower and upper extermities    Overactive bladder     Pneumonia of left lower lobe due to infectious organism (HCC)     RA (rheumatoid arthritis) ([address]     Sleep apnea     uses cpap    Stroke ([facility] )     5/2017    Type 2 diabetes mellitus (HCC)     Urinary frequency     Urinary urgency     Urothelial cancer ([facility] ) 04/23/2018    Use of cane as ambulatory aid      Past Surgical History:   Procedure Laterality Date    APPENDECTOMY      ARTHROSCOPY WRIST Right     with release of transverse carpal ligament     BLADDER SURGERY      BLADDER SURGERY      BREAST SURGERY      left breast    BUNIONECTOMY Bilateral     CATARACT EXTRACTION Bilateral     CHOLECYSTECTOMY      COLONOSCOPY      CYSTOSCOPY      EGD AND COLONOSCOPY N/A 12/20/2017    Procedure: EGD AND COLONOSCOPY;  Surgeon: Agueda Smith MD;  Location:  GI LAB;   Service: Gastroenterology    ESOPHAGOGASTRODUODENOSCOPY      diagnostic    FOREARM SURGERY Right     fracture repair    HYSTERECTOMY      JOINT REPLACEMENT      KNEE ARTHROSCOPY Left     KNEE SURGERY Left     meniscus tear    NOSE SURGERY      NC CYSTO/URETERO W/LITHOTRIPSY &INDWELL STENT INSRT Right 2/28/2018    Procedure: CYSTOSCOPY RIGHT URETEROSCOPY, RIGHT RETROGRADE PYELOGRAM AND INSERTION  RIGHT STENT URETERAL, RIGHT RENAL PELVIC WASHING FOR CYTOLOGY;  Surgeon: Jayla Gomes MD;  Location[facility];  Service: Urology    OH NEPHRECTOMY, W/PART  URETECTOMY Right 4/23/2018    Procedure: ROBATIC ASSISTED NEPHRO-URETERECTOMY WITH BLADDER CUFF EXCISION;  Surgeon: Allyson Slade MD;  Location: [facility];  Service: Urology    REPLACEMENT TOTAL KNEE BILATERAL Bilateral     URETEROSCOPY Right 3/20/2018    Procedure: CYSTOSCOPY, RETROGRADE PYELOGRAM, URETEROSCOPY, BIOPSY, BRUSH, FULGURATION, STENT PLACEMENT, BLADDER BIOPSY WITH FULGURATION;  Surgeon: Allyson Slade MD;  Location: [facility];  Service: Urology     History   Alcohol Use No     Comment: seldom     History   Drug Use No     History   Smoking Status    Never Smoker   Smokeless Tobacco    Never Used     Comment: stopped smoking in the distant past, never smoker (as per [employer])      Family History:   Family History   Problem Relation Age of Onset    Other Mother         epilepsy    Early death Mother    Geronimo Hero Glaucoma Father     Stroke Father         silent    Other Brother         cardiac disorder       Meds/Allergies   current meds:   Current Facility-Administered Medications   Medication Dose Route Frequency    bumetanide (BUMEX) injection 2 mg  2 mg Intravenous Once    bumetanide (BUMEX) injection 2 mg  2 mg Intravenous BID     Facility-Administered Medications Ordered in Other Encounters   Medication Dose Route Frequency    acetaminophen (TYLENOL) tablet 650 mg  650 mg Oral Q6H PRN     Allergies   Allergen Reactions    Acetazolamide Other (See Comments)     Other reaction(s): Unknown Allergic Reaction    Aspirin      Other reaction(s): Other (See Comments)  High Dose ASA-stomach ache, rachel  ASA 81 m    Atorvastatin      Other reaction(s): Muscle Pain, Myalgia    Azithromycin     Nitrofurantoin Hives     HIVES * pt denies  Other reaction(s): Unknown Allergic Reaction  Other reaction(s): Other (See Comments)  HIVES * pt denies    Other Other (See Comments)     Adhesive tape : red and itching  Other reaction(s):  Other (See Comments)  red and itching    Shellfish-Derived Products Other (See Comments)     Patient got Gout following eating shell fish    Sulfa Antibiotics Other (See Comments)     unknown    Tramadol Diarrhea and Vomiting       Objective   Vitals: Blood pressure 136/60, pulse 78, temperature 98 2 °F (36 8 °C), temperature source Tympanic, resp  rate 18, weight 99 8 kg (220 lb), SpO2 97 %  Orthostatic Blood Pressures      Most Recent Value   Blood Pressure  136/60 filed at 08/19/2018 1145   Patient Position - Orthostatic VS  Lying filed at 08/19/2018 1145          No intake or output data in the 24 hours ending 08/19/18 1224    Invasive Devices     Peripheral Intravenous Line            Peripheral IV 08/19/18 Right Antecubital less than 1 day          Drain            Ureteral Drain/Stent Right ureter 6 Fr  152 days                Physical Exam   Constitutional: She is oriented to person, place, and time  No distress  HENT:   Mouth/Throat: No oropharyngeal exudate  Eyes: No scleral icterus  Neck: JVD present  Cardiovascular: Normal rate  An irregular rhythm present  Pulmonary/Chest: Effort normal  She has no wheezes  She has rales  Abdominal: Soft  Bowel sounds are normal  She exhibits no distension  There is no tenderness  There is no rebound  Musculoskeletal: She exhibits edema  Neurological: She is alert and oriented to person, place, and time  Skin: Skin is warm and dry  She is not diaphoretic  Psychiatric: She has a normal mood and affect  Her behavior is normal        Lab Results:   I have personally reviewed pertinent lab results      CBC with diff:   Results from last 7 days  Lab Units 08/19/18  0942   WBC Thousand/uL 9 58   RBC Million/uL 2 69*   HEMOGLOBIN g/dL 8 6*   HEMATOCRIT % 26 6*   MCV fL 99*   MCH pg 32 0   MCHC g/dL 32 3   RDW % 16 7*   MPV fL 8 8*   PLATELETS Thousands/uL 326     CMP:   Results from last 7 days  Lab Units 08/19/18  0942   SODIUM mmol/L 133*   POTASSIUM mmol/L 3 8 CHLORIDE mmol/L 101   CO2 mmol/L 25   ANION GAP mmol/L 7   BUN mg/dL 31*   CREATININE mg/dL 1 64*   GLUCOSE RANDOM mg/dL 148*   CALCIUM mg/dL 8 7   AST U/L 14   ALT U/L 15   ALK PHOS U/L 104   TOTAL PROTEIN g/dL 7 2   BILIRUBIN TOTAL mg/dL 0 66   EGFR ml/min/1 73sq m 29     Troponin:   0  Lab Value Date/Time   TROPONINI <0 02 08/19/2018 0942   TROPONINI <0 02 08/09/2018 2328   TROPONINI <0 02 08/09/2018 1958   TROPONINI <0 02 08/09/2018 1650   TROPONINI <0 02 08/09/2018 0842     BNP:   Results from last 7 days  Lab Units 08/19/18  0942   SODIUM mmol/L 133*   POTASSIUM mmol/L 3 8   CHLORIDE mmol/L 101   CO2 mmol/L 25   ANION GAP mmol/L 7   BUN mg/dL 31*   CREATININE mg/dL 1 64*   GLUCOSE RANDOM mg/dL 148*   CALCIUM mg/dL 8 7   EGFR ml/min/1 73sq m 29     Coags:   Results from last 7 days  Lab Units 08/19/18  0942   PTT seconds 43*   INR  1 80*     TSH:     Magnesium:     Lipid Profile:     Imaging: I have personally reviewed pertinent films in PACS  EKG: Atrial fibrillation  Nonspecific T wave abnormality  Code Status: Prior  Advance Directive and Living Will: Yes    Power of :    POLST:      Counseling / Coordination of Care  Total floor / unit time spent today 45 minutes  Greater than 50% of total time was spent with the patient and / or family counseling and / or coordination of care  A description of the counseling / coordination of care

## 2018-08-19 NOTE — ED ATTENDING ATTESTATION
Aram Perkins MD, saw and evaluated the patient  I have discussed the patient with the resident/non-physician practitioner and agree with the resident's/non-physician practitioner's findings, Plan of Care, and MDM as documented in the resident's/non-physician practitioner's note, except where noted  All available labs and Radiology studies were reviewed  At this point I agree with the current assessment done in the Emergency Department  I have conducted an independent evaluation of this patient a history and physical is as follows:      Critical Care Time  CritCare Time    Procedures     80 yo female recently admitted for chf, c/o chest tightness, sob since being discharged few days ago and worsening last few days  Pt with one episode of vomiting yesterday and few loose bm  No fever, no headache  No radiation of chest pain, no abdominal pain  No urinary symptoms  Pt with worsening leg swelling, no weight gain  Pt with orthopnea  Vss, afebrile, lungs cta, rrr, abdomen soft nontender, pitting bilateral pedal edema  Cardiac workup, discuss with cardiology

## 2018-08-19 NOTE — H&P
H&P- Daphne Williams 1935, 81 y o  female MRN: [id number]    Unit/Bed#: ED 03 Encounter: [id number]    Primary Care Provider: Luther Bhandari MD   Date and time admitted to hospital: 8/19/2018  9:24 AM        * Acute on chronic congestive heart failure ([address] )   Assessment & Plan    · Likely diastolic congestive heart failure  · Patient was recently admitted to the hospital with acute CHF atrial fibrillation and was discharged on Lasix  · Cardiology evaluated the patient and started on Bumex  · Discharging weight was 201 lb and today she is 220 lb  · Started on Bumex by Cardiology in emergency room  · I/O, daily weights  · Patient reported that she has been compliant with medication and also dietary limitations        CKD (chronic kidney disease) stage 3, GFR 30-59 ml/min   Assessment & Plan    · CKD stage 3 status post right nephrectomy for urothelial malignancy  · Monitor creatinine with diuresis  · If the creatinine remains elevated may have to change the Xarelto to Eliquis          Atrial fibrillation [facility])   Assessment & Plan    · Patient was diagnosed with atrial fibrillation recently  · Heart rate control  · Patient was on Xarelto-patient with recent nephrectomy  If the creatinine remains elevated may have to change to Eliquis  · Patient with history of CVA in the past        Chest pain   Assessment & Plan    · Patient complaining of chest pain has been going on for about a week  · Likely secondary to fluid overload  · Patient is chronically anticoagulated on Xarelto  · Chest x-ray reviewed  · Pain control        Acquired hypothyroidism   Assessment & Plan    · Continue with Synthroid        Diabetes mellitus type 2 in obese [facility])   Assessment & Plan    Lab Results   Component Value Date    HGBA1C 6 3% 06/13/2018       No results for input(s): POCGLU in the last 72 hours      Blood Sugar Average: Last 72 hrs:   will start on insulin sliding scale        Essential hypertension   Assessment & Plan · Blood pressure acceptable  · Continue with the medication monitor        History of nephroureterectomy   Assessment & Plan    · Patient with urothelial malignancy, status post right u nephroureterectomy  · Follow-up with Urology as an outpatient        Iron deficiency anemia   Assessment & Plan    · Hemoglobin appears to be 8 6 which is stable when compared to before  · Monitor        Rheumatoid arthritis ([facility] [address] )   Assessment & Plan    · Patient is on methotrexate, leukovorin  and Plaquenil as an outpatient        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 ([facility] [address] )   Assessment & Plan    · History of CVA in the past  · Continue with Xarelto          VTE Prophylaxis: Rivaroxaban (Xarelto)  / sequential compression device   Code Status: full code  POLST: POLST form is not discussed and not completed at this time  Discussion with family:   updated in the room    Anticipated Length of Stay:  Patient will be admitted on an Observation basis with an anticipated length of stay of  less  than2 midnights  Justification for Hospital Stay:  Congestive heart failure    Total Time for Visit, including Counseling / Coordination of Care: 70 minutes  Greater than 50% of this total time spent on direct patient counseling and coordination of care  Chief Complaint:   Shortness of breath and chest pain    History of Present Illness:    Nory Kemp is a 81 y o  female who presents with worsening of shortness of breath and chest pain and lower extremity edema  Patient was admitted to the hospital from [id number] with CHF exacerbation and new onset atrial fibrillation and she was discharged home  At home patient was on Lasix and she reported that she was compliant with fluid and salt restriction and also was taking her medications appropriately  [facility] was following the patient as an outpatient    Patient reported that gradually for the past 1 week she noticed that her legs are getting more swollen and she was also getting progressively more short of breath  Patient gives history of orthopnea  Patient is also complaining of chest pain which is diffuse  There is no exertional chest pain  Patient came to the hospital with worsening of symptoms and her weight found to be increased from her baseline/discharge weight  Patient was evaluated by Cardiology in the emergency room and started on the Bumex  Patient with chronic kidney disease status post nephro ureterectomy on the right side for urothelial malignancy  Patient with known history of CVA and was on Xarelto as an outpatient  The patient reported that when she was discharged from the hospital she was on antibiotics for possible cellulitis and also pulmonary infection and she is having a vaginal yeast infection  Patient reported that she has severe itching and she has been using Vaseline at home  Denies any fever chills  Review of Systems:    Review of Systems   Constitutional: Positive for fatigue and unexpected weight change  HENT: Negative  Eyes: Negative  Respiratory: Negative  Cardiovascular: Positive for chest pain and leg swelling  Gastrointestinal: Negative  Endocrine: Negative  Genitourinary: Positive for vaginal discharge and vaginal pain  Musculoskeletal: Negative  Skin: Negative  Allergic/Immunologic: Negative  Neurological: Negative  Hematological: Negative  Psychiatric/Behavioral: Negative          Past Medical and Surgical History:     Past Medical History:   Diagnosis Date    Anemia     Arthritis     rheumatoid    Benign essential hypertension     Cataract     Chronic cystitis     CPAP (continuous positive airway pressure) dependence     Diverticulitis of colon     Early satiety     GERD (gastroesophageal reflux disease)     Gout     Hearing aid worn     bilateral    Hearing loss     Hemorrhoids     Hiatal hernia     History of colonic polyps     Hyperlipidemia  Hypothyroidism     Left breast mass     Microhematuria     Migraine     occular    Neuropathy     lower and upper extermities    Overactive bladder     Pneumonia of left lower lobe due to infectious organism (HCC)     RA (rheumatoid arthritis) ([facility] [address] )     Sleep apnea     uses cpap    Stroke ([facility] [address] )     5/2017    Type 2 diabetes mellitus (HCC)     Urinary frequency     Urinary urgency     Urothelial cancer ([facility] [address] ) 04/23/2018    Use of cane as ambulatory aid        Past Surgical History:   Procedure Laterality Date    APPENDECTOMY      ARTHROSCOPY WRIST Right     with release of transverse carpal ligament     BLADDER SURGERY      BLADDER SURGERY      BREAST SURGERY      left breast    BUNIONECTOMY Bilateral     CATARACT EXTRACTION Bilateral     CHOLECYSTECTOMY      COLONOSCOPY      CYSTOSCOPY      EGD AND COLONOSCOPY N/A 12/20/2017    Procedure: EGD AND COLONOSCOPY;  Surgeon: Rin Chavarria MD;  Location: [facility]; Service: Gastroenterology    ESOPHAGOGASTRODUODENOSCOPY      diagnostic    FOREARM SURGERY Right     fracture repair    HYSTERECTOMY      JOINT REPLACEMENT      KNEE ARTHROSCOPY Left     KNEE SURGERY Left     meniscus tear    NOSE SURGERY      KS CYSTO/URETERO W/LITHOTRIPSY &INDWELL STENT INSRT Right 2/28/2018    Procedure: CYSTOSCOPY RIGHT URETEROSCOPY, RIGHT RETROGRADE PYELOGRAM AND INSERTION  RIGHT STENT URETERAL, RIGHT RENAL PELVIC WASHING FOR CYTOLOGY;  Surgeon: Malik Watts MD;  Location: [facility];  Service: Urology    KS NEPHRECTOMY, W/PART   URETECTOMY Right 4/23/2018    Procedure: Dianehany Apple ASSISTED NEPHRO-URETERECTOMY WITH BLADDER CUFF EXCISION;  Surgeon: Lori Garcia MD;  Location: [facility];  Service: Urology    REPLACEMENT TOTAL KNEE BILATERAL Bilateral     URETEROSCOPY Right 3/20/2018    Procedure: CYSTOSCOPY, RETROGRADE PYELOGRAM, URETEROSCOPY, BIOPSY, BRUSH, FULGURATION, STENT PLACEMENT, BLADDER BIOPSY WITH FULGURATION;  Surgeon: Shoaib Olmstead Alin Hernandez MD;  Location: [facility] OR;  Service: Urology       Meds/Allergies:    Prior to Admission medications    Medication Sig Start Date End Date Taking?  Authorizing Provider   acetaminophen (TYLENOL) 325 mg tablet Take 650 mg by mouth every 6 (six) hours as needed for mild pain   Yes Historical Provider, MD   albuterol (VENTOLIN HFA) 90 mcg/act inhaler Inhale 2 puffs every 6 (six) hours as needed for wheezing 8/12/18  Yes Milad Vilchis MD   Calcium Citrate-Vitamin D (CALCIUM + D PO) Take 1 tablet by mouth 2 (two) times a day   Yes Historical Provider, MD   cyanocobalamin (VITAMIN B-12) 100 mcg tablet Take by mouth   Yes Historical Provider, MD   docusate sodium (COLACE) 100 mg capsule Take 1 capsule (100 mg total) by mouth 2 (two) times a day for 60 doses 4/23/18 8/19/18 Yes Rosa Nazario MD   furosemide (LASIX) 20 mg tablet TAKE 1 TABLET EVERY DAY 8/6/18  Yes Dara Case MD   gabapentin (NEURONTIN) 100 mg capsule TAKE 1 CAPSULE IN THE MORNING, TAKE 1 CAPSULE IN THE AFTERNOON AND TAKE 1 TO 3 CAPSULES AT BEDTIME 8/15/18  Yes Dara Case MD   hydroxychloroquine (PLAQUENIL) 200 mg tablet Take 200 mg by mouth 2 (two) times a day with meals   Yes Historical Provider, MD   LEUCOVORIN CALCIUM PO Take 10 mg by mouth once a week   Yes Historical Provider, MD   levothyroxine 75 mcg tablet Take 75 mcg by mouth daily   Yes Historical Provider, MD   methotrexate 2 5 mg tablet  3/7/18  Yes Historical Provider, MD   Multiple Vitamin (MULTIVITAMINS PO) Take 1 tablet by mouth daily   Yes Historical Provider, MD   omeprazole (PriLOSEC) 20 mg delayed release capsule TAKE 1 CAPSULE DAILY AS NEEDED FOR STOMACH ACID 3/19/18  Yes Nimco Michael PA-C   pravastatin (PRAVACHOL) 80 mg tablet TAKE 1 TABLET EVERY DAY 2/20/18  Yes Dara Case MD   rOPINIRole (REQUIP) 0 5 mg tablet Take 0 5 mg by mouth 2 (two) times a day     Yes Historical Provider, MD   XARELTO 20 MG tablet  5/22/18  Yes Historical Provider, MD     I have reviewed home medications using allscripts  Allergies: Allergies   Allergen Reactions    Acetazolamide Other (See Comments)     Other reaction(s): Unknown Allergic Reaction    Aspirin      Other reaction(s): Other (See Comments)  High Dose ASA-stomach ache, rachel  ASA 81 m    Atorvastatin      Other reaction(s): Muscle Pain, Myalgia    Azithromycin     Nitrofurantoin Hives     HIVES * pt denies  Other reaction(s): Unknown Allergic Reaction  Other reaction(s): Other (See Comments)  HIVES * pt denies    Other Other (See Comments)     Adhesive tape : red and itching  Other reaction(s): Other (See Comments)  red and itching    Shellfish-Derived Products Other (See Comments)     Patient got Gout following eating shell fish    Sulfa Antibiotics Other (See Comments)     unknown    Tramadol Diarrhea and Vomiting       Social History:     Marital Status: /Civil Union   Occupation: retired  Patient Pre-hospital Living Situation:  Patient Pre-hospital Level of Mobility:   Patient Pre-hospital Diet Restrictions:   Substance Use History:   History   Alcohol Use No     Comment: seldom     History   Smoking Status    Never Smoker   Smokeless Tobacco    Never Used     Comment: stopped smoking in the distant past, never smoker (as per Allscripts)      History   Drug Use No       Family History:    Family History   Problem Relation Age of Onset    Other Mother         epilepsy    Early death Mother     Glaucoma Father     Stroke Father         silent    Other Brother         cardiac disorder       Physical Exam:     Vitals:   Blood Pressure: 151/63 (08/19/18 1620)  Pulse: 100 (08/19/18 1620)  Temperature: 98 2 °F (36 8 °C) (08/19/18 0923)  Temp Source: Tympanic (08/19/18 0923)  Respirations: 18 (08/19/18 1620)  Weight - Scale: 99 8 kg (220 lb) (08/19/18 0922)  SpO2: 98 % (08/19/18 1620)    Physical Exam   Constitutional: She is oriented to person, place, and time   She appears well-developed and well-nourished  HENT:   Head: Normocephalic and atraumatic  Eyes: EOM are normal  Pupils are equal, round, and reactive to light  Neck: Normal range of motion  Neck supple  JVD present  No thyromegaly present  Cardiovascular: Normal rate and regular rhythm  No murmur heard  Pulmonary/Chest: Effort normal and breath sounds normal  No respiratory distress  She has no wheezes  crackles   Abdominal: Soft  Bowel sounds are normal  She exhibits no distension  There is no tenderness  Musculoskeletal: She exhibits edema (Bilateral lower extremity pitting pedal edema/mild erythema without any tenderness)  Neurological: She is alert and oriented to person, place, and time  No cranial nerve deficit  Skin: Skin is warm and dry  Additional Data:     Lab Results: I have personally reviewed pertinent reports  Results from last 7 days  Lab Units 08/19/18  0942   WBC Thousand/uL 9 58   HEMOGLOBIN g/dL 8 6*   HEMATOCRIT % 26 6*   PLATELETS Thousands/uL 326   NEUTROS PCT % 81*   LYMPHS PCT % 12*   MONOS PCT % 6   EOS PCT % 1       Results from last 7 days  Lab Units 08/19/18  0942   SODIUM mmol/L 133*   POTASSIUM mmol/L 3 8   CHLORIDE mmol/L 101   CO2 mmol/L 25   BUN mg/dL 31*   CREATININE mg/dL 1 64*   CALCIUM mg/dL 8 7   TOTAL PROTEIN g/dL 7 2   BILIRUBIN TOTAL mg/dL 0 66   ALK PHOS U/L 104   ALT U/L 15   AST U/L 14   GLUCOSE RANDOM mg/dL 148*       Results from last 7 days  Lab Units 08/19/18  0942   INR  1 80*               Imaging: I have personally reviewed pertinent films in PACS    X-ray chest 2 views   ED Interpretation by Ambrosio Madrigal MD (08/19 1012)   Chest x-ray demonstrates no change from prior  EKG, Pathology, and Other Studies Reviewed on Admission:   · EKG: a fib    Allscripts / Epic Records Reviewed: Yes     ** Please Note: This note has been constructed using a voice recognition system   **

## 2018-08-19 NOTE — ASSESSMENT & PLAN NOTE
Lab Results   Component Value Date    HGBA1C 6 3% 06/13/2018       No results for input(s): POCGLU in the last 72 hours      Blood Sugar Average: Last 72 hrs:   will start on insulin sliding scale

## 2018-08-19 NOTE — PLAN OF CARE
Problem: Potential for Falls  Goal: Patient will remain free of falls  INTERVENTIONS:  - Assess patient frequently for physical needs  -  Identify cognitive and physical deficits and behaviors that affect risk of falls    -  [place] fall precautions as indicated by assessment   - Educate patient/family on patient safety including physical limitations  - Instruct patient to call for assistance with activity based on assessment  - Modify environment to reduce risk of injury  - Consider OT/PT consult to assist with strengthening/mobility   Outcome: Progressing

## 2018-08-19 NOTE — ASSESSMENT & PLAN NOTE
· Likely diastolic congestive heart failure  · Patient was recently admitted to the hospital with acute CHF atrial fibrillation and was discharged on Lasix  · Cardiology evaluated the patient and started on Bumex  · Discharging weight was 201 lb and today she is 220 lb  · Started on Bumex by Cardiology in emergency room  · I/O, daily weights  · Patient reported that she has been compliant with medication and also dietary limitations

## 2018-08-19 NOTE — ASSESSMENT & PLAN NOTE
· Patient complaining of chest pain has been going on for about a week  · Likely secondary to fluid overload  · Patient is chronically anticoagulated on Xarelto  · Chest x-ray reviewed  · Pain control

## 2018-08-19 NOTE — ED PROVIDER NOTES
History  Chief Complaint   Patient presents with    Chest Pain     Pt presents with chest tightness x2 weeks  Pt was admitted and released 4 days ago for the same complaint, states it was CHF related  62-year-old female with past medical history significant for CVA currently on Xarelto, possible diastolic CHF with most recent echocardiogram on August 11, 2018, rheumatoid arthritis on methotrexate, hypertension, hyperlipidemia, and hypothyroidism who is presenting with shortness of breath  Patient reports that she has shortness of breath particularly when she is supine  The shortness of breath is nonexertional   Patient denies any cough  No fevers or chills  Patient denies any increase in peripheral edema recently  Patient has been compliant with her Lasix therapy; she was prescribed 20 mg daily after being discharged  Of note, patient was recently admitted for similar symptoms that she reports were actually more severe  Patient was discharged on August 12  Patient was evaluated by Cardiology during her last admission; they diagnosed diastolic dysfunction but did not feel the patient was volume overloaded  Stress test was unremarkable  On review of systems, patient admits to 1 episode of nonbloody nonbilious emesis yesterday evening  She also reports that she had constipation yesterday but moved her bowels this morning  Patient denies any abdominal pain  Patient denies any URI symptoms  Review of systems otherwise negative  Assessment and plan:  Shortness of breath in an 62-year-old female  We will obtain CBC, CMP, troponin, BNP, EKG, and chest x-ray  Given that patient was recently hospitalized with questionable diastolic CHF, we will speak with Cardiology regarding adjusting the patient's medications versus admission  Prior to Admission Medications   Prescriptions Last Dose Informant Patient Reported? Taking?    Calcium Citrate-Vitamin D (CALCIUM + D PO)  Self Yes Yes   Sig: Take 1 tablet by mouth 2 (two) times a day   LEUCOVORIN CALCIUM PO  Self Yes Yes   Sig: Take 10 mg by mouth once a week   Multiple Vitamin (MULTIVITAMINS PO)  Self Yes Yes   Sig: Take 1 tablet by mouth daily   XARELTO 20 MG tablet  Self Yes Yes   acetaminophen (TYLENOL) 325 mg tablet  Self Yes Yes   Sig: Take 650 mg by mouth every 6 (six) hours as needed for mild pain   albuterol (VENTOLIN HFA) 90 mcg/act inhaler   No Yes   Sig: Inhale 2 puffs every 6 (six) hours as needed for wheezing   cyanocobalamin (VITAMIN B-12) 100 mcg tablet  Self Yes Yes   Sig: Take by mouth   docusate sodium (COLACE) 100 mg capsule  Self No Yes   Sig: Take 1 capsule (100 mg total) by mouth 2 (two) times a day for 60 doses   furosemide (LASIX) 20 mg tablet   No Yes   Sig: TAKE 1 TABLET EVERY DAY   gabapentin (NEURONTIN) 100 mg capsule   No Yes   Sig: TAKE 1 CAPSULE IN THE MORNING, TAKE 1 CAPSULE IN THE AFTERNOON AND TAKE 1 TO 3 CAPSULES AT BEDTIME   hydroxychloroquine (PLAQUENIL) 200 mg tablet   Yes Yes   Sig: Take 200 mg by mouth 2 (two) times a day with meals   levothyroxine 75 mcg tablet  Self Yes Yes   Sig: Take 75 mcg by mouth daily   methotrexate 2 5 mg tablet  Self Yes Yes   omeprazole (PriLOSEC) 20 mg delayed release capsule  Self No Yes   Sig: TAKE 1 CAPSULE DAILY AS NEEDED FOR STOMACH ACID   pravastatin (PRAVACHOL) 80 mg tablet  Self No Yes   Sig: TAKE 1 TABLET EVERY DAY   rOPINIRole (REQUIP) 0 5 mg tablet  Self Yes Yes   Sig: Take 0 5 mg by mouth 2 (two) times a day        Facility-Administered Medications: None       Past Medical History:   Diagnosis Date    Anemia     Arthritis     rheumatoid    Benign essential hypertension     Cataract     Chronic cystitis     CPAP (continuous positive airway pressure) dependence     Diverticulitis of colon     Early satiety     GERD (gastroesophageal reflux disease)     Gout     Hearing aid worn     bilateral    Hearing loss     Hemorrhoids     Hiatal hernia     History of colonic polyps     Hyperlipidemia     Hypothyroidism     Left breast mass     Microhematuria     Migraine     occular    Neuropathy     lower and upper extermities    Overactive bladder     Pneumonia of left lower lobe due to infectious organism (HCC)     [facility]     Sleep apnea     uses cpap    Stroke [facility] [facility]     5/2017    Type 2 diabetes mellitus (HCC)     Urinary frequency     Urinary urgency     [facility] 04/23/2018    Use of cane as ambulatory aid        Past Surgical History:   Procedure Laterality Date    APPENDECTOMY      ARTHROSCOPY WRIST Right     with release of transverse carpal ligament     BLADDER SURGERY      BLADDER SURGERY      BREAST SURGERY      left breast    BUNIONECTOMY Bilateral     CATARACT EXTRACTION Bilateral     CHOLECYSTECTOMY      COLONOSCOPY      CYSTOSCOPY      EGD AND COLONOSCOPY N/A 12/20/2017    Procedure: EGD AND COLONOSCOPY;  Surgeon: Imelda Jasso MD;  Location: [facility]; Service: Gastroenterology    ESOPHAGOGASTRODUODENOSCOPY      diagnostic    FOREARM SURGERY Right     fracture repair    HYSTERECTOMY      JOINT REPLACEMENT      KNEE ARTHROSCOPY Left     KNEE SURGERY Left     meniscus tear    NOSE SURGERY      CT CYSTO/URETERO W/LITHOTRIPSY &INDWELL STENT INSRT Right 2/28/2018    Procedure: CYSTOSCOPY RIGHT URETEROSCOPY, RIGHT RETROGRADE PYELOGRAM AND INSERTION  RIGHT STENT URETERAL, RIGHT RENAL PELVIC WASHING FOR CYTOLOGY;  Surgeon: Oz Ferreira MD;  Location: [facility];  Service: Urology    CT NEPHRECTOMY, W/PART   URETECTOMY Right 4/23/2018    Procedure: ROBATIC ASSISTED NEPHRO-URETERECTOMY WITH BLADDER CUFF EXCISION;  Surgeon: Radha Sauceda MD;  Location: [facility];  Service: Urology    REPLACEMENT TOTAL KNEE BILATERAL Bilateral     URETEROSCOPY Right 3/20/2018    Procedure: CYSTOSCOPY, RETROGRADE PYELOGRAM, URETEROSCOPY, BIOPSY, BRUSH, FULGURATION, STENT PLACEMENT, BLADDER BIOPSY WITH FULGURATION; Surgeon: Danielle Bernal MD;  Location[facility];  Service: Urology       Family History   Problem Relation Age of Onset    Other Mother         epilepsy    Early death Mother    Bia Birch Glaucoma Father     Stroke Father         silent    Other Brother         cardiac disorder     I have reviewed and agree with the history as documented  Social History   Substance Use Topics    Smoking status: Never Smoker    Smokeless tobacco: Never Used      Comment: stopped smoking in the distant past, never smoker (as per [employer])     Alcohol use No      Comment: seldom        Review of Systems   Constitutional: Negative for diaphoresis, fever and unexpected weight change  HENT: Negative for congestion, rhinorrhea and sore throat  Eyes: Negative for pain, discharge and visual disturbance  Respiratory: Positive for chest tightness and shortness of breath  Negative for cough and wheezing  Cardiovascular: Negative for chest pain, palpitations and leg swelling  Gastrointestinal: Negative for abdominal pain, blood in stool, constipation, diarrhea, nausea and vomiting  Genitourinary: Negative for dysuria, flank pain and hematuria  Musculoskeletal: Negative for arthralgias and joint swelling  Skin: Negative for rash and wound  Allergic/Immunologic: Negative for environmental allergies and food allergies  Neurological: Negative for dizziness, seizures, weakness and numbness  Hematological: Negative for adenopathy  Psychiatric/Behavioral: Negative for confusion and hallucinations         Physical Exam  ED Triage Vitals   Temperature Pulse Respirations Blood Pressure SpO2   08/19/18 0923 08/19/18 0922 08/19/18 0922 08/19/18 0922 08/19/18 0922   98 2 °F (36 8 °C) 85 18 111/52 92 %      Temp Source Heart Rate Source Patient Position - Orthostatic VS BP Location FiO2 (%)   08/19/18 0923 08/19/18 0930 08/19/18 0930 08/19/18 0930 --   Tympanic Monitor Lying Right arm       Pain Score       08/19/18 1017 No Pain           Orthostatic Vital Signs  Vitals:    08/19/18 1405 08/19/18 1458 08/19/18 1620 08/19/18 1707   BP: 142/65 149/65 151/63 138/62   Pulse: 88 93 100 104   Patient Position - Orthostatic VS: Lying Lying Lying Sitting       Physical Exam   Constitutional: She is oriented to person, place, and time  She appears well-developed and well-nourished  No distress  HENT:   Head: Normocephalic and atraumatic  Right Ear: External ear normal    Left Ear: External ear normal    Eyes: Conjunctivae and EOM are normal  Pupils are equal, round, and reactive to light  Neck: Normal range of motion  Neck supple  Cardiovascular: Normal rate and normal heart sounds  An irregularly irregular rhythm present  No murmur heard  Atrial fibrillation with controlled rate  Pulmonary/Chest: Effort normal  No respiratory distress  She has no wheezes  She has rales  Bibasilar rales  Abdominal: Soft  Bowel sounds are normal  She exhibits no distension  There is no tenderness  There is no guarding  Musculoskeletal: Normal range of motion  She exhibits no deformity  Neurological: She is alert and oriented to person, place, and time  Skin: Skin is warm and dry  Psychiatric: She has a normal mood and affect  Her behavior is normal  Thought content normal    Nursing note and vitals reviewed        ED Medications  Medications   bumetanide (BUMEX) injection 2 mg (not administered)   acetaminophen (TYLENOL) tablet 650 mg (not administered)   albuterol (PROVENTIL HFA,VENTOLIN HFA) inhaler 2 puff (not administered)   calcium carbonate-vitamin D (OSCAL-D) 500 mg-200 units per tablet 1 tablet (not administered)   cyanocobalamin (VITAMIN B-12) tablet 100 mcg (not administered)   docusate sodium (COLACE) capsule 100 mg (not administered)   gabapentin (NEURONTIN) capsule 100 mg (not administered)   hydroxychloroquine (PLAQUENIL) tablet 200 mg (not administered)   levothyroxine tablet 75 mcg (not administered) pantoprazole (PROTONIX) EC tablet 40 mg (not administered)   pravastatin (PRAVACHOL) tablet 80 mg (not administered)   rOPINIRole (REQUIP) tablet 0 5 mg (not administered)   rivaroxaban (XARELTO) tablet 15 mg (not administered)   ondansetron (ZOFRAN) injection 4 mg (not administered)   calcium carbonate (TUMS) chewable tablet 1,000 mg (not administered)   simethicone (MYLICON) chewable tablet 80 mg (not administered)   miconazole (MONISTAT) external vaginal cream (not administered)     And   miconazole (MONISTAT) vaginal suppository 200 mg (not administered)   rOPINIRole (REQUIP) tablet 1 mg (not administered)   bumetanide (BUMEX) injection 2 mg (2 mg Intravenous Given 8/19/18 1353)       Diagnostic Studies  Results Reviewed     Procedure Component Value Units Date/Time    Protime-INR [[id number]]  (Abnormal) Collected:  08/19/18 0942    Lab Status:  Final result Specimen:  Blood from Arm, Right Updated:  08/19/18 1016     Protime 21 0 (H) seconds      INR 1 80 (H)    APTT [[id number]]  (Abnormal) Collected:  08/19/18 0942    Lab Status:  Final result Specimen:  Blood from Arm, Right Updated:  08/19/18 1016     PTT 43 (H) seconds     Comprehensive metabolic panel [[id number]]  (Abnormal) Collected:  08/19/18 0942    Lab Status:  Final result Specimen:  Blood from Arm, Right Updated:  08/19/18 1010     Sodium 133 (L) mmol/L      Potassium 3 8 mmol/L      Chloride 101 mmol/L      CO2 25 mmol/L      Anion Gap 7 mmol/L      BUN 31 (H) mg/dL      Creatinine 1 64 (H) mg/dL      Glucose 148 (H) mg/dL      Calcium 8 7 mg/dL      AST 14 U/L      ALT 15 U/L      Alkaline Phosphatase 104 U/L      Total Protein 7 2 g/dL      Albumin 3 2 (L) g/dL      Total Bilirubin 0 66 mg/dL      eGFR 29 ml/min/1 73sq m     Narrative:         National Kidney Disease Education Program recommendations are as follows:  GFR calculation is accurate only with a steady state creatinine  Chronic Kidney disease less than 60 ml/min/1 73 sq  meters  Kidney failure less than 15 ml/min/1 73 sq  meters  Troponin I [[id number]]  (Normal) Collected:  08/19/18 0942    Lab Status:  Final result Specimen:  Blood from Arm, Right Updated:  08/19/18 1010     Troponin I <0 02 ng/mL     B-type natriuretic peptide [[id number]]  (Abnormal) Collected:  08/19/18 0942    Lab Status:  Final result Specimen:  Blood from Arm, Right Updated:  08/19/18 1010     NT-proBNP 2,615 (H) pg/mL     CBC and differential [[id number]]  (Abnormal) Collected:  08/19/18 0942    Lab Status:  Final result Specimen:  Blood from Arm, Right Updated:  08/19/18 0950     WBC 9 58 Thousand/uL      RBC 2 69 (L) Million/uL      Hemoglobin 8 6 (L) g/dL      Hematocrit 26 6 (L) %      MCV 99 (H) fL      MCH 32 0 pg      MCHC 32 3 g/dL      RDW 16 7 (H) %      MPV 8 8 (L) fL      Platelets 136 Thousands/uL      nRBC 0 /100 WBCs      Neutrophils Relative 81 (H) %      Immat GRANS % 0 %      Lymphocytes Relative 12 (L) %      Monocytes Relative 6 %      Eosinophils Relative 1 %      Basophils Relative 0 %      Neutrophils Absolute 7 74 (H) Thousands/µL      Immature Grans Absolute 0 03 Thousand/uL      Lymphocytes Absolute 1 15 Thousands/µL      Monocytes Absolute 0 54 Thousand/µL      Eosinophils Absolute 0 11 Thousand/µL      Basophils Absolute 0 01 Thousands/µL                  X-ray chest 2 views   ED Interpretation by Dominic Baeza MD (08/19 1012)   Chest x-ray demonstrates no change from prior              Procedures  ECG 12 Lead Documentation  Date/Time: 8/19/2018 6:47 PM  Performed by: Diane Tapia by: Amado Kaur     ECG reviewed by me, the ED Provider: yes    Patient location:  ED  Previous ECG:     Previous ECG:  Compared to current    Comparison ECG info:  August 10, 2018    Similarity:  No change    Comparison to cardiac monitor: Yes    Interpretation:     Interpretation: abnormal    Rate:     ECG rate:  85    ECG rate assessment: normal    Rhythm:     Rhythm: atrial fibrillation    Ectopy:     Ectopy: none    QRS:     QRS axis:  Normal    QRS intervals:  Normal  Conduction:     Conduction: normal    ST segments:     ST segments:  Normal  T waves:     T waves: inverted      Inverted:  V5 and V6          Phone Consults  ED Phone Contact    ED Course  ED Course as of Aug 19 1843   Nahid Mills Aug 19, 2018   5270 Blood Pressure: 111/52   0924 Temperature: 98 2 °F (36 8 °C)   0924 Pulse: 85   0924 Respirations: 18   0924 SpO2: 92 %   1008 Unchanged from baseline  Hemoglobin: (!) 8 6   1010 Stable from prior  NT-proBNP: (!) 2,615   1010 Baseline chronic kidney disease  1011 Troponin I: <0 02   1021 PTT: (!) 43   1021 INR: (!) 1 80   1021 Patient [place]  [id number] Cardiology paged by Dr Patricia Waddell  1050 Cardiology returned paged  They will evaluate the patient  [facility] with Dr Brooke Cain of Cardiology  He evaluated the patient  Based on her persistent shortness of breath as well as rales on examination, we both agree that the patient should be admitted for observation and further diuresis  Patient admitted to [facility] at this time  MDM  Number of Diagnoses or Management Options  Acute on chronic diastolic congestive heart failure ([address] ): established and worsening  CKD (chronic kidney disease) stage 3, GFR 30-59 ml/min: established and improving  Diastolic dysfunction: established and worsening  Orthopnea: established and worsening  Shortness of breath: established and worsening  Diagnosis management comments:     As detailed above, patient presented with worsening shortness of breath  She had recently been admitted for similar symptoms and found to have diastolic dysfunction on echocardiogram   Labs were obtained and above; no acute changes were found  EKG did not demonstrate any acute ischemia  Chest x-ray demonstrated mild volume overload which was unchanged from prior    Due to persistent symptoms and bibasilar rales and examination, we were concerned for recurrent CHF  We consulted Cardiology who came to evaluate the patient  They felt that the patient should be admitted for further diuresis  Patient was admitted to observation under SLIM         Amount and/or Complexity of Data Reviewed  Clinical lab tests: ordered and reviewed  Tests in the radiology section of CPT®: ordered and reviewed  Decide to obtain previous medical records or to obtain history from someone other than the patient: yes  Obtain history from someone other than the patient: yes  Review and summarize past medical records: yes  Discuss the patient with other providers: yes  Independent visualization of images, tracings, or specimens: yes    Risk of Complications, Morbidity, and/or Mortality  Presenting problems: moderate  Diagnostic procedures: minimal  Management options: minimal    Patient Progress  Patient progress: stable    CritCare Time    Disposition  Final diagnoses:   Diastolic dysfunction   Acute on chronic diastolic congestive heart failure (HCC)   Shortness of breath   Orthopnea   CKD (chronic kidney disease) stage 3, GFR 30-59 ml/min     Time reflects when diagnosis was documented in both MDM as applicable and the Disposition within this note     Time User Action Codes Description Comment    8/19/2018 10:51 AM Larri Reading Add [R70 4] Diastolic dysfunction     0/58/0898 12:37 PM Larri Reading Add [I50 33] Acute on chronic diastolic congestive heart failure ([facility] )     8/19/2018 12:37 PM Larri Reading Modify [D77 1] Diastolic dysfunction     3/90/0620 12:37 PM Larri Reading Modify [I50 33] Acute on chronic diastolic congestive heart failure ([facility] )     8/19/2018 12:38 PM Larri Reading Add [R06 02] Shortness of breath     8/19/2018 12:38 PM Larri Reading Add [R06 01] Orthopnea     8/19/2018 12:38 PM Larri Reading Add [N18 3] CKD (chronic kidney disease) stage 3, GFR 30-59 ml/min       ED Disposition     ED Disposition Condition Comment    Admit  Case was discussed with SLIM and the patient's admission status was agreed to be Admission Status: observation status to the service of Dr Gustafson Prior          Follow-up Information    None         Current Discharge Medication List      CONTINUE these medications which have NOT CHANGED    Details   acetaminophen (TYLENOL) 325 mg tablet Take 650 mg by mouth every 6 (six) hours as needed for mild pain      albuterol (VENTOLIN HFA) 90 mcg/act inhaler Inhale 2 puffs every 6 (six) hours as needed for wheezing  Qty: 1 Inhaler, Refills: 0    Associated Diagnoses: Precordial pain      Calcium Citrate-Vitamin D (CALCIUM + D PO) Take 1 tablet by mouth 2 (two) times a day      cyanocobalamin (VITAMIN B-12) 100 mcg tablet Take by mouth      docusate sodium (COLACE) 100 mg capsule Take 1 capsule (100 mg total) by mouth 2 (two) times a day for 60 doses  Qty: 60 capsule, Refills: 0    Associated Diagnoses: Urothelial lesion      furosemide (LASIX) 20 mg tablet TAKE 1 TABLET EVERY DAY  Qty: 90 tablet, Refills: 3    Associated Diagnoses: Lower extremity edema      gabapentin (NEURONTIN) 100 mg capsule TAKE 1 CAPSULE IN THE MORNING, TAKE 1 CAPSULE IN THE AFTERNOON AND TAKE 1 TO 3 CAPSULES AT BEDTIME  Qty: 450 capsule, Refills: 1    Associated Diagnoses: Peripheral polyneuropathy      hydroxychloroquine (PLAQUENIL) 200 mg tablet Take 200 mg by mouth 2 (two) times a day with meals      LEUCOVORIN CALCIUM PO Take 10 mg by mouth once a week      levothyroxine 75 mcg tablet Take 75 mcg by mouth daily      methotrexate 2 5 mg tablet       Multiple Vitamin (MULTIVITAMINS PO) Take 1 tablet by mouth daily      omeprazole (PriLOSEC) 20 mg delayed release capsule TAKE 1 CAPSULE DAILY AS NEEDED FOR STOMACH ACID  Qty: 90 capsule, Refills: 3    Associated Diagnoses: Chronic GERD      pravastatin (PRAVACHOL) 80 mg tablet TAKE 1 TABLET EVERY DAY  Qty: 90 tablet, Refills: 3    Associated Diagnoses: Pure hypercholesterolemia      rOPINIRole (REQUIP) 0 5 mg tablet Take 0 5 mg by mouth 2 (two) times a day        XARELTO 20 MG tablet            No discharge procedures on file  ED Provider  Attending physically available and evaluated Kimmy Lira I managed the patient along with the ED Attending      Electronically Signed by         Denisha Ruiz MD  08/19/18 9931

## 2018-08-19 NOTE — ASSESSMENT & PLAN NOTE
· CKD stage 3 status post right nephrectomy for urothelial malignancy  · Monitor creatinine with diuresis  · If the creatinine remains elevated may have to change the Xarelto to Eliquis

## 2018-08-19 NOTE — ASSESSMENT & PLAN NOTE
· Patient was diagnosed with atrial fibrillation recently  · Heart rate control  · Patient was on Xarelto-patient with recent nephrectomy    If the creatinine remains elevated may have to change to Eliquis  · Patient with history of CVA in the past

## 2018-08-20 PROBLEM — N17.9 AKI (ACUTE KIDNEY INJURY) (HCC): Status: RESOLVED | Noted: 2018-04-25 | Resolved: 2018-08-20

## 2018-08-20 PROBLEM — N39.41 SENSORY URGE INCONTINENCE: Status: RESOLVED | Noted: 2017-09-11 | Resolved: 2018-08-20

## 2018-08-20 PROBLEM — G56.02 CARPAL TUNNEL SYNDROME, LEFT: Status: RESOLVED | Noted: 2017-09-15 | Resolved: 2018-08-20

## 2018-08-20 PROBLEM — I89.0 LYMPHEDEMA: Status: RESOLVED | Noted: 2017-09-15 | Resolved: 2018-08-20

## 2018-08-20 PROBLEM — R31.9 HEMATURIA: Status: RESOLVED | Noted: 2017-09-15 | Resolved: 2018-08-20

## 2018-08-20 LAB
ALBUMIN SERPL BCP-MCNC: 3.1 G/DL (ref 3.5–5)
ALP SERPL-CCNC: 100 U/L (ref 46–116)
ALT SERPL W P-5'-P-CCNC: 14 U/L (ref 12–78)
ANION GAP SERPL CALCULATED.3IONS-SCNC: 9 MMOL/L (ref 4–13)
AST SERPL W P-5'-P-CCNC: 12 U/L (ref 5–45)
BILIRUB SERPL-MCNC: 0.97 MG/DL (ref 0.2–1)
BUN SERPL-MCNC: 32 MG/DL (ref 5–25)
CALCIUM SERPL-MCNC: 8.7 MG/DL (ref 8.3–10.1)
CHLORIDE SERPL-SCNC: 103 MMOL/L (ref 100–108)
CO2 SERPL-SCNC: 27 MMOL/L (ref 21–32)
CREAT SERPL-MCNC: 1.57 MG/DL (ref 0.6–1.3)
ERYTHROCYTE [DISTWIDTH] IN BLOOD BY AUTOMATED COUNT: 16.6 % (ref 11.6–15.1)
GFR SERPL CREATININE-BSD FRML MDRD: 31 ML/MIN/1.73SQ M
GLUCOSE P FAST SERPL-MCNC: 124 MG/DL (ref 65–99)
GLUCOSE SERPL-MCNC: 124 MG/DL (ref 65–140)
GLUCOSE SERPL-MCNC: 127 MG/DL (ref 65–140)
GLUCOSE SERPL-MCNC: 134 MG/DL (ref 65–140)
GLUCOSE SERPL-MCNC: 152 MG/DL (ref 65–140)
HCT VFR BLD AUTO: 25.7 % (ref 34.8–46.1)
HGB BLD-MCNC: 8.3 G/DL (ref 11.5–15.4)
MAGNESIUM SERPL-MCNC: 2.2 MG/DL (ref 1.6–2.6)
MCH RBC QN AUTO: 32.3 PG (ref 26.8–34.3)
MCHC RBC AUTO-ENTMCNC: 32.3 G/DL (ref 31.4–37.4)
MCV RBC AUTO: 100 FL (ref 82–98)
PLATELET # BLD AUTO: 310 THOUSANDS/UL (ref 149–390)
PMV BLD AUTO: 8.9 FL (ref 8.9–12.7)
POTASSIUM SERPL-SCNC: 4 MMOL/L (ref 3.5–5.3)
PROT SERPL-MCNC: 6.9 G/DL (ref 6.4–8.2)
RBC # BLD AUTO: 2.57 MILLION/UL (ref 3.81–5.12)
SODIUM SERPL-SCNC: 139 MMOL/L (ref 136–145)
WBC # BLD AUTO: 7.75 THOUSAND/UL (ref 4.31–10.16)

## 2018-08-20 PROCEDURE — G8978 MOBILITY CURRENT STATUS: HCPCS

## 2018-08-20 PROCEDURE — G8979 MOBILITY GOAL STATUS: HCPCS

## 2018-08-20 PROCEDURE — 99232 SBSQ HOSP IP/OBS MODERATE 35: CPT | Performed by: NURSE PRACTITIONER

## 2018-08-20 PROCEDURE — 99222 1ST HOSP IP/OBS MODERATE 55: CPT | Performed by: INTERNAL MEDICINE

## 2018-08-20 PROCEDURE — 97163 PT EVAL HIGH COMPLEX 45 MIN: CPT

## 2018-08-20 PROCEDURE — 94660 CPAP INITIATION&MGMT: CPT

## 2018-08-20 PROCEDURE — 83735 ASSAY OF MAGNESIUM: CPT | Performed by: FAMILY MEDICINE

## 2018-08-20 PROCEDURE — 80053 COMPREHEN METABOLIC PANEL: CPT | Performed by: FAMILY MEDICINE

## 2018-08-20 PROCEDURE — 82948 REAGENT STRIP/BLOOD GLUCOSE: CPT

## 2018-08-20 PROCEDURE — 85027 COMPLETE CBC AUTOMATED: CPT | Performed by: FAMILY MEDICINE

## 2018-08-20 RX ORDER — MINERAL OIL AND PETROLATUM 150; 830 MG/G; MG/G
OINTMENT OPHTHALMIC 3 TIMES DAILY
Status: DISCONTINUED | OUTPATIENT
Start: 2018-08-20 | End: 2018-08-20

## 2018-08-20 RX ADMIN — ROPINIROLE 0.5 MG: 0.25 TABLET, FILM COATED ORAL at 07:37

## 2018-08-20 RX ADMIN — INSULIN LISPRO 1 UNITS: 100 INJECTION, SOLUTION INTRAVENOUS; SUBCUTANEOUS at 11:56

## 2018-08-20 RX ADMIN — PRAVASTATIN SODIUM 80 MG: 80 TABLET ORAL at 09:56

## 2018-08-20 RX ADMIN — BUMETANIDE 2 MG: 0.25 INJECTION INTRAMUSCULAR; INTRAVENOUS at 17:26

## 2018-08-20 RX ADMIN — DEXTRAN 70 AND HYPROMELLOSE 2910 1 DROP: 1; 3 SOLUTION/ DROPS OPHTHALMIC at 11:55

## 2018-08-20 RX ADMIN — VITAM B12 100 MCG: 100 TAB at 09:56

## 2018-08-20 RX ADMIN — ROPINIROLE 1 MG: 1 TABLET, FILM COATED ORAL at 21:47

## 2018-08-20 RX ADMIN — RIVAROXABAN 15 MG: 15 TABLET, FILM COATED ORAL at 01:20

## 2018-08-20 RX ADMIN — LEVOTHYROXINE SODIUM 75 MCG: 75 TABLET ORAL at 06:17

## 2018-08-20 RX ADMIN — ACETAMINOPHEN 650 MG: 325 TABLET, FILM COATED ORAL at 07:33

## 2018-08-20 RX ADMIN — DOCUSATE SODIUM 100 MG: 100 CAPSULE, LIQUID FILLED ORAL at 17:24

## 2018-08-20 RX ADMIN — CALCIUM CARBONATE 500 MG (1,250 MG)-VITAMIN D3 200 UNIT TABLET 1 TABLET: at 17:24

## 2018-08-20 RX ADMIN — ACETAMINOPHEN 650 MG: 325 TABLET, FILM COATED ORAL at 14:33

## 2018-08-20 RX ADMIN — CALCIUM CARBONATE 500 MG (1,250 MG)-VITAMIN D3 200 UNIT TABLET 1 TABLET: at 07:34

## 2018-08-20 RX ADMIN — DOCUSATE SODIUM 100 MG: 100 CAPSULE, LIQUID FILLED ORAL at 07:36

## 2018-08-20 RX ADMIN — RIVAROXABAN 15 MG: 15 TABLET, FILM COATED ORAL at 21:46

## 2018-08-20 RX ADMIN — HYDROXYCHLOROQUINE SULFATE 200 MG: 200 TABLET, FILM COATED ORAL at 17:26

## 2018-08-20 RX ADMIN — PANTOPRAZOLE SODIUM 40 MG: 40 TABLET, DELAYED RELEASE ORAL at 06:16

## 2018-08-20 RX ADMIN — ONDANSETRON 4 MG: 2 INJECTION, SOLUTION INTRAMUSCULAR; INTRAVENOUS at 00:46

## 2018-08-20 RX ADMIN — GABAPENTIN 100 MG: 100 CAPSULE ORAL at 07:35

## 2018-08-20 RX ADMIN — DEXTRAN 70 AND HYPROMELLOSE 2910 1 DROP: 1; 3 SOLUTION/ DROPS OPHTHALMIC at 17:25

## 2018-08-20 RX ADMIN — GABAPENTIN 100 MG: 100 CAPSULE ORAL at 21:45

## 2018-08-20 RX ADMIN — GABAPENTIN 100 MG: 100 CAPSULE ORAL at 17:25

## 2018-08-20 RX ADMIN — DEXTRAN 70 AND HYPROMELLOSE 2910 1 DROP: 1; 3 SOLUTION/ DROPS OPHTHALMIC at 21:46

## 2018-08-20 RX ADMIN — HYDROXYCHLOROQUINE SULFATE 200 MG: 200 TABLET, FILM COATED ORAL at 07:40

## 2018-08-20 RX ADMIN — BUMETANIDE 2 MG: 0.25 INJECTION INTRAMUSCULAR; INTRAVENOUS at 10:13

## 2018-08-20 NOTE — ASSESSMENT & PLAN NOTE
Lab Results   Component Value Date    HGBA1C 6 3% 06/13/2018       No results for input(s): POCGLU in the last 72 hours      Blood Sugar Average: Last 72 hrs:    · Add insulin sliding scale and accuchecks

## 2018-08-20 NOTE — PROGRESS NOTES
Progress Note - Noelle Come 1935, 81 y o  female MRN: [id number]    Unit/Bed#: [facility] 210-01 Encounter: [id number]    Primary Care Provider: Judd Guerra MD   Date and time admitted to hospital: 8/19/2018  9:24 AM        * Acute on chronic congestive heart failure [facility])   Assessment & Plan    · Acute on chronic diastolic congestive heart failure  · Patient was recently admitted to the hospital with acute CHF atrial fibrillation and was discharged on Lasix  · Pro BNP on admission 2615  · I/O, daily weights  Weight (last 2 days)     Date/Time   Weight    08/20/18 0845  91 7 (202 16)    08/19/18 1707  98 7 (217 59)    08/19/18 0922  99 8 (220)              Intake/Output Summary (Last 24 hours) at 08/20/18 1019  Last data filed at 08/20/18 0855   Gross per 24 hour   Intake              480 ml   Output             1550 ml   Net            -1070 ml ·   Noted dietary indiscretions by cardiology d/t increased salt intake recently  · HF education   · C/w IV bumex   · Check am BMP         CKD (chronic kidney disease) stage 3, GFR 30-59 ml/min   Assessment & Plan    · CKD stage 3 status post right nephrectomy for urothelial malignancy  · Monitor creatinine with diuresis  · If the creatinine remains elevated may have to change the Xarelto to Eliquis          Atrial fibrillation [facility])   Assessment & Plan    · Patient was diagnosed with atrial fibrillation recently  · Heart rate control  · Patient was on Xarelto-patient with recent nephrectomy      · If the creatinine remains elevated may have to change to Eliquis  · Patient with history of CVA in the past        Chest pain   Assessment & Plan    · Patient complaining of chest pressure likely associated with acute CHF and volume overload   · trops (-) x3        History of nephroureterectomy   Assessment & Plan    · Patient with urothelial malignancy, status post right nephroureterectomy  · Follow-up with Urology as an outpatient        Hyponatremia   Assessment & Plan    · Resolved         Iron deficiency anemia   Assessment & Plan    · hgb stable at 8 3        Obesity   Assessment & Plan    · BMI noted at 38 2  · Diet/wt loss         Acquired hypothyroidism   Assessment & Plan    · Continue with Synthroid        Diabetes mellitus type 2 in obese [facility])   Assessment & Plan    Lab Results   Component Value Date    HGBA1C 6 3% 06/13/2018       No results for input(s): POCGLU in the last 72 hours  Blood Sugar Average: Last 72 hrs:    · Add insulin sliding scale and accuchecks         Rheumatoid arthritis ([facility] [address] )   Assessment & Plan    · Patient is on methotrexate, leukovorin  and Plaquenil as an outpatient        Essential hypertension   Assessment & Plan    · Blood pressure acceptable  · Continue with the medication monitor        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 ([facility] [address] )   Assessment & Plan    · History of CVA in the past  · Continue with Xarelto            VTE Pharmacologic Prophylaxis:   Pharmacologic: Rivaroxaban (Xarelto)  Mechanical VTE Prophylaxis in Place: No    Patient Centered Rounds: I have performed bedside rounds with nursing staff today  Discussions with Specialists or Other Care Team Provider: d/w RN     Education and Discussions with Family / Patient: d/w patient     Time Spent for Care: 30 minutes  More than 50% of total time spent on counseling and coordination of care as described above  Current Length of Stay: 0 day(s)    Current Patient Status: Inpatient   Certification Statement: The patient, admitted on an observation basis, will now require > 2 midnight hospital stay due to continued need for IV diuretics     Discharge Plan: possible d/c in the next 24 hours     Code Status: Level 1 - Full Code      Subjective:   Pt reports she is feeling better than yesterday but still fatigued and SOB with exertion  Pt reports that her chest pressure is better today but still present   Denies any SOB with rest or conversation  Pt reports she vomited once yesterday but denies any further episodes of emesis or nausea  No diarrhea or constipation at this time  No other complaints  Objective:     Vitals:   Temp (24hrs), Av °F (36 7 °C), Min:97 6 °F (36 4 °C), Max:98 6 °F (37 °C)    HR:  [] 75  Resp:  [18-28] 18  BP: (111-194)/(57-73) 139/69  SpO2:  [91 %-98 %] 95 %  Body mass index is 38 2 kg/m²  Input and Output Summary (last 24 hours): Intake/Output Summary (Last 24 hours) at 18 1027  Last data filed at 18 0855   Gross per 24 hour   Intake              480 ml   Output             1550 ml   Net            -1070 ml       Physical Exam:     Physical Exam   Constitutional: She is oriented to person, place, and time  She appears well-developed  No distress  Neck: JVD present  Cardiovascular: Normal rate, normal heart sounds and intact distal pulses  An irregular rhythm present  No murmur heard  Pulmonary/Chest: Effort normal  No respiratory distress  She has no wheezes  She has rales (b/l lower lobes)  Abdominal: Soft  Bowel sounds are normal  She exhibits no distension  There is no tenderness  There is no rebound  Musculoskeletal: She exhibits edema (moderate b/l lower extremity)  She exhibits no tenderness  Neurological: She is alert and oriented to person, place, and time  No cranial nerve deficit  Skin: Skin is warm and dry  No rash noted  She is not diaphoretic  No erythema  Psychiatric: She has a normal mood and affect           Additional Data:     Labs:      Results from last 7 days  Lab Units 18  0628 18  0942   WBC Thousand/uL 7 75 9 58   HEMOGLOBIN g/dL 8 3* 8 6*   HEMATOCRIT % 25 7* 26 6*   PLATELETS Thousands/uL 310 326   NEUTROS PCT %  --  81*   LYMPHS PCT %  --  12*   MONOS PCT %  --  6   EOS PCT %  --  1       Results from last 7 days  Lab Units 18  0628   SODIUM mmol/L 139   POTASSIUM mmol/L 4 0   CHLORIDE mmol/L 103   CO2 mmol/L 27   BUN mg/dL 32*   CREATININE mg/dL 1 57*   CALCIUM mg/dL 8 7   TOTAL PROTEIN g/dL 6 9   BILIRUBIN TOTAL mg/dL 0 97   ALK PHOS U/L 100   ALT U/L 14   AST U/L 12   GLUCOSE RANDOM mg/dL 124       Results from last 7 days  Lab Units 08/19/18  0942   INR  1 80*                 * I Have Reviewed All Lab Data Listed Above  * Additional Pertinent Lab Tests Reviewed:  All Labs Within Last 24 Hours Reviewed    Imaging:    Imaging Reports Reviewed Today Include: chest xray  Recent Cultures (last 7 days):           Last 24 Hours Medication List:     Current Facility-Administered Medications:  acetaminophen 650 mg Oral Q6H PRN Luis Knott MD   albuterol 2 puff Inhalation Q6H PRN Luis Knott MD   bumetanide 2 mg Intravenous BID Jeff Villanueva MD   calcium carbonate 1,000 mg Oral Daily PRN Luis Knott MD   calcium carbonate-vitamin D 1 tablet Oral BID With Meals Luis Knott MD   cyanocobalamin 100 mcg Oral Daily Luis Knott MD   dextran 70-hypromellose 1 drop Both Eyes TID SANTOSH Lopez   docusate sodium 100 mg Oral BID Luis Knott MD   gabapentin 100 mg Oral TID Luis Knott MD   hydroxychloroquine 200 mg Oral BID With Meals Luis Knott MD   insulin lispro 1-5 Units Subcutaneous TID AC SANTOSH Rodríguez   levothyroxine 75 mcg Oral Daily Luis Knott MD   miconazole  Topical BID PRN Luis Knott MD   And       miconazole 200 mg Vaginal HS Luis Knott MD   ondansetron 4 mg Intravenous Q6H PRN Luis Knott MD   pantoprazole 40 mg Oral Early Morning Luis Knott MD   pravastatin 80 mg Oral Daily Luis Knott MD   rivaroxaban 15 mg Oral Daily With Breakfast Luis Knott MD   rOPINIRole 0 5 mg Oral Daily Luis Knott MD   rOPINIRole 1 mg Oral HS Luis Knott MD   simethicone 80 mg Oral 4x Daily PRN Luis Knott MD     Facility-Administered Medications Ordered in Other Encounters:  acetaminophen 650 mg Oral Q6H PRN Virgil Baron PA-C        Today, Patient Was Seen By: SANTOSH Lopez    ** Please Note: Dictation voice to text software may have been used in the creation of this document   **

## 2018-08-20 NOTE — PLAN OF CARE
Problem: PHYSICAL THERAPY ADULT  Goal: Performs mobility at highest level of function for planned discharge setting  See evaluation for individualized goals  Treatment/Interventions: Functional transfer training, LE strengthening/ROM, Elevations, Endurance training, Therapeutic exercise, Patient/family training, Gait training, Bed mobility, Equipment eval/education  Equipment Recommended: Walker       See flowsheet documentation for full assessment, interventions and recommendations  Prognosis: Good  Problem List: Decreased strength, Decreased mobility, Decreased endurance, Impaired balance, Decreased coordination, Decreased cognition  Assessment: Pt seen for high complexity physical therapy evaluation  Pt is an 80 y/o female w/ history/comorbidites of CKD, a-fib, hypothyroidism, DM II< HTN, nephrouterectomy, anemia, RA, CVA, neuropathy and recent admit last week w/ CHF who now returns w/ worsening chest pain, SOB and LE edema  dx is acute on chronic CHF  Due to re-admissions w/ CHF, continued SOB/fatigue, weakness, fall risk, note unstable clinical picture  PT consulted to assess mobility, d/c needs  Pt presents w/ decreased functional mob, standing balance, endurance, B LE strength, barriers at home  will benefit from skilled PT to correct for the above problems  When stable, recommend home w/ home PT        Recommendation:  (home w/ home PT)     PT - OK to Discharge: (S) Yes (when stable to home w/ home PT)    See flowsheet documentation for full assessment

## 2018-08-20 NOTE — ASSESSMENT & PLAN NOTE
· Acute on chronic diastolic congestive heart failure  · Patient was recently admitted to the hospital with acute CHF atrial fibrillation and was discharged on Lasix  · Pro BNP on admission 2615  · I/O, daily weights  Weight (last 2 days)     Date/Time   Weight    08/20/18 0845  91 7 (202 16)    08/19/18 1707  98 7 (217 59)    08/19/18 0922  99 8 (220)              Intake/Output Summary (Last 24 hours) at 08/20/18 1019  Last data filed at 08/20/18 0855   Gross per 24 hour   Intake              480 ml   Output             1550 ml   Net            -1070 ml   · Noted dietary indiscretions by cardiology d/t increased salt intake recently  · HF education   · C/w IV bumex   · Check am BMP

## 2018-08-20 NOTE — RESTORATIVE TECHNICIAN NOTE
Restorative Specialist Mobility Note       Activity: Ambulate in chahal, Chair     Assistive Device: Front wheel walker

## 2018-08-20 NOTE — ASSESSMENT & PLAN NOTE
· Patient was diagnosed with atrial fibrillation recently  · Heart rate control  · Patient was on Xarelto-patient with recent nephrectomy      · If the creatinine remains elevated may have to change to Eliquis  · Patient with history of CVA in the past

## 2018-08-20 NOTE — PHYSICIAN ADVISOR
Current patient class: Inpatient  The patient is currently on Hospital Day: 2 at [facility]        The patient was admitted to the hospital  on 8/20/18 at 1025 for the following diagnosis:  Orthopnea [R06 01]  Shortness of breath [R06 02]  Chest pain [E13 8]  Diastolic dysfunction [F01 2]  CKD (chronic kidney disease) stage 3, GFR 30-59 ml/min [N18 3]  Acute on chronic diastolic congestive heart failure (HCC) [I50 33]       There is documentation in the medical record of an expected length of stay of at least 2 midnights  The patient is therefore expected to satisfy the 2 midnight benchmark and given the 2 midnight presumption is appropriate for INPATIENT ADMISSION  Given this expectation of a satisfying stay, CMS instructs us that the patient is most often appropriate for inpatient admission under part A provided medical necessity is documented in the chart  After review of the relevant documentation, labs, vital signs and test results, the patient is appropriate for INPATIENT ADMISSION  Admission to the hospital as an inpatient is a complex decision making process which requires the practitioner to consider the patients presenting complaint, history and physical examination and all relevant testing  With this in mind, in this case, the patient was deemed appropriate for INPATIENT ADMISSION  After review of the documentation and testing available at the time of the admission I concur with this clinical determination of medical necessity  The patient does have an inpatient admission within the previous 30 days  The patient was admitted on 8/9/18 and discharged on 8/12/18 as an inpatient  The patient therefore required readmission review  Rationale is as follows: The patient is a 81 yrs   Female who presented to the ED at 8/19/2018  9:24 AM with a chief complaint of Chest Pain (Pt presents with chest tightness x2 weeks   Pt was admitted and released 4 days ago for the same complaint, states it was CHF related )     Patient admitted with a report of increasing shortness of breath and chest discomfort  She also complains of lower leg edema and has gained weight  She was recently hospitalized for acute CHF and new onset A  Fib and was discharged on oral diuretics  She reports taking her medication as precri ed  She was seen in consult by Cardiology and they agreed that she was in acute CHF and recommending IV diuretics  A CXR revealed a left pleural effusion and a BNP was 2,615 (previously 2,031)  It would be appropriate to consider this admission as RELATED to the previous admission      The patients vitals on arrival were ED Triage Vitals   Temperature Pulse Respirations Blood Pressure SpO2   08/19/18 0923 08/19/18 0922 08/19/18 0922 08/19/18 0922 08/19/18 0922   98 2 °F (36 8 °C) 85 18 111/52 92 %      Temp Source Heart Rate Source Patient Position - Orthostatic VS BP Location FiO2 (%)   08/19/18 0923 08/19/18 0930 08/19/18 0930 08/19/18 0930 --   Tympanic Monitor Lying Right arm       Pain Score       08/19/18 0922       No Pain           Past Medical History:   Diagnosis Date    Anemia     Arthritis     rheumatoid    Benign essential hypertension     Cataract     Chronic cystitis     CPAP (continuous positive airway pressure) dependence     Diverticulitis of colon     Early satiety     GERD (gastroesophageal reflux disease)     Gout     Hearing aid worn     bilateral    Hearing loss     Hemorrhoids     Hiatal hernia     History of colonic polyps     Hyperlipidemia     Hypothyroidism     Left breast mass     Microhematuria     Migraine     occular    Neuropathy     lower and upper extermities    Overactive bladder     Pneumonia of left lower lobe due to infectious organism (HCC)     RA [facility]oid arthritis) ([facility] [address] )     Sleep apnea     uses cpap    St[facility]     5/2017    Type 2 diabetes mellitus (HCC)     Urinary frequency     Urinary urgency     Urothelial cancer ([facility] [address] ) 04/23/2018    Use of cane as ambulatory aid      Past Surgical History:   Procedure Laterality Date    APPENDECTOMY      ARTHROSCOPY WRIST Right     with release of transverse carpal ligament     BLADDER SURGERY      BLADDER SURGERY      BREAST SURGERY      left breast    BUNIONECTOMY Bilateral     CATARACT EXTRACTION Bilateral     CHOLECYSTECTOMY      COLONOSCOPY      CYSTOSCOPY      EGD AND COLONOSCOPY N/A 12/20/2017    Procedure: EGD AND COLONOSCOPY;  Surgeon: Josue Hitchcock MD;  Location: [facility]; Service: Gastroenterology    ESOPHAGOGASTRODUODENOSCOPY      diagnostic    FOREARM SURGERY Right     fracture repair    HYSTERECTOMY      JOINT REPLACEMENT      KNEE ARTHROSCOPY Left     KNEE SURGERY Left     meniscus tear    NOSE SURGERY      NM CYSTO/URETERO W/LITHOTRIPSY &INDWELL STENT INSRT Right 2/28/2018    Procedure: CYSTOSCOPY RIGHT URETEROSCOPY, RIGHT RETROGRADE PYELOGRAM AND INSERTION  RIGHT STENT URETERAL, RIGHT RENAL PELVIC WASHING FOR CYTOLOGY;  Surgeon: Siddhartha Martinez MD;  Location: [facility];  Service: Urology    NM NEPHRECTOMY, W/PART   URETECTOMY Right 4/23/2018    Procedure: Phong Saini ASSISTED NEPHRO-URETERECTOMY WITH BLADDER CUFF EXCISION;  Surgeon: Rosa Nazario MD;  Location: [facility];  Service: Urology    REPLACEMENT TOTAL KNEE BILATERAL Bilateral     URETEROSCOPY Right 3/20/2018    Procedure: CYSTOSCOPY, RETROGRADE PYELOGRAM, URETEROSCOPY, BIOPSY, BRUSH, FULGURATION, STENT PLACEMENT, BLADDER BIOPSY WITH FULGURATION;  Surgeon: Rosa Nazario MD;  Location[facility];  Service: Urology           Consults have been placed to:   IP CONSULT TO CARDIOLOGY  IP CONSULT TO CARDIOLOGY  IP CONSULT TO NUTRITION SERVICES    Vitals:    08/20/18 0052 08/20/18 0729 08/20/18 0845 08/20/18 1154   BP: 117/57 139/69  113/56   BP Location: Right arm Left arm  Left arm   Pulse: 78 75  96   Resp:  18  18   Temp: 97 6 °F (36 4 °C) 97 8 °F (36 6 °C)  97 7 °F (36 5 °C)   TempSrc: Oral Oral  Oral   SpO2: 91% 95%  96%   Weight:   91 7 kg (202 lb 2 6 oz)    Height:           Most recent labs:    Recent Labs      08/19/18   0942   08/19/18   2121  08/20/18   0628   WBC  9 58   --    --   7 75   HGB  8 6*   --    --   8 3*   HCT  26 6*   --    --   25 7*   PLT  326   --    --   310   K  3 8   --    --   4 0   NA  133*   --    --   139   CALCIUM  8 7   --    --   8 7   BUN  31*   --    --   32*   CREATININE  1 64*   --    --   1 57*   INR  1 80*   --    --    --    TROPONINI  <0 02   < >  <0 02   --    AST  14   --    --   12   ALT  15   --    --   14   ALKPHOS  104   --    --   100   BILITOT  0 66   --    --   0 97    < > = values in this interval not displayed         Scheduled Meds:  Current Facility-Administered Medications:  acetaminophen 650 mg Oral Q6H PRN Jazlyn Deluca MD   albuterol 2 puff Inhalation Q6H PRN Jazlyn Deluca MD   bumetanide 2 mg Intravenous BID Sanam Perez MD   calcium carbonate 1,000 mg Oral Daily PRN Jazlyn Deluca MD   calcium carbonate-vitamin D 1 tablet Oral BID With Meals Jazlyn Deluca MD   cyanocobalamin 100 mcg Oral Daily Jazlyn Deluca MD   dextran 70-hypromellose 1 drop Both Eyes TID SANTOSH Pastor   docusate sodium 100 mg Oral BID Jazlyn Deluca MD   gabapentin 100 mg Oral TID Jazlyn Deluca MD   hydroxychloroquine 200 mg Oral BID With Meals Jazlyn Deluca MD   insulin lispro 1-5 Units Subcutaneous TID SANTOSH Rod   levothyroxine 75 mcg Oral Daily Jazlyn Deluca MD   miconazole  Topical BID PRN Jazlyn Deluca MD   And       miconazole 200 mg Vaginal HS Jazlyn Deluca MD   ondansetron 4 mg Intravenous Q6H PRN Jazlyn Deluca MD   pantoprazole 40 mg Oral Early Morning Jazlyn Deluca MD   pravastatin 80 mg Oral Daily Jazlyn Deluca MD   rivaroxaban 15 mg Oral Daily With Breakfast Jazlyn Deluca MD   rOPINIRole 0 5 mg Oral Daily Jazlyn Deluca MD   rOPINIRole 1 mg Oral HS Jazlyn Deluca MD   simethicone 80 mg Oral 4x Daily PRN Errol Cover Timoteo Campos MD     Facility-Administered Medications Ordered in Other Encounters:  acetaminophen 650 mg Oral Q6H PRN Con Deja Jefferson PA-C     Continuous Infusions:   PRN Meds:   acetaminophen    albuterol    calcium carbonate    miconazole **AND** miconazole    ondansetron    simethicone    Surgical procedures (if appropriate):

## 2018-08-20 NOTE — ASSESSMENT & PLAN NOTE
· Patient complaining of chest pressure likely associated with acute CHF and volume overload   · trops (-) x3

## 2018-08-20 NOTE — CASE MANAGEMENT
Continued Stay Review    OBSERVATION 08/19/2018 @ 1239, CONVERTED TO INPATIENT ADMISSION 08/20/2018 @ 1025, DUE TO FURTHER DIAGNOSTIC WORKUP, REQUIRING AT LEAST 2 MIDNIGHT STAY  Date: 08/20/2018 08/20/18 1025  Inpatient Admission Once     Transfer Service: General Medicine       Question Answer Comment   Admitting Physician Shoshana Brown    Level of Care Med Surg    Estimated length of stay More than 2 Midnights    Certification I certify that inpatient services are medically necessary for this patient for a duration of greater than two midnights  See H&P and MD Progress Notes for additional information about the patient's course of treatment                Vital Signs: /69 (BP Location: Left arm)   Pulse 75   Temp 97 8 °F (36 6 °C) (Oral)   Resp 18   Ht 5' 1" (1 549 m)   Wt 91 7 kg (202 lb 2 6 oz)   LMP  (LMP Unknown)   SpO2 95%   BMI 38 20 kg/m²     Medications:   Scheduled Meds:   Current Facility-Administered Medications:  acetaminophen 650 mg Oral Q6H PRN Jacobo Zhao MD   albuterol 2 puff Inhalation Q6H PRN Jacobo Zhao MD   bumetanide 2 mg Intravenous BID Olga Lidia Pollock MD   calcium carbonate 1,000 mg Oral Daily PRN Jacobo Zhao MD   calcium carbonate-vitamin D 1 tablet Oral BID With Meals Jacobo Zhao MD   cyanocobalamin 100 mcg Oral Daily Jacobo Zhao MD   docusate sodium 100 mg Oral BID Jacobo Zhao MD   gabapentin 100 mg Oral TID Jacobo Zhao MD   hydroxychloroquine 200 mg Oral BID With Meals Jacobo Zhao MD   levothyroxine 75 mcg Oral Daily Jacobo Zhao MD   miconazole  Topical BID PRN Jacobo Zhao MD   And       miconazole 200 mg Vaginal HS Jacobo Zhao MD   ondansetron 4 mg Intravenous Q6H PRN Jacobo Zhao MD   pantoprazole 40 mg Oral Early Morning Jacobo Zhao MD   pravastatin 80 mg Oral Daily Jacobo Zhao MD   rivaroxaban 15 mg Oral Daily With Breakfast Jacobo Zhao MD   rOPINIRole 0 5 mg Oral Daily Jacobo Zhao MD   rOPINIRole 1 mg Oral HS Jacobo Zhao MD   simethicone 80 mg Oral 4x Daily PRN Rizwana You MD     Facility-Administered Medications Ordered in Other Encounters:  acetaminophen 650 mg Oral Q6H PRN Svitlana Sharpe PA-C     Abnormal Labs/Diagnostic Results:     Age/Sex: 81 y o  female     Assessment/Plan: 0920, 1027         * Acute on chronic congestive heart failure (HCC)   Assessment & Plan     · Acute on chronic diastolic congestive heart failure  · Patient was recently admitted to the hospital with acute CHF atrial fibrillation and was discharged on Lasix  · Pro BNP on admission 2615  · I/O, daily weights      ·   Weight (last 2 days)      · Date/Time ·   · Weight     · 08/20/18 0845   · 91 7 (202 16)     · 08/19/18 1707   · 98 7 (217 59)     · 08/19/18 0922   · 99 8 (220)                ·    · Intake/Output Summary (Last 24 hours) at 08/20/18 1019  Last data filed at 08/20/18 0855    · Gross per 24 hour ·   Intake ·              480 ml ·   Output ·             1550 ml ·   Net ·            -1070 ml ·   Noted dietary indiscretions by cardiology d/t increased salt intake recently  · HF education   · C/w IV bumex   · Check am BMP           CKD (chronic kidney disease) stage 3, GFR 30-59 ml/min   Assessment & Plan     · CKD stage 3 status post right nephrectomy for urothelial malignancy  · Monitor creatinine with diuresis  · If the creatinine remains elevated may have to change the Xarelto to Eliquis             Atrial fibrillation ([facility] [address] )   Assessment & Plan     · Patient was diagnosed with atrial fibrillation recently  · Heart rate control  · Patient was on Xarelto-patient with recent nephrectomy      § If the creatinine remains elevated may have to change to Eliquis  · Patient with history of CVA in the past          Chest pain   Assessment & Plan     · Patient complaining of chest pressure likely associated with acute CHF and volume overload   · trops (-) x3          History of nephroureterectomy   Assessment & Plan     · Patient with urothelial malignancy, status post right nephroureterectomy  · Follow-up with Urology as an outpatient          Hyponatremia   Assessment & Plan     · Resolved           Iron deficiency anemia   Assessment & Plan     · hgb stable at 8 3          Obesity   Assessment & Plan     · BMI noted at 38 2  · Diet/wt loss           Acquired hypothyroidism   Assessment & Plan     · Continue with Synthroid          Diabetes mellitus type 2 in obese [facility])   Assessment & Plan             Lab Results   Component Value Date     HGBA1C 6 3% 06/13/2018         No results for input(s): POCGLU in the last 72 hours      Blood Sugar Average: Last 72 hrs:     · Add insulin sliding scale and accuchecks           Rheumatoid arthritis ([facility] [address] )   Assessment & Plan     · Patient is on methotrexate, leukovorin  and Plaquenil as an outpatient          Essential hypertension   Assessment & Plan     · Blood pressure acceptable  · Continue with the medication monitor          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 (HCC)   Assessment & Plan     · History of CVA in the past  · Continue with Xarelto                VTE Pharmacologic Prophylaxis:   Pharmacologic: Rivaroxaban (Xarelto)  Mechanical VTE Prophylaxis in Place: No    Current Length of Stay: 0 day(s)     Current Patient Status: Inpatient   Certification Statement: The patient, admitted on an observation basis, will now require > 2 midnight hospital stay due to continued need for IV diuretics      Discharge Plan: possible d/c in the next 24 hours

## 2018-08-20 NOTE — PHYSICAL THERAPY NOTE
Physical Therapy Evaluation    Patient's Name: Sydnee Wright    Admitting Diagnosis  Orthopnea [R06 01]  Shortness of breath [R06 02]  Chest pain [I58 7]  Diastolic dysfunction [R49 3]  CKD (chronic kidney disease) stage 3, GFR 30-59 ml/min [N18 3]  Acute on chronic diastolic congestive heart failure ([facility] [address] ) [I50 33]    Problem List  Patient Active Problem List   Diagnosis    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 ([facility] )    Essential hypertension    Rheumatoid arthritis ([facility] )    Diabetes mellitus type 2 in obese ([facility] )    Sleep apnea    Acquired hypothyroidism    Chronic GERD    Hypercholesterolemia    Obesity    Peripheral neuropathy    Prediabetes    Primary osteoarthritis of left knee    Restless leg syndrome    Right lumbar radiculopathy    Sensorineural hearing loss    Sinus arrhythmia    Chronic bilateral thoracic back pain    Thoracic degenerative disc disease    Urothelial cancer ([facility] )    Lung nodule < 6cm on CT    Pancreatic cyst    Iron deficiency anemia    Anemia    Hyponatremia    PAD (peripheral artery disease) (HCC)    Bilateral edema of lower extremity    History of nephroureterectomy    Incisional hernia    Chest pain    Hypertensive urgency    Leucocytosis    Pain and swelling of lower extremity    Atrial fibrillation (HCC)    CKD (chronic kidney disease) stage 3, GFR 30-59 ml/min    Acute on chronic congestive heart failure (HCC)       Past Medical History  Past Medical History:   Diagnosis Date    Anemia     Arthritis     rheumatoid    Benign essential hypertension     Cataract     Chronic cystitis     CPAP (continuous positive airway pressure) dependence     Diverticulitis of colon     Early satiety     GERD (gastroesophageal reflux disease)     Gout     Hearing aid worn     bilateral    Hearing loss     Hemorrhoids     Hiatal hernia     History of colonic polyps     Hyperlipidemia     Hypothyroidism  Left breast mass     Microhematuria     Migraine     occular    Neuropathy     lower and upper extermities    Overactive bladder     Pneumonia of left lower lobe due to infectious organism (HCC)     RA (rheumatoid arthritis) ([facility] [address] )     Sleep apnea     uses cpap    Stroke ([facility] [address] )     5/2017    Type 2 diabetes mellitus (HCC)     Urinary frequency     Urinary urgency     Urothelial cancer ([facility] [address] ) 04/23/2018    Use of cane as ambulatory aid        Past Surgical History  Past Surgical History:   Procedure Laterality Date    APPENDECTOMY      ARTHROSCOPY WRIST Right     with release of transverse carpal ligament     BLADDER SURGERY      BLADDER SURGERY      BREAST SURGERY      left breast    BUNIONECTOMY Bilateral     CATARACT EXTRACTION Bilateral     CHOLECYSTECTOMY      COLONOSCOPY      CYSTOSCOPY      EGD AND COLONOSCOPY N/A 12/20/2017    Procedure: EGD AND COLONOSCOPY;  Surgeon: Ryanne Maher MD;  [facility]: [facility]; Service: Gastroenterology    ESOPHAGOGASTRODUODENOSCOPY      diagnostic    FOREARM SURGERY Right     fracture repair    HYSTERECTOMY      JOINT REPLACEMENT      KNEE ARTHROSCOPY Left     KNEE SURGERY Left     meniscus tear    NOSE SURGERY      MO CYSTO/URETERO W/LITHOTRIPSY &INDWELL STENT INSRT Right 2/28/2018    Procedure: CYSTOSCOPY RIGHT URETEROSCOPY, RIGHT RETROGRADE PYELOGRAM AND INSERTION  RIGHT STENT URETERAL, RIGHT RENAL PELVIC WASHING FOR CYTOLOGY;  Surgeon: Rin Mooney MD;  [facility]: [facility];  Service: Urology    MO NEPHRECTOMY, W/PART   URETECTOMY Right 4/23/2018    Procedure: [place] ASSISTED NEPHRO-URETERECTOMY WITH BLADDER CUFF EXCISION;  Surgeon: Tanya Olivo MD;  [facility];  Service: Urology    REPLACEMENT TOTAL KNEE BILATERAL Bilateral     URETEROSCOPY Right 3/20/2018    Procedure: CYSTOSCOPY, RETROGRADE PYELOGRAM, URETEROSCOPY, BIOPSY, BRUSH, FULGURATION, STENT PLACEMENT, BLADDER BIOPSY WITH FULGURATION;  Surgeon: Leonides Lima Renate Ocampo MD;  [facility];  Service: Urology        08/20/18 1000   Note Type   Note type Eval only   Pain Assessment   Pain Assessment 0-10   Pain Score 4   Pain Type Acute pain;Chronic pain   Pain Location Generalized   Patient's Stated Pain Goal No pain   Hospital Pain Intervention(s) Ambulation/increased activity   Response to Interventions unchanged   Home Living   Type of Home House   Additional Comments Resides w/  in 1 story home, 4 RONNY  Indep self care, had VNA p recent surgery   does homemaking, driving since recent surgery  a,bulates w/ RW   Prior Function   Level of [place] Independent with ADLs and functional mobility   Falls in the last 6 months 1 to 4   Restrictions/Precautions   Weight Bearing Precautions Per Order No   Other Precautions Multiple lines; Fall Risk   General   Family/Caregiver Present No   Cognition   Overall Cognitive Status WFL   Arousal/Participation Responsive   Orientation Level Oriented X4   Memory Unable to assess   Following Commands Follows one step commands without difficulty   RLE Assessment   RLE Assessment (strength 4-/5)   LLE Assessment   LLE Assessment (strength 4-/5)   Coordination   Movements are Fluid and Coordinated 0   Coordination and Movement Description mild B LE ataxia   Transfers   Sit to Stand 4  Minimal assistance   Additional items Assist x 1   Stand to Sit 5  Supervision   Additional items Assist x 1   Ambulation/Elevation   Gait pattern (slow, wide MARILY)   Gait Assistance 4  Minimal assist   Additional items Assist x 1   Assistive Device Rolling walker   Distance 80'   Balance   Static Sitting Normal   Dynamic Sitting Good   Static Standing Fair +   Dynamic Standing Fair   Ambulatory Fair -   Endurance Deficit   Endurance Deficit Yes   Endurance Deficit Description fatigue, weakness   Activity Tolerance   Activity Tolerance Patient limited by fatigue;Treatment limited secondary to medical complications (Comment)   Nurse Made Aware yes   Assessment   Prognosis Good   Problem List Decreased strength;Decreased mobility; Decreased endurance; Impaired balance;Decreased coordination;Decreased cognition   Assessment Pt seen for high complexity physical therapy evaluation  Pt is an 80 y/o female w/ history/comorbidites of CKD, a-fib, hypothyroidism, DM II< HTN, nephrouterectomy, anemia, RA, CVA, neuropathy and recent admit last week w/ CHF who now returns w/ worsening chest pain, SOB and LE edema  dx is acute on chronic CHF  Due to re-admissions w/ CHF, continued SOB/fatigue, weakness, fall risk, note unstable clinical picture  PT consulted to assess mobility, d/c needs  Pt presents w/ decreased functional mob, standing balance, endurance, B LE strength, barriers at home  will benefit from skilled PT to correct for the above problems  When stable, recommend home w/ home PT   Goals   Patient Goals to feel better   STG Expiration Date 09/03/18   Short Term Goal #1 1-2 wks: bed mob and transfers w/ indep, standing balance to good/normal w/ device, ambulate 200-300 ft w/ RW and mod I, increase B LE strength by 1/2 -1 grade, ambulate 4 RONNY w/ S   Treatment Day 0   Plan   Treatment/Interventions Functional transfer training;LE strengthening/ROM; Elevations; Endurance training; Therapeutic exercise;Patient/family training;Gait training;Bed mobility; Equipment eval/education   PT Frequency 5x/wk;2-3x/wk  (3-5x/wk)   Recommendation   Recommendation (home w/ home PT)   Equipment Recommended Walker   PT - OK to Discharge Yes  (when stable to home w/ home PT)   Modified Jaswinder Scale   Modified [place] Scale 4   Barthel Index   Feeding 10   Bathing 0   Grooming Score 5   Dressing Score 5   Bladder Score 10   Bowels Score 10   Toilet Use Score 10   Transfers (Bed/Chair) Score 10   Mobility (Level Surface) Score 0   Stairs Score 0   Barthel Index Score 60         Yanna Nelsonts PT, DPT, CSRS

## 2018-08-20 NOTE — SOCIAL WORK
Initial interview and DC Dash:     CM met with the patient and her , Tatyana Guerrier, at bedside to review the CM role and discuss possible dc needs  Pt lives with her  samuel single story al in [place], [place]  4 [address]  Pt ambulates with a RW, is retired and drives; she reported that her  has been driving for her for the past few months  Pt has a SPC, RW, bedside commode, walk-in shower, grab bars and a handheld shower nozzle  Pt reported that she is open to [facility] RN services and stated preference to resume their services upon dc; CM sent [facility] referral to [facility]  Hx of MV IP rehab Pt denied drug, etoh or mental illness history  Pt stated that her Dtr Dany Rodriguez is her POA; CM requested copy for chart  Currently there are 3 copies of a living will in [facility] but no POA designation document  Prescriptions are filled at [facility] in [facility]  Main contact:  Suzanne Vee [phone], [phone]  Dtr / Sara Abbott [phone]  Pt has three Sons, also  Admit Dx A/C CHF, 20 lb wt gain  Hx Sleep Apnea on Cpap, DM2, CVA, CKD3, A Fib, recent Nephrectomy  Pt expressed limited understanding of her CHF diagnosis as it pertains to fluid and sodium restrictions  CM advised the patient of the [facility] program; pt agreeable and requesting for dc meds  Pt identified for HF PI project, HF [facility] was at bedside with pt and her   CM awaiting dc med scripts for discharge  CM reviewed d/c planning process including the following: identifying help at home, patient preference for d/c planning needs, Discharge Lounge, [facility] to Bed program, availability of treatment team to discuss questions or concerns patient and/or family may have regarding understanding medications and recognizing signs and symptoms once discharged  CM also encouraged patient to follow up with all recommended appointments after discharge   Patient advised of importance for patient and family to participate in managing patients medical well being

## 2018-08-20 NOTE — CONSULTS
Heart Failure Consult Note - Merline Crooked 81 y o  female MRN: [id number]    Unit/Bed#: CW2 210-01 Encounter: [id number]      Assessment:    Principal Problem:    Acute on chronic congestive heart failure (HCC)  Active Problems:    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 (HCC)    Essential hypertension    Rheumatoid arthritis ([address] )    Diabetes mellitus type 2 in obese (HCC)    Acquired hypothyroidism    Iron deficiency anemia    History of nephroureterectomy    Chest pain    Atrial fibrillation (HCC)    CKD (chronic kidney disease) stage 3, GFR 30-59 ml/min      Plan:  1  Acute on Chronic diastolic CHF exacerbation, HFpEF, NYHA Class III, AHA Stage C  Pro BNP on admission 2615, Chest xray demonstrating small left pleural effusion and cardiomegaly, +JVD, bilateral lower extremity edema with bibasilar crackles on PE  Patient still with c/o significant SOB on exertion  Continue with Bumex 2 mg IV BID  Continue to monitor daily weights, strict I's and O's, BMP  2 Persistent atrial fibrillation  Rate controlled  On Xarelto 15 mg daily  3 Hypertension  Reasonably controlled at this time  Previously on Losartan 100 mg daily, was d/c'd during prior admission due to hypotension  Will restart as long as BP permits  4 HLD  On statin therapy  5 History of urothelial carcinoma, S/P right nephrectomy  Continue to monitor renal function closely  HPI:  73-year-old female with past medical history of CKD, persistent atrial fibrillation, rate controlled and on Xarelto, uncontrolled hypertension, obstructive sleep apnea, CVA, status post recent nephrectomy with history of urothelial carcinoma, who presented to the ED yesterday with complaints of ongoing shortness of breath with exertion, bilateral lower extremity edema, and generalized fatigue since recent discharge    Patient was admitted here from 08/09/2018 to 08/12/2018 with similar symptoms and was discharged home on Lasix 20 mg p o  daily  Since discharge, she feels that she has not been producing adequate urine output and still having significant fluid rentention and shortness of breath  On admission yesterday, her proBNP was 2615 which is up from her previous BNP of 2031  TTE from 08/11/2018 demonstrated ejection fraction 55%, normal PA pressures, and normal RV  LV diastolic function could not be evaluated  She did undergo nuclear stress testing during her last admission which demonstrated no ischemia  She is a patient of Dr Alin Glover and was last seen by him in July of this year  At that time she had been feeling well and euvolemic with a weight of 208 lb  Her creatinine at that time was 1 5 which seems to be around her baseline  Today her creatinine is 1 57  So far she has received 2 doses of Bumex 2 mg IV, which is scheduled for twice daily  She is net - 870 so far  and her admission weight of 220 lbs is down 3 lb from yesterday at 217 lbs  States she feels a mild improvement  with her breathing but still has significant shortness of breath just walking to the bathroom       Past Medical History:   Diagnosis Date    Anemia     Arthritis     rheumatoid    Benign essential hypertension     Cataract     Chronic cystitis     CPAP (continuous positive airway pressure) dependence     Diverticulitis of colon     Early satiety     GERD (gastroesophageal reflux disease)     Gout     Hearing aid worn     bilateral    Hearing loss     Hemorrhoids     Hiatal hernia     History of colonic polyps     Hyperlipidemia     Hypothyroidism     Left breast mass     Microhematuria     Migraine     occular    Neuropathy     lower and upper extermities    Overactive bladder     Pneumonia of left lower lobe due to infectious organism (HCC)     RA [facility]rhe[facility]oid [facility]) [facility] )     Sleep apnea     uses cpap    Stroke ([facility] )     5/2017    Type 2 diabetes mellitus (HCC)     Urinary frequency     Urinary urgency  Urothelial cancer ([facility] [address] ) 04/23/2018    Use of cane as ambulatory aid        Review of Systems - Negative except as stated in HPI    Allergies   Allergen Reactions    Acetazolamide Other (See Comments)     Other reaction(s): Unknown Allergic Reaction    Aspirin      Other reaction(s): Other (See Comments)  High Dose ASA-stomach ache, rachel  ASA 81 m    Atorvastatin      Other reaction(s): Muscle Pain, Myalgia    Azithromycin     Nitrofurantoin Hives     HIVES * pt denies  Other reaction(s): Unknown Allergic Reaction  Other reaction(s): Other (See Comments)  HIVES * pt denies    Other Other (See Comments)     Adhesive tape : red and itching  Other reaction(s): Other (See Comments)  red and itching    Shellfish-Derived Products Other (See Comments)     Patient got Gout following eating shell fish    Sulfa Antibiotics Other (See Comments)     unknown    Tramadol Diarrhea and Vomiting           Current Facility-Administered Medications:     acetaminophen (TYLENOL) tablet 650 mg, 650 mg, Oral, Q6H PRN, Megan Lund MD, 650 mg at 08/20/18 0733    albuterol (PROVENTIL HFA,VENTOLIN HFA) inhaler 2 puff, 2 puff, Inhalation, Q6H PRN, Megan Lund MD    [facility]) injection 2 mg, 2 mg, Intravenous, BID, Erica Verduzco MD, 2 mg at 08/19/18 2130    calcium carbonate (TUMS) chewable tablet 1,000 mg, 1,000 mg, Oral, Daily PRN, Megan Lund MD    calcium carbonate-vitamin D (OSCAL-D) 500 mg-200 units per tablet 1 tablet, 1 tablet, Oral, BID With Meals, Megan Lund MD, 1 tablet at 08/20/18 0734    cyanocobalamin (VITAMIN B-12) tablet 100 mcg, 100 mcg, Oral, Daily, Megan Lund MD    docusate sodium (COLACE) capsule 100 mg, 100 mg, Oral, BID, Megan Lund MD, 100 mg at 08/20/18 0736    gabapentin (NEURONTIN) capsule 100 mg, 100 mg, Oral, TID, Megan Lund MD, 100 mg at 08/20/18 0735    hydroxychloroquine (PLAQUENIL) tablet 200 mg, 200 mg, Oral, BID With Meals, Megan Lund MD, 200 mg at 08/20/18 0740   levothyroxine tablet 75 mcg, 75 mcg, Oral, Daily, Bean Sanders MD, 75 mcg at 08/20/18 0617    miconazole (MONISTAT) external vaginal cream, , Topical, BID PRN **AND** miconazole (MONISTAT) vaginal suppository 200 mg, 200 mg, Vaginal, HS, Bean Sanders MD, 200 mg at 08/19/18 2318    ondansetron (ZOFRAN) injection 4 mg, 4 mg, Intravenous, Q6H PRN, Bean Sanders MD, 4 mg at 08/20/18 0046    pantoprazole (PROTONIX) EC tablet 40 mg, 40 mg, Oral, Early Morning, Bean Sanders MD, 40 mg at 08/20/18 4960    pravastatin (PRAVACHOL) tablet 80 mg, 80 mg, Oral, Daily, Bean Sanders MD    rivaroxaban (XARELTO) tablet 15 mg, 15 mg, Oral, Daily With Breakfast, Bean Sanders MD, 15 mg at 08/20/18 0120    rOPINIRole (REQUIP) tablet 0 5 mg, 0 5 mg, Oral, Daily, Bean Sanders MD, 0 5 mg at 08/20/18 0737    rOPINIRole (REQUIP) tablet 1 mg, 1 mg, Oral, HS, Bean Sanders MD, 1 mg at 08/19/18 2129    simethicone (MYLICON) chewable tablet 80 mg, 80 mg, Oral, 4x Daily PRN, Bean Sanders MD    Facility-Administered Medications Ordered in Other Encounters:     acetaminophen (TYLENOL) tablet 650 mg, 650 mg, Oral, Q6H PRN, Dave Read PA-C    Social History     Social History    Marital status: /Civil Union     Spouse name: N/A    Number of children: N/A    Years of education: N/A     Occupational History    Not on file       Social History Main Topics    Smoking status: Never Smoker    Smokeless tobacco: Never Used      Comment: stopped smoking in the distant past, never smoker (as per [employer])     Alcohol use No      Comment: seldom    Drug use: No    Sexual activity: Not on file     Other Topics Concern    Not on file     Social History Narrative    No caffeine use       Family History   Problem Relation Age of Onset    Other Mother         epilepsy    Early death Mother    Eliana Hawley Glaucoma Father     Stroke Father         silent    Other Brother         cardiac disorder       Physical Exam:  [facility] (day, reason): Medrano catheter (day, reason):    Vitals: Blood pressure 139/69, pulse 75, temperature 97 8 °F (36 6 °C), temperature source Oral, resp  rate 18, height 5' 1" (1 549 m), weight 91 7 kg (202 lb 2 6 oz), SpO2 95 %  , Body mass index is 38 2 kg/m² , I/O last 3 completed shifts: In: 480 [P O :480]  Out: 1350 [Urine:1300; Emesis/NG output:50]  I/O this shift:  In: -   Out: 200 [Urine:200]  Wt Readings from Last 3 Encounters:   08/20/18 91 7 kg (202 lb 2 6 oz)   08/12/18 91 5 kg (201 lb 11 5 oz)   08/06/18 94 3 kg (208 lb)       Intake/Output Summary (Last 24 hours) at 08/20/18 0913  Last data filed at 08/20/18 0855   Gross per 24 hour   Intake              480 ml   Output             1550 ml   Net            -1070 ml     I/O last 3 completed shifts: In: 18 [P O :480]  Out: 1350 [Urine:1300; Emesis/NG output:50]    No significant arrhythmias seen on telemetry review   Atrial fibrillation with controlled ventricular rate    Vitals:    08/19/18 1918 08/20/18 0052 08/20/18 0729 08/20/18 0845   BP: 133/66 117/57 139/69    BP Location: Left arm Right arm Left arm    Pulse: 95 78 75    Resp: 18  18    Temp: 98 6 °F (37 °C) 97 6 °F (36 4 °C) 97 8 °F (36 6 °C)    TempSrc: Oral Oral Oral    SpO2: 96% 91% 95%    Weight:    91 7 kg (202 lb 2 6 oz)   Height:           GEN: Lilliam Ramey appears well, alert and oriented x 3, pleasant and cooperative   HEENT: pupils equal, round, and reactive to light; extraocular muscles intact  NECK: supple, no carotid bruits   HEART: regular rhythm, normal S1 and S2, no murmurs, clicks, gallops or rubs, +JVD    LUNGS: decreased breath sounds,  no wheezes, bibasilar crackles present   ABDOMEN: normal bowel sounds, soft, no tenderness, no distention  EXTREMITIES: peripheral pulses normal; no clubbing, cyanosis, +1 BLLE edema  NEURO: no focal findings   SKIN: normal without suspicious lesions on exposed skin    Labs & Results:      Results from last 7 days  Lab Units 08/19/18 2123 08/19/18  1809 08/19/18  0942   TROPONIN I ng/mL <0 02 <0 02 <0 02     Results from last 7 days  Lab Units 08/20/18  0628 08/19/18  0942   WBC Thousand/uL 7 75 9 58   HEMOGLOBIN g/dL 8 3* 8 6*   HEMATOCRIT % 25 7* 26 6*   PLATELETS Thousands/uL 310 326           Results from last 7 days  Lab Units 08/20/18  0628 08/19/18  0942   SODIUM mmol/L 139 133*   POTASSIUM mmol/L 4 0 3 8   CHLORIDE mmol/L 103 101   CO2 mmol/L 27 25   BUN mg/dL 32* 31*   CREATININE mg/dL 1 57* 1 64*   CALCIUM mg/dL 8 7 8 7   TOTAL PROTEIN g/dL 6 9 7 2   BILIRUBIN TOTAL mg/dL 0 97 0 66   ALK PHOS U/L 100 104   ALT U/L 14 15   AST U/L 12 14   GLUCOSE RANDOM mg/dL 124 148*     Results from last 7 days  Lab Units 08/19/18  0942   INR  1 80*       Chest X-Ray is obtained; result - report pending  LVEF: 55%  LVIDd:3 08 cm  RV: normal systolic function   MR:mild  PASP: WNL  RVOT:   Other:    EKG personally reviewed by SANTOSH Farmer    Counseling / Coordination of Care  Total floor / unit time spent today 20 minutes  Greater than 50% of total time was spent with the patient and / or family counseling and / or coordination of care  A description of the counseling / coordination of care: 10  Thank you for the opportunity to participate in the care of this patient  Delcia Ill, DO    Advanced Heart Failure [address]

## 2018-08-20 NOTE — ASSESSMENT & PLAN NOTE
· Patient with urothelial malignancy, status post right nephroureterectomy  · Follow-up with Urology as an outpatient

## 2018-08-21 ENCOUNTER — TELEPHONE (OUTPATIENT)
Dept: UROLOGY | Facility: CLINIC | Age: 83
End: 2018-08-21

## 2018-08-21 VITALS
RESPIRATION RATE: 18 BRPM | BODY MASS INDEX: 37.75 KG/M2 | HEART RATE: 73 BPM | WEIGHT: 199.96 LBS | SYSTOLIC BLOOD PRESSURE: 129 MMHG | DIASTOLIC BLOOD PRESSURE: 58 MMHG | TEMPERATURE: 97.5 F | HEIGHT: 61 IN | OXYGEN SATURATION: 93 %

## 2018-08-21 PROBLEM — R07.9 CHEST PAIN: Status: RESOLVED | Noted: 2018-08-09 | Resolved: 2018-08-21

## 2018-08-21 LAB
ANION GAP SERPL CALCULATED.3IONS-SCNC: 5 MMOL/L (ref 4–13)
BUN SERPL-MCNC: 32 MG/DL (ref 5–25)
CALCIUM SERPL-MCNC: 8.9 MG/DL (ref 8.3–10.1)
CHLORIDE SERPL-SCNC: 99 MMOL/L (ref 100–108)
CO2 SERPL-SCNC: 29 MMOL/L (ref 21–32)
CREAT SERPL-MCNC: 1.62 MG/DL (ref 0.6–1.3)
GFR SERPL CREATININE-BSD FRML MDRD: 30 ML/MIN/1.73SQ M
GLUCOSE SERPL-MCNC: 120 MG/DL (ref 65–140)
GLUCOSE SERPL-MCNC: 126 MG/DL (ref 65–140)
POTASSIUM SERPL-SCNC: 3.8 MMOL/L (ref 3.5–5.3)
SODIUM SERPL-SCNC: 133 MMOL/L (ref 136–145)

## 2018-08-21 PROCEDURE — 97116 GAIT TRAINING THERAPY: CPT | Performed by: PHYSICAL THERAPIST

## 2018-08-21 PROCEDURE — 80048 BASIC METABOLIC PNL TOTAL CA: CPT | Performed by: NURSE PRACTITIONER

## 2018-08-21 PROCEDURE — 94660 CPAP INITIATION&MGMT: CPT

## 2018-08-21 PROCEDURE — 82948 REAGENT STRIP/BLOOD GLUCOSE: CPT

## 2018-08-21 PROCEDURE — 99239 HOSP IP/OBS DSCHRG MGMT >30: CPT | Performed by: NURSE PRACTITIONER

## 2018-08-21 RX ORDER — TORSEMIDE 20 MG/1
40 TABLET ORAL DAILY
Qty: 30 TABLET | Refills: 0 | Status: SHIPPED | OUTPATIENT
Start: 2018-08-22 | End: 2018-09-05 | Stop reason: SDUPTHER

## 2018-08-21 RX ORDER — TORSEMIDE 20 MG/1
40 TABLET ORAL DAILY
Status: DISCONTINUED | OUTPATIENT
Start: 2018-08-22 | End: 2018-08-21 | Stop reason: HOSPADM

## 2018-08-21 RX ORDER — LOSARTAN POTASSIUM 25 MG/1
25 TABLET ORAL DAILY
Qty: 30 TABLET | Refills: 0 | Status: SHIPPED | OUTPATIENT
Start: 2018-08-21 | End: 2018-09-07 | Stop reason: SDUPTHER

## 2018-08-21 RX ADMIN — PANTOPRAZOLE SODIUM 40 MG: 40 TABLET, DELAYED RELEASE ORAL at 06:39

## 2018-08-21 RX ADMIN — ROPINIROLE 0.5 MG: 0.25 TABLET, FILM COATED ORAL at 08:43

## 2018-08-21 RX ADMIN — ACETAMINOPHEN 650 MG: 325 TABLET, FILM COATED ORAL at 01:53

## 2018-08-21 RX ADMIN — LEVOTHYROXINE SODIUM 75 MCG: 75 TABLET ORAL at 06:39

## 2018-08-21 RX ADMIN — MICONAZOLE NITRATE 200 MG: 200 SUPPOSITORY VAGINAL at 01:55

## 2018-08-21 RX ADMIN — GABAPENTIN 100 MG: 100 CAPSULE ORAL at 08:40

## 2018-08-21 RX ADMIN — DEXTRAN 70 AND HYPROMELLOSE 2910 1 DROP: 1; 3 SOLUTION/ DROPS OPHTHALMIC at 08:43

## 2018-08-21 RX ADMIN — MICONAZOLE NITRATE: KIT VAGINAL at 01:54

## 2018-08-21 RX ADMIN — VITAM B12 100 MCG: 100 TAB at 08:41

## 2018-08-21 RX ADMIN — PRAVASTATIN SODIUM 80 MG: 80 TABLET ORAL at 08:41

## 2018-08-21 RX ADMIN — HYDROXYCHLOROQUINE SULFATE 200 MG: 200 TABLET, FILM COATED ORAL at 08:42

## 2018-08-21 RX ADMIN — DOCUSATE SODIUM 100 MG: 100 CAPSULE, LIQUID FILLED ORAL at 08:41

## 2018-08-21 RX ADMIN — CALCIUM CARBONATE 500 MG (1,250 MG)-VITAMIN D3 200 UNIT TABLET 1 TABLET: at 08:41

## 2018-08-21 RX ADMIN — BUMETANIDE 2 MG: 0.25 INJECTION INTRAMUSCULAR; INTRAVENOUS at 08:49

## 2018-08-21 NOTE — ASSESSMENT & PLAN NOTE
· CKD stage 3 status post right nephrectomy for urothelial malignancy  · Monitor creatinine with diuresis  · If the creatinine remains elevated may have to change the Xarelto to Eliquis  · Check BMP at discharge on 8/24 with the addition of torsemide and losartan

## 2018-08-21 NOTE — PHYSICAL THERAPY NOTE
PHYSICAL THERAPY NOTE    Patient Name: Nolvia HIDALGO Date: 8/21/2018 08/21/18 0910   Pain Assessment   Pain Assessment No/denies pain   Pain Score No Pain   Hospital Pain Intervention(s) Ambulation/increased activity   Response to Interventions Increased fatigue   Restrictions/Precautions   Weight Bearing Precautions Per Order No   Other Precautions Fall Risk   General   Chart Reviewed Yes   Family/Caregiver Present No   Subjective   Subjective Pt reports she has no tightness in her chest today, but her legs feel a little heavier than usual with ambulation  Bed Mobility   Additional Comments Pt seated in reclining chair prior to treatment session  Transfers   Sit to Stand 5  Supervision   Additional items Armrests   Stand to Sit 5  Supervision   Additional items Armrests   Ambulation/Elevation   Gait pattern Wide MARILY; Short stride   Gait Assistance 5  Supervision   Assistive Device Rolling walker   Distance 150'   Balance   Static Sitting Normal   Dynamic Sitting Good   Static Standing Fair +   Dynamic Standing Fair   Ambulatory Fair   Endurance Deficit   Endurance Deficit Yes   Endurance Deficit Description Fatigue   Activity Tolerance   Activity Tolerance Patient limited by fatigue   Nurse Made Aware Okay per RNNellie   Exercises   Knee AROM Long Arc Quad Sitting;10 reps;Bilateral   Ankle Pumps Sitting;10 reps;Bilateral   Marching Sitting;10 reps;Bilateral   Assessment   Prognosis Good   Problem List Decreased strength;Decreased endurance; Impaired balance;Decreased mobility; Decreased coordination   Assessment Pt did well w treatment session, and demonstrates improved activity tolerance  Pt was able to ambulate 150ft supervision down hallway with RW, but was SOB and reporting LE weakness toward end of walk  She completed exercises seated in chair prior to ambulation without difficulty    Pt seated in chair post session w SCD's placed  Pt is comfortable and reports no additional needs     Goals   Treatment Day 1   Recommendation   Equipment Recommended (Pt reports she has RW at home)     Yusef Hester, PT

## 2018-08-21 NOTE — RESPIRATORY THERAPY NOTE
RT Ventilator Management Note  Sabrina Blankenship 81 y o  female MRN: [id number]  Unit/Bed#: CW2 210-01 Encounter: [id number]      Daily Screen     No data found  Physical Exam:          Resp Comments: (P) Pt had been ordered on HS CPAP for her NICK on admiission  Pt placed on HS CPAP 8/20/18  At 4:30am   Pt reports to RT than she did not want to wear HS CPAP for this admission  Order D/C'd  Equipment removed  Pt now requests that she does want to wear HS CPAP  HS CPAP reordered  Pt placed on CPAP tonight

## 2018-08-21 NOTE — PLAN OF CARE

## 2018-08-21 NOTE — ASSESSMENT & PLAN NOTE
· Acute on chronic diastolic congestive heart failure  · Patient was recently admitted to the hospital with acute CHF atrial fibrillation and was discharged on Lasix  · Pro BNP on admission 2615  Weight (last 2 days)     Date/Time   Weight    08/21/18 0600  90 7 (199 96)    08/20/18 0845  91 7 (202 16)    08/19/18 1707  98 7 (217 59)    08/19/18 0922  99 8 (220)              Intake/Output Summary (Last 24 hours) at 08/21/18 1014  Last data filed at 08/21/18 0901   Gross per 24 hour   Intake              360 ml   Output             1700 ml   Net            -1340 ml   · Noted dietary indiscretions by cardiology d/t increased salt intake recently  · HF education   · Start on torsemide 40mg QD  · Check BMP on 8/24  · F/u with cards in one week after discharge

## 2018-08-21 NOTE — DISCHARGE INSTRUCTIONS
Obtain bloodwork on 8/24    Heart Failure   WHAT YOU NEED TO KNOW:   Heart failure (HF) is a condition that does not allow your heart to fill or pump properly  Not enough oxygen in your blood gets to your organs and tissues  HF can occur in the right side, the left side, or both lower chambers of your heart  HF is often caused by damage or injury to your heart  The damage may be caused by heart attack, other heart conditions, or high blood pressure  HF is a long-term condition that tends to get worse over time  It is important to manage your health to improve your quality of life  HF can be worsened by heavy alcohol use, smoking, diabetes that is not controlled, or obesity  DISCHARGE INSTRUCTIONS:   Call 911 if:   · You have any of the following signs of a heart attack:      ¨ Squeezing, pressure, or pain in your chest that lasts longer than 5 minutes or returns    ¨ Discomfort or pain in your back, neck, jaw, stomach, or arm     ¨ Trouble breathing    ¨ Nausea or vomiting    ¨ Lightheadedness or a sudden cold sweat, especially with chest pain or trouble breathing    Return to the emergency department if:   · You gain 3 or more pounds (1 4 kg) in a day, or more than your healthcare provider says you should  · Your heartbeat is fast, slow, or uneven all the time  Contact your healthcare provider if:   · You have symptoms of worsening HF:      ¨ Shortness of breath at rest, at night, or that is getting worse in any way     ¨ Weight gain of 5 or more pounds (2 2 kg) in a week     ¨ More swelling in your legs or ankles     ¨ Abdominal pain or swelling     ¨ More coughing     ¨ Loss of appetite     ¨ Feeling tired all the time    · You feel hopeless or depressed, or you have lost interest in things you used to enjoy  · You often feel worried or afraid  · You have questions or concerns about your condition or care  Medicines:   You may  need any of the following:  · Medicines  may be needed to help regulate your heart rhythm  You may also need medicine to lower your blood pressure, and to get rid of extra fluids  · Take your medicine as directed  Contact your healthcare provider if you think your medicine is not helping or if you have side effects  Tell him of her if you are allergic to any medicine  Keep a list of the medicines, vitamins, and herbs you take  Include the amounts, and when and why you take them  Bring the list or the pill bottles to follow-up visits  Carry your medicine list with you in case of an emergency  Follow up with your healthcare provider or cardiologist within 2 weeks or as directed: You may need to return for other tests  You may need home health care  A healthcare provider will monitor your vital signs, weight, and make sure your medicines are working  Write down your questions so you remember to ask them during your visits  Go to cardiac rehab as directed:  Cardiac rehab is a program run by specialists who will help you safely strengthen your heart  The program includes exercise, relaxation, stress management, and heart-healthy nutrition  Healthcare providers will also make sure your medicines are helping to reduce your symptoms  Manage HF:   · Do not smoke  Nicotine and other chemicals in cigarettes and cigars can cause lung damage and make HF difficult to manage  Ask your healthcare provider for information if you currently smoke and need help to quit  E-cigarettes or smokeless tobacco still contain nicotine  Talk to your healthcare provider before you use these products  · Do not drink alcohol or take illegal drugs  Alcohol and drugs can worsen your symptoms quickly  · Weigh yourself every morning  Use the same scale, in the same spot  Do this after you use the bathroom, but before you eat or drink anything  Wear the same type of clothing  Do not wear shoes  Record your weight each day so you will notice any sudden weight gain   Swelling and weight gain are signs of fluid retention  If you are overweight, ask how to lose weight safely  · Check your blood pressure and heart rate every day  Ask for more information about how to measure your blood pressure and heart rate correctly  Ask what these numbers should be for you  · Manage any chronic health conditions you have  These include high blood pressure, diabetes, obesity, high cholesterol, metabolic syndrome, and COPD  You will have fewer symptoms if you manage these health conditions  Follow your healthcare provider's recommendations and follow up with him or her regularly  · Eat heart-healthy foods and limit sodium (salt)  An easy way to do this is to eat more fresh fruits and vegetables and fewer canned and processed foods  Replace butter and margarine with heart-healthy oils such as olive oil and canola oil  Other heart-healthy foods include walnuts, whole-grain breads, low-fat dairy products, beans, and lean meats  Fatty fish such as salmon and tuna are also heart healthy  Ask how much salt you can eat each day  Do not use salt substitutes  · Drink liquids as directed  You may need to limit the amount of liquids you drink if you retain fluid  Ask how much liquid to drink each day and which liquids are best for you  · Stay active  If you are not active, your symptoms are likely to worsen quickly  Walking, bicycling, and other types of physical activity help maintain your strength and improve your mood  Physical activity also helps you manage your weight  Work with your healthcare provider to create an exercise plan that is right for you  · Get vaccines as directed  Get a flu shot every year  You may also need the pneumonia vaccine  The flu and pneumonia can be severe for a person who has HF  Vaccines protect you from these infections  Join a support group:  Living with HF can be difficult  It may be helpful to talk with others who have HF   You may learn how to better manage your condition or get emotional support  For more information:   · [address]  [place] , [place]   Phone: 7- [phone] - [phone]  Web Address: https://www strong com/  org   © 2017 [address] [employer] Information is for End User's use only and may not be sold, redistributed or otherwise used for commercial purposes  All illustrations and images included in CareNotes® are the copyrighted property of A D A M , Inc  or Carlos Juarez  The above information is an  only  It is not intended as medical advice for individual conditions or treatments  Talk to your doctor, nurse or pharmacist before following any medical regimen to see if it is safe and effective for you  Chronic Kidney Disease   WHAT YOU NEED TO KNOW:   Chronic kidney disease (CKD) is the gradual and permanent loss of kidney function  It is also called chronic kidney failure, or chronic renal insufficiency  Normally, the kidneys remove fluid, chemicals, and waste from your blood  These wastes are turned into urine by your kidneys  CKD may worsen over time and lead to kidney failure  DISCHARGE INSTRUCTIONS:   Seek care immediately if:   · You are confused and very drowsy  · You have a seizure  · You have shortness of breath  Contact your healthcare provider if:   · You suddenly gain or lose more weight than your healthcare provider has told you is okay  · You have itchy skin or a rash  · You urinate more or less than you normally do  · You have blood in your urine  · You have nausea and repeated vomiting  · You have fatigue or muscle weakness  · You have hiccups that will not stop  · You have questions or concerns about your condition or care  Medicines:   · Medicines  may be given to decrease blood pressure and get rid of extra fluid   You may also receive medicine to manage health conditions that may occur with CKD, such as anemia, diabetes, and heart disease  · Take your medicine as directed  Contact your healthcare provider if you think your medicine is not helping or if you have side effects  Tell him or her if you are allergic to any medicine  Keep a list of the medicines, vitamins, and herbs you take  Include the amounts, and when and why you take them  Bring the list or the pill bottles to follow-up visits  Carry your medicine list with you in case of an emergency  Follow up with your healthcare provider as directed: You will need to return for tests to monitor your kidney function  You may also be referred to a kidney specialist  Write down your questions so you remember to ask them during your visits  Manage other health conditions: Follow your healthcare provider's directions on how to manage diabetes, high blood pressure, and heart disease  These conditions can make CKD worse  Talk to your healthcare provider before you take over-the-counter medicine  Medicines such as NSAIDs, stomach medicine, or laxatives may harm your kidneys  Weigh yourself daily:  Ask your healthcare provider what your weight should be  Ask how much liquid you should drink each day  CKD may cause you to gain or lose weight rapidly  Weigh yourself every day  Write down your weight, how much liquid you drink or eat, and how much you urinate each day  Contact your healthcare provider if your weight is higher or lower than it should be  Manage CKD:   · Maintain a healthy weight  Ask your healthcare provider how much you should weigh  Ask him to help you create a weight loss plan if you are overweight  · Exercise 30 to 60 minutes a day, 4 to 7 times a week, or as directed  Ask about the best exercise plan for you  Regular exercise can help you manage CKD, high blood pressure, and diabetes  · Follow your healthcare provider's advice about what to eat and drink  He may tell you to eat food low in sodium (salt), potassium, phosphorus, or protein   You may need to see a dietitian if you need help planning meals  Ask how much liquid to drink each day and which liquids are best for you  · Limit alcohol  Ask how much alcohol is safe for you to drink  A drink of alcohol is 12 ounces of beer, 5 ounces of wine, or 1½ ounces of liquor  · Do not smoke  Nicotine and other chemicals in cigarettes and cigars can cause lung and kidney damage  Ask your healthcare provider for information if you currently smoke and need help to quit  E-cigarettes or smokeless tobacco still contain nicotine  Talk to your healthcare provider before you use these products  · Ask your healthcare provider if you need vaccines  Infections such as pneumonia, influenza, and hepatitis can be more harmful or more likely to occur in a person who has CKD  Vaccines reduce your risk of infection with these viruses  © 2017 [facility] [facility] Information is for End User's use only and may not be sold, redistributed or otherwise used for commercial purposes  All illustrations and images included in CareNotes® are the copyrighted property of A D A M , Inc  or Carlos Juarez  The above information is an  only  It is not intended as medical advice for individual conditions or treatments  Talk to your doctor, nurse or pharmacist before following any medical regimen to see if it is safe and effective for you

## 2018-08-21 NOTE — SOCIAL WORK
DC Med update:     ANU received call from Fabricio Glaser at [facility]; MD called in script for Xeralto 15 mg  Cost =$146 / month, initial 30 days free  ANU met with the pt, her  and RN Rudolph Scott at bedside and advised pt of cost  Pt reported that she has been taking Xeralto 20 mg and receives the med thru her [employer] order at a more affordable price  Pt requesting initial 30 day free supply and then she will obtain the refills from [facility] mail order  ANU called and advised Peri at [facility]; Gage Fagan will be delivered to the pt's room prior to dc

## 2018-08-21 NOTE — SOCIAL WORK
DC plan completion:     Per NP Smith with [facility], pt is medically cleared for discharge  CM received scripts for Cozaar and Demadex; CM tubed scripts to [facility], notified hospital pharmacy ext [phone] and [address] of pt's discharge home today  [facility] notified via [facility]  RN Nellie advised of the above  CM met with the pt and her  at bedside; pt is agreeable to dc plan, expressed understanding of services and Heart Failure programs  Pt's  to transport the patient home  Pt denied any other dc needs at this time  Following OP CM is Eneida Reyes; CM entered [facility] office contact into AVS follow up provider  Email sent to [facility] with detail of dc [facility] services, [facility] appt, [facility] teaching and Heart Failure team involvement

## 2018-08-21 NOTE — TELEPHONE ENCOUNTER
Pt cancelled her appt for tomorrow    Will call back to re-[facility] when she feels better

## 2018-08-21 NOTE — PROGRESS NOTES
Patient and her  were counseled on 8/21/18 at 1120 on the following cardiac medications (as well as all discharge medications): Losartan 25 mg PO daily  Torsemide 20mg 2 tabs PO twice daily  Rivaroxaban 15 mg PO daily     Patient and her  were educated on the importance of taking these medications to prevent further hospitalizations and improve mortality  Common side effects, what to do when a dose is missed, easy ways to remember to take the medications, and when to contact the doctor and/or pharmacist regarding the medications, were discussed  These medications were NOT placed into pill boxes because the patient has her own pill boxes at home and denied additional pill boxes because they were harder for her to maneuver  The patient and  seemed understanding of the education and the patient had the following questions: She asked where to get the Xarelto 15 mg since she only had 20 mg at home and they were not supplied in the bag with losartan and torsemide  The doctor was contacted and sent a script for it to [facility] so she would have some while the mail order supply had time to come  Patient had no further questions  Thank you for involving me in this patient's care and please do not hesitate to contact me with any questions or concerns  Performed by:   Rupert Phillips, PharmD  PGY-1 Pharmacy Resident     Aj Chand, PharmD  Clinical Pharmacist Specialist - Internal Medicine  [phone]

## 2018-08-21 NOTE — ASSESSMENT & PLAN NOTE
· Patient is on methotrexate, leukovorin  and Plaquenil as an outpatient  · Outpatient f/u with coordinated health rheum at discharge

## 2018-08-21 NOTE — DISCHARGE SUMMARY
Discharge- Kimmy Lira 1935, 81 y o  female MRN: [id number]    Unit/Bed#: CW2 210-01 Encounter: [id number]    Primary Care Provider: Viola Lazar MD   Date and time admitted to hospital: 8/19/2018  9:24 AM        * Acute on chronic congestive heart failure [facility])   Assessment & Plan    · Acute on chronic diastolic congestive heart failure  · Patient was recently admitted to the hospital with acute CHF atrial fibrillation and was discharged on Lasix  · Pro BNP on admission 2615  Weight (last 2 days)     Date/Time   Weight    08/21/18 0600  90 7 (199 96)    08/20/18 0845  91 7 (202 16)    08/19/18 1707  98 7 (217 59)    08/19/18 0922  99 8 (220)              Intake/Output Summary (Last 24 hours) at 08/21/18 1014  Last data filed at 08/21/18 0901   Gross per 24 hour   Intake              360 ml   Output             1700 ml   Net            -1340 ml ·   Noted dietary indiscretions by cardiology d/t increased salt intake recently  · HF education   · Start on torsemide 40mg QD  · Check BMP on 8/24  · F/u with cards in one week after discharge         CKD (chronic kidney disease) stage 3, GFR 30-59 ml/min   Assessment & Plan    · CKD stage 3 status post right nephrectomy for urothelial malignancy  · Monitor creatinine with diuresis  · If the creatinine remains elevated may have to change the Xarelto to Eliquis  · Check BMP at discharge on 8/24 with the addition of torsemide and losartan           Atrial fibrillation ([address] )   Assessment & Plan    · Patient was diagnosed with atrial fibrillation recently  · Heart rate control  · Patient was on Xarelto-patient with recent nephrectomy      · If the creatinine remains elevated may have to change to Eliquis  · Patient with history of CVA in the past        History of nephroureterectomy   Assessment & Plan    · Patient with urothelial malignancy, status post right nephroureterectomy  · Follow-up with Urology as an outpatient        Hyponatremia   Assessment & Plan    · Resolved         Iron deficiency anemia   Assessment & Plan    · hgb stable at 8 3        Obesity   Assessment & Plan    · BMI noted at 38 2  · Diet/wt loss         Acquired hypothyroidism   Assessment & Plan    · Continue with Synthroid        Diabetes mellitus type 2 in obese [facility])   Assessment & Plan    Lab Results   Component Value Date    HGBA1C 6 3% 06/13/2018       Recent Labs      08/20/18   1035  08/20/18   1557  08/20/18   2058  08/21/18   0637   POCGLU  152*  134  127  126       Blood Sugar Average: Last 72 hrs:          Rheumatoid arthritis ([address] )   Assessment & Plan    · Patient is on methotrexate, leukovorin  and Plaquenil as an outpatient  · Outpatient f/u with coordinated health rheum at discharge         Essential hypertension   Assessment & Plan    · Blood pressure acceptable  · Losartan added by cardiology  · Check BMP on 8/24 to monitor creat         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 ([facility] [address] )   Assessment & Plan    · History of CVA in the past  · Continue with Xarelto        Chest painresolved as of 8/21/2018   Assessment & Plan    · Patient complaining of chest pressure likely associated with acute CHF and volume overload   · trops (-) x3            Discharging Physician / Practitioner: SANTOSH Singletary  PCP: Edna Lacey MD  Admission Date:   Admission Orders     Ordered        08/20/18 1025  Inpatient Admission  Once         08/19/18 1239  Place in Observation (expected length of stay for this patient is less than two midnights)  Once             Discharge Date: 08/21/18    Resolved Problems  Date Reviewed: 8/21/2018          Resolved    Chest pain 8/21/2018     Resolved by  [facility], [address] [place] [address] Stay:  · Cardiology     Procedures Performed:     · Chest xray 8/20- Small left pleural effusion   Bibasilar opacities, left greater than right, likely reflecting atelectasis   Correlate clinically if there is concern for underlying infection  Mildly enlarged cardiac silhouette, likely on the basis of the pericardial effusion seen on the prior chest CT    Significant Findings / Test Results:     · Acute CHF     Incidental Findings:   · None     Test Results Pending at Discharge (will require follow up): · None      Outpatient Tests Requested:  · BMP on 7/94     Complications:  None     Reason for Admission: Shortness of breath and chest pain    Hospital Course:     Daphne Kramer is a 81 y o  female patient who originally presented to the hospital on 8/19/2018 presented with worsening of shortness of breath and chest pain and lower extremity edema  Patient was admitted to the hospital from 8/9/18-8/12/18 with CHF exacerbation and new onset atrial fibrillation and she was discharged home  At home patient was on Lasix  VNA was following the patient as an outpatient  Patient reported that gradually for the past 1 week she noticed that her legs are getting more swollen and she was also getting progressively more short of breath  Admitted and started on IV bumex by cardiology  Pt was diuresed and transitioned to PO torsemide 40mg daily at discharge  Losartan 25mg daily was added to her med regimen as well  She will need a BMP on 8/24  She will be discharged with her RX from [address] [place] [address] with teaching, CM involved, she will have VNA and PT at discharge as well     Please see above list of diagnoses and related plan for additional information  Condition at Discharge: good     Discharge Day Visit / Exam:     Subjective:  Pt reports feeling much better today  No SOB with exertion or rest  Denies any other complaints  No n/v  (+) constipation  Denies chest pressure     Vitals: Blood Pressure: 129/58 (08/21/18 0728)  Pulse: 73 (08/21/18 0728)  Temperature: 97 5 °F (36 4 °C) (08/21/18 0728)  Temp Source: Oral (08/21/18 0728)  Respirations: 18 (08/21/18 0728)  Height: 5' 1" (154 9 cm) (08/19/18 1707)  Weight - Scale: 90 7 kg (199 lb 15 3 oz) (08/21/18 0600)  SpO2: 93 % (08/21/18 0728)  Exam:   Physical Exam   Constitutional: She is oriented to person, place, and time  No distress  Cardiovascular: Normal rate, regular rhythm, normal heart sounds and intact distal pulses  No murmur heard  Pulmonary/Chest: Effort normal  No respiratory distress  She has no wheezes  She has rales  Abdominal: Soft  Bowel sounds are normal  She exhibits no distension  There is no tenderness  There is no rebound  Musculoskeletal: She exhibits edema (mild lower extremity edema)  She exhibits no tenderness  Neurological: She is alert and oriented to person, place, and time  Skin: Skin is warm and dry  No rash noted  She is not diaphoretic  No erythema  Psychiatric: She has a normal mood and affect  Discussion with Family: pt refused call to  or family     Discharge instructions/Information to patient and family:   See after visit summary for information provided to patient and family  Provisions for Follow-Up Care:  See after visit summary for information related to follow-up care and any pertinent home health orders  Disposition:     Home with VNA Services (Reminder: Complete face to face encounter)    For Discharges to [facility] SNF:   · Not Applicable to this Patient - Not Applicable to this Patient    Planned Readmission: no     Discharge Statement:  I spent 35 minutes discharging the patient  This time was spent on the day of discharge  I had direct contact with the patient on the day of discharge  Greater than 50% of the total time was spent examining patient, answering all patient questions, arranging and discussing plan of care with patient as well as directly providing post-discharge instructions  Additional time then spent on discharge activities  Discharge Medications:  See after visit summary for reconciled discharge medications provided to patient and family        ** Please Note: This note has been constructed using a voice recognition system **

## 2018-08-21 NOTE — PROGRESS NOTES
Heart Failure Service Progress Note - Kj Antunez 81 y o  female MRN: [id number]    Unit/Bed#: CW2 210-01 Encounter: [id number]      Assessment:    Principal Problem:    Acute on chronic congestive heart failure (HCC)  Active Problems:    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 (HCC)    Essential hypertension    Rheumatoid arthritis ([address] )    Diabetes mellitus type 2 in obese (HCC)    Acquired hypothyroidism    Obesity    Iron deficiency anemia    Hyponatremia    History of nephroureterectomy    Chest pain    Atrial fibrillation (HCC)    CKD (chronic kidney disease) stage 3, GFR 30-59 ml/min      Echocardiogram  LVEF: 55%  LVIDd:3 08 cm  RV:normal systolic function  MR: mild  PASP:WNL  RVOT:   Other:    Neurohormonal Blockade:  --Beta-Blocker:   --ACEi, ARB or ARNi: Losartan 25 mg daily starting tomorrow  (or SVR reduction)  --Aldosterone Receptor Blocker:  --Diuretic: Torsemide 40 mg daily  Sudden Cardiac Death Risk Reduction:  --ICD: not a candidate  Interrogation:     Plan:  1  Acute on Chronic diastolic CHF exacerbation, HFpEF, NYHA Class III, AHA Stage C  Pro BNP on admission 2615, Chest xray demonstrating small left pleural effusion and cardiomegaly, +JVD, bilateral lower extremity edema with bibasilar crackles on PE  Volume status improved  Renal function stable  Patient feeling better with much less SOB  Will transition to PO Torsemide 40 mg daily to start tomorrow AM  Will need ongoing CHF education as an outpatient  Already has visiting nurses, scale  Needs dietary guidance       2 Persistent atrial fibrillation  Rate controlled  On Xarelto 15 mg daily       3  Hypertension  Reasonably controlled at this time  Previously on Losartan 100 mg daily, was d/c'd during prior admission due to hypotension, will restart at 25 mg daily now         4  HLD  On statin therapy       5  History of urothelial carcinoma, S/P right nephrectomy   Continue to monitor renal function closely         Subjective:   Patient seen and examined  No significant events overnight  Feels markedly better today  Vitals stable  For discharge home today  Objective: Intake/ Output: no intake recorded/1900  Weight: 199 down from 220 on admit  Tele: Atrial fib with controlled ventr rate  [employer] (day, reason): Medrano catheter (day, reason):    Vitals: Blood pressure 129/58, pulse 73, temperature 97 5 °F (36 4 °C), temperature source Oral, resp  rate 18, height 5' 1" (1 549 m), weight 90 7 kg (199 lb 15 3 oz), SpO2 93 %  , Body mass index is 37 78 kg/m² , I/O last 3 completed shifts: In: 300 [P O :300]  Out: 2650 [Urine:2600; Emesis/NG output:50]  I/O this shift:  In: 360 [P O :360]  Out: -   Wt Readings from Last 3 Encounters:   08/21/18 90 7 kg (199 lb 15 3 oz)   08/12/18 91 5 kg (201 lb 11 5 oz)   08/06/18 94 3 kg (208 lb)       Intake/Output Summary (Last 24 hours) at 08/21/18 0949  Last data filed at 08/21/18 0901   Gross per 24 hour   Intake              360 ml   Output             1700 ml   Net            -1340 ml     I/O last 3 completed shifts: In: 300 [P O :300]  Out: 2650 [Urine:2600; Emesis/NG output:50]      Physical Exam:  Vitals:    08/20/18 2300 08/21/18 0327 08/21/18 0600 08/21/18 0728   BP: 99/51 138/66  129/58   BP Location: Left arm Left arm  Left arm   Pulse: 82 66  73   Resp: 18 18  18   Temp: 99 °F (37 2 °C) (!) 97 4 °F (36 3 °C)  97 5 °F (36 4 °C)   TempSrc: Oral Axillary  Oral   SpO2: 93% 93%  93%   Weight:   90 7 kg (199 lb 15 3 oz)    Height:           GEN: Lilliam Ramey appears well, alert and oriented x 3, pleasant and cooperative   HEENT: pupils equal, round, and reactive to light; extraocular muscles intact  NECK: supple, no carotid bruits   HEART: irregular rhythm, normal S1 and S2, no murmurs, clicks, gallops or rubs, no JVP    LUNGS: decreased to auscultation bilaterally; no wheezes, few fine rales at bases      ABDOMEN: normal bowel sounds, soft, no tenderness, no distention  EXTREMITIES: peripheral pulses normal; no clubbing, cyanosis, trace BLLE edema  NEURO: no focal findings   SKIN: normal without suspicious lesions on exposed skin      Current Facility-Administered Medications:     acetaminophen (TYLENOL) tablet 650 mg, 650 mg, Oral, Q6H PRN, Candy Bello MD, 650 mg at 08/21/18 0153    albuterol (PROVENTIL HFA,VENTOLIN HFA) inhaler 2 puff, 2 puff, Inhalation, Q6H PRN, Candy Bello MD    calcium carbonate (TUMS) chewable tablet 1,000 mg, 1,000 mg, Oral, Daily PRN, Candy Bello MD    calcium carbonate-vitamin D (OSCAL-D) 500 mg-200 units per tablet 1 tablet, 1 tablet, Oral, BID With Meals, Candy Bello MD, 1 tablet at 08/21/18 0841    cyanocobalamin (VITAMIN B-12) tablet 100 mcg, 100 mcg, Oral, Daily, Candy Bello MD, 100 mcg at 08/21/18 0841    dextran 70-hypromellose (GENTEAL TEARS) 0 1-0 3 % ophthalmic solution 1 drop, 1 drop, Both Eyes, TID, SANTOSH Rodríguez, 1 drop at 08/21/18 0843    docusate sodium (COLACE) capsule 100 mg, 100 mg, Oral, BID, Cnady Bello MD, 100 mg at 08/21/18 0841    gabapentin (NEURONTIN) capsule 100 mg, 100 mg, Oral, TID, Candy Bello MD, 100 mg at 08/21/18 0840    hydroxychloroquine (PLAQUENIL) tablet 200 mg, 200 mg, Oral, BID With Meals, Candy Bello MD, 200 mg at 08/21/18 0842    insulin lispro (HumaLOG) 100 units/mL subcutaneous injection 1-5 Units, 1-5 Units, Subcutaneous, TID AC, 1 Units at 08/20/18 1156 **AND** Fingerstick Glucose (POCT), , , TID AC, SANTOSH Rodríguez    levothyroxine tablet 75 mcg, 75 mcg, Oral, Daily, Candy Bello MD, 75 mcg at 08/21/18 0639    miconazole (MONISTAT) external vaginal cream, , Topical, BID PRN **AND** miconazole (MONISTAT) vaginal suppository 200 mg, 200 mg, Vaginal, HS, Candy Bello MD, 200 mg at 08/21/18 0155    ondansetron (ZOFRAN) injection 4 mg, 4 mg, Intravenous, Q6H PRN, Candy Bello MD, 4 mg at 08/20/18 0046    pantoprazole (PROTONIX) EC tablet 40 mg, 40 mg, Oral, Early Morning, Dianelys Rooney MD, 40 mg at 08/21/18 5409    pravastatin (PRAVACHOL) tablet 80 mg, 80 mg, Oral, Daily, Dianelys Rooney MD, 80 mg at 08/21/18 0841    rivaroxaban (XARELTO) tablet 15 mg, 15 mg, Oral, Daily With Breakfast, Dianelys Rooney MD, 15 mg at 08/20/18 2146    rOPINIRole (REQUIP) tablet 0 5 mg, 0 5 mg, Oral, Daily, Dianelys Rooney MD, 0 5 mg at 08/21/18 0843    rOPINIRole (REQUIP) tablet 1 mg, 1 mg, Oral, HS, Dianelys Rooney MD, 1 mg at 08/20/18 2147    simethicone (MYLICON) chewable tablet 80 mg, 80 mg, Oral, 4x Daily PRN, MD Sera Tan  [START ON 8/22/2018] torsemide (DEMADEX) tablet 40 mg, 40 mg, Oral, Daily, SANTOSH Canales    Facility-Administered Medications Ordered in Other Encounters:     acetaminophen (TYLENOL) tablet 650 mg, 650 mg, Oral, Q6H PRN, Julia Long PA-C      Labs & Results:      Results from last 7 days  Lab Units 08/19/18  2121 08/19/18  1809 08/19/18  0942   TROPONIN I ng/mL <0 02 <0 02 <0 02     Results from last 7 days  Lab Units 08/20/18  0628 08/19/18  0942   WBC Thousand/uL 7 75 9 58   HEMOGLOBIN g/dL 8 3* 8 6*   HEMATOCRIT % 25 7* 26 6*   PLATELETS Thousands/uL 310 326           Results from last 7 days  Lab Units 08/21/18  0705 08/20/18  0628 08/19/18  0942   SODIUM mmol/L 133* 139 133*   POTASSIUM mmol/L 3 8 4 0 3 8   CHLORIDE mmol/L 99* 103 101   CO2 mmol/L 29 27 25   BUN mg/dL 32* 32* 31*   CREATININE mg/dL 1 62* 1 57* 1 64*   CALCIUM mg/dL 8 9 8 7 8 7   TOTAL PROTEIN g/dL  --  6 9 7 2   BILIRUBIN TOTAL mg/dL  --  0 97 0 66   ALK PHOS U/L  --  100 104   ALT U/L  --  14 15   AST U/L  --  12 14   GLUCOSE RANDOM mg/dL 120 124 148*     Results from last 7 days  Lab Units 08/19/18  0942   INR  1 80*     Counseling / Coordination of Care  Total floor / unit time spent today 10 minutes  Greater than 50% of total time was spent with the patient and / or family counseling and / or coordination of care    A description of the counseling / coordination of care: 5  Thank you for the opportunity to participate in the care of this patient  Cyndie ROLON    Director Heart Failure/ Medical Director [address] [place] [address]

## 2018-08-21 NOTE — TELEPHONE ENCOUNTER
Patient called to cancel her procedure for cysto on 8/22/2018  She stated she was just discharged from the hospital and will call back to reschedule when she feels better

## 2018-08-21 NOTE — ASSESSMENT & PLAN NOTE
Lab Results   Component Value Date    HGBA1C 6 3% 06/13/2018       Recent Labs      08/20/18   1035  08/20/18   1557  08/20/18 2058  08/21/18   0637   POCGLU  152*  134  127  126       Blood Sugar Average: Last 72 hrs:

## 2018-08-22 ENCOUNTER — TRANSITIONAL CARE MANAGEMENT (OUTPATIENT)
Dept: FAMILY MEDICINE CLINIC | Facility: CLINIC | Age: 83
End: 2018-08-22

## 2018-08-22 ENCOUNTER — TELEPHONE (OUTPATIENT)
Dept: FAMILY MEDICINE CLINIC | Facility: CLINIC | Age: 83
End: 2018-08-22

## 2018-08-22 NOTE — PROGRESS NOTES
Heart Failure Outpatient Progress Note - Reyna Randolph 81 y o  female MRN: [id number]    @ Encounter: [id number]  Assessment/Plan:    Patient Active Problem List    Diagnosis Date Noted    Chronic diastolic heart failure ([facility] ) 08/23/2018    CKD (chronic kidney disease) stage 3, GFR 30-59 ml/min 08/12/2018    Atrial fibrillation ([place] [address] ) 08/11/2018    Incisional hernia 06/28/2018    History of nephroureterectomy 06/21/2018    PAD (peripheral artery disease) ([place] [address] ) 06/04/2018    Anemia 04/25/2018    Pancreatic cyst 03/14/2018    Iron deficiency anemia 03/14/2018    Urothelial cancer ([place] [address] ) 03/02/2018    Lung nodule < 6cm on CT 03/02/2018    Chronic bilateral thoracic back pain 02/12/2018    Thoracic degenerative disc disease 02/12/2018    Sinus arrhythmia 01/03/2018    Peripheral neuropathy 11/09/2017    Right lumbar radiculopathy 11/09/2017    Primary osteoarthritis of left knee 10/13/2017    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 ([place] [address] ) 05/10/2017    Essential hypertension 05/10/2017    Rheumatoid arthritis ([place] [address] ) 05/10/2017    Diabetes mellitus type 2 in obese ([place] [address] ) 05/10/2017    Sleep apnea 05/10/2017    Acquired hypothyroidism 05/10/2017    Chronic GERD 05/10/2017    Prediabetes 05/24/2016    Restless leg syndrome 01/05/2016    Sensorineural hearing loss 10/12/2014    Hypercholesterolemia 08/29/2013    Obesity 07/18/2013     # Grade II diastolic dysfunction: No JVD on exam  Likely 2/2 to HTN (cLVH on TTE)  Dieting now  Has gained 6 lbs since D/C  Labs today show chloride depletion alkalosis w/ mild elevation in BUN and Cr    --Decrease torsemide to 20 mg PO tomorrow AM; then alternate with 40 mg QOD  --Continue BP control   # HTN: BP ok  Asked to use arm cuff as typically more accurate than wrist   # Hx of lymphedema   # Hyperglycemia   # APCs: Continue current mgmt  Had 1 week holter w/o e/o AFib   Currently anticoagulated for CVA so would not   # Hx of CVA/TIA: Currently on Xarelto and pravastatin  # NICK on CPAP    RTC in 6 weeks    HPI: Very pleasant 80 y/o woman w/ PMHx of HTN, HLD, chronic LE edema/lymphedema, anemia of chronic disease p/f establishing care and abnormal ECG  She states she feels well  She was [facility] and now lives closer to [place] [address] so is transferring her care  Denies any cardiac symptoms  Uses lymphedema machine  She had a CVA in 5/2017 and received tPA  Overall feels well from that  She had a 1 week holter that did not show any arrhythmia and has been on NOAC ever since the stroke  Holter showed NSR w/ APCs  She comes in today 4/6/18, for f/u  She had noted hematuria  CT A/P noted a R renal pelvis multifocal papillary lesions with pathology showing low grade Ta urothelial cancer  She comes in for preop evaluation  She continues to deny cardiac symptoms  She is more limited by knee pains  She is able to get around her home without difficulty  Today 7/10/18, she returns for f/u  She had her right robotic nephroureterectomy with bladder cuff  She currently has VNA coming to the home  Moving around better  Feeling stronger  Walking is difficult 2/2 to rheumatoid arthritis  Today 8/24/2018, she returns for post hospital D/C  She was admitted twice since her last visit  In the first admission she presented with chest tightness and dyspnea  On 8/10/18 underwent nuclear stress test with normal perfusion  She was diuresed and felt improved  Then she was readmitted with dyspnea and volume overload and diuresed again  Today she feels well       Past Medical History:   Diagnosis Date    Anemia     Arthritis     rheumatoid    Benign essential hypertension     Cataract     Chronic cystitis     CPAP (continuous positive airway pressure) dependence     Diverticulitis of colon     Early satiety     GERD (gastroesophageal reflux disease)     Gout     Hearing aid worn     bilateral  Hearing loss     Hemorrhoids     Hiatal hernia     History of colonic polyps     Hyperlipidemia     Hypothyroidism     Left breast mass     Microhematuria     Migraine     occular    Neuropathy     lower and upper extermities    Overactive bladder     Pneumonia of left lower lobe due to infectious organism (HCC)     RA (rheumatoid arthritis) [facility] )     Sleep apnea     uses cpap    Stroke ([facility] )     5/2017    Type 2 diabetes mellitus (HCC)     Urinary frequency     Urinary urgency     Urothelial cancer ([facility] ) 04/23/2018    Use of cane as ambulatory aid        Review of Systems - 12 point ROS was done and is negative, except as noted above  Allergies   Allergen Reactions    Acetazolamide Other (See Comments)     Other reaction(s): Unknown Allergic Reaction    Aspirin      Other reaction(s): Other (See Comments)  High Dose ASA-stomach ache, rachel  ASA 81 m    Atorvastatin      Other reaction(s): Muscle Pain, Myalgia    Azithromycin     Nitrofurantoin Hives     HIVES * pt denies  Other reaction(s): Unknown Allergic Reaction  Other reaction(s): Other (See Comments)  HIVES * pt denies    Other Other (See Comments)     Adhesive tape : red and itching  Other reaction(s):  Other (See Comments)  red and itching    Shellfish-Derived Products Other (See Comments)     Patient got Gout following eating shell fish    Sulfa Antibiotics Other (See Comments)     unknown    Tramadol Diarrhea and Vomiting       Current Outpatient Prescriptions:     acetaminophen (TYLENOL) 325 mg tablet, Take 650 mg by mouth every 6 (six) hours as needed for mild pain, Disp: , Rfl:     albuterol (VENTOLIN HFA) 90 mcg/act inhaler, Inhale 2 puffs every 6 (six) hours as needed for wheezing, Disp: 1 Inhaler, Rfl: 0    Calcium Citrate-Vitamin D (CALCIUM + D PO), Take 1 tablet by mouth 2 (two) times a day, Disp: , Rfl:     cyanocobalamin (VITAMIN B-12) 100 mcg tablet, Take by mouth, Disp: , Rfl:     gabapentin (NEURONTIN) 100 mg capsule, TAKE 1 CAPSULE IN THE MORNING, TAKE 1 CAPSULE IN THE AFTERNOON AND TAKE 1 TO 3 CAPSULES AT BEDTIME, Disp: 450 capsule, Rfl: 1    hydroxychloroquine (PLAQUENIL) 200 mg tablet, Take 200 mg by mouth 2 (two) times a day with meals, Disp: , Rfl:     LEUCOVORIN CALCIUM PO, Take 10 mg by mouth once a week, Disp: , Rfl:     levothyroxine 75 mcg tablet, Take 75 mcg by mouth daily, Disp: , Rfl:     losartan (COZAAR) 25 mg tablet, Take 1 tablet (25 mg total) by mouth daily, Disp: 30 tablet, Rfl: 0    methotrexate 2 5 mg tablet, , Disp: , Rfl:     Multiple Vitamin (MULTIVITAMINS PO), Take 1 tablet by mouth daily, Disp: , Rfl:     omeprazole (PriLOSEC) 20 mg delayed release capsule, TAKE 1 CAPSULE DAILY AS NEEDED FOR STOMACH ACID, Disp: 90 capsule, Rfl: 3    pravastatin (PRAVACHOL) 80 mg tablet, TAKE 1 TABLET EVERY DAY, Disp: 90 tablet, Rfl: 3    rivaroxaban (XARELTO) 15 mg tablet, Take 1 tablet (15 mg total) by mouth daily with breakfast, Disp: , Rfl: 0    rOPINIRole (REQUIP) 0 5 mg tablet, Take 0 5 mg by mouth 2 (two) times a day  , Disp: , Rfl:     torsemide (DEMADEX) 20 mg tablet, Take 2 tablets (40 mg total) by mouth daily, Disp: 30 tablet, Rfl: 0    docusate sodium (COLACE) 100 mg capsule, Take 1 capsule (100 mg total) by mouth 2 (two) times a day for 60 doses, Disp: 60 capsule, Rfl: 0  No current facility-administered medications for this visit  Facility-Administered Medications Ordered in Other Visits:     acetaminophen (TYLENOL) tablet 650 mg, 650 mg, Oral, Q6H PRN, Kalli Bradford PA-C    Social History     Social History    Marital status: /Civil Union     Spouse name: N/A    Number of children: N/A    Years of education: N/A     Occupational History    Not on file       Social History Main Topics    Smoking status: Never Smoker    Smokeless tobacco: Never Used      Comment: stopped smoking in the distant past, never smoker (as per [employer])     Alcohol use No      Comment: seldom    Drug use: No    Sexual activity: Not on file     Other Topics Concern    Not on file     Social History Narrative    No caffeine use       Family History   Problem Relation Age of Onset    Other Mother         epilepsy    Early death Mother    Layla Moss Glaucoma Father     Stroke Father         silent    Other Brother         cardiac disorder       Physical Exam:    Vitals: Blood pressure 100/64, pulse 68, weight 93 kg (205 lb), SpO2 97 %  , Body mass index is 38 73 kg/m² ,   Wt Readings from Last 3 Encounters:   08/24/18 93 kg (205 lb)   08/23/18 92 3 kg (203 lb 6 4 oz)   08/21/18 90 7 kg (199 lb 15 3 oz)     Vitals:    08/24/18 1601   BP: 100/64   BP Location: Left arm   Patient Position: Sitting   Cuff Size: Standard   Pulse: 68   SpO2: 97%   Weight: 93 kg (205 lb)       GEN: Lilliam Ramey appears well, alert and oriented x 3, pleasant and cooperative   HEENT: pupils equal, round, and reactive to light; extraocular muscles intact  NECK: supple, no carotid bruits   HEART: regular rhythm, normal S1 and S2, no murmurs, clicks, gallops or rubs, JVD is mildly elevated     LUNGS: clear to auscultation bilaterally; no wheezes, rales, or rhonchi   ABDOMEN: normal bowel sounds, soft, no tenderness, no distention  EXTREMITIES: peripheral pulses normal; no clubbing, cyanosis; 2+ B/L LE edema to mid shin  NEURO: no focal findings   SKIN: normal without suspicious lesions on exposed skin    Labs & Results:  Lab Results   Component Value Date    WBC 7 75 08/20/2018    HGB 8 3 (L) 08/20/2018    HCT 25 7 (L) 08/20/2018     (H) 08/20/2018     08/20/2018       Chemistry        Component Value Date/Time     (L) 08/24/2018 1153     11/14/2017 1151    K 3 7 08/24/2018 1153    K 3 7 11/14/2017 1151    CL 94 (L) 08/24/2018 1153     11/14/2017 1151    CO2 27 08/24/2018 1153    CO2 29 11/14/2017 1151    BUN 43 (H) 08/24/2018 1153    BUN 18 11/14/2017 1151    CREATININE 1 82 (H) 08/24/2018 1153 CREATININE 0 88 11/14/2017 1151        Component Value Date/Time    CALCIUM 8 2 (L) 08/24/2018 1153    CALCIUM 8 9 11/14/2017 1151    ALKPHOS 100 08/20/2018 0628    ALKPHOS 95 11/14/2017 1151    AST 12 08/20/2018 0628    AST 24 11/14/2017 1151    ALT 14 08/20/2018 0628    ALT 19 11/14/2017 1151    BILITOT 0 97 08/20/2018 0628    BILITOT 1 2 11/14/2017 1151        EKG personally reviewed by Eze Yepez MD      Counseling / Coordination of Care  Total floor / unit time spent today 40 minutes  Greater than 50% of total time was spent with the patient and / or family counseling and / or coordination of care  A description of the counseling / coordination of care: 20 min  Thank you for the opportunity to participate in the care of this patient      Eze Yepez MD, PhD   Marylene Noon

## 2018-08-22 NOTE — TELEPHONE ENCOUNTER
Was in [facility] for 2 days for CHF,  8/19-8/21  Zina Baumanalison Has not had a BM since 8/19  On colace po BID and 1500 ml fluid restrictions  Has TCM appt tomorrow  Is there anything else she can do in meantime? Sherre Soulier

## 2018-08-23 ENCOUNTER — OFFICE VISIT (OUTPATIENT)
Dept: FAMILY MEDICINE CLINIC | Facility: CLINIC | Age: 83
End: 2018-08-23
Payer: MEDICARE

## 2018-08-23 VITALS
SYSTOLIC BLOOD PRESSURE: 124 MMHG | TEMPERATURE: 98.5 F | RESPIRATION RATE: 18 BRPM | BODY MASS INDEX: 38.4 KG/M2 | WEIGHT: 203.4 LBS | OXYGEN SATURATION: 98 % | HEART RATE: 78 BPM | HEIGHT: 61 IN | DIASTOLIC BLOOD PRESSURE: 64 MMHG

## 2018-08-23 DIAGNOSIS — I50.32 CHRONIC DIASTOLIC HEART FAILURE (HCC): Primary | ICD-10-CM

## 2018-08-23 DIAGNOSIS — G63 POLYNEUROPATHY ASSOCIATED WITH UNDERLYING DISEASE (HCC): ICD-10-CM

## 2018-08-23 DIAGNOSIS — M05.9 RHEUMATOID ARTHRITIS WITH POSITIVE RHEUMATOID FACTOR, INVOLVING UNSPECIFIED SITE (HCC): ICD-10-CM

## 2018-08-23 DIAGNOSIS — I10 ESSENTIAL HYPERTENSION: ICD-10-CM

## 2018-08-23 PROBLEM — D72.829 LEUCOCYTOSIS: Status: RESOLVED | Noted: 2018-08-10 | Resolved: 2018-08-23

## 2018-08-23 PROBLEM — I16.0 HYPERTENSIVE URGENCY: Status: RESOLVED | Noted: 2018-08-09 | Resolved: 2018-08-23

## 2018-08-23 PROBLEM — I50.9 ACUTE ON CHRONIC CONGESTIVE HEART FAILURE (HCC): Status: RESOLVED | Noted: 2018-08-19 | Resolved: 2018-08-23

## 2018-08-23 PROBLEM — M79.89 PAIN AND SWELLING OF LOWER EXTREMITY: Status: RESOLVED | Noted: 2018-08-10 | Resolved: 2018-08-23

## 2018-08-23 PROBLEM — E87.1 HYPONATREMIA: Status: RESOLVED | Noted: 2018-04-25 | Resolved: 2018-08-23

## 2018-08-23 PROBLEM — M79.606 PAIN AND SWELLING OF LOWER EXTREMITY: Status: RESOLVED | Noted: 2018-08-10 | Resolved: 2018-08-23

## 2018-08-23 PROBLEM — R60.0 BILATERAL EDEMA OF LOWER EXTREMITY: Status: RESOLVED | Noted: 2018-06-04 | Resolved: 2018-08-23

## 2018-08-23 PROCEDURE — 99496 TRANSJ CARE MGMT HIGH F2F 7D: CPT | Performed by: INTERNAL MEDICINE

## 2018-08-23 NOTE — PROGRESS NOTES
Assessment/Plan:     Diagnoses and all orders for this visit:    Chronic diastolic heart failure (HCC)    Rheumatoid arthritis with positive rheumatoid factor, involving unspecified site [facility])    Essential hypertension    Polyneuropathy associated with underlying disease ([facility] )       Destiny Blair was seen and exmained in the office today  Overall, she appears stable  She has bene monitoring her weight at home and does appear stable  Visiting nursing is coming 3 times a week  She was reminded to keep on a low salt diet  She has a known history of Pafib with previous CVA as well  She remains on anticoagulation  She does have follow up blood work to complete tomorrow to ensure kidney function has remained stable  She follows with [facility] for underlying RA as well  Otherwise no other changes were made  Subjective:      Patient ID: Tiff Magana is a 81 y o  female  Destiny Blair is here today for a follow up from a recent hospitalization  She was recently hospitalized twice in the last month  Prior to this hospital visit, she reported noticing increasing chest tightness and shortness of breath  She reported worsening lower extremity edema  She was diagnosed with acute on chronic diastolic heart failure  Eventually, medication changes were made including decreasing the Losartan and increasing the Torsemide dosing  She reports feeling stable  She has a known history of urothelial cancer s/p nephrectomy with underlying chronic kidney disease  She reports known neuropathy and does that Gabapentin for this  She was told by her pain management physician that she can take an extra 300 mg at bedtime if needed  She reported doing this last night and feels more fatigued today  Other history was reviewed as well           The following portions of the patient's history were reviewed and updated as appropriate: allergies, current medications, past family history, past medical history, past social history, past surgical history and problem list     Review of Systems   Constitutional: Positive for fatigue  Negative for chills, fever and unexpected weight change  HENT: Negative for sinus pain, sinus pressure, sore throat and voice change  Eyes: Negative for visual disturbance  Respiratory: Negative for cough, chest tightness, shortness of breath and wheezing  Cardiovascular: Negative for chest pain, palpitations and leg swelling  Gastrointestinal: Negative for abdominal pain, blood in stool, constipation, diarrhea, nausea and vomiting  Musculoskeletal: Positive for arthralgias  Negative for back pain and myalgias  Neurological: Negative for dizziness, syncope, numbness and headaches  Psychiatric/Behavioral: Negative for dysphoric mood and sleep disturbance  The patient is not nervous/anxious  Objective:      /64   Pulse 78   Temp 98 5 °F (36 9 °C)   Resp 18   Ht 5' 1" (1 549 m)   Wt 92 3 kg (203 lb 6 4 oz)   LMP  (LMP Unknown)   SpO2 98%   BMI 38 43 kg/m²          Physical Exam   Constitutional: She is oriented to person, place, and time  She appears well-developed and well-nourished  No distress  HENT:   Head: Normocephalic and atraumatic  Eyes: Conjunctivae and EOM are normal  Pupils are equal, round, and reactive to light  Right eye exhibits no discharge  Left eye exhibits no discharge  Neck: Normal range of motion  Neck supple  No JVD present  No tracheal deviation present  No thyromegaly present  Cardiovascular: Normal rate, regular rhythm and normal heart sounds  Pulmonary/Chest: Effort normal and breath sounds normal  No respiratory distress  She has no wheezes  Abdominal: Soft  Bowel sounds are normal  There is no tenderness  Hernia known    Musculoskeletal: Normal range of motion  Mild edema    Neurological: She is alert and oriented to person, place, and time  No cranial nerve deficit  Skin: Skin is warm and dry  She is not diaphoretic     Psychiatric: She has a normal mood and affect  Her speech is normal and behavior is normal  Judgment and thought content normal    Vitals reviewed          Date and time hospital follow up call was made:  8/22/2018  8:29 AM  Hospital care reviewed:  Records reviewed  Patient was hopsital[facility] at:  [facility]  Date of admission:  8/19/18  Date of discharge:  8/21/18  Diagnosis:  chronic congestive heart failure   Disposition:  Home health services  Were the patients medicaitons reviewed and updated:  No  Current symptoms:  Fatigue (Comment: some chest pressure persists)  Fatigue severity:  Moderate  Post hospital issues:  None  Scheduled for follow up?:  Yes  Did you obtain your prescribed medications:  Yes  Do you need help managing your perscriptions or medications:  No  Is transportation to your appointments needed:  No  I have advised the patient to call PCP with any new or worsening symptoms (please type in name along with any credentials):  lhunter  Living Arrangements:  Spouse or Significiant other

## 2018-08-23 NOTE — TELEPHONE ENCOUNTER
She can take MiraLax 17 g daily until she has a bowel movement and should also add prunes  Please make sure she is being seen here in the very near future

## 2018-08-24 ENCOUNTER — OFFICE VISIT (OUTPATIENT)
Dept: CARDIOLOGY CLINIC | Facility: CLINIC | Age: 83
End: 2018-08-24
Payer: MEDICARE

## 2018-08-24 ENCOUNTER — APPOINTMENT (OUTPATIENT)
Dept: LAB | Facility: CLINIC | Age: 83
End: 2018-08-24
Payer: MEDICARE

## 2018-08-24 VITALS
DIASTOLIC BLOOD PRESSURE: 64 MMHG | HEART RATE: 68 BPM | BODY MASS INDEX: 38.73 KG/M2 | WEIGHT: 205 LBS | OXYGEN SATURATION: 97 % | SYSTOLIC BLOOD PRESSURE: 100 MMHG

## 2018-08-24 DIAGNOSIS — L03.115 CELLULITIS OF RIGHT LEG: ICD-10-CM

## 2018-08-24 DIAGNOSIS — I50.9 OTHER CONGESTIVE HEART FAILURE (HCC): Primary | ICD-10-CM

## 2018-08-24 DIAGNOSIS — N17.9 AKI (ACUTE KIDNEY INJURY) (HCC): ICD-10-CM

## 2018-08-24 LAB
ANION GAP SERPL CALCULATED.3IONS-SCNC: 10 MMOL/L (ref 4–13)
BUN SERPL-MCNC: 43 MG/DL (ref 5–25)
CALCIUM SERPL-MCNC: 8.2 MG/DL (ref 8.3–10.1)
CHLORIDE SERPL-SCNC: 94 MMOL/L (ref 100–108)
CO2 SERPL-SCNC: 27 MMOL/L (ref 21–32)
CREAT SERPL-MCNC: 1.82 MG/DL (ref 0.6–1.3)
GFR SERPL CREATININE-BSD FRML MDRD: 26 ML/MIN/1.73SQ M
GLUCOSE SERPL-MCNC: 86 MG/DL (ref 65–140)
POTASSIUM SERPL-SCNC: 3.7 MMOL/L (ref 3.5–5.3)
SODIUM SERPL-SCNC: 131 MMOL/L (ref 136–145)

## 2018-08-24 PROCEDURE — 99214 OFFICE O/P EST MOD 30 MIN: CPT | Performed by: INTERNAL MEDICINE

## 2018-08-24 PROCEDURE — 80048 BASIC METABOLIC PNL TOTAL CA: CPT

## 2018-08-24 PROCEDURE — 36415 COLL VENOUS BLD VENIPUNCTURE: CPT

## 2018-08-24 NOTE — LETTER
August 24, 2018     Janey Granados MD  [id number]     Patient: Michael Verma   YOB: 1935   Date of Visit: 8/24/2018       Dear Dr Rogelio Darling: Thank you for referring Tricia Ayoub to me for evaluation  Below are my notes for this consultation  If you have questions, please do not hesitate to call me  I look forward to following your patient along with you           Sincerely,        Sue Marquez MD        CC: No Recipients  Sue Marquez MD  8/24/2018  4:27 PM  Sign at close encounter  Heart Failure Outpatient Progress Note - Michael Verma 81 y o  female MRN: [id number]    @ Encounter: [id number]  Assessment/Plan:    Patient Active Problem List    Diagnosis Date Noted    Chronic [facility] failure ([facility] ) 08/23/2018    CKD (chronic kidney disease) stage 3, GFR 30-59 ml/min 08/12/2018    Atrial fibrillation ([facility] ) 08/11/2018    Incisional hernia 06/28/2018    History of nephroureterectomy 06/21/2018    PAD (peripheral artery disease) ([facility] ) 06/04/2018    Anemia 04/25/2018    Pancreatic cyst 03/14/2018    Iron deficiency anemia 03/14/2018    Urothelial cancer ([facility] ) 03/02/2018    Lung nodule < 6cm on CT 03/02/2018    Chronic bilateral thoracic back pain 02/12/2018    Thoracic degenerative disc disease 02/12/2018    Sinus arrhythmia 01/03/2018    Peripheral neuropathy 11/09/2017    Right lumbar radiculopathy 11/09/2017    Primary osteoarthritis of left knee 10/13/2017    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 ([facility] ) 05/10/2017    Essential hypertension 05/10/2017    Rheumatoid arthritis ([facility] ) 05/10/2017    Diabetes mellitus type 2 in obese ([facility] ) 05/10/2017    Sleep apnea 05/10/2017    Acquired hypothyroidism 05/10/2017    Chronic GERD 05/10/2017    Prediabetes 05/24/2016    Restless leg syndrome 01/05/2016    Sensorineural hearing loss 10/12/2014    Hypercholesterolemia 08/29/2013    Obesity 07/18/2013     # Grade II diastolic dysfunction: No JVD on exam  Likely 2/2 to HTN (cLVH on TTE)  Dieting now  Has gained 6 lbs since D/C  Labs today show chloride depletion alkalosis w/ mild elevation in BUN and Cr    --Decrease torsemide to 20 mg PO tomorrow AM; then alternate with 40 mg QOD  --Continue BP control   # HTN: BP ok  Asked to use arm cuff as typically more accurate than wrist   # Hx of lymphedema   # Hyperglycemia   # APCs: Continue current mgmt  Had 1 week holter w/o e/o AFib  Currently anticoagulated for CVA so would not   # Hx of CVA/TIA: Currently on Xarelto and pravastatin  # NICK on CPAP    RTC in 6 weeks    HPI: Very pleasant 80 y/o woman w/ PMHx of HTN, HLD, chronic LE edema/lymphedema, anemia of chronic disease p/f establishing care and abnormal ECG  She states she feels well  She was [facility] at [facility] ([facility]) and now lives closer to [place] [id number] so is transferring her care  Denies any cardiac symptoms  Uses lymphedema machine  She had a CVA in 5/2017 and received tPA  Overall feels well from that  She had a 1 week holter that did not show any arrhythmia and has been on NOAC ever since the stroke  Holter showed NSR w/ APCs  She comes in today 4/6/18, for f/u  She had noted hematuria  CT A/P noted a R renal pelvis multifocal papillary lesions with pathology showing low grade Ta urothelial cancer  She comes in for preop evaluation  She continues to deny cardiac symptoms  She is more limited by knee pains  She is able to get around her home without difficulty  Today 7/10/18, she returns for f/u  She had her right robotic nephroureterectomy with bladder cuff  She currently has VNA coming to the home  Moving around better  Feeling stronger  Walking is difficult 2/2 to rheumatoid arthritis  Today 8/24/2018, she returns for post hospital D/C  She was admitted twice since her last visit   In the first admission she presented with chest tightness and dyspnea  On 8/10/18 underwent nuclear stress test with normal perfusion  She was diuresed and felt improved  Then she was readmitted with dyspnea and volume overload and diuresed again  Today she feels well  Past Medical History:   Diagnosis Date    Anemia     Arthritis     rheumatoid    Benign essential hypertension     Cataract     Chronic cystitis     CPAP (continuous positive airway pressure) dependence     Diverticulitis of colon     Early satiety     GERD (gastroesophageal reflux disease)     Gout     Hearing aid worn     bilateral    Hearing loss     Hemorrhoids     Hiatal hernia     History of colonic polyps     Hyperlipidemia     Hypothyroidism     Left breast mass     Microhematuria     Migraine     occular    Neuropathy     lower and upper extermities    Overactive bladder     Pneumonia of left lower lobe due to infectious organism ([facility] )     RA (rheumatoid arthritis) ([facility] )     Sleep apnea     uses cpap    Stroke [facility]     5/2017    Type 2 diabetes mellitus (HCC)     Urinary frequency     Urinary urgency     Urothelial cancer ([facility] ) 04/23/2018    Use of cane as ambulatory aid        Review of Systems - 12 point ROS was done and is negative, except as noted above  Allergies   Allergen Reactions    Acetazolamide Other (See Comments)     Other reaction(s): Unknown Allergic Reaction    Aspirin      Other reaction(s): Other (See Comments)  High Dose ASA-stomach ache, rachel  ASA 81 m    Atorvastatin      Other reaction(s): Muscle Pain, Myalgia    Azithromycin     Nitrofurantoin Hives     HIVES * pt denies  Other reaction(s): Unknown Allergic Reaction  Other reaction(s): Other (See Comments)  HIVES * pt denies    Other Other (See Comments)     Adhesive tape : red and itching  Other reaction(s):  Other (See Comments)  red and itching    Shellfish-Derived Products Other (See Comments)     Patient got Gout following eating shell fish    Sulfa Antibiotics Other (See Comments)     unknown    Tramadol Diarrhea and Vomiting       Current Outpatient Prescriptions:     acetaminophen (TYLENOL) 325 mg tablet, Take 650 mg by mouth every 6 (six) hours as needed for mild pain, Disp: , Rfl:     albuterol (VENTOLIN HFA) 90 mcg/act inhaler, Inhale 2 puffs every 6 (six) hours as needed for wheezing, Disp: 1 Inhaler, Rfl: 0    Calcium Citrate-Vitamin D (CALCIUM + D PO), Take 1 tablet by mouth 2 (two) times a day, Disp: , Rfl:     cyanocobalamin (VITAMIN B-12) 100 mcg tablet, Take by mouth, Disp: , Rfl:     gabapentin (NEURONTIN) 100 mg capsule, TAKE 1 CAPSULE IN THE MORNING, TAKE 1 CAPSULE IN THE AFTERNOON AND TAKE 1 TO 3 CAPSULES AT BEDTIME, Disp: 450 capsule, Rfl: 1    hydroxychloroquine (PLAQUENIL) 200 mg tablet, Take 200 mg by mouth 2 (two) times a day with meals, Disp: , Rfl:     LEUCOVORIN CALCIUM PO, Take 10 mg by mouth once a week, Disp: , Rfl:     levothyroxine 75 mcg tablet, Take 75 mcg by mouth daily, Disp: , Rfl:     losartan (COZAAR) 25 mg tablet, Take 1 tablet (25 mg total) by mouth daily, Disp: 30 tablet, Rfl: 0    methotrexate 2 5 mg tablet, , Disp: , Rfl:     Multiple Vitamin (MULTIVITAMINS PO), Take 1 tablet by mouth daily, Disp: , Rfl:     omeprazole (PriLOSEC) 20 mg delayed release capsule, TAKE 1 CAPSULE DAILY AS NEEDED FOR STOMACH ACID, Disp: 90 capsule, Rfl: 3    pravastatin (PRAVACHOL) 80 mg tablet, TAKE 1 TABLET EVERY DAY, Disp: 90 tablet, Rfl: 3    rivaroxaban (XARELTO) 15 mg tablet, Take 1 tablet (15 mg total) by mouth daily with breakfast, Disp: , Rfl: 0    rOPINIRole (REQUIP) 0 5 mg tablet, Take 0 5 mg by mouth 2 (two) times a day  , Disp: , Rfl:     torsemide (DEMADEX) 20 mg tablet, Take 2 tablets (40 mg total) by mouth daily, Disp: 30 tablet, Rfl: 0    docusate sodium (COLACE) 100 mg capsule, Take 1 capsule (100 mg total) by mouth 2 (two) times a day for 60 doses, Disp: 60 capsule, Rfl: 0  No current facility-administered medications for this visit  Facility-Administered Medications Ordered in Other Visits:     acetaminophen (TYLENOL) tablet 650 mg, 650 mg, Oral, Q6H PRN, Svitlana Sharpe PA-C    Social History     Social History    Marital status: /Civil Union     Spouse name: N/A    Number of children: N/A    Years of education: N/A     Occupational History    Not on file  Social History Main Topics    Smoking status: Never Smoker    Smokeless tobacco: Never Used      Comment: stopped smoking in the distant past, never smoker (as per [employer])     Alcohol use No      Comment: seldom    Drug use: No    Sexual activity: Not on file     Other Topics Concern    Not on file     Social History Narrative    No caffeine use       Family History   Problem Relation Age of Onset    Other Mother         epilepsy    Early death Mother    Elwyn Serge Glaucoma Father     Stroke Father         silent    Other Brother         cardiac disorder       Physical Exam:    Vitals: Blood pressure 100/64, pulse 68, weight 93 kg (205 lb), SpO2 97 %  , Body mass index is 38 73 kg/m² ,   Wt Readings from Last 3 Encounters:   08/24/18 93 kg (205 lb)   08/23/18 92 3 kg (203 lb 6 4 oz)   08/21/18 90 7 kg (199 lb 15 3 oz)     Vitals:    08/24/18 1601   BP: 100/64   BP Location: Left arm   Patient Position: Sitting   Cuff Size: Standard   Pulse: 68   SpO2: 97%   Weight: 93 kg (205 lb)       GEN: Lilliam Ramey appears well, alert and oriented x 3, pleasant and cooperative   HEENT: pupils equal, round, and reactive to light; extraocular muscles intact  NECK: supple, no carotid bruits   HEART: regular rhythm, normal S1 and S2, no murmurs, clicks, gallops or rubs, JVD is mildly elevated     LUNGS: clear to auscultation bilaterally; no wheezes, rales, or rhonchi   ABDOMEN: normal bowel sounds, soft, no tenderness, no distention  EXTREMITIES: peripheral pulses normal; no clubbing, cyanosis; 2+ B/L LE edema to mid shin  NEURO: no focal findings   SKIN: normal without suspicious lesions on exposed skin    Labs & Results:  Lab Results   Component Value Date    WBC 7 75 08/20/2018    HGB 8 3 (L) 08/20/2018    HCT 25 7 (L) 08/20/2018     (H) 08/20/2018     08/20/2018       Chemistry        Component Value Date/Time     (L) 08/24/2018 1153     11/14/2017 1151    K 3 7 08/24/2018 1153    K 3 7 11/14/2017 1151    CL 94 (L) 08/24/2018 1153     11/14/2017 1151    CO2 27 08/24/2018 1153    CO2 29 11/14/2017 1151    BUN 43 (H) 08/24/2018 1153    BUN 18 11/14/2017 1151    CREATININE 1 82 (H) 08/24/2018 1153    CREATININE 0 88 11/14/2017 1151        Component Value Date/Time    CALCIUM 8 2 (L) 08/24/2018 1153    CALCIUM 8 9 11/14/2017 1151    ALKPHOS 100 08/20/2018 0628    ALKPHOS 95 11/14/2017 1151    AST 12 08/20/2018 0628    AST 24 11/14/2017 1151    ALT 14 08/20/2018 0628    ALT 19 11/14/2017 1151    BILITOT 0 97 08/20/2018 0628    BILITOT 1 2 11/14/2017 1151        EKG personally reviewed by Sue Marquez MD      Counseling / Coordination of Care  Total floor / unit time spent today 40 minutes  Greater than 50% of total time was spent with the patient and / or family counseling and / or coordination of care  A description of the counseling / coordination of care: 20 min  Thank you for the opportunity to participate in the care of this patient      Sue Marquez MD, PhD   Fabricio Morrison

## 2018-08-24 NOTE — PATIENT INSTRUCTIONS
1) Decrease torsemide to 20 mg alternating with 40 mg every other day  (20 mg one day, then 40 mg the next day, then back to 20, etc)  2) Continue to weigh yourself every morning  If your weight changes more than 3 lbs in 24 hours or 5 lbs in 5 days, then call the clinic

## 2018-08-27 NOTE — TELEPHONE ENCOUNTER
Pt scheduled for September 19 at 1000 at the [facility] office with Dr Jerirca Nunes  Pt aware

## 2018-08-28 ENCOUNTER — TELEPHONE (OUTPATIENT)
Dept: FAMILY MEDICINE CLINIC | Facility: CLINIC | Age: 83
End: 2018-08-28

## 2018-08-28 DIAGNOSIS — I10 HYPERTENSION, UNSPECIFIED TYPE: Primary | ICD-10-CM

## 2018-08-28 NOTE — TELEPHONE ENCOUNTER
Spoke to the patient and informed her the doctor wants her to recheck BMP  Informed patient that lab orders will be coming through mail    Will mail lab order to patient

## 2018-08-28 NOTE — TELEPHONE ENCOUNTER
----- Message from Karthik Reyes MD sent at 8/28/2018  2:40 PM EDT -----  Call patient regarding test  Sodium and creatinine are a bit worse - please recheck BMP 1 week

## 2018-08-30 ENCOUNTER — TELEPHONE (OUTPATIENT)
Dept: FAMILY MEDICINE CLINIC | Facility: CLINIC | Age: 83
End: 2018-08-30

## 2018-09-05 ENCOUNTER — OFFICE VISIT (OUTPATIENT)
Dept: SLEEP CENTER | Facility: CLINIC | Age: 83
End: 2018-09-05
Payer: MEDICARE

## 2018-09-05 VITALS
HEIGHT: 61 IN | DIASTOLIC BLOOD PRESSURE: 68 MMHG | BODY MASS INDEX: 38.33 KG/M2 | WEIGHT: 203 LBS | RESPIRATION RATE: 18 BRPM | HEART RATE: 100 BPM | SYSTOLIC BLOOD PRESSURE: 132 MMHG

## 2018-09-05 DIAGNOSIS — G25.81 RESTLESS LEG SYNDROME: Primary | ICD-10-CM

## 2018-09-05 DIAGNOSIS — G47.33 OBSTRUCTIVE SLEEP APNEA: ICD-10-CM

## 2018-09-05 DIAGNOSIS — I50.33 ACUTE ON CHRONIC DIASTOLIC CONGESTIVE HEART FAILURE (HCC): ICD-10-CM

## 2018-09-05 PROCEDURE — 99213 OFFICE O/P EST LOW 20 MIN: CPT | Performed by: INTERNAL MEDICINE

## 2018-09-05 RX ORDER — TORSEMIDE 20 MG/1
40 TABLET ORAL DAILY
Qty: 60 TABLET | Refills: 0 | Status: SHIPPED | OUTPATIENT
Start: 2018-09-05 | End: 2018-10-11 | Stop reason: SDUPTHER

## 2018-09-05 RX ORDER — ROPINIROLE 0.5 MG/1
0.5 TABLET, FILM COATED ORAL 2 TIMES DAILY
Qty: 60 TABLET | Refills: 0 | Status: SHIPPED | OUTPATIENT
Start: 2018-09-05 | End: 2018-10-09 | Stop reason: SDUPTHER

## 2018-09-05 NOTE — TELEPHONE ENCOUNTER
Pt call today requesting a refill for Xarelto, Requip and Torsemide, pt is not sure if she needs to continue taking meds

## 2018-09-05 NOTE — PROGRESS NOTES
Progress Note - Sleep Cathie Patel :1935 MRN: [id number]      Reason for Visit:  81 y  o female here for annual follow-up    Assessment:  Doing well on current therapy of APAP 6 to 10 cm for severe obstructive sleep apnea  Plan:  Continue same    Follow up: One year    History of Present Illness:  History of NICK on PAP therapy  Fully compliant and deriving benefit        Review of Systems      Genitourinary need to urinate more than twice a night   Cardiology ankle/leg swelling   Gastrointestinal none   Neurology muscle weakness   Constitutional none   Integumentary none   Psychiatry none   Musculoskeletal joint pain and back pain   Pulmonary chest tightness   ENT none   Endocrine frequent urination   Hematological none         Historical Information    Past Medical History:   Past Medical History:   Diagnosis Date    Anemia     Arthritis     rheumatoid    Benign essential hypertension     Cataract     Chronic cystitis     CPAP (continuous positive airway pressure) dependence     Diverticulitis of colon     Early satiety     GERD (gastroesophageal reflux disease)     Gout     Hearing aid worn     bilateral    Hearing loss     Hemorrhoids     Hiatal hernia     History of colonic polyps     Hyperlipidemia     Hypothyroidism     Left breast mass     Microhematuria     Migraine     occular    Neuropathy     lower and upper extermities    Overactive bladder     Pneumonia of left lower lobe due to infectious organism (HCC)     RA (rheumatoid arthritis) (HCC)     Sleep apnea     uses cpap    Stroke ([facility] )     2017    Type 2 diabetes mellitus (HCC)     Urinary frequency     Urinary urgency     Urothelial cancer ([facility] ) 2018    Use of cane as ambulatory aid          Past Surgical History:   Past Surgical History:   Procedure Laterality Date    APPENDECTOMY      ARTHROSCOPY WRIST Right     with release of transverse carpal ligament     BLADDER SURGERY      BLADDER SURGERY      BREAST SURGERY      left breast    BUNIONECTOMY Bilateral     CATARACT EXTRACTION Bilateral     CHOLECYSTECTOMY      COLONOSCOPY      CYSTOSCOPY      EGD AND COLONOSCOPY N/A 12/20/2017    Procedure: EGD AND COLONOSCOPY;  Surgeon: Jennifer Longoria MD;  [facility]; Service: Gastroenterology    ESOPHAGOGASTRODUODENOSCOPY      diagnostic    FOREARM SURGERY Right     fracture repair    HYSTERECTOMY      JOINT REPLACEMENT      KNEE ARTHROSCOPY Left     KNEE SURGERY Left     meniscus tear    NOSE SURGERY      CT CYSTO/URETERO W/LITHOTRIPSY &INDWELL STENT INSRT Right 2/28/2018    Procedure: CYSTOSCOPY RIGHT URETEROSCOPY, RIGHT RETROGRADE PYELOGRAM AND INSERTION  RIGHT STENT URETERAL, RIGHT RENAL PELVIC WASHING FOR CYTOLOGY;  Surgeon: Dm Benson MD;  [facility];  Service: Urology    CT NEPHRECTOMY, W/PART   URETECTOMY Right 4/23/2018    Procedure: ROBATIC ASSISTED NEPHRO-URETERECTOMY WITH BLADDER CUFF EXCISION;  Surgeon: Chantale Wilson MD;  [facility];  Service: Urology    REPLACEMENT TOTAL KNEE BILATERAL Bilateral     URETEROSCOPY Right 3/20/2018    Procedure: CYSTOSCOPY, RETROGRADE PYELOGRAM, URETEROSCOPY, BIOPSY, BRUSH, FULGURATION, STENT PLACEMENT, BLADDER BIOPSY WITH FULGURATION;  Surgeon: Chantale Wilson MD;  [facility];  Service: Urology       Social History:   Social History     Social History    Marital status: /Civil Union     Spouse name: N/A    Number of children: N/A    Years of education: N/A     Social History Main Topics    Smoking status: Never Smoker    Smokeless tobacco: Never Used      Comment: stopped smoking in the distant past, never smoker (as per [employer])     Alcohol use No      Comment: seldom    Drug use: No    Sexual activity: Not Asked     Other Topics Concern    None     Social History Narrative    No caffeine use       Family History:   Family History   Problem Relation Age of Onset    Other Mother         epilepsy    Early death Mother    [employer] Glaucoma Father     Stroke Father         silent    Other Brother         cardiac disorder       Medications/Allergies:      Current Outpatient Prescriptions:     acetaminophen (TYLENOL) 325 mg tablet, Take 650 mg by mouth every 6 (six) hours as needed for mild pain, Disp: , Rfl:     albuterol (VENTOLIN HFA) 90 mcg/act inhaler, Inhale 2 puffs every 6 (six) hours as needed for wheezing, Disp: 1 Inhaler, Rfl: 0    Calcium Citrate-Vitamin D (CALCIUM + D PO), Take 1 tablet by mouth 2 (two) times a day, Disp: , Rfl:     cyanocobalamin (VITAMIN B-12) 100 mcg tablet, Take by mouth, Disp: , Rfl:     gabapentin (NEURONTIN) 100 mg capsule, TAKE 1 CAPSULE IN THE MORNING, TAKE 1 CAPSULE IN THE AFTERNOON AND TAKE 1 TO 3 CAPSULES AT BEDTIME, Disp: 450 capsule, Rfl: 1    hydroxychloroquine (PLAQUENIL) 200 mg tablet, Take 200 mg by mouth 2 (two) times a day with meals, Disp: , Rfl:     LEUCOVORIN CALCIUM PO, Take 10 mg by mouth once a week, Disp: , Rfl:     levothyroxine 75 mcg tablet, Take 75 mcg by mouth daily, Disp: , Rfl:     losartan (COZAAR) 25 mg tablet, Take 1 tablet (25 mg total) by mouth daily, Disp: 30 tablet, Rfl: 0    methotrexate 2 5 mg tablet, , Disp: , Rfl:     Multiple Vitamin (MULTIVITAMINS PO), Take 1 tablet by mouth daily, Disp: , Rfl:     omeprazole (PriLOSEC) 20 mg delayed release capsule, TAKE 1 CAPSULE DAILY AS NEEDED FOR STOMACH ACID, Disp: 90 capsule, Rfl: 3    pravastatin (PRAVACHOL) 80 mg tablet, TAKE 1 TABLET EVERY DAY, Disp: 90 tablet, Rfl: 3    rivaroxaban (XARELTO) 15 mg tablet, Take 1 tablet (15 mg total) by mouth daily with breakfast, Disp: , Rfl: 0    rOPINIRole (REQUIP) 0 5 mg tablet, Take 0 5 mg by mouth 2 (two) times a day  , Disp: , Rfl:     torsemide (DEMADEX) 20 mg tablet, Take 2 tablets (40 mg total) by mouth daily, Disp: 30 tablet, Rfl: 0    docusate sodium (COLACE) 100 mg capsule, Take 1 capsule (100 mg total) by mouth 2 (two) times a day for 60 doses, Disp: 60 capsule, Rfl: 0  No current facility-administered medications for this visit  Facility-Administered Medications Ordered in Other Visits:     acetaminophen (TYLENOL) tablet 650 mg, 650 mg, Oral, Q6H PRN, Serenity Garcia PA-C          Objective      Vital Signs:   Vitals:    09/05/18 1400   BP: 132/68   Pulse: 100   Resp: 18     [place] Sleepiness Scale: Total score: 3        Physical Exam:    General: Alert, appropriate, cooperative, overweight    Head: NC/AT    Skin: Warm, dry    Neuro: No motor abnormalities, cranial nerves appear intact    Extremity: No clubbing, cyanosis    DME Provider: [employer]    Counseling / Coordination of Care  Total clinic time spent today 15 minutes  Greater than 50% of total time was spent with the patient and / or family counseling and / or coordination of care  A description of the counseling / coordination of care: Discussed equipment and response to treatment  The patient's download data will be reviewed  GLORIA Perez    Board Certified Sleep Specialist

## 2018-09-06 ENCOUNTER — APPOINTMENT (OUTPATIENT)
Dept: LAB | Facility: CLINIC | Age: 83
End: 2018-09-06
Payer: MEDICARE

## 2018-09-06 ENCOUNTER — TELEPHONE (OUTPATIENT)
Dept: FAMILY MEDICINE CLINIC | Facility: CLINIC | Age: 83
End: 2018-09-06

## 2018-09-06 DIAGNOSIS — D64.9 HEMOGLOBIN LOW: ICD-10-CM

## 2018-09-06 LAB
ANION GAP SERPL CALCULATED.3IONS-SCNC: 7 MMOL/L (ref 4–13)
BASOPHILS # BLD AUTO: 0.04 THOUSANDS/ΜL (ref 0–0.1)
BASOPHILS NFR BLD AUTO: 1 % (ref 0–1)
BUN SERPL-MCNC: 26 MG/DL (ref 5–25)
CALCIUM SERPL-MCNC: 8.6 MG/DL (ref 8.3–10.1)
CHLORIDE SERPL-SCNC: 100 MMOL/L (ref 100–108)
CO2 SERPL-SCNC: 28 MMOL/L (ref 21–32)
CREAT SERPL-MCNC: 1.61 MG/DL (ref 0.6–1.3)
EOSINOPHIL # BLD AUTO: 0.32 THOUSAND/ΜL (ref 0–0.61)
EOSINOPHIL NFR BLD AUTO: 4 % (ref 0–6)
ERYTHROCYTE [DISTWIDTH] IN BLOOD BY AUTOMATED COUNT: 15.9 % (ref 11.6–15.1)
GFR SERPL CREATININE-BSD FRML MDRD: 30 ML/MIN/1.73SQ M
GLUCOSE P FAST SERPL-MCNC: 100 MG/DL (ref 65–99)
HCT VFR BLD AUTO: 27.6 % (ref 34.8–46.1)
HGB BLD-MCNC: 8.4 G/DL (ref 11.5–15.4)
IMM GRANULOCYTES # BLD AUTO: 0.04 THOUSAND/UL (ref 0–0.2)
IMM GRANULOCYTES NFR BLD AUTO: 1 % (ref 0–2)
LYMPHOCYTES # BLD AUTO: 1.42 THOUSANDS/ΜL (ref 0.6–4.47)
LYMPHOCYTES NFR BLD AUTO: 19 % (ref 14–44)
MCH RBC QN AUTO: 31.6 PG (ref 26.8–34.3)
MCHC RBC AUTO-ENTMCNC: 30.4 G/DL (ref 31.4–37.4)
MCV RBC AUTO: 104 FL (ref 82–98)
MONOCYTES # BLD AUTO: 0.81 THOUSAND/ΜL (ref 0.17–1.22)
MONOCYTES NFR BLD AUTO: 11 % (ref 4–12)
NEUTROPHILS # BLD AUTO: 4.75 THOUSANDS/ΜL (ref 1.85–7.62)
NEUTS SEG NFR BLD AUTO: 64 % (ref 43–75)
NRBC BLD AUTO-RTO: 0 /100 WBCS
PLATELET # BLD AUTO: 312 THOUSANDS/UL (ref 149–390)
PMV BLD AUTO: 8.9 FL (ref 8.9–12.7)
POTASSIUM SERPL-SCNC: 4 MMOL/L (ref 3.5–5.3)
RBC # BLD AUTO: 2.66 MILLION/UL (ref 3.81–5.12)
SODIUM SERPL-SCNC: 135 MMOL/L (ref 136–145)
WBC # BLD AUTO: 7.38 THOUSAND/UL (ref 4.31–10.16)

## 2018-09-06 PROCEDURE — 36415 COLL VENOUS BLD VENIPUNCTURE: CPT

## 2018-09-06 PROCEDURE — 85025 COMPLETE CBC W/AUTO DIFF WBC: CPT

## 2018-09-06 PROCEDURE — 80048 BASIC METABOLIC PNL TOTAL CA: CPT

## 2018-09-07 DIAGNOSIS — I50.33 ACUTE ON CHRONIC DIASTOLIC CONGESTIVE HEART FAILURE (HCC): ICD-10-CM

## 2018-09-07 RX ORDER — LOSARTAN POTASSIUM 25 MG/1
25 TABLET ORAL DAILY
Qty: 30 TABLET | Refills: 1 | Status: SHIPPED | OUTPATIENT
Start: 2018-09-07 | End: 2018-10-11 | Stop reason: SDUPTHER

## 2018-09-11 ENCOUNTER — APPOINTMENT (EMERGENCY)
Dept: RADIOLOGY | Facility: HOSPITAL | Age: 83
End: 2018-09-11
Payer: MEDICARE

## 2018-09-11 ENCOUNTER — HOSPITAL ENCOUNTER (EMERGENCY)
Facility: HOSPITAL | Age: 83
Discharge: HOME | End: 2018-09-11
Attending: EMERGENCY MEDICINE
Payer: MEDICARE

## 2018-09-11 VITALS
HEIGHT: 61 IN | BODY MASS INDEX: 38.51 KG/M2 | TEMPERATURE: 97.4 F | SYSTOLIC BLOOD PRESSURE: 141 MMHG | WEIGHT: 204 LBS | RESPIRATION RATE: 18 BRPM | OXYGEN SATURATION: 94 % | DIASTOLIC BLOOD PRESSURE: 63 MMHG | HEART RATE: 63 BPM

## 2018-09-11 DIAGNOSIS — S42.92XA: ICD-10-CM

## 2018-09-11 DIAGNOSIS — S42.92XA TRAUMATIC CLOSED DISPLACED FRACTURE OF LEFT SHOULDER WITH ANTERIOR DISLOCATION, INITIAL ENCOUNTER: Primary | ICD-10-CM

## 2018-09-11 PROCEDURE — 99284 EMERGENCY DEPT VISIT MOD MDM: CPT

## 2018-09-11 PROCEDURE — 73030 X-RAY EXAM OF SHOULDER: CPT

## 2018-09-11 PROCEDURE — 72125 CT NECK SPINE W/O DYE: CPT

## 2018-09-11 PROCEDURE — 70450 CT HEAD/BRAIN W/O DYE: CPT

## 2018-09-11 PROCEDURE — 96374 THER/PROPH/DIAG INJ IV PUSH: CPT

## 2018-09-11 PROCEDURE — 96375 TX/PRO/DX INJ NEW DRUG ADDON: CPT

## 2018-09-11 PROCEDURE — 73000 X-RAY EXAM OF COLLAR BONE: CPT

## 2018-09-11 RX ORDER — PROPOFOL 10 MG/ML
1 INJECTION, EMULSION INTRAVENOUS ONCE
Status: DISCONTINUED | OUTPATIENT
Start: 2018-09-11 | End: 2018-09-11

## 2018-09-11 RX ORDER — ETOMIDATE 2 MG/ML
0.15 INJECTION INTRAVENOUS ONCE
Status: DISCONTINUED | OUTPATIENT
Start: 2018-09-11 | End: 2018-09-11

## 2018-09-11 RX ORDER — OXYCODONE HYDROCHLORIDE 5 MG/1
5 TABLET ORAL EVERY 4 HOURS PRN
Qty: 20 TABLET | Refills: 0 | Status: SHIPPED | OUTPATIENT
Start: 2018-09-11 | End: 2018-09-21

## 2018-09-11 RX ORDER — ONDANSETRON 2 MG/ML
4 INJECTION INTRAMUSCULAR; INTRAVENOUS ONCE
Status: COMPLETED | OUTPATIENT
Start: 2018-09-11 | End: 2018-09-11

## 2018-09-11 RX ORDER — LIDOCAINE HYDROCHLORIDE 10 MG/ML
20 INJECTION, SOLUTION EPIDURAL; INFILTRATION; INTRACAUDAL; PERINEURAL ONCE
Status: COMPLETED | OUTPATIENT
Start: 2018-09-11 | End: 2018-09-11

## 2018-09-11 RX ADMIN — LIDOCAINE HYDROCHLORIDE 20 ML: 10 INJECTION, SOLUTION EPIDURAL; INFILTRATION; INTRACAUDAL; PERINEURAL at 17:33

## 2018-09-11 RX ADMIN — ONDANSETRON 4 MG: 2 INJECTION, SOLUTION INTRAMUSCULAR; INTRAVENOUS at 14:59

## 2018-09-11 RX ADMIN — HYDROMORPHONE HYDROCHLORIDE 0.5 MG: 1 INJECTION, SOLUTION INTRAMUSCULAR; INTRAVENOUS; SUBCUTANEOUS at 14:59

## 2018-09-11 NOTE — CONSULTS
Orthopedics   Randy Wise 81 y o  female MRN: [id number]  Unit/Bed#: X ray      Chief Complaint:   left shoulder pain    HPI:  81 y  o female LHD complaining of left shoulder pain  Patient sustained a mechanical fall while walking into [employer] today onto her left shoulder  She immediately noticed pain and inability to move about the shoulder  She denies having dislocated the shoulder in the past, though she does have a history of ORIF to the right humerus  She sis hit her head in the fall, but she did not lose consciousness  Pain is well localized to the shoulder, is worse with attempted motion or palpation and improves with rest  She has baseline neuropathy in the glove distribution of the hand  She has no other complaints at this time       Review Of Systems:   · Skin: Normal  · Neuro: See HPI  · Musculoskeletal: See HPI  · 14 point review of systems negative except as stated above     Past Medical History:   Past Medical History:   Diagnosis Date    Anemia     Arthritis     rheumatoid    Benign essential hypertension     Cataract     Chronic cystitis     CPAP (continuous positive airway pressure) dependence     Diverticulitis of colon     Early satiety     GERD (gastroesophageal reflux disease)     Gout     Hearing aid worn     bilateral    Hearing loss     Hemorrhoids     Hiatal hernia     History of colonic polyps     Hyperlipidemia     Hypothyroidism     Left breast mass     Microhematuria     Migraine     occular    Neuropathy     lower and upper extermities    Overactive bladder     Pneumonia of left lower lobe due to infectious organism ([address] )     RA (rheumatoid arthritis) ([address] )     Sleep apnea     uses cpap    Stroke ([address] )     5/2017    Type 2 diabetes mellitus (HCC)     Urinary frequency     Urinary urgency     Urothel[facility] cancer ([facility] ) 04/23/2018    Use of cane as ambulatory aid        Past Surgical History:   Past Surgical History:   Procedure Laterality Date    APPENDECTOMY      ARTHROSCOPY WRIST Right     with release of transverse carpal ligament     BLADDER SURGERY      BLADDER SURGERY      BREAST SURGERY      left breast    BUNIONECTOMY Bilateral     CATARACT EXTRACTION Bilateral     CHOLECYSTECTOMY      COLONOSCOPY      CYSTOSCOPY      EGD AND COLONOSCOPY N/A 12/20/2017    Procedure: EGD AND COLONOSCOPY;  Surgeon: Cary Alan MD;  [facility]; Service: Gastroenterology    ESOPHAGOGASTRODUODENOSCOPY      diagnostic    FOREARM SURGERY Right     fracture repair    HYSTERECTOMY      JOINT REPLACEMENT      KNEE ARTHROSCOPY Left     KNEE SURGERY Left     meniscus tear    NOSE SURGERY      NH CYSTO/URETERO W/LITHOTRIPSY &INDWELL STENT INSRT Right 2/28/2018    Procedure: CYSTOSCOPY RIGHT URETEROSCOPY, RIGHT RETROGRADE PYELOGRAM AND INSERTION  RIGHT STENT URETERAL, RIGHT RENAL PELVIC WASHING FOR CYTOLOGY;  Surgeon: Alen Bee MD;  [facility];  Service: Urology    NH NEPHRECTOMY, W/PART   URETECTOMY Right 4/23/2018    Procedure: ROBATIC ASSISTED NEPHRO-URETERECTOMY WITH BLADDER CUFF EXCISION;  Surgeon: Estefani Bagley MD;  [facility];  Service: Urology    REPLACEMENT TOTAL KNEE BILATERAL Bilateral     URETEROSCOPY Right 3/20/2018    Procedure: CYSTOSCOPY, RETROGRADE PYELOGRAM, URETEROSCOPY, BIOPSY, BRUSH, FULGURATION, STENT PLACEMENT, BLADDER BIOPSY WITH FULGURATION;  Surgeon: Estefani Bagley MD;  [facility];  Service: Urology       Family History:  Family history reviewed and non-contributory  Family History   Problem Relation Age of Onset    Other Mother         epilepsy    Early death Mother    [employer] Glaucoma Father     Stroke Father         silent    Other Brother         cardiac disorder       Social History:  Social History     Social History    Marital status: /Civil Union     Spouse name: N/A    Number of children: N/A    Years of education: N/A     Social History Main Topics    Smoking status: Never Smoker    Smokeless tobacco: Never Used      Comment: stopped smoking in the distant past, never smoker (as per [employer])     Alcohol use No      Comment: seldom    Drug use: No    Sexual activity: Not Asked     Other Topics Concern    None     Social History Narrative    No caffeine use       Allergies: Allergies   Allergen Reactions    Acetazolamide Other (See Comments)     Other reaction(s): Unknown Allergic Reaction    Aspirin      Other reaction(s): Other (See Comments)  High Dose ASA-stomach ache, rachel  ASA 82 m    Atorvastatin      Other reaction(s): Muscle Pain, Myalgia    Azithromycin     Nitrofurantoin Hives     HIVES * pt denies  Other reaction(s): Unknown Allergic Reaction  Other reaction(s): Other (See Comments)  HIVES * pt denies    Other Other (See Comments)     Adhesive tape : red and itching  Other reaction(s):  Other (See Comments)  red and itching    Shellfish-Derived Products Other (See Comments)     Patient got Gout following eating shell fish    Sulfa Antibiotics Other (See Comments)     unknown    Tramadol Diarrhea and Vomiting           Labs:    0  Lab Value Date/Time   HCT 27 6 (L) 09/06/2018 0932   HCT 25 7 (L) 08/20/2018 0628   HCT 26 6 (L) 08/19/2018 0942   HCT 28 5 (L) 11/14/2017 1151   HCT 32 7 (L) 01/04/2016 1758   HGB 8 4 (L) 09/06/2018 0932   HGB 8 3 (L) 08/20/2018 0628   HGB 8 6 (L) 08/19/2018 0942   HGB 9 5 (L) 11/14/2017 1151   HGB 11 1 (L) 01/04/2016 1758   INR 1 80 (H) 08/19/2018 0942   WBC 7 38 09/06/2018 0932   WBC 7 75 08/20/2018 0628   WBC 9 58 08/19/2018 0942   WBC 5 9 11/14/2017 1151   WBC 7 20 01/04/2016 1758   ESR 66 (H) 06/28/2018 1300   CRP 10 6 (H) 06/28/2018 1300       Meds:    Current Facility-Administered Medications:     lidocaine (PF) (XYLOCAINE-MPF) 1 % injection 20 mL, 20 mL, Infiltration, Once, Bobby Pretty MD    Current Outpatient Prescriptions:     acetaminophen (TYLENOL) 325 mg tablet, Take 650 mg by mouth every 6 (six) hours as needed for mild pain, Disp: , Rfl:     albuterol (VENTOLIN HFA) 90 mcg/act inhaler, Inhale 2 puffs every 6 (six) hours as needed for wheezing, Disp: 1 Inhaler, Rfl: 0    Calcium Citrate-Vitamin D (CALCIUM + D PO), Take 1 tablet by mouth 2 (two) times a day, Disp: , Rfl:     cyanocobalamin (VITAMIN B-12) 100 mcg tablet, Take by mouth, Disp: , Rfl:     gabapentin (NEURONTIN) 100 mg capsule, TAKE 1 CAPSULE IN THE MORNING, TAKE 1 CAPSULE IN THE AFTERNOON AND TAKE 1 TO 3 CAPSULES AT BEDTIME, Disp: 450 capsule, Rfl: 1    hydroxychloroquine (PLAQUENIL) 200 mg tablet, Take 200 mg by mouth 2 (two) times a day with meals, Disp: , Rfl:     LEUCOVORIN CALCIUM PO, Take 10 mg by mouth once a week, Disp: , Rfl:     levothyroxine 75 mcg tablet, Take 75 mcg by mouth daily, Disp: , Rfl:     losartan (COZAAR) 25 mg tablet, Take 1 tablet (25 mg total) by mouth daily, Disp: 30 tablet, Rfl: 1    methotrexate 2 5 mg tablet, , Disp: , Rfl:     Multiple Vitamin (MULTIVITAMINS PO), Take 1 tablet by mouth daily, Disp: , Rfl:     omeprazole (PriLOSEC) 20 mg delayed release capsule, TAKE 1 CAPSULE DAILY AS NEEDED FOR STOMACH ACID, Disp: 90 capsule, Rfl: 3    pravastatin (PRAVACHOL) 80 mg tablet, TAKE 1 TABLET EVERY DAY, Disp: 90 tablet, Rfl: 3    rivaroxaban (XARELTO) 15 mg tablet, Take 1 tablet (15 mg total) by mouth daily with breakfast, Disp: 30 tablet, Rfl: 0    rOPINIRole (REQUIP) 0 5 mg tablet, Take 1 tablet (0 5 mg total) by mouth 2 (two) times a day, Disp: 60 tablet, Rfl: 0    torsemide (DEMADEX) 20 mg tablet, Take 2 tablets (40 mg total) by mouth daily, Disp: 60 tablet, Rfl: 0    docusate sodium (COLACE) 100 mg capsule, Take 1 capsule (100 mg total) by mouth 2 (two) times a day for 60 doses, Disp: 60 capsule, Rfl: 0    Facility-Administered Medications Ordered in Other Encounters:     acetaminophen (TYLENOL) tablet 650 mg, 650 mg, Oral, Q6H PRN, Constantin Jefferson PA-C    Blood Culture:   No results found for: BLOODCX    Wound Culture:   No results found for: WOUNDCULT    Ins and Outs:  No intake/output data recorded  Physical Exam:   /71 (BP Location: Right arm)   Pulse 90   Temp (!) 97 4 °F (36 3 °C) (Oral)   Resp 22   Ht 5' 1" (1 549 m)   Wt 92 5 kg (204 lb)   LMP  (LMP Unknown)   SpO2 94%   BMI 38 55 kg/m²   Gen: Alert and oriented to person, place, time  HEENT: EOMI, eyes clear, moist mucus membranes, hearing intact  Respiratory: Bilateral chest rise  No audible wheezing found  Cardiovascular: Regular Rate and Rhythm  Abdomen: soft nontender/nondistended  Musculoskeletal: left upper extremity  · Skin pink dry and intact  · Painful range of motion about the shouler  · Mechanical block to internal and external rotation of the shoulder   · Sensation intact to axillary, musculocutaneous,  And radial nerve distributions  Decreased sensation to the ulnar and medical nerve distributions (baseline)  · Motor intact to Biceps, wrist and finger flexors  She is able to fire the deltoid, but motion is limited by pain  Unable to extend at the wrist or fingers  Unable to retropulse the thumb or extend the elbow  Radiology:   I personally reviewed the films  X-rays of left shoulder shows anterior-inferior dislocation of the shoulder    _*_*_*_*_*_*_*_*_*_*_*_*_*_*_*_*_*_*_*_*_*_*_*_*_*_*_*_*_*_*_*_*_*_*_*_*_*_*_*_*_*    Assessment:  81 y  o female with  left shoulder dislocation s/p reduction   New radial nerve palsy    Plan:   · NWB left upper extremity in sling for comfort   · Cock-up wrist brace for wrist drop  · Analgesics for pain  · F/U in 10 days with Dr Jade Camara  · Stable from an orthopaedic perspective for discharge         Shante South MD

## 2018-09-11 NOTE — ED PROVIDER NOTES
History  Chief Complaint   Patient presents with    Fall     pt tripped on uneven pavement going into [facility], landed on left shoulder, no LOC, pt takes xarelto, did not hit head  c/o severe left shoulder pain  EMS states they noted crepitus and deformity, no deformity or crepitus noted on removal of sling  Pt with full ROM  HPI  81 y o  Female with PMH HTN, DM, CVA, CKD, CHF, and Afib on xarelto presents to the ED with left shoulder pain s/p fall this afternoon  Pt reports she was at [facility] and tripped over an uneven part of the floor  States she fell to her left, into a brick wall, and then forwards onto her knees and abdomen  Pt is unsure if she hit her head, but denies LOC  Denies any dizziness, lightheadedness, chest pain, or palpitations before the fall  Pt has a history of tingling numbness in all her extremities, and states sensation is unchanged  Notes she is unable to move her left shoulder, arm, and wrist  States knee pain is unchanged from her baseline arthritis pain  Pt received fentanyl in the ambulance and reports she feels a little nauseated  Prior to Admission Medications   Prescriptions Last Dose Informant Patient Reported? Taking?    Calcium Citrate-Vitamin D (CALCIUM + D PO) 9/11/2018 Self Yes Yes   Sig: Take 1 tablet by mouth 2 (two) times a day   LEUCOVORIN CALCIUM PO 9/11/2018 Self Yes Yes   Sig: Take 10 mg by mouth once a week   Multiple Vitamin (MULTIVITAMINS PO) 9/11/2018 Self Yes Yes   Sig: Take 1 tablet by mouth daily   acetaminophen (TYLENOL) 325 mg tablet 9/11/2018 Self Yes Yes   Sig: Take 650 mg by mouth every 6 (six) hours as needed for mild pain   albuterol (VENTOLIN HFA) 90 mcg/act inhaler 9/11/2018  No Yes   Sig: Inhale 2 puffs every 6 (six) hours as needed for wheezing   cyanocobalamin (VITAMIN B-12) 100 mcg tablet 9/11/2018 Self Yes Yes   Sig: Take by mouth   docusate sodium (COLACE) 100 mg capsule  Self No No   Sig: Take 1 capsule (100 mg total) by mouth 2 (two) times a day for 60 doses   gabapentin (NEURONTIN) 100 mg capsule 9/11/2018  No Yes   Sig: TAKE 1 CAPSULE IN THE MORNING, TAKE 1 CAPSULE IN THE AFTERNOON AND TAKE 1 TO 3 CAPSULES AT BEDTIME   hydroxychloroquine (PLAQUENIL) 200 mg tablet 9/11/2018  Yes Yes   Sig: Take 200 mg by mouth 2 (two) times a day with meals   levothyroxine 75 mcg tablet 9/11/2018 Self Yes Yes   Sig: Take 75 mcg by mouth daily   losartan (COZAAR) 25 mg tablet 9/11/2018  No Yes   Sig: Take 1 tablet (25 mg total) by mouth daily   methotrexate 2 5 mg tablet 9/11/2018 Self Yes Yes   omeprazole (PriLOSEC) 20 mg delayed release capsule 9/11/2018 Self No Yes   Sig: TAKE 1 CAPSULE DAILY AS NEEDED FOR STOMACH ACID   pravastatin (PRAVACHOL) 80 mg tablet 9/11/2018 Self No Yes   Sig: TAKE 1 TABLET EVERY DAY   rOPINIRole (REQUIP) 0 5 mg tablet 9/11/2018  No Yes   Sig: Take 1 tablet (0 5 mg total) by mouth 2 (two) times a day   rivaroxaban (XARELTO) 15 mg tablet 9/11/2018  No Yes   Sig: Take 1 tablet (15 mg total) by mouth daily with breakfast   torsemide (DEMADEX) 20 mg tablet 9/11/2018  No Yes   Sig: Take 2 tablets (40 mg total) by mouth daily      Facility-Administered Medications: None       Past Medical History:   Diagnosis Date    Anemia     Arthritis     rheumatoid    Benign essential hypertension     Cataract     Chronic cystitis     CPAP (continuous positive airway pressure) dependence     Diverticulitis of colon     Early satiety     GERD (gastroesophageal reflux disease)     Gout     Hearing aid worn     bilateral    Hearing loss     Hemorrhoids     Hiatal hernia     History of colonic polyps     Hyperlipidemia     Hypothyroidism     Left breast mass     Microhematuria     Migraine     occular    Neuropathy     lower and upper extermities    Overactive bladder     Pneumonia of left lower lobe due to infectious organism ([facility] [address]     RA (rheumatoid arthritis) ([facility] [address] [facility]     Sleep apnea     uses cpap    Stroke ([facility] [address] [facility]     5/2017    Type 2 diabetes mellitus [facility] [address] [facility]     Urinary frequency     Urinary urgency     Urothelial cancer ([facility] [address] [facility] 04/23/2018    Use of cane as ambulatory aid        Past Surgical History:   Procedure Laterality Date    APPENDECTOMY      ARTHROSCOPY WRIST Right     with release of transverse carpal ligament     BLADDER SURGERY      BLADDER SURGERY      BREAST SURGERY      left breast    BUNIONECTOMY Bilateral     CATARACT EXTRACTION Bilateral     CHOLECYSTECTOMY      COLONOSCOPY      CYSTOSCOPY      EGD AND COLONOSCOPY N/A 12/20/2017    Procedure: EGD AND COLONOSCOPY;  Surgeon: Ranjith Busby MD;  [facility]: [facility]; Service: Gastroenterology    ESOPHAGOGASTRODUODENOSCOPY      diagnostic    FOREARM SURGERY Right     fracture repair    HYSTERECTOMY      JOINT REPLACEMENT      KNEE ARTHROSCOPY Left     KNEE SURGERY Left     meniscus tear    NOSE SURGERY      DE CYSTO/URETERO W/LITHOTRIPSY &INDWELL STENT INSRT Right 2/28/2018    Procedure: CYSTOSCOPY RIGHT URETEROSCOPY, RIGHT RETROGRADE PYELOGRAM AND INSERTION  RIGHT STENT URETERAL, RIGHT RENAL PELVIC WASHING FOR CYTOLOGY;  Surgeon: José Manuel Wagner MD;  [facility];  Service: Urology    DE NEPHRECTOMY, W/PART  URETECTOMY Right 4/23/2018    Procedure: ROBATIC ASSISTED NEPHRO-URETERECTOMY WITH BLADDER CUFF EXCISION;  Surgeon: Dyana Paz MD;  [facility];  Service: Urology    REPLACEMENT TOTAL KNEE BILATERAL Bilateral     URETEROSCOPY Right 3/20/2018    Procedure: CYSTOSCOPY, RETROGRADE PYELOGRAM, URETEROSCOPY, BIOPSY, BRUSH, FULGURATION, STENT PLACEMENT, BLADDER BIOPSY WITH FULGURATION;  Surgeon: Dyana Paz MD;  [facility];  Service: Urology       Family History   Problem Relation Age of Onset    Other Mother         epilepsy    Early death Mother     Glaucoma Father     Stroke Father         silent    Other Brother         cardiac disorder     I have reviewed and agree with the history as documented      Social History   Substance Use Topics    Smoking status: Never Smoker    Smokeless tobacco: Never Used      Comment: stopped smoking in the distant past, never smoker (as per [employer])     Alcohol use No      Comment: seldom        Review of Systems   Constitutional: Negative for chills and fever  HENT: Negative for congestion, rhinorrhea and sore throat  Respiratory: Negative for chest tightness and shortness of breath  Cardiovascular: Negative for chest pain and palpitations  Gastrointestinal: Positive for nausea  Negative for abdominal pain, diarrhea and vomiting  Genitourinary: Negative for dysuria and hematuria  Musculoskeletal: Positive for arthralgias, myalgias and neck pain  Skin: Negative for rash  Neurological: Negative for dizziness, syncope, weakness, light-headedness, numbness and headaches  Psychiatric/Behavioral: Negative for confusion  All other systems reviewed and are negative  Physical Exam  ED Triage Vitals   Temperature Pulse Respirations Blood Pressure SpO2   09/11/18 1400 09/11/18 1400 09/11/18 1400 09/11/18 1400 09/11/18 1400   (!) 97 4 °F (36 3 °C) 57 (!) 24 147/66 95 %      Temp Source Heart Rate Source Patient Position - Orthostatic VS BP Location FiO2 (%)   09/11/18 1400 09/11/18 1558 09/11/18 1558 09/11/18 1400 --   Oral Monitor Lying Right arm       Pain Score       09/11/18 1400       Worst Possible Pain           Orthostatic Vital Signs  Vitals:    09/11/18 1558 09/11/18 1600 09/11/18 1700 09/11/18 1804   BP: 147/66 153/72 142/71 141/63   Pulse: 68 78 90 63   Patient Position - Orthostatic VS: Lying Lying Lying Lying       Physical Exam   Constitutional: She is oriented to person, place, and time  She appears well-developed and well-nourished  No distress  HENT:   Head: Normocephalic and atraumatic     Right Ear: External ear normal    Left Ear: External ear normal    Nose: Nose normal    Eyes: Conjunctivae and EOM are normal  Pupils are equal, round, and reactive to light  Neck: Normal range of motion  Neck supple  Cardiovascular: Normal rate, regular rhythm, normal heart sounds and intact distal pulses  Exam reveals no gallop and no friction rub  No murmur heard  Pulmonary/Chest: Effort normal and breath sounds normal  No respiratory distress  She has no wheezes  She has no rhonchi  She has no rales  Abdominal: Soft  Bowel sounds are normal  She exhibits no distension and no mass  There is no tenderness  There is no rebound and no guarding  Musculoskeletal:        Right shoulder: She exhibits normal range of motion and no tenderness  Left shoulder: She exhibits decreased range of motion and tenderness  She exhibits no swelling, no effusion and no crepitus  Right elbow: She exhibits normal range of motion and no swelling  Left elbow: She exhibits normal range of motion, no swelling, no effusion and no deformity  No tenderness found  Right wrist: She exhibits normal range of motion and no tenderness  Left wrist: She exhibits normal range of motion, no tenderness, no bony tenderness, no swelling and no deformity  Right knee: She exhibits normal range of motion, no swelling, no effusion and no deformity  Left knee: She exhibits normal range of motion, no swelling, no effusion and no deformity  Cervical back: She exhibits tenderness  She exhibits no bony tenderness  Thoracic back: She exhibits no tenderness and no bony tenderness  Lumbar back: She exhibits no tenderness and no bony tenderness  Right lower leg: She exhibits edema  Left lower leg: She exhibits edema  Lymphadenopathy:     She has no cervical adenopathy  Neurological: She is alert and oriented to person, place, and time  No cranial nerve deficit or sensory deficit  She exhibits abnormal muscle tone (decreased movement left arm, weakness left wrist extension)  Skin: Skin is warm and dry   She is not diaphoretic  Psychiatric: She has a normal mood and affect  Nursing note and vitals reviewed  ED Medications  Medications    EMS REPLENISHMENT MED ( Does not apply Given to EMS 9/11/18 1404)   ondansetron (ZOFRAN) injection 4 mg (4 mg Intravenous Given 9/11/18 1459)   HYDROmorphone (DILAUDID) injection 0 5 mg (0 5 mg Intravenous Given 9/11/18 1459)   lidocaine (PF) (XYLOCAINE-MPF) 1 % injection 20 mL (20 mL Infiltration Given by Other 9/11/18 9841)       Diagnostic Studies  Results Reviewed     None                 XR shoulder 2+ vw left   Final Result by Shaggy Sanabria MD (09/11 0691)      No residual dislocation  No Hill-Sachs deformity/fracture  Workstation performed: [id number]         CT spine cervical without contrast   Final Result by Eriberto Villavicencio MD (09/11 3364)      No cervical spine fracture or traumatic malalignment  Workstation performed: [id number]         XR clavicle LEFT   Final Result by Nay Fletcher MD (67/47 5892)      Anterior dislocation            Workstation performed: [id number]         XR shoulder 2+ views LEFT   Final Result by Nay Fletcher MD (09/11 2382)      Anterior dislocation   Results called to emergency room, by myself, 3:49 PM, Dr Prabha Schroeder performed: [id number]         CT head without contrast   Final Result by Eriberto Villavicencio MD (09/11 )      No acute intracranial abnormality  Workstation performed: [id number]               Procedures  Procedures      Phone Consults  ED Phone Contact    ED Course  ED Course as of Sep 11 1854   Tue Sep 11, 2018   1602 No cervical fx, will d/c collar CT spine cervical without contrast   1606 Dislocated with fracture  Will call ortho  XR shoulder 2+ views LEFT   1707 Ortho reduced with local anesthesia  Pt can be discharged and follow up in clinic in one week       1745 Volar splint and sling per ortho's rec            Identification of Seniors at Risk Most Recent Value   (ISAR) Identification of Seniors at Risk   Before the illness or injury that brought you to the Emergency, did you need someone to help you on a regular basis? 0 Filed at: 09/11/2018 1402   In the last 24 hours, have you needed more help than usual?  0 Filed at: 09/11/2018 1402   Have you been hospitalized for one or more nights during the past 6 months? 0 Filed at: 09/11/2018 1402   In general, do you see well?  0 Filed at: 09/11/2018 1402   In general, do you have serious problems with your memory? 0 Filed at: 09/11/2018 1402   Do you take more than three different medications every day? 1 Filed at: 09/11/2018 1402   ISAR Score  1 Filed at: 09/11/2018 1402                          [facility]  Number of Diagnoses or Management Options  Diagnosis management comments: 81 y o  Female with PMH HTN, DM, CVA, CKD, CHF, and Afib on xarelto presenting with left shoulder pain s/p fall  Will XR left shoulder and clavicle  Will CT head and cervical spine  CritCare Time    Disposition  Final diagnoses:   Traumatic closed displaced fracture of shoulder w/anterior dislocation, left, initial encounter     Time reflects when diagnosis was documented in both MDM as applicable and the Disposition within this note     Time User Action Codes Description Comment    9/11/2018  3:55 PM Eduardo Cabrales Ataru Paul  92XA] Traumatic closed displaced fracture of left shoulder with anterior dislocation, initial encounter     9/11/2018  4:35 PM Chacha Barajasru Paul  92XA] Traumatic closed displaced fracture of shoulder w/anterior dislocation, left, initial encounter       ED Disposition     ED Disposition Condition Comment    Discharge  [address] [facility] [address] discharge to home/self care      Condition at discharge: Stable        Follow-up Information     Follow up With Specialties Details Why Contact Info    Renay Schrader MD Orthopedic Surgery Follow up in 1 week(s)  [address] Patient's Medications   Discharge Prescriptions    OXYCODONE (ROXICODONE) 5 MG IMMEDIATE RELEASE TABLET    Take 1 tablet (5 mg total) by mouth every 4 (four) hours as needed for moderate pain for up to 10 days Max Daily Amount: 30 mg       Start Date: 9/11/2018 End Date: 9/21/2018       Order Dose: 5 mg       Quantity: 20 tablet    Refills: 0     No discharge procedures on file  ED Provider  Attending physically available and evaluated Oswaldo Arambula I managed the patient along with the ED Attending      Electronically Signed by         Jose Mota MD  09/11/18 1615

## 2018-09-11 NOTE — ED ATTENDING ATTESTATION
Home Colby MD, saw and evaluated the patient  I have discussed the patient with the resident/non-physician practitioner and agree with the resident's/non-physician practitioner's findings, Plan of Care, and MDM as documented in the resident's/non-physician practitioner's note, except where noted  All available labs and Radiology studies were reviewed  At this point I agree with the current assessment done in the Emergency Department  I have conducted an independent evaluation of this patient a history and physical is as follows:      Critical Care Time  CritCare Time    Procedures     82 yo female with afib on xarelto had mechanical fall and hit face on pavement  Unknown loc  Pt c/o left shoulder pain and neck pain  No dizziness, no lightheadedness, no cp, no sob, no abdominal pain  No new numbness, tingling, weakness in extremities  Vss, afebrile, lungs cta, rrr, abdomen soft nontender, left clavicle tenderness, no midline spinal tenderness, no neuro deficits  Ct head, ct cspine, left shoulder xray, clavicle xray   Zofran, pain meds

## 2018-09-11 NOTE — DISCHARGE INSTRUCTIONS
Shoulder Dislocation   WHAT YOU NEED TO KNOW:   A shoulder dislocation occurs when the top of your arm bone (humerus) moves out of the socket in your shoulder blade  DISCHARGE INSTRUCTIONS:   Medicines:  Pain medicine: Take tylenol for pain, oxycodone for severe pain  · Take your medicine as directed  Contact your healthcare provider if you think your medicine is not helping or if you have side effects  Tell him of her if you are allergic to any medicine  Keep a list of the medicines, vitamins, and herbs you take  Include the amounts, and when and why you take them  Bring the list or the pill bottles to follow-up visits  Carry your medicine list with you in case of an emergency  Follow up with your healthcare provider or bone specialist in 5 to 10 days:  Write down your questions so you remember to ask them during your visits  Activity:  You may need to rest your injured shoulder and arm, and avoid activities that cause you pain  Rest your shoulder and arm to help your muscles and tissues heal  Your healthcare provider may have you limit your shoulder movement for up to 6 weeks  After 6 weeks, you may be told to slowly increase your activities  It may take 4 to 6 months for your shoulder to heal completely  You may need to wait at least 6 months before you return to your usual activities  Rest your shoulder as directed  How to wear a brace, sling, or splint:  A brace, sling, or splint may be needed to limit your movement and protect your injured shoulder  · Wear your brace, sling, or splint all the time  Take it off only to bathe or do exercises as directed  Ask your healthcare provider or bone specialist how many weeks you should wear it  · Keep your skin clean and dry  Place padding under your armpit to help absorb sweat and prevent sores on your skin  · Do not hunch your shoulders  This may cause pain  Keep your shoulders relaxed  · Support your wrist and hand with the sling  Cover the knuckles on your hand with your sling  Your wrist should be positioned higher than your elbow  Your wrist may start to hurt or go numb if your sling is too short  Ice:  Ice helps decrease swelling and pain  Ice may also help prevent tissue damage  Use an ice pack, or put crushed ice in a plastic bag  Cover it with a towel and place it on your shoulder for 15 to 20 minutes every hour or as directed  Exercises:  Begin gentle exercises as directed  After healing begins, you may start light exercises so your shoulder does not get stiff  Physical therapy also may be ordered for you  A physical therapist teaches you exercises to help improve movement and strength, and to decrease pain  Do not lift heavy objects or do any exercise that causes severe pain  Contact your healthcare provider or bone specialist if:   · You have a fever  · You have increased pain in your injured shoulder and arm even after you rest and take your medicine  · You have new weakness or numbness in your injured shoulder and arm  · You have questions or concerns about your condition or care  Return to the emergency department if:   · Your injured shoulder and arm become pale or cold  · You cannot move your injured shoulder and arm  · You have increased redness or swelling in your injured shoulder  · Your shoulder becomes dislocated again  © 2017 [address]  Information is for End User's use only and may not be sold, redistributed or otherwise used for commercial purposes  All illustrations and images included in CareNotes® are the copyrighted property of A [employer] M [employer]  or [employer] Juarez  The above information is an  only  It is not intended as medical advice for individual conditions or treatments  Talk to your doctor, nurse or pharmacist before following any medical regimen to see if it is safe and effective for you

## 2018-09-13 ENCOUNTER — TELEPHONE (OUTPATIENT)
Dept: FAMILY MEDICINE CLINIC | Facility: CLINIC | Age: 83
End: 2018-09-13

## 2018-09-13 DIAGNOSIS — I50.33 ACUTE ON CHRONIC DIASTOLIC CONGESTIVE HEART FAILURE (HCC): ICD-10-CM

## 2018-09-13 NOTE — TELEPHONE ENCOUNTER
Seen at [facility] ER for a fall and fx/ dislocated shoulder  Sent home on oxycodone and not tolerating it  Vomiting, dizzy, weak    Is there anything else she could take for pain?  (also has SE from tramadol)

## 2018-09-17 ENCOUNTER — TELEPHONE (OUTPATIENT)
Dept: FAMILY MEDICINE CLINIC | Facility: CLINIC | Age: 83
End: 2018-09-17

## 2018-09-17 DIAGNOSIS — S43.005A SHOULDER DISLOCATION, LEFT, INITIAL ENCOUNTER: Primary | ICD-10-CM

## 2018-09-18 ENCOUNTER — HOSPITAL ENCOUNTER (OUTPATIENT)
Dept: RADIOLOGY | Facility: HOSPITAL | Age: 83
Discharge: HOME | End: 2018-09-18
Attending: ORTHOPAEDIC SURGERY
Payer: MEDICARE

## 2018-09-18 ENCOUNTER — OFFICE VISIT (OUTPATIENT)
Dept: OBGYN CLINIC | Facility: HOSPITAL | Age: 83
End: 2018-09-18
Payer: MEDICARE

## 2018-09-18 VITALS — HEIGHT: 61 IN | HEART RATE: 80 BPM | SYSTOLIC BLOOD PRESSURE: 118 MMHG | DIASTOLIC BLOOD PRESSURE: 75 MMHG

## 2018-09-18 DIAGNOSIS — T14.8XXA FRACTURE: ICD-10-CM

## 2018-09-18 DIAGNOSIS — M24.312 SPONTANEOUS DISLOCATION OF SHOULDER, LEFT: Primary | ICD-10-CM

## 2018-09-18 PROCEDURE — 73030 X-RAY EXAM OF SHOULDER: CPT

## 2018-09-18 PROCEDURE — 99213 OFFICE O/P EST LOW 20 MIN: CPT | Performed by: ORTHOPAEDIC SURGERY

## 2018-09-18 NOTE — PROGRESS NOTES
82 y o female presents to the office for evaluation of left shoulder dislocation and wrist drop due to a fall on 09/11/2018  She is left hand dominant  She was reduced in the emergency room  Today, she reports anterior shoulder pain  She has been compliant with her restrictions and her sling  She has been moving her wrist and fingers  She takes Tylenol to control her pain  Review of Systems  Review of systems negative unless otherwise specified in HPI    Past Medical History  Past Medical History:   Diagnosis Date    Anemia     Arthritis     rheumatoid    Benign essential hypertension     Cataract     Chronic cystitis     CPAP (continuous positive airway pressure) dependence     Diverticulitis of colon     Early satiety     GERD (gastroesophageal reflux disease)     Gout     Hearing aid worn     bilateral    Hearing loss     Hemorrhoids     Hiatal hernia     History of colonic polyps     Hyperlipidemia     Hypothyroidism     Left breast mass     Microhematuria     Migraine     occular    Neuropathy     lower and upper extermities    Overactive bladder     Pneumonia of left lower lobe due to infectious organism (HCC)     [facility] [address] [facility]     Sleep apnea     uses cpap    Stroke ([facility] [address] )     5/2017    Type 2 diabetes mellitus (HCC)     Urinary frequency     Urinary urgency     Urothelial cancer ([facility] [address] ) 04/23/2018    Use of cane as ambulatory aid        Past Surgical History  Past Surgical History:   Procedure Laterality Date    APPENDECTOMY      ARTHROSCOPY WRIST Right     with release of transverse carpal ligament     BLADDER SURGERY      BLADDER SURGERY      BREAST SURGERY      left breast    BUNIONECTOMY Bilateral     CATARACT EXTRACTION Bilateral     CHOLECYSTECTOMY      COLONOSCOPY      CYSTOSCOPY      EGD AND COLONOSCOPY N/A 12/20/2017    Procedure: EGD AND COLONOSCOPY;  Surgeon: Sariah Keller MD;  [facility];   Service: Gastroenterology   Dash Guerra ESOPHAGOGASTRODUODENOSCOPY      diagnostic    FOREARM SURGERY Right     fracture repair    HYSTERECTOMY      JOINT REPLACEMENT      KNEE ARTHROSCOPY Left     KNEE SURGERY Left     meniscus tear    NOSE SURGERY      [place] CYSTO/URETERO W/LITHOTRIPSY &INDWELL STENT INSRT Right 2/28/2018    Procedure: CYSTOSCOPY RIGHT URETEROSCOPY, RIGHT RETROGRADE PYELOGRAM AND INSERTION  RIGHT STENT URETERAL, RIGHT RENAL PELVIC WASHING FOR CYTOLOGY;  Surgeon: Jermain Bryson MD;  [facility];  Service: Urology    KS NEPHRECTOMY, W/PART   URETECTOMY Right 4/23/2018    Procedure: ROBATIC ASSISTED NEPHRO-URETERECTOMY WITH BLADDER CUFF EXCISION;  Surgeon: May Fu MD;  Location:  MAIN OR;  Service: Urology    REPLACEMENT TOTAL KNEE BILATERAL Bilateral     URETEROSCOPY Right 3/20/2018    Procedure: CYSTOSCOPY, RETROGRADE PYELOGRAM, URETEROSCOPY, BIOPSY, BRUSH, FULGURATION, STENT PLACEMENT, BLADDER BIOPSY WITH FULGURATION;  Surgeon: May Fu MD;  [facility];  Service: Urology       Current Medications  Current Outpatient Prescriptions on File Prior to Visit   Medication Sig Dispense Refill    acetaminophen (TYLENOL) 325 mg tablet Take 650 mg by mouth every 6 (six) hours as needed for mild pain      albuterol (VENTOLIN HFA) 90 mcg/act inhaler Inhale 2 puffs every 6 (six) hours as needed for wheezing 1 Inhaler 0    Calcium Citrate-Vitamin D (CALCIUM + D PO) Take 1 tablet by mouth 2 (two) times a day      cyanocobalamin (VITAMIN B-12) 100 mcg tablet Take by mouth      gabapentin (NEURONTIN) 100 mg capsule TAKE 1 CAPSULE IN THE MORNING, TAKE 1 CAPSULE IN THE AFTERNOON AND TAKE 1 TO 3 CAPSULES AT BEDTIME 450 capsule 1    hydroxychloroquine (PLAQUENIL) 200 mg tablet Take 200 mg by mouth 2 (two) times a day with meals      LEUCOVORIN CALCIUM PO Take 10 mg by mouth once a week      levothyroxine 75 mcg tablet Take 75 mcg by mouth daily      losartan (COZAAR) 25 mg tablet Take 1 tablet (25 mg total) by mouth daily 30 tablet 1    methotrexate 2 5 mg tablet       Multiple Vitamin (MULTIVITAMINS PO) Take 1 tablet by mouth daily      omeprazole (PriLOSEC) 20 mg delayed release capsule TAKE 1 CAPSULE DAILY AS NEEDED FOR STOMACH ACID 90 capsule 3    pravastatin (PRAVACHOL) 80 mg tablet TAKE 1 TABLET EVERY DAY 90 tablet 3    rivaroxaban (XARELTO) 15 mg tablet Take 1 tablet (15 mg total) by mouth daily with breakfast 90 tablet 1    rOPINIRole (REQUIP) 0 5 mg tablet Take 1 tablet (0 5 mg total) by mouth 2 (two) times a day 60 tablet 0    torsemide (DEMADEX) 20 mg tablet Take 2 tablets (40 mg total) by mouth daily 60 tablet 0    docusate sodium (COLACE) 100 mg capsule Take 1 capsule (100 mg total) by mouth 2 (two) times a day for 60 doses 60 capsule 0    oxyCODONE (ROXICODONE) 5 mg immediate release tablet Take 1 tablet (5 mg total) by mouth every 4 (four) hours as needed for moderate pain for up to 10 days Max Daily Amount: 30 mg (Patient not taking: Reported on 9/18/2018 ) 20 tablet 0     Current Facility-Administered Medications on File Prior to Visit   Medication Dose Route Frequency Provider Last Rate Last Dose    acetaminophen (TYLENOL) tablet 650 mg  650 mg Oral Q6H PRN Sukh Melvin PA-C           Recent Labs [facility])    0  Lab Value Date/Time   HCT 27 6 (L) 09/06/2018 0932   HCT 28 5 (L) 11/14/2017 1151   HGB 8 4 (L) 09/06/2018 0932   HGB 9 5 (L) 11/14/2017 1151   WBC 7 38 09/06/2018 0932   WBC 5 9 11/14/2017 1151   INR 1 80 (H) 08/19/2018 0942   ESR 66 (H) 06/28/2018 1300   CRP 10 6 (H) 06/28/2018 1300   GLUCOSE 95 01/04/2016 1758   HGBA1C 6 3% 06/13/2018 1731   HGBA1C 5 9 04/24/2018 0610   HGBA1C 6 2 01/03/2018 1330         Physical exam  · General: Awake, Alert, Oriented  · Eyes: Pupils equal, round and reactive to light  · Heart: regular rate and rhythm  · Lungs: No audible wheezing  · Abdomen: soft  Left shoulder  · Patient presents to the office in a wheelchair  · Able to flex and extend rist against resistance  · Able to wiggle fingers  · Sensation intact      Imaging  X-rays of left shoulder were reviewed and shows no dislocation  Procedure  None    Assessment/Plan:   81 y  o female with left shoulder dislocation and neuropraxia of radial nerve sustained on 09/11/2018 will continue use of her sling  She may start range of motion exercises for her elbow, wrist and hand  She will start formal physical therapy in 2 weeks, script was provided  She will continue Tylenol to control her pain  We will see her back in 6 weeks

## 2018-09-19 ENCOUNTER — PROCEDURE VISIT (OUTPATIENT)
Dept: UROLOGY | Facility: CLINIC | Age: 83
End: 2018-09-19
Payer: MEDICARE

## 2018-09-19 VITALS
HEART RATE: 69 BPM | SYSTOLIC BLOOD PRESSURE: 124 MMHG | HEIGHT: 61 IN | BODY MASS INDEX: 38.89 KG/M2 | WEIGHT: 206 LBS | DIASTOLIC BLOOD PRESSURE: 70 MMHG

## 2018-09-19 DIAGNOSIS — K43.2 INCISIONAL HERNIA, WITHOUT OBSTRUCTION OR GANGRENE: ICD-10-CM

## 2018-09-19 DIAGNOSIS — C68.9 UROTHELIAL CANCER (HCC): Primary | ICD-10-CM

## 2018-09-19 PROCEDURE — 51798 US URINE CAPACITY MEASURE: CPT | Performed by: UROLOGY

## 2018-09-19 PROCEDURE — 52000 CYSTOURETHROSCOPY: CPT | Performed by: UROLOGY

## 2018-09-19 NOTE — PROGRESS NOTES
UROLOGY PROGRESS NOTE     History of Present Illness:   Jessica Crocker is a 81 y o  female known to Dr Jermain Bryson with a long history of urge incontinence who has been undergoing PTNS  She developed recurrent urinary tract infections and gross hematuria  Her primary care team ordered a renal ultrasound and followup CT scan which showed some nodularity in the right renal pelvis  Patient underwent ureteroscopic evaluation and subsequent ureteroscopic biopsy which demonstrated diffuse low-grade cancer  After lengthy discussion, the patient agreed to proceed to the operating room for a right robotic nephroureterectomy with bladder cuff which was performed in April  Patient did receive 1 unit of packed red blood cells while hospitalized given asymptomatic anemia but significant cardiac comorbidities  Given her rheumatologic issue she was transferred to a rehab facility following surgery  Developed incision hernia post op and has seen general surgery team for evaluation  Small draining wound was present  Then had subsequent fall from standing, with traumatic shoulder injury  Presents today for scheduled followup      Past Medical History:     Past Medical History:   Diagnosis Date    Anemia     Arthritis     rheumatoid    Benign essential hypertension     Cataract     Chronic cystitis     CPAP (continuous positive airway pressure) dependence     Diverticulitis of colon     Early satiety     GERD (gastroesophageal reflux disease)     Gout     Hearing aid worn     bilateral    Hearing loss     Hemorrhoids     Hiatal hernia     History of colonic polyps     Hyperlipidemia     Hypothyroidism     Left breast mass     Microhematuria     Migraine     occular    Neuropathy     lower and upper extermities    Overactive bladder     Pneumonia of left lower lobe due to infectious organism (HCC)     RA (rheumatoid arthritis) ([address] )     Sleep apnea     uses cpap    Stroke ([address] ) 5/2017    Type 2 diabetes mellitus (HCC)     Urinary frequency     Urinary urgency     Urothelial cancer ([facility] [address] ) 04/23/2018    Use of cane as ambulatory aid        PAST SURGICAL HISTORY:     Past Surgical History:   Procedure Laterality Date    APPENDECTOMY      ARTHROSCOPY WRIST Right     with release of transverse carpal ligament     BLADDER SURGERY      BLADDER SURGERY      BREAST SURGERY      left breast    BUNIONECTOMY Bilateral     CATARACT EXTRACTION Bilateral     CHOLECYSTECTOMY      COLONOSCOPY      CYSTOSCOPY      EGD AND COLONOSCOPY N/A 12/20/2017    Procedure: EGD AND COLONOSCOPY;  Surgeon: Sudhir Houston MD;  [facility]; Service: Gastroenterology    ESOPHAGOGASTRODUODENOSCOPY      diagnostic    FOREARM SURGERY Right     fracture repair    HYSTERECTOMY      JOINT REPLACEMENT      KNEE ARTHROSCOPY Left     KNEE SURGERY Left     meniscus tear    NOSE SURGERY      [place] CYSTO/URETERO W/LITHOTRIPSY &INDWELL STENT INSRT Right 2/28/2018    Procedure: CYSTOSCOPY RIGHT URETEROSCOPY, RIGHT RETROGRADE PYELOGRAM AND INSERTION  RIGHT STENT URETERAL, RIGHT RENAL PELVIC WASHING FOR CYTOLOGY;  Surgeon: Hosea Rubi MD;  [facility];  Service: Urology    [place] NEPHRECTOMY, W/PART   URETECTOMY Right 4/23/2018    Procedure: ROBATIC ASSISTED NEPHRO-URETERECTOMY WITH BLADDER CUFF EXCISION;  Surgeon: Charlene Pathak MD;  [facility];  Service: Urology    REPLACEMENT TOTAL KNEE BILATERAL Bilateral     URETEROSCOPY Right 3/20/2018    Procedure: CYSTOSCOPY, RETROGRADE PYELOGRAM, URETEROSCOPY, BIOPSY, BRUSH, FULGURATION, STENT PLACEMENT, BLADDER BIOPSY WITH FULGURATION;  Surgeon: Charlene Pathak MD;  [facility];  Service: Urology       CURRENT MEDICATIONS:     Current Outpatient Prescriptions   Medication Sig Dispense Refill    acetaminophen (TYLENOL) 325 mg tablet Take 650 mg by mouth every 6 (six) hours as needed for mild pain      albuterol (VENTOLIN HFA) 90 mcg/act inhaler Inhale 2 puffs every 6 (six) hours as needed for wheezing 1 Inhaler 0    Calcium Citrate-Vitamin D (CALCIUM + D PO) Take 1 tablet by mouth 2 (two) times a day      cyanocobalamin (VITAMIN B-12) 100 mcg tablet Take by mouth      docusate sodium (COLACE) 100 mg capsule Take 1 capsule (100 mg total) by mouth 2 (two) times a day for 60 doses 60 capsule 0    gabapentin (NEURONTIN) 100 mg capsule TAKE 1 CAPSULE IN THE MORNING, TAKE 1 CAPSULE IN THE AFTERNOON AND TAKE 1 TO 3 CAPSULES AT BEDTIME 450 capsule 1    hydroxychloroquine (PLAQUENIL) 200 mg tablet Take 200 mg by mouth 2 (two) times a day with meals      LEUCOVORIN CALCIUM PO Take 10 mg by mouth once a week      levothyroxine 75 mcg tablet Take 75 mcg by mouth daily      losartan (COZAAR) 25 mg tablet Take 1 tablet (25 mg total) by mouth daily 30 tablet 1    methotrexate 2 5 mg tablet       Multiple Vitamin (MULTIVITAMINS PO) Take 1 tablet by mouth daily      omeprazole (PriLOSEC) 20 mg delayed release capsule TAKE 1 CAPSULE DAILY AS NEEDED FOR STOMACH ACID 90 capsule 3    oxyCODONE (ROXICODONE) 5 mg immediate release tablet Take 1 tablet (5 mg total) by mouth every 4 (four) hours as needed for moderate pain for up to 10 days Max Daily Amount: 30 mg (Patient not taking: Reported on 9/18/2018 ) 20 tablet 0    pravastatin (PRAVACHOL) 80 mg tablet TAKE 1 TABLET EVERY DAY 90 tablet 3    rivaroxaban (XARELTO) 15 mg tablet Take 1 tablet (15 mg total) by mouth daily with breakfast 90 tablet 1    rOPINIRole (REQUIP) 0 5 mg tablet Take 1 tablet (0 5 mg total) by mouth 2 (two) times a day 60 tablet 0    torsemide (DEMADEX) 20 mg tablet Take 2 tablets (40 mg total) by mouth daily 60 tablet 0     No current facility-administered medications for this visit        Facility-Administered Medications Ordered in Other Visits   Medication Dose Route Frequency Provider Last Rate Last Dose    acetaminophen (TYLENOL) tablet 650 mg  650 mg Oral Q6H PRN Julio Jefferson PA-C           ALLERGIES:     Allergies   Allergen Reactions    Acetazolamide Other (See Comments)     Other reaction(s): Unknown Allergic Reaction    Aspirin      Other reaction(s): Other (See Comments)  High Dose ASA-stomach ache, rachel  ASA 81 m    Atorvastatin      Other reaction(s): Muscle Pain, Myalgia    Azithromycin     Nitrofurantoin Hives     HIVES * pt denies  Other reaction(s): Unknown Allergic Reaction  Other reaction(s): Other (See Comments)  HIVES * pt denies    Other Other (See Comments)     Adhesive tape : red and itching  Other reaction(s): Other (See Comments)  red and itching    Shellfish-Derived Products Other (See Comments)     Patient got Gout following eating shell fish    Sulfa Antibiotics Other (See Comments)     unknown    Tramadol Diarrhea and Vomiting       SOCIAL HISTORY:     Social History     Social History    Marital status: /Civil Union     Spouse name: N/A    Number of children: N/A    Years of education: N/A     Social History Main Topics    Smoking status: Never Smoker    Smokeless tobacco: Never Used      Comment: stopped smoking in the distant past, never smoker (as per [employer])     Alcohol use No      Comment: seldom    Drug use: No    Sexual activity: Not on file     Other Topics Concern    Not on file     Social History Narrative    No caffeine use       SOCIAL HISTORY:     Family History   Problem Relation Age of Onset    Other Mother         epilepsy    Early death Mother    Zahraa Pressley Glaucoma Father     Stroke Father         silent    Other Brother         cardiac disorder       REVIEW OF SYSTEMS:     Review of Systems   Constitutional: Negative  Respiratory: Negative  Cardiovascular: Positive for leg swelling  Gastrointestinal: Negative for abdominal pain  Genitourinary: Negative  Musculoskeletal: Positive for back pain and gait problem  Skin: Negative  Psychiatric/Behavioral: Negative          PHYSICAL EXAM:     LMP (LMP Unknown)   General:  Elderly female in no acute distress  They have a normal affect  There is not appear to be any gross neurologic defects or abnormalities  HEENT:  Normocephalic, atraumatic  Neck is supple without any palpable lymphadenopathy  Cardiovascular:  Patient has normal palpable distal radial pulses  There is no significant peripheral edema  No JVD is noted  Respiratory:  Patient has unlabored respirations  There is no audible wheeze or rhonchi  Abdomen:  Abdomen with healed surgical scars (appy/LILLY) and healing robotic incisions     Abdomen is soft and nontender  Obese with large pannus  There is no tympany  There is a large incisional hernia in the right flank around the extraction incision  THe wound has healed  Musculoskeletal:  Rheumatologic issues limit mobility  Walks with a cane  Left shoulder in sling  Dermatologic:  Patient has no skin abnormalities or rashes  LABS:     CBC:   Lab Results   Component Value Date    WBC 7 38 2018    HGB 8 4 (L) 2018    HCT 27 6 (L) 2018     (H) 2018     2018       BMP:   Lab Results   Component Value Date    GLUCOSE 95 2016    CALCIUM 8 6 2018     (L) 2018    K 4 0 2018    CO2 28 2018     2018    BUN 26 (H) 2018    CREATININE 1 61 (H) 2018     IMAGIN/23/18  CT ABDOMEN AND PELVIS WITHOUT IV CONTRAST     INDICATION:   C68 9: Malignant neoplasm of urinary organ, unspecified  Status post robotic nephroureterectomy with bladder cuff excision, presenting with concern for wound dehiscence     COMPARISON: CT 2018      TECHNIQUE:  CT examination of the abdomen and pelvis was performed without intravenous contrast   Axial, sagittal, and coronal 2D reformatted images were created from the source data and submitted for interpretation       Radiation dose length product (DLP) for this visit:  930 7 mGy-cm     This examination, like all CT scans performed in the [facility], was performed utilizing techniques to minimize radiation dose exposure, including the use of iterative   reconstruction and automated exposure control       Enteric contrast was administered       FINDINGS:     ABDOMEN     LOWER CHEST:  2 mm right middle lobe nodule is stable dating back to 9/28/2015      LIVER/BILIARY TREE:  Unremarkable      GALLBLADDER:  Gallbladder is surgically absent      SPLEEN:  Scattered calcifications are again seen, consistent with old granulomatous disease      PANCREAS:  Unremarkable      ADRENAL GLANDS:  Unremarkable      KIDNEYS/URETERS:  The right kidney is surgically absent  There is no recurrent mass within the surgical bed      STOMACH AND BOWEL:  There is colonic diverticulosis without evidence of acute diverticulitis      APPENDIX:  No findings to suggest appendicitis      ABDOMINOPELVIC CAVITY:  No ascites or free intraperitoneal air  No lymphadenopathy      VESSELS:  Unremarkable for patient's age      PELVIS     REPRODUCTIVE ORGANS:  Patient is status post hysterectomy      URINARY BLADDER:  A punctate focus of air is seen within the dome of the bladder      ABDOMINAL WALL/INGUINAL REGIONS:  There is a new right paracentral hernia containing large bowel  No evidence of obstruction  There is a small open wound within the lower most lateral right anterior abdominal wall  There is small amount of fluid in   this area without well-formed fluid collection      OSSEOUS STRUCTURES:  No acute fracture or destructive osseous lesion      IMPRESSION:        1  Small wound within the lower most lateral right anterior abdominal wall with trace amount of fluid in this area without well-formed fluid collection  Continued clinical follow-up recommended      2   New right paracentral ventral hernia containing large bowel without evidence of obstruction         3   Punctate focus of air within the dome of the bladder, may be related to instrumentation  If there is no history of instrumentation, correlation with urinalysis should be considered to exclude infection       4   Postsurgical changes of right nephrectomy without evidence of recurrent or metastatic disease  PATHOLOGY:     4/23/18  Case Report   Surgical Pathology Report                         Case: [id number]                                    Authorizing Provider: Colton Campbell MD   Collected:           04/23/2018 1631               Ordering Location:     [address]      Received:            04/24/2018 43                                      Hospital Operating Room                                                       Pathologist:           Du Carpio MD                                                                Specimen:    Kidney, Right, Kidney, Ureter, and Bladder Cuff - Right                                    Final Diagnosis   URETER AND RENAL PELVIS STAGING PROTOCOL     1  Specimen identification:     - Procedure: Nephroureterectomy, complete      - Laterality: Right    2  Tumor     - Tumor site: Renal pelvis      - Tumor size (cm): Greatest dimension: 3mm      - Histologic type: Low grade papillary urothelial carcinoma      - Associated epithelial lesions: None      - Histologic grade: Low grade      - Microscopic tumor extension (pTa): Papillary noninvasive carcinoma      - Tumor configuration: Papillary   3  Margins: All margins are negative for invasive carcinoma, carcinoma in situ and low-grade urothelial carcinoma/urothelial dysplasia   4  Lymph-vascular invasion: Not identified    5  Regional lymph nodes:  No lymph nodes submitted or found   6  Additional pathologic findings: Foreign body type giant cell response, chronic active pyelitis and ureteritis    7  Pathologic findings in ipsilateral non-neoplastic renal tissue: None    8  8th Ed AJCC Tumor Stage:  at least Stage 0a - pTa, pNx, Low grade      A   Right kidney, ureter and bladder cuff (radical nephrectomy):  - Focal low grade papillary urothelial carcinoma (3mm) with foci suspicious for superficial lamina propria invasion  - Bladder cuff and resection margins negative for carcinoma  - See staging protocol above (pTaNx)     Comment: The entire case was reviewed by Dr Marilia Akhtar at the [facility]  His diagnosis and comment are indicated below  Electronically signed by Thomas Smith MD on 5/3/2018 at 10:03 AM   Preliminary result electronically signed by Thomas Smith MD on 4/27/2018 at 12:00 PM   Note   [address]     Diagnosis  Right Kidney, Radical Nephrectomy:    - Papillary urothelial carcinoma, low grade, pelvicalyceal mucosa, focal areas suspicious for invasion into superficial subepithelial connective tissue (see comment)  Renal hilar and bladder cuff margins, negative for neoplasia  - Marked reactive changes, pelvicalyceal mucosa and ureteral lining  Foreign body type giant cell response, chronic active pyelitis and ureteritis; status post biopsy and catheter placement  PROCEDURE:     SEE NOTE    ASSESSMENT:     81 y o  female with low-grade upper tract urothelial carcinoma status post robotic nephro ureterectomy with bladder cuff excision on 4/23/2018 and postoperative incision hernia    PLAN:     Patient's cystoscopy today does not show gross evidence of tumor  Will continue surveillance of the bladder after nephro ureterectomy  Cystoscopy in 3 months with CT  Patient continues to have low level bladder symptoms likely related to the fact that she holds urine  We have discussed timed emptying and double voiding  This is somewhat challenging given the patient's habitus and ambulatory dysfunction  Recommended continued bowel control  No indication for catheterization or clean intermittent catheterization this time    Flank wound has healed  No longer draining  Incisional hernia present    Patient has been cautioned against major hernia surgery and I would tend to agree given her age and comorbidities  We will continue to follow      Shoulder dislocation will continue to be followed by the orthopedic team

## 2018-09-19 NOTE — PROGRESS NOTES
Cystoscopy  Date/Time: 9/19/2018 10:15 AM  Performed by: Christiano Modi  Authorized by: Christiano Modi     Procedure details: cystoscopy    Patient tolerance: Patient tolerated the procedure well with no immediate complications    Additional Procedure Details:   Cystoscopy Procedure note    Risk and benefits of flexible cystoscopy were discussed  Informed consent was obtained  A urine dip is adequate for cystoscopy  The patient was placed into the modified supine position  Her genitalia was prepped with Betadine and draped in a sterile fashion  Viscous 2% lidocaine was instilled into the urethra and allowed dwell time for local anesthesia  Flexible cystoscopy was then performed with a 16F scope  Urethra was inspected on entrance and exit of the scope  The bladder was thoroughly inspected in a 360 degree fashion  The urine was noted to be particulate  Left ureteral orifice was visualized in their orthotopic location with clear efflux of urine  The bladder mucosa was thoroughly inspected  There was no evidence of mucosal abnormalities, stones, or lesions  Retroflexion for evaluation of the bladder neck was normal       Overall this was a negative cystoscopy  A female office staff member was present throughout the entire procedure  Patient was filled with over 250 cc of sterile saline  She was able to void volitionally  Postvoid residual 69 cc

## 2018-09-21 DIAGNOSIS — R07.2 PRECORDIAL PAIN: ICD-10-CM

## 2018-09-21 RX ORDER — ALBUTEROL SULFATE 90 UG/1
AEROSOL, METERED RESPIRATORY (INHALATION)
Qty: 18 INHALER | Refills: 0 | Status: SHIPPED | OUTPATIENT
Start: 2018-09-21 | End: 2018-09-26

## 2018-09-26 ENCOUNTER — OFFICE VISIT (OUTPATIENT)
Dept: FAMILY MEDICINE CLINIC | Facility: CLINIC | Age: 83
End: 2018-09-26
Payer: MEDICARE

## 2018-09-26 VITALS
HEIGHT: 61 IN | BODY MASS INDEX: 38.78 KG/M2 | OXYGEN SATURATION: 98 % | HEART RATE: 98 BPM | SYSTOLIC BLOOD PRESSURE: 124 MMHG | WEIGHT: 205.4 LBS | TEMPERATURE: 98.4 F | DIASTOLIC BLOOD PRESSURE: 64 MMHG

## 2018-09-26 DIAGNOSIS — I10 ESSENTIAL HYPERTENSION: ICD-10-CM

## 2018-09-26 DIAGNOSIS — N39.8 UROTHELIAL LESION: ICD-10-CM

## 2018-09-26 DIAGNOSIS — M05.9 RHEUMATOID ARTHRITIS WITH POSITIVE RHEUMATOID FACTOR, INVOLVING UNSPECIFIED SITE (HCC): Primary | ICD-10-CM

## 2018-09-26 DIAGNOSIS — S43.005A DISLOCATION OF LEFT SHOULDER JOINT, INITIAL ENCOUNTER: ICD-10-CM

## 2018-09-26 DIAGNOSIS — Z23 NEED FOR INFLUENZA VACCINATION: ICD-10-CM

## 2018-09-26 DIAGNOSIS — N18.30 CKD (CHRONIC KIDNEY DISEASE) STAGE 3, GFR 30-59 ML/MIN (HCC): ICD-10-CM

## 2018-09-26 PROCEDURE — 99214 OFFICE O/P EST MOD 30 MIN: CPT | Performed by: FAMILY MEDICINE

## 2018-09-26 PROCEDURE — 90662 IIV NO PRSV INCREASED AG IM: CPT

## 2018-09-26 PROCEDURE — G0008 ADMIN INFLUENZA VIRUS VAC: HCPCS

## 2018-09-26 RX ORDER — DOCUSATE SODIUM 100 MG/1
100 CAPSULE, LIQUID FILLED ORAL 2 TIMES DAILY
Qty: 90 CAPSULE | Refills: 3 | Status: SHIPPED | OUTPATIENT
Start: 2018-09-26 | End: 2018-09-26 | Stop reason: SDUPTHER

## 2018-09-26 NOTE — PROGRESS NOTES
Assessment/Plan:    Rheumatoid arthritis ([address] )  She is getting good relief with Plaquenil    CKD (chronic kidney disease) stage 3, GFR 30-59 ml/min  Function has been stable with GFR approx 30  Essential hypertension    Her losartan was recently decreased from 100 mg to 25 mg daily  Blood pressure has been stable  Diagnoses and all orders for this visit:    Rheumatoid arthritis with positive rheumatoid factor, involving unspecified site [facility])    Need for influenza vaccination  -     influenza vaccine, [id number], high-dose, PF 0 5 mL, for patients 65 yr+ (FLUZONE HIGH-DOSE)    Urothelial lesion  -     docusate sodium (COLACE) 100 mg capsule; Take 1 capsule (100 mg total) by mouth 2 (two) times a day for 60 doses    Dislocation of left shoulder joint, initial encounter  Comments:  She will cont sling and start PT near future    CKD (chronic kidney disease) stage 3, GFR 30-59 ml/min    Essential hypertension          Subjective:      Patient ID: Maceo Sandifer is a 81 y o  female  She is here for follow up of gout in foot  Symptom resolved soon after her visit here in June  She fell 2 weeks ago and hit her left shoulder which caused anterior dislocation  She is wearing a sling now and she will soon be starting physical therapy  The back has not been too bad lately because she has been resting ever since injuring her shoulder  She had right nephrectomy April 2018  Now she has a hernia of the abdominal wall which is taking up space where the kidney used to be she denies pain but she notes bulging  Her GFR remains around 30  No CP or SOB    She is requesting some nursing help with bathing at home  Left wrist has been sore lately          The following portions of the patient's history were reviewed and updated as appropriate: allergies, current medications, past family history, past medical history, past social history, past surgical history and problem list     Review of Systems Constitutional: Positive for fatigue  Respiratory: Negative for chest tightness and shortness of breath  Cardiovascular: Positive for leg swelling  Negative for chest pain  Musculoskeletal: Positive for arthralgias  Objective:      /64 (BP Location: Right arm, Patient Position: Sitting, Cuff Size: Large)   Pulse 98   Temp 98 4 °F (36 9 °C) (Tympanic)   Ht 5' 1" (1 549 m)   Wt 93 2 kg (205 lb 6 4 oz)   LMP  (LMP Unknown)   SpO2 98%   BMI 38 81 kg/m²          Physical Exam   Constitutional: She is oriented to person, place, and time  She appears well-developed and well-nourished  obese   Neck: Normal range of motion  Neck supple  Cardiovascular: Normal rate, regular rhythm and normal heart sounds  Pulmonary/Chest: Effort normal and breath sounds normal    Musculoskeletal: She exhibits edema  Mild tenderness radial aspect right wrist  Full ROM but pain with ulnar deviation   Neurological: She is alert and oriented to person, place, and time  Skin: Skin is warm and dry  Nursing note and vitals reviewed

## 2018-09-28 NOTE — TELEPHONE ENCOUNTER
Tamika PATEL, called stating she went to see pt yesterday for admission visit and after sitting with pt they determined she did not have a skilled nursing need  Pt is also doing PT outpatient and according to medicare guidelines she then is not covered for homecare  Nurse and pt together decided not to initiate services

## 2018-10-01 RX ORDER — DOCUSATE SODIUM 100 MG/1
100 CAPSULE, LIQUID FILLED ORAL 2 TIMES DAILY
Qty: 90 CAPSULE | Refills: 0 | Status: SHIPPED | OUTPATIENT
Start: 2018-10-01 | End: 2019-09-03

## 2018-10-02 ENCOUNTER — EVALUATION (OUTPATIENT)
Dept: PHYSICAL THERAPY | Facility: REHABILITATION | Age: 83
End: 2018-10-02
Payer: MEDICARE

## 2018-10-02 DIAGNOSIS — M25.512 LEFT SHOULDER PAIN, UNSPECIFIED CHRONICITY: Primary | ICD-10-CM

## 2018-10-02 PROCEDURE — 97162 PT EVAL MOD COMPLEX 30 MIN: CPT | Performed by: PHYSICAL THERAPIST

## 2018-10-02 PROCEDURE — G8991 OTHER PT/OT GOAL STATUS: HCPCS | Performed by: PHYSICAL THERAPIST

## 2018-10-02 PROCEDURE — 97140 MANUAL THERAPY 1/> REGIONS: CPT | Performed by: PHYSICAL THERAPIST

## 2018-10-02 PROCEDURE — G8990 OTHER PT/OT CURRENT STATUS: HCPCS | Performed by: PHYSICAL THERAPIST

## 2018-10-02 NOTE — PROGRESS NOTES
PT Evaluation     Today's date: 10/2/2018  Patient name: Samson Brown  : 1935  MRN: [id number]  Referring provider: Carl Ledesma MD  Dx:   Encounter Diagnosis     ICD-10-CM    1  Left shoulder pain, unspecified chronicity M25 512                   Assessment  Impairments: abnormal or restricted ROM, impaired physical strength, pain with function and poor posture     Assessment details: Samson Brown is a 81 y o  female with significant medical history of many co-morbidities including RA, CHF, hypothyroidism, DM2 who presents with chief complaint of L shoulder pain following a traumatic dislocation  Trupti Emerson presents with evaluation findings of decreased L elbow and wrist strength, along with decreased passive and active motion of the L shoulder  These deficits are manifested functionally in difficulty with ambulation secondary to the inability to use her rolling walker with both hands and the inability to perform bathroom and dressing ADL's independently  Trupti Emerson will benefit from physical therapy to improve L shoulder ROM, improve L UE strength and allow for increased independence with ADL's  Understanding of Dx/Px/POC: good   Prognosis: fair    Goals  Short Term  1: Patient will report a 50% decrease in pain in 2-4 weeks  2: Pt will have active shoulder elevation equal to contralateral side in 4 weeks  Long Term  1: Patient will demonstrate independence in home exercise program by discharge  2: Patient will have FOTO score of 57 indicating improved function in 8 weeks       Plan  Patient would benefit from: skilled physical therapy  Planned therapy interventions: manual therapy, therapeutic exercise, therapeutic activities, neuromuscular re-education and functional ROM exercises  Frequency: 2x week  Duration in weeks: 12        Subjective Evaluation    History of Present Illness  Mechanism of injury: Pt presents following a traumatic dislocation of her L shoulder that was sustained during a fall on 2018  Pt presents in a sling that she states she was supposed to discharge previously  States she has been performing elbow ROM as her HEP but has not moved her shoulder as she was instructed not to until now  States she gets pain around her elbow  States she has been taking tylonal for her pain  States that she is having difficulty pushing her walker with one hand  Pt reports that she cant write, states that she has numbness in her L hand that was present before the fall  Pain  Current pain ratin  At best pain ratin  At worst pain ratin          Objective     Postural Observations    Additional Postural Observation Details  Forward shoulders bilaterally       Active Range of Motion   Left Shoulder   Flexion: 10 degrees     Right Shoulder   Flexion: 120 degrees   Abduction: 90 degrees     Passive Range of Motion   Left Shoulder   Flexion: 120 degrees   Abduction: 90 degrees   External rotation 90°: 0 degrees     Right Shoulder   Flexion: 145 degrees   Abduction: 145 degrees     Additional Passive Range of Motion Details  IR to chest    Strength/Myotome Testing     Left Shoulder     Planes of Motion   Flexion: 3-   Abduction: 3-     Right Shoulder     Planes of Motion   Flexion: 3   Abduction: 3     General Comments     Shoulder Comments   Wrist/elbow strength 4-/5 throughout          Precautions: recent dislocation, blood thinner, fall risk, hx of stroke, cardiovascular disease, DM2     Daily Treatment Diary     Manual  10/2            Shoulder PROM SASKIA                                                                    Exercise Diary  10/2            Table slides-flexion HEP            Elbow AROM             Rebeca             Shoulder isometrcis             AAROM with cane Modalities

## 2018-10-04 ENCOUNTER — OFFICE VISIT (OUTPATIENT)
Dept: PHYSICAL THERAPY | Facility: REHABILITATION | Age: 83
End: 2018-10-04
Payer: MEDICARE

## 2018-10-04 DIAGNOSIS — M25.512 LEFT SHOULDER PAIN, UNSPECIFIED CHRONICITY: Primary | ICD-10-CM

## 2018-10-04 PROCEDURE — 97110 THERAPEUTIC EXERCISES: CPT | Performed by: PHYSICAL THERAPIST

## 2018-10-04 PROCEDURE — 97140 MANUAL THERAPY 1/> REGIONS: CPT | Performed by: PHYSICAL THERAPIST

## 2018-10-04 NOTE — PROGRESS NOTES
Daily Note     Today's date: 10/4/2018  Patient name: Chen Shore  : 1935  MRN: [id number]  Referring provider: King Tino MD  Dx:   Encounter Diagnosis     ICD-10-CM    1  Left shoulder pain, unspecified chronicity M25 512                   Subjective: Pt reports that she felt better since removing her sling  States her contralateral arm is bothering her from excessive use  Objective: See treatment diary below  Precautions: recent dislocation, blood thinner, fall risk, hx of stroke, cardiovascular disease, DM2      Daily Treatment Diary      Manual  10/2  10/4                   Shoulder PROM SASKIA  SASKIA                                                                                                                         Exercise Diary  10/2  10/4                   Table slides-flexion HEP                     Elbow AROM    2x10                   Pulley    4'                   Shoulder isometrcis    man resistance 2x10x5" each                   AAROM with cane    not able                                                                                                                                                                                                                                                                                                                                                                                                 Modalities                                                                                                      Assessment: Pt showed improved PROM this visit with flexion to 145 and abduction to 120 and ER to 20  Pt continues to have difficulty with biceps activation and shows compensatory elbow flexion mechanism  Pt had significant difficulty with deltoid contraction this visit and continue to have the inability to activate against gravity but was able to activate twitch contraction  Plan: Continue per plan of care

## 2018-10-09 ENCOUNTER — OFFICE VISIT (OUTPATIENT)
Dept: PHYSICAL THERAPY | Facility: REHABILITATION | Age: 83
End: 2018-10-09
Payer: MEDICARE

## 2018-10-09 DIAGNOSIS — G25.81 RESTLESS LEG SYNDROME: ICD-10-CM

## 2018-10-09 DIAGNOSIS — M25.512 LEFT SHOULDER PAIN, UNSPECIFIED CHRONICITY: Primary | ICD-10-CM

## 2018-10-09 PROCEDURE — 97110 THERAPEUTIC EXERCISES: CPT | Performed by: PHYSICAL THERAPIST

## 2018-10-09 PROCEDURE — 97140 MANUAL THERAPY 1/> REGIONS: CPT | Performed by: PHYSICAL THERAPIST

## 2018-10-09 NOTE — PROGRESS NOTES
Daily Note     Today's date: 10/9/2018  Patient name: Ky Main  : 1935  MRN: [id number]  Referring provider: Lydia Ramirez MD  Dx:   Encounter Diagnosis     ICD-10-CM    1  Left shoulder pain, unspecified chronicity M25 512                   Subjective: Pt reports that she is better able to use her arm with her walker  Objective: See treatment diary below  Precautions: recent dislocation, blood thinner, fall risk, hx of stroke, cardiovascular disease, DM2      Daily Treatment Diary      Manual  10/2  10/4  10/9                 Shoulder PROM SASKIA [facility]  SASKIA                                                                                                                       Exercise Diary  10/2  10/4  10/9                 Table slides-flexion HEP                     Elbow AROM    2x10  2x15                 Pulley    4'  5'                 Shoulder isometrcis    man resistance 2x10x5" each  man resistance 2x10x5"                 AAROM with cane    not able  2x8                                                                                                                                                                                                                                                                                                                                                                                               Modalities                                                                                                      Assessment: Pt continues to show improvement in PROM with elevation equal to contralateral side this visit  Pt shows improved muscle activation in flexion and extension during isometrics and was able to perform AAROM this visit  Plan: Continue per plan of care

## 2018-10-10 RX ORDER — ROPINIROLE 0.5 MG/1
TABLET, FILM COATED ORAL
Qty: 270 TABLET | Refills: 3 | Status: ON HOLD | OUTPATIENT
Start: 2018-10-10 | End: 2019-03-21 | Stop reason: SDUPTHER

## 2018-10-11 ENCOUNTER — OFFICE VISIT (OUTPATIENT)
Dept: PHYSICAL THERAPY | Facility: REHABILITATION | Age: 83
End: 2018-10-11
Payer: MEDICARE

## 2018-10-11 DIAGNOSIS — M25.512 LEFT SHOULDER PAIN, UNSPECIFIED CHRONICITY: Primary | ICD-10-CM

## 2018-10-11 DIAGNOSIS — I50.33 ACUTE ON CHRONIC DIASTOLIC CONGESTIVE HEART FAILURE (HCC): ICD-10-CM

## 2018-10-11 PROCEDURE — 97110 THERAPEUTIC EXERCISES: CPT

## 2018-10-11 PROCEDURE — 97140 MANUAL THERAPY 1/> REGIONS: CPT

## 2018-10-11 RX ORDER — LOSARTAN POTASSIUM 25 MG/1
25 TABLET ORAL DAILY
Qty: 90 TABLET | Refills: 0 | Status: SHIPPED | OUTPATIENT
Start: 2018-10-11 | End: 2018-12-17 | Stop reason: SDUPTHER

## 2018-10-11 RX ORDER — TORSEMIDE 20 MG/1
40 TABLET ORAL DAILY
Qty: 180 TABLET | Refills: 0 | Status: SHIPPED | OUTPATIENT
Start: 2018-10-11 | End: 2018-11-27 | Stop reason: SDUPTHER

## 2018-10-11 NOTE — PROGRESS NOTES
Daily Note     Today's date: 10/11/2018  Patient name: Melanie Patel  : 1935  MRN: [id number]  Referring provider: Jessie Perez MD  Dx:   Encounter Diagnosis     ICD-10-CM    1  Left shoulder pain, unspecified chronicity M25 512        Start Time: 1400  Stop Time: 1445  Total time in clinic (min): 45 minutes    Subjective: Pt reports that she has more stiffness in her left shoulder  Objective: See treatment diary below  Precautions: recent dislocation, blood thinner, fall risk, hx of stroke, cardiovascular disease, DM2      Daily Treatment Diary      Manual  10/2  10/4  10/9  10/11               Shoulder PROM SASKIA [address]  SASKIA  LK                                                                                                                     Exercise Diary  10/2  10/4  10/9  10/11               Table slides-flexion HEP                     Elbow AROM    2x10  2x15                 Pulley    4'  5'  5'               Shoulder isometrcis    man resistance 2x10x5" each  man resistance 2x10x5"  man resistance 2x10x5"               AAROM with cane    not able  2x8  2x10                                                                                                                                                                                                                                                                                                                                                                                             Modalities                                                                                                      Assessment: Pt tolerates session well  Pt had improved FLEX AAROM with pulleys and with cane this session  Plan: Continue per plan of care

## 2018-10-16 ENCOUNTER — OFFICE VISIT (OUTPATIENT)
Dept: PHYSICAL THERAPY | Facility: REHABILITATION | Age: 83
End: 2018-10-16
Payer: MEDICARE

## 2018-10-16 DIAGNOSIS — M25.512 LEFT SHOULDER PAIN, UNSPECIFIED CHRONICITY: Primary | ICD-10-CM

## 2018-10-16 PROCEDURE — 97110 THERAPEUTIC EXERCISES: CPT | Performed by: PHYSICAL THERAPIST

## 2018-10-16 PROCEDURE — 97140 MANUAL THERAPY 1/> REGIONS: CPT | Performed by: PHYSICAL THERAPIST

## 2018-10-16 NOTE — PROGRESS NOTES
Daily Note     Today's date: 10/16/2018  Patient name: Rm Huffman  : 1935  MRN: [id number]  Referring provider: Jami Witt MD  Dx:   Encounter Diagnosis     ICD-10-CM    1  Left shoulder pain, unspecified chronicity M25 512                   Subjective: Pt reports that she has been able to do more household tasks such as emptying the   Objective: See treatment diary below  Precautions: recent dislocation, blood thinner, fall risk, hx of stroke, cardiovascular disease, DM2      Daily Treatment Diary      Manual  10/2  10/4  10/9  10/11  10/16             Shoulder PROM [facility]ASKIA [facility]  LK [facility]             rhythmic stabilization          unable                                                                                           Exercise Diary  10/2  10/4  10/9  10/11  10/16             Table slides-flexion HEP                     Elbow AROM    2x10  2x15                 Pulley    4'  5'  5'  6'             Shoulder isometrcis    man resistance 2x10x5" each  man resistance 2x10x5"  man resistance 2x10x5" Man resistance 2x10x5"             AAROM with cane    not able  2x8  2x10  2x10             serratus punches with cane          unable             scap retractions          10x10"                                                                                                                                                                                                                                                                                                                                           Modalities   10/16                      cold pack  5'                                                                           Assessment: Pt shows excellent abduction PROM  Shows improvement with AAROM  Pt showed significant improvement in posterior deltoid activation  Pt unable to perform additional TE today  Pt requested ice post treatment  Plan: Continue per plan of care

## 2018-10-17 ENCOUNTER — OFFICE VISIT (OUTPATIENT)
Dept: CARDIOLOGY CLINIC | Facility: CLINIC | Age: 83
End: 2018-10-17
Payer: MEDICARE

## 2018-10-17 VITALS
OXYGEN SATURATION: 99 % | BODY MASS INDEX: 38.92 KG/M2 | SYSTOLIC BLOOD PRESSURE: 126 MMHG | HEART RATE: 98 BPM | DIASTOLIC BLOOD PRESSURE: 70 MMHG | WEIGHT: 206 LBS

## 2018-10-17 DIAGNOSIS — I50.30 (HFPEF) HEART FAILURE WITH PRESERVED EJECTION FRACTION (HCC): Primary | ICD-10-CM

## 2018-10-17 DIAGNOSIS — E03.9 ACQUIRED HYPOTHYROIDISM: Primary | ICD-10-CM

## 2018-10-17 PROCEDURE — 99214 OFFICE O/P EST MOD 30 MIN: CPT | Performed by: INTERNAL MEDICINE

## 2018-10-17 RX ORDER — LEVOTHYROXINE SODIUM 0.07 MG/1
TABLET ORAL
Qty: 90 TABLET | Refills: 3 | Status: SHIPPED | OUTPATIENT
Start: 2018-10-17 | End: 2019-02-14 | Stop reason: SDUPTHER

## 2018-10-17 NOTE — PROGRESS NOTES
Heart Failure Outpatient Progress Note - Bebo Cobos 81 y o  female MRN: [id number]    @ Encounter: [id number]  Assessment/Plan:    Patient Active Problem List    Diagnosis Date Noted    Fracture 09/18/2018    Spontaneous dislocation of shoulder, left 09/18/2018    Chronic diastolic heart failure ([facility] ) 08/23/2018    CKD (chronic kidney disease) stage 3, GFR 30-59 ml/min ([facility]) 08/12/2018    Atrial fibrillation ([facility] ) 08/11/2018    Incisional hernia 06/28/2018    History of nephroureterectomy 06/21/2018    PAD (peripheral artery disease) ([place] [address] ) 06/04/2018    Anemia 04/25/2018    Pancreatic cyst 03/14/2018    Iron deficiency anemia 03/14/2018    Urothelial cancer ([place] [address] ) 03/02/2018    Lung nodule < 6cm on CT 03/02/2018    Chronic bilateral thoracic back pain 02/12/2018    Thoracic degenerative disc disease 02/12/2018    Sinus arrhythmia 01/03/2018    Peripheral neuropathy 11/09/2017    Right lumbar radiculopathy 11/09/2017    Primary osteoarthritis of left knee 10/13/2017    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 ([place] [address] ) 05/10/2017    Essential hypertension 05/10/2017    Rheumatoid arthritis ([place] [address] ) 05/10/2017    Diabetes mellitus type 2 in obese ([place] [address] ) 05/10/2017    Sleep apnea 05/10/2017    Acquired hypothyroidism 05/10/2017    Chronic GERD 05/10/2017    Prediabetes 05/24/2016    Restless leg syndrome 01/05/2016    Sensorineural hearing loss 10/12/2014    Hypercholesterolemia 08/29/2013    Obesity 07/18/2013     # HFpEF w/ Grade II diastolic dysfunction: No JVD on exam  Likely 2/2 to HTN (cLVH on TTE)  May also be 2/2 to plaquenil  Doing well  --Continue torsemide 20 mg PO qdaily as was urinating too much with 40 mg  Weight stable currently  --Continue BP control   # HTN: BP well controlled  Losartan decreased to 25 mg PO daily  # Hx of lymphedema   # Hyperglycemia   # APCs: Continue current mgmt  Had 1 week holter w/o e/o AFib   Currently anticoagulated for CVA so would not   # Hx of CVA/TIA: Currently on Xarelto and pravastatin  # NICK on CPAP  # RA w/ +RF on plaquenil and methotrexate  # L shoulder dislocation: Continue PT and Orthopedics f/u    RTC in 3 months    HPI: Very pleasant 82 y/o woman w/ PMHx of HTN, HLD, chronic LE edema/lymphedema, anemia of chronic disease p/f establishing care and abnormal ECG  She states she feels well  She was [facility] at [facility] and now lives closer to [place] [address] so is transferring her care  Denies any cardiac symptoms  Uses lymphedema machine  She had a CVA in 5/2017 and received tPA  Overall feels well from that  She had a 1 week holter that did not show any arrhythmia and has been on NOAC ever since the stroke  Holter showed NSR w/ APCs  She comes in today 4/6/18, for f/u  She had noted hematuria  CT A/P noted a R renal pelvis multifocal papillary lesions with pathology showing low grade Ta urothelial cancer  She comes in for preop evaluation  She continues to deny cardiac symptoms  She is more limited by knee pains  She is able to get around her home without difficulty  Today 7/10/18, she returns for f/u  She had her right robotic nephroureterectomy with bladder cuff  She currently has VNA coming to the home  Moving around better  Feeling stronger  Walking is difficult 2/2 to rheumatoid arthritis  Today 8/24/2018, she returns for post hospital D/C  She was admitted twice since her last visit  In the first admission she presented with chest tightness and dyspnea  On 8/10/18 underwent nuclear stress test with normal perfusion  She was diuresed and felt improved  Then she was readmitted with dyspnea and volume overload and diuresed again  Today she feels well  Today, 10/17/18, she returns for f/u  She states she has developed a hernia on the R side  She does not want to pursue surgery currently   She decreased her diuretics to 20 mg PO daily as she was urinating too much and was having accidents  She dislocated her L shoulder  Denies SOB with ambulation using her walker  Walking better overall around the house  Unable to stand too long due to her spine  Past Medical History:   Diagnosis Date    Anemia     Arthritis     rheumatoid    Benign essential hypertension     Cataract     Chronic cystitis     CPAP (continuous positive airway pressure) dependence     Diverticulitis of colon     Early satiety     GERD (gastroesophageal reflux disease)     Gout     Hearing aid worn     bilateral    Hearing loss     Hemorrhoids     Hiatal hernia     History of colonic polyps     Hyperlipidemia     Hypothyroidism     Left breast mass     Microhematuria     Migraine     occular    Neuropathy     lower and upper extermities    Overactive bladder     Pneumonia of left lower lobe due to infectious organism ([facility] )     RA (rheumatoid arthritis) ([facility] )     Sleep apnea     uses cpap    Stroke ([facility] )     5/2017    Type 2 diabetes mellitus (HCC)     Urinary frequency     Urinary urgency     Urothelial cancer ([facility] ) 04/23/2018    Use of cane as ambulatory aid        Review of Systems - 12 point ROS was done and is negative, except as noted above  Allergies   Allergen Reactions    Acetazolamide Other (See Comments)     Other reaction(s): Unknown Allergic Reaction    Aspirin      Other reaction(s): Other (See Comments)  High Dose ASA-stomach ache, rachel  ASA 81 m    Atorvastatin      Other reaction(s): Muscle Pain, Myalgia    Azithromycin     Nitrofurantoin Hives     HIVES * pt denies  Other reaction(s): Unknown Allergic Reaction  Other reaction(s): Other (See Comments)  HIVES * pt denies    Other Other (See Comments)     Adhesive tape : red and itching  Other reaction(s):  Other (See Comments)  red and itching    Shellfish-Derived Products Other (See Comments)     Patient got Gout following eating shell fish    Sulfa Antibiotics Other (See Comments)     unknown    Tramadol Diarrhea and Vomiting       Current Outpatient Prescriptions:     acetaminophen (TYLENOL) 325 mg tablet, Take 650 mg by mouth every 6 (six) hours as needed for mild pain, Disp: , Rfl:     Calcium Citrate-Vitamin D (CALCIUM + D PO), Take 1 tablet by mouth 2 (two) times a day, Disp: , Rfl:     cyanocobalamin (VITAMIN B-12) 100 mcg tablet, Take by mouth, Disp: , Rfl:     docusate sodium (COLACE) 100 mg capsule, Take 1 capsule (100 mg total) by mouth 2 (two) times a day for 60 doses, Disp: 90 capsule, Rfl: 0    gabapentin (NEURONTIN) 100 mg capsule, TAKE 1 CAPSULE IN THE MORNING, TAKE 1 CAPSULE IN THE AFTERNOON AND TAKE 1 TO 3 CAPSULES AT BEDTIME, Disp: 450 capsule, Rfl: 1    hydroxychloroquine (PLAQUENIL) 200 mg tablet, Take 200 mg by mouth 2 (two) times a day with meals, Disp: , Rfl:     LEUCOVORIN CALCIUM PO, Take 10 mg by mouth once a week, Disp: , Rfl:     levothyroxine 75 mcg tablet, Take 75 mcg by mouth daily, Disp: , Rfl:     losartan (COZAAR) 25 mg tablet, Take 1 tablet (25 mg total) by mouth daily, Disp: 90 tablet, Rfl: 0    methotrexate 2 5 mg tablet, 10 mg  , Disp: , Rfl:     Multiple Vitamin (MULTIVITAMINS PO), Take 1 tablet by mouth daily, Disp: , Rfl:     omeprazole (PriLOSEC) 20 mg delayed release capsule, TAKE 1 CAPSULE DAILY AS NEEDED FOR STOMACH ACID, Disp: 90 capsule, Rfl: 3    pravastatin (PRAVACHOL) 80 mg tablet, TAKE 1 TABLET EVERY DAY, Disp: 90 tablet, Rfl: 3    rivaroxaban (XARELTO) 15 mg tablet, Take 1 tablet (15 mg total) by mouth daily with breakfast, Disp: 90 tablet, Rfl: 1    rOPINIRole (REQUIP) 0 5 mg tablet, TAKE 2 TABLETS AT BEDTIME AND 1 TABLET IN THE MORNING, Disp: 270 tablet, Rfl: 3    torsemide (DEMADEX) 20 mg tablet, Take 2 tablets (40 mg total) by mouth daily (Patient taking differently: Take 40 mg by mouth daily Takes one a day since she has one kidney and it is too much for her  ), Disp: 180 tablet, Rfl: 0  No current facility-administered medications for this visit  Facility-Administered Medications Ordered in Other Visits:     acetaminophen (TYLENOL) tablet 650 mg, 650 mg, Oral, Q6H PRN, Lalit Workman PA-C    Social History     Social History    Marital status: /Civil Union     Spouse name: N/A    Number of children: N/A    Years of education: N/A     Occupational History    Not on file  Social History Main Topics    Smoking status: Never Smoker    Smokeless tobacco: Never Used      Comment: stopped smoking in the distant past, never smoker (as per [employer])     Alcohol use No      Comment: seldom    Drug use: No    Sexual activity: Not on file     Other Topics Concern    Not on file     Social History Narrative    No caffeine use       Family History   Problem Relation Age of Onset    Other Mother         epilepsy    Early death Mother    Thanh Rivera Glaucoma Father     Stroke Father         silent    Other Brother         cardiac disorder       Physical Exam:    Vitals: Blood pressure 126/70, pulse 98, weight 93 4 kg (206 lb), SpO2 99 %  , Body mass index is 38 92 kg/m² ,   Wt Readings from Last 3 Encounters:   10/17/18 93 4 kg (206 lb)   09/26/18 93 2 kg (205 lb 6 4 oz)   09/19/18 93 4 kg (206 lb)     Vitals:    10/17/18 1452   BP: 126/70   BP Location: Right arm   Patient Position: Sitting   Cuff Size: Standard   Pulse: 98   SpO2: 99%   Weight: 93 4 kg (206 lb)   On recheck HR 90 bpm    GEN: Lilliam Ramey appears well, alert and oriented x 3, pleasant and cooperative   HEENT: pupils equal, round, and reactive to light; extraocular muscles intact  NECK: supple, no carotid bruits   HEART: regular rhythm, normal S1 and S2, no murmurs, clicks, gallops or rubs, JVP is    LUNGS: clear to auscultation bilaterally; no wheezes, rales, or rhonchi   ABDOMEN: normal bowel sounds, soft, no tenderness, no distention  EXTREMITIES: peripheral pulses normal; no clubbing, cyanosis, or edema  NEURO: no focal findings   SKIN: normal without suspicious lesions on exposed skin    Labs & Results:  Lab Results   Component Value Date    WBC 7 38 09/06/2018    HGB 8 4 (L) 09/06/2018    HCT 27 6 (L) 09/06/2018     (H) 09/06/2018     09/06/2018       Chemistry        Component Value Date/Time     (L) 09/06/2018 0932     11/14/2017 1151    K 4 0 09/06/2018 0932    K 3 7 11/14/2017 1151     09/06/2018 0932     11/14/2017 1151    CO2 28 09/06/2018 0932    CO2 29 11/14/2017 1151    BUN 26 (H) 09/06/2018 0932    BUN 18 11/14/2017 1151    CREATININE 1 61 (H) 09/06/2018 0932    CREATININE 0 88 11/14/2017 1151        Component Value Date/Time    CALCIUM 8 6 09/06/2018 0932    CALCIUM 8 9 11/14/2017 1151    ALKPHOS 100 08/20/2018 0628    ALKPHOS 95 11/14/2017 1151    AST 12 08/20/2018 0628    AST 24 11/14/2017 1151    ALT 14 08/20/2018 0628    ALT 19 11/14/2017 1151    BILITOT 1 2 11/14/2017 1151        EKG personally reviewed by Conrad Nunez MD      Counseling / Coordination of Care  Total floor / unit time spent today 40 minutes  Greater than 50% of total time was spent with the patient and / or family counseling and / or coordination of care  A description of the counseling / coordination of care: 20 min  Thank you for the opportunity to participate in the care of this patient      Conrad Nunez MD, PhD   Claudia Bernal

## 2018-10-17 NOTE — LETTER
October 17, 2018     Eze Rainey MD  [id number] [place] [address]    Patient: Jewel Mc   YOB: 1935   Date of Visit: 10/17/2018       Dear Dr Elizabet Berkowitz: Thank you for referring Ezio Valenzuela to me for evaluation  Below are my notes for this consultation  If you have questions, please do not hesitate to call me  I look forward to following your patient along with you           Sincerely,        Mary Huntley MD        CC: No Recipients  Mary Huntley MD  10/17/2018  3:06 PM  Sign at close encounter  Heart Failure Outpatient Progress Note - Jewel Mc 81 y o  female MRN: [id number]    @ Encounter: [id number]  Assessment/Plan:    Patient Active Problem List    Diagnosis Date Noted    Fracture 09/18/2018    Spontaneous dislocation of shoulder, left 09/18/2018    Chronic diastolic heart failure ([facility] ) 08/23/2018    CKD (chronic kidney disease) stage 3, GFR 30-59 ml/min ([facility]) 08/12/2018    Atrial fibrillation ([facility] ) 08/11/2018    Incisional hernia 06/28/2018    History of nephroureterectomy 06/21/2018    PAD (peripheral artery disease) [facility] ) 06/04/2018    Anemia 04/25/2018    Pancreatic cyst 03/14/2018    Iron deficiency anemia 03/14/2018    Urothelial cancer ([facility] ) 03/02/2018    Lung nodule < 6cm on CT 03/02/2018    Chronic bilateral thoracic back pain 02/12/2018    Thoracic degenerative disc disease 02/12/2018    Sinus arrhythmia 01/03/2018    Peripheral neuropathy 11/09/2017    Right lumbar radiculopathy 11/09/2017    Primary osteoarthritis of left knee 10/13/2017    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 ([facility] ) 05/10/2017    Essential hypertension 05/10/2017    Rheumatoid arthritis ([facility] ) 05/10/2017    Diabetes mellitus type 2 in obese [facility] 05/10/2017    Sleep apnea 05/10/2017    Acquired hypothyroidism 05/10/2017    Chronic GERD 05/10/2017    Prediabetes 05/24/2016    Restless leg syndrome 01/05/2016    Sensorineural hearing loss 10/12/2014    Hypercholesterolemia 08/29/2013    Obesity 07/18/2013     # Grade II diastolic dysfunction: No JVD on exam  Likely 2/2 to HTN (cLVH on TTE)  Doing well  --Continue torsemide 20 mg PO qdaily as was urinating too much with 40 mg  Weight stable currently  --Continue BP control   # HTN: BP well controlled  Losartan decreased to 25 mg PO daily  # Hx of lymphedema   # Hyperglycemia   # APCs: Continue current mgmt  Had 1 week holter w/o e/o AFib  Currently anticoagulated for CVA so would not   # Hx of CVA/TIA: Currently on Xarelto and pravastatin  # NICK on CPAP  # L shoulder dislocation: Continue PT and Orthopedics f/u    RTC in 3 months    HPI: Very pleasant 80 y/o woman w/ PMHx of HTN, HLD, chronic LE edema/lymphedema, anemia of chronic disease p/f establishing care and abnormal ECG  She states she feels well  She was followed at [facility]  and now lives closer to [place] [address] so is transferring her care  Denies any cardiac symptoms  Uses lymphedema machine  She had a CVA in 5/2017 and received tPA  Overall feels well from that  She had a 1 week holter that did not show any arrhythmia and has been on NOAC ever since the stroke  Holter showed NSR w/ APCs  She comes in today 4/6/18, for f/u  She had noted hematuria  CT A/P noted a R renal pelvis multifocal papillary lesions with pathology showing low grade Ta urothelial cancer  She comes in for preop evaluation  She continues to deny cardiac symptoms  She is more limited by knee pains  She is able to get around her home without difficulty  Today 7/10/18, she returns for f/u  She had her right robotic nephroureterectomy with bladder cuff  She currently has VNA coming to the home  Moving around better  Feeling stronger  Walking is difficult 2/2 to rheumatoid arthritis  Today 8/24/2018, she returns for post hospital D/C  She was admitted twice since her last visit  In the first admission she presented with chest tightness and dyspnea  On 8/10/18 underwent nuclear stress test with normal perfusion  She was diuresed and felt improved  Then she was readmitted with dyspnea and volume overload and diuresed again  Today she feels well  Today, 10/17/18, she returns for f/u  She states she has developed a hernia on the R side  She does not want to pursue surgery currently  She decreased her diuretics to 20 mg PO daily as she was urinating too much and was having accidents  She dislocated her L shoulder  Denies SOB with ambulation using her walker  Walking better overall around the house  Unable to stand too long due to her spine  Past Medical History:   Diagnosis Date    Anemia     Arthritis     rheumatoid    Benign essential hypertension     Cataract     Chronic cystitis     CPAP (continuous positive airway pressure) dependence     Diverticulitis of colon     Early satiety     GERD (gastroesophageal reflux disease)     Gout     Hearing aid worn     bilateral    Hearing loss     Hemorrhoids     Hiatal hernia     History of colonic polyps     Hyperlipidemia     Hypothyroidism     Left breast mass     Microhematuria     Migraine     occular    Neuropathy     lower and upper extermities    Overactive bladder     Pneumonia of left lower lobe due to infectious organism ([facility] )     RA (rheumatoid arthritis) ([facility] )     Sleep apnea     uses cpap    Stroke ([facility] )     5/2017    Type 2 diabetes mellitus (HCC)     Urinary frequency     Urinary urgency     Urothelial cancer ([facility] ) 04/23/2018    Use of cane as ambulatory aid        Review of Systems - 12 point ROS was done and is negative, except as noted above  Allergies   Allergen Reactions    Acetazolamide Other (See Comments)     Other reaction(s): Unknown Allergic Reaction    Aspirin      Other reaction(s): Other (See Comments)  High Dose ASA-stomach ache, rachel  ASA 81 m    Atorvastatin      Other reaction(s): Muscle Pain, Myalgia    Azithromycin     Nitrofurantoin Hives     HIVES * pt denies  Other reaction(s): Unknown Allergic Reaction  Other reaction(s): Other (See Comments)  HIVES * pt denies    Other Other (See Comments)     Adhesive tape : red and itching  Other reaction(s):  Other (See Comments)  red and itching    Shellfish-Derived Products Other (See Comments)     Patient got Gout following eating shell fish    Sulfa Antibiotics Other (See Comments)     unknown    Tramadol Diarrhea and Vomiting       Current Outpatient Prescriptions:     acetaminophen (TYLENOL) 325 mg tablet, Take 650 mg by mouth every 6 (six) hours as needed for mild pain, Disp: , Rfl:     Calcium Citrate-Vitamin D (CALCIUM + D PO), Take 1 tablet by mouth 2 (two) times a day, Disp: , Rfl:     cyanocobalamin (VITAMIN B-12) 100 mcg tablet, Take by mouth, Disp: , Rfl:     docusate sodium (COLACE) 100 mg capsule, Take 1 capsule (100 mg total) by mouth 2 (two) times a day for 60 doses, Disp: 90 capsule, Rfl: 0    gabapentin (NEURONTIN) 100 mg capsule, TAKE 1 CAPSULE IN THE MORNING, TAKE 1 CAPSULE IN THE AFTERNOON AND TAKE 1 TO 3 CAPSULES AT BEDTIME, Disp: 450 capsule, Rfl: 1    hydroxychloroquine (PLAQUENIL) 200 mg tablet, Take 200 mg by mouth 2 (two) times a day with meals, Disp: , Rfl:     LEUCOVORIN CALCIUM PO, Take 10 mg by mouth once a week, Disp: , Rfl:     levothyroxine 75 mcg tablet, Take 75 mcg by mouth daily, Disp: , Rfl:     losartan (COZAAR) 25 mg tablet, Take 1 tablet (25 mg total) by mouth daily, Disp: 90 tablet, Rfl: 0    methotrexate 2 5 mg tablet, 10 mg  , Disp: , Rfl:     Multiple Vitamin (MULTIVITAMINS PO), Take 1 tablet by mouth daily, Disp: , Rfl:     omeprazole (PriLOSEC) 20 mg delayed release capsule, TAKE 1 CAPSULE DAILY AS NEEDED FOR STOMACH ACID, Disp: 90 capsule, Rfl: 3    pravastatin (PRAVACHOL) 80 mg tablet, TAKE 1 TABLET EVERY DAY, Disp: 90 tablet, Rfl: 3    rivaroxaban (XARELTO) 15 mg tablet, Take 1 tablet (15 mg total) by mouth daily with breakfast, Disp: 90 tablet, Rfl: 1    rOPINIRole (REQUIP) 0 5 mg tablet, TAKE 2 TABLETS AT BEDTIME AND 1 TABLET IN THE MORNING, Disp: 270 tablet, Rfl: 3    torsemide (DEMADEX) 20 mg tablet, Take 2 tablets (40 mg total) by mouth daily (Patient taking differently: Take 40 mg by mouth daily Takes one a day since she has one kidney and it is too much for her  ), Disp: 180 tablet, Rfl: 0  No current facility-administered medications for this visit  Facility-Administered Medications Ordered in Other Visits:     acetaminophen (TYLENOL) tablet 650 mg, 650 mg, Oral, Q6H PRN, Apurva Tai PA-C    Social History     Social History    Marital status: /Civil Union     Spouse name: N/A    Number of children: N/A    Years of education: N/A     Occupational History    Not on file  Social History Main Topics    Smoking status: Never Smoker    Smokeless tobacco: Never Used      Comment: stopped smoking in the distant past, never smoker (as per [employer])     Alcohol use No      Comment: seldom    Drug use: No    Sexual activity: Not on file     Other Topics Concern    Not on file     Social History Narrative    No caffeine use       Family History   Problem Relation Age of Onset    Other Mother         epilepsy    Early death Mother    [facility] Glaucoma Father     Stroke Father         silent    Other Brother         cardiac disorder       Physical Exam:    Vitals: Blood pressure 126/70, pulse 98, weight 93 4 kg (206 lb), SpO2 99 %  , Body mass index is 38 92 kg/m² ,   Wt Readings from Last 3 Encounters:   10/17/18 93 4 kg (206 lb)   09/26/18 93 2 kg (205 lb 6 4 oz)   09/19/18 93 4 kg (206 lb)     Vitals:    10/17/18 1452   BP: 126/70   BP Location: Right arm   Patient Position: Sitting   Cuff Size: Standard   Pulse: 98   SpO2: 99%   Weight: 93 4 kg (206 lb)   On recheck HR 90 bpm    GEN: Lilliam Ramey appears well, alert and oriented x 3, pleasant and cooperative   HEENT: pupils equal, round, and reactive to light; extraocular muscles intact  NECK: supple, no carotid bruits   HEART: regular rhythm, normal S1 and S2, no murmurs, clicks, gallops or rubs, JVP is    LUNGS: clear to auscultation bilaterally; no wheezes, rales, or rhonchi   ABDOMEN: normal bowel sounds, soft, no tenderness, no distention  EXTREMITIES: peripheral pulses normal; no clubbing, cyanosis, or edema  NEURO: no focal findings   SKIN: normal without suspicious lesions on exposed skin    Labs & Results:  Lab Results   Component Value Date    WBC 7 38 09/06/2018    HGB 8 4 (L) 09/06/2018    HCT 27 6 (L) 09/06/2018     (H) 09/06/2018     09/06/2018       Chemistry        Component Value Date/Time     (L) 09/06/2018 0932     11/14/2017 1151    K 4 0 09/06/2018 0932    K 3 7 11/14/2017 1151     09/06/2018 0932     11/14/2017 1151    CO2 28 09/06/2018 0932    CO2 29 11/14/2017 1151    BUN 26 (H) 09/06/2018 0932    BUN 18 11/14/2017 1151    CREATININE 1 61 (H) 09/06/2018 0932    CREATININE 0 88 11/14/2017 1151        Component Value Date/Time    CALCIUM 8 6 09/06/2018 0932    CALCIUM 8 9 11/14/2017 1151    ALKPHOS 100 08/20/2018 0628    ALKPHOS 95 11/14/2017 1151    AST 12 08/20/2018 0628    AST 24 11/14/2017 1151    ALT 14 08/20/2018 0628    ALT 19 11/14/2017 1151    BILITOT 1 2 11/14/2017 1151        EKG personally reviewed by Chanell Mejia MD      Counseling / Coordination of Care  Total floor / unit time spent today 40 minutes  Greater than 50% of total time was spent with the patient and / or family counseling and / or coordination of care  A description of the counseling / coordination of care: 20 min  Thank you for the opportunity to participate in the care of this patient      Chanell Mejia MD, PhD   Jerry Piper

## 2018-10-18 ENCOUNTER — OFFICE VISIT (OUTPATIENT)
Dept: PHYSICAL THERAPY | Facility: REHABILITATION | Age: 83
End: 2018-10-18
Payer: MEDICARE

## 2018-10-18 DIAGNOSIS — M25.512 LEFT SHOULDER PAIN, UNSPECIFIED CHRONICITY: Primary | ICD-10-CM

## 2018-10-18 PROCEDURE — 97110 THERAPEUTIC EXERCISES: CPT | Performed by: PHYSICAL THERAPIST

## 2018-10-18 PROCEDURE — 97140 MANUAL THERAPY 1/> REGIONS: CPT | Performed by: PHYSICAL THERAPIST

## 2018-10-18 NOTE — PROGRESS NOTES
Daily Note     Today's date: 10/18/2018  Patient name: Debara Apley  : 1935  MRN: [id number]  Referring provider: Tobias Deleon MD  Dx:   Encounter Diagnosis     ICD-10-CM    1  Left shoulder pain, unspecified chronicity M25 512                   Subjective: Pt offers no complaints upon arrival  States she has been utilizing less pain medication  Objective: See treatment diary below  Precautions: recent dislocation, blood thinner, fall risk, hx of stroke, cardiovascular disease, DM2      Daily Treatment Diary      Manual  10/2  10/4  10/9  10/11  10/16  10/18           Shoulder PROM [facility]  LK [facility]           rhythmic stabilization          unable                                                                                           Exercise Diary  10/2  10/4  10/9  10/11  10/16  10/18           Table slides-flexion HEP                     Elbow AROM    2x10  2x15      2x15           Pulley    4'  5'  5'  6'  6'           Shoulder isometrcis    man resistance 2x10x5" each  man resistance 2x10x5"  man resistance 2x10x5" Man resistance 2x10x5"  ex,abduction 2x10x5"           AAROM with cane    not able  2x8  2x10  2x10  2x10           serratus punches with cane          unable             scap retractions          10x10"             t-band rows            2x10 PTB                                                                                                                                                                                                                                                                                                                 Modalities   10/16  10/18                    cold pack  5'  5'                                                                         Assessment: Pt continues to be apprehensive with ER ROM  Shows improved deltoid activation this visit but is unable to abduct against gravity  Pt tx 1:1 with PT and concluded with ice         Plan: Continue per plan of care

## 2018-10-23 ENCOUNTER — OFFICE VISIT (OUTPATIENT)
Dept: PHYSICAL THERAPY | Facility: REHABILITATION | Age: 83
End: 2018-10-23
Payer: MEDICARE

## 2018-10-23 DIAGNOSIS — M25.512 LEFT SHOULDER PAIN, UNSPECIFIED CHRONICITY: Primary | ICD-10-CM

## 2018-10-23 PROCEDURE — 97110 THERAPEUTIC EXERCISES: CPT | Performed by: PHYSICAL THERAPIST

## 2018-10-23 PROCEDURE — 97140 MANUAL THERAPY 1/> REGIONS: CPT | Performed by: PHYSICAL THERAPIST

## 2018-10-23 NOTE — PROGRESS NOTES
Daily Note     Today's date: 10/23/2018  Patient name: Julian Soto  : 1935  MRN: [id number]  Referring provider: Paola Stevens MD  Dx:   Encounter Diagnosis     ICD-10-CM    1  Left shoulder pain, unspecified chronicity M25 512                   Subjective: Pt reports that she was able to play cards yesterday  She is pleased with her progress  Objective: See treatment diary below  Precautions: recent dislocation, blood thinner, fall risk, hx of stroke, cardiovascular disease, DM2      Daily Treatment Diary      Manual  10/2  10/4  10/9  10/11  10/16  10/18  10/23         Shoulder PROM SASKIA [address]  [address]         rhythmic stabilization          unable   [address]                                                                                       Exercise Diary  10/2  10/4  10/9  10/11  10/16  10/18  10/23         Table slides-flexion HEP                     Elbow AROM    2x10  2x15      2x15           Pulley    4'  5'  5'  6'  6'  6'         Shoulder isometrcis    man resistance 2x10x5" each  man resistance 2x10x5"  man resistance 2x10x5" Man resistance 2x10x5"  ex,abduction 2x10x5"  abd 2x10x5"         AAROM with cane    not able  2x8  2x10  2x10  2x10  2x10         serratus punches with cane          unable             scap retractions          10x10"             t-band rows            2x10 PTB  2x10 PTB                                                                                                                                                                                                                                                                                                               Modalities   10/16  10/18  10/23                  cold pack  5'  5'  5'                                                                       Assessment: Pt was able to maintain shoulder flexion against gravity better today  Continues to have difficulty activating mid delt for abduction  Plan: Continue per plan of care

## 2018-10-25 ENCOUNTER — OFFICE VISIT (OUTPATIENT)
Dept: PHYSICAL THERAPY | Facility: REHABILITATION | Age: 83
End: 2018-10-25
Payer: MEDICARE

## 2018-10-25 DIAGNOSIS — M25.512 LEFT SHOULDER PAIN, UNSPECIFIED CHRONICITY: Primary | ICD-10-CM

## 2018-10-25 PROCEDURE — 97110 THERAPEUTIC EXERCISES: CPT | Performed by: PHYSICAL THERAPIST

## 2018-10-25 PROCEDURE — 97140 MANUAL THERAPY 1/> REGIONS: CPT | Performed by: PHYSICAL THERAPIST

## 2018-10-25 NOTE — PROGRESS NOTES
Daily Note     Today's date: 10/25/2018  Patient name: Deedee Jackson  : 1935  MRN: [id number]  Referring provider: Yamilka Styles MD  Dx:   Encounter Diagnosis     ICD-10-CM    1  Left shoulder pain, unspecified chronicity M25 512                   Subjective: Pt reports that she is more sore today  States she feels like she overdid it yesterday  Objective: See treatment diary below  Precautions: recent dislocation, blood thinner, fall risk, hx of stroke, cardiovascular disease, DM2      Daily Treatment Diary      Manual  10/2  10/4  10/9  10/11  10/16  10/18  10/23  10/25       Shoulder PROM [address]ASKIA [address]  LK [address] 64 May 64 May 64       rhythmic stabilization          unable   [address] 64  SASKIA                                                                                     Exercise Diary  10/2  10/4  10/9  10/11  10/16  10/18  10/23  10/25       Table slides-flexion HEP                     Elbow AROM    2x10  2x15      2x15           Pulley    4'  5'  5'  6'  6'  6'  6'       Shoulder isometrcis    man resistance 2x10x5" each  man resistance 2x10x5"  man resistance 2x10x5" Man resistance 2x10x5"  ex,abduction 2x10x5"  abd 2x10x5"  abd 2x10x5"       AAROM with cane    not able  2x8  2x10  2x10  2x10  2x10  2x10       serratus punches with cane          unable             scap retractions          10x10"             t-band rows            2x10 PTB  2x10 PTB  2x10 OTB                                                                                                                                                                                                                                                                                                             Modalities   10/16  10/18  10/23  10/25                cold pack  5'  5'  5'  5'                                                                     Assessment: Pt had increased pain with PROM today  PROM performed to pt tolerance and not into pain   Pt was unable to perform rhythmic stabilization  Pt showed improvement in mild deltoid activation today  Plan: Continue per plan of care

## 2018-10-29 NOTE — PROGRESS NOTES
PT Re-Evaluation     Today's date: 10/30/2018  Patient name: Melanie Patel  : 1935  MRN: [id number]  Referring provider: Jessie Perez MD  Dx:   Encounter Diagnosis     ICD-10-CM    1  Left shoulder pain, unspecified chronicity M25 512                   Assessment  Impairments: abnormal or restricted ROM, impaired physical strength, pain with function and poor posture     Assessment details: Melanie Patel is a 81 y o  female with significant medical history of many co-morbidities including RA, CHF, hypothyroidism, DM2 who presents who has been attending physical therapy for  L shoulder pain following a traumatic dislocation  Tracie Mccullough has made significant progress since beginning PT as evidenced by improved passive shoulder ROM and improved muscle activation  Despite gains she continues to had difficutly with active range of motion and strength  These deficits are manifested functionally in difficulty with doing laundry and with cooking  Tracie Mccullough will benefit from physical therapy to improve L shoulder ROM, improve L UE strength and allow for increased independence with ADL's  Understanding of Dx/Px/POC: good   Prognosis: fair    Goals  Short Term  1: Patient will report a 50% decrease in pain in 2-4 weeks  2: Pt will have active shoulder elevation equal to contralateral side in 4 weeks  Long Term  1: Patient will demonstrate independence in home exercise program by discharge  2: Patient will have FOTO score of 57 indicating improved function in 8 weeks  Plan  Patient would benefit from: skilled physical therapy  Planned therapy interventions: manual therapy, therapeutic exercise, therapeutic activities, neuromuscular re-education and functional ROM exercises  Frequency: 2x week  Duration in weeks: 12        Subjective Evaluation    History of Present Illness  Mechanism of injury: Pt reports that she has made improvement in function since beginning PT   States that she is now able to dress herself and is able to play cards with her friends  Pain  Current pain ratin  At best pain ratin  At worst pain ratin          Objective     Postural Observations    Additional Postural Observation Details  Forward shoulders bilaterally       Active Range of Motion   Left Shoulder   Flexion: 10 degrees     Right Shoulder   Flexion: 120 degrees   Abduction: 90 degrees     Passive Range of Motion   Left Shoulder   Flexion: 145 degrees   Abduction: 145 degrees   Internal rotation 0°: 45 degrees     Right Shoulder   Flexion: 145 degrees   Abduction: 145 degrees     Additional Passive Range of Motion Details  IR to chest    Strength/Myotome Testing     Left Shoulder     Planes of Motion   Flexion: 3-   Abduction: 3-     Right Shoulder     Planes of Motion   Flexion: 3   Abduction: 3     General Comments     Shoulder Comments   Wrist/elbow strength 4-/5 throughout             Precautions: recent dislocation, blood thinner, fall risk, hx of stroke, cardiovascular disease, DM2      Daily Treatment Diary      Manual  10/2  10/4  10/9  10/11  10/16  10/18  10/23  10/25  10/30     Shoulder PROM [address]ASKIA [address]  LK [address] 64 [address] 64 [address] 64 [address] 64     rhythmic stabilization          unable   [address]                                                                                   Exercise Diary  10/2  10/4  10/9  10/11  10/16  10/18  10/23  10/25  10/30     Table slides-flexion HEP                     Elbow AROM    2x10  2x15      2x15           Pulley    4'  5'  5'  6'  6'  6'  6'  6'     Shoulder isometrcis    man resistance 2x10x5" each  man resistance 2x10x5"  man resistance 2x10x5" Man resistance 2x10x5"  ex,abduction 2x10x5"  abd 2x10x5"  abd 2x10x5"  ab 2x10x5"     AAROM with cane    not able  2x8  2x10  2x10  2x10  2x10  2x10  2x10     serratus punches with cane          unable             scap retractions          10x10"             t-band rows            2x10 PTB  2x10 PTB  2x10 OTB  2x10 OTB                                                                                                                                                                                                                                                                                                         Modalities   10/16  10/18  10/23  10/25  10/30              cold pack  5'  5'  5'  5'  5'

## 2018-10-30 ENCOUNTER — EVALUATION (OUTPATIENT)
Dept: PHYSICAL THERAPY | Facility: REHABILITATION | Age: 83
End: 2018-10-30
Payer: MEDICARE

## 2018-10-30 DIAGNOSIS — M25.512 LEFT SHOULDER PAIN, UNSPECIFIED CHRONICITY: Primary | ICD-10-CM

## 2018-10-30 PROCEDURE — 97110 THERAPEUTIC EXERCISES: CPT | Performed by: PHYSICAL THERAPIST

## 2018-10-30 PROCEDURE — G8990 OTHER PT/OT CURRENT STATUS: HCPCS | Performed by: PHYSICAL THERAPIST

## 2018-10-30 PROCEDURE — 97140 MANUAL THERAPY 1/> REGIONS: CPT | Performed by: PHYSICAL THERAPIST

## 2018-10-30 PROCEDURE — G8991 OTHER PT/OT GOAL STATUS: HCPCS | Performed by: PHYSICAL THERAPIST

## 2018-11-01 ENCOUNTER — OFFICE VISIT (OUTPATIENT)
Dept: OBGYN CLINIC | Facility: HOSPITAL | Age: 83
End: 2018-11-01
Payer: MEDICARE

## 2018-11-01 ENCOUNTER — OFFICE VISIT (OUTPATIENT)
Dept: PHYSICAL THERAPY | Facility: REHABILITATION | Age: 83
End: 2018-11-01
Payer: MEDICARE

## 2018-11-01 VITALS
WEIGHT: 207 LBS | SYSTOLIC BLOOD PRESSURE: 128 MMHG | HEART RATE: 87 BPM | HEIGHT: 61 IN | DIASTOLIC BLOOD PRESSURE: 78 MMHG | BODY MASS INDEX: 39.08 KG/M2

## 2018-11-01 DIAGNOSIS — M25.512 LEFT SHOULDER PAIN, UNSPECIFIED CHRONICITY: Primary | ICD-10-CM

## 2018-11-01 DIAGNOSIS — M24.312 SPONTANEOUS DISLOCATION OF SHOULDER, LEFT: Primary | ICD-10-CM

## 2018-11-01 PROCEDURE — 97140 MANUAL THERAPY 1/> REGIONS: CPT

## 2018-11-01 PROCEDURE — 99213 OFFICE O/P EST LOW 20 MIN: CPT | Performed by: ORTHOPAEDIC SURGERY

## 2018-11-01 PROCEDURE — 97110 THERAPEUTIC EXERCISES: CPT

## 2018-11-01 NOTE — PROGRESS NOTES
81 y o  female presenting to the office for follow up of left shoulder pain, s/p dislocation on 9/11/18  Patient describes the pain as aching which is intermittent  Patient state that the pain gets worse with physical activities  Patient states that she has made improvements with physical therapy  She can now use her arm more for her regular activities  Patient states that she is now able to dress herself  She also has been able to return to coloring activities  She is left hand dominant  Patient has baseline neuropathy, which is unchanged from her baseline  ROS  Review of Systems   Constitutional: Negative for fever and unexpected weight change  HENT: Negative for hearing loss, nosebleeds and sore throat  Eyes: Negative for pain, redness and visual disturbance  Respiratory: Negative for cough, shortness of breath and wheezing  Cardiovascular: Negative for chest pain, palpitations and leg swelling  Gastrointestinal: Negative for abdominal pain, nausea and vomiting  Endocrine: Negative for polydipsia and polyuria  Genitourinary: Negative for dysuria and hematuria  Skin: Negative for rash and wound  Neurological: Negative for dizziness and headaches  Psychiatric/Behavioral: Negative for agitation and suicidal ideas         Past Medical History:   Diagnosis Date    Anemia     Arthritis     rheumatoid    Benign essential hypertension     Cataract     Chronic cystitis     CPAP (continuous positive airway pressure) dependence     Diverticulitis of colon     Early satiety     GERD (gastroesophageal reflux disease)     Gout     Hearing aid worn     bilateral    Hearing loss     Hemorrhoids     Hiatal hernia     History of colonic polyps     Hyperlipidemia     Hypothyroidism     Left breast mass     Microhematuria     Migraine     occular    Neuropathy     lower and upper extermities    Overactive bladder     Pneumonia of left lower lobe due to infectious organism (HCC)     RA [facility]oid arthritis) [facility] [address] [facility]     Sleep apnea     uses cpap    Stroke [facility])     5/2017    Type 2 diabetes mellitus (HCC)     Urinary frequency     Urinary urgency     Urothelial cancer ([facility] [address] ) 04/23/2018    Use of cane as ambulatory aid      Past Surgical History:   Procedure Laterality Date    APPENDECTOMY      ARTHROSCOPY WRIST Right     with release of transverse carpal ligament     BLADDER SURGERY      BLADDER SURGERY      BREAST SURGERY      left breast    BUNIONECTOMY Bilateral     CATARACT EXTRACTION Bilateral     CHOLECYSTECTOMY      COLONOSCOPY      CYSTOSCOPY      EGD AND COLONOSCOPY N/A 12/20/2017    Procedure: EGD AND COLONOSCOPY;  Surgeon: Edgar Harding MD;  Location: [facility]; Service: Gastroenterology    ESOPHAGOGASTRODUODENOSCOPY      diagnostic    FOREARM SURGERY Right     fracture repair    HYSTERECTOMY      JOINT REPLACEMENT      KNEE ARTHROSCOPY Left     KNEE SURGERY Left     meniscus tear    NOSE SURGERY      [place] CYSTO/URETERO W/LITHOTRIPSY &INDWELL STENT INSRT Right 2/28/2018    Procedure: CYSTOSCOPY RIGHT URETEROSCOPY, RIGHT RETROGRADE PYELOGRAM AND INSERTION  RIGHT STENT URETERAL, RIGHT RENAL PELVIC WASHING FOR CYTOLOGY;  Surgeon: Kirstin Jackson MD;  [facility];  Service: Urology    [place] NEPHRECTOMY, W/PART  URETECTOMY Right 4/23/2018    Procedure: ROBATIC ASSISTED NEPHRO-URETERECTOMY WITH BLADDER CUFF EXCISION;  Surgeon: Naomy Villa MD;  [facility];  Service: Urology    REPLACEMENT TOTAL KNEE BILATERAL Bilateral     URETEROSCOPY Right 3/20/2018    Procedure: CYSTOSCOPY, RETROGRADE PYELOGRAM, URETEROSCOPY, BIOPSY, BRUSH, FULGURATION, STENT PLACEMENT, BLADDER BIOPSY WITH FULGURATION;  Surgeon: Naomy Villa MD;  [facility];  Service: Urology     Results Reviewed     None          Physical Exam  Physical Exam   Constitutional: She is oriented to person, place, and time  She appears well-developed and well-nourished     HENT: Head: Normocephalic and atraumatic  Eyes: Pupils are equal, round, and reactive to light  EOM are normal    Neck: Neck supple  No tracheal deviation present  Cardiovascular: Normal rate and regular rhythm  Pulmonary/Chest: Effort normal and breath sounds normal    Abdominal: There is no guarding  Neurological: She is alert and oriented to person, place, and time  Skin: Skin is warm and dry  Psychiatric: She has a normal mood and affect  Her behavior is normal      Left Shoulder Exam     Tenderness   The patient is experiencing tenderness in the acromion  Range of Motion   Passive Abduction: 80   Forward Flexion: 80     Muscle Strength   Subscapularis: 4/5   Biceps: 4/5     Other   Sensation: normal     Comments:  Patient has weakness to resisted thumb extension  Assessment/Plan  81 y o  female    1  Spontaneous dislocation of shoulder, left  - XR shoulder 2+ vw left;  Future  - continue with physical therapy activities  - discussed with patient that we can consider a corticosteroid injection at some point if her pain continues or worsens  - follow up in 6 weeks for re-evaluation

## 2018-11-01 NOTE — PROGRESS NOTES
Daily Note     Today's date: 2018  Patient name: Tere Tang  : 1935  MRN: [id number]  Referring provider: Magaly Cummins MD  Dx:   Encounter Diagnosis     ICD-10-CM    1  Left shoulder pain, unspecified chronicity M25 512        Start Time: 1215  Stop Time: 1300  Total time in clinic (min): 45 minutes    Subjective: Pt reports that she was able to tolerate coloring last night for the first time and is able to dress herself independently  Pt had MD appt today, and it was recommended that she continue PT for 6 weeks         Objective: See treatment diary below  Precautions: recent dislocation, blood thinner, fall risk, hx of stroke, cardiovascular disease, DM2      Daily Treatment Diary      Manual  10/2  10/4  10/9  10/11  10/16  10/18  10/23  10/25  10/30  11/1   Shoulder PROM [address] [address]  [address]  LK   rhythmic stabilization          unable   [address]  LK                                                                                 Exercise Diary  10/2  10/4  10/9  10/11  10/16  10/18  10/23  10/25  10/30  11/1   Table slides-flexion HEP                     Elbow AROM    2x10  2x15      2x15           Pulley    4'  5'  5'  6'  6'  6'  6'  6'  6'   Shoulder isometrcis    man resistance 2x10x5" each  man resistance 2x10x5"  man resistance 2x10x5" Man resistance 2x10x5"  ex,abduction 2x10x5"  abd 2x10x5"  abd 2x10x5"  ab 2x10x5"  AB  2x10   5"   AAROM with cane    not able  2x8  2x10  2x10  2x10  2x10  2x10  2x10  2x10   serratus punches with cane          unable             scap retractions          10x10"             t-band rows            2x10 PTB  2x10 PTB  2x10 OTB  2x10 OTB  2x10   OTB                                                                                                                                                                                                                                                                                                       Modalities   10/16  10/18  10/23  10/25  10/30  11/1            cold pack  5'  5'  5'  5'  5'  5'                                                               Assessment: Tolerated treatment well  Patient was limited in AROM and PROM shoulder flexion  Patient demonstrated fatigue post treatment, exhibited good technique with therapeutic exercises and would benefit from continued PT      Plan: Continue per plan of care

## 2018-11-06 ENCOUNTER — OFFICE VISIT (OUTPATIENT)
Dept: PHYSICAL THERAPY | Facility: REHABILITATION | Age: 83
End: 2018-11-06
Payer: MEDICARE

## 2018-11-06 DIAGNOSIS — M25.512 LEFT SHOULDER PAIN, UNSPECIFIED CHRONICITY: Primary | ICD-10-CM

## 2018-11-06 PROCEDURE — 97140 MANUAL THERAPY 1/> REGIONS: CPT

## 2018-11-06 PROCEDURE — 97110 THERAPEUTIC EXERCISES: CPT

## 2018-11-06 NOTE — PROGRESS NOTES
Daily Note     Today's date: 2018  Patient name: Marisol Wilder  : 1935  MRN: [id number]  Referring provider: Lillian Posada MD  Dx:   Encounter Diagnosis     ICD-10-CM    1  Left shoulder pain, unspecified chronicity M25 512        Start Time: 1200  Stop Time: 1245  Total time in clinic (min): 45 minutes    Subjective: Pt offers no complaints upon arrival  Patient had no increase in pain noted during therapy  Objective: See treatment diary below    Precautions: recent dislocation, blood thinner, fall risk, hx of stroke, cardiovascular disease, DM2     Daily Treatment Diary     Manual   10/11  10/16  10/18  10/23  10/25  10/30  11/1 11/6   Shoulder PROM  LK [facility] [address] [facility]  LK [address]   rhythmic stabilization    unable   [facility]  [address] [address]                                                              Exercise Diary   10/11  10/16  10/18  10/23  10/25  10/30  11/1 11   Table slides-flexion                  Elbow AROM      2x15            Pulley  5'  6'  6'  6'  6'  6'  6' 6'   Shoulder isometrcis  man resistance 2x10x5" Man resistance 2x10x5"  ex,abduction 2x10x5"  abd 2x10x5"  abd 2x10x5"  ab 2x10x5"  AB  2x10   5"  AB  2x10 5"   AAROM with cane  2x10  2x10  2x10  2x10  2x10  2x10  2x10 2x10   serratus punches with cane    unable              scap retractions    10x10"              t-band rows      2x10 PTB  2x10 PTB  2x10 OTB  2x10 OTB  2x10   OTB  2x10  OTB                                                                                                                                                                                                                                         Modalities   10/16  10/18  10/23  10/25  10/30  11/1            cold pack  5'  5'  5'  5'  5'  5'                                                             Assessment: Tolerated treatment well   Patient demonstrated fatigue post treatment, exhibited good technique with therapeutic exercises and would benefit from continued PT  Patient had difficulty isolating her shoulder abd during iso (UT compensation)  Verbal cueing required for proper form during TB rows  Plan: Continue per plan of care

## 2018-11-08 ENCOUNTER — OFFICE VISIT (OUTPATIENT)
Dept: PHYSICAL THERAPY | Facility: REHABILITATION | Age: 83
End: 2018-11-08
Payer: MEDICARE

## 2018-11-08 DIAGNOSIS — M25.512 LEFT SHOULDER PAIN, UNSPECIFIED CHRONICITY: Primary | ICD-10-CM

## 2018-11-08 PROCEDURE — 97140 MANUAL THERAPY 1/> REGIONS: CPT | Performed by: PHYSICAL THERAPIST

## 2018-11-08 PROCEDURE — 97110 THERAPEUTIC EXERCISES: CPT | Performed by: PHYSICAL THERAPIST

## 2018-11-08 NOTE — PROGRESS NOTES
Daily Note     Today's date: 2018  Patient name: Ирина Fulton  : 1935  MRN: [id number]  Referring provider: Jacinta Basurto MD  Dx:   Encounter Diagnosis     ICD-10-CM    1  Left shoulder pain, unspecified chronicity M25 512                   Subjective: Pt offers no complaints upon arrival      Objective: See treatment diary below    Precautions: recent dislocation, blood thinner, fall risk, hx of stroke, cardiovascular disease, DM2     Daily Treatment Diary     Manual   10/11  10/16  10/18  10/23  10/25  10/30  11/1 11/6 11/8   Shoulder PROM  [address]  [facility] [place]   rhythmic stabilization   [place]                                                                 Exercise Diary   10/11  10/16  10/18  10/23  10/25  10/30  11/1 11/6 11/8   [place]  5'  6'  6'  6'  6'  6'  6' 6' 6'   Shoulder isometrcis  man resistance 2x10x5" Man resistance 2x10x5"  ex,abduction 2x10x5"  abd 2x10x5"  abd 2x10x5"  ab 2x10x5"  AB  2x10   5"  AB  2x10 5" abduction 2x10x5"   AAROM with cane  2x10  2x10  2x10  2x10  2x10  2x10  2x10 2x10 2x10   serratus punches with cane    unable            10   scap retractions    10x10"               t-band rows      2x10 PTB  2x10 PTB  2x10 OTB  2x10 OTB  2x10   OTB  2x10  OTB 2x10 OTB                                                                                                                                                                                                                                                     Modalities   10/16  10/18  10/23  10/25  10/30  11/1  11/1          cold pack  5'  5'  5'  5'  5'  5'  5'                                                           Assessment: Tolerated treatment well  Patient demonstrated fatigue post treatment, exhibited good technique with therapeutic exercises and would benefit from continued PT  Pt continues to have difficult isolating deltoid motion   Was able to initiated serratus punch with assistance today  Plan: Continue per plan of care

## 2018-11-13 ENCOUNTER — OFFICE VISIT (OUTPATIENT)
Dept: PHYSICAL THERAPY | Facility: REHABILITATION | Age: 83
End: 2018-11-13
Payer: MEDICARE

## 2018-11-13 DIAGNOSIS — M25.512 LEFT SHOULDER PAIN, UNSPECIFIED CHRONICITY: Primary | ICD-10-CM

## 2018-11-13 PROCEDURE — 97110 THERAPEUTIC EXERCISES: CPT

## 2018-11-13 PROCEDURE — 97140 MANUAL THERAPY 1/> REGIONS: CPT

## 2018-11-13 NOTE — PROGRESS NOTES
Daily Note     Today's date: 2018  Patient name: Deedee Jackson  : 1935  MRN: [id number]  Referring provider: Yamilka Styles MD  Dx:   Encounter Diagnosis     ICD-10-CM    1  Left shoulder pain, unspecified chronicity M25 512               12:15 - 1:00 1:1 CF    Subjective: pt state she feels the same today as normal she has pain in her left shoulder that seems to be worse when the weather is bad       Objective: See treatment diary below    Precautions: recent dislocation, blood thinner, fall risk, hx of stroke, cardiovascular disease, DM2     Daily Treatment Diary     Manual   10/11  10/16  10/18  10/23  10/25  10/30  11/1 11/6 11/8 11/13   Shoulder PROM  EDGARD Olvera 65 May 64 May 64 May 64 May 65  EDGARD SOLANO SASKIA CF   rhythmic stabilization   Malcolm Caceres CF                                                                    Exercise Diary   10/11  10/16  10/18  10/23  10/25  10/30  11/1 11/6 11/8 11/13   Pulley  5'  6'  6'  6'  6'  6'  6' 6' 6' 6'    Shoulder isometrcis  man resistance 2x10x5" Man resistance 2x10x5"  ex,abduction 2x10x5"  abd 2x10x5"  abd 2x10x5"  ab 2x10x5"  AB  2x10   5"  AB  2x10 5" abduction 2x10x5" abduction 2x10x5"   AAROM with cane  2x10  2x10  2x10  2x10  2x10  2x10  2x10 2x10 2x10 2 x10    serratus punches with cane    unable            10 10x    scap retractions    10x10"                t-band rows      2x10 PTB  2x10 PTB  2x10 OTB  2x10 OTB  2x10   OTB  2x10  OTB 2x10 OTB 2 x10 OTB                                                                                                                                                                                                                                                                  Modalities   10/16  10/18  10/23  10/25  10/30  11/1  11/1  11/13        cold pack  5'  5'  5'  5'  5'  5'  5'  5'                                                          Assessment: Pt progressed through exercises well with no increase of pain at end of session  Pt demonstrates decreased active ROM agents gravity  Plan: Continue per plan of care

## 2018-11-14 ENCOUNTER — OFFICE VISIT (OUTPATIENT)
Dept: FAMILY MEDICINE CLINIC | Facility: CLINIC | Age: 83
End: 2018-11-14
Payer: MEDICARE

## 2018-11-14 VITALS
RESPIRATION RATE: 20 BRPM | SYSTOLIC BLOOD PRESSURE: 134 MMHG | WEIGHT: 206.5 LBS | TEMPERATURE: 98.5 F | DIASTOLIC BLOOD PRESSURE: 82 MMHG | HEIGHT: 61 IN | HEART RATE: 98 BPM | BODY MASS INDEX: 38.99 KG/M2

## 2018-11-14 DIAGNOSIS — N30.01 ACUTE CYSTITIS WITH HEMATURIA: ICD-10-CM

## 2018-11-14 DIAGNOSIS — N18.30 CKD (CHRONIC KIDNEY DISEASE) STAGE 3, GFR 30-59 ML/MIN (HCC): Primary | ICD-10-CM

## 2018-11-14 DIAGNOSIS — I50.32 CHRONIC DIASTOLIC HEART FAILURE (HCC): ICD-10-CM

## 2018-11-14 DIAGNOSIS — E03.9 ACQUIRED HYPOTHYROIDISM: ICD-10-CM

## 2018-11-14 DIAGNOSIS — I10 ESSENTIAL HYPERTENSION: ICD-10-CM

## 2018-11-14 LAB
SL AMB  POCT GLUCOSE, UA: NEGATIVE
SL AMB LEUKOCYTE ESTERASE,UA: ABNORMAL
SL AMB POCT BILIRUBIN,UA: NEGATIVE
SL AMB POCT BLOOD,UA: ABNORMAL
SL AMB POCT CLARITY,UA: ABNORMAL
SL AMB POCT COLOR,UA: YELLOW
SL AMB POCT KETONES,UA: NEGATIVE
SL AMB POCT NITRITE,UA: POSITIVE
SL AMB POCT PH,UA: 5
SL AMB POCT SPECIFIC GRAVITY,UA: 1.01
SL AMB POCT URINE PROTEIN: NEGATIVE
SL AMB POCT UROBILINOGEN: 0.2

## 2018-11-14 PROCEDURE — 87186 SC STD MICRODIL/AGAR DIL: CPT | Performed by: INTERNAL MEDICINE

## 2018-11-14 PROCEDURE — 99214 OFFICE O/P EST MOD 30 MIN: CPT | Performed by: INTERNAL MEDICINE

## 2018-11-14 PROCEDURE — 87086 URINE CULTURE/COLONY COUNT: CPT | Performed by: INTERNAL MEDICINE

## 2018-11-14 PROCEDURE — 87077 CULTURE AEROBIC IDENTIFY: CPT | Performed by: INTERNAL MEDICINE

## 2018-11-14 PROCEDURE — 81002 URINALYSIS NONAUTO W/O SCOPE: CPT | Performed by: INTERNAL MEDICINE

## 2018-11-14 RX ORDER — CEPHALEXIN 500 MG/1
500 CAPSULE ORAL EVERY 12 HOURS SCHEDULED
Qty: 10 CAPSULE | Refills: 0 | Status: SHIPPED | OUTPATIENT
Start: 2018-11-14 | End: 2018-11-27 | Stop reason: SDUPTHER

## 2018-11-14 NOTE — PROGRESS NOTES
Assessment/Plan:     Diagnoses and all orders for this visit:    CKD (chronic kidney disease) stage 3, GFR 30-59 ml/min ([facility])  -     CBC and differential  -     Basic metabolic panel    Acquired hypothyroidism  -     TSH, 3rd generation with Free T4 reflex    Acute cystitis with hematuria  -     cephalexin (KEFLEX) 500 mg capsule; Take 1 capsule (500 mg total) by mouth every 12 (twelve) hours for 5 days  -     POCT urine dip  -     Urine culture    Essential hypertension    Chronic diastolic heart failure ([facility] [address] )      Lansing Moritz was seen and examined in the office today  Urine dip does show signs of infection  Given this along with her allergies and underlying CKD, I did send in a script for Keflex  This will be cultured as well  LE edema is present but lungs appear clear  Weight is stable as well  I Would like her to try and elevate her legs as she has not been doing  She sticks to a low salt diet as well  She has an upcoming appt with Rheumatology as well  She was given blood work to complete prior to her next visit with Dr Elizabeth Search for her chronic problems as well  Subjective:      Patient ID: Prabhakar Watson is a 81 y o  female  Lansing Moritz is here today with urinary complaints that have been present for 1-2 weeks  She reports noticing an odor but denies melanie burning or urgency  She has known frequency  She reports that neuropathy symptoms are bothersome of the legs  She has known RA and also reports that her left thumb has been bothersome as well  She is currently working with PT on her shoulder as well  Hand Pain    Associated symptoms include numbness  Pertinent negatives include no chest pain  The following portions of the patient's history were reviewed and updated as appropriate: allergies, current medications, past family history, past medical history, past social history, past surgical history and problem list     Review of Systems   Constitutional: Positive for fatigue   Negative for chills and fever  Respiratory: Negative for chest tightness, shortness of breath and wheezing  Cardiovascular: Positive for leg swelling  Negative for chest pain and palpitations  Gastrointestinal: Negative for abdominal pain, nausea and vomiting  Genitourinary: Positive for frequency  Negative for dysuria and flank pain  Musculoskeletal: Positive for arthralgias and back pain  Neurological: Positive for numbness  Negative for dizziness and light-headedness  Psychiatric/Behavioral: Negative for dysphoric mood  The patient is not nervous/anxious  Objective:      /82   Pulse 98   Temp 98 5 °F (36 9 °C)   Resp 20   Ht 5' 1" (1 549 m)   Wt 93 7 kg (206 lb 8 oz)   LMP  (LMP Unknown)   BMI 39 02 kg/m²          Physical Exam   Constitutional: She is oriented to person, place, and time  She appears well-developed and well-nourished  No distress  Obese   HENT:   Head: Normocephalic and atraumatic  Eyes: Conjunctivae and EOM are normal  Right eye exhibits no discharge  Left eye exhibits no discharge  No scleral icterus  Neck: Normal range of motion  Cardiovascular: Normal rate, regular rhythm and normal heart sounds  LE edema noted bilaterally    Pulmonary/Chest: Effort normal and breath sounds normal  No respiratory distress  She has no wheezes  She exhibits no tenderness  Abdominal: Soft  Bowel sounds are normal  There is no tenderness  Musculoskeletal: Normal range of motion  Neurological: She is alert and oriented to person, place, and time  She has normal reflexes  Skin: Skin is warm and dry  She is not diaphoretic  No erythema  Psychiatric: She has a normal mood and affect  Her speech is normal and behavior is normal  Judgment and thought content normal    Vitals reviewed

## 2018-11-15 ENCOUNTER — APPOINTMENT (OUTPATIENT)
Dept: PHYSICAL THERAPY | Facility: REHABILITATION | Age: 83
End: 2018-11-15
Payer: MEDICARE

## 2018-11-15 NOTE — PROGRESS NOTES
Daily Note     Today's date: 11/15/2018  Patient name: Clay Giron  : 1935  MRN: [id number]  Referring provider: Ayden Patel MD  Dx:   No diagnosis found  :15 - 1:00 1:1 CF    Subjective: pt state she feels the same today as normal she has pain in her left shoulder that seems to be worse when the weather is bad  Objective: See treatment diary below    Precautions: recent dislocation, blood thinner, fall risk, hx of stroke, cardiovascular disease, DM2     Daily Treatment Diary     Manual   10/11  10/16  10/18  10/23  10/25  10/30  11/1 11/6 11/8 11/13   Shoulder PROM  LK May 65 May 65 May 64 May 64 May 65  EDGARD  SASKIA CF   rhythmic stabilization   Fertiffanie Sin CF                                                                    Exercise Diary   10/11  10/16  10/18  10/23  10/25  10/30  11/1 11/6 11/8 11/13   Pulley  5'  6'  6'  6'  6'  6'  6' 6' 6' 6'    Shoulder isometrcis  man resistance 2x10x5" Man resistance 2x10x5"  ex,abduction 2x10x5"  abd 2x10x5"  abd 2x10x5"  ab 2x10x5"  AB  2x10   5"  AB  2x10 5" abduction 2x10x5" abduction 2x10x5"   AAROM with cane  2x10  2x10  2x10  2x10  2x10  2x10  2x10 2x10 2x10 2 x10    serratus punches with cane    unable            10 10x    scap retractions    10x10"                t-band rows      2x10 PTB  2x10 PTB  2x10 OTB  2x10 OTB  2x10   OTB  2x10  OTB 2x10 OTB 2 x10 OTB                                                                                                                                                                                                                                                                  Modalities   10/16  10/18  10/23  10/25  10/30  11/1  11/1  11/13        cold pack  5'  5'  5'  5'  5'  5'  5'  5'                                                          Assessment: Pt progressed through exercises well with no increase of pain at end of session  Pt demonstrates decreased active ROM agents gravity  Plan: Continue per plan of care

## 2018-11-16 LAB — BACTERIA UR CULT: ABNORMAL

## 2018-11-20 ENCOUNTER — OFFICE VISIT (OUTPATIENT)
Dept: PHYSICAL THERAPY | Facility: REHABILITATION | Age: 83
End: 2018-11-20
Payer: MEDICARE

## 2018-11-20 DIAGNOSIS — M25.512 LEFT SHOULDER PAIN, UNSPECIFIED CHRONICITY: Primary | ICD-10-CM

## 2018-11-20 PROCEDURE — G8984 CARRY CURRENT STATUS: HCPCS

## 2018-11-20 PROCEDURE — 97140 MANUAL THERAPY 1/> REGIONS: CPT

## 2018-11-20 PROCEDURE — 97110 THERAPEUTIC EXERCISES: CPT

## 2018-11-20 PROCEDURE — G8985 CARRY GOAL STATUS: HCPCS

## 2018-11-20 NOTE — PROGRESS NOTES
Daily Note     Today's date: 2018  Patient name: Samson Brown  : 1935  MRN: [id number]  Referring provider: Carl Ledesma MD  Dx:   Encounter Diagnosis     ICD-10-CM    1  Left shoulder pain, unspecified chronicity M25 512        Start Time: 1200  Stop Time: 1300  Total time in clinic (min): 60 minutes    Subjective: Patient ntoted she fell today getting out of bed  Patient noted some bruising on the shoulder, however did not feel much pain  Patient noted she did not follow up with her doctor         Objective: See treatment diary below      Precautions: recent dislocation, blood thinner, fall risk, hx of stroke, cardiovascular disease, DM2     Daily Treatment Diary     Manual   11/20  10/16  10/18  10/23  10/25  10/30  11/1 11/6 11/8 11/13   Shoulder PROM  [address]  LK JM SASKIA CF   rhythmic stabilization   Miranda OCHOA                                                                    Exercise Diary  11/20  10/16  10/18  10/23  10/25  10/30  11/1 11/6 11/8 11/13   Pulley  6'  6'  6'  6'  6'  6'  6' 6' 6' 6'    Shoulder isometrcis  man resistance 2x10x5"  ABD Man resistance 2x10x5"  ex,abduction 2x10x5"  abd 2x10x5"  abd 2x10x5"  ab 2x10x5"  AB  2x10   5"  AB  2x10 5" abduction 2x10x5" abduction 2x10x5"   AAROM with cane  2x10  2x10  2x10  2x10  2x10  2x10  2x10 2x10 2x10 2 x10    serratus punches with cane  10x  unable            10 10x    scap retractions    10x10"                t-band rows  2x10   OTB    2x10 PTB  2x10 PTB  2x10 OTB  2x10 OTB  2x10   OTB  2x10  OTB 2x10 OTB 2 x10 OTB                                                                                                                                                                                                                                                                  Modalities   10/16  10/18  10/23  10/25  10/30  11/1  11/1  11/13  11/20      cold pack  5'  5'  5'  5'  5'  5'  5'  5'   5'                                                           Assessment: PT noted no major injuries from the patient's fall this morning  PT educated the patient to call the doctor if symptoms get worse  Patient demonstrated improvements with ROM after breathing techniques were performed  PT applied CP at end of the session for pain relief  Patient noted improvements at the end of the session  Patient would benefit from continued PT to allow the patient to return to her PLOF  Plan: Continue per plan of care

## 2018-11-26 ENCOUNTER — OFFICE VISIT (OUTPATIENT)
Dept: PHYSICAL THERAPY | Facility: REHABILITATION | Age: 83
End: 2018-11-26
Payer: MEDICARE

## 2018-11-26 DIAGNOSIS — M25.512 LEFT SHOULDER PAIN, UNSPECIFIED CHRONICITY: Primary | ICD-10-CM

## 2018-11-26 PROCEDURE — 97140 MANUAL THERAPY 1/> REGIONS: CPT | Performed by: PHYSICAL THERAPIST

## 2018-11-26 PROCEDURE — 97110 THERAPEUTIC EXERCISES: CPT | Performed by: PHYSICAL THERAPIST

## 2018-11-26 NOTE — PROGRESS NOTES
Daily Note     Today's date: 2018  Patient name: Chance Clemons  : 1935  MRN: [id number]  Referring provider: Lydia Ramirez MD  Dx:   Encounter Diagnosis     ICD-10-CM    1  Left shoulder pain, unspecified chronicity M25 512                   Subjective: Pt reported that she is stiff all over secondary to the weather         Objective: See treatment diary below      Precautions: recent dislocation, blood thinner, fall risk, hx of stroke, cardiovascular disease, DM2     Daily Treatment Diary     Manual   11/20  10/16  10/18  10/23  10/25  10/30  11/1 11/6 11/8 11/13 11/26   Shoulder PROM  [address]  LK JM SASKIA CF SASKIA   rhythmic stabilization    unable   [address] [place]                                                                       Exercise Diary  11/20  10/16  10/18  10/23  10/25  10/30  11/1 11/6 11/8 11/13 11/26   Pulley  6'  6'  6'  6'  6'  6'  6' 6' 6' 6'  5'   Shoulder isometrcis  man resistance 2x10x5"  ABD Man resistance 2x10x5"  ex,abduction 2x10x5"  abd 2x10x5"  abd 2x10x5"  ab 2x10x5"  AB  2x10   5"  AB  2x10 5" abduction 2x10x5" abduction 2x10x5" abduction 2x10x5"   AAROM with cane  2x10  2x10  2x10  2x10  2x10  2x10  2x10 2x10 2x10 2 x10  2x10   serratus punches with cane  10x  unable            10 10x  NP   scap retractions    10x10"                 t-band rows  2x10   OTB    2x10 PTB  2x10 PTB  2x10 OTB  2x10 OTB  2x10   OTB  2x10  OTB 2x10 OTB 2 x10 OTB  2x10 OTB   table slides                  abduction/flexion 10 each                                                                                                                                                                                                                                                       Modalities   10/16  10/18  10/23  10/25  10/30  11/1  11/1  11/13  11/20  11/26    cold pack  5'  5'  5'  5'  5'  5'  5'  5'   5'  5'                                                 Assessment: Pt continues to present with limited shoulder motion secondary to stiffness and pain  Pt shows improved abduction activation but is unable to raise arm against gravity  Plan: Continue per plan of care

## 2018-11-27 ENCOUNTER — APPOINTMENT (OUTPATIENT)
Dept: PHYSICAL THERAPY | Facility: REHABILITATION | Age: 83
End: 2018-11-27
Payer: MEDICARE

## 2018-11-27 ENCOUNTER — OFFICE VISIT (OUTPATIENT)
Dept: FAMILY MEDICINE CLINIC | Facility: CLINIC | Age: 83
End: 2018-11-27
Payer: MEDICARE

## 2018-11-27 VITALS
TEMPERATURE: 97.5 F | SYSTOLIC BLOOD PRESSURE: 140 MMHG | HEIGHT: 61 IN | WEIGHT: 208.4 LBS | HEART RATE: 78 BPM | OXYGEN SATURATION: 96 % | BODY MASS INDEX: 39.35 KG/M2 | DIASTOLIC BLOOD PRESSURE: 68 MMHG

## 2018-11-27 DIAGNOSIS — B96.20 E. COLI UTI (URINARY TRACT INFECTION): Primary | ICD-10-CM

## 2018-11-27 DIAGNOSIS — I89.0 LYMPHEDEMA: ICD-10-CM

## 2018-11-27 DIAGNOSIS — N39.0 E. COLI UTI (URINARY TRACT INFECTION): Primary | ICD-10-CM

## 2018-11-27 DIAGNOSIS — Z90.5 HISTORY OF NEPHROURETERECTOMY: ICD-10-CM

## 2018-11-27 DIAGNOSIS — Z90.6 HISTORY OF NEPHROURETERECTOMY: ICD-10-CM

## 2018-11-27 DIAGNOSIS — I50.33 ACUTE ON CHRONIC DIASTOLIC CONGESTIVE HEART FAILURE (HCC): ICD-10-CM

## 2018-11-27 DIAGNOSIS — M05.9 RHEUMATOID ARTHRITIS WITH POSITIVE RHEUMATOID FACTOR, INVOLVING UNSPECIFIED SITE (HCC): ICD-10-CM

## 2018-11-27 PROCEDURE — 99213 OFFICE O/P EST LOW 20 MIN: CPT | Performed by: FAMILY MEDICINE

## 2018-11-27 RX ORDER — CEPHALEXIN 500 MG/1
500 CAPSULE ORAL EVERY 12 HOURS SCHEDULED
Qty: 10 CAPSULE | Refills: 0 | Status: SHIPPED | OUTPATIENT
Start: 2018-11-27 | End: 2018-12-02

## 2018-11-27 RX ORDER — TORSEMIDE 20 MG/1
20 TABLET ORAL DAILY
Qty: 90 TABLET | Refills: 0
Start: 2018-11-27 | End: 2018-12-17 | Stop reason: SDUPTHER

## 2018-11-27 NOTE — PATIENT INSTRUCTIONS
Culture 2 weeks ago = EColi UTI -   Was susceptible to Keflex  Used 500 BID x 5 days -   As symptoms only transiently resolved, redo 5 day course  We reviewed medication, she is using torsemide 20 mg daily since October/ Methotrexate 4 a week,  everything else as before -   Continue to see her multiple specialists, she does have appointment with Dr Demetrice Bundy along with urologist in January, has slip to do blood work prior to visit  Saw Rheum earlier today    Creatinine 1 6 range past few months

## 2018-11-27 NOTE — PROGRESS NOTES
FAMILY PRACTICE OFFICE VISIT  [address]  [address], [address]      NAME: Elina Pineda  AGE: 81 y o  SEX: female  : 1935   MRN: [id number]    DATE: 2018  TIME: 4:46 PM    Assessment and Plan     Problem List Items Addressed This Visit        Unprioritized    History of nephroureterectomy    Rheumatoid arthritis ([facility] [address] )    Relevant Medications    methotrexate 2 5 mg tablet      Other Visit Diagnoses     E  coli UTI (urinary tract infection)    -  Primary    Relevant Medications    cephalexin (KEFLEX) 500 mg capsule    Acute on chronic diastolic congestive heart failure (HCC)        Relevant Medications    torsemide (DEMADEX) 20 mg tablet    Lymphedema              Patient Instructions   Culture 2 weeks ago = EColi UTI -   Was susceptible to Keflex  Used 500 BID x 5 days -   As symptoms only transiently resolved, redo 5 day course  We reviewed medication, she is using torsemide 20 mg daily since October/ Methotrexate 4 a week,  everything else as before -   Continue to see her multiple specialists, she does have appointment with Dr Shaw Ledesma along with urologist in January, has slip to do blood work prior to visit  Saw Rheum earlier today  Creatinine 1 6 range past few months      Chief Complaint     Chief Complaint   Patient presents with    Painful Urination       History of Present Illness   Elina Pineda is a 81y o -year-old female who is in today with urinary symptoms, she was seen in our office  by Dr Patrica Jalloh, urine culture confirmed E coli, susceptible to Keflex, used to 500 b i d  X5 days  I am seeing her for the 1st time, chart reviewed    Otherwise she has been feeling okay, she saw Rheumatology earlier today, on methotrexate weekly  Underwent surgery for urologic carcinoma earlier this year, solitary kidney        Review of Systems   Review of Systems Constitutional: Negative for chills and fever  Respiratory: Negative for chest tightness  Cardiovascular:        Chronic edema for years, has lymphatic pressure unit at home, has not used recently  Torsemide 20 mg daily, she had dropped to this dose few months ago, had seen Cardiology in October   Gastrointestinal: Negative for abdominal pain, blood in stool, nausea and vomiting  Genitourinary: Positive for dysuria  Negative for hematuria and pelvic pain  Neurological: Negative for dizziness and light-headedness         Active Problem List     Patient Active Problem List   Diagnosis    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 ([facility] [address] )    Essential hypertension    Rhe[facility]oid [facility] ([facility]    Diabetes mellitus type 2 in obese ([facility] )    Sleep apnea    Acquired hypothyroidism    Chronic GERD    Hypercholesterolemia    Obesity    Peripheral neuropathy    Prediabetes    Primary osteoarthritis of left knee    Restless leg syndrome    Right lumbar radiculopathy    Sensorineural hearing loss    Sinus arrhythmia    Chronic bilateral thoracic back pain    Thoracic degenerative disc disease    Urothelial cancer ([facility] )    Lung nodule < 6cm on CT    Pancreatic cyst    Iron deficiency anemia    Anemia    [facility]AD [facility]peripheral artery disease) ([facility])    History of nephroureterectomy    Incisional hernia    Atrial fibrillation (HCC)    CKD (chronic kidney disease) stage 3, GFR 30-59 ml/min ([facility])    Chronic diastolic heart failure ([facility])    Fracture    Spontaneous dislocation of shoulder, left       Past Medical History:  Past Medical History:   Diagnosis Date    Anemia     Arthritis     rheumatoid    Benign essential hypertension     Cataract     Chronic cystitis     CPAP (continuous positive airway pressure) dependence     Diverticulitis of colon     Early satiety     GERD (gastroesophageal reflux disease)     Gout     Hearing aid worn     bilateral  Hearing loss     Hemorrhoids     Hiatal hernia     History of colonic polyps     Hyperlipidemia     Hypothyroidism     Left breast mass     Microhematuria     Migraine     occular    Neuropathy     lower and upper extermities    Overactive bladder     Pneumonia of left lower lobe due to infectious organism (HCC)     RA (rheumatoid arthritis) ([facility] [address] )     Sleep apnea     uses cpap    Stroke ([facility] [facility] )     5/2017    Type 2 diabetes mellitus (HCC)     Urinary frequency     Urinary urgency     Urothelial cancer ([facility] ) 04/23/2018    Use of cane as ambulatory aid        Past Surgical History:  Past Surgical History:   Procedure Laterality Date    APPENDECTOMY      ARTHROSCOPY WRIST Right     with release of transverse carpal ligament     BLADDER SURGERY      BLADDER SURGERY      BREAST SURGERY      left breast    BUNIONECTOMY Bilateral     CATARACT EXTRACTION Bilateral     CHOLECYSTECTOMY      COLONOSCOPY      CYSTOSCOPY      EGD AND COLONOSCOPY N/A 12/20/2017    Procedure: EGD AND COLONOSCOPY;  Surgeon: Regina Bangura MD;  [facility]: [facility]; Service: Gastroenterology    ESOPHAGOGASTRODUODENOSCOPY      diagnostic    FOREARM SURGERY Right     fracture repair    HYSTERECTOMY      JOINT REPLACEMENT      KNEE ARTHROSCOPY Left     KNEE SURGERY Left     meniscus tear    NOSE SURGERY      HI CYSTO/URETERO W/LITHOTRIPSY &INDWELL STENT INSRT Right 2/28/2018    Procedure: CYSTOSCOPY RIGHT URETEROSCOPY, RIGHT RETROGRADE PYELOGRAM AND INSERTION  RIGHT STENT URETERAL, RIGHT RENAL PELVIC WASHING FOR CYTOLOGY;  Surgeon: Melba Prado MD;  [facility];  Service: Urology    HI NEPHRECTOMY, W/PART   URETECTOMY Right 4/23/2018    Procedure: ROBATIC ASSISTED NEPHRO-URETERECTOMY WITH BLADDER CUFF EXCISION;  Surgeon: Kash Feliz MD;  [facility];  Service: Urology    REPLACEMENT TOTAL KNEE BILATERAL Bilateral     URETEROSCOPY Right 3/20/2018    Procedure: Dano Chavez PYELOGRAM, URETEROSCOPY, BIOPSY, BRUSH, FULGURATION, STENT PLACEMENT, BLADDER BIOPSY WITH FULGURATION;  Surgeon: Citlali Rockwell MD;  [facility];  Service: Urology       Family History:  Family History   Problem Relation Age of Onset    Other Mother         epilepsy    Early death Mother    [place] Glaucoma Father     Stroke Father         silent    Other Brother         cardiac disorder       Social History:  Social History     Social History    Marital status: /Civil Union     Spouse name: N/A    Number of children: N/A    Years of education: N/A     Occupational History    Not on file  Social History Main Topics    Smoking status: Never Smoker    Smokeless tobacco: Never Used      Comment: stopped smoking in the distant past, never smoker (as per [employer])     Alcohol use No      Comment: seldom    Drug use: No    Sexual activity: Not on file     Other Topics Concern    Not on file     Social History Narrative    No caffeine use       Objective     Vitals:    11/27/18 1431   BP: 140/68   BP Location: Left arm   Patient Position: Sitting   Cuff Size: Large   Pulse: 78   Temp: 97 5 °F (36 4 °C)   SpO2: 96%   Weight: 94 5 kg (208 lb 6 4 oz)   Height: 5' 1" (1 549 m)     Body mass index is 39 38 kg/m²  BP Readings from Last 3 Encounters:   11/27/18 140/68   11/14/18 134/82   11/01/18 128/78       Wt Readings from Last 3 Encounters:   11/27/18 94 5 kg (208 lb 6 4 oz)   11/14/18 93 7 kg (206 lb 8 oz)   11/01/18 93 9 kg (207 lb)       Physical Exam   Constitutional: She is oriented to person, place, and time  She appears well-developed and well-nourished  No distress  Eyes: No scleral icterus  Cardiovascular: Normal rate, regular rhythm and normal heart sounds  Pulmonary/Chest: Effort normal  No respiratory distress  She has no wheezes  She has no rales  Abdominal: Soft  There is no tenderness     Musculoskeletal: She exhibits edema (+2 edema bilateral lower extremity, no warmth, no open wound)  Neurological: She is alert and oriented to person, place, and time  Psychiatric: She has a normal mood and affect  ALLERGIES:  Allergies   Allergen Reactions    Acetazolamide Other (See Comments)     Other reaction(s): Unknown Allergic Reaction    Aspirin      Other reaction(s): Other (See Comments)  High Dose ASA-stomach ache, rachel  ASA 81 m    Atorvastatin      Other reaction(s): Muscle Pain, Myalgia    Azithromycin     Nitrofurantoin Hives     HIVES * pt denies  Other reaction(s): Unknown Allergic Reaction  Other reaction(s): Other (See Comments)  HIVES * pt denies    Other Other (See Comments)     Adhesive tape : red and itching  Other reaction(s):  Other (See Comments)  red and itching    Shellfish-Derived Products Other (See Comments)     Patient got Gout following eating shell fish    Sulfa Antibiotics Other (See Comments)     unknown    Tramadol Diarrhea and Vomiting       Current Medications     Current Outpatient Prescriptions   Medication Sig Dispense Refill    acetaminophen (TYLENOL) 325 mg tablet Take 650 mg by mouth every 6 (six) hours as needed for mild pain      Calcium Citrate-Vitamin D (CALCIUM + D PO) Take 1 tablet by mouth 2 (two) times a day      cyanocobalamin (VITAMIN B-12) 100 mcg tablet Take by mouth      gabapentin (NEURONTIN) 100 mg capsule TAKE 1 CAPSULE IN THE MORNING, TAKE 1 CAPSULE IN THE AFTERNOON AND TAKE 1 TO 3 CAPSULES AT BEDTIME 450 capsule 1    hydroxychloroquine (PLAQUENIL) 200 mg tablet Take 200 mg by mouth 2 (two) times a day with meals      LEUCOVORIN CALCIUM PO Take 10 mg by mouth once a week      levothyroxine 75 mcg tablet TAKE 1 TABLET EVERY DAY 90 tablet 3    losartan (COZAAR) 25 mg tablet Take 1 tablet (25 mg total) by mouth daily 90 tablet 0    methotrexate 2 5 mg tablet Uses 4 a week via Rheumatology [facility] 12 tablet 0    Multiple Vitamin (MULTIVITAMINS PO) Take 1 tablet by mouth daily      omeprazole (PriLOSEC) 20 mg delayed release capsule TAKE 1 CAPSULE DAILY AS NEEDED FOR STOMACH ACID 90 capsule 3    pravastatin (PRAVACHOL) 80 mg tablet TAKE 1 TABLET EVERY DAY 90 tablet 3    rivaroxaban (XARELTO) 15 mg tablet Take 1 tablet (15 mg total) by mouth daily with breakfast 90 tablet 1    rOPINIRole (REQUIP) 0 5 mg tablet TAKE 2 TABLETS AT BEDTIME AND 1 TABLET IN THE MORNING 270 tablet 3    torsemide (DEMADEX) 20 mg tablet Take 1 tablet (20 mg total) by mouth daily ( decreased 10/18 w frequency ) 90 tablet 0    cephalexin (KEFLEX) 500 mg capsule Take 1 capsule (500 mg total) by mouth every 12 (twelve) hours for 5 days 10 capsule 0    docusate sodium (COLACE) 100 mg capsule Take 1 capsule (100 mg total) by mouth 2 (two) times a day for 60 doses 90 capsule 0     No current facility-administered medications for this visit  Facility-Administered Medications Ordered in Other Visits   Medication Dose Route Frequency Provider Last Rate Last Dose    acetaminophen (TYLENOL) tablet 650 mg  650 mg Oral Q6H PRN Janneth Santoro PA-C                 Most recent labs available from [address] [place] [address]   ( others may be available in Care Everywhere / Media sections)  Lab Results   Component Value Date    WBC 7 38 09/06/2018    HGB 8 4 (L) 09/06/2018    HCT 27 6 (L) 09/06/2018     09/06/2018    TRIG 161 (H) 06/25/2018    HDL 46 06/25/2018    LDLDIRECT 59 06/25/2018    ALT 14 08/20/2018    AST 12 08/20/2018     11/14/2017    K 4 0 09/06/2018     09/06/2018    CREATININE 1 61 (H) 09/06/2018    BUN 26 (H) 09/06/2018    CO2 28 09/06/2018    INR 1 80 (H) 08/19/2018    GLUF 100 (H) 09/06/2018    HGBA1C 6 3% 06/13/2018     Lab Results   Component Value Date    LDLCALC 44 06/25/2018     Lab Results   Component Value Date    SMO1QYUTOTMD 1 420 06/25/2018         No orders of the defined types were placed in this encounter          Aline Mike DO

## 2018-11-29 ENCOUNTER — OFFICE VISIT (OUTPATIENT)
Dept: PHYSICAL THERAPY | Facility: REHABILITATION | Age: 83
End: 2018-11-29
Payer: MEDICARE

## 2018-11-29 DIAGNOSIS — M25.512 LEFT SHOULDER PAIN, UNSPECIFIED CHRONICITY: Primary | ICD-10-CM

## 2018-11-29 PROCEDURE — 97140 MANUAL THERAPY 1/> REGIONS: CPT | Performed by: PHYSICAL THERAPIST

## 2018-11-29 PROCEDURE — 97110 THERAPEUTIC EXERCISES: CPT | Performed by: PHYSICAL THERAPIST

## 2018-11-29 NOTE — PROGRESS NOTES
Daily Note     Today's date: 2018  Patient name: Luke Felipe  : 1935  MRN: [id number]  Referring provider: Melissa Gamez MD  Dx:   Encounter Diagnosis     ICD-10-CM    1   Left shoulder pain, unspecified chronicity M25 512                   Subjective: Pt offers no complaints upon arrival        Objective: See treatment diary below      Precautions: recent dislocation, blood thinner, fall risk, hx of stroke, cardiovascular disease, DM2     Daily Treatment Diary     Manual   11/20  10/16  10/18  10/23  10/25  10/30  11/1 11/6 11/8 11/13 11/26 11/29   Shoulder PROM  [address]  LK JM SASKIA CF SASKIA SASKIA   rhythmic stabilization    unable   [address]                                                                          Exercise Diary  11/20  10/16  10/18  10/23  10/25  10/30  11/1 11/6 11/8 11/13 11/26 11/29   [place]ley  6'  6'  6'  6'  6'  6'  6' 6' 6' 6'  5' 6'   Shoulder isometrcis  man resistance 2x10x5"  ABD Man resistance 2x10x5"  ex,abduction 2x10x5"  abd 2x10x5"  abd 2x10x5"  ab 2x10x5"  AB  2x10   5"  AB  2x10 5" abduction 2x10x5" abduction 2x10x5" abduction 2x10x5" 2x10x5" abduction   AAROM with cane  2x10  2x10  2x10  2x10  2x10  2x10  2x10 2x10 2x10 2 x10  2x10 2x10   serratus punches with cane  10x  unable            10 10x  NP 10   scap retractions    10x10"                  t-band rows  2x10   OTB    2x10 PTB  2x10 PTB  2x10 OTB  2x10 OTB  2x10   OTB  2x10  OTB 2x10 OTB 2 x10 OTB  2x10 OTB 2x10 OTB   table slides                  abduction/flexion 10 each 2x10 each                                                                                                                                                                                                                                                                  Modalities   10/16  10/18  10/23  10/25  10/30  11/1  11/1  11/13  11/20  11/26    cold pack  5'  5'  5'  5'  5'  5'  5'  5'   5'  5'                                                         Assessment: Pt presents with improved ROM as compared to LV  Pt showed improved ability to hold arm up against gravity this visit  No complaints of pain post treatment  Plan: Continue per plan of care

## 2018-12-04 ENCOUNTER — HOSPITAL ENCOUNTER (OUTPATIENT)
Dept: NON INVASIVE DIAGNOSTICS | Facility: CLINIC | Age: 83
Discharge: HOME | End: 2018-12-04
Payer: MEDICARE

## 2018-12-04 ENCOUNTER — OFFICE VISIT (OUTPATIENT)
Dept: PHYSICAL THERAPY | Facility: REHABILITATION | Age: 83
End: 2018-12-04
Payer: MEDICARE

## 2018-12-04 DIAGNOSIS — I73.9 PAD (PERIPHERAL ARTERY DISEASE) (HCC): ICD-10-CM

## 2018-12-04 DIAGNOSIS — M25.512 LEFT SHOULDER PAIN, UNSPECIFIED CHRONICITY: Primary | ICD-10-CM

## 2018-12-04 PROCEDURE — 93923 UPR/LXTR ART STDY 3+ LVLS: CPT

## 2018-12-04 PROCEDURE — 97140 MANUAL THERAPY 1/> REGIONS: CPT | Performed by: PHYSICAL THERAPIST

## 2018-12-04 PROCEDURE — 93978 VASCULAR STUDY: CPT

## 2018-12-04 PROCEDURE — 93922 UPR/L XTREMITY ART 2 LEVELS: CPT | Performed by: SURGERY

## 2018-12-04 PROCEDURE — 93978 VASCULAR STUDY: CPT | Performed by: SURGERY

## 2018-12-04 PROCEDURE — 93925 LOWER EXTREMITY STUDY: CPT

## 2018-12-04 PROCEDURE — 93925 LOWER EXTREMITY STUDY: CPT | Performed by: SURGERY

## 2018-12-04 PROCEDURE — 97110 THERAPEUTIC EXERCISES: CPT | Performed by: PHYSICAL THERAPIST

## 2018-12-04 NOTE — PROGRESS NOTES
Daily Note     Today's date: 2018  Patient name: Karyle Cash  : 1935  MRN: [id number]  Referring provider: King Bills MD  Dx:   Encounter Diagnosis     ICD-10-CM    1  Left shoulder pain, unspecified chronicity M25 512                   Subjective: Pt reports she feels stiff secondary to the care  Objective: See treatment diary below      Precautions: recent dislocation, blood thinner, fall risk, hx of stroke, cardiovascular disease, DM2     Daily Treatment Diary     Manual   10/25  10/30  11/1 11/6 11/8 11/13 11/26 11/29 12/4   Shoulder PROM [address]  [address]  LK JM SASKIA CF [place] SASKIA   rhythmic stabilization [address]                                                     Exercise Diary   10/25  10/30  11/1 11/6 11/8 11/13 11/26 11/29 12   [address]  6'  6'  6' 6' 6' 6'  5' 6' 6'   Shoulder isometrcis  abd 2x10x5"  ab 2x10x5"  AB  2x10   5"  AB  2x10 5" abduction 2x10x5" abduction 2x10x5" abduction 2x10x5" 2x10x5" abduction 2x10x%"   AAROM with cane  2x10  2x10  2x10 2x10 2x10 2 x10  2x10 2x10 2x10   serratus punches with cane        10 10x  NP 10 10    scap retractions               t-band rows  2x10 OTB  2x10 OTB  2x10   OTB  2x10  OTB 2x10 OTB 2 x10 OTB  2x10 OTB 2x10 OTB 2x10 OTB   table slides          abduction/flexion 10 each 2x10 each 2x10 each                                                                                                                                                                                     Modalities   10/16  10/18  10/23  10/25  10/30  11/1  11/1  11/13  11/20  11/26    cold pack  5'  5'  5'  5'  5'  5'  5'  5'   5'  5'                                                         Assessment: Pt continues to present with improved muscle function and showed improvement with stabilization this visit  Plan: Continue per plan of care

## 2018-12-05 ENCOUNTER — TELEPHONE (OUTPATIENT)
Dept: VASCULAR SURGERY | Facility: CLINIC | Age: 83
End: 2018-12-05

## 2018-12-05 NOTE — TELEPHONE ENCOUNTER
----- Message from Bo Doyle sent at 12/5/2018 10:45 AM EST -----  Nursing call patient to see if there has been a clinical change corresponding to the changes in the results  If she is having significant leg/foot pain, particularly on the right leg, she should come in for office visit either with me or with [facility] for evaluation

## 2018-12-05 NOTE — TELEPHONE ENCOUNTER
Spoke to pt and she is having significant R leg/foot pain and would like to come in for OV to discuss  Scheduling, please arrange OV w/ Dr Edgar Jerez or [facility]

## 2018-12-06 ENCOUNTER — OFFICE VISIT (OUTPATIENT)
Dept: PHYSICAL THERAPY | Facility: REHABILITATION | Age: 83
End: 2018-12-06
Payer: MEDICARE

## 2018-12-06 DIAGNOSIS — M25.512 LEFT SHOULDER PAIN, UNSPECIFIED CHRONICITY: Primary | ICD-10-CM

## 2018-12-06 PROCEDURE — 97140 MANUAL THERAPY 1/> REGIONS: CPT

## 2018-12-06 PROCEDURE — 97110 THERAPEUTIC EXERCISES: CPT

## 2018-12-06 NOTE — PROGRESS NOTES
Daily Note     Today's date: 2018  Patient name: Rm Huffman  : 1935  MRN: [id number]  Referring provider: Jami Witt MD  Dx:   Encounter Diagnosis     ICD-10-CM    1  Left shoulder pain, unspecified chronicity M25 512        Start Time: 1445  Stop Time: 1530  Total time in clinic (min): 45 minutes    Subjective: Pt reports that she fell this morning when she was trying to stand up from her recliner  Pt reports that she believes she fell onto her back and hit her head  Pt reports that her son had to help her off of the floor and she believes that he might have hurt her shoulder when helping her stand up  Pt denies change in vision or LOC after fall  Objective: See treatment diary below      Precautions: recent dislocation, blood thinner, fall risk, hx of stroke, cardiovascular disease, DM2     Daily Treatment Diary     Manual             Shoulder PROM Lk           rhythmic stabilization Lk                                                             Exercise Diary     [place]ley 6'        6'   Shoulder isometrcis 2x10  10"        2x10x%"   AAROM with cane 2x10        2x10   serratus punches with cane 2x10        10    scap retractions            t-band rows OTB  x10        2x10 OTB   table slides 2x10   ea        2x10 each                                                                                                                                                                                     Modalities               cold pack 5'                                                                  Assessment: Pt tolerated session fair  Pt had soreness from fall and was limited with AAROM flexion this session due to soreness  DPT SASKIA informed of pt fall  Plan: Continue per plan of care

## 2018-12-10 ENCOUNTER — OFFICE VISIT (OUTPATIENT)
Dept: PHYSICAL THERAPY | Facility: REHABILITATION | Age: 83
End: 2018-12-10
Payer: MEDICARE

## 2018-12-10 DIAGNOSIS — M25.512 LEFT SHOULDER PAIN, UNSPECIFIED CHRONICITY: Primary | ICD-10-CM

## 2018-12-10 PROCEDURE — 97110 THERAPEUTIC EXERCISES: CPT | Performed by: PHYSICAL THERAPIST

## 2018-12-10 PROCEDURE — 97140 MANUAL THERAPY 1/> REGIONS: CPT | Performed by: PHYSICAL THERAPIST

## 2018-12-10 NOTE — PROGRESS NOTES
Daily Note     Today's date: 12/10/2018  Patient name: Anna Huizar  : 1935  MRN: [id number]  Referring provider: Zoey Robertson MD  Dx:   Encounter Diagnosis     ICD-10-CM    1  Left shoulder pain, unspecified chronicity M25 512                   Subjective: Pt offers no complaints upon arrival        Objective: See treatment diary below      Precautions: recent dislocation, blood thinner, fall risk, hx of stroke, cardiovascular disease, DM2     Daily Treatment Diary     Manual  12/6 12/10          Shoulder PROM Lk SASKIA          rhythmic stabilization Lk SASKIA                                                            Exercise Diary  12/6 12/10       12/4   [place] 6' 6'       6'   Shoulder isometrcis 2x10  10" [id number]       2x10x%"   AAROM with cane 2x10 2x10       2x10   serratus punches with cane 2x10 2x10       10    t-band rows OTB  x10 OTB 2x10       2x10 OTB   table slides 2x10   ea 2x10       2x10 each                                                                                                                                                                                     Modalities               cold pack 5'                                                                  Assessment: Pt had no complaints of pain with PROM  Showed improved multidirectional activation with rythmic stabilization and improved abduction activation  Plan: Continue per plan of care

## 2018-12-13 ENCOUNTER — OFFICE VISIT (OUTPATIENT)
Dept: OBGYN CLINIC | Facility: HOSPITAL | Age: 83
End: 2018-12-13
Payer: MEDICARE

## 2018-12-13 ENCOUNTER — EVALUATION (OUTPATIENT)
Dept: PHYSICAL THERAPY | Facility: REHABILITATION | Age: 83
End: 2018-12-13
Payer: MEDICARE

## 2018-12-13 VITALS
HEIGHT: 61 IN | BODY MASS INDEX: 39.84 KG/M2 | WEIGHT: 211 LBS | HEART RATE: 85 BPM | SYSTOLIC BLOOD PRESSURE: 151 MMHG | DIASTOLIC BLOOD PRESSURE: 79 MMHG

## 2018-12-13 DIAGNOSIS — K21.9 CHRONIC GERD: ICD-10-CM

## 2018-12-13 DIAGNOSIS — M25.512 LEFT SHOULDER PAIN, UNSPECIFIED CHRONICITY: Primary | ICD-10-CM

## 2018-12-13 DIAGNOSIS — M24.312 SPONTANEOUS DISLOCATION OF SHOULDER, LEFT: Primary | ICD-10-CM

## 2018-12-13 PROCEDURE — 97110 THERAPEUTIC EXERCISES: CPT | Performed by: PHYSICAL THERAPIST

## 2018-12-13 PROCEDURE — 99213 OFFICE O/P EST LOW 20 MIN: CPT | Performed by: ORTHOPAEDIC SURGERY

## 2018-12-13 PROCEDURE — 97140 MANUAL THERAPY 1/> REGIONS: CPT | Performed by: PHYSICAL THERAPIST

## 2018-12-13 RX ORDER — OMEPRAZOLE 20 MG/1
CAPSULE, DELAYED RELEASE ORAL
Qty: 90 CAPSULE | Refills: 1 | Status: SHIPPED | OUTPATIENT
Start: 2018-12-13 | End: 2019-03-25 | Stop reason: CLARIF

## 2018-12-13 NOTE — PROGRESS NOTES
PT Re-Evaluation     Today's date: 2018  Patient name: Zulema Cardenas  : 1935  MRN: [id number]  Referring provider: Rogena Collet, MD  Dx:   Encounter Diagnosis     ICD-10-CM    1  Left shoulder pain, unspecified chronicity M25 512                   Assessment  Assessment details: Zulema Cardenas is a 81 y o  female with significant medical history of many co-morbidities including RA, CHF, hypothyroidism, DM2 who presents who has been attending physical therapy for  L shoulder pain following a traumatic dislocation  Rosalie Perez has made significant progress since beginning PT as evidenced by improved passive shoulder ROM and improved muscle activation during rhythmic stabilization  Despite gains she continues to had difficutly with active range of motion and strength  These deficits are manifested functionally in difficulty with doing laundry and with cooking  Rosalie Perez will benefit from physical therapy to improve L shoulder ROM, improve L UE strength and allow for increased independence with ADL's  Impairments: abnormal or restricted ROM, impaired physical strength, pain with function and poor posture   Understanding of Dx/Px/POC: good   Prognosis: fair    Goals  Short Term  1: Patient will report a 50% decrease in pain in 2-4 weeks  - MET  2: Pt will have active shoulder elevation equal to contralateral side in 4 weeks  - Not MET  Long Term  1: Patient will demonstrate independence in home exercise program by discharge  - Partially met  2: Patient will have FOTO score of 57 indicating improved function in 8 weeks   - Partially met    Plan  Patient would benefit from: skilled physical therapy  Planned therapy interventions: manual therapy, therapeutic exercise, therapeutic activities, neuromuscular re-education and functional ROM exercises  Frequency: 2x week  Duration in weeks: 12        Subjective Evaluation    History of Present Illness  Mechanism of injury: Pt reports that her shoulder feels alright but she has some pain in her arm after being active  States she is still unable to lift her arm  Pain  Current pain ratin  At best pain ratin  At worst pain rating: 3          Objective     Postural Observations    Additional Postural Observation Details  Forward shoulders bilaterally       Active Range of Motion   Left Shoulder   Flexion: 30 degrees   Abduction: 5 degrees     Right Shoulder   Flexion: 120 degrees   Abduction: 90 degrees     Passive Range of Motion   Left Shoulder   Flexion: 145 degrees   Abduction: 145 degrees   External rotation 90°: 60 degrees   Internal rotation 90°: 45 degrees     Right Shoulder   Flexion: 145 degrees   Abduction: 145 degrees     Strength/Myotome Testing     Left Shoulder     Planes of Motion   Flexion: 3-   Abduction: 3-   External rotation at 0°: 2-   Internal rotation at 0°: 4-     Right Shoulder     Planes of Motion   Flexion: 3   Abduction: 3     General Comments     Shoulder Comments   Wrist/elbow strength 4-/5 throughout       Precautions: recent dislocation, blood thinner, fall risk, hx of stroke, cardiovascular disease, DM2      Daily Treatment Diary      Manual  12/6 12/10  12/13               Shoulder PROM [address]               rhythmic stabilization [address]                                                                                    Exercise Diary  12/6 12/10  12/13           12/4   Rebeca 6' 6'  6'           6'   Shoulder isometrcis 2x10  10" [id number]  2x10x5"           2x10x%"   AAROM with cane 2x10 2x10  np-time           2x10   serratus punches with cane 2x10 2x10  np time           10    t-band rows OTB  x10 OTB 2x10  OTB 2x10           2x10 OTB   table slides 2x10   ea 2x10  2x10 each           2x10 each                                                                                                                                                                                                                                                        Modalities  12/6                      cold pack 5'

## 2018-12-13 NOTE — PROGRESS NOTES
81 y o female presents to the office for follow up of left shoulder pain status post dislocation on 09/11/2018  She is left hand dominant  She has been progressing in physical therapy  She still experiences pain, but notes that it has diminished since the injury  She reports limited range of motion due to stiffness  She is accompanied by her  today in the office  Patient has history of rheumatoid arthritis      Review of Systems  Review of systems negative unless otherwise specified in HPI    Past Medical History  Past Medical History:   Diagnosis Date    Anemia     Arthritis     rheumatoid    Benign essential hypertension     Cataract     Chronic cystitis     CPAP (continuous positive airway pressure) dependence     Diverticulitis of colon     Early satiety     GERD (gastroesophageal reflux disease)     Gout     Hearing aid worn     bilateral    Hearing loss     Hemorrhoids     Hiatal hernia     History of colonic polyps     Hyperlipidemia     Hypothyroidism     Left breast mass     Microhematuria     Migraine     occular    Neuropathy     lower and upper extermities    Overactive bladder     Pneumonia of left lower lobe due to infectious organism (HCC)     RA (rheumatoid arthritis) ([address] )     Sleep apnea     uses cpap    Stroke ([facility] )     5/2017    Type 2 diabetes mellitus (HCC)     Urinary frequency     Urinary urgency     Urothelial cancer ([facility] ) 04/23/2018    Use of cane as ambulatory aid        Past Surgical History  Past Surgical History:   Procedure Laterality Date    APPENDECTOMY      ARTHROSCOPY WRIST Right     with release of transverse carpal ligament     BLADDER SURGERY      BLADDER SURGERY      BREAST SURGERY      left breast    BUNIONECTOMY Bilateral     CATARACT EXTRACTION Bilateral     CHOLECYSTECTOMY      COLONOSCOPY      CYSTOSCOPY      EGD AND COLONOSCOPY N/A 12/20/2017    Procedure: EGD AND COLONOSCOPY;  Surgeon: Daniel Aggarwal MD;  Location: [facility];  Service: Gastroenterology    ESOPHAGOGASTRODUODENOSCOPY      diagnostic    FOREARM SURGERY Right     fracture repair    HYSTERECTOMY      JOINT REPLACEMENT      KNEE ARTHROSCOPY Left     KNEE SURGERY Left     meniscus tear    NOSE SURGERY      ID CYSTO/URETERO W/LITHOTRIPSY &INDWELL STENT INSRT Right 2/28/2018    Procedure: CYSTOSCOPY RIGHT URETEROSCOPY, RIGHT RETROGRADE PYELOGRAM AND INSERTION  RIGHT STENT URETERAL, RIGHT RENAL PELVIC WASHING FOR CYTOLOGY;  Surgeon: Contreras Diaz MD;  [facility];  Service: Urology    ID NEPHRECTOMY, W/PART   URETECTOMY Right 4/23/2018    Procedure: ROBATIC ASSISTED NEPHRO-URETERECTOMY WITH BLADDER CUFF EXCISION;  Surgeon: Karuna Malave MD;  [facility];  Service: Urology    REPLACEMENT TOTAL KNEE BILATERAL Bilateral     URETEROSCOPY Right 3/20/2018    Procedure: CYSTOSCOPY, RETROGRADE PYELOGRAM, URETEROSCOPY, BIOPSY, BRUSH, FULGURATION, STENT PLACEMENT, BLADDER BIOPSY WITH FULGURATION;  Surgeon: Karuna Malave MD;  [facility];  Service: Urology       Current Medications  Current Outpatient Prescriptions on File Prior to Visit   Medication Sig Dispense Refill    acetaminophen (TYLENOL) 325 mg tablet Take 650 mg by mouth every 6 (six) hours as needed for mild pain      Calcium Citrate-Vitamin D (CALCIUM + D PO) Take 1 tablet by mouth 2 (two) times a day      cyanocobalamin (VITAMIN B-12) 100 mcg tablet Take by mouth      gabapentin (NEURONTIN) 100 mg capsule TAKE 1 CAPSULE IN THE MORNING, TAKE 1 CAPSULE IN THE AFTERNOON AND TAKE 1 TO 3 CAPSULES AT BEDTIME 450 capsule 1    hydroxychloroquine (PLAQUENIL) 200 mg tablet Take 200 mg by mouth 2 (two) times a day with meals      LEUCOVORIN CALCIUM PO Take 10 mg by mouth once a week      levothyroxine 75 mcg tablet TAKE 1 TABLET EVERY DAY 90 tablet 3    losartan (COZAAR) 25 mg tablet Take 1 tablet (25 mg total) by mouth daily 90 tablet 0    methotrexate 2 5 mg tablet Uses 4 a week via Rheumatology [facility] 12 tablet 0    Multiple Vitamin (MULTIVITAMINS PO) Take 1 tablet by mouth daily      omeprazole (PriLOSEC) 20 mg delayed release capsule TAKE 1 CAPSULE DAILY AS NEEDED FOR STOMACH ACID 90 capsule 3    pravastatin (PRAVACHOL) 80 mg tablet TAKE 1 TABLET EVERY DAY 90 tablet 3    rivaroxaban (XARELTO) 15 mg tablet Take 1 tablet (15 mg total) by mouth daily with breakfast 90 tablet 1    rOPINIRole (REQUIP) 0 5 mg tablet TAKE 2 TABLETS AT BEDTIME AND 1 TABLET IN THE MORNING 270 tablet 3    torsemide (DEMADEX) 20 mg tablet Take 1 tablet (20 mg total) by mouth daily ( decreased 10/18 w frequency ) 90 tablet 0    docusate sodium (COLACE) 100 mg capsule Take 1 capsule (100 mg total) by mouth 2 (two) times a day for 60 doses 90 capsule 0     Current Facility-Administered Medications on File Prior to Visit   Medication Dose Route Frequency Provider Last Rate Last Dose    acetaminophen (TYLENOL) tablet 650 mg  650 mg Oral Q6H PRN Clara Garcia PA-C           Recent Labs [facility])    0  Lab Value Date/Time   HCT 27 6 (L) 09/06/2018 0932   HCT 28 5 (L) 11/14/2017 1151   HGB 8 4 (L) 09/06/2018 0932   HGB 9 5 (L) 11/14/2017 1151   WBC 7 38 09/06/2018 0932   WBC 5 9 11/14/2017 1151   INR 1 80 (H) 08/19/2018 0942   ESR 66 (H) 06/28/2018 1300   CRP 10 6 (H) 06/28/2018 1300   GLUCOSE 95 01/04/2016 1758   HGBA1C 6 3% 06/13/2018 1731   HGBA1C 5 9 04/24/2018 0610   HGBA1C 6 2 01/03/2018 1330         Physical exam  · General: Awake, Alert, Oriented  · Eyes: Pupils equal, round and reactive to light  · Heart: regular rate and rhythm  · Lungs: No audible wheezing  · Abdomen: soft  Left shoulder  · Non-tender to palpation  · Unable to reach back of head  · Abduction to 30 degrees passively  · Strength 4/5  · Skin is warm and well perfused      Imaging  None    Procedure  None    Assessment/Plan:   81 y  o female status post left shoulder dislocation and concern for left rotator cuff tear will continue conservative management  She will continue physical therapy, new script was provided  We will see her back in 6 weeks

## 2018-12-17 DIAGNOSIS — I50.33 ACUTE ON CHRONIC DIASTOLIC CONGESTIVE HEART FAILURE (HCC): ICD-10-CM

## 2018-12-18 ENCOUNTER — OFFICE VISIT (OUTPATIENT)
Dept: PHYSICAL THERAPY | Facility: REHABILITATION | Age: 83
End: 2018-12-18
Payer: MEDICARE

## 2018-12-18 ENCOUNTER — TRANSCRIBE ORDERS (OUTPATIENT)
Dept: RADIOLOGY | Facility: HOSPITAL | Age: 83
End: 2018-12-18

## 2018-12-18 DIAGNOSIS — M25.512 LEFT SHOULDER PAIN, UNSPECIFIED CHRONICITY: Primary | ICD-10-CM

## 2018-12-18 PROCEDURE — G8991 OTHER PT/OT GOAL STATUS: HCPCS | Performed by: PHYSICAL THERAPIST

## 2018-12-18 PROCEDURE — 97140 MANUAL THERAPY 1/> REGIONS: CPT | Performed by: PHYSICAL THERAPIST

## 2018-12-18 PROCEDURE — 97110 THERAPEUTIC EXERCISES: CPT | Performed by: PHYSICAL THERAPIST

## 2018-12-18 PROCEDURE — G8990 OTHER PT/OT CURRENT STATUS: HCPCS | Performed by: PHYSICAL THERAPIST

## 2018-12-18 RX ORDER — TORSEMIDE 20 MG/1
TABLET ORAL
Qty: 180 TABLET | Refills: 0 | Status: SHIPPED | OUTPATIENT
Start: 2018-12-18 | End: 2019-03-21 | Stop reason: HOSPADM

## 2018-12-18 RX ORDER — LOSARTAN POTASSIUM 25 MG/1
TABLET ORAL
Qty: 90 TABLET | Refills: 0 | Status: SHIPPED | OUTPATIENT
Start: 2018-12-18 | End: 2019-03-26 | Stop reason: SDUPTHER

## 2018-12-18 NOTE — PROGRESS NOTES
Daily Note     Today's date: 2018  Patient name: Haley Bolanos  : 1935  MRN: [id number]  Referring provider: Edvin Vaughn MD  Dx:   Encounter Diagnosis     ICD-10-CM    1  Left shoulder pain, unspecified chronicity M25 [id number]                   Subjective: Pt offers no complaints upon arrival        Objective: See treatment diary below  Precautions: recent dislocation, blood thinner, fall risk, hx of stroke, cardiovascular disease, DM2      Daily Treatment Diary      Manual  12/6 12/10  12/13  12/18             Shoulder PROM Lk SASKIA [place] 12             rhythmic stabilization [facility]                                                                                  Exercise Diary  12/6 12/10  12/13  12/18         12/4   [place] 6' 6'  6'  6'         6'   Shoulder isometrcis 2x10  10" [id number]  2x10x5"           2x10x%"   AAROM with cane 2x10 2x10  np-time  2x10 each         2x10   serratus punches with cane 2x10 2x10  np time  10         10    t-band rows OTB  x10 OTB 2x10  OTB 2x10  OTB 2x10         2x10 OTB   table slides 2x10   ea 2x10  2x10 each  2x10 each         2x10 each    serratus ABC's        1x              press with cane       10                                                                                                                                                                                                                      Modalities                        cold pack 5'                                                                               Assessment: Tolerated treatment well  Patient demonstrated fatigue post treatment and would benefit from continued PT  Pt shows improved stabilization this visit but continues to lack shoulder abduction  Plan: Continue per plan of care

## 2018-12-19 ENCOUNTER — HOSPITAL ENCOUNTER (OUTPATIENT)
Dept: RADIOLOGY | Facility: HOSPITAL | Age: 83
Discharge: HOME | End: 2018-12-19
Attending: UROLOGY
Payer: MEDICARE

## 2018-12-19 DIAGNOSIS — C68.9 UROTHELIAL CANCER (HCC): ICD-10-CM

## 2018-12-19 PROCEDURE — 74176 CT ABD & PELVIS W/O CONTRAST: CPT

## 2018-12-20 ENCOUNTER — APPOINTMENT (EMERGENCY)
Dept: RADIOLOGY | Facility: HOSPITAL | Age: 83
End: 2018-12-20
Payer: MEDICARE

## 2018-12-20 ENCOUNTER — HOSPITAL ENCOUNTER (EMERGENCY)
Facility: HOSPITAL | Age: 83
Discharge: HOME | End: 2018-12-20
Attending: EMERGENCY MEDICINE
Payer: MEDICARE

## 2018-12-20 ENCOUNTER — OFFICE VISIT (OUTPATIENT)
Dept: PHYSICAL THERAPY | Facility: REHABILITATION | Age: 83
End: 2018-12-20
Payer: MEDICARE

## 2018-12-20 VITALS
HEART RATE: 74 BPM | BODY MASS INDEX: 39.86 KG/M2 | SYSTOLIC BLOOD PRESSURE: 161 MMHG | RESPIRATION RATE: 18 BRPM | DIASTOLIC BLOOD PRESSURE: 76 MMHG | TEMPERATURE: 97.8 F | OXYGEN SATURATION: 100 % | WEIGHT: 210.98 LBS

## 2018-12-20 DIAGNOSIS — M25.512 LEFT SHOULDER PAIN, UNSPECIFIED CHRONICITY: Primary | ICD-10-CM

## 2018-12-20 DIAGNOSIS — R60.0 LOWER EXTREMITY EDEMA: Primary | ICD-10-CM

## 2018-12-20 LAB
ANION GAP SERPL CALCULATED.3IONS-SCNC: 5 MMOL/L (ref 4–13)
BACTERIA UR QL AUTO: ABNORMAL /HPF
BASOPHILS # BLD AUTO: 0.02 THOUSANDS/ΜL (ref 0–0.1)
BASOPHILS NFR BLD AUTO: 0 % (ref 0–1)
BILIRUB UR QL STRIP: NEGATIVE
BUN SERPL-MCNC: 25 MG/DL (ref 5–25)
CALCIUM SERPL-MCNC: 8.8 MG/DL (ref 8.3–10.1)
CHLORIDE SERPL-SCNC: 103 MMOL/L (ref 100–108)
CLARITY UR: CLEAR
CO2 SERPL-SCNC: 30 MMOL/L (ref 21–32)
COLOR UR: YELLOW
COLOR, POC: NORMAL
CREAT SERPL-MCNC: 1.53 MG/DL (ref 0.6–1.3)
EOSINOPHIL # BLD AUTO: 0.21 THOUSAND/ΜL (ref 0–0.61)
EOSINOPHIL NFR BLD AUTO: 3 % (ref 0–6)
ERYTHROCYTE [DISTWIDTH] IN BLOOD BY AUTOMATED COUNT: 14.2 % (ref 11.6–15.1)
GFR SERPL CREATININE-BSD FRML MDRD: 31 ML/MIN/1.73SQ M
GLUCOSE SERPL-MCNC: 95 MG/DL (ref 65–140)
GLUCOSE UR STRIP-MCNC: NEGATIVE MG/DL
HCT VFR BLD AUTO: 30.3 % (ref 34.8–46.1)
HGB BLD-MCNC: 9.7 G/DL (ref 11.5–15.4)
HGB UR QL STRIP.AUTO: ABNORMAL
HYALINE CASTS #/AREA URNS LPF: ABNORMAL /LPF
IMM GRANULOCYTES # BLD AUTO: 0.04 THOUSAND/UL (ref 0–0.2)
IMM GRANULOCYTES NFR BLD AUTO: 1 % (ref 0–2)
KETONES UR STRIP-MCNC: NEGATIVE MG/DL
LEUKOCYTE ESTERASE UR QL STRIP: ABNORMAL
LYMPHOCYTES # BLD AUTO: 1.28 THOUSANDS/ΜL (ref 0.6–4.47)
LYMPHOCYTES NFR BLD AUTO: 20 % (ref 14–44)
MCH RBC QN AUTO: 33 PG (ref 26.8–34.3)
MCHC RBC AUTO-ENTMCNC: 32 G/DL (ref 31.4–37.4)
MCV RBC AUTO: 103 FL (ref 82–98)
MONOCYTES # BLD AUTO: 0.65 THOUSAND/ΜL (ref 0.17–1.22)
MONOCYTES NFR BLD AUTO: 10 % (ref 4–12)
NEUTROPHILS # BLD AUTO: 4.18 THOUSANDS/ΜL (ref 1.85–7.62)
NEUTS SEG NFR BLD AUTO: 66 % (ref 43–75)
NITRITE UR QL STRIP: NEGATIVE
NON-SQ EPI CELLS URNS QL MICRO: ABNORMAL /HPF
NRBC BLD AUTO-RTO: 0 /100 WBCS
NT-PROBNP SERPL-MCNC: 810 PG/ML
PH UR STRIP.AUTO: 7 [PH] (ref 4.5–8)
PLATELET # BLD AUTO: 208 THOUSANDS/UL (ref 149–390)
PMV BLD AUTO: 9.9 FL (ref 8.9–12.7)
POTASSIUM SERPL-SCNC: 3.8 MMOL/L (ref 3.5–5.3)
PROT UR STRIP-MCNC: NEGATIVE MG/DL
RBC # BLD AUTO: 2.94 MILLION/UL (ref 3.81–5.12)
RBC #/AREA URNS AUTO: ABNORMAL /HPF
SODIUM SERPL-SCNC: 138 MMOL/L (ref 136–145)
SP GR UR STRIP.AUTO: 1.01 (ref 1–1.03)
TROPONIN I SERPL-MCNC: <0.02 NG/ML
UROBILINOGEN UR QL STRIP.AUTO: 0.2 E.U./DL
WBC # BLD AUTO: 6.38 THOUSAND/UL (ref 4.31–10.16)
WBC #/AREA URNS AUTO: ABNORMAL /HPF

## 2018-12-20 PROCEDURE — 99285 EMERGENCY DEPT VISIT HI MDM: CPT

## 2018-12-20 PROCEDURE — 83880 ASSAY OF NATRIURETIC PEPTIDE: CPT | Performed by: EMERGENCY MEDICINE

## 2018-12-20 PROCEDURE — 87186 SC STD MICRODIL/AGAR DIL: CPT

## 2018-12-20 PROCEDURE — 93005 ELECTROCARDIOGRAM TRACING: CPT

## 2018-12-20 PROCEDURE — 87077 CULTURE AEROBIC IDENTIFY: CPT

## 2018-12-20 PROCEDURE — 85025 COMPLETE CBC W/AUTO DIFF WBC: CPT | Performed by: EMERGENCY MEDICINE

## 2018-12-20 PROCEDURE — 87185 SC STD ENZYME DETCJ PER NZM: CPT

## 2018-12-20 PROCEDURE — 81001 URINALYSIS AUTO W/SCOPE: CPT

## 2018-12-20 PROCEDURE — 36415 COLL VENOUS BLD VENIPUNCTURE: CPT | Performed by: EMERGENCY MEDICINE

## 2018-12-20 PROCEDURE — 71046 X-RAY EXAM CHEST 2 VIEWS: CPT

## 2018-12-20 PROCEDURE — 80048 BASIC METABOLIC PNL TOTAL CA: CPT | Performed by: EMERGENCY MEDICINE

## 2018-12-20 PROCEDURE — 97112 NEUROMUSCULAR REEDUCATION: CPT | Performed by: PHYSICAL THERAPIST

## 2018-12-20 PROCEDURE — 97140 MANUAL THERAPY 1/> REGIONS: CPT | Performed by: PHYSICAL THERAPIST

## 2018-12-20 PROCEDURE — 96374 THER/PROPH/DIAG INJ IV PUSH: CPT

## 2018-12-20 PROCEDURE — 97110 THERAPEUTIC EXERCISES: CPT | Performed by: PHYSICAL THERAPIST

## 2018-12-20 PROCEDURE — 84484 ASSAY OF TROPONIN QUANT: CPT | Performed by: EMERGENCY MEDICINE

## 2018-12-20 PROCEDURE — 87086 URINE CULTURE/COLONY COUNT: CPT

## 2018-12-20 RX ORDER — FUROSEMIDE 10 MG/ML
80 INJECTION INTRAMUSCULAR; INTRAVENOUS ONCE
Status: COMPLETED | OUTPATIENT
Start: 2018-12-20 | End: 2018-12-20

## 2018-12-20 RX ADMIN — FUROSEMIDE 80 MG: 10 INJECTION, SOLUTION INTRAMUSCULAR; INTRAVENOUS at 17:14

## 2018-12-20 NOTE — DISCHARGE INSTRUCTIONS
Follow-up with your primary care doctor by calling him tomorrow to discuss changing your diuretic medication dosage  Today you had no evidence of abnormal kidney function or electrolyte abnormalities  Return with any chest pain, shortness of breath significant weight gain or any other concerning symptoms  Leg Edema   WHAT YOU NEED TO KNOW:   Leg edema is swelling caused by fluid buildup  Your legs may swell if you sit or stand for long periods of time, are pregnant, or are injured  Swelling may also occur if you have heart failure or circulation problems  This means that your heart does not pump blood through your body as it should  DISCHARGE INSTRUCTIONS:   Self-care:   · Elevate your legs:  Raise your legs above the level of your heart as often as you can  This will help decrease swelling and pain  Prop your legs on pillows or blankets to keep them elevated comfortably  · Wear pressure stockings: These tight stockings put pressure on your legs to promote blood flow and prevent blood clots  Wear the stockings during the day  Do not wear them while you sleep  · Apply heat:  Heat helps decrease pain and swelling  Apply heat on the area for 20 to 30 minutes every 2 hours for as many days as directed  · Stay active:  Do not stand or sit for long periods of time  Ask your healthcare provider about the best exercise plan for you  · Eat healthy foods:  Healthy foods include fruits, vegetables, whole-grain breads, low-fat dairy products, beans, lean meats, and fish  Ask if you need to be on a special diet  Limit salt  Salt will make your body hold even more fluid  Follow up with your healthcare provider as directed:  Write down your questions so you remember to ask them during your visits  Contact your healthcare provider if:   · You have a fever or feel more tired than usual     · The veins in your legs look larger than usual  They may look full or bulging      · Your legs itch or feel heavy     · You have red or white areas or sores on your legs  The skin may also appear dimpled or have indentations  · You are gaining weight  · You have trouble moving your ankles  · The swelling does not go away, or other parts of your body swell  · You have questions or concerns about your condition or care  Return to the emergency department if:   · You cannot walk  · You feel faint or confused  · Your skin turns blue or gray  · Your leg feels warm, tender, and painful  It may be swollen and red  · You have chest pain or trouble breathing that is worse when you lie down  · You suddenly feel lightheaded and have trouble breathing  · You have new and sudden chest pain  You may have more pain when you take deep breaths or cough  You may also cough up blood  © 2017 [address]  Information is for End User's use only and may not be sold, redistributed or otherwise used for commercial purposes  All illustrations and images included in CareNotes® are the copyrighted property of [employer]  or [employer]  The above information is an  only  It is not intended as medical advice for individual conditions or treatments  Talk to your doctor, nurse or pharmacist before following any medical regimen to see if it is safe and effective for you

## 2018-12-20 NOTE — ED PROVIDER NOTES
History  Chief Complaint   Patient presents with    Edema     pt with fluid retention and edema, pt has only one kidney due to renal carcinoma, pt has been unable to clear the excess fluid and her legs are swollen and her abdomen feels bloated      50-year-old female presents for lower extremity swelling  For the past 3-4 days she has noticed worsening swelling/edema in both of her legs  She also reports mild abdominal fullness/bloated sensation  She has gained 3 lb over the past 48 hours she weighs herself daily  She reports recent diuretic adjustment as well as antihypertensive agent adjustment the comma this was after recent hospitalization  She reports a history of renal cancer is status post a nephrectomy last April  She does have a history for urinary tract infections but typically notices foul-smelling urine and urinary frequency which she does not notice today  She has no frequency or urgency/dysuria  No fevers chills rigors or change in mental status according to family member at bedside  She denies any dietary noncompliance  Has been compliant with her current diuretic and medication regimen  She is able ambulate with a walker without any further assistance  She has no chest pain no shortness of breath no orthopnea or exertional dyspnea  No history of DVT and has been compliant with her Xarelto  She does have a history of congestive heart failure this followed closely by her cardiologist   States that this is not the worst that her legs have been swollen  Denies any fevers chills rigors or unilateral leg swelling  No history of frequent soft tissue/skin infections  Prior to Admission Medications   Prescriptions Last Dose Informant Patient Reported? Taking?    Calcium Citrate-Vitamin D (CALCIUM + D PO)  Self Yes Yes   Sig: Take 1 tablet by mouth 2 (two) times a day   LEUCOVORIN CALCIUM PO  Self Yes Yes   Sig: Take 10 mg by mouth once a week   Multiple Vitamin (MULTIVITAMINS PO) Self Yes Yes   Sig: Take 1 tablet by mouth daily   acetaminophen (TYLENOL) 325 mg tablet  Self Yes Yes   Sig: Take 650 mg by mouth every 6 (six) hours as needed for mild pain   cyanocobalamin (VITAMIN B-12) 100 mcg tablet  Self Yes Yes   Sig: Take by mouth   docusate sodium (COLACE) 100 mg capsule   No Yes   Sig: Take 1 capsule (100 mg total) by mouth 2 (two) times a day for 60 doses   gabapentin (NEURONTIN) 100 mg capsule   No Yes   Sig: TAKE 1 CAPSULE IN THE MORNING, TAKE 1 CAPSULE IN THE AFTERNOON AND TAKE 1 TO 3 CAPSULES AT BEDTIME   hydroxychloroquine (PLAQUENIL) 200 mg tablet   Yes Yes   Sig: Take 200 mg by mouth 2 (two) times a day with meals   levothyroxine 75 mcg tablet   No Yes   Sig: TAKE 1 TABLET EVERY DAY   losartan (COZAAR) 25 mg tablet   No Yes   Sig: TAKE 1 TABLET EVERY DAY   methotrexate 2 5 mg tablet   No Yes   Sig: Uses 4 a week via Rheumatology [facility]   omeprazole (PriLOSEC) 20 mg delayed release capsule   No Yes   Sig: TAKE 1 CAPSULE EVERY DAY AS NEEDED FOR  STOMACH  ACID   pravastatin (PRAVACHOL) 80 mg tablet  Self No Yes   Sig: TAKE 1 TABLET EVERY DAY   rOPINIRole (REQUIP) 0 5 mg tablet   No Yes   Sig: TAKE 2 TABLETS AT BEDTIME AND 1 TABLET IN THE MORNING   rivaroxaban (XARELTO) 15 mg tablet   No Yes   Sig: Take 1 tablet (15 mg total) by mouth daily with breakfast   torsemide (DEMADEX) 20 mg tablet   No Yes   Sig: TAKE 2 TABLETS EVERY DAY      Facility-Administered Medications: None       Past Medical History:   Diagnosis Date    Anemia     Arthritis     rheumatoid    Benign essential hypertension     Cataract     Chronic cystitis     CPAP (continuous positive airway pressure) dependence     Diverticulitis of colon     Early satiety     GERD (gastroesophageal reflux disease)     Gout     Hearing aid worn     bilateral    Hearing loss     Hemorrhoids     Hiatal hernia     History of colonic polyps     Hyperlipidemia     Hypothyroidism     Left breast mass     Microhematuria     Migraine     occular    Neuropathy     lower and upper extermities    Overactive bladder     Pneumonia of left lower lobe due to infectious organism [facility] [address] [facility]     RA (rheumatoid arthritis) ([facility] [address] [facility]     Sleep apnea     uses cpap    Stroke ([facility] [address] [facility]     5/2017    Type 2 diabetes mellitus (HCC)     Urinary frequency     Urinary urgency     Urothelial cancer ([facility] [address] [facility] 04/23/2018    Use of cane as ambulatory aid        Past Surgical History:   Procedure Laterality Date    APPENDECTOMY      ARTHROSCOPY WRIST Right     with release of transverse carpal ligament     BLADDER SURGERY      BLADDER SURGERY      BREAST SURGERY      left breast    BUNIONECTOMY Bilateral     CATARACT EXTRACTION Bilateral     CHOLECYSTECTOMY      COLONOSCOPY      CYSTOSCOPY      EGD AND COLONOSCOPY N/A 12/20/2017    Procedure: EGD AND COLONOSCOPY;  Surgeon: Dara Zambrano MD;  [facility]: [facility]; Service: Gastroenterology    ESOPHAGOGASTRODUODENOSCOPY      diagnostic    FOREARM SURGERY Right     fracture repair    HYSTERECTOMY      JOINT REPLACEMENT      KNEE ARTHROSCOPY Left     KNEE SURGERY Left     meniscus tear    NOSE SURGERY      MO CYSTO/URETERO W/LITHOTRIPSY &INDWELL STENT INSRT Right 2/28/2018    Procedure: CYSTOSCOPY RIGHT URETEROSCOPY, RIGHT RETROGRADE PYELOGRAM AND INSERTION  RIGHT STENT URETERAL, RIGHT RENAL PELVIC WASHING FOR CYTOLOGY;  Surgeon: Ernestina Arthur MD;  [facility];  Service: Urology    MO NEPHRECTOMY, W/PART   URETECTOMY Right 4/23/2018    Procedure: ROBATIC ASSISTED NEPHRO-URETERECTOMY WITH BLADDER CUFF EXCISION;  Surgeon: Tereso Casillas MD;  [facility];  Service: Urology    REPLACEMENT TOTAL KNEE BILATERAL Bilateral     URETEROSCOPY Right 3/20/2018    Procedure: CYSTOSCOPY, RETROGRADE PYELOGRAM, URETEROSCOPY, BIOPSY, BRUSH, FULGURATION, STENT PLACEMENT, BLADDER BIOPSY WITH FULGURATION;  Surgeon: Tereso Casillas MD;  [facility];  Service: Urology       Family History   Problem Relation Age of Onset    Other Mother         epilepsy    Early death Mother    Aicha Cervantes Glaucoma Father     Stroke Father         silent    Other Brother         cardiac disorder     I have reviewed and agree with the history as documented  Social History   Substance Use Topics    Smoking status: Never Smoker    Smokeless tobacco: Never Used      Comment: stopped smoking in the distant past, never smoker (as per [employer])     Alcohol use No      Comment: seldom        Review of Systems   Constitutional: Negative for chills, fatigue and fever  HENT: Negative for congestion  Eyes: Negative for visual disturbance  Respiratory: Negative for cough, chest tightness, shortness of breath and wheezing  Cardiovascular: Positive for leg swelling  Negative for chest pain and palpitations  Gastrointestinal: Negative for abdominal pain, nausea and vomiting  Genitourinary: Negative for dyspareunia, dysuria, flank pain, frequency and urgency  Musculoskeletal: Negative for back pain and gait problem  Skin: Negative for rash  Allergic/Immunologic: Negative for immunocompromised state  Neurological: Negative for dizziness, syncope, weakness, light-headedness, numbness and headaches  Psychiatric/Behavioral: Negative for agitation         Physical Exam  ED Triage Vitals   Temperature Pulse Respirations Blood Pressure SpO2   12/20/18 1406 12/20/18 1406 12/20/18 1406 12/20/18 1406 12/20/18 1406   97 8 °F (36 6 °C) 86 18 (!) 191/88 97 %      Temp Source Heart Rate Source Patient Position - Orthostatic VS BP Location FiO2 (%)   12/20/18 1406 12/20/18 1609 12/20/18 1609 12/20/18 1609 --   Oral Monitor Sitting Right arm       Pain Score       12/20/18 1406       No Pain           Orthostatic Vital Signs  Vitals:    12/20/18 1609 12/20/18 1729 12/20/18 1738 12/20/18 1745   BP: (!) 215/102 161/76 152/71 161/76   Pulse: 91  73 74   Patient Position - Orthostatic VS: Sitting  Lying        Physical Exam   Constitutional: She is oriented to person, place, and time  Vital signs are normal  She appears well-developed and well-nourished  She does not appear ill  No distress  HENT:   Head: Normocephalic and atraumatic  Head is without abrasion and without contusion  Right Ear: Tympanic membrane normal    Left Ear: Tympanic membrane normal    Nose: Nose normal    Mouth/Throat: Uvula is midline, oropharynx is clear and moist and mucous membranes are normal    Eyes: Pupils are equal, round, and reactive to light  Conjunctivae and EOM are normal    Neck: Trachea normal, normal range of motion, full passive range of motion without pain and phonation normal  Neck supple  No JVD present  No spinous process tenderness and no muscular tenderness present  Carotid bruit is not present  Normal range of motion present  No thyromegaly present  Cardiovascular: Normal rate, regular rhythm and intact distal pulses  Exam reveals no friction rub  No murmur heard  Pulmonary/Chest: Effort normal and breath sounds normal  No accessory muscle usage or stridor  No tachypnea  No respiratory distress  She has no decreased breath sounds  She has no wheezes  She has no rhonchi  She has no rales  She exhibits no tenderness, no crepitus, no edema and no retraction  Abdominal: Soft  Normal appearance and bowel sounds are normal  She exhibits no distension  There is no tenderness  There is no rigidity, no rebound, no guarding and no CVA tenderness  Musculoskeletal: Normal range of motion  2+ pitting edema bilaterally to the knee  No overlying skin changes consistent with cellulitis  Equal leg swelling bilaterally   Lymphadenopathy:     She has no cervical adenopathy  Neurological: She is alert and oriented to person, place, and time  She has normal strength  No sensory deficit  GCS eye subscore is 4  GCS verbal subscore is 5  GCS motor subscore is 6  Skin: Skin is warm, dry and intact  No rash noted  She is not diaphoretic  Psychiatric: She has a normal mood and affect  Nursing note and vitals reviewed  ED Medications  Medications   furosemide (LASIX) injection 80 mg (80 mg Intravenous Given 12/20/18 1714)       Diagnostic Studies  Results Reviewed     Procedure Component Value Units Date/Time    Urine Microscopic [[id number]]  (Abnormal) Collected:  12/20/18 1707    Lab Status:  Final result Specimen:  Urine from Urine, Clean Catch Updated:  12/20/18 2007     RBC, UA None Seen /hpf      WBC, UA 30-50 (A) /hpf      Epithelial Cells None Seen /hpf      Bacteria, UA Innumerable (A) /hpf      Hyaline Casts, UA None Seen /lpf     Urine culture [[id number]] Collected:  12/20/18 1707    Lab Status:   In process Specimen:  Urine from Urine, Clean Catch Updated:  12/20/18 2007    POCT urinalysis dipstick [[id number]]  (Normal) Resulted:  12/20/18 1707    Lab Status:  Final result Updated:  12/20/18 1708     Color, UA -    ED Urine Macroscopic [[id number]]  (Abnormal) Collected:  12/20/18 1707    Lab Status:  Final result Specimen:  Urine Updated:  12/20/18 1707     Color, UA Yellow     Clarity, UA Clear     pH, UA 7 0     Leukocytes, UA Small (A)     Nitrite, UA Negative     Protein, UA Negative mg/dl      Glucose, UA Negative mg/dl      Ketones, UA Negative mg/dl      Urobilinogen, UA 0 2 E U /dl      Bilirubin, UA Negative     Blood, UA Trace (A)     Specific [place], UA 1 015    Narrative:       CLINITEK RESULT    Basic metabolic panel [[id number]]  (Abnormal) Collected:  12/20/18 1628    Lab Status:  Final result Specimen:  Blood from Arm, Right Updated:  12/20/18 1655     Sodium 138 mmol/L      Potassium 3 8 mmol/L      Chloride 103 mmol/L      CO2 30 mmol/L      ANION GAP 5 mmol/L      BUN 25 mg/dL      Creatinine 1 53 (H) mg/dL      Glucose 95 mg/dL      Calcium 8 8 mg/dL      eGFR 31 ml/min/1 73sq m     Narrative:         National Kidney Disease Education Program recommendations are as follows:  GFR calculation is accurate only with a steady state creatinine  Chronic Kidney disease less than 60 ml/min/1 73 sq  meters  Kidney failure less than 15 ml/min/1 73 sq  meters  NT-BNP PRO [[id number]]  (Abnormal) Collected:  12/20/18 1628    Lab Status:  Final result Specimen:  Blood from Arm, Right Updated:  12/20/18 1655     NT-proBNP 810 (H) pg/mL     Troponin I [[id number]]  (Normal) Collected:  12/20/18 1628    Lab Status:  Final result Specimen:  Blood from Arm, Right Updated:  12/20/18 1654     Troponin I <0 02 ng/mL     CBC and differential [[id number]]  (Abnormal) Collected:  12/20/18 1628    Lab Status:  Final result Specimen:  Blood from Arm, Right Updated:  12/20/18 1638     WBC 6 38 Thousand/uL      RBC 2 94 (L) Million/uL      Hemoglobin 9 7 (L) g/dL      Hematocrit 30 3 (L) %       (H) fL      MCH 33 0 pg      MCHC 32 0 g/dL      RDW 14 2 %      MPV 9 9 fL      Platelets 355 Thousands/uL      nRBC 0 /100 WBCs      Neutrophils Relative 66 %      Immat GRANS % 1 %      Lymphocytes Relative 20 %      Monocytes Relative 10 %      Eosinophils Relative 3 %      Basophils Relative 0 %      Neutrophils Absolute 4 18 Thousands/µL      Immature Grans Absolute 0 04 Thousand/uL      Lymphocytes Absolute 1 28 Thousands/µL      Monocytes Absolute 0 65 Thousand/µL      Eosinophils Absolute 0 21 Thousand/µL      Basophils Absolute 0 02 Thousands/µL                  XR chest 2 views   ED Interpretation by Sheral Mcburney, DO (12/20 1730)   No acute cardiopulmonary abnormality      Final Result by Russ Conner MD (12/20 2117)      No acute consolidation or congestion            Workstation performed: [id number]               Procedures  Procedures      Phone Consults  ED Phone Contact    ED Course  ED Course as of Dec 20 2316   Thu Dec 20, 2018   1802 Discussed plan with the patient and discuss normal lab work  Discussed plan to administer IV Lasix, discharge patient have her follow up with her PCP/cardiologist for medication management  Identification of Seniors at Risk      Most Recent Value   (ISAR) Identification of Seniors at Risk   Before the illness or injury that brought you to the Emergency, did you need someone to help you on a regular basis? 0 Filed at: 12/20/2018 1408   In the last 24 hours, have you needed more help than usual?  0 Filed at: 12/20/2018 1408   Have you been hospitalized for one or more nights during the past 6 months? 0 Filed at: 12/20/2018 1408   In general, do you see well?  0 Filed at: 12/20/2018 1408   In general, do you have serious problems with your memory? 0 Filed at: 12/20/2018 1408   Do you take more than three different medications every day? 1 Filed at: 12/20/2018 1408   ISAR Score  1 Filed at: 12/20/2018 1408                          [facility]  CritCare Time    Disposition  Final diagnoses:   Lower extremity edema     Time reflects when diagnosis was documented in both MDM as applicable and the Disposition within this note     Time User Action Codes Description Comment    12/20/2018  6:58 PM Joe Cannon Add [R60 0] Lower extremity edema       ED Disposition     ED Disposition Condition Comment    Discharge  [address] [facility] [address] discharge to home/self care  Condition at discharge: Good        Follow-up Information     Follow up With Specialties Details Why Contact Info Additional [address] [facility] Emergency Medicine Go to If symptoms worsen [address] [place] [address], [address] [place] [address], [address] [place] [address] , MD Family Medicine Call in 1 day For re-check and to discuss blood pressure / diuretic medications [address] [facility]    [address] [place] [address]  [phone]             Discharge Medication List as of 12/20/2018  6:59 PM      CONTINUE these medications which have NOT CHANGED    Details   acetaminophen (TYLENOL) 325 mg tablet Take 650 mg by mouth every 6 (six) hours as needed for mild pain, Historical Med      Calcium Citrate-Vitamin D (CALCIUM + D PO) Take 1 tablet by mouth 2 (two) times a day, Historical Med      cyanocobalamin (VITAMIN B-12) 100 mcg tablet Take by mouth, Historical Med      docusate sodium (COLACE) 100 mg capsule Take 1 capsule (100 mg total) by mouth 2 (two) times a day for 60 doses, Starting Mon 10/1/2018, Until Thu 12/20/2018, Print      gabapentin (NEURONTIN) 100 mg capsule TAKE 1 CAPSULE IN THE MORNING, TAKE 1 CAPSULE IN THE AFTERNOON AND TAKE 1 TO 3 CAPSULES AT BEDTIME, Normal      hydroxychloroquine (PLAQUENIL) 200 mg tablet Take 200 mg by mouth 2 (two) times a day with meals, Historical Med      LEUCOVORIN CALCIUM PO Take 10 mg by mouth once a week, Historical Med      levothyroxine 75 mcg tablet TAKE 1 TABLET EVERY DAY, Normal      losartan (COZAAR) 25 mg tablet TAKE 1 TABLET EVERY DAY, Normal      methotrexate 2 5 mg tablet Uses 4 a week via Rheumatology [facility], No Print      Multiple Vitamin (MULTIVITAMINS PO) Take 1 tablet by mouth daily, Historical Med      omeprazole (PriLOSEC) 20 mg delayed release capsule TAKE 1 CAPSULE EVERY DAY AS NEEDED FOR  STOMACH  ACID, Normal      pravastatin (PRAVACHOL) 80 mg tablet TAKE 1 TABLET EVERY DAY, Normal      rivaroxaban (XARELTO) 15 mg tablet Take 1 tablet (15 mg total) by mouth daily with breakfast, Starting Fri 9/14/2018, Normal      rOPINIRole (REQUIP) 0 5 mg tablet TAKE 2 TABLETS AT BEDTIME AND 1 TABLET IN THE MORNING, Normal      torsemide (DEMADEX) 20 mg tablet TAKE 2 TABLETS EVERY DAY, Normal           No discharge procedures on file  ED Provider  Attending physically available and evaluated Peter Long I managed the patient along with the ED Attending      Electronically Signed by         Patricia Koroma,   12/20/18 4429

## 2018-12-20 NOTE — ED ATTENDING ATTESTATION
Bertha Klein MD, saw and evaluated the patient  I have discussed the patient with the resident/non-physician practitioner and agree with the resident's/non-physician practitioner's findings, Plan of Care, and MDM as documented in the resident's/non-physician practitioner's note, except where noted  All available labs and Radiology studies were reviewed  At this point I agree with the current assessment done in the Emergency Department  I have conducted an independent evaluation of this patient a history and physical is as follows:      Critical Care Time  CritCare Time    Procedures     82 yo female with leg swelling, abdominal fullness  Pt with hx of renal ca with hx of nephroectomy  Pt noted weight gain last few days  No pain in legs  No cp, no sob, no orthopnea  Pt on diuretics and compliant  No cp  Vss, afebrile, hypertensive, lungs cta, rrr, abdomen soft nontender, 2+pedal edema  Cardiac workup, bnp  Lasix as tolerated

## 2018-12-20 NOTE — PROGRESS NOTES
Daily Note     Today's date: 2018  Patient name: Delgado Mac  : 1935  MRN: [id number]  Referring provider: Farrah Cardenas MD  Dx:   Encounter Diagnosis     ICD-10-CM    1  Left shoulder pain, unspecified chronicity M25 512                   Subjective: Pt states she her arthritis is worked up today secondary to the weather  Objective: See treatment diary below  Precautions: recent dislocation, blood thinner, fall risk, hx of stroke, cardiovascular disease, DM2      Daily Treatment Diary      Manual  12/6 12/10  12/13  12/18  12/20           Shoulder PROM Lk SASKIA   [address] [facility]           rhythmic stabilization Hosie Seal   [address] [facility]                                                                                Exercise Diary  12/6 12/10  12/13  12/18  12/20         [place] 6' 6'  6'  6'  6'         Shoulder isometrcis 2x10  10" [id number]  2x10x5"    2x10x5"         AAROM with cane 2x10 2x10  np-time  2x10 each  2x10 each         serratus punches with cane 2x10 2x10  np time  10  10         t-band rows OTB  x10 OTB 2x10  OTB 2x10  OTB 2x10  OTB 2x10         table slides 2x10   ea 2x10  2x10 each  2x10 each  2x10 each          serratus ABC's        1x            press with cane       10                                                                                                                                                                                                  Modalities                        cold pack 5'                                                                               Assessment: Tolerated treatment well  Patient demonstrated fatigue post treatment and would benefit from continued PT  Plan: Continue per plan of care

## 2018-12-21 ENCOUNTER — TELEPHONE (OUTPATIENT)
Dept: FAMILY MEDICINE CLINIC | Facility: CLINIC | Age: 83
End: 2018-12-21

## 2018-12-21 LAB
ATRIAL RATE: 65 BPM
P AXIS: 73 DEGREES
PR INTERVAL: 176 MS
QRS AXIS: -3 DEGREES
QRSD INTERVAL: 90 MS
QT INTERVAL: 420 MS
QTC INTERVAL: 436 MS
T WAVE AXIS: 24 DEGREES
VENTRICULAR RATE: 65 BPM

## 2018-12-21 NOTE — TELEPHONE ENCOUNTER
PT WENT TO [facility] ER FOR A 10 POUND WEIGHT GAIN SHE WAS TAKING TORSEMIDE 20 MG QD AND WAS TOLD BY THE ER TO INCREASE IT TO BID WHICH SHE DID NOW HER WEIGHT IS DOWN  PT WANTS TO KNOW SHOULD SHE STAY ON  THAT DOSE OF MEDICATION

## 2018-12-21 NOTE — TELEPHONE ENCOUNTER
If her weight is back to baseline, she can go back to 1 tablet daily  She should increase it to twice daily if her weight increases by more than 3 lb  Please make sure she has a follow-up with us in the near future

## 2018-12-23 LAB — BACTERIA UR CULT: ABNORMAL

## 2018-12-27 ENCOUNTER — OFFICE VISIT (OUTPATIENT)
Dept: PHYSICAL THERAPY | Facility: REHABILITATION | Age: 83
End: 2018-12-27
Payer: MEDICARE

## 2018-12-27 ENCOUNTER — APPOINTMENT (OUTPATIENT)
Dept: LAB | Facility: CLINIC | Age: 83
End: 2018-12-27
Payer: MEDICARE

## 2018-12-27 DIAGNOSIS — M25.512 LEFT SHOULDER PAIN, UNSPECIFIED CHRONICITY: Primary | ICD-10-CM

## 2018-12-27 LAB
ANION GAP SERPL CALCULATED.3IONS-SCNC: 7 MMOL/L (ref 4–13)
BASOPHILS # BLD AUTO: 0.02 THOUSANDS/ΜL (ref 0–0.1)
BASOPHILS NFR BLD AUTO: 0 % (ref 0–1)
BUN SERPL-MCNC: 29 MG/DL (ref 5–25)
CALCIUM SERPL-MCNC: 9.3 MG/DL (ref 8.3–10.1)
CHLORIDE SERPL-SCNC: 104 MMOL/L (ref 100–108)
CO2 SERPL-SCNC: 29 MMOL/L (ref 21–32)
CREAT SERPL-MCNC: 1.48 MG/DL (ref 0.6–1.3)
EOSINOPHIL # BLD AUTO: 0.25 THOUSAND/ΜL (ref 0–0.61)
EOSINOPHIL NFR BLD AUTO: 3 % (ref 0–6)
ERYTHROCYTE [DISTWIDTH] IN BLOOD BY AUTOMATED COUNT: 14.2 % (ref 11.6–15.1)
GFR SERPL CREATININE-BSD FRML MDRD: 33 ML/MIN/1.73SQ M
GLUCOSE P FAST SERPL-MCNC: 111 MG/DL (ref 65–99)
HCT VFR BLD AUTO: 30.6 % (ref 34.8–46.1)
HGB BLD-MCNC: 9.7 G/DL (ref 11.5–15.4)
IMM GRANULOCYTES # BLD AUTO: 0.04 THOUSAND/UL (ref 0–0.2)
IMM GRANULOCYTES NFR BLD AUTO: 1 % (ref 0–2)
LYMPHOCYTES # BLD AUTO: 1.31 THOUSANDS/ΜL (ref 0.6–4.47)
LYMPHOCYTES NFR BLD AUTO: 17 % (ref 14–44)
MCH RBC QN AUTO: 33.2 PG (ref 26.8–34.3)
MCHC RBC AUTO-ENTMCNC: 31.7 G/DL (ref 31.4–37.4)
MCV RBC AUTO: 105 FL (ref 82–98)
MONOCYTES # BLD AUTO: 0.73 THOUSAND/ΜL (ref 0.17–1.22)
MONOCYTES NFR BLD AUTO: 10 % (ref 4–12)
NEUTROPHILS # BLD AUTO: 5.29 THOUSANDS/ΜL (ref 1.85–7.62)
NEUTS SEG NFR BLD AUTO: 69 % (ref 43–75)
NRBC BLD AUTO-RTO: 0 /100 WBCS
PLATELET # BLD AUTO: 213 THOUSANDS/UL (ref 149–390)
PMV BLD AUTO: 10.1 FL (ref 8.9–12.7)
POTASSIUM SERPL-SCNC: 4 MMOL/L (ref 3.5–5.3)
RBC # BLD AUTO: 2.92 MILLION/UL (ref 3.81–5.12)
SODIUM SERPL-SCNC: 140 MMOL/L (ref 136–145)
TSH SERPL DL<=0.05 MIU/L-ACNC: 1.77 UIU/ML (ref 0.36–3.74)
WBC # BLD AUTO: 7.64 THOUSAND/UL (ref 4.31–10.16)

## 2018-12-27 PROCEDURE — 36415 COLL VENOUS BLD VENIPUNCTURE: CPT | Performed by: INTERNAL MEDICINE

## 2018-12-27 PROCEDURE — 97110 THERAPEUTIC EXERCISES: CPT | Performed by: PHYSICAL THERAPIST

## 2018-12-27 PROCEDURE — 84443 ASSAY THYROID STIM HORMONE: CPT | Performed by: INTERNAL MEDICINE

## 2018-12-27 PROCEDURE — 80048 BASIC METABOLIC PNL TOTAL CA: CPT | Performed by: INTERNAL MEDICINE

## 2018-12-27 PROCEDURE — 97140 MANUAL THERAPY 1/> REGIONS: CPT | Performed by: PHYSICAL THERAPIST

## 2018-12-27 PROCEDURE — 85025 COMPLETE CBC W/AUTO DIFF WBC: CPT | Performed by: INTERNAL MEDICINE

## 2018-12-27 NOTE — PROGRESS NOTES
Daily Note     Today's date: 2018  Patient name: Shayla Gaines  : 1935  MRN: [id number]  Referring provider: Woody Belcher MD  Dx:   Encounter Diagnosis     ICD-10-CM    1  Left shoulder pain, unspecified chronicity M25 512                   Subjective: Pt reports that she has been active      Objective: See treatment diary below  Precautions: recent dislocation, blood thinner, fall risk, hx of stroke, cardiovascular disease, DM2      Daily Treatment Diary      Manual  12/6 12/10  12/13  12/18  12/20  12/27         Shoulder PROM Lk [address]         rhythmic stabilization [address]                                                                              Exercise Diary  12/6 12/10  12/13  12/18  12/20  12/27       [place] 6' 6'  6'  6'  6'  5'       Shoulder isometrcis 2x10  10" [id number]  2x10x5"    2x10x5"  2x10x5"       AAROM with cane 2x10 2x10  np-time  2x10 each  2x10 each  2x10 each       serratus punches with cane 2x10 2x10  np time  10  10  10       t-band rows OTB  x10 OTB 2x10  OTB 2x10  OTB 2x10  OTB 2x10  OTB 2x10       table slides 2x10   ea 2x10  2x10 each  2x10 each  2x10 each  2x10 each        serratus ABC's        1x            press with cane       10                                                                                                                                                                                                  Modalities                        cold pack 5'                                                                               Assessment: Tolerated treatment well  Patient demonstrated fatigue post treatment and would benefit from continued PT  Pt reports some generalized pain throughout the session but was able to perform TE without specific complaint  Plan: Continue per plan of care

## 2018-12-31 ENCOUNTER — OFFICE VISIT (OUTPATIENT)
Dept: PHYSICAL THERAPY | Facility: REHABILITATION | Age: 83
End: 2018-12-31
Payer: MEDICARE

## 2018-12-31 ENCOUNTER — OFFICE VISIT (OUTPATIENT)
Dept: VASCULAR SURGERY | Facility: CLINIC | Age: 83
End: 2018-12-31
Payer: MEDICARE

## 2018-12-31 VITALS
DIASTOLIC BLOOD PRESSURE: 80 MMHG | HEIGHT: 61 IN | WEIGHT: 215 LBS | TEMPERATURE: 97.2 F | SYSTOLIC BLOOD PRESSURE: 124 MMHG | HEART RATE: 72 BPM | BODY MASS INDEX: 40.59 KG/M2

## 2018-12-31 DIAGNOSIS — E66.9 DIABETES MELLITUS TYPE 2 IN OBESE (HCC): ICD-10-CM

## 2018-12-31 DIAGNOSIS — I70.229 ATHEROSCLEROSIS OF ARTERY OF EXTREMITY WITH REST PAIN (HCC): Primary | ICD-10-CM

## 2018-12-31 DIAGNOSIS — I50.32 CHRONIC DIASTOLIC HEART FAILURE (HCC): ICD-10-CM

## 2018-12-31 DIAGNOSIS — M25.512 LEFT SHOULDER PAIN, UNSPECIFIED CHRONICITY: Primary | ICD-10-CM

## 2018-12-31 DIAGNOSIS — E11.69 DIABETES MELLITUS TYPE 2 IN OBESE (HCC): ICD-10-CM

## 2018-12-31 DIAGNOSIS — I48.20 CHRONIC ATRIAL FIBRILLATION (HCC): ICD-10-CM

## 2018-12-31 DIAGNOSIS — G63 POLYNEUROPATHY ASSOCIATED WITH UNDERLYING DISEASE (HCC): ICD-10-CM

## 2018-12-31 DIAGNOSIS — N18.30 CKD (CHRONIC KIDNEY DISEASE) STAGE 3, GFR 30-59 ML/MIN (HCC): ICD-10-CM

## 2018-12-31 PROCEDURE — 97110 THERAPEUTIC EXERCISES: CPT | Performed by: PHYSICAL THERAPIST

## 2018-12-31 PROCEDURE — 97140 MANUAL THERAPY 1/> REGIONS: CPT | Performed by: PHYSICAL THERAPIST

## 2018-12-31 PROCEDURE — 99215 OFFICE O/P EST HI 40 MIN: CPT | Performed by: SURGERY

## 2018-12-31 RX ORDER — SODIUM CHLORIDE 9 MG/ML
100 INJECTION, SOLUTION INTRAVENOUS CONTINUOUS
Status: CANCELLED | OUTPATIENT
Start: 2018-12-31

## 2018-12-31 NOTE — PATIENT INSTRUCTIONS
1) PAD  -you have arterial disease of the right leg; I think that multiple things are causing your leg symptoms (neuropathy, arthritis, swelling, etc) but the pain that is waking you at night in your right leg is probably due to artery disease  -we are planning an angiogram procedure to treat the blockages in your leg    2) Leg swelling  -continue to wear your compression stockings daily and use your lymphedema pumps as much as possible  -try to elevate your legs, stay active, and lose weight to get down to a healthy weight    Angiogram   WHAT YOU NEED TO KNOW:   What do I need to know about an angiogram?  An angiogram is used to examine blood flow through your arteries  Arteries carry blood from your heart to your body  How do I prepare for the procedure? Your healthcare provider will tell you how to prepare for your procedure  He may tell you not to eat or drink anything after midnight on the day of your procedure  He will tell you what medicines to take or not take on the day of your procedure  Contrast liquid will be used during the procedure to help your arteries show up better in the pictures  Tell your healthcare provider if you have ever had an allergic reaction to contrast liquid  Arrange to have someone drive you home after your procedure  What will happen during the procedure? · You may be given medicine to help you relax or make you drowsy  You may get local anesthesia to numb the area where the angiogram catheter will go in  Hair may be removed from the procedure site  · A catheter will be put into an artery in your leg near your groin, or in your arm  The catheter travels through the artery to the area being studied  Contrast liquid is put through the catheter to help your blood vessels and organs show up better in the x-ray pictures  You may feel warm as the liquid is put into the catheter  You may get a headache or feel nauseated  These feelings should go away quickly      · Your healthcare providers will remove the catheter and apply pressure to the wound for several minutes  They may place a pressure bandage or other pressure device over the wound to help stop any bleeding  What will happen after the procedure? You will be on a heart monitor until you are fully awake  A heart monitor continuously records the electrical activity of your heart  Healthcare providers will frequently monitor your vital signs and pulses  They will also frequently check your wound for bleeding  Keep your leg straight  Do not  get out of bed until your healthcare provider says it is okay  After you are monitored for several hours, you may be able to go home  What are the risks of the procedure? · You may bleed heavily after your catheter is removed  The catheter may damage your artery, and you may need surgery to fix the damage  You could have kidney problems from the contrast liquid  You could have an allergic reaction to the contrast liquid or numbing medicine  · You may develop a blood clot that causes pain and swelling, and stops blood from flowing  A blood clot in your leg can break loose and travel to your lungs and become life-threatening  CARE AGREEMENT:   You have the right to help plan your care  Learn about your health condition and how it may be treated  Discuss treatment options with your caregivers to decide what care you want to receive  You always have the right to refuse treatment  The above information is an  only  It is not intended as medical advice for individual conditions or treatments  Talk to your doctor, nurse or pharmacist before following any medical regimen to see if it is safe and effective for you  © 2017 [address] [employer] Information is for End User's use only and may not be sold, redistributed or otherwise used for commercial purposes   All illustrations and images included in CareNotes® are the copyrighted property of A D [employer] M , Inc  or [facility] [employer]  Leg Edema   WHAT YOU NEED TO KNOW:   Leg edema is swelling caused by fluid buildup  Your legs may swell if you sit or stand for long periods of time, are pregnant, or are injured  Swelling may also occur if you have heart failure or circulation problems  This means that your heart does not pump blood through your body as it should  DISCHARGE INSTRUCTIONS:   Self-care:   · Elevate your legs:  Raise your legs above the level of your heart as often as you can  This will help decrease swelling and pain  Prop your legs on pillows or blankets to keep them elevated comfortably  · Wear pressure stockings: These tight stockings put pressure on your legs to promote blood flow and prevent blood clots  Wear the stockings during the day  Do not wear them while you sleep  · Apply heat:  Heat helps decrease pain and swelling  Apply heat on the area for 20 to 30 minutes every 2 hours for as many days as directed  · Stay active:  Do not stand or sit for long periods of time  Ask your healthcare provider about the best exercise plan for you  · Eat healthy foods:  Healthy foods include fruits, vegetables, whole-grain breads, low-fat dairy products, beans, lean meats, and fish  Ask if you need to be on a special diet  Limit salt  Salt will make your body hold even more fluid  Follow up with your healthcare provider as directed:  Write down your questions so you remember to ask them during your visits  Contact your healthcare provider if:   · You have a fever or feel more tired than usual     · The veins in your legs look larger than usual  They may look full or bulging  · Your legs itch or feel heavy  · You have red or white areas or sores on your legs  The skin may also appear dimpled or have indentations  · You are gaining weight  · You have trouble moving your ankles  · The swelling does not go away, or other parts of your body swell      · You have questions or concerns about your condition or care  Return to the emergency department if:   · You cannot walk  · You feel faint or confused  · Your skin turns blue or gray  · Your leg feels warm, tender, and painful  It may be swollen and red  · You have chest pain or trouble breathing that is worse when you lie down  · You suddenly feel lightheaded and have trouble breathing  · You have new and sudden chest pain  You may have more pain when you take deep breaths or cough  You may also cough up blood  © 2017 [address]  Information is for End User's use only and may not be sold, redistributed or otherwise used for commercial purposes  All illustrations and images included in CareNotes® are the copyrighted property of [employer] M [employer] Inc  or [employer] Juarez  The above information is an  only  It is not intended as medical advice for individual conditions or treatments  Talk to your doctor, nurse or pharmacist before following any medical regimen to see if it is safe and effective for you

## 2018-12-31 NOTE — PROGRESS NOTES
Assessment/Plan:    Pt is an 82 yo F w/ GERD, pancreatic cyst, DM, hypothyroid, NICK, hx CVA, HTN, HLD, afib (Xarelto), OA, RA, back pain, peripheral neuropathy, PAD, BLE edema, lymphedema, s/p R nephrectomy for malignancy, CKD  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 ([facility])  -     Basic metabolic panel; Future  -     CBC and Platelet; Future  -     Protime-INR;  Future  -reviewed LEADs which show R: 0 41/0/0 and L: 0 91/91/113 (prior R: 0 34/-/- and L: 1 11/86/76 R femoral monophasic) diffuse fem/pop disease B w/o focal stenossi  -reviewed AOIL which shows 50-75% stenosis of R DAVID and >75% R EIA stenosis; also celiac and SMA stenoses  -patient has multiple etiologies of her leg symptoms including CHF/edema, neuropathy/DM, and PAD; she is having what sounds like R leg rest pain intermittently at night which is waking her from sleep  -discussed pathophysiology of PAD and options for treatment; at this point, I have recommended RLE angiogram with intervention to improve perfusion; did discuss risks including contrast nephropathy given CKD and hx nephrectomy, increased RLE edema, fluid overload/CHF exacerbation  -plan for RLE angiogram, L femoral, possible R femoral access  -labs; will give pre/post hydration    Diabetes mellitus type 2 in obese ([facility])  Polyneuropathy associated with underlying disease ([facility] [address] )  -neuropathy and OA/RA are complicating factors in determining her PAD symptoms/pain but contribute to her discomfort    Chronic diastolic heart failure ([facility] [address] )  Chronic atrial fibrillation ([facility] [address] )  -recently admitted for CHF exacerbation  -patient feels that she continues to have fluid overload and increased edema despite no SOB  -suggested patient call her cardiologist to discuss symptoms    CKD (chronic kidney disease) stage 3, GFR 30-59 ml/min ([facility])  -hx of nephrectomy for malignancy  -CKD, baseline Cr: 1 5    Bilateral edema of lower extremity  -likely multifactorial in origin with venous disease, lymphedema, CHF  -continue to use compression (using daily) and lymphedema pumps (hasn't used since getting out of rehab after surgery in 4/18) and elevating legs; discussed increased activity, healthy eating and weight loss    Medications  -taking Xarelto for afib  -reports ASA allergy of stomach upset  -cont statin for life      Other orders  -     Notify Physician in PT/INR greater than 1 8 and/or Creatine Clearance (gFR)  is less than 60  ml/oren/1 73sq m; Standing  -     Height and Weight Upon Arrival; Standing  -     Nursing communcation Apply snapless gown prior to procedure; Standing  -     Have Patient Void On Call to Procedure Room; Standing  -     Insert and Maintain IV; Standing  -     IR abdominal angiography; Standing  -     sodium chloride 0 9 % infusion; Infuse 100 mL/hr into a venous catheter continuous   -     IR abdominal angiography  -     Notify Physician in PT/INR greater than 1 8 and/or Creatine Clearance (gFR)  is less than 60  ml/oren/1 73sq m; Standing  -     Height and Weight Upon Arrival; Standing  -     Nursing communcation Apply snapless gown prior to procedure; Standing  -     Have Patient Void On Call to Procedure Room; Standing  -     Insert and Maintain IV; Standing      Operative Scheduling Information:    Hospital:  IR    Physician:  Me    Surgery: RLE angiogram, L femoral, possible R femoral access    Urgency:  Standard    Case Length:  Normal    Post-op Bed:  Outpatient    OR Table:  IR    Equipment Needs:  None    Medication Instructions:  Xarelto:  Hold for 2 days prior to procedure    Hydration:  100 cc/hr for 2 hour prior and 4 hours after procedure      Subjective:      Patient ID: Bebo Cobos is a 81 y o  female  Patient presents today for evaluation of right leg pain  She states that she has also been experiencing burning and cramping  Denies any burning and cramping while walking  She has a some slight redness to the left shin   Denies any open wounds, sores and coldness  She also complains of pretty significant leg swelling, with the left leg being worse  HPI:    Pt presents to discuss leg symptoms  In April, patient was hospitalized for robotic nephrectomy for renal cancer  No further treatment planned  Hospitalized again recently for BLE edema; diuretics adjusted    Today, patient notes BLE edema which has been present for about 5 yrs  She wears compression daily  She elevates her legs when she can on her recliner  She has lymphedema pumps but hasn't used these since dispo from the hospital in April  She also complains of neuropathy of the B fingers and toes, as well as the B thigh  She has burning of soles of feet at night  She also has joint pain from arthritis  She walks with a walker and get generalized tiredness after short distances  She mostly walks within her home  She does not do grocery shopping  She also notes R leg pain, different from her left which occurs at night and sometimes wakes her from sleep  The following portions of the patient's history were reviewed and updated as appropriate: allergies, current medications, past family history, past medical history, past social history, past surgical history and problem list     Review of Systems   Constitutional: Negative  HENT: Negative  Eyes: Negative  Respiratory: Negative  Cardiovascular: Positive for leg swelling  Gastrointestinal: Negative  Endocrine: Negative  Genitourinary: Negative  Musculoskeletal: Positive for gait problem (uses walker)  R foot pain   Skin: Negative  Negative for wound  Allergic/Immunologic: Negative  Neurological: Positive for numbness (neuropathy B thighs)  Negative for weakness  Hematological: Negative  Psychiatric/Behavioral: Negative            Objective:      /80 (BP Location: Right arm, Patient Position: Sitting)   Pulse 72   Temp (!) 97 2 °F (36 2 °C) (Tympanic)   Ht 5' 1" (1 549 m)   Wt 97 5 kg (215 lb)   LMP  (LMP Unknown)   BMI 40 62 kg/m²          Physical Exam   Constitutional: She is oriented to person, place, and time  She appears well-developed and well-nourished  HENT:   Head: Normocephalic and atraumatic  Eyes: Conjunctivae are normal    Neck: Normal range of motion  Neck supple  Cardiovascular: Normal rate, regular rhythm and normal heart sounds  No murmur heard  Pulses:       Radial pulses are 2+ on the right side, and 2+ on the left side  Femoral pulses are 0 on the right side, and 2+ on the left side  Popliteal pulses are 0 on the right side, and 0 on the left side  Dorsalis pedis pulses are 0 on the right side, and 2+ on the left side  Posterior tibial pulses are 0 on the right side, and 0 on the left side  No carotid bruits B   Pulmonary/Chest: Effort normal and breath sounds normal    Abdominal: Soft  She exhibits no distension  There is no tenderness  There is no rebound  Musculoskeletal: Normal range of motion  She exhibits edema (3+ pitting edema BLE, mostly foot/ankle/lower leg sparing thighs)  Neurological: She is alert and oriented to person, place, and time  Skin: Skin is warm and dry  No foot wounds  sparse spiders; no large varicosities   Psychiatric: She has a normal mood and affect  Her behavior is normal    Nursing note and vitals reviewed          Vitals:    12/31/18 1443   BP: 124/80   BP Location: Right arm   Patient Position: Sitting   Pulse: 72   Temp: (!) 97 2 °F (36 2 °C)   TempSrc: Tympanic   Weight: 97 5 kg (215 lb)   Height: 5' 1" (1 549 m)       Patient Active Problem List   Diagnosis    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 ([facility] [address] )    Essential hypertension    Rheumatoid arthritis ([address] )    Diabetes mellitus type 2 in obese ([facility] [address] )    Sleep apnea    Acquired hypothyroidism    Chronic GERD    Hypercholesterolemia    Obesity    Peripheral neuropathy    Prediabetes    Primary osteoarthritis of left knee    Restless leg syndrome    Right lumbar radiculopathy    Sensorineural hearing loss    Sinus arrhythmia    Chronic bilateral thoracic back pain    Thoracic degenerative disc disease    Urothelial cancer (HCC)    Lung nodule < 6cm on CT    Pancreatic cyst    Iron deficiency anemia    Anemia    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 (HCC)    History of nephroureterectomy    Incisional hernia    Atrial fibrillation (HCC)    CKD (chronic kidney disease) stage 3, GFR 30-59 ml/min (HCC)    Chronic diastolic heart failure (HCC)    Fracture    Spontaneous dislocation of shoulder, left       Past Surgical History:   Procedure Laterality Date    APPENDECTOMY      ARTHROSCOPY WRIST Right     with release of transverse carpal ligament     BLADDER SURGERY      BLADDER SURGERY      BREAST SURGERY      left breast    BUNIONECTOMY Bilateral     CATARACT EXTRACTION Bilateral     CHOLECYSTECTOMY      COLONOSCOPY      CYSTOSCOPY      EGD AND COLONOSCOPY N/A 12/20/2017    Procedure: EGD AND COLONOSCOPY;  Surgeon: Edgar Harding MD;  Location: [facility]; Service: Gastroenterology    ESOPHAGOGASTRODUODENOSCOPY      diagnostic    FOREARM SURGERY Right     fracture repair    HYSTERECTOMY      JOINT REPLACEMENT      KNEE ARTHROSCOPY Left     KNEE SURGERY Left     meniscus tear    NOSE SURGERY      DE CYSTO/URETERO W/LITHOTRIPSY &INDWELL STENT INSRT Right 2/28/2018    Procedure: CYSTOSCOPY RIGHT URETEROSCOPY, RIGHT RETROGRADE PYELOGRAM AND INSERTION  RIGHT STENT URETERAL, RIGHT RENAL PELVIC WASHING FOR CYTOLOGY;  Surgeon: Kirstin Jackson MD;  [facility];  Service: Urology    DE NEPHRECTOMY, W/PART   URETECTOMY Right 4/23/2018    Procedure: ROBATIC ASSISTED NEPHRO-URETERECTOMY WITH BLADDER CUFF EXCISION;  Surgeon: Naomy Villa MD;  [facility];  Service: Urology    REPLACEMENT TOTAL KNEE BILATERAL Bilateral     URETEROSCOPY Right 3/20/2018    Procedure: CYSTOSCOPY, RETROGRADE PYELOGRAM, URETEROSCOPY, BIOPSY, BRUSH, FULGURATION, STENT PLACEMENT, BLADDER BIOPSY WITH FULGURATION;  Surgeon: Kayce Mejía MD;  [facility];  Service: Urology       Family History   Problem Relation Age of Onset    Other Mother         epilepsy    Early death Mother    [facility] Glaucoma Father     Stroke Father         silent    Other Brother         cardiac disorder       Social History     Social History    Marital status: /Civil Union     Spouse name: N/A    Number of children: N/A    Years of education: N/A     Occupational History    Not on file  Social History Main Topics    Smoking status: Never Smoker    Smokeless tobacco: Never Used      Comment: stopped smoking in the distant past, never smoker (as per [employer])     Alcohol use No      Comment: seldom    Drug use: No    Sexual activity: Not on file     Other Topics Concern    Not on file     Social History Narrative    No caffeine use       Allergies   Allergen Reactions    Acetazolamide Other (See Comments)     Other reaction(s): Unknown Allergic Reaction    Aspirin      Other reaction(s): Other (See Comments)  High Dose ASA-stomach ache, rachel  ASA 82 m    Atorvastatin      Other reaction(s): Muscle Pain, Myalgia    Azithromycin     Nitrofurantoin Hives     HIVES * pt denies  Other reaction(s): Unknown Allergic Reaction  Other reaction(s): Other (See Comments)  HIVES * pt denies    Other Other (See Comments)     Adhesive tape : red and itching  Other reaction(s):  Other (See Comments)  red and itching    Shellfish-Derived Products Other (See Comments)     Patient got Gout following eating shell fish    Sulfa Antibiotics Other (See Comments)     unknown    Tramadol Diarrhea and Vomiting         Current Outpatient Prescriptions:     acetaminophen (TYLENOL) 325 mg tablet, Take 650 mg by mouth every 6 (six) hours as needed for mild pain, Disp: , Rfl:    Calcium Citrate-Vitamin D (CALCIUM + D PO), Take 1 tablet by mouth 2 (two) times a day, Disp: , Rfl:     cyanocobalamin (VITAMIN B-12) 100 mcg tablet, Take by mouth, Disp: , Rfl:     docusate sodium (COLACE) 100 mg capsule, Take 1 capsule (100 mg total) by mouth 2 (two) times a day for 60 doses, Disp: 90 capsule, Rfl: 0    gabapentin (NEURONTIN) 100 mg capsule, TAKE 1 CAPSULE IN THE MORNING, TAKE 1 CAPSULE IN THE AFTERNOON AND TAKE 1 TO 3 CAPSULES AT BEDTIME, Disp: 450 capsule, Rfl: 1    hydroxychloroquine (PLAQUENIL) 200 mg tablet, Take 200 mg by mouth 2 (two) times a day with meals, Disp: , Rfl:     LEUCOVORIN CALCIUM PO, Take 10 mg by mouth once a week, Disp: , Rfl:     levothyroxine 75 mcg tablet, TAKE 1 TABLET EVERY DAY, Disp: 90 tablet, Rfl: 3    losartan (COZAAR) 25 mg tablet, TAKE 1 TABLET EVERY DAY, Disp: 90 tablet, Rfl: 0    methotrexate 2 5 mg tablet, Uses 4 a week via Rheumatology [facility], Disp: 12 tablet, Rfl: 0    Multiple Vitamin (MULTIVITAMINS PO), Take 1 tablet by mouth daily, Disp: , Rfl:     omeprazole (PriLOSEC) 20 mg delayed release capsule, TAKE 1 CAPSULE EVERY DAY AS NEEDED FOR  STOMACH  ACID, Disp: 90 capsule, Rfl: 1    pravastatin (PRAVACHOL) 80 mg tablet, TAKE 1 TABLET EVERY DAY, Disp: 90 tablet, Rfl: 3    rivaroxaban (XARELTO) 15 mg tablet, Take 1 tablet (15 mg total) by mouth daily with breakfast, Disp: 90 tablet, Rfl: 1    rOPINIRole (REQUIP) 0 5 mg tablet, TAKE 2 TABLETS AT BEDTIME AND 1 TABLET IN THE MORNING, Disp: 270 tablet, Rfl: 3    torsemide (DEMADEX) 20 mg tablet, TAKE 2 TABLETS EVERY DAY, Disp: 180 tablet, Rfl: 0  No current facility-administered medications for this visit       Facility-Administered Medications Ordered in Other Visits:     acetaminophen (TYLENOL) tablet 650 mg, 650 mg, Oral, Q6H PRN, Felipa De La Rosa PA-C

## 2018-12-31 NOTE — PROGRESS NOTES
Daily Note     Today's date: 2018  Patient name: Samson Brown  : 1935  MRN: [id number]  Referring provider: Carl Ledesma MD  Dx:   Encounter Diagnosis     ICD-10-CM    1  Left shoulder pain, unspecified chronicity M25 512                   Subjective: Pt reports that she has been active and has been stiff  Objective: See treatment diary below  Precautions: recent dislocation, blood thinner, fall risk, hx of stroke, cardiovascular disease, DM2      Daily Treatment Diary      Manual  12/6 12/10  12/13  12/18  12/20  12/27  12/31       Shoulder PROM Lk SASKIA Rommel [facility] [address] [facility]       rhythmic stabilization Dylan Luna [facility]                                                                            Exercise Diary  12/6 12/10  12/13  12/18  12/20  12/27  12/31     Pulley 6' 6'  6'  6'  6'  5'  6'     Shoulder isometrcis 2x10  10" [id number]  2x10x5"    2x10x5"  2x10x5"  2x10x5"     AAROM with cane 2x10 2x10  np-time  2x10 each  2x10 each  2x10 each  2x10 each     serratus punches with cane 2x10 2x10  np time  10  10  10       t-band rows OTB  x10 OTB 2x10  OTB 2x10  OTB 2x10  OTB 2x10  OTB 2x10  OTB 2x15     table slides 2x10   ea 2x10  2x10 each  2x10 each  2x10 each  2x10 each        serratus ABC's        1x            press with cane       10      2x10                                                                                                                                                                                            Modalities                        cold pack 5'                                                                               Assessment: Tolerated treatment well  Patient demonstrated fatigue post treatment and would benefit from continued PT  Pt shows good activation of mid-delt but continues to be unable to lift her arm against gravity  Plan: Continue per plan of care

## 2019-01-02 ENCOUNTER — OFFICE VISIT (OUTPATIENT)
Dept: FAMILY MEDICINE CLINIC | Facility: CLINIC | Age: 84
End: 2019-01-02
Payer: MEDICARE

## 2019-01-02 VITALS
RESPIRATION RATE: 13 BRPM | SYSTOLIC BLOOD PRESSURE: 130 MMHG | HEIGHT: 61 IN | WEIGHT: 216 LBS | OXYGEN SATURATION: 98 % | BODY MASS INDEX: 40.78 KG/M2 | HEART RATE: 76 BPM | DIASTOLIC BLOOD PRESSURE: 80 MMHG

## 2019-01-02 DIAGNOSIS — E66.9 DIABETES MELLITUS TYPE 2 IN OBESE (HCC): Primary | ICD-10-CM

## 2019-01-02 DIAGNOSIS — I10 ESSENTIAL HYPERTENSION: ICD-10-CM

## 2019-01-02 DIAGNOSIS — Z90.5 HISTORY OF NEPHROURETERECTOMY: ICD-10-CM

## 2019-01-02 DIAGNOSIS — E78.00 HYPERCHOLESTEROLEMIA: ICD-10-CM

## 2019-01-02 DIAGNOSIS — I50.32 CHRONIC DIASTOLIC HEART FAILURE (HCC): ICD-10-CM

## 2019-01-02 DIAGNOSIS — D50.9 IRON DEFICIENCY ANEMIA, UNSPECIFIED IRON DEFICIENCY ANEMIA TYPE: ICD-10-CM

## 2019-01-02 DIAGNOSIS — I48.20 CHRONIC ATRIAL FIBRILLATION (HCC): ICD-10-CM

## 2019-01-02 DIAGNOSIS — E03.9 ACQUIRED HYPOTHYROIDISM: ICD-10-CM

## 2019-01-02 DIAGNOSIS — K43.9 ABDOMINAL WALL HERNIA: ICD-10-CM

## 2019-01-02 DIAGNOSIS — M05.9 RHEUMATOID ARTHRITIS WITH POSITIVE RHEUMATOID FACTOR, INVOLVING UNSPECIFIED SITE (HCC): ICD-10-CM

## 2019-01-02 DIAGNOSIS — E11.69 DIABETES MELLITUS TYPE 2 IN OBESE (HCC): Primary | ICD-10-CM

## 2019-01-02 DIAGNOSIS — N18.30 CKD (CHRONIC KIDNEY DISEASE) STAGE 3, GFR 30-59 ML/MIN (HCC): ICD-10-CM

## 2019-01-02 DIAGNOSIS — I70.229 ATHEROSCLEROSIS OF ARTERY OF EXTREMITY WITH REST PAIN (HCC): ICD-10-CM

## 2019-01-02 DIAGNOSIS — Z90.6 HISTORY OF NEPHROURETERECTOMY: ICD-10-CM

## 2019-01-02 DIAGNOSIS — G47.30 SLEEP APNEA, UNSPECIFIED TYPE: ICD-10-CM

## 2019-01-02 LAB — SL AMB POCT HEMOGLOBIN AIC: 6.5 (ref ?–6.5)

## 2019-01-02 PROCEDURE — 99215 OFFICE O/P EST HI 40 MIN: CPT | Performed by: FAMILY MEDICINE

## 2019-01-02 PROCEDURE — 83036 HEMOGLOBIN GLYCOSYLATED A1C: CPT | Performed by: FAMILY MEDICINE

## 2019-01-02 NOTE — ASSESSMENT & PLAN NOTE
She has now been persistently anemic  She has multiple reasons for this including chronic kidney disease as well as chronic rheumatologic disease  I am going to have her do a FIT test and repeat CBC as per vascular his orders  I am also going to have her see Hematology

## 2019-01-02 NOTE — PROGRESS NOTES
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 [address] )     Continue with Rheumatology follow-up  Genitourinary    CKD (chronic kidney disease) stage 3, GFR 30-59 ml/min ([facility])     Check BMP as already ordered by vascular surgery as well as a CBC  I am going to have her see Hematology in light of her persistent anemia  Relevant Orders    Ferritin    Iron    Iron Saturation %       Other    Hypercholesterolemia     Continue with pravastatin  Iron deficiency anemia     She has now been persistently anemic  She has multiple reasons for this including chronic kidney disease as well as chronic rheumatologic disease  I am going to have her do a FIT test and repeat CBC as per vascular his orders  I am also going to have her see Hematology  Relevant Orders    Ferritin    Iron    Iron Saturation %    Vitamin B12    Folate    Occult Blood, Fecal Immunochemical    Ambulatory referral to Hematology / Oncology    History of nephroureterectomy     Continue with routine Urology follow-up  Abdominal wall hernia     Continue to monitor this clinically  Subjective:      Patient ID: Prabhakar Watson is a 81 y o  female      HPI   Patient presents today for follow-up for chronic health issues  She is accompanied by her   She has now been followed by vascular surgery and will be having an angiogram coming up for rest pain concerning for persistent claudication  She is very sedentary at this point in uses a wheelchair due to a significant gait dysfunction and pain with ambulation  She has recent history of congestive heart failure  She does note some persistent lower extremity swelling  She has been compliant with her medications  She does have some shortness of breath on exertion  She has some erythema of her anterior lower legs but has had no fever or chills  She has history of hypothyroidism denies excessive fatigue or constipation  She has history of atrial fibrillation diagnosed on hospitalization for congestive heart failure in August   and remains on anticoagulation  She follows routinely with Rheumatology for her rheumatoid arthritis  She has persistent chronic pain but this remains pretty stable at this time  She is status post right nephroureterectomy in April  She denies any current issues with hematuria  She has some persistent bulging at the surgical site  She has some persistent anemia which became persistent in November 2017  She required 1 unit of PRBCs after her nephrectomy for hemoglobin of 6 8  Since that time, hemoglobin has ranged in the mid 8 do mid 9 range  She does admit to some chronic persistent fatigue  She denies any melena or rectal bleeding  The following portions of the patient's history were reviewed and updated as appropriate: allergies, current medications, past family history, past medical history, past social history, past surgical history and problem list     Review of Systems   Constitutional: Positive for fatigue  Negative for activity change, appetite change, chills, diaphoresis, fever and unexpected weight change  HENT: Negative for trouble swallowing  Eyes: Negative for visual disturbance  Respiratory: Negative for cough, chest tightness, shortness of breath and wheezing  Cardiovascular: Positive for leg swelling (stable)  Negative for chest pain  Gastrointestinal: Negative for abdominal distention, abdominal pain, blood in stool, constipation, diarrhea, nausea and vomiting  Endocrine: Negative for cold intolerance, heat intolerance and polyuria  Genitourinary: Negative for decreased urine volume, difficulty urinating, dysuria, flank pain and urgency  Musculoskeletal: Positive for arthralgias and gait problem  Negative for myalgias  Skin: Negative for color change and rash  Neurological: Negative for dizziness and light-headedness  Hematological: Negative for adenopathy  Does not bruise/bleed easily  Psychiatric/Behavioral: Negative for dysphoric mood and sleep disturbance  The patient is not nervous/anxious  Objective:      /80   Pulse 76   Resp 13   Ht 5' 1" (1 549 m)   Wt 98 kg (216 lb)   LMP  (LMP Unknown)   SpO2 98%   BMI 40 81 kg/m²          Physical Exam   Constitutional: She is oriented to person, place, and time  She appears well-developed and well-nourished  No distress  HENT:   Head: Normocephalic  Eyes: Pupils are equal, round, and reactive to light  Neck: No tracheal deviation present  No thyromegaly present  Cardiovascular: Normal rate, regular rhythm and normal heart sounds  No murmur heard  Pulmonary/Chest: Effort normal  No respiratory distress  She has no wheezes  She has no rales  Abdominal: Soft  She exhibits no distension  There is no tenderness  Musculoskeletal: Normal range of motion  She exhibits edema (1+ edema bilaterally lower extremity) and tenderness (She has some mild tenderness to palpation of the lower legs chronically  )  Neurological: She is alert and oriented to person, place, and time  No cranial nerve deficit  Skin: Skin is warm  She is not diaphoretic   There is erythema (She has some erythema of her anterior legs  There is only minimal warmth )  Psychiatric: She has a normal mood and affect   Judgment and thought content normal          Domenica Zarate MD

## 2019-01-02 NOTE — ASSESSMENT & PLAN NOTE
Check BMP as already ordered by vascular surgery as well as a CBC  I am going to have her see Hematology in light of her persistent anemia

## 2019-01-03 ENCOUNTER — OFFICE VISIT (OUTPATIENT)
Dept: PHYSICAL THERAPY | Facility: REHABILITATION | Age: 84
End: 2019-01-03
Payer: MEDICARE

## 2019-01-03 DIAGNOSIS — M25.512 LEFT SHOULDER PAIN, UNSPECIFIED CHRONICITY: Primary | ICD-10-CM

## 2019-01-03 DIAGNOSIS — I70.229 ATHEROSCLEROSIS OF ARTERY OF EXTREMITY WITH REST PAIN (HCC): Primary | ICD-10-CM

## 2019-01-03 PROCEDURE — 97112 NEUROMUSCULAR REEDUCATION: CPT | Performed by: PHYSICAL THERAPIST

## 2019-01-03 PROCEDURE — 97140 MANUAL THERAPY 1/> REGIONS: CPT | Performed by: PHYSICAL THERAPIST

## 2019-01-03 PROCEDURE — 97110 THERAPEUTIC EXERCISES: CPT | Performed by: PHYSICAL THERAPIST

## 2019-01-03 NOTE — PROGRESS NOTES
Daily Note     Today's date: 1/3/2019  Patient name: Ирина Fulton  : 1935  MRN: [id number]  Referring provider: Jacinta Basurto MD  Dx:   Encounter Diagnosis     ICD-10-CM    1  Left shoulder pain, unspecified chronicity M25 512                   Subjective: Pt reports that she continues to have some pain in her arm  Also reports that she has discomfort from several of her co-morbidities  Objective: See treatment diary below  Precautions: recent dislocation, blood thinner, fall risk, hx of stroke, cardiovascular disease, DM2      Daily Treatment Diary      Manual  12/6 12/10  12/13  12/18  12/20  12/27  12/31  1/3     Shoulder PROM [address] [address]     rhythmic stabilization Harris Skipper [address]                                                                          Exercise Diary  12/6 12/10  12/13  12/18  12/20  12/27  12/31  1/3   Pulley 6' 6'  6'  6'  6'  5'  6'  6'   Shoulder isometrcis 2x10  10" [id number]  2x10x5"    2x10x5"  2x10x5"  2x10x5"     AAROM with cane 2x10 2x10  np-time  2x10 each  2x10 each  2x10 each  2x10 each  2x10   serratus punches with cane 2x10 2x10  np time  10  10  10       t-band rows OTB  x10 OTB 2x10  OTB 2x10  OTB 2x10  OTB 2x10  OTB 2x10  OTB 2x15  OTB   table slides 2x10   ea 2x10  2x10 each  2x10 each  2x10 each  2x10 each        serratus ABC's        1x        1x    press with cane       10      2x10  2x10                                                                                                                                                                                          Modalities                        cold pack 5'                                                                               Assessment: Tolerated treatment well  Patient demonstrated fatigue post treatment and would benefit from continued PT  Pt shows continues to show improvement in tolerance to exercise but remains limited in movement against gravity  Plan: Continue per plan of care

## 2019-01-07 ENCOUNTER — TELEPHONE (OUTPATIENT)
Dept: VASCULAR SURGERY | Facility: CLINIC | Age: 84
End: 2019-01-07

## 2019-01-07 ENCOUNTER — APPOINTMENT (OUTPATIENT)
Dept: LAB | Facility: HOSPITAL | Age: 84
End: 2019-01-07
Payer: MEDICARE

## 2019-01-07 DIAGNOSIS — D50.9 IRON DEFICIENCY ANEMIA, UNSPECIFIED IRON DEFICIENCY ANEMIA TYPE: ICD-10-CM

## 2019-01-07 LAB — HEMOCCULT STL QL IA: NEGATIVE

## 2019-01-07 PROCEDURE — G0328 FECAL BLOOD SCRN IMMUNOASSAY: HCPCS

## 2019-01-07 NOTE — TELEPHONE ENCOUNTER
Spoke to patient to schedule surgery for 1-24-19 at [facility] with LMD patient was told about blood work to have done and to hold medication Xarelto 2 days prior surgery last dose 1-21-19 patient confirmed and understood instructions   LLF

## 2019-01-08 ENCOUNTER — OFFICE VISIT (OUTPATIENT)
Dept: PHYSICAL THERAPY | Facility: REHABILITATION | Age: 84
End: 2019-01-08
Payer: MEDICARE

## 2019-01-08 ENCOUNTER — PROCEDURE VISIT (OUTPATIENT)
Dept: UROLOGY | Facility: MEDICAL CENTER | Age: 84
End: 2019-01-08
Payer: MEDICARE

## 2019-01-08 DIAGNOSIS — N39.41 URGE INCONTINENCE: ICD-10-CM

## 2019-01-08 DIAGNOSIS — R39.15 URGENCY OF URINATION: ICD-10-CM

## 2019-01-08 DIAGNOSIS — M25.512 LEFT SHOULDER PAIN, UNSPECIFIED CHRONICITY: Primary | ICD-10-CM

## 2019-01-08 DIAGNOSIS — R35.0 URINARY FREQUENCY: ICD-10-CM

## 2019-01-08 PROCEDURE — 97140 MANUAL THERAPY 1/> REGIONS: CPT | Performed by: PHYSICAL THERAPIST

## 2019-01-08 PROCEDURE — 97110 THERAPEUTIC EXERCISES: CPT | Performed by: PHYSICAL THERAPIST

## 2019-01-08 PROCEDURE — 64566 NEUROELTRD STIM POST TIBIAL: CPT | Performed by: UROLOGY

## 2019-01-08 PROCEDURE — 97112 NEUROMUSCULAR REEDUCATION: CPT | Performed by: PHYSICAL THERAPIST

## 2019-01-08 NOTE — PROGRESS NOTES
Daily Note     Today's date: 2019  Patient name: Julian Soto  : 1935  MRN: [id number]  Referring provider: Paola Stevens MD  Dx:   Encounter Diagnosis     ICD-10-CM    1  Left shoulder pain, unspecified chronicity M25 512                   Subjective: Pt reports that her shoulder is sore today secondary to the weather  Objective: See treatment diary below  Precautions: recent dislocation, blood thinner, fall risk, hx of stroke, cardiovascular disease, DM2      Daily Treatment Diary      Manual  12/6 12/10  12/13  12/18  12/20  12/27  12/31  1/3  1/8   Shoulder PROM [address] [place] [address]   rhythmic stabilization [address]                                                                          Exercise Diary  12/6 12/10  12/13  12/18  12/20  12/27  12/31  1/3 1/8   Pulley 6' 6'  6'  6'  6'  5'  6'  6' 6'   Shoulder isometrcis 2x10  10" [id number]  2x10x5"    2x10x5"  2x10x5"  2x10x5"   2x10x5"   AAROM with cane 2x10 2x10  np-time  2x10 each  2x10 each  2x10 each  2x10 each  2x10 10   serratus punches with cane 2x10 2x10  np time  10  10  10        t-band rows OTB  x10 OTB 2x10  OTB 2x10  OTB 2x10  OTB 2x10  OTB 2x10  OTB 2x15  OTB OTB 2x10   table slides 2x10   ea 2x10  2x10 each  2x10 each  2x10 each  2x10 each     2x10 each    serratus ABC's        1x        1x     press with cane       10      2x10  2x10 10                                                                                                                                                                                                   Modalities                        cold pack 5'                                                                               Assessment: Tolerated treatment fair  Patient demonstrated fatigue post treatment and would benefit from continued PT  Pt had minimal tolerance for supine overhead activity this visit  ROM limited during PROM   It is likely that her RA is a limiting factor in her rehab  Plan: Continue per plan of care

## 2019-01-08 NOTE — PROGRESS NOTES
Procedure: Percutaneous Tibial Nerve Stimulation (PTNS)       Date onset of symptoms A LONG TIME AGO  Behavior modification: NO  Biofeedback: NO  E-Stim: NO  Drugs: NUMEROUS ANTICHOLINERGICS DIDN'T WORK  Other: NONE  Session number: 11 MONTH F/U     Patient Goals and Progress   Decreased urgency: YES  Decreased frequency: YES  No accidents: YES    Sleeps through the night: YES  No related health and social factors: NO     Caffeine - cups per day: 0  Alcohol - number of drinks per day: 0   Daytime voids - number per day: 7-12    Nighttime voids - number per night: 1  Urgency: 1-2      Incontinence - episodes per day: 1-2      Treatment Plan   Increase fluids: NO  Decrease fluids: YES    Decrease caffeine: YES  Urge reduction techniques: YES  Kegels: NO   Toilet every 3-4 hours     No fluids before bed YES     Ankle Used: LEFT     Treatment settin  Duration of treatment: 30 MINUTES  Lead lot #: [id number]  Expiration Date: 2021       Feeling/Response:  Toe flex and foot sensation

## 2019-01-09 ENCOUNTER — APPOINTMENT (OUTPATIENT)
Dept: LAB | Facility: CLINIC | Age: 84
End: 2019-01-09
Payer: MEDICARE

## 2019-01-09 DIAGNOSIS — I70.229 ATHEROSCLEROSIS OF ARTERY OF EXTREMITY WITH REST PAIN (HCC): ICD-10-CM

## 2019-01-09 DIAGNOSIS — C68.9 UROTHELIAL CANCER (HCC): ICD-10-CM

## 2019-01-09 DIAGNOSIS — E11.69 DIABETES MELLITUS TYPE 2 IN OBESE (HCC): ICD-10-CM

## 2019-01-09 DIAGNOSIS — E66.9 DIABETES MELLITUS TYPE 2 IN OBESE (HCC): ICD-10-CM

## 2019-01-09 LAB
ANION GAP SERPL CALCULATED.3IONS-SCNC: 8 MMOL/L (ref 4–13)
BUN SERPL-MCNC: 34 MG/DL (ref 5–25)
CALCIUM SERPL-MCNC: 9.1 MG/DL (ref 8.3–10.1)
CHLORIDE SERPL-SCNC: 105 MMOL/L (ref 100–108)
CO2 SERPL-SCNC: 27 MMOL/L (ref 21–32)
CREAT SERPL-MCNC: 1.67 MG/DL (ref 0.6–1.3)
CREAT UR-MCNC: 103 MG/DL
ERYTHROCYTE [DISTWIDTH] IN BLOOD BY AUTOMATED COUNT: 14 % (ref 11.6–15.1)
FERRITIN SERPL-MCNC: 58 NG/ML (ref 8–388)
FOLATE SERPL-MCNC: >20 NG/ML (ref 3.1–17.5)
GFR SERPL CREATININE-BSD FRML MDRD: 28 ML/MIN/1.73SQ M
GLUCOSE P FAST SERPL-MCNC: 103 MG/DL (ref 65–99)
HCT VFR BLD AUTO: 30.6 % (ref 34.8–46.1)
HGB BLD-MCNC: 9.5 G/DL (ref 11.5–15.4)
INR PPP: 1.53 (ref 0.86–1.17)
IRON SATN MFR SERPL: 15 %
IRON SERPL-MCNC: 46 UG/DL (ref 50–170)
MCH RBC QN AUTO: 32.3 PG (ref 26.8–34.3)
MCHC RBC AUTO-ENTMCNC: 31 G/DL (ref 31.4–37.4)
MCV RBC AUTO: 104 FL (ref 82–98)
MICROALBUMIN UR-MCNC: 6.4 MG/L (ref 0–20)
MICROALBUMIN/CREAT 24H UR: 6 MG/G CREATININE (ref 0–30)
PLATELET # BLD AUTO: 227 THOUSANDS/UL (ref 149–390)
PMV BLD AUTO: 9.8 FL (ref 8.9–12.7)
POTASSIUM SERPL-SCNC: 4.2 MMOL/L (ref 3.5–5.3)
PROTHROMBIN TIME: 18.5 SECONDS (ref 11.8–14.2)
RBC # BLD AUTO: 2.94 MILLION/UL (ref 3.81–5.12)
SODIUM SERPL-SCNC: 140 MMOL/L (ref 136–145)
TIBC SERPL-MCNC: 310 UG/DL (ref 250–450)
VIT B12 SERPL-MCNC: 957 PG/ML (ref 100–900)
WBC # BLD AUTO: 6.9 THOUSAND/UL (ref 4.31–10.16)

## 2019-01-09 PROCEDURE — 36415 COLL VENOUS BLD VENIPUNCTURE: CPT

## 2019-01-09 PROCEDURE — 82728 ASSAY OF FERRITIN: CPT | Performed by: FAMILY MEDICINE

## 2019-01-09 PROCEDURE — 82746 ASSAY OF FOLIC ACID SERUM: CPT | Performed by: FAMILY MEDICINE

## 2019-01-09 PROCEDURE — 85610 PROTHROMBIN TIME: CPT

## 2019-01-09 PROCEDURE — 85027 COMPLETE CBC AUTOMATED: CPT

## 2019-01-09 PROCEDURE — 83540 ASSAY OF IRON: CPT | Performed by: FAMILY MEDICINE

## 2019-01-09 PROCEDURE — 82043 UR ALBUMIN QUANTITATIVE: CPT

## 2019-01-09 PROCEDURE — 80048 BASIC METABOLIC PNL TOTAL CA: CPT

## 2019-01-09 PROCEDURE — 82607 VITAMIN B-12: CPT | Performed by: FAMILY MEDICINE

## 2019-01-09 PROCEDURE — 82570 ASSAY OF URINE CREATININE: CPT

## 2019-01-09 PROCEDURE — 83550 IRON BINDING TEST: CPT | Performed by: FAMILY MEDICINE

## 2019-01-10 ENCOUNTER — OFFICE VISIT (OUTPATIENT)
Dept: PHYSICAL THERAPY | Facility: REHABILITATION | Age: 84
End: 2019-01-10
Payer: MEDICARE

## 2019-01-10 DIAGNOSIS — M25.512 LEFT SHOULDER PAIN, UNSPECIFIED CHRONICITY: Primary | ICD-10-CM

## 2019-01-10 PROCEDURE — 97140 MANUAL THERAPY 1/> REGIONS: CPT | Performed by: PHYSICAL THERAPIST

## 2019-01-10 PROCEDURE — 97110 THERAPEUTIC EXERCISES: CPT | Performed by: PHYSICAL THERAPIST

## 2019-01-10 PROCEDURE — 97112 NEUROMUSCULAR REEDUCATION: CPT | Performed by: PHYSICAL THERAPIST

## 2019-01-10 NOTE — PROGRESS NOTES
Daily Note     Today's date: 1/10/2019  Patient name: Shayla Gaines  : 1935  MRN: [id number]  Referring provider: Woody Belcher MD  Dx:   Encounter Diagnosis     ICD-10-CM    1  Left shoulder pain, unspecified chronicity M25 512                   Subjective: Pt reports that she feels good today  States she was able to dress and prepare breakfast independently  States she feels close to normal       Objective: See treatment diary below  Precautions: recent dislocation, blood thinner, fall risk, hx of stroke, cardiovascular disease, DM2      Daily Treatment Diary      Manual  12/6 12/10  12/13  12/18  12/20  12/27  12/31  1/3  1/8 1/10   Shoulder PROM [address] [address] [place]   rhythmic stabilization [address] [place] [address]   [place]                                                                           Exercise Diary  12/6 12/10  12/13  12/18  12/20  12/27  12/31  1/3 1/8 1/10   Pulley 6' 6'  6'  6'  6'  5'  6'  6' 6' 6'   Shoulder isometrcis 2x10  10" [id number]  2x10x5"    2x10x5"  2x10x5"  2x10x5"   2x10x5" 2x10x5"   AAROM with cane 2x10 2x10  np-time  2x10 each  2x10 each  2x10 each  2x10 each  2x10 10 2x10   serratus punches with cane 2x10 2x10  np time  10  10  10      2x10   t-band rows OTB  x10 OTB 2x10  OTB 2x10  OTB 2x10  OTB 2x10  OTB 2x10  OTB 2x15  OTB OTB 2x10 OTB 2x10   table slides 2x10   ea 2x10  2x10 each  2x10 each  2x10 each  2x10 each     2x10 each 2x10    serratus ABC's        1x        1x      press with cane       10      2x10  2x10 10 2x10                                                                                                                                                                                                            Modalities                        cold pack 5'                                                                               Assessment: Tolerated treatment well   Patient demonstrated fatigue post treatment and would benefit from continued PT  Pt was able to return to full TE this visit without complaints of pain  Pt continues to be challenged by overhead motion  Plan: Continue per plan of care

## 2019-01-11 ENCOUNTER — PROCEDURE VISIT (OUTPATIENT)
Dept: UROLOGY | Facility: CLINIC | Age: 84
End: 2019-01-11
Payer: MEDICARE

## 2019-01-11 VITALS
HEIGHT: 61 IN | HEART RATE: 78 BPM | BODY MASS INDEX: 41.16 KG/M2 | DIASTOLIC BLOOD PRESSURE: 80 MMHG | WEIGHT: 218 LBS | SYSTOLIC BLOOD PRESSURE: 132 MMHG

## 2019-01-11 DIAGNOSIS — R31.29 MICROHEMATURIA: ICD-10-CM

## 2019-01-11 DIAGNOSIS — C68.9 UROTHELIAL CANCER (HCC): Primary | ICD-10-CM

## 2019-01-11 LAB
SL AMB  POCT GLUCOSE, UA: NORMAL
SL AMB LEUKOCYTE ESTERASE,UA: NORMAL
SL AMB POCT BILIRUBIN,UA: NORMAL
SL AMB POCT BLOOD,UA: NORMAL
SL AMB POCT CLARITY,UA: CLEAR
SL AMB POCT COLOR,UA: YELLOW
SL AMB POCT KETONES,UA: NORMAL
SL AMB POCT NITRITE,UA: NORMAL
SL AMB POCT PH,UA: 6
SL AMB POCT SPECIFIC GRAVITY,UA: 1
SL AMB POCT URINE PROTEIN: NORMAL
SL AMB POCT UROBILINOGEN: 0.2

## 2019-01-11 PROCEDURE — 52000 CYSTOURETHROSCOPY: CPT | Performed by: UROLOGY

## 2019-01-11 PROCEDURE — 87086 URINE CULTURE/COLONY COUNT: CPT | Performed by: UROLOGY

## 2019-01-11 PROCEDURE — 81002 URINALYSIS NONAUTO W/O SCOPE: CPT | Performed by: UROLOGY

## 2019-01-11 RX ORDER — LEVOFLOXACIN 250 MG/1
250 TABLET ORAL EVERY 24 HOURS
Qty: 3 TABLET | Refills: 0 | Status: SHIPPED | OUTPATIENT
Start: 2019-01-11 | End: 2019-01-14

## 2019-01-11 NOTE — PROGRESS NOTES
UROLOGY PROGRESS NOTE     History of Present Illness:   Melanie Patel is a 81 y o  female known to Dr Sharon Kirkpatrick with a long history of urge incontinence who has been undergoing PTNS  She developed recurrent urinary tract infections and gross hematuria  Her primary care team ordered a renal ultrasound and followup CT scan which showed some nodularity in the right renal pelvis  Patient underwent ureteroscopic evaluation and subsequent ureteroscopic biopsy which demonstrated diffuse low-grade cancer  After lengthy discussion, the patient agreed to proceed to the operating room for a right robotic nephroureterectomy with bladder cuff which was performed in April  Patient did receive 1 unit of packed red blood cells while hospitalized given asymptomatic anemia but significant cardiac comorbidities  Given her rheumatologic issue she was transferred to a rehab facility following surgery  Developed incision hernia post op and has seen general surgery team for evaluation  Small draining wound was present  Then had subsequent fall from standing, with traumatic shoulder injury  Presents today for scheduled followup      Past Medical History:     Past Medical History:   Diagnosis Date    Anemia     Arthritis     rheumatoid    Benign essential hypertension     Cataract     Chronic cystitis     CPAP (continuous positive airway pressure) dependence     Diverticulitis of colon     Early satiety     GERD (gastroesophageal reflux disease)     Gout     Hearing aid worn     bilateral    Hearing loss     Hemorrhoids     Hiatal hernia     History of colonic polyps     Hyperlipidemia     Hypothyroidism     Left breast mass     Microhematuria     Migraine     occular    Neuropathy     lower and upper extermities    Overactive bladder     Pneumonia of left lower lobe due to infectious organism (HCC)     RA (rheumatoid arthritis) ([address] )     Sleep apnea     uses cpap    Stroke ([address] ) 5/2017    Type 2 diabetes mellitus (HCC)     Urinary frequency     Urinary urgency     [facility]rothelial cancer [facility] [address] ) 04/23/2018    Use of cane as ambulatory aid        PAST SURGICAL HISTORY:     Past Surgical History:   Procedure Laterality Date    APPENDECTOMY      ARTHROSCOPY WRIST Right     with release of transverse carpal ligament     BLADDER SURGERY      BLADDER SURGERY      BREAST SURGERY      left breast    BUNIONECTOMY Bilateral     CATARACT EXTRACTION Bilateral     CHOLECYSTECTOMY      COLONOSCOPY      CYSTOSCOPY      EGD AND COLONOSCOPY N/A 12/20/2017    Procedure: EGD AND COLONOSCOPY;  Surgeon: Tramaine Adams MD;  Location: [facility]; Service: Gastroenterology    ESOPHAGOGASTRODUODENOSCOPY      diagnostic    FOREARM SURGERY Right     fracture repair    HYSTERECTOMY      JOINT REPLACEMENT      KNEE ARTHROSCOPY Left     KNEE SURGERY Left     meniscus tear    NOSE SURGERY      WY CYSTO/URETERO W/LITHOTRIPSY &INDWELL STENT INSRT Right 2/28/2018    Procedure: CYSTOSCOPY RIGHT URETEROSCOPY, RIGHT RETROGRADE PYELOGRAM AND INSERTION  RIGHT STENT URETERAL, RIGHT RENAL PELVIC WASHING FOR CYTOLOGY;  Surgeon: Viona Peabody, MD;  Location[facility];  Service: Urology    WY NEPHRECTOMY, W/PART   URETECTOMY Right 4/23/2018    Procedure: ROBATIC ASSISTED NEPHRO-URETERECTOMY WITH BLADDER CUFF EXCISION;  Surgeon: Richelle Huber MD;  [facility];  Service: Urology    REPLACEMENT TOTAL KNEE BILATERAL Bilateral     URETEROSCOPY Right 3/20/2018    Procedure: CYSTOSCOPY, RETROGRADE PYELOGRAM, URETEROSCOPY, BIOPSY, BRUSH, FULGURATION, STENT PLACEMENT, BLADDER BIOPSY WITH FULGURATION;  Surgeon: Richelle Huber MD;  [facility];  Service: Urology       CURRENT MEDICATIONS:     Current Outpatient Prescriptions   Medication Sig Dispense Refill    acetaminophen (TYLENOL) 325 mg tablet Take 650 mg by mouth every 6 (six) hours as needed for mild pain      Calcium Citrate-Vitamin D (CALCIUM + D PO) Take 1 tablet by mouth 2 (two) times a day      cyanocobalamin (VITAMIN B-12) 100 mcg tablet Take by mouth      docusate sodium (COLACE) 100 mg capsule Take 1 capsule (100 mg total) by mouth 2 (two) times a day for 60 doses 90 capsule 0    gabapentin (NEURONTIN) 100 mg capsule TAKE 1 CAPSULE IN THE MORNING, TAKE 1 CAPSULE IN THE AFTERNOON AND TAKE 1 TO 3 CAPSULES AT BEDTIME 450 capsule 1    hydroxychloroquine (PLAQUENIL) 200 mg tablet Take 200 mg by mouth 2 (two) times a day with meals      LEUCOVORIN CALCIUM PO Take 10 mg by mouth once a week      levothyroxine 75 mcg tablet TAKE 1 TABLET EVERY DAY 90 tablet 3    losartan (COZAAR) 25 mg tablet TAKE 1 TABLET EVERY DAY 90 tablet 0    methotrexate 2 5 mg tablet Uses 4 a week via Rheumatology [facility] 12 tablet 0    Multiple Vitamin (MULTIVITAMINS PO) Take 1 tablet by mouth daily      omeprazole (PriLOSEC) 20 mg delayed release capsule TAKE 1 CAPSULE EVERY DAY AS NEEDED FOR  STOMACH  ACID 90 capsule 1    pravastatin (PRAVACHOL) 80 mg tablet TAKE 1 TABLET EVERY DAY 90 tablet 3    rivaroxaban (XARELTO) 15 mg tablet Take 1 tablet (15 mg total) by mouth daily with breakfast 90 tablet 1    rOPINIRole (REQUIP) 0 5 mg tablet TAKE 2 TABLETS AT BEDTIME AND 1 TABLET IN THE MORNING 270 tablet 3    torsemide (DEMADEX) 20 mg tablet TAKE 2 TABLETS EVERY  tablet 0     No current facility-administered medications for this visit  Facility-Administered Medications Ordered in Other Visits   Medication Dose Route Frequency Provider Last Rate Last Dose    acetaminophen (TYLENOL) tablet 650 mg  650 mg Oral Q6H PRN Lindsay Jefferson PA-C           ALLERGIES:     Allergies   Allergen Reactions    Acetazolamide Other (See Comments)     Other reaction(s): Unknown Allergic Reaction    Aspirin      Other reaction(s): Other (See Comments)  High Dose ASA-stomach ache, rachel  ASA 81 m    Atorvastatin      Other reaction(s): Muscle Pain, Myalgia    Azithromycin  Nitrofurantoin Hives     HIVES * pt denies  Other reaction(s): Unknown Allergic Reaction  Other reaction(s): Other (See Comments)  HIVES * pt denies    Other Other (See Comments)     Adhesive tape : red and itching  Other reaction(s): Other (See Comments)  red and itching    Shellfish-Derived Products Other (See Comments)     Patient got Gout following eating shell fish    Sulfa Antibiotics Other (See Comments)     unknown    Tramadol Diarrhea and Vomiting       SOCIAL HISTORY:     Social History     Social History    Marital status: /Civil Union     Spouse name: N/A    Number of children: N/A    Years of education: N/A     Social History Main Topics    Smoking status: Never Smoker    Smokeless tobacco: Never Used      Comment: stopped smoking in the distant past, never smoker (as per [employer])     Alcohol use No      Comment: seldom    Drug use: No    Sexual activity: Not on file     Other Topics Concern    Not on file     Social History Narrative    No caffeine use       SOCIAL HISTORY:     Family History   Problem Relation Age of Onset    Other Mother         epilepsy    Early death Mother    Roxana Yoder Glaucoma Father     Stroke Father         silent    Other Brother         cardiac disorder       REVIEW OF SYSTEMS:     Review of Systems   Constitutional: Negative  Respiratory: Negative  Cardiovascular: Positive for leg swelling  Gastrointestinal: Negative for abdominal pain  Genitourinary: Negative  Musculoskeletal: Positive for back pain and gait problem  Skin: Negative  Psychiatric/Behavioral: Negative  PHYSICAL EXAM:     LMP  (LMP Unknown)   General:  Elderly female in no acute distress  They have a normal affect  There is not appear to be any gross neurologic defects or abnormalities  HEENT:  Normocephalic, atraumatic  Neck is supple without any palpable lymphadenopathy  Cardiovascular:  Patient has normal palpable distal radial pulses    There is no significant peripheral edema  No JVD is noted  Respiratory:  Patient has unlabored respirations  There is no audible wheeze or rhonchi  Abdomen:  Abdomen with healed surgical scars (appy/LILLY) and healing robotic incisions     Abdomen is soft and nontender  Obese with large pannus  There is no tympany  There is a large incisional hernia in the right flank around the extraction incision  THe wound has healed  Musculoskeletal:  Rheumatologic issues limit mobility  Walks with a cane  Left shoulder in sling  Dermatologic:  Patient has no skin abnormalities or rashes  LABS:     CBC:   Lab Results   Component Value Date    WBC 6 90 2019    HGB 9 5 (L) 2019    HCT 30 6 (L) 2019     (H) 2019     2019       BMP:   Lab Results   Component Value Date    GLUCOSE 95 2016    CALCIUM 9 1 2019     2017    K 4 2 2019    CO2 27 2019     2019    BUN 34 (H) 2019    CREATININE 1 67 (H) 2019     IMAGIN/19/18  CT ABDOMEN AND PELVIS WITHOUT IV CONTRAST     INDICATION:   C68 9: Malignant neoplasm of urinary organ, unspecified  Evaluate for metastasis     COMPARISON:  2018     TECHNIQUE:  CT examination of the abdomen and pelvis was performed without intravenous contrast   Axial, sagittal, and coronal 2D reformatted images were created from the source data and submitted for interpretation       Radiation dose length product (DLP) for this visit:  1148 7 mGy-cm     This examination, like all CT scans performed in the [facility], was performed utilizing techniques to minimize radiation dose exposure, including the use of iterative   reconstruction and automated exposure control       Enteric contrast was administered       FINDINGS:     ABDOMEN     LOWER CHEST:  No clinically significant abnormality identified in the visualized lower chest  Lung nodules are again stable      LIVER/BILIARY TREE: Unremarkable      GALLBLADDER:  Gallbladder is surgically absent      SPLEEN:  Unremarkable      PANCREAS:  Unremarkable      ADRENAL GLANDS:  Unremarkable      KIDNEYS/URETERS:  Status post right nephrectomy without evidence of recurrent tumor in the nephrectomy bed  The unenhanced left kidney is unremarkable  No hydronephrosis      STOMACH AND BOWEL:  There is colonic diverticulosis without evidence of acute diverticulitis      APPENDIX:  No findings to suggest appendicitis      ABDOMINOPELVIC CAVITY:  No ascites or free intraperitoneal air  No lymphadenopathy      VESSELS:  Unremarkable for patient's age      PELVIS     REPRODUCTIVE ORGANS:  Patient is status post hysterectomy      URINARY BLADDER:  Unremarkable      ABDOMINAL WALL/INGUINAL REGIONS:  There is a right lateral abdominal wall hernia which has increased from the prior study  Multiple loops of small bowel and colon are contained within the hernia though there is no evidence of obstruction      OSSEOUS STRUCTURES:  No acute fracture or destructive osseous lesion      IMPRESSION:     1  No evidence of recurrent or metastatic disease though limited by lack of intravenous contrast      2  Increasing bowel containing right lateral abdominal wall hernia      PATHOLOGY:     4/23/18  Case Report   Surgical Pathology Report                         Case: [id number]                                    Authorizing Provider: Ken Auguste MD   Collected:           04/23/2018 1631               Ordering Location:     [address]      Received:            04/24/2018 [facility]                                      Hospital Operating Room                                                       Pathologist:           Giancarlo Wiley MD                                                                Specimen:    Kidney, Right, Kidney, Ureter, and Bladder Cuff - Right                                    Final Diagnosis   URETER AND RENAL PELVIS STAGING PROTOCOL     1  Specimen identification:     - Procedure: Nephroureterectomy, complete      - Laterality: Right    2  Tumor     - Tumor site: Renal pelvis      - Tumor size (cm): Greatest dimension: 3mm      - Histologic type: Low grade papillary urothelial carcinoma      - Associated epithelial lesions: None      - Histologic grade: Low grade      - Microscopic tumor extension (pTa): Papillary noninvasive carcinoma      - Tumor configuration: Papillary   3  Margins: All margins are negative for invasive carcinoma, carcinoma in situ and low-grade urothelial carcinoma/urothelial dysplasia   4  Lymph-vascular invasion: Not identified    5  Regional lymph nodes:  No lymph nodes submitted or found   6  Additional pathologic findings: Foreign body type giant cell response, chronic active pyelitis and ureteritis    7  Pathologic findings in ipsilateral non-neoplastic renal tissue: None    8  8th Ed AJCC Tumor Stage:  at least Stage 0a - pTa, pNx, Low grade      A  Right kidney, ureter and bladder cuff (radical nephrectomy):  - Focal low grade papillary urothelial carcinoma (3mm) with foci suspicious for superficial lamina propria invasion  - Bladder cuff and resection margins negative for carcinoma  - See staging protocol above (pTaNx)     Comment: The entire case was reviewed by Dr Brigid Richards at the [facility]  His diagnosis and comment are indicated below  Electronically signed by Robert Madden MD on 5/3/2018 at 10:03 AM   Preliminary result electronically signed by Robert Madden MD on 4/27/2018 at 12:00 PM   Note   [address]     Diagnosis  Right Kidney, Radical Nephrectomy:    - Papillary urothelial carcinoma, low grade, pelvicalyceal mucosa, focal areas suspicious for invasion into superficial subepithelial connective tissue (see comment)  Renal hilar and bladder cuff margins, negative for neoplasia  - Marked reactive changes, pelvicalyceal mucosa and ureteral lining   Foreign body type giant cell response, chronic active pyelitis and ureteritis; status post biopsy and catheter placement  PROCEDURE:     Cystoscopy  Date/Time: 1/11/2019 2:25 PM  Performed by: Shabbir Cool by: Mackenzie Fontanez     Procedure details: cystoscopy    Patient tolerance: Patient tolerated the procedure well with no immediate complications    Additional Procedure Details:   Cystoscopy Procedure note    Risk and benefits of flexible cystoscopy were discussed  Informed consent was obtained  A urine dip is adequate for cystoscopy  The patient was placed into the modified supine position  Her genitalia was prepped with Betadine and draped in a sterile fashion  Viscous 2% lidocaine was instilled into the urethra and allowed dwell time for local anesthesia  Flexible cystoscopy was then performed with a 16F scope  Urethra was inspected on entrance and exit of the scope  The bladder was thoroughly inspected in a 360 degree fashion  Left ureteral orifice was visualized in their orthotopic location with clear efflux of urine  The bladder mucosa was thoroughly inspected  There was no evidence of mucosal abnormalities, stones, or lesions  Cystitis cystica noted  Retroflexion for evaluation of the bladder neck was normal       Overall this was a negative cystoscopy  A female office staff member was present throughout the entire procedure  ASSESSMENT:     81 y o  female with low-grade upper tract urothelial carcinoma status post robotic nephro ureterectomy with bladder cuff excision on 4/23/2018 and postoperative incision hernia    PLAN:     Cystoscopy and CT in 3 months  Levaquin PO based on cystitis cystica  Continue to observe incisional hernia

## 2019-01-12 LAB — BACTERIA UR CULT: NORMAL

## 2019-01-14 ENCOUNTER — CONSULT (OUTPATIENT)
Dept: HEMATOLOGY ONCOLOGY | Facility: CLINIC | Age: 84
End: 2019-01-14
Payer: MEDICARE

## 2019-01-14 VITALS
BODY MASS INDEX: 41.35 KG/M2 | WEIGHT: 219 LBS | HEART RATE: 91 BPM | SYSTOLIC BLOOD PRESSURE: 128 MMHG | OXYGEN SATURATION: 97 % | DIASTOLIC BLOOD PRESSURE: 82 MMHG | TEMPERATURE: 98 F | HEIGHT: 61 IN

## 2019-01-14 DIAGNOSIS — D63.8 ANEMIA, CHRONIC DISEASE: ICD-10-CM

## 2019-01-14 PROCEDURE — 99204 OFFICE O/P NEW MOD 45 MIN: CPT | Performed by: PHYSICIAN ASSISTANT

## 2019-01-14 NOTE — PROGRESS NOTES
Hematology/Oncology Outpatient Consult  Branden Browne 81 y o  female 1935 [id number]    Date:  1/14/2019      Assessment and Plan:  1  Anemia, chronic disease  This 26-year-old female presents for consultation regarding anemia  Patient's anemia is secondary to that of chronic disease  Patient's rheumatoid arthritis as well as chronic methotrexate therapy is contributing to this  Also, now patient's creatinine has worsened since having a right nephrectomy  This also was contributory  Patient had iron studies done which were normal as well as negative occult stool testing  With a hemoglobin of 9 5, observation is appropriate  Patient is slightly fatigued that this likely is multifactorial with age, medical conditions, medication  Her hemoglobin previously actually was worse  We discussed that if hemoglobin were to decrease below 9 and remained there, we could initiate Procrit  Patient is at risk of developing thrombosis with Procrit due to low-grade malignancy diagnosis less than 1 year ago  However, she is on Xarelto therapy for history of TIA  Patient is agreeable for observation at this time  We will see her back in 3 months with labs as below  - CBC and differential  - Erythropoietin  - Ferritin    HPI:  26-year-old female presents for consultation regarding anemia  Past medical history significant for rheumatoid arthritis, right nephrectomy, hypothyroidism, hypertension, history of TIA, CHF, hyperlipidemia, atherosclerosis, sleep apnea  Patient follows with [facility] for Rheumatology  She states that she was on injection medication prior to her nephrectomy in April 2018  Since her nephrectomy she has been on Plaquenil  She also takes Methotrexate 4 tablets per week  She has been on methotrexate for at least 18 years  After recurrent UTIs in microscopic hematuria patient was found to have a mass from the right kidney  She underwent right nephrectomy    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  She follows with Urology on a regular basis  Sept 11 dislocated his left shoulder after falling and is presently undergoing physical therapy  She states Surgical intervention is not recommended due to her multiple comorbidities  She has sleep apnea and uses CPAP     ROS: Review of Systems   Constitutional: Positive for fatigue (x 1 year )  HENT: Positive for rhinorrhea (started today )  Respiratory: Negative for cough and shortness of breath  Cardiovascular: Positive for leg swelling  Gastrointestinal: Positive for constipation (off and on) and diarrhea (off and on)  Endocrine: Positive for cold intolerance (always cold )  Genitourinary: Negative for difficulty urinating  Musculoskeletal: Negative for arthralgias and back pain  Neurological: Negative for dizziness, weakness, light-headedness and headaches  Hematological: Negative for adenopathy  Does not bruise/bleed easily  Psychiatric/Behavioral: Negative          Past Medical History:   Diagnosis Date    Anemia     Arthritis     rheumatoid    Benign essential hypertension     Cataract     Chronic cystitis     CPAP (continuous positive airway pressure) dependence     Diverticulitis of colon     Early satiety     GERD (gastroesophageal reflux disease)     Gout     Hearing aid worn     bilateral    Hearing loss     Hemorrhoids     Hiatal hernia     History of colonic polyps     Hyperlipidemia     Hypothyroidism     Left breast mass     Microhematuria     Migraine     occular    Neuropathy     lower and upper extermities    Overactive bladder     [facility]neumonia of left lower lobe due to infectious organism [facility]     RA [facility]rheumatoid arthritis) [facility]     Sleep apnea     uses cpap    Stroke ([facility]     5/2017    Type 2 diabetes mellitus (HCC)     Urinary frequency     Urinary urgency     Urothelial cancer ([facility] ) 04/23/2018    Use of cane as ambulatory aid Past Surgical History:   Procedure Laterality Date    APPENDECTOMY      ARTHROSCOPY WRIST Right     with release of transverse carpal ligament     BLADDER SURGERY      BLADDER SURGERY      BREAST SURGERY      left breast    BUNIONECTOMY Bilateral     CATARACT EXTRACTION Bilateral     CHOLECYSTECTOMY      COLONOSCOPY      CYSTOSCOPY      EGD AND COLONOSCOPY N/A 12/20/2017    Procedure: EGD AND COLONOSCOPY;  Surgeon: Cristina Garcia MD;  [facility]; Service: Gastroenterology    ESOPHAGOGASTRODUODENOSCOPY      diagnostic    FOREARM SURGERY Right     fracture repair    HYSTERECTOMY      JOINT REPLACEMENT      KNEE ARTHROSCOPY Left     KNEE SURGERY Left     meniscus tear    NOSE SURGERY      OK CYSTO/URETERO W/LITHOTRIPSY &INDWELL STENT INSRT Right 2/28/2018    Procedure: CYSTOSCOPY RIGHT URETEROSCOPY, RIGHT RETROGRADE PYELOGRAM AND INSERTION  RIGHT STENT URETERAL, RIGHT RENAL PELVIC WASHING FOR CYTOLOGY;  Surgeon: Sailaja Cardoso MD;  [facility];  Service: Urology    OK NEPHRECTOMY, W/PART   URETECTOMY Right 4/23/2018    Procedure: ROBATIC ASSISTED NEPHRO-URETERECTOMY WITH BLADDER CUFF EXCISION;  Surgeon: Kayce Mejía MD;  [facility];  Service: Urology    REPLACEMENT TOTAL KNEE BILATERAL Bilateral     URETEROSCOPY Right 3/20/2018    Procedure: CYSTOSCOPY, RETROGRADE PYELOGRAM, URETEROSCOPY, BIOPSY, BRUSH, FULGURATION, STENT PLACEMENT, BLADDER BIOPSY WITH FULGURATION;  Surgeon: Kayce Mejía MD;  [facility];  Service: Urology       Social History     Social History    Marital status: /Civil Union     Spouse name: N/A    Number of children: N/A    Years of education: N/A     Social History Main Topics    Smoking status: Never Smoker    Smokeless tobacco: Never Used      Comment: stopped smoking in the distant past, never smoker (as per [employer])     Alcohol use No      Comment: seldom    Drug use: No    Sexual activity: Not on file     Other Topics Concern    Not on file     Social History Narrative    No caffeine use       Family History   Problem Relation Age of Onset    Other Mother         epilepsy    Early death Mother    [employer] Glaucoma Father     Stroke Father         silent    Other Brother         cardiac disorder       Allergies   Allergen Reactions    Acetazolamide Other (See Comments)     Other reaction(s): Unknown Allergic Reaction    Aspirin      Other reaction(s): Other (See Comments)  High Dose ASA-stomach ache, rachel  ASA 81 m    Atorvastatin      Other reaction(s): Muscle Pain, Myalgia    Azithromycin     Nitrofurantoin Hives     HIVES * pt denies  Other reaction(s): Unknown Allergic Reaction  Other reaction(s): Other (See Comments)  HIVES * pt denies    Other Other (See Comments)     Adhesive tape : red and itching  Other reaction(s):  Other (See Comments)  red and itching    Shellfish-Derived Products Other (See Comments)     Patient got Gout following eating shell fish    Sulfa Antibiotics Other (See Comments)     unknown    Tramadol Diarrhea and Vomiting         Current Outpatient Prescriptions:     acetaminophen (TYLENOL) 325 mg tablet, Take 650 mg by mouth every 6 (six) hours as needed for mild pain, Disp: , Rfl:     Calcium Citrate-Vitamin D (CALCIUM + D PO), Take 1 tablet by mouth 2 (two) times a day, Disp: , Rfl:     cyanocobalamin (VITAMIN B-12) 100 mcg tablet, Take by mouth, Disp: , Rfl:     docusate sodium (COLACE) 100 mg capsule, Take 1 capsule (100 mg total) by mouth 2 (two) times a day for 60 doses, Disp: 90 capsule, Rfl: 0    gabapentin (NEURONTIN) 100 mg capsule, TAKE 1 CAPSULE IN THE MORNING, TAKE 1 CAPSULE IN THE AFTERNOON AND TAKE 1 TO 3 CAPSULES AT BEDTIME, Disp: 450 capsule, Rfl: 1    hydroxychloroquine (PLAQUENIL) 200 mg tablet, Take 200 mg by mouth 2 (two) times a day with meals, Disp: , Rfl:     LEUCOVORIN CALCIUM PO, Take 10 mg by mouth once a week, Disp: , Rfl:     levofloxacin (LEVAQUIN) 250 mg tablet, Take 1 tablet (250 mg total) by mouth every 24 hours for 3 days, Disp: 3 tablet, Rfl: 0    levothyroxine 75 mcg tablet, TAKE 1 TABLET EVERY DAY, Disp: 90 tablet, Rfl: 3    losartan (COZAAR) 25 mg tablet, TAKE 1 TABLET EVERY DAY, Disp: 90 tablet, Rfl: 0    methotrexate 2 5 mg tablet, Uses 4 a week via [facility], Disp: 12 tablet, Rfl: 0    Multiple Vitamin (MULTIVITAMINS PO), Take 1 tablet by mouth daily, Disp: , Rfl:     omeprazole (PriLOSEC) 20 mg delayed release capsule, TAKE 1 CAPSULE EVERY DAY AS NEEDED FOR  STOMACH  ACID, Disp: 90 capsule, Rfl: 1    pravastatin (PRAVACHOL) 80 mg tablet, TAKE 1 TABLET EVERY DAY, Disp: 90 tablet, Rfl: 3    rivaroxaban (XARELTO) 15 mg tablet, Take 1 tablet (15 mg total) by mouth daily with breakfast, Disp: 90 tablet, Rfl: 1    rOPINIRole (REQUIP) 0 5 mg tablet, TAKE 2 TABLETS AT BEDTIME AND 1 TABLET IN THE MORNING, Disp: 270 tablet, Rfl: 3    torsemide (DEMADEX) 20 mg tablet, TAKE 2 TABLETS EVERY DAY, Disp: 180 tablet, Rfl: 0  No current facility-administered medications for this visit  Facility-Administered Medications Ordered in Other Visits:     acetaminophen (TYLENOL) tablet 650 mg, 650 mg, Oral, Q6H PRN, Apurva Tai PA-C      Physical Exam:  /82 (BP Location: Right arm, Patient Position: Sitting, Cuff Size: Adult)   Pulse 91   Temp 98 °F (36 7 °C) (Temporal)   Ht 5' 1" (1 549 m)   Wt 99 3 kg (219 lb)   LMP  (LMP Unknown)   SpO2 97%   BMI 41 38 kg/m²     Physical Exam   Constitutional: She is oriented to person, place, and time  She appears well-developed and well-nourished  No distress  Overweight   HENT:   Head: Normocephalic and atraumatic  Eyes: Conjunctivae are normal  No scleral icterus  Neck: Normal range of motion  Neck supple  Cardiovascular: Normal rate and regular rhythm  No murmur heard  Pulmonary/Chest: Effort normal and breath sounds normal  No respiratory distress  Abdominal: Soft  There is no tenderness  Musculoskeletal: Normal range of motion  She exhibits no edema or tenderness  Ambulates with walker   Lymphadenopathy:     She has no cervical adenopathy  Neurological: She is alert and oriented to person, place, and time  No cranial nerve deficit  Skin: Skin is warm and dry  Psychiatric: She has a normal mood and affect  Vitals reviewed  Labs:  Lab Results   Component Value Date    WBC 6 90 01/09/2019    HGB 9 5 (L) 01/09/2019    HCT 30 6 (L) 01/09/2019     (H) 01/09/2019     01/09/2019     Lab Results   Component Value Date     11/14/2017    K 4 2 01/09/2019     01/09/2019    CO2 27 01/09/2019    ANIONGAP 9 01/04/2016    BUN 34 (H) 01/09/2019    CREATININE 1 67 (H) 01/09/2019    GLUCOSE 95 01/04/2016    GLUF 103 (H) 01/09/2019    CALCIUM 9 1 01/09/2019    AST 12 08/20/2018    ALT 14 08/20/2018    ALKPHOS 100 08/20/2018    PROT 6 3 11/14/2017    BILITOT 1 2 11/14/2017    EGFR 28 01/09/2019       Patient voiced understanding and agreement in the above discussion  Aware to contact our office with questions/symptoms in the interim

## 2019-01-14 NOTE — LETTER
January 14, 2019     Martha Frausto MD  [id number]     Patient: Haley Bolanos   YOB: 1935   Date of Visit: 1/14/2019       Dear Dr Cintia Reyes: Thank you for referring Ganga Sanchez to me for evaluation  Below are my notes for this consultation  If you have questions, please do not hesitate to call me  I look forward to following your patient along with you  Sincerely,        Rita Forde PA-C        CC: No Recipients  Rita Forde PA-C  1/14/2019  5:21 PM  Sign at close encounter  Hematology/Oncology Outpatient Consult  Haley Bolanos 81 y o  female 1935 [id number]    Date:  1/14/2019      Assessment and Plan:  1  Anemia, chronic disease  This 16-year-old female presents for consultation regarding anemia  Patient's anemia is secondary to that of chronic disease  Patient's rheumatoid arthritis as well as chronic methotrexate therapy is contributing to this  Also, now patient's creatinine has worsened since having a right nephrectomy  This also was contributory  With a hemoglobin of 9 5, observation is appropriate  Patient is slightly fatigued that this likely is multifactorial with age, medical conditions, medication  Her hemoglobin previously actually was worse  We discussed that if hemoglobin were to decrease below 9 and remained there, we could initiate Procrit  Patient is at risk of developing thrombosis with Procrit due to low-grade malignancy diagnosis less than 1 year ago  However, she is on Xarelto therapy for history of TIA  Patient is agreeable for observation at this time  We will see her back in 3 months with labs as below  - CBC and differential  - Erythropoietin  - Ferritin    HPI:  16-year-old female presents for consultation regarding anemia    Past medical history significant for rheumatoid arthritis, right nephrectomy, hypothyroidism, hypertension, history of TIA, CHF, hyperlipidemia, atherosclerosis, sleep apnea  Patient follows with [facility] for Rheumatology  She states that she was on injection medication prior to her nephrectomy in April 2018  Since her nephrectomy she has been on Plaquenil  She also takes Methotrexate 4 tablets per week  She has been on methotrexate for at least 18 years  After recurrent UTIs in microscopic hematuria patient was found to have a mass from the right kidney  She underwent right nephrectomy  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  She follows with Urology on a regular basis  Sept 11 dislocated his left shoulder after falling and is presently undergoing physical therapy  She states Surgical intervention is not recommended due to her multiple comorbidities  She has sleep apnea and uses CPAP     ROS: Review of Systems   Constitutional: Positive for fatigue (x 1 year )  HENT: Positive for rhinorrhea (started today )  Respiratory: Negative for cough and shortness of breath  Cardiovascular: Positive for leg swelling  Gastrointestinal: Positive for constipation (off and on) and diarrhea (off and on)  Endocrine: Positive for cold intolerance (always cold )  Genitourinary: Negative for difficulty urinating  Musculoskeletal: Negative for arthralgias and back pain  Neurological: Negative for dizziness, weakness, light-headedness and headaches  Hematological: Negative for adenopathy  Does not bruise/bleed easily  Psychiatric/Behavioral: Negative          Past Medical History:   Diagnosis Date    Anemia     Arthritis     rheumatoid    Benign essential hypertension     Cataract     Chronic cystitis     CPAP (continuous positive airway pressure) dependence     Diverticulitis of colon     Early satiety     GERD (gastroesophageal reflux disease)     Gout     Hearing aid worn     bilateral    Hearing loss     Hemorrhoids     Hiatal hernia     History of colonic polyps     Hyperlipidemia     Hypothyroidism     Left breast mass     Microhematuria     Migraine     occular    Neuropathy     lower and upper extermities    Overactive bladder     Pneumonia of left lower lobe due to infectious organism (HCC)     [facility]) [facility]     Sleep apnea     uses cpap    Stroke ([facility]     5/2017    Type 2 diabetes mellitus (HCC)     Urinary frequency     Urinary urgency     Urothelial cancer ([facility] ) 04/23/2018    Use of cane as ambulatory aid        Past Surgical History:   Procedure Laterality Date    APPENDECTOMY      ARTHROSCOPY WRIST Right     with release of transverse carpal ligament     BLADDER SURGERY      BLADDER SURGERY      BREAST SURGERY      left breast    BUNIONECTOMY Bilateral     CATARACT EXTRACTION Bilateral     CHOLECYSTECTOMY      COLONOSCOPY      CYSTOSCOPY      EGD AND COLONOSCOPY N/A 12/20/2017    Procedure: EGD AND COLONOSCOPY;  Surgeon: Sara Diaz MD;  [facility]; Service: Gastroenterology    ESOPHAGOGASTRODUODENOSCOPY      diagnostic    FOREARM SURGERY Right     fracture repair    HYSTERECTOMY      JOINT REPLACEMENT      KNEE ARTHROSCOPY Left     KNEE SURGERY Left     meniscus tear    NOSE SURGERY      ME CYSTO/URETERO W/LITHOTRIPSY &INDWELL STENT INSRT Right 2/28/2018    Procedure: CYSTOSCOPY RIGHT URETEROSCOPY, RIGHT RETROGRADE PYELOGRAM AND INSERTION  RIGHT STENT URETERAL, RIGHT RENAL PELVIC WASHING FOR CYTOLOGY;  Surgeon: Anamika Jacobs MD;  [facility];  Service: Urology    ME NEPHRECTOMY, W/PART   URETECTOMY Right 4/23/2018    Procedure: ROBATIC ASSISTED NEPHRO-URETERECTOMY WITH BLADDER CUFF EXCISION;  Surgeon: Janey Hilton MD;  [facility];  Service: Urology    REPLACEMENT TOTAL KNEE BILATERAL Bilateral     URETEROSCOPY Right 3/20/2018    Procedure: CYSTOSCOPY, RETROGRADE PYELOGRAM, URETEROSCOPY, BIOPSY, BRUSH, FULGURATION, STENT PLACEMENT, BLADDER BIOPSY WITH FULGURATION;  Surgeon: Dominga Villafuerte MD;  Location: [facility];  Service: Urology       Social History     Social History    Marital status: /Civil Union     Spouse name: N/A    Number of children: N/A    Years of education: N/A     Social History Main Topics    Smoking status: Never Smoker    Smokeless tobacco: Never Used      Comment: stopped smoking in the distant past, never smoker (as per [employer])     Alcohol use No      Comment: seldom    Drug use: No    Sexual activity: Not on file     Other Topics Concern    Not on file     Social History Narrative    No caffeine use       Family History   Problem Relation Age of Onset    Other Mother         epilepsy    Early death Mother    [employer] Glaucoma Father     Stroke Father         silent    Other Brother         cardiac disorder       Allergies   Allergen Reactions    Acetazolamide Other (See Comments)     Other reaction(s): Unknown Allergic Reaction    Aspirin      Other reaction(s): Other (See Comments)  High Dose ASA-stomach ache, rachel  ASA 81 m    Atorvastatin      Other reaction(s): Muscle Pain, Myalgia    Azithromycin     Nitrofurantoin Hives     HIVES * pt denies  Other reaction(s): Unknown Allergic Reaction  Other reaction(s): Other (See Comments)  HIVES * pt denies    Other Other (See Comments)     Adhesive tape : red and itching  Other reaction(s):  Other (See Comments)  red and itching    Shellfish-Derived Products Other (See Comments)     Patient got Gout following eating shell fish    Sulfa Antibiotics Other (See Comments)     unknown    Tramadol Diarrhea and Vomiting         Current Outpatient Prescriptions:     acetaminophen (TYLENOL) 325 mg tablet, Take 650 mg by mouth every 6 (six) hours as needed for mild pain, Disp: , Rfl:     Calcium Citrate-Vitamin D (CALCIUM + D PO), Take 1 tablet by mouth 2 (two) times a day, Disp: , Rfl:     cyanocobalamin (VITAMIN B-12) 100 mcg tablet, Take by mouth, Disp: , Rfl:     docusate sodium (COLACE) 100 mg capsule, Take 1 capsule (100 mg total) by mouth 2 (two) times a day for 60 doses, Disp: 90 capsule, Rfl: 0    gabapentin (NEURONTIN) 100 mg capsule, TAKE 1 CAPSULE IN THE MORNING, TAKE 1 CAPSULE IN THE AFTERNOON AND TAKE 1 TO 3 CAPSULES AT BEDTIME, Disp: 450 capsule, Rfl: 1    hydroxychloroquine (PLAQUENIL) 200 mg tablet, Take 200 mg by mouth 2 (two) times a day with meals, Disp: , Rfl:     LEUCOVORIN CALCIUM PO, Take 10 mg by mouth once a week, Disp: , Rfl:     levofloxacin (LEVAQUIN) 250 mg tablet, Take 1 tablet (250 mg total) by mouth every 24 hours for 3 days, Disp: 3 tablet, Rfl: 0    levothyroxine 75 mcg tablet, TAKE 1 TABLET EVERY DAY, Disp: 90 tablet, Rfl: 3    losartan (COZAAR) 25 mg tablet, TAKE 1 TABLET EVERY DAY, Disp: 90 tablet, Rfl: 0    methotrexate 2 5 mg tablet, Uses 4 a week via [facility], Disp: 12 tablet, Rfl: 0    Multiple Vitamin (MULTIVITAMINS PO), Take 1 tablet by mouth daily, Disp: , Rfl:     omeprazole (PriLOSEC) 20 mg delayed release capsule, TAKE 1 CAPSULE EVERY DAY AS NEEDED FOR  STOMACH  ACID, Disp: 90 capsule, Rfl: 1    pravastatin (PRAVACHOL) 80 mg tablet, TAKE 1 TABLET EVERY DAY, Disp: 90 tablet, Rfl: 3    rivaroxaban (XARELTO) 15 mg tablet, Take 1 tablet (15 mg total) by mouth daily with breakfast, Disp: 90 tablet, Rfl: 1    rOPINIRole (REQUIP) 0 5 mg tablet, TAKE 2 TABLETS AT BEDTIME AND 1 TABLET IN THE MORNING, Disp: 270 tablet, Rfl: 3    torsemide (DEMADEX) 20 mg tablet, TAKE 2 TABLETS EVERY DAY, Disp: 180 tablet, Rfl: 0  No current facility-administered medications for this visit       Facility-Administered Medications Ordered in Other Visits:     acetaminophen (TYLENOL) tablet 650 mg, 650 mg, Oral, Q6H PRN, Apurva Tai PA-C      Physical Exam:  /82 (BP Location: Right arm, Patient Position: Sitting, Cuff Size: Adult)   Pulse 91   Temp 98 °F (36 7 °C) (Temporal)   Ht 5' 1" (1 549 m)   Wt 99 3 kg (219 lb)   LMP  (LMP Unknown)   SpO2 97% BMI 41 38 kg/m²      Physical Exam   Constitutional: She is oriented to person, place, and time  She appears well-developed and well-nourished  No distress  Overweight   HENT:   Head: Normocephalic and atraumatic  Eyes: Conjunctivae are normal  No scleral icterus  Neck: Normal range of motion  Neck supple  Cardiovascular: Normal rate and regular rhythm  No murmur heard  Pulmonary/Chest: Effort normal and breath sounds normal  No respiratory distress  Abdominal: Soft  There is no tenderness  Musculoskeletal: Normal range of motion  She exhibits no edema or tenderness  Ambulates with walker   Lymphadenopathy:     She has no cervical adenopathy  Neurological: She is alert and oriented to person, place, and time  No cranial nerve deficit  Skin: Skin is warm and dry  Psychiatric: She has a normal mood and affect  Vitals reviewed  Labs:  Lab Results   Component Value Date    WBC 6 90 01/09/2019    HGB 9 5 (L) 01/09/2019    HCT 30 6 (L) 01/09/2019     (H) 01/09/2019     01/09/2019     Lab Results   Component Value Date     11/14/2017    K 4 2 01/09/2019     01/09/2019    CO2 27 01/09/2019    ANIONGAP 9 01/04/2016    BUN 34 (H) 01/09/2019    CREATININE 1 67 (H) 01/09/2019    GLUCOSE 95 01/04/2016    GLUF 103 (H) 01/09/2019    CALCIUM 9 1 01/09/2019    AST 12 08/20/2018    ALT 14 08/20/2018    ALKPHOS 100 08/20/2018    PROT 6 3 11/14/2017    BILITOT 1 2 11/14/2017    EGFR 28 01/09/2019       Patient voiced understanding and agreement in the above discussion  Aware to contact our office with questions/symptoms in the interim

## 2019-01-15 ENCOUNTER — OFFICE VISIT (OUTPATIENT)
Dept: PHYSICAL THERAPY | Facility: REHABILITATION | Age: 84
End: 2019-01-15
Payer: MEDICARE

## 2019-01-15 DIAGNOSIS — M25.512 LEFT SHOULDER PAIN, UNSPECIFIED CHRONICITY: Primary | ICD-10-CM

## 2019-01-15 PROCEDURE — 97112 NEUROMUSCULAR REEDUCATION: CPT | Performed by: PHYSICAL THERAPIST

## 2019-01-15 PROCEDURE — 97110 THERAPEUTIC EXERCISES: CPT | Performed by: PHYSICAL THERAPIST

## 2019-01-15 PROCEDURE — 97140 MANUAL THERAPY 1/> REGIONS: CPT | Performed by: PHYSICAL THERAPIST

## 2019-01-17 ENCOUNTER — EVALUATION (OUTPATIENT)
Dept: PHYSICAL THERAPY | Facility: REHABILITATION | Age: 84
End: 2019-01-17
Payer: MEDICARE

## 2019-01-17 DIAGNOSIS — M25.512 LEFT SHOULDER PAIN, UNSPECIFIED CHRONICITY: Primary | ICD-10-CM

## 2019-01-17 PROCEDURE — 97140 MANUAL THERAPY 1/> REGIONS: CPT | Performed by: PHYSICAL THERAPIST

## 2019-01-17 PROCEDURE — 97110 THERAPEUTIC EXERCISES: CPT | Performed by: PHYSICAL THERAPIST

## 2019-01-17 NOTE — PROGRESS NOTES
PT Discharge    Today's date: 2019  Patient name: Prabhakar Watson  : 1935  MRN: [id number]  Referring provider: Osbaldo Hidalgo MD  Dx:   Encounter Diagnosis     ICD-10-CM    1  Left shoulder pain, unspecified chronicity M25 [id number]                   Assessment  Assessment details: Prabhakar Watson is a 81 y o  female with significant medical history of many co-morbidities including RA, CHF, hypothyroidism, DM2 who presents who has been attending physical therapy for  L shoulder pain following a traumatic dislocation  Kaceysing Moritz has made no objective or subjective improvements since previous evaluation  Pt has reached a functional plateau and is discharged to [facility] at this time  Impairments: abnormal or restricted ROM, impaired physical strength, pain with function and poor posture   Understanding of Dx/Px/POC: good   Prognosis: fair    Goals  Short Term  1: Patient will report a 50% decrease in pain in 2-4 weeks  - MET  2: Pt will have active shoulder elevation equal to contralateral side in 4 weeks  - Not MET  Long Term  1: Patient will demonstrate independence in home exercise program by discharge  - MET  2: Patient will have FOTO score of 57 indicating improved function in 8 weeks  - Partially met    Plan  Patient would benefit from: skilled physical therapy  Planned therapy interventions: manual therapy, therapeutic exercise, therapeutic activities, neuromuscular re-education and functional ROM exercises        Subjective Evaluation    History of Present Illness  Mechanism of injury: Pt states that she feels like she has plateaued with therapy treatment  States that she initially made a lot of progress however she states that it has not seemed to progress recently  Pain  Current pain ratin  At best pain ratin  At worst pain ratin          Objective     Postural Observations    Additional Postural Observation Details  Forward shoulders bilaterally       Active Range of Motion   Left Shoulder Flexion: 30 degrees   Abduction: 5 degrees     Right Shoulder   Flexion: 120 degrees   Abduction: 90 degrees     Passive Range of Motion   Left Shoulder   Flexion: 145 degrees   Abduction: 145 degrees   External rotation 90°: 60 degrees   Internal rotation 90°: 45 degrees     Right Shoulder   Flexion: 145 degrees   Abduction: 145 degrees     Strength/Myotome Testing     Left Shoulder     Planes of Motion   Flexion: 3-   Abduction: 3-   External rotation at 0°: 2-   Internal rotation at 0°: 4-     Right Shoulder     Planes of Motion   Flexion: 3   Abduction: 3     General Comments     Shoulder Comments   Wrist/elbow strength 4-/5 throughout       Precautions: recent dislocation, blood thinner, fall risk, hx of stroke, cardiovascular disease, DM2      Daily Treatment Diary      Manual   12/18  12/20  12/27  12/31  1/3  1/8 1/10 1/15 1/17   Shoulder PROM Rommel Medina 12 May 64 May 64 May 64 May 64 SASKIA SASKIA SASKIA   rhythmic stabilization Rommel Medina 12 Rommel Medina 12 May 65   SASKIA                                                                        Exercise Diary   12/18  12/20  12/27  12/31  1/3 1/8 1/10 1/15 1/17   Pulley  6'  6'  5'  6'  6' 6' 6' 6'    Shoulder isometrcis    2x10x5"  2x10x5"  2x10x5"   2x10x5" 2x10x5" 2x10x5"    AAROM with cane  2x10 each  2x10 each  2x10 each  2x10 each  2x10 10 2x10 2x10    serratus punches with cane  10  10  10       2x10 2x10    t-band rows  OTB 2x10  OTB 2x10  OTB 2x10  OTB 2x15  OTB OTB 2x10 OTB 2x10 OTB 2x10    table slides  2x10 each  2x10 each  2x10 each     2x10 each 2x10   2x10    serratus ABC's  1x        1x           press with cane 10      2x10  2x10 10 2x10 2x10                                                                                                                                                                                                    Modalities  12/6                      cold pack 5'

## 2019-01-18 NOTE — TELEPHONE ENCOUNTER
Spoke to patient to remind her that her last dose of medication Xarelto will be 1-21-19 patient confirmed and understood instructions  F

## 2019-01-20 ENCOUNTER — TELEPHONE (OUTPATIENT)
Dept: RADIOLOGY | Facility: HOSPITAL | Age: 84
End: 2019-01-20

## 2019-01-21 ENCOUNTER — APPOINTMENT (OUTPATIENT)
Dept: PHYSICAL THERAPY | Facility: REHABILITATION | Age: 84
End: 2019-01-21
Payer: MEDICARE

## 2019-01-21 ENCOUNTER — PREP FOR PROCEDURE (OUTPATIENT)
Dept: VASCULAR SURGERY | Facility: CLINIC | Age: 84
End: 2019-01-21

## 2019-01-21 DIAGNOSIS — I70.221 ATHEROSCLEROSIS OF NATIVE ARTERY OF RIGHT LOWER EXTREMITY WITH REST PAIN (HCC): Primary | ICD-10-CM

## 2019-01-21 NOTE — TELEPHONE ENCOUNTER
Spoke with patient and pre procedure instructions given for arteriogram scheduled for 1/24/19   Patient is aware to hold xarelto for 2 days pre procedure (will take last dose 1/21/19 )   Patient verbalizes understanding of pre instructions and aware of potential overnight stay if any complications

## 2019-01-22 ENCOUNTER — OFFICE VISIT (OUTPATIENT)
Dept: OBGYN CLINIC | Facility: HOSPITAL | Age: 84
End: 2019-01-22
Payer: MEDICARE

## 2019-01-22 VITALS
BODY MASS INDEX: 40.59 KG/M2 | HEART RATE: 102 BPM | DIASTOLIC BLOOD PRESSURE: 78 MMHG | HEIGHT: 61 IN | SYSTOLIC BLOOD PRESSURE: 129 MMHG | WEIGHT: 215 LBS

## 2019-01-22 DIAGNOSIS — M24.312 SPONTANEOUS DISLOCATION OF SHOULDER, LEFT: Primary | ICD-10-CM

## 2019-01-22 PROCEDURE — 99213 OFFICE O/P EST LOW 20 MIN: CPT | Performed by: ORTHOPAEDIC SURGERY

## 2019-01-22 NOTE — PROGRESS NOTES
Patient Name:  Zulema Cardenas  MRN:  [id number]    Assessment     1  Spontaneous dislocation of shoulder, left         Plan     1  discussed surgical options with Rosalie Perez today, which may not be beneficial, such as a total  shoulder replacement or manipulation under anesthesia    2  continue HEP  3  Follow up as needed if symptoms worsen or fail to improve       Return if symptoms worsen or fail to improve  Subjective     81 y o  female presents to the office today for follow up regarding left shoulder dislocation that occurred on 09/11/18  Rosalie Perez has been d/c from PT  She notes that she is not happy with her left shoulder ROM  The patient states that she is unable to reach behind her back, reach away from her body or reach over head  Rosalie Perez is able to reach her face and feed herself  She also states that she is experiencing pain in her left shoulder that is worse with activities  She takes tylenol as needed for pain control  General ROS:  Negative for fever, lethargy/malaise, or night sweats  Neurological ROS:  Negative for confusion or seizures  Objective     /78   Pulse 102   Ht 5' 1" (1 549 m)   Wt 97 5 kg (215 lb)   LMP  (LMP Unknown)   BMI 40 62 kg/m²     Left shoulder:    Ambulates with the assistance of a walker   80 degree's of flexion   50 degree's of abduction   Internal rotation to L5      Data Review     I have personally reviewed pertinent films in PACS, and my interpretation follows      No new imaging to review       Scribe Attestation    I,:   Ayleen Serrato am acting as a scribe while in the presence of the attending physician :        I,:   Shankar Michaud MD personally performed the services described in this documentation    as scribed in my presence :

## 2019-01-23 ENCOUNTER — ANESTHESIA EVENT (OUTPATIENT)
Dept: PERIOP | Facility: HOSPITAL | Age: 84
End: 2019-01-23
Payer: MEDICARE

## 2019-01-23 NOTE — PRE-PROCEDURE INSTRUCTIONS
Pre-Surgery Instructions:   Medication Instructions    acetaminophen (TYLENOL) 325 mg tablet Instructed patient per Anesthesia Guidelines   Calcium Citrate-Vitamin D (CALCIUM + D PO) Instructed patient per Anesthesia Guidelines   cyanocobalamin (VITAMIN B-12) 100 mcg tablet Instructed patient per Anesthesia Guidelines   gabapentin (NEURONTIN) 100 mg capsule Instructed patient per Anesthesia Guidelines   hydroxychloroquine (PLAQUENIL) 200 mg tablet Instructed patient per Anesthesia Guidelines   LEUCOVORIN CALCIUM PO Instructed patient per Anesthesia Guidelines   levothyroxine 75 mcg tablet Instructed patient per Anesthesia Guidelines   losartan (COZAAR) 25 mg tablet Instructed patient per Anesthesia Guidelines   methotrexate 2 5 mg tablet Instructed patient per Anesthesia Guidelines   Multiple Vitamin (MULTIVITAMINS PO) Instructed patient per Anesthesia Guidelines   omeprazole (PriLOSEC) 20 mg delayed release capsule Instructed patient per Anesthesia Guidelines   pravastatin (PRAVACHOL) 80 mg tablet Instructed patient per Anesthesia Guidelines   rivaroxaban (XARELTO) 15 mg tablet Patient was instructed by Physician and understands   rOPINIRole (REQUIP) 0 5 mg tablet Instructed patient per Anesthesia Guidelines   torsemide (DEMADEX) 20 mg tablet Instructed patient per Anesthesia Guidelines  ACE/ARB Med Class   Continue this medication up to the evening before surgery/procedure, but do not take the morning of the day of surgery  Diuretic Med Class   Continue this medication up to the evening before surgery/procedure, but do not take the morning of the day of surgery  Acetaminophen Med Class   Continue to take this medication on your normal schedule  If this is an oral medication and you take it in the morning, then you may take this medicine with a sip of water  Antiepileptic Med Class   Continue to take this medication on your normal schedule    If this is an oral medication and you take it in the morning, then you may take this medicine with a sip of water  Antiparkinsons Med Class   Continue to take this medication on your normal schedule  If this is an oral medication and you take it in the morning, then you may take this medicine with a sip of water  Direct Xa Inhibitor Med Class   Stop taking this medication at least 3 days prior to surgery/procedure with prescribing Physician and Surgeon consultation  Methotrexate Med Class   Continue to take this medication on your normal schedule  If this is an oral medication and you take it in the morning, then you may take this medicine with a sip of water  Statin Med Class   Continue to take this medication on your normal schedule  If this is an oral medication and you take it in the morning, then you may take this medicine with a sip of water  Thyroxine Med Class   Continue to take this medication on your normal schedule  If this is an oral medication and you take it in the morning, then you may take this medicine with a sip of water  Reviewed with Pt, shower instructions, med instructions and transferred Pt to Bk Jarquin  for DOS time  Pt stated she stopped taking her Xaralto Tuesday 1/22/19   Pt verbalized understanding to all of the above

## 2019-01-24 ENCOUNTER — ANESTHESIA (OUTPATIENT)
Dept: PERIOP | Facility: HOSPITAL | Age: 84
End: 2019-01-24
Payer: MEDICARE

## 2019-01-24 ENCOUNTER — APPOINTMENT (OUTPATIENT)
Dept: RADIOLOGY | Facility: HOSPITAL | Age: 84
End: 2019-01-24
Payer: MEDICARE

## 2019-01-24 ENCOUNTER — HOSPITAL ENCOUNTER (OUTPATIENT)
Facility: HOSPITAL | Age: 84
Setting detail: OUTPATIENT SURGERY
Discharge: HOME | End: 2019-01-24
Attending: SURGERY | Admitting: SURGERY
Payer: MEDICARE

## 2019-01-24 VITALS
TEMPERATURE: 99.8 F | DIASTOLIC BLOOD PRESSURE: 61 MMHG | HEIGHT: 61 IN | WEIGHT: 218 LBS | OXYGEN SATURATION: 99 % | HEART RATE: 84 BPM | RESPIRATION RATE: 18 BRPM | BODY MASS INDEX: 41.16 KG/M2 | SYSTOLIC BLOOD PRESSURE: 166 MMHG

## 2019-01-24 DIAGNOSIS — I70.221 ATHEROSCLEROSIS OF NATIVE ARTERY OF RIGHT LOWER EXTREMITY WITH REST PAIN (HCC): ICD-10-CM

## 2019-01-24 DIAGNOSIS — I70.229 ATHEROSCLEROSIS OF ARTERY OF EXTREMITY WITH REST PAIN (HCC): ICD-10-CM

## 2019-01-24 LAB
GLUCOSE SERPL-MCNC: 113 MG/DL (ref 65–140)
GLUCOSE SERPL-MCNC: 118 MG/DL (ref 65–140)

## 2019-01-24 PROCEDURE — C1769 GUIDE WIRE: HCPCS | Performed by: SURGERY

## 2019-01-24 PROCEDURE — 76937 US GUIDE VASCULAR ACCESS: CPT

## 2019-01-24 PROCEDURE — C1876 STENT, NON-COA/NON-COV W/DEL: HCPCS | Performed by: SURGERY

## 2019-01-24 PROCEDURE — 75736 ARTERY X-RAYS PELVIS: CPT

## 2019-01-24 PROCEDURE — C1725 CATH, TRANSLUMIN NON-LASER: HCPCS | Performed by: SURGERY

## 2019-01-24 PROCEDURE — 82948 REAGENT STRIP/BLOOD GLUCOSE: CPT

## 2019-01-24 PROCEDURE — C1760 CLOSURE DEV, VASC: HCPCS | Performed by: SURGERY

## 2019-01-24 PROCEDURE — 37221 PR REVASCULARIZE ILIAC ARTERY,ANGIOPLASTY/STENT, INITIAL VESSEL: CPT | Performed by: SURGERY

## 2019-01-24 PROCEDURE — C1894 INTRO/SHEATH, NON-LASER: HCPCS | Performed by: SURGERY

## 2019-01-24 PROCEDURE — C1887 CATHETER, GUIDING: HCPCS | Performed by: SURGERY

## 2019-01-24 PROCEDURE — 75710 ARTERY X-RAYS ARM/LEG: CPT | Performed by: SURGERY

## 2019-01-24 DEVICE — IMPLANTABLE DEVICE: Type: IMPLANTABLE DEVICE | Site: ARTERIAL | Status: FUNCTIONAL

## 2019-01-24 DEVICE — CLOSURE DEVICE MYNX ACE 6F/7FR: Type: IMPLANTABLE DEVICE | Site: ARTERIAL | Status: FUNCTIONAL

## 2019-01-24 RX ORDER — HYDROMORPHONE HCL/PF 1 MG/ML
0.5 SYRINGE (ML) INJECTION
Status: DISCONTINUED | OUTPATIENT
Start: 2019-01-24 | End: 2019-01-24 | Stop reason: HOSPADM

## 2019-01-24 RX ORDER — MIDAZOLAM HYDROCHLORIDE 1 MG/ML
INJECTION INTRAMUSCULAR; INTRAVENOUS AS NEEDED
Status: DISCONTINUED | OUTPATIENT
Start: 2019-01-24 | End: 2019-01-24 | Stop reason: SURG

## 2019-01-24 RX ORDER — ASPIRIN 325 MG
325 TABLET ORAL ONCE
Status: COMPLETED | OUTPATIENT
Start: 2019-01-24 | End: 2019-01-24

## 2019-01-24 RX ORDER — FENTANYL CITRATE/PF 50 MCG/ML
25 SYRINGE (ML) INJECTION
Status: DISCONTINUED | OUTPATIENT
Start: 2019-01-24 | End: 2019-01-24 | Stop reason: HOSPADM

## 2019-01-24 RX ORDER — FENTANYL CITRATE 50 UG/ML
INJECTION, SOLUTION INTRAMUSCULAR; INTRAVENOUS AS NEEDED
Status: DISCONTINUED | OUTPATIENT
Start: 2019-01-24 | End: 2019-01-24 | Stop reason: SURG

## 2019-01-24 RX ORDER — OXYCODONE HYDROCHLORIDE AND ACETAMINOPHEN 5; 325 MG/1; MG/1
1 TABLET ORAL EVERY 6 HOURS PRN
Status: DISCONTINUED | OUTPATIENT
Start: 2019-01-24 | End: 2019-01-24 | Stop reason: HOSPADM

## 2019-01-24 RX ORDER — SODIUM CHLORIDE, SODIUM LACTATE, POTASSIUM CHLORIDE, CALCIUM CHLORIDE 600; 310; 30; 20 MG/100ML; MG/100ML; MG/100ML; MG/100ML
INJECTION, SOLUTION INTRAVENOUS CONTINUOUS PRN
Status: DISCONTINUED | OUTPATIENT
Start: 2019-01-24 | End: 2019-01-24 | Stop reason: SURG

## 2019-01-24 RX ORDER — LIDOCAINE HYDROCHLORIDE 10 MG/ML
INJECTION, SOLUTION INFILTRATION; PERINEURAL AS NEEDED
Status: DISCONTINUED | OUTPATIENT
Start: 2019-01-24 | End: 2019-01-24 | Stop reason: HOSPADM

## 2019-01-24 RX ORDER — LABETALOL HYDROCHLORIDE 5 MG/ML
INJECTION, SOLUTION INTRAVENOUS AS NEEDED
Status: DISCONTINUED | OUTPATIENT
Start: 2019-01-24 | End: 2019-01-24 | Stop reason: SURG

## 2019-01-24 RX ORDER — FENTANYL CITRATE/PF 50 MCG/ML
50 SYRINGE (ML) INJECTION
Status: DISCONTINUED | OUTPATIENT
Start: 2019-01-24 | End: 2019-01-24 | Stop reason: HOSPADM

## 2019-01-24 RX ORDER — LABETALOL 20 MG/4 ML (5 MG/ML) INTRAVENOUS SYRINGE
10 EVERY 4 HOURS PRN
Status: DISCONTINUED | OUTPATIENT
Start: 2019-01-24 | End: 2019-01-24 | Stop reason: SDUPTHER

## 2019-01-24 RX ORDER — HEPARIN SODIUM 1000 [USP'U]/ML
INJECTION, SOLUTION INTRAVENOUS; SUBCUTANEOUS AS NEEDED
Status: DISCONTINUED | OUTPATIENT
Start: 2019-01-24 | End: 2019-01-24 | Stop reason: SURG

## 2019-01-24 RX ORDER — SODIUM CHLORIDE 9 MG/ML
125 INJECTION, SOLUTION INTRAVENOUS CONTINUOUS
Status: CANCELLED | OUTPATIENT
Start: 2019-01-24

## 2019-01-24 RX ORDER — SODIUM CHLORIDE, SODIUM LACTATE, POTASSIUM CHLORIDE, CALCIUM CHLORIDE 600; 310; 30; 20 MG/100ML; MG/100ML; MG/100ML; MG/100ML
125 INJECTION, SOLUTION INTRAVENOUS CONTINUOUS
Status: CANCELLED | OUTPATIENT
Start: 2019-01-24

## 2019-01-24 RX ORDER — LABETALOL 20 MG/4 ML (5 MG/ML) INTRAVENOUS SYRINGE
10
Status: DISCONTINUED | OUTPATIENT
Start: 2019-01-24 | End: 2019-01-24 | Stop reason: HOSPADM

## 2019-01-24 RX ORDER — SODIUM CHLORIDE 9 MG/ML
125 INJECTION, SOLUTION INTRAVENOUS CONTINUOUS
Status: DISPENSED | OUTPATIENT
Start: 2019-01-24 | End: 2019-01-24

## 2019-01-24 RX ORDER — SODIUM CHLORIDE, SODIUM LACTATE, POTASSIUM CHLORIDE, CALCIUM CHLORIDE 600; 310; 30; 20 MG/100ML; MG/100ML; MG/100ML; MG/100ML
INJECTION, SOLUTION INTRAVENOUS CONTINUOUS PRN
Status: DISCONTINUED | OUTPATIENT
Start: 2019-01-24 | End: 2019-01-24

## 2019-01-24 RX ORDER — HYDRALAZINE HYDROCHLORIDE 20 MG/ML
10 INJECTION INTRAMUSCULAR; INTRAVENOUS EVERY 8 HOURS PRN
Status: DISCONTINUED | OUTPATIENT
Start: 2019-01-24 | End: 2019-01-24 | Stop reason: HOSPADM

## 2019-01-24 RX ADMIN — FENTANYL CITRATE 37.5 MCG: 50 INJECTION, SOLUTION INTRAMUSCULAR; INTRAVENOUS at 12:07

## 2019-01-24 RX ADMIN — LABETALOL HYDROCHLORIDE 5 MG: 5 INJECTION, SOLUTION INTRAVENOUS at 11:01

## 2019-01-24 RX ADMIN — FENTANYL CITRATE 25 MCG: 50 INJECTION, SOLUTION INTRAMUSCULAR; INTRAVENOUS at 11:53

## 2019-01-24 RX ADMIN — HEPARIN SODIUM 4000 UNITS: 1000 INJECTION INTRAVENOUS; SUBCUTANEOUS at 11:10

## 2019-01-24 RX ADMIN — ASPIRIN 325 MG: 325 TABLET ORAL at 17:40

## 2019-01-24 RX ADMIN — MIDAZOLAM 0.5 MG: 1 INJECTION INTRAMUSCULAR; INTRAVENOUS at 10:34

## 2019-01-24 RX ADMIN — SODIUM CHLORIDE, SODIUM LACTATE, POTASSIUM CHLORIDE, AND CALCIUM CHLORIDE: .6; .31; .03; .02 INJECTION, SOLUTION INTRAVENOUS at 10:15

## 2019-01-24 RX ADMIN — SODIUM CHLORIDE 125 ML/HR: 0.9 INJECTION, SOLUTION INTRAVENOUS at 13:49

## 2019-01-24 RX ADMIN — FENTANYL CITRATE 12.5 MCG: 50 INJECTION, SOLUTION INTRAMUSCULAR; INTRAVENOUS at 10:23

## 2019-01-24 RX ADMIN — FENTANYL CITRATE 12.5 MCG: 50 INJECTION, SOLUTION INTRAMUSCULAR; INTRAVENOUS at 11:16

## 2019-01-24 RX ADMIN — FENTANYL CITRATE 12.5 MCG: 50 INJECTION, SOLUTION INTRAMUSCULAR; INTRAVENOUS at 10:34

## 2019-01-24 RX ADMIN — MIDAZOLAM 0.5 MG: 1 INJECTION INTRAMUSCULAR; INTRAVENOUS at 10:23

## 2019-01-24 RX ADMIN — LABETALOL HYDROCHLORIDE 5 MG: 5 INJECTION, SOLUTION INTRAVENOUS at 12:16

## 2019-01-24 NOTE — H&P (VIEW-ONLY)
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 ([facility])  -     Basic metabolic panel; Future  -     CBC and Platelet; Future  -     Protime-INR;  Future  -reviewed LEADs which show R: 0 41/0/0 and L: 0 91/91/113 (prior R: 0 34/-/- and L: 1 11/86/76 R femoral monophasic) diffuse fem/pop disease B w/o focal stenossi  -reviewed AOIL which shows 50-75% stenosis of R DAVID and >75% R EIA stenosis; also celiac and SMA stenoses  -patient has multiple etiologies of her leg symptoms including CHF/edema, neuropathy/DM, and PAD; she is having what sounds like R leg rest pain intermittently at night which is waking her from sleep  -discussed pathophysiology of PAD and options for treatment; at this point, I have recommended RLE angiogram with intervention to improve perfusion; did discuss risks including contrast nephropathy given CKD and hx nephrectomy, increased RLE edema, fluid overload/CHF exacerbation  -plan for RLE angiogram, L femoral, possible R femoral access  -labs; will give pre/post hydration    Diabetes mellitus type 2 in obese ([facility])  Polyneuropathy associated with underlying disease ([facility] [address] )  -neuropathy and OA/RA are complicating factors in determining her PAD symptoms/pain but contribute to her discomfort    Chronic diastolic heart failure ([facility] [address] )  Chronic atrial fibrillation ([facility] [address] )  -recently admitted for CHF exacerbation  -patient feels that she continues to have fluid overload and increased edema despite no SOB  -suggested patient call her cardiologist to discuss symptoms    CKD (chronic kidney disease) stage 3, GFR 30-59 ml/min ([facility])  -hx of nephrectomy for malignancy  -CKD, baseline Cr: 1 5    Bilateral edema of lower extremity  -likely multifactorial in origin with venous disease, lymphedema, CHF  -continue to use compression (using daily) and lymphedema pumps (hasn't used since getting out of rehab after surgery in 4/18) and elevating legs; discussed increased activity, healthy eating and weight loss    Medications  -taking Xarelto for afib  -reports ASA allergy of stomach upset  -cont statin for life      Other orders  -     Notify Physician in PT/INR greater than 1 8 and/or Creatine Clearance (gFR)  is less than 60  ml/oren/1 73sq m; Standing  -     Height and Weight Upon Arrival; Standing  -     Nursing communcation Apply snapless gown prior to procedure; Standing  -     Have Patient Void On Call to Procedure Room; Standing  -     Insert and Maintain IV; Standing  -     IR abdominal angiography; Standing  -     sodium chloride 0 9 % infusion; Infuse 100 mL/hr into a venous catheter continuous   -     IR abdominal angiography  -     Notify Physician in PT/INR greater than 1 8 and/or Creatine Clearance (gFR)  is less than 60  ml/oren/1 73sq m; Standing  -     Height and Weight Upon Arrival; Standing  -     Nursing communcation Apply snapless gown prior to procedure; Standing  -     Have Patient Void On Call to Procedure Room; Standing  -     Insert and Maintain IV; Standing      Operative Scheduling Information:    Hospital:  IR    Physician:  Me    Surgery: RLE angiogram, L femoral, possible R femoral access    Urgency:  Standard    Case Length:  Normal    Post-op Bed:  Outpatient    OR Table:  IR    Equipment Needs:  None    Medication Instructions:  Xarelto:  Hold for 2 days prior to procedure    Hydration:  100 cc/hr for 2 hour prior and 4 hours after procedure      Subjective:      Patient ID: Brad Boyce is a 81 y o  female  Patient presents today for evaluation of right leg pain  She states that she has also been experiencing burning and cramping  Denies any burning and cramping while walking  She has a some slight redness to the left shin   Denies any open wounds, sores and coldness  She also complains of pretty significant leg swelling, with the left leg being worse  HPI:    Pt presents to discuss leg symptoms  In April, patient was hospitalized for robotic nephrectomy for renal cancer  No further treatment planned  Hospitalized again recently for BLE edema; diuretics adjusted    Today, patient notes BLE edema which has been present for about 5 yrs  She wears compression daily  She elevates her legs when she can on her recliner  She has lymphedema pumps but hasn't used these since dispo from the hospital in April  She also complains of neuropathy of the B fingers and toes, as well as the B thigh  She has burning of soles of feet at night  She also has joint pain from arthritis  She walks with a walker and get generalized tiredness after short distances  She mostly walks within her home  She does not do grocery shopping  She also notes R leg pain, different from her left which occurs at night and sometimes wakes her from sleep  The following portions of the patient's history were reviewed and updated as appropriate: allergies, current medications, past family history, past medical history, past social history, past surgical history and problem list     Review of Systems   Constitutional: Negative  HENT: Negative  Eyes: Negative  Respiratory: Negative  Cardiovascular: Positive for leg swelling  Gastrointestinal: Negative  Endocrine: Negative  Genitourinary: Negative  Musculoskeletal: Positive for gait problem (uses walker)  R foot pain   Skin: Negative  Negative for wound  Allergic/Immunologic: Negative  Neurological: Positive for numbness (neuropathy B thighs)  Negative for weakness  Hematological: Negative  Psychiatric/Behavioral: Negative            Objective:      /80 (BP Location: Right arm, Patient Position: Sitting)   Pulse 72   Temp (!) 97 2 °F (36 2 °C) (Tympanic)   Ht 5' 1" (1 549 m)   Wt 97 5 kg (215 lb)   LMP  (LMP Unknown)   BMI 40 62 kg/m²          Physical Exam   Constitutional: She is oriented to person, place, and time  She appears well-developed and well-nourished  HENT:   Head: Normocephalic and atraumatic  Eyes: Conjunctivae are normal    Neck: Normal range of motion  Neck supple  Cardiovascular: Normal rate, regular rhythm and normal heart sounds  No murmur heard  Pulses:       Radial pulses are 2+ on the right side, and 2+ on the left side  Femoral pulses are 0 on the right side, and 2+ on the left side  Popliteal pulses are 0 on the right side, and 0 on the left side  Dorsalis pedis pulses are 0 on the right side, and 2+ on the left side  Posterior tibial pulses are 0 on the right side, and 0 on the left side  No carotid bruits B   Pulmonary/Chest: Effort normal and breath sounds normal    Abdominal: Soft  She exhibits no distension  There is no tenderness  There is no rebound  Musculoskeletal: Normal range of motion  She exhibits edema (3+ pitting edema BLE, mostly foot/ankle/lower leg sparing thighs)  Neurological: She is alert and oriented to person, place, and time  Skin: Skin is warm and dry  No foot wounds  sparse spiders; no large varicosities   Psychiatric: She has a normal mood and affect  Her behavior is normal    Nursing note and vitals reviewed          Vitals:    12/31/18 1443   BP: 124/80   BP Location: Right arm   Patient Position: Sitting   Pulse: 72   Temp: (!) 97 2 °F (36 2 °C)   TempSrc: Tympanic   Weight: 97 5 kg (215 lb)   Height: 5' 1" (1 549 m)       Patient Active Problem List   Diagnosis    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 ([facility] [address] )    Essential hypertension    Rheumatoid arthritis ([address] )    Diabetes mellitus type 2 in obese (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 (HCC)    History of nephroureterectomy    Incisional hernia    Atrial fibrillation (HCC)    CKD (chronic kidney disease) stage 3, GFR 30-59 ml/min (HCC)    Chronic diastolic heart failure (HCC)    Fracture    Spontaneous dislocation of shoulder, left       Past Surgical History:   Procedure Laterality Date    APPENDECTOMY      ARTHROSCOPY WRIST Right     with release of transverse carpal ligament     BLADDER SURGERY      BLADDER SURGERY      BREAST SURGERY      left breast    BUNIONECTOMY Bilateral     CATARACT EXTRACTION Bilateral     CHOLECYSTECTOMY      COLONOSCOPY      CYSTOSCOPY      EGD AND COLONOSCOPY N/A 12/20/2017    Procedure: EGD AND COLONOSCOPY;  Surgeon: Cristina Garcia MD;  Location: [facility]; Service: Gastroenterology    ESOPHAGOGASTRODUODENOSCOPY      diagnostic    FOREARM SURGERY Right     fracture repair    HYSTERECTOMY      JOINT REPLACEMENT      KNEE ARTHROSCOPY Left     KNEE SURGERY Left     meniscus tear    NOSE SURGERY      FL CYSTO/URETERO W/LITHOTRIPSY &INDWELL STENT INSRT Right 2/28/2018    Procedure: CYSTOSCOPY RIGHT URETEROSCOPY, RIGHT RETROGRADE PYELOGRAM AND INSERTION  RIGHT STENT URETERAL, RIGHT RENAL PELVIC WASHING FOR CYTOLOGY;  Surgeon: Sailaja Cardoso MD;  [facility]: [facility];  Service: Urology    FL NEPHRECTOMY, W/PART   URETECTOMY Right 4/23/2018    Procedure: ROBATIC ASSISTED NEPHRO-URETERECTOMY WITH BLADDER CUFF EXCISION;  Surgeon: Kayce Mejía MD;  [facility];  Service: Urology    REPLACEMENT TOTAL KNEE BILATERAL Bilateral     URETEROSCOPY Right 3/20/2018    Procedure: CYSTOSCOPY, RETROGRADE PYELOGRAM, URETEROSCOPY, BIOPSY, BRUSH, FULGURATION, STENT PLACEMENT, BLADDER BIOPSY WITH FULGURATION;  Surgeon: Naomy Villa MD;  [facility];  Service: Urology       Family History   Problem Relation Age of Onset    Other Mother         epilepsy    Early death Mother    [facility] Glaucoma Father     Stroke Father         silent    Other Brother         cardiac disorder       Social History     Social History    Marital status: /Civil Union     Spouse name: N/A    Number of children: N/A    Years of education: N/A     Occupational History    Not on file  Social History Main Topics    Smoking status: Never Smoker    Smokeless tobacco: Never Used      Comment: stopped smoking in the distant past, never smoker (as per [employer])     Alcohol use No      Comment: seldom    Drug use: No    Sexual activity: Not on file     Other Topics Concern    Not on file     Social History Narrative    No caffeine use       Allergies   Allergen Reactions    Acetazolamide Other (See Comments)     Other reaction(s): Unknown Allergic Reaction    Aspirin      Other reaction(s): Other (See Comments)  High Dose ASA-stomach ache, rachel  ASA 81 m    Atorvastatin      Other reaction(s): Muscle Pain, Myalgia    Azithromycin     Nitrofurantoin Hives     HIVES * pt denies  Other reaction(s): Unknown Allergic Reaction  Other reaction(s): Other (See Comments)  HIVES * pt denies    Other Other (See Comments)     Adhesive tape : red and itching  Other reaction(s):  Other (See Comments)  red and itching    Shellfish-Derived Products Other (See Comments)     Patient got Gout following eating shell fish    Sulfa Antibiotics Other (See Comments)     unknown    Tramadol Diarrhea and Vomiting         Current Outpatient Prescriptions:     acetaminophen (TYLENOL) 325 mg tablet, Take 650 mg by mouth every 6 (six) hours as needed for mild pain, Disp: , Rfl:    Calcium Citrate-Vitamin D (CALCIUM + D PO), Take 1 tablet by mouth 2 (two) times a day, Disp: , Rfl:     cyanocobalamin (VITAMIN B-12) 100 mcg tablet, Take by mouth, Disp: , Rfl:     docusate sodium (COLACE) 100 mg capsule, Take 1 capsule (100 mg total) by mouth 2 (two) times a day for 60 doses, Disp: 90 capsule, Rfl: 0    gabapentin (NEURONTIN) 100 mg capsule, TAKE 1 CAPSULE IN THE MORNING, TAKE 1 CAPSULE IN THE AFTERNOON AND TAKE 1 TO 3 CAPSULES AT BEDTIME, Disp: 450 capsule, Rfl: 1    hydroxychloroquine (PLAQUENIL) 200 mg tablet, Take 200 mg by mouth 2 (two) times a day with meals, Disp: , Rfl:     LEUCOVORIN CALCIUM PO, Take 10 mg by mouth once a week, Disp: , Rfl:     levothyroxine 75 mcg tablet, TAKE 1 TABLET EVERY DAY, Disp: 90 tablet, Rfl: 3    losartan (COZAAR) 25 mg tablet, TAKE 1 TABLET EVERY DAY, Disp: 90 tablet, Rfl: 0    methotrexate 2 5 mg tablet, Uses 4 a week via Rheumatology [facility], Disp: 12 tablet, Rfl: 0    Multiple Vitamin (MULTIVITAMINS PO), Take 1 tablet by mouth daily, Disp: , Rfl:     omeprazole (PriLOSEC) 20 mg delayed release capsule, TAKE 1 CAPSULE EVERY DAY AS NEEDED FOR  STOMACH  ACID, Disp: 90 capsule, Rfl: 1    pravastatin (PRAVACHOL) 80 mg tablet, TAKE 1 TABLET EVERY DAY, Disp: 90 tablet, Rfl: 3    rivaroxaban (XARELTO) 15 mg tablet, Take 1 tablet (15 mg total) by mouth daily with breakfast, Disp: 90 tablet, Rfl: 1    rOPINIRole (REQUIP) 0 5 mg tablet, TAKE 2 TABLETS AT BEDTIME AND 1 TABLET IN THE MORNING, Disp: 270 tablet, Rfl: 3    torsemide (DEMADEX) 20 mg tablet, TAKE 2 TABLETS EVERY DAY, Disp: 180 tablet, Rfl: 0  No current facility-administered medications for this visit       Facility-Administered Medications Ordered in Other Visits:     acetaminophen (TYLENOL) tablet 650 mg, 650 mg, Oral, Q6H PRN, Twila Serrato PA-C

## 2019-01-24 NOTE — DISCHARGE INSTRUCTIONS
Please restart your Xarelto tomorrow AM on 1/25/19  Please start taking aspirin 81mg once daily  This should start tomorrow  Your first dose was given in the hospital after your procedure  Please try the one labeled EC or "enteric coated" as this is gentler on the stomach        DISCHARGE INSTRUCTIONS  ARTERIOGRAM/ANGIOPLASTY/STENT    Following discharge from the hospital, you may have some questions about your procedure, your activities or your general condition  These instructions may answer some of your questions and help you adjust during the first few weeks following your operation  ACTIVITY: The evening following the procedure you should be sure someone remains with you until the next morning  Rest as much as possible, sitting, lying or reclining  Use the stairs as little as possible  The following day, limit your activity to walking  Avoid stooping or heavy lifting (no more than 30 lbs) for the first three days  Walking up steps and normal activities may be resumed as you feel ready  You should not drive a car for at least two days following discharge from the hospital  You may ride in a car  If you have any questions regarding a particular activity, please discuss with your doctor or nurse before you are discharged  DIET:  Resume your normal diet  Try to eat low fat and low cholesterol foods  Drink more liquids than usual for the next 24 hours  INCISION: Your doctor may have chosen to use a type of adhesive glue, to close your incision  The glue is used to cover the incision, assist in closure, and prevent contamination  This adhesive will darken and peel away on its own within one to two weeks  You may shower after the procedure, but do not scrub the incision  Sitting in a tub is not recommended for the two days following the procedure or if you have any open wounds  It is normal to have some bruising, swelling or mild discoloration around the incision    IF increasing redness or pain develops, call our office immediately  If present, you may remove the band-aid or steri-strips over your wound after two days  If you notice any active bleeding at the site, apply pressure to the site and call our office ([phone])  FOLLOW UP STUDIES:  Your doctor will discuss whether further treatments or follow-up studies are necessary at your first post procedure visit  PLEASE CALL THE OFFICE IF YOU HAVE ANY QUESTIONS  [phone] [facility] [phone]  [address] [facility] , [address], [place], [address]  [address], [place], [address]  [address], [place], [address]  [address] [facility], [facility], [address], [facility], [address]    [address]th [address], [address]  [address] [facility] [address]   [address] [facility], [address] [facility], [address] [facility]  [address] [facility], [address] [facility], [place] [address]

## 2019-01-24 NOTE — ANESTHESIA PREPROCEDURE EVALUATION
Review of Systems/Medical History  Patient summary reviewed  Chart reviewed      Cardiovascular  EKG reviewed, Hyperlipidemia, Hypertension , Dysrhythmias , atrial fibrillation, CHF ,    Pulmonary  Sleep apnea ,        GI/Hepatic    GERD ,  Hiatal hernia,        Negative  ROS        Endo/Other  Diabetes , History of thyroid disease ,   Obesity    GYN       Hematology  Anemia ,     Musculoskeletal  Rheumatoid arthritis ,   Arthritis     Neurology    CVA ,    Psychology   Negative psychology ROS              Physical Exam    Airway    Mallampati score: II  TM Distance: >3 FB  Neck ROM: full     Dental   No notable dental hx     Cardiovascular  Rhythm: regular, Rate: normal,     Pulmonary  Pulmonary exam normal     Other Findings        Anesthesia Plan  ASA Score- 3     Anesthesia Type- IV sedation with anesthesia with ASA Monitors  Additional Monitors:   Airway Plan:         Plan Factors-Patient not instructed to abstain from smoking on day of procedure  Patient did not smoke on day of surgery  Induction- intravenous  Postoperative Plan-     Informed Consent- Anesthetic plan and risks discussed with patient  I personally reviewed this patient with the CRNA  Discussed and agreed on the Anesthesia Plan with the CRNA  Ambika Keane

## 2019-01-28 ENCOUNTER — APPOINTMENT (OUTPATIENT)
Dept: PHYSICAL THERAPY | Facility: REHABILITATION | Age: 84
End: 2019-01-28
Payer: MEDICARE

## 2019-01-28 DIAGNOSIS — I50.33 ACUTE ON CHRONIC DIASTOLIC CONGESTIVE HEART FAILURE (HCC): ICD-10-CM

## 2019-01-31 ENCOUNTER — APPOINTMENT (OUTPATIENT)
Dept: PHYSICAL THERAPY | Facility: REHABILITATION | Age: 84
End: 2019-01-31
Payer: MEDICARE

## 2019-02-05 ENCOUNTER — TELEPHONE (OUTPATIENT)
Dept: CARDIOLOGY CLINIC | Facility: CLINIC | Age: 84
End: 2019-02-05

## 2019-02-05 ENCOUNTER — OFFICE VISIT (OUTPATIENT)
Dept: CARDIOLOGY CLINIC | Facility: CLINIC | Age: 84
End: 2019-02-05
Payer: MEDICARE

## 2019-02-05 VITALS
SYSTOLIC BLOOD PRESSURE: 104 MMHG | HEART RATE: 96 BPM | BODY MASS INDEX: 41.16 KG/M2 | OXYGEN SATURATION: 95 % | WEIGHT: 218 LBS | DIASTOLIC BLOOD PRESSURE: 62 MMHG | HEIGHT: 61 IN

## 2019-02-05 DIAGNOSIS — I50.32 CHRONIC DIASTOLIC CONGESTIVE HEART FAILURE (HCC): Primary | ICD-10-CM

## 2019-02-05 PROCEDURE — 99214 OFFICE O/P EST MOD 30 MIN: CPT | Performed by: INTERNAL MEDICINE

## 2019-02-05 NOTE — LETTER
February 5, 2019     Kari Reyes MD  [id number]  [address] [facility] [place], [place]    Patient: Jolene Hunt   YOB: 1935   Date of Visit: 2/5/2019       Dear Dr Herlinda Aase: Thank you for referring Toro Kate to me for evaluation  Below are my notes for this consultation  If you have questions, please do not hesitate to call me  I look forward to following your patient along with you           Sincerely,        Leon Rdz MD        CC: No Recipients  Leon Rdz MD  2/5/2019  1:35 PM  Sign at close encounter  Heart Failure Outpatient Progress Note - Jolene Hunt 81 y o  female MRN: [id number]    @ Encounter: [id number]  Assessment/Plan:    Patient Active Problem List    Diagnosis Date Noted    Atherosclerosis of native artery of right lower extremity with rest pain ([facility] ) 01/21/2019    Abdominal wall hernia 01/02/2019    Fracture 09/18/2018    Spontaneous dislocation of shoulder, left 09/18/2018    Chronic diastolic heart failure ([facility] ) 08/23/2018    CKD (chronic kidney disease) stage 3, GFR 30-59 ml/min ([facility]) 08/12/2018    Atrial fibrillation ([facility] ) 08/11/2018    Incisional hernia 06/28/2018    History of nephroureterectomy 06/21/2018    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 ([facility] ) 06/04/2018    Anemia 04/25/2018    Pancreatic cyst 03/14/2018    Iron deficiency anemia 03/14/2018    Urothelial cancer ([facility] ) 03/02/2018    Lung nodule < 6cm on CT 03/02/2018    Chronic bilateral thoracic back pain 02/12/2018    Thoracic degenerative disc disease 02/12/2018    Sinus arrhythmia 01/03/2018    Peripheral neuropathy 11/09/2017    Right lumbar radiculopathy 11/09/2017    Primary osteoarthritis of left knee 10/13/2017    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 ([facility] ) 05/10/2017    Essential hypertension 05/10/2017    Rheumatoid arthritis ([address] ) 05/10/2017    Diabetes mellitus type 2 in obese ([facility] ) 05/10/2017    Sleep apnea 05/10/2017    Acquired hypothyroidism 05/10/2017    Chronic GERD 05/10/2017    Restless leg syndrome 01/05/2016    Sensorineural hearing loss 10/12/2014    Hypercholesterolemia 08/29/2013    Obesity 07/18/2013     # Chronic HFpEF w/ Grade II diastolic dysfunction: No JVD on exam  Likely 2/2 to HTN (cLVH on TTE)  May also be 2/2 to plaquenil  Unable to exercise 2/2 to shoulder  LE edema partially from lymphedema, but weight increased ~12 lbs since last visit  Partially from inactivity and non-fluid weight gain  --Continue torsemide 20 mg PO qdaily as was urinating too much with 40 mg  Weight stable currently  --Continue BP control   # HTN: BP well controlled  Losartan decreased to 25 mg PO daily  # Hx of lymphedema: Uses her lymphedema machine 2x daily as able  # APCs: Continue current mgmt  Had 1 week holter w/o e/o AFib  Currently anticoagulated for CVA so would not   # Hx of CVA/TIA: Currently on Xarelto and pravastatin  # NICK on CPAP  # RA w/ +RF on plaquenil and methotrexate  # L shoulder dislocation: Continue PT and pain control    RTC in 6 months    HPI: Very pleasant 80 y/o woman w/ PMHx of HTN, HLD, chronic LE edema/lymphedema, anemia of chronic disease p/f establishing care and abnormal ECG  She states she feels well  She was followed at [facility]  and now lives closer to [place] [address] so is transferring her care  Denies any cardiac symptoms  Uses lymphedema machine  She had a CVA in 5/2017 and received tPA  Overall feels well from that  She had a 1 week holter that did not show any arrhythmia and has been on NOAC ever since the stroke  Holter showed NSR w/ APCs  She comes in today 4/6/18, for f/u  She had noted hematuria  CT A/P noted a R renal pelvis multifocal papillary lesions with pathology showing low grade Ta urothelial cancer  She comes in for preop evaluation  She continues to deny cardiac symptoms   She is more limited by knee pains  She is able to get around her home without difficulty  Today 7/10/18, she returns for f/u  She had her right robotic nephroureterectomy with bladder cuff  She currently has VNA coming to the home  Moving around better  Feeling stronger  Walking is difficult 2/2 to rheumatoid arthritis  Today 8/24/2018, she returns for post hospital D/C  She was admitted twice since her last visit  In the first admission she presented with chest tightness and dyspnea  On 8/10/18 underwent nuclear stress test with normal perfusion  She was diuresed and felt improved  Then she was readmitted with dyspnea and volume overload and diuresed again  Today she feels well  Today, 10/17/18, she returns for f/u  She states she has developed a hernia on the R side  She does not want to pursue surgery currently  She decreased her diuretics to 20 mg PO daily as she was urinating too much and was having accidents  She dislocated her L shoulder  Denies SOB with ambulation using her walker  Walking better overall around the house  Unable to stand too long due to her spine  Today 2/5/19, she returns for f/u  She feels about the same as before  She does not feel limited by SOB, but more MSK as noted before       Past Medical History:   Diagnosis Date    Anemia     Arthritis     rheumatoid    Benign essential hypertension     Cataract     Chronic cystitis     CPAP (continuous positive airway pressure) dependence     Diverticulitis of colon     Early satiety     GERD (gastroesophageal reflux disease)     Gout     Hearing aid worn     bilateral    Hearing loss     Hemorrhoids     Hiatal hernia     History of colonic polyps     Hyperlipidemia     Hypothyroidism     Left breast mass     Microhematuria     Migraine     occular    Neuropathy     lower and upper extermities    Overactive bladder     Pneumonia of left lower lobe due to infectious organism (HCC)     RA (rheumatoid arthritis) ([facility] )     Sleep apnea uses cpap    Stroke [facility])     5/2017    Type 2 diabetes mellitus (HCC)     Urinary frequency     Urinary urgency     Urothelial cancer ([facility] ) 04/23/2018    Use of cane as ambulatory aid        Review of Systems - 12 point ROS was done and is negative, except as noted above  Allergies   Allergen Reactions    Acetazolamide Other (See Comments)     Other reaction(s): Unknown Allergic Reaction    Aspirin      Other reaction(s): Other (See Comments)  High Dose ASA-stomach ache, rachel  ASA 81 m    Atorvastatin      Other reaction(s): Muscle Pain, Myalgia    Azithromycin     Nitrofurantoin Hives     HIVES * pt denies  Other reaction(s): Unknown Allergic Reaction  Other reaction(s): Other (See Comments)  HIVES * pt denies    Other Other (See Comments)     Adhesive tape : red and itching  Other reaction(s):  Other (See Comments)  red and itching    Shellfish-Derived Products Other (See Comments)     Patient got Gout following eating shell fish    Sulfa Antibiotics Other (See Comments)     unknown    Tramadol Diarrhea and Vomiting       Current Outpatient Prescriptions:     acetaminophen (TYLENOL) 325 mg tablet, Take 650 mg by mouth every 6 (six) hours as needed for mild pain, Disp: , Rfl:     Calcium Citrate-Vitamin D (CALCIUM + D PO), Take 1 tablet by mouth 2 (two) times a day, Disp: , Rfl:     cyanocobalamin (VITAMIN B-12) 100 mcg tablet, Take by mouth, Disp: , Rfl:     gabapentin (NEURONTIN) 100 mg capsule, TAKE 1 CAPSULE IN THE MORNING, TAKE 1 CAPSULE IN THE AFTERNOON AND TAKE 1 TO 3 CAPSULES AT BEDTIME, Disp: 450 capsule, Rfl: 1    hydroxychloroquine (PLAQUENIL) 200 mg tablet, Take 200 mg by mouth 2 (two) times a day with meals, Disp: , Rfl:     LEUCOVORIN CALCIUM PO, Take 10 mg by mouth once a week, Disp: , Rfl:     levothyroxine 75 mcg tablet, TAKE 1 TABLET EVERY DAY, Disp: 90 tablet, Rfl: 3    losartan (COZAAR) 25 mg tablet, TAKE 1 TABLET EVERY DAY, Disp: 90 tablet, Rfl: 0    methotrexate 2 5 mg tablet, Uses 4 a week via Rheumatology [facility], Disp: 12 tablet, Rfl: 0    Multiple Vitamin (MULTIVITAMINS PO), Take 1 tablet by mouth daily, Disp: , Rfl:     omeprazole (PriLOSEC) 20 mg delayed release capsule, TAKE 1 CAPSULE EVERY DAY AS NEEDED FOR  STOMACH  ACID, Disp: 90 capsule, Rfl: 1    pravastatin (PRAVACHOL) 80 mg tablet, TAKE 1 TABLET EVERY DAY, Disp: 90 tablet, Rfl: 3    rivaroxaban (XARELTO) 15 mg tablet, Take 1 tablet (15 mg total) by mouth daily with breakfast, Disp: 90 tablet, Rfl: 3    rOPINIRole (REQUIP) 0 5 mg tablet, TAKE 2 TABLETS AT BEDTIME AND 1 TABLET IN THE MORNING, Disp: 270 tablet, Rfl: 3    torsemide (DEMADEX) 20 mg tablet, TAKE 2 TABLETS EVERY DAY, Disp: 180 tablet, Rfl: 0    docusate sodium (COLACE) 100 mg capsule, Take 1 capsule (100 mg total) by mouth 2 (two) times a day for 60 doses, Disp: 90 capsule, Rfl: 0  No current facility-administered medications for this visit  Facility-Administered Medications Ordered in Other Visits:     acetaminophen (TYLENOL) tablet 650 mg, 650 mg, Oral, Q6H PRN, Marciano Suarez PA-C    Social History     Social History    Marital status: /Civil Union     Spouse name: N/A    Number of children: N/A    Years of education: N/A     Occupational History    Not on file       Social History Main Topics    Smoking status: Never Smoker    Smokeless tobacco: Never Used      Comment: stopped smoking in the distant past, never smoker (as per [employer])     Alcohol use No      Comment: seldom    Drug use: No    Sexual activity: Not on file     Other Topics Concern    Not on file     Social History Narrative    No caffeine use       Family History   Problem Relation Age of Onset    Other Mother         epilepsy    Early death Mother    [facility] Glaucoma Father     Stroke Father         silent    Other Brother         cardiac disorder       Physical Exam:    Vitals: Blood pressure 104/62, pulse 96, height 5' 1" (1 549 m), weight 98 9 kg (218 lb), SpO2 95 %  , Body mass index is 41 19 kg/m² ,   Wt Readings from Last 3 Encounters:   02/05/19 98 9 kg (218 lb)   01/24/19 98 9 kg (218 lb)   01/22/19 97 5 kg (215 lb)     Vitals:    02/05/19 1307   BP: 104/62   BP Location: Right arm   Patient Position: Sitting   Cuff Size: Standard   Pulse: 96   SpO2: 95%   Weight: 98 9 kg (218 lb)   Height: 5' 1" (1 549 m)       GEN: Lilliam Ramey appears well, alert and oriented x 3, pleasant and cooperative   HEENT: pupils equal, round, and reactive to light; extraocular muscles intact  NECK: supple, no carotid bruits   HEART: regular rhythm, normal S1 and S2, no murmurs, clicks, gallops or rubs, JVP is    LUNGS: clear to auscultation bilaterally; no wheezes, rales, or rhonchi   ABDOMEN: normal bowel sounds, soft, no tenderness, no distention  EXTREMITIES: peripheral pulses normal; no clubbing, cyanosis; + lymphedema  NEURO: no focal findings   SKIN: normal without suspicious lesions on exposed skin    Labs & Results:  Lab Results   Component Value Date    WBC 6 90 01/09/2019    HGB 9 5 (L) 01/09/2019    HCT 30 6 (L) 01/09/2019     (H) 01/09/2019     01/09/2019       Chemistry        Component Value Date/Time     11/14/2017 1151    K 4 2 01/09/2019 0955    K 3 7 11/14/2017 1151     01/09/2019 0955     11/14/2017 1151    CO2 27 01/09/2019 0955    CO2 29 11/14/2017 1151    BUN 34 (H) 01/09/2019 0955    BUN 18 11/14/2017 1151    CREATININE 1 67 (H) 01/09/2019 0955    CREATININE 0 88 11/14/2017 1151        Component Value Date/Time    CALCIUM 9 1 01/09/2019 0955    CALCIUM 8 9 11/14/2017 1151    ALKPHOS 100 08/20/2018 0628    ALKPHOS 95 11/14/2017 1151    AST 12 08/20/2018 0628    AST 24 11/14/2017 1151    ALT 14 08/20/2018 0628    ALT 19 11/14/2017 1151    BILITOT 1 2 11/14/2017 1151        EKG personally reviewed by Jarome Habermann, MD      Counseling / Coordination of Care  Total floor / unit time spent today 40 minutes    Greater than 50% of total time was spent with the patient and / or family counseling and / or coordination of care  A description of the counseling / coordination of care: 20 min  Thank you for the opportunity to participate in the care of this patient      Shaneka Townsend MD, PhD   Sixto Vinson

## 2019-02-05 NOTE — PROGRESS NOTES
Heart Failure Outpatient Progress Note - Melanie Patel 81 y o  female MRN: [id number]    @ Encounter: [id number]  Assessment/Plan:    Patient Active Problem List    Diagnosis Date Noted    Atherosclerosis of native artery of right lower extremity with rest pain ([facility] ) 01/21/2019    Abdominal wall hernia 01/02/2019    Fracture 09/18/2018    Spontaneous dislocation of shoulder, left 09/18/2018    Chronic diastolic heart failure ([facility] ) 08/23/2018    CKD (chronic kidney disease) stage 3, GFR 30-59 ml/min ([facility]) 08/12/2018    Atrial fibrillation ([facility] ) 08/11/2018    Incisional hernia 06/28/2018    History of nephroureterectomy 06/21/2018    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 ([place] [address] ) 06/04/2018    Anemia 04/25/2018    Pancreatic cyst 03/14/2018    Iron deficiency anemia 03/14/2018    Urothelial cancer ([place] [address] ) 03/02/2018    Lung nodule < 6cm on CT 03/02/2018    Chronic bilateral thoracic back pain 02/12/2018    Thoracic degenerative disc disease 02/12/2018    Sinus arrhythmia 01/03/2018    Peripheral neuropathy 11/09/2017    Right lumbar radiculopathy 11/09/2017    Primary osteoarthritis of left knee 10/13/2017    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 ([facility] ) 05/10/2017    Essential hypertension 05/10/2017    Rheumatoid arthritis ([place] [address] ) 05/10/2017    Diabetes mellitus type 2 in obese ([place] [address] ) 05/10/2017    Sleep apnea 05/10/2017    Acquired hypothyroidism 05/10/2017    Chronic GERD 05/10/2017    Restless leg syndrome 01/05/2016    Sensorineural hearing loss 10/12/2014    Hypercholesterolemia 08/29/2013    Obesity 07/18/2013     # Chronic HFpEF w/ Grade II diastolic dysfunction: No JVD on exam  Likely 2/2 to HTN (cLVH on TTE)  May also be 2/2 to plaquenil  Unable to exercise 2/2 to shoulder  LE edema partially from lymphedema, but weight increased ~12 lbs since last visit  Partially from inactivity and non-fluid weight gain     --Continue torsemide 20 mg PO qdaily as was urinating too much with 40 mg  Weight stable currently  --Continue BP control   # HTN: BP well controlled  Losartan decreased to 25 mg PO daily  # Hx of lymphedema: Uses her lymphedema machine 2x daily as able  # APCs: Continue current mgmt  Had 1 week holter w/o e/o AFib  Currently anticoagulated for CVA so would not   # Hx of CVA/TIA: Currently on Xarelto and pravastatin  # NICK on CPAP  # RA w/ +RF on plaquenil and methotrexate  # L shoulder dislocation: Continue PT and pain control    RTC in 3 months    HPI: Very pleasant 80 y/o woman w/ PMHx of HTN, HLD, chronic LE edema/lymphedema, anemia of chronic disease p/f establishing care and abnormal ECG  She states she feels well  She was followed at [facility] ([facility]) and now lives closer to [facility] so is transferring her care  Denies any cardiac symptoms  Uses lymphedema machine  She had a CVA in 5/2017 and received tPA  Overall feels well from that  She had a 1 week holter that did not show any arrhythmia and has been on NOAC ever since the stroke  Holter showed NSR w/ APCs  She comes in today 4/6/18, for f/u  She had noted hematuria  CT A/P noted a R renal pelvis multifocal papillary lesions with pathology showing low grade Ta urothelial cancer  She comes in for preop evaluation  She continues to deny cardiac symptoms  She is more limited by knee pains  She is able to get around her home without difficulty  Today 7/10/18, she returns for f/u  She had her right robotic nephroureterectomy with bladder cuff  She currently has VNA coming to the home  Moving around better  Feeling stronger  Walking is difficult 2/2 to rheumatoid arthritis  Today 8/24/2018, she returns for post hospital D/C  She was admitted twice since her last visit  In the first admission she presented with chest tightness and dyspnea  On 8/10/18 underwent nuclear stress test with normal perfusion   She was diuresed and felt improved  Then she was readmitted with dyspnea and volume overload and diuresed again  Today she feels well  Today, 10/17/18, she returns for f/u  She states she has developed a hernia on the R side  She does not want to pursue surgery currently  She decreased her diuretics to 20 mg PO daily as she was urinating too much and was having accidents  She dislocated her L shoulder  Denies SOB with ambulation using her walker  Walking better overall around the house  Unable to stand too long due to her spine  Today 2/5/19, she returns for f/u  She feels about the same as before  She does not feel limited by SOB, but more MSK as noted before  Past Medical History:   Diagnosis Date    Anemia     Arthritis     rheumatoid    Benign essential hypertension     Cataract     Chronic cystitis     CPAP (continuous positive airway pressure) dependence     Diverticulitis of colon     Early satiety     GERD (gastroesophageal reflux disease)     Gout     Hearing aid worn     bilateral    Hearing loss     Hemorrhoids     Hiatal hernia     History of colonic polyps     Hyperlipidemia     Hypothyroidism     Left breast mass     Microhematuria     Migraine     occular    Neuropathy     lower and upper extermities    Overactive bladder     Pneumonia of left lower lobe due to infectious organism ([facility] )     RA (rheumatoid arthritis) ([facility] )     Sleep apnea     uses cpap    Stroke ([facility] )     5/2017    Type 2 diabetes mellitus (HCC)     Urinary frequency     Urinary urgency     [email or web address] 04/23/2018    Use of cane as ambulatory aid        Review of Systems - 12 point ROS was done and is negative, except as noted above  Allergies   Allergen Reactions    Acetazolamide Other (See Comments)     Other reaction(s): Unknown Allergic Reaction    Aspirin      Other reaction(s): Other (See Comments)  High Dose ASA-stomach ache, rachel  ASA 81 m    Atorvastatin      Other reaction(s): Muscle Pain, Myalgia    Azithromycin     Nitrofurantoin Hives     HIVES * pt denies  Other reaction(s): Unknown Allergic Reaction  Other reaction(s): Other (See Comments)  HIVES * pt denies    Other Other (See Comments)     Adhesive tape : red and itching  Other reaction(s):  Other (See Comments)  red and itching    Shellfish-Derived Products Other (See Comments)     Patient got Gout following eating shell fish    Sulfa Antibiotics Other (See Comments)     unknown    Tramadol Diarrhea and Vomiting       Current Outpatient Prescriptions:     acetaminophen (TYLENOL) 325 mg tablet, Take 650 mg by mouth every 6 (six) hours as needed for mild pain, Disp: , Rfl:     Calcium Citrate-Vitamin D (CALCIUM + D PO), Take 1 tablet by mouth 2 (two) times a day, Disp: , Rfl:     cyanocobalamin (VITAMIN B-12) 100 mcg tablet, Take by mouth, Disp: , Rfl:     gabapentin (NEURONTIN) 100 mg capsule, TAKE 1 CAPSULE IN THE MORNING, TAKE 1 CAPSULE IN THE AFTERNOON AND TAKE 1 TO 3 CAPSULES AT BEDTIME, Disp: 450 capsule, Rfl: 1    hydroxychloroquine (PLAQUENIL) 200 mg tablet, Take 200 mg by mouth 2 (two) times a day with meals, Disp: , Rfl:     LEUCOVORIN CALCIUM PO, Take 10 mg by mouth once a week, Disp: , Rfl:     levothyroxine 75 mcg tablet, TAKE 1 TABLET EVERY DAY, Disp: 90 tablet, Rfl: 3    losartan (COZAAR) 25 mg tablet, TAKE 1 TABLET EVERY DAY, Disp: 90 tablet, Rfl: 0    methotrexate 2 5 mg tablet, Uses 4 a week via [facility], Disp: 12 tablet, Rfl: 0    Multiple Vitamin (MULTIVITAMINS PO), Take 1 tablet by mouth daily, Disp: , Rfl:     omeprazole (PriLOSEC) 20 mg delayed release capsule, TAKE 1 CAPSULE EVERY DAY AS NEEDED FOR  STOMACH  ACID, Disp: 90 capsule, Rfl: 1    pravastatin (PRAVACHOL) 80 mg tablet, TAKE 1 TABLET EVERY DAY, Disp: 90 tablet, Rfl: 3    rivaroxaban (XARELTO) 15 mg tablet, Take 1 tablet (15 mg total) by mouth daily with breakfast, Disp: 90 tablet, Rfl: 3    rOPINIRole (REQUIP) 0 5 mg tablet, TAKE 2 TABLETS AT BEDTIME AND 1 TABLET IN THE MORNING, Disp: 270 tablet, Rfl: 3    torsemide (DEMADEX) 20 mg tablet, TAKE 2 TABLETS EVERY DAY, Disp: 180 tablet, Rfl: 0    docusate sodium (COLACE) 100 mg capsule, Take 1 capsule (100 mg total) by mouth 2 (two) times a day for 60 doses, Disp: 90 capsule, Rfl: 0  No current facility-administered medications for this visit  Facility-Administered Medications Ordered in Other Visits:     acetaminophen (TYLENOL) tablet 650 mg, 650 mg, Oral, Q6H PRN, Twila Serrato PA-C    Social History     Social History    Marital status: /Civil Union     Spouse name: N/A    Number of children: N/A    Years of education: N/A     Occupational History    Not on file  Social History Main Topics    Smoking status: Never Smoker    Smokeless tobacco: Never Used      Comment: stopped smoking in the distant past, never smoker (as per [employer])     Alcohol use No      Comment: seldom    Drug use: No    Sexual activity: Not on file     Other Topics Concern    Not on file     Social History Narrative    No caffeine use       Family History   Problem Relation Age of Onset    Other Mother         epilepsy    Early death Mother    Vannesa Moreau Glaucoma Father     Stroke Father         silent    Other Brother         cardiac disorder       Physical Exam:    Vitals: Blood pressure 104/62, pulse 96, height 5' 1" (1 549 m), weight 98 9 kg (218 lb), SpO2 95 %  , Body mass index is 41 19 kg/m² ,   Wt Readings from Last 3 Encounters:   02/05/19 98 9 kg (218 lb)   01/24/19 98 9 kg (218 lb)   01/22/19 97 5 kg (215 lb)     Vitals:    02/05/19 1307   BP: 104/62   BP Location: Right arm   Patient Position: Sitting   Cuff Size: Standard   Pulse: 96   SpO2: 95%   Weight: 98 9 kg (218 lb)   Height: 5' 1" (1 549 m)       GEN: Lilliam Ramey appears well, alert and oriented x 3, pleasant and cooperative   HEENT: pupils equal, round, and reactive to light; extraocular muscles intact  NECK: supple, no carotid bruits   HEART: regular rhythm, normal S1 and S2, no murmurs, clicks, gallops or rubs, JVP is    LUNGS: clear to auscultation bilaterally; no wheezes, rales, or rhonchi   ABDOMEN: normal bowel sounds, soft, no tenderness, no distention  EXTREMITIES: peripheral pulses normal; no clubbing, cyanosis; + lymphedema  NEURO: no focal findings   SKIN: normal without suspicious lesions on exposed skin    Labs & Results:  Lab Results   Component Value Date    WBC 6 90 01/09/2019    HGB 9 5 (L) 01/09/2019    HCT 30 6 (L) 01/09/2019     (H) 01/09/2019     01/09/2019       Chemistry        Component Value Date/Time     11/14/2017 1151    K 4 2 01/09/2019 0955    K 3 7 11/14/2017 1151     01/09/2019 0955     11/14/2017 1151    CO2 27 01/09/2019 0955    CO2 29 11/14/2017 1151    BUN 34 (H) 01/09/2019 0955    BUN 18 11/14/2017 1151    CREATININE 1 67 (H) 01/09/2019 0955    CREATININE 0 88 11/14/2017 1151        Component Value Date/Time    CALCIUM 9 1 01/09/2019 0955    CALCIUM 8 9 11/14/2017 1151    ALKPHOS 100 08/20/2018 0628    ALKPHOS 95 11/14/2017 1151    AST 12 08/20/2018 0628    AST 24 11/14/2017 1151    ALT 14 08/20/2018 0628    ALT 19 11/14/2017 1151    BILITOT 1 2 11/14/2017 1151        EKG personally reviewed by Verna Delgado MD      Counseling / Coordination of Care  Total floor / unit time spent today 40 minutes  Greater than 50% of total time was spent with the patient and / or family counseling and / or coordination of care  A description of the counseling / coordination of care: 20 min  Thank you for the opportunity to participate in the care of this patient      Verna Delgado MD, PhD   Krishna Espino

## 2019-02-06 ENCOUNTER — TELEPHONE (OUTPATIENT)
Dept: CARDIOLOGY CLINIC | Facility: CLINIC | Age: 84
End: 2019-02-06

## 2019-02-07 ENCOUNTER — PROCEDURE VISIT (OUTPATIENT)
Dept: UROLOGY | Facility: MEDICAL CENTER | Age: 84
End: 2019-02-07
Payer: MEDICARE

## 2019-02-07 DIAGNOSIS — R35.0 URINARY FREQUENCY: ICD-10-CM

## 2019-02-07 DIAGNOSIS — R39.15 URGENCY OF URINATION: ICD-10-CM

## 2019-02-07 DIAGNOSIS — N39.41 URGE INCONTINENCE: ICD-10-CM

## 2019-02-07 PROCEDURE — 64566 NEUROELTRD STIM POST TIBIAL: CPT

## 2019-02-07 NOTE — PROGRESS NOTES
Procedure: Percutaneous Tibial Nerve Stimulation (PTNS)       Date onset of symptoms A LONG TIME AGO  Behavior modification: NO  Biofeedback: NO  E-Stim: NO  Drugs: NUMEROUS ANTICHOLINERGICS DIDN'T WORK    Other: NONE  Session number: ONE MONTH F/U     Patient Goals and Progress   Decreased urgency: YES  Decreased frequency: YES  No accidents: YES    Sleeps through the night: YES  No related health and social factors: NO     Caffeine - cups per day: 0  Alcohol - number of drinks per day: 0   Daytime voids - number per day: 7-12   Nighttime voids - number per night: 1  Urgency: 1-2       Incontinence - episodes per day: 1-2      Treatment Plan   Increase fluids: NO  Decrease fluids: YES    Decrease caffeine: YES   Urge reduction techniques: YES  Kegels: NO    Toilet every 3-4 hours     No fluids before bed YES     Ankle Used: LEFT     Treatment settin  Duration of treatment: 30 MINUTES  Lead lot #: [id number]  Expiration Date: 2021       Feeling/Response:  Toe flex and foot sensation

## 2019-02-14 DIAGNOSIS — E03.9 ACQUIRED HYPOTHYROIDISM: ICD-10-CM

## 2019-02-14 RX ORDER — LEVOTHYROXINE SODIUM 0.07 MG/1
75 TABLET ORAL DAILY
Qty: 90 TABLET | Refills: 3 | Status: SHIPPED | OUTPATIENT
Start: 2019-02-14 | End: 2019-07-30 | Stop reason: SDUPTHER

## 2019-02-22 ENCOUNTER — APPOINTMENT (OUTPATIENT)
Dept: LAB | Facility: CLINIC | Age: 84
End: 2019-02-22
Payer: MEDICARE

## 2019-02-22 ENCOUNTER — TRANSCRIBE ORDERS (OUTPATIENT)
Dept: LAB | Facility: CLINIC | Age: 84
End: 2019-02-22

## 2019-02-22 DIAGNOSIS — N18.30 CHRONIC RENAL IMPAIRMENT, STAGE 3 (MODERATE) (HCC): ICD-10-CM

## 2019-02-22 DIAGNOSIS — M25.512 LEFT SHOULDER PAIN, UNSPECIFIED CHRONICITY: ICD-10-CM

## 2019-02-22 DIAGNOSIS — M05.79 RHEUMATOID ARTHRITIS INVOLVING MULTIPLE SITES WITH POSITIVE RHEUMATOID FACTOR (HCC): ICD-10-CM

## 2019-02-22 DIAGNOSIS — R53.83 OTHER FATIGUE: ICD-10-CM

## 2019-02-22 DIAGNOSIS — D63.8 ANEMIA IN OTHER CHRONIC DISEASES CLASSIFIED ELSEWHERE: ICD-10-CM

## 2019-02-22 DIAGNOSIS — D63.8 ANEMIA IN OTHER CHRONIC DISEASES CLASSIFIED ELSEWHERE: Primary | ICD-10-CM

## 2019-02-22 DIAGNOSIS — Z79.899 ENCOUNTER FOR LONG-TERM (CURRENT) USE OF MEDICATIONS: ICD-10-CM

## 2019-02-22 LAB
ALBUMIN SERPL BCP-MCNC: 3.8 G/DL (ref 3.5–5)
ALP SERPL-CCNC: 117 U/L (ref 46–116)
ALT SERPL W P-5'-P-CCNC: 22 U/L (ref 12–78)
ANION GAP SERPL CALCULATED.3IONS-SCNC: 9 MMOL/L (ref 4–13)
AST SERPL W P-5'-P-CCNC: 17 U/L (ref 5–45)
BASOPHILS # BLD AUTO: 0.04 THOUSANDS/ΜL (ref 0–0.1)
BASOPHILS NFR BLD AUTO: 0 % (ref 0–1)
BILIRUB SERPL-MCNC: 0.39 MG/DL (ref 0.2–1)
BUN SERPL-MCNC: 49 MG/DL (ref 5–25)
CALCIUM SERPL-MCNC: 8.6 MG/DL (ref 8.3–10.1)
CHLORIDE SERPL-SCNC: 103 MMOL/L (ref 100–108)
CO2 SERPL-SCNC: 26 MMOL/L (ref 21–32)
CREAT SERPL-MCNC: 1.73 MG/DL (ref 0.6–1.3)
CRP SERPL QL: <3 MG/L
EOSINOPHIL # BLD AUTO: 0.19 THOUSAND/ΜL (ref 0–0.61)
EOSINOPHIL NFR BLD AUTO: 2 % (ref 0–6)
ERYTHROCYTE [DISTWIDTH] IN BLOOD BY AUTOMATED COUNT: 14.6 % (ref 11.6–15.1)
ERYTHROCYTE [SEDIMENTATION RATE] IN BLOOD: 34 MM/HOUR (ref 0–20)
FERRITIN SERPL-MCNC: 45 NG/ML (ref 8–388)
GFR SERPL CREATININE-BSD FRML MDRD: 27 ML/MIN/1.73SQ M
GLUCOSE SERPL-MCNC: 99 MG/DL (ref 65–140)
HCT VFR BLD AUTO: 31.2 % (ref 34.8–46.1)
HGB BLD-MCNC: 10 G/DL (ref 11.5–15.4)
IMM GRANULOCYTES # BLD AUTO: 0.09 THOUSAND/UL (ref 0–0.2)
IMM GRANULOCYTES NFR BLD AUTO: 1 % (ref 0–2)
LYMPHOCYTES # BLD AUTO: 2.03 THOUSANDS/ΜL (ref 0.6–4.47)
LYMPHOCYTES NFR BLD AUTO: 16 % (ref 14–44)
MCH RBC QN AUTO: 33.8 PG (ref 26.8–34.3)
MCHC RBC AUTO-ENTMCNC: 32.1 G/DL (ref 31.4–37.4)
MCV RBC AUTO: 105 FL (ref 82–98)
MONOCYTES # BLD AUTO: 1.3 THOUSAND/ΜL (ref 0.17–1.22)
MONOCYTES NFR BLD AUTO: 10 % (ref 4–12)
NEUTROPHILS # BLD AUTO: 8.92 THOUSANDS/ΜL (ref 1.85–7.62)
NEUTS SEG NFR BLD AUTO: 71 % (ref 43–75)
NRBC BLD AUTO-RTO: 0 /100 WBCS
PLATELET # BLD AUTO: 238 THOUSANDS/UL (ref 149–390)
PMV BLD AUTO: 10.1 FL (ref 8.9–12.7)
POTASSIUM SERPL-SCNC: 3.7 MMOL/L (ref 3.5–5.3)
PROT SERPL-MCNC: 7.3 G/DL (ref 6.4–8.2)
RBC # BLD AUTO: 2.96 MILLION/UL (ref 3.81–5.12)
SODIUM SERPL-SCNC: 138 MMOL/L (ref 136–145)
WBC # BLD AUTO: 12.57 THOUSAND/UL (ref 4.31–10.16)

## 2019-02-22 PROCEDURE — 85652 RBC SED RATE AUTOMATED: CPT

## 2019-02-22 PROCEDURE — 36415 COLL VENOUS BLD VENIPUNCTURE: CPT | Performed by: PHYSICIAN ASSISTANT

## 2019-02-22 PROCEDURE — 82728 ASSAY OF FERRITIN: CPT | Performed by: PHYSICIAN ASSISTANT

## 2019-02-22 PROCEDURE — 85025 COMPLETE CBC W/AUTO DIFF WBC: CPT | Performed by: PHYSICIAN ASSISTANT

## 2019-02-22 PROCEDURE — 80053 COMPREHEN METABOLIC PANEL: CPT

## 2019-02-22 PROCEDURE — 86140 C-REACTIVE PROTEIN: CPT

## 2019-02-22 PROCEDURE — 82668 ASSAY OF ERYTHROPOIETIN: CPT | Performed by: PHYSICIAN ASSISTANT

## 2019-02-23 LAB — EPO SERPL-ACNC: 35.1 MIU/ML (ref 2.6–18.5)

## 2019-03-11 ENCOUNTER — TELEPHONE (OUTPATIENT)
Dept: UROLOGY | Facility: CLINIC | Age: 84
End: 2019-03-11

## 2019-03-11 NOTE — TELEPHONE ENCOUNTER
Patient managed by Dr Maritza Davalos and Dr Amrita Rodriguez in the [facility] and [facility] offices  Patient has history of urothelial cancer, for which Dr Maritza Davalos is treating, last cysto done on 1/11/19, has pending follow up on 4/26/19 for office cysto  Patient also has history of urge incontinence, for which Dr Amrita Rodriguez is treating, patient currently being treated with PTNS  Per review of chart, no recent positive urine culture results found, unsure who has been treating patient for UTIs  Left message for patient to return phone call to further discuss and triage symptoms

## 2019-03-12 ENCOUNTER — PROCEDURE VISIT (OUTPATIENT)
Dept: UROLOGY | Facility: MEDICAL CENTER | Age: 84
End: 2019-03-12
Payer: MEDICARE

## 2019-03-12 ENCOUNTER — TELEPHONE (OUTPATIENT)
Dept: UROLOGY | Facility: MEDICAL CENTER | Age: 84
End: 2019-03-12

## 2019-03-12 DIAGNOSIS — N39.41 URGE INCONTINENCE: ICD-10-CM

## 2019-03-12 DIAGNOSIS — R39.15 URGENCY OF URINATION: ICD-10-CM

## 2019-03-12 DIAGNOSIS — R35.0 URINARY FREQUENCY: ICD-10-CM

## 2019-03-12 PROCEDURE — 64566 NEUROELTRD STIM POST TIBIAL: CPT

## 2019-03-12 NOTE — PROGRESS NOTES
Procedure: Percutaneous Tibial Nerve Stimulation (PTNS)       Date onset of symptoms A LONG TIME AGO  Behavior modification: NO  Biofeedback: NO  E-Stim: NO  Drugs: NUMEROUS ANTICHOLINERGICS DIDN'T WORK  Other: NONE  Session number: ONE MONTH F/U     Patient Goals and Progress   Decreased urgency: YES  Decreased frequency: YES  No accidents: YES    Sleeps through the night: YES  No related health and social factors: NO     Caffeine - cups per day: 0  Alcohol - number of drinks per day: 0  Daytime voids - number per day: 10-12  Nighttime voids - number per night: 0-2   Urgency: 1-2      Incontinence - episodes per day: 1-2     Treatment Plan   Increase fluids: NO  Decrease fluids: YES    Decrease caffeine: YES  Urge reduction techniques: YES  Kegels: NO  Toilet every 3-4 hours     No fluids before bed YES     Ankle Used: LEFT     Treatment settin  Duration of treatment: 30 MINUTES  Lead lot #: [id number]  Expiration Date: 2021       Feeling/Response:  Toe flex and foot sensation

## 2019-03-12 NOTE — TELEPHONE ENCOUNTER
Pt has PTNS Tx 3/12 in Dr Kristyn Ahumada office  Stated she had UC done via [facility] and confirmed UTI    [phone]

## 2019-03-12 NOTE — TELEPHONE ENCOUNTER
Spoke with patient  Patient reports she was treated over the weekend at [facility]  Patient reports she had symptoms of foul smelling urine and burning  Per patient, symptoms have resolved since starting antibiotics  Patient reports she was started on Cipro 500 mg, twice daily for 7 days  Patient reports, she is waiting for the urgent care facility to call back with urine culture results  Advised patient, will contact [facility] to obtain visit notes and urine test results  Once received, will discuss with provider and call her with further plan, if needed  Patient verbalized understanding  Patient has pending appointment today in the [facility] office for PTNS treatment, as ordered by Dr Marylen Herbert  [facility] in [facility], [phone], spoke with, Roosevelt Fabian, urine culture results not final as of now  Will fax visit notes and will make note to fax urine culture results once final  Caryn León office fax number provided

## 2019-03-12 NOTE — TELEPHONE ENCOUNTER
Notes reviewed, agree with plan of care  No further intervention at this time aside for ensuring culture results are followed

## 2019-03-13 ENCOUNTER — APPOINTMENT (EMERGENCY)
Dept: RADIOLOGY | Facility: HOSPITAL | Age: 84
DRG: 291 | End: 2019-03-13
Payer: MEDICARE

## 2019-03-13 ENCOUNTER — TELEPHONE (OUTPATIENT)
Dept: HEMATOLOGY ONCOLOGY | Facility: CLINIC | Age: 84
End: 2019-03-13

## 2019-03-13 ENCOUNTER — HOSPITAL ENCOUNTER (INPATIENT)
Facility: HOSPITAL | Age: 84
LOS: 8 days | Discharge: HOME HEALTH | DRG: 291 | End: 2019-03-21
Attending: EMERGENCY MEDICINE | Admitting: INTERNAL MEDICINE
Payer: MEDICARE

## 2019-03-13 ENCOUNTER — OFFICE VISIT (OUTPATIENT)
Dept: FAMILY MEDICINE CLINIC | Facility: CLINIC | Age: 84
End: 2019-03-13
Payer: MEDICARE

## 2019-03-13 VITALS
HEART RATE: 78 BPM | SYSTOLIC BLOOD PRESSURE: 120 MMHG | BODY MASS INDEX: 41.91 KG/M2 | OXYGEN SATURATION: 98 % | TEMPERATURE: 98.3 F | WEIGHT: 222 LBS | DIASTOLIC BLOOD PRESSURE: 70 MMHG | HEIGHT: 61 IN

## 2019-03-13 DIAGNOSIS — R60.9 EDEMA: ICD-10-CM

## 2019-03-13 DIAGNOSIS — R60.0 LOWER EXTREMITY EDEMA: Primary | ICD-10-CM

## 2019-03-13 DIAGNOSIS — G25.81 RESTLESS LEG SYNDROME: ICD-10-CM

## 2019-03-13 DIAGNOSIS — M79.605 PAIN IN BOTH LOWER EXTREMITIES: Primary | ICD-10-CM

## 2019-03-13 DIAGNOSIS — I50.33 ACUTE ON CHRONIC DIASTOLIC CONGESTIVE HEART FAILURE (HCC): ICD-10-CM

## 2019-03-13 DIAGNOSIS — M79.604 PAIN IN BOTH LOWER EXTREMITIES: Primary | ICD-10-CM

## 2019-03-13 DIAGNOSIS — Z90.6 HISTORY OF NEPHROURETERECTOMY: ICD-10-CM

## 2019-03-13 DIAGNOSIS — E87.70 VOLUME OVERLOAD: ICD-10-CM

## 2019-03-13 DIAGNOSIS — N18.9 CKD (CHRONIC KIDNEY DISEASE): ICD-10-CM

## 2019-03-13 DIAGNOSIS — I48.20 CHRONIC ATRIAL FIBRILLATION (HCC): ICD-10-CM

## 2019-03-13 DIAGNOSIS — I10 ESSENTIAL HYPERTENSION: ICD-10-CM

## 2019-03-13 DIAGNOSIS — R60.9 EDEMA, UNSPECIFIED TYPE: ICD-10-CM

## 2019-03-13 DIAGNOSIS — G62.9 PERIPHERAL POLYNEUROPATHY: ICD-10-CM

## 2019-03-13 DIAGNOSIS — Z90.5 HISTORY OF NEPHROURETERECTOMY: ICD-10-CM

## 2019-03-13 LAB
ANION GAP SERPL CALCULATED.3IONS-SCNC: 7 MMOL/L (ref 4–13)
BASOPHILS # BLD AUTO: 0.01 THOUSANDS/ΜL (ref 0–0.1)
BASOPHILS NFR BLD AUTO: 0 % (ref 0–1)
BUN SERPL-MCNC: 31 MG/DL (ref 5–25)
CALCIUM SERPL-MCNC: 8.3 MG/DL (ref 8.3–10.1)
CHLORIDE SERPL-SCNC: 105 MMOL/L (ref 100–108)
CO2 SERPL-SCNC: 29 MMOL/L (ref 21–32)
CREAT SERPL-MCNC: 1.83 MG/DL (ref 0.6–1.3)
EOSINOPHIL # BLD AUTO: 0.25 THOUSAND/ΜL (ref 0–0.61)
EOSINOPHIL NFR BLD AUTO: 4 % (ref 0–6)
ERYTHROCYTE [DISTWIDTH] IN BLOOD BY AUTOMATED COUNT: 13.9 % (ref 11.6–15.1)
GFR SERPL CREATININE-BSD FRML MDRD: 25 ML/MIN/1.73SQ M
GLUCOSE SERPL-MCNC: 113 MG/DL (ref 65–140)
GLUCOSE SERPL-MCNC: 130 MG/DL (ref 65–140)
HCT VFR BLD AUTO: 29.3 % (ref 34.8–46.1)
HGB BLD-MCNC: 9.3 G/DL (ref 11.5–15.4)
IMM GRANULOCYTES # BLD AUTO: 0.03 THOUSAND/UL (ref 0–0.2)
IMM GRANULOCYTES NFR BLD AUTO: 0 % (ref 0–2)
LYMPHOCYTES # BLD AUTO: 1.32 THOUSANDS/ΜL (ref 0.6–4.47)
LYMPHOCYTES NFR BLD AUTO: 19 % (ref 14–44)
MCH RBC QN AUTO: 33 PG (ref 26.8–34.3)
MCHC RBC AUTO-ENTMCNC: 31.7 G/DL (ref 31.4–37.4)
MCV RBC AUTO: 104 FL (ref 82–98)
MONOCYTES # BLD AUTO: 0.67 THOUSAND/ΜL (ref 0.17–1.22)
MONOCYTES NFR BLD AUTO: 10 % (ref 4–12)
NEUTROPHILS # BLD AUTO: 4.74 THOUSANDS/ΜL (ref 1.85–7.62)
NEUTS SEG NFR BLD AUTO: 67 % (ref 43–75)
NRBC BLD AUTO-RTO: 0 /100 WBCS
PLATELET # BLD AUTO: 207 THOUSANDS/UL (ref 149–390)
PMV BLD AUTO: 9.6 FL (ref 8.9–12.7)
POTASSIUM SERPL-SCNC: 4.1 MMOL/L (ref 3.5–5.3)
RBC # BLD AUTO: 2.82 MILLION/UL (ref 3.81–5.12)
SODIUM SERPL-SCNC: 141 MMOL/L (ref 136–145)
WBC # BLD AUTO: 7.02 THOUSAND/UL (ref 4.31–10.16)

## 2019-03-13 PROCEDURE — 80048 BASIC METABOLIC PNL TOTAL CA: CPT | Performed by: EMERGENCY MEDICINE

## 2019-03-13 PROCEDURE — 71046 X-RAY EXAM CHEST 2 VIEWS: CPT

## 2019-03-13 PROCEDURE — 85025 COMPLETE CBC W/AUTO DIFF WBC: CPT | Performed by: EMERGENCY MEDICINE

## 2019-03-13 PROCEDURE — 99214 OFFICE O/P EST MOD 30 MIN: CPT | Performed by: FAMILY MEDICINE

## 2019-03-13 PROCEDURE — 82948 REAGENT STRIP/BLOOD GLUCOSE: CPT

## 2019-03-13 PROCEDURE — 99284 EMERGENCY DEPT VISIT MOD MDM: CPT

## 2019-03-13 PROCEDURE — 99223 1ST HOSP IP/OBS HIGH 75: CPT | Performed by: INTERNAL MEDICINE

## 2019-03-13 PROCEDURE — 93005 ELECTROCARDIOGRAM TRACING: CPT

## 2019-03-13 PROCEDURE — 36415 COLL VENOUS BLD VENIPUNCTURE: CPT | Performed by: EMERGENCY MEDICINE

## 2019-03-13 RX ORDER — ROPINIROLE 1 MG/1
1 TABLET, FILM COATED ORAL
Status: DISCONTINUED | OUTPATIENT
Start: 2019-03-13 | End: 2019-03-21 | Stop reason: HOSPADM

## 2019-03-13 RX ORDER — HYDROXYCHLOROQUINE SULFATE 200 MG/1
200 TABLET, FILM COATED ORAL 2 TIMES DAILY WITH MEALS
Status: DISCONTINUED | OUTPATIENT
Start: 2019-03-14 | End: 2019-03-21 | Stop reason: HOSPADM

## 2019-03-13 RX ORDER — DOCUSATE SODIUM 100 MG/1
100 CAPSULE, LIQUID FILLED ORAL 2 TIMES DAILY
Status: DISCONTINUED | OUTPATIENT
Start: 2019-03-13 | End: 2019-03-21 | Stop reason: HOSPADM

## 2019-03-13 RX ORDER — B-COMPLEX WITH VITAMIN C
1 TABLET ORAL 2 TIMES DAILY WITH MEALS
Status: DISCONTINUED | OUTPATIENT
Start: 2019-03-13 | End: 2019-03-21 | Stop reason: HOSPADM

## 2019-03-13 RX ORDER — LOSARTAN POTASSIUM 25 MG/1
25 TABLET ORAL DAILY
Status: DISCONTINUED | OUTPATIENT
Start: 2019-03-14 | End: 2019-03-18

## 2019-03-13 RX ORDER — LEVOTHYROXINE SODIUM 0.07 MG/1
75 TABLET ORAL DAILY
Status: DISCONTINUED | OUTPATIENT
Start: 2019-03-14 | End: 2019-03-21 | Stop reason: HOSPADM

## 2019-03-13 RX ORDER — GABAPENTIN 100 MG/1
200 CAPSULE ORAL
Status: DISCONTINUED | OUTPATIENT
Start: 2019-03-13 | End: 2019-03-21 | Stop reason: HOSPADM

## 2019-03-13 RX ORDER — UBIDECARENONE 75 MG
100 CAPSULE ORAL DAILY
Status: DISCONTINUED | OUTPATIENT
Start: 2019-03-14 | End: 2019-03-21 | Stop reason: HOSPADM

## 2019-03-13 RX ORDER — GABAPENTIN 100 MG/1
100 CAPSULE ORAL EVERY MORNING
Status: DISCONTINUED | OUTPATIENT
Start: 2019-03-14 | End: 2019-03-14

## 2019-03-13 RX ORDER — B-COMPLEX WITH VITAMIN C
1 TABLET ORAL 2 TIMES DAILY WITH MEALS
Status: DISCONTINUED | OUTPATIENT
Start: 2019-03-14 | End: 2019-03-13

## 2019-03-13 RX ORDER — ACETAMINOPHEN 325 MG/1
650 TABLET ORAL 2 TIMES DAILY PRN
Status: DISCONTINUED | OUTPATIENT
Start: 2019-03-13 | End: 2019-03-21 | Stop reason: HOSPADM

## 2019-03-13 RX ORDER — LEUCOVORIN CALCIUM 5 MG/1
10 TABLET ORAL WEEKLY
Status: DISCONTINUED | OUTPATIENT
Start: 2019-03-16 | End: 2019-03-21 | Stop reason: HOSPADM

## 2019-03-13 RX ORDER — FUROSEMIDE 10 MG/ML
20 INJECTION INTRAMUSCULAR; INTRAVENOUS ONCE
Status: COMPLETED | OUTPATIENT
Start: 2019-03-13 | End: 2019-03-13

## 2019-03-13 RX ADMIN — CALCIUM CARBONATE-VITAMIN D TAB 500 MG-200 UNIT 1 TABLET: 500-200 TAB at 22:33

## 2019-03-13 RX ADMIN — GABAPENTIN 200 MG: 100 CAPSULE ORAL at 22:33

## 2019-03-13 RX ADMIN — DOCUSATE SODIUM 100 MG: 100 CAPSULE, LIQUID FILLED ORAL at 22:33

## 2019-03-13 RX ADMIN — FUROSEMIDE 20 MG: 10 INJECTION, SOLUTION INTRAMUSCULAR; INTRAVENOUS at 20:45

## 2019-03-13 RX ADMIN — ROPINIROLE 1 MG: 1 TABLET, FILM COATED ORAL at 22:33

## 2019-03-13 NOTE — ED ATTENDING ATTESTATION
Steffen Johnson MD, saw and evaluated the patient  All available labs and X-rays were ordered by me or the resident and have been reviewed by myself  I discussed the patient with the resident / non-physician and agree with the resident's / non-physician practitioner's findings and plan as documented in the resident's / non-physician practicitioner's note, except where noted  At this point, I agree with the current assessment done in the ED  I was present during key portions of all procedures performed unless otherwise stated  Chief Complaint   Patient presents with    Leg Swelling     Pt with b/l leg swelling since last week, sent here from PCP  LE edema x1 week  Swelling to below the knees  Pain/tightness above the knees --> so came in   Pain with ambulation  Compression stockings that she is using without relief and also doesn't like to wear them in general because they really hurt  No f/ch/nv/cp/sob  Fatigue is at baseline  No WHITTINGTON  No orthopnea / PND  Echo (8/11/18):  LEFT VENTRICLE:  Systolic function was normal  Ejection fraction was estimated to be 55 %  There were no regional wall motion abnormalities  PMH:  - on ELiquis  - Nephrectomy for cancer  - dCHF  - HLD  - NICK/OHS  - GERD  - Diverticulitis  - DM2  - Gout  - RA on methotrexate  PSH:  - Hysteromy  - Katia  - Appendectomy  - Stent of iliac artery in the past?  - Bunionectomy  - EGD/Colonoscopy  - Nephrectomy  No smoking drinking drugs  PE:  Vitals:    03/14/19 0600 03/14/19 0730 03/14/19 1100 03/14/19 1512   BP:  133/81 117/56 107/51   BP Location:   Right arm Right arm   Pulse:  72 80 70   Resp:  16 17 18   Temp:  97 8 °F (36 6 °C) 97 5 °F (36 4 °C) 97 7 °F (36 5 °C)   TempSrc:  Oral Oral Oral   SpO2:  99% 97% 96%   Weight: 98 7 kg (217 lb 9 5 oz)      Height:       General: VSS, NAD, awake, alert  Well-nourished, well-developed  Appears stated age  Speaking normally in full sentences     Head: Normocephalic, atraumatic, nontender  Eyes: PERRL, EOM-I  No diplopia  No hyphema  No subconjunctival hemorrhages  Symmetrical lids  ENT: Atraumatic external nose and ears  MMM  No malocclusion  No stridor  Normal phonation  No drooling  Normal swallowing  Neck: Symmetric, trachea midline  No JVD  CV: RRR  +T6/W7  Grade I systolic murmur present   Peripheral pulses +2 throughout  No chest wall tenderness  Lungs:   Unlabored No retractions  CTAB, lungs sounds equal bilateral    No tachypnea  Abd: +BS, soft, NT/ND    MSK:   FROM   Significant, 2+ pitting LE edema up to thighs  Back:   No rashes  Skin: Dry, intact  Neuro: AAOx3, GCS 15, CN II-XII grossly intact  Motor grossly intact  Psychiatric/Behavioral: Appropriate mood and affect   Exam: deferred  A:  - LE edema  P:  - Cardiac workup / CHF workup  - Likely admit given will need diuresis and close monitoring of her creatinine given single kidney left  - 13 point ROS was performed and all are normal unless stated in the history above  - Nursing note reviewed  Vitals reviewed  - Orders placed by myself and/or advanced practitioner / resident     - Previous chart was reviewed  - No language barrier    - History obtained from patient  - There are no limitations to the history obtained  - Critical care time: Not applicable for this patient  Final Diagnosis:  1  Lower extremity edema    2  Volume overload    3  CKD (chronic kidney disease)    4  Acute on chronic diastolic congestive heart failure ([facility] [address] )    5  History of nephroureterectomy    6  Edema      ED Course as of Mar 14 1733   Wed Mar 13, 2019   1950 CKD   Creatinine(!): 1 83   1951 Chronic marcocytic anemia      Hemoglobin(!): 9 3     Medications   acetaminophen (TYLENOL) tablet 650 mg (has no administration in time range)   cyanocobalamin (VITAMIN B-12) tablet 100 mcg (100 mcg Oral Given 3/14/19 1029)   docusate sodium (COLACE) capsule 100 mg (100 mg Oral Given 3/14/19 1029)   hydroxychloroquine (PLAQUENIL) tablet 200 mg (200 mg Oral Given 3/14/19 1614)   leucovorin (WELLCOVORIN) tablet 10 mg (has no administration in time range)   losartan (COZAAR) tablet 25 mg (25 mg Oral Given 3/14/19 1029)   levothyroxine tablet 75 mcg (75 mcg Oral Given 3/14/19 0753)   methotrexate tablet 5 mg (has no administration in time range)   multivitamin-minerals (CENTRUM) tablet 1 tablet (1 tablet Oral Given 3/14/19 1029)   rivaroxaban (XARELTO) tablet 15 mg (15 mg Oral Given 3/14/19 1029)   rOPINIRole (REQUIP) tablet 1 mg (1 mg Oral Given 3/13/19 2233)   insulin lispro (HumaLOG) 100 units/mL subcutaneous injection 1-6 Units (1 Units Subcutaneous Not Given 3/14/19 1614)   insulin lispro (HumaLOG) 100 units/mL subcutaneous injection 1-5 Units (1 Units Subcutaneous Not Given 3/13/19 2251)   calcium carbonate-vitamin D (OSCAL-D) 500 mg-200 units per tablet 1 tablet (1 tablet Oral Given 3/14/19 1614)   gabapentin (NEURONTIN) capsule 200 mg (200 mg Oral Given 3/13/19 2233)   pantoprazole (PROTONIX) EC tablet 40 mg (40 mg Oral Given 3/14/19 1316)   polyvinyl alcohol (LIQUIFILM TEARS) 1 4 % ophthalmic solution 1 drop (1 drop Both Eyes Given 3/14/19 1614)   gabapentin (NEURONTIN) capsule 100 mg (100 mg Oral Given 3/14/19 1614)   furosemide (LASIX) injection 20 mg (20 mg Intravenous Given 3/13/19 2045)   torsemide (DEMADEX) tablet 40 mg (40 mg Oral Given 3/14/19 1614)     XR chest 2 views   ED Interpretation   The cxr was ordered by resident and interpreted by me independently  On my read, it appears normal: no pneumonia, no obvious fluid overload  Final Result      No acute cardiopulmonary disease              Workstation performed: [id number]         VAS lower limb venous duplex study, complete bilateral    (Results Pending)     Orders Placed This Encounter   Procedures    ED ECG Documentation Only    XR chest 2 views    CBC and differential    Basic metabolic panel    CBC and differential    Basic metabolic panel    Magnesium    Basic metabolic panel    Diet Cardiovascular; Sodium 2 GM; Fluid Restriction 1500 ML, Consistent Carbohydrate Diet Level 2 (5 carb servings/75 grams CHO/meal)    Nursing communcation Continue IV as ordered  [facility] Notify admitting physician    Notify admitting physician on arrival    Vital Signs per unit routine    Daily weights- Use standing scale to weigh patient    I/O    Provide Patient with Heart Failure Education    Nursing communcation ECG 12 lead with chest pain or ST segment change and notify MD  Place an ECG order if performed      Insulin Subcutaneous Instruction    Up with assistance    Ambulate patient    Insulin Subcutaneous Notify Physician    Fingerstick Glucose (POCT) Before meals and at bedtime    Incentive spirometry    Place sequential compression device    Fingerstick Glucose (POCT)    48 Hour Telemetry Monitoring    Level 3 - DNAR (Do Not Attempt Resuscitation) and DNI (Do Not Intubate)    Inpatient consult to Nephrology    Inpatient consult to Case Management    Inpatient consult to Heart Failure Service    ECG 12 lead    Echo limited with contrast if indicated    Inpatient Admission (expected length of stay for this patient Order details is greater than two midnights)     Labs Reviewed   CBC AND DIFFERENTIAL - Abnormal       Result Value Ref Range Status    WBC 7 02  4 31 - 10 16 Thousand/uL Final    RBC 2 82 (*) 3 81 - 5 12 Million/uL Final    Hemoglobin 9 3 (*) 11 5 - 15 4 g/dL Final    Hematocrit 29 3 (*) 34 8 - 46 1 % Final     (*) 82 - 98 fL Final    MCH 33 0  26 8 - 34 3 pg Final    MCHC 31 7  31 4 - 37 4 g/dL Final    RDW 13 9  11 6 - 15 1 % Final    MPV 9 6  8 9 - 12 7 fL Final    Platelets 961  431 - 390 Thousands/uL Final    nRBC 0  /100 WBCs Final    Neutrophils Relative 67  43 - 75 % Final    Immat GRANS % 0  0 - 2 % Final    Lymphocytes Relative 19  14 - 44 % Final    Monocytes Relative 10  4 - 12 % Final    Eosinophils Relative 4  0 - 6 % Final    Basophils Relative 0  0 - 1 % Final    Neutrophils Absolute 4 74  1 85 - 7 62 Thousands/µL Final    Immature Grans Absolute 0 03  0 00 - 0 20 Thousand/uL Final    Lymphocytes Absolute 1 32  0 60 - 4 47 Thousands/µL Final    Monocytes Absolute 0 67  0 17 - 1 22 Thousand/µL Final    Eosinophils Absolute 0 25  0 00 - 0 61 Thousand/µL Final    Basophils Absolute 0 01  0 00 - 0 10 Thousands/µL Final   BASIC METABOLIC PANEL - Abnormal    Sodium 141  136 - 145 mmol/L Final    Potassium 4 1  3 5 - 5 3 mmol/L Final    Chloride 105  100 - 108 mmol/L Final    CO2 29  21 - 32 mmol/L Final    ANION GAP 7  4 - 13 mmol/L Final    BUN 31 (*) 5 - 25 mg/dL Final    Creatinine 1 83 (*) 0 60 - 1 30 mg/dL Final    Comment: Standardized to IDMS reference method    Glucose 130  65 - 140 mg/dL Final    Comment:   If the patient is fasting, the ADA then defines impaired fasting glucose as > 100 mg/dL and diabetes as > or equal to 123 mg/dL  Specimen collection should occur prior to Sulfasalazine administration due to the potential for falsely depressed results  Specimen collection should occur prior to Sulfapyridine administration due to the potential for falsely elevated results  Calcium 8 3  8 3 - 10 1 mg/dL Final    eGFR 25  ml/min/1 73sq m Final    Narrative:     National Kidney Disease Education Program recommendations are as follows:  GFR calculation is accurate only with a steady state creatinine  Chronic Kidney disease less than 60 ml/min/1 73 sq  meters  Kidney failure less than 15 ml/min/1 73 sq  meters       Time reflects when diagnosis was documented in both MDM as applicable and the Disposition within this note     Time User Action Codes Description Comment    3/13/2019  8:28 PM Earney Chuy Add [R60 0] Lower extremity edema     3/13/2019  8:28 PM Earney Chuy Add [E87 70] Volume overload     3/13/2019  8:28 PM Earney Chuy Add [N18 9] CKD (chronic kidney disease)     3/13/2019  8:50 PM Cande Nowak Add [I50 33] Acute on chronic diastolic congestive heart failure ([address] )     3/13/2019  8:50 PM Pedro ABREU Add [Z90 5,  Z90 6] History of nephroureterectomy     3/14/2019  1:42 PM Gerhard CASTRO Add [R60 9] Edema     3/14/2019  1:42 PM Jett Olmstead Modify [R60 9] Edema       ED Disposition     ED Disposition Condition Date/Time Comment    Admit Stable Wed Mar 13, 2019  8:28 PM Case was discussed with KANU and the patient's admission status was agreed to be Admission Status: inpatient status to the service of Dr Benna Lefort           Follow-up Information    None       Current Discharge Medication List      CONTINUE these medications which have NOT CHANGED    Details   acetaminophen (TYLENOL) 325 mg tablet Take 650 mg by mouth 2 (two) times a day as needed for mild pain       Calcium Citrate-Vitamin D (CALCIUM + D PO) Take 1 tablet by mouth 2 (two) times a day      cyanocobalamin (VITAMIN B-12) 100 mcg tablet Take by mouth      docusate sodium (COLACE) 100 mg capsule Take 1 capsule (100 mg total) by mouth 2 (two) times a day for 60 doses  Qty: 90 capsule, Refills: 0    Associated Diagnoses: Urothelial lesion      gabapentin (NEURONTIN) 100 mg capsule TAKE 1 CAPSULE IN THE MORNING, TAKE 1 CAPSULE IN THE AFTERNOON AND TAKE 1 TO 3 CAPSULES AT BEDTIME  Qty: 450 capsule, Refills: 1    Associated Diagnoses: Peripheral polyneuropathy      hydroxychloroquine (PLAQUENIL) 200 mg tablet Take 200 mg by mouth 2 (two) times a day with meals      LEUCOVORIN CALCIUM PO Take 10 mg by mouth once a week      levothyroxine 75 mcg tablet Take 1 tablet (75 mcg total) by mouth daily  Qty: 90 tablet, Refills: 3    Associated Diagnoses: Acquired hypothyroidism      losartan (COZAAR) 25 mg tablet TAKE 1 TABLET EVERY DAY  Qty: 90 tablet, Refills: 0    Associated Diagnoses: Acute on chronic diastolic congestive heart failure (HCC)      methotrexate 2 5 mg tablet Uses 4 a week via Rheumatology [facility]  Qty: 12 tablet, Refills: 0 Associated Diagnoses: Rheumatoid arthritis with positive rheumatoid factor, involving unspecified site (HCC)      Multiple Vitamin (MULTIVITAMINS PO) Take 1 tablet by mouth daily      omeprazole (PriLOSEC) 20 mg delayed release capsule TAKE 1 CAPSULE EVERY DAY AS NEEDED FOR  STOMACH  ACID  Qty: 90 capsule, Refills: 1    Associated Diagnoses: Chronic GERD      pravastatin (PRAVACHOL) 80 mg tablet TAKE 1 TABLET EVERY DAY  Qty: 90 tablet, Refills: 3    Associated Diagnoses: Pure hypercholesterolemia      rivaroxaban (XARELTO) 15 mg tablet Take 1 tablet (15 mg total) by mouth daily with breakfast  Qty: 90 tablet, Refills: 3    Associated Diagnoses: Acute on chronic diastolic congestive heart failure (HCC)      rOPINIRole (REQUIP) 0 5 mg tablet TAKE 2 TABLETS AT BEDTIME AND 1 TABLET IN THE MORNING  Qty: 270 tablet, Refills: 3    Associated Diagnoses: Restless leg syndrome      torsemide (DEMADEX) 20 mg tablet TAKE 2 TABLETS EVERY DAY  Qty: 180 tablet, Refills: 0    Associated Diagnoses: Acute on chronic diastolic congestive heart failure (HCC)           No discharge procedures on file  Prior to Admission Medications   Prescriptions Last Dose Informant Patient Reported? Taking?    Calcium Citrate-Vitamin D (CALCIUM + D PO)  Self Yes No   Sig: Take 1 tablet by mouth 2 (two) times a day   LEUCOVORIN CALCIUM PO  Self Yes No   Sig: Take 10 mg by mouth once a week   Multiple Vitamin (MULTIVITAMINS PO)  Self Yes No   Sig: Take 1 tablet by mouth daily   acetaminophen (TYLENOL) 325 mg tablet  Self Yes No   Sig: Take 650 mg by mouth 2 (two) times a day as needed for mild pain    cyanocobalamin (VITAMIN B-12) 100 mcg tablet  Self Yes No   Sig: Take by mouth   docusate sodium (COLACE) 100 mg capsule  Self No No   Sig: Take 1 capsule (100 mg total) by mouth 2 (two) times a day for 60 doses   gabapentin (NEURONTIN) 100 mg capsule  Self No No   Sig: TAKE 1 CAPSULE IN THE MORNING, TAKE 1 CAPSULE IN THE AFTERNOON AND TAKE 1 TO 3 CAPSULES AT BEDTIME   Patient taking differently: TAKE 1 CAPSULE IN THE MORNING, TAKE 1 CAPSULE IN THE AFTERNOON AND TAKE 2 CAPSULES AT BEDTIME   hydroxychloroquine (PLAQUENIL) 200 mg tablet  Self Yes No   Sig: Take 200 mg by mouth 2 (two) times a day with meals   levothyroxine 75 mcg tablet   No No   Sig: Take 1 tablet (75 mcg total) by mouth daily   losartan (COZAAR) 25 mg tablet  Self No No   Sig: TAKE 1 TABLET EVERY DAY   methotrexate 2 5 mg tablet  Self No No   Sig: Uses 4 a week via Rheumatology [facility]   Patient taking differently: 5 mg Uses 4 a week via Rheumatology [facility]   omeprazole (PriLOSEC) 20 mg delayed release capsule  Self No No   Sig: TAKE 1 CAPSULE EVERY DAY AS NEEDED FOR  STOMACH  ACID   pravastatin (PRAVACHOL) 80 mg tablet  Self No No   Sig: TAKE 1 TABLET EVERY DAY   rOPINIRole (REQUIP) 0 5 mg tablet  Self No No   Sig: TAKE 2 TABLETS AT BEDTIME AND 1 TABLET IN THE MORNING   rivaroxaban (XARELTO) 15 mg tablet   No No   Sig: Take 1 tablet (15 mg total) by mouth daily with breakfast   torsemide (DEMADEX) 20 mg tablet  Self No No   Sig: TAKE 2 TABLETS EVERY DAY      Facility-Administered Medications: None       Portions of the record may have been created with voice recognition software  Occasional wrong word or "sound a like" substitutions may have occurred due to the inherent limitations of voice recognition software  Read the chart carefully and recognize, using context, where substitutions have occurred      Electronically signed by:  Sp Laura

## 2019-03-13 NOTE — ED PROVIDER NOTES
History  Chief Complaint   Patient presents with    Leg Swelling     Pt with b/l leg swelling since last week, sent here from PCP  HPI  Patient is an 73-year-old female past medical history CKD, nephrectomy, diastolic heart failure presenting with bilateral lower extremity swelling for the past week  Patient states that she has previously had numerous occasions of lower extremity edema but states that this has been the worst   Patient states sensation of tightness, pain with palpation and ambulation  Patient denies lower extremity paresthesias or weakness  Patient denies chest pain, shortness of breath, dyspnea on exertion  Patient denies orthopnea, PND  Patient denies any urinary changes or decreased urination  Patient denies any recent changes in weight  Patient denies fevers, chills, nausea, vomiting, abdominal pain  Prior to Admission Medications   Prescriptions Last Dose Informant Patient Reported? Taking?    Calcium Citrate-Vitamin D (CALCIUM + D PO)  Self Yes No   Sig: Take 1 tablet by mouth 2 (two) times a day   LEUCOVORIN CALCIUM PO  Self Yes No   Sig: Take 10 mg by mouth once a week   Multiple Vitamin (MULTIVITAMINS PO)  Self Yes No   Sig: Take 1 tablet by mouth daily   acetaminophen (TYLENOL) 325 mg tablet  Self Yes No   Sig: Take 650 mg by mouth 2 (two) times a day as needed for mild pain    cyanocobalamin (VITAMIN B-12) 100 mcg tablet  Self Yes No   Sig: Take by mouth   docusate sodium (COLACE) 100 mg capsule  Self No No   Sig: Take 1 capsule (100 mg total) by mouth 2 (two) times a day for 60 doses   gabapentin (NEURONTIN) 100 mg capsule  Self No No   Sig: TAKE 1 CAPSULE IN THE MORNING, TAKE 1 CAPSULE IN THE AFTERNOON AND TAKE 1 TO 3 CAPSULES AT BEDTIME   Patient taking differently: TAKE 1 CAPSULE IN THE MORNING, TAKE 1 CAPSULE IN THE AFTERNOON AND TAKE 2 CAPSULES AT BEDTIME   hydroxychloroquine (PLAQUENIL) 200 mg tablet  Self Yes No   Sig: Take 200 mg by mouth 2 (two) times a day with meals levothyroxine 75 mcg tablet   No No   Sig: Take 1 tablet (75 mcg total) by mouth daily   losartan (COZAAR) 25 mg tablet  Self No No   Sig: TAKE 1 TABLET EVERY DAY   methotrexate 2 5 mg tablet  Self No No   Sig: Uses 4 a week via Rheumatology [facility]   Patient taking differently: 5 mg Uses 4 a week via Rheumatology [facility]   omeprazole (PriLOSEC) 20 mg delayed release capsule  Self No No   Sig: TAKE 1 CAPSULE EVERY DAY AS NEEDED FOR  STOMACH  ACID   pravastatin (PRAVACHOL) 80 mg tablet  Self No No   Sig: TAKE 1 TABLET EVERY DAY   rOPINIRole (REQUIP) 0 5 mg tablet  Self No No   Sig: TAKE 2 TABLETS AT BEDTIME AND 1 TABLET IN THE MORNING   rivaroxaban (XARELTO) 15 mg tablet   No No   Sig: Take 1 tablet (15 mg total) by mouth daily with breakfast   torsemide (DEMADEX) 20 mg tablet  Self No No   Sig: TAKE 2 TABLETS EVERY DAY      Facility-Administered Medications: None       Past Medical History:   Diagnosis Date    Anemia     Arthritis     rheumatoid    Benign essential hypertension     Cataract     Chronic cystitis     CPAP (continuous positive airway pressure) dependence     Diverticulitis of colon     Early satiety     GERD (gastroesophageal reflux disease)     Gout     Hearing aid worn     bilateral    Hearing loss     Hemorrhoids     Hiatal hernia     History of colonic polyps     Hyperlipidemia     Hypothyroidism     Left breast mass     Microhematuria     Migraine     occular    Neuropathy     lower and upper extermities    Overactive bladder     Pneumonia of left lower lobe due to infectious organism (HCC)     RA (rheumatoid arthritis) (HCC)     Sleep apnea     uses cpap    Stroke ([address] )     5/2017    Type 2 diabetes mellitus (HCC)     Urinary frequency     Urinary urgency     Urothelial [facility] ) 04/23/2018    Use of cane as ambulatory aid        Past Surgical History:   Procedure Laterality Date    APPENDECTOMY      ARTHROSCOPY WRIST Right     with release of transverse carpal ligament     BLADDER SURGERY      BLADDER SURGERY      BREAST SURGERY      left breast    BUNIONECTOMY Bilateral     CATARACT EXTRACTION Bilateral     CHOLECYSTECTOMY      COLONOSCOPY      CYSTOSCOPY      EGD AND COLONOSCOPY N/A 12/20/2017    Procedure: EGD AND COLONOSCOPY;  Surgeon: Henrry Tee MD;  [facility]: [facility]; Service: Gastroenterology    ESOPHAGOGASTRODUODENOSCOPY      diagnostic    FOREARM SURGERY Right     fracture repair    HYSTERECTOMY      IR ABDOMINAL ANGIOGRAPHY / INTERVENTION  1/24/2019    KNEE ARTHROSCOPY Left     KNEE SURGERY Left     meniscus tear    NEPHRECTOMY Right     NOSE SURGERY      WA CYSTO/URETERO W/LITHOTRIPSY &INDWELL STENT INSRT Right 2/28/2018    Procedure: CYSTOSCOPY RIGHT URETEROSCOPY, RIGHT RETROGRADE PYELOGRAM AND INSERTION  RIGHT STENT URETERAL, RIGHT RENAL PELVIC WASHING FOR CYTOLOGY;  Surgeon: Norbert Cornejo MD;  [facility];  Service: Urology    WA NEPHRECTOMY, W/PART   URETECTOMY Right 4/23/2018    Procedure: ROBATIC ASSISTED NEPHRO-URETERECTOMY WITH BLADDER CUFF EXCISION;  Surgeon: Ken Auguste MD;  [facility];  Service: Urology    WA [facility] 3RD+ ORD SLCTV ABDL PEL/LXTR [facility] Right 1/24/2019    Procedure: RIGHT LEG ANGIOGRAM, BILATERAL FEMORAL ACCESS, STENTING OF RIGHT EXTERNAL ILIAC ARTERY WITH BALLOON ANGIOPLASTY;  Surgeon: Maria C Arteaga MD;  [facility];  Service: Vascular    REPLACEMENT TOTAL KNEE BILATERAL Bilateral     URETEROSCOPY Right 3/20/2018    Procedure: CYSTOSCOPY, RETROGRADE PYELOGRAM, URETEROSCOPY, BIOPSY, BRUSH, FULGURATION, STENT PLACEMENT, BLADDER BIOPSY WITH FULGURATION;  Surgeon: Ken Auguste MD;  [facility];  Service: Urology       Family History   Problem Relation Age of Onset    Other Mother         epilepsy    Early death Mother     Glaucoma Father     Stroke Father         silent    Other Brother         cardiac disorder     I have reviewed and agree with the history as documented  Social History     Tobacco Use    Smoking status: Never Smoker    Smokeless tobacco: Never Used    Tobacco comment: stopped smoking in the distant past, never smoker (as per [employer])    Substance Use Topics    Alcohol use: No     Comment: seldom    Drug use: No        Review of Systems   Constitutional: Negative for chills, fatigue and fever  HENT: Negative for hearing loss  Eyes: Negative for visual disturbance  Respiratory: Negative for cough, chest tightness, shortness of breath and wheezing  Cardiovascular: Positive for leg swelling (Bilateral up to knees for past week)  Negative for chest pain and palpitations  Gastrointestinal: Negative for abdominal distention, abdominal pain, constipation, diarrhea, nausea and vomiting  Endocrine: Negative for polydipsia and polyuria  Genitourinary: Negative for decreased urine volume, dysuria and hematuria  Skin: Negative for color change, pallor, rash and wound  Neurological: Negative for dizziness, syncope, light-headedness and headaches  Physical Exam  ED Triage Vitals [03/13/19 1751]   Temperature Pulse Respirations Blood Pressure SpO2   97 8 °F (36 6 °C) 85 20 162/77 99 %      Temp Source Heart Rate Source Patient Position - Orthostatic VS BP Location FiO2 (%)   Tympanic Monitor Sitting Right arm --      Pain Score       5           qSOFA     Row Name 03/13/19 2122 03/13/19 2045 03/13/19 1751 03/13/19 1600       Altered mental status GCS < 15             Respiratory Rate > / =22  0  0  0       Systolic BP < / =894  0  0  0  0     Q Sofa Score  0  0  0             Orthostatic Vital Signs  Vitals:    03/13/19 1751 03/13/19 2045 03/13/19 2122   BP: 162/77 168/78 144/77   Pulse: 85 80 72   Patient Position - Orthostatic VS: Sitting Sitting Lying       Physical Exam   Constitutional: She is oriented to person, place, and time  She appears well-developed and well-nourished  No distress     HENT:   Head: Normocephalic and atraumatic  Right Ear: External ear normal    Left Ear: External ear normal    Nose: Nose normal    Mouth/Throat: Oropharynx is clear and moist  No oropharyngeal exudate  Eyes: Pupils are equal, round, and reactive to light  Neck: Normal range of motion  Cardiovascular: Normal rate, regular rhythm and normal heart sounds  Exam reveals no gallop and no friction rub  No murmur heard  Pulmonary/Chest: Effort normal and breath sounds normal  No respiratory distress  She has no wheezes  She exhibits no tenderness  Abdominal: Soft  Bowel sounds are normal  She exhibits no distension and no mass  There is no tenderness  There is no guarding  Musculoskeletal: Normal range of motion  She exhibits edema (Pitting edema bilaterally up to knees)  She exhibits no deformity  Palpable dorsalis pedis pulse bilaterally  Toes warm and well perfused  Full sensation throughout the lower extremities bilaterally   Lymphadenopathy:     She has no cervical adenopathy  Neurological: She is alert and oriented to person, place, and time  No sensory deficit  Skin: Skin is warm and dry  Capillary refill takes less than 2 seconds  She is not diaphoretic  Psychiatric: She has a normal mood and affect  Vitals reviewed        ED Medications  Medications   acetaminophen (TYLENOL) tablet 650 mg (has no administration in time range)   cyanocobalamin (VITAMIN B-12) tablet 100 mcg (has no administration in time range)   docusate sodium (COLACE) capsule 100 mg (has no administration in time range)   hydroxychloroquine (PLAQUENIL) tablet 200 mg (has no administration in time range)   leucovorin (WELLCOVORIN) tablet 10 mg (has no administration in time range)   losartan (COZAAR) tablet 25 mg (has no administration in time range)   levothyroxine tablet 75 mcg (has no administration in time range)   methotrexate tablet 5 mg (has no administration in time range)   multivitamin-minerals (CENTRUM) tablet 1 tablet (has no administration in time range)   rivaroxaban (XARELTO) tablet 15 mg (has no administration in time range)   rOPINIRole (REQUIP) tablet 1 mg (has no administration in time range)   insulin lispro (HumaLOG) 100 units/mL subcutaneous injection 1-6 Units (has no administration in time range)   insulin lispro (HumaLOG) 100 units/mL subcutaneous injection 1-5 Units (has no administration in time range)   calcium carbonate-vitamin D (OSCAL-D) 500 mg-200 units per tablet 1 tablet (has no administration in time range)   gabapentin (NEURONTIN) capsule 200 mg (has no administration in time range)   gabapentin (NEURONTIN) capsule 100 mg (has no administration in time range)   furosemide (LASIX) injection 20 mg (20 mg Intravenous Given 3/13/19 2045)       Diagnostic Studies  Results Reviewed     Procedure Component Value Units Date/Time    Basic metabolic panel [[id number]]  (Abnormal) Collected:  03/13/19 1928    Lab Status:  Final result Specimen:  Blood from Arm, Right Updated:  03/13/19 1947     Sodium 141 mmol/L      Potassium 4 1 mmol/L      Chloride 105 mmol/L      CO2 29 mmol/L      ANION GAP 7 mmol/L      BUN 31 mg/dL      Creatinine 1 83 mg/dL      Glucose 130 mg/dL      Calcium 8 3 mg/dL      eGFR 25 ml/min/1 73sq m     Narrative:       National Kidney Disease Education Program recommendations are as follows:  GFR calculation is accurate only with a steady state creatinine  Chronic Kidney disease less than 60 ml/min/1 73 sq  meters  Kidney failure less than 15 ml/min/1 73 sq  meters      CBC and differential [[id number]]  (Abnormal) Collected:  03/13/19 1928    Lab Status:  Final result Specimen:  Blood from Arm, Right Updated:  03/13/19 1940     WBC 7 02 Thousand/uL      RBC 2 82 Million/uL      Hemoglobin 9 3 g/dL      Hematocrit 29 3 %       fL      MCH 33 0 pg      MCHC 31 7 g/dL      RDW 13 9 %      MPV 9 6 fL      Platelets 286 Thousands/uL      nRBC 0 /100 WBCs      Neutrophils Relative 67 %      Immat GRANS % 0 %      Lymphocytes Relative 19 %      Monocytes Relative 10 %      Eosinophils Relative 4 %      Basophils Relative 0 %      Neutrophils Absolute 4 74 Thousands/µL      Immature Grans Absolute 0 03 Thousand/uL      Lymphocytes Absolute 1 32 Thousands/µL      Monocytes Absolute 0 67 Thousand/µL      Eosinophils Absolute 0 25 Thousand/µL      Basophils Absolute 0 01 Thousands/µL                  XR chest 2 views   ED Interpretation by Adelfo Houston MD (03/13 1952)   The cxr was ordered by resident and interpreted by me independently  On my read, it appears normal: no pneumonia, no obvious fluid overload  Procedures  ECG 12 Lead Documentation  Date/Time: 3/13/2019 6:47 PM  Performed by: Reyes Longoria MD  Authorized by: Reyes Longoria MD     Patient location:  ED  Previous ECG:     Previous ECG:  Compared to current    Similarity:  No change  Interpretation:     Interpretation: non-specific    Rate:     ECG rate assessment: normal    Rhythm:     Rhythm: sinus rhythm    Ectopy:     Ectopy: none    QRS:     QRS axis:  Normal  ST segments:     ST segments:  Normal  T waves:     T waves: inverted    Q waves:     Q waves:  III  Comments:      A T-wave inversion in lead 3 previously present on EKG from December 20, 2018          Phone Consults  ED Phone Contact    ED Course                               MDM  Number of Diagnoses or Management Options  CKD (chronic kidney disease): Lower extremity edema:   Volume overload:   Diagnosis management comments: Patient is a 81-year-old female presenting with lower extremity edema bilaterally  Patient's lungs clear to auscultation, no abnormalities on cardiac exam   CBC, BMP checked demonstrating a slight creatinine increase of 1 8 from 1 7, otherwise no significant abnormalities  Chest x-ray did not demonstrate cardiomegaly, effusion of pulmonary edema    Patient appears volume overloaded and requires diuresis, however will require close monitoring given renal status, prior nephrectomy  Patient given initial gentle diuresis in the emergency department, admitted to Internal Medicine service for further diuresis and continued evaluation  Amount and/or Complexity of Data Reviewed  Clinical lab tests: ordered and reviewed  Tests in the radiology section of CPT®: ordered and reviewed  Review and summarize past medical records: yes  Discuss the patient with other providers: yes  Independent visualization of images, tracings, or specimens: yes    Risk of Complications, Morbidity, and/or Mortality  Presenting problems: moderate  Diagnostic procedures: minimal  Management options: low    Patient Progress  Patient progress: improved      Disposition  Final diagnoses:   Lower extremity edema   Volume overload   CKD (chronic kidney disease)     Time reflects when diagnosis was documented in both MDM as applicable and the Disposition within this note     Time User Action Codes Description Comment    3/13/2019  8:28 PM London Cull Add [R60 0] Lower extremity edema     3/13/2019  8:28 PM London Cull Add [E87 70] Volume overload     3/13/2019  8:28 PM London Cull Add [N18 9] CKD (chronic kidney disease)     3/13/2019  8:50 PM Maria M ABREU Add [I50 33] Acute on chronic diastolic congestive heart failure ([facility] )     3/13/2019  8:50 PM Nawaf Gonzalez Add [Z90 5,  Z90 6] History of nephroureterectomy       ED Disposition     ED Disposition Condition Date/Time Comment    Admit Stable Wed Mar 13, 2019  8:28 PM Case was discussed with KANU and the patient's admission status was agreed to be Admission Status: inpatient status to the service of Dr Antoine Whalen           Follow-up Information    None         Current Discharge Medication List      CONTINUE these medications which have NOT CHANGED    Details   acetaminophen (TYLENOL) 325 mg tablet Take 650 mg by mouth 2 (two) times a day as needed for mild pain       Calcium Citrate-Vitamin D (CALCIUM + D PO) Take 1 tablet by mouth 2 (two) times a day      cyanocobalamin (VITAMIN B-12) 100 mcg tablet Take by mouth      docusate sodium (COLACE) 100 mg capsule Take 1 capsule (100 mg total) by mouth 2 (two) times a day for 60 doses  Qty: 90 capsule, Refills: 0    Associated Diagnoses: Urothelial lesion      gabapentin (NEURONTIN) 100 mg capsule TAKE 1 CAPSULE IN THE MORNING, TAKE 1 CAPSULE IN THE AFTERNOON AND TAKE 1 TO 3 CAPSULES AT BEDTIME  Qty: 450 capsule, Refills: 1    Associated Diagnoses: Peripheral polyneuropathy      hydroxychloroquine (PLAQUENIL) 200 mg tablet Take 200 mg by mouth 2 (two) times a day with meals      LEUCOVORIN CALCIUM PO Take 10 mg by mouth once a week      levothyroxine 75 mcg tablet Take 1 tablet (75 mcg total) by mouth daily  Qty: 90 tablet, Refills: 3    Associated Diagnoses: Acquired hypothyroidism      losartan (COZAAR) 25 mg tablet TAKE 1 TABLET EVERY DAY  Qty: 90 tablet, Refills: 0    Associated Diagnoses: Acute on chronic diastolic congestive heart failure (HCC)      methotrexate 2 5 mg tablet Uses 4 a week via Rheumatology [facility]  Qty: 12 tablet, Refills: 0    Associated Diagnoses: Rheumatoid arthritis with positive rheumatoid factor, involving unspecified site (formerly [facility])      Multiple Vitamin (MULTIVITAMINS PO) Take 1 tablet by mouth daily      omeprazole (PriLOSEC) 20 mg delayed release capsule TAKE 1 CAPSULE EVERY DAY AS NEEDED FOR  STOMACH  ACID  Qty: 90 capsule, Refills: 1    Associated Diagnoses: Chronic GERD      pravastatin (PRAVACHOL) 80 mg tablet TAKE 1 TABLET EVERY DAY  Qty: 90 tablet, Refills: 3    Associated Diagnoses: Pure hypercholesterolemia      rivaroxaban (XARELTO) 15 mg tablet Take 1 tablet (15 mg total) by mouth daily with breakfast  Qty: 90 tablet, Refills: 3    Associated Diagnoses: Acute on chronic diastolic congestive heart failure (HCC)      rOPINIRole (REQUIP) 0 5 mg tablet TAKE 2 TABLETS AT BEDTIME AND 1 TABLET IN THE MORNING  Qty: 270 tablet, Refills: 3    Associated Diagnoses: Restless leg syndrome      torsemide (DEMADEX) 20 mg tablet TAKE 2 TABLETS EVERY DAY  Qty: 180 tablet, Refills: 0    Associated Diagnoses: Acute on chronic diastolic congestive heart failure (HCC)           No discharge procedures on file  ED Provider  Attending physically available and evaluated Anel Jailyn NICHOLS managed the patient along with the ED Attending      Electronically Signed by         Ayden Baeza MD  03/13/19 6262       Ayden Baeza MD  03/13/19 6375

## 2019-03-13 NOTE — TELEPHONE ENCOUNTER
Spoke with patient  Advised patient urology office received final urine culture  Per Desire Clark PA-C recommendation, would advise against further antibiotics  Patient verbalized understanding

## 2019-03-13 NOTE — TELEPHONE ENCOUNTER
[address] [facility] in [facility] to find out about urine culture results  Spoke with Francis Villalobos PA-C  Per Francis Villalobos, they have received final results  She reports final results should "low level enterococcus " Per Francis Villalobos, given symptoms they decided to treat  Francis Villalobos reports, culture showed resistant to Cipro, so they changed antibiotic to Keflex  Francis Villalobos reports she already spoke with patient regarding final results  Francis Villalobos to fax copy of urine culture results to [facility] office

## 2019-03-13 NOTE — TELEPHONE ENCOUNTER
There are no sensitivities run to cephalosporins so I do not recommend the patient take Keflex  Given the low levels of e coli I do not recommend and further treatment with antibiotics

## 2019-03-13 NOTE — ASSESSMENT & PLAN NOTE
Her lower leg  edema has worsened and she has had significant weight gain since last visit in the fall  I recommended evaluation in the emergency room and she will plan to have her  take her to [place]

## 2019-03-13 NOTE — TELEPHONE ENCOUNTER
Spoke to patient and informed her that her appt on 04/15/19 needed to be R/S  Her new appt is 04/15/19 at 11:00 am at the [place] location with KENROY Kruger

## 2019-03-13 NOTE — PROGRESS NOTES
Assessment/Plan:    Atrial fibrillation ([address] )  She is anticoagulated with Xarelto  It sounds like she is in sinus rhythm currently  Edema  Her lower leg  edema has worsened and she has had significant weight gain since last visit in the fall  I recommended evaluation in the emergency room and she will plan to have her  take her to [place]  Essential hypertension  Blood pressure is well controlled on current regimen  Pain in both lower extremities  Unclear why she has developed increased pain in the thighs, although likely related to increased lower extremity edema  Plan workup in the emergency room  Diagnoses and all orders for this visit:    Pain in both lower extremities    Essential hypertension    Chronic atrial fibrillation (HCC)    Edema, unspecified type          Subjective:      Patient ID: Brad Boyce is a 81 y o  female  She has had pain in the anterior thighs which began at least a week ago  She denies any injury or fall  Pain feels like a pulled muscle  It is achy in character  Pain is not present at rest but occurs as soon as she tries to stretch her legs or stand up  She has a lot of pain when she tries to get dressed  It is especially hard to put her socks on  She still uses the lymphedema press but it has not helped as much recently  She is on chronic Xarelto for atrial fibrillation  She had 2 vascular stents placed in the right groin in January  The following portions of the patient's history were reviewed and updated as appropriate: allergies, current medications, past family history, past medical history, past social history, past surgical history and problem list     Review of Systems   Constitutional: Negative for activity change, chills, fatigue and fever  HENT: Negative for congestion, ear pain, sinus pressure and sore throat  Eyes: Negative for pain and visual disturbance     Respiratory: Negative for cough, chest tightness, shortness of breath and wheezing  Cardiovascular: Positive for leg swelling  Negative for chest pain and palpitations  Gastrointestinal: Negative for abdominal pain, blood in stool, constipation, diarrhea, nausea and vomiting  Endocrine: Negative for polydipsia and polyuria  Genitourinary: Negative for difficulty urinating, dysuria, frequency and urgency  Musculoskeletal: Positive for myalgias  Negative for arthralgias and joint swelling  Skin: Negative for rash  Neurological: Negative for dizziness, weakness, numbness and headaches  Hematological: Negative for adenopathy  Does not bruise/bleed easily  Psychiatric/Behavioral: Negative for dysphoric mood  The patient is not nervous/anxious  Objective:      /70   Pulse 78   Temp 98 3 °F (36 8 °C) (Tympanic)   Ht 5' 1" (1 549 m)   Wt 101 kg (222 lb)   LMP  (LMP Unknown)   SpO2 98%   BMI 41 95 kg/m²          Physical Exam   Constitutional: She appears well-developed and well-nourished  HENT:   Head: Normocephalic and atraumatic  Neck: Normal range of motion  Neck supple  Cardiovascular: Normal rate  Sinus with ectopics   Pulmonary/Chest: Effort normal and breath sounds normal    Musculoskeletal: She exhibits edema  3 plus pitting edema to knees  Skin: Skin is warm and dry  Psychiatric: She has a normal mood and affect  Her behavior is normal    Nursing note and vitals reviewed

## 2019-03-13 NOTE — ASSESSMENT & PLAN NOTE
Unclear why she has developed increased pain in the thighs, although likely related to increased lower extremity edema  Plan workup in the emergency room

## 2019-03-14 ENCOUNTER — APPOINTMENT (INPATIENT)
Dept: NON INVASIVE DIAGNOSTICS | Facility: HOSPITAL | Age: 84
DRG: 291 | End: 2019-03-14
Payer: MEDICARE

## 2019-03-14 LAB
ANION GAP SERPL CALCULATED.3IONS-SCNC: 8 MMOL/L (ref 4–13)
ATRIAL RATE: 72 BPM
BASOPHILS # BLD AUTO: 0.03 THOUSANDS/ΜL (ref 0–0.1)
BASOPHILS NFR BLD AUTO: 1 % (ref 0–1)
BUN SERPL-MCNC: 30 MG/DL (ref 5–25)
CALCIUM SERPL-MCNC: 8.4 MG/DL (ref 8.3–10.1)
CHLORIDE SERPL-SCNC: 106 MMOL/L (ref 100–108)
CO2 SERPL-SCNC: 28 MMOL/L (ref 21–32)
CREAT SERPL-MCNC: 1.63 MG/DL (ref 0.6–1.3)
EOSINOPHIL # BLD AUTO: 0.23 THOUSAND/ΜL (ref 0–0.61)
EOSINOPHIL NFR BLD AUTO: 4 % (ref 0–6)
ERYTHROCYTE [DISTWIDTH] IN BLOOD BY AUTOMATED COUNT: 14 % (ref 11.6–15.1)
GFR SERPL CREATININE-BSD FRML MDRD: 29 ML/MIN/1.73SQ M
GLUCOSE SERPL-MCNC: 109 MG/DL (ref 65–140)
GLUCOSE SERPL-MCNC: 115 MG/DL (ref 65–140)
GLUCOSE SERPL-MCNC: 118 MG/DL (ref 65–140)
GLUCOSE SERPL-MCNC: 123 MG/DL (ref 65–140)
GLUCOSE SERPL-MCNC: 98 MG/DL (ref 65–140)
HCT VFR BLD AUTO: 27.6 % (ref 34.8–46.1)
HGB BLD-MCNC: 8.8 G/DL (ref 11.5–15.4)
IMM GRANULOCYTES # BLD AUTO: 0.02 THOUSAND/UL (ref 0–0.2)
IMM GRANULOCYTES NFR BLD AUTO: 0 % (ref 0–2)
LYMPHOCYTES # BLD AUTO: 1.24 THOUSANDS/ΜL (ref 0.6–4.47)
LYMPHOCYTES NFR BLD AUTO: 19 % (ref 14–44)
MAGNESIUM SERPL-MCNC: 2.4 MG/DL (ref 1.6–2.6)
MCH RBC QN AUTO: 32.7 PG (ref 26.8–34.3)
MCHC RBC AUTO-ENTMCNC: 31.9 G/DL (ref 31.4–37.4)
MCV RBC AUTO: 103 FL (ref 82–98)
MONOCYTES # BLD AUTO: 0.65 THOUSAND/ΜL (ref 0.17–1.22)
MONOCYTES NFR BLD AUTO: 10 % (ref 4–12)
NEUTROPHILS # BLD AUTO: 4.44 THOUSANDS/ΜL (ref 1.85–7.62)
NEUTS SEG NFR BLD AUTO: 66 % (ref 43–75)
NRBC BLD AUTO-RTO: 0 /100 WBCS
P AXIS: 82 DEGREES
PLATELET # BLD AUTO: 191 THOUSANDS/UL (ref 149–390)
PMV BLD AUTO: 10.1 FL (ref 8.9–12.7)
POTASSIUM SERPL-SCNC: 3.7 MMOL/L (ref 3.5–5.3)
PR INTERVAL: 174 MS
QRS AXIS: 24 DEGREES
QRSD INTERVAL: 90 MS
QT INTERVAL: 406 MS
QTC INTERVAL: 444 MS
RBC # BLD AUTO: 2.69 MILLION/UL (ref 3.81–5.12)
SODIUM SERPL-SCNC: 142 MMOL/L (ref 136–145)
T WAVE AXIS: 53 DEGREES
VENTRICULAR RATE: 72 BPM
WBC # BLD AUTO: 6.61 THOUSAND/UL (ref 4.31–10.16)

## 2019-03-14 PROCEDURE — 82948 REAGENT STRIP/BLOOD GLUCOSE: CPT

## 2019-03-14 PROCEDURE — 93970 EXTREMITY STUDY: CPT

## 2019-03-14 PROCEDURE — 93970 EXTREMITY STUDY: CPT | Performed by: SURGERY

## 2019-03-14 PROCEDURE — 99221 1ST HOSP IP/OBS SF/LOW 40: CPT | Performed by: INTERNAL MEDICINE

## 2019-03-14 PROCEDURE — 85025 COMPLETE CBC W/AUTO DIFF WBC: CPT | Performed by: INTERNAL MEDICINE

## 2019-03-14 PROCEDURE — 80048 BASIC METABOLIC PNL TOTAL CA: CPT | Performed by: INTERNAL MEDICINE

## 2019-03-14 PROCEDURE — 93010 ELECTROCARDIOGRAM REPORT: CPT | Performed by: INTERNAL MEDICINE

## 2019-03-14 PROCEDURE — 99222 1ST HOSP IP/OBS MODERATE 55: CPT | Performed by: INTERNAL MEDICINE

## 2019-03-14 PROCEDURE — 83735 ASSAY OF MAGNESIUM: CPT | Performed by: INTERNAL MEDICINE

## 2019-03-14 PROCEDURE — 93308 TTE F-UP OR LMTD: CPT

## 2019-03-14 PROCEDURE — 93306 TTE W/DOPPLER COMPLETE: CPT | Performed by: INTERNAL MEDICINE

## 2019-03-14 PROCEDURE — 99232 SBSQ HOSP IP/OBS MODERATE 35: CPT | Performed by: INTERNAL MEDICINE

## 2019-03-14 RX ORDER — GABAPENTIN 100 MG/1
100 CAPSULE ORAL 2 TIMES DAILY
Status: DISCONTINUED | OUTPATIENT
Start: 2019-03-14 | End: 2019-03-21 | Stop reason: HOSPADM

## 2019-03-14 RX ORDER — POLYVINYL ALCOHOL 14 MG/ML
1 SOLUTION/ DROPS OPHTHALMIC 3 TIMES DAILY
Status: DISCONTINUED | OUTPATIENT
Start: 2019-03-14 | End: 2019-03-21 | Stop reason: HOSPADM

## 2019-03-14 RX ORDER — TORSEMIDE 20 MG/1
40 TABLET ORAL ONCE
Status: COMPLETED | OUTPATIENT
Start: 2019-03-14 | End: 2019-03-14

## 2019-03-14 RX ORDER — FUROSEMIDE 10 MG/ML
20 INJECTION INTRAMUSCULAR; INTRAVENOUS ONCE
Status: DISCONTINUED | OUTPATIENT
Start: 2019-03-14 | End: 2019-03-14

## 2019-03-14 RX ORDER — PANTOPRAZOLE SODIUM 40 MG/1
40 TABLET, DELAYED RELEASE ORAL
Status: DISCONTINUED | OUTPATIENT
Start: 2019-03-14 | End: 2019-03-21 | Stop reason: HOSPADM

## 2019-03-14 RX ADMIN — ROPINIROLE 1 MG: 1 TABLET, FILM COATED ORAL at 22:37

## 2019-03-14 RX ADMIN — POLYVINYL ALCOHOL 1 DROP: 14 SOLUTION/ DROPS OPHTHALMIC at 13:16

## 2019-03-14 RX ADMIN — DOCUSATE SODIUM 100 MG: 100 CAPSULE, LIQUID FILLED ORAL at 17:42

## 2019-03-14 RX ADMIN — GABAPENTIN 100 MG: 100 CAPSULE ORAL at 10:28

## 2019-03-14 RX ADMIN — RIVAROXABAN 15 MG: 15 TABLET, FILM COATED ORAL at 10:29

## 2019-03-14 RX ADMIN — VITAM B12 100 MCG: 100 TAB at 10:29

## 2019-03-14 RX ADMIN — TORSEMIDE 40 MG: 20 TABLET ORAL at 16:14

## 2019-03-14 RX ADMIN — GABAPENTIN 200 MG: 100 CAPSULE ORAL at 22:37

## 2019-03-14 RX ADMIN — HYDROXYCHLOROQUINE SULFATE 200 MG: 200 TABLET, FILM COATED ORAL at 16:14

## 2019-03-14 RX ADMIN — HYDROXYCHLOROQUINE SULFATE 200 MG: 200 TABLET, FILM COATED ORAL at 10:29

## 2019-03-14 RX ADMIN — Medication 1 TABLET: at 10:29

## 2019-03-14 RX ADMIN — CALCIUM CARBONATE-VITAMIN D TAB 500 MG-200 UNIT 1 TABLET: 500-200 TAB at 16:14

## 2019-03-14 RX ADMIN — LOSARTAN POTASSIUM 25 MG: 25 TABLET, FILM COATED ORAL at 10:29

## 2019-03-14 RX ADMIN — POLYVINYL ALCOHOL 1 DROP: 14 SOLUTION/ DROPS OPHTHALMIC at 16:14

## 2019-03-14 RX ADMIN — DOCUSATE SODIUM 100 MG: 100 CAPSULE, LIQUID FILLED ORAL at 10:29

## 2019-03-14 RX ADMIN — CALCIUM CARBONATE-VITAMIN D TAB 500 MG-200 UNIT 1 TABLET: 500-200 TAB at 10:29

## 2019-03-14 RX ADMIN — PANTOPRAZOLE SODIUM 40 MG: 40 TABLET, DELAYED RELEASE ORAL at 13:16

## 2019-03-14 RX ADMIN — POLYVINYL ALCOHOL 1 DROP: 14 SOLUTION/ DROPS OPHTHALMIC at 22:37

## 2019-03-14 RX ADMIN — GABAPENTIN 100 MG: 100 CAPSULE ORAL at 16:14

## 2019-03-14 RX ADMIN — LEVOTHYROXINE SODIUM 75 MCG: 75 TABLET ORAL at 07:53

## 2019-03-14 NOTE — PLAN OF CARE
Problem: Potential for Falls  Goal: Patient will remain free of falls  Description  INTERVENTIONS:  - Assess patient frequently for physical needs  -  Identify cognitive and physical deficits and behaviors that affect risk of falls  -  [place] fall precautions as indicated by assessment   - Educate patient/family on patient safety including physical limitations  - Instruct patient to call for assistance with activity based on assessment  - Modify environment to reduce risk of injury  - Consider OT/PT consult to assist with strengthening/mobility  Outcome: Progressing     Problem: CARDIOVASCULAR - ADULT  Goal: Maintains optimal cardiac output and hemodynamic stability  Description  INTERVENTIONS:  - Monitor I/O, vital signs and rhythm  - Monitor for S/S and trends of decreased cardiac output i e  bleeding, hypotension  - Administer and titrate ordered vasoactive medications to optimize hemodynamic stability  - Assess quality of pulses, skin color and temperature  - Assess for signs of decreased coronary artery perfusion - ex   Angina  - Instruct patient to report change in severity of symptoms  Outcome: Progressing     Problem: METABOLIC, FLUID AND ELECTROLYTES - ADULT  Goal: Fluid balance maintained  Description  INTERVENTIONS:  - Monitor labs and assess for signs and symptoms of volume excess or deficit  - Monitor I/O and WT  - Instruct patient on fluid and nutrition as appropriate  Outcome: Progressing     Problem: DISCHARGE PLANNING - CARE MANAGEMENT  Goal: Discharge to post-acute care or home with appropriate resources  Description  INTERVENTIONS:  - Conduct assessment to determine patient/family and health care team treatment goals, and need for post-acute services based on payer coverage, community resources, and patient preferences, and barriers to discharge  - Address psychosocial, clinical, and financial barriers to discharge as identified in assessment in conjunction with the patient/family and health care team  - Arrange appropriate level of post-acute services according to patient?s   needs and preference and payer coverage in collaboration with the physician and health care team  - Communicate with and update the patient/family, physician, and health care team regarding progress on the discharge plan  - Arrange appropriate transportation to post-acute venues  Outcome: Progressing     Problem: Nutrition/Hydration-ADULT  Goal: Nutrient/Hydration intake appropriate for improving, restoring or maintaining nutritional needs  Description  Monitor and assess patient's nutrition/hydration status for malnutrition (ex- brittle hair, bruises, dry skin, pale skin and conjunctiva, muscle wasting, smooth red tongue, and disorientation)  Collaborate with interdisciplinary team and initiate plan and interventions as ordered  Monitor patient's weight and dietary intake as ordered or per policy  Utilize nutrition screening tool and intervene per policy  Determine patient's food preferences and provide high-protein, high-caloric foods as appropriate       INTERVENTIONS:  - Monitor oral intake, urinary output, labs, and treatment plans  - Assess nutrition and hydration status and recommend course of action  - Evaluate amount of meals eaten  - Assist patient with eating if necessary   - Allow adequate time for meals  - Recommend/ encourage appropriate diets, oral nutritional supplements, and vitamin/mineral supplements  - Order, calculate, and assess calorie counts as needed  - Recommend, monitor, and adjust tube feedings and TPN/PPN based on assessed needs  - Assess need for intravenous fluids  - Provide specific nutrition/hydration education as appropriate  - Include patient/family/caregiver in decisions related to nutrition  Outcome: Progressing

## 2019-03-14 NOTE — ASSESSMENT & PLAN NOTE
· In sinus rhythm at this time not on rate control medication    Continue Xarelto for anticoagulation

## 2019-03-14 NOTE — SOCIAL WORK
Heart Failure Care Coordinator Note  Met with patient to provide HF education and identify barriers for HF management at home    HF Booklet: given and reviewed    Patient has Scale yes  Scale given:no

## 2019-03-14 NOTE — SOCIAL WORK
Cm met with patient to explain Cm role and discuss discharge planning  Patient lives with  in 1st floor condo with 4STE  Patient's  Lubna Chand is emergency contact [phone]  Patient has living will and POA is patient's dtr Jeanette Maria  Patient uses RW to ambulate and  assists with ADLs  Patient also has grab bars, commode, shower chair, and wheelchair in the home  Patient uses CPAP at night  Patient's  drives  Patient reported history at [facility] and history of [facility] upon discharge from [facility], and denied mental health or ETOH treatment in the past   Pharmacy: [facility]  PCP: Dr Reggie Marshall following  CM reviewed d/c planning process including the following: identifying help at home, patient preference for d/c planning needs, Discharge Lounge, [facility] Meds to Bed program, availability of treatment team to discuss questions or concerns patient and/or family may have regarding understanding medications and recognizing signs and symptoms once discharged  CM also encouraged patient to follow up with all recommended appointments after discharge  Patient advised of importance for patient and family to participate in managing patients medical well being

## 2019-03-14 NOTE — ASSESSMENT & PLAN NOTE
· Creatinine fluctuating, however appears near baseline at this time  · Monitor daily with BMP, particularly in setting of IV diuretic administration

## 2019-03-14 NOTE — H&P
H&P- Chen Shore 1935, 81 y o  female MRN: [id number]    Unit/Bed#: [facility] 821-01 Encounter: [id number]    Primary Care Provider: Bill Hoffman MD   Date and time admitted to hospital: 3/13/2019  5:53 PM        * Acute on chronic diastolic congestive heart failure ([facility] )  Assessment & Plan  · Evidence of volume overload on examination, weight gain per patient  · Received 20 mg IV Lasix x1 ED, assess response with daily weights, I/O  · Patient known to Dr Prasanna Ruth, will consult heart failure team  · Additionally consult Nephrology given history of nephrectomy  · 1500 cc daily fluid restriction, 2 g sodium diet  · Monitor electrolytes, creatinine closely    Atrial fibrillation ([facility] )  Assessment & Plan  · In sinus rhythm at this time not on rate control medication  Continue Xarelto for anticoagulation      Acquired hypothyroidism  Assessment & Plan  · Chronic and controlled, continue levothyroxine    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 (HCC)  Assessment & Plan  · Without any neurologic deficits  · Continue statin, Xarelto    CKD (chronic kidney disease) stage 3, GFR 30-59 ml/min ([facility])  Assessment & Plan  · Creatinine fluctuating, however appears near baseline at this time  · Monitor daily with BMP, particularly in setting of IV diuretic administration    Urothelial cancer ([facility] )  Assessment & Plan  · Status post nephroureterectomy 04/23/2018  · Outpatient follow up with Urology as indicated    Diabetes mellitus type 2 in [facility])  Assessment & Plan  Lab Results   Component Value Date    HGBA1C 6 5 01/02/2019     Initiate SSI with Accu-Cheks while inpatient, carb controlled diet, hypoglycemic protocol    No results for input(s): POCGLU in the last 72 hours      Blood Sugar Average: Last 72 hrs:      Rheumatoid arthritis ([facility] )  Assessment & Plan  · Tentatively to continue methotrexate 5 mg Friday, however monitor renal function closely  · Continue leucovorin day after        VTE Prophylaxis: Rivaroxaban (Xarelto)  / sequential compression device   Code Status: Level 3 - DNAR and DNI as discussed with patient at bedside  POLST: There is no POLST form on file for this patient (pre-hospital)    Anticipated Length of Stay:  Patient will be admitted on an Inpatient basis with an anticipated length of stay of  greater than 2 midnights  Justification for Hospital Stay: Please see detailed plans noted above  Chief Complaint:     Leg swelling and pain  History of Present Illness:  Shayla Gaines is a 81 y o  female who has a past medical history significant for diastolic CHF (EF 65% 3/54/1469), urothelial cancer status post right nephroureterectomy 04/23/2018, history of CVA, atrial fibrillation on Xarelto, and PAD s/p stent x2 right iliac 1/24/2019  She presents with a 3-4 week history of worsening lower extremity edema, with worsening pain in the thighs associated with this  She reports she is using her lymphedema machine as well as compression stockings without significant relief of swelling  She states she has gained approximately 10 lb over the past 3-4 weeks, admits to drinking 5-6 cups of water daily but with some pop usage, and states she is compliant with her medications  She denies fevers/chills, chest pain/pressure, palpitations, shortness of breath, diaphoresis, abdominal pain/nausea/vomiting/diarrhea, or new rashes/lesions  The patient was seen by her PCP today, who advised patient present to ED for evaluation and potential need for diuresis      Review of Systems:    Constitutional:  Denies fever or chills   Eyes:  Denies change in visual acuity   HENT:  Denies nasal congestion or sore throat   Respiratory:  Denies cough or shortness of breath   Cardiovascular:  Denies chest pain but endorses edema  GI:  Denies abdominal pain, nausea, vomiting, bloody stools or diarrhea   :  Denies dysuria   Musculoskeletal:  Denies back pain but endorses leg pain  Integument:  Denies rash   Neurologic:  Denies headache, focal weakness or sensory changes   Endocrine:  Denies polyuria or polydipsia   Lymphatic:  Denies swollen glands   Psychiatric:  Denies depression or anxiety     Past Medical and Surgical History:   Past Medical History:   Diagnosis Date    Anemia     Arthritis     rheumatoid    Benign essential hypertension     Cataract     Chronic cystitis     CPAP (continuous positive airway pressure) dependence     Diverticulitis of colon     Early satiety     GERD (gastroesophageal reflux disease)     Gout     Hearing aid worn     bilateral    Hearing loss     Hemorrhoids     Hiatal hernia     History of colonic polyps     Hyperlipidemia     Hypothyroidism     Left breast mass     Microhematuria     Migraine     occular    Neuropathy     lower and upper extermities    Overactive bladder     Pneumonia of left lower lobe due to infectious organism ([facility] )     RA (rheumatoid arthritis) ([facility] )     Sleep apnea     uses cpap    Stroke ([facility] )     5/2017    Type 2 diabetes mellitus (HCC)     Urinary frequency     Urinary urgency     Urothelial cancer ([facility] ) 04/23/2018    Use of cane as ambulatory aid      Past Surgical History:   Procedure Laterality Date    APPENDECTOMY      ARTHROSCOPY WRIST Right     with release of transverse carpal ligament     BLADDER SURGERY      BLADDER SURGERY      BREAST SURGERY      left breast    BUNIONECTOMY Bilateral     CATARACT EXTRACTION Bilateral     CHOLECYSTECTOMY      COLONOSCOPY      CYSTOSCOPY      EGD AND COLONOSCOPY N/A 12/20/2017    Procedure: EGD AND COLONOSCOPY;  Surgeon: Garrick Gary MD;  [facility]: [facility];   Service: Gastroenterology    ESOPHAGOGASTRODUODENOSCOPY      diagnostic    FOREARM SURGERY Right     fracture repair    HYSTERECTOMY      IR ABDOMINAL ANGIOGRAPHY / INTERVENTION  1/24/2019    KNEE ARTHROSCOPY Left     KNEE SURGERY Left     meniscus tear    NEPHRECTOMY Right  NOSE SURGERY      DE CYSTO/URETERO W/LITHOTRIPSY &INDWELL STENT INSRT Right 2/28/2018    Procedure: CYSTOSCOPY RIGHT URETEROSCOPY, RIGHT RETROGRADE PYELOGRAM AND INSERTION  RIGHT STENT URETERAL, RIGHT RENAL PELVIC WASHING FOR CYTOLOGY;  Surgeon: Bryon Cruz MD;  Location: [facility];  Service: Urology    DE NEPHRECTOMY, W/PART  URETECTOMY Right 4/23/2018    Procedure: ROBATIC ASSISTED NEPHRO-URETERECTOMY WITH BLADDER CUFF EXCISION;  Surgeon: Onel Lou MD;  [facility];  Service: Urology    DE SLCTV CATHJ 3RD+ ORD SLCTV ABDL PEL/LXTR [facility] Right 1/24/2019    Procedure: RIGHT LEG ANGIOGRAM, BILATERAL FEMORAL ACCESS, STENTING OF RIGHT EXTERNAL ILIAC ARTERY WITH BALLOON ANGIOPLASTY;  Surgeon: Mavis Arteaga MD;  [facility];  Service: Vascular    REPLACEMENT TOTAL KNEE BILATERAL Bilateral     URETEROSCOPY Right 3/20/2018    Procedure: CYSTOSCOPY, RETROGRADE PYELOGRAM, URETEROSCOPY, BIOPSY, BRUSH, FULGURATION, STENT PLACEMENT, BLADDER BIOPSY WITH FULGURATION;  Surgeon: Onel Lou MD;  [facility];  Service: Urology       Meds/Allergies:  Medications Prior to Admission   Medication    acetaminophen (TYLENOL) 325 mg tablet    Calcium Citrate-Vitamin D (CALCIUM + D PO)    cyanocobalamin (VITAMIN B-12) 100 mcg tablet    docusate sodium (COLACE) 100 mg capsule    gabapentin (NEURONTIN) 100 mg capsule    hydroxychloroquine (PLAQUENIL) 200 mg tablet    LEUCOVORIN CALCIUM PO    levothyroxine 75 mcg tablet    losartan (COZAAR) 25 mg tablet    methotrexate 2 5 mg tablet    Multiple Vitamin (MULTIVITAMINS PO)    omeprazole (PriLOSEC) 20 mg delayed release capsule    pravastatin (PRAVACHOL) 80 mg tablet    rivaroxaban (XARELTO) 15 mg tablet    rOPINIRole (REQUIP) 0 5 mg tablet    torsemide (DEMADEX) 20 mg tablet       Allergies:    Allergies   Allergen Reactions    Acetazolamide Other (See Comments)     Other reaction(s): Unknown Allergic Reaction    Aspirin Other reaction(s): Other (See Comments)  High Dose ASA-stomach ache, rachel  ASA 81 m    Atorvastatin      Other reaction(s): Muscle Pain, Myalgia    Azithromycin     Nitrofurantoin Hives     HIVES * pt denies  Other reaction(s): Unknown Allergic Reaction  Other reaction(s): Other (See Comments)  HIVES * pt denies    Other Other (See Comments)     Adhesive tape : red and itching  Other reaction(s):  Other (See Comments)  red and itching    Shellfish-Derived Products Other (See Comments)     Patient got Gout following eating shell fish    Sulfa Antibiotics Other (See Comments)     unknown    Tramadol Diarrhea and Vomiting     History:  Marital Status: /Civil Union   Occupation:  None  Patient Pre-hospital Living Situation:  Home  Patient Pre-hospital Level of Mobility:  Requires roller walker  Patient Pre-hospital Diet Restrictions:  Cardiac  Substance Use History:   Social History     Substance and Sexual Activity   Alcohol Use No    Comment: seldom     Social History     Tobacco Use   Smoking Status Never Smoker   Smokeless Tobacco Never Used   Tobacco Comment    stopped smoking in the distant past, never smoker (as per [employer])      Social History     Substance and Sexual Activity   Drug Use No       Family History:  Family History   Problem Relation Age of Onset    Other Mother         epilepsy    Early death Mother     Glaucoma Father     Stroke Father         silent    Other Brother         cardiac disorder       Physical Exam:     Vitals:   Blood Pressure: 168/78 (03/13/19 2045)  Pulse: 80 (03/13/19 2045)  Temperature: 97 8 °F (36 6 °C) (03/13/19 1751)  Temp Source: Tympanic (03/13/19 1751)  Respirations: 20 (03/13/19 2045)  Height: 5' 1" (154 9 cm) (03/13/19 1751)  Weight - Scale: 101 kg (222 lb) (03/13/19 1751)  SpO2: 100 % (03/13/19 2045)    Constitutional:  Well developed, well nourished, no acute distress, non-toxic appearance   Eyes:  PERRL, conjunctiva normal   HENT:  Atraumatic, external ears normal, nose normal, oropharynx moist, no pharyngeal exudates  Neck- normal range of motion, no tenderness, supple, +JVD   Respiratory:  No respiratory distress, normal breath sounds, no rales, no wheezing   Cardiovascular:  Normal rate, normal rhythm, no murmurs, no gallops, no rubs   GI:  Soft, nondistended, normal bowel sounds, nontender, no organomegaly, no mass, no rebound, no guarding   :  No costovertebral angle tenderness   Musculoskeletal:  2+ bilateral LE pitting edema to kneecap, no tenderness, no deformities  Back- no tenderness  Integument:  Well hydrated, no rash   Lymphatic:  No lymphadenopathy noted   Neurologic:  Alert &awake, communicative, CN 2-12 normal, normal motor function, normal sensory function, no focal deficits noted   Psychiatric:  Speech and behavior appropriate       Lab Results: I have personally reviewed pertinent reports  Results from last 7 days   Lab Units 03/13/19  1928   WBC Thousand/uL 7 02   HEMOGLOBIN g/dL 9 3*   HEMATOCRIT % 29 3*   PLATELETS Thousands/uL 207   NEUTROS PCT % 67   LYMPHS PCT % 19   MONOS PCT % 10   EOS PCT % 4     Results from last 7 days   Lab Units 03/13/19  1928   POTASSIUM mmol/L 4 1   CHLORIDE mmol/L 105   CO2 mmol/L 29   BUN mg/dL 31*   CREATININE mg/dL 1 83*   CALCIUM mg/dL 8 3           EKG: Sinus rhythm with sinus arrhythmia    Imaging: I have personally reviewed pertinent films in PACS    CXR: await radiologist read  My read no marked pulmonary congestion, infiltrate, or effusion noted  ** Please Note: Dragon 360 Dictation voice to text software was used in the creation of this document   **

## 2019-03-14 NOTE — ASSESSMENT & PLAN NOTE
· Evidence of volume overload on examination, weight gain per patient  · Received 20 mg IV Lasix x1 ED, assess response with daily weights, I/O  · Patient known to Dr Astrid Cole, will consult heart failure team  · Additionally consult Nephrology given history of nephrectomy  · 1500 cc daily fluid restriction, 2 g sodium diet  · Monitor electrolytes, creatinine closely

## 2019-03-14 NOTE — CONSULTS
[address] [place] [address] 81 y o  female MRN: [id number]  Unit/Bed#: [facility] 821-01 Encounter: [id number]    ASSESSMENT and PLAN:    81-year-old female with a history of diastolic congestive heart failure urothelial cancer, CVA, atrial fibrillation, peripheral vascular disease admitted to [facility] for shortness of breath and lower extremity edema  Being managed for exacerbation of CHF  Nephrology consulted for kidney disease      1 ) Chronic Kidney Disease Stage III  - outpatient nephrologist: none, will need an outpatient nephrologist to follow up on her renal function  - baseline creatinine: 1 4-1 8 mg/dL  - Etiology: s/p right nephroureterectomy in April of 2018 and has been stable since that time but did suggest that she had some underlying kidney disease to start with, as her creatinine has fluctuated between 1 4-1 8 post that procedure  -renal function is currently stable at baseline  -avoid NSAIDs and contrast  -diuretics per Cardiology    2 ) Acute on chronic diastolic congestive heart failure  -cardiology and heart failure team have been consulted  -diuretics as per Cardiology  -daily weight  -low 2 g sodium diet  -monitor input and output  -agree with continued IV diuresis    3 ) Hypertension  -most recent blood pressure is well controlled, could be volume mediated    4 ) History of urothelial cancer  -status post nephroureterectomy of the right side in April of 2018  -follows with Urology as an outpatient    5 ) History of CVA  -due to thrombus of the right middle cerebral artery  -currently on Xarelto and statin  -blood pressure control      SUMMARY OF RECOMMENDATIONS:    · Continue blood pressure control  · Continue diuretics will defer to Cardiology in terms of diuretics but I would agree with continued IV diuresis  · Monitor daily weight  · Low 2 g sodium diet  · Monitor input and output  · Renal function is currently stable at baseline, will need outpatient nephrology follow-up    HISTORY OF PRESENT ILLNESS:  Requesting Physician: Niyah Draper DO  Reason for Consult:  Chronic kidney disease    Kacie Valentino is a 81 y o  female who was admitted to [facility] on March 13th after presenting with shortness of breath and increased lower extremity swelling  A renal consultation is requested today for assistance in the management of chronic kidney disease  Patient has a history of urothelial cancer status post right nephroureterectomy in April of 2018  Prior to that surgery her creatinine baseline was around 1 and post surgery her renal function has remained stable within the range of 1 4-1 8  Renal function was stable on baseline  She was admitted for exacerbation of CHF and is currently being diuresed      PAST MEDICAL HISTORY:  Past Medical History:   Diagnosis Date    Anemia     Arthritis     rheumatoid    Benign essential hypertension     Cataract     Chronic cystitis     CPAP (continuous positive airway pressure) dependence     Diverticulitis of colon     Early satiety     GERD (gastroesophageal reflux disease)     Gout     Hearing aid worn     bilateral    Hearing loss     Hemorrhoids     Hiatal hernia     History of colonic polyps     Hyperlipidemia     Hypothyroidism     Left breast mass     Microhematuria     Migraine     occular    Neuropathy     lower and upper extermities    Overactive bladder     Pneumonia of left lower lobe due to infectious organism ([address]     RA (rheumatoid arthritis) ([address]     Sleep apnea     uses cpap    Stroke ([address] )     5/2017    Type 2 diabetes mellitus (HCC)     Urinary frequency     Urinary urgency     Urothelial cancer ([address] ) 04/23/2018    Use of cane as ambulatory aid        PAST SURGICAL HISTORY:  Past Surgical History:   Procedure Laterality Date    APPENDECTOMY      ARTHROSCOPY WRIST Right     with release of transverse carpal ligament     BLADDER SURGERY      BLADDER SURGERY      BREAST SURGERY      left breast    BUNIONECTOMY Bilateral     CATARACT EXTRACTION Bilateral     CHOLECYSTECTOMY      COLONOSCOPY      CYSTOSCOPY      EGD AND COLONOSCOPY N/A 12/20/2017    Procedure: EGD AND COLONOSCOPY;  Surgeon: Daryl Castano MD;  Location: [facility]; Service: Gastroenterology    ESOPHAGOGASTRODUODENOSCOPY      diagnostic    FOREARM SURGERY Right     fracture repair    HYSTERECTOMY      IR ABDOMINAL ANGIOGRAPHY / INTERVENTION  1/24/2019    KNEE ARTHROSCOPY Left     KNEE SURGERY Left     meniscus tear    NEPHRECTOMY Right     NOSE SURGERY      [place] CYSTO/URETERO W/LITHOTRIPSY &INDWELL STENT INSRT Right 2/28/2018    Procedure: CYSTOSCOPY RIGHT URETEROSCOPY, RIGHT RETROGRADE PYELOGRAM AND INSERTION  RIGHT STENT URETERAL, RIGHT RENAL PELVIC WASHING FOR CYTOLOGY;  Surgeon: Harman Herring MD;  Location[facility];  Service: Urology    NY NEPHRECTOMY, W/PART   URETECTOMY Right 4/23/2018    Procedure: ROBATIC ASSISTED NEPHRO-URETERECTOMY WITH BLADDER CUFF EXCISION;  Surgeon: Roxann Mckeon MD;  [facility];  Service: Urology    [place] [facility] 3RD+ ORD SLCTV ABDL PEL/LXTR [facility] Right 1/24/2019    Procedure: RIGHT LEG ANGIOGRAM, BILATERAL FEMORAL ACCESS, STENTING OF RIGHT EXTERNAL ILIAC ARTERY WITH BALLOON ANGIOPLASTY;  Surgeon: Ana Arteaga MD;  [facility]: [facility];  Service: Vascular    REPLACEMENT TOTAL KNEE BILATERAL Bilateral     URETEROSCOPY Right 3/20/2018    Procedure: CYSTOSCOPY, RETROGRADE PYELOGRAM, URETEROSCOPY, BIOPSY, BRUSH, FULGURATION, STENT PLACEMENT, BLADDER BIOPSY WITH FULGURATION;  Surgeon: Roxann Mckeon MD;  [facility];  Service: Urology       ALLERGIES:  Allergies   Allergen Reactions    Acetazolamide Other (See Comments)     Other reaction(s): Unknown Allergic Reaction    Atorvastatin      Other reaction(s): Muscle Pain, Myalgia    Azithromycin     Nitrofurantoin Hives     HIVES * pt denies  Other reaction(s): Unknown Allergic Reaction  Other reaction(s): Other (See Comments)  HIVES * pt denies    Other Other (See Comments)     Adhesive tape : red and itching  Other reaction(s):  Other (See Comments)  red and itching    Shellfish-Derived Products Other (See Comments)     Patient got Gout following eating shell fish    Sulfa Antibiotics Other (See Comments)     unknown    Tramadol Diarrhea and Vomiting       SOCIAL HISTORY:  Social History     Substance and Sexual Activity   Alcohol Use Yes    Frequency: Monthly or less    Drinks per session: 1 or 2    Binge frequency: Never    Comment: seldom     Social History     Substance and Sexual Activity   Drug Use No     Social History     Tobacco Use   Smoking Status Never Smoker   Smokeless Tobacco Never Used   Tobacco Comment    stopped smoking in the distant past, never smoker (as per [employer])        FAMILY HISTORY:  Family History   Problem Relation Age of Onset    Other Mother         epilepsy    Early death Mother    Charles Mcelroy Glaucoma Father     Stroke Father         silent    Other Brother         cardiac disorder       MEDICATIONS:    Current Facility-Administered Medications:     acetaminophen (TYLENOL) tablet 650 mg, 650 mg, Oral, BID PRN, Denny Jones MD    calcium carbonate-vitamin D (OSCAL-D) 500 mg-200 units per tablet 1 tablet, 1 tablet, Oral, BID With Meals, Denny Jones MD, 1 tablet at 03/14/19 1029    cyanocobalamin (VITAMIN B-12) tablet 100 mcg, 100 mcg, Oral, Daily, Denny Jones MD, 100 mcg at 03/14/19 1029    docusate sodium (COLACE) capsule 100 mg, 100 mg, Oral, BID, Denny Jones MD, 100 mg at 03/14/19 1029    furosemide (LASIX) injection 20 mg, 20 mg, Intravenous, Once, sickweather, PA-C    gabapentin (NEURONTIN) capsule 100 mg, 100 mg, Oral, QAM, Denny Jones MD, 100 mg at 03/14/19 1028    gabapentin (NEURONTIN) capsule 200 mg, 200 mg, Oral, HS, Denny Jones MD, 200 mg at 03/13/19 5902   hydroxychloroquine (PLAQUENIL) tablet 200 mg, 200 mg, Oral, BID With Meals, Milta Halsted, MD, 200 mg at 03/14/19 1029    insulin lispro (HumaLOG) 100 units/mL subcutaneous injection 1-5 Units, 1-5 Units, Subcutaneous, HS, Milta Halsted, MD    insulin lispro (HumaLOG) 100 units/mL subcutaneous injection 1-6 Units, 1-6 Units, Subcutaneous, TID AC **AND** Fingerstick Glucose (POCT), , , TID AC, Milta Halsted, MD    [START ON 3/16/2019] leucovorin (WELLCOVORIN) tablet 10 mg, 10 mg, Oral, Weekly, Milta Halsted, MD    levothyroxine tablet 75 mcg, 75 mcg, Oral, Daily, Milta Halsted, MD, 75 mcg at 03/14/19 0753    losartan (COZAAR) tablet 25 mg, 25 mg, Oral, Daily, Milta Halsted, MD, 25 mg at 03/14/19 1029    [START ON 3/15/2019] methotrexate tablet 5 mg, 5 mg, Oral, Weekly, Milta Halsted, MD    multivitamin-minerals (CENTRUM) tablet 1 tablet, 1 tablet, Oral, Daily, Milta Halsted, MD, 1 tablet at 03/14/19 1029    pantoprazole (PROTONIX) EC tablet 40 mg, 40 mg, Oral, Early Morning, Mickeal Ricardo, DO, 40 mg at 03/14/19 1316    polyvinyl alcohol (LIQUIFILM TEARS) 1 4 % ophthalmic solution 1 drop, 1 drop, Both Eyes, TID, Mickeal Ricardo, DO, 1 drop at 03/14/19 1316    rivaroxaban (XARELTO) tablet 15 mg, 15 mg, Oral, Daily With Breakfast, Milta Halsted, MD, 15 mg at 03/14/19 1029    rOPINIRole (REQUIP) tablet 1 mg, 1 mg, Oral, HS, Milta Halsted, MD, 1 mg at 03/13/19 2233    Facility-Administered Medications Ordered in Other Encounters:     acetaminophen (TYLENOL) tablet 650 mg, 650 mg, Oral, Q6H PRN, Corinne Jefferson PA-C    REVIEW OF SYSTEMS:  Constitutional: Negative for fatigue, anorexia, fever, chills, diaphoresis  HENT: Negative for postnasal drip  Eyes: Negative for visual disturbance  Respiratory: Negative for cough, shortness of breath and wheezing     Cardiovascular:  Positive swelling, shortness of breath has improved  Gastrointestinal: Negative for abdominal pain, constipation, diarrhea, nausea and vomiting  Genitourinary: No dysuria, hematuria  Endocrine: Negative for polyuria  Musculoskeletal: Negative for arthralgias, back pain and joint swelling  Skin: Negative for rash  Neurological: Negative for focal weakness, headaches, dizziness  Hematological: Negative for easy bruising or bleeding  Psychiatric/Behavioral: Negative for confusion and sleep disturbance  All the systems were reviewed and were negative except as documented on the HPI  PHYSICAL EXAM:  Current Weight: Weight - Scale: 98 7 kg (217 lb 9 5 oz)  First Weight: Weight - Scale: 101 kg (222 lb)  Vitals:    03/13/19 2300 03/14/19 0600 03/14/19 0730 03/14/19 1100   BP: 118/55  133/81 117/56   BP Location: Right arm   Right arm   Pulse: 72  72 80   Resp: 16  16 17   Temp: 97 8 °F (36 6 °C)  97 8 °F (36 6 °C) 97 5 °F (36 4 °C)   TempSrc: Oral  Oral Oral   SpO2: 97%  99% 97%   Weight:  98 7 kg (217 lb 9 5 oz)     Height:           Intake/Output Summary (Last 24 hours) at 3/14/2019 1322  Last data filed at 3/14/2019 1307  Gross per 24 hour   Intake 540 ml   Output 1750 ml   Net -1210 ml     Physical Exam   Constitutional: She is oriented to person, place, and time  She appears well-developed and well-nourished  No distress  HENT:   Head: Normocephalic and atraumatic  Eyes: Pupils are equal, round, and reactive to light  Conjunctivae are normal  No scleral icterus  Neck: Normal range of motion  Neck supple  Cardiovascular: Normal rate, regular rhythm, S1 normal, S2 normal and intact distal pulses  Exam reveals no gallop and no friction rub  No murmur heard  Pulmonary/Chest: Effort normal and breath sounds normal  No respiratory distress  She has no wheezes  She has no rales  Abdominal: Soft  Bowel sounds are normal  There is no tenderness  There is no rebound  Musculoskeletal: Normal range of motion  She exhibits edema  Neurological: She is alert and oriented to person, place, and time  Skin: No rash noted  Psychiatric: She has a normal mood and affect  Her behavior is normal    Nursing note and vitals reviewed          Invasive Devices:      Lab Results:   Results from last 7 days   Lab Units 03/14/19  0438 03/13/19  1928   WBC Thousand/uL 6 61 7 02   HEMOGLOBIN g/dL 8 8* 9 3*   HEMATOCRIT % 27 6* 29 3*   PLATELETS Thousands/uL 191 207   POTASSIUM mmol/L 3 7 4 1   CHLORIDE mmol/L 106 105   CO2 mmol/L 28 29   BUN mg/dL 30* 31*   CREATININE mg/dL 1 63* 1 83*   CALCIUM mg/dL 8 4 8 3   MAGNESIUM mg/dL 2 4  --

## 2019-03-14 NOTE — CONSULTS
Heart Failure Consult Note - Jewel Mc 81 y o  female MRN: [id number]    Unit/Bed#: [facility] 821-01 Encounter: [id number]    Assessment:  Principal Problem:    Acute on chronic diastolic congestive heart failure (HCC)  Active Problems:    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 (HCC)    Rheumatoid arthritis ([facility] )    Diabetes mellitus type 2 in obese (HCC)    Acquired hypothyroidism    Urothelial cancer ([facility] )    Atrial fibrillation (HCC)    CKD (chronic kidney disease) stage 3, GFR 30-59 ml/min ([facility])    HPI:   PA is an 81-year-old female with a PMH of chronic HFpEF, HTN, HLD, CVA/TIA, NICK (compliant with CPAP), DM2, CKD 3, urothelial cancer (s/p right nephrectomy in 2018), and lymphedema who presented directly from her family [de-identified] office with worsening lower extremity edema for past 3-4 weeks  She reports weight gain and lower extremity pain  The pain in addition to significant swelling prompted her to see her family doctor  She denies fevers, chills, lightheadedness, dizziness, SOB, PND, orthopnea, cough, chest pain, palpitations, abdominal pain, or n/v/d  She does report being constipated, but did have small BM this AM      Heart failure consulted for "acute on chronic diastolic congestive heart failure " Her outpatient cardiologist is Dr Tona Gastelum (last seen on 02/05/2019)  Plan:    Acute on chronic diastolic congestive heart failure; LVEF 60%; NYHA II; ACC/AHA Stage C   Etiology: HTN; normal perfusion on nuclear stress test on 08/10/2018; plaquenil/methotrexate use? Has received Lasix IV 20 mg in total  Net negative of 1190 mL since admitted  Reordered one-time dose IV Lasix 20 mg  Will reassess diuretic need tomorrow AM     Continue on losartan 25 mg PO QD  Strict I/Os with daily weights  CV diet with fluid restriction  History of CVA / atrial fibrillation   Documented on EKG from 08/10/2018 and 08/19/2018     CHADSVASc = 9   Anticoagulation with Xarelto since stroke in 2017  Bilateral LE venous duplex study ordered to rule out DVT  Past Medical History:   Diagnosis Date    Anemia     Arthritis     rheumatoid    Benign essential hypertension     Cataract     Chronic cystitis     CPAP (continuous positive airway pressure) dependence     Diverticulitis of colon     Early satiety     GERD (gastroesophageal reflux disease)     Gout     Hearing aid worn     bilateral    Hearing loss     Hemorrhoids     Hiatal hernia     History of colonic polyps     Hyperlipidemia     Hypothyroidism     Left breast mass     Microhematuria     Migraine     occular    Neuropathy     lower and upper extermities    Overactive bladder     Pneumonia of left lower lobe due to infectious organism ([facility] )     RA (rheumatoid arthritis) ([facility] )     Sleep apnea     uses cpap    Stroke ([facility] )     5/2017    Type 2 diabetes mellitus (HCC)     Urinary frequency     Urinary urgency     Urothelial cancer ([place] [address] ) 04/23/2018    Use of cane as ambulatory aid      Review of Systems:  Constitutional: Negative for fevers, chills  Positive for weight gain  HEENT: Negative for rhinorrhea, sore throat, recent illness  Respiratory: Negative for SOB, [place], PND, orthopnea  Cardiovascular: Negative for chest pain, palpitations, syncope  GI: Negative for nausea, vomiting, diarrhea  Positive for constipation  : Negative for dysuria, hematuria  Positive for urinary incontinence and recent UTI  Musculoskeletal: Positive for shoulder pain and joint pain  Positive for LE swelling and pain  Neurological: Negative for memory loss, loss of balance, and confusion  12-point ROS was completed and negative unless stated above or in HPI  Diagnostic Testing:  Echocardiogram from 03/03/2019:  LVEF: 65%; normal systolic function  "Left ventricular diastolic function parameters were normal "  LVIDd: 4 20 cm  RV: Normal size and function  MR: None   Marked annular calcification  PASP: Within normal range  Mild TR  RVOT:   Other: Mildly dilated LA  CXR from 03/013/2019: "Cardiomediastinal silhouette appears unremarkable  Lungs are clear  No pneumothorax or pleural effusion "    Echocardiogram from 08/11/2018:  LVEF: 48%; normal systolic function  LVIDd: 3 08 cm  RV: Normal size and function  MR: Mild  PASP: Within normal range  RVOT:   Other: Mildly dilated LA  Dilated IVC  Nuclear pharmacologic stress test from 08/10/2018:   "Stress results: There was no chest pain during stress  ECG conclusions: The stress ECG was negative for ischemia and normal  Arrhythmia during stress: atrial fibrillation  Perfusion imaging: There were no perfusion defects  Gated SPECT: The calculated left ventricular ejection fraction was 73%  Left ventricular ejection fraction was within normal limits by visual estimate  There was no left ventricular regional abnormality "    Allergies   Allergen Reactions    Acetazolamide Other (See Comments)     Other reaction(s): Unknown Allergic Reaction    Atorvastatin      Other reaction(s): Muscle Pain, Myalgia    Azithromycin     Nitrofurantoin Hives     HIVES * pt denies  Other reaction(s): Unknown Allergic Reaction  Other reaction(s): Other (See Comments)  HIVES * pt denies    Other Other (See Comments)     Adhesive tape : red and itching  Other reaction(s):  Other (See Comments)  red and itching    Shellfish-Derived Products Other (See Comments)     Patient got Gout following eating shell fish    Sulfa Antibiotics Other (See Comments)     unknown    Tramadol Diarrhea and Vomiting       Current Facility-Administered Medications:     acetaminophen (TYLENOL) tablet 650 mg, 650 mg, Oral, BID PRN, Janet Huff MD    calcium carbonate-vitamin D (OSCAL-D) 500 mg-200 units per tablet 1 tablet, 1 tablet, Oral, BID With Meals, Janet Huff MD, 1 tablet at 03/14/19 1029    cyanocobalamin (VITAMIN B-12) tablet 100 mcg, 100 mcg, Oral, Daily, Raul Fuentes MD, 100 mcg at 03/14/19 1029    docusate sodium (COLACE) capsule 100 mg, 100 mg, Oral, BID, Raul Fuentes MD, 100 mg at 03/14/19 1029    gabapentin (NEURONTIN) capsule 100 mg, 100 mg, Oral, QAM, Raul Fuentes MD, 100 mg at 03/14/19 1028    gabapentin (NEURONTIN) capsule 200 mg, 200 mg, Oral, HS, Raul Fuentes MD, 200 mg at 03/13/19 2233    hydroxychloroquine (PLAQUENIL) tablet 200 mg, 200 mg, Oral, BID With Meals, Raul Fuentes MD, 200 mg at 03/14/19 1029    insulin lispro (HumaLOG) 100 units/mL subcutaneous injection 1-5 Units, 1-5 Units, Subcutaneous, HS, Raul Fuentes MD    insulin lispro (HumaLOG) 100 units/mL subcutaneous injection 1-6 Units, 1-6 Units, Subcutaneous, TID AC **AND** Fingerstick Glucose (POCT), , , TID AC, Raul Fuentes MD    [START ON 3/16/2019] leucovorin (WELLCOVORIN) tablet 10 mg, 10 mg, Oral, Weekly, Raul Fuentes MD    levothyroxine tablet 75 mcg, 75 mcg, Oral, Daily, Raul Fuentes MD, 75 mcg at 03/14/19 0753    losartan (COZAAR) tablet 25 mg, 25 mg, Oral, Daily, Raul Fuentes MD, 25 mg at 03/14/19 1029    [START ON 3/15/2019] methotrexate tablet 5 mg, 5 mg, Oral, Weekly, Raul Fuentes MD    multivitamin-minerals (CENTRUM) tablet 1 tablet, 1 tablet, Oral, Daily, Raul Fuentes MD, 1 tablet at 03/14/19 1029    rivaroxaban (XARELTO) tablet 15 mg, 15 mg, Oral, Daily With Breakfast, Raul Fuentes MD, 15 mg at 03/14/19 1029    rOPINIRole (REQUIP) tablet 1 mg, 1 mg, Oral, HS, Raul Fuentes MD, 1 mg at 03/13/19 2233    Facility-Administered Medications Ordered in Other Encounters:     acetaminophen (TYLENOL) tablet 650 mg, 650 mg, Oral, Q6H PRN, Karsten Dickinson PA-C    Social History     Socioeconomic History    Marital status: /Civil Union     Spouse name: Not on file    Number of children: Not on file    Years of education: Not on file    Highest education level: Not on file   Occupational History    Not on file   Social Needs    Financial resource strain: Not on file    Food insecurity:     Worry: Not on file     Inability: Not on file    Transportation needs:     Medical: Not on file     Non-medical: Not on file   Tobacco Use    Smoking status: Never Smoker    Smokeless tobacco: Never Used    Tobacco comment: stopped smoking in the distant past, never smoker (as per [employer])    Substance and Sexual Activity    Alcohol use: Yes     Frequency: Monthly or less     Drinks per session: 1 or 2     Binge frequency: Never     Comment: seldom    Drug use: No    Sexual activity: Not on file   Lifestyle    Physical activity:     Days per week: Not on file     Minutes per session: Not on file    Stress: Not on file   Relationships    Social connections:     Talks on phone: Not on file     Gets together: Not on file     Attends Yazdanism service: Not on file     Active member of club or organization: Not on file     Attends meetings of clubs or organizations: Not on file     Relationship status: Not on file    Intimate partner violence:     Fear of current or ex partner: Not on file     Emotionally abused: Not on file     Physically abused: Not on file     Forced sexual activity: Not on file   Other Topics Concern    Not on file   Social History Narrative    No caffeine use     Family History   Problem Relation Age of Onset    Other Mother         epilepsy    Early death Mother     Glaucoma Father     Stroke Father         silent    Other Brother         cardiac disorder     Physical Exam:  GEN: Emmalene Soulier appears well, alert and oriented x 3, pleasant and cooperative   HEENT: pupils equal, round, and reactive to light; extraocular muscles intact  NECK: supple, no carotid bruits   HEART: regular rhythm, normal S1 and S2, no murmurs, clicks, gallops or rubs, JVP is mildly elevated    LUNGS: clear to auscultation bilaterally; no wheezes, rales, or rhonchi  ABDOMEN: normal bowel sounds, soft, no tenderness, no distention  EXTREMITIES: peripheral pulses normal; no clubbing or cyanosis  3+ LE edema  NEURO: no focal findings   SKIN: normal without suspicious lesions on exposed skin    Vitals: Blood pressure 133/81, pulse 72, temperature 97 8 °F (36 6 °C), temperature source Oral, resp  rate 16, height 5' 1" (1 549 m), weight 98 7 kg (217 lb 9 5 oz), SpO2 99 %  , Body mass index is 41 11 kg/m² , I/O last 3 completed shifts: In: 120 [P O :120]  Out: 1250 [Urine:1250]  I/O this shift:  In: 240 [P O :240]  Out: 300 [Urine:300]  Wt Readings from Last 3 Encounters:   03/14/19 98 7 kg (217 lb 9 5 oz)   03/13/19 101 kg (222 lb)   02/05/19 98 9 kg (218 lb)       Intake/Output Summary (Last 24 hours) at 3/14/2019 1049  Last data filed at 3/14/2019 0951  Gross per 24 hour   Intake 360 ml   Output 1550 ml   Net -1190 ml     I/O last 3 completed shifts: In: 120 [P O :120]  Out: 1250 [Urine:1250]    Vitals:    03/13/19 2122 03/13/19 2300 03/14/19 0600 03/14/19 0730   BP: 144/77 118/55  133/81   BP Location: Right arm Right arm     Pulse: 72 72  72   Resp: 16 16  16   Temp: 98 1 °F (36 7 °C) 97 8 °F (36 6 °C)  97 8 °F (36 6 °C)   TempSrc: Oral Oral  Oral   SpO2: 100% 97%  99%   Weight: 102 kg (224 lb 8 oz)  98 7 kg (217 lb 9 5 oz)    Height: 5' 1" (1 549 m)        [employer] (day, reason): N/A  Medrano catheter (day, reason): N/A    Labs & Results:  Results from last 7 days   Lab Units 03/14/19 0438 03/13/19 1928   WBC Thousand/uL 6 61 7 02   HEMOGLOBIN g/dL 8 8* 9 3*   HEMATOCRIT % 27 6* 29 3*   PLATELETS Thousands/uL 191 207         Results from last 7 days   Lab Units 03/14/19 0438 03/13/19 1928   POTASSIUM mmol/L 3 7 4 1   CHLORIDE mmol/L 106 105   CO2 mmol/L 28 29   BUN mg/dL 30* 31*   CREATININE mg/dL 1 63* 1 83*   CALCIUM mg/dL 8 4 8 3       Counseling / Coordination of Care: Total floor / unit time spent today 40 minutes   Greater than 50% of total time was spent with the patient and / or family counseling and / or coordination of care  A description of the counseling / coordination of care: 20  Thank you for the opportunity to participate in the care of this patient      Tobias Baum PA-C

## 2019-03-14 NOTE — ASSESSMENT & PLAN NOTE
Lab Results   Component Value Date    HGBA1C 6 5 01/02/2019     Initiate SSI with Accu-Cheks while inpatient, carb controlled diet, hypoglycemic protocol    No results for input(s): POCGLU in the last 72 hours      Blood Sugar Average: Last 72 hrs:

## 2019-03-14 NOTE — ASSESSMENT & PLAN NOTE
· Tentatively to continue methotrexate 5 mg Friday, however monitor renal function closely  · Continue leucovorin day after

## 2019-03-14 NOTE — PLAN OF CARE
Problem: Potential for Falls  Goal: Patient will remain free of falls  Description  INTERVENTIONS:  - Assess patient frequently for physical needs  -  Identify cognitive and physical deficits and behaviors that affect risk of falls  -  [place] fall precautions as indicated by assessment   - Educate patient/family on patient safety including physical limitations  - Instruct patient to call for assistance with activity based on assessment  - Modify environment to reduce risk of injury  - Consider OT/PT consult to assist with strengthening/mobility  Outcome: Progressing     Problem: CARDIOVASCULAR - ADULT  Goal: Maintains optimal cardiac output and hemodynamic stability  Description  INTERVENTIONS:  - Monitor I/O, vital signs and rhythm  - Monitor for S/S and trends of decreased cardiac output i e  bleeding, hypotension  - Administer and titrate ordered vasoactive medications to optimize hemodynamic stability  - Assess quality of pulses, skin color and temperature  - Assess for signs of decreased coronary artery perfusion - ex   Angina  - Instruct patient to report change in severity of symptoms  Outcome: Progressing     Problem: METABOLIC, FLUID AND ELECTROLYTES - ADULT  Goal: Fluid balance maintained  Description  INTERVENTIONS:  - Monitor labs and assess for signs and symptoms of volume excess or deficit  - Monitor I/O and WT  - Instruct patient on fluid and nutrition as appropriate  Outcome: Progressing

## 2019-03-14 NOTE — PROGRESS NOTES
[facility] Progress Note  Patient: Marisol Wilder 81 y o  female   MRN: [id number]  PCP: William Anderson MD  Unit/Bed#: [facility] 828-90 Encounter: [id number]  Date Of Visit: 03/14/19    Assessment:    Principal Problem:    Acute on chronic diastolic congestive heart failure ([facility] )  Active Problems:    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 ([place] [address] )    Rheumatoid arthritis ([place] [address] )    Diabetes mellitus type 2 in obese [facility])    Acquired hypothyroidism    Urothelial cancer ([place] [address] )    Atrial fibrillation ([place] [address] )    CKD (chronic kidney disease) stage 3, GFR 30-59 ml/min ([place] [address] )      Plan:    1  Acute on chronic diastolic CHF, worsening edema, received 1 dose of IV Lasix, awaiting cardiology evaluation, follow on cardiac echo  2  Chronic lower extremity edema/lymphedema, now with increasing pain likely secondary to volume overload, check venous Doppler to rule out DVT  3  Anemia of chronic disease, monitor  4  Atrial fibrillation, stable HR, on Xarelto  5  BRENNEN /CKD stage 3, solitary kidney, monitor  6  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  7  Diabetes mellitus type 2, A1c 6 5,  acceptable blood sugar, continue ISS  8  History CVA  9  Hypothyroidism, continue levothyroxine  10  Rheumatoid arthritis, on Plaquenil, methotrexate and leucovorin       VTE Pharmacologic Prophylaxis:   Pharmacologic: Rivaroxaban (Xarelto)  Mechanical VTE Prophylaxis in Place: Yes    Patient Centered Rounds: I have performed bedside rounds with nursing staff today  Discussions with Specialists or Other Care Team Provider:     Education and Discussions with Family / Patient:  Patient and  at bedside    Time Spent for Care: 30 minutes  More than 50% of total time spent on counseling and coordination of care as described above      Current Length of Stay: 1 day(s)    Current Patient Status: Inpatient   Certification Statement: The patient will continue to require additional inpatient hospital stay due to Management of CHF    Discharge Plan / Estimated Discharge Date: not ready yet    Code Status: Level 3 - DNAR and DNI      Subjective:   Patient seen and examined  Comfortable in bed  No chest pain or shortness of breath  Complaining of bilateral lower extremity edema and pain   at bedside    Objective:     Vitals:   Temp (24hrs), Av °F (36 7 °C), Min:97 8 °F (36 6 °C), Max:98 3 °F (36 8 °C)    Temp:  [97 8 °F (36 6 °C)-98 3 °F (36 8 °C)] 97 8 °F (36 6 °C)  HR:  [72-85] 72  Resp:  [16-20] 16  BP: (118-168)/(55-81) 133/81  SpO2:  [97 %-100 %] 99 %  Body mass index is 41 11 kg/m²  Input and Output Summary (last 24 hours): Intake/Output Summary (Last 24 hours) at 3/14/2019 1033  Last data filed at 3/14/2019 0951  Gross per 24 hour   Intake 360 ml   Output 1550 ml   Net -1190 ml       Physical Exam:     Physical Exam     Patient is awake alert in no acute distress  Lung clear to auscultation bilateral  Heart positive S1-S2 no murmur  Abdomen soft nontender positive bowel sounds  Bilateral lower extremity edema    Additional Data:     Labs:    Results from last 7 days   Lab Units 19  0438   WBC Thousand/uL 6 61   HEMOGLOBIN g/dL 8 8*   HEMATOCRIT % 27 6*   PLATELETS Thousands/uL 191   NEUTROS PCT % 66   LYMPHS PCT % 19   MONOS PCT % 10   EOS PCT % 4     Results from last 7 days   Lab Units 19  0438   POTASSIUM mmol/L 3 7   CHLORIDE mmol/L 106   CO2 mmol/L 28   BUN mg/dL 30*   CREATININE mg/dL 1 63*   CALCIUM mg/dL 8 4           * I Have Reviewed All Lab Data Listed Above  * Additional Pertinent Lab Tests Reviewed:  [facility] Admission Reviewed    Imaging:    Imaging Reports Reviewed Today Include:   Imaging Personally Reviewed by Myself Includes:     Recent Cultures (last 7 days):           Last 24 Hours Medication List:     Current Facility-Administered Medications:  acetaminophen 650 mg Oral BID PRN Janet Huff MD   calcium carbonate-vitamin D 1 tablet Oral BID With Meals Feli Carroll MD   cyanocobalamin 100 mcg Oral Daily Feli Carroll MD   docusate sodium 100 mg Oral BID Feli Carroll MD   gabapentin 100 mg Oral QAM Feli Carroll MD   gabapentin 200 mg Oral HS Feli Carroll MD   hydroxychloroquine 200 mg Oral BID With Meals Feli Carroll MD   insulin lispro 1-5 Units Subcutaneous HS Feli Carroll MD   insulin lispro 1-6 Units Subcutaneous TID [facility] Feli Carroll MD   [START ON 3/16/2019] leucovorin 10 mg Oral Weekly Feli Carroll MD   levothyroxine 75 mcg Oral Daily Feli Carroll MD   losartan 25 mg Oral Daily Feli Carroll MD   [START ON 3/15/2019] methotrexate 5 mg Oral Weekly Feli Carroll MD   multivitamin-minerals 1 tablet Oral Daily Feli Carroll MD   rivaroxaban 15 mg Oral Daily With Breakfast Feli Carroll MD   rOPINIRole 1 mg Oral HS Feli Carroll MD     Facility-Administered Medications Ordered in Other Encounters:  acetaminophen 650 mg Oral Q6H PRN Leah Akhtar PA-C        Today, Patient Was Seen By: Allison Restrepo DO    ** Please Note: This note has been constructed using a voice recognition system   **

## 2019-03-14 NOTE — UTILIZATION REVIEW
Initial Clinical Review    Admission: Date/Time/Statement: 3/13/19 @ 2029 Inpatient Written   Orders Placed This Encounter   Procedures    Inpatient Admission (expected length of stay for this patient Order details is greater than two midnights)     Standing Status:   Standing     Number of Occurrences:   1     Order Specific Question:   Admitting Physician     Answer:   Olga White [[id number]]     Order Specific Question:   Level of Care     Answer:   Med Surg [[id number]]     Order Specific Question:   Estimated length of stay     Answer:   More than 2 Midnights     Order Specific Question:   Certification     Answer:   I certify that inpatient services are medically necessary for this patient for a duration of greater than two midnights  See H&P and MD Progress Notes for additional information about the patient's course of treatment  ED: Date/Time/Mode of Arrival:   ED Arrival Information     Expected Arrival Acuity Means of Arrival Escorted By Service Admission Type    - 3/13/2019 17:43 Urgent Walk-In Family Member Hospitalist Urgent    Arrival Complaint    pain both legs        Chief Complaint:   Chief Complaint   Patient presents with    Leg Swelling     Pt with b/l leg swelling since last week, sent here from PCP  Assessment/Plan:  Raina Randolph is a 81 y o  female presents with a 3-4 week history of worsening lower extremity edema, with worsening pain in the thighs associated with this  She reports she is using her lymphedema machine as well as compression stockings without significant relief of swelling  She states she has gained approximately 10 lb over the past 3-4 weeks, admits to drinking 5-6 cups of water daily but with some pop usage, and states she is compliant with her medications  Admit INPATIENT STATUS      Acute on chronic diastolic congestive heart failure (HCC)  Assessment & Plan  · Evidence of volume overload on examination, weight gain per patient  · Received 20 mg IV Lasix x1 ED, assess response with daily weights, I/O  · Patient known to Dr Paul Harkins, will consult heart failure team  · Additionally consult Nephrology given history of nephrectomy  · 1500 cc daily fluid restriction, 2 g sodium diet  · Monitor electrolytes, creatinine closely  Atrial fibrillation ([facility] )  Assessment & Plan  · In sinus rhythm at this time not on rate control medication  Continue Xarelto for anticoagulation  Acquired hypothyroidism  Assessment & Plan  · Chronic and controlled, continue levothyroxine  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 (HCC)  Assessment & Plan  · Without any neurologic deficits  · Continue statin, Xarelto  CKD (chronic kidney disease) stage 3, GFR 30-59 ml/min ([facility])  Assessment & Plan  · Creatinine fluctuating, however appears near baseline at this time  · Monitor daily with BMP, particularly in setting of IV diuretic administration  Urothelial cancer ([facility] )  Assessment & Plan  · Status post nephroureterectomy 04/23/2018  · Outpatient follow up with Urology as indicated  Diabetes mellitus type 2 in obese [facility])  Assessment & Plan  Initiate SSI with Accu-Cheks while inpatient, carb controlled diet, hypoglycemic protocol  Rheumatoid arthritis ([facility] )  Assessment & Plan  · Tentatively to continue methotrexate 5 mg Friday, however monitor renal function closely  · Continue leucovorin day after  VTE Prophylaxis: Rivaroxaban (Xarelto)  / sequential compression device   Anticipated Length of Stay:  Patient will be admitted on an Inpatient basis with an anticipated length of stay of  greater than 2 midnights         ED Vital Signs:   ED Triage Vitals [03/13/19 1751]   Temperature Pulse Respirations Blood Pressure SpO2   97 8 °F (36 6 °C) 85 20 162/77 99 %      Temp Source Heart Rate Source Patient Position - Orthostatic VS BP Location FiO2 (%)   Tympanic Monitor Sitting Right arm --      Pain Score       5        Wt Readings from Last 1 Encounters:   03/14/19 98 7 kg (217 lb 9 5 oz)     Vital Signs (abnormal): WNL  Pertinent Labs/Diagnostic Test Results: HGB 9 3, HCT 29 3, BUN 31, CREAT 1 83  EKG: Sinus rhythm with sinus arrhythmia  CXR:  [facility]  ED Treatment:   Medication Administration from 03/13/2019 1743 to 03/13/2019 2059       Date/Time Order Dose Route Action     03/13/2019 2045 furosemide (LASIX) injection 20 mg 20 mg Intravenous Given        Past Medical/Surgical History:    Active Ambulatory Problems     Diagnosis Date Noted    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 ([facility] ) 05/10/2017    Essential hypertension 05/10/2017    Rheumatoid arthritis ([facility] ) 05/10/2017    Diabetes mellitus type 2 in obese ([facility] ) 05/10/2017    Sleep apnea 05/10/2017    Acquired hypothyroidism 05/10/2017    Chronic GERD 05/10/2017    Edema 05/14/2013    Hypercholesterolemia 08/29/2013    Obesity 07/18/2013    Peripheral neuropathy 11/09/2017    Primary osteoarthritis of left knee 10/13/2017    Restless leg syndrome 01/05/2016    Right lumbar radiculopathy 11/09/2017    Sensorineural hearing loss 10/12/2014    Sinus arrhythmia 01/03/2018    Chronic bilateral thoracic back pain 02/12/2018    Thoracic degenerative disc disease 02/12/2018    Urothelial cancer ([facility] ) 03/02/2018    Lung nodule < 6cm on CT 03/02/2018    Pancreatic cyst 03/14/2018    Iron deficiency anemia 03/14/2018    Anemia 04/25/2018    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 ([facility] ) 06/04/2018    History of nephroureterectomy 06/21/2018    Incisional hernia 06/28/2018    Acute on chronic diastolic congestive heart failure ([facility] ) 08/09/2018    Atrial fibrillation ([facility] ) 08/11/2018    CKD (chronic kidney disease) stage 3, GFR 30-59 ml/min ([facility]) 08/12/2018    Acute on chronic congestive heart failure ([facility]) 08/19/2018    Chronic diastolic heart failure ([facility] ) 08/23/2018    Fracture 09/18/2018    Spontaneous dislocation of shoulder, left 09/18/2018    Abdominal wall hernia 01/02/2019    Atherosclerosis of native artery of right lower extremity with rest pain [facility] ) 01/21/2019    Pain in both lower extremities 03/13/2019     Past Medical History:   Diagnosis Date    Anemia     Arthritis     Benign essential hypertension     Cataract     Chronic cystitis     CPAP (continuous positive airway pressure) dependence     Diverticulitis of colon     Early satiety     GERD (gastroesophageal reflux disease)     Gout     Hearing aid worn     Hearing loss     Hemorrhoids     Hiatal hernia     History of colonic polyps     Hyperlipidemia     Hypothyroidism     Left breast mass     Microhematuria     Migraine     Neuropathy     Overactive bladder     Pneumonia of left lower lobe due to infectious organism (HCC)     RA (rheumatoid arthritis) ([facility] )     Sleep apnea     Stroke ([facility] )     Type 2 diabetes mellitus (HCC)     Urinary frequency     Urinary urgency     Urothelial cancer ([place] [address] ) 04/23/2018    Use of cane as ambulatory aid      Admitting Diagnosis: Leg pain [M79 606]  Lower extremity edema [R60 0]  CKD (chronic kidney disease) [N18 9]  Volume overload [E87 70]  Acute on chronic diastolic congestive heart failure (HCC) [I50 33]  History of nephroureterectomy [Z90 5, Z90 6]  Age/Sex: 81 y o  female  Admission Orders:  Telemetry   Cardiac diet, 2gm Na, fluid restriction  OOB with assist  Accuchecks QAC and QHS with coverage  Incentive spirometry  Daily weights, I/O  Consult HF service, cm, nephrology  Sequential compression device   HF education  Scheduled Meds:   Current Facility-Administered Medications:  acetaminophen 650 mg Oral BID PRN   calcium carbonate-vitamin D 1 tablet Oral BID With Meals   cyanocobalamin 100 mcg Oral Daily   docusate sodium 100 mg Oral BID   gabapentin 100 mg Oral QAM   gabapentin 200 mg Oral HS   hydroxychloroquine 200 mg Oral BID With Meals   insulin lispro 1-5 Units Subcutaneous HS   insulin lispro 1-6 Units Subcutaneous TID AC   [START ON 3/16/2019] leucovorin 10 mg Oral Weekly   levothyroxine 75 mcg Oral Daily   losartan 25 mg Oral Daily   [START ON 3/15/2019] methotrexate 5 mg Oral Weekly   multivitamin-minerals 1 tablet Oral Daily   rivaroxaban 15 mg Oral Daily With Breakfast   rOPINIRole 1 mg Oral HS     Facility-Administered Medications Ordered in Other Encounters:  acetaminophen 650 mg Oral Q6H PRN Colt Jefferson PA-C     Continuous Infusions:    PRN Meds:   acetaminophen    Network Utilization Review Department  Phone: [phone]; Fax [phone]  [email or web address]  org  ATTENTION: Please call with any questions or concerns to [phone]  and carefully listen to the prompts so that you are directed to the right person  Send all requests for admission clinical reviews, approved or denied determinations and any other requests to fax [phone]   All voicemails are confidential

## 2019-03-15 LAB
ANION GAP SERPL CALCULATED.3IONS-SCNC: 5 MMOL/L (ref 4–13)
BUN SERPL-MCNC: 30 MG/DL (ref 5–25)
CALCIUM SERPL-MCNC: 8.6 MG/DL (ref 8.3–10.1)
CHLORIDE SERPL-SCNC: 104 MMOL/L (ref 100–108)
CO2 SERPL-SCNC: 32 MMOL/L (ref 21–32)
CREAT SERPL-MCNC: 1.76 MG/DL (ref 0.6–1.3)
GFR SERPL CREATININE-BSD FRML MDRD: 27 ML/MIN/1.73SQ M
GLUCOSE SERPL-MCNC: 105 MG/DL (ref 65–140)
GLUCOSE SERPL-MCNC: 118 MG/DL (ref 65–140)
GLUCOSE SERPL-MCNC: 138 MG/DL (ref 65–140)
GLUCOSE SERPL-MCNC: 142 MG/DL (ref 65–140)
GLUCOSE SERPL-MCNC: 162 MG/DL (ref 65–140)
POTASSIUM SERPL-SCNC: 3.7 MMOL/L (ref 3.5–5.3)
SODIUM SERPL-SCNC: 141 MMOL/L (ref 136–145)

## 2019-03-15 PROCEDURE — 99232 SBSQ HOSP IP/OBS MODERATE 35: CPT | Performed by: INTERNAL MEDICINE

## 2019-03-15 PROCEDURE — 80048 BASIC METABOLIC PNL TOTAL CA: CPT | Performed by: INTERNAL MEDICINE

## 2019-03-15 PROCEDURE — 82948 REAGENT STRIP/BLOOD GLUCOSE: CPT

## 2019-03-15 RX ORDER — TORSEMIDE 20 MG/1
40 TABLET ORAL DAILY
Status: DISCONTINUED | OUTPATIENT
Start: 2019-03-15 | End: 2019-03-17

## 2019-03-15 RX ORDER — POLYETHYLENE GLYCOL 3350 17 G/17G
17 POWDER, FOR SOLUTION ORAL DAILY
Status: DISCONTINUED | OUTPATIENT
Start: 2019-03-15 | End: 2019-03-21 | Stop reason: HOSPADM

## 2019-03-15 RX ORDER — FERROUS SULFATE 325(65) MG
325 TABLET ORAL 2 TIMES DAILY WITH MEALS
Status: DISCONTINUED | OUTPATIENT
Start: 2019-03-15 | End: 2019-03-21 | Stop reason: HOSPADM

## 2019-03-15 RX ADMIN — GABAPENTIN 100 MG: 100 CAPSULE ORAL at 16:28

## 2019-03-15 RX ADMIN — DOCUSATE SODIUM 100 MG: 100 CAPSULE, LIQUID FILLED ORAL at 08:07

## 2019-03-15 RX ADMIN — METHOTREXATE SODIUM 5 MG: 2.5 TABLET ORAL at 08:07

## 2019-03-15 RX ADMIN — TORSEMIDE 40 MG: 20 TABLET ORAL at 16:31

## 2019-03-15 RX ADMIN — POLYVINYL ALCOHOL 1 DROP: 14 SOLUTION/ DROPS OPHTHALMIC at 23:13

## 2019-03-15 RX ADMIN — ROPINIROLE 1 MG: 1 TABLET, FILM COATED ORAL at 23:13

## 2019-03-15 RX ADMIN — CALCIUM CARBONATE-VITAMIN D TAB 500 MG-200 UNIT 1 TABLET: 500-200 TAB at 08:06

## 2019-03-15 RX ADMIN — DOCUSATE SODIUM 100 MG: 100 CAPSULE, LIQUID FILLED ORAL at 17:26

## 2019-03-15 RX ADMIN — GABAPENTIN 200 MG: 100 CAPSULE ORAL at 23:13

## 2019-03-15 RX ADMIN — Medication 1 TABLET: at 08:06

## 2019-03-15 RX ADMIN — INSULIN LISPRO 1 UNITS: 100 INJECTION, SOLUTION INTRAVENOUS; SUBCUTANEOUS at 23:13

## 2019-03-15 RX ADMIN — POLYVINYL ALCOHOL 1 DROP: 14 SOLUTION/ DROPS OPHTHALMIC at 08:07

## 2019-03-15 RX ADMIN — ACETAMINOPHEN 650 MG: 325 TABLET ORAL at 16:32

## 2019-03-15 RX ADMIN — VITAM B12 100 MCG: 100 TAB at 08:06

## 2019-03-15 RX ADMIN — CALCIUM CARBONATE-VITAMIN D TAB 500 MG-200 UNIT 1 TABLET: 500-200 TAB at 16:28

## 2019-03-15 RX ADMIN — LOSARTAN POTASSIUM 25 MG: 25 TABLET, FILM COATED ORAL at 08:07

## 2019-03-15 RX ADMIN — LEVOTHYROXINE SODIUM 75 MCG: 75 TABLET ORAL at 05:00

## 2019-03-15 RX ADMIN — HYDROXYCHLOROQUINE SULFATE 200 MG: 200 TABLET, FILM COATED ORAL at 16:28

## 2019-03-15 RX ADMIN — RIVAROXABAN 15 MG: 15 TABLET, FILM COATED ORAL at 08:06

## 2019-03-15 RX ADMIN — FERROUS SULFATE TAB 325 MG (65 MG ELEMENTAL FE) 325 MG: 325 (65 FE) TAB at 16:28

## 2019-03-15 RX ADMIN — PANTOPRAZOLE SODIUM 40 MG: 40 TABLET, DELAYED RELEASE ORAL at 05:00

## 2019-03-15 RX ADMIN — POLYVINYL ALCOHOL 1 DROP: 14 SOLUTION/ DROPS OPHTHALMIC at 17:26

## 2019-03-15 RX ADMIN — GABAPENTIN 100 MG: 100 CAPSULE ORAL at 08:07

## 2019-03-15 RX ADMIN — HYDROXYCHLOROQUINE SULFATE 200 MG: 200 TABLET, FILM COATED ORAL at 08:06

## 2019-03-15 NOTE — SOCIAL WORK
Cm reviewed patient during care coordination rounds with Dr Bethel Brar  Patient not stable for discharge today and no anticipated weekend discharge  Awaiting cardiology to clear patient  Nephrology following  Patient anticipated to discharge home with her  once stable and patient's  to transport  Patient to continue with diuretics  Dopplers came back negative venous for DVT  Cm following

## 2019-03-15 NOTE — PLAN OF CARE
Problem: Potential for Falls  Goal: Patient will remain free of falls  Description  INTERVENTIONS:  - Assess patient frequently for physical needs  -  Identify cognitive and physical deficits and behaviors that affect risk of falls  -  [place] fall precautions as indicated by assessment   - Educate patient/family on patient safety including physical limitations  - Instruct patient to call for assistance with activity based on assessment  - Modify environment to reduce risk of injury  - Consider OT/PT consult to assist with strengthening/mobility  Outcome: Progressing     Problem: CARDIOVASCULAR - ADULT  Goal: Maintains optimal cardiac output and hemodynamic stability  Description  INTERVENTIONS:  - Monitor I/O, vital signs and rhythm  - Monitor for S/S and trends of decreased cardiac output i e  bleeding, hypotension  - Administer and titrate ordered vasoactive medications to optimize hemodynamic stability  - Assess quality of pulses, skin color and temperature  - Assess for signs of decreased coronary artery perfusion - ex   Angina  - Instruct patient to report change in severity of symptoms  Outcome: Progressing     Problem: METABOLIC, FLUID AND ELECTROLYTES - ADULT  Goal: Fluid balance maintained  Description  INTERVENTIONS:  - Monitor labs and assess for signs and symptoms of volume excess or deficit  - Monitor I/O and WT  - Instruct patient on fluid and nutrition as appropriate  Outcome: Progressing     Problem: DISCHARGE PLANNING - CARE MANAGEMENT  Goal: Discharge to post-acute care or home with appropriate resources  Description  INTERVENTIONS:  - Conduct assessment to determine patient/family and health care team treatment goals, and need for post-acute services based on payer coverage, community resources, and patient preferences, and barriers to discharge  - Address psychosocial, clinical, and financial barriers to discharge as identified in assessment in conjunction with the patient/family and health care team  - Arrange appropriate level of post-acute services according to patient?s   needs and preference and payer coverage in collaboration with the physician and health care team  - Communicate with and update the patient/family, physician, and health care team regarding progress on the discharge plan  - Arrange appropriate transportation to post-acute venues  Outcome: Progressing     Problem: Nutrition/Hydration-ADULT  Goal: Nutrient/Hydration intake appropriate for improving, restoring or maintaining nutritional needs  Description  Monitor and assess patient's nutrition/hydration status for malnutrition (ex- brittle hair, bruises, dry skin, pale skin and conjunctiva, muscle wasting, smooth red tongue, and disorientation)  Collaborate with interdisciplinary team and initiate plan and interventions as ordered  Monitor patient's weight and dietary intake as ordered or per policy  Utilize nutrition screening tool and intervene per policy  Determine patient's food preferences and provide high-protein, high-caloric foods as appropriate       INTERVENTIONS:  - Monitor oral intake, urinary output, labs, and treatment plans  - Assess nutrition and hydration status and recommend course of action  - Evaluate amount of meals eaten  - Assist patient with eating if necessary   - Allow adequate time for meals  - Recommend/ encourage appropriate diets, oral nutritional supplements, and vitamin/mineral supplements  - Order, calculate, and assess calorie counts as needed  - Recommend, monitor, and adjust tube feedings and TPN/PPN based on assessed needs  - Assess need for intravenous fluids  - Provide specific nutrition/hydration education as appropriate  - Include patient/family/caregiver in decisions related to nutrition  Outcome: Progressing

## 2019-03-15 NOTE — PHYSICIAN ADVISOR
Current patient class: Inpatient  The patient is currently on Hospital Day: 3 at [address] [place] [address]      The patient was admitted to the hospital at 2029 on 3/13/19 for the following diagnosis:  Leg pain [M79 606]  Lower extremity edema [R60 0]  CKD (chronic kidney disease) [N18 9]  Volume overload [E87 70]  Acute on chronic diastolic congestive heart failure (HCC) [I50 33]  History of nephroureterectomy [Z90 5, Z90 6]       There is documentation in the medical record of an expected length of stay of at least 2 midnights  The patient is therefore expected to satisfy the 2 midnight benchmark and given the 2 midnight presumption is appropriate for INPATIENT ADMISSION  Given this expectation of a satisfying stay, CMS instructs us that the patient is most often appropriate for inpatient admission under part A provided medical necessity is documented in the chart  After review of the relevant documentation, labs, vital signs and test results, the patient is appropriate for INPATIENT ADMISSION  Admission to the hospital as an inpatient is a complex decision making process which requires the practitioner to consider the patients presenting complaint, history and physical examination and all relevant testing  With this in mind, in this case, the patient was deemed appropriate for INPATIENT ADMISSION  After review of the documentation and testing available at the time of the admission I concur with this clinical determination of medical necessity  Rationale is as follows: The patient is a 81 yrs old Female who presented to the ED at 3/13/2019  5:53 PM with a chief complaint of Leg Swelling (Pt with b/l leg swelling since last week, sent here from PCP )     Given the need for further hospitalization, and along with the documentation of medical necessity present in the chart, the patient is appropriate for inpatient admission    The patient is expected to satisfy the 2 midnight benchmark, and will require further acute medical care  The patient does have comorbid conditions which increases the risk for significant adverse outcome  Given this the patient is appropriate for inpatient admission        The patients vitals on arrival were ED Triage Vitals [03/13/19 1751]   Temperature Pulse Respirations Blood Pressure SpO2   97 8 °F (36 6 °C) 85 20 162/77 99 %      Temp Source Heart Rate Source Patient Position - Orthostatic VS BP Location FiO2 (%)   Tympanic Monitor Sitting Right arm --      Pain Score       5           Past Medical History:   Diagnosis Date    Anemia     Arthritis     rheumatoid    Benign essential hypertension     Cataract     Chronic cystitis     CPAP (continuous positive airway pressure) dependence     Diverticulitis of colon     Early satiety     GERD (gastroesophageal reflux disease)     Gout     Hearing aid worn     bilateral    Hearing loss     Hemorrhoids     Hiatal hernia     History of colonic polyps     Hyperlipidemia     Hypothyroidism     Left breast mass     Microhematuria     Migraine     occular    Neuropathy     lower and upper extermities    Overactive bladder     Pneumonia of left lower lobe due to infectious organism (HCC)     RA (rheumatoid arthritis) (HCC)     Sleep apnea     uses cpap    Stroke [facility] )     5/2017    Type 2 diabetes mellitus (HCC)     Urinary frequency     Urinary urgency     Urothelial cancer ([facility] ) 04/23/2018    Use of cane as ambulatory aid      Past Surgical History:   Procedure Laterality Date    APPENDECTOMY      ARTHROSCOPY WRIST Right     with release of transverse carpal ligament     BLADDER SURGERY      BLADDER SURGERY      BREAST SURGERY      left breast    BUNIONECTOMY Bilateral     CATARACT EXTRACTION Bilateral     CHOLECYSTECTOMY      COLONOSCOPY      CYSTOSCOPY      EGD AND COLONOSCOPY N/A 12/20/2017    Procedure: EGD AND COLONOSCOPY;  Surgeon: Raul Oscar MD;  [facility]: [facility]; Service: Gastroenterology    ESOPHAGOGASTRODUODENOSCOPY      diagnostic    FOREARM SURGERY Right     fracture repair    HYSTERECTOMY      IR ABDOMINAL ANGIOGRAPHY / INTERVENTION  1/24/2019    KNEE ARTHROSCOPY Left     KNEE SURGERY Left     meniscus tear    NEPHRECTOMY Right     NOSE SURGERY      AL CYSTO/URETERO W/LITHOTRIPSY &INDWELL STENT INSRT Right 2/28/2018    Procedure: CYSTOSCOPY RIGHT URETEROSCOPY, RIGHT RETROGRADE PYELOGRAM AND INSERTION  RIGHT STENT URETERAL, RIGHT RENAL PELVIC WASHING FOR CYTOLOGY;  Surgeon: Yehuda Cifuentes MD;  [facility];  Service: Urology    AL NEPHRECTOMY, W/PART   URETECTOMY Right 4/23/2018    Procedure: ROBATIC ASSISTED NEPHRO-URETERECTOMY WITH BLADDER CUFF EXCISION;  Surgeon: Eim Lange MD;  [facility];  Service: Urology    AL [facility] 3RD+ ORD SLCTV ABDL PEL/LXTR [facility] Right 1/24/2019    Procedure: RIGHT LEG ANGIOGRAM, BILATERAL FEMORAL ACCESS, STENTING OF RIGHT EXTERNAL ILIAC ARTERY WITH BALLOON ANGIOPLASTY;  Surgeon: Liliana Hickey MD;  [facility];  Service: Vascular    REPLACEMENT TOTAL KNEE BILATERAL Bilateral     URETEROSCOPY Right 3/20/2018    Procedure: CYSTOSCOPY, RETROGRADE PYELOGRAM, URETEROSCOPY, BIOPSY, BRUSH, FULGURATION, STENT PLACEMENT, BLADDER BIOPSY WITH FULGURATION;  Surgeon: Emi Lange MD;  [facility];  Service: Urology           Consults have been placed to:   IP CONSULT TO NEPHROLOGY  IP CONSULT TO CASE MANAGEMENT  IP CONSULT TO HEART FAILURE SERVICE    Vitals:    03/14/19 1100 03/14/19 1512 03/14/19 1900 03/14/19 2300   BP: 117/56 107/51 119/56 109/51   BP Location: Right arm Right arm Right arm Right arm   Pulse: 80 70 90 75   Resp: 17 18 18 18   Temp: 97 5 °F (36 4 °C) 97 7 °F (36 5 °C) 97 5 °F (36 4 °C) 99 °F (37 2 °C)   TempSrc: Oral Oral Oral Oral   SpO2: 97% 96% 96% 97%   Weight:       Height:           Most recent labs:    Recent Labs     03/14/19  0438   WBC 6 61   HGB 8 8*   HCT 27 6*      K 3 7   CALCIUM 8 4   BUN 30*   CREATININE 1 63*       Scheduled Meds:  Current Facility-Administered Medications:  acetaminophen 650 mg Oral BID PRN Garrett Christian MD   calcium carbonate-vitamin D 1 tablet Oral BID With Meals Garrett Christian MD   cyanocobalamin 100 mcg Oral Daily Garrett Christian MD   docusate sodium 100 mg Oral BID Garrett Christian MD   gabapentin 100 mg Oral BID [facility], DO   gabapentin 200 mg Oral HS Garrett Christian MD   hydroxychloroquine 200 mg Oral BID With Meals Garrett Christian MD   insulin lispro 1-5 Units Subcutaneous HS Garrett Christian MD   insulin lispro 1-6 Units Subcutaneous TID [facility] Garrett Christian MD   [START ON 3/16/2019] leucovorin 10 mg Oral Weekly Garrett Christian MD   levothyroxine 75 mcg Oral Daily Garrett Christian MD   losartan 25 mg Oral Daily Garrett Christian MD   methotrexate 5 mg Oral Weekly Garrett Christian MD   multivitamin-minerals 1 tablet Oral Daily Garrett Christian MD   pantoprazole 40 mg Oral Early Morning [facility], DO   polyvinyl alcohol 1 drop Both Eyes TID [facility], DO   rivaroxaban 15 mg Oral Daily With Breakfast Garrett Christian MD   rOPINIRole 1 mg Oral HS Garrett Christian MD     Facility-Administered Medications Ordered in Other Encounters:  acetaminophen 650 mg Oral Q6H PRN Jaya Jefferson PA-C     Continuous Infusions:   PRN Meds:   acetaminophen    Surgical procedures (if appropriate):

## 2019-03-15 NOTE — PROGRESS NOTES
03/15/19 1300   Clinical Encounter Type   Visited With Patient   Routine Visit Introduction   Referral From    Referral To [address] [place] [address] Patient wants communion   Communion Given Indicator Yes   Family Spiritual Encounters   Family Coping Accepting

## 2019-03-15 NOTE — RESTORATIVE TECHNICIAN NOTE
Restorative Specialist Mobility Note       Activity: Ambulate in chahal, Stand at bedside, Turn     Assistive Device: Front wheel walker        Repositioned: Supine

## 2019-03-15 NOTE — PLAN OF CARE
Problem: Potential for Falls  Goal: Patient will remain free of falls  Description  INTERVENTIONS:  - Assess patient frequently for physical needs  -  Identify cognitive and physical deficits and behaviors that affect risk of falls  -  [place] fall precautions as indicated by assessment   - Educate patient/family on patient safety including physical limitations  - Instruct patient to call for assistance with activity based on assessment  - Modify environment to reduce risk of injury  - Consider OT/PT consult to assist with strengthening/mobility  Outcome: Progressing     Problem: CARDIOVASCULAR - ADULT  Goal: Maintains optimal cardiac output and hemodynamic stability  Description  INTERVENTIONS:  - Monitor I/O, vital signs and rhythm  - Monitor for S/S and trends of decreased cardiac output i e  bleeding, hypotension  - Administer and titrate ordered vasoactive medications to optimize hemodynamic stability  - Assess quality of pulses, skin color and temperature  - Assess for signs of decreased coronary artery perfusion - ex   Angina  - Instruct patient to report change in severity of symptoms  Outcome: Progressing     Problem: METABOLIC, FLUID AND ELECTROLYTES - ADULT  Goal: Fluid balance maintained  Description  INTERVENTIONS:  - Monitor labs and assess for signs and symptoms of volume excess or deficit  - Monitor I/O and WT  - Instruct patient on fluid and nutrition as appropriate  Outcome: Progressing     Problem: DISCHARGE PLANNING - CARE MANAGEMENT  Goal: Discharge to post-acute care or home with appropriate resources  Description  INTERVENTIONS:  - Conduct assessment to determine patient/family and health care team treatment goals, and need for post-acute services based on payer coverage, community resources, and patient preferences, and barriers to discharge  - Address psychosocial, clinical, and financial barriers to discharge as identified in assessment in conjunction with the patient/family and health care team  - Arrange appropriate level of post-acute services according to patient?s   needs and preference and payer coverage in collaboration with the physician and health care team  - Communicate with and update the patient/family, physician, and health care team regarding progress on the discharge plan  - Arrange appropriate transportation to post-acute venues  Outcome: Progressing     Problem: Nutrition/Hydration-ADULT  Goal: Nutrient/Hydration intake appropriate for improving, restoring or maintaining nutritional needs  Description  Monitor and assess patient's nutrition/hydration status for malnutrition (ex- brittle hair, bruises, dry skin, pale skin and conjunctiva, muscle wasting, smooth red tongue, and disorientation)  Collaborate with interdisciplinary team and initiate plan and interventions as ordered  Monitor patient's weight and dietary intake as ordered or per policy  Utilize nutrition screening tool and intervene per policy  Determine patient's food preferences and provide high-protein, high-caloric foods as appropriate       INTERVENTIONS:  - Monitor oral intake, urinary output, labs, and treatment plans  - Assess nutrition and hydration status and recommend course of action  - Evaluate amount of meals eaten  - Assist patient with eating if necessary   - Allow adequate time for meals  - Recommend/ encourage appropriate diets, oral nutritional supplements, and vitamin/mineral supplements  - Order, calculate, and assess calorie counts as needed  - Recommend, monitor, and adjust tube feedings and TPN/PPN based on assessed needs  - Assess need for intravenous fluids  - Provide specific nutrition/hydration education as appropriate  - Include patient/family/caregiver in decisions related to nutrition  Outcome: Progressing     Problem: Prexisting or High Potential for Compromised Skin Integrity  Goal: Skin integrity is maintained or improved  Description  INTERVENTIONS:  - Identify patients at risk for skin breakdown  - Assess and monitor skin integrity  - Assess and monitor nutrition and hydration status  - Monitor labs (i e  albumin)  - Assess for incontinence   - Turn and reposition patient  - Assist with mobility/ambulation  - Relieve pressure over bony prominences  - Avoid friction and shearing  - Provide appropriate hygiene as needed including keeping skin clean and dry  - Evaluate need for skin moisturizer/barrier cream  - Collaborate with interdisciplinary team (i e  Nutrition, Rehabilitation, etc )   - Patient/family teaching  Outcome: Progressing

## 2019-03-15 NOTE — PROGRESS NOTES
[facility] Internal Medicine Progress Note  Patient: Ирина Fulton 81 y o  female   MRN: [id number]  PCP: Lavonne Rosas MD  Unit/Bed#: [facility] 296-45 Encounter: [id number]  Date Of Visit: 03/15/19    Assessment:    Principal Problem:    Acute on chronic diastolic congestive heart failure ([facility] )  Active Problems:    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 ([place] [id number] )    Rheumatoid arthritis ([place] [address] )    Diabetes mellitus type 2 in obese [facility])    Acquired hypothyroidism    Urothelial cancer ([place] [address] )    Atrial fibrillation ([place] [address] )    CKD (chronic kidney disease) stage 3, GFR 30-59 ml/min ([place] [address] )      Plan:    1  Acute on chronic diastolic CHF, worsening edema, improving,  received 1 dose of IV Lasix and 1 dose of Demadex, cardiac echo with EF 60%,  cardiology is follow  2  Chronic LEs/lymphedema, now with increasing neuropathic pain likely secondary to volume overload, negative venous Doppler for DVT, continue with diuretics and Neurontin  3  Anemia of chronic disease,  monitor  4  Atrial fibrillation, stable HR, on Xarelto  5  CKD stage 3, solitary kidney, baseline creatinine 1 4-1 8,  nephrology is following  6  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  7  DM type 2, A1c 6 5,  acceptable blood sugar, continue ISS  8  History CVA  9  Hypothyroidism, continue levothyroxine  10  Rheumatoid arthritis, on Plaquenil, methotrexate and leucovorin  11  Constipation, start daily MiraLax  12  Iron deficiency anemia, start iron supplement       VTE Pharmacologic Prophylaxis:   Pharmacologic: Rivaroxaban (Xarelto)  Mechanical VTE Prophylaxis in Place: Yes    Patient Centered Rounds: I have performed bedside rounds with nursing staff today  Discussions with Specialists or Other Care Team Provider:     Education and Discussions with Family / Patient:  Patient and  at bedside    Time Spent for Care: 30 minutes    More than 50% of total time spent on counseling and coordination of care as described above  Current Length of Stay: 2 day(s)    Current Patient Status: Inpatient   Certification Statement: The patient will continue to require additional inpatient hospital stay due to Management of CHF    Discharge Plan / Estimated Discharge Date:  When cleared by Cardiology    Code Status: Level 3 - DNAR and DNI      Subjective:   Patient seen and examined  Comfortable in bed  Clinically improving with blisters were extremities edema   at bedside    Objective:     Vitals:   Temp (24hrs), Av °F (36 7 °C), Min:97 5 °F (36 4 °C), Max:99 °F (37 2 °C)    Temp:  [97 5 °F (36 4 °C)-99 °F (37 2 °C)] 98 1 °F (36 7 °C)  HR:  [70-90] 76  Resp:  [17-18] 18  BP: (107-119)/(51-70) 113/70  SpO2:  [96 %-97 %] 96 %  Body mass index is 40 82 kg/m²  Input and Output Summary (last 24 hours): Intake/Output Summary (Last 24 hours) at 3/15/2019 1029  Last data filed at 3/15/2019 0900  Gross per 24 hour   Intake 780 ml   Output 2450 ml   Net -1670 ml       Physical Exam:     Physical Exam     Patient is awake alert in no acute distress  Lung clear to auscultation bilateral  Heart positive S1-S2 no murmur  Abdomen soft nontender positive bowel sounds  Bilateral lower extremity improving edema    Additional Data:     Labs:    Results from last 7 days   Lab Units 19  0438   WBC Thousand/uL 6 61   HEMOGLOBIN g/dL 8 8*   HEMATOCRIT % 27 6*   PLATELETS Thousands/uL 191   NEUTROS PCT % 66   LYMPHS PCT % 19   MONOS PCT % 10   EOS PCT % 4     Results from last 7 days   Lab Units 03/15/19  0509   POTASSIUM mmol/L 3 7   CHLORIDE mmol/L 104   CO2 mmol/L 32   BUN mg/dL 30*   CREATININE mg/dL 1 76*   CALCIUM mg/dL 8 6           * I Have Reviewed All Lab Data Listed Above  * Additional Pertinent Lab Tests Reviewed:  [facility] Admission Reviewed    Imaging:    Imaging Reports Reviewed Today Include:   Imaging Personally Reviewed by Myself Includes:     Recent Cultures (last 7 days): Last 24 Hours Medication List:     Current Facility-Administered Medications:  acetaminophen 650 mg Oral BID PRN Nataliya Dumont MD   calcium carbonate-vitamin D 1 tablet Oral BID With Meals Nataliya Dumont MD   cyanocobalamin 100 mcg Oral Daily Nataliya Dumont MD   docusate sodium 100 mg Oral BID Nataliya Dumont MD   gabapentin 100 mg Oral BID Lore Copeland DO   gabapentin 200 mg Oral HS Nataliya Dumont MD   hydroxychloroquine 200 mg Oral BID With Meals Nataliya Dumont MD   insulin lispro 1-5 Units Subcutaneous HS Nataliya Dumont MD   insulin lispro 1-6 Units Subcutaneous TID [facility] Nataliya Dumont MD   [START ON 3/16/2019] leucovorin 10 mg Oral Weekly Nataliya Dumont MD   levothyroxine 75 mcg Oral Daily Nataliya Dumont MD   losartan 25 mg Oral Daily Nataliya Dumont MD   methotrexate 5 mg Oral Weekly Nataliya Dumont MD   multivitamin-minerals 1 tablet Oral Daily Nataliya Dumont MD   pantoprazole 40 mg Oral Early Morning Lore Copeland DO   polyvinyl alcohol 1 drop Both Eyes TID Lore Copeland DO   rivaroxaban 15 mg Oral Daily With Breakfast Nataliya Dumont MD   rOPINIRole 1 mg Oral HS Nataliya Dumont MD     Facility-Administered Medications Ordered in Other Encounters:  acetaminophen 650 mg Oral Q6H PRN Dara Cruz PA-C        Today, Patient Was Seen By: Lore Copeland DO    ** Please Note: This note has been constructed using a voice recognition system   **

## 2019-03-15 NOTE — PROGRESS NOTES
Heart Failure Service Progress Note - Julian Soto 81 y o  female MRN: [id number]    Unit/Bed#: [facility] 821-01 Encounter: [id number]      Assessment:    Principal Problem:    Acute on chronic diastolic congestive heart failure (HCC)  Active Problems:    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 (HCC)    Rheumatoid arthritis ([facility] )    Diabetes mellitus type 2 in obese [facility])    Acquired hypothyroidism    Urothelial cancer ([facility] )    Atrial fibrillation (HCC)    CKD (chronic kidney disease) stage 3, GFR 30-59 ml/min (HCC)    # Acute on Chronic HFpEF (Grade II diastolic dysfunction), NYHA II/III, ACC/AHA Stage C: +JVD w/ peripheral edema to above knees  Exacerbation likely 2/2 to fluid and dietary noncompliance  No clear e/o arrythmias on tele  BP improved since admission with diuresis  HFpEF likely 2/2 to HTN (cLVH on TTE), NICK, obesity and/or ? RA (plaquenil)  LE edema partially from lymphedema  --Can diurese with torsemide 40 mg PO daily as well (as she is responsive to this as outpatient)  --Continue BP control   # HTN: BP well controlled  Continue losartan 25 mg PO daily  # Hx of lymphedema: Uses her lymphedema machine 2x daily as able  # APCs: Continue current mgmt  Had 1 week holter w/o e/o AFib  Currently anticoagulated for CVA so would not   # Hx of CVA/TIA: Currently on Xarelto and pravastatin  # NICK on CPAP  # RA w/ +RF on plaquenil and methotrexate  # L shoulder dislocation: Continue PT and pain control    Subjective:   Patient seen and examined  No significant events overnight  Objective:       [employer] (day, reason): Medrano catheter (day, reason):    Vitals: Blood pressure 108/53, pulse 72, temperature 97 8 °F (36 6 °C), temperature source Oral, resp  rate 18, height 5' 1" (1 549 m), weight 98 kg (216 lb 0 8 oz), SpO2 96 %  , Body mass index is 40 82 kg/m² , I/O last 3 completed shifts:   In: 920 [P O :920]  Out: 2774 [Urine:3650]  I/O this shift:  In: 340 [P O :340]  Out: 650 [Urine:650]  Wt Readings from Last 3 Encounters:   03/15/19 98 kg (216 lb 0 8 oz)   03/13/19 101 kg (222 lb)   02/05/19 98 9 kg (218 lb)       Intake/Output Summary (Last 24 hours) at 3/15/2019 1334  Last data filed at 3/15/2019 1244  Gross per 24 hour   Intake 720 ml   Output 2550 ml   Net -1830 ml     I/O last 3 completed shifts: In: 981 [P O :920]  Out: 9359 [Urine:3650]    No significant arrhythmias seen on telemetry review      Physical Exam:  Vitals:    03/14/19 2300 03/15/19 0600 03/15/19 0715 03/15/19 1100   BP: 109/51  113/70 108/53   BP Location: Right arm  Right arm Right arm   Pulse: 75  76 72   Resp: 18  18 18   Temp: 99 °F (37 2 °C)  98 1 °F (36 7 °C) 97 8 °F (36 6 °C)   TempSrc: Oral  Oral Oral   SpO2: 97%  96% 96%   Weight:  98 kg (216 lb 0 8 oz)     Height:           GEN: Lilliam Ramey appears well, alert and oriented x 3, pleasant and cooperative   HEENT: pupils equal, round, and reactive to light; extraocular muscles intact  NECK: supple, no carotid bruits   HEART: regular rhythm, normal S1 and S2, no murmurs, clicks, gallops or rubs, JVP is    LUNGS: clear to auscultation bilaterally; no wheezes, rales, or rhonchi   ABDOMEN: normal bowel sounds, soft, no tenderness, no distention  EXTREMITIES: peripheral pulses normal; no clubbing, cyanosis, or edema  NEURO: no focal findings   SKIN: normal without suspicious lesions on exposed skin      Current Facility-Administered Medications:     acetaminophen (TYLENOL) tablet 650 mg, 650 mg, Oral, BID PRN, Stacy Argueta MD    calcium carbonate-vitamin D (OSCAL-D) 500 mg-200 units per tablet 1 tablet, 1 tablet, Oral, BID With Meals, Stacy Argueta MD, 1 tablet at 03/15/19 0806    cyanocobalamin (VITAMIN B-12) tablet 100 mcg, 100 mcg, Oral, Daily, Stacy Argueta MD, 100 mcg at 03/15/19 0806    docusate sodium (COLACE) capsule 100 mg, 100 mg, Oral, BID, Stacy Argueta MD, 100 mg at 03/15/19 0807    ferrous sulfate tablet 325 mg, 325 mg, Oral, BID With Meals, Garrick Brewer DO    gabapentin (NEURONTIN) capsule 100 mg, 100 mg, Oral, BID, Garrick Brewer DO, 100 mg at 03/15/19 0807    gabapentin (NEURONTIN) capsule 200 mg, 200 mg, Oral, HS, Jade Adams MD, 200 mg at 03/14/19 2237    hydroxychloroquine (PLAQUENIL) tablet 200 mg, 200 mg, Oral, BID With Meals, Jade Adams MD, 200 mg at 03/15/19 0806    insulin lispro (HumaLOG) 100 units/mL subcutaneous injection 1-5 Units, 1-5 Units, Subcutaneous, HS, Jade Adams MD    insulin lispro (HumaLOG) 100 units/mL subcutaneous injection 1-6 Units, 1-6 Units, Subcutaneous, TID AC **AND** Fingerstick Glucose (POCT), , , TID AC, MD Nickie Mccann  [START ON 3/16/2019] leucovorin (WELLCOVORIN) tablet 10 mg, 10 mg, Oral, Weekly, Jade Adams MD    levothyroxine tablet 75 mcg, 75 mcg, Oral, Daily, Jade Adams MD, 75 mcg at 03/15/19 0500    losartan (COZAAR) tablet 25 mg, 25 mg, Oral, Daily, Jade Adams MD, 25 mg at 03/15/19 0807    methotrexate tablet 5 mg, 5 mg, Oral, Weekly, Jade Adams MD, 5 mg at 03/15/19 0807    multivitamin-minerals (CENTRUM) tablet 1 tablet, 1 tablet, Oral, Daily, Jade Adams MD, 1 tablet at 03/15/19 0806    pantoprazole (PROTONIX) EC tablet 40 mg, 40 mg, Oral, Early Morning, Garrick Brewre DO, 40 mg at 03/15/19 0500    polyethylene glycol (MIRALAX) packet 17 g, 17 g, Oral, Daily, Garrick Brewer DO    polyvinyl alcohol (LIQUIFILM TEARS) 1 4 % ophthalmic solution 1 drop, 1 drop, Both Eyes, TID, Garrick Brewer DO, 1 drop at 03/15/19 0807    rivaroxaban (XARELTO) tablet 15 mg, 15 mg, Oral, Daily With Breakfast, Jade Adams MD, 15 mg at 03/15/19 0806    rOPINIRole (REQUIP) tablet 1 mg, 1 mg, Oral, HS, Jade Adams MD, 1 mg at 03/14/19 2237    Facility-Administered Medications Ordered in Other Encounters:     acetaminophen (TYLENOL) tablet 650 mg, 650 mg, Oral, Q6H PRN, Brian Hanson PA-C    Labs & Results: Results from last 7 days   Lab Units 03/14/19  0438 03/13/19  1928   WBC Thousand/uL 6 61 7 02   HEMOGLOBIN g/dL 8 8* 9 3*   HEMATOCRIT % 27 6* 29 3*   PLATELETS Thousands/uL 191 207         Results from last 7 days   Lab Units 03/15/19  0509 03/14/19  0438 03/13/19  1928   POTASSIUM mmol/L 3 7 3 7 4 1   CHLORIDE mmol/L 104 106 105   CO2 mmol/L 32 28 29   BUN mg/dL 30* 30* 31*   CREATININE mg/dL 1 76* 1 63* 1 83*   CALCIUM mg/dL 8 6 8 4 8 3         EKG personally reviewed by Kerby Cushing, MD      Counseling / Coordination of Care  Total floor / unit time spent today 40 minutes  Greater than 50% of total time was spent with the patient and / or family counseling and / or coordination of care  A description of the counseling / coordination of care: 20 min  Thank you for the opportunity to participate in the care of this patient      Kerby Cushing MD, PhD   Nikko Talbot

## 2019-03-15 NOTE — PROGRESS NOTES
NEPHROLOGY PROGRESS NOTE   Karyle Cash 81 y o  female MRN: [id number]  Unit/Bed#: [facility] 821-01 Encounter: [id number]  Reason for Consult: CKD    ASSESSMENT and PLAN:    61-year-old female with a history of diastolic congestive heart failure urothelial cancer, CVA, atrial fibrillation, peripheral vascular disease admitted to [facility] for shortness of breath and lower extremity edema  Being managed for exacerbation of CHF  Nephrology consulted for kidney disease      1 ) Chronic Kidney Disease Stage III  - outpatient nephrologist: none, will need an outpatient nephrologist to follow up on her renal function  - baseline creatinine: 1 4-1 8 mg/dL  - Etiology: s/p right nephroureterectomy in April of 2018 and has been stable since that time but did suggest that she had some underlying kidney disease to start with, as her creatinine has fluctuated between 1 4-1 8 post that procedure  -renal function is currently stable at baseline  -avoid NSAIDs and contrast  -diuretics per Cardiology, I would recommend torsemide 40 mg daily  -stable from a renal standpoint for discharge  I will have her follow up with us as an outpatient  Nothing further to add from a renal standpoint will sign off please feel free to call for any questions or concerns    Discussed with hospitalist      2 ) Acute on chronic diastolic congestive heart failure  -cardiology and heart failure team have been consulted  -diuretics as per Cardiology  -daily weight  -low 2 g sodium diet  -monitor input and output  -recommend torsemide 40 mg daily     3 ) Hypertension  -most recent blood pressure is well controlled, could be volume mediated     4 ) History of urothelial cancer  -status post nephroureterectomy of the right side in April of 2018  -follows with Urology as an outpatient     5 ) History of CVA  -due to thrombus of the right middle cerebral artery  -currently on Xarelto and statin  -blood pressure control        SUBJECTIVE / INTERVAL HISTORY:    Feels much better still having little swelling in her legs    OBJECTIVE:  Current Weight: Weight - Scale: 98 kg (216 lb 0 8 oz)  Vitals:    03/14/19 2300 03/15/19 0600 03/15/19 0715 03/15/19 1100   BP: 109/51  113/70 108/53   BP Location: Right arm  Right arm Right arm   Pulse: 75  76 72   Resp: 18  18 18   Temp: 99 °F (37 2 °C)  98 1 °F (36 7 °C) 97 8 °F (36 6 °C)   TempSrc: Oral  Oral Oral   SpO2: 97%  96% 96%   Weight:  98 kg (216 lb 0 8 oz)     Height:           Intake/Output Summary (Last 24 hours) at 3/15/2019 1203  Last data filed at 3/15/2019 0900  Gross per 24 hour   Intake 780 ml   Output 2450 ml   Net -1670 ml       Review of Systems:    12 point ROS has been reviewed  Physical Exam   Constitutional: She is oriented to person, place, and time  She appears well-developed and well-nourished  No distress  HENT:   Head: Normocephalic and atraumatic  Eyes: Pupils are equal, round, and reactive to light  Conjunctivae are normal  No scleral icterus  Neck: Normal range of motion  Neck supple  Cardiovascular: Normal rate, regular rhythm, S1 normal, S2 normal and intact distal pulses  Exam reveals no gallop and no friction rub  No murmur heard  Pulmonary/Chest: Effort normal and breath sounds normal  No respiratory distress  She has no wheezes  She has no rales  Abdominal: Soft  Bowel sounds are normal  There is no tenderness  There is no rebound  Musculoskeletal: Normal range of motion  She exhibits edema  Neurological: She is alert and oriented to person, place, and time  Skin: No rash noted  Psychiatric: She has a normal mood and affect  Her behavior is normal    Nursing note and vitals reviewed        Medications:    Current Facility-Administered Medications:     acetaminophen (TYLENOL) tablet 650 mg, 650 mg, Oral, BID PRN, Feli Farrar MD    calcium carbonate-vitamin D (OSCAL-D) 500 mg-200 units per tablet 1 tablet, 1 tablet, Oral, BID With Meals, [facility] MD Kwabena, 1 tablet at 03/15/19 0806    cyanocobalamin (VITAMIN B-12) tablet 100 mcg, 100 mcg, Oral, Daily, Mary Alejo MD, 100 mcg at 03/15/19 0806    docusate sodium (COLACE) capsule 100 mg, 100 mg, Oral, BID, Mary Alejo MD, 100 mg at 03/15/19 0807    ferrous sulfate tablet 325 mg, 325 mg, Oral, BID With Meals, Alex Munoz, DO    gabapentin (NEURONTIN) capsule 100 mg, 100 mg, Oral, BID, Alex Munoz, DO, 100 mg at 03/15/19 0807    gabapentin (NEURONTIN) capsule 200 mg, 200 mg, Oral, HS, Mary Alejo MD, 200 mg at 03/14/19 2237    hydroxychloroquine (PLAQUENIL) tablet 200 mg, 200 mg, Oral, BID With Meals, Mary Alejo MD, 200 mg at 03/15/19 0806    insulin lispro (HumaLOG) 100 units/mL subcutaneous injection 1-5 Units, 1-5 Units, Subcutaneous, HS, Mary Alejo MD    insulin lispro (HumaLOG) 100 units/mL subcutaneous injection 1-6 Units, 1-6 Units, Subcutaneous, TID AC **AND** Fingerstick Glucose (POCT), , , TID AC, Mary Alejo MD  [facility]  [START ON 3/16/2019] leucovorin (WELLCOVORIN) tablet 10 mg, 10 mg, Oral, Weekly, Mary Alejo MD    levothyroxine tablet 75 mcg, 75 mcg, Oral, Daily, Mary Alejo MD, 75 mcg at 03/15/19 0500    losartan (COZAAR) tablet 25 mg, 25 mg, Oral, Daily, Mary Alejo MD, 25 mg at 03/15/19 0807    methotrexate tablet 5 mg, 5 mg, Oral, Weekly, Mary Alejo MD, 5 mg at 03/15/19 0807    multivitamin-minerals (CENTRUM) tablet 1 tablet, 1 tablet, Oral, Daily, Mary Alejo MD, 1 tablet at 03/15/19 0806    pantoprazole (PROTONIX) EC tablet 40 mg, 40 mg, Oral, Early Morning, Alex Munoz DO, 40 mg at 03/15/19 0500    polyethylene glycol (MIRALAX) packet 17 g, 17 g, Oral, Daily, Alex Munoz DO    polyvinyl alcohol (LIQUIFILM TEARS) 1 4 % ophthalmic solution 1 drop, 1 drop, Both Eyes, TID, Alex Munoz DO, 1 drop at 03/15/19 0807    rivaroxaban (XARELTO) tablet 15 mg, 15 mg, Oral, Daily With Breakfast, Mary Alejo MD, 15 mg at 03/15/19 4252    rOPINIRole (REQUIP) tablet 1 mg, 1 mg, Oral, HS, Go Palacios MD, 1 mg at 03/14/19 9247    Facility-Administered Medications Ordered in Other Encounters:     acetaminophen (TYLENOL) tablet 650 mg, 650 mg, Oral, Q6H PRN, Anjel Downs PA-C    Laboratory Results:  Results from last 7 days   Lab Units 03/15/19  0509 03/14/19  0438 03/13/19  1928   WBC Thousand/uL  --  6 61 7 02   HEMOGLOBIN g/dL  --  8 8* 9 3*   HEMATOCRIT %  --  27 6* 29 3*   PLATELETS Thousands/uL  --  191 207   POTASSIUM mmol/L 3 7 3 7 4 1   CHLORIDE mmol/L 104 106 105   CO2 mmol/L 32 28 29   BUN mg/dL 30* 30* 31*   CREATININE mg/dL 1 76* 1 63* 1 83*   CALCIUM mg/dL 8 6 8 4 8 3   MAGNESIUM mg/dL  --  2 4  --

## 2019-03-15 NOTE — RESTORATIVE TECHNICIAN NOTE
Restorative Specialist Mobility Note       Activity: Ambulate in chahal, Chair, Stand at bedside, Turn     Assistive Device: Front wheel walker        Repositioned: Up in chair, Sitting

## 2019-03-15 NOTE — PROGRESS NOTES
03/15/19 1300   Spiritual Beliefs/Perceptions   Support Systems Spouse/significant other   Stress Factors   Patient Stress Factors None identified   Family Stress Factors None identified   Coping Responses   Patient Coping Accepting   Family Coping Accepting   Plan of Care   Comments Provided Holy Communion for pt  and , provided a  caring presence     Assessment Completed by: Unit visit

## 2019-03-16 LAB
ANION GAP SERPL CALCULATED.3IONS-SCNC: 4 MMOL/L (ref 4–13)
BUN SERPL-MCNC: 32 MG/DL (ref 5–25)
CALCIUM SERPL-MCNC: 8.5 MG/DL (ref 8.3–10.1)
CHLORIDE SERPL-SCNC: 105 MMOL/L (ref 100–108)
CO2 SERPL-SCNC: 29 MMOL/L (ref 21–32)
CREAT SERPL-MCNC: 1.83 MG/DL (ref 0.6–1.3)
GFR SERPL CREATININE-BSD FRML MDRD: 25 ML/MIN/1.73SQ M
GLUCOSE SERPL-MCNC: 109 MG/DL (ref 65–140)
GLUCOSE SERPL-MCNC: 118 MG/DL (ref 65–140)
GLUCOSE SERPL-MCNC: 119 MG/DL (ref 65–140)
GLUCOSE SERPL-MCNC: 137 MG/DL (ref 65–140)
GLUCOSE SERPL-MCNC: 95 MG/DL (ref 65–140)
POTASSIUM SERPL-SCNC: 3.8 MMOL/L (ref 3.5–5.3)
SODIUM SERPL-SCNC: 138 MMOL/L (ref 136–145)

## 2019-03-16 PROCEDURE — 99232 SBSQ HOSP IP/OBS MODERATE 35: CPT | Performed by: INTERNAL MEDICINE

## 2019-03-16 PROCEDURE — 82948 REAGENT STRIP/BLOOD GLUCOSE: CPT

## 2019-03-16 PROCEDURE — 80048 BASIC METABOLIC PNL TOTAL CA: CPT | Performed by: INTERNAL MEDICINE

## 2019-03-16 RX ADMIN — DOCUSATE SODIUM 100 MG: 100 CAPSULE, LIQUID FILLED ORAL at 17:08

## 2019-03-16 RX ADMIN — FERROUS SULFATE TAB 325 MG (65 MG ELEMENTAL FE) 325 MG: 325 (65 FE) TAB at 17:07

## 2019-03-16 RX ADMIN — LEVOTHYROXINE SODIUM 75 MCG: 75 TABLET ORAL at 05:36

## 2019-03-16 RX ADMIN — HYDROXYCHLOROQUINE SULFATE 200 MG: 200 TABLET, FILM COATED ORAL at 08:14

## 2019-03-16 RX ADMIN — CALCIUM CARBONATE-VITAMIN D TAB 500 MG-200 UNIT 1 TABLET: 500-200 TAB at 17:08

## 2019-03-16 RX ADMIN — VITAM B12 100 MCG: 100 TAB at 08:13

## 2019-03-16 RX ADMIN — LEUCOVORIN CALCIUM 10 MG: 5 TABLET ORAL at 08:16

## 2019-03-16 RX ADMIN — GABAPENTIN 100 MG: 100 CAPSULE ORAL at 08:14

## 2019-03-16 RX ADMIN — RIVAROXABAN 15 MG: 15 TABLET, FILM COATED ORAL at 08:13

## 2019-03-16 RX ADMIN — LOSARTAN POTASSIUM 25 MG: 25 TABLET, FILM COATED ORAL at 08:14

## 2019-03-16 RX ADMIN — POLYVINYL ALCOHOL 1 DROP: 14 SOLUTION/ DROPS OPHTHALMIC at 17:08

## 2019-03-16 RX ADMIN — TORSEMIDE 40 MG: 20 TABLET ORAL at 08:13

## 2019-03-16 RX ADMIN — GABAPENTIN 100 MG: 100 CAPSULE ORAL at 17:07

## 2019-03-16 RX ADMIN — ROPINIROLE 1 MG: 1 TABLET, FILM COATED ORAL at 22:06

## 2019-03-16 RX ADMIN — POLYETHYLENE GLYCOL 3350 17 G: 17 POWDER, FOR SOLUTION ORAL at 08:12

## 2019-03-16 RX ADMIN — CALCIUM CARBONATE-VITAMIN D TAB 500 MG-200 UNIT 1 TABLET: 500-200 TAB at 08:13

## 2019-03-16 RX ADMIN — HYDROXYCHLOROQUINE SULFATE 200 MG: 200 TABLET, FILM COATED ORAL at 17:07

## 2019-03-16 RX ADMIN — POLYVINYL ALCOHOL 1 DROP: 14 SOLUTION/ DROPS OPHTHALMIC at 08:15

## 2019-03-16 RX ADMIN — DOCUSATE SODIUM 100 MG: 100 CAPSULE, LIQUID FILLED ORAL at 08:12

## 2019-03-16 RX ADMIN — POLYVINYL ALCOHOL 1 DROP: 14 SOLUTION/ DROPS OPHTHALMIC at 22:00

## 2019-03-16 RX ADMIN — PANTOPRAZOLE SODIUM 40 MG: 40 TABLET, DELAYED RELEASE ORAL at 05:36

## 2019-03-16 RX ADMIN — ACETAMINOPHEN 650 MG: 325 TABLET ORAL at 22:06

## 2019-03-16 RX ADMIN — FERROUS SULFATE TAB 325 MG (65 MG ELEMENTAL FE) 325 MG: 325 (65 FE) TAB at 08:13

## 2019-03-16 RX ADMIN — Medication 1 TABLET: at 08:12

## 2019-03-16 RX ADMIN — GABAPENTIN 200 MG: 100 CAPSULE ORAL at 22:06

## 2019-03-16 NOTE — PROGRESS NOTES
Re-explained importance of wearing SCD pumps for 18 hrs a day  The Pt was unaware of having to re-apply pumps after walk during the day time  Only thought they were worn at night  Educated pt and her   Pt will now wear SCD pumps for 18 hours

## 2019-03-16 NOTE — PROGRESS NOTES
[facility] Progress Note  Patient: Prabhakar Watson 81 y o  female   MRN: [id number]  PCP: Arlene Cherry MD  Unit/Bed#: [facility] 181-22 Encounter: [id number]  Date Of Visit: 03/16/19    Assessment:    Principal Problem:    Acute on chronic diastolic congestive heart failure ([facility] )  Active Problems:    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 ([facility] )    Rheumatoid arthritis ([place] [id number] )    Diabetes mellitus type 2 in obese [facility])    Acquired hypothyroidism    Urothelial cancer ([place] [address] )    Atrial fibrillation ([place] [address] )    CKD (chronic kidney disease) stage 3, GFR 30-59 ml/min ([place] [address] )      Plan:    1  Acute on chronic diastolic CHF, improving,  received 1 dose of IV Lasix, started on oral Demadex, cardiac echo with EF 60%,  cardiology is following  2  Chronic LEs/lymphedema, now with increasing neuropathic pain likely secondary to volume overload, negative venous Doppler for DVT, continue with diuretics and Neurontin  3  Anemia of chronic disease with iron deficiency, started on iron supplement,  monitor  4  Atrial fibrillation, stable HR, on Xarelto  5  CKD stage 3, solitary kidney, baseline creatinine 1 4-1 8, monitor   6  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  7  DM type 2, A1c 6 5,  acceptable blood sugar, continue ISS  8  History CVA  9  Hypothyroidism, continue levothyroxine  10  Rheumatoid arthritis, on Plaquenil, methotrexate and leucovorin  11  Constipation, continue MiraLax      VTE Pharmacologic Prophylaxis:   Pharmacologic: Rivaroxaban (Xarelto)  Mechanical VTE Prophylaxis in Place: Yes    Patient Centered Rounds: I have performed bedside rounds with nursing staff today  Discussions with Specialists or Other Care Team Provider:     Education and Discussions with Family / Patient:  Patient and  at bedside    Time Spent for Care: 30 minutes  More than 50% of total time spent on counseling and coordination of care as described above      Current Length of Stay: 3 day(s)    Current Patient Status: Inpatient   Certification Statement: The patient will continue to require additional inpatient hospital stay due to Management of CHF    Discharge Plan / Estimated Discharge Date:  When cleared by Cardiology    Code Status: Level 3 - DNAR and DNI      Subjective:   Patient seen and examined  Comfortable sitting in chair  Clinically improving    at bedside    Objective:     Vitals:   Temp (24hrs), Av °F (36 7 °C), Min:97 9 °F (36 6 °C), Max:98 °F (36 7 °C)    Temp:  [97 9 °F (36 6 °C)-98 °F (36 7 °C)] 98 °F (36 7 °C)  HR:  [74-78] 78  Resp:  [18] 18  BP: (114-132)/(58-68) 132/68  SpO2:  [96 %-100 %] 98 %  Body mass index is 40 61 kg/m²  Input and Output Summary (last 24 hours): Intake/Output Summary (Last 24 hours) at 3/16/2019 1119  Last data filed at 3/16/2019 0801  Gross per 24 hour   Intake 240 ml   Output 2500 ml   Net -2260 ml       Physical Exam:     Physical Exam     Patient is awake alert in no acute distress  Lung clear to auscultation bilateral  Heart positive S1-S2 no murmur  Abdomen soft nontender positive bowel sounds  Bilateral lower extremity improving edema    Additional Data:     Labs:    Results from last 7 days   Lab Units 19  0438   WBC Thousand/uL 6 61   HEMOGLOBIN g/dL 8 8*   HEMATOCRIT % 27 6*   PLATELETS Thousands/uL 191   NEUTROS PCT % 66   LYMPHS PCT % 19   MONOS PCT % 10   EOS PCT % 4     Results from last 7 days   Lab Units 19  0530   POTASSIUM mmol/L 3 8   CHLORIDE mmol/L 105   CO2 mmol/L 29   BUN mg/dL 32*   CREATININE mg/dL 1 83*   CALCIUM mg/dL 8 5           * I Have Reviewed All Lab Data Listed Above  * Additional Pertinent Lab Tests Reviewed:  [facility] Admission Reviewed    Imaging:    Imaging Reports Reviewed Today Include:   Imaging Personally Reviewed by Myself Includes:     Recent Cultures (last 7 days):           Last 24 Hours Medication List:     Current Facility-Administered Medications:  acetaminophen 650 mg Oral BID PRN Go Palacios MD   calcium carbonate-vitamin D 1 tablet Oral BID With Meals Go Palacios MD   cyanocobalamin 100 mcg Oral Daily Go Palacios MD   docusate sodium 100 mg Oral BID Go Palacios MD   ferrous sulfate 325 mg Oral BID With Meals Kathleen Antonio DO   gabapentin 100 mg Oral BID Kathleen Antonio DO   gabapentin 200 mg Oral HS Go Palacios MD   hydroxychloroquine 200 mg Oral BID With Meals Go Palacios MD   insulin lispro 1-5 Units Subcutaneous HS Go Palacios MD   insulin lispro 1-6 Units Subcutaneous TID AC Go Palacios MD   leucovorin 10 mg Oral Weekly Go Palacios MD   levothyroxine 75 mcg Oral Daily Go Palacios MD   losartan 25 mg Oral Daily Go Palacios MD   methotrexate 5 mg Oral Weekly Go Palacios MD   multivitamin-minerals 1 tablet Oral Daily Go Palacios MD   pantoprazole 40 mg Oral Early Morning Kathleen Antonio DO   polyethylene glycol 17 g Oral Daily Kathleen Antonio DO   polyvinyl alcohol 1 drop Both Eyes TID Kathleen Antonio DO   rivaroxaban 15 mg Oral Daily With Breakfast Go Palacios MD   rOPINIRole 1 mg Oral HS Go Palacios MD   torsemide 40 mg Oral Daily Kathleen Antonio DO     Facility-Administered Medications Ordered in Other Encounters:  acetaminophen 650 mg Oral Q6H PRN Anjel Downs PA-C        Today, Patient Was Seen By: Kathleen Antonio DO    ** Please Note: This note has been constructed using a voice recognition system   **

## 2019-03-16 NOTE — PLAN OF CARE
Problem: Potential for Falls  Goal: Patient will remain free of falls  Description  INTERVENTIONS:  - Assess patient frequently for physical needs  -  Identify cognitive and physical deficits and behaviors that affect risk of falls  -  [place] fall precautions as indicated by assessment   - Educate patient/family on patient safety including physical limitations  - Instruct patient to call for assistance with activity based on assessment  - Modify environment to reduce risk of injury  - Consider OT/PT consult to assist with strengthening/mobility  Outcome: Progressing     Problem: CARDIOVASCULAR - ADULT  Goal: Maintains optimal cardiac output and hemodynamic stability  Description  INTERVENTIONS:  - Monitor I/O, vital signs and rhythm  - Monitor for S/S and trends of decreased cardiac output i e  bleeding, hypotension  - Administer and titrate ordered vasoactive medications to optimize hemodynamic stability  - Assess quality of pulses, skin color and temperature  - Assess for signs of decreased coronary artery perfusion - ex   Angina  - Instruct patient to report change in severity of symptoms  Outcome: Progressing     Problem: METABOLIC, FLUID AND ELECTROLYTES - ADULT  Goal: Fluid balance maintained  Description  INTERVENTIONS:  - Monitor labs and assess for signs and symptoms of volume excess or deficit  - Monitor I/O and WT  - Instruct patient on fluid and nutrition as appropriate  Outcome: Progressing     Problem: DISCHARGE PLANNING - CARE MANAGEMENT  Goal: Discharge to post-acute care or home with appropriate resources  Description  INTERVENTIONS:  - Conduct assessment to determine patient/family and health care team treatment goals, and need for post-acute services based on payer coverage, community resources, and patient preferences, and barriers to discharge  - Address psychosocial, clinical, and financial barriers to discharge as identified in assessment in conjunction with the patient/family and health care team  - Arrange appropriate level of post-acute services according to patient?s   needs and preference and payer coverage in collaboration with the physician and health care team  - Communicate with and update the patient/family, physician, and health care team regarding progress on the discharge plan  - Arrange appropriate transportation to post-acute venues  Outcome: Progressing     Problem: Nutrition/Hydration-ADULT  Goal: Nutrient/Hydration intake appropriate for improving, restoring or maintaining nutritional needs  Description  Monitor and assess patient's nutrition/hydration status for malnutrition (ex- brittle hair, bruises, dry skin, pale skin and conjunctiva, muscle wasting, smooth red tongue, and disorientation)  Collaborate with interdisciplinary team and initiate plan and interventions as ordered  Monitor patient's weight and dietary intake as ordered or per policy  Utilize nutrition screening tool and intervene per policy  Determine patient's food preferences and provide high-protein, high-caloric foods as appropriate       INTERVENTIONS:  - Monitor oral intake, urinary output, labs, and treatment plans  - Assess nutrition and hydration status and recommend course of action  - Evaluate amount of meals eaten  - Assist patient with eating if necessary   - Allow adequate time for meals  - Recommend/ encourage appropriate diets, oral nutritional supplements, and vitamin/mineral supplements  - Order, calculate, and assess calorie counts as needed  - Recommend, monitor, and adjust tube feedings and TPN/PPN based on assessed needs  - Assess need for intravenous fluids  - Provide specific nutrition/hydration education as appropriate  - Include patient/family/caregiver in decisions related to nutrition  Outcome: Progressing     Problem: Prexisting or High Potential for Compromised Skin Integrity  Goal: Skin integrity is maintained or improved  Description  INTERVENTIONS:  - Identify patients at risk for skin breakdown  - Assess and monitor skin integrity  - Assess and monitor nutrition and hydration status  - Monitor labs (i e  albumin)  - Assess for incontinence   - Turn and reposition patient  - Assist with mobility/ambulation  - Relieve pressure over bony prominences  - Avoid friction and shearing  - Provide appropriate hygiene as needed including keeping skin clean and dry  - Evaluate need for skin moisturizer/barrier cream  - Collaborate with interdisciplinary team (i e  Nutrition, Rehabilitation, etc )   - Patient/family teaching  Outcome: Progressing

## 2019-03-16 NOTE — PROGRESS NOTES
Pt refusing SCD pumps until later today  Pt ambulating frequently around to bathroom  Dr Myesha Sena aware and okay with keeping off until the evening  Will continue to monitor

## 2019-03-16 NOTE — PLAN OF CARE
Problem: Potential for Falls  Goal: Patient will remain free of falls  Description  INTERVENTIONS:  - Assess patient frequently for physical needs  -  Identify cognitive and physical deficits and behaviors that affect risk of falls  -  [place] fall precautions as indicated by assessment   - Educate patient/family on patient safety including physical limitations  - Instruct patient to call for assistance with activity based on assessment  - Modify environment to reduce risk of injury  - Consider OT/PT consult to assist with strengthening/mobility  Outcome: Progressing     Problem: CARDIOVASCULAR - ADULT  Goal: Maintains optimal cardiac output and hemodynamic stability  Description  INTERVENTIONS:  - Monitor I/O, vital signs and rhythm  - Monitor for S/S and trends of decreased cardiac output i e  bleeding, hypotension  - Administer and titrate ordered vasoactive medications to optimize hemodynamic stability  - Assess quality of pulses, skin color and temperature  - Assess for signs of decreased coronary artery perfusion - ex   Angina  - Instruct patient to report change in severity of symptoms  Outcome: Progressing     Problem: METABOLIC, FLUID AND ELECTROLYTES - ADULT  Goal: Fluid balance maintained  Description  INTERVENTIONS:  - Monitor labs and assess for signs and symptoms of volume excess or deficit  - Monitor I/O and WT  - Instruct patient on fluid and nutrition as appropriate  Outcome: Progressing     Problem: DISCHARGE PLANNING - CARE MANAGEMENT  Goal: Discharge to post-acute care or home with appropriate resources  Description  INTERVENTIONS:  - Conduct assessment to determine patient/family and health care team treatment goals, and need for post-acute services based on payer coverage, community resources, and patient preferences, and barriers to discharge  - Address psychosocial, clinical, and financial barriers to discharge as identified in assessment in conjunction with the patient/family and health care team  - Arrange appropriate level of post-acute services according to patient?s   needs and preference and payer coverage in collaboration with the physician and health care team  - Communicate with and update the patient/family, physician, and health care team regarding progress on the discharge plan  - Arrange appropriate transportation to post-acute venues  Outcome: Progressing     Problem: Nutrition/Hydration-ADULT  Goal: Nutrient/Hydration intake appropriate for improving, restoring or maintaining nutritional needs  Description  Monitor and assess patient's nutrition/hydration status for malnutrition (ex- brittle hair, bruises, dry skin, pale skin and conjunctiva, muscle wasting, smooth red tongue, and disorientation)  Collaborate with interdisciplinary team and initiate plan and interventions as ordered  Monitor patient's weight and dietary intake as ordered or per policy  Utilize nutrition screening tool and intervene per policy  Determine patient's food preferences and provide high-protein, high-caloric foods as appropriate       INTERVENTIONS:  - Monitor oral intake, urinary output, labs, and treatment plans  - Assess nutrition and hydration status and recommend course of action  - Evaluate amount of meals eaten  - Assist patient with eating if necessary   - Allow adequate time for meals  - Recommend/ encourage appropriate diets, oral nutritional supplements, and vitamin/mineral supplements  - Order, calculate, and assess calorie counts as needed  - Recommend, monitor, and adjust tube feedings and TPN/PPN based on assessed needs  - Assess need for intravenous fluids  - Provide specific nutrition/hydration education as appropriate  - Include patient/family/caregiver in decisions related to nutrition  Outcome: Progressing     Problem: Prexisting or High Potential for Compromised Skin Integrity  Goal: Skin integrity is maintained or improved  Description  INTERVENTIONS:  - Identify patients at risk for skin breakdown  - Assess and monitor skin integrity  - Assess and monitor nutrition and hydration status  - Monitor labs (i e  albumin)  - Assess for incontinence   - Turn and reposition patient  - Assist with mobility/ambulation  - Relieve pressure over bony prominences  - Avoid friction and shearing  - Provide appropriate hygiene as needed including keeping skin clean and dry  - Evaluate need for skin moisturizer/barrier cream  - Collaborate with interdisciplinary team (i e  Nutrition, Rehabilitation, etc )   - Patient/family teaching  Outcome: Progressing

## 2019-03-16 NOTE — PROGRESS NOTES
Cardiology Progress Note - Ирина Fulton 81 y o  female MRN: [id number]    Unit/Bed#: [facility] 821-01 Encounter: [id number]      Assessment/Recommendations:  1  Acute on chronic diastolic heart failure:  Improving volume status with current dose of torsemide  Creatinine is slowly increasing, but remains in baseline status  Blood pressure control, which seems to be appropriate this time  2  Hypertension:  Well controlled on current regimen  3  History of lymphedema:  Lower extremity edema is unlikely to be completely resolved  4  PCPs:  Asymptomatic, no evidence of atrial fibrillation on prior Holter  5  History of CVA/TIA:  Continued on statin and Xarelto  6  Rheumatoid arthritis:  Continued on Plaquenil and methotrexate      Subjective:   Patient seen and examined  No significant events overnight   ; pertinent negatives - chest pain, chest pressure/discomfort, irregular heart beat and palpitations  Dyspnea improving  Objective:     Vitals: Blood pressure 132/68, pulse 78, temperature 98 °F (36 7 °C), temperature source Oral, resp  rate 18, height 5' 1" (1 549 m), weight 97 5 kg (214 lb 15 2 oz), SpO2 98 %  , Body mass index is 40 61 kg/m² ,   Orthostatic Blood Pressures      Most Recent Value   Blood Pressure  132/68 filed at 03/16/2019 0700   Patient Position - Orthostatic VS  Lying filed at 03/16/2019 0700            Intake/Output Summary (Last 24 hours) at 3/16/2019 1025  Last data filed at 3/16/2019 0801  Gross per 24 hour   Intake 240 ml   Output 2500 ml   Net -2260 ml       Physical Exam:    GEN: Ирина Fulton appears well, alert and oriented x 3, pleasant and cooperative   HEENT: pupils equal, round, and reactive to light; extraocular muscles intact  NECK: supple, no carotid bruits   HEART: regular rhythm, normal S1 and S2, no murmurs, clicks, gallops or rubs   LUNGS: clear to auscultation bilaterally; no wheezes, rales, or rhonchi   ABDOMEN: normal bowel sounds, soft, no tenderness, no distention  EXTREMITIES: peripheral pulses normal; no clubbing, cyanosis, + edema  NEURO: no focal findings   SKIN: normal without suspicious lesions on exposed skin    Medications:      Current Facility-Administered Medications:     acetaminophen (TYLENOL) tablet 650 mg, 650 mg, Oral, BID PRN, Nataliya Dumont MD, 650 mg at 03/15/19 1632    calcium carbonate-vitamin D (OSCAL-D) 500 mg-200 units per tablet 1 tablet, 1 tablet, Oral, BID With Meals, Nataliya Dumont MD, 1 tablet at 03/16/19 0813    cyanocobalamin (VITAMIN B-12) tablet 100 mcg, 100 mcg, Oral, Daily, Nataliya Dumont MD, 100 mcg at 03/16/19 0813    docusate sodium (COLACE) capsule 100 mg, 100 mg, Oral, BID, Nataliya Dumont MD, 100 mg at 03/16/19 2760    ferrous sulfate tablet 325 mg, 325 mg, Oral, BID With Meals, Marine Min, DO, 325 mg at 03/16/19 0813    gabapentin (NEURONTIN) capsule 100 mg, 100 mg, Oral, BID, Marine Min, DO, 100 mg at 03/16/19 4747    gabapentin (NEURONTIN) capsule 200 mg, 200 mg, Oral, HS, Nataliya Dumont MD, 200 mg at 03/15/19 2313    hydroxychloroquine (PLAQUENIL) tablet 200 mg, 200 mg, Oral, BID With Meals, Nataliya Dumont MD, 200 mg at 03/16/19 0814    insulin lispro (HumaLOG) 100 units/mL subcutaneous injection 1-5 Units, 1-5 Units, Subcutaneous, HS, Nataliya Dumont MD, 1 Units at 03/15/19 2313    insulin lispro (HumaLOG) 100 units/mL subcutaneous injection 1-6 Units, 1-6 Units, Subcutaneous, TID AC **AND** Fingerstick Glucose (POCT), , , TID AC, Nataliya Dumont MD    leucovorin (WELLCOVORIN) tablet 10 mg, 10 mg, Oral, Weekly, Nataliya Dumont MD, 10 mg at 03/16/19 0816    levothyroxine tablet 75 mcg, 75 mcg, Oral, Daily, Nataliya Dumont MD, 75 mcg at 03/16/19 0536    losartan (COZAAR) tablet 25 mg, 25 mg, Oral, Daily, Nataliya Dumont MD, 25 mg at 03/16/19 0814    methotrexate tablet 5 mg, 5 mg, Oral, Weekly, Nataliya Dumont MD, 5 mg at 03/15/19 0807    multivitamin-minerals (CENTRUM) tablet 1 tablet, 1 tablet, Oral, Daily, Nataliya Dumont MD, 1 tablet at 03/16/19 0812    pantoprazole (PROTONIX) EC tablet 40 mg, 40 mg, Oral, Early Morning, Marine Min, DO, 40 mg at 03/16/19 0536    polyethylene glycol (MIRALAX) packet 17 g, 17 g, Oral, Daily, Marine Min, DO, 17 g at 03/16/19 2618    polyvinyl alcohol (LIQUIFILM TEARS) 1 4 % ophthalmic solution 1 drop, 1 drop, Both Eyes, TID, Marine Min, DO, 1 drop at 03/16/19 0815    rivaroxaban (XARELTO) tablet 15 mg, 15 mg, Oral, Daily With Breakfast, Nataliya Dumont MD, 15 mg at 03/16/19 0813    rOPINIRole (REQUIP) tablet 1 mg, 1 mg, Oral, HS, Nataliya Dumont MD, 1 mg at 03/15/19 2313    torsemide (DEMADEX) tablet 40 mg, 40 mg, Oral, Daily, Marine Min, DO, 40 mg at 03/16/19 0813    Facility-Administered Medications Ordered in Other Encounters:     acetaminophen (TYLENOL) tablet 650 mg, 650 mg, Oral, Q6H PRN, Dara Cruz PA-C     Labs & Results:        Results from last 7 days   Lab Units 03/14/19  0438 03/13/19  1928   WBC Thousand/uL 6 61 7 02   HEMOGLOBIN g/dL 8 8* 9 3*   HEMATOCRIT % 27 6* 29 3*   PLATELETS Thousands/uL 191 207         Results from last 7 days   Lab Units 03/16/19  0530 03/15/19  0509 03/14/19  0438   POTASSIUM mmol/L 3 8 3 7 3 7   CHLORIDE mmol/L 105 104 106   CO2 mmol/L 29 32 28   BUN mg/dL 32* 30* 30*   CREATININE mg/dL 1 83* 1 76* 1 63*   CALCIUM mg/dL 8 5 8 6 8 4         Results from last 7 days   Lab Units 03/14/19  0438   MAGNESIUM mg/dL 2 4       Echo: personally reviewed - normal EF, dilated left atrium, normal pulmonary pressures      EKG personally reviewed by Bere Cifuentes MD

## 2019-03-17 PROBLEM — I89.0 CHRONIC ACQUIRED LYMPHEDEMA: Status: ACTIVE | Noted: 2019-03-17

## 2019-03-17 PROBLEM — D63.8 ANEMIA OF CHRONIC DISEASE: Status: ACTIVE | Noted: 2019-03-17

## 2019-03-17 LAB
ANION GAP SERPL CALCULATED.3IONS-SCNC: 5 MMOL/L (ref 4–13)
BUN SERPL-MCNC: 38 MG/DL (ref 5–25)
CALCIUM SERPL-MCNC: 8.8 MG/DL (ref 8.3–10.1)
CHLORIDE SERPL-SCNC: 102 MMOL/L (ref 100–108)
CO2 SERPL-SCNC: 30 MMOL/L (ref 21–32)
CREAT SERPL-MCNC: 1.91 MG/DL (ref 0.6–1.3)
GFR SERPL CREATININE-BSD FRML MDRD: 24 ML/MIN/1.73SQ M
GLUCOSE SERPL-MCNC: 105 MG/DL (ref 65–140)
GLUCOSE SERPL-MCNC: 105 MG/DL (ref 65–140)
GLUCOSE SERPL-MCNC: 116 MG/DL (ref 65–140)
GLUCOSE SERPL-MCNC: 124 MG/DL (ref 65–140)
GLUCOSE SERPL-MCNC: 128 MG/DL (ref 65–140)
POTASSIUM SERPL-SCNC: 3.7 MMOL/L (ref 3.5–5.3)
SODIUM SERPL-SCNC: 137 MMOL/L (ref 136–145)

## 2019-03-17 PROCEDURE — 99232 SBSQ HOSP IP/OBS MODERATE 35: CPT | Performed by: INTERNAL MEDICINE

## 2019-03-17 PROCEDURE — 82948 REAGENT STRIP/BLOOD GLUCOSE: CPT

## 2019-03-17 PROCEDURE — 80048 BASIC METABOLIC PNL TOTAL CA: CPT | Performed by: INTERNAL MEDICINE

## 2019-03-17 RX ORDER — TORSEMIDE 20 MG/1
20 TABLET ORAL DAILY
Status: DISCONTINUED | OUTPATIENT
Start: 2019-03-18 | End: 2019-03-18

## 2019-03-17 RX ADMIN — ACETAMINOPHEN 650 MG: 325 TABLET ORAL at 20:52

## 2019-03-17 RX ADMIN — CALCIUM CARBONATE-VITAMIN D TAB 500 MG-200 UNIT 1 TABLET: 500-200 TAB at 17:39

## 2019-03-17 RX ADMIN — PANTOPRAZOLE SODIUM 40 MG: 40 TABLET, DELAYED RELEASE ORAL at 05:01

## 2019-03-17 RX ADMIN — FERROUS SULFATE TAB 325 MG (65 MG ELEMENTAL FE) 325 MG: 325 (65 FE) TAB at 17:39

## 2019-03-17 RX ADMIN — FERROUS SULFATE TAB 325 MG (65 MG ELEMENTAL FE) 325 MG: 325 (65 FE) TAB at 08:59

## 2019-03-17 RX ADMIN — VITAM B12 100 MCG: 100 TAB at 08:59

## 2019-03-17 RX ADMIN — ACETAMINOPHEN 650 MG: 325 TABLET ORAL at 10:49

## 2019-03-17 RX ADMIN — RIVAROXABAN 15 MG: 15 TABLET, FILM COATED ORAL at 08:59

## 2019-03-17 RX ADMIN — HYDROXYCHLOROQUINE SULFATE 200 MG: 200 TABLET, FILM COATED ORAL at 08:58

## 2019-03-17 RX ADMIN — ROPINIROLE 1 MG: 1 TABLET, FILM COATED ORAL at 21:01

## 2019-03-17 RX ADMIN — DOCUSATE SODIUM 100 MG: 100 CAPSULE, LIQUID FILLED ORAL at 17:39

## 2019-03-17 RX ADMIN — POLYETHYLENE GLYCOL 3350 17 G: 17 POWDER, FOR SOLUTION ORAL at 08:58

## 2019-03-17 RX ADMIN — CALCIUM CARBONATE-VITAMIN D TAB 500 MG-200 UNIT 1 TABLET: 500-200 TAB at 08:59

## 2019-03-17 RX ADMIN — Medication 1 TABLET: at 08:59

## 2019-03-17 RX ADMIN — GABAPENTIN 200 MG: 100 CAPSULE ORAL at 21:02

## 2019-03-17 RX ADMIN — DOCUSATE SODIUM 100 MG: 100 CAPSULE, LIQUID FILLED ORAL at 08:59

## 2019-03-17 RX ADMIN — GABAPENTIN 100 MG: 100 CAPSULE ORAL at 08:59

## 2019-03-17 RX ADMIN — LOSARTAN POTASSIUM 25 MG: 25 TABLET, FILM COATED ORAL at 08:59

## 2019-03-17 RX ADMIN — POLYVINYL ALCOHOL 1 DROP: 14 SOLUTION/ DROPS OPHTHALMIC at 08:58

## 2019-03-17 RX ADMIN — HYDROXYCHLOROQUINE SULFATE 200 MG: 200 TABLET, FILM COATED ORAL at 17:39

## 2019-03-17 RX ADMIN — POLYVINYL ALCOHOL 1 DROP: 14 SOLUTION/ DROPS OPHTHALMIC at 17:39

## 2019-03-17 RX ADMIN — TORSEMIDE 40 MG: 20 TABLET ORAL at 08:59

## 2019-03-17 RX ADMIN — POLYVINYL ALCOHOL 1 DROP: 14 SOLUTION/ DROPS OPHTHALMIC at 20:52

## 2019-03-17 RX ADMIN — LEVOTHYROXINE SODIUM 75 MCG: 75 TABLET ORAL at 05:01

## 2019-03-17 RX ADMIN — GABAPENTIN 100 MG: 100 CAPSULE ORAL at 13:15

## 2019-03-17 NOTE — PROGRESS NOTES
[facility] Internal Medicine Progress Note  Patient: Haydee Sparks 81 y o  female   MRN: [id number]  PCP: Rolan Espinoza MD  Unit/Bed#: [facility] 401-16 Encounter: [id number]  Date Of Visit: 03/17/19    Assessment:    Principal Problem:    Acute on chronic diastolic congestive heart failure ([facility] )  Active Problems:    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 ([facility] )    Rheumatoid arthritis ([place] [address] )    Diabetes mellitus type 2 in obese [facility])    Acquired hypothyroidism    Urothelial cancer ([place] [address] )    Atrial fibrillation ([place] [address] )    CKD (chronic kidney disease) stage 3, GFR 30-59 ml/min ([place] [address] )      Plan:    1  Acute on chronic diastolic CHF, improving,  EF 60% on oral Demadex,  cardiology is following  2  Chronic LEs/lymphedema, neuropathic pain likely secondary to volume overload, negative venous Doppler for DVT, continue with diuretics and Neurontin  3  Anemia of chronic disease with iron deficiency, continue iron supplement,  monitor  4  Atrial fibrillation, stable HR, on Xarelto  5  BRENNEN /CKD stage 3, solitary kidney, baseline creatinine 1 4-1 8, Demadex dose was decreased, continue to monitor  6  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  7  DM type 2, A1c 6 5,  acceptable blood sugar, continue ISS  8  History CVA  9  Hypothyroidism, continue levothyroxine  10  Rheumatoid arthritis, on Plaquenil, methotrexate and leucovorin  11  Constipation, continue MiraLax      VTE Pharmacologic Prophylaxis:   Pharmacologic: Rivaroxaban (Xarelto)  Mechanical VTE Prophylaxis in Place: Yes    Patient Centered Rounds: I have performed bedside rounds with nursing staff today  Discussions with Specialists or Other Care Team Provider:     Education and Discussions with Family / Patient:  Discussed with patient's  at bedside     Time Spent for Care: 30 minutes  More than 50% of total time spent on counseling and coordination of care as described above      Current Length of Stay: 4 day(s)    Current Patient Status: Inpatient   Certification Statement: The patient will continue to require additional inpatient hospital stay due to Management of CHF    Discharge Plan / Estimated Discharge Date:  When cleared by Cardiology    Code Status: Level 3 - DNAR and DNI      Subjective:   Patient seen and examined  Comfortable sitting in chair  Clinically improving   No nausea vomiting or diarrhea  Lower extremities edema are improving   at bedside    Objective:     Vitals:   Temp (24hrs), Av 9 °F (36 6 °C), Min:97 4 °F (36 3 °C), Max:98 2 °F (36 8 °C)    Temp:  [97 4 °F (36 3 °C)-98 2 °F (36 8 °C)] 97 4 °F (36 3 °C)  HR:  [67-81] 67  Resp:  [18] 18  BP: (111-135)/(55-75) 116/55  SpO2:  [95 %-98 %] 98 %  Body mass index is 40 45 kg/m²  Input and Output Summary (last 24 hours): Intake/Output Summary (Last 24 hours) at 3/17/2019 1107  Last data filed at 3/17/2019 1050  Gross per 24 hour   Intake 560 ml   Output 1900 ml   Net -1340 ml       Physical Exam:     Physical Exam     Patient is awake alert in no acute distress  Lung clear to auscultation bilateral  Heart positive S1-S2 no murmur  Abdomen soft nontender positive bowel sounds  Bilateral lower extremity improving edema    Additional Data:     Labs:    Results from last 7 days   Lab Units 19  0438   WBC Thousand/uL 6 61   HEMOGLOBIN g/dL 8 8*   HEMATOCRIT % 27 6*   PLATELETS Thousands/uL 191   NEUTROS PCT % 66   LYMPHS PCT % 19   MONOS PCT % 10   EOS PCT % 4     Results from last 7 days   Lab Units 19  0610   POTASSIUM mmol/L 3 7   CHLORIDE mmol/L 102   CO2 mmol/L 30   BUN mg/dL 38*   CREATININE mg/dL 1 91*   CALCIUM mg/dL 8 8           * I Have Reviewed All Lab Data Listed Above  * Additional Pertinent Lab Tests Reviewed:  [facility] Admission Reviewed    Imaging:    Imaging Reports Reviewed Today Include:   Imaging Personally Reviewed by Myself Includes:     Recent Cultures (last 7 days): Last 24 Hours Medication List:     Current Facility-Administered Medications:  acetaminophen 650 mg Oral BID PRN Kellie Hendrix MD   calcium carbonate-vitamin D 1 tablet Oral BID With Meals Kellie Hendrix MD   cyanocobalamin 100 mcg Oral Daily Kellie Hendrix MD   docusate sodium 100 mg Oral BID Kellie Hendrix MD   ferrous sulfate 325 mg Oral BID With Meals Wanda Reed DO   gabapentin 100 mg Oral BID Wanda Reed DO   gabapentin 200 mg Oral HS Kellie Hendrix MD   hydroxychloroquine 200 mg Oral BID With Meals Kellie Hendrix MD   insulin lispro 1-5 Units Subcutaneous HS Kellie Hendrix MD   insulin lispro 1-6 Units Subcutaneous TID AC Kellie Hendrix MD   leucovorin 10 mg Oral Weekly Kellie Hendrix MD   levothyroxine 75 mcg Oral Daily Kellie Hendrix MD   losartan 25 mg Oral Daily Kellie Hendrix MD   methotrexate 5 mg Oral Weekly Kellie Hendrix MD   multivitamin-minerals 1 tablet Oral Daily Kellie Hendrix MD   pantoprazole 40 mg Oral Early Morning Wanda Reed DO   polyethylene glycol 17 g Oral Daily Wanda Reed DO   polyvinyl alcohol 1 drop Both Eyes TID Wanda Reed DO   rivaroxaban 15 mg Oral Daily With Breakfast Kellie Hendrix MD   rOPINIRole 1 mg Oral HS Kellie Hendrix MD   [START ON 3/18/2019] torsemide 20 mg Oral Daily Asad Maldonado MD     Facility-Administered Medications Ordered in Other Encounters:  acetaminophen 650 mg Oral Q6H PRN Rey Slade PA-C        Today, Patient Was Seen By: Wanda Reed DO    ** Please Note: This note has been constructed using a voice recognition system   **

## 2019-03-17 NOTE — PROGRESS NOTES
Cardiology Progress Note - Julian Soto 81 y o  female MRN: [id number]    Unit/Bed#: [facility] 821-01 Encounter: [id number]      Assessment/Recommendations:  1  Acute on chronic diastolic heart failure:  Improving volume status with current dose of torsemide  Creatinine is slowly increasing, and now slightly above baseline status  Blood pressure control, which seems to be appropriate at this time  Will reduce torsemide to 20mg to help with Cr elevation  2  Hypertension:  Well controlled on current regimen  3  History of lymphedema:  Lower extremity edema is unlikely to be completely resolved  4  PCPs:  Asymptomatic, no evidence of atrial fibrillation on prior Holter  5  History of CVA/TIA:  Continued on statin and Xarelto  6  Rheumatoid arthritis:  Continued on Plaquenil and methotrexate      Subjective:   Patient seen and examined  No significant events overnight   ; pertinent negatives - chest pain, chest pressure/discomfort, irregular heart beat and palpitations  Dyspnea improving  Objective:     Vitals: Blood pressure 116/55, pulse 67, temperature (!) 97 4 °F (36 3 °C), temperature source Oral, resp  rate 18, height 5' 1" (1 549 m), weight 97 1 kg (214 lb 1 6 oz), SpO2 98 %  , Body mass index is 40 45 kg/m² ,   Orthostatic Blood Pressures      Most Recent Value   Blood Pressure  116/55 filed at 03/17/2019 0700   Patient Position - Orthostatic VS  Sitting filed at 03/17/2019 0700            Intake/Output Summary (Last 24 hours) at 3/17/2019 0925  Last data filed at 3/17/2019 0501  Gross per 24 hour   Intake 560 ml   Output 1700 ml   Net -1140 ml       Physical Exam:    GEN: Julian Soto appears well, alert and oriented x 3, pleasant and cooperative   HEENT: pupils equal, round, and reactive to light; extraocular muscles intact  NECK: supple, no carotid bruits   HEART: regular rhythm, normal S1 and S2, no murmurs, clicks, gallops or rubs   LUNGS: clear to auscultation bilaterally; no wheezes, rales, or rhonchi   ABDOMEN: normal bowel sounds, soft, no tenderness, no distention  EXTREMITIES: peripheral pulses normal; no clubbing, cyanosis, +mild edema  NEURO: no focal findings   SKIN: normal without suspicious lesions on exposed skin      Medications:      Current Facility-Administered Medications:     acetaminophen (TYLENOL) tablet 650 mg, 650 mg, Oral, BID PRN, Anuradha Hirsch MD, 650 mg at 03/16/19 2206    calcium carbonate-vitamin D (OSCAL-D) 500 mg-200 units per tablet 1 tablet, 1 tablet, Oral, BID With Meals, Anuradha Hirsch MD, 1 tablet at 03/17/19 0859    cyanocobalamin (VITAMIN B-12) tablet 100 mcg, 100 mcg, Oral, Daily, Anuradha Hirsch MD, 100 mcg at 03/17/19 0859    docusate sodium (COLACE) capsule 100 mg, 100 mg, Oral, BID, Anuradha Hirsch MD, 100 mg at 03/17/19 8874    ferrous sulfate tablet 325 mg, 325 mg, Oral, BID With Meals, Zeus Rider DO, 325 mg at 03/17/19 0859    gabapentin (NEURONTIN) capsule 100 mg, 100 mg, Oral, BID, Zeus Rider DO, 100 mg at 03/17/19 0859    gabapentin (NEURONTIN) capsule 200 mg, 200 mg, Oral, HS, Anuradha Hirsch MD, 200 mg at 03/16/19 2206    hydroxychloroquine (PLAQUENIL) tablet 200 mg, 200 mg, Oral, BID With Meals, Anuradha Hirsch MD, 200 mg at 03/17/19 0858    insulin lispro (HumaLOG) 100 units/mL subcutaneous injection 1-5 Units, 1-5 Units, Subcutaneous, HS, Anuradha Hirsch MD, 1 Units at 03/15/19 2313    insulin lispro (HumaLOG) 100 units/mL subcutaneous injection 1-6 Units, 1-6 Units, Subcutaneous, TID AC **AND** Fingerstick Glucose (POCT), , , TID AC, Anuradha Hirsch MD    leucovorin (WELLCOVORIN) tablet 10 mg, 10 mg, Oral, Weekly, Anuradha Hirsch MD, 10 mg at 03/16/19 0816    levothyroxine tablet 75 mcg, 75 mcg, Oral, Daily, Anuradha Hirsch MD, 75 mcg at 03/17/19 0501    losartan (COZAAR) tablet 25 mg, 25 mg, Oral, Daily, Anuradha Hirsch MD, 25 mg at 03/17/19 0859    methotrexate tablet 5 mg, 5 mg, Oral, Weekly, Anuradha Hirsch MD, 5 mg at 03/15/19 0807    multivitamin-minerals (CENTRUM) tablet 1 tablet, 1 tablet, Oral, Daily, Feli Carroll MD, 1 tablet at 03/17/19 0859    pantoprazole (PROTONIX) EC tablet 40 mg, 40 mg, Oral, Early Morning, Allison Restrepo DO, 40 mg at 03/17/19 0501    polyethylene glycol (MIRALAX) packet 17 g, 17 g, Oral, Daily, Allison Restrepo DO, 17 g at 03/17/19 0858    polyvinyl alcohol (LIQUIFILM TEARS) 1 4 % ophthalmic solution 1 drop, 1 drop, Both Eyes, TID, Allison Restrepo DO, 1 drop at 03/17/19 0858    rivaroxaban (XARELTO) tablet 15 mg, 15 mg, Oral, Daily With Breakfast, Feli Carroll MD, 15 mg at 03/17/19 0859    rOPINIRole (REQUIP) tablet 1 mg, 1 mg, Oral, HS, Feli Carroll MD, 1 mg at 03/16/19 2206    torsemide (DEMADEX) tablet 40 mg, 40 mg, Oral, Daily, Allison Restrepo DO, 40 mg at 03/17/19 4700    Facility-Administered Medications Ordered in Other Encounters:     acetaminophen (TYLENOL) tablet 650 mg, 650 mg, Oral, Q6H PRN, Leah Akhtar PA-C     Labs & Results:        Results from last 7 days   Lab Units 03/14/19  0438 03/13/19  1928   WBC Thousand/uL 6 61 7 02   HEMOGLOBIN g/dL 8 8* 9 3*   HEMATOCRIT % 27 6* 29 3*   PLATELETS Thousands/uL 191 207         Results from last 7 days   Lab Units 03/17/19  0610 03/16/19  0530 03/15/19  0509   POTASSIUM mmol/L 3 7 3 8 3 7   CHLORIDE mmol/L 102 105 104   CO2 mmol/L 30 29 32   BUN mg/dL 38* 32* 30*   CREATININE mg/dL 1 91* 1 83* 1 76*   CALCIUM mg/dL 8 8 8 5 8 6         Results from last 7 days   Lab Units 03/14/19  0438   MAGNESIUM mg/dL 2 4       Echo: personally reviewed - normal EF, dilated left atrium, normal pulmonary pressures      EKG personally reviewed by Estevan Solitario MD

## 2019-03-17 NOTE — PLAN OF CARE
Problem: Potential for Falls  Goal: Patient will remain free of falls  Description  INTERVENTIONS:  - Assess patient frequently for physical needs  -  Identify cognitive and physical deficits and behaviors that affect risk of falls  -  [place] fall precautions as indicated by assessment   - Educate patient/family on patient safety including physical limitations  - Instruct patient to call for assistance with activity based on assessment  - Modify environment to reduce risk of injury  - Consider OT/PT consult to assist with strengthening/mobility  Outcome: Progressing     Problem: CARDIOVASCULAR - ADULT  Goal: Maintains optimal cardiac output and hemodynamic stability  Description  INTERVENTIONS:  - Monitor I/O, vital signs and rhythm  - Monitor for S/S and trends of decreased cardiac output i e  bleeding, hypotension  - Administer and titrate ordered vasoactive medications to optimize hemodynamic stability  - Assess quality of pulses, skin color and temperature  - Assess for signs of decreased coronary artery perfusion - ex   Angina  - Instruct patient to report change in severity of symptoms  Outcome: Progressing     Problem: METABOLIC, FLUID AND ELECTROLYTES - ADULT  Goal: Fluid balance maintained  Description  INTERVENTIONS:  - Monitor labs and assess for signs and symptoms of volume excess or deficit  - Monitor I/O and WT  - Instruct patient on fluid and nutrition as appropriate  Outcome: Progressing     Problem: DISCHARGE PLANNING - CARE MANAGEMENT  Goal: Discharge to post-acute care or home with appropriate resources  Description  INTERVENTIONS:  - Conduct assessment to determine patient/family and health care team treatment goals, and need for post-acute services based on payer coverage, community resources, and patient preferences, and barriers to discharge  - Address psychosocial, clinical, and financial barriers to discharge as identified in assessment in conjunction with the patient/family and health care team  - Arrange appropriate level of post-acute services according to patient?s   needs and preference and payer coverage in collaboration with the physician and health care team  - Communicate with and update the patient/family, physician, and health care team regarding progress on the discharge plan  - Arrange appropriate transportation to post-acute venues  Outcome: Progressing     Problem: Nutrition/Hydration-ADULT  Goal: Nutrient/Hydration intake appropriate for improving, restoring or maintaining nutritional needs  Description  Monitor and assess patient's nutrition/hydration status for malnutrition (ex- brittle hair, bruises, dry skin, pale skin and conjunctiva, muscle wasting, smooth red tongue, and disorientation)  Collaborate with interdisciplinary team and initiate plan and interventions as ordered  Monitor patient's weight and dietary intake as ordered or per policy  Utilize nutrition screening tool and intervene per policy  Determine patient's food preferences and provide high-protein, high-caloric foods as appropriate       INTERVENTIONS:  - Monitor oral intake, urinary output, labs, and treatment plans  - Assess nutrition and hydration status and recommend course of action  - Evaluate amount of meals eaten  - Assist patient with eating if necessary   - Allow adequate time for meals  - Recommend/ encourage appropriate diets, oral nutritional supplements, and vitamin/mineral supplements  - Order, calculate, and assess calorie counts as needed  - Recommend, monitor, and adjust tube feedings and TPN/PPN based on assessed needs  - Assess need for intravenous fluids  - Provide specific nutrition/hydration education as appropriate  - Include patient/family/caregiver in decisions related to nutrition  Outcome: Progressing     Problem: Prexisting or High Potential for Compromised Skin Integrity  Goal: Skin integrity is maintained or improved  Description  INTERVENTIONS:  - Identify patients at risk for skin breakdown  - Assess and monitor skin integrity  - Assess and monitor nutrition and hydration status  - Monitor labs (i e  albumin)  - Assess for incontinence   - Turn and reposition patient  - Assist with mobility/ambulation  - Relieve pressure over bony prominences  - Avoid friction and shearing  - Provide appropriate hygiene as needed including keeping skin clean and dry  - Evaluate need for skin moisturizer/barrier cream  - Collaborate with interdisciplinary team (i e  Nutrition, Rehabilitation, etc )   - Patient/family teaching  Outcome: Progressing

## 2019-03-18 PROBLEM — N17.9 AKI (ACUTE KIDNEY INJURY) (HCC): Status: ACTIVE | Noted: 2019-03-18

## 2019-03-18 LAB
ANION GAP SERPL CALCULATED.3IONS-SCNC: 5 MMOL/L (ref 4–13)
BUN SERPL-MCNC: 39 MG/DL (ref 5–25)
CALCIUM SERPL-MCNC: 8.7 MG/DL (ref 8.3–10.1)
CHLORIDE SERPL-SCNC: 102 MMOL/L (ref 100–108)
CO2 SERPL-SCNC: 31 MMOL/L (ref 21–32)
CREAT SERPL-MCNC: 2 MG/DL (ref 0.6–1.3)
GFR SERPL CREATININE-BSD FRML MDRD: 23 ML/MIN/1.73SQ M
GLUCOSE SERPL-MCNC: 101 MG/DL (ref 65–140)
GLUCOSE SERPL-MCNC: 110 MG/DL (ref 65–140)
GLUCOSE SERPL-MCNC: 111 MG/DL (ref 65–140)
GLUCOSE SERPL-MCNC: 123 MG/DL (ref 65–140)
GLUCOSE SERPL-MCNC: 130 MG/DL (ref 65–140)
POTASSIUM SERPL-SCNC: 4 MMOL/L (ref 3.5–5.3)
SODIUM SERPL-SCNC: 138 MMOL/L (ref 136–145)

## 2019-03-18 PROCEDURE — 80048 BASIC METABOLIC PNL TOTAL CA: CPT | Performed by: INTERNAL MEDICINE

## 2019-03-18 PROCEDURE — 99232 SBSQ HOSP IP/OBS MODERATE 35: CPT | Performed by: INTERNAL MEDICINE

## 2019-03-18 PROCEDURE — 82948 REAGENT STRIP/BLOOD GLUCOSE: CPT

## 2019-03-18 RX ORDER — ECHINACEA PURPUREA EXTRACT 125 MG
1 TABLET ORAL
Status: DISCONTINUED | OUTPATIENT
Start: 2019-03-18 | End: 2019-03-21 | Stop reason: HOSPADM

## 2019-03-18 RX ADMIN — Medication 1 SPRAY: at 21:05

## 2019-03-18 RX ADMIN — DOCUSATE SODIUM 100 MG: 100 CAPSULE, LIQUID FILLED ORAL at 08:13

## 2019-03-18 RX ADMIN — GABAPENTIN 100 MG: 100 CAPSULE ORAL at 14:21

## 2019-03-18 RX ADMIN — HYDROXYCHLOROQUINE SULFATE 200 MG: 200 TABLET, FILM COATED ORAL at 17:15

## 2019-03-18 RX ADMIN — ACETAMINOPHEN 650 MG: 325 TABLET ORAL at 22:40

## 2019-03-18 RX ADMIN — POLYETHYLENE GLYCOL 3350 17 G: 17 POWDER, FOR SOLUTION ORAL at 08:13

## 2019-03-18 RX ADMIN — CALCIUM CARBONATE-VITAMIN D TAB 500 MG-200 UNIT 1 TABLET: 500-200 TAB at 08:13

## 2019-03-18 RX ADMIN — VITAM B12 100 MCG: 100 TAB at 08:13

## 2019-03-18 RX ADMIN — HYDROXYCHLOROQUINE SULFATE 200 MG: 200 TABLET, FILM COATED ORAL at 08:13

## 2019-03-18 RX ADMIN — ROPINIROLE 1 MG: 1 TABLET, FILM COATED ORAL at 21:05

## 2019-03-18 RX ADMIN — POLYVINYL ALCOHOL 1 DROP: 14 SOLUTION/ DROPS OPHTHALMIC at 21:05

## 2019-03-18 RX ADMIN — LEVOTHYROXINE SODIUM 75 MCG: 75 TABLET ORAL at 05:23

## 2019-03-18 RX ADMIN — POLYVINYL ALCOHOL 1 DROP: 14 SOLUTION/ DROPS OPHTHALMIC at 17:14

## 2019-03-18 RX ADMIN — CALCIUM CARBONATE-VITAMIN D TAB 500 MG-200 UNIT 1 TABLET: 500-200 TAB at 17:13

## 2019-03-18 RX ADMIN — FERROUS SULFATE TAB 325 MG (65 MG ELEMENTAL FE) 325 MG: 325 (65 FE) TAB at 17:13

## 2019-03-18 RX ADMIN — GABAPENTIN 200 MG: 100 CAPSULE ORAL at 21:05

## 2019-03-18 RX ADMIN — FERROUS SULFATE TAB 325 MG (65 MG ELEMENTAL FE) 325 MG: 325 (65 FE) TAB at 08:13

## 2019-03-18 RX ADMIN — Medication 1 TABLET: at 08:13

## 2019-03-18 RX ADMIN — RIVAROXABAN 15 MG: 15 TABLET, FILM COATED ORAL at 08:13

## 2019-03-18 RX ADMIN — PANTOPRAZOLE SODIUM 40 MG: 40 TABLET, DELAYED RELEASE ORAL at 05:23

## 2019-03-18 RX ADMIN — POLYVINYL ALCOHOL 1 DROP: 14 SOLUTION/ DROPS OPHTHALMIC at 08:13

## 2019-03-18 RX ADMIN — GABAPENTIN 100 MG: 100 CAPSULE ORAL at 08:13

## 2019-03-18 RX ADMIN — DOCUSATE SODIUM 100 MG: 100 CAPSULE, LIQUID FILLED ORAL at 17:13

## 2019-03-18 NOTE — PLAN OF CARE
Problem: Potential for Falls  Goal: Patient will remain free of falls  Description  INTERVENTIONS:  - Assess patient frequently for physical needs  -  Identify cognitive and physical deficits and behaviors that affect risk of falls  -  [place] fall precautions as indicated by assessment   - Educate patient/family on patient safety including physical limitations  - Instruct patient to call for assistance with activity based on assessment  - Modify environment to reduce risk of injury  - Consider OT/PT consult to assist with strengthening/mobility  Outcome: Progressing     Problem: CARDIOVASCULAR - ADULT  Goal: Maintains optimal cardiac output and hemodynamic stability  Description  INTERVENTIONS:  - Monitor I/O, vital signs and rhythm  - Monitor for S/S and trends of decreased cardiac output i e  bleeding, hypotension  - Administer and titrate ordered vasoactive medications to optimize hemodynamic stability  - Assess quality of pulses, skin color and temperature  - Assess for signs of decreased coronary artery perfusion - ex   Angina  - Instruct patient to report change in severity of symptoms  Outcome: Progressing     Problem: METABOLIC, FLUID AND ELECTROLYTES - ADULT  Goal: Fluid balance maintained  Description  INTERVENTIONS:  - Monitor labs and assess for signs and symptoms of volume excess or deficit  - Monitor I/O and WT  - Instruct patient on fluid and nutrition as appropriate  Outcome: Progressing     Problem: DISCHARGE PLANNING - CARE MANAGEMENT  Goal: Discharge to post-acute care or home with appropriate resources  Description  INTERVENTIONS:  - Conduct assessment to determine patient/family and health care team treatment goals, and need for post-acute services based on payer coverage, community resources, and patient preferences, and barriers to discharge  - Address psychosocial, clinical, and financial barriers to discharge as identified in assessment in conjunction with the patient/family and health care team  - Arrange appropriate level of post-acute services according to patient?s   needs and preference and payer coverage in collaboration with the physician and health care team  - Communicate with and update the patient/family, physician, and health care team regarding progress on the discharge plan  - Arrange appropriate transportation to post-acute venues  Outcome: Progressing     Problem: Nutrition/Hydration-ADULT  Goal: Nutrient/Hydration intake appropriate for improving, restoring or maintaining nutritional needs  Description  Monitor and assess patient's nutrition/hydration status for malnutrition (ex- brittle hair, bruises, dry skin, pale skin and conjunctiva, muscle wasting, smooth red tongue, and disorientation)  Collaborate with interdisciplinary team and initiate plan and interventions as ordered  Monitor patient's weight and dietary intake as ordered or per policy  Utilize nutrition screening tool and intervene per policy  Determine patient's food preferences and provide high-protein, high-caloric foods as appropriate       INTERVENTIONS:  - Monitor oral intake, urinary output, labs, and treatment plans  - Assess nutrition and hydration status and recommend course of action  - Evaluate amount of meals eaten  - Assist patient with eating if necessary   - Allow adequate time for meals  - Recommend/ encourage appropriate diets, oral nutritional supplements, and vitamin/mineral supplements  - Order, calculate, and assess calorie counts as needed  - Recommend, monitor, and adjust tube feedings and TPN/PPN based on assessed needs  - Assess need for intravenous fluids  - Provide specific nutrition/hydration education as appropriate  - Include patient/family/caregiver in decisions related to nutrition  Outcome: Progressing     Problem: Prexisting or High Potential for Compromised Skin Integrity  Goal: Skin integrity is maintained or improved  Description  INTERVENTIONS:  - Identify patients at risk for skin breakdown  - Assess and monitor skin integrity  - Assess and monitor nutrition and hydration status  - Monitor labs (i e  albumin)  - Assess for incontinence   - Turn and reposition patient  - Assist with mobility/ambulation  - Relieve pressure over bony prominences  - Avoid friction and shearing  - Provide appropriate hygiene as needed including keeping skin clean and dry  - Evaluate need for skin moisturizer/barrier cream  - Collaborate with interdisciplinary team (i e  Nutrition, Rehabilitation, etc )   - Patient/family teaching  Outcome: Progressing

## 2019-03-18 NOTE — PROGRESS NOTES
[facility] Progress Note  Patient: Shayla Gaines 81 y o  female   MRN: [id number]  PCP: Kristi Boucher MD  Unit/Bed#: [facility] 592-26 Encounter: [id number]  Date Of Visit: 03/18/19    Assessment:    Principal Problem:    Acute on chronic diastolic congestive heart failure ([place] [address] )  Active Problems:    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 ([place] [address] )    Rheumatoid arthritis ([place] [address] )    Diabetes mellitus type 2 in obese [facility])    Acquired hypothyroidism    Urothelial cancer ([place] [address] )    Atrial fibrillation ([place] [address] )    CKD (chronic kidney disease) stage 3, GFR 30-59 ml/min ([facility])    Chronic acquired lymphedema    Anemia of chronic disease    BRENNEN (acute kidney injury) ([place] [address] )      Plan:    1  Acute on chronic diastolic CHF, improving, EF 60% hold oral Demadex and losartan due to BRENNEN,  cardiology is following  2  Chronic LEs/lymphedema, neuropathic pain likely secondary to volume overload, negative venous Doppler for DVT, improving   3  Anemia of chronic disease with iron deficiency, continue iron supplement,  monitor  4  Atrial fibrillation, stable HR, on Xarelto  5  BRENNEN /CKD stage 3, solitary kidney, baseline creatinine 1 4-1 8, hold Demadex and losartan today, monitor   6  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  7  DM type 2, A1c 6 5,  acceptable blood sugar, continue ISS  8  History CVA  9  Hypothyroidism, continue levothyroxine  10  Rheumatoid arthritis, on Plaquenil, methotrexate and leucovorin  11  Constipation, continue MiraLax    PT OT eval      VTE Pharmacologic Prophylaxis:   Pharmacologic: Rivaroxaban (Xarelto)  Mechanical VTE Prophylaxis in Place: Yes    Patient Centered Rounds: I have performed bedside rounds with nursing staff today  Discussions with Specialists or Other Care Team Provider:     Education and Discussions with Family / Patient:  Discussed with patient's  at bedside     Time Spent for Care: 30 minutes    More than 50% of total time spent on counseling and coordination of care as described above  Current Length of Stay: 5 day(s)    Current Patient Status: Inpatient   Certification Statement: The patient will continue to require additional inpatient hospital stay due to Management of CHF    Discharge Plan / Estimated Discharge Date: not ready yet    Code Status: Level 3 - DNAR and DNI      Subjective:     Patient seen and examined  Comfortable sitting in chair  Clinically improving   No nausea vomiting or diarrhea  No shortness of breath  Lower extremities edema are improving   at bedside    Objective:     Vitals:   Temp (24hrs), Av 7 °F (36 5 °C), Min:97 5 °F (36 4 °C), Max:98 °F (36 7 °C)    Temp:  [97 5 °F (36 4 °C)-98 °F (36 7 °C)] 97 5 °F (36 4 °C)  HR:  [75-87] 87  Resp:  [18] 18  BP: (106-115)/(55-59) 115/57  SpO2:  [95 %-97 %] 97 %  Body mass index is 40 57 kg/m²  Input and Output Summary (last 24 hours): Intake/Output Summary (Last 24 hours) at 3/18/2019 1154  Last data filed at 3/18/2019 1100  Gross per 24 hour   Intake 660 ml   Output 1500 ml   Net -840 ml       Physical Exam:     Physical Exam     Patient is awake alert in no acute distress  Lung clear to auscultation bilateral  Heart positive S1-S2 no murmur  Abdomen soft nontender positive bowel sounds  Bilateral lower extremity improving edema    Additional Data:     Labs:    Results from last 7 days   Lab Units 19  0438   WBC Thousand/uL 6 61   HEMOGLOBIN g/dL 8 8*   HEMATOCRIT % 27 6*   PLATELETS Thousands/uL 191   NEUTROS PCT % 66   LYMPHS PCT % 19   MONOS PCT % 10   EOS PCT % 4     Results from last 7 days   Lab Units 19  0605   POTASSIUM mmol/L 4 0   CHLORIDE mmol/L 102   CO2 mmol/L 31   BUN mg/dL 39*   CREATININE mg/dL 2 00*   CALCIUM mg/dL 8 7           * I Have Reviewed All Lab Data Listed Above  * Additional Pertinent Lab Tests Reviewed:  Alexander  Admission Reviewed    Imaging:    Imaging Reports Reviewed Today Include:   Imaging Personally Reviewed by Myself Includes:     Recent Cultures (last 7 days):           Last 24 Hours Medication List:     Current Facility-Administered Medications:  acetaminophen 650 mg Oral BID PRN Kellie Hendrix MD   calcium carbonate-vitamin D 1 tablet Oral BID With Meals Kellie Hendrix MD   cyanocobalamin 100 mcg Oral Daily Kellie Hendrix MD   docusate sodium 100 mg Oral BID Kellie Hendrix MD   ferrous sulfate 325 mg Oral BID With Meals Wanda Reed DO   gabapentin 100 mg Oral BID Wanda Reed DO   gabapentin 200 mg Oral HS Kellie Hendrix MD   hydroxychloroquine 200 mg Oral BID With Meals Kellie Hendrix MD   insulin lispro 1-5 Units Subcutaneous HS Kellie Hendrix MD   insulin lispro 1-6 Units Subcutaneous TID AC Kellie Hendrix MD   leucovorin 10 mg Oral Weekly Kellie Hendrix MD   levothyroxine 75 mcg Oral Daily Kellie Hendrix MD   methotrexate 5 mg Oral Weekly Kellie Hendrix MD   multivitamin-minerals 1 tablet Oral Daily Kellie Hendrix MD   pantoprazole 40 mg Oral Early Morning Wanda Reed DO   polyethylene glycol 17 g Oral Daily Wanda Reed,    polyvinyl alcohol 1 drop Both Eyes TID Wanda Reed DO   rivaroxaban 15 mg Oral Daily With Breakfast Kellie Hendrix MD   rOPINIRole 1 mg Oral HS Kellie Hendrix MD     Facility-Administered Medications Ordered in Other Encounters:  acetaminophen 650 mg Oral Q6H PRN Rey Slade PA-C        Today, Patient Was Seen By: Wanda Reed DO    ** Please Note: This note has been constructed using a voice recognition system   **

## 2019-03-18 NOTE — PLAN OF CARE
Problem: Potential for Falls  Goal: Patient will remain free of falls  Description  INTERVENTIONS:  - Assess patient frequently for physical needs  -  Identify cognitive and physical deficits and behaviors that affect risk of falls  -  [place] fall precautions as indicated by assessment   - Educate patient/family on patient safety including physical limitations  - Instruct patient to call for assistance with activity based on assessment  - Modify environment to reduce risk of injury  - Consider OT/PT consult to assist with strengthening/mobility  Outcome: Progressing     Problem: CARDIOVASCULAR - ADULT  Goal: Maintains optimal cardiac output and hemodynamic stability  Description  INTERVENTIONS:  - Monitor I/O, vital signs and rhythm  - Monitor for S/S and trends of decreased cardiac output i e  bleeding, hypotension  - Administer and titrate ordered vasoactive medications to optimize hemodynamic stability  - Assess quality of pulses, skin color and temperature  - Assess for signs of decreased coronary artery perfusion - ex   Angina  - Instruct patient to report change in severity of symptoms  Outcome: Progressing     Problem: METABOLIC, FLUID AND ELECTROLYTES - ADULT  Goal: Fluid balance maintained  Description  INTERVENTIONS:  - Monitor labs and assess for signs and symptoms of volume excess or deficit  - Monitor I/O and WT  - Instruct patient on fluid and nutrition as appropriate  Outcome: Progressing     Problem: DISCHARGE PLANNING - CARE MANAGEMENT  Goal: Discharge to post-acute care or home with appropriate resources  Description  INTERVENTIONS:  - Conduct assessment to determine patient/family and health care team treatment goals, and need for post-acute services based on payer coverage, community resources, and patient preferences, and barriers to discharge  - Address psychosocial, clinical, and financial barriers to discharge as identified in assessment in conjunction with the patient/family and health care team  - Arrange appropriate level of post-acute services according to patient?s   needs and preference and payer coverage in collaboration with the physician and health care team  - Communicate with and update the patient/family, physician, and health care team regarding progress on the discharge plan  - Arrange appropriate transportation to post-acute venues  Outcome: Progressing     Problem: Nutrition/Hydration-ADULT  Goal: Nutrient/Hydration intake appropriate for improving, restoring or maintaining nutritional needs  Description  Monitor and assess patient's nutrition/hydration status for malnutrition (ex- brittle hair, bruises, dry skin, pale skin and conjunctiva, muscle wasting, smooth red tongue, and disorientation)  Collaborate with interdisciplinary team and initiate plan and interventions as ordered  Monitor patient's weight and dietary intake as ordered or per policy  Utilize nutrition screening tool and intervene per policy  Determine patient's food preferences and provide high-protein, high-caloric foods as appropriate       INTERVENTIONS:  - Monitor oral intake, urinary output, labs, and treatment plans  - Assess nutrition and hydration status and recommend course of action  - Evaluate amount of meals eaten  - Assist patient with eating if necessary   - Allow adequate time for meals  - Recommend/ encourage appropriate diets, oral nutritional supplements, and vitamin/mineral supplements  - Order, calculate, and assess calorie counts as needed  - Recommend, monitor, and adjust tube feedings and TPN/PPN based on assessed needs  - Assess need for intravenous fluids  - Provide specific nutrition/hydration education as appropriate  - Include patient/family/caregiver in decisions related to nutrition  Outcome: Progressing     Problem: Prexisting or High Potential for Compromised Skin Integrity  Goal: Skin integrity is maintained or improved  Description  INTERVENTIONS:  - Identify patients at risk for skin breakdown  - Assess and monitor skin integrity  - Assess and monitor nutrition and hydration status  - Monitor labs (i e  albumin)  - Assess for incontinence   - Turn and reposition patient  - Assist with mobility/ambulation  - Relieve pressure over bony prominences  - Avoid friction and shearing  - Provide appropriate hygiene as needed including keeping skin clean and dry  - Evaluate need for skin moisturizer/barrier cream  - Collaborate with interdisciplinary team (i e  Nutrition, Rehabilitation, etc )   - Patient/family teaching  Outcome: Progressing

## 2019-03-18 NOTE — PROGRESS NOTES
Heart Failure Service Progress Note - Shayla Shape 81 y o  female MRN: [id number]    Unit/Bed#: [facility] 821-01 Encounter: [id number]    Assessment:  Principal Problem:    Acute on chronic diastolic congestive heart failure (HCC)  Active Problems:    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 (HCC)    Rheumatoid arthritis ([facility] [address] )    Diabetes mellitus type 2 in obese [facility])    Acquired hypothyroidism    [facility] )    Atrial fibrillation (HCC)    CKD (chronic kidney disease) stage 3, GFR 30-59 ml/min (HCC)    Chronic acquired lymphedema    Anemia of chronic disease    Subjective:   PA is an 56-year-old female with a PMH of chronic HFpEF, HTN, HLD, CVA/TIA, NICK (compliant with CPAP), DM2, CKD 3, urothelial cancer (s/p right nephrectomy in 2018), and lymphedema who presented directly from her family [de-identified] office with worsening lower extremity edema for past 3-4 weeks  Patient seen and examined  No significant events overnight  She does report being constipated, but did have BM yesterday  Has been walking the halls multiple times day with ; has felt fatigued afterwards but denies any shortness of breath or other symptoms while walking  Reports much improvement with her legs  Denies fevers, chills, lightheadedness, dizziness, chest pain, PND, orthopnea, and abdominal pain  Objective: Intake/ Output: 600 mL / 1500 mL (net negative 900 mL)  Weight: 214 lbs (down from 222 lbs from 03/13)  Telemetry: Not ordered  Diagnostic Testing:  Echocardiogram from 03/03/2019:  LVEF: 82%; normal systolic function  "Left ventricular diastolic function parameters were normal "  LVIDd: 4 20 cm  RV: Normal size and function  MR: None  Marked annular calcification  PASP: Within normal range  Mild TR  RVOT:   Other: Mildly dilated LA       CXR from 03/013/2019: "Cardiomediastinal silhouette appears unremarkable  Lungs are clear   No pneumothorax or pleural effusion  "     Echocardiogram from 08/11/2018:  LVEF: 00%; normal systolic function  LVIDd: 3 08 cm  RV: Normal size and function  MR: Mild  PASP: Within normal range  RVOT:   Other: Mildly dilated LA  Dilated IVC      Nuclear pharmacologic stress test from 08/10/2018:              "Stress results: There was no chest pain during stress  ECG conclusions: The stress ECG was negative for ischemia and normal  Arrhythmia during stress: atrial fibrillation  Perfusion imaging: There were no perfusion defects  Gated SPECT: The calculated left ventricular ejection fraction was 73%  Left ventricular ejection fraction was within normal limits by visual estimate  There was no left ventricular regional abnormality "    Plan:    Acute on chronic diastolic congestive heart failure; LVEF 60%; NYHA II; ACC/AHA Stage C              Etiology: HTN; normal perfusion on nuclear stress test on 08/10/2018; plaquenil/methotrexate use? Torsemide 20 mg QD ordered for this AM (reduced from 40 mg received on 03/16), but discontinued by primary team this AM    Was receiving home dose of losartan 25 mg QD, discontinued by primary team this AM    Creatinine of 2 00 today (up from 1 91 from 03/17)  Note: patient had right nephrectomy in 2018  Strict I/Os with daily weights  CV diet with fluid restriction        History of CVA / atrial fibrillation              Documented on EKG from 08/10/2018 and 08/19/2018  CHADSVASc = 9              Anticoagulation with Xarelto since stroke in 2017  Consider restarting home pravastatin  Neurohormonal Blockade:  --Beta Blocker:  --ACEi, ARB or ARNi: losartan 25 mg QD (discontinued)  --Aldosterone Receptor Blocker:  --Diuretic: torsemide 20 mg QD (discontinued)    Sudden Cardiac Death Risk Reduction:  --ICD:   --Interrogation:     Advanced Therapies (If appropriate):   --Inotrope:  --LVAD/Transplant Candidacy:    Vitals:   Blood pressure 115/57, pulse 87, temperature 97 5 °F (36 4 °C), temperature source Oral, resp  rate 18, height 5' 1" (1 549 m), weight 97 4 kg (214 lb 11 2 oz), SpO2 97 %  , Body mass index is 40 57 kg/m²  I/O last 3 completed shifts: In: 880 [P O :880]  Out: 2100 [Urine:2100]  I/O this shift:  In: 240 [P O :240]  Out: 200 [Urine:200]  Wt Readings from Last 3 Encounters:   03/18/19 97 4 kg (214 lb 11 2 oz)   03/13/19 101 kg (222 lb)   02/05/19 98 9 kg (218 lb)       Intake/Output Summary (Last 24 hours) at 3/18/2019 1016  Last data filed at 3/18/2019 0745  Gross per 24 hour   Intake 540 ml   Output 1700 ml   Net -1160 ml     I/O last 3 completed shifts: In: 880 [P O :880]  Out: 2100 [Urine:2100]    Physical Exam:  Vitals:    03/17/19 1545 03/18/19 0006 03/18/19 0600 03/18/19 0850   BP: 106/55 107/59  115/57   BP Location: Right arm Right arm     Pulse: 77 75  87   Resp: 18 18  18   Temp: 97 6 °F (36 4 °C) 98 °F (36 7 °C)  97 5 °F (36 4 °C)   TempSrc: Oral Oral  Oral   SpO2: 95% 96%  97%   Weight:   97 4 kg (214 lb 11 2 oz)    Height:         Physical Exam   Constitutional: She is oriented to person, place, and time  She appears well-developed and well-nourished  HENT:   Head: Normocephalic and atraumatic  Eyes: Pupils are equal, round, and reactive to light  Conjunctivae are normal  No scleral icterus  Neck: JVD (mildly elevated) present  Cardiovascular: Normal rate, regular rhythm, S1 normal, S2 normal, normal heart sounds and intact distal pulses  Pulmonary/Chest: Effort normal and breath sounds normal    Abdominal: Soft  Bowel sounds are normal  There is no tenderness  Musculoskeletal:   1+ LE edema  Neurological: She is alert and oriented to person, place, and time  Skin: Skin is warm and dry       Central Line (day, reason): N/A  Medrano catheter (day, reason): N/A      Current Facility-Administered Medications:     acetaminophen (TYLENOL) tablet 650 mg, 650 mg, Oral, BID PRN, Feli Farrar MD, 650 mg at 03/17/19 2052    calcium carbonate-vitamin D (OSCAL-D) 500 mg-200 units per tablet 1 tablet, 1 tablet, Oral, BID With Meals, Feli Farrar MD, 1 tablet at 03/18/19 0813    cyanocobalamin (VITAMIN B-12) tablet 100 mcg, 100 mcg, Oral, Daily, Feli Farrar MD, 100 mcg at 03/18/19 0813    docusate sodium (COLACE) capsule 100 mg, 100 mg, Oral, BID, Feli Farrar MD, 100 mg at 03/18/19 0813    ferrous sulfate tablet 325 mg, 325 mg, Oral, BID With Meals, Cathryne Sicard, DO, 325 mg at 03/18/19 0813    gabapentin (NEURONTIN) capsule 100 mg, 100 mg, Oral, BID, Cathryne Sicard, DO, 100 mg at 03/18/19 0813    gabapentin (NEURONTIN) capsule 200 mg, 200 mg, Oral, HS, Feli Farrar MD, 200 mg at 03/17/19 2102    hydroxychloroquine (PLAQUENIL) tablet 200 mg, 200 mg, Oral, BID With Meals, Feli Farrar MD, 200 mg at 03/18/19 0813    insulin lispro (HumaLOG) 100 units/mL subcutaneous injection 1-5 Units, 1-5 Units, Subcutaneous, HS, Feli Farrar MD, 1 Units at 03/15/19 2313    insulin lispro (HumaLOG) 100 units/mL subcutaneous injection 1-6 Units, 1-6 Units, Subcutaneous, TID AC **AND** Fingerstick Glucose (POCT), , , TID AC, Feli Farrar MD    leucovorin (WELLCOVORIN) tablet 10 mg, 10 mg, Oral, Weekly, Feli Farrar MD, 10 mg at 03/16/19 0816    levothyroxine tablet 75 mcg, 75 mcg, Oral, Daily, Feli Farrar MD, 75 mcg at 03/18/19 0523    methotrexate tablet 5 mg, 5 mg, Oral, Weekly, Feli Farrar MD, 5 mg at 03/15/19 0807    multivitamin-minerals (CENTRUM) tablet 1 tablet, 1 tablet, Oral, Daily, Feli Farrar MD, 1 tablet at 03/18/19 0813    pantoprazole (PROTONIX) EC tablet 40 mg, 40 mg, Oral, Early Morning, Cathryne Sicard, DO, 40 mg at 03/18/19 0523    polyethylene glycol (MIRALAX) packet 17 g, 17 g, Oral, Daily, Cathryne Sicard, DO, 17 g at 03/18/19 0813    polyvinyl alcohol (LIQUIFILM TEARS) 1 4 % ophthalmic solution 1 drop, 1 drop, Both Eyes, TID, Cathryne Sicard, DO, 1 drop at 03/18/19 0813    rivaroxaban (XARELTO) tablet 15 mg, 15 mg, Oral, Daily With Breakfast, Aubrie Thurston MD, 15 mg at 03/18/19 0813    rOPINIRole (REQUIP) tablet 1 mg, 1 mg, Oral, HS, Aubrie Thurston MD, 1 mg at 03/17/19 2101    Facility-Administered Medications Ordered in Other Encounters:     acetaminophen (TYLENOL) tablet 650 mg, 650 mg, Oral, Q6H PRN, Claudetta Harry, PA-C    Labs & Results:      Results from last 7 days   Lab Units 03/14/19  0438 03/13/19  1928   WBC Thousand/uL 6 61 7 02   HEMOGLOBIN g/dL 8 8* 9 3*   HEMATOCRIT % 27 6* 29 3*   PLATELETS Thousands/uL 191 207         Results from last 7 days   Lab Units 03/18/19  0605 03/17/19  0610 03/16/19  0530   POTASSIUM mmol/L 4 0 3 7 3 8   CHLORIDE mmol/L 102 102 105   CO2 mmol/L 31 30 29   BUN mg/dL 39* 38* 32*   CREATININE mg/dL 2 00* 1 91* 1 83*   CALCIUM mg/dL 8 7 8 8 8 5       Counseling / Coordination of Care: Total floor / unit time spent today 20 minutes  Greater than 50% of total time was spent with the patient and / or family counseling and / or coordination of care  A description of the counseling / coordination of care: 20  Thank you for the opportunity to participate in the care of this patient      Mynor Self PA-C

## 2019-03-18 NOTE — SOCIAL WORK
CM met with pt and pt  and discussed home vs snf   Pt states she has been ambulating in hallway and has assistance at home  Pt agreeable to home vna and [facility]   Cm will follow , when medically clear  will transport home

## 2019-03-19 LAB
ANION GAP SERPL CALCULATED.3IONS-SCNC: 4 MMOL/L (ref 4–13)
BUN SERPL-MCNC: 34 MG/DL (ref 5–25)
CALCIUM SERPL-MCNC: 8.5 MG/DL (ref 8.3–10.1)
CHLORIDE SERPL-SCNC: 105 MMOL/L (ref 100–108)
CO2 SERPL-SCNC: 29 MMOL/L (ref 21–32)
CREAT SERPL-MCNC: 1.82 MG/DL (ref 0.6–1.3)
GFR SERPL CREATININE-BSD FRML MDRD: 25 ML/MIN/1.73SQ M
GLUCOSE SERPL-MCNC: 100 MG/DL (ref 65–140)
GLUCOSE SERPL-MCNC: 103 MG/DL (ref 65–140)
GLUCOSE SERPL-MCNC: 124 MG/DL (ref 65–140)
GLUCOSE SERPL-MCNC: 132 MG/DL (ref 65–140)
POTASSIUM SERPL-SCNC: 4 MMOL/L (ref 3.5–5.3)
SODIUM SERPL-SCNC: 138 MMOL/L (ref 136–145)

## 2019-03-19 PROCEDURE — G8978 MOBILITY CURRENT STATUS: HCPCS

## 2019-03-19 PROCEDURE — G8979 MOBILITY GOAL STATUS: HCPCS

## 2019-03-19 PROCEDURE — 82948 REAGENT STRIP/BLOOD GLUCOSE: CPT

## 2019-03-19 PROCEDURE — 99232 SBSQ HOSP IP/OBS MODERATE 35: CPT | Performed by: INTERNAL MEDICINE

## 2019-03-19 PROCEDURE — 97167 OT EVAL HIGH COMPLEX 60 MIN: CPT

## 2019-03-19 PROCEDURE — 97163 PT EVAL HIGH COMPLEX 45 MIN: CPT

## 2019-03-19 PROCEDURE — G8987 SELF CARE CURRENT STATUS: HCPCS

## 2019-03-19 PROCEDURE — 97535 SELF CARE MNGMENT TRAINING: CPT

## 2019-03-19 PROCEDURE — G8988 SELF CARE GOAL STATUS: HCPCS

## 2019-03-19 PROCEDURE — 99232 SBSQ HOSP IP/OBS MODERATE 35: CPT | Performed by: HOSPITALIST

## 2019-03-19 PROCEDURE — 80048 BASIC METABOLIC PNL TOTAL CA: CPT | Performed by: INTERNAL MEDICINE

## 2019-03-19 RX ORDER — LOSARTAN POTASSIUM 25 MG/1
25 TABLET ORAL DAILY
Status: DISCONTINUED | OUTPATIENT
Start: 2019-03-19 | End: 2019-03-21 | Stop reason: HOSPADM

## 2019-03-19 RX ORDER — TORSEMIDE 20 MG/1
20 TABLET ORAL DAILY
Status: DISCONTINUED | OUTPATIENT
Start: 2019-03-19 | End: 2019-03-21 | Stop reason: HOSPADM

## 2019-03-19 RX ADMIN — Medication 1 SPRAY: at 12:07

## 2019-03-19 RX ADMIN — Medication 1 TABLET: at 08:24

## 2019-03-19 RX ADMIN — ACETAMINOPHEN 650 MG: 325 TABLET ORAL at 21:12

## 2019-03-19 RX ADMIN — LOSARTAN POTASSIUM 25 MG: 25 TABLET, FILM COATED ORAL at 12:07

## 2019-03-19 RX ADMIN — ROPINIROLE 1 MG: 1 TABLET, FILM COATED ORAL at 21:13

## 2019-03-19 RX ADMIN — POLYVINYL ALCOHOL 1 DROP: 14 SOLUTION/ DROPS OPHTHALMIC at 08:24

## 2019-03-19 RX ADMIN — GABAPENTIN 100 MG: 100 CAPSULE ORAL at 13:24

## 2019-03-19 RX ADMIN — HYDROXYCHLOROQUINE SULFATE 200 MG: 200 TABLET, FILM COATED ORAL at 17:13

## 2019-03-19 RX ADMIN — PANTOPRAZOLE SODIUM 40 MG: 40 TABLET, DELAYED RELEASE ORAL at 05:17

## 2019-03-19 RX ADMIN — GABAPENTIN 100 MG: 100 CAPSULE ORAL at 08:24

## 2019-03-19 RX ADMIN — DOCUSATE SODIUM 100 MG: 100 CAPSULE, LIQUID FILLED ORAL at 17:13

## 2019-03-19 RX ADMIN — Medication 1 SPRAY: at 17:15

## 2019-03-19 RX ADMIN — POLYETHYLENE GLYCOL 3350 17 G: 17 POWDER, FOR SOLUTION ORAL at 08:24

## 2019-03-19 RX ADMIN — CALCIUM CARBONATE-VITAMIN D TAB 500 MG-200 UNIT 1 TABLET: 500-200 TAB at 08:24

## 2019-03-19 RX ADMIN — POLYVINYL ALCOHOL 1 DROP: 14 SOLUTION/ DROPS OPHTHALMIC at 17:15

## 2019-03-19 RX ADMIN — FERROUS SULFATE TAB 325 MG (65 MG ELEMENTAL FE) 325 MG: 325 (65 FE) TAB at 17:13

## 2019-03-19 RX ADMIN — TORSEMIDE 20 MG: 20 TABLET ORAL at 12:07

## 2019-03-19 RX ADMIN — Medication 1 SPRAY: at 08:41

## 2019-03-19 RX ADMIN — FERROUS SULFATE TAB 325 MG (65 MG ELEMENTAL FE) 325 MG: 325 (65 FE) TAB at 08:24

## 2019-03-19 RX ADMIN — LEVOTHYROXINE SODIUM 75 MCG: 75 TABLET ORAL at 05:17

## 2019-03-19 RX ADMIN — HYDROXYCHLOROQUINE SULFATE 200 MG: 200 TABLET, FILM COATED ORAL at 08:24

## 2019-03-19 RX ADMIN — DOCUSATE SODIUM 100 MG: 100 CAPSULE, LIQUID FILLED ORAL at 08:24

## 2019-03-19 RX ADMIN — VITAM B12 100 MCG: 100 TAB at 08:24

## 2019-03-19 RX ADMIN — POLYVINYL ALCOHOL 1 DROP: 14 SOLUTION/ DROPS OPHTHALMIC at 21:13

## 2019-03-19 RX ADMIN — GABAPENTIN 200 MG: 100 CAPSULE ORAL at 21:12

## 2019-03-19 RX ADMIN — RIVAROXABAN 15 MG: 15 TABLET, FILM COATED ORAL at 08:24

## 2019-03-19 RX ADMIN — CALCIUM CARBONATE-VITAMIN D TAB 500 MG-200 UNIT 1 TABLET: 500-200 TAB at 17:13

## 2019-03-19 NOTE — SOCIAL WORK
CM discussed pt during care coordination rounds and not medically clear today   Pt accepted by [facility]    Cm will follow ,  will transport home

## 2019-03-19 NOTE — PROGRESS NOTES
[facility] Internal Medicine Progress Note  Patient: Elina Pineda 81 y o  female   MRN: [id number]  PCP: Vale Parham MD  [facility] Encounter: [id number]  Date Of Visit: 03/19/19    Assessment:    Principal Problem:    Acute on chronic diastolic congestive heart failure ([facility] )  Active Problems:    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 ([place] [address] )    Rheumatoid arthritis ([place] [address] )    Diabetes mellitus type 2 in obese [facility])    Acquired hypothyroidism    Urothelial cancer ([place] [address] )    Atrial fibrillation ([place] [address] )    CKD (chronic kidney disease) stage 3, GFR 30-59 ml/min ([facility])    Chronic acquired lymphedema    Anemia of chronic disease    BRENNEN (acute kidney injury) ([place] [address] )      Plan:    1  Acute on chronic diastolic CHF, improving, EF 60%  demadex & losartan held due to BRENNEN  Cr better today, hence per cardio restarted on torsemide 20 mg QD, losartan 25 mg today  2  Chronic LEs/lymphedema, neuropathic pain likely secondary to volume overload, negative venous Doppler for DVT  3  Anemia of chronic disease with iron deficiency, continue iron supplement,  monitor  4  Atrial fibrillation, stable HR, on Xarelto  5  BRENNEN /CKD stage 3, solitary kidney, baseline creatinine 1 4-1 8, restarted Demadex and losartan today, monitor Cr  6  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  7  DM type 2, A1c 6 5,  acceptable blood sugar, continue ISS  8  History CVA  9  Hypothyroidism, continue levothyroxine  10  Rheumatoid arthritis, on Plaquenil, methotrexate and leucovorin  11  Constipation, continue MiraLax       VTE Pharmacologic Prophylaxis:   Pharmacologic: Rivaroxaban (Xarelto)  Mechanical VTE Prophylaxis in Place: Yes    Patient Centered Rounds: I have performed bedside rounds with nursing staff today  Discussions with Specialists or Other Care Team Provider:     Education and Discussions with Family / Patient: patient,     Time Spent for Care: 45 minutes    More than 50% of total time spent on counseling and coordination of care as described above  Current Length of Stay: 6 day(s)    Current Patient Status: Inpatient   Certification Statement: The patient will continue to require additional inpatient hospital stay due to monitor Cr    Discharge Plan / Estimated Discharge Date: 1-2 days    Code Status: Level 3 - DNAR and DNI      Subjective:   Feels better    Objective:     Vitals:   Temp (24hrs), Av °F (36 7 °C), Min:97 5 °F (36 4 °C), Max:98 7 °F (37 1 °C)    Temp:  [97 5 °F (36 4 °C)-98 7 °F (37 1 °C)] 97 5 °F (36 4 °C)  HR:  [68-85] 85  Resp:  [18] 18  BP: (139-161)/(63-74) 161/71  SpO2:  [95 %-99 %] 99 %  Body mass index is 40 45 kg/m²  Input and Output Summary (last 24 hours): Intake/Output Summary (Last 24 hours) at 3/19/2019 1745  Last data filed at 3/19/2019 1606  Gross per 24 hour   Intake 480 ml   Output 2200 ml   Net -1720 ml       Physical Exam:     Physical Exam   Constitutional: She is oriented to person, place, and time  She appears well-developed and well-nourished  HENT:   Head: Normocephalic and atraumatic  Mouth/Throat: Oropharynx is clear and moist    Cardiovascular: Normal rate and regular rhythm  Exam reveals no friction rub  No murmur heard  Pulmonary/Chest: Effort normal and breath sounds normal  No stridor  No respiratory distress  She has no wheezes  Abdominal: Soft  She exhibits no distension  There is no tenderness  Musculoskeletal: She exhibits no edema  Neurological: She is alert and oriented to person, place, and time  Vitals reviewed          Additional Data:     Labs:    Results from last 7 days   Lab Units 19  0438   WBC Thousand/uL 6 61   HEMOGLOBIN g/dL 8 8*   HEMATOCRIT % 27 6*   PLATELETS Thousands/uL 191   NEUTROS PCT % 66   LYMPHS PCT % 19   MONOS PCT % 10   EOS PCT % 4     Results from last 7 days   Lab Units 19  0557   POTASSIUM mmol/L 4 0   CHLORIDE mmol/L 105   CO2 mmol/L 29   BUN mg/dL 34* CREATININE mg/dL 1 82*   CALCIUM mg/dL 8 5           * I Have Reviewed All Lab Data Listed Above  * Additional Pertinent Lab Tests Reviewed: All Labs Within Last 24 Hours Reviewed    Imaging:    Imaging Reports Reviewed Today Include:   Imaging Personally Reviewed by Myself Includes:      Recent Cultures (last 7 days):           Last 24 Hours Medication List:     Current Facility-Administered Medications:  acetaminophen 650 mg Oral BID PRN Garrett Christian MD   calcium carbonate-vitamin D 1 tablet Oral BID With Meals Garrett Christian MD   cyanocobalamin 100 mcg Oral Daily Garrett Christian MD   docusate sodium 100 mg Oral BID Garrett Christian MD   ferrous sulfate 325 mg Oral BID With Meals [facility], DO   gabapentin 100 mg Oral BID [facility], DO   gabapentin 200 mg Oral HS Garrett Christian MD   hydroxychloroquine 200 mg Oral BID With Meals Garrett Christian MD   insulin lispro 1-5 Units Subcutaneous HS Garrett Christian MD   insulin lispro 1-6 Units Subcutaneous TID AC Garrett Christian MD   leucovorin 10 mg Oral Weekly Garrett Christian MD   levothyroxine 75 mcg Oral Daily Garrett Christian MD   losartan 25 mg Oral Daily Rafat Ferreira PA-C   methotrexate 5 mg Oral Weekly Garrett Christian MD   multivitamin-minerals 1 tablet Oral Daily Garrett Christian MD   pantoprazole 40 mg Oral Early Morning [facility], DO   polyethylene glycol 17 g Oral Daily [facility], DO   polyvinyl alcohol 1 drop Both Eyes TID [facility], DO   rivaroxaban 15 mg Oral Daily With Breakfast Garrett Christian MD   rOPINIRole 1 mg Oral HS Garrett Christian MD   sodium chloride 1 spray Each Nare Q1H PRN Judge Massiel PA-C   torsemide 20 mg Oral Daily Rafat Ferreira PA-C     Facility-Administered Medications Ordered in Other Encounters:  acetaminophen 650 mg Oral Q6H PRN Melchor Palmer PA-C        Today, Patient Was Seen By: Milton Verdugo MD    ** Please Note: This note has been constructed using a voice recognition system   **

## 2019-03-19 NOTE — PLAN OF CARE
Problem: PHYSICAL THERAPY ADULT  Goal: Performs mobility at highest level of function for planned discharge setting  See evaluation for individualized goals  Description  Treatment/Interventions: Functional transfer training, LE strengthening/ROM, Patient/family training, Equipment eval/education, Bed mobility, Gait training, Spoke to case management, Spoke to nursing  Equipment Recommended: Walker(Pt has 4 [facility] but would be safest with  bariRW use  Pt 5'1"   )       See flowsheet documentation for full assessment, interventions and recommendations  Note:   Prognosis: Good  Problem List: Decreased strength, Decreased endurance, Impaired balance, Decreased mobility, Impaired judgement, Decreased safety awareness, Obesity, Impaired sensation  Assessment: Pt is 81 y o  female seen for PT evaluation s/p admit to [facility] on 3/13/2019 w/ Acute on chronic diastolic congestive heart failure ([facility] [address] )  PT consulted to assess pt's functional mobility and d/c needs  Order placed for PT eval and tx, w/ ambulate patient order  Comorbidities affecting pt's physical performance at time of assessment include: pt adm with BRENNEN  Pt with PMH of: CVA, RA, urothelial CA, a-fib, lymnphedema, NICK, obesity, peripheral neuropathy, RLS and pancreatic cyst     PTA, pt was independent w/ all functional mobility w/ 5BX, ambulates community distances and elevations, has 4 non-consecutive curb  RONNY, lives w/ her  in 1 level [facility], retired and believes she would best benefit from obtaining a RW rather than continue using her 4WW since she has had falls with her 3WW  [place] Personal factors affecting pt at time of IE include: ambulating w/ assistive device, stairs to enter home, inability to navigate community distances, positive fall history, impulsivity, limited insight into impairments, inability to perform IADLs and seems to be just a bit below her baseline level of mobility at this time   Please find objective findings from PT assessment regarding body systems outlined above with impairments and limitations including weakness, impaired balance, decreased endurance, gait deviations, decreased functional mobility tolerance, decreased safety awareness, impaired judgement and fall risk  The following objective measures performed on IE also reveal limitations: Barthel Index: 75/100  Pt's clinical presentation is currently unstable/unpredictable seen in pt's presentation of pt with below baseline level of mobility, pt with some abnormal lab values, pt with ongoing CHFas well as peripheral neuropathy/foot numbness potentially affecting gait safety ( pt has hx of falls)    Pt to benefit from continued PT tx to address deficits as defined above and maximize level of functional independent mobility and consistency  From PT/mobility standpoint, recommendation at time of d/c would be Home PT with family support pending progress in order to facilitate return to PLOF  Barriers to Discharge Comments: 4 RONNY but able to do 2 today with CG assist    Recommendation: Home with family support, Home PT     PT - OK to Discharge: Yes    See flowsheet documentation for full assessment

## 2019-03-19 NOTE — OCCUPATIONAL THERAPY NOTE
[address] Evaluation     Patient Name: Shayla Gaines  RMAAP'U Date: 3/19/2019  Problem List  Patient Active Problem List   Diagnosis    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 ([facility] [address] )    Essential hypertension    Rheumatoid arthritis ([facility] )    Diabetes mellitus type 2 in obese ([facility] [address] )    Sleep apnea    Acquired hypothyroidism    Chronic GERD    Edema    Hypercholesterolemia    Obesity    Peripheral neuropathy    Primary osteoarthritis of left knee    Restless leg syndrome    Right lumbar radiculopathy    Sensorineural hearing loss    Sinus arrhythmia    Chronic bilateral thoracic back pain    Thoracic degenerative disc disease    Urothelial cancer ([facility] )    Lung nodule < 6cm on CT    Pancreatic cyst    Iron deficiency anemia    Anemia    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 (HCC)    History of nephroureterectomy    Incisional hernia    Acute on chronic diastolic congestive heart failure (HCC)    Atrial fibrillation (HCC)    CKD (chronic kidney disease) stage 3, GFR 30-59 ml/min (HCC)    Acute on chronic congestive heart failure (HCC)    Chronic diastolic heart failure (HCC)    Fracture    Spontaneous dislocation of shoulder, left    Abdominal wall hernia    Atherosclerosis of native artery of right lower extremity with rest pain (HCC)    Pain in both lower extremities    Chronic acquired lymphedema    Anemia of chronic disease    BRENNEN (acute kidney injury) ([facility] [address] )     Past Medical History  Past Medical History:   Diagnosis Date    Anemia     Arthritis     rheumatoid    Benign essential hypertension     Cataract     Chronic cystitis     CPAP (continuous positive airway pressure) dependence     Diverticulitis of colon     Early satiety     GERD (gastroesophageal reflux disease)     Gout     Hearing aid worn     bilateral    Hearing loss     Hemorrhoids     Hiatal hernia     History of colonic polyps  Hyperlipidemia     Hypothyroidism     Left breast mass     Microhematuria     Migraine     occular    Neuropathy     lower and upper extermities    Overactive bladder     Pneumonia of left lower lobe due to infectious organism (HCC)     RA (rheumatoid arthritis) ([facility] [address] )     Sleep apnea     uses cpap    Stroke ([facility] [address] )     5/2017    Type 2 diabetes mellitus (HCC)     Urinary frequency     Urinary urgency     Urothelial cancer ([facility] [address] ) 04/23/2018    Use of cane as ambulatory aid      Past Surgical History  Past Surgical History:   Procedure Laterality Date    APPENDECTOMY      ARTHROSCOPY WRIST Right     with release of transverse carpal ligament     BLADDER SURGERY      BLADDER SURGERY      BREAST SURGERY      left breast    BUNIONECTOMY Bilateral     CATARACT EXTRACTION Bilateral     CHOLECYSTECTOMY      COLONOSCOPY      CYSTOSCOPY      EGD AND COLONOSCOPY N/A 12/20/2017    Procedure: EGD AND COLONOSCOPY;  Surgeon: Ester Turcios MD;  [facility]: [facility]; Service: Gastroenterology    ESOPHAGOGASTRODUODENOSCOPY      diagnostic    FOREARM SURGERY Right     fracture repair    HYSTERECTOMY      IR ABDOMINAL ANGIOGRAPHY / INTERVENTION  1/24/2019    KNEE ARTHROSCOPY Left     KNEE SURGERY Left     meniscus tear    NEPHRECTOMY Right     NOSE SURGERY      WY CYSTO/URETERO W/LITHOTRIPSY &INDWELL STENT INSRT Right 2/28/2018    Procedure: CYSTOSCOPY RIGHT URETEROSCOPY, RIGHT RETROGRADE PYELOGRAM AND INSERTION  RIGHT STENT URETERAL, RIGHT RENAL PELVIC WASHING FOR CYTOLOGY;  Surgeon: Yehuda Cifuentes MD;  [facility];  Service: Urology    WY NEPHRECTOMY, W/PART   URETECTOMY Right 4/23/2018    Procedure: ROBATIC ASSISTED NEPHRO-URETERECTOMY WITH BLADDER CUFF EXCISION;  Surgeon: Emi Lange MD;  [facility];  Service: Urology    WY [address] 3RD+ ORD SLCTV ABDL PEL/LXTR [facility] Right 1/24/2019    Procedure: RIGHT LEG ANGIOGRAM, BILATERAL FEMORAL ACCESS, STENTING OF RIGHT EXTERNAL ILIAC ARTERY WITH BALLOON ANGIOPLASTY;  Surgeon: Rivera Hernández MD;  Location:  MAIN OR;  Service: Vascular    REPLACEMENT TOTAL KNEE BILATERAL Bilateral     URETEROSCOPY Right 3/20/2018    Procedure: CYSTOSCOPY, RETROGRADE PYELOGRAM, URETEROSCOPY, BIOPSY, BRUSH, FULGURATION, STENT PLACEMENT, BLADDER BIOPSY WITH FULGURATION;  Surgeon: Karina Servin MD;  [facility];  Service: Urology         03/19/19 1400   Note Type   Note type Eval/Treat   Restrictions/Precautions   Weight Bearing Precautions Per Order No   Other Precautions Fall Risk;Impulsive   Pain Assessment   Pain Assessment No/denies pain   Pain Score No Pain   Home Living   Type of [facility] One level;Stairs to enter with rails  (4)   Bathroom Shower/Tub Tub/shower unit   Bathroom Toilet Standard   Bathroom Equipment Commode; [facility] [address] 112; Wheelchair-manual  (rollator)   Prior Function   Level of [place] Needs assistance with ADLs and functional mobility  (washing back)   Lives With Spouse   Receives Help From Family   ADL Assistance Needs assistance   IADLs Needs assistance   Falls in the last 6 months 1 to 4  (can recall 1-2 falls)   Vocational Retired   Lifestyle   Autonomy pta pt reports spouse assists w/ bathing, able to ambulate w/ rollator independently   Reciprocal Relationships supportive spouse   Service to Others retired   Intrinsic Gratification enjoys talking to her friends   Psychosocial   Psychosocial (WDL) WDL   Subjective   Subjective "I don't like this walker"   ADL   Where Assessed Edge of bed   Eating Assistance 5  Supervision/Setup   Grooming Assistance 5  Supervision/Setup   UB [address] 4  Minimal Assistance   LB [address] [facility] to Stand 5  Supervision   Additional items Increased time required   Stand to Sit 5  Supervision   Additional items Increased time required   Toilet transfer 4  Minimal assistance   Additional items Assist x 1;Standard toilet   Additional Comments pt required VC for all transfers   Functional Mobility   Functional Mobility 4  Minimal assistance   Additional Comments CGA   Additional items Rolling walker   Balance   Static Sitting Good   Dynamic Sitting Fair +   Static Standing Fair   Dynamic Standing Fair   Ambulatory Fair -   Activity Tolerance   Activity Tolerance Patient tolerated treatment well   Medical Staff Made Aware PT Anabella Nielson present   Nurse Made Aware okay to see per RN   RUE Assessment   RUE Assessment WFL   LUE Assessment   LUE Assessment WFL   Hand Function   Gross Motor Coordination Functional   Fine Motor Coordination Functional   Cognition   Overall Cognitive Status Impaired   Arousal/Participation Cooperative   Attention Attends with cues to redirect   Orientation Level Oriented X4   Memory Decreased recall of precautions   Following Commands Follows multistep commands with increased time or repetition   Comments pt very impulsive, requiring additional cueing for transfers   Assessment   Limitation Decreased ADL status; Decreased Safe judgement during ADL;Decreased cognition;Decreased endurance;Decreased self-care trans;Decreased high-level ADLs   Prognosis Good   Assessment Pt is a 80 YO  Female admitted [facility] w/ acute on chronic diastolic congestive heart failure  Comorbidities include a h/o BRENNEN, CVA, RA, urothelial CA, a fib, lymphedema, NICK, peripheral neuropathy, and obestiy,    Pt with active OT orders and ambulate  orders   Pt resides in a condo with spouse  Pt required assist for bathing, able to ambulate w/ rollator independently    Currently pt is Min A for ADLs and toileting and supervision for functional mobility   Pt is limited at this time 2*: activity tolerance, functional mobility, decreased I w/ ADLS/IADLS, cognitive impairments, decreased safety awareness and decreased insight into deficits  The following Occupational Performance Areas to address include: bathing/shower, toilet hygiene, dressing, functional mobility, clothing management and household maintenance  Pt scored overall  70/100 on the Barthel Index  Based on the aforementioned OT evaluation, functional performance deficits, and assessments, pt has been identified as a high complexity evaluation  From OT standpoint, anticipate d/c home OT  Pt to continue to benefit from acute immediate OT services to address the following goals 3-5x/week to  w/in 7-10 days: Sandeep Manifold Goals   Patient Goals to get a RW    LTG Time Frame 7-10   Plan   Treatment Interventions ADL retraining;Functional transfer training;Cognitive reorientation; Endurance training;Continued evaluation;Patient/family training   Goal Expiration Date 19   Treatment Day 1   OT Frequency 3-5x/wk   Additional Treatment Session   Start Time 1350   End Time 1400   Treatment Assessment Pt seen for follow up tx session for toileting  Pt required MIn A and VC for toilet transfer and perineal hygiene  Pt limited 2* decreased attention to task and decreased safety awareness  Pt returned to chair w/ call bell in reach  OT will continue to follow  COntinue to recommend home OT      Recommendation   OT Discharge Recommendation Home OT   OT - OK to Discharge Yes   Barthel Index   Feeding 10   Bathing 0   Grooming Score 5   Dressing Score 5   Bladder Score 10   Bowels Score 10   Toilet Use Score 5   Transfers (Bed/Chair) Score 10   Mobility (Level Surface) Score 10   Stairs Score 5   Barthel Index Score 70   Modified Jaswinder Scale   Modified Jaswinder Scale 3     GOALS    1) Pt will increase activity tolerance to G for 30 min txment sessions    2) Pt will complete UB/LB dressing/self care w/ mod I using adaptive device and DME as needed    3) Pt will complete bathing w/ Mod I w/ use of AE and DME as needed    4) Pt will complete toileting w/ mod I w/ G hygiene/thoroughness using DME as needed    5) Pt will improve functional transfers to Mod I on/off all surfaces using DME as needed w/ G balance/safety     6) Pt will improve functional mobility during ADL/IADL/leisure tasks to Mod I using DME as needed w/ G balance/safety     7) Pt will participate in simulated IADL management task to increase independence to  w/ G safety and endurance    8) Pt will engage in ongoing cognitive assessment w/ G participation to assist w/ safe d/c planning/recommendations    9) Pt will demonstrate G carryover of pt/caregiver education and training as appropriate      Trupti Middleton MS, OTR/L

## 2019-03-19 NOTE — PROGRESS NOTES
Heart Failure Service Progress Note - Jolene Hunt 81 y o  female MRN: [id number]    Unit/Bed#: [facility] 821-01 Encounter: [id number]    Assessment:  Principal Problem:    Acute on chronic diastolic congestive heart failure (HCC)  Active Problems:    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 (HCC)    Rheumatoid arthritis ([facility] )    Diabetes mellitus type 2 in [facility])    Acquired hypothyroidism    Urothelial cancer ([place] [address] )    Atrial fibrillation (HCC)    CKD (chronic kidney disease) stage 3, GFR 30-59 ml/min (HCC)    Chronic acquired lymphedema    Anemia of chronic disease    BRENNEN (acute kidney injury) ([place] [address] )    Subjective:   PA is an 27-year-old female with a PMH of chronic HFpEF, HTN, HLD, CVA/TIA, NICK (compliant with CPAP), DM2, CKD 3, urothelial cancer (s/p right nephrectomy in 2018), and lymphedema who presented directly from her family [de-identified] office with worsening lower extremity edema for past 3-4 weeks  Patient seen and examined  No significant events overnight  Has been walking the halls multiple times day with ; has felt fatigued afterwards but denies any shortness of breath or other symptoms while walking  Reports decreased swelling in her legs  Denies fevers, chills, lightheadedness, dizziness, chest pain, PND, orthopnea, and abdominal pain  Objective: Intake/ Output: 1020 mL / 2050 mL (net negative 1030 mL)  Weight: 214 lbs (down from 222 lbs from 03/13)  Telemetry: Not ordered  Diagnostic Testing:  Echocardiogram from 03/03/2019:  LVEF: 72%; normal systolic function  "Left ventricular diastolic function parameters were normal "  LVIDd: 4 20 cm  RV: Normal size and function  MR: None  Marked annular calcification  PASP: Within normal range  Mild TR  RVOT:   Other: Mildly dilated LA       CXR from 03/013/2019: "Cardiomediastinal silhouette appears unremarkable  Lungs are clear  No pneumothorax or pleural effusion  "     Echocardiogram from 08/11/2018:  LVEF: 68%; normal systolic function  LVIDd: 3 08 cm  RV: Normal size and function  MR: Mild  PASP: Within normal range  RVOT:   Other: Mildly dilated LA  Dilated IVC      Nuclear pharmacologic stress test from 08/10/2018:              "Stress results: There was no chest pain during stress  ECG conclusions: The stress ECG was negative for ischemia and normal  Arrhythmia during stress: atrial fibrillation  Perfusion imaging: There were no perfusion defects  Gated SPECT: The calculated left ventricular ejection fraction was 73%  Left ventricular ejection fraction was within normal limits by visual estimate  There was no left ventricular regional abnormality "    Plan:    Acute on chronic diastolic congestive heart failure; LVEF 60%; NYHA II; ACC/AHA Stage C              Etiology: HTN; normal perfusion on nuclear stress test on 08/10/2018; plaquenil use? Did not receive torsemide 20 mg QD and losartan 25 mg QD yesterday AM due to bump in creatinine  Creatinine of 1 82 today (up from 2 00 from 03/18)  Baseline appears to be 1 4-1 6  Note: patient had right nephrectomy in 2018  Creatinine now nearly identical to value at time of admission  Will restart torsemide and losartan as above  Strict I/Os with daily weights  CV diet with fluid restriction        History of CVA / atrial fibrillation              Documented on EKG from 08/10/2018 and 08/19/2018  CHADSVASc = 9              Anticoagulation with Xarelto since stroke in 2017  Consider restarting home pravastatin  Neurohormonal Blockade:  --Beta Blocker:  --ACEi, ARB or ARNi: losartan 25 mg QD  --Aldosterone Receptor Blocker:  --Diuretic: torsemide 20 mg QD    Sudden Cardiac Death Risk Reduction:  --ICD:   --Interrogation:     Advanced Therapies (If appropriate):   --Inotrope:  --LVAD/Transplant Candidacy:    Vitals:   Blood pressure 140/63, pulse 75, temperature 97 5 °F (36 4 °C), temperature source Oral, resp  rate 18, height 5' 1" (1 549 m), weight 97 1 kg (214 lb 1 1 oz), SpO2 95 %  , Body mass index is 40 45 kg/m²  I/O last 3 completed shifts: In: 1020 [P O :1020]  Out: 2300 [Urine:2300]  I/O this shift:  In: -   Out: 200 [Urine:200]  Wt Readings from Last 3 Encounters:   03/19/19 97 1 kg (214 lb 1 1 oz)   03/13/19 101 kg (222 lb)   02/05/19 98 9 kg (218 lb)       Intake/Output Summary (Last 24 hours) at 3/19/2019 1046  Last data filed at 3/19/2019 0745  Gross per 24 hour   Intake 780 ml   Output 2050 ml   Net -1270 ml     I/O last 3 completed shifts: In: 1020 [P O :1020]  Out: 2300 [Urine:2300]    Physical Exam:  Vitals:    03/18/19 0850 03/18/19 1600 03/18/19 2300 03/19/19 0600   BP: 115/57 140/73 140/63    BP Location:  Right arm Left arm    Pulse: 87 79 75    Resp: 18 18 18    Temp: 97 5 °F (36 4 °C) 97 5 °F (36 4 °C) 97 5 °F (36 4 °C)    TempSrc: Oral Oral Oral    SpO2: 97% 96% 95%    Weight:    97 1 kg (214 lb 1 1 oz)   Height:         Physical Exam   Constitutional: She is oriented to person, place, and time  She appears well-developed and well-nourished  HENT:   Head: Normocephalic and atraumatic  Eyes: Pupils are equal, round, and reactive to light  Conjunctivae are normal  No scleral icterus  Neck: JVD (mildly elevated) present  No tracheal deviation present  Cardiovascular: Normal rate, regular rhythm, S1 normal, S2 normal, normal heart sounds and intact distal pulses  Exam reveals no gallop and no friction rub  No murmur heard  Pulmonary/Chest: Effort normal and breath sounds normal  No respiratory distress  Abdominal: Soft  Bowel sounds are normal  She exhibits no distension  There is no tenderness  Musculoskeletal: She exhibits edema (1+ LE edema)  She exhibits no deformity  Neurological: She is alert and oriented to person, place, and time  Skin: Skin is warm and dry  Psychiatric: She has a normal mood and affect  Her behavior is normal      [facility] (day, reason): N/A  Medrano catheter (day, reason): N/A    Current Facility-Administered Medications:     acetaminophen (TYLENOL) tablet 650 mg, 650 mg, Oral, BID PRN, Elin Jameson MD, 650 mg at 03/18/19 2240    calcium carbonate-vitamin D (OSCAL-D) 500 mg-200 units per tablet 1 tablet, 1 tablet, Oral, BID With Meals, Elin Jameson MD, 1 tablet at 03/19/19 0824    cyanocobalamin (VITAMIN B-12) tablet 100 mcg, 100 mcg, Oral, Daily, Elin Jameson MD, 100 mcg at 03/19/19 3898    docusate sodium (COLACE) capsule 100 mg, 100 mg, Oral, BID, Elin Jameson MD, 100 mg at 03/19/19 7758    ferrous sulfate tablet 325 mg, 325 mg, Oral, BID With Meals, Enmarimaame Parminder, DO, 325 mg at 03/19/19 1695    gabapentin (NEURONTIN) capsule 100 mg, 100 mg, Oral, BID, Cassandria Parminder, DO, 100 mg at 03/19/19 1351    gabapentin (NEURONTIN) capsule 200 mg, 200 mg, Oral, HS, Elin Jameson MD, 200 mg at 03/18/19 2105    hydroxychloroquine (PLAQUENIL) tablet 200 mg, 200 mg, Oral, BID With Meals, Elin Jameson MD, 200 mg at 03/19/19 0824    insulin lispro (HumaLOG) 100 units/mL subcutaneous injection 1-5 Units, 1-5 Units, Subcutaneous, HS, Elin Jameson MD, 1 Units at 03/15/19 2313    insulin lispro (HumaLOG) 100 units/mL subcutaneous injection 1-6 Units, 1-6 Units, Subcutaneous, TID AC **AND** Fingerstick Glucose (POCT), , , TID AC, Elin Jameson MD    leucovorin (WELLCOVORIN) tablet 10 mg, 10 mg, Oral, Weekly, Elin Jameson MD, 10 mg at 03/16/19 0816    levothyroxine tablet 75 mcg, 75 mcg, Oral, Daily, Elin Jameson MD, 75 mcg at 03/19/19 0517    methotrexate tablet 5 mg, 5 mg, Oral, Weekly, Elin Jameson MD, 5 mg at 03/15/19 0807    multivitamin-minerals (CENTRUM) tablet 1 tablet, 1 tablet, Oral, Daily, Elin Jameson MD, 1 tablet at 03/19/19 0824    pantoprazole (PROTONIX) EC tablet 40 mg, 40 mg, Oral, Early Morning, Benigno Zurita DO, 40 mg at 03/19/19 0517    polyethylene glycol (MIRALAX) packet 17 g, 17 g, Oral, Daily, Alex Badder, DO, 17 g at 03/19/19 0824    polyvinyl alcohol (LIQUIFILM TEARS) 1 4 % ophthalmic solution 1 drop, 1 drop, Both Eyes, TID, Alex Badder, DO, 1 drop at 03/19/19 0824    rivaroxaban (XARELTO) tablet 15 mg, 15 mg, Oral, Daily With Breakfast, Mary Alejo MD, 15 mg at 03/19/19 0824    rOPINIRole (REQUIP) tablet 1 mg, 1 mg, Oral, HS, Mary Alejo MD, 1 mg at 03/18/19 2105    sodium chloride (OCEAN) 0 65 % nasal spray 1 spray, 1 spray, Each Nare, Q1H PRN, Pavan Pena PA-C, 1 spray at 03/19/19 0841    Facility-Administered Medications Ordered in Other Encounters:     acetaminophen (TYLENOL) tablet 650 mg, 650 mg, Oral, Q6H PRN, Desmond Allison PA-C    Labs & Results:      Results from last 7 days   Lab Units 03/14/19  0438 03/13/19  1928   WBC Thousand/uL 6 61 7 02   HEMOGLOBIN g/dL 8 8* 9 3*   HEMATOCRIT % 27 6* 29 3*   PLATELETS Thousands/uL 191 207         Results from last 7 days   Lab Units 03/19/19  0557 03/18/19  0605 03/17/19  0610   POTASSIUM mmol/L 4 0 4 0 3 7   CHLORIDE mmol/L 105 102 102   CO2 mmol/L 29 31 30   BUN mg/dL 34* 39* 38*   CREATININE mg/dL 1 82* 2 00* 1 91*   CALCIUM mg/dL 8 5 8 7 8 8       Counseling / Coordination of Care: Total floor / unit time spent today 20 minutes  Greater than 50% of total time was spent with the patient and / or family counseling and / or coordination of care  A description of the counseling / coordination of care: 20  Thank you for the opportunity to participate in the care of this patient      Juan Neely PA-C

## 2019-03-19 NOTE — PLAN OF CARE
Problem: OCCUPATIONAL THERAPY ADULT  Goal: Performs self-care activities at highest level of function for planned discharge setting  See evaluation for individualized goals  Description  Treatment Interventions: ADL retraining, Functional transfer training, Cognitive reorientation, Endurance training, Continued evaluation, Patient/family training          See flowsheet documentation for full assessment, interventions and recommendations  Note:   Limitation: Decreased ADL status, Decreased Safe judgement during ADL, Decreased cognition, Decreased endurance, Decreased self-care trans, Decreased high-level ADLs  Prognosis: Good  Assessment: Pt is a 80 YO  Female admitted to \[facility]\"" on 3/13/19 w/ acute on chronic diastolic congestive heart failure  Comorbidities include a h/o BRENNEN, CVA, RA, urothelial CA, a fib, lymphedema, NICK, peripheral neuropathy, and obestiy,    Pt with active OT orders and ambulate  orders   Pt resides in a condo with spouse  Pt required assist for bathing, able to ambulate w/ rollator independently    Currently pt is Min A for ADLs and toileting and supervision for functional mobility  Pt is limited at this time 2*: activity tolerance, functional mobility, decreased I w/ ADLS/IADLS, cognitive impairments, decreased safety awareness and decreased insight into deficits  The following Occupational Performance Areas to address include: bathing/shower, toilet hygiene, dressing, functional mobility, clothing management and household maintenance  Pt scored overall  70/100 on the Barthel Index  Based on the aforementioned OT evaluation, functional performance deficits, and assessments, pt has been identified as a high complexity evaluation  From OT standpoint, anticipate d/c home OT  Pt to continue to benefit from acute immediate OT services to address the following goals 3-5x/week to  w/in 7-10 days:   OT Discharge Recommendation: Home OT  OT - OK to Discharge:  Yes

## 2019-03-19 NOTE — PLAN OF CARE
Problem: Potential for Falls  Goal: Patient will remain free of falls  Description  INTERVENTIONS:  - Assess patient frequently for physical needs  -  Identify cognitive and physical deficits and behaviors that affect risk of falls  -  [place] fall precautions as indicated by assessment   - Educate patient/family on patient safety including physical limitations  - Instruct patient to call for assistance with activity based on assessment  - Modify environment to reduce risk of injury  - Consider OT/PT consult to assist with strengthening/mobility  Outcome: Progressing     Problem: CARDIOVASCULAR - ADULT  Goal: Maintains optimal cardiac output and hemodynamic stability  Description  INTERVENTIONS:  - Monitor I/O, vital signs and rhythm  - Monitor for S/S and trends of decreased cardiac output i e  bleeding, hypotension  - Administer and titrate ordered vasoactive medications to optimize hemodynamic stability  - Assess quality of pulses, skin color and temperature  - Assess for signs of decreased coronary artery perfusion - ex   Angina  - Instruct patient to report change in severity of symptoms  Outcome: Progressing     Problem: METABOLIC, FLUID AND ELECTROLYTES - ADULT  Goal: Fluid balance maintained  Description  INTERVENTIONS:  - Monitor labs and assess for signs and symptoms of volume excess or deficit  - Monitor I/O and WT  - Instruct patient on fluid and nutrition as appropriate  Outcome: Progressing     Problem: DISCHARGE PLANNING - CARE MANAGEMENT  Goal: Discharge to post-acute care or home with appropriate resources  Description  INTERVENTIONS:  - Conduct assessment to determine patient/family and health care team treatment goals, and need for post-acute services based on payer coverage, community resources, and patient preferences, and barriers to discharge  - Address psychosocial, clinical, and financial barriers to discharge as identified in assessment in conjunction with the patient/family and health care team  - Arrange appropriate level of post-acute services according to patient?s   needs and preference and payer coverage in collaboration with the physician and health care team  - Communicate with and update the patient/family, physician, and health care team regarding progress on the discharge plan  - Arrange appropriate transportation to post-acute venues  Outcome: Progressing     Problem: Nutrition/Hydration-ADULT  Goal: Nutrient/Hydration intake appropriate for improving, restoring or maintaining nutritional needs  Description  Monitor and assess patient's nutrition/hydration status for malnutrition (ex- brittle hair, bruises, dry skin, pale skin and conjunctiva, muscle wasting, smooth red tongue, and disorientation)  Collaborate with interdisciplinary team and initiate plan and interventions as ordered  Monitor patient's weight and dietary intake as ordered or per policy  Utilize nutrition screening tool and intervene per policy  Determine patient's food preferences and provide high-protein, high-caloric foods as appropriate       INTERVENTIONS:  - Monitor oral intake, urinary output, labs, and treatment plans  - Assess nutrition and hydration status and recommend course of action  - Evaluate amount of meals eaten  - Assist patient with eating if necessary   - Allow adequate time for meals  - Recommend/ encourage appropriate diets, oral nutritional supplements, and vitamin/mineral supplements  - Order, calculate, and assess calorie counts as needed  - Recommend, monitor, and adjust tube feedings and TPN/PPN based on assessed needs  - Assess need for intravenous fluids  - Provide specific nutrition/hydration education as appropriate  - Include patient/family/caregiver in decisions related to nutrition  Outcome: Progressing     Problem: Prexisting or High Potential for Compromised Skin Integrity  Goal: Skin integrity is maintained or improved  Description  INTERVENTIONS:  - Identify patients at risk for skin breakdown  - Assess and monitor skin integrity  - Assess and monitor nutrition and hydration status  - Monitor labs (i e  albumin)  - Assess for incontinence   - Turn and reposition patient  - Assist with mobility/ambulation  - Relieve pressure over bony prominences  - Avoid friction and shearing  - Provide appropriate hygiene as needed including keeping skin clean and dry  - Evaluate need for skin moisturizer/barrier cream  - Collaborate with interdisciplinary team (i e  Nutrition, Rehabilitation, etc )   - Patient/family teaching  Outcome: Progressing

## 2019-03-19 NOTE — PHYSICAL THERAPY NOTE
Physical Therapy Initial Evaluation   Chey Moya, PT     03/19/19 6407   Note Type   Note type Eval only   Pain Assessment   Pain Assessment 0-10   Pain Score No Pain   Home Living   Type of Home Other (Comment)  (condo with 4 non-consecutive steps into the home  )   Home Layout One level   [address]; Wheelchair-manual   Additional Comments Pt typically uses [employer] but thinks she needs to use RW instead for better safety  Prior Function   Level of [place] Needs assistance with IADLs; Needs assistance with ADLs and functional mobility  ( assists with washing pt's back  )   Lives With Spouse   Receives Help From Family   ADL Assistance Needs assistance   IADLs Needs assistance   Falls in the last 6 months 1 to 4  (1x while walking into [employer]  Dislocated shoulde)   Vocational Retired   Restrictions/Precautions   [facility] Bearing Precautions Per Order No   Braces or Orthoses Other (Comment)  (none)   Other Precautions Fall Risk  (slight inattention to task while walking  Distractable  )   General   Additional Pertinent History dx: adm with acute on chronic CHF, BRENNEN  PMH: CVA, RA, urothelial CA, a-fib, lyphedema, NICK, obesity, peripheral neuropathy, RLS, pancreatic cyst     Family/Caregiver Present No   Cognition   Overall Cognitive Status WFL  (Distractible , impulsive  )   Arousal/Participation Alert   Orientation Level Oriented X4   Memory Within functional limits   Following Commands Follows all commands and directions without difficulty   Comments Decreased carryover of instructions given  Maybe slight memory issue?     RLE Assessment   RLE Assessment WFL   LLE Assessment   LLE Assessment WFL   Coordination   Movements are Fluid and Coordinated 1   Sensation   (numbness in feet bilaterally  )   Bed Mobility   Rolling R   (Pt seated in recliner upon PT's arrival in pt's room  )   Additional Comments Pt returned to sitting in recliner upon completion of PT eval   Call bell in lap  All needs in easy reach of pt  Transfers   Sit to Stand 5  Supervision   Additional items Assist x 1; Increased time required;Verbal cues   Stand to Sit 5  Supervision   Additional items Assist x 1; Increased time required;Verbal cues   Stand pivot 5  Supervision   Additional items Assist x 1; Increased time required;Verbal cues; Impulsive   Additional Comments RW used for all upright mobility activities  Ambulation/Elevation   Gait pattern Forward Flexion; Excessively slow   Gait Assistance 5  Supervision   Additional items Assist x 1   Assistive Device Rolling walker   Distance 160'   Stair Management Assistance 4  Minimal assist   Additional items Assist x 1;Verbal cues; Increased time required  (Pt used conventional steps  At home uses RW on curb steps  )   Stair Management Technique One rail R;Foreward  (2 hands on 1 railing  )   Number of Stairs 2  (Pt stated her steps at home are non-consecutive curb steps  )   Ramp Technique   (Steps at home are 1/2 the height as per pt report  )   Balance   Static Sitting Good   Dynamic Sitting Fair +   Static Standing Fair   Dynamic Standing Fair   Ambulatory Fair -   Endurance Deficit   Endurance Deficit No   Activity Tolerance   Activity Tolerance Patient tolerated treatment well   Medical Staff Made Aware RN consented to allow pt to participate in PT evaluation   Nurse Made Aware yes   Assessment   Prognosis Good   Problem List Decreased strength;Decreased endurance; Impaired balance;Decreased mobility; Impaired judgement;Decreased safety awareness; Obesity; Impaired sensation   Assessment Pt is 81 y o  female seen for PT evaluation s/p admit to [facility] on 3/13/2019 w/ Acute on chronic diastolic congestive heart failure ([facility] [address] )  PT consulted to assess pt's functional mobility and d/c needs  Order placed for PT eval and tx, w/ ambulate patient order   Comorbidities affecting pt's physical performance at time of assessment include: pt adm with BRENNEN  Pt with PMH of: CVA, RA, urothelial CA, a-fib, lymnphedema, NICK, obesity, peripheral neuropathy, RLS and pancreatic cyst     PTA, pt was independent w/ all functional mobility w/ 6VE, ambulates community distances and elevations, has 4 non-consecutive curb  [place], lives w/ her  in 1 level condo, retired and believes she would best benefit from obtaining a RW rather than continue using her 4WW since she has had falls with her 3WW  Ambika Skipper Personal factors affecting pt at time of IE include: ambulating w/ assistive device, stairs to enter home, inability to navigate community distances, positive fall history, impulsivity, limited insight into impairments, inability to perform IADLs and seems to be just a bit below her baseline level of mobility at this time  Please find objective findings from PT assessment regarding body systems outlined above with impairments and limitations including weakness, impaired balance, decreased endurance, gait deviations, decreased functional mobility tolerance, decreased safety awareness, impaired judgement and fall risk  The following objective measures performed on IE also reveal limitations: Barthel Index: 75/100  Pt's clinical presentation is currently unstable/unpredictable seen in pt's presentation of pt with below baseline level of mobility, pt with some abnormal lab values, pt with ongoing CHFas well as peripheral neuropathy/foot numbness potentially affecting gait safety ( pt has hx of falls)    Pt to benefit from continued PT tx to address deficits as defined above and maximize level of functional independent mobility and consistency  From PT/mobility standpoint, recommendation at time of d/c would be Home PT with family support pending progress in order to facilitate return to PLOF     Barriers to Discharge Comments 4 RONNY but able to do 2 today with CG assist     Goals   Patient Goals To get a rolling walker so she is safer as per pt request  STG Expiration Date 03/29/19   Short Term Goal #1 Pt completes bed mobility activities independently without the need for electronic bed control use  Pt completes transfers independetnly with RW and good safety   Pt ambulates 300' independently with RW with good safety   Pt ambulates up/down 4 non-consecutive curb steps with RW, good safety and close supervision  Pt independently able to complete LE ex's  for prevention of deconditioning and strenght losses  Treatment Day 0   Plan   Treatment/Interventions Functional transfer training;LE strengthening/ROM; Patient/family training;Equipment eval/education; Bed mobility;Gait training;Spoke to case management;Spoke to nursing   PT Frequency Other (Comment)  (3-5x/wk)   Recommendation   Recommendation Home with family support;Home PT   Equipment Recommended Walker  (Pt has [address] but would be safest with  bariRW use  Pt 5'1"   )   PT - OK to Discharge Yes   Additional Comments Yes whenever pt is medically ready for d/c      Barthel Index   Feeding 10   Bathing 0   Grooming Score 5   Dressing Score 5   Bladder Score 10   Bowels Score 10   Toilet Use Score 10   Transfers (Bed/Chair) Score 10   Mobility (Level Surface) Score 10   Stairs Score 5   Barthel Index Score 75

## 2019-03-20 LAB
ANION GAP SERPL CALCULATED.3IONS-SCNC: 3 MMOL/L (ref 4–13)
BUN SERPL-MCNC: 38 MG/DL (ref 5–25)
CALCIUM SERPL-MCNC: 8.6 MG/DL (ref 8.3–10.1)
CHLORIDE SERPL-SCNC: 106 MMOL/L (ref 100–108)
CO2 SERPL-SCNC: 29 MMOL/L (ref 21–32)
CREAT SERPL-MCNC: 1.9 MG/DL (ref 0.6–1.3)
GFR SERPL CREATININE-BSD FRML MDRD: 24 ML/MIN/1.73SQ M
GLUCOSE SERPL-MCNC: 112 MG/DL (ref 65–140)
GLUCOSE SERPL-MCNC: 113 MG/DL (ref 65–140)
GLUCOSE SERPL-MCNC: 114 MG/DL (ref 65–140)
GLUCOSE SERPL-MCNC: 125 MG/DL (ref 65–140)
GLUCOSE SERPL-MCNC: 151 MG/DL (ref 65–140)
POTASSIUM SERPL-SCNC: 4 MMOL/L (ref 3.5–5.3)
SODIUM SERPL-SCNC: 138 MMOL/L (ref 136–145)

## 2019-03-20 PROCEDURE — 82948 REAGENT STRIP/BLOOD GLUCOSE: CPT

## 2019-03-20 PROCEDURE — 80048 BASIC METABOLIC PNL TOTAL CA: CPT | Performed by: HOSPITALIST

## 2019-03-20 PROCEDURE — 99232 SBSQ HOSP IP/OBS MODERATE 35: CPT | Performed by: HOSPITALIST

## 2019-03-20 PROCEDURE — 99232 SBSQ HOSP IP/OBS MODERATE 35: CPT | Performed by: INTERNAL MEDICINE

## 2019-03-20 RX ADMIN — VITAM B12 100 MCG: 100 TAB at 08:35

## 2019-03-20 RX ADMIN — POLYVINYL ALCOHOL 1 DROP: 14 SOLUTION/ DROPS OPHTHALMIC at 17:19

## 2019-03-20 RX ADMIN — TORSEMIDE 20 MG: 20 TABLET ORAL at 08:36

## 2019-03-20 RX ADMIN — INSULIN LISPRO 1 UNITS: 100 INJECTION, SOLUTION INTRAVENOUS; SUBCUTANEOUS at 21:28

## 2019-03-20 RX ADMIN — FERROUS SULFATE TAB 325 MG (65 MG ELEMENTAL FE) 325 MG: 325 (65 FE) TAB at 17:18

## 2019-03-20 RX ADMIN — FERROUS SULFATE TAB 325 MG (65 MG ELEMENTAL FE) 325 MG: 325 (65 FE) TAB at 08:35

## 2019-03-20 RX ADMIN — DOCUSATE SODIUM 100 MG: 100 CAPSULE, LIQUID FILLED ORAL at 17:18

## 2019-03-20 RX ADMIN — POLYVINYL ALCOHOL 1 DROP: 14 SOLUTION/ DROPS OPHTHALMIC at 08:36

## 2019-03-20 RX ADMIN — GABAPENTIN 100 MG: 100 CAPSULE ORAL at 08:36

## 2019-03-20 RX ADMIN — LOSARTAN POTASSIUM 25 MG: 25 TABLET, FILM COATED ORAL at 08:35

## 2019-03-20 RX ADMIN — RIVAROXABAN 15 MG: 15 TABLET, FILM COATED ORAL at 08:36

## 2019-03-20 RX ADMIN — POLYETHYLENE GLYCOL 3350 17 G: 17 POWDER, FOR SOLUTION ORAL at 08:35

## 2019-03-20 RX ADMIN — POLYVINYL ALCOHOL 1 DROP: 14 SOLUTION/ DROPS OPHTHALMIC at 21:29

## 2019-03-20 RX ADMIN — CALCIUM CARBONATE-VITAMIN D TAB 500 MG-200 UNIT 1 TABLET: 500-200 TAB at 08:36

## 2019-03-20 RX ADMIN — HYDROXYCHLOROQUINE SULFATE 200 MG: 200 TABLET, FILM COATED ORAL at 17:18

## 2019-03-20 RX ADMIN — PANTOPRAZOLE SODIUM 40 MG: 40 TABLET, DELAYED RELEASE ORAL at 05:25

## 2019-03-20 RX ADMIN — DOCUSATE SODIUM 100 MG: 100 CAPSULE, LIQUID FILLED ORAL at 08:35

## 2019-03-20 RX ADMIN — Medication 1 TABLET: at 08:37

## 2019-03-20 RX ADMIN — GABAPENTIN 200 MG: 100 CAPSULE ORAL at 21:22

## 2019-03-20 RX ADMIN — CALCIUM CARBONATE-VITAMIN D TAB 500 MG-200 UNIT 1 TABLET: 500-200 TAB at 17:18

## 2019-03-20 RX ADMIN — ACETAMINOPHEN 650 MG: 325 TABLET ORAL at 21:26

## 2019-03-20 RX ADMIN — ROPINIROLE 1 MG: 1 TABLET, FILM COATED ORAL at 21:22

## 2019-03-20 RX ADMIN — HYDROXYCHLOROQUINE SULFATE 200 MG: 200 TABLET, FILM COATED ORAL at 08:35

## 2019-03-20 RX ADMIN — GABAPENTIN 100 MG: 100 CAPSULE ORAL at 13:24

## 2019-03-20 RX ADMIN — LEVOTHYROXINE SODIUM 75 MCG: 75 TABLET ORAL at 05:25

## 2019-03-20 NOTE — SOCIAL WORK
CM met with pt and aware PT recommendation is for rolling walker , pt agreeable    Cm received script from Dr Christal Edwards  Referral in [facility]   Cm updated vna and  will transport home

## 2019-03-20 NOTE — RESTORATIVE TECHNICIAN NOTE
Restorative Specialist Mobility Note       Activity: Ambulate in chahal, Chair, Stand at bedside, Turn     Assistive Device: Front wheel walker        Repositioned: Up in chair, Sitting              Anti-Embolism Device On:  Bilateral, Sequential compression devices, below knee

## 2019-03-20 NOTE — PLAN OF CARE
Problem: Potential for Falls  Goal: Patient will remain free of falls  Description  INTERVENTIONS:  - Assess patient frequently for physical needs  -  Identify cognitive and physical deficits and behaviors that affect risk of falls  -  [place] fall precautions as indicated by assessment   - Educate patient/family on patient safety including physical limitations  - Instruct patient to call for assistance with activity based on assessment  - Modify environment to reduce risk of injury  - Consider OT/PT consult to assist with strengthening/mobility  Outcome: Progressing     Problem: CARDIOVASCULAR - ADULT  Goal: Maintains optimal cardiac output and hemodynamic stability  Description  INTERVENTIONS:  - Monitor I/O, vital signs and rhythm  - Monitor for S/S and trends of decreased cardiac output i e  bleeding, hypotension  - Administer and titrate ordered vasoactive medications to optimize hemodynamic stability  - Assess quality of pulses, skin color and temperature  - Assess for signs of decreased coronary artery perfusion - ex   Angina  - Instruct patient to report change in severity of symptoms  Outcome: Progressing     Problem: METABOLIC, FLUID AND ELECTROLYTES - ADULT  Goal: Fluid balance maintained  Description  INTERVENTIONS:  - Monitor labs and assess for signs and symptoms of volume excess or deficit  - Monitor I/O and WT  - Instruct patient on fluid and nutrition as appropriate  Outcome: Progressing     Problem: DISCHARGE PLANNING - CARE MANAGEMENT  Goal: Discharge to post-acute care or home with appropriate resources  Description  INTERVENTIONS:  - Conduct assessment to determine patient/family and health care team treatment goals, and need for post-acute services based on payer coverage, community resources, and patient preferences, and barriers to discharge  - Address psychosocial, clinical, and financial barriers to discharge as identified in assessment in conjunction with the patient/family and health care team  - Arrange appropriate level of post-acute services according to patient?s   needs and preference and payer coverage in collaboration with the physician and health care team  - Communicate with and update the patient/family, physician, and health care team regarding progress on the discharge plan  - Arrange appropriate transportation to post-acute venues  Outcome: Progressing     Problem: Nutrition/Hydration-ADULT  Goal: Nutrient/Hydration intake appropriate for improving, restoring or maintaining nutritional needs  Description  Monitor and assess patient's nutrition/hydration status for malnutrition (ex- brittle hair, bruises, dry skin, pale skin and conjunctiva, muscle wasting, smooth red tongue, and disorientation)  Collaborate with interdisciplinary team and initiate plan and interventions as ordered  Monitor patient's weight and dietary intake as ordered or per policy  Utilize nutrition screening tool and intervene per policy  Determine patient's food preferences and provide high-protein, high-caloric foods as appropriate       INTERVENTIONS:  - Monitor oral intake, urinary output, labs, and treatment plans  - Assess nutrition and hydration status and recommend course of action  - Evaluate amount of meals eaten  - Assist patient with eating if necessary   - Allow adequate time for meals  - Recommend/ encourage appropriate diets, oral nutritional supplements, and vitamin/mineral supplements  - Order, calculate, and assess calorie counts as needed  - Recommend, monitor, and adjust tube feedings and TPN/PPN based on assessed needs  - Assess need for intravenous fluids  - Provide specific nutrition/hydration education as appropriate  - Include patient/family/caregiver in decisions related to nutrition  Outcome: Progressing     Problem: Prexisting or High Potential for Compromised Skin Integrity  Goal: Skin integrity is maintained or improved  Description  INTERVENTIONS:  - Identify patients at risk for skin breakdown  - Assess and monitor skin integrity  - Assess and monitor nutrition and hydration status  - Monitor labs (i e  albumin)  - Assess for incontinence   - Turn and reposition patient  - Assist with mobility/ambulation  - Relieve pressure over bony prominences  - Avoid friction and shearing  - Provide appropriate hygiene as needed including keeping skin clean and dry  - Evaluate need for skin moisturizer/barrier cream  - Collaborate with interdisciplinary team (i e  Nutrition, Rehabilitation, etc )   - Patient/family teaching  Outcome: Progressing

## 2019-03-20 NOTE — PROGRESS NOTES
[facility] Progress Note  Patient: Zulema Cardenas 81 y o  female   MRN: [id number]  PCP: Destiny Zurita MD  Unit/Bed#: [facility] 748-86 Encounter: [id number]  Date Of Visit: 03/20/19    Assessment:    Principal Problem:    Acute on chronic diastolic congestive heart failure ([email or web address] )  Active Problems:    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 ([place] [address] )    Rheumatoid arthritis ([place] [address] )    Diabetes mellitus type 2 in obese [facility])    Acquired hypothyroidism    Urothelial cancer ([place] [address] )    Atrial fibrillation ([place] [address] )    CKD (chronic kidney disease) stage 3, GFR 30-59 ml/min ([facility])    Chronic acquired lymphedema    Anemia of chronic disease    BRENNEN (acute kidney injury) ([place] [address] )      Plan:    1  Acute on chronic diastolic CHF, improving, EF 60%  demadex & losartan held due to BRENNEN  Cr better today, hence per cardio restarted on torsemide 20 mg QD, losartan 25 mg today  2  Chronic LEs/lymphedema, neuropathic pain likely secondary to volume overload, negative venous Doppler for DVT  3  Anemia of chronic disease with iron deficiency, continue iron supplement,  monitor  4  Atrial fibrillation, stable HR, on Xarelto  5  BRENNEN /CKD stage 3, solitary kidney, baseline creatinine 1 4-1 8, restarted Demadex and losartan today, monitor Cr  6  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  7  DM type 2, A1c 6 5,  acceptable blood sugar, continue ISS  8  History CVA  9  Hypothyroidism, continue levothyroxine  10  Rheumatoid arthritis, on Plaquenil, methotrexate and leucovorin  11  Constipation, continue MiraLax       VTE Pharmacologic Prophylaxis:   Pharmacologic: Rivaroxaban (Xarelto)  Mechanical VTE Prophylaxis in Place: Yes    Patient Centered Rounds: I have performed bedside rounds with nursing staff today  Discussions with Specialists or Other Care Team Provider: cardio    Education and Discussions with Family / Patient: patient    Time Spent for Care: 45 minutes    More than 50% of total time spent on counseling and coordination of care as described above  Current Length of Stay: 7 day(s)    Current Patient Status: Inpatient   Certification Statement: The patient will continue to require additional inpatient hospital stay due to monitor Cr    Discharge Plan / Estimated Discharge Date: possibly tomorrow    Code Status: Level 3 - DNAR and DNI      Subjective:   Feels good, no CP/SOB    Objective:     Vitals:   Temp (24hrs), Av 1 °F (36 7 °C), Min:97 3 °F (36 3 °C), Max:98 9 °F (37 2 °C)    Temp:  [97 3 °F (36 3 °C)-98 9 °F (37 2 °C)] 97 3 °F (36 3 °C)  HR:  [67-77] 77  Resp:  [18] 18  BP: (120-130)/(54-80) 130/64  SpO2:  [94 %-97 %] 97 %  Body mass index is 40 6 kg/m²  Input and Output Summary (last 24 hours): Intake/Output Summary (Last 24 hours) at 3/20/2019 1735  Last data filed at 3/20/2019 1718  Gross per 24 hour   Intake 600 ml   Output 1450 ml   Net -850 ml       Physical Exam:     Physical Exam   Constitutional: She is oriented to person, place, and time  She appears well-developed and well-nourished  HENT:   Head: Normocephalic and atraumatic  Mouth/Throat: Oropharynx is clear and moist    Cardiovascular: Normal rate and regular rhythm  Exam reveals no friction rub  No murmur heard  Pulmonary/Chest: Effort normal and breath sounds normal  No stridor  No respiratory distress  She has no wheezes  Abdominal: Soft  She exhibits no distension  There is no tenderness  There is no guarding  Musculoskeletal: She exhibits no edema  Neurological: She is alert and oriented to person, place, and time  Vitals reviewed          Additional Data:     Labs:    Results from last 7 days   Lab Units 19  0438   WBC Thousand/uL 6 61   HEMOGLOBIN g/dL 8 8*   HEMATOCRIT % 27 6*   PLATELETS Thousands/uL 191   NEUTROS PCT % 66   LYMPHS PCT % 19   MONOS PCT % 10   EOS PCT % 4     Results from last 7 days   Lab Units 19  0511   POTASSIUM mmol/L 4 0   CHLORIDE mmol/L 106 CO2 mmol/L 29   BUN mg/dL 38*   CREATININE mg/dL 1 90*   CALCIUM mg/dL 8 6           * I Have Reviewed All Lab Data Listed Above  * Additional Pertinent Lab Tests Reviewed:  All Labs Within Last 24 Hours Reviewed    Imaging:    Imaging Reports Reviewed Today Include:   Imaging Personally Reviewed by Myself Includes:     Recent Cultures (last 7 days):           Last 24 Hours Medication List:     Current Facility-Administered Medications:  acetaminophen 650 mg Oral BID PRN Gabrielle Cevallos MD   calcium carbonate-vitamin D 1 tablet Oral BID With Meals Gabrielle Cevallos MD   cyanocobalamin 100 mcg Oral Daily Gabrielle Cevallos MD   docusate sodium 100 mg Oral BID Gabrielle Cevallos MD   ferrous sulfate 325 mg Oral BID With Meals Tiny Redo, DO   gabapentin 100 mg Oral BID Tiny Redo, DO   gabapentin 200 mg Oral HS Gabrielle Cevallos MD   hydroxychloroquine 200 mg Oral BID With Meals Gabrielle Cevallos MD   insulin lispro 1-5 Units Subcutaneous HS Gabrielle Cevallos MD   insulin lispro 1-6 Units Subcutaneous TID AC Gabrielle Cevallos MD   leucovorin 10 mg Oral Weekly Gabrielle Cevallos MD   levothyroxine 75 mcg Oral Daily Gabrielle Cevallos MD   losartan 25 mg Oral Daily Maria Dolores Currie PA-C   methotrexate 5 mg Oral Weekly Gabrielle Cevallos MD   multivitamin-minerals 1 tablet Oral Daily Gabrielle Cevallos MD   pantoprazole 40 mg Oral Early Morning Tiny Redo, DO   polyethylene glycol 17 g Oral Daily Tiny Redo, DO   polyvinyl alcohol 1 drop Both Eyes TID Tiny Redo, DO   rivaroxaban 15 mg Oral Daily With Breakfast Gabrielle Cevallos MD   rOPINIRole 1 mg Oral HS Gabrielle Cevallos MD   sodium chloride 1 spray Each Nare Q1H PRN Leslie Caban PA-C   torsemide 20 mg Oral Daily Maria Dolores Currie PA-C     Facility-Administered Medications Ordered in Other Encounters:  acetaminophen 650 mg Oral Q6H PRN Preston Feliz PA-C        Today, Patient Was Seen By: Demetria hCilders MD    ** Please Note: This note has been constructed using a voice recognition system   **

## 2019-03-20 NOTE — PROGRESS NOTES
03/20/19 1600   Clinical Encounter Type   Visited With Patient   Routine Visit Introduction   Sacramental Encounters   Communion Patient wants communion   Communion Given Indicator Yes

## 2019-03-20 NOTE — PROGRESS NOTES
Progress Note - Cardiology   Emmalene Soulier 81 y o  female MRN: [id number]  Unit/Bed#: [facility] 821-01 Encounter: [id number]    Assessment:    Acute on Chronic Diastolic Heart Failure   Likely related to dietary noncompliance with increased fluid intake reportedly gained 10 lbs at home  LVEF 60%   I/0: -1 3 L in last 24 H  -9 7 L total   Wt 214 down from as high as 224 on admission  Reported dry weight 211 per   Previously on torsemide 40 mg daily and losartan 25 mg daily held due to increased creatinine  Restarted losartan and reduced dose of torsemide 20 mg daily 3/19  Plan:  Cr mildly increased from 1 8 to 1 9  Continue losartan 25 and torsemide 20 mg daily  If creatinine stable tomorrow, can be discharged on this regimen  BRENNEN on CKD in setting of solitary kidney  Baseline creatinine 1 4-18  Plan:  Monitor creatinine daily with medication changes     Atrial fibrillation  Currently rate controlled  Plan:  Continue anticoagulation with Xarelto    Subjective/Objective   Patient seen and examined at bedside  No acute events  Overall feels much improved from admission  Weight down to 214 today  Vitals: /80 (BP Location: Right arm)   Pulse 67   Temp 98 °F (36 7 °C) (Oral)   Resp 18   Ht 5' 1" (1 549 m)   Wt 97 5 kg (214 lb 14 4 oz)   LMP  (LMP Unknown)   SpO2 94%   BMI 40 60 kg/m²   Vitals:    03/19/19 0600 03/20/19 0600   Weight: 97 1 kg (214 lb 1 1 oz) 97 5 kg (214 lb 14 4 oz)     Orthostatic Blood Pressures      Most Recent Value   Blood Pressure  120/80 filed at 03/20/2019 0730   Patient Position - Orthostatic VS  Lying filed at 03/20/2019 0730            Intake/Output Summary (Last 24 hours) at 3/20/2019 1102  Last data filed at 3/20/2019 0900  Gross per 24 hour   Intake 420 ml   Output 1700 ml   Net -1280 ml       Invasive Devices          None          Review of Systems: Negative     Physical Exam:   Physical Exam   Constitutional: She is oriented to person, place, and time  She appears well-developed and well-nourished  No distress  HENT:   Head: Normocephalic and atraumatic  Mouth/Throat: Oropharynx is clear and moist  No oropharyngeal exudate  Eyes: Pupils are equal, round, and reactive to light  Conjunctivae are normal  No scleral icterus  Cardiovascular:   No murmur heard  Irregularly irregular   Pulmonary/Chest: Effort normal    Scattered wheezing   Abdominal: Soft  She exhibits no distension  There is no tenderness  There is no rebound and no guarding  Musculoskeletal: She exhibits edema (Trace bilateral lower extremities)  She exhibits no tenderness or deformity  Neurological: She is alert and oriented to person, place, and time  Skin: Skin is warm and dry  She is not diaphoretic  Psychiatric: She has a normal mood and affect  Her behavior is normal  Judgment and thought content normal    Vitals reviewed  Lab Results: I have personally reviewed pertinent lab results  Imaging: I have personally reviewed pertinent reports  EKG: No new EKG  VTE Pharmacologic Prophylaxis: Reason for no pharmacologic prophylaxis xarelto   VTE Mechanical Prophylaxis: sequential compression device    Counseling / Coordination of Care  Total time spent today 30 minutes  Greater than 50% of total time was spent with the patient and / or family counseling and / or coordination of care

## 2019-03-20 NOTE — PROGRESS NOTES
03/20/19 [address] [facility]   Spiritual Beliefs/Perceptions   Support Systems Spouse/significant other;Children   Stress Factors   Patient Stress Factors None identified   Coping Responses   Patient Coping Accepting;Open/discussion   Plan of Care   Comments Pt  requested [employer], listened empathically, provided a caring, supportive presence     Assessment Completed by: Unit visit

## 2019-03-21 VITALS
DIASTOLIC BLOOD PRESSURE: 64 MMHG | SYSTOLIC BLOOD PRESSURE: 124 MMHG | RESPIRATION RATE: 18 BRPM | WEIGHT: 213.2 LBS | OXYGEN SATURATION: 97 % | TEMPERATURE: 98 F | BODY MASS INDEX: 40.25 KG/M2 | HEART RATE: 71 BPM | HEIGHT: 61 IN

## 2019-03-21 LAB
ANION GAP SERPL CALCULATED.3IONS-SCNC: 6 MMOL/L (ref 4–13)
BUN SERPL-MCNC: 36 MG/DL (ref 5–25)
CALCIUM SERPL-MCNC: 9.2 MG/DL (ref 8.3–10.1)
CHLORIDE SERPL-SCNC: 103 MMOL/L (ref 100–108)
CO2 SERPL-SCNC: 28 MMOL/L (ref 21–32)
CREAT SERPL-MCNC: 1.92 MG/DL (ref 0.6–1.3)
GFR SERPL CREATININE-BSD FRML MDRD: 24 ML/MIN/1.73SQ M
GLUCOSE SERPL-MCNC: 121 MG/DL (ref 65–140)
GLUCOSE SERPL-MCNC: 134 MG/DL (ref 65–140)
GLUCOSE SERPL-MCNC: 91 MG/DL (ref 65–140)
POTASSIUM SERPL-SCNC: 3.2 MMOL/L (ref 3.5–5.3)
SODIUM SERPL-SCNC: 137 MMOL/L (ref 136–145)

## 2019-03-21 PROCEDURE — 80048 BASIC METABOLIC PNL TOTAL CA: CPT | Performed by: INTERNAL MEDICINE

## 2019-03-21 PROCEDURE — 82948 REAGENT STRIP/BLOOD GLUCOSE: CPT

## 2019-03-21 PROCEDURE — 99232 SBSQ HOSP IP/OBS MODERATE 35: CPT | Performed by: INTERNAL MEDICINE

## 2019-03-21 PROCEDURE — 99239 HOSP IP/OBS DSCHRG MGMT >30: CPT | Performed by: HOSPITALIST

## 2019-03-21 RX ORDER — FERROUS SULFATE 325(65) MG
325 TABLET ORAL 2 TIMES DAILY WITH MEALS
Refills: 0 | Status: SHIPPED | OUTPATIENT
Start: 2019-03-21 | End: 2019-04-16

## 2019-03-21 RX ORDER — ROPINIROLE 0.5 MG/1
1 TABLET, FILM COATED ORAL
Qty: 270 TABLET | Refills: 0 | Status: SHIPPED | OUTPATIENT
Start: 2019-03-21 | End: 2019-06-12 | Stop reason: SDUPTHER

## 2019-03-21 RX ORDER — PANTOPRAZOLE SODIUM 40 MG/1
40 TABLET, DELAYED RELEASE ORAL
Qty: 14 TABLET | Refills: 0 | Status: SHIPPED | OUTPATIENT
Start: 2019-03-22 | End: 2019-04-16

## 2019-03-21 RX ORDER — POLYETHYLENE GLYCOL 3350 17 G/17G
17 POWDER, FOR SOLUTION ORAL DAILY
Qty: 14 EACH | Refills: 0 | Status: SHIPPED | OUTPATIENT
Start: 2019-03-22

## 2019-03-21 RX ORDER — TORSEMIDE 20 MG/1
20 TABLET ORAL DAILY
Qty: 30 TABLET | Refills: 0 | Status: SHIPPED | OUTPATIENT
Start: 2019-03-22 | End: 2019-04-12 | Stop reason: SDUPTHER

## 2019-03-21 RX ADMIN — GABAPENTIN 100 MG: 100 CAPSULE ORAL at 14:32

## 2019-03-21 RX ADMIN — RIVAROXABAN 15 MG: 15 TABLET, FILM COATED ORAL at 08:03

## 2019-03-21 RX ADMIN — Medication 1 TABLET: at 08:00

## 2019-03-21 RX ADMIN — FERROUS SULFATE TAB 325 MG (65 MG ELEMENTAL FE) 325 MG: 325 (65 FE) TAB at 07:54

## 2019-03-21 RX ADMIN — LOSARTAN POTASSIUM 25 MG: 25 TABLET, FILM COATED ORAL at 08:00

## 2019-03-21 RX ADMIN — TORSEMIDE 20 MG: 20 TABLET ORAL at 08:00

## 2019-03-21 RX ADMIN — PANTOPRAZOLE SODIUM 40 MG: 40 TABLET, DELAYED RELEASE ORAL at 05:34

## 2019-03-21 RX ADMIN — GABAPENTIN 100 MG: 100 CAPSULE ORAL at 08:00

## 2019-03-21 RX ADMIN — POLYVINYL ALCOHOL 1 DROP: 14 SOLUTION/ DROPS OPHTHALMIC at 08:12

## 2019-03-21 RX ADMIN — POLYETHYLENE GLYCOL 3350 17 G: 17 POWDER, FOR SOLUTION ORAL at 08:01

## 2019-03-21 RX ADMIN — VITAM B12 100 MCG: 100 TAB at 08:00

## 2019-03-21 RX ADMIN — HYDROXYCHLOROQUINE SULFATE 200 MG: 200 TABLET, FILM COATED ORAL at 08:00

## 2019-03-21 RX ADMIN — LEVOTHYROXINE SODIUM 75 MCG: 75 TABLET ORAL at 05:34

## 2019-03-21 RX ADMIN — DOCUSATE SODIUM 100 MG: 100 CAPSULE, LIQUID FILLED ORAL at 08:11

## 2019-03-21 RX ADMIN — CALCIUM CARBONATE-VITAMIN D TAB 500 MG-200 UNIT 1 TABLET: 500-200 TAB at 07:53

## 2019-03-21 NOTE — SOCIAL WORK
CM met with pt and pt for tentative d/c today pending blood work  Pt walker delivered and vna updated  Cm will follow family will transport home , imm reviewed and signed

## 2019-03-21 NOTE — PLAN OF CARE
Problem: Potential for Falls  Goal: Patient will remain free of falls  Description  INTERVENTIONS:  - Assess patient frequently for physical needs  -  Identify cognitive and physical deficits and behaviors that affect risk of falls  -  [place] fall precautions as indicated by assessment   - Educate patient/family on patient safety including physical limitations  - Instruct patient to call for assistance with activity based on assessment  - Modify environment to reduce risk of injury  - Consider OT/PT consult to assist with strengthening/mobility  Outcome: Progressing     Problem: CARDIOVASCULAR - ADULT  Goal: Maintains optimal cardiac output and hemodynamic stability  Description  INTERVENTIONS:  - Monitor I/O, vital signs and rhythm  - Monitor for S/S and trends of decreased cardiac output i e  bleeding, hypotension  - Administer and titrate ordered vasoactive medications to optimize hemodynamic stability  - Assess quality of pulses, skin color and temperature  - Assess for signs of decreased coronary artery perfusion - ex   Angina  - Instruct patient to report change in severity of symptoms  Outcome: Progressing     Problem: METABOLIC, FLUID AND ELECTROLYTES - ADULT  Goal: Fluid balance maintained  Description  INTERVENTIONS:  - Monitor labs and assess for signs and symptoms of volume excess or deficit  - Monitor I/O and WT  - Instruct patient on fluid and nutrition as appropriate  Outcome: Progressing     Problem: DISCHARGE PLANNING - CARE MANAGEMENT  Goal: Discharge to post-acute care or home with appropriate resources  Description  INTERVENTIONS:  - Conduct assessment to determine patient/family and health care team treatment goals, and need for post-acute services based on payer coverage, community resources, and patient preferences, and barriers to discharge  - Address psychosocial, clinical, and financial barriers to discharge as identified in assessment in conjunction with the patient/family and health care team  - Arrange appropriate level of post-acute services according to patient?s   needs and preference and payer coverage in collaboration with the physician and health care team  - Communicate with and update the patient/family, physician, and health care team regarding progress on the discharge plan  - Arrange appropriate transportation to post-acute venues  Outcome: Progressing     Problem: Nutrition/Hydration-ADULT  Goal: Nutrient/Hydration intake appropriate for improving, restoring or maintaining nutritional needs  Description  Monitor and assess patient's nutrition/hydration status for malnutrition (ex- brittle hair, bruises, dry skin, pale skin and conjunctiva, muscle wasting, smooth red tongue, and disorientation)  Collaborate with interdisciplinary team and initiate plan and interventions as ordered  Monitor patient's weight and dietary intake as ordered or per policy  Utilize nutrition screening tool and intervene per policy  Determine patient's food preferences and provide high-protein, high-caloric foods as appropriate       INTERVENTIONS:  - Monitor oral intake, urinary output, labs, and treatment plans  - Assess nutrition and hydration status and recommend course of action  - Evaluate amount of meals eaten  - Assist patient with eating if necessary   - Allow adequate time for meals  - Recommend/ encourage appropriate diets, oral nutritional supplements, and vitamin/mineral supplements  - Order, calculate, and assess calorie counts as needed  - Recommend, monitor, and adjust tube feedings and TPN/PPN based on assessed needs  - Assess need for intravenous fluids  - Provide specific nutrition/hydration education as appropriate  - Include patient/family/caregiver in decisions related to nutrition  Outcome: Progressing     Problem: Prexisting or High Potential for Compromised Skin Integrity  Goal: Skin integrity is maintained or improved  Description  INTERVENTIONS:  - Identify patients at risk for skin breakdown  - Assess and monitor skin integrity  - Assess and monitor nutrition and hydration status  - Monitor labs (i e  albumin)  - Assess for incontinence   - Turn and reposition patient  - Assist with mobility/ambulation  - Relieve pressure over bony prominences  - Avoid friction and shearing  - Provide appropriate hygiene as needed including keeping skin clean and dry  - Evaluate need for skin moisturizer/barrier cream  - Collaborate with interdisciplinary team (i e  Nutrition, Rehabilitation, etc )   - Patient/family teaching  Outcome: Progressing

## 2019-03-21 NOTE — PLAN OF CARE
Problem: Potential for Falls  Goal: Patient will remain free of falls  Description  INTERVENTIONS:  - Assess patient frequently for physical needs  -  Identify cognitive and physical deficits and behaviors that affect risk of falls  -  [place] fall precautions as indicated by assessment   - Educate patient/family on patient safety including physical limitations  - Instruct patient to call for assistance with activity based on assessment  - Modify environment to reduce risk of injury  - Consider OT/PT consult to assist with strengthening/mobility  Outcome: Progressing     Problem: CARDIOVASCULAR - ADULT  Goal: Maintains optimal cardiac output and hemodynamic stability  Description  INTERVENTIONS:  - Monitor I/O, vital signs and rhythm  - Monitor for S/S and trends of decreased cardiac output i e  bleeding, hypotension  - Administer and titrate ordered vasoactive medications to optimize hemodynamic stability  - Assess quality of pulses, skin color and temperature  - Assess for signs of decreased coronary artery perfusion - ex   Angina  - Instruct patient to report change in severity of symptoms  Outcome: Progressing     Problem: METABOLIC, FLUID AND ELECTROLYTES - ADULT  Goal: Fluid balance maintained  Description  INTERVENTIONS:  - Monitor labs and assess for signs and symptoms of volume excess or deficit  - Monitor I/O and WT  - Instruct patient on fluid and nutrition as appropriate  Outcome: Progressing     Problem: DISCHARGE PLANNING - CARE MANAGEMENT  Goal: Discharge to post-acute care or home with appropriate resources  Description  INTERVENTIONS:  - Conduct assessment to determine patient/family and health care team treatment goals, and need for post-acute services based on payer coverage, community resources, and patient preferences, and barriers to discharge  - Address psychosocial, clinical, and financial barriers to discharge as identified in assessment in conjunction with the patient/family and health care team  - Arrange appropriate level of post-acute services according to patient?s   needs and preference and payer coverage in collaboration with the physician and health care team  - Communicate with and update the patient/family, physician, and health care team regarding progress on the discharge plan  - Arrange appropriate transportation to post-acute venues  Outcome: Progressing     Problem: Nutrition/Hydration-ADULT  Goal: Nutrient/Hydration intake appropriate for improving, restoring or maintaining nutritional needs  Description  Monitor and assess patient's nutrition/hydration status for malnutrition (ex- brittle hair, bruises, dry skin, pale skin and conjunctiva, muscle wasting, smooth red tongue, and disorientation)  Collaborate with interdisciplinary team and initiate plan and interventions as ordered  Monitor patient's weight and dietary intake as ordered or per policy  Utilize nutrition screening tool and intervene per policy  Determine patient's food preferences and provide high-protein, high-caloric foods as appropriate       INTERVENTIONS:  - Monitor oral intake, urinary output, labs, and treatment plans  - Assess nutrition and hydration status and recommend course of action  - Evaluate amount of meals eaten  - Assist patient with eating if necessary   - Allow adequate time for meals  - Recommend/ encourage appropriate diets, oral nutritional supplements, and vitamin/mineral supplements  - Order, calculate, and assess calorie counts as needed  - Recommend, monitor, and adjust tube feedings and TPN/PPN based on assessed needs  - Assess need for intravenous fluids  - Provide specific nutrition/hydration education as appropriate  - Include patient/family/caregiver in decisions related to nutrition  Outcome: Progressing     Problem: Prexisting or High Potential for Compromised Skin Integrity  Goal: Skin integrity is maintained or improved  Description  INTERVENTIONS:  - Identify patients at risk for skin breakdown  - Assess and monitor skin integrity  - Assess and monitor nutrition and hydration status  - Monitor labs (i e  albumin)  - Assess for incontinence   - Turn and reposition patient  - Assist with mobility/ambulation  - Relieve pressure over bony prominences  - Avoid friction and shearing  - Provide appropriate hygiene as needed including keeping skin clean and dry  - Evaluate need for skin moisturizer/barrier cream  - Collaborate with interdisciplinary team (i e  Nutrition, Rehabilitation, etc )   - Patient/family teaching  Outcome: Progressing

## 2019-03-21 NOTE — PROGRESS NOTES
Progress Note - Cardiology   Delgado Mac 81 y o  female MRN: [id number]  Unit/Bed#: [facility] 817-01 Encounter: [id number]    Assessment:    Acute on Chronic Diastolic Heart Failure   Likely related to dietary noncompliance with increased fluid intake reportedly gained 10 lbs at home  LVEF 60%   I/0: -1 03 L in last 24 H  -9 7 L total   Wt 213 down from as high as 224 on admission  Reported dry weight 211 per   Previously on torsemide 40 mg daily and losartan 25 mg daily held due to increased creatinine  Restarted losartan and reduced dose of torsemide 20 mg daily 3/19  Creatinine stable today at 1 9  Plan:  Can be discharged on torsemide 20 mg daily and spironolactone 25 mg daily   2g sodium diet with fluid restriction   Outpatient follow up with Dr Radha Eric      BRENNEN on CKD in setting of solitary kidney  Baseline creatinine 1 4-1 8  Plan:  Monitor creatinine daily with medication changes      Atrial fibrillation  Currently rate controlled  Plan:  Continue anticoagulation with Xarelto    Subjective/Objective   Patient seen and examined at bedside  No complaints overnight  Vitals: /68 (BP Location: Right arm)   Pulse 72   Temp 97 5 °F (36 4 °C) (Oral)   Resp 18   Ht 5' 1" (1 549 m)   Wt 96 7 kg (213 lb 3 2 oz)   LMP  (LMP Unknown)   SpO2 97%   BMI 40 28 kg/m²   Vitals:    03/20/19 0600 03/21/19 0600   Weight: 97 5 kg (214 lb 14 4 oz) 96 7 kg (213 lb 3 2 oz)     Orthostatic Blood Pressures      Most Recent Value   Blood Pressure  143/68 filed at 03/21/2019 0719   Patient Position - Orthostatic VS  Sitting filed at 03/21/2019 0719            Intake/Output Summary (Last 24 hours) at 3/21/2019 1029  Last data filed at 3/21/2019 0800  Gross per 24 hour   Intake 600 ml   Output 1430 ml   Net -830 ml       Invasive Devices          None          Review of Systems: Negative     Physical Exam:   Physical Exam   Constitutional: She appears well-developed and well-nourished  No distress     HENT: Head: Normocephalic and atraumatic  Mouth/Throat: Oropharynx is clear and moist    Eyes: Pupils are equal, round, and reactive to light  Conjunctivae are normal  No scleral icterus  Cardiovascular: Normal rate, regular rhythm and normal heart sounds  No murmur heard  Pulmonary/Chest: Effort normal and breath sounds normal  No respiratory distress  She has no wheezes  She has no rales  Abdominal: Soft  She exhibits no distension  There is no tenderness  There is no rebound and no guarding  Musculoskeletal: She exhibits no edema, tenderness or deformity  Neurological: She is alert  Skin: Skin is warm and dry  She is not diaphoretic  Psychiatric: She has a normal mood and affect  Her behavior is normal  Judgment and thought content normal    Vitals reviewed  Lab Results: I have personally reviewed pertinent lab results  Imaging: I have personally reviewed pertinent reports  EKG: No new EKG  VTE Pharmacologic Prophylaxis: Reason for no pharmacologic prophylaxis xarelto   VTE Mechanical Prophylaxis: sequential compression device    Counseling / Coordination of Care  Total time spent today 30 minutes  Greater than 50% of total time was spent with the patient and / or family counseling and / or coordination of care

## 2019-03-22 ENCOUNTER — TRANSITIONAL CARE MANAGEMENT (OUTPATIENT)
Dept: FAMILY MEDICINE CLINIC | Facility: CLINIC | Age: 84
End: 2019-03-22

## 2019-03-22 NOTE — DISCHARGE SUMMARY
Discharge Summary - [facility] 73 Internal Medicine    Patient Information: Moose Lira 81 y o  female MRN: [id number]  Unit/Bed#: [facility] 620-20 Encounter: [id number]    Discharging Physician / Practitioner: Cynthia Ovalle MD  PCP: Nisha Clifford MD  Admission Date: 3/13/2019  Discharge Date: 03/21/19    Disposition:     Home    Reason for Admission: leg swelling & pain    Discharge Diagnoses:     Principal Problem:    Acute on chronic diastolic congestive heart failure ([facility] )  Active Problems:    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 ([place] [address] )    Rheumatoid arthritis ([place] [address] )    Diabetes mellitus type 2 in obese [facility])    Acquired hypothyroidism    Urothelial cancer ([place] [address] )    Atrial fibrillation ([place] [address] )    CKD (chronic kidney disease) stage 3, GFR 30-59 ml/min ([facility])    Chronic acquired lymphedema    Anemia of chronic disease    BRENNEN (acute kidney injury) ([place] [address] )  Resolved Problems:    * No resolved hospital problems  *      Consultations During Hospital Stay:  · Cardiology  · nephrology    Procedures Performed:     · none    Significant Findings / Test Results:     · none    Incidental Findings:   · none     Test Results Pending at Discharge (will require follow up):   · none     Outpatient Tests Requested:  · none    Complications:  none    Hospital Course:     Moose Lira is a 81 y o  female patient who originally presented to the hospital on 3/13/2019 due to leg swelling & pain  past medical history significant for diastolic CHF (EF 43% 1/08/2054), urothelial cancer status post right nephroureterectomy 04/23/2018, history of CVA, atrial fibrillation on Xarelto, and PAD s/p stent x2 right iliac 1/24/2019  She presents with a 3-4 week history of worsening lower extremity edema, with worsening pain in the thighs associated with this  She reports she is using her lymphedema machine as well as compression stockings without significant relief of swelling    She states she has gained approximately 10 lb over the past 3-4 weeks, admits to drinking 5-6 cups of water daily but with some pop usage, and states she is compliant with her medications    1  Acute on chronic diastolic CHF, improving, EF 60%  Treated with IV diuresis, switched to PO torsemide 20 mg QD upon DC  Cr remained stable at 1 9 last 2 days  2  Chronic LEs/lymphedema, neuropathic pain likely secondary to volume overload, negative venous Doppler for DVT  3  Anemia of chronic disease with iron deficiency, continue iron supplement,  monitor  4  Atrial fibrillation, stable HR, on Xarelto  5  BRENNEN /CKD stage 3, solitary kidney, baseline creatinine 1 4-1 8, continue Demadex and losartan  6  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  7  DM type 2, A1c 6 5,  acceptable blood sugar, continue ISS  8  History CVA  9  Hypothyroidism, continue levothyroxine  10  Rheumatoid arthritis, on Plaquenil, methotrexate and leucovorin  11  Constipation, continue MiraLax      Condition at Discharge: stable     Discharge Day Visit / Exam:     Subjective:  Feels good    Vitals: Blood Pressure: 124/64 (03/21/19 1553)  Pulse: 71 (03/21/19 1553)  Temperature: 98 °F (36 7 °C) (03/21/19 1553)  Temp Source: Oral (03/21/19 1553)  Respirations: 18 (03/21/19 1553)  Height: 5' 1" (154 9 cm) (03/13/19 2122)  Weight - Scale: 96 7 kg (213 lb 3 2 oz) (03/21/19 0600)  SpO2: 97 % (03/21/19 1553)  Exam:   Physical Exam   Constitutional: She is oriented to person, place, and time  She appears well-developed and well-nourished  HENT:   Head: Normocephalic and atraumatic  Mouth/Throat: Oropharynx is clear and moist    Cardiovascular: Normal rate and regular rhythm  Exam reveals no friction rub  No murmur heard  Pulmonary/Chest: Effort normal and breath sounds normal  No stridor  No respiratory distress  She has no wheezes  Abdominal: Soft  She exhibits no distension  There is no tenderness  There is no guarding     Musculoskeletal: She exhibits no edema  Neurological: She is alert and oriented to person, place, and time  Vitals reviewed  Discharge instructions/Information to patient and family:   See after visit summary for information provided to patient and family  Provisions for Follow-Up Care:  See after visit summary for information related to follow-up care and any pertinent home health orders  Planned Readmission: no     Discharge Statement:  I spent 45 minutes discharging the patient  This time was spent on the day of discharge  I had direct contact with the patient on the day of discharge  Greater than 50% of the total time was spent examining patient, answering all patient questions, arranging and discussing plan of care with patient as well as directly providing post-discharge instructions  Additional time then spent on discharge activities  Discharge Medications:  See after visit summary for reconciled discharge medications provided to patient and family        ** Please Note: This note has been constructed using a voice recognition system **

## 2019-03-25 ENCOUNTER — OFFICE VISIT (OUTPATIENT)
Dept: CARDIOLOGY CLINIC | Facility: CLINIC | Age: 84
End: 2019-03-25
Payer: MEDICARE

## 2019-03-25 VITALS
DIASTOLIC BLOOD PRESSURE: 64 MMHG | SYSTOLIC BLOOD PRESSURE: 128 MMHG | HEART RATE: 79 BPM | HEIGHT: 61 IN | BODY MASS INDEX: 41.52 KG/M2 | OXYGEN SATURATION: 98 % | WEIGHT: 219.9 LBS

## 2019-03-25 DIAGNOSIS — I50.32 CHRONIC DIASTOLIC CONGESTIVE HEART FAILURE (HCC): Primary | ICD-10-CM

## 2019-03-25 DIAGNOSIS — N18.30 CKD (CHRONIC KIDNEY DISEASE) STAGE 3, GFR 30-59 ML/MIN (HCC): ICD-10-CM

## 2019-03-25 DIAGNOSIS — G47.33 OBSTRUCTIVE SLEEP APNEA: ICD-10-CM

## 2019-03-25 DIAGNOSIS — E11.69 DIABETES MELLITUS TYPE 2 IN OBESE (HCC): ICD-10-CM

## 2019-03-25 DIAGNOSIS — E66.9 DIABETES MELLITUS TYPE 2 IN OBESE (HCC): ICD-10-CM

## 2019-03-25 PROCEDURE — 99215 OFFICE O/P EST HI 40 MIN: CPT | Performed by: NURSE PRACTITIONER

## 2019-03-25 NOTE — PROGRESS NOTES
Heart Failure Office Visit   Anel Glaser 81 y o  female MRN: [id number]    [facility]  Ms Jacoby Saldaña is an 81year old female with a known past medical history of  Urothelial CA sp Right Nephrectomy 4/23/18  TIA/CVA on Xarelto  PAD stent x 2 right iliac  1/24/19  Lymphedema  NICK compliant with CPAP at night   RA on Methotrexate and Plaquenil  CKD III baseline creat 1 4 - 1 8   DM2 Hgb A1C 6 5  Patient Active Problem List   Diagnosis    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 ([address] )    Essential hypertension    Rheumatoid arthritis ([address] )    [facility] type 2 [facility]    Sleep apnea    Acquired hypothyroidism    Chronic GERD    Edema    Hypercholesterolemia    Obesity    Peripheral neuropathy    Primary osteoarthritis of left knee    Restless leg syndrome    Right lumbar radiculopathy    Sensorineural hearing loss    Sinus arrhythmia    Chronic bilateral thoracic back pain    Thoracic degenerative disc disease    Urothelial cancer ([address] )    Lung nodule < 6cm on CT    Pancreatic cyst    Iron deficiency anemia    Anemia    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 (HCC)    History of nephroureterectomy    Incisional hernia    Acute on chronic diastolic congestive heart failure (HCC)    Atrial fibrillation (HCC)    CKD (chronic kidney disease) stage 3, GFR 30-59 ml/min (HCC)    Acute on chronic congestive heart failure (HCC)    Chronic diastolic heart failure (HCC)    Fracture    Spontaneous dislocation of shoulder, left    Abdominal wall hernia    Atherosclerosis of native artery of right lower extremity with rest pain (HCC)    Pain in both lower extremities    Chronic acquired lymphedema    Anemia of chronic disease    BRENNEN (acute kidney injury) [facility])     Ms Jacoby Saldaña was admitted to [facility] on 3/13 - 3/21/19 with acute on chronic diastolic heart failure    Jami Gatica was seen by her primary care physician who sent to the ED due to worsening lower extremity edema and pain  She has gained 10 lb over 3-4 week period of fluid  She was drinking more fluids  3/14/19 limited TTE left ventricular systolic function normal   LVEF 60%  Wall thickness mildly increased  Left atrium mildly dilated  Mild TR  She was diuresed with IV Lasix and transition to torsemide 20 mg daily  Discharge creatinine 1 92  Discharge weight 213 lb  Today Ms Albino Gutiérrez presents to our office for recent hospitalization follow-up visit  She is accompanied by her   She denies dyspnea with minimal moderate exertion chest pain palpitations lightheadedness or dizziness  She admits to orthopnea  She is compliant with CPAP at HS  Her weight at home was 217 lb and her weight in the office is 219 lb  Her  shopping and cooking Clay Pulling is abiding by 2 g sodium diet 1500 cc fluid restriction  No recent lab studies completed  She is followed by [facility]  Review of Systems   Constitution: Negative  Cardiovascular: Positive for leg swelling  Respiratory: Negative  Musculoskeletal: Positive for arthritis  Objective:   Vitals:   Vitals:    03/25/19 1029   BP: 128/64   BP Location: Right arm   Patient Position: Sitting   Cuff Size: Large   Pulse: 79   SpO2: 98%   Weight: 99 7 kg (219 lb 14 4 oz)   Height: 5' 1" (1 549 m)     Body mass index is 41 55 kg/m²      Wt Readings from Last 3 Encounters:   03/25/19 99 7 kg (219 lb 14 4 oz)   03/21/19 96 7 kg (213 lb 3 2 oz)   03/13/19 101 kg (222 lb)         Physical Exam:  GEN: Moose Lira appears well, alert and oriented x 3, pleasant and cooperative   HEENT: pupils equal, round, and reactive to light; extraocular muscles intact  NECK: supple, no carotid bruits   HEART: regular rhythm, normal S1 and S2, no murmurs, clicks, gallops or rubs, JVP is flat    LUNGS: clear to auscultation bilaterally; no wheezes, rales, or rhonchi   ABDOMEN: normal bowel sounds, soft, no tenderness, no distention  EXTREMITIES: peripheral pulses normal; no clubbing, cyanosis, 1+ amadou LE edema   NEURO: no focal findings   SKIN: normal without suspicious lesions on exposed skin      Current Outpatient Medications:     acetaminophen (TYLENOL) 325 mg tablet, Take 650 mg by mouth 2 (two) times a day as needed for mild pain , Disp: , Rfl:     Calcium Citrate-Vitamin D (CALCIUM + D PO), Take 1 tablet by mouth 2 (two) times a day, Disp: , Rfl:     cyanocobalamin (VITAMIN B-12) 100 mcg tablet, Take by mouth, Disp: , Rfl:     docusate sodium (COLACE) 100 mg capsule, Take 1 capsule (100 mg total) by mouth 2 (two) times a day for 60 doses, Disp: 90 capsule, Rfl: 0    ferrous sulfate 325 (65 Fe) mg tablet, Take 1 tablet (325 mg total) by mouth 2 (two) times a day with meals, Disp: , Rfl: 0    gabapentin (NEURONTIN) 100 mg capsule, TAKE 1 CAPSULE IN THE MORNING, TAKE 1 CAPSULE IN THE AFTERNOON AND TAKE 1 TO 3 CAPSULES AT BEDTIME (Patient taking differently: TAKE 1 CAPSULE IN THE MORNING, TAKE 1 CAPSULE IN THE AFTERNOON AND TAKE 2 CAPSULES AT BEDTIME), Disp: 450 capsule, Rfl: 1    hydroxychloroquine (PLAQUENIL) 200 mg tablet, Take 200 mg by mouth 2 (two) times a day with meals, Disp: , Rfl:     LEUCOVORIN CALCIUM PO, Take 10 mg by mouth once a week, Disp: , Rfl:     levothyroxine 75 mcg tablet, Take 1 tablet (75 mcg total) by mouth daily, Disp: 90 tablet, Rfl: 3    losartan (COZAAR) 25 mg tablet, TAKE 1 TABLET EVERY DAY, Disp: 90 tablet, Rfl: 0    methotrexate 2 5 mg tablet, Uses 4 a week via Rheumat[facility] (Patient taking differently: 2 5 mg once a week Uses 4 a week via [facility]), Disp: 12 tablet, Rfl: 0    Multiple Vitamin (MULTIVITAMINS PO), Take 1 tablet by mouth daily, Disp: , Rfl:     pantoprazole (PROTONIX) 40 mg tablet, Take 1 tablet (40 mg total) by mouth daily in the early morning for 14 days, Disp: 14 tablet, Rfl: 0    polyethylene glycol (MIRALAX) 17 g packet, Take 17 g by mouth daily (Patient taking differently: Take 17 g by mouth as needed ), Disp: 14 each, Rfl: 0    pravastatin (PRAVACHOL) 80 mg tablet, TAKE 1 TABLET EVERY DAY, Disp: 90 tablet, Rfl: 3    rivaroxaban (XARELTO) 15 mg tablet, Take 1 tablet (15 mg total) by mouth daily with breakfast, Disp: 90 tablet, Rfl: 3    rOPINIRole (REQUIP) 0 5 mg tablet, Take 2 tablets (1 mg total) by mouth daily at bedtime, Disp: 270 tablet, Rfl: 0    torsemide (DEMADEX) 20 mg tablet, Take 1 tablet (20 mg total) by mouth daily for 30 days, Disp: 30 tablet, Rfl: 0  No current facility-administered medications for this visit  Facility-Administered Medications Ordered in Other Visits:     acetaminophen (TYLENOL) tablet 650 mg, 650 mg, Oral, Q6H PRN, Preston Feliz PA-C      Labs & Results:              Results from last 7 days   Lab Units 03/21/19  1020 03/20/19  0511 03/19/19  0557   POTASSIUM mmol/L 3 2* 4 0 4 0   CHLORIDE mmol/L 103 106 105   CO2 mmol/L 28 29 29   BUN mg/dL 36* 38* 34*   CREATININE mg/dL 1 92* 1 90* 1 82*   CALCIUM mg/dL 9 2 8 6 8 5               Assessment/Plan:   1  Chronic diastolic heart failure NYHA class III stage C, LVEF 60% - weight in the office is 219 lb weight at home 217 lb on physical exam compensated  Blood pressure and heart rate controlled  Compliant with CPAP at night  Continue on Cozaar 25 mg daily, torsemide 20 mg daily  Maintain a 2 gram daily sodium diet and 1500 ml daily fluid restriction  Check daily weights  If you gained 3 pounds in one day, 5 pounds in one week, or experience worsening shortness of breath or increasing lower leg swelling  Please call the heart failure office at [phone]  Please bring a  list of your current medications and daily weights to the office visit  2  CKD III baseline creat 1 4 - 1 8 - BMP  3  DM2 Hgb A1C 6 5  4  NICK compliant with CPAP at night   5  RA on Methotrexate and Plaquenil  6  Hx of TIA/CVA on Xarelto  7  PAD stent x 2 right iliac  1/24/19  8  Lymphedema  Patient Active Problem List       SANTOSH Sandoval  3/25/2019  10:41 AM

## 2019-03-25 NOTE — PATIENT INSTRUCTIONS
Maintain a 2 gram daily sodium diet and 1500 ml daily fluid restriction  Check daily weights  If you gain 3 pounds in one day, 5 pounds in one week, or experience worsening shortness of breath or increasing lower leg swelling  Please call the heart failure office at [phone]    Please bring a  list of your current medications and daily weights to the office visit

## 2019-03-26 ENCOUNTER — OFFICE VISIT (OUTPATIENT)
Dept: FAMILY MEDICINE CLINIC | Facility: CLINIC | Age: 84
End: 2019-03-26
Payer: MEDICARE

## 2019-03-26 VITALS
WEIGHT: 218.8 LBS | OXYGEN SATURATION: 98 % | SYSTOLIC BLOOD PRESSURE: 144 MMHG | TEMPERATURE: 96.9 F | HEART RATE: 88 BPM | HEIGHT: 61 IN | BODY MASS INDEX: 41.31 KG/M2 | DIASTOLIC BLOOD PRESSURE: 100 MMHG

## 2019-03-26 DIAGNOSIS — I50.9 ACUTE ON CHRONIC CONGESTIVE HEART FAILURE, UNSPECIFIED HEART FAILURE TYPE (HCC): Primary | ICD-10-CM

## 2019-03-26 DIAGNOSIS — G62.9 PERIPHERAL POLYNEUROPATHY: ICD-10-CM

## 2019-03-26 DIAGNOSIS — G63 POLYNEUROPATHY ASSOCIATED WITH UNDERLYING DISEASE (HCC): ICD-10-CM

## 2019-03-26 DIAGNOSIS — R60.9 EDEMA, UNSPECIFIED TYPE: ICD-10-CM

## 2019-03-26 DIAGNOSIS — I48.0 PAROXYSMAL ATRIAL FIBRILLATION (HCC): ICD-10-CM

## 2019-03-26 DIAGNOSIS — N18.30 CKD (CHRONIC KIDNEY DISEASE) STAGE 3, GFR 30-59 ML/MIN (HCC): ICD-10-CM

## 2019-03-26 DIAGNOSIS — N17.9 AKI (ACUTE KIDNEY INJURY) (HCC): ICD-10-CM

## 2019-03-26 DIAGNOSIS — R39.9 UTI SYMPTOMS: ICD-10-CM

## 2019-03-26 DIAGNOSIS — I50.33 ACUTE ON CHRONIC DIASTOLIC CONGESTIVE HEART FAILURE (HCC): ICD-10-CM

## 2019-03-26 DIAGNOSIS — I74.09 OTHER ARTERIAL EMBOLISM AND THROMBOSIS OF ABDOMINAL AORTA (HCC): ICD-10-CM

## 2019-03-26 LAB
SL AMB  POCT GLUCOSE, UA: NEGATIVE
SL AMB LEUKOCYTE ESTERASE,UA: ABNORMAL
SL AMB POCT BILIRUBIN,UA: NEGATIVE
SL AMB POCT BLOOD,UA: ABNORMAL
SL AMB POCT CLARITY,UA: CLEAR
SL AMB POCT COLOR,UA: ABNORMAL
SL AMB POCT KETONES,UA: NEGATIVE
SL AMB POCT NITRITE,UA: NEGATIVE
SL AMB POCT PH,UA: 5.5
SL AMB POCT SPECIFIC GRAVITY,UA: 1
SL AMB POCT URINE PROTEIN: NEGATIVE
SL AMB POCT UROBILINOGEN: 0.2

## 2019-03-26 PROCEDURE — 81002 URINALYSIS NONAUTO W/O SCOPE: CPT | Performed by: FAMILY MEDICINE

## 2019-03-26 PROCEDURE — 87186 SC STD MICRODIL/AGAR DIL: CPT | Performed by: FAMILY MEDICINE

## 2019-03-26 PROCEDURE — 87077 CULTURE AEROBIC IDENTIFY: CPT | Performed by: FAMILY MEDICINE

## 2019-03-26 PROCEDURE — 87086 URINE CULTURE/COLONY COUNT: CPT | Performed by: FAMILY MEDICINE

## 2019-03-26 PROCEDURE — 99496 TRANSJ CARE MGMT HIGH F2F 7D: CPT | Performed by: FAMILY MEDICINE

## 2019-03-26 RX ORDER — GABAPENTIN 100 MG/1
CAPSULE ORAL
Qty: 450 CAPSULE | Refills: 3 | Status: SHIPPED | OUTPATIENT
Start: 2019-03-26 | End: 2020-01-01 | Stop reason: SDUPTHER

## 2019-03-26 RX ORDER — LOSARTAN POTASSIUM 25 MG/1
25 TABLET ORAL DAILY
Qty: 90 TABLET | Refills: 3 | Status: SHIPPED | OUTPATIENT
Start: 2019-03-26 | End: 2020-01-01 | Stop reason: SDUPTHER

## 2019-03-26 NOTE — ASSESSMENT & PLAN NOTE
She diuresed 9 lb while in the hospital   She is feeling much better  She will continue with the torsemide 20 mg daily  She will continue with Cardiology follow-up

## 2019-03-26 NOTE — PROGRESS NOTES
Assessment/Plan:     Acute on chronic congestive heart failure ([facility] 75 )  She diuresed 9 lb while in the hospital   She is feeling much better  She will continue with the torsemide 20 mg daily  She will continue with Cardiology follow-up  Atrial fibrillation ([facility])  Heart rate is well controlled  No bleeding problems on Xarelto  CKD (chronic kidney disease) stage 3, GFR 30-59 ml/min ([facility])  She will continue nephrology f/u    Edema  Much improved after diuresis       Diagnoses and all orders for this visit:    Acute on chronic congestive heart failure, unspecified heart failure type ([facility] 75 )    Peripheral polyneuropathy  -     gabapentin (NEURONTIN) 100 mg capsule; Take 1 capsule in the morning, take 1 capsule in the afternoon, and take 1-3 capapsules at bedtime    Acute on chronic diastolic congestive heart failure ([facility])  -     losartan (COZAAR) 25 mg tablet; Take 1 tablet (25 mg total) by mouth daily    Polyneuropathy associated with underlying disease ([place] [address] )    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 ([facility])    Paroxysmal atrial fibrillation ([facility])    CKD (chronic kidney disease) stage 3, GFR 30-59 ml/min ([facility])    BRENNEN (acute kidney injury) ([facility])    Edema, unspecified type    UTI symptoms  -     POCT urine dip  -     Urine culture         Subjective:     Patient ID: Anna Huizar is a 81 y o  female  She is here for transitional care management following hospitalization for acute on chronic congestive heart failure  She lost 9 lb while in the hospital and edema is much improved  She was transition from IV diuresis to po Torsemide  Currently she is feeling much better with normal breathing and no chest symptoms  The thigh pain has resolved with diuresis  She also developed a callus on the right inner heel  She has been treating with baby ointment and a Band-Aid    She has UTI sx again with strong odor        Review of Systems   Constitutional: Negative for activity change, chills, fatigue and fever  HENT: Negative for congestion, ear pain, sinus pressure and sore throat  Eyes: Negative for pain and visual disturbance  Respiratory: Negative for cough, chest tightness, shortness of breath and wheezing  Cardiovascular: Positive for leg swelling  Negative for chest pain and palpitations  Gastrointestinal: Negative for abdominal pain, blood in stool, constipation, diarrhea, nausea and vomiting  Endocrine: Negative for polydipsia and polyuria  Genitourinary: Positive for frequency  Negative for difficulty urinating, dysuria, hematuria and urgency  Musculoskeletal: Positive for arthralgias  Negative for joint swelling and myalgias  Skin: Negative for rash  Neurological: Negative for dizziness, seizures, weakness, numbness and headaches  Hematological: Negative for adenopathy  Does not bruise/bleed easily  Psychiatric/Behavioral: Negative for dysphoric mood  The patient is not nervous/anxious  Objective:     Physical Exam   Constitutional: She is oriented to person, place, and time  She appears well-developed and well-nourished  Morbidly obese   HENT:   Head: Normocephalic and atraumatic  Neck: Normal range of motion  Neck supple  Cardiovascular: Normal heart sounds  irreg irreg   Pulmonary/Chest: Effort normal and breath sounds normal    Musculoskeletal: She exhibits edema  Neurological: She is alert and oriented to person, place, and time  Skin: Skin is warm and dry  There is superficial abraded appearance to the right medial heel  Psychiatric: She has a normal mood and affect  Her behavior is normal    Nursing note and vitals reviewed  Vitals:    03/26/19 1212   BP: 144/100   Pulse: 88   Temp: (!) 96 9 °F (36 1 °C)   TempSrc: Tympanic   SpO2: 98%   Weight: 99 2 kg (218 lb 12 8 oz)   Height: 5' 1" (1 549 m)       Transitional Care Management Review:  Brad Boyce is a 81 y o  female here for TCM follow up       During the TCM phone call patient stated:    TCM Call (since 2/23/2019)     Date and time call was made  3/22/2019  9:42 AM    Hospital care reviewed  Records reviewed    Patient was hospitial[facility] at  [facility]    Date of Admission  03/13/19    Date of discharge  03/21/19    Diagnosis  lower extremity edema    Disposition  Home    Were the patients medications reviewed and updated  No    Current Symptoms  None      TCM Call (since 2/23/2019)     Post hospital issues  None    Should patient be enrolled in anticoag monitoring? No    Scheduled for follow up?   Yes    Did you obtain your prescribed medications  Yes    Do you need help managing your prescriptions or medications  No    Is transportation to your appointment needed  No    I have advised the patient to call PCP with any new or worsening symptoms  lhunter    Living Arrangements  Spouse or Significiant other    Are you recieving any outpatient services  No    Are you recieving home care services  Yes    Types of home care services  Nurse visit    Are you using any community resources  No    Current waiver services  No    Have you fallen in the last 12 months  Yes    How many times  one     Interperter language line needed  No    Comments  scheduled with Dr Red Litten 3/26          Mago Lares MD

## 2019-03-27 ENCOUNTER — PATIENT OUTREACH (OUTPATIENT)
Dept: CASE MANAGEMENT | Facility: OTHER | Age: 84
End: 2019-03-27

## 2019-03-27 NOTE — PROGRESS NOTES
Chart review completed, confirmed that patient was opened with skilled nursing services on 3/22/19  Will follow up with patient for Outpatient Care Management services

## 2019-03-28 ENCOUNTER — TELEPHONE (OUTPATIENT)
Dept: FAMILY MEDICINE CLINIC | Facility: CLINIC | Age: 84
End: 2019-03-28

## 2019-03-28 DIAGNOSIS — N30.00 ACUTE CYSTITIS WITHOUT HEMATURIA: Primary | ICD-10-CM

## 2019-03-28 LAB — BACTERIA UR CULT: ABNORMAL

## 2019-03-28 RX ORDER — AMOXICILLIN 875 MG/1
875 TABLET, COATED ORAL 2 TIMES DAILY
Qty: 14 TABLET | Refills: 0 | Status: SHIPPED | OUTPATIENT
Start: 2019-03-28 | End: 2019-04-04

## 2019-03-29 ENCOUNTER — TELEPHONE (OUTPATIENT)
Dept: UROLOGY | Facility: MEDICAL CENTER | Age: 84
End: 2019-03-29

## 2019-03-29 ENCOUNTER — TELEPHONE (OUTPATIENT)
Dept: NEPHROLOGY | Facility: CLINIC | Age: 84
End: 2019-03-29

## 2019-03-29 NOTE — TELEPHONE ENCOUNTER
I spoke to the patient, she is aware she needs blood work before her [place] appointment on Tuesday   She is going to [place] [id number] on 4/1

## 2019-03-29 NOTE — TELEPHONE ENCOUNTER
Patient of Dr Ariane Pope seen in [address]  Patient was given an antibiotic by her PCP's office yesterday, amoxicillin, one in the morning, one at night for 7 days  She only has one kidney  She was told by us to check in with us if she got another UTI  She can be reached at [phone]

## 2019-03-29 NOTE — TELEPHONE ENCOUNTER
Spoke with patient  Patient advised to take antibiotics as directed by PCP  Patient instructed to call office if after completing antibiotics, symptoms still remain or to call office in future for UTI treatment  Patient verbalized understanding

## 2019-03-29 NOTE — TELEPHONE ENCOUNTER
Patient managed by Dr Noé Farfan and Dr Jing Barnett in the [address] [address] and [facility] offices       Patient has history of urothelial cancer, for which Dr Noé Farfan is treating, last cysto done on 1/11/19, has pending follow up on 4/26/19 for office cysto  Has pending PTNS appointment in the [facility] office on 4/9/19  Patient also has history of urge incontinence, for which Dr Jing Barnett is treating, patient currently being treated with PTNS  Per chart review patient was seen by PCP on 3/26/19 for UTI symptoms  Per review of systems, only complaint is frequency and foul smelling urine  Urine culture was obtained, results in Epic show 30,000-39,000 enterococcus faecalis  Patient currently on 7 day course of amoxicillin per PCP  Please review and advise on additional orders

## 2019-04-01 ENCOUNTER — APPOINTMENT (OUTPATIENT)
Dept: LAB | Facility: CLINIC | Age: 84
End: 2019-04-01
Payer: MEDICARE

## 2019-04-01 ENCOUNTER — TRANSCRIBE ORDERS (OUTPATIENT)
Dept: LAB | Facility: CLINIC | Age: 84
End: 2019-04-01

## 2019-04-01 ENCOUNTER — OFFICE VISIT (OUTPATIENT)
Dept: VASCULAR SURGERY | Facility: CLINIC | Age: 84
End: 2019-04-01
Payer: MEDICARE

## 2019-04-01 ENCOUNTER — PATIENT OUTREACH (OUTPATIENT)
Dept: CASE MANAGEMENT | Facility: OTHER | Age: 84
End: 2019-04-01

## 2019-04-01 ENCOUNTER — TELEPHONE (OUTPATIENT)
Dept: FAMILY MEDICINE CLINIC | Facility: CLINIC | Age: 84
End: 2019-04-01

## 2019-04-01 VITALS
HEIGHT: 61 IN | SYSTOLIC BLOOD PRESSURE: 128 MMHG | TEMPERATURE: 97.6 F | DIASTOLIC BLOOD PRESSURE: 74 MMHG | BODY MASS INDEX: 41.34 KG/M2

## 2019-04-01 DIAGNOSIS — N18.4 CHRONIC KIDNEY DISEASE, STAGE IV (SEVERE) (HCC): Primary | ICD-10-CM

## 2019-04-01 DIAGNOSIS — I73.9 PAD (PERIPHERAL ARTERY DISEASE) (HCC): Primary | ICD-10-CM

## 2019-04-01 DIAGNOSIS — N18.30 STAGE 3 CHRONIC KIDNEY DISEASE (HCC): Primary | ICD-10-CM

## 2019-04-01 DIAGNOSIS — N18.30 CKD (CHRONIC KIDNEY DISEASE) STAGE 3, GFR 30-59 ML/MIN (HCC): Primary | ICD-10-CM

## 2019-04-01 DIAGNOSIS — N18.30 CKD (CHRONIC KIDNEY DISEASE) STAGE 3, GFR 30-59 ML/MIN (HCC): ICD-10-CM

## 2019-04-01 DIAGNOSIS — E78.00 PURE HYPERCHOLESTEROLEMIA: ICD-10-CM

## 2019-04-01 DIAGNOSIS — I50.32 CHRONIC DIASTOLIC HEART FAILURE (HCC): ICD-10-CM

## 2019-04-01 LAB
ANION GAP SERPL CALCULATED.3IONS-SCNC: 5 MMOL/L (ref 4–13)
BUN SERPL-MCNC: 25 MG/DL (ref 5–25)
CALCIUM SERPL-MCNC: 9.1 MG/DL (ref 8.3–10.1)
CHLORIDE SERPL-SCNC: 103 MMOL/L (ref 100–108)
CO2 SERPL-SCNC: 30 MMOL/L (ref 21–32)
CREAT SERPL-MCNC: 1.97 MG/DL (ref 0.6–1.3)
GFR SERPL CREATININE-BSD FRML MDRD: 23 ML/MIN/1.73SQ M
GLUCOSE SERPL-MCNC: 120 MG/DL (ref 65–140)
POTASSIUM SERPL-SCNC: 3.7 MMOL/L (ref 3.5–5.3)
SODIUM SERPL-SCNC: 138 MMOL/L (ref 136–145)

## 2019-04-01 PROCEDURE — 99213 OFFICE O/P EST LOW 20 MIN: CPT | Performed by: SURGERY

## 2019-04-01 PROCEDURE — 80048 BASIC METABOLIC PNL TOTAL CA: CPT

## 2019-04-01 PROCEDURE — 36415 COLL VENOUS BLD VENIPUNCTURE: CPT

## 2019-04-01 RX ORDER — PRAVASTATIN SODIUM 80 MG/1
80 TABLET ORAL DAILY
Qty: 90 TABLET | Refills: 3 | Status: SHIPPED | OUTPATIENT
Start: 2019-04-01 | End: 2020-01-01

## 2019-04-01 NOTE — TELEPHONE ENCOUNTER
Doug Varela from [facility] called in regards to blood work that pt was there currently getting labs done for her HFU for tomorrow  There are no labs in pt chart from us please advise

## 2019-04-02 ENCOUNTER — TELEPHONE (OUTPATIENT)
Dept: NEPHROLOGY | Facility: CLINIC | Age: 84
End: 2019-04-02

## 2019-04-02 ENCOUNTER — TELEPHONE (OUTPATIENT)
Dept: CARDIOLOGY CLINIC | Facility: CLINIC | Age: 84
End: 2019-04-02

## 2019-04-02 ENCOUNTER — OFFICE VISIT (OUTPATIENT)
Dept: NEPHROLOGY | Facility: CLINIC | Age: 84
End: 2019-04-02
Payer: MEDICARE

## 2019-04-02 VITALS
DIASTOLIC BLOOD PRESSURE: 70 MMHG | BODY MASS INDEX: 41.42 KG/M2 | RESPIRATION RATE: 16 BRPM | HEART RATE: 70 BPM | HEIGHT: 61 IN | SYSTOLIC BLOOD PRESSURE: 116 MMHG | WEIGHT: 219.38 LBS

## 2019-04-02 DIAGNOSIS — IMO0002 SOLITARY KIDNEY: ICD-10-CM

## 2019-04-02 DIAGNOSIS — I12.9 HYPERTENSIVE CHRONIC KIDNEY DISEASE WITH STAGE 1 THROUGH STAGE 4 CHRONIC KIDNEY DISEASE, OR UNSPECIFIED CHRONIC KIDNEY DISEASE: ICD-10-CM

## 2019-04-02 DIAGNOSIS — R60.0 LOCALIZED EDEMA: ICD-10-CM

## 2019-04-02 DIAGNOSIS — N18.4 CKD (CHRONIC KIDNEY DISEASE), STAGE IV (HCC): Primary | ICD-10-CM

## 2019-04-02 PROCEDURE — 99214 OFFICE O/P EST MOD 30 MIN: CPT | Performed by: PHYSICIAN ASSISTANT

## 2019-04-04 ENCOUNTER — APPOINTMENT (OUTPATIENT)
Dept: LAB | Facility: CLINIC | Age: 84
End: 2019-04-04
Payer: MEDICARE

## 2019-04-04 ENCOUNTER — TELEPHONE (OUTPATIENT)
Dept: CARDIOLOGY CLINIC | Facility: CLINIC | Age: 84
End: 2019-04-04

## 2019-04-04 DIAGNOSIS — C68.9 UROTHELIAL CANCER (HCC): ICD-10-CM

## 2019-04-04 LAB
ANION GAP SERPL CALCULATED.3IONS-SCNC: 5 MMOL/L (ref 4–13)
BUN SERPL-MCNC: 31 MG/DL (ref 5–25)
CALCIUM SERPL-MCNC: 8.9 MG/DL (ref 8.3–10.1)
CHLORIDE SERPL-SCNC: 107 MMOL/L (ref 100–108)
CO2 SERPL-SCNC: 27 MMOL/L (ref 21–32)
CREAT SERPL-MCNC: 1.91 MG/DL (ref 0.6–1.3)
GFR SERPL CREATININE-BSD FRML MDRD: 24 ML/MIN/1.73SQ M
GLUCOSE SERPL-MCNC: 143 MG/DL (ref 65–140)
POTASSIUM SERPL-SCNC: 4 MMOL/L (ref 3.5–5.3)
SODIUM SERPL-SCNC: 139 MMOL/L (ref 136–145)

## 2019-04-04 PROCEDURE — 80048 BASIC METABOLIC PNL TOTAL CA: CPT

## 2019-04-04 PROCEDURE — 36415 COLL VENOUS BLD VENIPUNCTURE: CPT

## 2019-04-08 ENCOUNTER — PATIENT OUTREACH (OUTPATIENT)
Dept: CASE MANAGEMENT | Facility: OTHER | Age: 84
End: 2019-04-08

## 2019-04-09 ENCOUNTER — PROCEDURE VISIT (OUTPATIENT)
Dept: UROLOGY | Facility: MEDICAL CENTER | Age: 84
End: 2019-04-09
Payer: MEDICARE

## 2019-04-09 DIAGNOSIS — N39.41 URGE INCONTINENCE: ICD-10-CM

## 2019-04-09 DIAGNOSIS — R35.0 URINARY FREQUENCY: ICD-10-CM

## 2019-04-09 DIAGNOSIS — R39.15 URGENCY OF URINATION: ICD-10-CM

## 2019-04-09 PROCEDURE — 64566 NEUROELTRD STIM POST TIBIAL: CPT

## 2019-04-11 ENCOUNTER — HOSPITAL ENCOUNTER (OUTPATIENT)
Dept: RADIOLOGY | Facility: HOSPITAL | Age: 84
Discharge: HOME | End: 2019-04-11
Attending: UROLOGY
Payer: MEDICARE

## 2019-04-11 DIAGNOSIS — C68.9 UROTHELIAL CANCER (HCC): ICD-10-CM

## 2019-04-11 PROCEDURE — 74176 CT ABD & PELVIS W/O CONTRAST: CPT

## 2019-04-12 ENCOUNTER — APPOINTMENT (OUTPATIENT)
Dept: LAB | Facility: CLINIC | Age: 84
End: 2019-04-12
Payer: MEDICARE

## 2019-04-12 ENCOUNTER — PATIENT OUTREACH (OUTPATIENT)
Dept: CASE MANAGEMENT | Facility: OTHER | Age: 84
End: 2019-04-12

## 2019-04-12 ENCOUNTER — TELEPHONE (OUTPATIENT)
Dept: HEMATOLOGY ONCOLOGY | Facility: HOSPITAL | Age: 84
End: 2019-04-12

## 2019-04-12 DIAGNOSIS — D63.8 ANEMIA, CHRONIC DISEASE: Primary | ICD-10-CM

## 2019-04-12 DIAGNOSIS — I50.33 ACUTE ON CHRONIC DIASTOLIC CONGESTIVE HEART FAILURE (HCC): ICD-10-CM

## 2019-04-12 LAB
BASOPHILS # BLD AUTO: 0.02 THOUSANDS/ΜL (ref 0–0.1)
BASOPHILS NFR BLD AUTO: 0 % (ref 0–1)
EOSINOPHIL # BLD AUTO: 0.24 THOUSAND/ΜL (ref 0–0.61)
EOSINOPHIL NFR BLD AUTO: 4 % (ref 0–6)
ERYTHROCYTE [DISTWIDTH] IN BLOOD BY AUTOMATED COUNT: 13.8 % (ref 11.6–15.1)
FERRITIN SERPL-MCNC: 68 NG/ML (ref 8–388)
HCT VFR BLD AUTO: 31.4 % (ref 34.8–46.1)
HGB BLD-MCNC: 10 G/DL (ref 11.5–15.4)
IMM GRANULOCYTES # BLD AUTO: 0.03 THOUSAND/UL (ref 0–0.2)
IMM GRANULOCYTES NFR BLD AUTO: 1 % (ref 0–2)
LYMPHOCYTES # BLD AUTO: 1.4 THOUSANDS/ΜL (ref 0.6–4.47)
LYMPHOCYTES NFR BLD AUTO: 22 % (ref 14–44)
MCH RBC QN AUTO: 33 PG (ref 26.8–34.3)
MCHC RBC AUTO-ENTMCNC: 31.8 G/DL (ref 31.4–37.4)
MCV RBC AUTO: 104 FL (ref 82–98)
MONOCYTES # BLD AUTO: 0.74 THOUSAND/ΜL (ref 0.17–1.22)
MONOCYTES NFR BLD AUTO: 12 % (ref 4–12)
NEUTROPHILS # BLD AUTO: 3.96 THOUSANDS/ΜL (ref 1.85–7.62)
NEUTS SEG NFR BLD AUTO: 61 % (ref 43–75)
NRBC BLD AUTO-RTO: 0 /100 WBCS
PLATELET # BLD AUTO: 210 THOUSANDS/UL (ref 149–390)
PMV BLD AUTO: 10.8 FL (ref 8.9–12.7)
RBC # BLD AUTO: 3.03 MILLION/UL (ref 3.81–5.12)
WBC # BLD AUTO: 6.39 THOUSAND/UL (ref 4.31–10.16)

## 2019-04-12 PROCEDURE — 82728 ASSAY OF FERRITIN: CPT

## 2019-04-12 PROCEDURE — 82668 ASSAY OF ERYTHROPOIETIN: CPT

## 2019-04-12 PROCEDURE — 36415 COLL VENOUS BLD VENIPUNCTURE: CPT

## 2019-04-12 PROCEDURE — 85025 COMPLETE CBC W/AUTO DIFF WBC: CPT

## 2019-04-12 RX ORDER — TORSEMIDE 20 MG/1
TABLET ORAL
Qty: 30 TABLET | Refills: 0 | Status: SHIPPED | OUTPATIENT
Start: 2019-04-12 | End: 2019-04-19 | Stop reason: SDUPTHER

## 2019-04-13 ENCOUNTER — HOSPITAL ENCOUNTER (EMERGENCY)
Facility: HOSPITAL | Age: 84
Discharge: HOME | End: 2019-04-13
Attending: EMERGENCY MEDICINE | Admitting: EMERGENCY MEDICINE
Payer: MEDICARE

## 2019-04-13 ENCOUNTER — APPOINTMENT (EMERGENCY)
Dept: NON INVASIVE DIAGNOSTICS | Facility: HOSPITAL | Age: 84
End: 2019-04-13
Payer: MEDICARE

## 2019-04-13 VITALS
BODY MASS INDEX: 41.45 KG/M2 | WEIGHT: 219.36 LBS | OXYGEN SATURATION: 100 % | SYSTOLIC BLOOD PRESSURE: 145 MMHG | TEMPERATURE: 98.1 F | DIASTOLIC BLOOD PRESSURE: 65 MMHG | RESPIRATION RATE: 18 BRPM | HEART RATE: 58 BPM

## 2019-04-13 DIAGNOSIS — I87.2 ACUTE VENOUS STASIS DERMATITIS OF BOTH LEGS: Primary | ICD-10-CM

## 2019-04-13 DIAGNOSIS — M79.89 LEG SWELLING: ICD-10-CM

## 2019-04-13 LAB
ANION GAP SERPL CALCULATED.3IONS-SCNC: 4 MMOL/L (ref 4–13)
BASOPHILS # BLD AUTO: 0.02 THOUSANDS/ΜL (ref 0–0.1)
BASOPHILS NFR BLD AUTO: 0 % (ref 0–1)
BUN SERPL-MCNC: 29 MG/DL (ref 5–25)
CALCIUM SERPL-MCNC: 8.8 MG/DL (ref 8.3–10.1)
CHLORIDE SERPL-SCNC: 107 MMOL/L (ref 100–108)
CO2 SERPL-SCNC: 30 MMOL/L (ref 21–32)
CREAT SERPL-MCNC: 1.58 MG/DL (ref 0.6–1.3)
EOSINOPHIL # BLD AUTO: 0.16 THOUSAND/ΜL (ref 0–0.61)
EOSINOPHIL NFR BLD AUTO: 2 % (ref 0–6)
EPO SERPL-ACNC: 30.5 MIU/ML (ref 2.6–18.5)
ERYTHROCYTE [DISTWIDTH] IN BLOOD BY AUTOMATED COUNT: 13.6 % (ref 11.6–15.1)
GFR SERPL CREATININE-BSD FRML MDRD: 30 ML/MIN/1.73SQ M
GLUCOSE SERPL-MCNC: 97 MG/DL (ref 65–140)
HCT VFR BLD AUTO: 29.1 % (ref 34.8–46.1)
HGB BLD-MCNC: 9.5 G/DL (ref 11.5–15.4)
IMM GRANULOCYTES # BLD AUTO: 0.02 THOUSAND/UL (ref 0–0.2)
IMM GRANULOCYTES NFR BLD AUTO: 0 % (ref 0–2)
LYMPHOCYTES # BLD AUTO: 1.11 THOUSANDS/ΜL (ref 0.6–4.47)
LYMPHOCYTES NFR BLD AUTO: 17 % (ref 14–44)
MCH RBC QN AUTO: 33.3 PG (ref 26.8–34.3)
MCHC RBC AUTO-ENTMCNC: 32.6 G/DL (ref 31.4–37.4)
MCV RBC AUTO: 102 FL (ref 82–98)
MONOCYTES # BLD AUTO: 0.68 THOUSAND/ΜL (ref 0.17–1.22)
MONOCYTES NFR BLD AUTO: 10 % (ref 4–12)
NEUTROPHILS # BLD AUTO: 4.66 THOUSANDS/ΜL (ref 1.85–7.62)
NEUTS SEG NFR BLD AUTO: 71 % (ref 43–75)
NRBC BLD AUTO-RTO: 0 /100 WBCS
PLATELET # BLD AUTO: 188 THOUSANDS/UL (ref 149–390)
PMV BLD AUTO: 9.8 FL (ref 8.9–12.7)
POTASSIUM SERPL-SCNC: 4.5 MMOL/L (ref 3.5–5.3)
RBC # BLD AUTO: 2.85 MILLION/UL (ref 3.81–5.12)
SODIUM SERPL-SCNC: 141 MMOL/L (ref 136–145)
WBC # BLD AUTO: 6.65 THOUSAND/UL (ref 4.31–10.16)

## 2019-04-13 PROCEDURE — 80048 BASIC METABOLIC PNL TOTAL CA: CPT | Performed by: EMERGENCY MEDICINE

## 2019-04-13 PROCEDURE — 99283 EMERGENCY DEPT VISIT LOW MDM: CPT | Performed by: EMERGENCY MEDICINE

## 2019-04-13 PROCEDURE — 36415 COLL VENOUS BLD VENIPUNCTURE: CPT | Performed by: EMERGENCY MEDICINE

## 2019-04-13 PROCEDURE — 99284 EMERGENCY DEPT VISIT MOD MDM: CPT

## 2019-04-13 PROCEDURE — 85025 COMPLETE CBC W/AUTO DIFF WBC: CPT | Performed by: EMERGENCY MEDICINE

## 2019-04-13 PROCEDURE — 93971 EXTREMITY STUDY: CPT

## 2019-04-14 PROCEDURE — 93971 EXTREMITY STUDY: CPT | Performed by: SURGERY

## 2019-04-15 ENCOUNTER — OFFICE VISIT (OUTPATIENT)
Dept: HEMATOLOGY ONCOLOGY | Facility: CLINIC | Age: 84
End: 2019-04-15
Payer: MEDICARE

## 2019-04-15 VITALS
WEIGHT: 219 LBS | SYSTOLIC BLOOD PRESSURE: 112 MMHG | RESPIRATION RATE: 14 BRPM | DIASTOLIC BLOOD PRESSURE: 72 MMHG | TEMPERATURE: 98.1 F | HEART RATE: 96 BPM | BODY MASS INDEX: 41.35 KG/M2 | HEIGHT: 61 IN

## 2019-04-15 DIAGNOSIS — D63.8 ANEMIA, CHRONIC DISEASE: Primary | ICD-10-CM

## 2019-04-15 PROCEDURE — 99213 OFFICE O/P EST LOW 20 MIN: CPT | Performed by: PHYSICIAN ASSISTANT

## 2019-04-16 ENCOUNTER — OFFICE VISIT (OUTPATIENT)
Dept: FAMILY MEDICINE CLINIC | Facility: CLINIC | Age: 84
End: 2019-04-16
Payer: MEDICARE

## 2019-04-16 VITALS
OXYGEN SATURATION: 97 % | DIASTOLIC BLOOD PRESSURE: 70 MMHG | BODY MASS INDEX: 41.16 KG/M2 | HEIGHT: 61 IN | SYSTOLIC BLOOD PRESSURE: 104 MMHG | HEART RATE: 82 BPM | WEIGHT: 218 LBS

## 2019-04-16 DIAGNOSIS — R31.9 HEMATURIA, UNSPECIFIED TYPE: ICD-10-CM

## 2019-04-16 DIAGNOSIS — R60.0 LOCALIZED EDEMA: Primary | ICD-10-CM

## 2019-04-16 LAB
SL AMB  POCT GLUCOSE, UA: NEGATIVE
SL AMB LEUKOCYTE ESTERASE,UA: ABNORMAL
SL AMB POCT BILIRUBIN,UA: NEGATIVE
SL AMB POCT BLOOD,UA: ABNORMAL
SL AMB POCT CLARITY,UA: ABNORMAL
SL AMB POCT COLOR,UA: YELLOW
SL AMB POCT KETONES,UA: NEGATIVE
SL AMB POCT NITRITE,UA: NEGATIVE
SL AMB POCT PH,UA: 5
SL AMB POCT SPECIFIC GRAVITY,UA: 1
SL AMB POCT URINE PROTEIN: NEGATIVE
SL AMB POCT UROBILINOGEN: 0.2

## 2019-04-16 PROCEDURE — 81002 URINALYSIS NONAUTO W/O SCOPE: CPT | Performed by: PHYSICIAN ASSISTANT

## 2019-04-16 PROCEDURE — 87086 URINE CULTURE/COLONY COUNT: CPT | Performed by: PHYSICIAN ASSISTANT

## 2019-04-16 PROCEDURE — 99213 OFFICE O/P EST LOW 20 MIN: CPT | Performed by: PHYSICIAN ASSISTANT

## 2019-04-16 RX ORDER — OMEPRAZOLE 40 MG/1
40 CAPSULE, DELAYED RELEASE ORAL DAILY
COMMUNITY
End: 2019-07-30 | Stop reason: SDUPTHER

## 2019-04-17 LAB — BACTERIA UR CULT: NORMAL

## 2019-04-18 ENCOUNTER — PATIENT OUTREACH (OUTPATIENT)
Dept: CASE MANAGEMENT | Facility: OTHER | Age: 84
End: 2019-04-18

## 2019-04-19 DIAGNOSIS — I50.33 ACUTE ON CHRONIC DIASTOLIC CONGESTIVE HEART FAILURE (HCC): ICD-10-CM

## 2019-04-19 RX ORDER — TORSEMIDE 20 MG/1
20 TABLET ORAL DAILY
Qty: 90 TABLET | Refills: 3 | Status: SHIPPED | OUTPATIENT
Start: 2019-04-19 | End: 2020-01-01 | Stop reason: SDUPTHER

## 2019-04-23 ENCOUNTER — APPOINTMENT (OUTPATIENT)
Dept: LAB | Facility: CLINIC | Age: 84
End: 2019-04-23
Payer: MEDICARE

## 2019-04-23 ENCOUNTER — PATIENT OUTREACH (OUTPATIENT)
Dept: CASE MANAGEMENT | Facility: OTHER | Age: 84
End: 2019-04-23

## 2019-04-23 DIAGNOSIS — N18.4 CHRONIC KIDNEY DISEASE, STAGE IV (SEVERE) (HCC): ICD-10-CM

## 2019-04-23 LAB
ANION GAP SERPL CALCULATED.3IONS-SCNC: 6 MMOL/L (ref 4–13)
BUN SERPL-MCNC: 32 MG/DL (ref 5–25)
CALCIUM SERPL-MCNC: 8.2 MG/DL (ref 8.3–10.1)
CHLORIDE SERPL-SCNC: 107 MMOL/L (ref 100–108)
CO2 SERPL-SCNC: 28 MMOL/L (ref 21–32)
CREAT SERPL-MCNC: 1.64 MG/DL (ref 0.6–1.3)
GFR SERPL CREATININE-BSD FRML MDRD: 29 ML/MIN/1.73SQ M
GLUCOSE SERPL-MCNC: 106 MG/DL (ref 65–140)
MAGNESIUM SERPL-MCNC: 2.3 MG/DL (ref 1.6–2.6)
POTASSIUM SERPL-SCNC: 4 MMOL/L (ref 3.5–5.3)
SODIUM SERPL-SCNC: 141 MMOL/L (ref 136–145)

## 2019-04-23 PROCEDURE — 36415 COLL VENOUS BLD VENIPUNCTURE: CPT

## 2019-04-23 PROCEDURE — 80048 BASIC METABOLIC PNL TOTAL CA: CPT

## 2019-04-23 PROCEDURE — 83735 ASSAY OF MAGNESIUM: CPT

## 2019-04-25 ENCOUNTER — OFFICE VISIT (OUTPATIENT)
Dept: CARDIOLOGY CLINIC | Facility: CLINIC | Age: 84
End: 2019-04-25
Payer: MEDICARE

## 2019-04-25 VITALS
HEIGHT: 61 IN | BODY MASS INDEX: 41.71 KG/M2 | SYSTOLIC BLOOD PRESSURE: 120 MMHG | WEIGHT: 220.9 LBS | OXYGEN SATURATION: 98 % | HEART RATE: 76 BPM | DIASTOLIC BLOOD PRESSURE: 64 MMHG

## 2019-04-25 DIAGNOSIS — I89.0 LYMPHEDEMA: Primary | ICD-10-CM

## 2019-04-25 DIAGNOSIS — I50.32 CHRONIC DIASTOLIC CONGESTIVE HEART FAILURE (HCC): ICD-10-CM

## 2019-04-25 DIAGNOSIS — N18.30 CKD (CHRONIC KIDNEY DISEASE), STAGE III (HCC): ICD-10-CM

## 2019-04-25 DIAGNOSIS — G47.33 OSA (OBSTRUCTIVE SLEEP APNEA): ICD-10-CM

## 2019-04-25 DIAGNOSIS — E11.69 DIABETES MELLITUS TYPE 2 IN OBESE (HCC): ICD-10-CM

## 2019-04-25 DIAGNOSIS — E66.9 DIABETES MELLITUS TYPE 2 IN OBESE (HCC): ICD-10-CM

## 2019-04-25 DIAGNOSIS — I48.0 PAROXYSMAL ATRIAL FIBRILLATION (HCC): ICD-10-CM

## 2019-04-25 PROCEDURE — 99214 OFFICE O/P EST MOD 30 MIN: CPT | Performed by: NURSE PRACTITIONER

## 2019-04-26 ENCOUNTER — PROCEDURE VISIT (OUTPATIENT)
Dept: UROLOGY | Facility: CLINIC | Age: 84
End: 2019-04-26
Payer: MEDICARE

## 2019-04-26 ENCOUNTER — TELEPHONE (OUTPATIENT)
Dept: UROLOGY | Facility: CLINIC | Age: 84
End: 2019-04-26

## 2019-04-26 VITALS
DIASTOLIC BLOOD PRESSURE: 80 MMHG | HEIGHT: 61 IN | SYSTOLIC BLOOD PRESSURE: 132 MMHG | HEART RATE: 79 BPM | BODY MASS INDEX: 41.99 KG/M2 | WEIGHT: 222.4 LBS

## 2019-04-26 DIAGNOSIS — N18.30 CKD (CHRONIC KIDNEY DISEASE) STAGE 3, GFR 30-59 ML/MIN (HCC): ICD-10-CM

## 2019-04-26 DIAGNOSIS — N32.9 LESION OF BLADDER: ICD-10-CM

## 2019-04-26 DIAGNOSIS — C68.9 UROTHELIAL CANCER (HCC): Primary | ICD-10-CM

## 2019-04-26 DIAGNOSIS — Z90.6 HISTORY OF NEPHROURETERECTOMY: ICD-10-CM

## 2019-04-26 DIAGNOSIS — Z90.5 HISTORY OF NEPHROURETERECTOMY: ICD-10-CM

## 2019-04-26 DIAGNOSIS — K43.2 INCISIONAL HERNIA, WITHOUT OBSTRUCTION OR GANGRENE: ICD-10-CM

## 2019-04-26 LAB
SL AMB  POCT GLUCOSE, UA: NORMAL
SL AMB LEUKOCYTE ESTERASE,UA: NORMAL
SL AMB POCT BILIRUBIN,UA: NORMAL
SL AMB POCT BLOOD,UA: NORMAL
SL AMB POCT CLARITY,UA: CLEAR
SL AMB POCT COLOR,UA: YELLOW
SL AMB POCT KETONES,UA: NORMAL
SL AMB POCT NITRITE,UA: NORMAL
SL AMB POCT PH,UA: 5
SL AMB POCT SPECIFIC GRAVITY,UA: 1
SL AMB POCT URINE PROTEIN: NORMAL
SL AMB POCT UROBILINOGEN: NORMAL

## 2019-04-26 PROCEDURE — 88112 CYTOPATH CELL ENHANCE TECH: CPT | Performed by: PATHOLOGY

## 2019-04-26 PROCEDURE — 99214 OFFICE O/P EST MOD 30 MIN: CPT | Performed by: UROLOGY

## 2019-04-26 PROCEDURE — 52000 CYSTOURETHROSCOPY: CPT | Performed by: UROLOGY

## 2019-04-26 PROCEDURE — 81002 URINALYSIS NONAUTO W/O SCOPE: CPT | Performed by: UROLOGY

## 2019-04-26 RX ORDER — CEFAZOLIN SODIUM 2 G/50ML
2000 SOLUTION INTRAVENOUS ONCE
Status: CANCELLED | OUTPATIENT
Start: 2019-05-21 | End: 2019-04-26

## 2019-04-30 ENCOUNTER — PATIENT OUTREACH (OUTPATIENT)
Dept: CASE MANAGEMENT | Facility: OTHER | Age: 84
End: 2019-04-30

## 2019-04-30 ENCOUNTER — TELEPHONE (OUTPATIENT)
Dept: FAMILY MEDICINE CLINIC | Facility: CLINIC | Age: 84
End: 2019-04-30

## 2019-04-30 ENCOUNTER — TELEPHONE (OUTPATIENT)
Dept: UROLOGY | Facility: CLINIC | Age: 84
End: 2019-04-30

## 2019-05-07 ENCOUNTER — PROCEDURE VISIT (OUTPATIENT)
Dept: UROLOGY | Facility: MEDICAL CENTER | Age: 84
End: 2019-05-07
Payer: MEDICARE

## 2019-05-07 DIAGNOSIS — R35.0 URINARY FREQUENCY: ICD-10-CM

## 2019-05-07 DIAGNOSIS — R39.15 URGENCY OF URINATION: ICD-10-CM

## 2019-05-07 DIAGNOSIS — N39.41 URGE INCONTINENCE: ICD-10-CM

## 2019-05-07 PROCEDURE — 64566 NEUROELTRD STIM POST TIBIAL: CPT

## 2019-05-09 ENCOUNTER — ANESTHESIA EVENT (OUTPATIENT)
Dept: PERIOP | Facility: HOSPITAL | Age: 84
End: 2019-05-09
Payer: MEDICARE

## 2019-05-09 ENCOUNTER — APPOINTMENT (OUTPATIENT)
Dept: PREADMISSION TESTING | Facility: HOSPITAL | Age: 84
End: 2019-05-09
Payer: MEDICARE

## 2019-05-09 PROCEDURE — 87086 URINE CULTURE/COLONY COUNT: CPT | Performed by: UROLOGY

## 2019-05-09 RX ORDER — ONDANSETRON 2 MG/ML
4 INJECTION INTRAMUSCULAR; INTRAVENOUS ONCE AS NEEDED
Status: CANCELLED | OUTPATIENT
Start: 2019-05-09

## 2019-05-09 RX ORDER — SODIUM CHLORIDE 9 MG/ML
125 INJECTION, SOLUTION INTRAVENOUS CONTINUOUS
Status: CANCELLED | OUTPATIENT
Start: 2019-05-21

## 2019-05-09 RX ORDER — FENTANYL CITRATE/PF 50 MCG/ML
25 SYRINGE (ML) INJECTION
Status: CANCELLED | OUTPATIENT
Start: 2019-05-09

## 2019-05-10 ENCOUNTER — TELEPHONE (OUTPATIENT)
Dept: UROLOGY | Facility: CLINIC | Age: 84
End: 2019-05-10

## 2019-05-10 LAB — BACTERIA UR CULT: NORMAL

## 2019-05-15 ENCOUNTER — CONSULT (OUTPATIENT)
Dept: FAMILY MEDICINE CLINIC | Facility: CLINIC | Age: 84
End: 2019-05-15
Payer: MEDICARE

## 2019-05-15 ENCOUNTER — APPOINTMENT (OUTPATIENT)
Dept: LAB | Facility: CLINIC | Age: 84
End: 2019-05-15
Payer: MEDICARE

## 2019-05-15 VITALS
HEART RATE: 72 BPM | OXYGEN SATURATION: 96 % | RESPIRATION RATE: 18 BRPM | DIASTOLIC BLOOD PRESSURE: 80 MMHG | BODY MASS INDEX: 41.72 KG/M2 | WEIGHT: 221 LBS | SYSTOLIC BLOOD PRESSURE: 130 MMHG | HEIGHT: 61 IN

## 2019-05-15 DIAGNOSIS — M05.9 RHEUMATOID ARTHRITIS WITH POSITIVE RHEUMATOID FACTOR, INVOLVING UNSPECIFIED SITE (HCC): ICD-10-CM

## 2019-05-15 DIAGNOSIS — I73.9 PAD (PERIPHERAL ARTERY DISEASE) (HCC): ICD-10-CM

## 2019-05-15 DIAGNOSIS — E03.9 ACQUIRED HYPOTHYROIDISM: ICD-10-CM

## 2019-05-15 DIAGNOSIS — I10 ESSENTIAL HYPERTENSION: ICD-10-CM

## 2019-05-15 DIAGNOSIS — E66.9 DIABETES MELLITUS TYPE 2 IN OBESE (HCC): ICD-10-CM

## 2019-05-15 DIAGNOSIS — I63.311 CEREBROVASCULAR ACCIDENT (CVA) DUE TO THROMBOSIS OF RIGHT MIDDLE CEREBRAL ARTERY (HCC): ICD-10-CM

## 2019-05-15 DIAGNOSIS — E11.69 DIABETES MELLITUS TYPE 2 IN OBESE (HCC): ICD-10-CM

## 2019-05-15 DIAGNOSIS — I48.0 PAROXYSMAL ATRIAL FIBRILLATION (HCC): ICD-10-CM

## 2019-05-15 DIAGNOSIS — Z66 PHYSICIAN ORDERS FOR LIFE-SUSTAINING TREATMENT (POLST) FORM INDICATES PATIENT WISH FOR DO-NOT-RESUSCITATE STATUS: ICD-10-CM

## 2019-05-15 DIAGNOSIS — N18.30 CKD (CHRONIC KIDNEY DISEASE) STAGE 3, GFR 30-59 ML/MIN (HCC): ICD-10-CM

## 2019-05-15 DIAGNOSIS — I50.32 CHRONIC DIASTOLIC HEART FAILURE (HCC): ICD-10-CM

## 2019-05-15 DIAGNOSIS — Z01.818 PRE-OP EXAMINATION: Primary | ICD-10-CM

## 2019-05-15 DIAGNOSIS — D50.8 OTHER IRON DEFICIENCY ANEMIA: ICD-10-CM

## 2019-05-15 DIAGNOSIS — N32.9 LESION OF BLADDER: ICD-10-CM

## 2019-05-15 DIAGNOSIS — I89.0 CHRONIC ACQUIRED LYMPHEDEMA: ICD-10-CM

## 2019-05-15 DIAGNOSIS — K21.9 CHRONIC GERD: ICD-10-CM

## 2019-05-15 DIAGNOSIS — G63 POLYNEUROPATHY ASSOCIATED WITH UNDERLYING DISEASE (HCC): ICD-10-CM

## 2019-05-15 DIAGNOSIS — E11.42 DIABETIC POLYNEUROPATHY ASSOCIATED WITH TYPE 2 DIABETES MELLITUS (HCC): ICD-10-CM

## 2019-05-15 DIAGNOSIS — C68.9 UROTHELIAL CANCER (HCC): ICD-10-CM

## 2019-05-15 DIAGNOSIS — G47.30 SLEEP APNEA, UNSPECIFIED TYPE: ICD-10-CM

## 2019-05-15 PROBLEM — E11.40 DIABETIC NEUROPATHY ASSOCIATED WITH TYPE 2 DIABETES MELLITUS (HCC): Status: ACTIVE | Noted: 2019-05-15

## 2019-05-15 LAB
ALBUMIN SERPL BCP-MCNC: 3.9 G/DL (ref 3.5–5)
ALP SERPL-CCNC: 141 U/L (ref 46–116)
ALT SERPL W P-5'-P-CCNC: 20 U/L (ref 12–78)
ANION GAP SERPL CALCULATED.3IONS-SCNC: 6 MMOL/L (ref 4–13)
AST SERPL W P-5'-P-CCNC: 20 U/L (ref 5–45)
BASOPHILS # BLD AUTO: 0.03 THOUSANDS/ΜL (ref 0–0.1)
BASOPHILS NFR BLD AUTO: 0 % (ref 0–1)
BILIRUB SERPL-MCNC: 0.46 MG/DL (ref 0.2–1)
BUN SERPL-MCNC: 28 MG/DL (ref 5–25)
CALCIUM SERPL-MCNC: 9 MG/DL (ref 8.3–10.1)
CHLORIDE SERPL-SCNC: 106 MMOL/L (ref 100–108)
CO2 SERPL-SCNC: 30 MMOL/L (ref 21–32)
CREAT SERPL-MCNC: 1.65 MG/DL (ref 0.6–1.3)
EOSINOPHIL # BLD AUTO: 0.19 THOUSAND/ΜL (ref 0–0.61)
EOSINOPHIL NFR BLD AUTO: 3 % (ref 0–6)
ERYTHROCYTE [DISTWIDTH] IN BLOOD BY AUTOMATED COUNT: 13.6 % (ref 11.6–15.1)
EST. AVERAGE GLUCOSE BLD GHB EST-MCNC: 134 MG/DL
GFR SERPL CREATININE-BSD FRML MDRD: 29 ML/MIN/1.73SQ M
GLUCOSE SERPL-MCNC: 98 MG/DL (ref 65–140)
HBA1C MFR BLD: 6.3 % (ref 4.2–6.3)
HCT VFR BLD AUTO: 33.6 % (ref 34.8–46.1)
HGB BLD-MCNC: 10.4 G/DL (ref 11.5–15.4)
IMM GRANULOCYTES # BLD AUTO: 0.04 THOUSAND/UL (ref 0–0.2)
IMM GRANULOCYTES NFR BLD AUTO: 1 % (ref 0–2)
LYMPHOCYTES # BLD AUTO: 1.69 THOUSANDS/ΜL (ref 0.6–4.47)
LYMPHOCYTES NFR BLD AUTO: 23 % (ref 14–44)
MCH RBC QN AUTO: 32.1 PG (ref 26.8–34.3)
MCHC RBC AUTO-ENTMCNC: 31 G/DL (ref 31.4–37.4)
MCV RBC AUTO: 104 FL (ref 82–98)
MONOCYTES # BLD AUTO: 0.64 THOUSAND/ΜL (ref 0.17–1.22)
MONOCYTES NFR BLD AUTO: 9 % (ref 4–12)
NEUTROPHILS # BLD AUTO: 4.69 THOUSANDS/ΜL (ref 1.85–7.62)
NEUTS SEG NFR BLD AUTO: 64 % (ref 43–75)
NRBC BLD AUTO-RTO: 0 /100 WBCS
PLATELET # BLD AUTO: 238 THOUSANDS/UL (ref 149–390)
PMV BLD AUTO: 10.1 FL (ref 8.9–12.7)
POTASSIUM SERPL-SCNC: 4.1 MMOL/L (ref 3.5–5.3)
PROT SERPL-MCNC: 7.6 G/DL (ref 6.4–8.2)
RBC # BLD AUTO: 3.24 MILLION/UL (ref 3.81–5.12)
SODIUM SERPL-SCNC: 142 MMOL/L (ref 136–145)
TSH SERPL DL<=0.05 MIU/L-ACNC: 1.8 UIU/ML (ref 0.36–3.74)
WBC # BLD AUTO: 7.28 THOUSAND/UL (ref 4.31–10.16)

## 2019-05-15 PROCEDURE — 36415 COLL VENOUS BLD VENIPUNCTURE: CPT

## 2019-05-15 PROCEDURE — 99215 OFFICE O/P EST HI 40 MIN: CPT | Performed by: FAMILY MEDICINE

## 2019-05-15 PROCEDURE — 80053 COMPREHEN METABOLIC PANEL: CPT

## 2019-05-15 PROCEDURE — 84443 ASSAY THYROID STIM HORMONE: CPT

## 2019-05-15 PROCEDURE — 83036 HEMOGLOBIN GLYCOSYLATED A1C: CPT

## 2019-05-15 PROCEDURE — 85025 COMPLETE CBC W/AUTO DIFF WBC: CPT

## 2019-05-21 ENCOUNTER — TELEPHONE (OUTPATIENT)
Dept: UROLOGY | Facility: AMBULATORY SURGERY CENTER | Age: 84
End: 2019-05-21

## 2019-05-21 ENCOUNTER — ANESTHESIA (OUTPATIENT)
Dept: PERIOP | Facility: HOSPITAL | Age: 84
End: 2019-05-21
Payer: MEDICARE

## 2019-05-21 ENCOUNTER — TELEPHONE (OUTPATIENT)
Dept: UROLOGY | Facility: MEDICAL CENTER | Age: 84
End: 2019-05-21

## 2019-05-21 ENCOUNTER — HOSPITAL ENCOUNTER (OUTPATIENT)
Facility: HOSPITAL | Age: 84
Setting detail: OUTPATIENT SURGERY
Discharge: HOME | End: 2019-05-21
Attending: UROLOGY | Admitting: UROLOGY
Payer: MEDICARE

## 2019-05-21 VITALS
DIASTOLIC BLOOD PRESSURE: 63 MMHG | HEART RATE: 68 BPM | BODY MASS INDEX: 40.97 KG/M2 | WEIGHT: 217 LBS | HEIGHT: 61 IN | TEMPERATURE: 97.5 F | SYSTOLIC BLOOD PRESSURE: 140 MMHG | OXYGEN SATURATION: 97 % | RESPIRATION RATE: 18 BRPM

## 2019-05-21 DIAGNOSIS — Z90.6 HISTORY OF NEPHROURETERECTOMY: ICD-10-CM

## 2019-05-21 DIAGNOSIS — Z90.5 HISTORY OF NEPHROURETERECTOMY: ICD-10-CM

## 2019-05-21 DIAGNOSIS — N32.9 LESION OF BLADDER: ICD-10-CM

## 2019-05-21 LAB
GLUCOSE SERPL-MCNC: 106 MG/DL (ref 65–140)
GLUCOSE SERPL-MCNC: 115 MG/DL (ref 65–140)

## 2019-05-21 PROCEDURE — 88341 IMHCHEM/IMCYTCHM EA ADD ANTB: CPT | Performed by: PATHOLOGY

## 2019-05-21 PROCEDURE — 88305 TISSUE EXAM BY PATHOLOGIST: CPT | Performed by: PATHOLOGY

## 2019-05-21 PROCEDURE — 88342 IMHCHEM/IMCYTCHM 1ST ANTB: CPT | Performed by: PATHOLOGY

## 2019-05-21 PROCEDURE — 52204 CYSTOSCOPY W/BIOPSY(S): CPT | Performed by: UROLOGY

## 2019-05-21 PROCEDURE — 82948 REAGENT STRIP/BLOOD GLUCOSE: CPT

## 2019-05-21 RX ORDER — CEFAZOLIN SODIUM 2 G/50ML
2000 SOLUTION INTRAVENOUS ONCE
Status: DISCONTINUED | OUTPATIENT
Start: 2019-05-21 | End: 2019-05-21 | Stop reason: HOSPADM

## 2019-05-21 RX ORDER — PHENAZOPYRIDINE HYDROCHLORIDE 100 MG/1
100 TABLET, FILM COATED ORAL ONCE
Status: COMPLETED | OUTPATIENT
Start: 2019-05-21 | End: 2019-05-21

## 2019-05-21 RX ORDER — SODIUM CHLORIDE 9 MG/ML
125 INJECTION, SOLUTION INTRAVENOUS CONTINUOUS
Status: DISCONTINUED | OUTPATIENT
Start: 2019-05-21 | End: 2019-05-21 | Stop reason: HOSPADM

## 2019-05-21 RX ORDER — PHENAZOPYRIDINE HYDROCHLORIDE 100 MG/1
100 TABLET, FILM COATED ORAL 3 TIMES DAILY PRN
Qty: 10 TABLET | Refills: 0 | Status: SHIPPED | OUTPATIENT
Start: 2019-05-21 | End: 2019-08-05

## 2019-05-21 RX ORDER — FENTANYL CITRATE 50 UG/ML
25 INJECTION, SOLUTION INTRAMUSCULAR; INTRAVENOUS
Status: DISCONTINUED | OUTPATIENT
Start: 2019-05-21 | End: 2019-05-21 | Stop reason: HOSPADM

## 2019-05-21 RX ORDER — ONDANSETRON 2 MG/ML
INJECTION INTRAMUSCULAR; INTRAVENOUS AS NEEDED
Status: DISCONTINUED | OUTPATIENT
Start: 2019-05-21 | End: 2019-05-21 | Stop reason: SURG

## 2019-05-21 RX ORDER — PHENAZOPYRIDINE HYDROCHLORIDE 100 MG/1
100 TABLET, FILM COATED ORAL 3 TIMES DAILY PRN
Qty: 10 TABLET | Refills: 0 | Status: SHIPPED | OUTPATIENT
Start: 2019-05-21 | End: 2019-05-21 | Stop reason: SDUPTHER

## 2019-05-21 RX ORDER — ONDANSETRON 2 MG/ML
4 INJECTION INTRAMUSCULAR; INTRAVENOUS ONCE AS NEEDED
Status: DISCONTINUED | OUTPATIENT
Start: 2019-05-21 | End: 2019-05-21 | Stop reason: HOSPADM

## 2019-05-21 RX ORDER — ONDANSETRON 2 MG/ML
4 INJECTION INTRAMUSCULAR; INTRAVENOUS EVERY 6 HOURS PRN
Status: DISCONTINUED | OUTPATIENT
Start: 2019-05-21 | End: 2019-05-21 | Stop reason: HOSPADM

## 2019-05-21 RX ORDER — FENTANYL CITRATE/PF 50 MCG/ML
25 SYRINGE (ML) INJECTION
Status: DISCONTINUED | OUTPATIENT
Start: 2019-05-21 | End: 2019-05-21 | Stop reason: HOSPADM

## 2019-05-21 RX ORDER — PROPOFOL 10 MG/ML
INJECTION, EMULSION INTRAVENOUS AS NEEDED
Status: DISCONTINUED | OUTPATIENT
Start: 2019-05-21 | End: 2019-05-21 | Stop reason: SURG

## 2019-05-21 RX ORDER — CEFAZOLIN SODIUM 2 G/50ML
SOLUTION INTRAVENOUS AS NEEDED
Status: DISCONTINUED | OUTPATIENT
Start: 2019-05-21 | End: 2019-05-21 | Stop reason: SURG

## 2019-05-21 RX ORDER — FENTANYL CITRATE 50 UG/ML
INJECTION, SOLUTION INTRAMUSCULAR; INTRAVENOUS AS NEEDED
Status: DISCONTINUED | OUTPATIENT
Start: 2019-05-21 | End: 2019-05-21 | Stop reason: SURG

## 2019-05-21 RX ADMIN — FENTANYL CITRATE 25 MCG: 50 INJECTION, SOLUTION INTRAMUSCULAR; INTRAVENOUS at 10:36

## 2019-05-21 RX ADMIN — SODIUM CHLORIDE 125 ML/HR: 0.9 INJECTION, SOLUTION INTRAVENOUS at 09:45

## 2019-05-21 RX ADMIN — LIDOCAINE HYDROCHLORIDE 60 MG: 20 INJECTION, SOLUTION INTRAVENOUS at 10:23

## 2019-05-21 RX ADMIN — PHENAZOPYRIDINE HYDROCHLORIDE 100 MG: 100 TABLET ORAL at 12:17

## 2019-05-21 RX ADMIN — CEFAZOLIN SODIUM 2000 MG: 2 SOLUTION INTRAVENOUS at 10:14

## 2019-05-21 RX ADMIN — PROPOFOL 150 MG: 10 INJECTION, EMULSION INTRAVENOUS at 10:23

## 2019-05-21 RX ADMIN — ONDANSETRON HYDROCHLORIDE 4 MG: 2 INJECTION, SOLUTION INTRAVENOUS at 10:40

## 2019-05-23 ENCOUNTER — TELEPHONE (OUTPATIENT)
Dept: UROLOGY | Facility: CLINIC | Age: 84
End: 2019-05-23

## 2019-05-24 ENCOUNTER — APPOINTMENT (OUTPATIENT)
Dept: LAB | Facility: CLINIC | Age: 84
End: 2019-05-24
Payer: MEDICARE

## 2019-05-24 DIAGNOSIS — M25.50 PAIN IN JOINT, MULTIPLE SITES: ICD-10-CM

## 2019-05-24 DIAGNOSIS — Z79.899 ENCOUNTER FOR LONG-TERM (CURRENT) USE OF OTHER MEDICATIONS: ICD-10-CM

## 2019-05-24 DIAGNOSIS — M25.512 LEFT SHOULDER PAIN, UNSPECIFIED CHRONICITY: ICD-10-CM

## 2019-05-24 DIAGNOSIS — N18.30 CHRONIC KIDNEY DISEASE, STAGE III (MODERATE) (HCC): Primary | ICD-10-CM

## 2019-05-24 DIAGNOSIS — R53.83 OTHER FATIGUE: ICD-10-CM

## 2019-05-24 DIAGNOSIS — M05.79 SEROPOSITIVE RHEUMATOID ARTHRITIS OF MULTIPLE SITES (HCC): ICD-10-CM

## 2019-05-24 LAB
ALBUMIN SERPL BCP-MCNC: 3.7 G/DL (ref 3.5–5)
ALP SERPL-CCNC: 129 U/L (ref 46–116)
ALT SERPL W P-5'-P-CCNC: 15 U/L (ref 12–78)
ANION GAP SERPL CALCULATED.3IONS-SCNC: 6 MMOL/L (ref 4–13)
AST SERPL W P-5'-P-CCNC: 16 U/L (ref 5–45)
BASOPHILS # BLD AUTO: 0.03 THOUSANDS/ΜL (ref 0–0.1)
BASOPHILS NFR BLD AUTO: 0 % (ref 0–1)
BILIRUB SERPL-MCNC: 0.42 MG/DL (ref 0.2–1)
BUN SERPL-MCNC: 33 MG/DL (ref 5–25)
CALCIUM SERPL-MCNC: 9 MG/DL (ref 8.3–10.1)
CHLORIDE SERPL-SCNC: 104 MMOL/L (ref 100–108)
CO2 SERPL-SCNC: 28 MMOL/L (ref 21–32)
CREAT SERPL-MCNC: 1.83 MG/DL (ref 0.6–1.3)
CRP SERPL QL: 13.1 MG/L
EOSINOPHIL # BLD AUTO: 0.34 THOUSAND/ΜL (ref 0–0.61)
EOSINOPHIL NFR BLD AUTO: 5 % (ref 0–6)
ERYTHROCYTE [DISTWIDTH] IN BLOOD BY AUTOMATED COUNT: 13.7 % (ref 11.6–15.1)
ERYTHROCYTE [SEDIMENTATION RATE] IN BLOOD: 53 MM/HOUR (ref 0–20)
GFR SERPL CREATININE-BSD FRML MDRD: 25 ML/MIN/1.73SQ M
GLUCOSE SERPL-MCNC: 122 MG/DL (ref 65–140)
HCT VFR BLD AUTO: 30.6 % (ref 34.8–46.1)
HGB BLD-MCNC: 9.7 G/DL (ref 11.5–15.4)
IMM GRANULOCYTES # BLD AUTO: 0.03 THOUSAND/UL (ref 0–0.2)
IMM GRANULOCYTES NFR BLD AUTO: 0 % (ref 0–2)
LYMPHOCYTES # BLD AUTO: 1.32 THOUSANDS/ΜL (ref 0.6–4.47)
LYMPHOCYTES NFR BLD AUTO: 19 % (ref 14–44)
MCH RBC QN AUTO: 32.8 PG (ref 26.8–34.3)
MCHC RBC AUTO-ENTMCNC: 31.7 G/DL (ref 31.4–37.4)
MCV RBC AUTO: 103 FL (ref 82–98)
MONOCYTES # BLD AUTO: 0.61 THOUSAND/ΜL (ref 0.17–1.22)
MONOCYTES NFR BLD AUTO: 9 % (ref 4–12)
NEUTROPHILS # BLD AUTO: 4.67 THOUSANDS/ΜL (ref 1.85–7.62)
NEUTS SEG NFR BLD AUTO: 67 % (ref 43–75)
NRBC BLD AUTO-RTO: 0 /100 WBCS
PLATELET # BLD AUTO: 186 THOUSANDS/UL (ref 149–390)
PMV BLD AUTO: 10.6 FL (ref 8.9–12.7)
POTASSIUM SERPL-SCNC: 3.9 MMOL/L (ref 3.5–5.3)
PROT SERPL-MCNC: 7.1 G/DL (ref 6.4–8.2)
RBC # BLD AUTO: 2.96 MILLION/UL (ref 3.81–5.12)
SODIUM SERPL-SCNC: 138 MMOL/L (ref 136–145)
WBC # BLD AUTO: 7 THOUSAND/UL (ref 4.31–10.16)

## 2019-05-24 PROCEDURE — 86140 C-REACTIVE PROTEIN: CPT

## 2019-05-24 PROCEDURE — 85652 RBC SED RATE AUTOMATED: CPT

## 2019-05-24 PROCEDURE — 36415 COLL VENOUS BLD VENIPUNCTURE: CPT

## 2019-05-24 PROCEDURE — 85025 COMPLETE CBC W/AUTO DIFF WBC: CPT

## 2019-05-24 PROCEDURE — 80053 COMPREHEN METABOLIC PANEL: CPT

## 2019-05-31 ENCOUNTER — OFFICE VISIT (OUTPATIENT)
Dept: CARDIOLOGY CLINIC | Facility: CLINIC | Age: 84
End: 2019-05-31
Payer: MEDICARE

## 2019-05-31 ENCOUNTER — TELEPHONE (OUTPATIENT)
Dept: CARDIOLOGY CLINIC | Facility: CLINIC | Age: 84
End: 2019-05-31

## 2019-05-31 VITALS
WEIGHT: 221 LBS | DIASTOLIC BLOOD PRESSURE: 74 MMHG | SYSTOLIC BLOOD PRESSURE: 134 MMHG | OXYGEN SATURATION: 96 % | HEART RATE: 86 BPM | HEIGHT: 61 IN | BODY MASS INDEX: 41.72 KG/M2

## 2019-05-31 DIAGNOSIS — I50.32 CHRONIC DIASTOLIC CONGESTIVE HEART FAILURE (HCC): Primary | ICD-10-CM

## 2019-05-31 DIAGNOSIS — I48.91 ATRIAL FIBRILLATION, UNSPECIFIED TYPE (HCC): Primary | ICD-10-CM

## 2019-05-31 PROCEDURE — 99214 OFFICE O/P EST MOD 30 MIN: CPT | Performed by: INTERNAL MEDICINE

## 2019-06-03 ENCOUNTER — HOSPITAL ENCOUNTER (EMERGENCY)
Facility: HOSPITAL | Age: 84
Discharge: HOME | End: 2019-06-03
Attending: EMERGENCY MEDICINE | Admitting: EMERGENCY MEDICINE
Payer: MEDICARE

## 2019-06-03 VITALS
HEART RATE: 87 BPM | RESPIRATION RATE: 16 BRPM | HEIGHT: 61 IN | TEMPERATURE: 97.9 F | BODY MASS INDEX: 41.72 KG/M2 | DIASTOLIC BLOOD PRESSURE: 85 MMHG | SYSTOLIC BLOOD PRESSURE: 124 MMHG | WEIGHT: 221 LBS | OXYGEN SATURATION: 98 %

## 2019-06-03 DIAGNOSIS — N39.0 UTI (URINARY TRACT INFECTION): Primary | ICD-10-CM

## 2019-06-03 LAB
ANION GAP SERPL CALCULATED.3IONS-SCNC: 7 MMOL/L (ref 4–13)
BACTERIA UR QL AUTO: ABNORMAL /HPF
BILIRUB UR QL STRIP: NEGATIVE
BUN SERPL-MCNC: 33 MG/DL (ref 5–25)
CALCIUM SERPL-MCNC: 8.4 MG/DL (ref 8.3–10.1)
CHLORIDE SERPL-SCNC: 105 MMOL/L (ref 100–108)
CLARITY UR: ABNORMAL
CO2 SERPL-SCNC: 27 MMOL/L (ref 21–32)
COLOR UR: ABNORMAL
CREAT SERPL-MCNC: 2.21 MG/DL (ref 0.6–1.3)
GFR SERPL CREATININE-BSD FRML MDRD: 20 ML/MIN/1.73SQ M
GLUCOSE SERPL-MCNC: 114 MG/DL (ref 65–140)
GLUCOSE UR STRIP-MCNC: NEGATIVE MG/DL
HGB UR QL STRIP.AUTO: ABNORMAL
HYALINE CASTS #/AREA URNS LPF: ABNORMAL /LPF
KETONES UR STRIP-MCNC: NEGATIVE MG/DL
LEUKOCYTE ESTERASE UR QL STRIP: ABNORMAL
NITRITE UR QL STRIP: NEGATIVE
NON-SQ EPI CELLS URNS QL MICRO: ABNORMAL /HPF
PH UR STRIP.AUTO: 6 [PH]
POTASSIUM SERPL-SCNC: 3.9 MMOL/L (ref 3.5–5.3)
PROT UR STRIP-MCNC: ABNORMAL MG/DL
RBC #/AREA URNS AUTO: ABNORMAL /HPF
SODIUM SERPL-SCNC: 139 MMOL/L (ref 136–145)
SP GR UR STRIP.AUTO: 1.01 (ref 1–1.03)
UROBILINOGEN UR QL STRIP.AUTO: 0.2 E.U./DL
WBC #/AREA URNS AUTO: ABNORMAL /HPF

## 2019-06-03 PROCEDURE — 87077 CULTURE AEROBIC IDENTIFY: CPT | Performed by: EMERGENCY MEDICINE

## 2019-06-03 PROCEDURE — 81001 URINALYSIS AUTO W/SCOPE: CPT | Performed by: EMERGENCY MEDICINE

## 2019-06-03 PROCEDURE — 80048 BASIC METABOLIC PNL TOTAL CA: CPT | Performed by: EMERGENCY MEDICINE

## 2019-06-03 PROCEDURE — 99283 EMERGENCY DEPT VISIT LOW MDM: CPT

## 2019-06-03 PROCEDURE — 99283 EMERGENCY DEPT VISIT LOW MDM: CPT | Performed by: EMERGENCY MEDICINE

## 2019-06-03 PROCEDURE — 87086 URINE CULTURE/COLONY COUNT: CPT | Performed by: EMERGENCY MEDICINE

## 2019-06-03 PROCEDURE — 87186 SC STD MICRODIL/AGAR DIL: CPT | Performed by: EMERGENCY MEDICINE

## 2019-06-03 PROCEDURE — 36415 COLL VENOUS BLD VENIPUNCTURE: CPT | Performed by: EMERGENCY MEDICINE

## 2019-06-03 RX ORDER — CEPHALEXIN 500 MG/1
500 CAPSULE ORAL EVERY 12 HOURS SCHEDULED
Qty: 14 CAPSULE | Refills: 0 | Status: SHIPPED | OUTPATIENT
Start: 2019-06-03 | End: 2019-06-10

## 2019-06-03 RX ORDER — CEPHALEXIN 250 MG/1
500 CAPSULE ORAL ONCE
Status: COMPLETED | OUTPATIENT
Start: 2019-06-03 | End: 2019-06-03

## 2019-06-03 RX ADMIN — CEPHALEXIN 500 MG: 250 CAPSULE ORAL at 22:41

## 2019-06-04 ENCOUNTER — TELEPHONE (OUTPATIENT)
Dept: UROLOGY | Facility: CLINIC | Age: 84
End: 2019-06-04

## 2019-06-04 DIAGNOSIS — R31.0 HEMATURIA, GROSS: Primary | ICD-10-CM

## 2019-06-05 ENCOUNTER — PATIENT OUTREACH (OUTPATIENT)
Dept: CASE MANAGEMENT | Facility: OTHER | Age: 84
End: 2019-06-05

## 2019-06-06 ENCOUNTER — TRANSCRIBE ORDERS (OUTPATIENT)
Dept: LAB | Facility: CLINIC | Age: 84
End: 2019-06-06

## 2019-06-06 ENCOUNTER — APPOINTMENT (OUTPATIENT)
Dept: LAB | Facility: CLINIC | Age: 84
End: 2019-06-06
Payer: MEDICARE

## 2019-06-06 DIAGNOSIS — N39.0 UTI (URINARY TRACT INFECTION): ICD-10-CM

## 2019-06-06 LAB
ANION GAP SERPL CALCULATED.3IONS-SCNC: 7 MMOL/L (ref 4–13)
BACTERIA UR CULT: ABNORMAL
BUN SERPL-MCNC: 32 MG/DL (ref 5–25)
CALCIUM SERPL-MCNC: 8.5 MG/DL (ref 8.3–10.1)
CHLORIDE SERPL-SCNC: 108 MMOL/L (ref 100–108)
CO2 SERPL-SCNC: 28 MMOL/L (ref 21–32)
CREAT SERPL-MCNC: 1.64 MG/DL (ref 0.6–1.3)
GFR SERPL CREATININE-BSD FRML MDRD: 29 ML/MIN/1.73SQ M
GLUCOSE P FAST SERPL-MCNC: 120 MG/DL (ref 65–99)
POTASSIUM SERPL-SCNC: 4.3 MMOL/L (ref 3.5–5.3)
SODIUM SERPL-SCNC: 143 MMOL/L (ref 136–145)

## 2019-06-06 PROCEDURE — 36415 COLL VENOUS BLD VENIPUNCTURE: CPT

## 2019-06-06 PROCEDURE — 80048 BASIC METABOLIC PNL TOTAL CA: CPT

## 2019-06-08 ENCOUNTER — HOSPITAL ENCOUNTER (EMERGENCY)
Facility: HOSPITAL | Age: 84
Discharge: HOME | End: 2019-06-08
Attending: EMERGENCY MEDICINE | Admitting: EMERGENCY MEDICINE
Payer: MEDICARE

## 2019-06-08 VITALS
HEART RATE: 72 BPM | RESPIRATION RATE: 20 BRPM | DIASTOLIC BLOOD PRESSURE: 60 MMHG | SYSTOLIC BLOOD PRESSURE: 131 MMHG | WEIGHT: 213.44 LBS | TEMPERATURE: 98.1 F | OXYGEN SATURATION: 98 % | BODY MASS INDEX: 40.33 KG/M2

## 2019-06-08 DIAGNOSIS — R31.9 HEMATURIA: Primary | ICD-10-CM

## 2019-06-08 LAB
ALBUMIN SERPL BCP-MCNC: 3.7 G/DL (ref 3.5–5)
ALP SERPL-CCNC: 131 U/L (ref 46–116)
ALT SERPL W P-5'-P-CCNC: 21 U/L (ref 12–78)
ANION GAP SERPL CALCULATED.3IONS-SCNC: 6 MMOL/L (ref 4–13)
APTT PPP: 36 SECONDS (ref 26–38)
AST SERPL W P-5'-P-CCNC: 27 U/L (ref 5–45)
BACTERIA UR QL AUTO: ABNORMAL /HPF
BASOPHILS # BLD AUTO: 0.03 THOUSANDS/ΜL (ref 0–0.1)
BASOPHILS NFR BLD AUTO: 0 % (ref 0–1)
BILIRUB SERPL-MCNC: 0.43 MG/DL (ref 0.2–1)
BILIRUB UR QL STRIP: ABNORMAL
BUN SERPL-MCNC: 32 MG/DL (ref 5–25)
CALCIUM SERPL-MCNC: 8.8 MG/DL (ref 8.3–10.1)
CHLORIDE SERPL-SCNC: 108 MMOL/L (ref 100–108)
CLARITY UR: ABNORMAL
CO2 SERPL-SCNC: 28 MMOL/L (ref 21–32)
COLOR UR: ABNORMAL
CREAT SERPL-MCNC: 1.72 MG/DL (ref 0.6–1.3)
EOSINOPHIL # BLD AUTO: 0.3 THOUSAND/ΜL (ref 0–0.61)
EOSINOPHIL NFR BLD AUTO: 3 % (ref 0–6)
ERYTHROCYTE [DISTWIDTH] IN BLOOD BY AUTOMATED COUNT: 13.8 % (ref 11.6–15.1)
GFR SERPL CREATININE-BSD FRML MDRD: 27 ML/MIN/1.73SQ M
GLUCOSE SERPL-MCNC: 80 MG/DL (ref 65–140)
GLUCOSE UR STRIP-MCNC: NEGATIVE MG/DL
HCT VFR BLD AUTO: 30 % (ref 34.8–46.1)
HGB BLD-MCNC: 9.7 G/DL (ref 11.5–15.4)
HGB UR QL STRIP.AUTO: ABNORMAL
IMM GRANULOCYTES # BLD AUTO: 0.04 THOUSAND/UL (ref 0–0.2)
IMM GRANULOCYTES NFR BLD AUTO: 0 % (ref 0–2)
INR PPP: 1.32 (ref 0.86–1.17)
KETONES UR STRIP-MCNC: ABNORMAL MG/DL
LEUKOCYTE ESTERASE UR QL STRIP: ABNORMAL
LYMPHOCYTES # BLD AUTO: 1.59 THOUSANDS/ΜL (ref 0.6–4.47)
LYMPHOCYTES NFR BLD AUTO: 18 % (ref 14–44)
MCH RBC QN AUTO: 32.7 PG (ref 26.8–34.3)
MCHC RBC AUTO-ENTMCNC: 32.3 G/DL (ref 31.4–37.4)
MCV RBC AUTO: 101 FL (ref 82–98)
MONOCYTES # BLD AUTO: 0.85 THOUSAND/ΜL (ref 0.17–1.22)
MONOCYTES NFR BLD AUTO: 10 % (ref 4–12)
NEUTROPHILS # BLD AUTO: 6.12 THOUSANDS/ΜL (ref 1.85–7.62)
NEUTS SEG NFR BLD AUTO: 69 % (ref 43–75)
NITRITE UR QL STRIP: NEGATIVE
NON-SQ EPI CELLS URNS QL MICRO: ABNORMAL /HPF
NRBC BLD AUTO-RTO: 0 /100 WBCS
OTHER STN SPEC: ABNORMAL
PH UR STRIP.AUTO: 6.5 [PH]
PLATELET # BLD AUTO: 213 THOUSANDS/UL (ref 149–390)
PMV BLD AUTO: 10.3 FL (ref 8.9–12.7)
POTASSIUM SERPL-SCNC: 4.4 MMOL/L (ref 3.5–5.3)
PROT SERPL-MCNC: 7.1 G/DL (ref 6.4–8.2)
PROT UR STRIP-MCNC: ABNORMAL MG/DL
PROTHROMBIN TIME: 16.5 SECONDS (ref 11.8–14.2)
RBC # BLD AUTO: 2.97 MILLION/UL (ref 3.81–5.12)
RBC #/AREA URNS AUTO: ABNORMAL /HPF
SODIUM SERPL-SCNC: 142 MMOL/L (ref 136–145)
SP GR UR STRIP.AUTO: 1.01 (ref 1–1.03)
UROBILINOGEN UR QL STRIP.AUTO: 1 E.U./DL
WBC # BLD AUTO: 8.93 THOUSAND/UL (ref 4.31–10.16)
WBC #/AREA URNS AUTO: ABNORMAL /HPF
WBC CLUMPS # UR AUTO: PRESENT /UL

## 2019-06-08 PROCEDURE — 99284 EMERGENCY DEPT VISIT MOD MDM: CPT | Performed by: EMERGENCY MEDICINE

## 2019-06-08 PROCEDURE — 36415 COLL VENOUS BLD VENIPUNCTURE: CPT | Performed by: EMERGENCY MEDICINE

## 2019-06-08 PROCEDURE — 87186 SC STD MICRODIL/AGAR DIL: CPT | Performed by: EMERGENCY MEDICINE

## 2019-06-08 PROCEDURE — 85610 PROTHROMBIN TIME: CPT | Performed by: EMERGENCY MEDICINE

## 2019-06-08 PROCEDURE — 81001 URINALYSIS AUTO W/SCOPE: CPT | Performed by: EMERGENCY MEDICINE

## 2019-06-08 PROCEDURE — 85025 COMPLETE CBC W/AUTO DIFF WBC: CPT | Performed by: EMERGENCY MEDICINE

## 2019-06-08 PROCEDURE — 99284 EMERGENCY DEPT VISIT MOD MDM: CPT

## 2019-06-08 PROCEDURE — 87077 CULTURE AEROBIC IDENTIFY: CPT | Performed by: EMERGENCY MEDICINE

## 2019-06-08 PROCEDURE — 87086 URINE CULTURE/COLONY COUNT: CPT | Performed by: EMERGENCY MEDICINE

## 2019-06-08 PROCEDURE — 85730 THROMBOPLASTIN TIME PARTIAL: CPT | Performed by: EMERGENCY MEDICINE

## 2019-06-08 PROCEDURE — 80053 COMPREHEN METABOLIC PANEL: CPT | Performed by: EMERGENCY MEDICINE

## 2019-06-10 ENCOUNTER — TELEPHONE (OUTPATIENT)
Dept: UROLOGY | Facility: MEDICAL CENTER | Age: 84
End: 2019-06-10

## 2019-06-10 ENCOUNTER — TELEPHONE (OUTPATIENT)
Dept: CARDIOLOGY CLINIC | Facility: CLINIC | Age: 84
End: 2019-06-10

## 2019-06-11 ENCOUNTER — TELEPHONE (OUTPATIENT)
Dept: VASCULAR SURGERY | Facility: CLINIC | Age: 84
End: 2019-06-11

## 2019-06-11 DIAGNOSIS — I73.9 PAD (PERIPHERAL ARTERY DISEASE) (HCC): Primary | ICD-10-CM

## 2019-06-12 ENCOUNTER — OFFICE VISIT (OUTPATIENT)
Dept: FAMILY MEDICINE CLINIC | Facility: CLINIC | Age: 84
End: 2019-06-12
Payer: MEDICARE

## 2019-06-12 VITALS
OXYGEN SATURATION: 97 % | SYSTOLIC BLOOD PRESSURE: 130 MMHG | DIASTOLIC BLOOD PRESSURE: 80 MMHG | BODY MASS INDEX: 41.91 KG/M2 | WEIGHT: 222 LBS | RESPIRATION RATE: 18 BRPM | HEIGHT: 61 IN | HEART RATE: 88 BPM

## 2019-06-12 DIAGNOSIS — N30.01 ACUTE CYSTITIS WITH HEMATURIA: Primary | ICD-10-CM

## 2019-06-12 DIAGNOSIS — E11.42 DIABETIC POLYNEUROPATHY ASSOCIATED WITH TYPE 2 DIABETES MELLITUS (HCC): ICD-10-CM

## 2019-06-12 DIAGNOSIS — M05.9 RHEUMATOID ARTHRITIS WITH POSITIVE RHEUMATOID FACTOR, INVOLVING UNSPECIFIED SITE (HCC): ICD-10-CM

## 2019-06-12 DIAGNOSIS — N18.30 CKD (CHRONIC KIDNEY DISEASE) STAGE 3, GFR 30-59 ML/MIN (HCC): ICD-10-CM

## 2019-06-12 DIAGNOSIS — E11.69 DIABETES MELLITUS TYPE 2 IN OBESE (HCC): ICD-10-CM

## 2019-06-12 DIAGNOSIS — I48.0 PAROXYSMAL ATRIAL FIBRILLATION (HCC): ICD-10-CM

## 2019-06-12 DIAGNOSIS — G25.81 RESTLESS LEG SYNDROME: ICD-10-CM

## 2019-06-12 DIAGNOSIS — E66.9 DIABETES MELLITUS TYPE 2 IN OBESE (HCC): ICD-10-CM

## 2019-06-12 DIAGNOSIS — E66.01 OBESITY, CLASS III, BMI 40-49.9 (MORBID OBESITY) (HCC): ICD-10-CM

## 2019-06-12 LAB
BACTERIA UR CULT: ABNORMAL
BACTERIA UR QL AUTO: ABNORMAL /HPF
BILIRUB UR QL STRIP: NEGATIVE
CLARITY UR: ABNORMAL
COLOR UR: YELLOW
GLUCOSE UR STRIP-MCNC: NEGATIVE MG/DL
HGB UR QL STRIP.AUTO: ABNORMAL
HYALINE CASTS #/AREA URNS LPF: ABNORMAL /LPF
KETONES UR STRIP-MCNC: NEGATIVE MG/DL
LEUKOCYTE ESTERASE UR QL STRIP: ABNORMAL
NITRITE UR QL STRIP: NEGATIVE
NON-SQ EPI CELLS URNS QL MICRO: ABNORMAL /HPF
PH UR STRIP.AUTO: 6 [PH]
PROT UR STRIP-MCNC: NEGATIVE MG/DL
RBC #/AREA URNS AUTO: ABNORMAL /HPF
SL AMB  POCT GLUCOSE, UA: NEGATIVE
SL AMB LEUKOCYTE ESTERASE,UA: ABNORMAL
SL AMB POCT BILIRUBIN,UA: NEGATIVE
SL AMB POCT BLOOD,UA: ABNORMAL
SL AMB POCT CLARITY,UA: ABNORMAL
SL AMB POCT COLOR,UA: YELLOW
SL AMB POCT KETONES,UA: NEGATIVE
SL AMB POCT NITRITE,UA: NEGATIVE
SL AMB POCT PH,UA: 5
SL AMB POCT SPECIFIC GRAVITY,UA: 1
SL AMB POCT URINE PROTEIN: NEGATIVE
SL AMB POCT UROBILINOGEN: 0.2
SP GR UR STRIP.AUTO: 1.01 (ref 1–1.03)
UROBILINOGEN UR QL STRIP.AUTO: 0.2 E.U./DL
WBC #/AREA URNS AUTO: ABNORMAL /HPF

## 2019-06-12 PROCEDURE — 81001 URINALYSIS AUTO W/SCOPE: CPT | Performed by: FAMILY MEDICINE

## 2019-06-12 PROCEDURE — 81002 URINALYSIS NONAUTO W/O SCOPE: CPT | Performed by: FAMILY MEDICINE

## 2019-06-12 PROCEDURE — 87186 SC STD MICRODIL/AGAR DIL: CPT | Performed by: FAMILY MEDICINE

## 2019-06-12 PROCEDURE — 87086 URINE CULTURE/COLONY COUNT: CPT | Performed by: FAMILY MEDICINE

## 2019-06-12 PROCEDURE — 99214 OFFICE O/P EST MOD 30 MIN: CPT | Performed by: FAMILY MEDICINE

## 2019-06-12 PROCEDURE — 87077 CULTURE AEROBIC IDENTIFY: CPT | Performed by: FAMILY MEDICINE

## 2019-06-14 DIAGNOSIS — N30.01 ACUTE CYSTITIS WITH HEMATURIA: Primary | ICD-10-CM

## 2019-06-14 LAB
BACTERIA UR CULT: ABNORMAL
BACTERIA UR CULT: ABNORMAL

## 2019-06-14 RX ORDER — CIPROFLOXACIN 500 MG/1
500 TABLET, FILM COATED ORAL EVERY 24 HOURS
Qty: 7 TABLET | Refills: 0 | Status: SHIPPED | OUTPATIENT
Start: 2019-06-14 | End: 2020-01-01 | Stop reason: SDUPTHER

## 2019-06-17 RX ORDER — ROPINIROLE 0.5 MG/1
TABLET, FILM COATED ORAL
Start: 2019-06-17 | End: 2019-12-18 | Stop reason: SDUPTHER

## 2019-06-18 ENCOUNTER — PATIENT OUTREACH (OUTPATIENT)
Dept: CASE MANAGEMENT | Facility: OTHER | Age: 84
End: 2019-06-18

## 2019-06-27 ENCOUNTER — OFFICE VISIT (OUTPATIENT)
Dept: FAMILY MEDICINE CLINIC | Facility: CLINIC | Age: 84
End: 2019-06-27
Payer: MEDICARE

## 2019-06-27 VITALS
BODY MASS INDEX: 42.67 KG/M2 | SYSTOLIC BLOOD PRESSURE: 130 MMHG | HEART RATE: 100 BPM | WEIGHT: 226 LBS | DIASTOLIC BLOOD PRESSURE: 70 MMHG | OXYGEN SATURATION: 99 % | HEIGHT: 61 IN | TEMPERATURE: 97.5 F

## 2019-06-27 DIAGNOSIS — R39.9 UTI SYMPTOMS: Primary | ICD-10-CM

## 2019-06-27 DIAGNOSIS — N30.01 ACUTE CYSTITIS WITH HEMATURIA: ICD-10-CM

## 2019-06-27 DIAGNOSIS — L82.1 SEBORRHEIC KERATOSES: ICD-10-CM

## 2019-06-27 LAB
SL AMB  POCT GLUCOSE, UA: NEGATIVE
SL AMB LEUKOCYTE ESTERASE,UA: ABNORMAL
SL AMB POCT BILIRUBIN,UA: NEGATIVE
SL AMB POCT BLOOD,UA: ABNORMAL
SL AMB POCT CLARITY,UA: ABNORMAL
SL AMB POCT COLOR,UA: YELLOW
SL AMB POCT KETONES,UA: NEGATIVE
SL AMB POCT NITRITE,UA: POSITIVE
SL AMB POCT PH,UA: 5
SL AMB POCT SPECIFIC GRAVITY,UA: 1.03
SL AMB POCT URINE PROTEIN: NEGATIVE
SL AMB POCT UROBILINOGEN: 0.2

## 2019-06-27 PROCEDURE — 81002 URINALYSIS NONAUTO W/O SCOPE: CPT | Performed by: PHYSICIAN ASSISTANT

## 2019-06-27 PROCEDURE — 87086 URINE CULTURE/COLONY COUNT: CPT | Performed by: PHYSICIAN ASSISTANT

## 2019-06-27 PROCEDURE — 99213 OFFICE O/P EST LOW 20 MIN: CPT | Performed by: PHYSICIAN ASSISTANT

## 2019-06-27 RX ORDER — LEVOFLOXACIN 250 MG/1
250 TABLET ORAL EVERY 24 HOURS
Qty: 10 TABLET | Refills: 0 | Status: SHIPPED | OUTPATIENT
Start: 2019-06-27 | End: 2019-07-07

## 2019-06-28 LAB — BACTERIA UR CULT: NORMAL

## 2019-07-02 ENCOUNTER — HOSPITAL ENCOUNTER (OUTPATIENT)
Dept: NON INVASIVE DIAGNOSTICS | Facility: CLINIC | Age: 84
Discharge: HOME | End: 2019-07-02
Payer: MEDICARE

## 2019-07-02 DIAGNOSIS — I73.9 PAD (PERIPHERAL ARTERY DISEASE) (HCC): ICD-10-CM

## 2019-07-02 PROCEDURE — 93978 VASCULAR STUDY: CPT | Performed by: SURGERY

## 2019-07-02 PROCEDURE — 93978 VASCULAR STUDY: CPT

## 2019-07-02 PROCEDURE — 93924 LWR XTR VASC STDY BILAT: CPT

## 2019-07-10 ENCOUNTER — APPOINTMENT (OUTPATIENT)
Dept: LAB | Facility: CLINIC | Age: 84
End: 2019-07-10
Payer: MEDICARE

## 2019-07-10 ENCOUNTER — TRANSCRIBE ORDERS (OUTPATIENT)
Dept: LAB | Facility: CLINIC | Age: 84
End: 2019-07-10

## 2019-07-10 DIAGNOSIS — R31.0 HEMATURIA, GROSS: ICD-10-CM

## 2019-07-10 DIAGNOSIS — N18.30 CKD (CHRONIC KIDNEY DISEASE) STAGE 3, GFR 30-59 ML/MIN (HCC): ICD-10-CM

## 2019-07-10 DIAGNOSIS — I48.0 PAROXYSMAL ATRIAL FIBRILLATION (HCC): ICD-10-CM

## 2019-07-10 DIAGNOSIS — N18.4 CKD (CHRONIC KIDNEY DISEASE), STAGE IV (HCC): ICD-10-CM

## 2019-07-10 DIAGNOSIS — N30.01 ACUTE CYSTITIS WITH HEMATURIA: ICD-10-CM

## 2019-07-10 DIAGNOSIS — N18.30 STAGE 3 CHRONIC KIDNEY DISEASE (HCC): ICD-10-CM

## 2019-07-10 LAB
ALBUMIN SERPL BCP-MCNC: 4 G/DL (ref 3.5–5)
ALP SERPL-CCNC: 129 U/L (ref 46–116)
ALT SERPL W P-5'-P-CCNC: 19 U/L (ref 12–78)
ANION GAP SERPL CALCULATED.3IONS-SCNC: 8 MMOL/L (ref 4–13)
AST SERPL W P-5'-P-CCNC: 17 U/L (ref 5–45)
BASOPHILS # BLD AUTO: 0.04 THOUSANDS/ΜL (ref 0–0.1)
BASOPHILS NFR BLD AUTO: 0 % (ref 0–1)
BILIRUB SERPL-MCNC: 0.37 MG/DL (ref 0.2–1)
BILIRUB UR QL STRIP: NEGATIVE
BUN SERPL-MCNC: 35 MG/DL (ref 5–25)
CALCIUM SERPL-MCNC: 9 MG/DL (ref 8.3–10.1)
CHLORIDE SERPL-SCNC: 106 MMOL/L (ref 100–108)
CLARITY UR: CLEAR
CO2 SERPL-SCNC: 28 MMOL/L (ref 21–32)
COLOR UR: YELLOW
CREAT SERPL-MCNC: 1.72 MG/DL (ref 0.6–1.3)
CREAT UR-MCNC: 20.3 MG/DL
EOSINOPHIL # BLD AUTO: 0.22 THOUSAND/ΜL (ref 0–0.61)
EOSINOPHIL NFR BLD AUTO: 2 % (ref 0–6)
ERYTHROCYTE [DISTWIDTH] IN BLOOD BY AUTOMATED COUNT: 13.9 % (ref 11.6–15.1)
GFR SERPL CREATININE-BSD FRML MDRD: 27 ML/MIN/1.73SQ M
GLUCOSE SERPL-MCNC: 101 MG/DL (ref 65–140)
GLUCOSE UR STRIP-MCNC: NEGATIVE MG/DL
HCT VFR BLD AUTO: 31.5 % (ref 34.8–46.1)
HGB BLD-MCNC: 10 G/DL (ref 11.5–15.4)
HGB UR QL STRIP.AUTO: NEGATIVE
IMM GRANULOCYTES # BLD AUTO: 0.07 THOUSAND/UL (ref 0–0.2)
IMM GRANULOCYTES NFR BLD AUTO: 1 % (ref 0–2)
KETONES UR STRIP-MCNC: NEGATIVE MG/DL
LEUKOCYTE ESTERASE UR QL STRIP: NEGATIVE
LYMPHOCYTES # BLD AUTO: 1.45 THOUSANDS/ΜL (ref 0.6–4.47)
LYMPHOCYTES NFR BLD AUTO: 16 % (ref 14–44)
MAGNESIUM SERPL-MCNC: 2.6 MG/DL (ref 1.6–2.6)
MCH RBC QN AUTO: 32.3 PG (ref 26.8–34.3)
MCHC RBC AUTO-ENTMCNC: 31.7 G/DL (ref 31.4–37.4)
MCV RBC AUTO: 102 FL (ref 82–98)
MONOCYTES # BLD AUTO: 0.67 THOUSAND/ΜL (ref 0.17–1.22)
MONOCYTES NFR BLD AUTO: 7 % (ref 4–12)
NEUTROPHILS # BLD AUTO: 6.62 THOUSANDS/ΜL (ref 1.85–7.62)
NEUTS SEG NFR BLD AUTO: 74 % (ref 43–75)
NITRITE UR QL STRIP: NEGATIVE
NRBC BLD AUTO-RTO: 0 /100 WBCS
PH UR STRIP.AUTO: 6.5 [PH]
PHOSPHATE SERPL-MCNC: 4.2 MG/DL (ref 2.3–4.1)
PLATELET # BLD AUTO: 237 THOUSANDS/UL (ref 149–390)
PMV BLD AUTO: 10.2 FL (ref 8.9–12.7)
POTASSIUM SERPL-SCNC: 3.9 MMOL/L (ref 3.5–5.3)
PROT SERPL-MCNC: 7.5 G/DL (ref 6.4–8.2)
PROT UR STRIP-MCNC: NEGATIVE MG/DL
PROT UR-MCNC: <6 MG/DL
PROT/CREAT UR: <0.3 MG/G{CREAT} (ref 0–0.1)
PTH-INTACT SERPL-MCNC: 83.6 PG/ML (ref 18.4–80.1)
RBC # BLD AUTO: 3.1 MILLION/UL (ref 3.81–5.12)
SODIUM SERPL-SCNC: 142 MMOL/L (ref 136–145)
SP GR UR STRIP.AUTO: 1.01 (ref 1–1.03)
UROBILINOGEN UR QL STRIP.AUTO: 0.2 E.U./DL
WBC # BLD AUTO: 9.07 THOUSAND/UL (ref 4.31–10.16)

## 2019-07-10 PROCEDURE — 82570 ASSAY OF URINE CREATININE: CPT

## 2019-07-10 PROCEDURE — 84100 ASSAY OF PHOSPHORUS: CPT

## 2019-07-10 PROCEDURE — 83735 ASSAY OF MAGNESIUM: CPT | Performed by: NURSE PRACTITIONER

## 2019-07-10 PROCEDURE — 85025 COMPLETE CBC W/AUTO DIFF WBC: CPT

## 2019-07-10 PROCEDURE — 81003 URINALYSIS AUTO W/O SCOPE: CPT

## 2019-07-10 PROCEDURE — 87086 URINE CULTURE/COLONY COUNT: CPT

## 2019-07-10 PROCEDURE — 84156 ASSAY OF PROTEIN URINE: CPT

## 2019-07-10 PROCEDURE — 83970 ASSAY OF PARATHORMONE: CPT

## 2019-07-10 PROCEDURE — 36415 COLL VENOUS BLD VENIPUNCTURE: CPT

## 2019-07-10 PROCEDURE — 80053 COMPREHEN METABOLIC PANEL: CPT

## 2019-07-11 LAB — BACTERIA UR CULT: NORMAL

## 2019-07-25 ENCOUNTER — HOSPITAL ENCOUNTER (OUTPATIENT)
Dept: NON INVASIVE DIAGNOSTICS | Facility: CLINIC | Age: 84
Discharge: HOME | End: 2019-07-25
Payer: MEDICARE

## 2019-07-25 DIAGNOSIS — I73.9 PAD (PERIPHERAL ARTERY DISEASE) (HCC): ICD-10-CM

## 2019-07-25 PROCEDURE — 93922 UPR/L XTREMITY ART 2 LEVELS: CPT | Performed by: SURGERY

## 2019-07-25 PROCEDURE — 93925 LOWER EXTREMITY STUDY: CPT

## 2019-07-25 PROCEDURE — 93925 LOWER EXTREMITY STUDY: CPT | Performed by: SURGERY

## 2019-07-25 PROCEDURE — 93923 UPR/LXTR ART STDY 3+ LVLS: CPT

## 2019-07-30 DIAGNOSIS — K21.9 CHRONIC GERD: Primary | ICD-10-CM

## 2019-07-30 DIAGNOSIS — E03.9 ACQUIRED HYPOTHYROIDISM: ICD-10-CM

## 2019-07-30 RX ORDER — LEVOTHYROXINE SODIUM 0.07 MG/1
75 TABLET ORAL DAILY
Qty: 90 TABLET | Refills: 3 | Status: SHIPPED | OUTPATIENT
Start: 2019-07-30 | End: 2020-01-01 | Stop reason: SDUPTHER

## 2019-07-30 RX ORDER — OMEPRAZOLE 40 MG/1
40 CAPSULE, DELAYED RELEASE ORAL DAILY
Qty: 90 CAPSULE | Refills: 3 | Status: SHIPPED | OUTPATIENT
Start: 2019-07-30 | End: 2020-01-01 | Stop reason: SDUPTHER

## 2019-07-31 ENCOUNTER — PROCEDURE VISIT (OUTPATIENT)
Dept: UROLOGY | Facility: CLINIC | Age: 84
End: 2019-07-31
Payer: MEDICARE

## 2019-07-31 VITALS
DIASTOLIC BLOOD PRESSURE: 90 MMHG | BODY MASS INDEX: 41.35 KG/M2 | SYSTOLIC BLOOD PRESSURE: 168 MMHG | WEIGHT: 219 LBS | HEART RATE: 80 BPM | HEIGHT: 61 IN

## 2019-07-31 DIAGNOSIS — K43.9 ABDOMINAL WALL HERNIA: ICD-10-CM

## 2019-07-31 DIAGNOSIS — C68.9 UROTHELIAL CARCINOMA (HCC): ICD-10-CM

## 2019-07-31 DIAGNOSIS — C67.9 MALIGNANT NEOPLASM OF URINARY BLADDER, UNSPECIFIED SITE (HCC): Primary | ICD-10-CM

## 2019-07-31 DIAGNOSIS — C68.9 UROTHELIAL CANCER (HCC): ICD-10-CM

## 2019-07-31 PROBLEM — N39.0 UTI (URINARY TRACT INFECTION): Status: RESOLVED | Noted: 2017-05-10 | Resolved: 2019-07-31

## 2019-07-31 PROBLEM — N32.9 LESION OF BLADDER: Status: RESOLVED | Noted: 2019-04-26 | Resolved: 2019-07-31

## 2019-07-31 LAB
SL AMB  POCT GLUCOSE, UA: ABNORMAL
SL AMB LEUKOCYTE ESTERASE,UA: ABNORMAL
SL AMB POCT BILIRUBIN,UA: ABNORMAL
SL AMB POCT BLOOD,UA: ABNORMAL
SL AMB POCT CLARITY,UA: CLEAR
SL AMB POCT COLOR,UA: YELLOW
SL AMB POCT KETONES,UA: ABNORMAL
SL AMB POCT NITRITE,UA: ABNORMAL
SL AMB POCT PH,UA: 5
SL AMB POCT SPECIFIC GRAVITY,UA: 1.02
SL AMB POCT URINE PROTEIN: ABNORMAL
SL AMB POCT UROBILINOGEN: ABNORMAL

## 2019-07-31 PROCEDURE — 81002 URINALYSIS NONAUTO W/O SCOPE: CPT | Performed by: UROLOGY

## 2019-07-31 PROCEDURE — 52000 CYSTOURETHROSCOPY: CPT | Performed by: UROLOGY

## 2019-07-31 NOTE — PROGRESS NOTES
Cystoscopy  Date/Time: 7/31/2019 10:14 AM  Performed by: Tejas Renae MD  Authorized by: Tejas Renae MD     Procedure details: cystoscopy    Patient tolerance: Patient tolerated the procedure well with no immediate complications    Additional Procedure Details:   Cystoscopy Procedure note    Risk and benefits of flexible cystoscopy were discussed  Informed consent was obtained  A urine dip is adequate for cystoscopy  The patient was placed into the modified supine position  Her genitalia was prepped with Betadine and draped in a sterile fashion  Viscous 2% lidocaine was instilled into the urethra and allowed dwell time for local anesthesia  Flexible cystoscopy was then performed with a 16F scope  Urethra was inspected on entrance and exit of the scope  Urethra was retracted  The bladder was thoroughly inspected in a 360 degree fashion  Left ureteral orifice seen in the orthotopic position  The bladder mucosa was thoroughly inspected  There was no evidence of mucosal abnormalities, stones, or lesions  Retroflexion for evaluation of the bladder neck was normal       Overall this was a negative cystoscopy  A female office staff member was present throughout the entire procedure

## 2019-07-31 NOTE — PROGRESS NOTES
UROLOGY PROGRESS NOTE     History of Present Illness:   Jaison Terrell is a 81 y o  female known to Dr Twyla Ortez with a long history of urge incontinence who has been undergoing PTNS  This was helping but the patient is no longer qualified from an insurance standpoint    She developed recurrent urinary tract infections and gross hematuria  Her primary care team ordered a renal ultrasound and followup CT scan which showed some nodularity in the right renal pelvis  Patient underwent ureteroscopic evaluation and subsequent ureteroscopic biopsy which demonstrated diffuse low-grade cancer  After lengthy discussion, the patient agreed to proceed to the operating room for a right robotic nephroureterectomy with bladder cuff which was performed in April  Patient did receive 1 unit of packed red blood cells while hospitalized given asymptomatic anemia but significant cardiac comorbidities  Given her rheumatologic issue she was transferred to a rehab facility following surgery  Developed incision hernia post op and has seen general surgery team for evaluation  Small draining wound was present  Then had subsequent fall from standing, with traumatic shoulder injury  Presents today for scheduled followup  She has been doing reasonably well  Her hernia continues to be present but is not causing her any symptoms  She underwent vascular stenting of her right iliac artery in January 2019 and feels much improved      Past Medical History:     Past Medical History:   Diagnosis Date    Anemia     Arthritis     rheumatoid    At risk for falls     Benign essential hypertension     CHF (congestive heart failure) (HCC)     Chronic cystitis     Chronic kidney disease     right kidney cancer - kidney removed    Chronic pain disorder     CPAP (continuous positive airway pressure) dependence     Diverticulitis of colon     Early satiety     Edema     GERD (gastroesophageal reflux disease)     Gout     Hearing aid worn     bilateral    Hearing loss     Hemorrhoids     Hiatal hernia     History of colonic polyps     Hyperlipidemia     Hypothyroidism     Irregular heart beat     Afib    Left breast mass     Microhematuria     Migraine     occular    Mobility impaired     left arm    Neuropathy     lower and upper extermities    Overactive bladder     Pneumonia of left lower lobe due to infectious organism ([address]     PVD (peripheral vascular disease) (HCC)     RA (rheumatoid arthritis) ([address]     Restless leg syndrome     Sleep apnea     uses cpap    Stroke ([address] )     5/2017    Type 2 diabetes mellitus (HCC)     Urinary frequency     Urinary urgency     Urothelial cancer ([address] ) 04/23/2018    Uses walker        PAST SURGICAL HISTORY:     Past Surgical History:   Procedure Laterality Date    APPENDECTOMY      ARTHROSCOPY WRIST Right     with release of transverse carpal ligament     BLADDER SURGERY      BLADDER SURGERY      BREAST SURGERY      left breast    BUNIONECTOMY Bilateral     CATARACT EXTRACTION Bilateral     CHOLECYSTECTOMY      COLONOSCOPY      CYSTOSCOPY      CYSTOSCOPY      EGD AND COLONOSCOPY N/A 12/20/2017    Procedure: EGD AND COLONOSCOPY;  Surgeon: Michelle Kendrick MD;  [facility]: [facility];   Service: Gastroenterology    ESOPHAGOGASTRODUODENOSCOPY      diagnostic    FOREARM SURGERY Right     fracture repair    FRACTURE SURGERY Right     arm with hardware    HYSTERECTOMY      IR ABDOMINAL ANGIOGRAPHY / INTERVENTION  1/24/2019    KNEE ARTHROSCOPY Left     KNEE SURGERY Left     meniscus tear    NEPHRECTOMY Right     NOSE SURGERY      ND CYSTO/URETERO W/LITHOTRIPSY &INDWELL STENT INSRT Right 2/28/2018    Procedure: CYSTOSCOPY RIGHT URETEROSCOPY, RIGHT RETROGRADE PYELOGRAM AND INSERTION  RIGHT STENT URETERAL, RIGHT RENAL PELVIC WASHING FOR CYTOLOGY;  Surgeon: Kavitha Parada MD;  [facility];  Service: Urology    ND CYSTOURETHROSCOPY,FULGUR 0 5-2 CM LESN N/A 5/21/2019    Procedure: BLADDER BIOPSY WITH FULGURATION;  Surgeon: Emile Mart MD;  [facility];  Service: Urology    FL NEPHRECTOMY, W/PART   URETECTOMY Right 4/23/2018    Procedure: Radha Allenne ASSISTED NEPHRO-URETERECTOMY WITH BLADDER CUFF EXCISION;  Surgeon: Emile Mart MD;  [facility];  Service: Urology    FL Dwayne Cantu 3RD+ ORD SLCTV ABDL PEL/LXTR [facility] Right 1/24/2019    Procedure: RIGHT LEG ANGIOGRAM, BILATERAL FEMORAL ACCESS, STENTING OF RIGHT EXTERNAL ILIAC ARTERY WITH BALLOON ANGIOPLASTY;  Surgeon: Jamie Arteaga MD;  [facility];  Service: Vascular    REPLACEMENT TOTAL KNEE BILATERAL      URETEROSCOPY Right 3/20/2018    Procedure: CYSTOSCOPY, RETROGRADE PYELOGRAM, URETEROSCOPY, BIOPSY, BRUSH, FULGURATION, STENT PLACEMENT, BLADDER BIOPSY WITH FULGURATION;  Surgeon: Emile Mart MD;  [facility];  Service: Urology    VASCULAR SURGERY      stents       CURRENT MEDICATIONS:     Current Outpatient Medications   Medication Sig Dispense Refill    acetaminophen (TYLENOL) 325 mg tablet Take 650 mg by mouth 2 (two) times a day as needed for mild pain       Calcium Citrate-Vitamin D (CALCIUM + D PO) Take 1 tablet by mouth 2 (two) times a day      cyanocobalamin (VITAMIN B-12) 100 mcg tablet Take 100 mcg by mouth daily       Elastic Bandages & Supports ([address]) MISC by Does not apply route daily 10 each 0    gabapentin (NEURONTIN) 100 mg capsule Take 1 capsule in the morning, take 1 capsule in the afternoon, and take 1-3 capapsules at bedtime 450 capsule 3    hydroxychloroquine (PLAQUENIL) 200 mg tablet Take 200 mg by mouth 2 (two) times a day with meals      LEUCOVORIN CALCIUM PO Take 10 mg by mouth once a week      levothyroxine 75 mcg tablet Take 1 tablet (75 mcg total) by mouth daily 90 tablet 3    losartan (COZAAR) 25 mg tablet Take 1 tablet (25 mg total) by mouth daily 90 tablet 3    methotrexate 2 5 mg tablet Take 2 tablets weekly 12 tablet 0    Multiple Vitamin (MULTIVITAMINS PO) Take 1 tablet by mouth daily      omeprazole (PriLOSEC) 40 MG capsule Take 1 capsule (40 mg total) by mouth daily 90 capsule 3    phenazopyridine (PYRIDIUM) 100 mg tablet Take 1 tablet (100 mg total) by mouth 3 (three) times a day as needed for bladder spasms 10 tablet 0    polyethylene glycol (MIRALAX) 17 g packet Take 17 g by mouth daily (Patient taking differently: Take 17 g by mouth as needed ) 14 each 0    pravastatin (PRAVACHOL) 80 mg tablet Take 1 tablet (80 mg total) by mouth daily 90 tablet 3    rivaroxaban (XARELTO) 15 mg tablet Take 1 tablet (15 mg total) by mouth daily 28 tablet 0    rOPINIRole (REQUIP) 0 5 mg tablet 1 tab in the am and 2 tab at bedtime      torsemide (DEMADEX) 20 mg tablet Take 1 tablet (20 mg total) by mouth daily 90 tablet 3    docusate sodium (COLACE) 100 mg capsule Take 1 capsule (100 mg total) by mouth 2 (two) times a day for 60 doses (Patient taking differently: Take 100 mg by mouth 2 (two) times a day as needed ) 90 capsule 0     No current facility-administered medications for this visit  Facility-Administered Medications Ordered in Other Visits   Medication Dose Route Frequency Provider Last Rate Last Dose    acetaminophen (TYLENOL) tablet 650 mg  650 mg Oral Q6H PRN Lam Jefferson PA-C           ALLERGIES:     Allergies   Allergen Reactions    Nitrofurantoin Hives     HIVES * pt denies  Other reaction(s): Unknown Allergic Reaction  Other reaction(s): Other (See Comments)  HIVES * pt denies    Atorvastatin      Other reaction(s): Muscle Pain, Myalgia    Acetazolamide Other (See Comments)     Other reaction(s): Unknown Allergic Reaction unknown reaction     Other Other (See Comments)     Adhesive tape : red and itching  Other reaction(s):  Other (See Comments)  red and itching    Shellfish-Derived Products Other (See Comments)     Patient got Gout following eating shell fish    Sulfa Antibiotics Other (See Comments)     unknown    Tramadol Diarrhea and Vomiting    Azithromycin Rash       SOCIAL HISTORY:     Social History     Socioeconomic History    Marital status: /Civil Union     Spouse name: None    Number of children: None    Years of education: None    Highest education level: None   Occupational History    None   Social Needs    Financial resource strain: Not hard at all   [place] insecurity:     Worry: Never true     Inability: Never true    Transportation needs:     Medical: No     Non-medical: No   Tobacco Use    Smoking status: Never Smoker    Smokeless tobacco: Never Used   Substance and Sexual Activity    Alcohol use: Not Currently    Drug use: No    Sexual activity: None   Lifestyle    Physical activity:     Days per week: 0 days     Minutes per session: 0 min    Stress: Only a little   Relationships    Social connections:     Talks on phone: More than three times a week     Gets together: Once a week     Attends Jew service: 1 to 4 times per year     Active member of club or organization: No     Attends meetings of clubs or organizations: Never     Relationship status:     Intimate partner violence:     Fear of current or ex partner: No     Emotionally abused: No     Physically abused: No     Forced sexual activity: No   Other Topics Concern    None   Social History Narrative    No caffeine use       SOCIAL HISTORY:     Family History   Problem Relation Age of Onset    Other Mother         epilepsy    Early death Mother    [facility] Glaucoma Father     Stroke Father         silent    Other Brother         cardiac disorder    Rheum arthritis Son     No Known Problems Son     No Known Problems Daughter     No Known Problems Son        REVIEW OF SYSTEMS:     Review of Systems   Constitutional: Negative  Respiratory: Negative  Cardiovascular: Positive for leg swelling  Gastrointestinal: Negative for abdominal pain     Genitourinary: Negative  Musculoskeletal: Positive for back pain and gait problem  Skin: Negative  Psychiatric/Behavioral: Negative  PHYSICAL EXAM:     /90   Pulse 80   Ht 5' 1" (1 549 m)   Wt 99 3 kg (219 lb)   LMP  (LMP Unknown)   BMI 41 38 kg/m²   General:  Elderly female in no acute distress  They have a normal affect  There is not appear to be any gross neurologic defects or abnormalities  HEENT:  Normocephalic, atraumatic  Neck is supple without any palpable lymphadenopathy  Cardiovascular:  Patient has normal palpable distal radial pulses  There is no significant peripheral edema  No JVD is noted  Respiratory:  Patient has unlabored respirations  There is no audible wheeze or rhonchi  Abdomen:  Abdomen with healed surgical scars (appy/LILLY) and healed robotic incisions     Abdomen is soft and nontender  Obese with large pannus  There is no tympany  There is a large incisional hernia in the right flank around the extraction incision  THe wound has healed  : Difficult to visualize external female genitalia, given habitus  Musculoskeletal:  Rheumatologic issues limit mobility  Walks with a cane  Dermatologic:  Patient has no skin abnormalities or rashes       LABS:     CBC:   Lab Results   Component Value Date    WBC 9 07 07/10/2019    HGB 10 0 (L) 07/10/2019    HCT 31 5 (L) 07/10/2019     (H) 07/10/2019     07/10/2019       BMP:   Lab Results   Component Value Date    GLUCOSE 95 2016    CALCIUM 9 0 07/10/2019     2017    K 3 9 07/10/2019    CO2 28 07/10/2019     07/10/2019    BUN 35 (H) 07/10/2019    CREATININE 1 72 (H) 07/10/2019     IMAGIN/11/19  CT ABDOMEN AND PELVIS WITHOUT IV CONTRAST     INDICATION:   C68 9: Malignant neoplasm of urinary organ, unspecified      COMPARISON:  2018     TECHNIQUE:  CT examination of the abdomen and pelvis was performed without intravenous contrast   Axial, sagittal, and coronal 2D reformatted images were created from the source data and submitted for interpretation       Radiation dose length product (DLP) for this visit:  1120 05 mGy-cm   This examination, like all CT scans performed in the [facility], was performed utilizing techniques to minimize radiation dose exposure, including the use of   iterative reconstruction and automated exposure control       Enteric contrast was administered       FINDINGS:     ABDOMEN     LOWER CHEST: Increased AP diameter chest suggests underlying COPD  Mitral annular calcifications are noted  No clinically significant abnormality identified in the visualized lower chest      LIVER/BILIARY TREE:  Unremarkable      GALLBLADDER:  Gallbladder is surgically absent      SPLEEN:  Unremarkable      PANCREAS:  Unremarkable      ADRENAL GLANDS:  Unremarkable      KIDNEYS/URETERS:  Patient is post right nephrectomy  No evidence for metastasis or recurrence in the nephrectomy bed/right renal fossa  Left kidney is normal      STOMACH AND BOWEL:  There is colonic diverticulosis without evidence of acute diverticulitis      APPENDIX:  No findings to suggest appendicitis      ABDOMINOPELVIC CAVITY:  No ascites or free intraperitoneal air  No lymphadenopathy      VESSELS:  A right external iliac stent graft is noted; patency cannot be evaluated due to the lack of IV contrast      PELVIS     REPRODUCTIVE ORGANS:  Unremarkable for patient's age      URINARY BLADDER:  Unremarkable      ABDOMINAL WALL/INGUINAL REGIONS:  Large right spigelian hernia containing loops of small and large bowel continues to increase in size with the neck now measuring 10 8 cm in comparison to the prior exam when it measured 10 0 cm    No evidence for   obstruction, incarceration or necrosis      OSSEOUS STRUCTURES:  No acute fracture or destructive osseous lesion      IMPRESSION:     No evidence for metastasis or recurrence in the right nephrectomy bed      No adenopathy      Large right spigelian hernia continues to enlarge (detailed further above)  PATHOLOGY:     5/21/19  Final Diagnosis   A  Urinary Bladder, right posterior biopsy:  - Urothelial mucosa with chronic inflammation and lymphoid follicles consistent with follicular cystitis  See comment   - Negative for malignancy  4/23/18  Case Report   Surgical Pathology Report                         Case: [id number]                                    Authorizing Provider: Jen Cardoso MD   Collected:           04/23/2018 1631               Ordering Location:     [address]      Received:            04/24/2018 43                                      Hospital Operating Room                                                       Pathologist:           Hima Steinberg MD                                                                Specimen:    Kidney, Right, Kidney, Ureter, and Bladder Cuff - Right                                    Final Diagnosis   URETER AND RENAL PELVIS STAGING PROTOCOL     1  Specimen identification:     - Procedure: Nephroureterectomy, complete      - Laterality: Right    2  Tumor     - Tumor site: Renal pelvis      - Tumor size (cm): Greatest dimension: 3mm      - Histologic type: Low grade papillary urothelial carcinoma      - Associated epithelial lesions: None      - Histologic grade: Low grade      - Microscopic tumor extension (pTa): Papillary noninvasive carcinoma      - Tumor configuration: Papillary   3  Margins: All margins are negative for invasive carcinoma, carcinoma in situ and low-grade urothelial carcinoma/urothelial dysplasia   4  Lymph-vascular invasion: Not identified    5  Regional lymph nodes:  No lymph nodes submitted or found   6  Additional pathologic findings: Foreign body type giant cell response, chronic active pyelitis and ureteritis    7   Pathologic findings in ipsilateral non-neoplastic renal tissue: None    8  8th Ed AJCC Tumor Stage:  at least Stage 0a - pTa, pNx, Low grade    A  Right kidney, ureter and bladder cuff (radical nephrectomy):  - Focal low grade papillary urothelial carcinoma (3mm) with foci suspicious for superficial lamina propria invasion  - Bladder cuff and resection margins negative for carcinoma  - See staging protocol above (pTaNx)     Comment: The entire case was reviewed by Dr Charlie Shepard at the [facility]  His diagnosis and comment are indicated below  Electronically signed by Jacqui Hooper MD on 5/3/2018 at 10:03 AM   Preliminary result electronically signed by Jacqui Hooper MD on 4/27/2018 at 12:00 PM   Note   [address]     Diagnosis  Right Kidney, Radical Nephrectomy:    - Papillary urothelial carcinoma, low grade, pelvicalyceal mucosa, focal areas suspicious for invasion into superficial subepithelial connective tissue (see comment)  Renal hilar and bladder cuff margins, negative for neoplasia  - Marked reactive changes, pelvicalyceal mucosa and ureteral lining  Foreign body type giant cell response, chronic active pyelitis and ureteritis; status post biopsy and catheter placement  PROCEDURE:     SEE NOTE    ASSESSMENT:     81 y o  female with low-grade upper tract urothelial carcinoma status post robotic nephro ureterectomy with bladder cuff excision on 4/23/2018 and postoperative incision hernia    PLAN:     Repeat cystoscopy and CT in 6 months

## 2019-08-05 ENCOUNTER — OFFICE VISIT (OUTPATIENT)
Dept: NEPHROLOGY | Facility: CLINIC | Age: 84
End: 2019-08-05
Payer: MEDICARE

## 2019-08-05 VITALS
WEIGHT: 219 LBS | HEART RATE: 80 BPM | SYSTOLIC BLOOD PRESSURE: 124 MMHG | RESPIRATION RATE: 16 BRPM | DIASTOLIC BLOOD PRESSURE: 70 MMHG | HEIGHT: 61 IN | BODY MASS INDEX: 41.35 KG/M2

## 2019-08-05 DIAGNOSIS — E11.42 DIABETIC POLYNEUROPATHY ASSOCIATED WITH TYPE 2 DIABETES MELLITUS (HCC): Primary | ICD-10-CM

## 2019-08-05 DIAGNOSIS — N18.30 ANEMIA DUE TO STAGE 3 CHRONIC KIDNEY DISEASE (HCC): ICD-10-CM

## 2019-08-05 DIAGNOSIS — N18.4 CKD (CHRONIC KIDNEY DISEASE) STAGE 4, GFR 15-29 ML/MIN (HCC): ICD-10-CM

## 2019-08-05 DIAGNOSIS — D63.1 ANEMIA DUE TO STAGE 3 CHRONIC KIDNEY DISEASE (HCC): ICD-10-CM

## 2019-08-05 DIAGNOSIS — C68.9 UROTHELIAL CANCER (HCC): ICD-10-CM

## 2019-08-05 PROBLEM — N17.9 AKI (ACUTE KIDNEY INJURY) (HCC): Status: RESOLVED | Noted: 2019-03-18 | Resolved: 2019-08-05

## 2019-08-05 PROCEDURE — 99214 OFFICE O/P EST MOD 30 MIN: CPT | Performed by: INTERNAL MEDICINE

## 2019-08-05 NOTE — PROGRESS NOTES
Nephrology Follow up Consultation  Olayinka Albarran 81 y o  female MRN: [id number]            BACKGROUND:  Olayinka Albarran is a 81 y  o female who was referred by Darya Herring MD for evaluation of Follow-up and Chronic Kidney Disease    ASSESSMENT / PLAN:   81 y o   female with pmh of multiple co-morbidities including diastolic CHF, urothelial cancer, CVA, AFib, PVD and CKD stage 3 presented to the office for routine follow-up    1  CKD stage 3B/4:  - Patient has a baseline creatinine of 1 8-2 mg/dL  Most recent labs show a Creatinine of 1 72 mg/dL  Renal function remains stable  - likely has underlying CKD secondary to decreased nephron mass due to nephrectomy plus hypertensive nephrosclerosis plus age-related nephron loss plus cardiorenal syndrome  - status post right nephrectomy in April 2018  - Proteinuria - most recent protein creatinine ratio of 300 mg  Will check UA, spot urine protein and creatinine    - Acid base and lytes stable  - Clinically the patient appears to be euvoelmic  - Recommend to avoid use of NSAIDs, nephrotoxins  Caution advised with regards to exposure to IV contrast dye    - Discussed with the patient in depth her renal status, including the possible etiologies for CKD  - Advised the patient that when her GFR is close to 20mL/min then will start discussing about RRT(renal replacement therapy) options such as renal transplant, peritoneal dialysis and hemodialysis  - Informed the patient about the various options for Renal Replacement therapy  - Discussed with the patient how we need to work together to delay the progression of CKD with optimal BP control based on their age and co-morbidities and trying to reduce proteinuria by the use of anti-proteinuric agents       2  Hypertension:  - Patient is on cozaar 25mg po Q24, torsemide 20mg po Q24     - Goal BP of < 140/90 based on age and comorbidities  - Instructed to follow low sodium (2gm)diet   - Advised to hold ACEI/ARBs if patient suffers from dehydration due to gastrointestinal losses due to risk of BRENNEN secondary to failure to autoregulate  3  Hemoglobin:  - Goal Hb of 10-12 g/dL  - Most recent labs suggestive of 10 grams/deciliter  - check iron studies and CBC on next visit    4  CKD-MBD(Mineral Bone Disease): - Based on patients CKD stage following is the goal of therapy  - Maintain calcium phosphorus product of < 55   - Stage 4 CKD - Goal Ca 8 5-10 mg/dL , goal Phos 2 7-4 6 mg/dL  , goal iPTH  pg/mL  - Patient is currently at goal   - Continue patient on calcium + D  - most recent parathyroid hormone level of 83 6    5  Lipids:  - goal LDL less than 70  - on pravachol  - management as per primary team    6  History of urothelial cancer:  - management as per Primary team  - follow-up with Urology  - status post right nephro ureterectomy in April 2018    7  CHF / a fib:  - Management as per primary team  - follow-up with cardiology  - on xarelto  - most recent echo from March 14, 2019 shows EF of 60%  8  RA:  - on mtx  - f/u with rheum    9  Nutrition:  - Encouraged patient to follow a renal diet comprising of moderate potassium, low phosphorus and protein restriction to 0 8gm/kg  - Will check serum albumin with next blood work  10  Followup:  - Patient is to follow-up in 4 months, with lab work to be performed a few days prior to the visit  Advised patient to call me in 10 days to review the results if they do not hear back from me, as I may have not received the results  Guilherme Vinson MD, [facility], 8/5/2019, 4:09 PM             SUBJECTIVE: 81 y o  female presents to the office for routine follow-up has no complaints doing well no recent hospitalizations happy to hear all the information about her renal disease and that she is not near dialysis at this time  Renal parameters are stable  Has been watching her diet  No recent urinary tract infections    Has been following up with Urology post nephrectomy  No issues with edema  Has been working with rehab  Has been titrating off of methotrexate  Review of Systems   Constitutional: Negative for appetite change, fatigue and fever  HENT: Negative for congestion and sore throat  Respiratory: Negative for cough, shortness of breath and wheezing  Cardiovascular: Negative for chest pain, palpitations and leg swelling  Gastrointestinal: Negative for abdominal pain, constipation, diarrhea, nausea and vomiting  Genitourinary: Negative for difficulty urinating and dysuria  Musculoskeletal: Negative for back pain  Skin: Negative for rash and wound  Neurological: Negative for dizziness, light-headedness and headaches  Psychiatric/Behavioral: Negative for confusion  All other systems reviewed and are negative        PAST MEDICAL HISTORY:  Past Medical History:   Diagnosis Date    Anemia     Arthritis     rheumatoid    At risk for falls     Benign essential hypertension     [facility]estive [facility] failure) [facility]     Chronic cystitis     Chronic kidney disease     right kidney cancer - kidney removed    Chronic pain disorder     CPAP (continuous positive airway pressure) dependence     Diverticulitis of colon     Early satiety     Edema     GERD (gastroesophageal reflux disease)     Gout     Hearing aid worn     bilateral    Hearing loss     Hemorrhoids     Hiatal hernia     History of colonic polyps     Hyperlipidemia     Hypothyroidism     Irregular heart beat     Afib    Left breast mass     Microhematuria     Migraine     occular    Mobility impaired     left arm    Neuropathy     lower and upper extermities    Overactive bladder     Pneumonia of left lower lobe due to infectious organism ([facility]     PVD (peripheral vascular disease) (HCC)     RA (rheumatoid arthritis) ([facility])     Restless leg syndrome     Sleep apnea     uses cpap    [facility]     [facility]    Type 2 diabetes mellitus ([facility])     Urinary frequency     Urinary urgency     Urothelial cancer ([facility] ) 04/23/2018    Uses walker        PROBLEM LIST    Patient Active Problem List   Diagnosis    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 ([facility]    Essential hypertension    Rheumatoid arthritis ([facility]    Diabetes mellitus type 2 in obese ([facility]    Sleep apnea    Acquired hypothyroidism    Chronic GERD    Edema    Hypercholesterolemia    Obesity    Peripheral neuropathy    Primary osteoarthritis of left knee    Restless leg syndrome    Right lumbar radiculopathy    Sensorineural hearing loss    Sinus arrhythmia    Chronic bilateral thoracic back pain    Thoracic degenerative disc disease    Urothelial cancer ([facility]    Lung nodule < 6cm on CT    Pancreatic cyst    Iron deficiency anemia    Anemia    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 (HCC)    History of nephroureterectomy    Incisional hernia    Acute on chronic diastolic congestive heart failure (HCC)    Atrial fibrillation (HCC)    CKD (chronic kidney disease) stage 3, GFR 30-59 ml/min (HCC)    Acute on chronic congestive heart failure (HCC)    Chronic diastolic heart failure (HCC)    Fracture    Spontaneous dislocation of shoulder, left    Abdominal wall hernia    Atherosclerosis of native artery of right lower extremity with rest pain (HCC)    Pain in both lower extremities    Chronic acquired lymphedema    Anemia of chronic disease    Polyneuropathy associated with underlying disease ([facility]    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)    [facility]    Diabetic neuropathy associated with type 2 diabetes mellitus ([facility]    Seborrheic keratoses       PAST SURGICAL HISTORY:  Past Surgical History:   Procedure Laterality Date    APPENDECTOMY      ARTHROSCOPY WRIST Right     with release of transverse carpal ligament     BLADDER SURGERY      BLADDER SURGERY      BREAST SURGERY      left breast    BUNIONECTOMY Bilateral     CATARACT EXTRACTION Bilateral     CHOLECYSTECTOMY      COLONOSCOPY      CYSTOSCOPY      CYSTOSCOPY      EGD AND COLONOSCOPY N/A 12/20/2017    Procedure: EGD AND COLONOSCOPY;  Surgeon: Rin Chavarria MD;  [facility]; Service: Gastroenterology    ESOPHAGOGASTRODUODENOSCOPY      diagnostic    FOREARM SURGERY Right     fracture repair    FRACTURE SURGERY Right     arm with hardware    HYSTERECTOMY      IR ABDOMINAL ANGIOGRAPHY / INTERVENTION  1/24/2019    KNEE ARTHROSCOPY Left     KNEE SURGERY Left     meniscus tear    NEPHRECTOMY Right     NOSE SURGERY      MN CYSTO/URETERO W/LITHOTRIPSY &INDWELL STENT INSRT Right 2/28/2018    Procedure: CYSTOSCOPY RIGHT URETEROSCOPY, RIGHT RETROGRADE PYELOGRAM AND INSERTION  RIGHT STENT URETERAL, RIGHT RENAL PELVIC WASHING FOR CYTOLOGY;  Surgeon: Malik Watts MD;  [facility];  Service: Urology    MN CYSTOURETHROSCOPY,FULGUR 0 5-2 CM LESN N/A 5/21/2019    Procedure: BLADDER BIOPSY WITH FULGURATION;  Surgeon: Miranda Burgess MD;  [facility];  Service: Urology    MN NEPHRECTOMY, W/PART   URETECTOMY Right 4/23/2018    Procedure: Dianeelieser Apple ASSISTED NEPHRO-URETERECTOMY WITH BLADDER CUFF EXCISION;  Surgeon: Miranda Burgess MD;  [facility];  Service: Urology    MN [address] 3RD+ ORD SLCTV ABDL PEL/LXTR [facility] Right 1/24/2019    Procedure: RIGHT LEG ANGIOGRAM, BILATERAL FEMORAL ACCESS, STENTING OF RIGHT EXTERNAL ILIAC ARTERY WITH BALLOON ANGIOPLASTY;  Surgeon: Sonia Arteaga MD;  [facility];  Service: Vascular    REPLACEMENT TOTAL KNEE BILATERAL      URETEROSCOPY Right 3/20/2018    Procedure: CYSTOSCOPY, RETROGRADE PYELOGRAM, URETEROSCOPY, BIOPSY, BRUSH, FULGURATION, STENT PLACEMENT, BLADDER BIOPSY WITH FULGURATION;  Surgeon: Miranda Burgess MD;  [facility];  Service: Urology    VASCULAR SURGERY      stents       SOCIAL HISTORY : reports that she has never smoked  She has never used smokeless tobacco  She reports that she drank alcohol  She reports that she does not use drugs  FAMILY HISTORY:  Family History   Problem Relation Age of Onset    Other Mother         epilepsy    Early death Mother    [facility] Gl[facility] Father     Stroke Father         silent    Other Brother         cardiac disorder    Rheum arthritis Son     No Known Problems Son     No Known Problems Daughter     No Known Problems Son        ALLERGIES:  Allergies   Allergen Reactions    Nitrofurantoin Hives     HIVES * pt denies  Other reaction(s): Unknown Allergic Reaction  Other reaction(s): Other (See Comments)  HIVES * pt denies    Atorvastatin      Other reaction(s): Muscle Pain, Myalgia    Acetazolamide Other (See Comments)     Other reaction(s): Unknown Allergic Reaction unknown reaction     Other Other (See Comments)     Adhesive tape : red and itching  Other reaction(s): Other (See Comments)  red and itching    Shellfish-Derived Products Other (See Comments)     Patient got Gout following eating shell fish    Sulfa Antibiotics Other (See Comments)     unknown    Tramadol Diarrhea and Vomiting    Azithromycin Rash           PHYSICAL EXAM:  Vitals:    08/05/19 1531   BP: 124/70   BP Location: Right arm   Patient Position: Sitting   Cuff Size: Large   Pulse: 80   Resp: 16   Weight: 99 3 kg (219 lb)   Height: 5' 1" (1 549 m)     Body mass index is 41 38 kg/m²  Physical Exam   Constitutional: She appears well-developed and well-nourished  No distress  HENT:   Head: Normocephalic and atraumatic  Mouth/Throat: Oropharynx is clear and moist  No oropharyngeal exudate  Eyes: Conjunctivae are normal  No scleral icterus  Neck: Neck supple  No JVD present  No tracheal deviation present  Cardiovascular: Normal heart sounds  Exam reveals no friction rub  Pulmonary/Chest: Effort normal  She has no wheezes  She has no rales  Abdominal: Soft   She exhibits no mass  There is no guarding  Musculoskeletal: She exhibits edema  Trace edema with bilateral compression stocking in place   Skin: Skin is warm  No rash noted  She is not diaphoretic  Psychiatric: She has a normal mood and affect  Vitals reviewed  LABORATORY DATA:     Results from last 6 Months   Lab Units 07/10/19  1225 06/08/19  1159 06/06/19  0914  05/24/19  1155  04/23/19  1007  04/12/19  1301  03/14/19  0438  02/22/19  1210   WBC Thousand/uL 9 07 8 93  --   --  7 00   < >  --    < > 6 39  --  6 61   < > 12 57*   HEMOGLOBIN g/dL 10 0* 9 7*  --   --  9 7*   < >  --    < > 10 0*  --  8 8*   < > 10 0*   HEMATOCRIT % 31 5* 30 0*  --   --  30 6*   < >  --    < > 31 4*  --  27 6*   < > 31 2*   PLATELETS Thousands/uL 237 213  --   --  186   < >  --    < > 210  --  191   < > 238   POTASSIUM mmol/L 3 9 4 4 4 3   < > 3 9   < > 4 0   < >  --    < > 3 7   < > 3 7   CHLORIDE mmol/L 106 108 108   < > 104   < > 107   < >  --    < > 106   < > 103   CO2 mmol/L 28 28 28   < > 28   < > 28   < >  --    < > 28   < > 26   BUN mg/dL 35* 32* 32*   < > 33*   < > 32*   < >  --    < > 30*   < > 49*   CREATININE mg/dL 1 72* 1 72* 1 64*   < > 1 83*   < > 1 64*   < >  --    < > 1 63*   < > 1 73*   CALCIUM mg/dL 9 0 8 8 8 5   < > 9 0   < > 8 2*   < >  --    < > 8 4   < > 8 6   MAGNESIUM mg/dL 2 6  --   --   --   --   --  2 3  --   --   --  2 4  --   --    PHOSPHORUS mg/dL 4 2*  --   --   --   --   --   --   --   --   --   --   --   --    FERRITIN ng/mL  --   --   --   --   --   --   --   --  68  --   --   --  45    < > = values in this interval not displayed          rest all reviewed    RADIOLOGY:  No orders to display     Rest all reviewed        MEDICATIONS:    Current Outpatient Medications:     acetaminophen (TYLENOL) 325 mg tablet, Take 650 mg by mouth 2 (two) times a day as needed for mild pain , Disp: , Rfl:     Calcium Citrate-Vitamin D (CALCIUM + D PO), Take 1 tablet by mouth 2 (two) times a day, Disp: , Rfl:    cyanocobalamin (VITAMIN B-12) 100 mcg tablet, Take 100 mcg by mouth daily , Disp: , Rfl:     Elastic Bandages & Supports ([address]) MISC, by Does not apply route daily, Disp: 10 each, Rfl: 0    gabapentin (NEURONTIN) 100 mg capsule, Take 1 capsule in the morning, take 1 capsule in the afternoon, and take 1-3 capapsules at bedtime, Disp: 450 capsule, Rfl: 3    hydroxychloroquine (PLAQUENIL) 200 mg tablet, Take 200 mg by mouth 2 (two) times a day with meals, Disp: , Rfl:     LEUCOVORIN CALCIUM PO, Take 10 mg by mouth once a week, Disp: , Rfl:     levothyroxine 75 mcg tablet, Take 1 tablet (75 mcg total) by mouth daily, Disp: 90 tablet, Rfl: 3    losartan (COZAAR) 25 mg tablet, Take 1 tablet (25 mg total) by mouth daily, Disp: 90 tablet, Rfl: 3    methotrexate 2 5 mg tablet, Take 2 tablets weekly, Disp: 12 tablet, Rfl: 0    Multiple Vitamin (MULTIVITAMINS PO), Take 1 tablet by mouth daily, Disp: , Rfl:     omeprazole (PriLOSEC) 40 MG capsule, Take 1 capsule (40 mg total) by mouth daily, Disp: 90 capsule, Rfl: 3    polyethylene glycol (MIRALAX) 17 g packet, Take 17 g by mouth daily (Patient taking differently: Take 17 g by mouth as needed ), Disp: 14 each, Rfl: 0    pravastatin (PRAVACHOL) 80 mg tablet, Take 1 tablet (80 mg total) by mouth daily, Disp: 90 tablet, Rfl: 3    rivaroxaban (XARELTO) 15 mg tablet, Take 1 tablet (15 mg total) by mouth daily, Disp: 28 tablet, Rfl: 0    rOPINIRole (REQUIP) 0 5 mg tablet, 1 tab in the am and 2 tab at bedtime, Disp: , Rfl:     torsemide (DEMADEX) 20 mg tablet, Take 1 tablet (20 mg total) by mouth daily, Disp: 90 tablet, Rfl: 3    docusate sodium (COLACE) 100 mg capsule, Take 1 capsule (100 mg total) by mouth 2 (two) times a day for 60 doses (Patient taking differently: Take 100 mg by mouth 2 (two) times a day as needed ), Disp: 90 capsule, Rfl: 0  No current facility-administered medications for this visit       Facility-Administered Medications Ordered in Other Visits:     acetaminophen (TYLENOL) tablet 650 mg, 650 mg, Oral, Q6H PRN, Meena Bedoya PA-C          Portions of the record may have been created with voice recognition software  Occasional wrong word or "sound a like" substitutions may have occurred due to the inherent limitations of voice recognition software  Read the chart carefully and recognize, using context, where substitutions have occurred  If you have any questions, please contact the dictating provider

## 2019-08-05 NOTE — PATIENT INSTRUCTIONS
- Please call me in 10 days after having your blood work done to review the results if you do not hear back from me or my office, as I may have not received the results  - please remember to perform blood work prior to the next visit  - Please call if the blood pressure top number is greater than 150 or less than 110 consistently  - Please call if you are gaining more than 2lbs in 2 days for adjustment of water pills   ~ Please AVOID the following pain medications  LIST OF NSAIDS (NONSTEROIDAL ANTI-INFLAMMATORY DRUGS) AND DUTTA-2 INHIBITORS    DIFLUNISAL (DOLOBID)  IBUPROFEN (MOTRIN, ADVIL)  FLURBIPROFEN (ANSAID)  KETOPROFEN (ORUDIS, ORUVAIL)  FENOPROFEN (NALFON)  NABUMETONE (RELAFEN)  PIROXICAM (FELDENE)  NAPROXEN (ALEVE, NAPROSYN, NAPRELAN, ANAPROX)  DICLOFENAC (VOLTAREN, CATAFLAM)  INDOMETHACIN (INDOCIN)  SULINDAC (CLINORIL)  TOLMETIN (TOLETIN)  ETODOLAC (LODINE)  MELOXICAM (MOBIC)  KETOROLAC (TORADOL)  OXAPROZIN (DAYPRO)  CELECOXIB (CELEBREX)    Things to do to reduce your blood pressure include working with all your physician to do the following:  ~ stop smoking if you smoke  ~ increase cardiovascular exercise like walking and swimming    ~ modify your diet to decrease fat and salt intake  ~ reduce your weight if you are overweight or obese   ~ increase the consumption of fruits, vegetables and whole grains  ~ decrease alcohol consumption if you consume alcohol    ~ try to minimize stress in your life with lifestyle modifications  ~ be compliant with your anti-hypertensive medications  ~ adjust your medications to help improve your vascular stiffness and decrease risks for heart attacks and strokes  Phosphorus diet  Follow a low phosphorus diet      Avoid these higher phosphorus foods: Choose these lower phosphorus foods:   Milk, pudding or yogurt (from animals and from many soy varieties) Rice milk (unfortified), nondairy creamer (if it doesn't have terms in the ingredients list that contain the letters "phos")   Hard cheeses, ricotta or cottage cheese, fat-free cream cheese Regular and low-fat cream cheese   Ice cream or frozen yogurt Sherbet or frozen fruit pops   Soups made with higher phosphorus ingredients (milk, dried peas, beans, lentils) Soups made with lower phosphorus ingredients (broth- or water-based with other lower phosphorus ingredients)   Whole grains, including whole-grain breads, crackers, cereal, rice and pasta Refined grains, including white bread, crackers, cereals, rice and pasta   Quick breads, biscuits, cornbread, muffins, pancakes or waffles Homemade refined (white) dinner rolls, bagels or English muffins   Dried peas (split, black-eyed), beans (black, garbanzo, lima, kidney, navy, adamson) or lentils Green peas (canned, frozen), green beans or wax beans   Organ meats, walleye, pollock or sardines Lean beef, pork, lamb, poultry or other fish   Nuts and seeds Popcorn   Peanut butter and other nut butters Jam, jelly or honey   Chocolate, including chocolate drinks Carob (chocolate-flavored) candy, hard candy or gumdrops   Evette and pepper-type sodas, flavored hobson, bottled teas (if a term in the ingredients list contains the letters "phos") Lemon-lime soda, ginger ale or root beer, plain water     Follow a moderate potassium diet

## 2019-08-07 ENCOUNTER — OFFICE VISIT (OUTPATIENT)
Dept: CARDIOLOGY CLINIC | Facility: CLINIC | Age: 84
End: 2019-08-07
Payer: MEDICARE

## 2019-08-07 VITALS
HEART RATE: 90 BPM | OXYGEN SATURATION: 96 % | DIASTOLIC BLOOD PRESSURE: 54 MMHG | WEIGHT: 220 LBS | BODY MASS INDEX: 41.54 KG/M2 | HEIGHT: 61 IN | SYSTOLIC BLOOD PRESSURE: 102 MMHG

## 2019-08-07 DIAGNOSIS — R35.0 URINARY FREQUENCY: Primary | ICD-10-CM

## 2019-08-07 PROCEDURE — 99214 OFFICE O/P EST MOD 30 MIN: CPT | Performed by: INTERNAL MEDICINE

## 2019-08-07 NOTE — PROGRESS NOTES
Advanced Heart Failure/Pulmonary Hypertension Outpatient Progress Note - Sabrina Blankenship 81 y o  female MRN: [id number]    @ Encounter: [id number]    Assessment/Plan:  Patient Active Problem List    Diagnosis Date Noted    Seborrheic keratoses 06/27/2019    Diabetic neuropathy associated with type 2 diabetes mellitus ([facility] ) 05/15/2019    PAD (peripheral artery disease) ([facility] [facility] ) 04/01/2019    Polyneuropathy associated with underlying disease ([facility] [facility] ) 03/26/2019    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 ([facility] ) 03/26/2019    Chronic acquired lymphedema 03/17/2019    Anemia of chronic disease 03/17/2019    Pain in both lower extremities 03/13/2019    Atherosclerosis of native artery of right lower extremity with rest pain ([facility] ) 01/21/2019    Abdominal wall hernia 01/02/2019    Fracture 09/18/2018    Spontaneous dislocation of shoulder, left 09/18/2018    Chronic diastolic heart failure ([facility] ) 08/23/2018    Acute on chronic congestive heart failure ([facility] ) 08/19/2018    CKD (chronic kidney disease) stage 3, GFR 30-59 ml/min ([facility]) 08/12/2018    Atrial fibrillation ([facility] ) 08/11/2018    Acute on chronic diastolic congestive heart failure ([facility] ) 08/09/2018    Incisional hernia 06/28/2018    History of nephroureterectomy 06/21/2018    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 ([facility] ) 06/04/2018    Anemia 04/25/2018    Pancreatic cyst 03/14/2018    Iron deficiency anemia 03/14/2018    Urothelial cancer ([facility] 03/02/2018    Lung nodule < 6cm on CT 03/02/2018    Chronic bilateral thoracic back pain 02/12/2018    Thoracic degenerative disc disease 02/12/2018    Sinus arrhythmia 01/03/2018    Peripheral neuropathy 11/09/2017    Right lumbar radiculopathy 11/09/2017    Primary osteoarthritis of left knee 10/13/2017    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 ([address] ) 05/10/2017    Essential hypertension 05/10/2017    Rheumatoid arthritis ([facility] [facility] ) 05/10/2017    Diabetes mellitus type 2 in obese ([facility] [facility] ) 05/10/2017    Sleep apnea 05/10/2017    Acquired hypothyroidism 05/10/2017    Chronic GERD 05/10/2017    Restless leg syndrome 01/05/2016    Sensorineural hearing loss 10/12/2014    Hypercholesterolemia 08/29/2013    Obesity 07/18/2013    Edema 05/14/2013     # Urinary frequency/burning: Likely UTI  Will get UA  Defer Abx to PMD     # Chronic HFpEF (Grade II diastolic dysfunction), NYHA II/III, ACC/AHA Stage C: HFpEF likely 2/2 to HTN (cLVH on TTE), NICK, obesity and/or ? RA (plaquenil)  LE edema partially from lymphedema  Weight stable ~220 lbs on home scale  Weight of 221 lbs today  --Continue torsemide 20 mg PO daily  --Continue BP control     # HTN: BP well controlled  Continue losartan 25 mg PO daily  # Hx of lymphedema: Uses her lymphedema machine 2x daily as able  # APCs: Continue current mgmt  Had 1 week holter w/o e/o AFib  Currently anticoagulated for CVA so would not   # Hx of CVA/TIA: Currently on Xarelto 15 mg daily and pravastatin  # NICK on CPAP  # RA w/ +RF on plaquenil and methotrexate  # L shoulder dislocation: Continue PT and pain control  # Urothelial CA s/o R nephrectomy 04/23/2018    RTC in 3 months    HPI: Very pleasant 81 y o  woman w/ PMHx of HTN, HLD, chronic LE edema/lymphedema, anemia of chronic disease p/f establishing care and abnormal ECG  She states she feels well  She was followed at [facility]  and now lives closer to [place] [id number] so is transferring her care  Denies any cardiac symptoms  Uses lymphedema machine  She had a CVA in 5/2017 and received tPA  Overall feels well from that  She had a 1 week holter that did not show any arrhythmia and has been on NOAC ever since the stroke  Holter showed NSR w/ APCs  She comes in today 4/6/18, for f/u  She had noted hematuria  CT A/P noted a R renal pelvis multifocal papillary lesions with pathology showing low grade Ta urothelial cancer   She comes in for preop evaluation  She continues to deny cardiac symptoms  She is more limited by knee pains  She is able to get around her home without difficulty  Today 7/10/18, she returns for f/u  She had her right robotic nephroureterectomy with bladder cuff  She currently has VNA coming to the home  Moving around better  Feeling stronger  Walking is difficult 2/2 to rheumatoid arthritis  Today 8/24/2018, she returns for post hospital D/C  She was admitted twice since her last visit  In the first admission she presented with chest tightness and dyspnea  On 8/10/18 underwent nuclear stress test with normal perfusion  She was diuresed and felt improved  Then she was readmitted with dyspnea and volume overload and diuresed again  Today she feels well  Today, 10/17/18, she returns for f/u  She states she has developed a hernia on the R side  She does not want to pursue surgery currently  She decreased her diuretics to 20 mg PO daily as she was urinating too much and was having accidents  She dislocated her L shoulder  Denies SOB with ambulation using her walker  Walking better overall around the house  Unable to stand too long due to her spine  Today 2/5/19, she returns for f/u  She feels about the same as before  She does not feel limited by SOB, but more MSK as noted before  Today, 5/31/19, she returns for f/u  She was seen by Faiza fritz since her last visit  She was admitted for fluid overload in 3/2019  She has been doing well since then  Weight today of 221 lbs (up from 218 lbs in February 2019)  Xarelto is very expensive for her; does have pills remaining at home  Requesting less expensive anticoagulation option  Provided Xarelto samples in interim, while pricing for other anticoagulation is underway  Continues with chronic LE edema, has improved some with OT home visits  Today, 8/7/19, she returns for f/u  She was admitted for hematuria  Her [address] and antiplatelet agent were held   She is back on them now  She feels at her baseline  Having urinary frequency and burning  Past Medical History:   Diagnosis Date    Anemia     Arthritis     rheumatoid    At risk for falls     Benign essential hypertension     CHF (congestive heart failure) (HCC)     Chronic cystitis     Chronic kidney disease     right kidney cancer - kidney removed    Chronic pain disorder     CPAP (continuous positive airway pressure) dependence     Diverticulitis of colon     Early satiety     Edema     GERD (gastroesophageal reflux disease)     Gout     Hearing aid worn     bilateral    Hearing loss     Hemorrhoids     Hiatal hernia     History of colonic polyps     Hyperlipidemia     Hypothyroidism     Irregular heart beat     Afib    Left breast mass     Microhematuria     Migraine     occular    Mobility impaired     left arm    Neuropathy     lower and upper extermities    Overactive bladder     Pneumonia of left lower lobe due to infectious organism ([facility] )     PVD (peripheral vascular disease) ([facility] [facility] )     RA [facility]he[facility]oid arthritis) [facility]     Restless leg syndrome     Sleep apnea     uses cpap    Stroke ([facility] )     5/2017    Type 2 diabetes mellitus ([facility])     Urinary frequency     Urinary urgency     [facility] 04/[facility]/2018    Uses walker      Review of Systems - 12 point ROS was done and is negative, except as noted above  Allergies   Allergen Reactions    Nitrofurantoin Hives     HIVES * pt denies  Other reaction(s): Unknown Allergic Reaction  Other reaction(s): Other (See Comments)  HIVES * pt denies    Atorvastatin      Other reaction(s): Muscle Pain, Myalgia    Acetazolamide Other (See Comments)     Other reaction(s): Unknown Allergic Reaction unknown reaction     Other Other (See Comments)     Adhesive tape : red and itching  Other reaction(s):  Other (See Comments)  red and itching    Shellfish-Derived Products Other (See Comments)     Patient got Gout following eating shell fish    Sulfa Antibiotics Other (See Comments)     unknown    Tramadol Diarrhea and Vomiting    Azithromycin Rash       Current Outpatient Medications:     acetaminophen (TYLENOL) 325 mg tablet, Take 650 mg by mouth 2 (two) times a day as needed for mild pain , Disp: , Rfl:     Calcium Citrate-Vitamin D (CALCIUM + D PO), Take 1 tablet by mouth 2 (two) times a day, Disp: , Rfl:     cyanocobalamin (VITAMIN B-12) 100 mcg tablet, Take 100 mcg by mouth daily , Disp: , Rfl:     Elastic Bandages & Supports (MEDICAL COMPRESSION STOCKINGS) MISC, by Does not apply route daily, Disp: 10 each, Rfl: 0    gabapentin (NEURONTIN) 100 mg capsule, Take 1 capsule in the morning, take 1 capsule in the afternoon, and take 1-3 capapsules at bedtime, Disp: 450 capsule, Rfl: 3    hydroxychloroquine (PLAQUENIL) 200 mg tablet, Take 200 mg by mouth 2 (two) times a day with meals, Disp: , Rfl:     LEUCOVORIN CALCIUM PO, Take 10 mg by mouth once a week, Disp: , Rfl:     levothyroxine 75 mcg tablet, Take 1 tablet (75 mcg total) by mouth daily, Disp: 90 tablet, Rfl: 3    losartan (COZAAR) 25 mg tablet, Take 1 tablet (25 mg total) by mouth daily, Disp: 90 tablet, Rfl: 3    methotrexate 2 5 mg tablet, Take 2 tablets weekly, Disp: 12 tablet, Rfl: 0    Multiple Vitamin (MULTIVITAMINS PO), Take 1 tablet by mouth daily, Disp: , Rfl:     omeprazole (PriLOSEC) 40 MG capsule, Take 1 capsule (40 mg total) by mouth daily, Disp: 90 capsule, Rfl: 3    pravastatin (PRAVACHOL) 80 mg tablet, Take 1 tablet (80 mg total) by mouth daily, Disp: 90 tablet, Rfl: 3    rivaroxaban (XARELTO) 15 mg tablet, Take 1 tablet (15 mg total) by mouth daily, Disp: 28 tablet, Rfl: 0    rOPINIRole (REQUIP) 0 5 mg tablet, 1 tab in the am and 2 tab at bedtime, Disp: , Rfl:     torsemide (DEMADEX) 20 mg tablet, Take 1 tablet (20 mg total) by mouth daily, Disp: 90 tablet, Rfl: 3    docusate sodium (COLACE) 100 mg capsule, Take 1 capsule (100 mg total) by mouth 2 (two) times a day for 60 doses (Patient taking differently: Take 100 mg by mouth 2 (two) times a day as needed ), Disp: 90 capsule, Rfl: 0    polyethylene glycol (MIRALAX) 17 g packet, Take 17 g by mouth daily (Patient not taking: Reported on 8/7/2019), Disp: 14 each, Rfl: 0  No current facility-administered medications for this visit  Facility-Administered Medications Ordered in Other Visits:     acetaminophen (TYLENOL) tablet 650 mg, 650 mg, Oral, Q6H PRN, Ernestina Solitario PA-C    Social History     Socioeconomic History    Marital status: /Civil Union     Spouse name: Not on file    Number of children: Not on file    Years of education: Not on file    Highest education level: Not on file   Occupational History    Not on file   Social Needs    Financial resource strain: Not hard at all   [employer] insecurity:     Worry: Never true     Inability: Never true   [employer] needs:     Medical: No     Non-medical: No   Tobacco Use    Smoking status: Never Smoker    Smokeless tobacco: Never Used   Substance and Sexual Activity    Alcohol use: Not Currently    Drug use: No    Sexual activity: Not on file   Lifestyle    Physical activity:     Days per week: 0 days     Minutes per session: 0 min    Stress:  Only a little   Relationships    Social connections:     Talks on phone: More than three times a week     Gets together: Once a week     Attends [employer] service: 1 to 4 times per year     Active member of club or organization: No     Attends meetings of clubs or organizations: Never     Relationship status:     Intimate partner violence:     Fear of current or ex partner: No     Emotionally abused: No     Physically abused: No     Forced sexual activity: No   Other Topics Concern    Not on file   Social History Narrative    No caffeine use     Family History   Problem Relation Age of Onset    Other Mother         epilepsy    Early death Mother     Glaucoma Father     Stroke Father         silent    Other Brother         cardiac disorder    Rheum arthritis Son     No Known Problems Son     No Known Problems Daughter     No Known Problems Son      Physical Exam:  Vitals: Blood pressure 102/54, pulse 90, height 5' 1" (1 549 m), weight 99 8 kg (220 lb), SpO2 96 %  , Body mass index is 41 57 kg/m² ,   Wt Readings from Last 3 Encounters:   08/07/19 99 8 kg (220 lb)   08/05/19 99 3 kg (219 lb)   07/31/19 99 3 kg (219 lb)     Vitals:    08/07/19 1353   BP: 102/54   BP Location: Right arm   Patient Position: Sitting   Cuff Size: Standard   Pulse: 90   SpO2: 96%   Weight: 99 8 kg (220 lb)   Height: 5' 1" (1 549 m)       GEN: Lilliam Ramey appears well, alert and oriented x 3, pleasant and cooperative   HEENT: pupils equal, round, and reactive to light; extraocular muscles intact  NECK: supple, no carotid bruits   HEART: regular rhythm, normal S1 and S2, no murmurs, clicks, gallops or rubs, JVP is    LUNGS: clear to auscultation bilaterally; no wheezes, rales, or rhonchi   ABDOMEN: normal bowel sounds, soft, no tenderness, no distention  EXTREMITIES: peripheral pulses normal; no clubbing, cyanosis, or edema  NEURO: no focal findings   SKIN: normal without suspicious lesions on exposed skin    Labs & Results:  Lab Results   Component Value Date    WBC 9 07 07/10/2019    HGB 10 0 (L) 07/10/2019    HCT 31 5 (L) 07/10/2019     (H) 07/10/2019     07/10/2019       Chemistry        Component Value Date/Time     11/14/2017 1151    K 3 9 07/10/2019 1225    K 3 7 11/14/2017 1151     07/10/2019 1225     11/14/2017 1151    CO2 28 07/10/2019 1225    CO2 29 11/14/2017 1151    BUN 35 (H) 07/10/2019 1225    BUN 18 11/14/2017 1151    CREATININE 1 72 (H) 07/10/2019 1225    CREATININE 0 88 11/14/2017 1151        Component Value Date/Time    CALCIUM 9 0 07/10/2019 1225    CALCIUM 8 9 11/14/2017 1151    ALKPHOS 129 (H) 07/10/2019 1225    ALKPHOS 95 11/14/2017 1151    AST 17 07/10/2019 1225    AST 24 11/14/2017 1151    ALT 19 07/10/2019 1225    ALT 19 11/14/2017 1151    BILITOT 1 2 11/14/2017 1151        Counseling / Coordination of Care  Total floor / unit time spent today 40 minutes  Greater than 50% of total time was spent with the patient and / or family counseling and / or coordination of care  A description of the counseling / coordination of care: 20 min  Thank you for the opportunity to participate in the care of this patient      Trever Crowley MD, PhD   Anders Solano

## 2019-08-08 ENCOUNTER — OFFICE VISIT (OUTPATIENT)
Dept: VASCULAR SURGERY | Facility: CLINIC | Age: 84
End: 2019-08-08
Payer: MEDICARE

## 2019-08-08 ENCOUNTER — OFFICE VISIT (OUTPATIENT)
Dept: FAMILY MEDICINE CLINIC | Facility: CLINIC | Age: 84
End: 2019-08-08
Payer: MEDICARE

## 2019-08-08 VITALS
SYSTOLIC BLOOD PRESSURE: 118 MMHG | DIASTOLIC BLOOD PRESSURE: 70 MMHG | TEMPERATURE: 99 F | WEIGHT: 218 LBS | BODY MASS INDEX: 41.19 KG/M2 | HEART RATE: 83 BPM | OXYGEN SATURATION: 97 %

## 2019-08-08 VITALS
HEIGHT: 61 IN | DIASTOLIC BLOOD PRESSURE: 60 MMHG | BODY MASS INDEX: 41.16 KG/M2 | HEART RATE: 78 BPM | WEIGHT: 218 LBS | RESPIRATION RATE: 16 BRPM | TEMPERATURE: 96.8 F | SYSTOLIC BLOOD PRESSURE: 110 MMHG

## 2019-08-08 DIAGNOSIS — I74.09 AORTOILIAC OCCLUSIVE DISEASE (HCC): Primary | ICD-10-CM

## 2019-08-08 DIAGNOSIS — I70.211 ATHEROSCLEROSIS OF NATIVE ARTERY OF RIGHT LOWER EXTREMITY WITH INTERMITTENT CLAUDICATION (HCC): ICD-10-CM

## 2019-08-08 DIAGNOSIS — I89.0 CHRONIC ACQUIRED LYMPHEDEMA: ICD-10-CM

## 2019-08-08 DIAGNOSIS — R82.90 ABNORMAL URINE: ICD-10-CM

## 2019-08-08 DIAGNOSIS — N18.30 CKD (CHRONIC KIDNEY DISEASE) STAGE 3, GFR 30-59 ML/MIN (HCC): ICD-10-CM

## 2019-08-08 DIAGNOSIS — N39.0 RECURRENT UTI: ICD-10-CM

## 2019-08-08 DIAGNOSIS — R39.9 UTI SYMPTOMS: Primary | ICD-10-CM

## 2019-08-08 DIAGNOSIS — R35.0 URINARY FREQUENCY: ICD-10-CM

## 2019-08-08 PROBLEM — I70.213 ATHEROSCLEROSIS OF NATIVE ARTERY OF BOTH LOWER EXTREMITIES WITH INTERMITTENT CLAUDICATION (HCC): Status: ACTIVE | Noted: 2019-01-21

## 2019-08-08 LAB
SL AMB  POCT GLUCOSE, UA: ABNORMAL
SL AMB LEUKOCYTE ESTERASE,UA: ABNORMAL
SL AMB POCT BILIRUBIN,UA: ABNORMAL
SL AMB POCT BLOOD,UA: ABNORMAL
SL AMB POCT CLARITY,UA: ABNORMAL
SL AMB POCT COLOR,UA: YELLOW
SL AMB POCT KETONES,UA: ABNORMAL
SL AMB POCT NITRITE,UA: ABNORMAL
SL AMB POCT PH,UA: 6
SL AMB POCT SPECIFIC GRAVITY,UA: 1.01
SL AMB POCT URINE PROTEIN: ABNORMAL
SL AMB POCT UROBILINOGEN: 0.2

## 2019-08-08 PROCEDURE — 99214 OFFICE O/P EST MOD 30 MIN: CPT | Performed by: NURSE PRACTITIONER

## 2019-08-08 PROCEDURE — 99213 OFFICE O/P EST LOW 20 MIN: CPT | Performed by: FAMILY MEDICINE

## 2019-08-08 PROCEDURE — 87086 URINE CULTURE/COLONY COUNT: CPT | Performed by: FAMILY MEDICINE

## 2019-08-08 PROCEDURE — 1124F ACP DISCUSS-NO DSCNMKR DOCD: CPT | Performed by: FAMILY MEDICINE

## 2019-08-08 PROCEDURE — 81002 URINALYSIS NONAUTO W/O SCOPE: CPT | Performed by: FAMILY MEDICINE

## 2019-08-08 RX ORDER — CEPHALEXIN 500 MG/1
500 CAPSULE ORAL EVERY 12 HOURS SCHEDULED
Qty: 14 CAPSULE | Refills: 0 | Status: SHIPPED | OUTPATIENT
Start: 2019-08-08 | End: 2019-08-15

## 2019-08-08 NOTE — ASSESSMENT & PLAN NOTE
-Chronic bilateral lower extremity edema, secondary to obesity, CHF, venous insufficiency, and likely an element of secondary lymphedema  -Continue daily use of compression stockings  -Elevate periodically    If any pain with elevation she is aware to stop elevating and notify our office  -Continue daily use of lymphedema pumps, currently using once per day; can use pumps 2-3 times per day

## 2019-08-08 NOTE — ASSESSMENT & PLAN NOTE
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

## 2019-08-08 NOTE — PROGRESS NOTES
Assessment/Plan:     80 y/o F w/ HTN, HLD, DM II w/ peripheral neuropathy, Afib (on Xarelto), CHF, pancreatic cyst, s/p R nephrectomy for malignancy, CKD, hypothyroid, NICK, hx CVA w/ no residual, OA, RA, chronic back pain, and PAD s/p R DAVID PTA and EIA PTA/stenting Jan 2019 by Dr Carter Barber  Seen today for review after initial post procedure arterial duplex  Aortoiliac occlusive disease (HCC)  -AOIL/LEAD results- R DAVID patent, R EIA stent w/ greater than 75% restenosis, RIGO 0 51/45/29 (prior 0 41/0/0); L RIGO 1 15/63/64, diffuse infrainguinal occlusive disease with no focal stenoses  -Patient reports improvement in preprocedure right foot rest pain  Now sleeping through the night without pain  -Presently not ambulating distances, ambulates with walker without claudication  -In the absence of rest pain or tissue loss in this patient with advanced age and solitary kidney with CKD will continue with conservative management  -Continue statin therapy  Xarelto for Afib  -Will continue surveillance monitoring with routine duplex imaging  -Followup in office in 1 year  Notify the office prior with any tissue loss or development of rest pain/claudicatoin    CKD (chronic kidney disease) stage 3, GFR 30-59 ml/min ([facility])  -Solitary kidney s/p R nephrectomy 2/2 malignancy  -Labs reviewed  Cr 1 72/GFR 27  -Would proceed with contrast based imaging with caution  -Avoid nephrotoxic agents    Chronic acquired lymphedema  -Chronic bilateral lower extremity edema, secondary to obesity, CHF, venous insufficiency, and likely an element of secondary lymphedema  -Continue daily use of compression stockings  -Elevate periodically    If any pain with elevation she is aware to stop elevating and notify our office  -Continue daily use of lymphedema pumps, currently using once per day; can use pumps 2-3 times per day       Diagnoses and all orders for this visit:    Aortoiliac occlusive disease ([address] )  -     VAS abdominal aorta/iliacs; complete study; Future    Atherosclerosis of native artery of right lower extremity with intermittent claudication (HCC)  -     VAS lower limb arterial duplex, complete bilateral; Future    CKD (chronic kidney disease) stage 3, GFR 30-59 ml/min (HCC)    Chronic acquired lymphedema          Subjective:      Patient ID: Jessica Crocker is a 81 y o  female  Patient seen in office for review after recent arterial testing  AOIL done 7/2/19 and LEAD done 7/25/19  History of R DAVID PTA and R EIA PTA/stenting Jan 2019 by Dr Harris Michelle  She states that since procedure her right leg is feeling "better "  She does not ambulate distances due to difficulties from osteoarthritis/RA, edema secondary to CHF and lymphedema  She ambulates with walker  She denies any rest pain symptoms  States that she is sleeping through the night without pain  She has no tissue loss to the feet  She has chronic swelling to BLE, L>R  She is using lymphedema pumps once daily with some improvement in swelling  She elevates her legs occasionally with no pain  She is on Xarelto for Afib  Pt is here to review the result of her AOIL on 7/2/19 and her DIMITRY 7/25/19  Pt denies any abdominal or back pain  Pt denies any nausea, vomiting or bloating after eating  Pt denies any numbness tingling or claudication in her legs  Pt uses a lymphedema pump at least one a day  Pt does wear compression stockings and does elevates her legs often each day  Pt had a right leg Agram on 1/24/19 by Dr Harris Michelle Doctor  Pt is currently taking Xarelto and Pravastatin  The following portions of the patient's history were reviewed and updated as appropriate: allergies, current medications, past family history, past medical history, past social history, past surgical history and problem list     Review of Systems   Constitutional: Negative  HENT: Negative  Eyes: Negative  Respiratory: Negative  Cardiovascular: Positive for leg swelling     Gastrointestinal: Negative  Endocrine: Negative  Genitourinary: Positive for frequency  Musculoskeletal: Positive for back pain  Skin: Negative  Allergic/Immunologic: Positive for food allergies  Neurological: Negative  Hematological: Bruises/bleeds easily  Psychiatric/Behavioral: Negative  Objective:     Physical Exam   Constitutional: She is oriented to person, place, and time  No distress  obese   HENT:   Head: Normocephalic and atraumatic  Eyes: No scleral icterus  Neck: Neck supple  No JVD present  Cardiovascular: Normal rate and regular rhythm  Dopplerable AT/DP/PT signals bilaterally    Pulmonary/Chest: Effort normal and breath sounds normal    Abdominal: Soft  There is no tenderness  obese   Musculoskeletal: She exhibits edema (BLE minimally pitting edema, gives appearance consistent with lymphedema)  Ambulates with walker   Neurological: She is alert and oriented to person, place, and time  Skin: Skin is warm and dry  Mild BLE hyperpigmentation, L>R   Psychiatric: She has a normal mood and affect  I have reviewed and made appropriate changes to the review of systems input by the medical assistant      Vitals:    08/08/19 1108   BP: 110/60   BP Location: Right arm   Patient Position: Sitting   Cuff Size: Adult   Pulse: 78   Resp: 16   Temp: (!) 96 8 °F (36 °C)   TempSrc: Tympanic   Weight: 98 9 kg (218 lb)   Height: 5' 1" (1 549 m)       Patient Active Problem List   Diagnosis    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 ([address] )    Essential hypertension    Rheumatoid arthritis ([address] )    Diabetes mellitus type 2 in obese ([address] )    Sleep apnea    Acquired hypothyroidism    Chronic GERD    Edema    Hypercholesterolemia    Obesity    Peripheral neuropathy    Primary osteoarthritis of left knee    Restless leg syndrome    Right lumbar radiculopathy    Sensorineural hearing loss    Sinus arrhythmia    Chronic bilateral thoracic back pain    Thoracic degenerative disc disease    Urothelial cancer (HCC)    Lung nodule < 6cm on CT    Pancreatic cyst    Iron deficiency anemia    Anemia    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 (HCC)    History of nephroureterectomy    Incisional hernia    Acute on chronic diastolic congestive heart failure (HCC)    Atrial fibrillation (HCC)    CKD (chronic kidney disease) stage 3, GFR 30-59 ml/min (HCC)    Acute on chronic congestive heart failure (HCC)    Chronic diastolic heart failure (HCC)    Fracture    Spontaneous dislocation of shoulder, left    Abdominal wall hernia    Atherosclerosis of native artery of right lower extremity with intermittent claudication (HCC)    Pain in both lower extremities    Chronic acquired lymphedema    Anemia of chronic disease    Polyneuropathy associated with underlying disease ([facility]    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)    PAD (peripheral artery disease) ([facility]    Diabetic neuropathy associated with type 2 diabetes mellitus (HCC)    Seborrheic keratoses    Aortoiliac occlusive disease ([facility] [facility] )       Past Surgical History:   Procedure Laterality Date    APPENDECTOMY      ARTHROSCOPY WRIST Right     with release of transverse carpal ligament     BLADDER SURGERY      BLADDER SURGERY      BREAST SURGERY      left breast    BUNIONECTOMY Bilateral     CATARACT EXTRACTION Bilateral     CHOLECYSTECTOMY      COLONOSCOPY      CYSTOSCOPY      CYSTOSCOPY      EGD AND COLONOSCOPY N/A 12/20/2017    Procedure: EGD AND COLONOSCOPY;  Surgeon: Cary Alan MD;  [facility];   Service: Gastroenterology    ESOPHAGOGASTRODUODENOSCOPY      diagnostic    FOREARM SURGERY Right     fracture repair    FRACTURE SURGERY Right     arm with hardware    HYSTERECTOMY      IR ABDOMINAL ANGIOGRAPHY / INTERVENTION  1/24/2019    KNEE ARTHROSCOPY Left     KNEE SURGERY Left     meniscus tear    NEPHRECTOMY Right     NOSE SURGERY      IL CYSTO/URETERO W/LITHOTRIPSY &INDWELL STENT INSRT Right 2/28/2018    Procedure: CYSTOSCOPY RIGHT URETEROSCOPY, RIGHT RETROGRADE PYELOGRAM AND INSERTION  RIGHT STENT URETERAL, RIGHT RENAL PELVIC WASHING FOR CYTOLOGY;  Surgeon: You Jc MD;  [facility];  Service: Urology    IL CYSTOURETHROSCOPY,FULGUR 0 5-2 CM LESN N/A 5/21/2019    Procedure: BLADDER BIOPSY WITH FULGURATION;  Surgeon: Steve Pike MD;  [facility];  Service: Urology    IL NEPHRECTOMY, W/PART   URETECTOMY Right 4/23/2018    Procedure: Nereida Mura ASSISTED NEPHRO-URETERECTOMY WITH BLADDER CUFF EXCISION;  Surgeon: Steve Pike MD;  [facility];  Service: Urology    [place] [address] 3RD+ ORD SLCTV ABDL PEL/TR [facility] Right 1/24/2019    Procedure: RIGHT LEG ANGIOGRAM, BILATERAL FEMORAL ACCESS, STENTING OF RIGHT EXTERNAL ILIAC ARTERY WITH BALLOON ANGIOPLASTY;  Surgeon: Reena Arteaga MD;  [facility];  Service: Vascular    REPLACEMENT TOTAL KNEE BILATERAL      URETEROSCOPY Right 3/20/2018    Procedure: CYSTOSCOPY, RETROGRADE PYELOGRAM, URETEROSCOPY, BIOPSY, BRUSH, FULGURATION, STENT PLACEMENT, BLADDER BIOPSY WITH FULGURATION;  Surgeon: Steve Pike MD;  [facility];  Service: Urology    VASCULAR SURGERY      stents       Family History   Problem Relation Age of Onset    Other Mother         epilepsy   Tessy Centeno Early death Mother    Tessy Centeno Glaucoma Father     Stroke Father         silent    Other Brother         cardiac disorder    Rheum arthritis Son     No Known Problems Son     No Known Problems Daughter     No Known Problems Son        Social History     Socioeconomic History    Marital status: /Civil Union     Spouse name: Not on file    Number of children: Not on file    Years of education: Not on file    Highest education level: Not on file   Occupational History    Not on file   Social Needs    Financial resource strain: Not hard at all  Food insecurity:     Worry: Never true     Inability: Never true    Transportation needs:     Medical: No     Non-medical: No   Tobacco Use    Smoking status: Never Smoker    Smokeless tobacco: Never Used   Substance and Sexual Activity    Alcohol use: Not Currently    Drug use: No    Sexual activity: Not on file   Lifestyle    Physical activity:     Days per week: 0 days     Minutes per session: 0 min    Stress: Only a little   Relationships    Social connections:     Talks on phone: More than three times a week     Gets together: Once a week     Attends [employer] service: 1 to 4 times per year     Active member of club or organization: No     Attends meetings of clubs or organizations: Never     Relationship status:     Intimate partner violence:     Fear of current or ex partner: No     Emotionally abused: No     Physically abused: No     Forced sexual activity: No   Other Topics Concern    Not on file   Social History Narrative    No caffeine use       Allergies   Allergen Reactions    Nitrofurantoin Hives     HIVES * pt denies  Other reaction(s): Unknown Allergic Reaction  Other reaction(s): Other (See Comments)  HIVES * pt denies    Atorvastatin      Other reaction(s): Muscle Pain, Myalgia    Acetazolamide Other (See Comments)     Other reaction(s): Unknown Allergic Reaction unknown reaction     Other Other (See Comments)     Adhesive tape : red and itching  Other reaction(s):  Other (See Comments)  red and itching    Shellfish-Derived Products Other (See Comments)     Patient got Gout following eating shell fish    Sulfa Antibiotics Other (See Comments)     unknown    Tramadol Diarrhea and Vomiting    Azithromycin Rash         Current Outpatient Medications:     acetaminophen (TYLENOL) 325 mg tablet, Take 650 mg by mouth 2 (two) times a day as needed for mild pain , Disp: , Rfl:     Calcium Citrate-Vitamin D (CALCIUM + D PO), Take 1 tablet by mouth 2 (two) times a day, Disp: , Rfl:     cyanocobalamin (VITAMIN B-12) 100 mcg tablet, Take 100 mcg by mouth daily , Disp: , Rfl:     Elastic Bandages & Supports ([address]) MISC, by Does not apply route daily, Disp: 10 each, Rfl: 0    gabapentin (NEURONTIN) 100 mg capsule, Take 1 capsule in the morning, take 1 capsule in the afternoon, and take 1-3 capapsules at bedtime, Disp: 450 capsule, Rfl: 3    hydroxychloroquine (PLAQUENIL) 200 mg tablet, Take 200 mg by mouth 2 (two) times a day with meals, Disp: , Rfl:     LEUCOVORIN CALCIUM PO, Take 10 mg by mouth once a week, Disp: , Rfl:     levothyroxine 75 mcg tablet, Take 1 tablet (75 mcg total) by mouth daily, Disp: 90 tablet, Rfl: 3    losartan (COZAAR) 25 mg tablet, Take 1 tablet (25 mg total) by mouth daily, Disp: 90 tablet, Rfl: 3    methotrexate 2 5 mg tablet, Take 2 tablets weekly, Disp: 12 tablet, Rfl: 0    Multiple Vitamin (MULTIVITAMINS PO), Take 1 tablet by mouth daily, Disp: , Rfl:     omeprazole (PriLOSEC) 40 MG capsule, Take 1 capsule (40 mg total) by mouth daily, Disp: 90 capsule, Rfl: 3    polyethylene glycol (MIRALAX) 17 g packet, Take 17 g by mouth daily, Disp: 14 each, Rfl: 0    pravastatin (PRAVACHOL) 80 mg tablet, Take 1 tablet (80 mg total) by mouth daily, Disp: 90 tablet, Rfl: 3    rivaroxaban (XARELTO) 15 mg tablet, Take 1 tablet (15 mg total) by mouth daily, Disp: 28 tablet, Rfl: 0    rOPINIRole (REQUIP) 0 5 mg tablet, 1 tab in the am and 2 tab at bedtime, Disp: , Rfl:     torsemide (DEMADEX) 20 mg tablet, Take 1 tablet (20 mg total) by mouth daily, Disp: 90 tablet, Rfl: 3    docusate sodium (COLACE) 100 mg capsule, Take 1 capsule (100 mg total) by mouth 2 (two) times a day for 60 doses (Patient taking differently: Take 100 mg by mouth 2 (two) times a day as needed ), Disp: 90 capsule, Rfl: 0  No current facility-administered medications for this visit       Facility-Administered Medications Ordered in Other Visits:     acetaminophen (TYLENOL) tablet 650 mg, 650 mg, Oral, Q6H PRN, Jed Lundborg, PA-C

## 2019-08-08 NOTE — ASSESSMENT & PLAN NOTE
-AOIL/LEAD results- R DAVID patent, R EIA stent w/ greater than 75% restenosis, RIGO 0 51/45/29 (prior 0 41/0/0); L RIGO 1 15/63/64, diffuse infrainguinal occlusive disease with no focal stenoses  -Patient reports improvement in preprocedure right foot rest pain  Now sleeping through the night without pain  -Presently not ambulating distances, ambulates with walker without claudication  -In the absence of rest pain or tissue loss in this patient with advanced age and solitary kidney with CKD will continue with conservative management  -Continue statin therapy  Xarelto for Afib  -Will continue surveillance monitoring with routine duplex imaging  -Followup in office in 1 year    Notify the office prior with any tissue loss or development of rest pain/claudicatoin

## 2019-08-08 NOTE — PATIENT INSTRUCTIONS
Peripheral arterial disease (PAD)  -We reviewed the results of your recent arterial duplex  You have restenosis/narrowing of your right iliac stent that was placed in January  At this time you are not experiencing pain with walking or at rest, so we will continue a conservative approach  -Continue all current medications including cholesterol medication and Xarelto for Afib, which is also beneficial for PAD  -Remain as active as possible to maintain/enhance collateral flow  -If you develop any rest pain or sores to your feet notify the office immediately, otherwise will plan for routine in office followup in 1 year  -We will monitor for any disease progression with an arterial duplex of the iliac arteries twice per year and the lower legs once per year    If your duplex in 6 months is abnormal we will call you for office visit, otherwise followup in office in 1 year, notify office prior with any change in symptoms

## 2019-08-08 NOTE — PROGRESS NOTES
Assessment/Plan:    She is here with UTI symptoms again  She has been having recurrent UTIs and symptoms of UTI  Will send the urine for culture and I have given her prescription for Keflex to take for the next 7 days  Also urged increased hydration with water  Because of recurrent symptoms, I have given her a referral to Urogynecology for further evaluation and treatment  Although she has a urologist because of her history of renal cell carcinoma, she would like another opinion regarding recurrent infections  No problem-specific Assessment & Plan notes found for this encounter  Diagnoses and all orders for this visit:    UTI symptoms  -     Urine culture  -     POCT urine dip  -     cephalexin (KEFLEX) 500 mg capsule; Take 1 capsule (500 mg total) by mouth every 12 (twelve) hours for 7 days    Urinary frequency  -     Urine culture  -     POCT urine dip    Abnormal urine  -     POCT urine dip    Recurrent UTI  -     Ambulatory referral to Urogynecology; Future          Subjective:      Patient ID: Dony Storm is a 81 y o  female  She is here with UTI symptoms again  They started this past weekend about 4 or 5 days ago  She notes burning with urination and frequency  She denies fever or chills  She has been getting recurrent UTI symptoms  The last infection was about a month ago  The following portions of the patient's history were reviewed and updated as appropriate: allergies, current medications, past family history, past medical history, past social history, past surgical history and problem list     Review of Systems   Constitutional: Negative for chills, fatigue and fever  Genitourinary: Positive for dysuria, frequency and urgency  Negative for hematuria  Neurological: Negative for dizziness and headaches           Objective:      /70 (BP Location: Right arm, Patient Position: Sitting, Cuff Size: Standard)   Pulse 83   Temp 99 °F (37 2 °C) (Tympanic)   Wt 98 9 kg (218 lb)   LMP  (LMP Unknown)   SpO2 97%   BMI 41 19 kg/m²          Physical Exam   Constitutional: She appears well-developed and well-nourished  obese   HENT:   Head: Normocephalic and atraumatic  Cardiovascular: Normal rate and regular rhythm  Pulmonary/Chest: Effort normal and breath sounds normal    Abdominal: Soft  Bowel sounds are normal    Nursing note and vitals reviewed

## 2019-08-09 LAB — BACTERIA UR CULT: NORMAL

## 2019-08-12 ENCOUNTER — TELEPHONE (OUTPATIENT)
Dept: FAMILY MEDICINE CLINIC | Facility: CLINIC | Age: 84
End: 2019-08-12

## 2019-08-16 DIAGNOSIS — I48.91 ATRIAL FIBRILLATION, UNSPECIFIED TYPE (HCC): ICD-10-CM

## 2019-08-16 RX ORDER — RIVAROXABAN 15 MG/1
TABLET, FILM COATED ORAL
Qty: 30 TABLET | Refills: 0 | Status: SHIPPED | OUTPATIENT
Start: 2019-08-16 | End: 2019-10-03 | Stop reason: SDUPTHER

## 2019-08-29 ENCOUNTER — TELEPHONE (OUTPATIENT)
Dept: HEMATOLOGY ONCOLOGY | Facility: CLINIC | Age: 84
End: 2019-08-29

## 2019-08-29 NOTE — TELEPHONE ENCOUNTER
Spoke to patient and informed her that her appt 10/18/19 needed to be R/S  Her new appt is 10/24/19 at 1:00 pm with KENROY Frankel Chi at the [place] office  Patient voiced understanding of all appt information

## 2019-09-03 ENCOUNTER — APPOINTMENT (EMERGENCY)
Dept: RADIOLOGY | Facility: HOSPITAL | Age: 84
End: 2019-09-03
Payer: MEDICARE

## 2019-09-03 ENCOUNTER — HOSPITAL ENCOUNTER (EMERGENCY)
Facility: HOSPITAL | Age: 84
Discharge: HOME | End: 2019-09-03
Admitting: SURGERY
Payer: MEDICARE

## 2019-09-03 VITALS
BODY MASS INDEX: 39.66 KG/M2 | HEART RATE: 74 BPM | DIASTOLIC BLOOD PRESSURE: 70 MMHG | OXYGEN SATURATION: 97 % | RESPIRATION RATE: 21 BRPM | TEMPERATURE: 97 F | WEIGHT: 209.88 LBS | SYSTOLIC BLOOD PRESSURE: 170 MMHG
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
## 2019-09-03 PROBLEM — M25.562 LEFT KNEE PAIN: Status: ACTIVE | Noted: 2019-09-03

## 2019-09-03 LAB
BASE EXCESS BLDA CALC-SCNC: 7 MMOL/L (ref -2–3)
CA-I BLD-SCNC: 1.1 MMOL/L (ref 1.12–1.32)
GLUCOSE SERPL-MCNC: 165 MG/DL (ref 65–140)
HCO3 BLDA-SCNC: 29.6 MMOL/L (ref 24–30)
HCT VFR BLD CALC: 30 % (ref 34.8–46.1)
HGB BLDA-MCNC: 10.2 G/DL (ref 11.5–15.4)
INR PPP: 1.45 (ref 0.84–1.19)
PCO2 BLD: 31 MMOL/L (ref 21–32)
PCO2 BLD: 35.5 MM HG (ref 42–50)
PH BLD: 7.53 [PH] (ref 7.3–7.4)
PO2 BLD: 49 MM HG (ref 35–45)
POTASSIUM BLD-SCNC: 4.3 MMOL/L (ref 3.5–5.3)
PROTHROMBIN TIME: 17.2 SECONDS (ref 11.6–14.5)
SAO2 % BLD FROM PO2: 88 % (ref 95–98)
SODIUM BLD-SCNC: 138 MMOL/L (ref 136–145)
SPECIMEN SOURCE: ABNORMAL

## 2019-09-03 PROCEDURE — 71045 X-RAY EXAM CHEST 1 VIEW: CPT

## 2019-09-03 PROCEDURE — 84295 ASSAY OF SERUM SODIUM: CPT

## 2019-09-03 PROCEDURE — 85014 HEMATOCRIT: CPT

## 2019-09-03 PROCEDURE — 73560 X-RAY EXAM OF KNEE 1 OR 2: CPT

## 2019-09-03 PROCEDURE — 72125 CT NECK SPINE W/O DYE: CPT

## 2019-09-03 PROCEDURE — 99282 EMERGENCY DEPT VISIT SF MDM: CPT | Performed by: SURGERY

## 2019-09-03 PROCEDURE — 82947 ASSAY GLUCOSE BLOOD QUANT: CPT

## 2019-09-03 PROCEDURE — 84132 ASSAY OF SERUM POTASSIUM: CPT

## 2019-09-03 PROCEDURE — 85610 PROTHROMBIN TIME: CPT | Performed by: EMERGENCY MEDICINE

## 2019-09-03 PROCEDURE — NC001 PR NO CHARGE: Performed by: EMERGENCY MEDICINE

## 2019-09-03 PROCEDURE — 99284 EMERGENCY DEPT VISIT MOD MDM: CPT

## 2019-09-03 PROCEDURE — 82330 ASSAY OF CALCIUM: CPT

## 2019-09-03 PROCEDURE — 70450 CT HEAD/BRAIN W/O DYE: CPT

## 2019-09-03 PROCEDURE — 36415 COLL VENOUS BLD VENIPUNCTURE: CPT | Performed by: EMERGENCY MEDICINE

## 2019-09-03 PROCEDURE — 82803 BLOOD GASES ANY COMBINATION: CPT

## 2019-09-03 NOTE — TRAUMA DOCUMENTATION
Patient ambulatory to bathroom with walker per order by Dr Pedro Gallardo  Patient denies any new pain or difficulty ambulating

## 2019-09-03 NOTE — PROGRESS NOTES
09/03/19 1317 [facility]   Plan of Care   Comments Trauma alert, PT fell at home  SP Kong Peaks arrived- provided hospitality (coffee)     Assessment Completed by: Unit visit

## 2019-09-03 NOTE — ED PROVIDER NOTES
Emergency Department Airway Evaluation and Management Form    History  Obtained from: ems  Nitrofurantoin; Atorvastatin; Acetazolamide; Other; Shellfish-derived products; Sulfa antibiotics;  Tramadol; and Azithromycin  Chief Complaint   Patient presents with    Fall     Pt reports getting up to use the bathroom and fell onto both of her knees, hitting her head on the bedside table +thinners -LOC     HPI   Fall on blood thinners and hit her head    Past Medical History:   Diagnosis Date    Anemia     Arthritis     rheumatoid    At risk for falls     Benign essential hypertension     CHF (congestive heart failure) (HCC)     Chronic cystitis     Chronic kidney disease     right kidney cancer - kidney removed    Chronic pain disorder     CPAP (continuous positive airway pressure) dependence     Diverticulitis of colon     Early satiety     Edema     GERD (gastroesophageal reflux disease)     Gout     Hearing aid worn     bilateral    Hearing loss     Hemorrhoids     Hiatal hernia     History of colonic polyps     Hyperlipidemia     Hypothyroidism     Irregular heart beat     Afib    Left breast mass     Microhematuria     Migraine     occular    Mobility impaired     left arm    Neuropathy     lower and upper extermities    Overactive bladder     Pneumonia of left lower lobe due to infectious organism ([address] )     [facility]VD [facility]peripheral [facility]) [facility]     RA (rheumatoid arthritis) ([facility]     Restless leg syndrome     Sleep apnea     uses cpap    Stroke ([address] )     5/2017    Type 2 diabetes mellitus (HCC)     Urinary frequency     Urinary urgency     Urothelial cancer ([facility] ) 04/23/2018    Uses walker      Past Surgical History:   Procedure Laterality Date    APPENDECTOMY      ARTHROSCOPY WRIST Right     with release of transverse carpal ligament     BLADDER SURGERY      BLADDER SURGERY      BREAST SURGERY      left breast    BUNIONECTOMY Bilateral     CATARACT EXTRACTION Bilateral  CHOLECYSTECTOMY      COLONOSCOPY      CYSTOSCOPY      CYSTOSCOPY      EGD AND COLONOSCOPY N/A 12/20/2017    Procedure: EGD AND COLONOSCOPY;  Surgeon: Melecio Khalil MD;  [facility]; Service: Gastroenterology    ESOPHAGOGASTRODUODENOSCOPY      diagnostic    FOREARM SURGERY Right     fracture repair    FRACTURE SURGERY Right     arm with hardware    HYSTERECTOMY      IR ABDOMINAL ANGIOGRAPHY / INTERVENTION  1/24/2019    KNEE ARTHROSCOPY Left     KNEE SURGERY Left     meniscus tear    NEPHRECTOMY Right     NOSE SURGERY      ND CYSTO/URETERO W/LITHOTRIPSY &INDWELL STENT INSRT Right 2/28/2018    Procedure: CYSTOSCOPY RIGHT URETEROSCOPY, RIGHT RETROGRADE PYELOGRAM AND INSERTION  RIGHT STENT URETERAL, RIGHT RENAL PELVIC WASHING FOR CYTOLOGY;  Surgeon: Adryan Gracia MD;  [facility];  Service: Urology    ND CYSTOURETHROSCOPY,FULGUR 0 5-2 CM LESN N/A 5/21/2019    Procedure: BLADDER BIOPSY WITH FULGURATION;  Surgeon: Mayank Durham MD;  [facility];  Service: Urology    ND NEPHRECTOMY, W/PART   URETECTOMY Right 4/23/2018    Procedure: ROBATIC ASSISTED NEPHRO-URETERECTOMY WITH BLADDER CUFF EXCISION;  Surgeon: Mayank Durham MD;  [facility];  Service: Urology    [facility] SL[facility] ABDL PEL/LXTR [facility] Right 1/24/2019    Procedure: RIGHT LEG ANGIOGRAM, BILATERAL FEMORAL ACCESS, STENTING OF RIGHT EXTERNAL ILIAC ARTERY WITH BALLOON ANGIOPLASTY;  Surgeon: Leonora Arteaga MD;  [facility];  Service: Vascular    REPLACEMENT TOTAL KNEE BILATERAL      URETEROSCOPY Right 3/20/2018    Procedure: CYSTOSCOPY, RETROGRADE PYELOGRAM, URETEROSCOPY, BIOPSY, BRUSH, FULGURATION, STENT PLACEMENT, BLADDER BIOPSY WITH FULGURATION;  Surgeon: Mayank Durham MD;  [facility];  Service: Urology    VASCULAR SURGERY      stents     Family History   Problem Relation Age of Onset    Other Mother         epilepsy    Early death Mother     Glaucoma Father     Stroke Father         silent    Other Brother         cardiac disorder    Rheum arthritis Son     No Known Problems Son     No Known Problems Daughter     No Known Problems Son      Social History     Tobacco Use    Smoking status: Never Smoker    Smokeless tobacco: Never Used   Substance Use Topics    Alcohol use: Not Currently    Drug use: No     I have reviewed and agree with the history as documented      Review of Systems    Physical Exam  /70   Pulse 74   Temp (!) 97 °F (36 1 °C) (Tympanic)   Resp 21   Wt 95 2 kg (209 lb 14 oz)   LMP  (LMP Unknown)   SpO2 97%   BMI 39 66 kg/m²     Physical Exam  Airway intact, breath sounds equal, heart reg rate, GCS 15    ED Medications  Medications - No data to display    Intubation  Procedures    Notes      Final Diagnosis  Final diagnoses:   None       ED Provider  Electronically Signed by     Karine Bauer DO  09/06/19 1621

## 2019-09-03 NOTE — H&P
H&P Exam - Trauma   Zach Andres 81 y o  female MRN: [id number]  Unit/Bed#: ED 10 Encounter: [id number]    Assessment/Plan   Trauma Alert: Level B  Model of Arrival: Ambulance  Trauma Team: Attending Francisco Salinas and Residents Maurice Beck  Consultants: None    Trauma Active Problems:     -fall out of bed  -coagulopathy secondary to chronic Xarelto use  -chronic arthritis    Trauma Plan:    -CT head, C-spine negative for acute injury  -chest x-ray, left knee x-rays unremarkable  -check coags, i-STAT  -will ambulate in the ED  If able to ambulate at baseline, likely discharge home    Chief Complaint:  Fall, left knee pain    History of Present Illness   HPI:  Zach Andres is a 81 y o  female with history of rheumatoid arthritis on methotrexate, atrial fibrillation on Xarelto, who presents for evaluation status post a fall out of bed  The patient wears CPAP  She was attempting to get out of bed, when she got caught up in her CPAP machine  She then fell off the bed onto her knees bilaterally, and states that she might have hit her head but she is not sure  She states that she was on her knees for at least half an hour, and that her  was not able to help her up  For this reason 911 was called, and she was brought to the hospital for evaluation  The patient states that her knees always hurt, however that her left knee currently hurts a little more than baseline  She has chronic aches and pains due to her chronic rheumatoid arthritis, however states that she has no new exacerbated pain  She walks with a walker at baseline  Mechanism:Fall    Review of Systems   Constitutional: Negative for chills and fever  HENT: Negative for congestion and rhinorrhea  Eyes: Negative for photophobia and visual disturbance  Respiratory: Negative for cough and shortness of breath  Cardiovascular: Negative for chest pain and palpitations  Gastrointestinal: Negative for abdominal pain, diarrhea, nausea and vomiting  Genitourinary: Negative for dysuria and frequency  Musculoskeletal: Negative for neck pain and neck stiffness  Skin: Negative for pallor and rash  Neurological: Negative for light-headedness and headaches  All other systems reviewed and are negative        Historical Information       Past Medical History:   Diagnosis Date    Anemia     Arthritis     rheumatoid    At risk for falls     Benign essential hypertension     CHF (congestive heart failure) (HCC)     Chronic cystitis     Chronic kidney disease     right kidney cancer - kidney removed    Chronic pain disorder     CPAP (continuous positive airway pressure) dependence     Diverticulitis of colon     Early satiety     Edema     GERD (gastroesophageal reflux disease)     Gout     Hearing aid worn     bilateral    Hearing loss     Hemorrhoids     Hiatal hernia     History of colonic polyps     Hyperlipidemia     Hypothyroidism     Irregular heart beat     Afib    Left breast mass     Microhematuria     Migraine     occular    Mobility impaired     left arm    Neuropathy     lower and upper extermities    Overactive bladder     Pneumonia of left lower lobe due to infectious organism ([facility] [address] [facility]     PVD (peripheral vascular disease) ([facility] [address] [facility]     RA (rheumatoid arthritis) ([facility] [address] [facility]     Restless leg syndrome     Sleep apnea     uses cpap    Stroke ([facility] [address] )     5/2017    Type 2 diabetes mellitus (HCC)     Urinary frequency     Urinary urgency     Urothelial cancer ([facility] [address] ) 04/23/2018    Uses walker      Past Surgical History:   Procedure Laterality Date    APPENDECTOMY      ARTHROSCOPY WRIST Right     with release of transverse carpal ligament     BLADDER SURGERY      BLADDER SURGERY      BREAST SURGERY      left breast    BUNIONECTOMY Bilateral     CATARACT EXTRACTION Bilateral     CHOLECYSTECTOMY      COLONOSCOPY      CYSTOSCOPY      CYSTOSCOPY      EGD AND COLONOSCOPY N/A 12/20/2017    Procedure: EGD AND COLONOSCOPY; Surgeon: Melecio Khalil MD;  [facility]; Service: Gastroenterology    ESOPHAGOGASTRODUODENOSCOPY      diagnostic    FOREARM SURGERY Right     fracture repair    FRACTURE SURGERY Right     arm with hardware    HYSTERECTOMY      IR ABDOMINAL ANGIOGRAPHY / INTERVENTION  1/24/2019    KNEE ARTHROSCOPY Left     KNEE SURGERY Left     meniscus tear    NEPHRECTOMY Right     NOSE SURGERY      [place] CYSTO/URETERO W/LITHOTRIPSY &INDWELL STENT INSRT Right 2/28/2018    Procedure: CYSTOSCOPY RIGHT URETEROSCOPY, RIGHT RETROGRADE PYELOGRAM AND INSERTION  RIGHT STENT URETERAL, RIGHT RENAL PELVIC WASHING FOR CYTOLOGY;  Surgeon: Adryan Gracia MD;  [facility];  Service: Urology    WY CYSTOURETHROSCOPY,FULGUR 0 5-2 CM LESN N/A 5/21/2019    Procedure: BLADDER BIOPSY WITH FULGURATION;  Surgeon: Mayank Durham MD;  [facility];  Service: Urology    WY NEPHRECTOMY, W/PART   URETECTOMY Right 4/23/2018    Procedure: ROBATIC ASSISTED NEPHRO-URETERECTOMY WITH BLADDER CUFF EXCISION;  Surgeon: Mayank Durham MD;  [facility];  Service: Urology    [place] [address]RD+ ORD SLCTV ABDL PEL/LXTR [facility] Right 1/24/2019    Procedure: RIGHT LEG ANGIOGRAM, BILATERAL FEMORAL ACCESS, STENTING OF RIGHT EXTERNAL ILIAC ARTERY WITH BALLOON ANGIOPLASTY;  Surgeon: Leonora Arteaga MD;  [facility];  Service: Vascular    REPLACEMENT TOTAL KNEE BILATERAL      URETEROSCOPY Right 3/20/2018    Procedure: CYSTOSCOPY, RETROGRADE PYELOGRAM, URETEROSCOPY, BIOPSY, BRUSH, FULGURATION, STENT PLACEMENT, BLADDER BIOPSY WITH FULGURATION;  Surgeon: Mayank Durham MD;  [facility];  Service: Urology    VASCULAR SURGERY      stents     Social History   Social History     Substance and Sexual Activity   Alcohol Use Not Currently     Social History     Substance and Sexual Activity   Drug Use No     Social History     Tobacco Use   Smoking Status Never Smoker   Smokeless Tobacco Never Used Immunization History   Administered Date(s) Administered    INFLUENZA 12/26/2012, 09/05/2013, 10/01/2014, 09/11/2015, 09/27/2016, 10/31/2016, 10/01/2017    Influenza Split High Dose Preservative Free IM 09/11/2015, 09/27/2016, 09/01/2017    Influenza TIV (IM) 09/01/2009, 10/01/2010, 10/01/2011, 11/29/2012    Influenza, high dose seasonal 0 5 mL 09/26/2018    Pneumococcal Conjugate 13-Valent 08/11/2015    Pneumococcal Polysaccharide PPV23 10/13/2006    Tdap 04/25/2014    Zoster 01/01/2015     Last Tetanus:  Unknown  Family History: Non-contributory  Unable to obtain/limited by no limitations      Meds/Allergies   all current active meds have been reviewed    Allergies   Allergen Reactions    Nitrofurantoin Hives     HIVES * pt denies  Other reaction(s): Unknown Allergic Reaction  Other reaction(s): Other (See Comments)  HIVES * pt denies    Atorvastatin      Other reaction(s): Muscle Pain, Myalgia    Acetazolamide Other (See Comments)     Other reaction(s): Unknown Allergic Reaction unknown reaction     Other Other (See Comments)     Adhesive tape : red and itching  Other reaction(s):  Other (See Comments)  red and itching    Shellfish-Derived Products Other (See Comments)     Patient got Gout following eating shell fish    Sulfa Antibiotics Other (See Comments)     unknown    Tramadol Diarrhea and Vomiting    Azithromycin Rash         PHYSICAL EXAM    PE limited by:  No limitations    Objective   Vitals:   First set: Temperature: (!) 97 °F (36 1 °C) (09/03/19 0356)  Pulse: 90 (09/03/19 0356)  Respirations: 18 (09/03/19 0356)  Blood Pressure: (!) 180/79 (09/03/19 0356)    Primary Survey:   (A) Airway:  Patent, intact  (B) Breathing:  Clear and equal bilaterally  (C) Circulation: Pulses:   carotid  2/4, pedal  2/4, radial  2/4 and femoral  2/4  (D) Disabliity:  GCS Total:  15  (E) Expose:  Completed    Secondary Survey: (Click on Physical Exam tab above)  Physical Exam   Constitutional: She is oriented to person, place, and time  Awake and alert, well appearing, mental status is appropriate  Nontoxic  HENT:   Head: Normocephalic and atraumatic  Mouth/Throat: Oropharynx is clear and moist  No oropharyngeal exudate  No hemotympanum bilaterally   Eyes: Pupils are equal, round, and reactive to light  EOM are normal  Right eye exhibits no discharge  Left eye exhibits no discharge  No scleral icterus  Neck: Normal range of motion  Neck supple  No JVD present  No tracheal deviation present  No C-spine, T-spine, or L-spine tenderness  No step-off, deformity, or skin changes   Cardiovascular: Normal rate, regular rhythm and normal heart sounds  No murmur heard  Pulmonary/Chest: Effort normal  No stridor  No respiratory distress  She has no wheezes  She has no rales  Abdominal: Soft  She exhibits no distension and no mass  There is no tenderness  Musculoskeletal: Normal range of motion  She exhibits no edema or deformity  Tenderness to palpation over the left knee, without skin changes, deformity, or crepitus   Neurological: She is alert and oriented to person, place, and time  No cranial nerve deficit  She exhibits normal muscle tone  Skin: Skin is warm and dry  No rash noted  No pallor  Invasive Devices     Peripheral Intravenous Line            Peripheral IV 09/03/19 Right Antecubital less than 1 day                Lab Results: Results: I have personally reviewed pertinent reports  Imaging/EKG Studies: Results: I have personally reviewed pertinent reports      Other Studies:     Code Status: Prior  Advance Directive and Living Will: Yes    Power of :    POLST: Yes

## 2019-09-04 ENCOUNTER — OFFICE VISIT (OUTPATIENT)
Dept: SLEEP CENTER | Facility: CLINIC | Age: 84
End: 2019-09-04
Payer: MEDICARE

## 2019-09-04 VITALS
SYSTOLIC BLOOD PRESSURE: 108 MMHG | BODY MASS INDEX: 41.35 KG/M2 | DIASTOLIC BLOOD PRESSURE: 60 MMHG | WEIGHT: 219 LBS | HEIGHT: 61 IN

## 2019-09-04 DIAGNOSIS — G47.33 OSA (OBSTRUCTIVE SLEEP APNEA): Primary | ICD-10-CM

## 2019-09-04 PROCEDURE — 99213 OFFICE O/P EST LOW 20 MIN: CPT | Performed by: INTERNAL MEDICINE

## 2019-09-04 NOTE — PROGRESS NOTES
Progress Note - Sleep Amy Slot :1935 MRN: [id number]      Reason for Visit:  81 y  o female here for annual follow-up    Assessment:  Doing well on current therapy of APAP 6 to 10 cm for previously diagnosed obstructive sleep apnea (AHI = 6 5)  Plan:  Continue same  Try a new mask, which will not irritates the bridge of her nose  Follow up: One year    History of Present Illness:  History of NICK on PAP therapy  Fully compliant and deriving benefit      Review of Systems      Genitourinary none   Cardiology ankle/leg swelling   Gastrointestinal none   Neurology difficulty with memory   Constitutional none   Integumentary rash or dry skin   Psychiatry none   Musculoskeletal joint pain, back pain and legs twitching/jerking   Pulmonary snoring   ENT none   Endocrine none   Hematological none         I have reviewed and updated the review of systems as necessary      Historical Information    Past Medical History:   Past Medical History:   Diagnosis Date    Anemia     Arthritis     rheumatoid    At risk for falls     Benign essential hypertension     CHF (congestive heart failure) (HCC)     Chronic cystitis     Chronic kidney disease     right kidney cancer - kidney removed    Chronic pain disorder     CPAP (continuous positive airway pressure) dependence     Diverticulitis of colon     Early satiety     Edema     GERD (gastroesophageal reflux disease)     Gout     Hearing aid worn     bilateral    Hearing loss     Hemorrhoids     Hiatal hernia     History of colonic polyps     Hyperlipidemia     Hypothyroidism     Irregular heart beat     Afib    Left breast mass     Microhematuria     Migraine     occular    Mobility impaired     left arm    Neuropathy     lower and upper extermities    Overactive bladder     Pneumonia of left lower lobe due to infectious organism ([address]     PVD (peripheral vascular disease) (HCC)     RA (rheumatoid arthritis) ([address]     Restless leg syndrome     Sleep apnea     uses cpap    Stroke [facility])     5/2017    Type 2 diabetes mellitus (HCC)     Urinary frequency     Urinary urgency     Urothelial cancer ([facility] [address] ) 04/23/2018    Uses walker          Past Surgical History:   Past Surgical History:   Procedure Laterality Date    APPENDECTOMY      ARTHROSCOPY WRIST Right     with release of transverse carpal ligament     BLADDER SURGERY      BLADDER SURGERY      BREAST SURGERY      left breast    BUNIONECTOMY Bilateral     CATARACT EXTRACTION Bilateral     CHOLECYSTECTOMY      COLONOSCOPY      CYSTOSCOPY      CYSTOSCOPY      EGD AND COLONOSCOPY N/A 12/20/2017    Procedure: EGD AND COLONOSCOPY;  Surgeon: Josue Leon MD;  [facility]; Service: Gastroenterology    ESOPHAGOGASTRODUODENOSCOPY      diagnostic    FOREARM SURGERY Right     fracture repair    FRACTURE SURGERY Right     arm with hardware    HYSTERECTOMY      IR ABDOMINAL ANGIOGRAPHY / INTERVENTION  1/24/2019    KNEE ARTHROSCOPY Left     KNEE SURGERY Left     meniscus tear    NEPHRECTOMY Right     NOSE SURGERY      [place] CYSTO/URETERO W/LITHOTRIPSY &INDWELL STENT INSRT Right 2/28/2018    Procedure: CYSTOSCOPY RIGHT URETEROSCOPY, RIGHT RETROGRADE PYELOGRAM AND INSERTION  RIGHT STENT URETERAL, RIGHT RENAL PELVIC WASHING FOR CYTOLOGY;  Surgeon: Charles Verdin MD;  [facility];  Service: Urology    OK CYSTOURETHROSCOPY,FULGUR 0 5-2 CM LESN N/A 5/21/2019    Procedure: BLADDER BIOPSY WITH FULGURATION;  Surgeon: Brenda Eugene MD;  [facility];  Service: Urology    OK NEPHRECTOMY, W/PART   URETECTOMY Right 4/23/2018    Procedure: ROBATIC ASSISTED NEPHRO-URETERECTOMY WITH BLADDER CUFF EXCISION;  Surgeon: Brenda Eugene MD;  [facility];  Service: Urology    [place] 3RD+ ORD SLCTV ABDL PEL/LXTR [facility] Right 1/24/2019    Procedure: RIGHT LEG ANGIOGRAM, BILATERAL FEMORAL ACCESS, STENTING OF RIGHT EXTERNAL ILIAC ARTERY WITH BALLOON ANGIOPLASTY;  Surgeon: Daren Holloway MD;  Location: [facility];  Service: Vascular    REPLACEMENT TOTAL KNEE BILATERAL      URETEROSCOPY Right 3/20/2018    Procedure: CYSTOSCOPY, RETROGRADE PYELOGRAM, URETEROSCOPY, BIOPSY, BRUSH, FULGURATION, STENT PLACEMENT, BLADDER BIOPSY WITH FULGURATION;  Surgeon: Rosa Nazario MD;  [facility];  Service: Urology    VASCULAR SURGERY      stents       Social History:   Social History     Socioeconomic History    Marital status: /Civil Union     Spouse name: None    Number of children: None    Years of education: None    Highest education level: None   Occupational History    None   Social Needs    Financial resource strain: Not hard at all   DailyPath insecurity:     Worry: Never true     Inability: Never true    Transportation needs:     Medical: No     Non-medical: No   Tobacco Use    Smoking status: Never Smoker    Smokeless tobacco: Never Used   Substance and Sexual Activity    Alcohol use: Not Currently    Drug use: No    Sexual activity: None   Lifestyle    Physical activity:     Days per week: 0 days     Minutes per session: 0 min    Stress:  Only a little   Relationships    Social connections:     Talks on phone: More than three times a week     Gets together: Once a week     Attends Mu-ism service: 1 to 4 times per year     Active member of club or organization: No     Attends meetings of clubs or organizations: Never     Relationship status:     Intimate partner violence:     Fear of current or ex partner: No     Emotionally abused: No     Physically abused: No     Forced sexual activity: No   Other Topics Concern    None   Social History Narrative    No caffeine use       Family History:   Family History   Problem Relation Age of Onset    Other Mother         epilepsy    Early death Mother     Glaucoma Father     Stroke Father         silent    Other Brother         cardiac disorder    Rheum arthritis Son  No Known Problems Son     No Known Problems Daughter     No Known Problems Son        Medications/Allergies:      Current Outpatient Medications:     acetaminophen (TYLENOL) 325 mg tablet, Take 650 mg by mouth 2 (two) times a day as needed for mild pain , Disp: , Rfl:     Calcium Citrate-Vitamin D (CALCIUM + D PO), Take 1 tablet by mouth 2 (two) times a day, Disp: , Rfl:     cyanocobalamin (VITAMIN B-12) 100 mcg tablet, Take 100 mcg by mouth daily , Disp: , Rfl:     Elastic Bandages & Supports ([address]) MIS, by Does not apply route daily, Disp: 10 each, Rfl: 0    gabapentin (NEURONTIN) 100 mg capsule, Take 1 capsule in the morning, take 1 capsule in the afternoon, and take 1-3 capapsules at bedtime, Disp: 450 capsule, Rfl: 3    hydroxychloroquine (PLAQUENIL) 200 mg tablet, Take 200 mg by mouth 2 (two) times a day with meals, Disp: , Rfl:     LEUCOVORIN CALCIUM PO, Take 10 mg by mouth once a week, Disp: , Rfl:     levothyroxine 75 mcg tablet, Take 1 tablet (75 mcg total) by mouth daily, Disp: 90 tablet, Rfl: 3    losartan (COZAAR) 25 mg tablet, Take 1 tablet (25 mg total) by mouth daily, Disp: 90 tablet, Rfl: 3    methotrexate 2 5 mg tablet, Take 2 tablets weekly, Disp: 12 tablet, Rfl: 0    Multiple Vitamin (MULTIVITAMINS PO), Take 1 tablet by mouth daily, Disp: , Rfl:     omeprazole (PriLOSEC) 40 MG capsule, Take 1 capsule (40 mg total) by mouth daily, Disp: 90 capsule, Rfl: 3    polyethylene glycol (MIRALAX) 17 g packet, Take 17 g by mouth daily, Disp: 14 each, Rfl: 0    pravastatin (PRAVACHOL) 80 mg tablet, Take 1 tablet (80 mg total) by mouth daily, Disp: 90 tablet, Rfl: 3    rOPINIRole (REQUIP) 0 5 mg tablet, 1 tab in the am and 2 tab at bedtime, Disp: , Rfl:     torsemide (DEMADEX) 20 mg tablet, Take 1 tablet (20 mg total) by mouth daily, Disp: 90 tablet, Rfl: 3    XARELTO 15 MG tablet, TAKE 1 TABLET DAILY WITH BREAKFAST, Disp: 30 tablet, Rfl: 0  No current facility-administered medications for this visit  Facility-Administered Medications Ordered in Other Visits:     acetaminophen (TYLENOL) tablet 650 mg, 650 mg, Oral, Q6H PRN, Harjinder Mendenhall PA-C          Objective      Vital Signs:   Vitals:    09/04/19 1330   BP: 108/60     [place] Sleepiness Scale: Total score: 1        Physical Exam:    General: Alert, appropriate, cooperative, overweight    Head: NC/AT    Skin: Warm, dry    Neuro: No motor abnormalities, cranial nerves appear intact    Extremity: No clubbing, cyanosis      DME Provider: [facility]        Counseling / Coordination of Care   I have spent 15 minutes with the patient today in which greater than 50% of this time was spent in counseling/coordination of care regarding: equipment and compliance  Board Certified Sleep Specialist    Portions of the record may have been created with voice recognition software  Occasional wrong word or "sound a like" substitutions may have occurred due to the inherent limitations of voice recognition software  Read the chart carefully and recognize, using context, where substitutions have occurred

## 2019-09-17 ENCOUNTER — OFFICE VISIT (OUTPATIENT)
Dept: FAMILY MEDICINE CLINIC | Facility: CLINIC | Age: 84
End: 2019-09-17
Payer: MEDICARE

## 2019-09-17 VITALS
HEIGHT: 61 IN | OXYGEN SATURATION: 97 % | WEIGHT: 223 LBS | SYSTOLIC BLOOD PRESSURE: 122 MMHG | DIASTOLIC BLOOD PRESSURE: 60 MMHG | RESPIRATION RATE: 18 BRPM | HEART RATE: 84 BPM | BODY MASS INDEX: 42.1 KG/M2

## 2019-09-17 DIAGNOSIS — E78.00 HYPERCHOLESTEROLEMIA: ICD-10-CM

## 2019-09-17 DIAGNOSIS — I50.32 CHRONIC DIASTOLIC HEART FAILURE (HCC): ICD-10-CM

## 2019-09-17 DIAGNOSIS — N18.30 CKD (CHRONIC KIDNEY DISEASE) STAGE 3, GFR 30-59 ML/MIN (HCC): ICD-10-CM

## 2019-09-17 DIAGNOSIS — I10 ESSENTIAL HYPERTENSION: ICD-10-CM

## 2019-09-17 DIAGNOSIS — G47.30 SLEEP APNEA, UNSPECIFIED TYPE: ICD-10-CM

## 2019-09-17 DIAGNOSIS — D63.8 ANEMIA OF CHRONIC DISEASE: ICD-10-CM

## 2019-09-17 DIAGNOSIS — R73.01 IMPAIRED FASTING GLUCOSE: ICD-10-CM

## 2019-09-17 DIAGNOSIS — E03.9 ACQUIRED HYPOTHYROIDISM: ICD-10-CM

## 2019-09-17 DIAGNOSIS — Z00.00 MEDICARE ANNUAL WELLNESS VISIT, SUBSEQUENT: Primary | ICD-10-CM

## 2019-09-17 DIAGNOSIS — Z23 FLU VACCINE NEED: ICD-10-CM

## 2019-09-17 DIAGNOSIS — E66.01 OBESITY, CLASS III, BMI 40-49.9 (MORBID OBESITY) (HCC): ICD-10-CM

## 2019-09-17 PROBLEM — E11.69 DIABETES MELLITUS TYPE 2 IN OBESE (HCC): Status: RESOLVED | Noted: 2017-05-10 | Resolved: 2019-09-17

## 2019-09-17 PROBLEM — E66.9 DIABETES MELLITUS TYPE 2 IN OBESE (HCC): Status: RESOLVED | Noted: 2017-05-10 | Resolved: 2019-09-17

## 2019-09-17 PROBLEM — I50.9 ACUTE ON CHRONIC CONGESTIVE HEART FAILURE (HCC): Status: RESOLVED | Noted: 2018-08-19 | Resolved: 2019-09-17

## 2019-09-17 PROBLEM — T14.8XXA FRACTURE: Status: RESOLVED | Noted: 2018-09-18 | Resolved: 2019-09-17

## 2019-09-17 PROBLEM — M79.605 PAIN IN BOTH LOWER EXTREMITIES: Status: RESOLVED | Noted: 2019-03-13 | Resolved: 2019-09-17

## 2019-09-17 PROBLEM — M79.604 PAIN IN BOTH LOWER EXTREMITIES: Status: RESOLVED | Noted: 2019-03-13 | Resolved: 2019-09-17

## 2019-09-17 PROBLEM — I50.33 ACUTE ON CHRONIC DIASTOLIC CONGESTIVE HEART FAILURE (HCC): Status: RESOLVED | Noted: 2018-08-09 | Resolved: 2019-09-17

## 2019-09-17 PROCEDURE — G0439 PPPS, SUBSEQ VISIT: HCPCS | Performed by: INTERNAL MEDICINE

## 2019-09-17 PROCEDURE — 99214 OFFICE O/P EST MOD 30 MIN: CPT | Performed by: INTERNAL MEDICINE

## 2019-09-17 PROCEDURE — G0008 ADMIN INFLUENZA VIRUS VAC: HCPCS | Performed by: INTERNAL MEDICINE

## 2019-09-17 PROCEDURE — 90662 IIV NO PRSV INCREASED AG IM: CPT | Performed by: INTERNAL MEDICINE

## 2019-09-17 NOTE — PROGRESS NOTES
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) ([facility] [address] )  -Does participate in PT/light exercises and feels well doing this  BMI Counseling: Body mass index is 42 14 kg/m²  The BMI is above normal  Nutrition recommendations include reducing portion sizes, consuming healthier snacks and moderation in carbohydrate intake  Acquired hypothyroidism  -Thyroid blood work ordered for follow up  Sleep apnea, unspecified type  -Compliant with CPAP  Was seen by Sleep Medicine recently  Essential hypertension  -Well controlled on current regime  Chronic diastolic heart failure (HCC)  -LE edema noted with compression stockings but otherwise appears euvolemic  Monitors weight daily  Impaired fasting glucose  -     Hemoglobin A1C; Future    Anemia of chronic disease  -Does follow regularly with CBCs  CKD (chronic kidney disease) stage 3, GFR 30-59 ml/min (HCC)  -     Comprehensive metabolic panel  -Avoid nephrotoxic agents  Follows with Nephrology  Flu vaccine need  -     influenza vaccine, high-dose, PF 0 5 mL    Hypercholesterolemia  -     Lipid panel      There is a 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  Recent CT does not mention this but this was without contrast   She is on Xarelto for Afib but is currently in sinus rhythm  Subjective:      Patient ID: Daphne Kramer is a 81 y o  female  Raina Bhandari is here today for a follow up/AWV  Reports feeling well overall and is largely without complaints  She reports having back pain which is chronic in nature  She is participating in exercises at the [address] end and feels well doing this  Admits that diet can be better but does try and watch it  She does monitor her weight daily  ROS is largely negative          The following portions of the patient's history were reviewed and updated as appropriate: allergies, current medications, past family history, past medical history, past social history, past surgical history and problem list     Review of Systems   Constitutional: Negative for chills, fever and unexpected weight change  Respiratory: Negative for cough, chest tightness and shortness of breath  Cardiovascular: Positive for leg swelling  Negative for chest pain and palpitations  Gastrointestinal: Negative for abdominal pain, diarrhea, nausea and vomiting  Musculoskeletal: Positive for arthralgias and back pain  Negative for myalgias  Neurological: Negative for dizziness, numbness and headaches  Psychiatric/Behavioral: Negative for dysphoric mood and sleep disturbance  The patient is not nervous/anxious  Objective:      /60   Pulse 84   Resp 18   Ht 5' 1" (1 549 m)   Wt 101 kg (223 lb)   LMP  (LMP Unknown)   SpO2 97%   BMI 42 14 kg/m²          Physical Exam   Constitutional: She is oriented to person, place, and time  She appears well-developed and well-nourished  No distress  HENT:   Head: Normocephalic and atraumatic  Eyes: Conjunctivae and EOM are normal  Right eye exhibits no discharge  Left eye exhibits no discharge  No scleral icterus  Neck: Normal range of motion  Cardiovascular: Normal rate, regular rhythm and normal heart sounds  No murmur heard  B/L +2-3 pitting edema noted in LE  She does wear compression stockings    Pulmonary/Chest: Effort normal and breath sounds normal  No respiratory distress  She has no wheezes  Abdominal: Soft  Bowel sounds are normal  There is no tenderness  Musculoskeletal: Normal range of motion  Uses assistive device to ambulate    Lymphadenopathy:     She has no cervical adenopathy  Neurological: She is alert and oriented to person, place, and time  Skin: Skin is warm and dry  She is not diaphoretic  No erythema  Psychiatric: She has a normal mood and affect   Her speech is normal and behavior is normal  Judgment and thought content normal    Vitals reviewed

## 2019-09-17 NOTE — PATIENT INSTRUCTIONS
Medicare Preventive Visit Patient Instructions  Thank you for completing your Welcome to Medicare Visit or Medicare Annual Wellness Visit today  Your next wellness visit will be due in one year (9/17/2020)  The screening/preventive services that you may require over the next 5-10 years are detailed below  Some tests may not apply to you based off risk factors and/or age  Screening tests ordered at today's visit but not completed yet may show as past due  Also, please note that scanned in results may not display below  Preventive Screenings:  Service Recommendations Previous Testing/Comments   Colorectal Cancer Screening  * Colonoscopy    * Fecal Occult Blood Test (FOBT)/Fecal Immunochemical Test (FIT)  * Fecal DNA/Cologuard Test  * Flexible Sigmoidoscopy Age: 54-65 years old   Colonoscopy: every 10 years (may be performed more frequently if at higher risk)  OR  FOBT/FIT: every 1 year  OR  Cologuard: every 3 years  OR  Sigmoidoscopy: every 5 years  Screening may be recommended earlier than age 48 if at higher risk for colorectal cancer  Also, an individualized decision between you and your healthcare provider will decide whether screening between the ages of 74-80 would be appropriate  Colonoscopy: 12/20/2017  FOBT/FIT: 01/07/2019  Cologuard: Not on file  Sigmoidoscopy: Not on file    Screening Current     Breast Cancer Screening Age: 36 years old  Frequency: every 1-2 years  Not required if history of left and right mastectomy Mammogram: 09/18/2015       Cervical Cancer Screening Between the ages of 21-29, pap smear recommended once every 3 years  Between the ages of 33-67, can perform pap smear with HPV co-testing every 5 years     Recommendations may differ for women with a history of total hysterectomy, cervical cancer, or abnormal pap smears in past  Pap Smear: Not on file    Screening Not Indicated   Hepatitis C Screening Once for adults born between [facility]  More frequently in patients at high risk for Hepatitis C Hep C Antibody: Not on file       Diabetes Screening 1-2 times per year if you're at risk for diabetes or have pre-diabetes Fasting glucose: 120 mg/dL   A1C: 6 3 %    Screening Not Indicated  History Diabetes   Cholesterol Screening Once every 5 years if you don't have a lipid disorder  May order more often based on risk factors  Lipid panel: 06/25/2018    Screening Not Indicated  History Lipid Disorder     Other Preventive Screenings Covered by Medicare:  1  Abdominal Aortic Aneurysm (AAA) Screening: covered once if your at risk  You're considered to be at risk if you have a family history of AAA  2  Lung Cancer Screening: covers low dose CT scan once per year if you meet all of the following conditions: (1) Age 50-69; (2) No signs or symptoms of lung cancer; (3) Current smoker or have quit smoking within the last 15 years; (4) You have a tobacco smoking history of at least 30 pack years (packs per day multiplied by number of years you smoked); (5) You get a written order from a healthcare provider  3  Glaucoma Screening: covered annually if you're considered high risk: (1) You have diabetes OR (2) Family history of glaucoma OR (3)  aged 48 and older OR (3)  American aged 72 and older  3  Osteoporosis Screening: covered every 2 years if you meet one of the following conditions: (1) You're estrogen deficient and at risk for osteoporosis based off medical history and other findings; (2) Have a vertebral abnormality; (3) On glucocorticoid therapy for more than 3 months; (4) Have primary hyperparathyroidism; (5) On osteoporosis medications and need to assess response to drug therapy  · Last bone density test (DXA Scan): 09/12/2012  5  HIV Screening: covered annually if you're between the age of 12-76  Also covered annually if you are younger than 13 and older than 72 with risk factors for HIV infection   For pregnant patients, it is covered up to 3 times per pregnancy  Immunizations:  Immunization Recommendations   Influenza Vaccine Annual influenza vaccination during flu season is recommended for all persons aged >= 6 months who do not have contraindications   Pneumococcal Vaccine (Prevnar and Pneumovax)  * Prevnar = PCV13  * Pneumovax = PPSV23   Adults 25-60 years old: 1-3 doses may be recommended based on certain risk factors  Adults 72 years old: Prevnar (PCV13) vaccine recommended followed by Pneumovax (PPSV23) vaccine  If already received PPSV23 since turning 65, then PCV13 recommended at least one year after PPSV23 dose  Hepatitis B Vaccine 3 dose series if at intermediate or high risk (ex: diabetes, end stage renal disease, liver disease)   Tetanus (Td) Vaccine - COST NOT COVERED BY MEDICARE PART B Following completion of primary series, a booster dose should be given every 10 years to maintain immunity against tetanus  Td may also be given as tetanus wound prophylaxis  Tdap Vaccine - COST NOT COVERED BY MEDICARE PART B Recommended at least once for all adults  For pregnant patients, recommended with each pregnancy  Shingles Vaccine (Shingrix) - COST NOT COVERED BY MEDICARE PART B  2 shot series recommended in those aged 48 and above     Health Maintenance Due:      Topic Date Due    DXA SCAN  09/12/2014    CRC Screening: Colonoscopy  12/20/2027     Immunizations Due:      Topic Date Due    HEPATITIS B VACCINES (1 of 3 - Risk 3-dose series) 10/04/1954    INFLUENZA VACCINE  07/01/2019     Advance Directives   What are advance directives? Advance directives are legal documents that state your wishes and plans for medical care  These plans are made ahead of time in case you lose your ability to make decisions for yourself  Advance directives can apply to any medical decision, such as the treatments you want, and if you want to donate organs  What are the types of advance directives?   There are many types of advance directives, and each state has rules about how to use them  You may choose a combination of any of the following:  · Living will: This is a written record of the treatment you want  You can also choose which treatments you do not want, which to limit, and which to stop at a certain time  This includes surgery, medicine, IV fluid, and tube feedings  · Durable power of  for healthcare [facility]): This is a written record that states who you want to make healthcare choices for you when you are unable to make them for yourself  This person, called a proxy, is usually a family member or a friend  You may choose more than 1 proxy  · Do not resuscitate (DNR) order:  A DNR order is used in case your heart stops beating or you stop breathing  It is a request not to have certain forms of treatment, such as CPR  A DNR order may be included in other types of advance directives  · Medical directive: This covers the care that you want if you are in a coma, near death, or unable to make decisions for yourself  You can list the treatments you want for each condition  Treatment may include pain medicine, surgery, blood transfusions, dialysis, IV or tube feedings, and a ventilator (breathing machine)  · Values history: This document has questions about your views, beliefs, and how you feel and think about life  This information can help others choose the care that you would choose  Why are advance directives important? An advance directive helps you control your care  Although spoken wishes may be used, it is better to have your wishes written down  Spoken wishes can be misunderstood, or not followed  Treatments may be given even if you do not want them  An advance directive may make it easier for your family to make difficult choices about your care  Fall Prevention    Fall prevention  includes ways to make your home and other areas safer  It also includes ways you can move more carefully to prevent a fall   Health conditions that cause changes in your blood pressure, vision, or muscle strength and coordination may increase your risk for falls  Medicines may also increase your risk for falls if they make you dizzy, weak, or sleepy  Fall prevention tips:   · Stand or sit up slowly  · Use assistive devices as directed  · Wear shoes that fit well and have soles that   · Wear a personal alarm  · Stay active  · Manage your medical conditions  Home Safety Tips:  · Add items to prevent falls in the bathroom  · Keep paths clear  · Install bright lights in your home  · Keep items you use often on shelves within reach  · Paint or place reflective tape on the edges of your stairs  Urinary Incontinence   Urinary incontinence (UI)  is when you lose control of your bladder  UI develops because your bladder cannot store or empty urine properly  The 3 most common types of UI are stress incontinence, urge incontinence, or both  Medicines:   · May be given to help strengthen your bladder control  Report any side effects of medication to your healthcare provider  Do pelvic muscle exercises often:  Your pelvic muscles help you stop urinating  Squeeze these muscles tight for 5 seconds, then relax for 5 seconds  Gradually work up to squeezing for 10 seconds  Do 3 sets of 15 repetitions a day, or as directed  This will help strengthen your pelvic muscles and improve bladder control  Train your bladder:  Go to the bathroom at set times, such as every 2 hours, even if you do not feel the urge to go  You can also try to hold your urine when you feel the urge to go  For example, hold your urine for 5 minutes when you feel the urge to go  As that becomes easier, hold your urine for 10 minutes  Self-care:   · Keep a UI record  Write down how often you leak urine and how much you leak  Make a note of what you were doing when you leaked urine  · Drink liquids as directed   You may need to limit the amount of liquid you drink to help control your urine leakage  Do not drink any liquid right before you go to bed  Limit or do not have drinks that contain caffeine or alcohol  · Prevent constipation  Eat a variety of high-fiber foods  Good examples are high-fiber cereals, beans, vegetables, and whole-grain breads  Walking is the best way to trigger your intestines to have a bowel movement  · Exercise regularly and maintain a healthy weight  Weight loss and exercise will decrease pressure on your bladder and help you control your leakage  · Use a catheter as directed  to help empty your bladder  A catheter is a tiny, plastic tube that is put into your bladder to drain your urine  · Go to behavior therapy as directed  Behavior therapy may be used to help you learn to control your urge to urinate  Weight Management   Why it is important to manage your weight:  Being overweight increases your risk of health conditions such as heart disease, high blood pressure, type 2 diabetes, and certain types of cancer  It can also increase your risk for osteoarthritis, sleep apnea, and other respiratory problems  Aim for a slow, steady weight loss  Even a small amount of weight loss can lower your risk of health problems  How to lose weight safely:  A safe and healthy way to lose weight is to eat fewer calories and get regular exercise  You can lose up about 1 pound a week by decreasing the number of calories you eat by 500 calories each day  Healthy meal plan for weight management:  A healthy meal plan includes a variety of foods, contains fewer calories, and helps you stay healthy  A healthy meal plan includes the following:  · Eat whole-grain foods more often  A healthy meal plan should contain fiber  Fiber is the part of grains, fruits, and vegetables that is not broken down by your body  Whole-grain foods are healthy and provide extra fiber in your diet   Some examples of whole-grain foods are whole-wheat breads and pastas, oatmeal, brown rice, and bulgur  · Eat a variety of vegetables every day  Include dark, leafy greens such as spinach, kale, gary greens, and mustard greens  Eat yellow and orange vegetables such as carrots, sweet potatoes, and winter squash  · Eat a variety of fruits every day  Choose fresh or canned fruit (canned in its own juice or light syrup) instead of juice  Fruit juice has very little or no fiber  · Eat low-fat dairy foods  Drink fat-free (skim) milk or 1% milk  Eat fat-free yogurt and low-fat cottage cheese  Try low-fat cheeses such as mozzarella and other reduced-fat cheeses  · Choose meat and other protein foods that are low in fat  Choose beans or other legumes such as split peas or lentils  Choose fish, skinless poultry (chicken or turkey), or lean cuts of red meat (beef or pork)  Before you cook meat or poultry, cut off any visible fat  · Use less fat and oil  Try baking foods instead of frying them  Add less fat, such as margarine, sour cream, regular salad dressing and mayonnaise to foods  Eat fewer high-fat foods  Some examples of high-fat foods include french fries, doughnuts, ice cream, and cakes  · Eat fewer sweets  Limit foods and drinks that are high in sugar  This includes candy, cookies, regular soda, and sweetened drinks  Exercise:  Exercise at least 30 minutes per day on most days of the week  Some examples of exercise include walking, biking, dancing, and swimming  You can also fit in more physical activity by taking the stairs instead of the elevator or parking farther away from stores  Ask your healthcare provider about the best exercise plan for you  © Copyright [employer] 2018 Information is for End User's use only and may not be sold, redistributed or otherwise used for commercial purposes   All illustrations and images included in CareNotes® are the copyrighted property of [employer]  or [address] [place] [address] [employer]

## 2019-09-17 NOTE — PROGRESS NOTES
Assessment and Plan:     Problem List Items Addressed This Visit     None      Visit Diagnoses     Medicare annual wellness visit, subsequent    -  Primary          Falls Plan of Care: balance, strength, and gait training instructions were provided and referral to physical therapy  Preventive health issues were discussed with patient, and age appropriate screening tests were ordered as noted in patient's After Visit Summary  Personalized health advice and appropriate referrals for health education or preventive services given if needed, as noted in patient's After Visit Summary       History of Present Illness:     Patient presents for Medicare Annual Wellness visit    Patient Care Team:  Jagdish Pabon MD as PCP - General  YOANA Lopes MD Kathlene Prime, MD Starleen Knuckles, DO (Ophthalmology)  Sariah Keller MD as Endoscopist  Reina Herrera MD (Urology)     Problem List:     Patient Active Problem List   Diagnosis    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 [facility])    Essential hypertension    [facility]umat[facility] arthritis [facility]    Diabetes mellitus type 2 in obese ([facility]    Sleep apnea    Acquired hypothyroidism    Chronic GERD    Edema    Hypercholesterolemia    Obesity    Peripheral neuropathy    Primary osteoarthritis of left knee    Restless leg syndrome    Right lumbar radiculopathy    Sensorineural hearing loss    Sinus arrhythmia    Chronic bilateral thoracic back pain    Thoracic degenerative disc disease    Urothelial cancer ([facility]    Lung nodule < 6cm on CT    Pancreatic cyst    Iron deficiency anemia    Anemia    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 ([facility]    History of nephroureterectomy    Incisional hernia    Acute on chronic diastolic congestive heart failure (HCC)    Atrial fibrillation (HCC)    CKD (chronic kidney disease) stage 3, GFR 30-59 ml/min (HCC)    Acute on chronic congestive heart failure (HCC)    Chronic diastolic heart failure (HCC)    Fracture    Spontaneous dislocation of shoulder, left    Abdominal wall hernia    Atherosclerosis of native artery of right lower extremity with intermittent claudication (HCC)    Pain in both lower extremities    Chronic acquired lymphedema    Anemia of chronic disease    Polyneuropathy associated with underlying disease ([facility]    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)    PAD (peripheral artery disease) ([facility]    Diabetic neuropathy associated with type 2 diabetes mellitus ([facility])    Seborrheic keratoses    Aortoiliac occlusive disease ([facility]    Left knee pain    Fall from bed      Past Medical and Surgical History:     Past Medical History:   Diagnosis Date    Anemia     Arthritis     rheumatoid    At risk for falls     Benign essential hypertension     CHF (congestive heart failure) (HCC)     Chronic cystitis     Chronic kidney disease     right kidney cancer - kidney removed    Chronic pain disorder     CPAP (continuous positive airway pressure) dependence     Diverticulitis of colon     Early satiety     Edema     GERD (gastroesophageal reflux disease)     Gout     Hearing aid worn     bilateral    Hearing loss     Hemorrhoids     Hiatal hernia     History of colonic polyps     Hyperlipidemia     Hypothyroidism     Irregular heart beat     Afib    Left breast mass     Microhematuria     Migraine     occular    Mobility impaired     left arm    Neuropathy     lower and upper extermities    Overactive bladder     Pneumonia of left lower lobe due to infectious organism ([facility]     PVD (peripheral vascular disease) ([facility]     RA (rheumatoid arthritis) ([facility]     Restless leg syndrome     Sleep apnea     uses cpap    Stroke ([facility]     5/2017    Type 2 diabetes mellitus (HCC)     Urinary frequency     Urinary urgency     Urothelial cancer ([facility] ) 04/23/2018    Uses walker      Past Surgical History:   Procedure Laterality Date    APPENDECTOMY      ARTHROSCOPY WRIST Right     with release of transverse carpal ligament     BLADDER SURGERY      BLADDER SURGERY      BREAST SURGERY      left breast    BUNIONECTOMY Bilateral     CATARACT EXTRACTION Bilateral     CHOLECYSTECTOMY      COLONOSCOPY      CYSTOSCOPY      CYSTOSCOPY      EGD AND COLONOSCOPY N/A 12/20/2017    Procedure: EGD AND COLONOSCOPY;  Surgeon: Saul Schulz MD;  [facility]; Service: Gastroenterology    ESOPHAGOGASTRODUODENOSCOPY      diagnostic    FOREARM SURGERY Right     fracture repair    FRACTURE SURGERY Right     arm with hardware    HYSTERECTOMY      IR ABDOMINAL ANGIOGRAPHY / INTERVENTION  1/24/2019    KNEE ARTHROSCOPY Left     KNEE SURGERY Left     meniscus tear    NEPHRECTOMY Right     NOSE SURGERY      [place] CYSTO/URETERO W/LITHOTRIPSY &INDWELL STENT INSRT Right 2/28/2018    Procedure: CYSTOSCOPY RIGHT URETEROSCOPY, RIGHT RETROGRADE PYELOGRAM AND INSERTION  RIGHT STENT URETERAL, RIGHT RENAL PELVIC WASHING FOR CYTOLOGY;  Surgeon: You Jc MD;  [facility];  Service: Urology    [place] CYSTOURETHROSCOPY,FULGUR 0 5-2 CM LESN N/A 5/21/2019    Procedure: BLADDER BIOPSY WITH FULGURATION;  Surgeon: Steve Pike MD;  [facility];  Service: Urology    [place] NEPHRECTOMY, W/PART   URETECTOMY Right 4/23/2018    Procedure: ROBATIC ASSISTED NEPHRO-URETERECTOMY WITH BLADDER CUFF EXCISION;  Surgeon: Steve Pike MD;  [facility];  Service: Urology    [place] A[place] Justin 3RD+ ORD SLCTV ABDL PEL[facility] Right 1/24/2019    Procedure: RIGHT LEG ANGIOGRAM, BILATERAL FEMORAL ACCESS, STENTING OF RIGHT EXTERNAL ILIAC ARTERY WITH BALLOON ANGIOPLASTY;  Surgeon: Reena Arteaga MD;  [facility];  Service: Vascular    REPLACEMENT TOTAL KNEE BILATERAL      URETEROSCOPY Right 3/20/2018    Procedure: CYSTOSCOPY, RETROGRADE PYELOGRAM, URETEROSCOPY, BIOPSY, BRUSH, FULGURATION, STENT PLACEMENT, BLADDER BIOPSY WITH FULGURATION;  Surgeon: Demetrio Del Real MD;  Location: [facility];  Service: Urology    VASCULAR SURGERY      stents      Family History:     Family History   Problem Relation Age of Onset    Other Mother         epilepsy   [de-identified] Early death Mother    [de-identified] Glaucoma Father     Stroke Father         silent    Other Brother         cardiac disorder    Rheum arthritis Son     No Known Problems Son     No Known Problems Daughter     No Known Problems Son       Social History:     Social History     Socioeconomic History    Marital status: /Civil Union     Spouse name: Not on file    Number of children: Not on file    Years of education: Not on file    Highest education level: Not on file   Occupational History    Not on file   Social Needs    Financial resource strain: Not hard at all   Kinetic insecurity:     Worry: Never true     Inability: Never true   Granify needs:     Medical: No     Non-medical: No   Tobacco Use    Smoking status: Never Smoker    Smokeless tobacco: Never Used   Substance and Sexual Activity    Alcohol use: Not Currently    Drug use: No    Sexual activity: Not on file   Lifestyle    Physical activity:     Days per week: 0 days     Minutes per session: 0 min    Stress:  Only a little   Relationships    Social connections:     Talks on phone: More than three times a week     Gets together: Once a week     Attends Taoism service: 1 to 4 times per year     Active member of club or organization: No     Attends meetings of clubs or organizations: Never     Relationship status:     Intimate partner violence:     Fear of current or ex partner: No     Emotionally abused: No     Physically abused: No     Forced sexual activity: No   Other Topics Concern    Not on file   Social History Narrative    No caffeine use       Medications and Allergies:     Current Outpatient Medications   Medication Sig Dispense Refill    acetaminophen (TYLENOL) 325 mg tablet Take 650 mg by mouth 2 (two) times a day as needed for mild pain       Calcium Citrate-Vitamin D (CALCIUM + D PO) Take 1 tablet by mouth 2 (two) times a day      cyanocobalamin (VITAMIN B-12) 100 mcg tablet Take 100 mcg by mouth daily       Elastic Bandages & Supports (MEDICAL COMPRESSION STOCKINGS) MISC by Does not apply route daily 10 each 0    gabapentin (NEURONTIN) 100 mg capsule Take 1 capsule in the morning, take 1 capsule in the afternoon, and take 1-3 capapsules at bedtime 450 capsule 3    hydroxychloroquine (PLAQUENIL) 200 mg tablet Take 200 mg by mouth 2 (two) times a day with meals      LEUCOVORIN CALCIUM PO Take 10 mg by mouth once a week      levothyroxine 75 mcg tablet Take 1 tablet (75 mcg total) by mouth daily 90 tablet 3    losartan (COZAAR) 25 mg tablet Take 1 tablet (25 mg total) by mouth daily 90 tablet 3    methotrexate 2 5 mg tablet Take 2 tablets weekly 12 tablet 0    Multiple Vitamin (MULTIVITAMINS PO) Take 1 tablet by mouth daily      omeprazole (PriLOSEC) 40 MG capsule Take 1 capsule (40 mg total) by mouth daily 90 capsule 3    polyethylene glycol (MIRALAX) 17 g packet Take 17 g by mouth daily 14 each 0    pravastatin (PRAVACHOL) 80 mg tablet Take 1 tablet (80 mg total) by mouth daily 90 tablet 3    rOPINIRole (REQUIP) 0 5 mg tablet 1 tab in the am and 2 tab at bedtime      torsemide (DEMADEX) 20 mg tablet Take 1 tablet (20 mg total) by mouth daily 90 tablet 3    XARELTO 15 MG tablet TAKE 1 TABLET DAILY WITH BREAKFAST 30 tablet 0     No current facility-administered medications for this visit        Facility-Administered Medications Ordered in Other Visits   Medication Dose Route Frequency Provider Last Rate Last Dose    acetaminophen (TYLENOL) tablet 650 mg  650 mg Oral Q6H PRN Magnolia [place]ard Pypiuk, PA-C         Allergies   Allergen Reactions    Nitrofurantoin Hives     HIVES * pt denies  Other reaction(s): Unknown Allergic Reaction  Other reaction(s): Other (See Comments)  HIVES * pt denies    Atorvastatin      Other reaction(s): Muscle Pain, Myalgia    Acetazolamide Other (See Comments)     Other reaction(s): Unknown Allergic Reaction unknown reaction     Other Other (See Comments)     Adhesive tape : red and itching  Other reaction(s): Other (See Comments)  red and itching    Shellfish-Derived Products Other (See Comments)     Patient got Gout following eating shell fish    Sulfa Antibiotics Other (See Comments)     unknown    Tramadol Diarrhea and Vomiting    Azithromycin Rash      Immunizations:     Immunization History   Administered Date(s) Administered    INFLUENZA 12/26/2012, 09/05/2013, 10/01/2014, 09/11/2015, 09/27/2016, 10/31/2016, 10/01/2017    Influenza Split High Dose Preservative Free IM 09/11/2015, 09/27/2016, 09/01/2017    Influenza TIV (IM) 09/01/2009, 10/01/2010, 10/01/2011, 11/29/2012    Influenza, high dose seasonal 0 5 mL 09/26/2018    Pneumococcal Conjugate 13-Valent 08/11/2015    Pneumococcal Polysaccharide PPV23 10/13/2006    Tdap 04/25/2014    Zoster 01/01/2015      Health Maintenance:         Topic Date Due    DXA SCAN  09/12/2014    CRC Screening: Colonoscopy  12/20/2027         Topic Date Due    HEPATITIS B VACCINES (1 of 3 - Risk 3-dose series) 10/04/1954    INFLUENZA VACCINE  07/01/2019      Medicare Health Risk Assessment:     LMP  (LMP Unknown)      Lakesha Sue is here for her Subsequent Wellness visit  Health Risk Assessment:   Patient rates overall health as fair  Patient feels that their physical health rating is same  Eyesight was rated as same  Hearing was rated as slightly worse  Patient feels that their emotional and mental health rating is same  Pain experienced in the last 7 days has been a lot  Patient's pain rating has been 6/10  Depression Screening:   PHQ-2 Score: 0      Fall Risk Screening:    In the past year, patient has experienced: history of falling in past year    Number of falls: 2 or more  Injured during fall?: No      Urinary Incontinence Screening:   Patient has leaked urine accidently in the last six months  Home Safety:  Patient has trouble with stairs inside or outside of their home  Patient has working smoke alarms and has no working carbon monoxide detector  Nutrition:   Current diet is Low Cholesterol, Low Carb and No Added Salt  Medications:   Patient is currently taking over-the-counter supplements  OTC medications include: see medication list  Patient is able to manage medications  Activities of Daily Living (ADLs)/Instrumental Activities of Daily Living (IADLs):   Walk and transfer into and out of bed and chair?: Yes  Dress and groom yourself?: Yes    Bathe or shower yourself?: Yes    Feed yourself? Yes  Do your laundry/housekeeping?: Yes  Manage your money, pay your bills and track your expenses?: Yes  Make your own meals?: No    Do your own shopping?: No    Previous Hospitalizations:   Any hospitalizations or ED visits within the last 12 months?: Yes    How many hospitalizations have you had in the last year?: 3-4    Advance Care Planning:   Living will: Yes    Durable POA for healthcare:  Yes    Advanced directive: Yes      PREVENTIVE SCREENINGS      Cardiovascular Screening:    General: Screening Not Indicated and History Lipid Disorder      Diabetes Screening:     General: Screening Not Indicated and History Diabetes      Colorectal Cancer Screening:     General: Screening Current      Breast Cancer Screening:     General: Screening Not Indicated      Cervical Cancer Screening:    General: Screening Not Indicated      Lung Cancer Screening:     General: Screening Not Indicated      Hepatitis C Screening:    General: Screening Not Indicated      Tayla Jimenez DO

## 2019-09-29 ENCOUNTER — APPOINTMENT (EMERGENCY)
Dept: RADIOLOGY | Facility: HOSPITAL | Age: 84
End: 2019-09-29
Payer: MEDICARE

## 2019-09-29 ENCOUNTER — HOSPITAL ENCOUNTER (EMERGENCY)
Facility: HOSPITAL | Age: 84
Discharge: HOME | End: 2019-09-29
Attending: EMERGENCY MEDICINE
Payer: MEDICARE

## 2019-09-29 VITALS
HEART RATE: 66 BPM | BODY MASS INDEX: 42.74 KG/M2 | WEIGHT: 226.19 LBS | SYSTOLIC BLOOD PRESSURE: 137 MMHG | DIASTOLIC BLOOD PRESSURE: 74 MMHG | RESPIRATION RATE: 20 BRPM | TEMPERATURE: 97.4 F | OXYGEN SATURATION: 95 %

## 2019-09-29 DIAGNOSIS — R60.9 PERIPHERAL EDEMA: Primary | ICD-10-CM

## 2019-09-29 LAB
ANION GAP SERPL CALCULATED.3IONS-SCNC: 6 MMOL/L (ref 4–13)
BASOPHILS # BLD AUTO: 0.03 THOUSANDS/ΜL (ref 0–0.1)
BASOPHILS NFR BLD AUTO: 0 % (ref 0–1)
BUN SERPL-MCNC: 27 MG/DL (ref 5–25)
CALCIUM SERPL-MCNC: 9.3 MG/DL (ref 8.3–10.1)
CHLORIDE SERPL-SCNC: 107 MMOL/L (ref 100–108)
CO2 SERPL-SCNC: 28 MMOL/L (ref 21–32)
CREAT SERPL-MCNC: 1.56 MG/DL (ref 0.6–1.3)
EOSINOPHIL # BLD AUTO: 0.09 THOUSAND/ΜL (ref 0–0.61)
EOSINOPHIL NFR BLD AUTO: 1 % (ref 0–6)
ERYTHROCYTE [DISTWIDTH] IN BLOOD BY AUTOMATED COUNT: 14.6 % (ref 11.6–15.1)
GFR SERPL CREATININE-BSD FRML MDRD: 31 ML/MIN/1.73SQ M
GLUCOSE SERPL-MCNC: 106 MG/DL (ref 65–140)
HCT VFR BLD AUTO: 31.2 % (ref 34.8–46.1)
HGB BLD-MCNC: 10 G/DL (ref 11.5–15.4)
IMM GRANULOCYTES # BLD AUTO: 0.09 THOUSAND/UL (ref 0–0.2)
IMM GRANULOCYTES NFR BLD AUTO: 1 % (ref 0–2)
LYMPHOCYTES # BLD AUTO: 1.39 THOUSANDS/ΜL (ref 0.6–4.47)
LYMPHOCYTES NFR BLD AUTO: 18 % (ref 14–44)
MCH RBC QN AUTO: 33.1 PG (ref 26.8–34.3)
MCHC RBC AUTO-ENTMCNC: 32.1 G/DL (ref 31.4–37.4)
MCV RBC AUTO: 103 FL (ref 82–98)
MONOCYTES # BLD AUTO: 0.52 THOUSAND/ΜL (ref 0.17–1.22)
MONOCYTES NFR BLD AUTO: 7 % (ref 4–12)
NEUTROPHILS # BLD AUTO: 5.64 THOUSANDS/ΜL (ref 1.85–7.62)
NEUTS SEG NFR BLD AUTO: 73 % (ref 43–75)
NRBC BLD AUTO-RTO: 0 /100 WBCS
NT-PROBNP SERPL-MCNC: 1502 PG/ML
PLATELET # BLD AUTO: 195 THOUSANDS/UL (ref 149–390)
PMV BLD AUTO: 9.5 FL (ref 8.9–12.7)
POTASSIUM SERPL-SCNC: 4 MMOL/L (ref 3.5–5.3)
RBC # BLD AUTO: 3.02 MILLION/UL (ref 3.81–5.12)
SODIUM SERPL-SCNC: 141 MMOL/L (ref 136–145)
TROPONIN I SERPL-MCNC: <0.02 NG/ML
WBC # BLD AUTO: 7.76 THOUSAND/UL (ref 4.31–10.16)

## 2019-09-29 PROCEDURE — 85025 COMPLETE CBC W/AUTO DIFF WBC: CPT | Performed by: EMERGENCY MEDICINE

## 2019-09-29 PROCEDURE — 99285 EMERGENCY DEPT VISIT HI MDM: CPT | Performed by: EMERGENCY MEDICINE

## 2019-09-29 PROCEDURE — 84484 ASSAY OF TROPONIN QUANT: CPT | Performed by: EMERGENCY MEDICINE

## 2019-09-29 PROCEDURE — 83880 ASSAY OF NATRIURETIC PEPTIDE: CPT | Performed by: EMERGENCY MEDICINE

## 2019-09-29 PROCEDURE — 93005 ELECTROCARDIOGRAM TRACING: CPT

## 2019-09-29 PROCEDURE — 80048 BASIC METABOLIC PNL TOTAL CA: CPT | Performed by: EMERGENCY MEDICINE

## 2019-09-29 PROCEDURE — 96374 THER/PROPH/DIAG INJ IV PUSH: CPT

## 2019-09-29 PROCEDURE — 71046 X-RAY EXAM CHEST 2 VIEWS: CPT

## 2019-09-29 PROCEDURE — 99285 EMERGENCY DEPT VISIT HI MDM: CPT

## 2019-09-29 PROCEDURE — 36415 COLL VENOUS BLD VENIPUNCTURE: CPT | Performed by: EMERGENCY MEDICINE

## 2019-09-29 RX ORDER — FUROSEMIDE 10 MG/ML
20 INJECTION INTRAMUSCULAR; INTRAVENOUS ONCE
Status: COMPLETED | OUTPATIENT
Start: 2019-09-29 | End: 2019-09-29

## 2019-09-29 RX ADMIN — FUROSEMIDE 20 MG: 10 INJECTION, SOLUTION INTRAMUSCULAR; INTRAVENOUS at 14:36

## 2019-09-29 NOTE — ED ATTENDING ATTESTATION
9/29/2019  I, Stew Burrell DO, saw and evaluated the patient  I have discussed the patient with the resident/non-physician practitioner and agree with the resident's/non-physician practitioner's findings, Plan of Care, and MDM as documented in the resident's/non-physician practitioner's note, except where noted  All available labs and Radiology studies were reviewed  I was present for key portions of any procedure(s) performed by the resident/non-physician practitioner and I was immediately available to provide assistance  At this point I agree with the current assessment done in the Emergency Department  I have conducted an independent evaluation of this patient a history and physical is as follows:    ED Course       Patient is a pleasant 81-year-old female complaining of lower extremity edema bilaterally  Patient states over last week, she has gained 5 lb and feels like her abdomen is bloated and she has had worsening lower extremity edema  There is no calf pain tenderness or asymmetry  No shortness of breath or chest pain  No paroxysmal nocturnal dyspnea  Patient's history of solitary kidney  The following isn't except from her echocardiogram performed on March 14, 2019:     LEFT VENTRICLE: Size was normal  Systolic function was normal  Ejection fraction was estimated to be 60 %  There were no regional wall motion abnormalities  Wall thickness was mildly increased  No evidence of apical thrombus  DOPPLER: Left  ventricular diastolic function parameters were normal      RIGHT VENTRICLE: The size was normal  Systolic function was normal  Wall thickness was normal      LEFT ATRIUM: The atrium was mildly dilated      RIGHT ATRIUM: Size was normal      Will check labs, chest x-ray, BNP and reassess  Of note patient is resting comfortably and is currently lying flat stretcher  Labs reviewed  Will discuss with Cardiology       Will administer Lasix 20 mg IV and have the patient discharged to follow up with Cardiology as outpatient        Critical Care Time  Procedures

## 2019-09-29 NOTE — DISCHARGE INSTRUCTIONS
You came to the emergency department for evaluation of leg edema  We gave you medication to help with this  Your lab work, chest xray, and EKG were normal  Please follow up with your primary care doctor and cardiologist for further evaluation  Please return to the emergency department for any chest pain, difficulty breathing, or anything else concerning to you

## 2019-09-29 NOTE — QUICK NOTE
Was contacted by ED staff regarding dosing of diuretic given solitary kidney  Creatinine was noted to be down trending from 2 months prior  As per ED staff, patient appears overloaded with lower extremity edema      Decision about diuretics dose as per primary team

## 2019-09-29 NOTE — ED PROVIDER NOTES
History  Chief Complaint   Patient presents with    Edema     Patient was told to come in if she gains more than 2 lbs per day, patient reports gaining 5lb in 2 days  81 YOF with PMH of diastolic heart failure who presents with bilateral lower extremity edema  Pt states her legs have become increasingly swollen over the past 4-5 days and her weight has increased by 5lbs over this timeframe as well  She takes 20mg of torsemide at home with no recent changes in her dosing  Her legs are not painful  She denies any fevers/chills, cough, chest pain, SOB  Prior to Admission Medications   Prescriptions Last Dose Informant Patient Reported? Taking?    Calcium Citrate-Vitamin D (CALCIUM + D PO) 9/28/2019 at Unknown time Self Yes Yes   Sig: Take 1 tablet by mouth 2 (two) times a day   Elastic Bandages & Supports ([address]) MISC Past Week at Unknown time Self No Yes   Sig: by Does not apply route daily   LEUCOVORIN CALCIUM PO 9/28/2019 at Unknown time Self Yes Yes   Sig: Take 10 mg by mouth once a week   Multiple Vitamin (MULTIVITAMINS PO) 9/28/2019 at Unknown time Self Yes Yes   Sig: Take 1 tablet by mouth daily   XARELTO 15 MG tablet 9/29/2019 at Unknown time  No Yes   Sig: TAKE 1 TABLET DAILY WITH BREAKFAST   acetaminophen (TYLENOL) 325 mg tablet 9/28/2019 at Unknown time Self Yes Yes   Sig: Take 650 mg by mouth 2 (two) times a day as needed for mild pain    cyanocobalamin (VITAMIN B-12) 100 mcg tablet 9/28/2019 at Unknown time Self Yes Yes   Sig: Take 100 mcg by mouth daily    gabapentin (NEURONTIN) 100 mg capsule 9/29/2019 at Unknown time Self No Yes   Sig: Take 1 capsule in the morning, take 1 capsule in the afternoon, and take 1-3 capapsules at bedtime   hydroxychloroquine (PLAQUENIL) 200 mg tablet 9/29/2019 at Unknown time Self Yes Yes   Sig: Take 200 mg by mouth 2 (two) times a day with meals   levothyroxine 75 mcg tablet 9/29/2019 at Unknown time Self No Yes   Sig: Take 1 tablet (75 mcg total) by mouth daily   losartan (COZAAR) 25 mg tablet 2019 at Unknown time Self No Yes   Sig: Take 1 tablet (25 mg total) by mouth daily   methotrexate 2 5 mg tablet 2019 at Unknown time Self No Yes   Sig: Take 2 tablets weekly   omeprazole (PriLOSEC) 40 MG capsule 2019 at Unknown time Self No Yes   Sig: Take 1 capsule (40 mg total) by mouth daily   polyethylene glycol (MIRALAX) 17 g packet 2019 at Unknown time Self No Yes   Sig: Take 17 g by mouth daily   pravastatin (PRAVACHOL) 80 mg tablet 2019 at Unknown time Self No Yes   Sig: Take 1 tablet (80 mg total) by mouth daily   rOPINIRole (REQUIP) 0 5 mg tablet 2019 at Unknown time Self No Yes   Si tab in the am and 2 tab at bedtime   torsemide (DEMADEX) 20 mg tablet 2019 at Unknown time Self No Yes   Sig: Take 1 tablet (20 mg total) by mouth daily      Facility-Administered Medications: None       Past Medical History:   Diagnosis Date    Anemia     Arthritis     rheumatoid    At risk for falls     Benign essential hypertension     [facility]est[facility] failure[facility]     Chronic cystitis     Chronic kidney disease     right kidney cancer - kidney removed    Chronic pain disorder     CPAP (continuous positive airway pressure) dependence     Diverticulitis of colon     Early satiety     Edema     GERD (gastroesophageal reflux disease)     Gout     Hearing aid worn     bilateral    Hearing loss     Hemorrhoids     Hiatal hernia     History of colonic polyps     Hyperlipidemia     Hypothyroidism     Irregular heart beat     Afib    Left breast mass     Microhematuria     Migraine     occular    Mobility impaired     left arm    Neuropathy     lower and upper extermities    Overactive bladder     Pneumonia of left lower lobe due to infectious organism [facility]     PVD (peripheral vascular disease) ([facility])     RA (rheumatoid arthritis) ([facility])     Restless leg syndrome     Sleep apnea     uses cpap    Stroke ([facility] )     5/2017    Type 2 diabetes mellitus (HCC)     Urinary frequency     Urinary urgency     Urothelial cancer ([facility] ) 04/23/2018    Uses walker        Past Surgical History:   Procedure Laterality Date    APPENDECTOMY      ARTHROSCOPY WRIST Right     with release of transverse carpal ligament     BLADDER SURGERY      BLADDER SURGERY      BREAST SURGERY      left breast    BUNIONECTOMY Bilateral     CATARACT EXTRACTION Bilateral     CHOLECYSTECTOMY      COLONOSCOPY      CYSTOSCOPY      CYSTOSCOPY      EGD AND COLONOSCOPY N/A 12/20/2017    Procedure: EGD AND COLONOSCOPY;  Surgeon: Dominic Santiago MD;  [facility]; Service: Gastroenterology    ESOPHAGOGASTRODUODENOSCOPY      diagnostic    FOREARM SURGERY Right     fracture repair    FRACTURE SURGERY Right     arm with hardware    HYSTERECTOMY      IR ABDOMINAL ANGIOGRAPHY / INTERVENTION  1/24/2019    KNEE ARTHROSCOPY Left     KNEE SURGERY Left     meniscus tear    NEPHRECTOMY Right     NOSE SURGERY      MA CYSTO/URETERO W/LITHOTRIPSY &INDWELL STENT INSRT Right 2/28/2018    Procedure: CYSTOSCOPY RIGHT URETEROSCOPY, RIGHT RETROGRADE PYELOGRAM AND INSERTION  RIGHT STENT URETERAL, RIGHT RENAL PELVIC WASHING FOR CYTOLOGY;  Surgeon: Bjorn Calderon MD;  [facility];  Service: Urology    MA CYSTOURETHROSCOPY,FULGUR 0 5-2 CM LESN N/A 5/21/2019    Procedure: BLADDER BIOPSY WITH FULGURATION;  Surgeon: Estelita Hopkins MD;  [facility];  Service: Urology    MA NEPHRECTOMY, W/PART   URETECTOMY Right 4/23/2018    Procedure: ROBATIC ASSISTED NEPHRO-URETERECTOMY WITH BLADDER CUFF EXCISION;  Surgeon: Estelita Hopkins MD;  [facility];  Service: Urology    MA Arnaud Corral 3RD+ ORD SLCTV ABDL PEL/LXTR [facility] Right 1/24/2019    Procedure: RIGHT LEG ANGIOGRAM, BILATERAL FEMORAL ACCESS, STENTING OF RIGHT EXTERNAL ILIAC ARTERY WITH BALLOON ANGIOPLASTY;  Surgeon: Tammi Arteaga MD;  [facility];  Service: Vascular    REPLACEMENT TOTAL KNEE BILATERAL      URETEROSCOPY Right 3/20/2018    Procedure: CYSTOSCOPY, RETROGRADE PYELOGRAM, URETEROSCOPY, BIOPSY, BRUSH, FULGURATION, STENT PLACEMENT, BLADDER BIOPSY WITH FULGURATION;  Surgeon: Xochilt Jacboo MD;  Location: [facility];  Service: Urology    VASCULAR SURGERY      stents       Family History   Problem Relation Age of Onset    Other Mother         epilepsy    Early death Mother    Jono Marcano Glaucoma Father     Stroke Father         silent    Other Brother         cardiac disorder    Rheum arthritis Son     No Known Problems Son     No Known Problems Daughter     No Known Problems Son      I have reviewed and agree with the history as documented  Social History     Tobacco Use    Smoking status: Never Smoker    Smokeless tobacco: Never Used   Substance Use Topics    Alcohol use: Not Currently    Drug use: No        Review of Systems   Constitutional: Negative for chills and fever  Respiratory: Negative for cough, chest tightness and shortness of breath  Cardiovascular: Positive for leg swelling  Negative for chest pain  Gastrointestinal: Negative for abdominal distention, abdominal pain, nausea and vomiting  Musculoskeletal: Negative for gait problem  Skin: Negative for pallor and rash  Neurological: Negative for syncope, weakness and light-headedness  All other systems reviewed and are negative        Physical Exam  ED Triage Vitals [09/29/19 1132]   Temperature Pulse Respirations Blood Pressure SpO2   (!) 97 4 °F (36 3 °C) 84 18 130/76 100 %      Temp Source Heart Rate Source Patient Position - Orthostatic VS BP Location FiO2 (%)   Tympanic Monitor Lying Right arm --      Pain Score       No Pain             Orthostatic Vital Signs  Vitals:    09/29/19 1132 09/29/19 1252 09/29/19 1430   BP: 130/76 142/64 137/74   Pulse: 84 79 66   Patient Position - Orthostatic VS: Lying Lying Lying       Physical Exam   Constitutional: She is oriented to person, place, and time  No distress  Obese   HENT:   Head: Normocephalic and atraumatic  Mouth/Throat: Oropharynx is clear and moist    Eyes: Conjunctivae are normal    Neck: Normal range of motion  Neck supple  Cardiovascular: Normal rate and regular rhythm  Exam reveals no gallop and no friction rub  No murmur heard  2+ bilateral DP pulses  Pulmonary/Chest: Effort normal and breath sounds normal  She has no wheezes  She has no rales  Abdominal: Soft  Bowel sounds are normal  She exhibits no distension  There is no tenderness  Musculoskeletal: She exhibits edema (3+ pitting edema of the bilateral lower extremities to the mid-shin)  Neurological: She is alert and oriented to person, place, and time  No sensory deficit (in bilateral feet)  Skin: Skin is warm and dry  No rash noted  No pallor  Erythema of the bilateral lower extremities to the mid-shin  Psychiatric: She has a normal mood and affect  Her behavior is normal    Nursing note and vitals reviewed        ED Medications  Medications   furosemide (LASIX) injection 20 mg (20 mg Intravenous Given 9/29/19 1436)       Diagnostic Studies  Results Reviewed     Procedure Component Value Units Date/Time    Basic metabolic panel [[id number]]  (Abnormal) Collected:  09/29/19 1241    Lab Status:  Final result Specimen:  Blood from Arm, Right Updated:  09/29/19 1323     Sodium 141 mmol/L      Potassium 4 0 mmol/L      Chloride 107 mmol/L      CO2 28 mmol/L      ANION GAP 6 mmol/L      BUN 27 mg/dL      Creatinine 1 56 mg/dL      Glucose 106 mg/dL      Calcium 9 3 mg/dL      eGFR 31 ml/min/1 73sq m     Narrative:       [place] guidelines for Chronic Kidney Disease (CKD):     Stage 1 with normal or high GFR (GFR > 90 mL/min/1 73 square meters)    Stage 2 Mild CKD (GFR = 60-89 mL/min/1 73 square meters)    Stage 3A Moderate CKD (GFR = 45-59 mL/min/1 73 square meters)    Stage 3B Moderate CKD (GFR = 30-44 mL/min/1 73 square meters)    Stage 4 Severe CKD (GFR = 15-29 mL/min/1 73 square meters)    Stage 5 End Stage CKD (GFR <15 mL/min/1 73 square meters)  Note: GFR calculation is accurate only with a steady state creatinine    NT-BNP PRO (BNP for [facility], [facility], [facility], [place], [place], [facility], [facility], [place] campuses) [[id number]]  (Abnormal) Collected:  09/29/19 1241    Lab Status:  Final result Specimen:  Blood from Arm, Right Updated:  09/29/19 1323     NT-proBNP 1,502 pg/mL     Troponin I [[id number]]  (Normal) Collected:  09/29/19 1241    Lab Status:  Final result Specimen:  Blood from Arm, Right Updated:  09/29/19 1322     Troponin I <0 02 ng/mL     CBC and differential [[id number]]  (Abnormal) Collected:  09/29/19 1241    Lab Status:  Final result Specimen:  Blood from Arm, Right Updated:  09/29/19 1251     WBC 7 76 Thousand/uL      RBC 3 02 Million/uL      Hemoglobin 10 0 g/dL      Hematocrit 31 2 %       fL      MCH 33 1 pg      MCHC 32 1 g/dL      RDW 14 6 %      MPV 9 5 fL      Platelets 526 Thousands/uL      nRBC 0 /100 WBCs      Neutrophils Relative 73 %      Immat GRANS % 1 %      Lymphocytes Relative 18 %      Monocytes Relative 7 %      Eosinophils Relative 1 %      Basophils Relative 0 %      Neutrophils Absolute 5 64 Thousands/µL      Immature Grans Absolute 0 09 Thousand/uL      Lymphocytes Absolute 1 39 Thousands/µL      Monocytes Absolute 0 52 Thousand/µL      Eosinophils Absolute 0 09 Thousand/µL      Basophils Absolute 0 03 Thousands/µL                  XR chest 2 views   Final Result by Jennifer Smith MD (09/29 1331)      No acute cardiopulmonary disease              Workstation performed: [id number]               Procedures  ECG 12 Lead Documentation Only  Date/Time: 9/29/2019 1:25 PM  Performed by: Mariaelena Aiken MD  Authorized by: Mariaelena Aiken MD     Indications / Diagnosis:  Edema  ECG reviewed by me, the ED Provider: yes    Patient location:  ED  Previous ECG:     Previous ECG:  Compared to current    Comparison ECG info:  03/13/2019    Similarity:  No change  Interpretation:     Interpretation: non-specific    Rate:     ECG rate:  73    ECG rate assessment: normal    Rhythm:     Rhythm: sinus rhythm    Ectopy:     Ectopy: none    QRS:     QRS axis:  Normal    QRS intervals:  Normal  Conduction:     Conduction: normal    ST segments:     ST segments:  Normal  T waves:     T waves: inverted      Inverted:  III            ED Course           Identification of Seniors at Risk      Most Recent Value   (ISAR) Identification of Seniors at Risk   Before the illness or injury that brought you to the Emergency, did you need someone to help you on a regular basis? 0 Filed at: 09/29/2019 1133   In the last 24 hours, have you needed more help than usual?  0 Filed at: 09/29/2019 1133   Have you been hospitalized for one or more nights during the past 6 months? 0 Filed at: 09/29/2019 1133   In general, do you see well?  0 Filed at: 09/29/2019 1133   In general, do you have serious problems with your memory? 0 Filed at: 09/29/2019 1133   Do you take more than three different medications every day? 1 Filed at: 09/29/2019 1133   ISAR Score  1 Filed at: 09/29/2019 1133                          MDM  Number of Diagnoses or Management Options  Peripheral edema: established and worsening  Diagnosis management comments: 81 YOF with lower extremity edema  No pulmonary edema on CXR  EKG & troponin normal  BNP only slightly elevated  Likely more dependent edema vs CHF given no other signs of fluid overload  Given 20mg IV lasix for symptomatic improvement  Discharged with PCP & cardiology follow up  Return precautions discussed         Amount and/or Complexity of Data Reviewed  Clinical lab tests: ordered and reviewed  Tests in the radiology section of CPT®: ordered and reviewed  Decide to obtain previous medical records or to obtain history from someone other than the patient: yes  Review and summarize past medical records: yes  Discuss the patient with other providers: yes    Risk of Complications, Morbidity, and/or Mortality  Presenting problems: low  Management options: low    Patient Progress  Patient progress: stable      Disposition  Final diagnoses:   Peripheral edema     Time reflects when diagnosis was documented in both MDM as applicable and the Disposition within this note     Time User Action Codes Description Comment    9/29/2019  2:31 PM Kian Kulkarni Add [R60 9] Peripheral edema       ED Disposition     ED Disposition Condition Date/Time Comment    Discharge Stable Sun Sep 29, 2019  2:31  [address] discharge to home/self care  Follow-up Information     Follow up With Specialties Details Why Carlos Warren MD Family Medicine In 2 days  [address]    [address] [facility]      Johnnie Villasenor MD Cardiology In 2 days  [facility] [address] [facility]  [phone]            Discharge Medication List as of 9/29/2019  2:33 PM      CONTINUE these medications which have NOT CHANGED    Details   acetaminophen (TYLENOL) 325 mg tablet Take 650 mg by mouth 2 (two) times a day as needed for mild pain , Historical Med      Calcium Citrate-Vitamin D (CALCIUM + D PO) Take 1 tablet by mouth 2 (two) times a day, Historical Med      cyanocobalamin (VITAMIN B-12) 100 mcg tablet Take 100 mcg by mouth daily , Historical Med      Elastic Bandages & Supports ([address]) MISC by Does not apply route daily, Starting Sat 4/13/2019, Print      gabapentin (NEURONTIN) 100 mg capsule Take 1 capsule in the morning, take 1 capsule in the afternoon, and take 1-3 capapsules at bedtime, Normal      hydroxychloroquine (PLAQUENIL) 200 mg tablet Take 200 mg by mouth 2 (two) times a day with meals, Historical Med      LEUCOVORIN CALCIUM PO Take 10 mg by mouth once a week, Historical Med      levothyroxine 75 mcg tablet Take 1 tablet (75 mcg total) by mouth daily, Starting Tue 7/30/2019, Normal      losartan (COZAAR) 25 mg tablet Take 1 tablet (25 mg total) by mouth daily, Starting Tue 3/26/2019, Normal      methotrexate 2 5 mg tablet Take 2 tablets weekly, No Print      Multiple Vitamin (MULTIVITAMINS PO) Take 1 tablet by mouth daily, Historical Med      omeprazole (PriLOSEC) 40 MG capsule Take 1 capsule (40 mg total) by mouth daily, Starting Tue 7/30/2019, Normal      polyethylene glycol (MIRALAX) 17 g packet Take 17 g by mouth daily, Starting Fri 3/22/2019, Print      pravastatin (PRAVACHOL) 80 mg tablet Take 1 tablet (80 mg total) by mouth daily, Starting Mon 4/1/2019, Normal      rOPINIRole (REQUIP) 0 5 mg tablet 1 tab in the am and 2 tab at bedtime, No Print      torsemide (DEMADEX) 20 mg tablet Take 1 tablet (20 mg total) by mouth daily, Starting Fri 4/19/2019, Normal      XARELTO 15 MG tablet TAKE 1 TABLET DAILY WITH BREAKFAST, Normal           No discharge procedures on file  ED Provider  Attending physically available and evaluated Katharine Mendoza I managed the patient along with the ED Attending      Electronically Signed by         Kylah Rosario MD  09/29/19 0458

## 2019-09-30 LAB
ATRIAL RATE: 73 BPM
P AXIS: 86 DEGREES
PR INTERVAL: 184 MS
QRS AXIS: 8 DEGREES
QRSD INTERVAL: 100 MS
QT INTERVAL: 386 MS
QTC INTERVAL: 425 MS
T WAVE AXIS: -2 DEGREES
VENTRICULAR RATE: 73 BPM

## 2019-09-30 PROCEDURE — 93010 ELECTROCARDIOGRAM REPORT: CPT | Performed by: INTERNAL MEDICINE

## 2019-10-03 DIAGNOSIS — I48.91 ATRIAL FIBRILLATION, UNSPECIFIED TYPE (HCC): ICD-10-CM

## 2019-10-07 ENCOUNTER — OFFICE VISIT (OUTPATIENT)
Dept: FAMILY MEDICINE CLINIC | Facility: CLINIC | Age: 84
End: 2019-10-07
Payer: MEDICARE

## 2019-10-07 VITALS
DIASTOLIC BLOOD PRESSURE: 72 MMHG | RESPIRATION RATE: 18 BRPM | WEIGHT: 222 LBS | TEMPERATURE: 97.5 F | SYSTOLIC BLOOD PRESSURE: 134 MMHG | OXYGEN SATURATION: 95 % | HEIGHT: 61 IN | BODY MASS INDEX: 41.91 KG/M2 | HEART RATE: 98 BPM

## 2019-10-07 DIAGNOSIS — I89.0 CHRONIC ACQUIRED LYMPHEDEMA: ICD-10-CM

## 2019-10-07 DIAGNOSIS — I10 ESSENTIAL HYPERTENSION: ICD-10-CM

## 2019-10-07 DIAGNOSIS — I50.32 CHRONIC DIASTOLIC HEART FAILURE (HCC): Primary | ICD-10-CM

## 2019-10-07 PROCEDURE — 99213 OFFICE O/P EST LOW 20 MIN: CPT | Performed by: INTERNAL MEDICINE

## 2019-10-07 NOTE — PROGRESS NOTES
Assessment/Plan:       Diagnoses and all orders for this visit:    Chronic diastolic heart failure ([address] )    Will remain on her current regime  She will continue to monitor her weight and keep on low salt diet  HTN: Improved after sitting for some time  Subjective:      Patient ID: Zulema Balderrama is a 81 y o  female  Ada Dura is here today for an ED follow up  She went in secondary to noticing ankle swelling b/l along with a slight weight gain  ED note was reviewed  Note was reviewed  They did do some basic blood work and chest xray/EKG  She was given IV Lasix as well  She reports it did not seem to improve  She called her rheumatologist and was given prednisone  She feels that this improved it  She has been using the compression stockings as well elastic stockings that are new  She is now feeling better and is without complaints  The following portions of the patient's history were reviewed and updated as appropriate: allergies, current medications, past family history, past medical history, past social history, past surgical history and problem list     Review of Systems   Constitutional: Negative for chills, fatigue and fever  Respiratory: Negative for cough and shortness of breath  Cardiovascular: Positive for leg swelling  Negative for chest pain and palpitations  Gastrointestinal: Negative for abdominal pain, nausea and vomiting  Musculoskeletal: Positive for arthralgias  Psychiatric/Behavioral: Negative for sleep disturbance  Objective:      /82   Pulse 98   Temp 97 5 °F (36 4 °C)   Resp 18   Ht 5' 1" (1 549 m)   Wt 101 kg (222 lb)   LMP  (LMP Unknown)   SpO2 95%   BMI 41 95 kg/m²          Physical Exam   Constitutional: She is oriented to person, place, and time  She appears well-developed and well-nourished  No distress  Cardiovascular: Normal rate, regular rhythm and normal heart sounds     LE edema noted bilaterally   Slight erythema but does not appear cellulitic     Pulmonary/Chest: Effort normal and breath sounds normal    Neurological: She is alert and oriented to person, place, and time  Skin: Skin is warm and dry  She is not diaphoretic  Psychiatric: She has a normal mood and affect   Her behavior is normal  Judgment and thought content normal

## 2019-10-10 ENCOUNTER — OFFICE VISIT (OUTPATIENT)
Dept: CARDIOLOGY CLINIC | Facility: CLINIC | Age: 84
End: 2019-10-10
Payer: MEDICARE

## 2019-10-10 VITALS
HEIGHT: 61 IN | BODY MASS INDEX: 41.89 KG/M2 | SYSTOLIC BLOOD PRESSURE: 94 MMHG | OXYGEN SATURATION: 98 % | DIASTOLIC BLOOD PRESSURE: 50 MMHG | HEART RATE: 63 BPM | WEIGHT: 221.9 LBS

## 2019-10-10 DIAGNOSIS — G47.33 OSA TREATED WITH BIPAP: ICD-10-CM

## 2019-10-10 DIAGNOSIS — I89.0 LYMPHEDEMA: ICD-10-CM

## 2019-10-10 DIAGNOSIS — N18.30 CKD (CHRONIC KIDNEY DISEASE), STAGE III (HCC): ICD-10-CM

## 2019-10-10 DIAGNOSIS — G45.9 TIA (TRANSIENT ISCHEMIC ATTACK): ICD-10-CM

## 2019-10-10 DIAGNOSIS — I50.32 CHRONIC DIASTOLIC CONGESTIVE HEART FAILURE (HCC): Primary | ICD-10-CM

## 2019-10-10 PROCEDURE — 99214 OFFICE O/P EST MOD 30 MIN: CPT | Performed by: NURSE PRACTITIONER

## 2019-10-10 NOTE — PATIENT INSTRUCTIONS
Maintain a 2 gram daily sodium diet and 1500 ml daily fluid restriction  Check daily weights  If you gained 3 pounds in one day, 5 pounds in one week, or experience worsening shortness of breath or increasing lower leg swelling  Please call the heart failure office at [phone]    Please bring a  list of your current medications and daily weights to the office visit      [address]

## 2019-10-10 NOTE — PROGRESS NOTES
Cardiology Follow Up    Renan Tejas  1935  [id number]  [facility] [place]  [address] [address] [phone]    Emergency Room office follow up visit    Interval History:   Ms Adela Singh presented to [facility] Emergency Room on 9/29/19 with peripheral edema  Lilliam experienced 5 lb weight gain with increasing lower extremity edema  Her legs are painful and red  Belita Class was given IV Lasix 20mg in ED and discharged home  Ms Adela Singh presents to our office for a follow-up visit  He is undergoing physical therapy and doing well  She denies dyspnea with minimal exertion chest pain palpitations lightheadedness or dizziness  Her weight is stable in the office 221 lb  Belita Class is wearing amadou LE compression wraps        PMHX:  Chronic diastolic heart failure NYHA class 3 stage C, LVEF 60%  Urothelial CA sp Right Nephrectomy 4/23/18  TIA/CVA on Xarelto  PAD stent x 2 right iliac 1/24/19  Lymphedema  NICK compliant with PAP  RA on Methotrexate and Plaquenil  CKD III baseline creat 1 4 - 1 8  DM2 HgbA1C 6 5  Left shoulder dislocation undergoing PT  Obesity BMI 41 93  Hypothyroidism  Patient Active Problem List   Diagnosis    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 ([address]    Essential hypertension    Rheumatoid arthritis ([address] )    Sleep apnea    Acquired hypothyroidism    Chronic GERD    Edema    Hypercholesterolemia    Obesity    Peripheral neuropathy    Primary osteoarthritis of left knee    Restless leg syndrome    Right lumbar radiculopathy    Sensorineural hearing loss    Sinus arrhythmia    Chronic bilateral thoracic back pain    Thoracic degenerative disc disease    Urothelial cancer ([address]    Lung nodule < 6cm on CT    Pancreatic cyst    Iron deficiency anemia    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 ([address] )    History of nephroureterectomy    Incisional hernia    Atrial fibrillation (HCC)    CKD (chronic kidney disease) stage 3, GFR 30-59 ml/min (HCC)    Chronic diastolic heart failure (HCC)    Spontaneous dislocation of shoulder, left    Abdominal wall hernia    Atherosclerosis of native artery of right lower extremity with intermittent claudication (HCC)    Chronic acquired lymphedema    Anemia of chronic disease    Polyneuropathy associated with underlying disease ([address]    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)    PAD (peripheral artery disease) (HCC)    Impaired fasting glucose    Seborrheic keratoses    Aortoiliac occlusive disease ([address] )    Left knee pain    Fall from bed     Past Medical History:   Diagnosis Date    Anemia     Arthritis     rheumatoid    At risk for falls     Benign essential hypertension     CHF (congestive heart failure) (HCC)     Chronic cystitis     Chronic kidney disease     right kidney cancer - kidney removed    Chronic pain disorder     CPAP (continuous positive airway pressure) dependence     Diverticulitis of colon     Early satiety     Edema     GERD (gastroesophageal reflux disease)     Gout     Hearing aid worn     bilateral    Hearing loss     Hemorrhoids     Hiatal hernia     History of colonic polyps     Hyperlipidemia     Hypothyroidism     Irregular heart beat     Afib    Left breast mass     Microhematuria     Migraine     occular    Mobility impaired     left arm    Neuropathy     lower and upper extermities    Overactive bladder     Pneumonia of left lower lobe due to infectious organism ([address]     PVD (peripheral vascular disease) ([facility]     RA (rheumatoid arthritis) ([address]     Restless leg syndrome     Sleep apnea     uses cpap    Stroke ([address] )     5/2017    Type 2 diabetes mellitus (HCC)     Urinary frequency     Urinary urgency     Urothelial cancer ([facility] ) 04/23/2018    Uses walker      Social History     Socioeconomic History    Marital status: /Civil Union     Spouse name: Not on file    Number of children: Not on file    Years of education: Not on file    Highest education level: Not on file   Occupational History    Not on file   Social Needs    Financial resource strain: Not hard at all   [place] insecurity:     Worry: Never true     Inability: Never true   [employer] needs:     Medical: No     Non-medical: No   Tobacco Use    Smoking status: Never Smoker    Smokeless tobacco: Never Used   Substance and Sexual Activity    Alcohol use: Not Currently    Drug use: No    Sexual activity: Not on file   Lifestyle    Physical activity:     Days per week: 0 days     Minutes per session: 0 min    Stress:  Only a little   Relationships    Social connections:     Talks on phone: More than three times a week     Gets together: Once a week     Attends Islam service: 1 to 4 times per year     Active member of club or organization: No     Attends meetings of clubs or organizations: Never     Relationship status:     Intimate partner violence:     Fear of current or ex partner: No     Emotionally abused: No     Physically abused: No     Forced sexual activity: No   Other Topics Concern    Not on file   Social History Narrative    No caffeine use      Family History   Problem Relation Age of Onset    Other Mother         epilepsy    Early death Mother     Glaucoma Father     Stroke Father         silent    Other Brother         cardiac disorder    Rheum arthritis Son     No Known Problems Son     No Known Problems Daughter     No Known Problems Son      Past Surgical History:   Procedure Laterality Date    APPENDECTOMY      ARTHROSCOPY WRIST Right     with release of transverse carpal ligament     BLADDER SURGERY      BLADDER SURGERY      BREAST SURGERY      left breast    BUNIONECTOMY Bilateral     CATARACT EXTRACTION Bilateral     CHOLECYSTECTOMY      COLONOSCOPY      CYSTOSCOPY      CYSTOSCOPY      EGD AND COLONOSCOPY N/A 12/20/2017    Procedure: EGD AND COLONOSCOPY;  Surgeon: Afsaneh Gilbert MD;  Location: [facility]; Service: Gastroenterology    ESOPHAGOGASTRODUODENOSCOPY      diagnostic    FOREARM SURGERY Right     fracture repair    FRACTURE SURGERY Right     arm with hardware    HYSTERECTOMY      IR ABDOMINAL ANGIOGRAPHY / INTERVENTION  1/24/2019    KNEE ARTHROSCOPY Left     KNEE SURGERY Left     meniscus tear    NEPHRECTOMY Right     NOSE SURGERY      DC CYSTO/URETERO W/LITHOTRIPSY &INDWELL STENT INSRT Right 2/28/2018    Procedure: CYSTOSCOPY RIGHT URETEROSCOPY, RIGHT RETROGRADE PYELOGRAM AND INSERTION  RIGHT STENT URETERAL, RIGHT RENAL PELVIC WASHING FOR CYTOLOGY;  Surgeon: Nika Urbano MD;  [facility];  Service: Urology    DC CYSTOURETHROSCOPY,FULGUR 0 5-2 CM LESN N/A 5/21/2019    Procedure: BLADDER BIOPSY WITH FULGURATION;  Surgeon: Aleah Bonilla MD;  [facility];  Service: Urology    DC NEPHRECTOMY, W/PART   URETECTOMY Right 4/23/2018    Procedure: [place] ASSISTED NEPHRO-URETERECTOMY WITH BLADDER CUFF EXCISION;  Surgeon: Aleah Bonilla MD;  [facility];  Service: Urology    DC Nevaeh Brooke 3RD+ ORD SLCTV ABDL PEL/LXTR [facility] Right 1/24/2019    Procedure: RIGHT LEG ANGIOGRAM, BILATERAL FEMORAL ACCESS, STENTING OF RIGHT EXTERNAL ILIAC ARTERY WITH BALLOON ANGIOPLASTY;  Surgeon: Gera Arteaga MD;  [facility];  Service: Vascular    REPLACEMENT TOTAL KNEE BILATERAL      URETEROSCOPY Right 3/20/2018    Procedure: CYSTOSCOPY, RETROGRADE PYELOGRAM, URETEROSCOPY, BIOPSY, BRUSH, FULGURATION, STENT PLACEMENT, BLADDER BIOPSY WITH FULGURATION;  Surgeon: Aleah Bonilla MD;  [facility];  Service: Urology    VASCULAR SURGERY      stents       Current Outpatient Medications:     acetaminophen (TYLENOL) 325 mg tablet, Take 650 mg by mouth 2 (two) times a day as needed for mild pain , Disp: , Rfl:     Calcium Citrate-Vitamin D (CALCIUM + D PO), Take 1 tablet by mouth 2 (two) times a day, Disp: , Rfl:     cyanocobalamin (VITAMIN B-12) 100 mcg tablet, Take 100 mcg by mouth daily , Disp: , Rfl:     Elastic Bandages & Supports ([address]) MIS, by Does not apply route daily, Disp: 10 each, Rfl: 0    gabapentin (NEURONTIN) 100 mg capsule, Take 1 capsule in the morning, take 1 capsule in the afternoon, and take 1-3 capapsules at bedtime, Disp: 450 capsule, Rfl: 3    hydroxychloroquine (PLAQUENIL) 200 mg tablet, Take 200 mg by mouth 2 (two) times a day with meals, Disp: , Rfl:     LEUCOVORIN CALCIUM PO, Take 10 mg by mouth once a week, Disp: , Rfl:     levothyroxine 75 mcg tablet, Take 1 tablet (75 mcg total) by mouth daily, Disp: 90 tablet, Rfl: 3    losartan (COZAAR) 25 mg tablet, Take 1 tablet (25 mg total) by mouth daily, Disp: 90 tablet, Rfl: 3    methotrexate 2 5 mg tablet, Take 2 tablets weekly, Disp: 12 tablet, Rfl: 0    Multiple Vitamin (MULTIVITAMINS PO), Take 1 tablet by mouth daily, Disp: , Rfl:     omeprazole (PriLOSEC) 40 MG capsule, Take 1 capsule (40 mg total) by mouth daily, Disp: 90 capsule, Rfl: 3    polyethylene glycol (MIRALAX) 17 g packet, Take 17 g by mouth daily (Patient taking differently: Take 17 g by mouth as needed ), Disp: 14 each, Rfl: 0    pravastatin (PRAVACHOL) 80 mg tablet, Take 1 tablet (80 mg total) by mouth daily, Disp: 90 tablet, Rfl: 3    rivaroxaban (XARELTO) 15 mg tablet, Take 1 tablet (15 mg total) by mouth daily with breakfast, Disp: 28 tablet, Rfl: 0    rOPINIRole (REQUIP) 0 5 mg tablet, 1 tab in the am and 2 tab at bedtime, Disp: , Rfl:     torsemide (DEMADEX) 20 mg tablet, Take 1 tablet (20 mg total) by mouth daily, Disp: 90 tablet, Rfl: 3  No current facility-administered medications for this visit       Facility-Administered Medications Ordered in Other Visits:     acetaminophen (TYLENOL) tablet 650 mg, 650 mg, Oral, Q6H ETHAN, Yulia Melo PA-C  Allergies   Allergen Reactions    Nitrofurantoin Hives     HIVES * pt denies  Other reaction(s): Unknown Allergic Reaction  Other reaction(s): Other (See Comments)  HIVES * pt denies    Atorvastatin      Other reaction(s): Muscle Pain, Myalgia    Acetazolamide Other (See Comments)     Other reaction(s): Unknown Allergic Reaction unknown reaction     Other Other (See Comments)     Adhesive tape : red and itching  Other reaction(s):  Other (See Comments)  red and itching    Shellfish-Derived Products Other (See Comments)     Patient got Gout following eating shell fish    Sulfa Antibiotics Other (See Comments)     unknown    Tramadol Diarrhea and Vomiting    Azithromycin Rash       Labs:  Admission on 09/29/2019, Discharged on 09/29/2019   Component Date Value    WBC 09/29/2019 7 76     RBC 09/29/2019 3 02*    Hemoglobin 09/29/2019 10 0*    Hematocrit 09/29/2019 31 2*    MCV 09/29/2019 103*    MCH 09/29/2019 33 1     MCHC 09/29/2019 32 1     RDW 09/29/2019 14 6     MPV 09/29/2019 9 5     Platelets 14/73/1270 195     nRBC 09/29/2019 0     Neutrophils Relative 09/29/2019 73     Immat GRANS % 09/29/2019 1     Lymphocytes Relative 09/29/2019 18     Monocytes Relative 09/29/2019 7     Eosinophils Relative 09/29/2019 1     Basophils Relative 09/29/2019 0     Neutrophils Absolute 09/29/2019 5 64     Immature Grans Absolute 09/29/2019 0 09     Lymphocytes Absolute 09/29/2019 1 39     Monocytes Absolute 09/29/2019 0 52     Eosinophils Absolute 09/29/2019 0 09     Basophils Absolute 09/29/2019 0 03     Sodium 09/29/2019 141     Potassium 09/29/2019 4 0     Chloride 09/29/2019 107     CO2 09/29/2019 28     ANION GAP 09/29/2019 6     BUN 09/29/2019 27*    Creatinine 09/29/2019 1 56*    Glucose 09/29/2019 106     Calcium 09/29/2019 9 3     eGFR 09/29/2019 31     Troponin I 09/29/2019 <0 02     NT-proBNP 09/29/2019 1,502*    Ventricular Rate 09/29/2019 73     Atrial Rate 09/29/2019 73     MT Interval 09/29/2019 184     QRSD Interval 09/29/2019 100     QT Interval 09/29/2019 386     QTC Interval 09/29/2019 425     P Axis 09/29/2019 86     QRS Axis 09/29/2019 8     T Wave [place] 09/29/2019 -2    Admission on 09/03/2019, Discharged on 09/03/2019   Component Date Value    Protime 09/03/2019 17 2*    INR 09/03/2019 1 45*    ph, [place] ISTAT 09/03/2019 7 529*    pCO2, [place] i-STAT 09/03/2019 35 5*    pO2, [place] i-STAT 09/03/2019 49 0*    BE, i-STAT 09/03/2019 7*    HCO3, [place] i-STAT 09/03/2019 29 6     CO2, i-STAT 09/03/2019 31     O2 Sat, i-STAT 09/03/2019 88*    SODIUM, I-STAT 09/03/2019 138     Potassium, i-STAT 09/03/2019 4 3     Calcium, Ionized i-STAT 09/03/2019 1 10*    Hct, i-STAT 09/03/2019 30*    Hgb, i-STAT 09/03/2019 10 2*    Glucose, i-STAT 09/03/2019 165*    Specimen Type 09/03/2019 VENOUS      Imaging: Xr Chest 2 Views    Result Date: 9/29/2019  Narrative: CHEST INDICATION:   CHF, edema  COMPARISON:  March 13, 2019 EXAM PERFORMED/VIEWS:  XR CHEST PA & LATERAL  The frontal view was performed utilizing dual energy radiographic technique  Images: 4 FINDINGS:  Hardware of the right humerus is not seen in its entirety Cardiomediastinal silhouette appears unremarkable  The lungs are clear  No pneumothorax or pleural effusion  Osseous structures appear within normal limits for patient age  Impression: No acute cardiopulmonary disease  Workstation performed: [id number]       Review of Systems:  Review of Systems   Musculoskeletal: Positive for arthralgias, gait problem and myalgias  All other systems reviewed and are negative  Physical Exam:  Physical Exam   Constitutional: She is oriented to person, place, and time  She appears well-developed  obese   HENT:   Head: Normocephalic  Eyes: Pupils are equal, round, and reactive to light  Neck: Normal range of motion  Neck supple  No JVD present     Cardiovascular: Normal rate, regular rhythm and normal heart sounds  Pulmonary/Chest: Effort normal and breath sounds normal    Abdominal: Soft  Bowel sounds are normal    obese   Musculoskeletal: Normal range of motion  She exhibits no edema  amadou LE wraps in place   Neurological: She is alert and oriented to person, place, and time  Skin: Skin is warm  Capillary refill takes less than 2 seconds  Psychiatric: She has a normal mood and affect  Vitals reviewed  Discussion/Summary:  1  Chronic diastolic heart failure NYHA class 3 stage C, LVEF 60%- due to HTN, On PE eu volemic and compensated,weight 221 pounds, HR and BP controlled, BP low today 90/60 without symptoms of lightheadedness or dizziness  She was instructed to hold Torsemide today only  She had already taken Losartan 25mg daily  Maintain a 2 gram daily sodium diet and 1500 ml daily fluid restriction  Check daily weights  If you gain 3 pounds in one day, 5 pounds in one week, or experience worsening shortness of breath or increasing lower leg swelling  Please call the heart failure office at [phone]  Please bring a  list of your current medications and daily weights to the office visit  I have instructed Ada Celaya to call our office if she experiences LE edema  She will be evaluated in our office for IV Lasix  2  CKD III baseline creat 1 4 - 1 8- creat 1 56  3  TIA/CVA on Xarelto 15mg daily   4  Lymphedema continue to wear bilateral LE leg wraps  5  NICK compliant with PAP  6  RA on Methorexate and Plaquenil  7   DM2 HgbA1C 6 5

## 2019-10-16 ENCOUNTER — TELEPHONE (OUTPATIENT)
Dept: NEPHROLOGY | Facility: CLINIC | Age: 84
End: 2019-10-16

## 2019-10-16 ENCOUNTER — APPOINTMENT (OUTPATIENT)
Dept: LAB | Facility: CLINIC | Age: 84
End: 2019-10-16
Payer: MEDICARE

## 2019-10-16 ENCOUNTER — TELEPHONE (OUTPATIENT)
Dept: NEPHROLOGY | Facility: HOSPITAL | Age: 84
End: 2019-10-16

## 2019-10-16 DIAGNOSIS — D63.1 ANEMIA DUE TO STAGE 3 CHRONIC KIDNEY DISEASE (HCC): ICD-10-CM

## 2019-10-16 DIAGNOSIS — IMO0002 SOLITARY KIDNEY: ICD-10-CM

## 2019-10-16 DIAGNOSIS — E87.70 HYPERVOLEMIA, UNSPECIFIED HYPERVOLEMIA TYPE: ICD-10-CM

## 2019-10-16 DIAGNOSIS — Z90.5 HISTORY OF NEPHROURETERECTOMY: ICD-10-CM

## 2019-10-16 DIAGNOSIS — I12.9 HYPERTENSIVE CHRONIC KIDNEY DISEASE WITH STAGE 1 THROUGH STAGE 4 CHRONIC KIDNEY DISEASE, OR UNSPECIFIED CHRONIC KIDNEY DISEASE: ICD-10-CM

## 2019-10-16 DIAGNOSIS — R73.01 IMPAIRED FASTING GLUCOSE: ICD-10-CM

## 2019-10-16 DIAGNOSIS — N18.4 CKD (CHRONIC KIDNEY DISEASE) STAGE 4, GFR 15-29 ML/MIN (HCC): Primary | ICD-10-CM

## 2019-10-16 DIAGNOSIS — Z90.6 HISTORY OF NEPHROURETERECTOMY: ICD-10-CM

## 2019-10-16 DIAGNOSIS — N18.30 ANEMIA DUE TO STAGE 3 CHRONIC KIDNEY DISEASE (HCC): ICD-10-CM

## 2019-10-16 DIAGNOSIS — N18.4 CKD (CHRONIC KIDNEY DISEASE) STAGE 4, GFR 15-29 ML/MIN (HCC): ICD-10-CM

## 2019-10-16 DIAGNOSIS — E11.42 DIABETIC POLYNEUROPATHY ASSOCIATED WITH TYPE 2 DIABETES MELLITUS (HCC): ICD-10-CM

## 2019-10-16 LAB
25(OH)D3 SERPL-MCNC: 48.6 NG/ML (ref 30–100)
ALBUMIN SERPL BCP-MCNC: 3.7 G/DL (ref 3.5–5)
ALP SERPL-CCNC: 104 U/L (ref 46–116)
ALT SERPL W P-5'-P-CCNC: 22 U/L (ref 12–78)
ANION GAP SERPL CALCULATED.3IONS-SCNC: 10 MMOL/L (ref 4–13)
AST SERPL W P-5'-P-CCNC: 15 U/L (ref 5–45)
BACTERIA UR QL AUTO: ABNORMAL /HPF
BILIRUB SERPL-MCNC: 0.53 MG/DL (ref 0.2–1)
BILIRUB UR QL STRIP: NEGATIVE
BUN SERPL-MCNC: 28 MG/DL (ref 5–25)
CALCIUM SERPL-MCNC: 9.3 MG/DL (ref 8.3–10.1)
CHLORIDE SERPL-SCNC: 109 MMOL/L (ref 100–108)
CHOLEST SERPL-MCNC: 153 MG/DL (ref 50–200)
CLARITY UR: ABNORMAL
CO2 SERPL-SCNC: 23 MMOL/L (ref 21–32)
COLOR UR: YELLOW
CREAT SERPL-MCNC: 1.5 MG/DL (ref 0.6–1.3)
CREAT UR-MCNC: 112 MG/DL
ERYTHROCYTE [DISTWIDTH] IN BLOOD BY AUTOMATED COUNT: 14.6 % (ref 11.6–15.1)
EST. AVERAGE GLUCOSE BLD GHB EST-MCNC: 140 MG/DL
FERRITIN SERPL-MCNC: 83 NG/ML (ref 8–388)
GFR SERPL CREATININE-BSD FRML MDRD: 32 ML/MIN/1.73SQ M
GLUCOSE P FAST SERPL-MCNC: 155 MG/DL (ref 65–99)
GLUCOSE UR STRIP-MCNC: NEGATIVE MG/DL
HBA1C MFR BLD: 6.5 % (ref 4.2–6.3)
HCT VFR BLD AUTO: 29.6 % (ref 34.8–46.1)
HDLC SERPL-MCNC: 55 MG/DL (ref 40–60)
HGB BLD-MCNC: 9.7 G/DL (ref 11.5–15.4)
HGB UR QL STRIP.AUTO: ABNORMAL
HYALINE CASTS #/AREA URNS LPF: ABNORMAL /LPF
IRON SATN MFR SERPL: 18 %
IRON SERPL-MCNC: 51 UG/DL (ref 50–170)
KETONES UR STRIP-MCNC: NEGATIVE MG/DL
LDLC SERPL CALC-MCNC: 74 MG/DL (ref 0–100)
LEUKOCYTE ESTERASE UR QL STRIP: ABNORMAL
MAGNESIUM SERPL-MCNC: 2.1 MG/DL (ref 1.6–2.6)
MCH RBC QN AUTO: 33.7 PG (ref 26.8–34.3)
MCHC RBC AUTO-ENTMCNC: 32.8 G/DL (ref 31.4–37.4)
MCV RBC AUTO: 103 FL (ref 82–98)
MICROALBUMIN UR-MCNC: 29.8 MG/L (ref 0–20)
MICROALBUMIN/CREAT 24H UR: 27 MG/G CREATININE (ref 0–30)
NITRITE UR QL STRIP: NEGATIVE
NON-SQ EPI CELLS URNS QL MICRO: ABNORMAL /HPF
NONHDLC SERPL-MCNC: 98 MG/DL
PH UR STRIP.AUTO: 7 [PH]
PHOSPHATE SERPL-MCNC: 2.8 MG/DL (ref 2.3–4.1)
PLATELET # BLD AUTO: 216 THOUSANDS/UL (ref 149–390)
PMV BLD AUTO: 10.2 FL (ref 8.9–12.7)
POTASSIUM SERPL-SCNC: 4 MMOL/L (ref 3.5–5.3)
PROT SERPL-MCNC: 6.8 G/DL (ref 6.4–8.2)
PROT UR STRIP-MCNC: ABNORMAL MG/DL
PTH-INTACT SERPL-MCNC: 95.1 PG/ML (ref 18.4–80.1)
RBC # BLD AUTO: 2.88 MILLION/UL (ref 3.81–5.12)
RBC #/AREA URNS AUTO: ABNORMAL /HPF
SODIUM SERPL-SCNC: 142 MMOL/L (ref 136–145)
SP GR UR STRIP.AUTO: 1.02 (ref 1–1.03)
TIBC SERPL-MCNC: 290 UG/DL (ref 250–450)
TRIGL SERPL-MCNC: 122 MG/DL
TSH SERPL DL<=0.05 MIU/L-ACNC: 0.45 UIU/ML (ref 0.36–3.74)
UROBILINOGEN UR QL STRIP.AUTO: 0.2 E.U./DL
WBC # BLD AUTO: 14.78 THOUSAND/UL (ref 4.31–10.16)
WBC #/AREA URNS AUTO: ABNORMAL /HPF

## 2019-10-16 PROCEDURE — 84100 ASSAY OF PHOSPHORUS: CPT

## 2019-10-16 PROCEDURE — 36415 COLL VENOUS BLD VENIPUNCTURE: CPT | Performed by: INTERNAL MEDICINE

## 2019-10-16 PROCEDURE — 84443 ASSAY THYROID STIM HORMONE: CPT | Performed by: INTERNAL MEDICINE

## 2019-10-16 PROCEDURE — 83735 ASSAY OF MAGNESIUM: CPT

## 2019-10-16 PROCEDURE — 80061 LIPID PANEL: CPT | Performed by: INTERNAL MEDICINE

## 2019-10-16 PROCEDURE — 83550 IRON BINDING TEST: CPT

## 2019-10-16 PROCEDURE — 85027 COMPLETE CBC AUTOMATED: CPT

## 2019-10-16 PROCEDURE — 83970 ASSAY OF PARATHORMONE: CPT

## 2019-10-16 PROCEDURE — 83540 ASSAY OF IRON: CPT

## 2019-10-16 PROCEDURE — 80053 COMPREHEN METABOLIC PANEL: CPT | Performed by: INTERNAL MEDICINE

## 2019-10-16 PROCEDURE — 82043 UR ALBUMIN QUANTITATIVE: CPT

## 2019-10-16 PROCEDURE — 81001 URINALYSIS AUTO W/SCOPE: CPT | Performed by: INTERNAL MEDICINE

## 2019-10-16 PROCEDURE — 82570 ASSAY OF URINE CREATININE: CPT

## 2019-10-16 PROCEDURE — 82306 VITAMIN D 25 HYDROXY: CPT

## 2019-10-16 PROCEDURE — 82728 ASSAY OF FERRITIN: CPT

## 2019-10-16 PROCEDURE — 83036 HEMOGLOBIN GLYCOSYLATED A1C: CPT

## 2019-10-16 NOTE — TELEPHONE ENCOUNTER
----- Message from Maurice Callahan MD sent at 10/16/2019 12:53 PM EDT -----  Please let the patient know, most recent labs are stable and  Would have liked to  Give iron infusions for the anemia but in light of the slight elevation in wbc will hold off for now  Renal parameters are stable   tks

## 2019-10-16 NOTE — TELEPHONE ENCOUNTER
Called pt and informed her of the blood work results  She stated that all of her labs were dated for this month, she would like to know what labs will need to be done before her December follow up    Please let me know and I can order and mail them out thanks!

## 2019-10-23 DIAGNOSIS — D63.8 ANEMIA, CHRONIC DISEASE: Primary | ICD-10-CM

## 2019-10-24 ENCOUNTER — APPOINTMENT (OUTPATIENT)
Dept: LAB | Facility: CLINIC | Age: 84
End: 2019-10-24
Payer: MEDICARE

## 2019-10-24 ENCOUNTER — OFFICE VISIT (OUTPATIENT)
Dept: HEMATOLOGY ONCOLOGY | Facility: CLINIC | Age: 84
End: 2019-10-24
Payer: MEDICARE

## 2019-10-24 VITALS
SYSTOLIC BLOOD PRESSURE: 98 MMHG | DIASTOLIC BLOOD PRESSURE: 50 MMHG | WEIGHT: 226.4 LBS | BODY MASS INDEX: 44.45 KG/M2 | OXYGEN SATURATION: 97 % | HEART RATE: 71 BPM | HEIGHT: 60 IN | TEMPERATURE: 98.2 F | RESPIRATION RATE: 18 BRPM

## 2019-10-24 DIAGNOSIS — D63.8 ANEMIA, CHRONIC DISEASE: Primary | ICD-10-CM

## 2019-10-24 DIAGNOSIS — D63.8 ANEMIA, CHRONIC DISEASE: ICD-10-CM

## 2019-10-24 LAB
ALBUMIN SERPL BCP-MCNC: 3.7 G/DL (ref 3.5–5)
ALP SERPL-CCNC: 105 U/L (ref 46–116)
ALT SERPL W P-5'-P-CCNC: 24 U/L (ref 12–78)
ANION GAP SERPL CALCULATED.3IONS-SCNC: 7 MMOL/L (ref 4–13)
AST SERPL W P-5'-P-CCNC: 13 U/L (ref 5–45)
BASOPHILS # BLD AUTO: 0.03 THOUSANDS/ΜL (ref 0–0.1)
BASOPHILS NFR BLD AUTO: 0 % (ref 0–1)
BILIRUB SERPL-MCNC: 0.44 MG/DL (ref 0.2–1)
BUN SERPL-MCNC: 34 MG/DL (ref 5–25)
CALCIUM SERPL-MCNC: 8.8 MG/DL (ref 8.3–10.1)
CHLORIDE SERPL-SCNC: 108 MMOL/L (ref 100–108)
CO2 SERPL-SCNC: 27 MMOL/L (ref 21–32)
CREAT SERPL-MCNC: 1.47 MG/DL (ref 0.6–1.3)
EOSINOPHIL # BLD AUTO: 0.19 THOUSAND/ΜL (ref 0–0.61)
EOSINOPHIL NFR BLD AUTO: 2 % (ref 0–6)
ERYTHROCYTE [DISTWIDTH] IN BLOOD BY AUTOMATED COUNT: 15.2 % (ref 11.6–15.1)
FERRITIN SERPL-MCNC: 64 NG/ML (ref 8–388)
GFR SERPL CREATININE-BSD FRML MDRD: 33 ML/MIN/1.73SQ M
GLUCOSE SERPL-MCNC: 131 MG/DL (ref 65–140)
HCT VFR BLD AUTO: 30.4 % (ref 34.8–46.1)
HGB BLD-MCNC: 9.4 G/DL (ref 11.5–15.4)
IMM GRANULOCYTES # BLD AUTO: 0.09 THOUSAND/UL (ref 0–0.2)
IMM GRANULOCYTES NFR BLD AUTO: 1 % (ref 0–2)
LYMPHOCYTES # BLD AUTO: 1.76 THOUSANDS/ΜL (ref 0.6–4.47)
LYMPHOCYTES NFR BLD AUTO: 20 % (ref 14–44)
MCH RBC QN AUTO: 33.1 PG (ref 26.8–34.3)
MCHC RBC AUTO-ENTMCNC: 30.9 G/DL (ref 31.4–37.4)
MCV RBC AUTO: 107 FL (ref 82–98)
MONOCYTES # BLD AUTO: 0.6 THOUSAND/ΜL (ref 0.17–1.22)
MONOCYTES NFR BLD AUTO: 7 % (ref 4–12)
NEUTROPHILS # BLD AUTO: 5.98 THOUSANDS/ΜL (ref 1.85–7.62)
NEUTS SEG NFR BLD AUTO: 70 % (ref 43–75)
NRBC BLD AUTO-RTO: 0 /100 WBCS
PLATELET # BLD AUTO: 182 THOUSANDS/UL (ref 149–390)
PMV BLD AUTO: 9.8 FL (ref 8.9–12.7)
POTASSIUM SERPL-SCNC: 4 MMOL/L (ref 3.5–5.3)
PROT SERPL-MCNC: 6.7 G/DL (ref 6.4–8.2)
RBC # BLD AUTO: 2.84 MILLION/UL (ref 3.81–5.12)
SODIUM SERPL-SCNC: 142 MMOL/L (ref 136–145)
WBC # BLD AUTO: 8.65 THOUSAND/UL (ref 4.31–10.16)

## 2019-10-24 PROCEDURE — 82728 ASSAY OF FERRITIN: CPT

## 2019-10-24 PROCEDURE — 85025 COMPLETE CBC W/AUTO DIFF WBC: CPT

## 2019-10-24 PROCEDURE — 80053 COMPREHEN METABOLIC PANEL: CPT

## 2019-10-24 PROCEDURE — 99213 OFFICE O/P EST LOW 20 MIN: CPT | Performed by: PHYSICIAN ASSISTANT

## 2019-10-24 PROCEDURE — 36415 COLL VENOUS BLD VENIPUNCTURE: CPT

## 2019-10-24 NOTE — PROGRESS NOTES
Hematology/Oncology Outpatient Follow-up  Kendall Fernández 81 y o  female 1935 [id number]    Date:  10/24/2019      Assessment and Plan:    1  Anemia, chronic disease  67-year-old female presents for follow-up regarding history of anemia secondary to chronic disease/renal disease  She has renal disease secondary to a right nephrectomy  Anemia is also related to her rheumatoid arthritis as well as treatment for her rheumatoid arthritis  Hemoglobin is 9 4  Patient notes fatigue over the last 1 week however over this time she had switched CPAP masks which is keeping her awake at night  We reviewed that her fatigue is likely related to this  Due to not being symptomatic and relatively stable hemoglobin, erythropoietin is not needed at this time  If this decreases on review of other labs, please refer patient back sooner however at this time plan to see her back in 1 year  - CBC and differential; Future  - Comprehensive metabolic panel; Future  - Ferritin; Future    HPI:  67-year-old female presents for follow up regarding anemia   Past medical history significant for rheumatoid arthritis, right nephrectomy, hypothyroidism, hypertension, history of TIA, CHF, hyperlipidemia, atherosclerosis, sleep apnea      Patient follows with [facility]  Desire Barajas states that she was on injection medication prior to her nephrectomy in April 2018  Jackie Wong her nephrectomy she has been on Plaquenil   She also takes Methotrexate 4 tablets per week  She has been on methotrexate for at least 18 years       After recurrent UTIs in microscopic hematuria patient was found to have a mass from the right kidney   She underwent right nephrectomy   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  Desire Barajas follows with Urology on a regular basis   She has a CT scan scheduled for Jan 2020 and follow up with urology after       Sept 11 dislocated his left shoulder after falling and is presently undergoing physical therapy   She states Surgical intervention is not recommended due to her multiple comorbidities      She has sleep apnea and uses CPAP  Interval history:    ROS: Review of Systems   Constitutional: Positive for fatigue  Negative for appetite change, chills, fever and unexpected weight change  Respiratory: Positive for shortness of breath (With exertion)  Cardiovascular: Negative for chest pain, palpitations and leg swelling  Gastrointestinal: Negative for abdominal pain, blood in stool, constipation, diarrhea, nausea and vomiting  Genitourinary: Negative for difficulty urinating and dysuria  Musculoskeletal: Positive for arthralgias  Skin: Negative  Neurological: Negative for dizziness, weakness, light-headedness, numbness and headaches  Hematological: Negative  Psychiatric/Behavioral: Negative          Past Medical History:   Diagnosis Date    Anemia     Arthritis     rheumatoid    At risk for falls     Benign essential hypertension     [facility]est[facility] failure[facility]     Chronic cystitis     Chronic kidney disease     right kidney cancer - kidney removed    Chronic pain disorder     CPAP (continuous positive airway pressure) dependence     Diverticulitis of colon     Early satiety     Edema     GERD (gastroesophageal reflux disease)     Gout     Hearing aid worn     bilateral    Hearing loss     Hemorrhoids     Hiatal hernia     History of colonic polyps     Hyperlipidemia     Hypothyroidism     Irregular heart beat     Afib    Left breast mass     Microhematuria     Migraine     occular    Mobility impaired     left arm    Neuropathy     lower and upper extermities    Overactive bladder     Pneumonia of left lower lobe due to infectious organism ([facility] [facility]     PVD (peripheral vascular disease) ([facility] [facility]     RA (rheumatoid arthritis) ([facility])     Restless leg syndrome     Sleep apnea     uses cpap    Stroke ([facility] [address] [facility]     5/2017    Type 2 diabetes mellitus ([facility] [address] [facility]     Urinary frequency     Urinary urgency     Urothelial cancer ([facility] [address] ) 04/23/2018    Uses walker        Past Surgical History:   Procedure Laterality Date    APPENDECTOMY      ARTHROSCOPY WRIST Right     with release of transverse carpal ligament     BLADDER SURGERY      BLADDER SURGERY      BREAST SURGERY      left breast    BUNIONECTOMY Bilateral     CATARACT EXTRACTION Bilateral     CHOLECYSTECTOMY      COLONOSCOPY      CYSTOSCOPY      CYSTOSCOPY      EGD AND COLONOSCOPY N/A 12/20/2017    Procedure: EGD AND COLONOSCOPY;  Surgeon: Perez Cannon MD;  [facility]: [facility]; Service: Gastroenterology    ESOPHAGOGASTRODUODENOSCOPY      diagnostic    FOREARM SURGERY Right     fracture repair    FRACTURE SURGERY Right     arm with hardware    HYSTERECTOMY      IR ABDOMINAL ANGIOGRAPHY / INTERVENTION  1/24/2019    KNEE ARTHROSCOPY Left     KNEE SURGERY Left     meniscus tear    NEPHRECTOMY Right     NOSE SURGERY      CO CYSTO/URETERO W/LITHOTRIPSY &INDWELL STENT INSRT Right 2/28/2018    Procedure: CYSTOSCOPY RIGHT URETEROSCOPY, RIGHT RETROGRADE PYELOGRAM AND INSERTION  RIGHT STENT URETERAL, RIGHT RENAL PELVIC WASHING FOR CYTOLOGY;  Surgeon: Rios Coates MD;  [facility];  Service: Urology    CO CYSTOURETHROSCOPY,FULGUR 0 5-2 CM LESN N/A 5/21/2019    Procedure: BLADDER BIOPSY WITH FULGURATION;  Surgeon: Arabella Rankin MD;  [facility];  Service: Urology    CO NEPHRECTOMY, W/PART   URETECTOMY Right 4/23/2018    Procedure: ROBATIC ASSISTED NEPHRO-URETERECTOMY WITH BLADDER CUFF EXCISION;  Surgeon: Arabella Rankin MD;  [facility];  Service: Urology    CO Julio Hiss 3RD+ ORD SLCTV ABDL PEL/LXTR [facility] Right 1/24/2019    Procedure: RIGHT LEG ANGIOGRAM, BILATERAL FEMORAL ACCESS, STENTING OF RIGHT EXTERNAL ILIAC ARTERY WITH BALLOON ANGIOPLASTY;  Surgeon: Meryle Molt Doctor, MD;  [facility];  Service: Vascular    REPLACEMENT TOTAL KNEE BILATERAL  URETEROSCOPY Right 3/20/2018    Procedure: CYSTOSCOPY, RETROGRADE PYELOGRAM, URETEROSCOPY, BIOPSY, BRUSH, FULGURATION, STENT PLACEMENT, BLADDER BIOPSY WITH FULGURATION;  Surgeon: Fatuma Craig MD;  [facility];  Service: Urology    VASCULAR SURGERY      stents       Social History     Socioeconomic History    Marital status: /Civil Union     Spouse name: None    Number of children: None    Years of education: None    Highest education level: None   Occupational History    None   Social Needs    Financial resource strain: Not hard at all   [employer] insecurity:     Worry: Never true     Inability: Never true   [employer] needs:     Medical: No     Non-medical: No   Tobacco Use    Smoking status: Never Smoker    Smokeless tobacco: Never Used   Substance and Sexual Activity    Alcohol use: Not Currently    Drug use: No    Sexual activity: None   Lifestyle    Physical activity:     Days per week: 0 days     Minutes per session: 0 min    Stress:  Only a little   Relationships    Social connections:     Talks on phone: More than three times a week     Gets together: Once a week     Attends [employer] service: 1 to 4 times per year     Active member of club or organization: No     Attends meetings of clubs or organizations: Never     Relationship status:     Intimate partner violence:     Fear of current or ex partner: No     Emotionally abused: No     Physically abused: No     Forced sexual activity: No   Other Topics Concern    None   Social History Narrative    No caffeine use       Family History   Problem Relation Age of Onset    Other Mother         epilepsy    Early death Mother    Lupe Jose Glaucoma Father     Stroke Father         silent    Other Brother         cardiac disorder    Rheum arthritis Son     No Known Problems Son     No Known Problems Daughter     No Known Problems Son        Allergies   Allergen Reactions    Nitrofurantoin Hives     HIVES * pt denies  Other reaction(s): Unknown Allergic Reaction  Other reaction(s): Other (See Comments)  HIVES * pt denies    Atorvastatin      Other reaction(s): Muscle Pain, Myalgia    Acetazolamide Other (See Comments)     Other reaction(s): Unknown Allergic Reaction unknown reaction     Other Other (See Comments)     Adhesive tape : red and itching  Other reaction(s):  Other (See Comments)  red and itching    Shellfish-Derived Products Other (See Comments)     Patient got Gout following eating shell fish    Sulfa Antibiotics Other (See Comments)     unknown    Tramadol Diarrhea and Vomiting    Azithromycin Rash         Current Outpatient Medications:     acetaminophen (TYLENOL) 325 mg tablet, Take 650 mg by mouth 2 (two) times a day as needed for mild pain , Disp: , Rfl:     Calcium Citrate-Vitamin D (CALCIUM + D PO), Take 1 tablet by mouth 2 (two) times a day, Disp: , Rfl:     cyanocobalamin (VITAMIN B-12) 100 mcg tablet, Take 100 mcg by mouth daily , Disp: , Rfl:     Elastic Bandages & Supports (MEDICAL COMPRESSION STOCKINGS) MISC, by Does not apply route daily, Disp: 10 each, Rfl: 0    gabapentin (NEURONTIN) 100 mg capsule, Take 1 capsule in the morning, take 1 capsule in the afternoon, and take 1-3 capapsules at bedtime, Disp: 450 capsule, Rfl: 3    hydroxychloroquine (PLAQUENIL) 200 mg tablet, Take 200 mg by mouth 2 (two) times a day with meals, Disp: , Rfl:     LEUCOVORIN CALCIUM PO, Take 10 mg by mouth once a week, Disp: , Rfl:     levothyroxine 75 mcg tablet, Take 1 tablet (75 mcg total) by mouth daily, Disp: 90 tablet, Rfl: 3    losartan (COZAAR) 25 mg tablet, Take 1 tablet (25 mg total) by mouth daily, Disp: 90 tablet, Rfl: 3    methotrexate 2 5 mg tablet, Take 2 tablets weekly, Disp: 12 tablet, Rfl: 0    Multiple Vitamin (MULTIVITAMINS PO), Take 1 tablet by mouth daily, Disp: , Rfl:     omeprazole (PriLOSEC) 40 MG capsule, Take 1 capsule (40 mg total) by mouth daily, Disp: 90 capsule, Rfl: 3    polyethylene glycol (MIRALAX) 17 g packet, Take 17 g by mouth daily (Patient taking differently: Take 17 g by mouth as needed ), Disp: 14 each, Rfl: 0    pravastatin (PRAVACHOL) 80 mg tablet, Take 1 tablet (80 mg total) by mouth daily, Disp: 90 tablet, Rfl: 3    rivaroxaban (XARELTO) 15 mg tablet, Take 1 tablet (15 mg total) by mouth daily with breakfast, Disp: 28 tablet, Rfl: 0    rOPINIRole (REQUIP) 0 5 mg tablet, 1 tab in the am and 2 tab at bedtime, Disp: , Rfl:     torsemide (DEMADEX) 20 mg tablet, Take 1 tablet (20 mg total) by mouth daily, Disp: 90 tablet, Rfl: 3  No current facility-administered medications for this visit  Facility-Administered Medications Ordered in Other Visits:     acetaminophen (TYLENOL) tablet 650 mg, 650 mg, Oral, Q6H PRN, Maggy You PA-C      Physical Exam:  Pulse 71   Temp 98 2 °F (36 8 °C)   Resp 18   Ht 5' (1 524 m)   LMP  (LMP Unknown)   BMI 43 34 kg/m²     Physical Exam   Constitutional: She is oriented to person, place, and time  She appears well-developed and well-nourished  No distress  Obese   HENT:   Head: Normocephalic and atraumatic  Eyes: Conjunctivae are normal  No scleral icterus  Neck: Normal range of motion  Neck supple  Cardiovascular: Normal rate, regular rhythm and normal heart sounds  No murmur heard  Pulmonary/Chest: Effort normal and breath sounds normal  No respiratory distress  Abdominal: Soft  There is no tenderness  Musculoskeletal: She exhibits no edema or tenderness  Ambulates with walker   Lymphadenopathy:     She has no cervical adenopathy  Neurological: She is alert and oriented to person, place, and time  No cranial nerve deficit  Skin: Skin is warm and dry  Psychiatric: She has a normal mood and affect           Labs:  Lab Results   Component Value Date    WBC 8 65 10/24/2019    HGB 9 4 (L) 10/24/2019    HCT 30 4 (L) 10/24/2019     (H) 10/24/2019     10/24/2019     Lab Results   Component Value Date     11/14/2017    K 4 0 10/24/2019     10/24/2019    CO2 27 10/24/2019    ANIONGAP 9 01/04/2016    BUN 34 (H) 10/24/2019    CREATININE 1 47 (H) 10/24/2019    GLUCOSE 165 (H) 09/03/2019    GLUF 155 (H) 10/16/2019    CALCIUM 8 8 10/24/2019    AST 13 10/24/2019    ALT 24 10/24/2019    ALKPHOS 105 10/24/2019    PROT 6 3 11/14/2017    BILITOT 1 2 11/14/2017    EGFR 33 10/24/2019       Patient voiced understanding and agreement in the above discussion  Aware to contact our office with questions/symptoms in the interim  This note has been generated by voice recognition software system  Therefore, there may be spelling, grammar, and or syntax errors  Please contact if questions arise

## 2019-11-11 DIAGNOSIS — I48.91 ATRIAL FIBRILLATION, UNSPECIFIED TYPE (HCC): ICD-10-CM

## 2019-11-22 ENCOUNTER — OFFICE VISIT (OUTPATIENT)
Dept: CARDIOLOGY CLINIC | Facility: CLINIC | Age: 84
End: 2019-11-22
Payer: MEDICARE

## 2019-11-22 VITALS
SYSTOLIC BLOOD PRESSURE: 142 MMHG | HEIGHT: 60 IN | WEIGHT: 226 LBS | DIASTOLIC BLOOD PRESSURE: 80 MMHG | BODY MASS INDEX: 44.37 KG/M2 | HEART RATE: 78 BPM | OXYGEN SATURATION: 96 %

## 2019-11-22 DIAGNOSIS — I50.32 CHRONIC DIASTOLIC CONGESTIVE HEART FAILURE (HCC): Primary | ICD-10-CM

## 2019-11-22 PROCEDURE — 99214 OFFICE O/P EST MOD 30 MIN: CPT | Performed by: INTERNAL MEDICINE

## 2019-11-22 NOTE — PROGRESS NOTES
Advanced Heart Failure/Pulmonary Hypertension Outpatient Progress Note - Bertha Benton 81 y o  female MRN: [id number]    @ Encounter: [id number]    Assessment/Plan:  Patient Active Problem List    Diagnosis Date Noted    Left knee pain 09/03/2019    Fall from bed 09/03/2019    Aortoiliac occlusive disease ([facility] ) 08/08/2019    Seborrheic keratoses 06/27/2019    Impaired fasting glucose 05/15/2019    PAD (peripheral artery disease) ([facility] ) 04/01/2019    Polyneuropathy associated with underlying disease ([facility] ) 03/26/2019    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 ([facility] ) 03/26/2019    Chronic acquired lymphedema 03/17/2019    Anemia of chronic disease 03/17/2019    Atherosclerosis of native artery of right lower extremity with intermittent claudication ([facility] ) 01/21/2019    Abdominal wall hernia 01/02/2019    Spontaneous dislocation of shoulder, left 09/18/2018    Chronic diastolic heart failure (HCC) 08/23/2018    CKD (chronic kidney disease) stage 3, GFR 30-59 ml/min ([facility]) 08/12/2018    Atrial fibrillation ([facility] ) 08/11/2018    Incisional hernia 06/28/2018    History of nephroureterectomy 06/21/2018    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 ([facility] ) 06/04/2018    Pancreatic cyst 03/14/2018    Iron deficiency anemia 03/14/2018    Urothelial cancer ([facility] ) 03/02/2018    Lung nodule < 6cm on CT 03/02/2018    Chronic bilateral thoracic back pain 02/12/2018    Thoracic degenerative disc disease 02/12/2018    Sinus arrhythmia 01/03/2018    Peripheral neuropathy 11/09/2017    Right lumbar radiculopathy 11/09/2017    Primary osteoarthritis of left knee 10/13/2017    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 ([facility] ) 05/10/2017    Essential hypertension 05/10/2017    Rheumatoid arthritis ([facility] ) 05/10/2017    Sleep apnea 05/10/2017    Acquired hypothyroidism 05/10/2017    Chronic GERD 05/10/2017    Restless leg syndrome 01/05/2016    Sensorineural hearing loss 10/12/2014    Hypercholesterolemia 08/29/2013    Obesity 07/18/2013    Edema 05/14/2013     # Urinary frequency/burning: Likely UTI  Will get UA  Defer Abx to PMD     # Chronic HFpEF (Grade II diastolic dysfunction), NYHA II/III, ACC/AHA Stage C: HFpEF likely 2/2 to HTN (cLVH on TTE), NICK, obesity and/or ? RA (plaquenil)  LE edema partially from lymphedema  Weight stable ~220 lbs on home scale  Weight of 221 lbs today  --Continue torsemide 20 mg PO daily  --Continue BP control     # HTN: BP well controlled  Continue losartan 25 mg PO daily  # Hx of lymphedema: Uses her lymphedema machine 2x daily as able  # APCs: Continue current mgmt  Had 1 week holter w/o e/o AFib  Currently anticoagulated for CVA so would not   # Hx of CVA/TIA: Currently on Xarelto 15 mg daily and pravastatin  # NICK on CPAP  # RA w/ +RF on plaquenil and methotrexate  # L shoulder dislocation: Continue PT and pain control  # Urothelial CA s/o R nephrectomy 04/23/2018    RTC in 3 months    HPI: Very pleasant 81 y o  woman w/ PMHx of HTN, HLD, chronic LE edema/lymphedema, anemia of chronic disease p/f establishing care and abnormal ECG  She states she feels well  She was followed at [facility] and now lives closer to [place] [id number] so is transferring her care  Denies any cardiac symptoms  Uses lymphedema machine  She had a CVA in 5/2017 and received tPA  Overall feels well from that  She had a 1 week holter that did not show any arrhythmia and has been on NOAC ever since the stroke  Holter showed NSR w/ APCs  She comes in today 4/6/18, for f/u  She had noted hematuria  CT A/P noted a R renal pelvis multifocal papillary lesions with pathology showing low grade Ta urothelial cancer  She comes in for preop evaluation  She continues to deny cardiac symptoms  She is more limited by knee pains  She is able to get around her home without difficulty  Today 7/10/18, she returns for f/u   She had her right robotic nephroureterectomy with bladder cuff  She currently has VNA coming to the home  Moving around better  Feeling stronger  Walking is difficult 2/2 to rheumatoid arthritis  Today 8/24/2018, she returns for post hospital D/C  She was admitted twice since her last visit  In the first admission she presented with chest tightness and dyspnea  On 8/10/18 underwent nuclear stress test with normal perfusion  She was diuresed and felt improved  Then she was readmitted with dyspnea and volume overload and diuresed again  Today she feels well  Today, 10/17/18, she returns for f/u  She states she has developed a hernia on the R side  She does not want to pursue surgery currently  She decreased her diuretics to 20 mg PO daily as she was urinating too much and was having accidents  She dislocated her L shoulder  Denies SOB with ambulation using her walker  Walking better overall around the house  Unable to stand too long due to her spine  Today 2/5/19, she returns for f/u  She feels about the same as before  She does not feel limited by SOB, but more MSK as noted before  Today, 5/31/19, she returns for f/u  She was seen by Jeovanny fritz since her last visit  She was admitted for fluid overload in 3/2019  She has been doing well since then  Weight today of 221 lbs (up from 218 lbs in February 2019)  Xarelto is very expensive for her; does have pills remaining at home  Requesting less expensive anticoagulation option  Provided Xarelto samples in interim, while pricing for other anticoagulation is underway  Continues with chronic LE edema, has improved some with OT home visits  Today, 8/7/19, she returns for f/u  She was admitted for hematuria  Her [address] and antiplatelet agent were held  She is back on them now  She feels at her baseline  Having urinary frequency and burning  11/22/19: feels well  Same as last visit  Going to PT for L arm       Past Medical History:   Diagnosis Date    Anemia     Arthritis     rheumatoid    At risk for falls     Benign essential hypertension     CHF (congestive heart failure) (HCC)     Chronic cystitis     Chronic kidney disease     right kidney cancer - kidney removed    Chronic pain disorder     CPAP (continuous positive airway pressure) dependence     Diverticulitis of colon     Early satiety     Edema     GERD (gastroesophageal reflux disease)     Gout     Hearing aid worn     bilateral    Hearing loss     Hemorrhoids     Hiatal hernia     History of colonic polyps     Hyperlipidemia     Hypothyroidism     Irregular heart beat     Afib    Left breast mass     Microhematuria     Migraine     occular    Mobility impaired     left arm    Neuropathy     lower and upper extermities    Overactive bladder     Pneumonia of left lower lobe due to infectious organism ([facility]     [facility]peripheral [facility]) [facility]     RA (rheumatoid arthritis) ([facility]     Restless leg syndrome     Sleep apnea     uses cpap    Stroke [facility]     5/2017    Type 2 diabetes mellitus ([facility])     Urinary frequency     Urinary urgency     Urothelial cancer ([facility] ) 04/23/2018    Uses walker      Review of Systems - 12 point ROS was done and is negative, except as noted above  Allergies   Allergen Reactions    Nitrofurantoin Hives     HIVES * pt denies  Other reaction(s): Unknown Allergic Reaction  Other reaction(s): Other (See Comments)  HIVES * pt denies    Atorvastatin      Other reaction(s): Muscle Pain, Myalgia    Acetazolamide Other (See Comments)     Other reaction(s): Unknown Allergic Reaction unknown reaction     Other Other (See Comments)     Adhesive tape : red and itching  Other reaction(s):  Other (See Comments)  red and itching    Shellfish-Derived Products Other (See Comments)     Patient got Gout following eating shell fish    Sulfa Antibiotics Other (See Comments)     unknown    Tramadol Diarrhea and Vomiting    Azithromycin Rash Current Outpatient Medications:     acetaminophen (TYLENOL) 325 mg tablet, Take 650 mg by mouth 2 (two) times a day as needed for mild pain , Disp: , Rfl:     Calcium Citrate-Vitamin D (CALCIUM + D PO), Take 1 tablet by mouth 2 (two) times a day, Disp: , Rfl:     cyanocobalamin (VITAMIN B-12) 100 mcg tablet, Take 100 mcg by mouth daily , Disp: , Rfl:     Elastic Bandages & Supports ([address]) MIS, by Does not apply route daily, Disp: 10 each, Rfl: 0    gabapentin (NEURONTIN) 100 mg capsule, Take 1 capsule in the morning, take 1 capsule in the afternoon, and take 1-3 capapsules at bedtime, Disp: 450 capsule, Rfl: 3    hydroxychloroquine (PLAQUENIL) 200 mg tablet, Take 200 mg by mouth 2 (two) times a day with meals, Disp: , Rfl:     LEUCOVORIN CALCIUM PO, Take 10 mg by mouth once a week, Disp: , Rfl:     levothyroxine 75 mcg tablet, Take 1 tablet (75 mcg total) by mouth daily, Disp: 90 tablet, Rfl: 3    losartan (COZAAR) 25 mg tablet, Take 1 tablet (25 mg total) by mouth daily, Disp: 90 tablet, Rfl: 3    methotrexate 2 5 mg tablet, Take 2 tablets weekly, Disp: 12 tablet, Rfl: 0    Multiple Vitamin (MULTIVITAMINS PO), Take 1 tablet by mouth daily, Disp: , Rfl:     omeprazole (PriLOSEC) 40 MG capsule, Take 1 capsule (40 mg total) by mouth daily, Disp: 90 capsule, Rfl: 3    polyethylene glycol (MIRALAX) 17 g packet, Take 17 g by mouth daily (Patient taking differently: Take 17 g by mouth as needed ), Disp: 14 each, Rfl: 0    pravastatin (PRAVACHOL) 80 mg tablet, Take 1 tablet (80 mg total) by mouth daily, Disp: 90 tablet, Rfl: 3    rivaroxaban (XARELTO) 15 mg tablet, Take 1 tablet (15 mg total) by mouth daily with breakfast, Disp: 30 tablet, Rfl: 5    rOPINIRole (REQUIP) 0 5 mg tablet, 1 tab in the am and 2 tab at bedtime, Disp: , Rfl:     torsemide (DEMADEX) 20 mg tablet, Take 1 tablet (20 mg total) by mouth daily, Disp: 90 tablet, Rfl: 3  No current facility-administered medications for this visit  Facility-Administered Medications Ordered in Other Visits:     acetaminophen (TYLENOL) tablet 650 mg, 650 mg, Oral, Q6H PRN, Yuila Melo PA-C    Social History     Socioeconomic History    Marital status: /Civil Union     Spouse name: Not on file    Number of children: Not on file    Years of education: Not on file    Highest education level: Not on file   Occupational History    Not on file   Social Needs    Financial resource strain: Not hard at all   [employer] insecurity:     Worry: Never true     Inability: Never true   [email or web address] needs:     Medical: No     Non-medical: No   Tobacco Use    Smoking status: Never Smoker    Smokeless tobacco: Never Used   Substance and Sexual Activity    Alcohol use: Not Currently    Drug use: No    Sexual activity: Not on file   Lifestyle    Physical activity:     Days per week: 0 days     Minutes per session: 0 min    Stress: Only a little   Relationships    Social connections:     Talks on phone: More than three times a week     Gets together: Once a week     Attends Church service: 1 to 4 times per year     Active member of club or organization: No     Attends meetings of clubs or organizations: Never     Relationship status:     Intimate partner violence:     Fear of current or ex partner: No     Emotionally abused: No     Physically abused: No     Forced sexual activity: No   Other Topics Concern    Not on file   Social History Narrative    No caffeine use     Family History   Problem Relation Age of Onset    Other Mother         epilepsy    Early death Mother    [facility] Glaucoma Father     Stroke Father         silent    Other Brother         cardiac disorder    Rheum arthritis Son     No Known Problems Son     No Known Problems Daughter     No Known Problems Son      Physical Exam:  Vitals: Blood pressure 142/80, pulse 78, height 5' (1 524 m), weight 103 kg (226 lb), SpO2 96 %  , Body mass index is 44 14 kg/m² ,   Wt Readings from Last 3 Encounters:   11/22/19 103 kg (226 lb)   10/24/19 103 kg (226 lb 6 4 oz)   10/10/19 101 kg (221 lb 14 4 oz)     Vitals:    11/22/19 1554   BP: 142/80   BP Location: Right arm   Patient Position: Sitting   Cuff Size: Standard   Pulse: 78   SpO2: 96%   Weight: 103 kg (226 lb)   Height: 5' (1 524 m)       GEN: Lilliam Ramey appears well, alert and oriented x 3, pleasant and cooperative   HEENT: pupils equal, round, and reactive to light; extraocular muscles intact  NECK: supple, no carotid bruits   HEART: regular rhythm, normal S1 and S2, no murmurs, clicks, gallops or rubs, JVP is    LUNGS: clear to auscultation bilaterally; no wheezes, rales, or rhonchi   ABDOMEN: normal bowel sounds, soft, no tenderness, no distention  EXTREMITIES: peripheral pulses normal; no clubbing, cyanosis, or edema  NEURO: no focal findings   SKIN: normal without suspicious lesions on exposed skin    Labs & Results:  Lab Results   Component Value Date    WBC 8 65 10/24/2019    HGB 9 4 (L) 10/24/2019    HCT 30 4 (L) 10/24/2019     (H) 10/24/2019     10/24/2019       Chemistry        Component Value Date/Time     11/14/2017 1151    K 4 0 10/24/2019 1012    K 3 7 11/14/2017 1151     10/24/2019 1012     11/14/2017 1151    CO2 27 10/24/2019 1012    CO2 31 09/03/2019 0404    BUN 34 (H) 10/24/2019 1012    BUN 18 11/14/2017 1151    CREATININE 1 47 (H) 10/24/2019 1012    CREATININE 0 88 11/14/2017 1151        Component Value Date/Time    CALCIUM 8 8 10/24/2019 1012    CALCIUM 8 9 11/14/2017 1151    ALKPHOS 105 10/24/2019 1012    ALKPHOS 95 11/14/2017 1151    AST 13 10/24/2019 1012    AST 24 11/14/2017 1151    ALT 24 10/24/2019 1012    ALT 19 11/14/2017 1151    BILITOT 1 2 11/14/2017 1151        Counseling / Coordination of Care  Total floor / unit time spent today 40 minutes    Greater than 50% of total time was spent with the patient and / or family counseling and / or coordination of care  A description of the counseling / coordination of care: 20 min  Thank you for the opportunity to participate in the care of this patient      Stevan Torres MD, PhD   [facility]

## 2019-12-10 ENCOUNTER — APPOINTMENT (OUTPATIENT)
Dept: LAB | Facility: CLINIC | Age: 84
End: 2019-12-10
Payer: MEDICARE

## 2019-12-10 ENCOUNTER — TRANSCRIBE ORDERS (OUTPATIENT)
Dept: LAB | Facility: CLINIC | Age: 84
End: 2019-12-10

## 2019-12-10 DIAGNOSIS — M05.79 RHEUMATOID ARTHRITIS INVOLVING MULTIPLE SITES WITH POSITIVE RHEUMATOID FACTOR (HCC): ICD-10-CM

## 2019-12-10 DIAGNOSIS — Z79.899 ENCOUNTER FOR LONG-TERM (CURRENT) USE OF OTHER MEDICATIONS: Primary | ICD-10-CM

## 2019-12-10 DIAGNOSIS — D63.8 ANEMIA, CHRONIC DISEASE: ICD-10-CM

## 2019-12-10 DIAGNOSIS — M79.89 SWELLING OF LEFT LOWER EXTREMITY: ICD-10-CM

## 2019-12-10 DIAGNOSIS — N18.4 CKD (CHRONIC KIDNEY DISEASE) STAGE 4, GFR 15-29 ML/MIN (HCC): ICD-10-CM

## 2019-12-10 DIAGNOSIS — R53.83 FATIGUE, UNSPECIFIED TYPE: ICD-10-CM

## 2019-12-10 DIAGNOSIS — E87.70 HYPERVOLEMIA, UNSPECIFIED HYPERVOLEMIA TYPE: ICD-10-CM

## 2019-12-10 DIAGNOSIS — Z90.5 HISTORY OF NEPHROURETERECTOMY: ICD-10-CM

## 2019-12-10 DIAGNOSIS — Z90.6 HISTORY OF NEPHROURETERECTOMY: ICD-10-CM

## 2019-12-10 DIAGNOSIS — I12.9 HYPERTENSIVE CHRONIC KIDNEY DISEASE WITH STAGE 1 THROUGH STAGE 4 CHRONIC KIDNEY DISEASE, OR UNSPECIFIED CHRONIC KIDNEY DISEASE: ICD-10-CM

## 2019-12-10 DIAGNOSIS — Z51.81 THERAPEUTIC DRUG MONITORING: ICD-10-CM

## 2019-12-10 DIAGNOSIS — IMO0002 SOLITARY KIDNEY: ICD-10-CM

## 2019-12-10 LAB
25(OH)D3 SERPL-MCNC: 38 NG/ML (ref 30–100)
ALBUMIN SERPL BCP-MCNC: 3.7 G/DL (ref 3.5–5)
ALP SERPL-CCNC: 110 U/L (ref 46–116)
ALT SERPL W P-5'-P-CCNC: 23 U/L (ref 12–78)
ANION GAP SERPL CALCULATED.3IONS-SCNC: 3 MMOL/L (ref 4–13)
AST SERPL W P-5'-P-CCNC: 17 U/L (ref 5–45)
BASOPHILS # BLD AUTO: 0.02 THOUSANDS/ΜL (ref 0–0.1)
BASOPHILS NFR BLD AUTO: 0 % (ref 0–1)
BILIRUB SERPL-MCNC: 0.5 MG/DL (ref 0.2–1)
BUN SERPL-MCNC: 29 MG/DL (ref 5–25)
CALCIUM SERPL-MCNC: 8.7 MG/DL (ref 8.3–10.1)
CHLORIDE SERPL-SCNC: 109 MMOL/L (ref 100–108)
CO2 SERPL-SCNC: 31 MMOL/L (ref 21–32)
CREAT SERPL-MCNC: 1.71 MG/DL (ref 0.6–1.3)
CRP SERPL QL: 11 MG/L
EOSINOPHIL # BLD AUTO: 0.2 THOUSAND/ΜL (ref 0–0.61)
EOSINOPHIL NFR BLD AUTO: 2 % (ref 0–6)
ERYTHROCYTE [DISTWIDTH] IN BLOOD BY AUTOMATED COUNT: 14.1 % (ref 11.6–15.1)
ERYTHROCYTE [SEDIMENTATION RATE] IN BLOOD: 12 MM/HOUR (ref 0–20)
FERRITIN SERPL-MCNC: 68 NG/ML (ref 8–388)
GFR SERPL CREATININE-BSD FRML MDRD: 27 ML/MIN/1.73SQ M
GLUCOSE SERPL-MCNC: 117 MG/DL (ref 65–140)
HCT VFR BLD AUTO: 29.1 % (ref 34.8–46.1)
HGB BLD-MCNC: 9 G/DL (ref 11.5–15.4)
IMM GRANULOCYTES # BLD AUTO: 0.04 THOUSAND/UL (ref 0–0.2)
IMM GRANULOCYTES NFR BLD AUTO: 1 % (ref 0–2)
IRON SATN MFR SERPL: 16 %
IRON SERPL-MCNC: 44 UG/DL (ref 50–170)
LYMPHOCYTES # BLD AUTO: 1.42 THOUSANDS/ΜL (ref 0.6–4.47)
LYMPHOCYTES NFR BLD AUTO: 17 % (ref 14–44)
MCH RBC QN AUTO: 33.8 PG (ref 26.8–34.3)
MCHC RBC AUTO-ENTMCNC: 30.9 G/DL (ref 31.4–37.4)
MCV RBC AUTO: 109 FL (ref 82–98)
MONOCYTES # BLD AUTO: 0.6 THOUSAND/ΜL (ref 0.17–1.22)
MONOCYTES NFR BLD AUTO: 7 % (ref 4–12)
NEUTROPHILS # BLD AUTO: 5.99 THOUSANDS/ΜL (ref 1.85–7.62)
NEUTS SEG NFR BLD AUTO: 73 % (ref 43–75)
NRBC BLD AUTO-RTO: 0 /100 WBCS
PHOSPHATE SERPL-MCNC: 3.6 MG/DL (ref 2.3–4.1)
PLATELET # BLD AUTO: 198 THOUSANDS/UL (ref 149–390)
PMV BLD AUTO: 9.8 FL (ref 8.9–12.7)
POTASSIUM SERPL-SCNC: 4.4 MMOL/L (ref 3.5–5.3)
PROT SERPL-MCNC: 6.6 G/DL (ref 6.4–8.2)
PTH-INTACT SERPL-MCNC: 128.3 PG/ML (ref 18.4–80.1)
RBC # BLD AUTO: 2.66 MILLION/UL (ref 3.81–5.12)
SODIUM SERPL-SCNC: 143 MMOL/L (ref 136–145)
TIBC SERPL-MCNC: 277 UG/DL (ref 250–450)
WBC # BLD AUTO: 8.27 THOUSAND/UL (ref 4.31–10.16)

## 2019-12-10 PROCEDURE — 85652 RBC SED RATE AUTOMATED: CPT

## 2019-12-10 PROCEDURE — 80053 COMPREHEN METABOLIC PANEL: CPT | Performed by: PHYSICIAN ASSISTANT

## 2019-12-10 PROCEDURE — 83550 IRON BINDING TEST: CPT

## 2019-12-10 PROCEDURE — 86140 C-REACTIVE PROTEIN: CPT

## 2019-12-10 PROCEDURE — 85025 COMPLETE CBC W/AUTO DIFF WBC: CPT | Performed by: PHYSICIAN ASSISTANT

## 2019-12-10 PROCEDURE — 36415 COLL VENOUS BLD VENIPUNCTURE: CPT | Performed by: PHYSICIAN ASSISTANT

## 2019-12-10 PROCEDURE — 82306 VITAMIN D 25 HYDROXY: CPT

## 2019-12-10 PROCEDURE — 83970 ASSAY OF PARATHORMONE: CPT

## 2019-12-10 PROCEDURE — 82728 ASSAY OF FERRITIN: CPT

## 2019-12-10 PROCEDURE — 84100 ASSAY OF PHOSPHORUS: CPT

## 2019-12-10 PROCEDURE — 83540 ASSAY OF IRON: CPT

## 2019-12-13 ENCOUNTER — TELEPHONE (OUTPATIENT)
Dept: NEPHROLOGY | Facility: CLINIC | Age: 84
End: 2019-12-13

## 2019-12-13 ENCOUNTER — OFFICE VISIT (OUTPATIENT)
Dept: NEPHROLOGY | Facility: CLINIC | Age: 84
End: 2019-12-13
Payer: MEDICARE

## 2019-12-13 VITALS
SYSTOLIC BLOOD PRESSURE: 128 MMHG | DIASTOLIC BLOOD PRESSURE: 76 MMHG | HEIGHT: 60 IN | BODY MASS INDEX: 44.76 KG/M2 | WEIGHT: 228 LBS | HEART RATE: 82 BPM

## 2019-12-13 DIAGNOSIS — I89.0 CHRONIC ACQUIRED LYMPHEDEMA: ICD-10-CM

## 2019-12-13 DIAGNOSIS — D50.8 OTHER IRON DEFICIENCY ANEMIA: Primary | ICD-10-CM

## 2019-12-13 DIAGNOSIS — D63.8 ANEMIA OF CHRONIC DISEASE: ICD-10-CM

## 2019-12-13 DIAGNOSIS — E66.9 OBESITY WITH SERIOUS COMORBIDITY, UNSPECIFIED CLASSIFICATION, UNSPECIFIED OBESITY TYPE: ICD-10-CM

## 2019-12-13 DIAGNOSIS — I10 ESSENTIAL HYPERTENSION: ICD-10-CM

## 2019-12-13 DIAGNOSIS — I50.32 CHRONIC DIASTOLIC HEART FAILURE (HCC): ICD-10-CM

## 2019-12-13 DIAGNOSIS — N18.30 CKD (CHRONIC KIDNEY DISEASE) STAGE 3, GFR 30-59 ML/MIN (HCC): ICD-10-CM

## 2019-12-13 DIAGNOSIS — Z90.5 HISTORY OF NEPHROURETERECTOMY: ICD-10-CM

## 2019-12-13 DIAGNOSIS — Z90.6 HISTORY OF NEPHROURETERECTOMY: ICD-10-CM

## 2019-12-13 DIAGNOSIS — I89.0 LYMPHEDEMA: ICD-10-CM

## 2019-12-13 PROCEDURE — 99214 OFFICE O/P EST MOD 30 MIN: CPT | Performed by: INTERNAL MEDICINE

## 2019-12-13 RX ORDER — SODIUM CHLORIDE 9 MG/ML
20 INJECTION, SOLUTION INTRAVENOUS ONCE
Status: CANCELLED | OUTPATIENT
Start: 2019-12-18

## 2019-12-13 NOTE — PATIENT INSTRUCTIONS
- Please call me in 10 days after having your blood work done to review the results if you do not hear back from me or my office, as I may have not received the results  - please remember to perform blood work prior to the next visit  - Please call if the blood pressure top number is greater than 150 or less than 110 consistently  - Please call if you are gaining more than 2lbs in 2 days for adjustment of water pills   ~ Please AVOID the following pain medications  LIST OF NSAIDS (NONSTEROIDAL ANTI-INFLAMMATORY DRUGS) AND DUTTA-2 INHIBITORS    DIFLUNISAL (DOLOBID)  IBUPROFEN (MOTRIN, ADVIL)  FLURBIPROFEN (ANSAID)  KETOPROFEN (ORUDIS, ORUVAIL)  FENOPROFEN (NALFON)  NABUMETONE (RELAFEN)  PIROXICAM (FELDENE)  NAPROXEN (ALEVE, NAPROSYN, NAPRELAN, ANAPROX)  DICLOFENAC (VOLTAREN, CATAFLAM)  INDOMETHACIN (INDOCIN)  SULINDAC (CLINORIL)  TOLMETIN (TOLETIN)  ETODOLAC (LODINE)  MELOXICAM (MOBIC)  KETOROLAC (TORADOL)  OXAPROZIN (DAYPRO)  CELECOXIB (CELEBREX)    Phosphorus diet  Follow a low phosphorus diet      Avoid these higher phosphorus foods: Choose these lower phosphorus foods:   Milk, pudding or yogurt (from animals and from many soy varieties) Rice milk (unfortified), nondairy creamer (if it doesn't have terms in the ingredients list that contain the letters "phos")   Hard cheeses, ricotta or cottage cheese, fat-free cream cheese Regular and low-fat cream cheese   Ice cream or frozen yogurt Sherbet or frozen fruit pops   Soups made with higher phosphorus ingredients (milk, dried peas, beans, lentils) Soups made with lower phosphorus ingredients (broth- or water-based with other lower phosphorus ingredients)   Whole grains, including whole-grain breads, crackers, cereal, rice and pasta Refined grains, including white bread, crackers, cereals, rice and pasta   Quick breads, biscuits, cornbread, muffins, pancakes or waffles Homemade refined (white) dinner rolls, bagels or English muffins   Dried peas (split, black-eyed), beans (black, garbanzo, lima, kidney, navy, adamson) or lentils Green peas (canned, frozen), green beans or wax beans   Organ meats, walleye, pollock or sardines Lean beef, pork, lamb, poultry or other fish   Nuts and seeds Popcorn   Peanut butter and other nut butters Jam, jelly or honey   Chocolate, including chocolate drinks Carob (chocolate-flavored) candy, hard candy or gumdrops   Evette and pepper-type sodas, flavored hobson, bottled teas (if a term in the ingredients list contains the letters "phos") Lemon-lime soda, ginger ale or root beer, plain water     Follow a moderate potassium diet

## 2019-12-13 NOTE — TELEPHONE ENCOUNTER
Scheduled for 12/20 at 130 and 12/27 at 200 [facility]- called and made the pt aware of date, time, and location

## 2019-12-13 NOTE — PROGRESS NOTES
Nephrology Follow up Consultation  Jewels Alatorre 81 y o  female MRN: [id number]            BACKGROUND:  Jewels Alatorre is a 81 y  o female who was referred by Maxx Diaz MD for evaluation of Follow-up and Chronic Kidney Disease    ASSESSMENT / PLAN:   81 y o   female with pmh of multiple co-morbidities including diastolic CHF, urothelial cancer, CVA, AFib, PVD and CKD stage 3 presented to the office for routine follow-up    1  CKD stage 3B/4:  - Patient has a baseline creatinine of 1 8-2 mg/dL  Most recent labs show a Creatinine of 1 71 mg/dL  Renal function remains stable  - likely has underlying CKD secondary to decreased nephron mass due to nephrectomy plus hypertensive nephrosclerosis plus age-related nephron loss plus cardiorenal syndrome  - status post right nephrectomy in April 2018  - Proteinuria - most recent protein creatinine ratio of 300 mg  Will check UA, spot urine protein and creatinine    - Acid base and lytes stable  - Clinically the patient appears to be hypervolemic  - Recommend to avoid use of NSAIDs, nephrotoxins  Caution advised with regards to exposure to IV contrast dye    - Discussed with the patient in depth her renal status, including the possible etiologies for CKD  - Advised the patient that when her GFR is close to 20mL/min then will start discussing about RRT(renal replacement therapy) options such as renal transplant, peritoneal dialysis and hemodialysis  - Informed the patient about the various options for Renal Replacement therapy  - Discussed with the patient how we need to work together to delay the progression of CKD with optimal BP control based on their age and co-morbidities and trying to reduce proteinuria by the use of anti-proteinuric agents       2  Hypertension:  - Patient is on cozaar 25mg po Q24, torsemide 20mg po Q24     - Goal BP of < 140/90 based on age and comorbidities  - Instructed to follow low sodium (2gm)diet   - Advised to hold ACEI/ARBs if patient suffers from dehydration due to gastrointestinal losses due to risk of BRENNEN secondary to failure to autoregulate  3  Hemoglobin:  - Goal Hb of 10-12 g/dL  - Most recent labs suggestive of 9 grams/deciliter  - is following the hematologist  - will set up for [facility] infusion for feraheme  - cont vit B 12 PO     4 CKD-MBD(Mineral Bone Disease): - Based on patients CKD stage following is the goal of therapy  - Maintain calcium phosphorus product of < 55   - Stage 4 CKD - Goal Ca 8 5-10 mg/dL , goal Phos 2 7-4 6 mg/dL  , goal iPTH  pg/mL  - Patient is currently not at goal   - Continue patient on calcium + D  - most recent vit D of 38, ipth =128, recheck PTH next visit to see if needs to be on calcitriol  5  Lipids:  - goal LDL less than 70  - on pravachol  - management as per primary team    6  History of urothelial cancer:  - management as per Primary team  - follow-up with Urology  - status post right nephro ureterectomy in April 2018    7  CHF / a fib:  - Management as per primary team  - follow-up with cardiology  - on xarelto  - most recent echo from March 14, 2019 shows EF of 60%  8  RA:  - on mtx  - f/u with rheum    9  Nutrition:  - Encouraged patient to follow a renal diet comprising of moderate potassium, low phosphorus and protein restriction to 0 8gm/kg  - Will check serum albumin with next blood work  10  Followup:  - Patient is to follow-up in 4 months, with lab work to be performed a few days prior to the visit  Advised patient to call me in 10 days to review the results if they do not hear back from me, as I may have not received the results  Elzbieta Irvin MD, FASN, 12/13/2019, 2:35 PM             SUBJECTIVE: 81 y o  female presents to the office for routine follow-up, not doing lymphedema wraps so increasing edema lower extremity  Has been eating more salt than usual   Reports was on IV infusions but now only on p o  Iron    Next appointment with Heme-Onc is in a year  Agreeable to go and get IV iron infusions  No recent hospitalizations no NSAID use  Review of Systems   Constitutional: Positive for fatigue  Negative for appetite change and fever  HENT: Negative for congestion and sore throat  Respiratory: Negative for cough, shortness of breath and wheezing  Cardiovascular: Negative for chest pain, palpitations and leg swelling  Gastrointestinal: Negative for abdominal pain, constipation, diarrhea, nausea and vomiting  Endocrine: Positive for cold intolerance  Genitourinary: Negative for difficulty urinating and dysuria  Musculoskeletal: Negative for back pain  Skin: Negative for rash and wound  Neurological: Positive for weakness  Negative for dizziness, light-headedness and headaches  Psychiatric/Behavioral: Negative for confusion  All other systems reviewed and are negative        PAST MEDICAL HISTORY:  Past Medical History:   Diagnosis Date    Anemia     Arthritis     rheumatoid    At risk for falls     Benign essential hypertension     [facility]     Chronic cystitis     Chronic kidney disease     right kidney cancer - kidney removed    Chronic pain disorder     CPAP (continuous positive airway pressure) dependence     Diverticulitis of colon     Early satiety     Edema     GERD (gastroesophageal reflux disease)     Gout     Hearing aid worn     bilateral    Hearing loss     Hemorrhoids     Hiatal hernia     History of colonic polyps     Hyperlipidemia     Hypothyroidism     Irregular heart beat     Afib    Left breast mass     Microhematuria     Migraine     occular    Mobility impaired     left arm    Neuropathy     lower and upper extermities    Overactive bladder     Pneumonia of left lower lobe due to infectious organism ([facility]     PVD (peripheral vascular disease) ([facility]     RA (rheumatoid arthritis) ([facility]     Restless leg syndrome     Sleep apnea     uses cpap    Stroke [facility]     5/2017    Type 2 diabetes mellitus (HCC)     Urinary frequency     Urinary urgency     Urothelial cancer ([facility] ) 04/23/2018    Uses walker        PROBLEM LIST    Patient Active Problem List   Diagnosis    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 ([facility]    Essential hypertension    Rheumatoid arthritis ([facility]    Sleep apnea    Acquired hypothyroidism    Chronic GERD    Edema    Hypercholesterolemia    Obesity    Peripheral neuropathy    Primary osteoarthritis of left knee    Restless leg syndrome    Right lumbar radiculopathy    Sensorineural hearing loss    Sinus arrhythmia    Chronic bilateral thoracic back pain    Thoracic degenerative disc disease    Urothelial cancer ([facility]    Lung nodule < 6cm on CT    Pancreatic cyst    Iron deficiency anemia    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 (HCC)    History of nephroureterectomy    Incisional hernia    Atrial fibrillation (HCC)    CKD (chronic kidney disease) stage 3, GFR 30-59 ml/min (HCC)    Chronic diastolic heart failure (HCC)    Spontaneous dislocation of shoulder, left    Abdominal wall hernia    Atherosclerosis of native artery of right lower extremity with intermittent claudication (HCC)    Chronic acquired lymphedema    Anemia of chronic disease    Polyneuropathy associated with underlying disease (HCC)    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)    PAD (peripheral artery disease) (HCC)    Impaired fasting glucose    Seborrheic keratoses    Aortoiliac occlusive disease ([facility]    Left knee pain    Fall from bed       PAST SURGICAL HISTORY:  Past Surgical History:   Procedure Laterality Date    APPENDECTOMY      ARTHROSCOPY WRIST Right     with release of transverse carpal ligament     BLADDER SURGERY      BLADDER SURGERY      BREAST SURGERY      left breast    BUNIONECTOMY Bilateral     CATARACT EXTRACTION Bilateral     CHOLECYSTECTOMY      COLONOSCOPY      CYSTOSCOPY      CYSTOSCOPY      EGD AND COLONOSCOPY N/A 12/20/2017    Procedure: EGD AND COLONOSCOPY;  Surgeon: Delfina Gutierrez MD;  [facility]; Service: Gastroenterology    ESOPHAGOGASTRODUODENOSCOPY      diagnostic    FOREARM SURGERY Right     fracture repair    FRACTURE SURGERY Right     arm with hardware    HYSTERECTOMY      IR ABDOMINAL ANGIOGRAPHY / INTERVENTION  1/24/2019    KNEE ARTHROSCOPY Left     KNEE SURGERY Left     meniscus tear    NEPHRECTOMY Right     NOSE SURGERY      NC CYSTO/URETERO W/LITHOTRIPSY &INDWELL STENT INSRT Right 2/28/2018    Procedure: CYSTOSCOPY RIGHT URETEROSCOPY, RIGHT RETROGRADE PYELOGRAM AND INSERTION  RIGHT STENT URETERAL, RIGHT RENAL PELVIC WASHING FOR CYTOLOGY;  Surgeon: Jordan Enamorado MD;  [facility];  Service: Urology    NC CYSTOURETHROSCOPY,FULGUR 0 5-2 CM LESN N/A 5/21/2019    Procedure: BLADDER BIOPSY WITH FULGURATION;  Surgeon: Kiko Tapia MD;  [facility];  Service: Urology    NC NEPHRECTOMY, W/PART  URETECTOMY Right 4/23/2018    Procedure: Cr Sessions ASSISTED NEPHRO-URETERECTOMY WITH BLADDER CUFF EXCISION;  Surgeon: Kiko Tapia MD;  [facility];  Service: Urology    NC [place] 3RD+ ORD SLCTV ABDL PEL/LXTR [facility] Right 1/24/2019    Procedure: RIGHT LEG ANGIOGRAM, BILATERAL FEMORAL ACCESS, STENTING OF RIGHT EXTERNAL ILIAC ARTERY WITH BALLOON ANGIOPLASTY;  Surgeon: Maritza Arteaga MD;  [facility];  Service: Vascular    REPLACEMENT TOTAL KNEE BILATERAL      URETEROSCOPY Right 3/20/2018    Procedure: CYSTOSCOPY, RETROGRADE PYELOGRAM, URETEROSCOPY, BIOPSY, BRUSH, FULGURATION, STENT PLACEMENT, BLADDER BIOPSY WITH FULGURATION;  Surgeon: Kiko Tapia MD;  [facility];  Service: Urology    VASCULAR SURGERY      stents       SOCIAL HISTORY :   reports that she has never smoked   She has never used smokeless tobacco  She reports that she drank alcohol  She reports that she does not use drugs  FAMILY HISTORY:  Family History   Problem Relation Age of Onset    Other Mother         epilepsy    Early death Mother    [place] Iqbal Glaucoma Father     Stroke Father         silent    Other Brother         cardiac disorder    Rheum arthritis Son     No Known Problems Son     No Known Problems Daughter     No Known Problems Son        ALLERGIES:  Allergies   Allergen Reactions    Nitrofurantoin Hives     HIVES * pt denies  Other reaction(s): Unknown Allergic Reaction  Other reaction(s): Other (See Comments)  HIVES * pt denies    Atorvastatin      Other reaction(s): Muscle Pain, Myalgia    Acetazolamide Other (See Comments)     Other reaction(s): Unknown Allergic Reaction unknown reaction     Other Other (See Comments)     Adhesive tape : red and itching  Other reaction(s): Other (See Comments)  red and itching    Shellfish-Derived Products Other (See Comments)     Patient got Gout following eating shell fish    Sulfa Antibiotics Other (See Comments)     unknown    Tramadol Diarrhea and Vomiting    Azithromycin Rash           PHYSICAL EXAM:  Vitals:    12/13/19 1131   BP: 128/76   Pulse: 82   Weight: 103 kg (228 lb)   Height: 5' (1 524 m)     Body mass index is 44 53 kg/m²  Physical Exam   Constitutional: She is oriented to person, place, and time  She appears well-developed and well-nourished  No distress  HENT:   Head: Normocephalic and atraumatic  Mouth/Throat: Oropharynx is clear and moist  No oropharyngeal exudate  Eyes: Conjunctivae are normal    Neck: Neck supple  No JVD present  No tracheal deviation present  No thyromegaly present  Cardiovascular: Normal heart sounds  Exam reveals no friction rub  Pulmonary/Chest: Effort normal  She has no wheezes  She has no rales  Abdominal: Soft  She exhibits no mass  There is no tenderness  Musculoskeletal: She exhibits edema     +1 edema   Neurological: She is alert and oriented to person, place, and time  Skin: Skin is warm  No rash noted  She is not diaphoretic  No pallor  Psychiatric: She has a normal mood and affect  Her behavior is normal    Vitals reviewed  LABORATORY DATA:     Results from last 6 Months   Lab Units 12/10/19  1250 10/24/19  1012 10/16/19  0932  09/03/19  0404 07/10/19  1225   WBC Thousand/uL 8 27 8 65 14 78*   < >  --  9 07   HEMOGLOBIN g/dL 9 0* 9 4* 9 7*   < >  --  10 0*   I STAT HEMOGLOBIN g/dl  --   --   --   --  10 2*  --    HEMATOCRIT % 29 1* 30 4* 29 6*   < >  --  31 5*   HEMATOCRIT, ISTAT %  --   --   --   --  30*  --    PLATELETS Thousands/uL 198 182 216   < >  --  237   POTASSIUM mmol/L 4 4 4 0 4 0   < >  --  3 9   CHLORIDE mmol/L 109* 108 109*   < >  --  106   CO2 mmol/L 31 27 23   < >  --  28   CO2, I-STAT mmol/L  --   --   --   --  31  --    BUN mg/dL 29* 34* 28*   < >  --  35*   CREATININE mg/dL 1 71* 1 47* 1 50*   < >  --  1 72*   CALCIUM mg/dL 8 7 8 8 9 3   < >  --  9 0   MAGNESIUM mg/dL  --   --  2 1  --   --  2 6   PHOSPHORUS mg/dL 3 6  --  2 8  --   --  4 2*   GLUCOSE, ISTAT mg/dl  --   --   --   --  165*  --    IRON ug/dL 44*  --  51  --   --   --    FERRITIN ng/mL 68 64 83  --   --   --     < > = values in this interval not displayed          rest all reviewed    RADIOLOGY:  No orders to display     Rest all reviewed        MEDICATIONS:    Current Outpatient Medications:     acetaminophen (TYLENOL) 325 mg tablet, Take 650 mg by mouth 2 (two) times a day as needed for mild pain , Disp: , Rfl:     Calcium Citrate-Vitamin D (CALCIUM + D PO), Take 1 tablet by mouth 2 (two) times a day, Disp: , Rfl:     cyanocobalamin (VITAMIN B-12) 100 mcg tablet, Take 100 mcg by mouth daily , Disp: , Rfl:     gabapentin (NEURONTIN) 100 mg capsule, Take 1 capsule in the morning, take 1 capsule in the afternoon, and take 1-3 capapsules at bedtime, Disp: 450 capsule, Rfl: 3    hydroxychloroquine (PLAQUENIL) 200 mg tablet, Take 200 mg by mouth 2 (two) times a day with meals, Disp: , Rfl:     LEUCOVORIN CALCIUM PO, Take 10 mg by mouth once a week, Disp: , Rfl:     levothyroxine 75 mcg tablet, Take 1 tablet (75 mcg total) by mouth daily, Disp: 90 tablet, Rfl: 3    losartan (COZAAR) 25 mg tablet, Take 1 tablet (25 mg total) by mouth daily, Disp: 90 tablet, Rfl: 3    methotrexate 2 5 mg tablet, Take 2 tablets weekly, Disp: 12 tablet, Rfl: 0    Multiple Vitamin (MULTIVITAMINS PO), Take 1 tablet by mouth daily, Disp: , Rfl:     omeprazole (PriLOSEC) 40 MG capsule, Take 1 capsule (40 mg total) by mouth daily, Disp: 90 capsule, Rfl: 3    pravastatin (PRAVACHOL) 80 mg tablet, Take 1 tablet (80 mg total) by mouth daily, Disp: 90 tablet, Rfl: 3    rivaroxaban (XARELTO) 15 mg tablet, Take 1 tablet (15 mg total) by mouth daily with breakfast, Disp: 30 tablet, Rfl: 5    rOPINIRole (REQUIP) 0 5 mg tablet, 1 tab in the am and 2 tab at bedtime, Disp: , Rfl:     torsemide (DEMADEX) 20 mg tablet, Take 1 tablet (20 mg total) by mouth daily, Disp: 90 tablet, Rfl: 3    Elastic Bandages & Supports (MEDICAL COMPRESSION STOCKINGS) MISC, by Does not apply route daily, Disp: 10 each, Rfl: 0    polyethylene glycol (MIRALAX) 17 g packet, Take 17 g by mouth daily (Patient taking differently: Take 17 g by mouth as needed ), Disp: 14 each, Rfl: 0  No current facility-administered medications for this visit  Facility-Administered Medications Ordered in Other Visits:     acetaminophen (TYLENOL) tablet 650 mg, 650 mg, Oral, Q6H PRN, Brianne Gasca PA-C          Portions of the record may have been created with voice recognition software  Occasional wrong word or "sound a like" substitutions may have occurred due to the inherent limitations of voice recognition software  Read the chart carefully and recognize, using context, where substitutions have occurred  If you have any questions, please contact the dictating provider

## 2019-12-13 NOTE — TELEPHONE ENCOUNTER
Yuki Mattson MD  [facility]             Can you please schedule her for Feraheme infusion at [facility] for next week I have placed the orders thanks

## 2019-12-13 NOTE — TELEPHONE ENCOUNTER
Patient needs order put in [facility] for the iron infusions  Will call [facility] infusion to set up patient and call her then

## 2019-12-17 ENCOUNTER — TELEPHONE (OUTPATIENT)
Dept: CARDIOLOGY CLINIC | Facility: CLINIC | Age: 84
End: 2019-12-17

## 2019-12-17 NOTE — TELEPHONE ENCOUNTER
Please take an additional torsemide 20 mg today  If weight has not decreased by 3 lbs by tomorrow, please take an additional 20 mg today as well  Thanks

## 2019-12-17 NOTE — TELEPHONE ENCOUNTER
I spoke with Ayo Hankins and advised of Dr Nicolas Stafford instructions  She verbalized understanding

## 2019-12-17 NOTE — TELEPHONE ENCOUNTER
Neftaly Kwong called with a weight gain of 3 lbs over 3 days  Last weight on Sunday was 228 and today she weighs 231  Last visit on 11/22, weight was 221  She said her lymphedema in legs in worse and her jayla does go up and down  Taking torsemide 20 mg daily  Last CMP on 12/10:  Creatine 1 71, K 4 4  Please advise

## 2019-12-18 ENCOUNTER — HOSPITAL ENCOUNTER (OUTPATIENT)
Dept: INFUSION CENTER | Facility: HOSPITAL | Age: 84
Discharge: HOME | End: 2019-12-18
Attending: INTERNAL MEDICINE
Payer: MEDICARE

## 2019-12-18 VITALS
RESPIRATION RATE: 18 BRPM | BODY MASS INDEX: 44.97 KG/M2 | HEIGHT: 60 IN | HEART RATE: 78 BPM | DIASTOLIC BLOOD PRESSURE: 68 MMHG | SYSTOLIC BLOOD PRESSURE: 140 MMHG | WEIGHT: 229.06 LBS | TEMPERATURE: 97.3 F

## 2019-12-18 DIAGNOSIS — D63.8 ANEMIA OF CHRONIC DISEASE: Primary | ICD-10-CM

## 2019-12-18 DIAGNOSIS — G25.81 RESTLESS LEG SYNDROME: ICD-10-CM

## 2019-12-18 DIAGNOSIS — N18.30 CKD (CHRONIC KIDNEY DISEASE) STAGE 3, GFR 30-59 ML/MIN (HCC): ICD-10-CM

## 2019-12-18 DIAGNOSIS — D50.8 OTHER IRON DEFICIENCY ANEMIA: ICD-10-CM

## 2019-12-18 PROCEDURE — 96365 THER/PROPH/DIAG IV INF INIT: CPT

## 2019-12-18 RX ORDER — SODIUM CHLORIDE 9 MG/ML
20 INJECTION, SOLUTION INTRAVENOUS ONCE
Status: CANCELLED | OUTPATIENT
Start: 2019-12-27

## 2019-12-18 RX ORDER — PHENAZOPYRIDINE HYDROCHLORIDE 100 MG/1
100 TABLET, FILM COATED ORAL 3 TIMES DAILY PRN
COMMUNITY
End: 2020-01-01 | Stop reason: CLARIF

## 2019-12-18 RX ORDER — SODIUM CHLORIDE 9 MG/ML
20 INJECTION, SOLUTION INTRAVENOUS ONCE
Status: COMPLETED | OUTPATIENT
Start: 2019-12-18 | End: 2019-12-18

## 2019-12-18 RX ORDER — ROPINIROLE 0.5 MG/1
TABLET, FILM COATED ORAL
Qty: 270 TABLET | Refills: 3 | Status: SHIPPED | OUTPATIENT
Start: 2019-12-18 | End: 2020-01-01 | Stop reason: SDUPTHER

## 2019-12-18 RX ADMIN — FERUMOXYTOL 510 MG: 510 INJECTION INTRAVENOUS at 14:18

## 2019-12-18 RX ADMIN — SODIUM CHLORIDE 20 ML/HR: 0.9 INJECTION, SOLUTION INTRAVENOUS at 14:19

## 2019-12-27 ENCOUNTER — HOSPITAL ENCOUNTER (OUTPATIENT)
Dept: INFUSION CENTER | Facility: HOSPITAL | Age: 84
Discharge: HOME | End: 2019-12-27
Attending: INTERNAL MEDICINE
Payer: MEDICARE

## 2019-12-27 VITALS
SYSTOLIC BLOOD PRESSURE: 136 MMHG | RESPIRATION RATE: 18 BRPM | TEMPERATURE: 97.6 F | DIASTOLIC BLOOD PRESSURE: 74 MMHG | HEART RATE: 84 BPM

## 2019-12-27 DIAGNOSIS — N18.30 CKD (CHRONIC KIDNEY DISEASE) STAGE 3, GFR 30-59 ML/MIN (HCC): ICD-10-CM

## 2019-12-27 DIAGNOSIS — D50.8 OTHER IRON DEFICIENCY ANEMIA: ICD-10-CM

## 2019-12-27 DIAGNOSIS — D63.8 ANEMIA OF CHRONIC DISEASE: Primary | ICD-10-CM

## 2019-12-27 PROCEDURE — 96365 THER/PROPH/DIAG IV INF INIT: CPT

## 2019-12-27 RX ORDER — SODIUM CHLORIDE 9 MG/ML
20 INJECTION, SOLUTION INTRAVENOUS ONCE
Status: CANCELLED | OUTPATIENT
Start: 2020-01-01

## 2019-12-27 RX ORDER — SODIUM CHLORIDE 9 MG/ML
20 INJECTION, SOLUTION INTRAVENOUS ONCE
Status: COMPLETED | OUTPATIENT
Start: 2019-12-27 | End: 2019-12-27

## 2019-12-27 RX ADMIN — SODIUM CHLORIDE 20 ML/HR: 0.9 INJECTION, SOLUTION INTRAVENOUS at 14:44

## 2019-12-27 RX ADMIN — FERUMOXYTOL 510 MG: 510 INJECTION INTRAVENOUS at 14:44

## 2019-12-27 NOTE — PLAN OF CARE
Problem: Potential for Falls  Goal: Patient will remain free of falls  Description  INTERVENTIONS:  - Assess patient frequently for physical needs  -  Identify cognitive and physical deficits and behaviors that affect risk of falls    -  [place] fall precautions as indicated by assessment   - Educate patient/family on patient safety including physical limitations  - Instruct patient to call for assistance with activity based on assessment  - Modify environment to reduce risk of injury  - Consider OT/PT consult to assist with strengthening/mobility  Outcome: Progressing

## 2020-01-01 ENCOUNTER — TELEMEDICINE (OUTPATIENT)
Dept: CARDIOLOGY CLINIC | Facility: CLINIC | Age: 85
End: 2020-01-01
Payer: MEDICARE

## 2020-01-01 ENCOUNTER — APPOINTMENT (EMERGENCY)
Dept: RADIOLOGY | Facility: HOSPITAL | Age: 85
End: 2020-01-01
Payer: MEDICARE

## 2020-01-01 ENCOUNTER — APPOINTMENT (OUTPATIENT)
Dept: PHYSICAL THERAPY | Facility: REHABILITATION | Age: 85
End: 2020-01-01
Payer: MEDICARE

## 2020-01-01 ENCOUNTER — TELEPHONE (OUTPATIENT)
Dept: NEPHROLOGY | Facility: CLINIC | Age: 85
End: 2020-01-01

## 2020-01-01 ENCOUNTER — TELEPHONE (OUTPATIENT)
Dept: CARDIOLOGY CLINIC | Facility: CLINIC | Age: 85
End: 2020-01-01

## 2020-01-01 ENCOUNTER — OFFICE VISIT (OUTPATIENT)
Dept: PHYSICAL THERAPY | Facility: REHABILITATION | Age: 85
End: 2020-01-01
Payer: MEDICARE

## 2020-01-01 ENCOUNTER — APPOINTMENT (OUTPATIENT)
Dept: PHYSICAL THERAPY | Age: 85
End: 2020-01-01
Payer: MEDICARE

## 2020-01-01 ENCOUNTER — TELEPHONE (OUTPATIENT)
Dept: HEMATOLOGY ONCOLOGY | Facility: CLINIC | Age: 85
End: 2020-01-01

## 2020-01-01 ENCOUNTER — OFFICE VISIT (OUTPATIENT)
Dept: HEMATOLOGY ONCOLOGY | Facility: CLINIC | Age: 85
End: 2020-01-01
Payer: MEDICARE

## 2020-01-01 ENCOUNTER — TRANSCRIBE ORDERS (OUTPATIENT)
Dept: RADIOLOGY | Facility: HOSPITAL | Age: 85
End: 2020-01-01

## 2020-01-01 ENCOUNTER — OFFICE VISIT (OUTPATIENT)
Dept: FAMILY MEDICINE CLINIC | Facility: CLINIC | Age: 85
End: 2020-01-01
Payer: MEDICARE

## 2020-01-01 ENCOUNTER — TRANSCRIBE ORDERS (OUTPATIENT)
Dept: LAB | Facility: CLINIC | Age: 85
End: 2020-01-01

## 2020-01-01 ENCOUNTER — PROCEDURE VISIT (OUTPATIENT)
Dept: UROLOGY | Facility: CLINIC | Age: 85
End: 2020-01-01
Payer: MEDICARE

## 2020-01-01 ENCOUNTER — HOSPITAL ENCOUNTER (EMERGENCY)
Facility: HOSPITAL | Age: 85
End: 2020-12-30
Attending: EMERGENCY MEDICINE | Admitting: EMERGENCY MEDICINE
Payer: MEDICARE

## 2020-01-01 ENCOUNTER — TELEPHONE (OUTPATIENT)
Dept: ADMINISTRATIVE | Facility: HOSPITAL | Age: 85
End: 2020-01-01

## 2020-01-01 ENCOUNTER — APPOINTMENT (OUTPATIENT)
Dept: LAB | Facility: CLINIC | Age: 85
End: 2020-01-01
Payer: MEDICARE

## 2020-01-01 ENCOUNTER — HOSPITAL ENCOUNTER (OUTPATIENT)
Dept: NON INVASIVE DIAGNOSTICS | Facility: CLINIC | Age: 85
Discharge: HOME | End: 2020-02-25
Payer: MEDICARE

## 2020-01-01 ENCOUNTER — OFFICE VISIT (OUTPATIENT)
Dept: CARDIOLOGY CLINIC | Facility: CLINIC | Age: 85
End: 2020-01-01
Payer: MEDICARE

## 2020-01-01 ENCOUNTER — TELEPHONE (OUTPATIENT)
Dept: CASE MANAGEMENT | Facility: HOSPITAL | Age: 85
End: 2020-01-01

## 2020-01-01 ENCOUNTER — OFFICE VISIT (OUTPATIENT)
Dept: PHYSICAL THERAPY | Age: 85
End: 2020-01-01
Payer: MEDICARE

## 2020-01-01 ENCOUNTER — OFFICE VISIT (OUTPATIENT)
Dept: NEPHROLOGY | Facility: CLINIC | Age: 85
End: 2020-01-01
Payer: MEDICARE

## 2020-01-01 ENCOUNTER — LAB (OUTPATIENT)
Dept: LAB | Facility: CLINIC | Age: 85
End: 2020-01-01
Payer: MEDICARE

## 2020-01-01 ENCOUNTER — TELEPHONE (OUTPATIENT)
Dept: OTHER | Facility: HOSPITAL | Age: 85
End: 2020-01-01

## 2020-01-01 ENCOUNTER — HOSPITAL ENCOUNTER (OUTPATIENT)
Dept: RADIOLOGY | Facility: HOSPITAL | Age: 85
Discharge: HOME | End: 2020-01-16
Attending: UROLOGY
Payer: MEDICARE

## 2020-01-01 ENCOUNTER — TELEMEDICINE (OUTPATIENT)
Dept: VASCULAR SURGERY | Facility: CLINIC | Age: 85
End: 2020-01-01
Payer: MEDICARE

## 2020-01-01 ENCOUNTER — EVALUATION (OUTPATIENT)
Dept: PHYSICAL THERAPY | Age: 85
End: 2020-01-01
Payer: MEDICARE

## 2020-01-01 ENCOUNTER — HOSPITAL ENCOUNTER (OUTPATIENT)
Dept: NON INVASIVE DIAGNOSTICS | Facility: CLINIC | Age: 85
Discharge: HOME | End: 2020-08-12
Payer: MEDICARE

## 2020-01-01 ENCOUNTER — EVALUATION (OUTPATIENT)
Dept: PHYSICAL THERAPY | Facility: REHABILITATION | Age: 85
End: 2020-01-01
Payer: MEDICARE

## 2020-01-01 ENCOUNTER — TELEMEDICINE (OUTPATIENT)
Dept: NEPHROLOGY | Facility: CLINIC | Age: 85
End: 2020-01-01
Payer: MEDICARE

## 2020-01-01 ENCOUNTER — TELEPHONE (OUTPATIENT)
Dept: FAMILY MEDICINE CLINIC | Facility: CLINIC | Age: 85
End: 2020-01-01

## 2020-01-01 ENCOUNTER — DOCUMENTATION (OUTPATIENT)
Dept: CARDIOLOGY CLINIC | Facility: CLINIC | Age: 85
End: 2020-01-01

## 2020-01-01 ENCOUNTER — HOSPITAL ENCOUNTER (OUTPATIENT)
Dept: RADIOLOGY | Facility: HOSPITAL | Age: 85
Discharge: HOME | End: 2020-07-11
Attending: INTERNAL MEDICINE
Payer: MEDICARE

## 2020-01-01 ENCOUNTER — HOSPITAL ENCOUNTER (OUTPATIENT)
Dept: NON INVASIVE DIAGNOSTICS | Facility: CLINIC | Age: 85
Discharge: HOME | End: 2020-11-13
Payer: MEDICARE

## 2020-01-01 VITALS
BODY MASS INDEX: 45.84 KG/M2 | SYSTOLIC BLOOD PRESSURE: 120 MMHG | WEIGHT: 221 LBS | DIASTOLIC BLOOD PRESSURE: 80 MMHG | TEMPERATURE: 97.1 F | BODY MASS INDEX: 43.39 KG/M2 | HEIGHT: 60 IN | HEIGHT: 60 IN | WEIGHT: 233.5 LBS | OXYGEN SATURATION: 97 % | HEART RATE: 90 BPM

## 2020-01-01 VITALS
HEART RATE: 79 BPM | WEIGHT: 228.8 LBS | BODY MASS INDEX: 43.2 KG/M2 | TEMPERATURE: 97.9 F | OXYGEN SATURATION: 97 % | DIASTOLIC BLOOD PRESSURE: 70 MMHG | HEIGHT: 61 IN | SYSTOLIC BLOOD PRESSURE: 120 MMHG

## 2020-01-01 VITALS
HEART RATE: 98 BPM | WEIGHT: 229.25 LBS | HEIGHT: 60 IN | DIASTOLIC BLOOD PRESSURE: 70 MMHG | BODY MASS INDEX: 45.01 KG/M2 | OXYGEN SATURATION: 97 % | SYSTOLIC BLOOD PRESSURE: 116 MMHG

## 2020-01-01 VITALS
WEIGHT: 221.06 LBS | HEART RATE: 91 BPM | OXYGEN SATURATION: 97 % | RESPIRATION RATE: 20 BRPM | SYSTOLIC BLOOD PRESSURE: 140 MMHG | HEIGHT: 60 IN | DIASTOLIC BLOOD PRESSURE: 70 MMHG | BODY MASS INDEX: 43.4 KG/M2 | TEMPERATURE: 97.6 F

## 2020-01-01 VITALS
TEMPERATURE: 98.4 F | WEIGHT: 228 LBS | RESPIRATION RATE: 20 BRPM | HEIGHT: 60 IN | SYSTOLIC BLOOD PRESSURE: 144 MMHG | BODY MASS INDEX: 44.76 KG/M2 | HEART RATE: 84 BPM | DIASTOLIC BLOOD PRESSURE: 84 MMHG

## 2020-01-01 VITALS — OXYGEN SATURATION: 13 % | TEMPERATURE: 96.8 F

## 2020-01-01 VITALS
SYSTOLIC BLOOD PRESSURE: 142 MMHG | WEIGHT: 234 LBS | RESPIRATION RATE: 18 BRPM | BODY MASS INDEX: 44.17 KG/M2 | HEART RATE: 100 BPM | TEMPERATURE: 97.6 F | DIASTOLIC BLOOD PRESSURE: 64 MMHG | DIASTOLIC BLOOD PRESSURE: 68 MMHG | WEIGHT: 225 LBS | SYSTOLIC BLOOD PRESSURE: 140 MMHG | BODY MASS INDEX: 45.94 KG/M2 | OXYGEN SATURATION: 98 % | HEART RATE: 69 BPM | HEIGHT: 60 IN | HEIGHT: 60 IN

## 2020-01-01 VITALS
HEIGHT: 60 IN | HEART RATE: 113 BPM | SYSTOLIC BLOOD PRESSURE: 141 MMHG | DIASTOLIC BLOOD PRESSURE: 80 MMHG | OXYGEN SATURATION: 98 % | TEMPERATURE: 97.6 F | BODY MASS INDEX: 43.31 KG/M2 | WEIGHT: 220.6 LBS

## 2020-01-01 VITALS
HEART RATE: 56 BPM | BODY MASS INDEX: 43.39 KG/M2 | DIASTOLIC BLOOD PRESSURE: 86 MMHG | TEMPERATURE: 97.8 F | RESPIRATION RATE: 18 BRPM | HEIGHT: 60 IN | WEIGHT: 221 LBS | SYSTOLIC BLOOD PRESSURE: 132 MMHG

## 2020-01-01 VITALS
WEIGHT: 233 LBS | SYSTOLIC BLOOD PRESSURE: 128 MMHG | DIASTOLIC BLOOD PRESSURE: 76 MMHG | RESPIRATION RATE: 16 BRPM | BODY MASS INDEX: 45.75 KG/M2 | HEIGHT: 60 IN | HEART RATE: 91 BPM | TEMPERATURE: 97.7 F | OXYGEN SATURATION: 96 %

## 2020-01-01 VITALS
OXYGEN SATURATION: 98 % | HEIGHT: 60 IN | WEIGHT: 221 LBS | TEMPERATURE: 97.5 F | DIASTOLIC BLOOD PRESSURE: 82 MMHG | BODY MASS INDEX: 43.39 KG/M2 | HEART RATE: 101 BPM | SYSTOLIC BLOOD PRESSURE: 132 MMHG | RESPIRATION RATE: 18 BRPM

## 2020-01-01 VITALS
DIASTOLIC BLOOD PRESSURE: 68 MMHG | WEIGHT: 227 LBS | SYSTOLIC BLOOD PRESSURE: 132 MMHG | HEIGHT: 61 IN | HEART RATE: 83 BPM | BODY MASS INDEX: 42.86 KG/M2

## 2020-01-01 VITALS
HEART RATE: 79 BPM | OXYGEN SATURATION: 95 % | DIASTOLIC BLOOD PRESSURE: 58 MMHG | HEIGHT: 60 IN | SYSTOLIC BLOOD PRESSURE: 118 MMHG | WEIGHT: 227.6 LBS | TEMPERATURE: 97.5 F | BODY MASS INDEX: 44.68 KG/M2

## 2020-01-01 VITALS
WEIGHT: 232.4 LBS | BODY MASS INDEX: 46.85 KG/M2 | DIASTOLIC BLOOD PRESSURE: 70 MMHG | OXYGEN SATURATION: 98 % | HEIGHT: 59 IN | TEMPERATURE: 98.7 F | HEART RATE: 110 BPM | RESPIRATION RATE: 18 BRPM | SYSTOLIC BLOOD PRESSURE: 126 MMHG

## 2020-01-01 VITALS
RESPIRATION RATE: 18 BRPM | HEIGHT: 61 IN | SYSTOLIC BLOOD PRESSURE: 138 MMHG | DIASTOLIC BLOOD PRESSURE: 80 MMHG | HEART RATE: 110 BPM | WEIGHT: 228 LBS | OXYGEN SATURATION: 98 % | BODY MASS INDEX: 43.05 KG/M2

## 2020-01-01 VITALS
DIASTOLIC BLOOD PRESSURE: 52 MMHG | OXYGEN SATURATION: 97 % | BODY MASS INDEX: 46.53 KG/M2 | SYSTOLIC BLOOD PRESSURE: 110 MMHG | WEIGHT: 230.8 LBS | HEART RATE: 84 BPM | HEIGHT: 59 IN

## 2020-01-01 VITALS
HEART RATE: 85 BPM | WEIGHT: 223.5 LBS | HEIGHT: 60 IN | TEMPERATURE: 97.2 F | BODY MASS INDEX: 43.88 KG/M2 | DIASTOLIC BLOOD PRESSURE: 80 MMHG | SYSTOLIC BLOOD PRESSURE: 130 MMHG | RESPIRATION RATE: 17 BRPM | OXYGEN SATURATION: 97 %

## 2020-01-01 VITALS
SYSTOLIC BLOOD PRESSURE: 124 MMHG | DIASTOLIC BLOOD PRESSURE: 64 MMHG | BODY MASS INDEX: 45.25 KG/M2 | HEART RATE: 55 BPM | WEIGHT: 230.5 LBS | OXYGEN SATURATION: 90 % | HEIGHT: 60 IN

## 2020-01-01 VITALS
SYSTOLIC BLOOD PRESSURE: 138 MMHG | HEART RATE: 114 BPM | DIASTOLIC BLOOD PRESSURE: 76 MMHG | BODY MASS INDEX: 46.65 KG/M2 | TEMPERATURE: 97.6 F | HEIGHT: 60 IN | OXYGEN SATURATION: 97 % | RESPIRATION RATE: 22 BRPM | WEIGHT: 237.6 LBS

## 2020-01-01 VITALS
WEIGHT: 230 LBS | BODY MASS INDEX: 45.16 KG/M2 | DIASTOLIC BLOOD PRESSURE: 78 MMHG | HEIGHT: 60 IN | HEART RATE: 87 BPM | SYSTOLIC BLOOD PRESSURE: 122 MMHG

## 2020-01-01 VITALS
DIASTOLIC BLOOD PRESSURE: 80 MMHG | BODY MASS INDEX: 42.74 KG/M2 | TEMPERATURE: 97.3 F | HEART RATE: 84 BPM | OXYGEN SATURATION: 97 % | WEIGHT: 226.38 LBS | SYSTOLIC BLOOD PRESSURE: 120 MMHG | HEIGHT: 61 IN

## 2020-01-01 VITALS
HEIGHT: 60 IN | HEART RATE: 91 BPM | DIASTOLIC BLOOD PRESSURE: 60 MMHG | BODY MASS INDEX: 43.74 KG/M2 | TEMPERATURE: 97.5 F | SYSTOLIC BLOOD PRESSURE: 110 MMHG | OXYGEN SATURATION: 96 % | WEIGHT: 222.8 LBS

## 2020-01-01 VITALS — HEIGHT: 60 IN | WEIGHT: 224 LBS | BODY MASS INDEX: 43.98 KG/M2

## 2020-01-01 VITALS — WEIGHT: 223 LBS | BODY MASS INDEX: 43.78 KG/M2 | HEIGHT: 60 IN

## 2020-01-01 DIAGNOSIS — I50.32 CHRONIC DIASTOLIC CONGESTIVE HEART FAILURE (HCC): Primary | ICD-10-CM

## 2020-01-01 DIAGNOSIS — I50.32 CHRONIC DIASTOLIC CONGESTIVE HEART FAILURE (HCC): ICD-10-CM

## 2020-01-01 DIAGNOSIS — I63.311 CEREBROVASCULAR ACCIDENT (CVA) DUE TO THROMBOSIS OF RIGHT MIDDLE CEREBRAL ARTERY (HCC): ICD-10-CM

## 2020-01-01 DIAGNOSIS — M35.00 SICCA SYNDROME (HCC): ICD-10-CM

## 2020-01-01 DIAGNOSIS — M54.16 RIGHT LUMBAR RADICULOPATHY: Primary | ICD-10-CM

## 2020-01-01 DIAGNOSIS — I49.01 CARDIAC ARREST WITH VENTRICULAR FIBRILLATION (HCC): Primary | ICD-10-CM

## 2020-01-01 DIAGNOSIS — I74.09 AORTOILIAC OCCLUSIVE DISEASE (HCC): ICD-10-CM

## 2020-01-01 DIAGNOSIS — R31.9 URINARY TRACT INFECTION WITH HEMATURIA, SITE UNSPECIFIED: Primary | ICD-10-CM

## 2020-01-01 DIAGNOSIS — D63.8 ANEMIA, CHRONIC DISEASE: Primary | ICD-10-CM

## 2020-01-01 DIAGNOSIS — I50.32 CHRONIC DIASTOLIC HEART FAILURE (HCC): Primary | ICD-10-CM

## 2020-01-01 DIAGNOSIS — N18.4 CKD (CHRONIC KIDNEY DISEASE) STAGE 4, GFR 15-29 ML/MIN (HCC): Primary | ICD-10-CM

## 2020-01-01 DIAGNOSIS — I89.0 CHRONIC ACQUIRED LYMPHEDEMA: ICD-10-CM

## 2020-01-01 DIAGNOSIS — N18.30 CKD (CHRONIC KIDNEY DISEASE) STAGE 3, GFR 30-59 ML/MIN (HCC): Primary | ICD-10-CM

## 2020-01-01 DIAGNOSIS — E78.00 HYPERCHOLESTEROLEMIA: ICD-10-CM

## 2020-01-01 DIAGNOSIS — I46.9 CARDIAC ARREST WITH VENTRICULAR FIBRILLATION (HCC): Primary | ICD-10-CM

## 2020-01-01 DIAGNOSIS — M54.50 CHRONIC BILATERAL LOW BACK PAIN WITHOUT SCIATICA: ICD-10-CM

## 2020-01-01 DIAGNOSIS — I50.32 CHRONIC HEART FAILURE WITH PRESERVED EJECTION FRACTION (HCC): Primary | ICD-10-CM

## 2020-01-01 DIAGNOSIS — I10 HYPERTENSION, UNSPECIFIED TYPE: ICD-10-CM

## 2020-01-01 DIAGNOSIS — I50.32 CHRONIC DIASTOLIC HEART FAILURE (HCC): ICD-10-CM

## 2020-01-01 DIAGNOSIS — K21.9 CHRONIC GERD: ICD-10-CM

## 2020-01-01 DIAGNOSIS — M05.79 SEROPOSITIVE RHEUMATOID ARTHRITIS OF MULTIPLE SITES (HCC): ICD-10-CM

## 2020-01-01 DIAGNOSIS — I89.0 LYMPHEDEMA: Primary | ICD-10-CM

## 2020-01-01 DIAGNOSIS — E87.6 HYPOKALEMIA: ICD-10-CM

## 2020-01-01 DIAGNOSIS — Z23 NEED FOR INFLUENZA VACCINATION: Primary | ICD-10-CM

## 2020-01-01 DIAGNOSIS — N30.00 ACUTE CYSTITIS WITHOUT HEMATURIA: Primary | ICD-10-CM

## 2020-01-01 DIAGNOSIS — I71.4 ABDOMINAL AORTIC ANEURYSM WITHOUT RUPTURE (HCC): ICD-10-CM

## 2020-01-01 DIAGNOSIS — N18.30 CHRONIC RENAL IMPAIRMENT, STAGE 3 (MODERATE) (HCC): ICD-10-CM

## 2020-01-01 DIAGNOSIS — H61.23 EXCESSIVE CERUMEN IN EAR CANAL, BILATERAL: Primary | ICD-10-CM

## 2020-01-01 DIAGNOSIS — R31.9 HEMATURIA SYNDROME: ICD-10-CM

## 2020-01-01 DIAGNOSIS — Z51.81 ENCOUNTER FOR THERAPEUTIC DRUG MONITORING: ICD-10-CM

## 2020-01-01 DIAGNOSIS — N18.4 CKD (CHRONIC KIDNEY DISEASE) STAGE 4, GFR 15-29 ML/MIN (HCC): ICD-10-CM

## 2020-01-01 DIAGNOSIS — M54.16 RIGHT LUMBAR RADICULOPATHY: ICD-10-CM

## 2020-01-01 DIAGNOSIS — I10 ESSENTIAL HYPERTENSION: ICD-10-CM

## 2020-01-01 DIAGNOSIS — N39.0 URINARY TRACT INFECTION WITH HEMATURIA, SITE UNSPECIFIED: Primary | ICD-10-CM

## 2020-01-01 DIAGNOSIS — E87.6 HYPOKALEMIA: Primary | ICD-10-CM

## 2020-01-01 DIAGNOSIS — R39.9 UTI SYMPTOMS: Primary | ICD-10-CM

## 2020-01-01 DIAGNOSIS — I95.9 HYPOTENSION: ICD-10-CM

## 2020-01-01 DIAGNOSIS — D64.9 ANEMIA, UNSPECIFIED TYPE: Primary | ICD-10-CM

## 2020-01-01 DIAGNOSIS — D63.8 ANEMIA OF CHRONIC DISEASE: ICD-10-CM

## 2020-01-01 DIAGNOSIS — I50.30 DIASTOLIC CONGESTIVE HEART FAILURE, UNSPECIFIED HF CHRONICITY (HCC): Primary | ICD-10-CM

## 2020-01-01 DIAGNOSIS — I50.33 ACUTE ON CHRONIC DIASTOLIC CONGESTIVE HEART FAILURE (HCC): ICD-10-CM

## 2020-01-01 DIAGNOSIS — G89.29 CHRONIC BILATERAL LOW BACK PAIN WITHOUT SCIATICA: ICD-10-CM

## 2020-01-01 DIAGNOSIS — I74.09 AORTOILIAC OCCLUSIVE DISEASE (HCC): Primary | ICD-10-CM

## 2020-01-01 DIAGNOSIS — E66.9 OBESITY WITH SERIOUS COMORBIDITY, UNSPECIFIED CLASSIFICATION, UNSPECIFIED OBESITY TYPE: ICD-10-CM

## 2020-01-01 DIAGNOSIS — N25.81 SECONDARY HYPERPARATHYROIDISM OF RENAL ORIGIN (HCC): ICD-10-CM

## 2020-01-01 DIAGNOSIS — R60.9 EDEMA, UNSPECIFIED TYPE: Primary | ICD-10-CM

## 2020-01-01 DIAGNOSIS — R00.1 BRADYCARDIA: ICD-10-CM

## 2020-01-01 DIAGNOSIS — M05.79 RHEUMATOID ARTHRITIS INVOLVING MULTIPLE SITES WITH POSITIVE RHEUMATOID FACTOR (HCC): ICD-10-CM

## 2020-01-01 DIAGNOSIS — Z79.899 ENCOUNTER FOR LONG-TERM (CURRENT) USE OF MEDICATIONS: ICD-10-CM

## 2020-01-01 DIAGNOSIS — M05.9 RHEUMATOID ARTHRITIS WITH POSITIVE RHEUMATOID FACTOR, INVOLVING UNSPECIFIED SITE (HCC): ICD-10-CM

## 2020-01-01 DIAGNOSIS — N18.30 CKD (CHRONIC KIDNEY DISEASE) STAGE 3, GFR 30-59 ML/MIN (HCC): ICD-10-CM

## 2020-01-01 DIAGNOSIS — E66.01 CLASS 3 SEVERE OBESITY WITH BODY MASS INDEX (BMI) OF 45.0 TO 49.9 IN ADULT, UNSPECIFIED OBESITY TYPE, UNSPECIFIED WHETHER SERIOUS COMORBIDITY PRESENT (HCC): ICD-10-CM

## 2020-01-01 DIAGNOSIS — D63.8 ANEMIA OF CHRONIC DISEASE: Primary | ICD-10-CM

## 2020-01-01 DIAGNOSIS — R35.0 URINARY FREQUENCY: ICD-10-CM

## 2020-01-01 DIAGNOSIS — R60.9 EDEMA, UNSPECIFIED TYPE: ICD-10-CM

## 2020-01-01 DIAGNOSIS — R53.83 FATIGUE, UNSPECIFIED TYPE: ICD-10-CM

## 2020-01-01 DIAGNOSIS — G63 POLYNEUROPATHY ASSOCIATED WITH UNDERLYING DISEASE (HCC): ICD-10-CM

## 2020-01-01 DIAGNOSIS — I70.211 ATHEROSCLEROSIS OF NATIVE ARTERY OF RIGHT LOWER EXTREMITY WITH INTERMITTENT CLAUDICATION (HCC): ICD-10-CM

## 2020-01-01 DIAGNOSIS — C67.9 MALIGNANT NEOPLASM OF URINARY BLADDER, UNSPECIFIED SITE (HCC): ICD-10-CM

## 2020-01-01 DIAGNOSIS — J06.9 VIRAL UPPER RESPIRATORY TRACT INFECTION: Primary | ICD-10-CM

## 2020-01-01 DIAGNOSIS — M81.0 OSTEOPOROSIS, UNSPECIFIED OSTEOPOROSIS TYPE, UNSPECIFIED PATHOLOGICAL FRACTURE PRESENCE: ICD-10-CM

## 2020-01-01 DIAGNOSIS — I71.4 ABDOMINAL AORTIC ANEURYSM WITHOUT RUPTURE (HCC): Primary | ICD-10-CM

## 2020-01-01 DIAGNOSIS — E87.70 HYPERVOLEMIA, UNSPECIFIED HYPERVOLEMIA TYPE: ICD-10-CM

## 2020-01-01 DIAGNOSIS — I73.9 PAD (PERIPHERAL ARTERY DISEASE) (HCC): ICD-10-CM

## 2020-01-01 DIAGNOSIS — G25.81 RESTLESS LEG SYNDROME: ICD-10-CM

## 2020-01-01 DIAGNOSIS — D50.8 OTHER IRON DEFICIENCY ANEMIA: ICD-10-CM

## 2020-01-01 DIAGNOSIS — I48.91 ATRIAL FIBRILLATION, UNSPECIFIED TYPE (HCC): ICD-10-CM

## 2020-01-01 DIAGNOSIS — G47.33 OBSTRUCTIVE SLEEP APNEA: ICD-10-CM

## 2020-01-01 DIAGNOSIS — I48.0 PAROXYSMAL ATRIAL FIBRILLATION (HCC): ICD-10-CM

## 2020-01-01 DIAGNOSIS — R31.9 URINARY TRACT INFECTION WITH HEMATURIA, SITE UNSPECIFIED: ICD-10-CM

## 2020-01-01 DIAGNOSIS — N18.30 CKD (CHRONIC KIDNEY DISEASE), STAGE III (HCC): ICD-10-CM

## 2020-01-01 DIAGNOSIS — G62.9 PERIPHERAL POLYNEUROPATHY: ICD-10-CM

## 2020-01-01 DIAGNOSIS — I89.0 LYMPHEDEMA: ICD-10-CM

## 2020-01-01 DIAGNOSIS — D63.8 ANEMIA, CHRONIC DISEASE: ICD-10-CM

## 2020-01-01 DIAGNOSIS — G47.33 OSA (OBSTRUCTIVE SLEEP APNEA): ICD-10-CM

## 2020-01-01 DIAGNOSIS — I50.9 CHRONIC CONGESTIVE HEART FAILURE, UNSPECIFIED HEART FAILURE TYPE (HCC): Primary | ICD-10-CM
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
## 2020-01-01 DIAGNOSIS — R30.0 DYSURIA: ICD-10-CM

## 2020-01-01 DIAGNOSIS — E11.69 DIABETES MELLITUS TYPE 2 IN OBESE (HCC): ICD-10-CM

## 2020-01-01 DIAGNOSIS — T17.908A ASPIRATION INTO AIRWAY, INITIAL ENCOUNTER: ICD-10-CM

## 2020-01-01 DIAGNOSIS — I50.33 ACUTE ON CHRONIC DIASTOLIC HEART FAILURE (HCC): ICD-10-CM

## 2020-01-01 DIAGNOSIS — G89.29 CHRONIC BILATERAL LOW BACK PAIN WITHOUT SCIATICA: Primary | ICD-10-CM

## 2020-01-01 DIAGNOSIS — Z79.899 ENCOUNTER FOR LONG-TERM (CURRENT) USE OF OTHER MEDICATIONS: ICD-10-CM

## 2020-01-01 DIAGNOSIS — E78.00 PURE HYPERCHOLESTEROLEMIA: ICD-10-CM

## 2020-01-01 DIAGNOSIS — K46.9 NON-RECURRENT ABDOMINAL HERNIA WITHOUT OBSTRUCTION OR GANGRENE, UNSPECIFIED HERNIA TYPE: ICD-10-CM

## 2020-01-01 DIAGNOSIS — J93.9 PNEUMOTHORAX ON RIGHT: ICD-10-CM

## 2020-01-01 DIAGNOSIS — J40 BRONCHITIS: Primary | ICD-10-CM

## 2020-01-01 DIAGNOSIS — I50.33 ACUTE ON CHRONIC DIASTOLIC HEART FAILURE (HCC): Primary | ICD-10-CM

## 2020-01-01 DIAGNOSIS — B37.2 CANDIDIASIS OF SKIN: Primary | ICD-10-CM

## 2020-01-01 DIAGNOSIS — N39.41 URGE INCONTINENCE: Primary | ICD-10-CM

## 2020-01-01 DIAGNOSIS — R39.9 UTI SYMPTOMS: ICD-10-CM

## 2020-01-01 DIAGNOSIS — S22.43XA CLOSED FRACTURE OF MULTIPLE RIBS OF BOTH SIDES, INITIAL ENCOUNTER: ICD-10-CM

## 2020-01-01 DIAGNOSIS — D64.9 ANEMIA, UNSPECIFIED TYPE: ICD-10-CM

## 2020-01-01 DIAGNOSIS — C68.9 UROTHELIAL CARCINOMA (HCC): ICD-10-CM

## 2020-01-01 DIAGNOSIS — E66.9 DIABETES MELLITUS TYPE 2 IN OBESE (HCC): ICD-10-CM

## 2020-01-01 DIAGNOSIS — M54.50 CHRONIC BILATERAL LOW BACK PAIN WITHOUT SCIATICA: Primary | ICD-10-CM

## 2020-01-01 DIAGNOSIS — E03.9 ACQUIRED HYPOTHYROIDISM: ICD-10-CM

## 2020-01-01 DIAGNOSIS — N39.0 URINARY TRACT INFECTION WITH HEMATURIA, SITE UNSPECIFIED: ICD-10-CM

## 2020-01-01 LAB
25(OH)D3 SERPL-MCNC: 38.3 NG/ML (ref 30–100)
25(OH)D3 SERPL-MCNC: 40.5 NG/ML (ref 30–100)
25(OH)D3 SERPL-MCNC: 41 NG/ML (ref 30–100)
ALBUMIN SERPL BCP-MCNC: 3.3 G/DL (ref 3.5–5)
ALBUMIN SERPL BCP-MCNC: 3.5 G/DL (ref 3.5–5)
ALBUMIN SERPL BCP-MCNC: 3.5 G/DL (ref 3.5–5)
ALBUMIN SERPL BCP-MCNC: 3.6 G/DL (ref 3.5–5)
ALBUMIN SERPL BCP-MCNC: 4 G/DL (ref 3.5–5)
ALP SERPL-CCNC: 104 U/L (ref 46–116)
ALP SERPL-CCNC: 105 U/L (ref 46–116)
ALP SERPL-CCNC: 109 U/L (ref 46–116)
ALP SERPL-CCNC: 119 U/L (ref 46–116)
ALP SERPL-CCNC: 92 U/L (ref 46–116)
ALT SERPL W P-5'-P-CCNC: 23 U/L (ref 12–78)
ALT SERPL W P-5'-P-CCNC: 25 U/L (ref 12–78)
ALT SERPL W P-5'-P-CCNC: 26 U/L (ref 12–78)
ALT SERPL W P-5'-P-CCNC: 27 U/L (ref 12–78)
ALT SERPL W P-5'-P-CCNC: 88 U/L (ref 12–78)
ANION GAP SERPL CALCULATED.3IONS-SCNC: 11 MMOL/L (ref 4–13)
ANION GAP SERPL CALCULATED.3IONS-SCNC: 13 MMOL/L (ref 4–13)
ANION GAP SERPL CALCULATED.3IONS-SCNC: 5 MMOL/L (ref 4–13)
ANION GAP SERPL CALCULATED.3IONS-SCNC: 6 MMOL/L (ref 4–13)
ANION GAP SERPL CALCULATED.3IONS-SCNC: 7 MMOL/L (ref 4–13)
ANION GAP SERPL CALCULATED.3IONS-SCNC: 8 MMOL/L (ref 4–13)
ANION GAP SERPL CALCULATED.3IONS-SCNC: 8 MMOL/L (ref 4–13)
ANION GAP SERPL CALCULATED.3IONS-SCNC: 9 MMOL/L (ref 4–13)
ANISOCYTOSIS BLD QL SMEAR: PRESENT
AST SERPL W P-5'-P-CCNC: 113 U/L (ref 5–45)
AST SERPL W P-5'-P-CCNC: 13 U/L (ref 5–45)
AST SERPL W P-5'-P-CCNC: 16 U/L (ref 5–45)
AST SERPL W P-5'-P-CCNC: 18 U/L (ref 5–45)
AST SERPL W P-5'-P-CCNC: 19 U/L (ref 5–45)
ATRIAL RATE: 258 BPM
BACTERIA UR CULT: ABNORMAL
BACTERIA UR CULT: NORMAL
BACTERIA UR QL AUTO: ABNORMAL /HPF
BACTERIA UR QL AUTO: ABNORMAL /HPF
BASE EXCESS BLDA CALC-SCNC: -2 MMOL/L (ref -2–3)
BASOPHILS # BLD AUTO: 0.02 THOUSANDS/ΜL (ref 0–0.1)
BASOPHILS # BLD AUTO: 0.03 THOUSANDS/ΜL (ref 0–0.1)
BASOPHILS # BLD AUTO: 0.03 THOUSANDS/ΜL (ref 0–0.1)
BASOPHILS # BLD MANUAL: 0.17 THOUSAND/UL (ref 0–0.1)
BASOPHILS NFR BLD AUTO: 0 % (ref 0–1)
BASOPHILS NFR MAR MANUAL: 1 % (ref 0–1)
BILIRUB SERPL-MCNC: 0.45 MG/DL (ref 0.2–1)
BILIRUB SERPL-MCNC: 0.52 MG/DL (ref 0.2–1)
BILIRUB SERPL-MCNC: 0.53 MG/DL (ref 0.2–1)
BILIRUB SERPL-MCNC: 0.56 MG/DL (ref 0.2–1)
BILIRUB SERPL-MCNC: 0.64 MG/DL (ref 0.2–1)
BILIRUB UR QL STRIP: NEGATIVE
BILIRUB UR QL STRIP: NEGATIVE
BUN SERPL-MCNC: 23 MG/DL (ref 5–25)
BUN SERPL-MCNC: 24 MG/DL (ref 5–25)
BUN SERPL-MCNC: 25 MG/DL (ref 5–25)
BUN SERPL-MCNC: 27 MG/DL (ref 5–25)
BUN SERPL-MCNC: 29 MG/DL (ref 5–25)
BUN SERPL-MCNC: 32 MG/DL (ref 5–25)
BUN SERPL-MCNC: 34 MG/DL (ref 5–25)
BUN SERPL-MCNC: 34 MG/DL (ref 5–25)
BUN SERPL-MCNC: 35 MG/DL (ref 5–25)
BUN SERPL-MCNC: 36 MG/DL (ref 5–25)
BUN SERPL-MCNC: 37 MG/DL (ref 5–25)
BUN SERPL-MCNC: 37 MG/DL (ref 5–25)
BUN SERPL-MCNC: 40 MG/DL (ref 5–25)
BUN SERPL-MCNC: 44 MG/DL (ref 5–25)
BUN SERPL-MCNC: 52 MG/DL (ref 5–25)
BUN SERPL-MCNC: 53 MG/DL (ref 5–25)
CALCIUM SERPL-MCNC: 8.6 MG/DL (ref 8.3–10.1)
CALCIUM SERPL-MCNC: 8.7 MG/DL (ref 8.3–10.1)
CALCIUM SERPL-MCNC: 8.7 MG/DL (ref 8.3–10.1)
CALCIUM SERPL-MCNC: 8.8 MG/DL (ref 8.3–10.1)
CALCIUM SERPL-MCNC: 9 MG/DL (ref 8.3–10.1)
CALCIUM SERPL-MCNC: 9.1 MG/DL (ref 8.3–10.1)
CALCIUM SERPL-MCNC: 9.1 MG/DL (ref 8.3–10.1)
CALCIUM SERPL-MCNC: 9.3 MG/DL (ref 8.3–10.1)
CALCIUM SERPL-MCNC: 9.4 MG/DL (ref 8.3–10.1)
CALCIUM SERPL-MCNC: 9.5 MG/DL (ref 8.3–10.1)
CALCIUM SERPL-MCNC: 9.6 MG/DL (ref 8.3–10.1)
CALCIUM SERPL-MCNC: 9.6 MG/DL (ref 8.3–10.1)
CALCIUM SERPL-MCNC: 9.8 MG/DL (ref 8.3–10.1)
CHLORIDE SERPL-SCNC: 100 MMOL/L (ref 100–108)
CHLORIDE SERPL-SCNC: 101 MMOL/L (ref 100–108)
CHLORIDE SERPL-SCNC: 103 MMOL/L (ref 100–108)
CHLORIDE SERPL-SCNC: 103 MMOL/L (ref 100–108)
CHLORIDE SERPL-SCNC: 104 MMOL/L (ref 100–108)
CHLORIDE SERPL-SCNC: 105 MMOL/L (ref 100–108)
CHLORIDE SERPL-SCNC: 105 MMOL/L (ref 100–108)
CHLORIDE SERPL-SCNC: 106 MMOL/L (ref 100–108)
CHLORIDE SERPL-SCNC: 106 MMOL/L (ref 100–108)
CHLORIDE SERPL-SCNC: 107 MMOL/L (ref 100–108)
CHLORIDE SERPL-SCNC: 97 MMOL/L (ref 100–108)
CHLORIDE SERPL-SCNC: 98 MMOL/L (ref 100–108)
CLARITY UR: ABNORMAL
CLARITY UR: ABNORMAL
CO2 SERPL-SCNC: 26 MMOL/L (ref 21–32)
CO2 SERPL-SCNC: 27 MMOL/L (ref 21–32)
CO2 SERPL-SCNC: 27 MMOL/L (ref 21–32)
CO2 SERPL-SCNC: 28 MMOL/L (ref 21–32)
CO2 SERPL-SCNC: 28 MMOL/L (ref 21–32)
CO2 SERPL-SCNC: 29 MMOL/L (ref 21–32)
CO2 SERPL-SCNC: 30 MMOL/L (ref 21–32)
CO2 SERPL-SCNC: 31 MMOL/L (ref 21–32)
CO2 SERPL-SCNC: 32 MMOL/L (ref 21–32)
CO2 SERPL-SCNC: 35 MMOL/L (ref 21–32)
COLOR UR: YELLOW
COLOR UR: YELLOW
CREAT SERPL-MCNC: 1.7 MG/DL (ref 0.6–1.3)
CREAT SERPL-MCNC: 1.71 MG/DL (ref 0.6–1.3)
CREAT SERPL-MCNC: 1.72 MG/DL (ref 0.6–1.3)
CREAT SERPL-MCNC: 1.75 MG/DL (ref 0.6–1.3)
CREAT SERPL-MCNC: 1.77 MG/DL (ref 0.6–1.3)
CREAT SERPL-MCNC: 1.83 MG/DL (ref 0.6–1.3)
CREAT SERPL-MCNC: 1.83 MG/DL (ref 0.6–1.3)
CREAT SERPL-MCNC: 1.9 MG/DL (ref 0.6–1.3)
CREAT SERPL-MCNC: 1.93 MG/DL (ref 0.6–1.3)
CREAT SERPL-MCNC: 1.96 MG/DL (ref 0.6–1.3)
CREAT SERPL-MCNC: 1.98 MG/DL (ref 0.6–1.3)
CREAT SERPL-MCNC: 2.04 MG/DL (ref 0.6–1.3)
CREAT SERPL-MCNC: 2.12 MG/DL (ref 0.6–1.3)
CREAT SERPL-MCNC: 2.16 MG/DL (ref 0.6–1.3)
CREAT SERPL-MCNC: 2.21 MG/DL (ref 0.6–1.3)
CREAT SERPL-MCNC: 2.46 MG/DL (ref 0.6–1.3)
CRP SERPL QL: 10.6 MG/L
CRP SERPL QL: 3.9 MG/L
EOSINOPHIL # BLD AUTO: 0.14 THOUSAND/ΜL (ref 0–0.61)
EOSINOPHIL # BLD AUTO: 0.17 THOUSAND/ΜL (ref 0–0.61)
EOSINOPHIL # BLD AUTO: 0.2 THOUSAND/ΜL (ref 0–0.61)
EOSINOPHIL # BLD AUTO: 0.22 THOUSAND/ΜL (ref 0–0.61)
EOSINOPHIL # BLD AUTO: 0.27 THOUSAND/ΜL (ref 0–0.61)
EOSINOPHIL # BLD MANUAL: 0 THOUSAND/UL (ref 0–0.4)
EOSINOPHIL NFR BLD AUTO: 2 % (ref 0–6)
EOSINOPHIL NFR BLD AUTO: 3 % (ref 0–6)
EOSINOPHIL NFR BLD AUTO: 4 % (ref 0–6)
EOSINOPHIL NFR BLD MANUAL: 0 % (ref 0–6)
ERYTHROCYTE [DISTWIDTH] IN BLOOD BY AUTOMATED COUNT: 13.6 % (ref 11.6–15.1)
ERYTHROCYTE [DISTWIDTH] IN BLOOD BY AUTOMATED COUNT: 13.7 % (ref 11.6–15.1)
ERYTHROCYTE [DISTWIDTH] IN BLOOD BY AUTOMATED COUNT: 13.9 % (ref 11.6–15.1)
ERYTHROCYTE [DISTWIDTH] IN BLOOD BY AUTOMATED COUNT: 14.1 % (ref 11.6–15.1)
ERYTHROCYTE [DISTWIDTH] IN BLOOD BY AUTOMATED COUNT: 14.2 % (ref 11.6–15.1)
ERYTHROCYTE [DISTWIDTH] IN BLOOD BY AUTOMATED COUNT: 14.6 % (ref 11.6–15.1)
ERYTHROCYTE [DISTWIDTH] IN BLOOD BY AUTOMATED COUNT: 15 % (ref 11.6–15.1)
ERYTHROCYTE [SEDIMENTATION RATE] IN BLOOD: 28 MM/HOUR (ref 0–20)
ERYTHROCYTE [SEDIMENTATION RATE] IN BLOOD: 38 MM/HOUR (ref 0–20)
FERRITIN SERPL-MCNC: 254 NG/ML (ref 8–388)
FERRITIN SERPL-MCNC: 264 NG/ML (ref 8–388)
FERRITIN SERPL-MCNC: 276 NG/ML (ref 8–388)
FERRITIN SERPL-MCNC: 314 NG/ML (ref 8–388)
FLUAV RNA RESP QL NAA+PROBE: NEGATIVE
FLUBV RNA RESP QL NAA+PROBE: NEGATIVE
GFR SERPL CREATININE-BSD FRML MDRD: 17 ML/MIN/1.73SQ M
GFR SERPL CREATININE-BSD FRML MDRD: 20 ML/MIN/1.73SQ M
GFR SERPL CREATININE-BSD FRML MDRD: 21 ML/MIN/1.73SQ M
GFR SERPL CREATININE-BSD FRML MDRD: 21 ML/MIN/1.73SQ M
GFR SERPL CREATININE-BSD FRML MDRD: 22 ML/MIN/1.73SQ M
GFR SERPL CREATININE-BSD FRML MDRD: 23 ML/MIN/1.73SQ M
GFR SERPL CREATININE-BSD FRML MDRD: 23 ML/MIN/1.73SQ M
GFR SERPL CREATININE-BSD FRML MDRD: 24 ML/MIN/1.73SQ M
GFR SERPL CREATININE-BSD FRML MDRD: 24 ML/MIN/1.73SQ M
GFR SERPL CREATININE-BSD FRML MDRD: 25 ML/MIN/1.73SQ M
GFR SERPL CREATININE-BSD FRML MDRD: 25 ML/MIN/1.73SQ M
GFR SERPL CREATININE-BSD FRML MDRD: 26 ML/MIN/1.73SQ M
GFR SERPL CREATININE-BSD FRML MDRD: 27 ML/MIN/1.73SQ M
GLUCOSE P FAST SERPL-MCNC: 116 MG/DL (ref 65–99)
GLUCOSE P FAST SERPL-MCNC: 126 MG/DL (ref 65–99)
GLUCOSE P FAST SERPL-MCNC: 131 MG/DL (ref 65–99)
GLUCOSE P FAST SERPL-MCNC: 132 MG/DL (ref 65–99)
GLUCOSE P FAST SERPL-MCNC: 133 MG/DL (ref 65–99)
GLUCOSE P FAST SERPL-MCNC: 134 MG/DL (ref 65–99)
GLUCOSE P FAST SERPL-MCNC: 142 MG/DL (ref 65–99)
GLUCOSE P FAST SERPL-MCNC: 146 MG/DL (ref 65–99)
GLUCOSE P FAST SERPL-MCNC: 160 MG/DL (ref 65–99)
GLUCOSE P FAST SERPL-MCNC: 218 MG/DL (ref 65–99)
GLUCOSE SERPL-MCNC: 147 MG/DL (ref 65–140)
GLUCOSE SERPL-MCNC: 160 MG/DL (ref 65–140)
GLUCOSE SERPL-MCNC: 176 MG/DL (ref 65–140)
GLUCOSE SERPL-MCNC: 188 MG/DL (ref 65–140)
GLUCOSE SERPL-MCNC: 321 MG/DL (ref 65–140)
GLUCOSE SERPL-MCNC: 329 MG/DL (ref 65–140)
GLUCOSE SERPL-MCNC: 91 MG/DL (ref 65–140)
GLUCOSE UR STRIP-MCNC: NEGATIVE MG/DL
GLUCOSE UR STRIP-MCNC: NEGATIVE MG/DL
HCO3 BLDA-SCNC: 25.1 MMOL/L (ref 24–30)
HCT VFR BLD AUTO: 29.3 % (ref 34.8–46.1)
HCT VFR BLD AUTO: 29.6 % (ref 34.8–46.1)
HCT VFR BLD AUTO: 29.9 % (ref 34.8–46.1)
HCT VFR BLD AUTO: 30.1 % (ref 34.8–46.1)
HCT VFR BLD AUTO: 31.5 % (ref 34.8–46.1)
HCT VFR BLD AUTO: 31.9 % (ref 34.8–46.1)
HCT VFR BLD AUTO: 32.2 % (ref 34.8–46.1)
HCT VFR BLD CALC: 30 % (ref 34.8–46.1)
HGB BLD-MCNC: 10.1 G/DL (ref 11.5–15.4)
HGB BLD-MCNC: 10.4 G/DL (ref 11.5–15.4)
HGB BLD-MCNC: 10.5 G/DL (ref 11.5–15.4)
HGB BLD-MCNC: 9.6 G/DL (ref 11.5–15.4)
HGB BLD-MCNC: 9.7 G/DL (ref 11.5–15.4)
HGB BLD-MCNC: 9.7 G/DL (ref 11.5–15.4)
HGB BLD-MCNC: 9.9 G/DL (ref 11.5–15.4)
HGB BLDA-MCNC: 10.2 G/DL (ref 11.5–15.4)
HGB UR QL STRIP.AUTO: ABNORMAL
HGB UR QL STRIP.AUTO: ABNORMAL
HYALINE CASTS #/AREA URNS LPF: ABNORMAL /LPF
IMM GRANULOCYTES # BLD AUTO: 0.04 THOUSAND/UL (ref 0–0.2)
IMM GRANULOCYTES # BLD AUTO: 0.06 THOUSAND/UL (ref 0–0.2)
IMM GRANULOCYTES # BLD AUTO: 0.06 THOUSAND/UL (ref 0–0.2)
IMM GRANULOCYTES # BLD AUTO: 0.07 THOUSAND/UL (ref 0–0.2)
IMM GRANULOCYTES # BLD AUTO: 0.08 THOUSAND/UL (ref 0–0.2)
IMM GRANULOCYTES NFR BLD AUTO: 1 % (ref 0–2)
IRON SATN MFR SERPL: 22 %
IRON SERPL-MCNC: 59 UG/DL (ref 50–170)
IRON SERPL-MCNC: 68 UG/DL (ref 50–170)
KETONES UR STRIP-MCNC: NEGATIVE MG/DL
KETONES UR STRIP-MCNC: NEGATIVE MG/DL
LACTATE SERPL-SCNC: 8.4 MMOL/L (ref 0.5–2)
LEUKOCYTE ESTERASE UR QL STRIP: ABNORMAL
LEUKOCYTE ESTERASE UR QL STRIP: ABNORMAL
LYMPHOCYTES # BLD AUTO: 1.28 THOUSANDS/ΜL (ref 0.6–4.47)
LYMPHOCYTES # BLD AUTO: 1.42 THOUSANDS/ΜL (ref 0.6–4.47)
LYMPHOCYTES # BLD AUTO: 1.43 THOUSANDS/ΜL (ref 0.6–4.47)
LYMPHOCYTES # BLD AUTO: 1.84 THOUSANDS/ΜL (ref 0.6–4.47)
LYMPHOCYTES # BLD AUTO: 1.85 THOUSANDS/ΜL (ref 0.6–4.47)
LYMPHOCYTES # BLD AUTO: 26 % (ref 14–44)
LYMPHOCYTES # BLD AUTO: 4.39 THOUSAND/UL (ref 0.6–4.47)
LYMPHOCYTES NFR BLD AUTO: 16 % (ref 14–44)
LYMPHOCYTES NFR BLD AUTO: 18 % (ref 14–44)
LYMPHOCYTES NFR BLD AUTO: 18 % (ref 14–44)
LYMPHOCYTES NFR BLD AUTO: 20 % (ref 14–44)
LYMPHOCYTES NFR BLD AUTO: 21 % (ref 14–44)
MACROCYTES BLD QL AUTO: PRESENT
MAGNESIUM SERPL-MCNC: 2.2 MG/DL (ref 1.6–2.6)
MAGNESIUM SERPL-MCNC: 2.3 MG/DL (ref 1.6–2.6)
MAGNESIUM SERPL-MCNC: 2.3 MG/DL (ref 1.6–2.6)
MAGNESIUM SERPL-MCNC: 2.5 MG/DL (ref 1.6–2.6)
MCH RBC QN AUTO: 34.4 PG (ref 26.8–34.3)
MCH RBC QN AUTO: 34.4 PG (ref 26.8–34.3)
MCH RBC QN AUTO: 34.7 PG (ref 26.8–34.3)
MCH RBC QN AUTO: 34.9 PG (ref 26.8–34.3)
MCH RBC QN AUTO: 35.4 PG (ref 26.8–34.3)
MCH RBC QN AUTO: 35.5 PG (ref 26.8–34.3)
MCH RBC QN AUTO: 36 PG (ref 26.8–34.3)
MCHC RBC AUTO-ENTMCNC: 31.7 G/DL (ref 31.4–37.4)
MCHC RBC AUTO-ENTMCNC: 32.2 G/DL (ref 31.4–37.4)
MCHC RBC AUTO-ENTMCNC: 32.3 G/DL (ref 31.4–37.4)
MCHC RBC AUTO-ENTMCNC: 32.4 G/DL (ref 31.4–37.4)
MCHC RBC AUTO-ENTMCNC: 32.8 G/DL (ref 31.4–37.4)
MCHC RBC AUTO-ENTMCNC: 33.3 G/DL (ref 31.4–37.4)
MCHC RBC AUTO-ENTMCNC: 33.4 G/DL (ref 31.4–37.4)
MCV RBC AUTO: 106 FL (ref 82–98)
MCV RBC AUTO: 106 FL (ref 82–98)
MCV RBC AUTO: 107 FL (ref 82–98)
MCV RBC AUTO: 108 FL (ref 82–98)
MCV RBC AUTO: 109 FL (ref 82–98)
METAMYELOCYTES NFR BLD MANUAL: 1 % (ref 0–1)
MONOCYTES # BLD AUTO: 0.17 THOUSAND/UL (ref 0–1.22)
MONOCYTES # BLD AUTO: 0.62 THOUSAND/ΜL (ref 0.17–1.22)
MONOCYTES # BLD AUTO: 0.62 THOUSAND/ΜL (ref 0.17–1.22)
MONOCYTES # BLD AUTO: 0.66 THOUSAND/ΜL (ref 0.17–1.22)
MONOCYTES # BLD AUTO: 0.74 THOUSAND/ΜL (ref 0.17–1.22)
MONOCYTES # BLD AUTO: 0.85 THOUSAND/ΜL (ref 0.17–1.22)
MONOCYTES NFR BLD AUTO: 10 % (ref 4–12)
MONOCYTES NFR BLD AUTO: 7 % (ref 4–12)
MONOCYTES NFR BLD AUTO: 7 % (ref 4–12)
MONOCYTES NFR BLD AUTO: 9 % (ref 4–12)
MONOCYTES NFR BLD AUTO: 9 % (ref 4–12)
MONOCYTES NFR BLD: 1 % (ref 4–12)
MYELOCYTES NFR BLD MANUAL: 1 % (ref 0–1)
NEUTROPHILS # BLD AUTO: 4.64 THOUSANDS/ΜL (ref 1.85–7.62)
NEUTROPHILS # BLD AUTO: 5.04 THOUSANDS/ΜL (ref 1.85–7.62)
NEUTROPHILS # BLD AUTO: 5.76 THOUSANDS/ΜL (ref 1.85–7.62)
NEUTROPHILS # BLD AUTO: 6.58 THOUSANDS/ΜL (ref 1.85–7.62)
NEUTROPHILS # BLD AUTO: 7.37 THOUSANDS/ΜL (ref 1.85–7.62)
NEUTROPHILS # BLD MANUAL: 11.15 THOUSAND/UL (ref 1.85–7.62)
NEUTS BAND NFR BLD MANUAL: 1 % (ref 0–8)
NEUTS SEG NFR BLD AUTO: 65 % (ref 43–75)
NEUTS SEG NFR BLD AUTO: 65 % (ref 43–75)
NEUTS SEG NFR BLD AUTO: 66 % (ref 43–75)
NEUTS SEG NFR BLD AUTO: 70 % (ref 43–75)
NEUTS SEG NFR BLD AUTO: 72 % (ref 43–75)
NEUTS SEG NFR BLD AUTO: 74 % (ref 43–75)
NITRITE UR QL STRIP: NEGATIVE
NITRITE UR QL STRIP: POSITIVE
NON-SQ EPI CELLS URNS QL MICRO: ABNORMAL /HPF
NON-SQ EPI CELLS URNS QL MICRO: ABNORMAL /HPF
NRBC BLD AUTO-RTO: 0 /100 WBCS
PCO2 BLD: 27 MMOL/L (ref 21–32)
PCO2 BLD: 54.2 MM HG (ref 42–50)
PH BLD: 7.27 [PH] (ref 7.3–7.4)
PH UR STRIP.AUTO: 6 [PH]
PH UR STRIP.AUTO: 6.5 [PH]
PHOSPHATE SERPL-MCNC: 3.8 MG/DL (ref 2.3–4.1)
PHOSPHATE SERPL-MCNC: 3.9 MG/DL (ref 2.3–4.1)
PHOSPHATE SERPL-MCNC: 4 MG/DL (ref 2.3–4.1)
PLATELET # BLD AUTO: 192 THOUSANDS/UL (ref 149–390)
PLATELET # BLD AUTO: 198 THOUSANDS/UL (ref 149–390)
PLATELET # BLD AUTO: 202 THOUSANDS/UL (ref 149–390)
PLATELET # BLD AUTO: 202 THOUSANDS/UL (ref 149–390)
PLATELET # BLD AUTO: 208 THOUSANDS/UL (ref 149–390)
PLATELET # BLD AUTO: 210 THOUSANDS/UL (ref 149–390)
PLATELET # BLD AUTO: 270 THOUSANDS/UL (ref 149–390)
PLATELET BLD QL SMEAR: ADEQUATE
PMV BLD AUTO: 10 FL (ref 8.9–12.7)
PMV BLD AUTO: 10 FL (ref 8.9–12.7)
PMV BLD AUTO: 10.1 FL (ref 8.9–12.7)
PMV BLD AUTO: 10.3 FL (ref 8.9–12.7)
PMV BLD AUTO: 10.3 FL (ref 8.9–12.7)
PMV BLD AUTO: 10.4 FL (ref 8.9–12.7)
PMV BLD AUTO: 10.4 FL (ref 8.9–12.7)
PO2 BLD: 44 MM HG (ref 35–45)
POLYCHROMASIA BLD QL SMEAR: PRESENT
POTASSIUM BLD-SCNC: 3.2 MMOL/L (ref 3.5–5.3)
POTASSIUM SERPL-SCNC: 2.8 MMOL/L (ref 3.5–5.3)
POTASSIUM SERPL-SCNC: 2.8 MMOL/L (ref 3.5–5.3)
POTASSIUM SERPL-SCNC: 2.9 MMOL/L (ref 3.5–5.3)
POTASSIUM SERPL-SCNC: 3 MMOL/L (ref 3.5–5.3)
POTASSIUM SERPL-SCNC: 3.1 MMOL/L (ref 3.5–5.3)
POTASSIUM SERPL-SCNC: 3.1 MMOL/L (ref 3.5–5.3)
POTASSIUM SERPL-SCNC: 3.2 MMOL/L (ref 3.5–5.3)
POTASSIUM SERPL-SCNC: 3.3 MMOL/L (ref 3.5–5.3)
POTASSIUM SERPL-SCNC: 3.6 MMOL/L (ref 3.5–5.3)
POTASSIUM SERPL-SCNC: 3.7 MMOL/L (ref 3.5–5.3)
POTASSIUM SERPL-SCNC: 3.8 MMOL/L (ref 3.5–5.3)
POTASSIUM SERPL-SCNC: 3.9 MMOL/L (ref 3.5–5.3)
POTASSIUM SERPL-SCNC: 3.9 MMOL/L (ref 3.5–5.3)
POTASSIUM SERPL-SCNC: 4 MMOL/L (ref 3.5–5.3)
POTASSIUM SERPL-SCNC: 4.1 MMOL/L (ref 3.5–5.3)
POTASSIUM SERPL-SCNC: 4.3 MMOL/L (ref 3.5–5.3)
PROT SERPL-MCNC: 6.7 G/DL (ref 6.4–8.2)
PROT SERPL-MCNC: 7.1 G/DL (ref 6.4–8.2)
PROT UR STRIP-MCNC: ABNORMAL MG/DL
PROT UR STRIP-MCNC: NEGATIVE MG/DL
PTH-INTACT SERPL-MCNC: 111.6 PG/ML (ref 18.4–80.1)
PTH-INTACT SERPL-MCNC: 143.3 PG/ML (ref 18.4–80.1)
PTH-INTACT SERPL-MCNC: 93.9 PG/ML (ref 18.4–80.1)
QRS AXIS: 49 DEGREES
QRSD INTERVAL: 104 MS
QT INTERVAL: 518 MS
QTC INTERVAL: 472 MS
RBC # BLD AUTO: 2.71 MILLION/UL (ref 3.81–5.12)
RBC # BLD AUTO: 2.75 MILLION/UL (ref 3.81–5.12)
RBC # BLD AUTO: 2.78 MILLION/UL (ref 3.81–5.12)
RBC # BLD AUTO: 2.82 MILLION/UL (ref 3.81–5.12)
RBC # BLD AUTO: 2.94 MILLION/UL (ref 3.81–5.12)
RBC # BLD AUTO: 2.96 MILLION/UL (ref 3.81–5.12)
RBC # BLD AUTO: 3 MILLION/UL (ref 3.81–5.12)
RBC #/AREA URNS AUTO: ABNORMAL /HPF
RBC #/AREA URNS AUTO: ABNORMAL /HPF
RBC MORPH BLD: PRESENT
RSV RNA RESP QL NAA+PROBE: NEGATIVE
SAO2 % BLD FROM PO2: 73 % (ref 60–85)
SARS-COV-2 RNA RESP QL NAA+PROBE: NEGATIVE
SL AMB  POCT GLUCOSE, UA: ABNORMAL
SL AMB  POCT GLUCOSE, UA: NEGATIVE
SL AMB  POCT GLUCOSE, UA: NORMAL
SL AMB LEUKOCYTE ESTERASE,UA: ABNORMAL
SL AMB LEUKOCYTE ESTERASE,UA: NORMAL
SL AMB POCT BILIRUBIN,UA: ABNORMAL
SL AMB POCT BILIRUBIN,UA: NEGATIVE
SL AMB POCT BILIRUBIN,UA: NORMAL
SL AMB POCT BLOOD,UA: ABNORMAL
SL AMB POCT BLOOD,UA: NORMAL
SL AMB POCT CLARITY,UA: ABNORMAL
SL AMB POCT CLARITY,UA: CLEAR
SL AMB POCT CLARITY,UA: CLEAR
SL AMB POCT COLOR,UA: YELLOW
SL AMB POCT HEMOGLOBIN AIC: 7.1 (ref ?–6.5)
SL AMB POCT KETONES,UA: ABNORMAL
SL AMB POCT KETONES,UA: NEGATIVE
SL AMB POCT KETONES,UA: NORMAL
SL AMB POCT NITRITE,UA: ABNORMAL
SL AMB POCT NITRITE,UA: NEGATIVE
SL AMB POCT NITRITE,UA: NORMAL
SL AMB POCT NITRITE,UA: POSITIVE
SL AMB POCT NITRITE,UA: POSITIVE
SL AMB POCT PH,UA: 5
SL AMB POCT PH,UA: 7
SL AMB POCT SPECIFIC GRAVITY,UA: 1
SL AMB POCT SPECIFIC GRAVITY,UA: 1.01
SL AMB POCT SPECIFIC GRAVITY,UA: 1015
SL AMB POCT URINE PROTEIN: ABNORMAL
SL AMB POCT URINE PROTEIN: ABNORMAL
SL AMB POCT URINE PROTEIN: NEGATIVE
SL AMB POCT URINE PROTEIN: NEGATIVE
SL AMB POCT URINE PROTEIN: NORMAL
SL AMB POCT UROBILINOGEN: 0.2
SL AMB POCT UROBILINOGEN: NEGATIVE
SODIUM BLD-SCNC: 136 MMOL/L (ref 136–145)
SODIUM SERPL-SCNC: 139 MMOL/L (ref 136–145)
SODIUM SERPL-SCNC: 140 MMOL/L (ref 136–145)
SODIUM SERPL-SCNC: 140 MMOL/L (ref 136–145)
SODIUM SERPL-SCNC: 141 MMOL/L (ref 136–145)
SODIUM SERPL-SCNC: 141 MMOL/L (ref 136–145)
SODIUM SERPL-SCNC: 142 MMOL/L (ref 136–145)
SODIUM SERPL-SCNC: 143 MMOL/L (ref 136–145)
SODIUM SERPL-SCNC: 143 MMOL/L (ref 136–145)
SP GR UR STRIP.AUTO: 1.01 (ref 1–1.03)
SP GR UR STRIP.AUTO: 1.01 (ref 1–1.03)
SPECIMEN SOURCE: ABNORMAL
T WAVE AXIS: 60 DEGREES
TIBC SERPL-MCNC: 261 UG/DL (ref 250–450)
TIBC SERPL-MCNC: 272 UG/DL (ref 250–450)
TRANSFERRIN SERPL-MCNC: 182 MG/DL (ref 200–400)
TROPONIN I SERPL-MCNC: 0.03 NG/ML
UROBILINOGEN UR QL STRIP.AUTO: 0.2 E.U./DL
UROBILINOGEN UR QL STRIP.AUTO: 0.2 E.U./DL
VARIANT LYMPHS # BLD AUTO: 4 %
VENTRICULAR RATE: 50 BPM
WBC # BLD AUTO: 10.25 THOUSAND/UL (ref 4.31–10.16)
WBC # BLD AUTO: 16.89 THOUSAND/UL (ref 4.31–10.16)
WBC # BLD AUTO: 7.09 THOUSAND/UL (ref 4.31–10.16)
WBC # BLD AUTO: 7.17 THOUSAND/UL (ref 4.31–10.16)
WBC # BLD AUTO: 7.42 THOUSAND/UL (ref 4.31–10.16)
WBC # BLD AUTO: 8.78 THOUSAND/UL (ref 4.31–10.16)
WBC # BLD AUTO: 8.84 THOUSAND/UL (ref 4.31–10.16)
WBC #/AREA URNS AUTO: ABNORMAL /HPF
WBC #/AREA URNS AUTO: ABNORMAL /HPF

## 2020-01-01 PROCEDURE — 87186 SC STD MICRODIL/AGAR DIL: CPT | Performed by: INTERNAL MEDICINE

## 2020-01-01 PROCEDURE — 3066F NEPHROPATHY DOC TX: CPT | Performed by: INTERNAL MEDICINE

## 2020-01-01 PROCEDURE — 1036F TOBACCO NON-USER: CPT | Performed by: UROLOGY

## 2020-01-01 PROCEDURE — 3008F BODY MASS INDEX DOCD: CPT | Performed by: INTERNAL MEDICINE

## 2020-01-01 PROCEDURE — 1036F TOBACCO NON-USER: CPT | Performed by: INTERNAL MEDICINE

## 2020-01-01 PROCEDURE — 3008F BODY MASS INDEX DOCD: CPT | Performed by: UROLOGY

## 2020-01-01 PROCEDURE — 93978 VASCULAR STUDY: CPT | Performed by: SURGERY

## 2020-01-01 PROCEDURE — 80053 COMPREHEN METABOLIC PANEL: CPT

## 2020-01-01 PROCEDURE — 1036F TOBACCO NON-USER: CPT | Performed by: FAMILY MEDICINE

## 2020-01-01 PROCEDURE — 84466 ASSAY OF TRANSFERRIN: CPT

## 2020-01-01 PROCEDURE — 80048 BASIC METABOLIC PNL TOTAL CA: CPT

## 2020-01-01 PROCEDURE — 3078F DIAST BP <80 MM HG: CPT | Performed by: PHYSICIAN ASSISTANT

## 2020-01-01 PROCEDURE — 96365 THER/PROPH/DIAG IV INF INIT: CPT

## 2020-01-01 PROCEDURE — 36415 COLL VENOUS BLD VENIPUNCTURE: CPT

## 2020-01-01 PROCEDURE — 3008F BODY MASS INDEX DOCD: CPT | Performed by: FAMILY MEDICINE

## 2020-01-01 PROCEDURE — 3066F NEPHROPATHY DOC TX: CPT | Performed by: FAMILY MEDICINE

## 2020-01-01 PROCEDURE — 97163 PT EVAL HIGH COMPLEX 45 MIN: CPT | Performed by: PHYSICAL THERAPIST

## 2020-01-01 PROCEDURE — 80069 RENAL FUNCTION PANEL: CPT

## 2020-01-01 PROCEDURE — 99443 PR PHYS/QHP TELEPHONE EVALUATION 21-30 MIN: CPT | Performed by: PHYSICIAN ASSISTANT

## 2020-01-01 PROCEDURE — 71045 X-RAY EXAM CHEST 1 VIEW: CPT

## 2020-01-01 PROCEDURE — 87086 URINE CULTURE/COLONY COUNT: CPT | Performed by: FAMILY MEDICINE

## 2020-01-01 PROCEDURE — 99213 OFFICE O/P EST LOW 20 MIN: CPT | Performed by: PHYSICIAN ASSISTANT

## 2020-01-01 PROCEDURE — 1160F RVW MEDS BY RX/DR IN RCRD: CPT | Performed by: PHYSICIAN ASSISTANT

## 2020-01-01 PROCEDURE — 99214 OFFICE O/P EST MOD 30 MIN: CPT | Performed by: FAMILY MEDICINE

## 2020-01-01 PROCEDURE — 99213 OFFICE O/P EST LOW 20 MIN: CPT | Performed by: FAMILY MEDICINE

## 2020-01-01 PROCEDURE — 93925 LOWER EXTREMITY STUDY: CPT | Performed by: SURGERY

## 2020-01-01 PROCEDURE — 94760 N-INVAS EAR/PLS OXIMETRY 1: CPT

## 2020-01-01 PROCEDURE — 81002 URINALYSIS NONAUTO W/O SCOPE: CPT | Performed by: INTERNAL MEDICINE

## 2020-01-01 PROCEDURE — 80053 COMPREHEN METABOLIC PANEL: CPT | Performed by: PHYSICIAN ASSISTANT

## 2020-01-01 PROCEDURE — 85027 COMPLETE CBC AUTOMATED: CPT

## 2020-01-01 PROCEDURE — 97140 MANUAL THERAPY 1/> REGIONS: CPT

## 2020-01-01 PROCEDURE — 99213 OFFICE O/P EST LOW 20 MIN: CPT | Performed by: INTERNAL MEDICINE

## 2020-01-01 PROCEDURE — 97110 THERAPEUTIC EXERCISES: CPT | Performed by: PHYSICAL THERAPIST

## 2020-01-01 PROCEDURE — 99441 PR PHYS/QHP TELEPHONE EVALUATION 5-10 MIN: CPT | Performed by: NURSE PRACTITIONER

## 2020-01-01 PROCEDURE — 82330 ASSAY OF CALCIUM: CPT

## 2020-01-01 PROCEDURE — 97530 THERAPEUTIC ACTIVITIES: CPT | Performed by: PHYSICAL THERAPIST

## 2020-01-01 PROCEDURE — 52000 CYSTOURETHROSCOPY: CPT | Performed by: UROLOGY

## 2020-01-01 PROCEDURE — 1160F RVW MEDS BY RX/DR IN RCRD: CPT | Performed by: INTERNAL MEDICINE

## 2020-01-01 PROCEDURE — 93923 UPR/LXTR ART STDY 3+ LVLS: CPT

## 2020-01-01 PROCEDURE — 1036F TOBACCO NON-USER: CPT | Performed by: PHYSICIAN ASSISTANT

## 2020-01-01 PROCEDURE — 81001 URINALYSIS AUTO W/SCOPE: CPT | Performed by: INTERNAL MEDICINE

## 2020-01-01 PROCEDURE — 87077 CULTURE AEROBIC IDENTIFY: CPT | Performed by: PHYSICIAN ASSISTANT

## 2020-01-01 PROCEDURE — 3075F SYST BP GE 130 - 139MM HG: CPT | Performed by: FAMILY MEDICINE

## 2020-01-01 PROCEDURE — 83735 ASSAY OF MAGNESIUM: CPT

## 2020-01-01 PROCEDURE — 87186 SC STD MICRODIL/AGAR DIL: CPT | Performed by: PHYSICIAN ASSISTANT

## 2020-01-01 PROCEDURE — 82728 ASSAY OF FERRITIN: CPT | Performed by: PHYSICIAN ASSISTANT

## 2020-01-01 PROCEDURE — 97110 THERAPEUTIC EXERCISES: CPT

## 2020-01-01 PROCEDURE — 93922 UPR/L XTREMITY ART 2 LEVELS: CPT | Performed by: SURGERY

## 2020-01-01 PROCEDURE — 72125 CT NECK SPINE W/O DYE: CPT

## 2020-01-01 PROCEDURE — 3074F SYST BP LT 130 MM HG: CPT | Performed by: PHYSICIAN ASSISTANT

## 2020-01-01 PROCEDURE — 85007 BL SMEAR W/DIFF WBC COUNT: CPT | Performed by: EMERGENCY MEDICINE

## 2020-01-01 PROCEDURE — 97530 THERAPEUTIC ACTIVITIES: CPT

## 2020-01-01 PROCEDURE — 4040F PNEUMOC VAC/ADMIN/RCVD: CPT | Performed by: PHYSICIAN ASSISTANT

## 2020-01-01 PROCEDURE — 87077 CULTURE AEROBIC IDENTIFY: CPT | Performed by: INTERNAL MEDICINE

## 2020-01-01 PROCEDURE — 3051F HG A1C>EQUAL 7.0%<8.0%: CPT | Performed by: PHYSICIAN ASSISTANT

## 2020-01-01 PROCEDURE — 97112 NEUROMUSCULAR REEDUCATION: CPT | Performed by: PHYSICAL THERAPIST

## 2020-01-01 PROCEDURE — 1160F RVW MEDS BY RX/DR IN RCRD: CPT | Performed by: UROLOGY

## 2020-01-01 PROCEDURE — 83550 IRON BINDING TEST: CPT

## 2020-01-01 PROCEDURE — 4040F PNEUMOC VAC/ADMIN/RCVD: CPT | Performed by: FAMILY MEDICINE

## 2020-01-01 PROCEDURE — 3078F DIAST BP <80 MM HG: CPT | Performed by: INTERNAL MEDICINE

## 2020-01-01 PROCEDURE — 99214 OFFICE O/P EST MOD 30 MIN: CPT | Performed by: INTERNAL MEDICINE

## 2020-01-01 PROCEDURE — 3051F HG A1C>EQUAL 7.0%<8.0%: CPT | Performed by: FAMILY MEDICINE

## 2020-01-01 PROCEDURE — 81002 URINALYSIS NONAUTO W/O SCOPE: CPT | Performed by: UROLOGY

## 2020-01-01 PROCEDURE — 3078F DIAST BP <80 MM HG: CPT | Performed by: FAMILY MEDICINE

## 2020-01-01 PROCEDURE — 93005 ELECTROCARDIOGRAM TRACING: CPT

## 2020-01-01 PROCEDURE — 3077F SYST BP >= 140 MM HG: CPT | Performed by: INTERNAL MEDICINE

## 2020-01-01 PROCEDURE — 99214 OFFICE O/P EST MOD 30 MIN: CPT | Performed by: PHYSICIAN ASSISTANT

## 2020-01-01 PROCEDURE — 85652 RBC SED RATE AUTOMATED: CPT

## 2020-01-01 PROCEDURE — 83540 ASSAY OF IRON: CPT

## 2020-01-01 PROCEDURE — 83970 ASSAY OF PARATHORMONE: CPT

## 2020-01-01 PROCEDURE — 3008F BODY MASS INDEX DOCD: CPT | Performed by: PHYSICIAN ASSISTANT

## 2020-01-01 PROCEDURE — 87186 SC STD MICRODIL/AGAR DIL: CPT | Performed by: FAMILY MEDICINE

## 2020-01-01 PROCEDURE — 3066F NEPHROPATHY DOC TX: CPT | Performed by: PHYSICIAN ASSISTANT

## 2020-01-01 PROCEDURE — 82306 VITAMIN D 25 HYDROXY: CPT

## 2020-01-01 PROCEDURE — 80053 COMPREHEN METABOLIC PANEL: CPT | Performed by: EMERGENCY MEDICINE

## 2020-01-01 PROCEDURE — 36415 COLL VENOUS BLD VENIPUNCTURE: CPT | Performed by: NURSE PRACTITIONER

## 2020-01-01 PROCEDURE — 3074F SYST BP LT 130 MM HG: CPT | Performed by: FAMILY MEDICINE

## 2020-01-01 PROCEDURE — 70450 CT HEAD/BRAIN W/O DYE: CPT

## 2020-01-01 PROCEDURE — 87086 URINE CULTURE/COLONY COUNT: CPT | Performed by: INTERNAL MEDICINE

## 2020-01-01 PROCEDURE — G1004 CDSM NDSC: HCPCS

## 2020-01-01 PROCEDURE — 84100 ASSAY OF PHOSPHORUS: CPT

## 2020-01-01 PROCEDURE — 3075F SYST BP GE 130 - 139MM HG: CPT | Performed by: UROLOGY

## 2020-01-01 PROCEDURE — 0241U HB NFCT DS VIR RESP RNA 4 TRGT: CPT | Performed by: EMERGENCY MEDICINE

## 2020-01-01 PROCEDURE — 1160F RVW MEDS BY RX/DR IN RCRD: CPT | Performed by: FAMILY MEDICINE

## 2020-01-01 PROCEDURE — 99443 PR PHYS/QHP TELEPHONE EVALUATION 21-30 MIN: CPT | Performed by: INTERNAL MEDICINE

## 2020-01-01 PROCEDURE — 87186 SC STD MICRODIL/AGAR DIL: CPT

## 2020-01-01 PROCEDURE — 3079F DIAST BP 80-89 MM HG: CPT | Performed by: INTERNAL MEDICINE

## 2020-01-01 PROCEDURE — 4040F PNEUMOC VAC/ADMIN/RCVD: CPT | Performed by: UROLOGY

## 2020-01-01 PROCEDURE — G0439 PPPS, SUBSEQ VISIT: HCPCS | Performed by: INTERNAL MEDICINE

## 2020-01-01 PROCEDURE — 3075F SYST BP GE 130 - 139MM HG: CPT | Performed by: INTERNAL MEDICINE

## 2020-01-01 PROCEDURE — 85025 COMPLETE CBC W/AUTO DIFF WBC: CPT

## 2020-01-01 PROCEDURE — 3077F SYST BP >= 140 MM HG: CPT | Performed by: FAMILY MEDICINE

## 2020-01-01 PROCEDURE — 84295 ASSAY OF SERUM SODIUM: CPT

## 2020-01-01 PROCEDURE — 4040F PNEUMOC VAC/ADMIN/RCVD: CPT | Performed by: INTERNAL MEDICINE

## 2020-01-01 PROCEDURE — 82728 ASSAY OF FERRITIN: CPT

## 2020-01-01 PROCEDURE — 87077 CULTURE AEROBIC IDENTIFY: CPT | Performed by: FAMILY MEDICINE

## 2020-01-01 PROCEDURE — 83540 ASSAY OF IRON: CPT | Performed by: PHYSICIAN ASSISTANT

## 2020-01-01 PROCEDURE — 86140 C-REACTIVE PROTEIN: CPT

## 2020-01-01 PROCEDURE — 84484 ASSAY OF TROPONIN QUANT: CPT | Performed by: EMERGENCY MEDICINE

## 2020-01-01 PROCEDURE — 94002 VENT MGMT INPAT INIT DAY: CPT

## 2020-01-01 PROCEDURE — 36415 COLL VENOUS BLD VENIPUNCTURE: CPT | Performed by: PHYSICIAN ASSISTANT

## 2020-01-01 PROCEDURE — 3074F SYST BP LT 130 MM HG: CPT | Performed by: INTERNAL MEDICINE

## 2020-01-01 PROCEDURE — 97163 PT EVAL HIGH COMPLEX 45 MIN: CPT

## 2020-01-01 PROCEDURE — 87086 URINE CULTURE/COLONY COUNT: CPT | Performed by: UROLOGY

## 2020-01-01 PROCEDURE — 80048 BASIC METABOLIC PNL TOTAL CA: CPT | Performed by: NURSE PRACTITIONER

## 2020-01-01 PROCEDURE — 3079F DIAST BP 80-89 MM HG: CPT | Performed by: PHYSICIAN ASSISTANT

## 2020-01-01 PROCEDURE — 99213 OFFICE O/P EST LOW 20 MIN: CPT | Performed by: UROLOGY

## 2020-01-01 PROCEDURE — 3051F HG A1C>EQUAL 7.0%<8.0%: CPT | Performed by: INTERNAL MEDICINE

## 2020-01-01 PROCEDURE — 99443 PR PHYS/QHP TELEPHONE EVALUATION 21-30 MIN: CPT | Performed by: NURSE PRACTITIONER

## 2020-01-01 PROCEDURE — G0008 ADMIN INFLUENZA VIRUS VAC: HCPCS

## 2020-01-01 PROCEDURE — 69210 REMOVE IMPACTED EAR WAX UNI: CPT | Performed by: FAMILY MEDICINE

## 2020-01-01 PROCEDURE — 96375 TX/PRO/DX INJ NEW DRUG ADDON: CPT

## 2020-01-01 PROCEDURE — 84132 ASSAY OF SERUM POTASSIUM: CPT

## 2020-01-01 PROCEDURE — 74176 CT ABD & PELVIS W/O CONTRAST: CPT

## 2020-01-01 PROCEDURE — 82947 ASSAY GLUCOSE BLOOD QUANT: CPT

## 2020-01-01 PROCEDURE — 87086 URINE CULTURE/COLONY COUNT: CPT

## 2020-01-01 PROCEDURE — 81002 URINALYSIS NONAUTO W/O SCOPE: CPT | Performed by: FAMILY MEDICINE

## 2020-01-01 PROCEDURE — 96374 THER/PROPH/DIAG INJ IV PUSH: CPT

## 2020-01-01 PROCEDURE — 3078F DIAST BP <80 MM HG: CPT | Performed by: UROLOGY

## 2020-01-01 PROCEDURE — 81002 URINALYSIS NONAUTO W/O SCOPE: CPT | Performed by: PHYSICIAN ASSISTANT

## 2020-01-01 PROCEDURE — 96376 TX/PRO/DX INJ SAME DRUG ADON: CPT

## 2020-01-01 PROCEDURE — 83036 HEMOGLOBIN GLYCOSYLATED A1C: CPT | Performed by: PHYSICIAN ASSISTANT

## 2020-01-01 PROCEDURE — 99285 EMERGENCY DEPT VISIT HI MDM: CPT

## 2020-01-01 PROCEDURE — 3079F DIAST BP 80-89 MM HG: CPT | Performed by: FAMILY MEDICINE

## 2020-01-01 PROCEDURE — 3066F NEPHROPATHY DOC TX: CPT | Performed by: UROLOGY

## 2020-01-01 PROCEDURE — 72110 X-RAY EXAM L-2 SPINE 4/>VWS: CPT

## 2020-01-01 PROCEDURE — 99214 OFFICE O/P EST MOD 30 MIN: CPT | Performed by: NURSE PRACTITIONER

## 2020-01-01 PROCEDURE — 71250 CT THORAX DX C-: CPT

## 2020-01-01 PROCEDURE — 85027 COMPLETE CBC AUTOMATED: CPT | Performed by: EMERGENCY MEDICINE

## 2020-01-01 PROCEDURE — 83605 ASSAY OF LACTIC ACID: CPT | Performed by: EMERGENCY MEDICINE

## 2020-01-01 PROCEDURE — 93978 VASCULAR STUDY: CPT

## 2020-01-01 PROCEDURE — 73502 X-RAY EXAM HIP UNI 2-3 VIEWS: CPT

## 2020-01-01 PROCEDURE — 36415 COLL VENOUS BLD VENIPUNCTURE: CPT | Performed by: EMERGENCY MEDICINE

## 2020-01-01 PROCEDURE — 87077 CULTURE AEROBIC IDENTIFY: CPT

## 2020-01-01 PROCEDURE — 90662 IIV NO PRSV INCREASED AG IM: CPT

## 2020-01-01 PROCEDURE — 36556 INSERT NON-TUNNEL CV CATH: CPT | Performed by: EMERGENCY MEDICINE

## 2020-01-01 PROCEDURE — 82803 BLOOD GASES ANY COMBINATION: CPT

## 2020-01-01 PROCEDURE — 93925 LOWER EXTREMITY STUDY: CPT

## 2020-01-01 PROCEDURE — 96366 THER/PROPH/DIAG IV INF ADDON: CPT

## 2020-01-01 PROCEDURE — 87086 URINE CULTURE/COLONY COUNT: CPT | Performed by: PHYSICIAN ASSISTANT

## 2020-01-01 PROCEDURE — 85014 HEMATOCRIT: CPT

## 2020-01-01 PROCEDURE — 31500 INSERT EMERGENCY AIRWAY: CPT | Performed by: EMERGENCY MEDICINE

## 2020-01-01 PROCEDURE — 99291 CRITICAL CARE FIRST HOUR: CPT | Performed by: EMERGENCY MEDICINE

## 2020-01-01 RX ORDER — CEFPODOXIME PROXETIL 100 MG/1
100 TABLET, FILM COATED ORAL 2 TIMES DAILY
Qty: 14 TABLET | Refills: 0 | Status: SHIPPED | OUTPATIENT
Start: 2020-01-01 | End: 2020-01-01 | Stop reason: ALTCHOICE

## 2020-01-01 RX ORDER — NYSTATIN 100000 [USP'U]/G
POWDER TOPICAL 3 TIMES DAILY
Qty: 30 G | Refills: 1 | Status: SHIPPED | OUTPATIENT
Start: 2020-01-01

## 2020-01-01 RX ORDER — CIPROFLOXACIN 500 MG/1
500 TABLET, FILM COATED ORAL EVERY 24 HOURS
Qty: 7 TABLET | Refills: 0 | Status: SHIPPED | OUTPATIENT
Start: 2020-01-01 | End: 2020-01-01

## 2020-01-01 RX ORDER — CIPROFLOXACIN 500 MG/1
500 TABLET, FILM COATED ORAL EVERY 24 HOURS
Qty: 5 TABLET | Refills: 0 | Status: SHIPPED | OUTPATIENT
Start: 2020-01-01 | End: 2020-01-01

## 2020-01-01 RX ORDER — PRAVASTATIN SODIUM 80 MG/1
TABLET ORAL
Qty: 90 TABLET | Refills: 3 | Status: SHIPPED | OUTPATIENT
Start: 2020-01-01

## 2020-01-01 RX ORDER — AMOXICILLIN 500 MG/1
500 CAPSULE ORAL EVERY 8 HOURS SCHEDULED
Qty: 21 CAPSULE | Refills: 0 | Status: SHIPPED | OUTPATIENT
Start: 2020-01-01 | End: 2020-01-01 | Stop reason: ALTCHOICE

## 2020-01-01 RX ORDER — TORSEMIDE 20 MG/1
20 TABLET ORAL DAILY
Qty: 30 TABLET
Start: 2020-01-01 | End: 2020-01-01

## 2020-01-01 RX ORDER — OMEPRAZOLE 40 MG/1
40 CAPSULE, DELAYED RELEASE ORAL DAILY
Qty: 90 CAPSULE | Refills: 3 | Status: SHIPPED | OUTPATIENT
Start: 2020-01-01

## 2020-01-01 RX ORDER — TORSEMIDE 20 MG/1
20 TABLET ORAL 2 TIMES DAILY
Qty: 90 TABLET | Refills: 3
Start: 2020-01-01 | End: 2020-01-01 | Stop reason: SDUPTHER

## 2020-01-01 RX ORDER — TORSEMIDE 20 MG/1
20 TABLET ORAL 2 TIMES DAILY
Qty: 180 TABLET | Refills: 3 | Status: SHIPPED | OUTPATIENT
Start: 2020-01-01 | End: 2020-01-01 | Stop reason: SDUPTHER

## 2020-01-01 RX ORDER — GABAPENTIN 100 MG/1
CAPSULE ORAL
Qty: 450 CAPSULE | Refills: 3 | Status: SHIPPED | OUTPATIENT
Start: 2020-01-01

## 2020-01-01 RX ORDER — ETOMIDATE 2 MG/ML
20 INJECTION INTRAVENOUS ONCE
Status: COMPLETED | OUTPATIENT
Start: 2020-01-01 | End: 2020-01-01

## 2020-01-01 RX ORDER — ROPINIROLE 0.5 MG/1
TABLET, FILM COATED ORAL
Qty: 270 TABLET | Refills: 3 | Status: SHIPPED | OUTPATIENT
Start: 2020-01-01

## 2020-01-01 RX ORDER — DOXYCYCLINE HYCLATE 100 MG/1
100 CAPSULE ORAL EVERY 12 HOURS SCHEDULED
Qty: 14 CAPSULE | Refills: 0 | Status: SHIPPED | OUTPATIENT
Start: 2020-01-01 | End: 2020-01-01

## 2020-01-01 RX ORDER — LORAZEPAM 2 MG/ML
0.5 INJECTION INTRAMUSCULAR
Status: DISCONTINUED | OUTPATIENT
Start: 2020-01-01 | End: 2020-01-01 | Stop reason: HOSPADM

## 2020-01-01 RX ORDER — METOLAZONE 5 MG/1
5 TABLET ORAL 3 TIMES WEEKLY
COMMUNITY
Start: 2020-01-01 | End: 2020-01-01 | Stop reason: SDUPTHER

## 2020-01-01 RX ORDER — POTASSIUM CHLORIDE 20 MEQ/1
20 TABLET, EXTENDED RELEASE ORAL 3 TIMES DAILY
Start: 2020-01-01

## 2020-01-01 RX ORDER — CEPHALEXIN 500 MG/1
500 CAPSULE ORAL EVERY 8 HOURS SCHEDULED
Qty: 21 CAPSULE | Refills: 0 | Status: SHIPPED | OUTPATIENT
Start: 2020-01-01 | End: 2020-01-01

## 2020-01-01 RX ORDER — METOLAZONE 5 MG/1
5 TABLET ORAL 3 TIMES WEEKLY
Qty: 36 TABLET | Refills: 0 | OUTPATIENT
Start: 2020-01-01

## 2020-01-01 RX ORDER — POTASSIUM CHLORIDE 20 MEQ/1
40 TABLET, EXTENDED RELEASE ORAL 2 TIMES DAILY
Qty: 360 TABLET | Refills: 1 | Status: SHIPPED | OUTPATIENT
Start: 2020-01-01

## 2020-01-01 RX ORDER — POTASSIUM CHLORIDE 20 MEQ/1
20 TABLET, EXTENDED RELEASE ORAL DAILY
Qty: 30 TABLET | Refills: 1 | Status: SHIPPED | OUTPATIENT
Start: 2020-01-01 | End: 2020-01-01

## 2020-01-01 RX ORDER — FUROSEMIDE 20 MG/1
60 TABLET ORAL ONCE
Status: DISCONTINUED | OUTPATIENT
Start: 2020-01-01 | End: 2020-01-01

## 2020-01-01 RX ORDER — LORAZEPAM 2 MG/ML
1 INJECTION INTRAMUSCULAR ONCE
Status: COMPLETED | OUTPATIENT
Start: 2020-01-01 | End: 2020-01-01

## 2020-01-01 RX ORDER — METOLAZONE 5 MG/1
5 TABLET ORAL 3 TIMES WEEKLY
Qty: 36 TABLET | Refills: 3 | Status: SHIPPED | OUTPATIENT
Start: 2020-01-01

## 2020-01-01 RX ORDER — METOLAZONE 5 MG/1
5 TABLET ORAL DAILY
Qty: 30 TABLET | Refills: 1 | Status: SHIPPED | OUTPATIENT
Start: 2020-01-01 | End: 2020-01-01

## 2020-01-01 RX ORDER — LOSARTAN POTASSIUM 25 MG/1
25 TABLET ORAL DAILY
Qty: 90 TABLET | Refills: 3 | Status: SHIPPED | OUTPATIENT
Start: 2020-01-01

## 2020-01-01 RX ORDER — TORSEMIDE 20 MG/1
60 TABLET ORAL 2 TIMES DAILY
Start: 2020-01-01 | End: 2020-01-01

## 2020-01-01 RX ORDER — SUCCINYLCHOLINE/SOD CL,ISO/PF 100 MG/5ML
100 SYRINGE (ML) INTRAVENOUS ONCE
Status: COMPLETED | OUTPATIENT
Start: 2020-01-01 | End: 2020-01-01

## 2020-01-01 RX ORDER — TORSEMIDE 20 MG/1
20 TABLET ORAL DAILY
Qty: 90 TABLET | Refills: 3 | Status: SHIPPED | OUTPATIENT
Start: 2020-01-01 | End: 2020-01-01

## 2020-01-01 RX ORDER — MORPHINE SULFATE 10 MG/ML
6 INJECTION, SOLUTION INTRAMUSCULAR; INTRAVENOUS ONCE
Status: COMPLETED | OUTPATIENT
Start: 2020-01-01 | End: 2020-01-01

## 2020-01-01 RX ORDER — POTASSIUM CHLORIDE 20 MEQ/1
20 TABLET, EXTENDED RELEASE ORAL 2 TIMES DAILY
Qty: 30 TABLET | Refills: 1 | Status: SHIPPED | OUTPATIENT
Start: 2020-01-01 | End: 2020-01-01

## 2020-01-01 RX ORDER — POTASSIUM CHLORIDE 20 MEQ/1
20 TABLET, EXTENDED RELEASE ORAL 2 TIMES DAILY
Qty: 60 TABLET | Refills: 5 | Status: SHIPPED | OUTPATIENT
Start: 2020-01-01 | End: 2020-01-01

## 2020-01-01 RX ORDER — TORSEMIDE 20 MG/1
40 TABLET ORAL DAILY
Qty: 180 TABLET | Refills: 1 | OUTPATIENT
Start: 2020-01-01

## 2020-01-01 RX ORDER — FUROSEMIDE 10 MG/ML
60 INJECTION INTRAMUSCULAR; INTRAVENOUS ONCE
Status: COMPLETED | OUTPATIENT
Start: 2020-01-01 | End: 2020-01-01

## 2020-01-01 RX ORDER — CEPHALEXIN 500 MG/1
500 CAPSULE ORAL EVERY 12 HOURS SCHEDULED
Qty: 14 CAPSULE | Refills: 0 | Status: SHIPPED | OUTPATIENT
Start: 2020-01-01 | End: 2020-01-01

## 2020-01-01 RX ORDER — METOLAZONE 5 MG/1
TABLET ORAL
Qty: 30 TABLET | Refills: 1 | OUTPATIENT
Start: 2020-01-01

## 2020-01-01 RX ORDER — POTASSIUM CHLORIDE 20 MEQ/1
40 TABLET, EXTENDED RELEASE ORAL DAILY
Start: 2020-01-01 | End: 2020-01-01

## 2020-01-01 RX ORDER — TORSEMIDE 20 MG/1
40 TABLET ORAL DAILY
Start: 2020-01-01 | End: 2020-01-01 | Stop reason: SDUPTHER

## 2020-01-01 RX ORDER — TORSEMIDE 20 MG/1
40 TABLET ORAL 2 TIMES DAILY
Start: 2020-01-01 | End: 2020-01-01

## 2020-01-01 RX ORDER — POTASSIUM CHLORIDE 20 MEQ/1
20 TABLET, EXTENDED RELEASE ORAL DAILY
Start: 2020-01-01 | End: 2020-01-01

## 2020-01-01 RX ORDER — POTASSIUM CHLORIDE 29.8 MG/ML
40 INJECTION INTRAVENOUS ONCE
Status: DISCONTINUED | OUTPATIENT
Start: 2020-01-01 | End: 2020-01-01

## 2020-01-01 RX ORDER — FENTANYL CITRATE-0.9 % NACL/PF 10 MCG/ML
25 PLASTIC BAG, INJECTION (ML) INTRAVENOUS CONTINUOUS
Status: DISCONTINUED | OUTPATIENT
Start: 2020-01-01 | End: 2020-01-01 | Stop reason: HOSPADM

## 2020-01-01 RX ORDER — AMOXICILLIN 500 MG/1
500 CAPSULE ORAL EVERY 12 HOURS SCHEDULED
Qty: 14 CAPSULE | Refills: 0 | Status: SHIPPED | OUTPATIENT
Start: 2020-01-01 | End: 2020-01-01

## 2020-01-01 RX ORDER — LEVOTHYROXINE SODIUM 0.07 MG/1
75 TABLET ORAL DAILY
Qty: 90 TABLET | Refills: 3 | Status: SHIPPED | OUTPATIENT
Start: 2020-01-01

## 2020-01-01 RX ORDER — GLYCOPYRROLATE 0.2 MG/ML
0.2 INJECTION INTRAMUSCULAR; INTRAVENOUS
Status: DISCONTINUED | OUTPATIENT
Start: 2020-01-01 | End: 2020-01-01 | Stop reason: HOSPADM

## 2020-01-01 RX ORDER — TORSEMIDE 20 MG/1
40 TABLET ORAL 2 TIMES DAILY
Qty: 90 TABLET | Refills: 3 | Status: SHIPPED | OUTPATIENT
Start: 2020-01-01 | End: 2020-01-01

## 2020-01-01 RX ORDER — METOLAZONE 5 MG/1
TABLET ORAL
Qty: 36 TABLET | Refills: 3 | Status: SHIPPED | OUTPATIENT
Start: 2020-01-01 | End: 2020-01-01

## 2020-01-01 RX ORDER — BENZONATATE 100 MG/1
CAPSULE ORAL
Qty: 30 CAPSULE | Refills: 0 | Status: SHIPPED | OUTPATIENT
Start: 2020-01-01 | End: 2020-01-01 | Stop reason: ALTCHOICE

## 2020-01-01 RX ORDER — SIMVASTATIN 40 MG
TABLET ORAL
Qty: 90 TABLET | Refills: 0 | OUTPATIENT
Start: 2020-01-01

## 2020-01-01 RX ORDER — LORAZEPAM 2 MG/ML
2 INJECTION INTRAMUSCULAR ONCE
Status: COMPLETED | OUTPATIENT
Start: 2020-01-01 | End: 2020-01-01

## 2020-01-01 RX ORDER — POTASSIUM CHLORIDE 20 MEQ/1
TABLET, EXTENDED RELEASE ORAL
Start: 2020-01-01 | End: 2020-01-01 | Stop reason: SDUPTHER

## 2020-01-01 RX ORDER — ALBUTEROL SULFATE 90 UG/1
2 AEROSOL, METERED RESPIRATORY (INHALATION) EVERY 6 HOURS PRN
Qty: 1 INHALER | Refills: 0 | Status: SHIPPED | OUTPATIENT
Start: 2020-01-01 | End: 2020-01-01 | Stop reason: CLARIF

## 2020-01-01 RX ORDER — LOSARTAN POTASSIUM 25 MG/1
25 TABLET ORAL DAILY
Qty: 90 TABLET | Refills: 3 | Status: SHIPPED | OUTPATIENT
Start: 2020-01-01 | End: 2020-01-01 | Stop reason: SDUPTHER

## 2020-01-01 RX ORDER — LEUCOVORIN CALCIUM 5 MG/1
10 TABLET ORAL WEEKLY
COMMUNITY
Start: 2020-01-01

## 2020-01-01 RX ADMIN — Medication 100 MG: at 19:26

## 2020-01-01 RX ADMIN — FUROSEMIDE 60 MG: 10 INJECTION INTRAMUSCULAR; INTRAVENOUS at 14:35

## 2020-01-01 RX ADMIN — FENTANYL CITRATE 25 MCG/HR: 50 INJECTION, SOLUTION INTRAMUSCULAR; INTRAVENOUS at 20:28

## 2020-01-01 RX ADMIN — ETOMIDATE 20 MG: 2 INJECTION INTRAVENOUS at 19:26

## 2020-01-01 RX ADMIN — MORPHINE SULFATE 6 MG: 10 INJECTION INTRAVENOUS at 23:50

## 2020-01-01 RX ADMIN — LORAZEPAM 2 MG: 2 INJECTION INTRAMUSCULAR; INTRAVENOUS at 22:16

## 2020-01-01 RX ADMIN — GLYCOPYRROLATE 0.2 MG: 0.2 INJECTION, SOLUTION INTRAMUSCULAR; INTRAVENOUS at 23:50

## 2020-01-01 RX ADMIN — LORAZEPAM 1 MG: 2 INJECTION INTRAMUSCULAR; INTRAVENOUS at 23:50

## 2020-01-06 NOTE — PATIENT INSTRUCTIONS
Maintain a 2 gram daily sodium diet and 1500 ml daily fluid restriction  Check daily weights  If you gain 3 pounds in one day, 5 pounds in one week, or experience worsening shortness of breath or increasing lower leg swelling  Please call the heart failure office at [phone]    Please bring a  list of your current medications and daily weights to the office visit

## 2020-01-06 NOTE — PROGRESS NOTES
Cardiology Follow Up    Zulema Balderrama  1935  [id number]  [facility] [address]  [place] [address]  [phone]    Acute office visit for weight gain    Interval History:   Ms Zachary Gallagher called our office on 12/17/19 with a weight gain of 3 pounds in 3 days  Weight increased from 228 pounds to 231 pounds  Chronic amadou LE lymphedema  She was instructed to take an additional Torsemide 20mg on that day  If no improvement in weight in am, take an additional Torsemide 20mg  Ms Hodge Sol our office due to continued lower extremity edema  She denies dyspnea with minimal moderate exertion chest pain palpitations lightheadedness or dizziness  Her weight in the office is 230 lb  She admits to dietary indiscretion over the holiday season        HPI:  Chronic diastolic heart failure NYHA class 3 stage C, LVEF 60%  Urothelial CA sp Right Nephrectomy 4/23/18  TIA/CVA on Xarelto  PAD stent x 2 right iliac 1/24/19  Lymphedema  NICK compliant with PAP  RA on Methotrexate and Plaquenil  CKD III baseline creat 1 4 - 1 8  DM2 HgbA1C 6 5  Left shoulder dislocation undergoing PT  Obesity BMI 41 93  Hypothyroidism  Patient Active Problem List   Diagnosis    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 ([address] )    Essential hypertension    Rheumatoid arthritis ([address] )    Sleep apnea    Acquired hypothyroidism    Chronic GERD    Edema    Hypercholesterolemia    Obesity    Peripheral neuropathy    Primary osteoarthritis of left knee    Restless leg syndrome    Right lumbar radiculopathy    Sensorineural hearing loss    Sinus arrhythmia    Chronic bilateral thoracic back pain    Thoracic degenerative disc disease    Urothelial cancer ([address] )    Lung nodule < 6cm on CT    Pancreatic cyst    Iron deficiency anemia    Atherosclerosis of artery of extremity with walker      Social History     Socioeconomic History    Marital status: /Civil Union     Spouse name: Not on file    Number of children: Not on file    Years of education: Not on file    Highest education level: Not on file   Occupational History    Not on file   Social Needs    Financial resource strain: Not hard at all   [place] insecurity:     Worry: Never true     Inability: Never true   [employer] needs:     Medical: No     Non-medical: No   Tobacco Use    Smoking status: Never Smoker    Smokeless tobacco: Never Used   Substance and Sexual Activity    Alcohol use: Not Currently    Drug use: No    Sexual activity: Not on file   Lifestyle    Physical activity:     Days per week: 0 days     Minutes per session: 0 min    Stress:  Only a little   Relationships    Social connections:     Talks on phone: More than three times a week     Gets together: Once a week     Attends Scientologist service: 1 to 4 times per year     Active member of club or organization: No     Attends meetings of clubs or organizations: Never     Relationship status:     Intimate partner violence:     Fear of current or ex partner: No     Emotionally abused: No     Physically abused: No     Forced sexual activity: No   Other Topics Concern    Not on file   Social History Narrative    No caffeine use      Family History   Problem Relation Age of Onset    Other Mother         epilepsy    Early death Mother     Glaucoma Father     Stroke Father         silent    Other Brother         cardiac disorder    Rheum arthritis Son     No Known Problems Son     No Known Problems Daughter     No Known Problems Son      Past Surgical History:   Procedure Laterality Date    APPENDECTOMY      ARTHROSCOPY WRIST Right     with release of transverse carpal ligament     BLADDER SURGERY      BLADDER SURGERY      BREAST SURGERY      left breast    BUNIONECTOMY Bilateral     CATARACT EXTRACTION Bilateral     CHOLECYSTECTOMY      COLONOSCOPY      CYSTOSCOPY      CYSTOSCOPY      EGD AND COLONOSCOPY N/A 12/20/2017    Procedure: EGD AND COLONOSCOPY;  Surgeon: Jude Alcaraz MD;  [facility]; Service: Gastroenterology    ESOPHAGOGASTRODUODENOSCOPY      diagnostic    FOREARM SURGERY Right     fracture repair    FRACTURE SURGERY Right     arm with hardware    HYSTERECTOMY      IR ABDOMINAL ANGIOGRAPHY / INTERVENTION  1/24/2019    KNEE ARTHROSCOPY Left     KNEE SURGERY Left     meniscus tear    NEPHRECTOMY Right     NOSE SURGERY      ME CYSTO/URETERO W/LITHOTRIPSY &INDWELL STENT INSRT Right 2/28/2018    Procedure: CYSTOSCOPY RIGHT URETEROSCOPY, RIGHT RETROGRADE PYELOGRAM AND INSERTION  RIGHT STENT URETERAL, RIGHT RENAL PELVIC WASHING FOR CYTOLOGY;  Surgeon: Juan J Mccormick MD;  [facility];  Service: Urology    ME CYSTOURETHROSCOPY,FULGUR 0 5-2 CM LESN N/A 5/21/2019    Procedure: BLADDER BIOPSY WITH FULGURATION;  Surgeon: Deacon Logan MD;  [facility];  Service: Urology    ME NEPHRECTOMY, W/PART   URETECTOMY Right 4/23/2018    Procedure: Chryl Meals ASSISTED NEPHRO-URETERECTOMY WITH BLADDER CUFF EXCISION;  Surgeon: Deacon Logan MD;  [facility];  Service: Urology    ME Shlomo Lamine 3RD+ ORD SLCTV ABDL PEL/TR [facility] Right 1/24/2019    Procedure: RIGHT LEG ANGIOGRAM, BILATERAL FEMORAL ACCESS, STENTING OF RIGHT EXTERNAL ILIAC ARTERY WITH BALLOON ANGIOPLASTY;  Surgeon: Padma Arteaga MD;  [facility];  Service: Vascular    REPLACEMENT TOTAL KNEE BILATERAL      URETEROSCOPY Right 3/20/2018    Procedure: CYSTOSCOPY, RETROGRADE PYELOGRAM, URETEROSCOPY, BIOPSY, BRUSH, FULGURATION, STENT PLACEMENT, BLADDER BIOPSY WITH FULGURATION;  Surgeon: Deacon Logan MD;  [facility];  Service: Urology    VASCULAR SURGERY      stents       Current Outpatient Medications:     acetaminophen (TYLENOL) 325 mg tablet, Take 650 mg by mouth 2 (two) times a day as needed for mild pain , Disp: , Rfl:     Calcium Citrate-Vitamin D (CALCIUM + D PO), Take 1 tablet by mouth 2 (two) times a day, Disp: , Rfl:     cyanocobalamin (VITAMIN B-12) 100 mcg tablet, Take 100 mcg by mouth daily , Disp: , Rfl:     Elastic Bandages & Supports ([address]) MIS, by Does not apply route daily, Disp: 10 each, Rfl: 0    gabapentin (NEURONTIN) 100 mg capsule, Take 1 capsule in the morning, take 1 capsule in the afternoon, and take 1-3 capapsules at bedtime, Disp: 450 capsule, Rfl: 3    hydroxychloroquine (PLAQUENIL) 200 mg tablet, Take 200 mg by mouth 2 (two) times a day with meals, Disp: , Rfl:     LEUCOVORIN CALCIUM PO, Take 10 mg by mouth once a week, Disp: , Rfl:     levothyroxine 75 mcg tablet, Take 1 tablet (75 mcg total) by mouth daily, Disp: 90 tablet, Rfl: 3    losartan (COZAAR) 25 mg tablet, Take 1 tablet (25 mg total) by mouth daily, Disp: 90 tablet, Rfl: 3    methotrexate 2 5 mg tablet, Take 2 tablets weekly, Disp: 12 tablet, Rfl: 0    Multiple Vitamin (MULTIVITAMINS PO), Take 1 tablet by mouth daily, Disp: , Rfl:     omeprazole (PriLOSEC) 40 MG capsule, Take 1 capsule (40 mg total) by mouth daily, Disp: 90 capsule, Rfl: 3    phenazopyridine (PYRIDIUM) 100 mg tablet, Take 100 mg by mouth 3 (three) times a day as needed for bladder spasms, Disp: , Rfl:     polyethylene glycol (MIRALAX) 17 g packet, Take 17 g by mouth daily (Patient taking differently: Take 17 g by mouth as needed ), Disp: 14 each, Rfl: 0    pravastatin (PRAVACHOL) 80 mg tablet, Take 1 tablet (80 mg total) by mouth daily, Disp: 90 tablet, Rfl: 3    rivaroxaban (XARELTO) 15 mg tablet, Take 1 tablet (15 mg total) by mouth daily with breakfast, Disp: 30 tablet, Rfl: 5    rOPINIRole (REQUIP) 0 5 mg tablet, 1 tab in the am and 2 tab at bedtime, Disp: 270 tablet, Rfl: 3    torsemide (DEMADEX) 20 mg tablet, Take 1 tablet (20 mg total) by mouth daily, Disp: 90 tablet, Rfl: 3  No current facility-administered medications for this visit  Facility-Administered Medications Ordered in Other Visits:     acetaminophen (TYLENOL) tablet 650 mg, 650 mg, Oral, Q6H PRN, Joanie Jefferson PA-C  Allergies   Allergen Reactions    Nitrofurantoin Hives     HIVES * pt denies  Other reaction(s): Unknown Allergic Reaction  Other reaction(s): Other (See Comments)  HIVES * pt denies    Atorvastatin      Other reaction(s): Muscle Pain, Myalgia    Acetazolamide Other (See Comments)     Other reaction(s): Unknown Allergic Reaction unknown reaction     Other Other (See Comments)     Adhesive tape : red and itching  Other reaction(s): Other (See Comments)  red and itching    Shellfish-Derived Products Other (See Comments)     Patient got Gout following eating shell fish    Sulfa Antibiotics Other (See Comments)     unknown    Tramadol Diarrhea and Vomiting    Azithromycin Rash       Labs:  Appointment on 12/10/2019   Component Date Value    Vit D, 25-Hydroxy 12/10/2019 38 0     PTH 12/10/2019 128 3*    Iron Saturation 12/10/2019 16     TIBC 12/10/2019 277     Iron 12/10/2019 44*    Ferritin 12/10/2019 68     Phosphorus 12/10/2019 3 6    Transcribe Orders on 12/10/2019   Component Date Value    CRP 12/10/2019 11 0*    Sed Rate 12/10/2019 12      Imaging: No results found  Review of Systems:  Review of Systems   Constitutional: Positive for fatigue  Cardiovascular: Positive for leg swelling  Musculoskeletal: Positive for arthralgias  Physical Exam:  Physical Exam   Constitutional: She is oriented to person, place, and time  She appears well-developed  HENT:   Head: Normocephalic  Eyes: Pupils are equal, round, and reactive to light  Neck: Normal range of motion  Cardiovascular: Normal rate and regular rhythm  Frequent ectopic beats   Pulmonary/Chest: Effort normal and breath sounds normal    Abdominal: Soft  Bowel sounds are normal    Musculoskeletal: Normal range of motion   She exhibits edema  Neurological: She is alert and oriented to person, place, and time  Skin: Skin is warm and dry  Capillary refill takes less than 2 seconds  Psychiatric: She has a normal mood and affect  Vitals reviewed  Discussion/Summary:  1  Acute on Chronic diastolic heart failure NYHA class 3 stage C, LVEF 60%- + amadou LE edema, weight up to 230 pounds secondary to dietary indiscretion over the holiday season  IV Lasix 60mg given in office today  HR and BP Controlled, Continue Cozaar 25mg daily,  Torsemide 20 mg daily in a m , BMP in a m  Maintain a 2 gram daily sodium diet and 1500 ml daily fluid restriction  Check daily weights  If you gained 3 pounds in one day, 5 pounds in one week, or experience worsening shortness of breath or increasing lower leg swelling  Please call the heart failure office at [phone]  Please bring a  list of your current medications and daily weights to the office visit  2  NICK compliant with PAP  3   CKD III baseline creat 1 4 - 1 8- BMP in am

## 2020-01-06 NOTE — PROGRESS NOTES
Gave pt 60 mg IV lasix in Right wrist without difficulty  UO post- 300 ml clear yellow  BP post 122/54    Pt informed of blood work, appt and I will call her tomorrow to get an update  Did discuss fluid restriction and Low Na diet

## 2020-01-07 NOTE — TELEPHONE ENCOUNTER
Called pt, she states  that the bloating has gone down a little, did urinate several times yesterday  Wt unchanged and legs still red and lymphedema is acting up  Pt will try to get BMP done today        Taking torsemide 20 mg qd      Please advise

## 2020-01-09 PROBLEM — E11.42 DIABETIC POLYNEUROPATHY ASSOCIATED WITH TYPE 2 DIABETES MELLITUS (HCC): Status: RESOLVED | Noted: 2020-01-01 | Resolved: 2020-01-01

## 2020-01-09 PROBLEM — E11.42 DIABETIC POLYNEUROPATHY ASSOCIATED WITH TYPE 2 DIABETES MELLITUS (HCC): Status: ACTIVE | Noted: 2020-01-01

## 2020-01-09 PROBLEM — N18.4 CKD (CHRONIC KIDNEY DISEASE) STAGE 4, GFR 15-29 ML/MIN (HCC): Status: ACTIVE | Noted: 2020-01-01

## 2020-01-09 NOTE — PROGRESS NOTES
Assessment/Plan:     Diagnoses and all orders for this visit:    CKD (chronic kidney disease) stage 3, GFR 30-59 ml/min ([facility])  -Recent blood work was completed after taking Demedex  It largely is stable but creatinine runs between 1 5-1 7  Lymphedema  -     Ambulatory referral to PT/OT lymphedema therapy; Future  Likely multifactorial  I am going to have her see lymphedema clinic to see if she can get additional help through them as well  Anemia of chronic disease  -Reports receiving an iron infusion in December and will have follow up blood work in March  Atrial fibrillation, unspecified type ([address] )   -Rate controlled  On AC  Subjective:      Patient ID: Honorio Dai is a 81 y o  female  Adalberto Steinberg is here today for evaluation of her LE edema  She does have known chronic LE edema but feels it is worse  She reports having compression stockings/devices to use at home but does not always do it as she is suppose to  She does try and elevate her legs  She reports pain as well  She notes slight redness  She was following with cardiology and prescribed Demedex  Reports she does try and mind the fluid intake and does not add salt to the diet  See discussion  The following portions of the patient's history were reviewed and updated as appropriate: allergies, current medications, past family history, past medical history, past social history, past surgical history and problem list     Review of Systems   Constitutional: Negative for chills, fever and unexpected weight change  Respiratory: Negative for cough, chest tightness and shortness of breath  Cardiovascular: Positive for leg swelling  Negative for chest pain  Gastrointestinal: Negative for abdominal pain, diarrhea, nausea and vomiting  Musculoskeletal: Positive for arthralgias  Neurological: Negative for dizziness and headaches  Psychiatric/Behavioral: Negative for sleep disturbance           Objective:      /70   Pulse (!) 110   Temp 98 7 °F (37 1 °C)   Resp 18   Ht 4' 11" (1 499 m)   Wt 105 kg (232 lb 6 4 oz)   LMP  (LMP Unknown)   SpO2 98%   BMI 46 94 kg/m²          Physical Exam   Constitutional: She is oriented to person, place, and time  She appears well-developed and well-nourished  No distress  HENT:   Head: Normocephalic and atraumatic  Eyes: Conjunctivae and EOM are normal  Right eye exhibits no discharge  Left eye exhibits no discharge  No scleral icterus  Neck: Normal range of motion  Cardiovascular: Normal rate and normal heart sounds  An irregularly irregular rhythm present  No murmur heard  B/L LE edema 3+ tracing up to the knee with mild erythema noted of the shin region b/l   Pulmonary/Chest: Effort normal and breath sounds normal  No respiratory distress  She has no wheezes  Abdominal: Soft  Bowel sounds are normal  There is no tenderness  Neurological: She is alert and oriented to person, place, and time  Skin: Skin is warm and dry  She is not diaphoretic  Psychiatric: She has a normal mood and affect  Her speech is normal and behavior is normal  Judgment and thought content normal    Vitals reviewed

## 2020-01-13 NOTE — TELEPHONE ENCOUNTER
Pt called for Rx refill for both her and her   VM was unclear  I did call and left message to call office back to clarify medication request and pharmacy, as she stated she has a new mail order pharmacy

## 2020-01-25 NOTE — PROGRESS NOTES
Daily Note     Today's date: 2020  Patient name: Heber Blackburn  : 1935  MRN: [id number]  Referring provider: Lavetta Bumpers, DO  Dx:   Encounter Diagnosis     ICD-10-CM    1  Lymphedema I89 0                   Subjective: "My legs look less red and smaller today with using my compression wraps "      Objective: See treatment diary below      Assessment: Tolerated treatment well  Patient would benefit from continued PT      Plan: Continue per plan of care        Precautions: med allergies:      Manual  1/15/2020 1/22 1/24           I eval            Mld massage and soft tissue mobilization bilateral le's  30 min man 45 min man          Monitor left greater than right distal tibial redness on le's   improved                                         Exercise Diary             Nu step trial  10 min r3 15 min r 3          Lymphedema le exer packet  20 reps each                                                                                                                                                                                                                                                         Modalities              Pt has home compression pump and uses daily

## 2020-01-27 NOTE — PROGRESS NOTES
Daily Note     Today's date: 2020  Patient name: Sheila Burks  : 1935  MRN: [id number]  Referring provider: Rosa Contreras DO  Dx:   Encounter Diagnosis     ICD-10-CM    1  Lymphedema I89 0                   Subjective: Advised patient to start lymphedema wrap further down on ankle to prevent increased ankle swelling with fibrosis  Objective: See treatment diary below      Assessment: Tolerated treatment well  Patient would benefit from continued PT      Plan: Continue per plan of care        Precautions: med allergies:      Manual  1/15/2020 1/22 1/24 1/27          I eval            Mld massage and soft tissue mobilization bilateral le's  30 min man 45 min man 45 min         Monitor left greater than right distal tibial redness on le's   improved                                         Exercise Diary            Nu step trial  10 min r3 15 min r 3 15 min         Lymphedema le exer packet  20 reps each                                                                                                                                                                                                                                                         Modalities              Pt has home compression pump and uses daily

## 2020-01-29 NOTE — PROGRESS NOTES
Assessment/Plan:      She appears to have viral upper respiratory infection  Will treat symptomatically with the benzonatate 100 mg up to t i d  For cough  She may also use saline nose spray p r n  Philipp Zamudio She can't continue with Tylenol if needed  She should contact us if symptoms are not improving over the next few days  Diagnoses and all orders for this visit:    Viral upper respiratory tract infection          Subjective:      Patient ID: Katharine Mendoza is a 81 y o  female  She is here with chest cold symptoms that started yesterday  She has scratchy throat, nasal congestion, cough productive of some greenish phlegm  She also feels very fatigued  No fever or chills  She tried a cough drop yesterday and cause nausea and dry heaving  Today her stomach feels okay  No diarrhea      The following portions of the patient's history were reviewed and updated as appropriate: allergies, current medications, past family history, past medical history, past social history, past surgical history and problem list     Review of Systems   Constitutional: Positive for fatigue  Negative for chills and fever  HENT: Positive for congestion and sore throat  Respiratory: Positive for cough  Gastrointestinal: Positive for nausea  Negative for abdominal pain and diarrhea  Neurological: Negative for headaches  Objective:      /70 (BP Location: Left arm, Patient Position: Sitting, Cuff Size: Large)   Pulse 79   Temp 97 9 °F (36 6 °C) (Tympanic)   Ht 5' 1" (1 549 m)   Wt 104 kg (228 lb 12 8 oz)   LMP  (LMP Unknown)   SpO2 97%   BMI 43 23 kg/m²          Physical Exam   Constitutional: She appears well-developed and well-nourished  HENT:   Head: Normocephalic and atraumatic  Right Ear: External ear normal    Mouth/Throat: Oropharynx is clear and moist    Nose with mucosal edema  Neck: Normal range of motion  Neck supple  Cardiovascular: Normal rate, regular rhythm and normal heart sounds  Pulmonary/Chest: Breath sounds normal    Occasional cough   Nursing note and vitals reviewed

## 2020-02-05 NOTE — PROGRESS NOTES
Assessment/Plan:    She appears to have bronchitis  Will treat with doxycycline 100 mg b i d  For 7 day course  No need to adjust dose for low renal function  Also gave Tessalon Perles 1 or 2 to up to t i d  As needed for cough  In addition gave albuterol inhaler which she may do 2 puffs up to t i d  She should contact us if not improving  Diagnoses and all orders for this visit:    Bronchitis  -     doxycycline hyclate (VIBRAMYCIN) 100 mg capsule; Take 1 capsule (100 mg total) by mouth every 12 (twelve) hours for 7 days  -     benzonatate (TESSALON PERLES) 100 mg capsule; Take 1-2 capsules po tid prn cough  -     albuterol (PROVENTIL HFA,VENTOLIN HFA) 90 mcg/act inhaler; Inhale 2 puffs every 6 (six) hours as needed for wheezing          Subjective:      Patient ID: Kendall Fernández is a 81 y o  female  I had seen her last week with cold symptoms and unfortunately she is not improving  She has been coughing continuously  It keeps her awake at night  Cough is productive of greenish mucus  I have given her Tessalon Perles last week, but unfortunately they weren't very helpful      The following portions of the patient's history were reviewed and updated as appropriate: allergies, current medications, past family history, past medical history, past social history, past surgical history and problem list     Review of Systems   Constitutional: Positive for fatigue  Negative for chills and fever  HENT: Positive for congestion and rhinorrhea  Respiratory: Positive for cough  Gastrointestinal: Positive for nausea  Negative for abdominal pain, diarrhea and vomiting  Neurological: Positive for headaches  Psychiatric/Behavioral: Negative for dysphoric mood  The patient is not nervous/anxious            Objective:      /80 (BP Location: Left arm, Patient Position: Sitting, Cuff Size: Large)   Pulse 84   Temp (!) 97 3 °F (36 3 °C)   Ht 5' 1" (1 549 m)   Wt 103 kg (226 lb 6 oz)   LMP (LMP Unknown)   SpO2 97%   BMI 42 77 kg/m²          Physical Exam   Constitutional: She is oriented to person, place, and time  She appears well-developed and well-nourished  HENT:   Head: Normocephalic and atraumatic  Right TM blocked by cerumen   Neck: Normal range of motion  Neck supple  Cardiovascular: Normal rate, regular rhythm and normal heart sounds  Pulmonary/Chest: No respiratory distress  She has wheezes  Frequent coughing  Few scattered expiratory wheezes  Neurological: She is alert and oriented to person, place, and time  Nursing note and vitals reviewed

## 2020-02-11 NOTE — PROGRESS NOTES
UROLOGY PROGRESS NOTE     History of Present Illness:   Angeles Kincaid is a 81 y o  female known to Dr Lena Rogers with a long history of urge incontinence who has been undergoing PTNS  She developed recurrent urinary tract infections and gross hematuria  Her primary care team ordered a renal ultrasound and followup CT scan which showed some nodularity in the right renal pelvis  Patient underwent ureteroscopic evaluation and subsequent ureteroscopic biopsy which demonstrated diffuse low-grade cancer  After lengthy discussion, the patient agreed to proceed to the operating room for a right robotic nephroureterectomy with bladder cuff which was performed in April 2018  Patient did receive 1 unit of packed red blood cells while hospitalized given asymptomatic anemia but significant cardiac comorbidities  Given her rheumatologic issue she was transferred to a rehab facility following surgery  Developed incision hernia post op and has seen general surgery team for evaluation  Surgery was deferred  Presents today for scheduled followup  Patient reportedly is just getting over bronchitis  She has been on antibiotic therapy  With the coughing, she has had some discomfort around the hernia site  No new issues with her bladder which continues to be an issue from a functional voiding standpoint      Past Medical History:     Past Medical History:   Diagnosis Date    Anemia     Arthritis     rheumatoid    At risk for falls     Benign essential hypertension     CHF (congestive heart failure) (HCC)     Chronic cystitis     Chronic kidney disease     right kidney cancer - kidney removed    Chronic pain disorder     CPAP (continuous positive airway pressure) dependence     Diverticulitis of colon     Early satiety     Edema     GERD (gastroesophageal reflux disease)     Gout     Hearing aid worn     bilateral    Hearing loss     Hemorrhoids     Hiatal hernia     History of colonic polyps     Hyperlipidemia     Hypothyroidism     Irregular heart beat     Afib    Left breast mass     Microhematuria     Migraine     occular    Mobility impaired     left arm    Neuropathy     lower and upper extermities    Overactive bladder     Pneumonia of left lower lobe due to infectious organism [facility]     PVD (peripheral vascular disease) (HCC)     RA (rheumatoid arthritis) ([facility]     Restless leg syndrome     Sleep apnea     uses cpap    [facility]     5/2017    Type 2 diabetes mellitus (HCC)     Urinary frequency     Urinary urgency     Urothelial cancer ([facility] ) 04/23/2018    Uses walker        PAST SURGICAL HISTORY:     Past Surgical History:   Procedure Laterality Date    APPENDECTOMY      ARTHROSCOPY WRIST Right     with release of transverse carpal ligament     BLADDER SURGERY      BLADDER SURGERY      BREAST SURGERY      left breast    BUNIONECTOMY Bilateral     CATARACT EXTRACTION Bilateral     CHOLECYSTECTOMY      COLONOSCOPY      CYSTOSCOPY      CYSTOSCOPY      EGD AND COLONOSCOPY N/A 12/20/2017    Procedure: EGD AND COLONOSCOPY;  Surgeon: Sallye Olszewski, MD;  Location:  GI LAB;   Service: Gastroenterology    ESOPHAGOGASTRODUODENOSCOPY      diagnostic    FOREARM SURGERY Right     fracture repair    FRACTURE SURGERY Right     arm with hardware    HYSTERECTOMY      IR ABDOMINAL ANGIOGRAPHY / INTERVENTION  1/24/2019    KNEE ARTHROSCOPY Left     KNEE SURGERY Left     meniscus tear    NEPHRECTOMY Right     NOSE SURGERY      NJ CYSTO/URETERO W/LITHOTRIPSY &INDWELL STENT INSRT Right 2/28/2018    Procedure: CYSTOSCOPY RIGHT URETEROSCOPY, RIGHT RETROGRADE PYELOGRAM AND INSERTION  RIGHT STENT URETERAL, RIGHT RENAL PELVIC WASHING FOR CYTOLOGY;  Surgeon: Sai Diallo MD;  Location: [facility] OR;  Service: Urology    NJ CYSTOURETHROSCOPY,FULGUR 0 5-2 CM LESN N/A 5/21/2019    Procedure: BLADDER BIOPSY WITH FULGURATION;  Surgeon: Praveen Dale MD;  Location: [facility] OR;  Service: Urology    CO NEPHRECTOMY, W/PART   URETECTOMY Right 4/23/2018    Procedure: ROBATIC ASSISTED NEPHRO-URETERECTOMY WITH BLADDER CUFF EXCISION;  Surgeon: Brigid Darling MD;  [facility];  Service: Urology    CO Albino Limbarielle 3RD+ ORD SLCTV ABDL PEL/LXTR [facility] Right 1/24/2019    Procedure: RIGHT LEG ANGIOGRAM, BILATERAL FEMORAL ACCESS, STENTING OF RIGHT EXTERNAL ILIAC ARTERY WITH BALLOON ANGIOPLASTY;  Surgeon: Tobi Arteaga MD;  Location: [facility];  Service: Vascular    REPLACEMENT TOTAL KNEE BILATERAL      URETEROSCOPY Right 3/20/2018    Procedure: CYSTOSCOPY, RETROGRADE PYELOGRAM, URETEROSCOPY, BIOPSY, BRUSH, FULGURATION, STENT PLACEMENT, BLADDER BIOPSY WITH FULGURATION;  Surgeon: Brigid Darling MD;  Location: [facility];  Service: Urology    VASCULAR SURGERY      stents       CURRENT MEDICATIONS:     Current Outpatient Medications   Medication Sig Dispense Refill    acetaminophen (TYLENOL) 325 mg tablet Take 650 mg by mouth 2 (two) times a day as needed for mild pain       albuterol (PROVENTIL HFA,VENTOLIN HFA) 90 mcg/act inhaler Inhale 2 puffs every 6 (six) hours as needed for wheezing 1 Inhaler 0    benzonatate (TESSALON PERLES) 100 mg capsule Take 1-2 capsules po tid prn cough 30 capsule 0    Calcium Citrate-Vitamin D (CALCIUM + D PO) Take 1 tablet by mouth 2 (two) times a day      cyanocobalamin (VITAMIN B-12) 100 mcg tablet Take 100 mcg by mouth daily       doxycycline hyclate (VIBRAMYCIN) 100 mg capsule Take 1 capsule (100 mg total) by mouth every 12 (twelve) hours for 7 days 14 capsule 0    Elastic Bandages & Supports (MEDICAL COMPRESSION STOCKINGS) MISC by Does not apply route daily 10 each 0    gabapentin (NEURONTIN) 100 mg capsule Take 1 capsule in the morning, take 1 capsule in the afternoon, and take 1-3 capapsules at bedtime (Patient taking differently: 100 mg Take 1 capsule in the morning, take 1 capsule in the afternoon, and take 1-3 capapsules at bedtime) 450 capsule 3    hydroxychloroquine (PLAQUENIL) 200 mg tablet Take 200 mg by mouth 2 (two) times a day with meals      LEUCOVORIN CALCIUM PO Take 10 mg by mouth once a week      levothyroxine 75 mcg tablet Take 1 tablet (75 mcg total) by mouth daily 90 tablet 3    losartan (COZAAR) 25 mg tablet Take 1 tablet (25 mg total) by mouth daily 90 tablet 3    methotrexate 2 5 mg tablet Take 2 tablets weekly 12 tablet 0    Multiple Vitamin (MULTIVITAMINS PO) Take 1 tablet by mouth daily      omeprazole (PriLOSEC) 40 MG capsule Take 1 capsule (40 mg total) by mouth daily 90 capsule 3    polyethylene glycol (MIRALAX) 17 g packet Take 17 g by mouth daily (Patient taking differently: Take 17 g by mouth as needed ) 14 each 0    pravastatin (PRAVACHOL) 80 mg tablet Take 1 tablet (80 mg total) by mouth daily 90 tablet 3    rivaroxaban (XARELTO) 15 mg tablet Take 1 tablet (15 mg total) by mouth daily with breakfast 30 tablet 5    rOPINIRole (REQUIP) 0 5 mg tablet 1 tab in the am and 2 tab at bedtime 270 tablet 3    torsemide (DEMADEX) 20 mg tablet Take 1 tablet (20 mg total) by mouth daily 90 tablet 3     No current facility-administered medications for this visit  Facility-Administered Medications Ordered in Other Visits   Medication Dose Route Frequency Provider Last Rate Last Dose    acetaminophen (TYLENOL) tablet 650 mg  650 mg Oral Q6H PRN Hector Janet Jefferson PA-C           ALLERGIES:     Allergies   Allergen Reactions    Nitrofurantoin Hives     HIVES * pt denies  Other reaction(s): Unknown Allergic Reaction  Other reaction(s): Other (See Comments)  HIVES * pt denies    Atorvastatin      Other reaction(s): Muscle Pain, Myalgia    Acetazolamide Other (See Comments)     Other reaction(s): Unknown Allergic Reaction unknown reaction     Other Other (See Comments)     Adhesive tape : red and itching  Other reaction(s):  Other (See Comments)  red and itching    Shellfish-Derived Products Other (See Comments)     Patient got Gout following eating shell fish    Sulfa Antibiotics Other (See Comments)     unknown    Tramadol Diarrhea and Vomiting    Azithromycin Rash       SOCIAL HISTORY:     Social History     Socioeconomic History    Marital status: /Civil Union     Spouse name: Not on file    Number of children: Not on file    Years of education: Not on file    Highest education level: Not on file   Occupational History    Not on file   Social Needs    Financial resource strain: Not hard at all   [employer] insecurity:     Worry: Never true     Inability: Never true   [employer] needs:     Medical: No     Non-medical: No   Tobacco Use    Smoking status: Never Smoker    Smokeless tobacco: Never Used   Substance and Sexual Activity    Alcohol use: Not Currently    Drug use: No    Sexual activity: Not Currently   Lifestyle    Physical activity:     Days per week: 0 days     Minutes per session: 0 min    Stress: Only a little   Relationships    Social connections:     Talks on phone: More than three times a week     Gets together: Once a week     Attends [employer] service: 1 to 4 times per year     Active member of club or organization: No     Attends meetings of clubs or organizations: Never     Relationship status:     Intimate partner violence:     Fear of current or ex partner: No     Emotionally abused: No     Physically abused: No     Forced sexual activity: No   Other Topics Concern    Not on file   Social History Narrative    No caffeine use       SOCIAL HISTORY:     Family History   Problem Relation Age of Onset    Other Mother         epilepsy    Early death Mother    Abi Splinter Glaucoma Father     Stroke Father         silent    Other Brother         cardiac disorder    Rheum arthritis Son     No Known Problems Son     No Known Problems Daughter     No Known Problems Son        REVIEW OF SYSTEMS:     Review of Systems   Constitutional: Positive for activity change  Respiratory: Positive for cough  Cardiovascular: Negative  Gastrointestinal: Positive for abdominal pain  Genitourinary: Positive for difficulty urinating  Musculoskeletal: Negative  Skin: Negative  Psychiatric/Behavioral: Negative  PHYSICAL EXAM:     LMP  (LMP Unknown)     General:    Elderly an obese female in no acute distress  They have a normal affect  There is not appear to be any gross neurologic defects or abnormalities  HEENT:  Normocephalic, atraumatic  Neck is supple without any palpable lymphadenopathy  Cardiovascular:  Patient has normal palpable distal radial pulses  There is no significant peripheral edema  No JVD is noted  Respiratory:  Patient has unlabored respirations  There is no audible wheeze or rhonchi  Mild nonproductive cough  Abdomen:  Abdomen is  With healed surgical scars  Abdomen is soft and nontender  There is no tympany  Incisional hernia present and stable    Genitourinary:  External female genitalia difficult to examine given patient habitus    Musculoskeletal:   Walks with a cane  Dermatologic:  Patient has no skin abnormalities or rashes  LABS:     CBC:   Lab Results   Component Value Date    WBC 8 27 12/10/2019    HGB 9 0 (L) 12/10/2019    HCT 29 1 (L) 12/10/2019     (H) 12/10/2019     12/10/2019       BMP:   Lab Results   Component Value Date    GLUCOSE 165 (H) 2019    CALCIUM 9 0 2020     2017    K 3 6 2020    CO2 29 2020     2020    BUN 34 (H) 2020    CREATININE 1 75 (H) 2020     IMAGIN/16/20  CT ABDOMEN AND PELVIS WITHOUT IV CONTRAST     INDICATION:   C68 9: Malignant neoplasm of urinary organ, unspecified      COMPARISON:  2019     TECHNIQUE:  CT examination of the abdomen and pelvis was performed without intravenous contrast   Axial, sagittal, and coronal 2D reformatted images were created from the source data and submitted for interpretation     Radiation dose length product (DLP) for this visit:  1942 89 mGy-cm   This examination, like all CT scans performed in the [facility], was performed utilizing techniques to minimize radiation dose exposure, including the use of   iterative reconstruction and automated exposure control       Enteric contrast was not administered       FINDINGS:     ABDOMEN     LOWER CHEST:  No clinically significant abnormality identified in the visualized lower chest      LIVER/BILIARY TREE:  Unremarkable      GALLBLADDER:  Gallbladder is surgically absent      SPLEEN:  Unremarkable      PANCREAS:  Unremarkable      ADRENAL GLANDS:  Unremarkable      KIDNEYS/URETERS:  Status post right nephrectomy  The nephrectomy bed is unremarkable  The left kidney is normal   No hydronephrosis      STOMACH AND BOWEL:  Unremarkable      APPENDIX:  No findings to suggest appendicitis      ABDOMINOPELVIC CAVITY:  No ascites or free intraperitoneal air  No lymphadenopathy      VESSELS:  Unremarkable for patient's age      PELVIS     REPRODUCTIVE ORGANS:  Patient is status post hysterectomy      URINARY BLADDER:  Unremarkable      ABDOMINAL WALL/INGUINAL REGIONS:  There is a large right lateral abdominal wall hernia containing numerous loops of nonobstructed small bowel and colon      OSSEOUS STRUCTURES:  No acute fracture or destructive osseous lesion      IMPRESSION:     1  No evidence of metastatic disease in the abdomen or pelvis      2   Stable large right-sided abdominal wall hernia      PATHOLOGY:     5/21/19  Final Diagnosis   A  Urinary Bladder, right posterior biopsy:  - Urothelial mucosa with chronic inflammation and lymphoid follicles consistent with follicular cystitis  See comment   - Negative for malignancy       4/23/18  Case Report   Surgical Pathology Report                         Case: [id number]                                    Authorizing Provider: Roger Jerome MD   Collected:           04/23/2018 1631               Ordering Location:     [facility]      Received:            04/24/2018 0743                                      Hospital Operating Room                                                       Pathologist:           Shar Toledo MD                                                                Specimen:    Kidney, Right, Kidney, Ureter, and Bladder Cuff - Right                                    Final Diagnosis   URETER AND RENAL PELVIS STAGING PROTOCOL     1  Specimen identification:     - Procedure: Nephroureterectomy, complete      - Laterality: Right    2  Tumor     - Tumor site: Renal pelvis      - Tumor size (cm): Greatest dimension: 3mm      - Histologic type: Low grade papillary urothelial carcinoma      - Associated epithelial lesions: None      - Histologic grade: Low grade      - Microscopic tumor extension (pTa): Papillary noninvasive carcinoma      - Tumor configuration: Papillary   3  Margins: All margins are negative for invasive carcinoma, carcinoma in situ and low-grade urothelial carcinoma/urothelial dysplasia   4  Lymph-vascular invasion: Not identified    5  Regional lymph nodes:  No lymph nodes submitted or found   6  Additional pathologic findings: Foreign body type giant cell response, chronic active pyelitis and ureteritis    7  Pathologic findings in ipsilateral non-neoplastic renal tissue: None    8  8th Ed AJCC Tumor Stage:  at least Stage 0a - pTa, pNx, Low grade      A  Right kidney, ureter and bladder cuff (radical nephrectomy):  - Focal low grade papillary urothelial carcinoma (3mm) with foci suspicious for superficial lamina propria invasion  - Bladder cuff and resection margins negative for carcinoma  - See staging protocol above (pTaNx)     Comment: The entire case was reviewed by Dr Ana Jara at the [facility]  His diagnosis and comment are indicated below      Electronically signed by Shar Toledo MD on 5/3/2018 at 10:03 AM Preliminary result electronically signed by Rodri Guerrier MD on 4/27/2018 at 12:00 PM   Note   [address]     Diagnosis  Right Kidney, Radical Nephrectomy:    - Papillary urothelial carcinoma, low grade, pelvicalyceal mucosa, focal areas suspicious for invasion into superficial subepithelial connective tissue (see comment)  Renal hilar and bladder cuff margins, negative for neoplasia  - Marked reactive changes, pelvicalyceal mucosa and ureteral lining  Foreign body type giant cell response, chronic active pyelitis and ureteritis; status post biopsy and catheter placement  PROCEDURE:     SEE NOTE    ASSESSMENT:     81 y o  female with low-grade pTa upper tract urothelial carcinoma status post RIGHT robotic nephro ureterectomy with bladder cuff excision on 4/23/2018 and postoperative incision hernia    PLAN:     Patient remained stable after her treatment  Will recommend next follow-up in April of 2021 with cystoscopy and imaging  Patient is on antibiotics at this time  We will send her urine for culture as there was the appearance of cystitis cystica  Patient knows to contact me with any new symptoms of concern

## 2020-02-11 NOTE — PROGRESS NOTES
Cystoscopy  Date/Time: 2/11/2020 11:33 AM  Performed by: Ricarda Murphy MD  Authorized by: Ricarda Murphy MD     Procedure details: cystoscopy    Patient tolerance: Patient tolerated the procedure well with no immediate complications    Additional Procedure Details:   Cystoscopy Procedure note    Risk and benefits of flexible cystoscopy were discussed  Informed consent was obtained  A urine dip is adequate for cystoscopy  The patient was placed into the modified supine position  Her genitalia was prepped with Betadine and draped in a sterile fashion  Viscous 2% lidocaine was instilled into the urethra and allowed dwell time for local anesthesia  Flexible cystoscopy was then performed with a 16F scope  Urethra was inspected on entrance and exit of the scope  The bladder was thoroughly inspected in a 360 degree fashion  Both ureteral orifices were visualized in their orthotopic location with clear efflux of urine  The bladder mucosa was thoroughly inspected  Patient had cystitis cystica appearance  Urine will be sent for culture  Right ureteral orifice surgically resected  No tumors noted  Overall this was a negative cystoscopy  A female office staff member was present throughout the entire procedure

## 2020-02-12 NOTE — PROGRESS NOTES
Daily Note     Today's date: 2020  Patient name: Shahana Hinds  : 1935  MRN: [id number]  Referring provider: Eloy Angeles DO  Dx:   Encounter Diagnosis     ICD-10-CM    1  Lymphedema I89 0                   Subjective: Pt  To bring in paper work for new compression stockings  Objective: See treatment diary below      Assessment: Tolerated treatment well  Patient would benefit from continued PT      Plan: Continue per plan of care        Precautions: med allergies:      Manual  1/15/2020 1/22 1/24 1/27 2/12         I eval            Mld massage and soft tissue mobilization bilateral le's  30 min man 45 min man 45 min 45 min        Monitor left greater than right distal tibial redness on le's   improved                                         Exercise Diary           Nu step trial  10 min r3 15 min r 3 15 min 15 min        Lymphedema le exer packet  20 reps each                                                                                                                                                                                                                                                         Modalities              Pt has home compression pump and uses daily

## 2020-02-15 NOTE — PROGRESS NOTES
Daily Note     Today's date: 2020  Patient name: Carl Tarango  : 1935  MRN: [id number]  Referring provider: Tanya Arce DO  Dx:   Encounter Diagnosis     ICD-10-CM    1  Lymphedema I89 0                   Subjective: "I am feeling better"      Objective: See treatment diary below      Assessment: Tolerated treatment well  Patient would benefit from continued PT      Plan: Continue per plan of care        Precautions: med allergies:      Manual  1/15/2020 1/22 1/24 1/27 2/12 2/14        I eval            Mld massage and soft tissue mobilization bilateral le's  30 min man 45 min man 45 min 45 min 45 min       Monitor left greater than right distal tibial redness on le's   improved                                         Exercise Diary          Nu step trial  10 min r3 15 min r 3 15 min 15 min 15min       Lymphedema le exer packet  20 reps each                                                                                                                                                                                                                                                         Modalities              Pt has home compression pump and uses daily

## 2020-02-19 NOTE — PROGRESS NOTES
Daily Note     Today's date: 2020  Patient name: Jewels Alatorre  : 1935  MRN: [id number]  Referring provider: Essence Spann DO  Dx:   Encounter Diagnosis     ICD-10-CM    1  Lymphedema I89 0                   Subjective: Pt  With decreasing girth b/l LE's with redi wrap use  Objective: See treatment diary below      Assessment: Tolerated treatment well  Patient would benefit from continued PT      Plan: Continue per plan of care        Precautions: med allergies:      Manual  1/15/2020 1/22 1/24 1/27 2/12 2/14 2/17       I eval            Mld massage and soft tissue mobilization bilateral le's  30 min man 45 min man 45 min 45 min 45 min 45 min      Monitor left greater than right distal tibial redness on le's   improved                                         Exercise Diary         Nu step trial  10 min r3 15 min r 3 15 min 15 min 15min 15 min      Lymphedema le exer packet  20 reps each                                                                                                                                                                                                                                                         Modalities              Pt has home compression pump and uses daily

## 2020-02-20 NOTE — PROGRESS NOTES
Assessment/Plan:    No problem-specific Assessment & Plan notes found for this encounter  There are no diagnoses linked to this encounter  Subjective:      Patient ID: Angeles Kincaid is a 81 y o  female  Bilateral ear congestion past few weeks  Noted wax when she came in for bronchitis early Feb  She used Debrox drops on the right side but she did not apply them to the left ear because she wears a hearing aid on that side  The following portions of the patient's history were reviewed and updated as appropriate: allergies, current medications, past family history, past medical history, past social history, past surgical history and problem list     Review of Systems   Constitutional: Negative for chills, fatigue and fever  HENT: Positive for hearing loss  Negative for congestion, ear pain and sore throat  Respiratory: Negative for cough  Neurological: Negative for dizziness and headaches  Objective:      /80   Pulse (!) 110   Resp 18   Ht 5' 1" (1 549 m)   Wt 103 kg (228 lb)   LMP  (LMP Unknown)   SpO2 98%   BMI 43 08 kg/m²          Physical Exam   Constitutional: She appears well-developed and well-nourished  HENT:   Head: Normocephalic and atraumatic  Right TM is obscured by cerumen  Left TM is partly obscured by cerumen  Following removal of cerumen bilaterally, TMs were found to be normal    Nursing note and vitals reviewed      Ear cerumen removal  Date/Time: 2/20/2020 4:27 PM  Performed by: Keith Simmons MD  Authorized by: Keith Simmons MD     Patient location:  Clinic  Consent:     Consent obtained:  Verbal    Consent given by:  Patient    Risks discussed:  Bleeding, incomplete removal and dizziness    Alternatives discussed:  No treatment  Universal protocol:     Procedure explained and questions answered to patient or proxy's satisfaction: yes      Patient identity confirmed:  Verbally with patient  Procedure details:     Local anesthetic:  None Location:  L ear and R ear    Procedure type: irrigation with instrumentation      Instrumentation: forceps      Approach:  External  Post-procedure details:     Complication:  None    Hearing quality:  Improved    Patient tolerance of procedure:   Tolerated well, no immediate complications

## 2020-02-22 NOTE — PROGRESS NOTES
Daily Note     Today's date: 2020  Patient name: Dong Nurse  : 1935  MRN: [id number]  Referring provider: Tania Galvez DO  Dx:   Encounter Diagnosis     ICD-10-CM    1  Lymphedema I89 0                   Subjective: "My legs look very good "      Objective: See treatment diary below      Assessment: Tolerated treatment well  Patient would benefit from continued PT      Plan: Continue per plan of care        Precautions: med allergies:      Manual  1/15/2020 1/22 1/24 1/27 2/12 2/14 2/17 2/21      I eval            Mld massage and soft tissue mobilization bilateral le's  30 min man 45 min man 45 min 45 min 45 min 45 min 45 min man     Monitor left greater than right distal tibial redness on le's   improved                                         Exercise Diary        Nu step trial  10 min r3 15 min r 3 15 min 15 min 15min 15 min 15 min      Lymphedema le exer packet  20 reps each                                                                                                                                                                                                                                                         Modalities              Pt has home compression pump and uses daily

## 2020-02-24 NOTE — PROGRESS NOTES
Advanced Heart Failure/Pulmonary Hypertension Outpatient Progress Note - Honorio Dai 81 y o  female MRN: [id number]    @ Encounter: [id number]    Assessment/Plan:  Patient Active Problem List    Diagnosis Date Noted    CKD (chronic kidney disease) stage 4, GFR 15-29 ml/min ([facility] ) 01/09/2020    Left knee pain 09/03/2019    Fall from bed 09/03/2019    Aortoiliac occlusive disease ([address] ) 08/08/2019    Seborrheic keratoses 06/27/2019    Impaired fasting glucose 05/15/2019    PAD (peripheral artery disease) ([address] ) 04/01/2019    Polyneuropathy associated with underlying disease ([facility] ) 03/26/2019    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 ([facility]) 03/26/2019    Chronic acquired lymphedema 03/17/2019    Anemia of chronic disease 03/17/2019    Atherosclerosis of native artery of right lower extremity with intermittent claudication ([facility] ) 01/21/2019    Abdominal wall hernia 01/02/2019    Spontaneous dislocation of shoulder, left 09/18/2018    Chronic diastolic heart failure (HCC) 08/23/2018    CKD (chronic kidney disease) stage 3, GFR 30-59 ml/min ([facility]) 08/12/2018    Atrial fibrillation ([address] ) 08/11/2018    Incisional hernia 06/28/2018    History of nephroureterectomy 06/21/2018    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 ([address] ) 06/04/2018    Pancreatic cyst 03/14/2018    Iron deficiency anemia 03/14/2018    Urothelial cancer ([facility] ) 03/02/2018    Lung nodule < 6cm on CT 03/02/2018    Chronic bilateral thoracic back pain 02/12/2018    Thoracic degenerative disc disease 02/12/2018    Sinus arrhythmia 01/03/2018    Peripheral neuropathy 11/09/2017    Right lumbar radiculopathy 11/09/2017    Primary osteoarthritis of left knee 10/13/2017    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 ([address] ) 05/10/2017    Essential hypertension 05/10/2017    Rheumatoid arthritis ([address] ) 05/10/2017    Sleep apnea 05/10/2017    Acquired hypothyroidism 05/10/2017    Chronic GERD 05/10/2017    Restless leg syndrome 01/05/2016    Sensorineural hearing loss 10/12/2014    Hypercholesterolemia 08/29/2013    Obesity 07/18/2013    Edema 05/14/2013     Plan:  --Continue current medications as prescribed  --Patient wants to try 10 mg PO BID of torsemide due to accidents at 20 mg daily  Can try, but may need 20 mg at a time due to braking phenomenon    Assessment:  # Chronic HFpEF (Grade II diastolic dysfunction), NYHA II/III, ACC/AHA Stage C: HFpEF likely 2/2 to HTN (cLVH on TTE), NICK, obesity and/or ? RA (plaquenil)  LE edema partially from lymphedema  Weight stable ~220 lbs on home scale  Weight of 221 lbs today  --Continue torsemide 20 mg PO daily  --Continue BP control     # HTN: BP well controlled  Continue losartan 25 mg PO daily  # Hx of lymphedema: Uses her lymphedema machine 2x daily as able  # APCs: Continue current mgmt  Had 1 week holter w/o e/o AFib  Currently anticoagulated for CVA so would not   # Hx of CVA/TIA: Currently on Xarelto 15 mg daily and pravastatin  # NICK on CPAP  # RA w/ +RF on plaquenil and methotrexate  # L shoulder dislocation: Continue PT and pain control  # Urothelial CA s/o R nephrectomy 04/23/2018    RTC in 3 months    HPI: Very pleasant 81 y o  woman w/ PMHx of HTN, HLD, chronic LE edema/lymphedema, anemia of chronic disease p/f establishing care and abnormal ECG  She states she feels well  She was followed at [place] [id number]  and now lives closer to [facility] so is transferring her care  Denies any cardiac symptoms  Uses lymphedema machine  She had a CVA in 5/2017 and received tPA  Overall feels well from that  She had a 1 week holter that did not show any arrhythmia and has been on NOAC ever since the stroke  Holter showed NSR w/ APCs  She comes in today 4/6/18, for f/u  She had noted hematuria   CT A/P noted a R renal pelvis multifocal papillary lesions with pathology showing low grade Ta urothelial cancer  She comes in for preop evaluation  She continues to deny cardiac symptoms  She is more limited by knee pains  She is able to get around her home without difficulty  Today 7/10/18, she returns for f/u  She had her right robotic nephroureterectomy with bladder cuff  She currently has VNA coming to the home  Moving around better  Feeling stronger  Walking is difficult 2/2 to rheumatoid arthritis  Today 8/24/2018, she returns for post hospital D/C  She was admitted twice since her last visit  In the first admission she presented with chest tightness and dyspnea  On 8/10/18 underwent nuclear stress test with normal perfusion  She was diuresed and felt improved  Then she was readmitted with dyspnea and volume overload and diuresed again  Today she feels well  Today, 10/17/18, she returns for f/u  She states she has developed a hernia on the R side  She does not want to pursue surgery currently  She decreased her diuretics to 20 mg PO daily as she was urinating too much and was having accidents  She dislocated her L shoulder  Denies SOB with ambulation using her walker  Walking better overall around the house  Unable to stand too long due to her spine  Today 2/5/19, she returns for f/u  She feels about the same as before  She does not feel limited by SOB, but more MSK as noted before  Today, 5/31/19, she returns for f/u  She was seen by Den Anders since her last visit  She was admitted for fluid overload in 3/2019  She has been doing well since then  Weight today of 221 lbs (up from 218 lbs in February 2019)  Xarelto is very expensive for her; does have pills remaining at home  Requesting less expensive anticoagulation option  Provided Xarelto samples in interim, while pricing for other anticoagulation is underway  Continues with chronic LE edema, has improved some with OT home visits  Today, 8/7/19, she returns for f/u  She was admitted for hematuria   Her [address] and antiplatelet agent were held  She is back on them now  She feels at her baseline  Having urinary frequency and burning  11/22/19: feels well  Same as last visit  Going to PT for L arm      2/25/2020: Feels well  Her lymphedema is improving with therapy  She is doing PT 2x/week for her shoulder and it is much improved  Otherwise feels same as last visit  She had CT of the A/P that did not show any metastatic disease  Past Medical History:   Diagnosis Date    Anemia     Arthritis     rheumatoid    At risk for falls     Benign essential hypertension     CHF (congestive heart failure) (HCC)     Chronic cystitis     Chronic kidney disease     right kidney cancer - kidney removed    Chronic pain disorder     CPAP (continuous positive airway pressure) dependence     Diverticulitis of colon     Early satiety     Edema     GERD (gastroesophageal reflux disease)     Gout     Hearing aid worn     bilateral    Hearing loss     Hemorrhoids     Hiatal hernia     History of colonic polyps     Hyperlipidemia     Hypothyroidism     Irregular heart beat     Afib    Left breast mass     Microhematuria     Migraine     occular    Mobility impaired     left arm    Neuropathy     lower and upper extermities    Overactive bladder     Pneumonia of left lower lobe due to infectious organism ([facility] )     PVD (peripheral vascular disease) ([facility] )     RA (rheumatoid arthritis) ([facility])     Restless leg syndrome     Sleep apnea     uses cpap    Stroke ([facility] )     5/2017    Type 2 diabetes mellitus ([facility])     Urinary frequency     Urinary urgency     Urothelial cancer ([facility] ) 04/23/2018    Uses walker      Review of Systems - 12 point ROS was done and is negative, except as noted above  Allergies   Allergen Reactions    Nitrofurantoin Hives     HIVES * pt denies  Other reaction(s): Unknown Allergic Reaction  Other reaction(s):  Other (See Comments)  HIVES * pt denies    Atorvastatin      Other reaction(s): Muscle Pain, Myalgia    Acetazolamide Other (See Comments)     Other reaction(s): Unknown Allergic Reaction unknown reaction     Other Other (See Comments)     Adhesive tape : red and itching  Other reaction(s):  Other (See Comments)  red and itching    Shellfish-Derived Products Other (See Comments)     Patient got Gout following eating shell fish    Sulfa Antibiotics Other (See Comments)     unknown    Tramadol Diarrhea and Vomiting    Azithromycin Rash       Current Outpatient Medications:     acetaminophen (TYLENOL) 325 mg tablet, Take 650 mg by mouth 2 (two) times a day as needed for mild pain , Disp: , Rfl:     Calcium Citrate-Vitamin D (CALCIUM + D PO), Take 1 tablet by mouth 2 (two) times a day, Disp: , Rfl:     cyanocobalamin (VITAMIN B-12) 100 mcg tablet, Take 100 mcg by mouth daily , Disp: , Rfl:     Elastic Bandages & Supports (MEDICAL COMPRESSION STOCKINGS) MISC, by Does not apply route daily, Disp: 10 each, Rfl: 0    gabapentin (NEURONTIN) 100 mg capsule, Take 1 capsule in the morning, take 1 capsule in the afternoon, and take 1-3 capapsules at bedtime (Patient taking differently: 100 mg Take 1 capsule in the morning, take 1 capsule in the afternoon, and take 1-3 capapsules at bedtime), Disp: 450 capsule, Rfl: 3    hydroxychloroquine (PLAQUENIL) 200 mg tablet, Take 200 mg by mouth 2 (two) times a day with meals, Disp: , Rfl:     LEUCOVORIN CALCIUM PO, Take 10 mg by mouth once a week, Disp: , Rfl:     levothyroxine 75 mcg tablet, Take 1 tablet (75 mcg total) by mouth daily, Disp: 90 tablet, Rfl: 3    losartan (COZAAR) 25 mg tablet, Take 1 tablet (25 mg total) by mouth daily, Disp: 90 tablet, Rfl: 3    methotrexate 2 5 mg tablet, Take 2 tablets weekly, Disp: 12 tablet, Rfl: 0    Multiple Vitamin (MULTIVITAMINS PO), Take 1 tablet by mouth daily, Disp: , Rfl:     omeprazole (PriLOSEC) 40 MG capsule, Take 1 capsule (40 mg total) by mouth daily, Disp: 90 capsule, Rfl: 3    pravastatin (PRAVACHOL) 80 mg tablet, Take 1 tablet (80 mg total) by mouth daily, Disp: 90 tablet, Rfl: 3    rivaroxaban (Xarelto) 15 mg tablet, Take 1 tablet (15 mg total) by mouth daily with breakfast, Disp: 30 tablet, Rfl: 5    rOPINIRole (REQUIP) 0 5 mg tablet, 1 tab in the am and 2 tab at bedtime, Disp: 270 tablet, Rfl: 3    torsemide (DEMADEX) 20 mg tablet, Take 1 tablet (20 mg total) by mouth daily, Disp: 90 tablet, Rfl: 3    benzonatate (TESSALON PERLES) 100 mg capsule, Take 1-2 capsules po tid prn cough (Patient not taking: Reported on 2/25/2020), Disp: 30 capsule, Rfl: 0    polyethylene glycol (MIRALAX) 17 g packet, Take 17 g by mouth daily (Patient not taking: Reported on 2/25/2020), Disp: 14 each, Rfl: 0  No current facility-administered medications for this visit  Facility-Administered Medications Ordered in Other Visits:     acetaminophen (TYLENOL) tablet 650 mg, 650 mg, Oral, Q6H PRN, Kyle Amaya PA-C    Social History     Socioeconomic History    Marital status: /Civil Union     Spouse name: Not on file    Number of children: Not on file    Years of education: Not on file    Highest education level: Not on file   Occupational History    Not on file   Social Needs    Financial resource strain: Not hard at all   [place] insecurity:     Worry: Never true     Inability: Never true   [employer] needs:     Medical: No     Non-medical: No   Tobacco Use    Smoking status: Never Smoker    Smokeless tobacco: Never Used   Substance and Sexual Activity    Alcohol use: Not Currently    Drug use: No    Sexual activity: Not Currently   Lifestyle    Physical activity:     Days per week: 0 days     Minutes per session: 0 min    Stress:  Only a little   Relationships    Social connections:     Talks on phone: More than three times a week     Gets together: Once a week     Attends Yarsani service: 1 to 4 times per year     Active member of club or organization: No     Attends meetings of clubs or organizations: Never     Relationship status:     Intimate partner violence:     Fear of current or ex partner: No     Emotionally abused: No     Physically abused: No     Forced sexual activity: No   Other Topics Concern    Not on file   Social History Narrative    No caffeine use     Family History   Problem Relation Age of Onset    Other Mother         epilepsy    Early death Mother    Cony Gleason Glaucoma Father     Stroke Father         silent    Other Brother         cardiac disorder    Rheum arthritis Son     No Known Problems Son     No Known Problems Daughter     No Known Problems Son      Physical Exam:  Vitals: Blood pressure 124/64, pulse 55, height 5' (1 524 m), weight 105 kg (230 lb 8 oz), SpO2 90 %  , Body mass index is 45 02 kg/m² ,   Wt Readings from Last 3 Encounters:   02/25/20 105 kg (230 lb 8 oz)   02/20/20 103 kg (228 lb)   02/11/20 103 kg (227 lb)     Vitals:    02/25/20 1614   BP: 124/64   BP Location: Right arm   Patient Position: Sitting   Cuff Size: Large   Pulse: 55   SpO2: 90%   Weight: 105 kg (230 lb 8 oz)   Height: 5' (1 524 m)       GEN: Lilliam Ramey appears well, alert and oriented x 3, pleasant and cooperative   HEENT: pupils equal, round, and reactive to light; extraocular muscles intact  NECK: supple, no carotid bruits   HEART: regular rhythm, normal S1 and S2, no murmurs, clicks, gallops or rubs, JVP is    LUNGS: clear to auscultation bilaterally; no wheezes, rales, or rhonchi   ABDOMEN: normal bowel sounds, soft, no tenderness, no distention  EXTREMITIES: peripheral pulses normal; no clubbing, cyanosis, or edema  NEURO: no focal findings   SKIN: normal without suspicious lesions on exposed skin    Labs & Results:  Lab Results   Component Value Date    WBC 8 27 12/10/2019    HGB 9 0 (L) 12/10/2019    HCT 29 1 (L) 12/10/2019     (H) 12/10/2019     12/10/2019       Chemistry        Component Value Date/Time     11/14/2017 1151    K 3 6 01/07/2020 1211 K 3 7 11/14/2017 1151     01/07/2020 1211     11/14/2017 1151    CO2 29 01/07/2020 1211    CO2 31 09/03/2019 0404    BUN 34 (H) 01/07/2020 1211    BUN 18 11/14/2017 1151    CREATININE 1 75 (H) 01/07/2020 1211    CREATININE 0 88 11/14/2017 1151        Component Value Date/Time    CALCIUM 9 0 01/07/2020 1211    CALCIUM 8 9 11/14/2017 1151    ALKPHOS 110 12/10/2019 1250    ALKPHOS 95 11/14/2017 1151    AST 17 12/10/2019 1250    AST 24 11/14/2017 1151    ALT 23 12/10/2019 1250    ALT 19 11/14/2017 1151    BILITOT 1 2 11/14/2017 1151        Counseling / Coordination of Care  Total floor / unit time spent today 40 minutes  Greater than 50% of total time was spent with the patient and / or family counseling and / or coordination of care  A description of the counseling / coordination of care: 20 min  Thank you for the opportunity to participate in the care of this patient      Velia Holcomb MD, PhD   [place]

## 2020-02-26 NOTE — PROGRESS NOTES
Daily Note     Today's date: 2020  Patient name: Ambrosio Funk  : 1935  MRN: [id number]  Referring provider: Ottoniel Trejo DO  Dx:   Encounter Diagnosis     ICD-10-CM    1  Lymphedema I89 0                   Subjective: Pt  Reports her neuropathy is very bad today  Objective: See treatment diary below      Assessment: Tolerated treatment well  Patient would benefit from continued PT      Plan: Continue per plan of care        Precautions: med allergies:      Manual  1/15/2020 1/22 1/24 1/27 2/12 2/14 2/17 2/21 2/26     I eval            Mld massage and soft tissue mobilization bilateral le's  30 min man 45 min man 45 min 45 min 45 min 45 min 45 min man 45 min    Monitor left greater than right distal tibial redness on le's   improved                                         Exercise Diary       Nu step trial  10 min r3 15 min r 3 15 min 15 min 15min 15 min 15 min      Lymphedema le exer packet  20 reps each       15 min                                                                                                                                                                                                                                                  Modalities              Pt has home compression pump and uses daily

## 2020-04-17 PROBLEM — E87.70 HYPERVOLEMIA: Status: ACTIVE | Noted: 2020-01-01

## 2020-07-02 PROBLEM — N25.81 SECONDARY HYPERPARATHYROIDISM OF RENAL ORIGIN (HCC): Status: ACTIVE | Noted: 2020-01-01

## 2020-07-02 NOTE — TELEPHONE ENCOUNTER
Called and spoke to patient advised her to take metolazone 5 mg p o  Every Wednesday take additional potassium pills on that day  Patient to get repeat blood work for BMP in 3 weeks  Order placed everything explained to the patient      ----- Message from Quinn Campbell PA-C sent at 7/2/2020 12:46 PM EDT -----  Regarding: RE: [place] patient  Hello,    I haven't been able to see the patient in person to fully assess her volume status for a few months now  But since you saw her in person today, I fully trust your assessment and would not be against her being started on weekly metolazone  Thanks! Laila Samples     ----- Message -----  From: Zaria Stewart MD  Sent: 7/2/2020  12:08 PM EDT  To: Quinn Campbell PA-C  Subject: [place] patient                                   Hi  I know you guys are managing her diuretics I just saw her in the office today I was wondering what your thoughts would be with regards to the addition of metolazone once a week to help with her hypervolemia  If you do decide to initiate that on your next visit with her place adjust her potassium supplements accordingly    She reported she has upcoming follow-up with you thank you    michael

## 2020-07-02 NOTE — PATIENT INSTRUCTIONS
- Please call me in 10 days after having your blood work done to review the results if you do not hear back from me or my office, as I may have not received the results  - please remember to perform blood work prior to the next visit  - Please call if the blood pressure top number is greater than 150 or less than 110 consistently  - Please call if you are gaining more than 2lbs in 2 days for adjustment of water pills   ~ Please AVOID the following pain medications  LIST OF NSAIDS (NONSTEROIDAL ANTI-INFLAMMATORY DRUGS) AND DUTTA-2 INHIBITORS    DIFLUNISAL (DOLOBID)  IBUPROFEN (MOTRIN, ADVIL)  FLURBIPROFEN (ANSAID)  KETOPROFEN (ORUDIS, ORUVAIL)  FENOPROFEN (NALFON)  NABUMETONE (RELAFEN)  PIROXICAM (FELDENE)  NAPROXEN (ALEVE, NAPROSYN, NAPRELAN, ANAPROX)  DICLOFENAC (VOLTAREN, CATAFLAM)  INDOMETHACIN (INDOCIN)  SULINDAC (CLINORIL)  TOLMETIN (TOLETIN)  ETODOLAC (LODINE)  MELOXICAM (MOBIC)  KETOROLAC (TORADOL)  OXAPROZIN (DAYPRO)  CELECOXIB (CELEBREX)    Phosphorus diet  Follow a moderate phosphorus diet      Avoid these higher phosphorus foods: Choose these lower phosphorus foods:   Milk, pudding or yogurt (from animals and from many soy varieties) Rice milk (unfortified), nondairy creamer (if it doesn't have terms in the ingredients list that contain the letters "phos")   Hard cheeses, ricotta or cottage cheese, fat-free cream cheese Regular and low-fat cream cheese   Ice cream or frozen yogurt Sherbet or frozen fruit pops   Soups made with higher phosphorus ingredients (milk, dried peas, beans, lentils) Soups made with lower phosphorus ingredients (broth- or water-based with other lower phosphorus ingredients)   Whole grains, including whole-grain breads, crackers, cereal, rice and pasta Refined grains, including white bread, crackers, cereals, rice and pasta   Quick breads, biscuits, cornbread, muffins, pancakes or waffles Homemade refined (white) dinner rolls, bagels or English muffins   Dried peas (split, black-eyed), beans (black, garbanzo, lima, kidney, navy, adamson) or lentils Green peas (canned, frozen), green beans or wax beans   Organ meats, walleye, pollock or sardines Lean beef, pork, lamb, poultry or other fish   Nuts and seeds Popcorn   Peanut butter and other nut butters Jam, jelly or honey   Chocolate, including chocolate drinks Carob (chocolate-flavored) candy, hard candy or gumdrops   Evette and pepper-type sodas, flavored hobson, bottled teas (if a term in the ingredients list contains the letters "phos") Lemon-lime soda, ginger ale or root beer, plain water     Follow a moderate potassium diet

## 2020-07-02 NOTE — PROGRESS NOTES
Nephrology Follow up Consultation  Chen Shore 81 y o  female MRN: [id number]            BACKGROUND:  Chen Shore is a 81 y  o female who was referred by Sara Martins MD for evaluation of Follow-up and Chronic Kidney Disease    ASSESSMENT / PLAN:   81 y o   female with pmh of multiple co-morbidities including diastolic CHF, urothelial cancer, CVA, AFib, PVD and CKD stage 3 presents to the office for routine follow-up     1  CKD stage 4:  - Patient has a baseline creatinine of 1 8-2 mg/dL  Most recent labs show a Creatinine of 1 72  mg/dL on 06/18/2020  Renal function remains stable and slightly below baseline    - likely has underlying CKD secondary to decreased nephron mass due to nephrectomy plus hypertensive nephrosclerosis plus age-related nephron loss plus cardiorenal syndrome  - status post right nephrectomy in April 2018  - Proteinuria - most recent protein creatinine ratio of 300 mg   - Acid base and lytes stable  - hypervolemia: Currently patient on torsemide 40 mg p o  Q a m  And 20 mg p o  Q p m  Diuretics being managed by Cardiology will send message over to cardiology to consider addition of metolazone once a week  - Recommend to avoid use of NSAIDs, nephrotoxins  Caution advised with regards to exposure to IV contrast dye    - Discussed with the patient in depth her renal status, including the possible etiologies for CKD  - Advised the patient that when her GFR is close to 20mL/min then will start discussing about RRT(renal replacement therapy) options such as renal transplant, peritoneal dialysis and hemodialysis  - Informed the patient about the various options for Renal Replacement therapy  - Discussed with the patient how we need to work together to delay the progression of CKD with optimal BP control based on their age and co-morbidities and trying to reduce proteinuria by the use of anti-proteinuric agents  - referral placed for CKD education     2  Hypertension:  - Patient is on cozaar 25mg po Q24, torsemide 40mg po qpm and 20mg po Qpm  kdur 20mg po bid   - Goal BP of < 140/90 based on age and comorbidities  - Instructed to follow low sodium (2gm)diet   - Advised to hold ACEI/ARBs if patient suffers from dehydration due to gastrointestinal losses due to risk of BRENNEN secondary to failure to autoregulate      3  Hemoglobin:  - Goal Hb of 10-12 g/dL  - Most recent labs suggestive of  9 6  grams/deciliter  - is following with the hematologist   Further management per Hematology  - cont vit B 12 PO      4 CKD-MBD(Mineral Bone Disease) / secondary hyperparathyroidism renal origin:  - Based on patients CKD stage following is the goal of therapy  - Maintain calcium phosphorus product of < 55   - Stage 4 CKD - Goal Ca 8 5-10 mg/dL , goal Phos 2 7-4 6 mg/dL  , goal iPTH  pg/mL  - Patient is currently at goal   - Continue patient on calcium + D  - most recent vit D of 38 3, ipth = 93 9,  improving  - check intact PTH vitamin-D prior to next visit     5  Lipids:  - goal LDL less than 70  - on pravachol  - management as per primary team     6  History of urothelial cancer:  - management as per Primary team  - follow-up with Urology  - status post right nephro ureterectomy in April 2018     7  CHF / a fib:  - Management as per primary team  - follow-up with cardiology  - on xarelto  - most recent echo from March 14, 2019 shows EF of 60%  - has appointment with Cardiology for 04/29/2020     8  RA:  - on mtx, Leucovorin, Plaquenil, Neurontin  - f/u with rheum     9  Nutrition:  - Encouraged patient to follow a renal diet comprising of moderate potassium, low phosphorus and protein restriction to 0 8gm/kg  - Will check serum albumin with next blood work       10  Followup:  - Patient is to follow-up in 3 months, with lab work to be in a few days prior to the visit   Advised patient to call me in 10 days to review the results if they do not hear back from me, as I may have not received the results  Abel Martinez MD, [facility], 7/2/2020, 12:09 PM             SUBJECTIVE: 81 y o  female presents to the office for routine follow-up   Records reveal urine culture positive for Klebsiella on 06/01/2020 following with cardio and they are managing the diuretics  Feels better, no sob  Edema is improving and weights  Weights are about 232-234lbs  Doing well has no complaints frustrated at times with the diet that her  follows and cooks for her  Diuretics have being managed by Cardiology  Agreeable to contacting Cardiology and interested in trying out metolazone  Review of Systems   Constitutional: Negative for chills, fatigue and fever  HENT: Negative for congestion, postnasal drip and sore throat  Respiratory: Negative for cough, shortness of breath and wheezing  Cardiovascular: Positive for leg swelling  Gastrointestinal: Negative for abdominal pain, constipation, diarrhea, nausea and vomiting  Genitourinary: Negative for difficulty urinating, dysuria and hematuria  Musculoskeletal: Negative for back pain  Skin: Negative for rash  Neurological: Negative for dizziness and light-headedness  Psychiatric/Behavioral: Negative for agitation  All other systems reviewed and are negative        PAST MEDICAL HISTORY:  Past Medical History:   Diagnosis Date    Anemia     Arthritis     rheumatoid    At risk for falls     Benign essential hypertension     CHF (congestive heart failure) (HCC)     Chronic cystitis     Chronic kidney disease     right kidney cancer - kidney removed    Chronic pain disorder     CPAP (continuous positive airway pressure) dependence     Diverticulitis of colon     Early satiety     Edema     GERD (gastroesophageal reflux disease)     Gout     Hearing aid worn     bilateral    Hearing loss     Hemorrhoids     Hiatal hernia     History of colonic polyps     Hyperlipidemia     Hypothyroidism     Irregular heart beat Afib    Left breast mass     Microhematuria     Migraine     occular    Mobility impaired     left arm    Neuropathy     lower and upper extermities    Overactive bladder     Pneumonia of left lower lobe due to infectious organism     [facility]ipheral vascular [facility]) [facility]     RA (rheumatoid arthritis) (HCC)     Restless leg syndrome     Sleep apnea     uses cpap    [facility]roke [facility]     5/2017    Type 2 diabetes mellitus ([facility])     Urinary frequency     Urinary urgency     Urothelial cancer ([facility] ) 04/23/2018    Uses walker        PROBLEM LIST    Patient Active Problem List   Diagnosis    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 ([facility] )    Essential hypertension    Rheumatoid arthritis ([facility]    Sleep apnea    Acquired hypothyroidism    Chronic GERD    Edema    Hypercholesterolemia    Obesity    Peripheral neuropathy    Primary osteoarthritis of left knee    Restless leg syndrome    Right lumbar radiculopathy    Sensorineural hearing loss    Sinus arrhythmia    Chronic bilateral thoracic back pain    Thoracic degenerative disc disease    [facility]    Lung nodule < 6cm on CT    Pancreatic cyst    Iron deficiency anemia    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 (HCC)    History of nephroureterectomy    Incisional hernia    Atrial fibrillation (HCC)    CKD (chronic kidney disease) stage 3, GFR 30-59 ml/min (HCC)    Chronic diastolic heart failure (HCC)    Spontaneous dislocation of shoulder, left    Abdominal wall hernia    Atherosclerosis of native artery of right lower extremity with intermittent claudication (HCC)    Chronic acquired lymphedema    Anemia of chronic disease    Polyneuropathy associated with underlying disease ([facility]    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)    Impaired fasting glucose    Seborrheic keratoses    Aortoiliac occlusive disease ([facility]    Left knee pain    Fall from bed    CKD (chronic kidney disease) stage 4, GFR 15-29 ml/min ([facility])    Hypervolemia    Secondary hyperparathyroidism of renal origin ([facility] )       PAST SURGICAL HISTORY:  Past Surgical History:   Procedure Laterality Date    APPENDECTOMY      ARTHROSCOPY WRIST Right     with release of transverse carpal ligament     BLADDER SURGERY      BLADDER SURGERY      BREAST SURGERY      left breast    BUNIONECTOMY Bilateral     CATARACT EXTRACTION Bilateral     CHOLECYSTECTOMY      COLONOSCOPY      CYSTOSCOPY      CYSTOSCOPY      EGD AND COLONOSCOPY N/A 12/20/2017    Procedure: EGD AND COLONOSCOPY;  Surgeon: Flako Dye MD;  [facility]; Service: Gastroenterology    ESOPHAGOGASTRODUODENOSCOPY      diagnostic    FOREARM SURGERY Right     fracture repair    FRACTURE SURGERY Right     arm with hardware    HYSTERECTOMY      IR ABDOMINAL ANGIOGRAPHY / INTERVENTION  1/24/2019    KNEE ARTHROSCOPY Left     KNEE SURGERY Left     meniscus tear    NEPHRECTOMY Right     NOSE SURGERY      IA CYSTO/URETERO W/LITHOTRIPSY &INDWELL STENT INSRT Right 2/28/2018    Procedure: CYSTOSCOPY RIGHT URETEROSCOPY, RIGHT RETROGRADE PYELOGRAM AND INSERTION  RIGHT STENT URETERAL, RIGHT RENAL PELVIC WASHING FOR CYTOLOGY;  Surgeon: Flako Barron MD;  [facility];  Service: Urology    IA CYSTOURETHROSCOPY,FULGUR 0 5-2 CM LESN N/A 5/21/2019    Procedure: BLADDER BIOPSY WITH FULGURATION;  Surgeon: Verdell Gilford, MD;  [facility];  Service: Urology    IA NEPHRECTOMY, W/PART   URETECTOMY Right 4/23/2018    Procedure: ROBATIC ASSISTED NEPHRO-URETERECTOMY WITH BLADDER CUFF EXCISION;  Surgeon: Verdell Gilford, MD;  [facility];  Service: Urology    IA [facility] 3RD+ ORD SLCTV ABDL PEL/LXTR [facility] Right 1/24/2019    Procedure: RIGHT LEG ANGIOGRAM, BILATERAL FEMORAL ACCESS, STENTING OF RIGHT EXTERNAL ILIAC ARTERY WITH BALLOON ANGIOPLASTY; Surgeon: Juan Dyer MD;  Location: [facility] OR;  Service: Vascular    REPLACEMENT TOTAL KNEE BILATERAL      URETEROSCOPY Right 3/20/2018    Procedure: CYSTOSCOPY, RETROGRADE PYELOGRAM, URETEROSCOPY, BIOPSY, BRUSH, FULGURATION, STENT PLACEMENT, BLADDER BIOPSY WITH FULGURATION;  Surgeon: Onel Lou MD;  Location[facility];  Service: Urology    VASCULAR SURGERY      stents       SOCIAL HISTORY :   reports that she has never smoked  She has never used smokeless tobacco  She reports that she drank alcohol  She reports that she does not use drugs  FAMILY HISTORY:  Family History   Problem Relation Age of Onset    Other Mother         epilepsy    Early death Mother    [facility] Glaucoma Father     Stroke Father         silent    Other Brother         cardiac disorder    Rheum arthritis Son     No Known Problems Son     No Known Problems Daughter     No Known Problems Son        ALLERGIES:  Allergies   Allergen Reactions    Nitrofurantoin Hives     HIVES * pt denies  Other reaction(s): Unknown Allergic Reaction  Other reaction(s): Other (See Comments)  HIVES * pt denies    Atorvastatin      Other reaction(s): Muscle Pain, Myalgia    Acetazolamide Other (See Comments)     Other reaction(s): Unknown Allergic Reaction unknown reaction     Other Other (See Comments)     Adhesive tape : red and itching  Other reaction(s): Other (See Comments)  red and itching    Shellfish-Derived Products Other (See Comments)     Patient got Gout following eating shell fish    Sulfa Antibiotics Other (See Comments)     unknown    Tramadol Diarrhea and Vomiting    Azithromycin Rash           PHYSICAL EXAM:  Vitals:    07/02/20 1103   BP: 140/64   BP Location: Right arm   Patient Position: Sitting   Cuff Size: Large   Pulse: 100   Resp: 18   Weight: 106 kg (234 lb)   Height: 5' (1 524 m)     Body mass index is 45 7 kg/m²  Physical Exam   Constitutional: She is oriented to person, place, and time   She appears well-developed and well-nourished  No distress  HENT:   Head: Normocephalic and atraumatic  Mouth/Throat: Oropharynx is clear and moist  No oropharyngeal exudate  Eyes: Conjunctivae are normal  No scleral icterus  Neck: Normal range of motion  Neck supple  No JVD present  Cardiovascular: Normal heart sounds  Exam reveals no friction rub  Pulmonary/Chest: Effort normal  She has no wheezes  She has no rales  Abdominal: Soft  She exhibits no mass  There is no tenderness  Musculoskeletal: She exhibits edema  Compression stockings in place +1 edema bilateral lower extremities   Neurological: She is alert and oriented to person, place, and time  Skin: Skin is warm  No rash noted  She is not diaphoretic  Psychiatric: She has a normal mood and affect  Her behavior is normal    Vitals reviewed  LABORATORY DATA:     Results from last 6 Months   Lab Units 06/18/20  0956 06/05/20  1120 05/13/20  1040  04/14/20  1046 01/07/20  1211   WBC Thousand/uL 7 09 7 42  --   --  7 17  --    HEMOGLOBIN g/dL 9 6* 10 1*  --   --  10 5*  --    HEMATOCRIT % 29 3* 31 9*  --   --  31 5*  --    PLATELETS Thousands/uL 192 202  --   --  210  --    POTASSIUM mmol/L 3 7 3 8 3 9   < > 3 2* 3 6   CHLORIDE mmol/L 105 103 104   < > 107 107   CO2 mmol/L 29 28 28   < > 27 29   BUN mg/dL 25 23 24   < > 27* 34*   CREATININE mg/dL 1 72* 1 98* 1 96*   < > 1 70* 1 75*   CALCIUM mg/dL 8 8 9 1 9 0   < > 9 0 9 0   MAGNESIUM mg/dL  --  2 5  --   --  2 2 2 3   PHOSPHORUS mg/dL  --  4 0  --   --  3 8  --    IRON ug/dL  --  68  --   --   --   --    FERRITIN ng/mL  --  314 264  --   --   --     < > = values in this interval not displayed          rest all reviewed    RADIOLOGY:  No orders to display     Rest all reviewed        MEDICATIONS:    Current Outpatient Medications:     acetaminophen (TYLENOL) 325 mg tablet, Take 650 mg by mouth 2 (two) times a day as needed for mild pain , Disp: , Rfl:     Calcium Citrate-Vitamin D (CALCIUM + D PO), Take 1 tablet by mouth 2 (two) times a day, Disp: , Rfl:     cyanocobalamin (VITAMIN B-12) 100 mcg tablet, Take 100 mcg by mouth daily , Disp: , Rfl:     Elastic Bandages & Supports ([address]) MIS, by Does not apply route daily, Disp: 10 each, Rfl: 0    gabapentin (NEURONTIN) 100 mg capsule, Take 1 capsule in the morning, take 1 capsule in the afternoon, and take 1-3 capapsules at bedtime (Patient taking differently: Take 1 capsule in the morning and take 1-2 capsules at bedtime), Disp: 450 capsule, Rfl: 3    hydroxychloroquine (PLAQUENIL) 200 mg tablet, Take 200 mg by mouth 2 (two) times a day with meals, Disp: , Rfl:     leucovorin (WELLCOVORIN) 5 mg tablet, Take 10 mg by mouth once a week , Disp: , Rfl:     levothyroxine 75 mcg tablet, Take 1 tablet (75 mcg total) by mouth daily, Disp: 90 tablet, Rfl: 3    losartan (COZAAR) 25 mg tablet, Take 1 tablet (25 mg total) by mouth daily, Disp: 90 tablet, Rfl: 3    methotrexate 2 5 mg tablet, Take 2 tablets weekly, Disp: 12 tablet, Rfl: 0    Multiple Vitamin (MULTIVITAMINS PO), Take 1 tablet by mouth daily, Disp: , Rfl:     omeprazole (PriLOSEC) 40 MG capsule, Take 1 capsule (40 mg total) by mouth daily, Disp: 90 capsule, Rfl: 3    potassium chloride (Klor-Con M20) 20 mEq tablet, Take 1 tablet (20 mEq total) by mouth daily (Patient taking differently: Take 20 mEq by mouth 2 (two) times a day ), Disp: , Rfl:     pravastatin (PRAVACHOL) 80 mg tablet, TAKE 1 TABLET BY MOUTH EVERY DAY, Disp: 90 tablet, Rfl: 3    rivaroxaban (Xarelto) 15 mg tablet, Take 1 tablet (15 mg total) by mouth daily with breakfast, Disp: 90 tablet, Rfl: 3    rOPINIRole (REQUIP) 0 5 mg tablet, 1 tab in the am and 2 tab at bedtime, Disp: 270 tablet, Rfl: 3    torsemide (DEMADEX) 20 mg tablet, Take 2 tablets (40 mg total) by mouth daily (Patient taking differently: Take 40 mg by mouth daily 40mg in am and 20mg in pm), Disp: 180 tablet, Rfl: 1    polyethylene glycol (MIRALAX) 17 g packet, Take 17 g by mouth daily (Patient not taking: Reported on 6/23/2020), Disp: 14 each, Rfl: 0          Portions of the record may have been created with voice recognition software  Occasional wrong word or "sound a like" substitutions may have occurred due to the inherent limitations of voice recognition software  Read the chart carefully and recognize, using context, where substitutions have occurred  If you have any questions, please contact the dictating provider

## 2020-07-09 NOTE — PROGRESS NOTES
Assessment/Plan:     1  Right lumbar radiculopathy  -     XR spine lumbar minimum 4 views non injury; Future; Expected date: 07/09/2020  -     XR hip/pelv 2-3 vws right if performed; Future; Expected date: 07/09/2020  -     Ambulatory referral to Physical Therapy; Future     We mostly discussed her discomfort and pain that she has and current regime  She did want xrays completed and explained that we can start with PT as well for the back specifically as she has not had this  Discussed she can have extra Tylenol as well and she will monitor  Subjective:      Patient ID: Bebo Cobos is a 81 y o  female  [place] Messing is here today for known arthritis/worsening pain  Reports having pain in her low back, worse on the right, and is unsure if it is her hip  She reports known left shoulder pain as well as knee pain  She has not had any falls  She finds it difficult to stand for more then 5 minutes without having pain  She follows with [facility] for her known RA  She believes she has an upcoming appointment  She currently does take Tylenol 500 mg BID for her pain on average  She has known CKD III as well         The following portions of the patient's history were reviewed and updated as appropriate: allergies, past family history, past medical history, past social history, past surgical history and problem list     Current Outpatient Medications:     acetaminophen (TYLENOL) 325 mg tablet, Take 650 mg by mouth 2 (two) times a day as needed for mild pain , Disp: , Rfl:     Calcium Citrate-Vitamin D (CALCIUM + D PO), Take 1 tablet by mouth 2 (two) times a day, Disp: , Rfl:     cyanocobalamin (VITAMIN B-12) 100 mcg tablet, Take 100 mcg by mouth daily , Disp: , Rfl:     Elastic Bandages & Supports (MEDICAL COMPRESSION STOCKINGS) MISC, by Does not apply route daily, Disp: 10 each, Rfl: 0    gabapentin (NEURONTIN) 100 mg capsule, Take 1 capsule in the morning, take 1 capsule in the afternoon, and take 1-3 capapsules at bedtime (Patient taking differently: Take 1 capsule in the morning and take 1-2 capsules at bedtime), Disp: 450 capsule, Rfl: 3    hydroxychloroquine (PLAQUENIL) 200 mg tablet, Take 200 mg by mouth 2 (two) times a day with meals, Disp: , Rfl:     leucovorin (WELLCOVORIN) 5 mg tablet, Take 10 mg by mouth once a week , Disp: , Rfl:     levothyroxine 75 mcg tablet, Take 1 tablet (75 mcg total) by mouth daily, Disp: 90 tablet, Rfl: 3    losartan (COZAAR) 25 mg tablet, Take 1 tablet (25 mg total) by mouth daily, Disp: 90 tablet, Rfl: 3    methotrexate 2 5 mg tablet, Take 2 tablets weekly, Disp: 12 tablet, Rfl: 0    metolazone (ZAROXOLYN) 5 mg tablet, Take 1 tablet (5 mg total) by mouth daily, Disp: 30 tablet, Rfl: 1    Multiple Vitamin (MULTIVITAMINS PO), Take 1 tablet by mouth daily, Disp: , Rfl:     omeprazole (PriLOSEC) 40 MG capsule, Take 1 capsule (40 mg total) by mouth daily, Disp: 90 capsule, Rfl: 3    polyethylene glycol (MIRALAX) 17 g packet, Take 17 g by mouth daily, Disp: 14 each, Rfl: 0    potassium chloride (Klor-Con M20) 20 mEq tablet, Take 1 tablet (20 mEq total) by mouth daily (Patient taking differently: Take 20 mEq by mouth 2 (two) times a day ), Disp: , Rfl:     pravastatin (PRAVACHOL) 80 mg tablet, TAKE 1 TABLET BY MOUTH EVERY DAY, Disp: 90 tablet, Rfl: 3    rivaroxaban (Xarelto) 15 mg tablet, Take 1 tablet (15 mg total) by mouth daily with breakfast, Disp: 90 tablet, Rfl: 3    rOPINIRole (REQUIP) 0 5 mg tablet, 1 tab in the am and 2 tab at bedtime, Disp: 270 tablet, Rfl: 3    torsemide (DEMADEX) 20 mg tablet, Take 2 tablets (40 mg total) by mouth daily (Patient taking differently: Take 40 mg by mouth daily 40mg in am and 20mg in pm), Disp: 180 tablet, Rfl: 1    Review of Systems   Constitutional: Negative for chills and fever  Respiratory: Negative for cough, chest tightness and shortness of breath  Cardiovascular: Negative for chest pain  Gastrointestinal: Negative for abdominal pain, diarrhea, nausea and vomiting  Genitourinary: Positive for frequency  Musculoskeletal: Positive for arthralgias, back pain and gait problem  Negative for myalgias  Neurological: Negative for dizziness, numbness and headaches  Psychiatric/Behavioral: Negative for dysphoric mood and sleep disturbance  The patient is not nervous/anxious  Objective:      /76   Pulse (!) 114   Temp 97 6 °F (36 4 °C)   Resp 22   Ht 5' (1 524 m)   Wt 108 kg (237 lb 9 6 oz)   LMP  (LMP Unknown)   SpO2 97%   BMI 46 40 kg/m²          Physical Exam   Constitutional: She is oriented to person, place, and time  She appears well-developed and well-nourished  No distress  HENT:   Head: Normocephalic and atraumatic  Eyes: Conjunctivae and EOM are normal  Right eye exhibits no discharge  Left eye exhibits no discharge  No scleral icterus  Neck: Normal range of motion  Cardiovascular: Normal rate, regular rhythm and normal heart sounds  Pulmonary/Chest: Effort normal and breath sounds normal  No respiratory distress  She has no wheezes  Musculoskeletal:   Walks with a walker   Chronic edema in the LE and is wearing compression stockings    Neurological: She is alert and oriented to person, place, and time  Skin: Skin is warm and dry  She is not diaphoretic  No erythema  Psychiatric: She has a normal mood and affect  Her speech is normal and behavior is normal  Judgment and thought content normal    Vitals reviewed

## 2020-07-13 NOTE — PROGRESS NOTES
PT Evaluation     Today's date: 2020  Patient name: Raina Randolph  : 1935  MRN: [id number]  Referring provider: Marjorie Epley, DO  Dx:   Encounter Diagnosis     ICD-10-CM    1  Chronic bilateral low back pain without sciatica M54 5     G89 29    2  Right lumbar radiculopathy M54 16 Ambulatory referral to Physical Therapy       Start Time: 1020  Stop Time: 1107  Total time in clinic (min): 47 minutes    Assessment  Assessment details: Raina Randolph is a 81 y o  female who presents to therapy for chronic low back pain without sciatica  Examination findings include decreased TrA and LE strength, poor posture, decreased tolerance to activity, and LE ROM impairments  These impairments are limiting the pt's ability to perform normal ADLs, household chores, and household and community ambulation  Pt will benefit from skilled outpatient PT to address the below stated impairments, to address therapy goals, to reduce pain, and improve function  Therapist explained to pt: findings of IE, rehab diagnosis, and POC  Pt-centered goals reviewed and confirmed by pt  Instruction also given for HEP  Pt expressed verbal agreement and understanding and verified understanding via teach back method  Pt also expressed satisfaction that their current concerns were addressed at the end of the session  Impairments: abnormal gait, abnormal or restricted ROM, activity intolerance, impaired balance, impaired physical strength, lacks appropriate home exercise program, pain with function, poor posture  and poor body mechanics    Symptom irritability: moderateBarriers to therapy: None   Understanding of Dx/Px/POC: good   Prognosis: fair    Goals  Short term goals: to be met in 4 weeks  Pt independent with initial HEP, rationale, technique and frequency, for ROM and pain control  Pt will report at least a 25% reduction in subjective pain complaints/symptoms to better manage ADLs and household chores     Pt will improve FOTO score to better then expected outcome indicating an overall improvement in pain and function   Pt will be able to effectively isolate and recruit the TrA without Valsalva or global abdominal contraction to improve lumbopelvic stability  Pt will manage 8-10 minutes of continuous activity for general conditioning and endorphin release for pain management using bike or treadmill    Long term goals: to be met in 8 weeks  Pt will have WFL and pain free lumbar ROM to better manage ADLs and household chores  Pt will report a 75% or > reduction in subjective pain complaints/symptoms to better manage ADLs and household chores  Pt will improve FOTO score to better then expected outcome indicating an overall improvement in pain and function   Pt will be able to sleep through the night (6-8 hours) without waking secondary to pain  Pt will be able to perform ADLs, iADLS, and household duties with minimal pain/ restriction indicating return to PLOF  Pt independent with rationale, technique and plan for performance of advanced HEP to ensure independent self-management of symptoms upon discharge  Achieve pts therapy goal: solve the back problem and do more      Plan  Patient would benefit from: skilled physical therapy  Planned modality interventions: TENS, thermotherapy: hydrocollator packs, traction, ultrasound, cryotherapy and low level laser therapy  Planned therapy interventions: body mechanics training, therapeutic training, therapeutic exercise, therapeutic activities, stretching, strengthening, neuromuscular re-education, patient education, home exercise program, functional ROM exercises, flexibility, manual therapy, Varner taping, joint mobilization and balance  Frequency: 2-3x/week  Duration in weeks: 12  Treatment plan discussed with: patient        Subjective Evaluation    History of Present Illness  Mechanism of injury: Pt presents with low back pain   Pt states the back pain started a long time ago, over 6 months ago  Had x-rays a few days ago but not received the results yet  Pt states she has lymphedema in B legs and uses the machine at home to control this  Denies LE n/t  States pain is located across the low back and into the hips/buttocks  Denies radiating pain  Pain is worse with standing and walking, better with sitting  Pt states she doesn't walk much  States she has difficulty performing household chores due to the pain  Pt walks with a RW, has been using for years  Denies pain with valsalva  Pt states she got a new mattress and now does not sleep well  States she needs a new mattress  Taking tylenol for pain          Recurrent probem    Pain  Current pain ratin  At best pain ratin  At worst pain ratin  Location: across low back and buttocks  Quality: dull ache  Aggravating factors: standing and walking  Progression: worsening    Social Support  Steps to enter house: yes  L[facility] in: [facility]  Lives with: spouse    Employment status: not working  Treatments  Previous treatment: medication  Patient Goals  Patient goals for therapy: decreased pain, increased strength and independence with ADLs/IADLs  Patient goal: solve the back problem and do more        Objective   Observation: ambulates with RW, flexed posture, wide base, rounded shoulders, dec lumbar lordosis, forward head     Palpation: TTP over lumbar region     Active Range of Motion   LE ROM  Hamstrings: moderately tight bilat  Hip ER ROM: WNL R, moderately tight on the L     Lower quarter screen    Sensation  LE light touch - intact BLE    Reflexes   Patellar tendon- normal bilat  Achilles  - normal Bilat    Lumbar   Flexion:  Restriction level: minimal, pain free  Extension:  Restriction level:  Minimal, pain free  Left lateral flexion:  Restriction level: moderate pain free  Right lateral flexion:  with pain Restriction level:   Moderate, pain free       Strength/Myotome Testing      Left Hip   Planes of Motion   Flexion: 3-  Extension: 2+  Abduction:3-      Right Hip   Planes of Motion   Flexion: 3-  Extension: 2+  Abduction: 3-     Left Knee   Flexion: 4  Extension:  4     Right Knee   Flexion: 4  Extension: 4     Left Ankle/Foot   Dorsiflexion: 4  Planter flexion:4     Right Ankle/Foot   Dorsiflexion: 4  Planter flexion:4     Muscle Activation   TrA: 2+/5 - (+) compensation       Tests   Lumbar     Left   neg crossed SLR and passive SLR  neg slump test     Right   neg crossed SLR and passive SLR  neg slump test     Left Hip   neg MICHELLE  neg FADIR     Right Hip   neg MICHELLE  neg FADIR        Flowsheet Rows      Most Recent Value   PT/OT G-Codes   Current Score  36   Projected Score  50             Precautions: hx of CVA,  hypertension; Hearing loss; ; Hiatal hernia; DM; RA; Neuropathy;hx of  cancer; Edema; CHF at risk for falls; Left breast mass; PVD;  Restless leg syndrome; Irregular heart beat   FOTO  7/13 = 36/50    Manuals 7/14                                                                Neuro Re-Ed             TrA iso with post  Pelvic tilt 5"x15                                                                                          Ther Ex             B hams and piriformis stretch DS 30"x3 each            glute set with bridge 5"x15            Seated lumbar flex stretch 10"x10            Clamshell hooklying NV            Standing hip abd NV                                                   Ther Activity             Sit to stands NV                                                                Gait Training                                       Modalities

## 2020-07-17 NOTE — PROGRESS NOTES
Daily Note     Today's date: 2020  Patient name: Branden Browne  : 1935  MRN: [id number]  Referring provider: Ibrahima Caballero DO  Dx:   Encounter Diagnosis     ICD-10-CM    1  Right lumbar radiculopathy M54 16    2  Chronic bilateral low back pain without sciatica M54 5     G89 29        Start Time: 1430  Stop Time: 1512  Total time in clinic (min): 42 minutes    Subjective: Pt states she just showered prior to PT and is very tired now  Objective: See treatment diary below  lumbar spine x-ray - severe degenrative changes    Assessment: Pt presents for first follow up after eval  Reviewed HEP  Cues during Tra with pelvic tilts for proper form  Pt fatigued with tx today but responded well overall  Pt will benefit from continued skilled outpatient PT to improve strength, to address therapy goals, to reduce pain, and improve function  Plan: Continue per plan of care  Progress treatment as tolerated  Precautions: hx of CVA,  hypertension; Hearing loss; ; Hiatal hernia; DM; RA; Neuropathy;hx of  cancer; Edema; CHF at risk for falls; Left breast mass; PVD;  Restless leg syndrome; Irregular heart beat     FOTO   = 36/50    Manuals                                                                Neuro Re-Ed             TrA iso with post  Pelvic tilt 5"x15 5" 3x10                                                                                         Ther Ex             B hams and piriformis stretch DS 30"x3 each DS 30"x3 each           glute set with bridge 5"x15 HEP           Seated lumbar flex stretch 10"x10 HEP           Clamshell hooklying NV [place] 2x15           Standing hip abd NV 2x15 each           VG  L5 10'                                     Ther Activity             Sit to stands NV Hi-lo mat 2x10                                                               Gait Training                                       Modalities

## 2020-07-20 NOTE — PROGRESS NOTES
Daily Note     Today's date: 2020  Patient name: Surekha Murphy  : 1935  MRN: [id number]  Referring provider: Sarai Hinojosa DO  Dx:   Encounter Diagnosis     ICD-10-CM    1  Right lumbar radiculopathy M54 16    2  Chronic bilateral low back pain without sciatica M54 5     G89 29                   Subjective: Pt states she just showered prior to PT and is very tired now  Objective: See treatment diary below  lumbar spine x-ray - severe degenrative changes    Assessment: Pt presents for first follow up after eval  Reviewed HEP  Cues during Tra with pelvic tilts for proper form  Pt fatigued with tx today but responded well overall  Pt will benefit from continued skilled outpatient PT to improve strength, to address therapy goals, to reduce pain, and improve function  Plan: Continue per plan of care  Progress treatment as tolerated  Precautions: hx of CVA,  hypertension; Hearing loss; ; Hiatal hernia; DM; RA; Neuropathy;hx of  cancer; Edema; CHF at risk for falls; Left breast mass; PVD;  Restless leg syndrome; Irregular heart beat     FOTO   = 36/50    Manuals                                                                Neuro Re-Ed             TrA iso with post  Pelvic tilt 5"x15 5" 3x10                                                                                         Ther Ex             B hams and piriformis stretch DS 30"x3 each DS 30"x3 each           glute set with bridge 5"x15 HEP           Seated lumbar flex stretch 10"x10 HEP           Clamshell hooklying NV [place] 2x15           Standing hip abd NV 2x15 each           VG  L5 10'                                     Ther Activity             Sit to stands NV Hi-lo mat 2x10                                                               Gait Training                                       Modalities

## 2020-07-23 NOTE — PROGRESS NOTES
Daily Note     Today's date: 2020  Patient name: Clay Giron  : 1935  MRN: [id number]  Referring provider: Omar Perez DO  Dx:   Encounter Diagnosis     ICD-10-CM    1  Right lumbar radiculopathy M54 16    2  Chronic bilateral low back pain without sciatica M54 5     G89 29        Start Time: 1030  Stop Time: 1113  Total time in clinic (min): 43 minutes    Subjective: Pt states she has been doing her HEP and that helps  Objective: See treatment diary below    Assessment:Pt required rest breaks between sit to stand sets, but completed more exercises today  She responded well to progression in [employer] gym  Pt will benefit from continued skilled outpatient PT to improve strength, to address therapy goals, to reduce pain, and improve function  Plan: Continue per plan of care  Progress treatment as tolerated  Precautions: hx of CVA,  hypertension; Hearing loss; ; Hiatal hernia; DM; RA; Neuropathy;hx of  cancer; Edema; CHF at risk for falls; Left breast mass; PVD;  Restless leg syndrome; Irregular heart beat     FOTO   = 36/50    Manuals                                                               Neuro Re-Ed             TrA iso with post  Pelvic tilt 5"x15 5" 3x10 5" 3x10                                                                                        Ther Ex             B hams and piriformis stretch DS 30"x3 each DS 30"x3 each DS 30"x3 each          glute set with bridge 5"x15 HEP           Seated lumbar flex stretch 10"x10 HEP           Clamshell hooklying NV [place] 2x15 [place] 2x15          Standing hip abd NV 2x15 each x20 b/l          VG  L5 10' L7 10'                                    Ther Activity             Sit to stands NV Hi-lo mat 2x10 Hi-lo mat 3x10                                                              Gait Training                                       Modalities

## 2020-07-24 NOTE — PROGRESS NOTES
FAMILY PRACTICE OFFICE VISIT  [facility]  [id number]   [address] [place], [address]      NAME: Karyle Cash  AGE: 81 y o  SEX: female  : 1935   MRN: [id number]    DATE: 2020  TIME: 12:45 PM    Assessment and Plan     Problem List Items Addressed This Visit        Cardiovascular and Mediastinum    Chronic diastolic heart failure (HCC)       Genitourinary    CKD (chronic kidney disease) stage 4, GFR 15-29 ml/min (HCC)      Other Visit Diagnoses     Acute cystitis without hematuria    -  Primary    Relevant Medications    ciprofloxacin (CIPRO) 500 mg tablet    Other Relevant Orders    Urine culture    Urinary frequency        Relevant Orders    POCT urine dip (Completed)    Urine culture          Patient Instructions   Her urine dip here today is consistent with urinary tract infections as our her symptoms, for last culture in  showed Klebsiella pneumonia, she had used Keflex 500 3 times daily for 1 week at that time with benefit  She has use cephalexin few times now, remotely had used Cipro without issue, she will use Cipro 500 mg once daily for 1 week  Redo culture  Call if symptoms worsen, fevers, chills etcetera  Most recent creatinine readings have been running 1 8 to 2 range  She has noted benefit with using physical therapy regarding back pain, had seen Dr Dano Nash with x-rays  Will be seeing Dr Miguel A Mckinney again for routine check in October  She continues w Nephrology/ Cardiology/ Hematology/ Rheumatology    Requests evaluation re hearing loss-  Referred to ENT      Chief Complaint     Chief Complaint   Patient presents with    Bladder pressure    Urinary Frequency       History of Present Illness   Karyle Cash is a 81y o -year-old female who I had seen back in , she is in today to evaluate regarding urinary tract symptoms  She notes urinary frequency, some burning    No fevers or chills  She did have Klebsiella pneumonia on urine culture back in June, was treated with Keflex 500 mg 3 times daily x7 days  She does have chronic kidney disease, creatinine usually runs 1 8 to 2  She was seen here by Dr Dano Nash earlier this month for right low back pain, she relates she did start physical therapy which does help with pain  She does continue to see Hematology regarding anemia, also sees Cardiology  She relates she is on all medication including diuretics, does not feel she has increased fluid retention  Review of Systems   Review of Systems   Constitutional: Positive for fatigue (Chronic)  Negative for chills and fever  Respiratory: Negative for chest tightness  No increased shortness of breath, no recent cough or wheezing   Cardiovascular:        No chest pain or increased palpitations, trace swelling lower extremities  Gastrointestinal:        No abdominal pain, no change in bowels, no blood in stool  Appetite okay, no nausea or vomiting   Neurological: Negative for dizziness, syncope and light-headedness  Hematological: Bruises/bleeds easily (No bleeding)  Psychiatric/Behavioral: Negative for behavioral problems and confusion         Active Problem List     Patient Active Problem List   Diagnosis    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 (HCC)    Essential hypertension    Rheumatoid arthritis ([address] )    Sleep apnea    Acquired hypothyroidism    Chronic GERD    Edema    Hypercholesterolemia    Obesity    Peripheral neuropathy    Primary osteoarthritis of left knee    Restless leg syndrome    Right lumbar radiculopathy    Sensorineural hearing loss    Sinus arrhythmia    Chronic bilateral thoracic back pain    Thoracic degenerative disc disease    Urothelial cancer ([address] )    Lung nodule < 6cm on CT    Pancreatic cyst    Iron deficiency anemia    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 ([address] )    History of nephroureterectomy    Incisional hernia    Atrial fibrillation (HCC)    CKD (chronic kidney disease) stage 3, GFR 30-59 ml/min ([facility])    Chronic diastolic heart failure (HCC)    Spontaneous dislocation of shoulder, left    Abdominal wall hernia    Atherosclerosis of native artery of right lower extremity with intermittent claudication ([facility])    Chronic acquired lymphedema    Anemia of chronic disease    Polyneuropathy associated with underlying disease ([facility] [facility] )    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 ([facility])    PAD (peripheral artery disease) ([facility])    Impaired fasting glucose    Seborrheic keratoses    Aortoiliac occlusive disease ([facility] )    Left knee pain    Fall from bed    CKD (chronic kidney disease) stage 4, GFR 15-29 ml/min ([facility])    Hypervolemia    Secondary hyperparathyroidism of renal origin [facility])       Past Medical History:  Reviewed    Past Surgical History:  Reviewed    Family History:  Reviewed    Social History:  Reviewed    Objective     Vitals:    07/24/20 1053   BP: 142/68   BP Location: Left arm   Patient Position: Sitting   Cuff Size: Large   Pulse: 69   Temp: 97 6 °F (36 4 °C)   SpO2: 98%   Weight: 102 kg (225 lb)   Height: 5' (1 524 m)     Body mass index is 43 94 kg/m²  BP Readings from Last 3 Encounters:   07/24/20 142/68   07/09/20 138/76   07/02/20 140/64       Wt Readings from Last 3 Encounters:   07/24/20 102 kg (225 lb)   07/09/20 108 kg (237 lb 9 6 oz)   07/02/20 106 kg (234 lb)       Physical Exam   Constitutional: She is oriented to person, place, and time  Pleasant overweight female seated no acute distress, afebrile  Cardiovascular:   Regular rhythm here today  Pulmonary/Chest:   Slightly decreased in bases otherwise lungs are clear, no rhonchi, rales, wheezing   Abdominal: Soft  She exhibits no distension  There is no tenderness  There is no guarding     Musculoskeletal:   Trace pitting edema bilateral anterior tibial   Neurological: She is alert and oriented to person, place, and time  Psychiatric: She has a normal mood and affect  ALLERGIES:  Allergies   Allergen Reactions    Nitrofurantoin Hives     HIVES * pt denies  Other reaction(s): Unknown Allergic Reaction  Other reaction(s): Other (See Comments)  HIVES * pt denies    Atorvastatin      Other reaction(s): Muscle Pain, Myalgia    Acetazolamide Other (See Comments)     Other reaction(s): Unknown Allergic Reaction unknown reaction     Other Other (See Comments)     Adhesive tape : red and itching  Other reaction(s):  Other (See Comments)  red and itching    Shellfish-Derived Products Other (See Comments)     Patient got Gout following eating shell fish    Sulfa Antibiotics Other (See Comments)     unknown    Tramadol Diarrhea and Vomiting    Azithromycin Rash       Current Medications     Current Outpatient Medications   Medication Sig Dispense Refill    acetaminophen (TYLENOL) 325 mg tablet Take 650 mg by mouth 2 (two) times a day as needed for mild pain       Calcium Citrate-Vitamin D (CALCIUM + D PO) Take 1 tablet by mouth 2 (two) times a day      cyanocobalamin (VITAMIN B-12) 100 mcg tablet Take 100 mcg by mouth daily       gabapentin (NEURONTIN) 100 mg capsule Take 1 capsule in the morning, take 1 capsule in the afternoon, and take 1-3 capapsules at bedtime (Patient taking differently: Take 1 capsule in the morning and take 1-2 capsules at bedtime) 450 capsule 3    hydroxychloroquine (PLAQUENIL) 200 mg tablet Take 200 mg by mouth 2 (two) times a day with meals      leucovorin (WELLCOVORIN) 5 mg tablet Take 10 mg by mouth once a week       levothyroxine 75 mcg tablet Take 1 tablet (75 mcg total) by mouth daily 90 tablet 3    losartan (COZAAR) 25 mg tablet Take 1 tablet (25 mg total) by mouth daily 90 tablet 3    methotrexate 2 5 mg tablet Take 2 tablets weekly 12 tablet 0    metolazone (ZAROXOLYN) 5 mg tablet Take 1 tablet (5 mg total) by mouth daily 30 tablet 1  Multiple Vitamin (MULTIVITAMINS PO) Take 1 tablet by mouth daily      omeprazole (PriLOSEC) 40 MG capsule Take 1 capsule (40 mg total) by mouth daily 90 capsule 3    polyethylene glycol (MIRALAX) 17 g packet Take 17 g by mouth daily 14 each 0    potassium chloride (Klor-Con M20) 20 mEq tablet Take 1 tablet (20 mEq total) by mouth daily (Patient taking differently: Take 20 mEq by mouth 2 (two) times a day )      pravastatin (PRAVACHOL) 80 mg tablet TAKE 1 TABLET BY MOUTH EVERY DAY 90 tablet 3    rivaroxaban (Xarelto) 15 mg tablet Take 1 tablet (15 mg total) by mouth daily with breakfast 90 tablet 3    rOPINIRole (REQUIP) 0 5 mg tablet 1 tab in the am and 2 tab at bedtime 270 tablet 3    torsemide (DEMADEX) 20 mg tablet Take 2 tablets (40 mg total) by mouth daily (Patient taking differently: Take 40 mg by mouth daily 40mg in am and 20mg in pm) 180 tablet 1    ciprofloxacin (CIPRO) 500 mg tablet Take 1 tablet (500 mg total) by mouth every 24 hours for 7 days 7 tablet 0    Elastic Bandages & Supports (MEDICAL COMPRESSION STOCKINGS) MISC by Does not apply route daily 10 each 0     No current facility-administered medications for this visit               Orders Placed This Encounter   Procedures    Urine culture    POCT urine dip         Augusto Bush DO

## 2020-07-24 NOTE — PATIENT INSTRUCTIONS
Her urine dip here today is consistent with urinary tract infections as our her symptoms, for last culture in June showed Klebsiella pneumonia, she had used Keflex 500 3 times daily for 1 week at that time with benefit  She has use cephalexin few times now, remotely had used Cipro without issue, she will use Cipro 500 mg once daily for 1 week  Redo culture  Call if symptoms worsen, fevers, chills etcetera  Most recent creatinine readings have been running 1 8 to 2 range  She has noted benefit with using physical therapy regarding back pain, had seen Dr Camilo Hirsch with x-rays  Will be seeing Dr Sean Alaniz again for routine check in October     She continues w Nephrology/ Cardiology/ Hematology/ Rheumatology    Requests evaluation re hearing loss-  Referred to ENT

## 2020-07-27 NOTE — PROGRESS NOTES
Daily Note     Today's date: 2020  Patient name: Clay Giron  : 1935  MRN: [id number]  Referring provider: Omar Perez DO  Dx:   Encounter Diagnosis     ICD-10-CM    1  Right lumbar radiculopathy M54 16    2  Chronic bilateral low back pain without sciatica M54 5     G89 29        Start Time: 1620  Stop Time: 1703  Total time in clinic (min): 43 minutes    Subjective: Pt states her knees are really aching today from her arthritis  States she twisted her knee the other day and fell back onto her bed, but did not fall to the floor  Objective: See treatment diary below    Assessment: Pt tolerated tx with reports of R knee pain  She was able to complete all exercises but required more seated rest breaks  Pt will benefit from continued skilled outpatient PT to improve strength, to address therapy goals, to reduce pain, and improve function  Plan: Continue per plan of care  Progress treatment as tolerated  Precautions: hx of CVA,  hypertension; Hearing loss; ; Hiatal hernia; DM; RA; Neuropathy;hx of  cancer; Edema; CHF at risk for falls; Left breast mass; PVD;  Restless leg syndrome; Irregular heart beat     FOTO   = 36/50    Manuals                                                              Neuro Re-Ed             TrA iso with post  Pelvic tilt 5"x15 5" 3x10 5" 3x10 5" 3x10                      TrA with march    nv                                                             Ther Ex             B hams and piriformis stretch DS 30"x3 each DS 30"x3 each DS 30"x3 each DS 30"x3 each         glute set with bridge 5"x15 HEP           Seated lumbar flex stretch 10"x10 HEP           Clamshell hooklying NV [place] 2x15 Plum 2x15 Plum 2x15         Standing hip abd NV 2x15 each x20 b/l x20 b/l         VG  L5 10' L7 10' L7 10'                                   Ther Activity             Sit to stands NV Hi-lo mat 2x10 Hi-lo mat 3x10 Hi-lo mat 2x15 Gait Training                                       Modalities

## 2020-07-27 NOTE — TELEPHONE ENCOUNTER
Called and spoke with the pt, she was not taking metolazone daily  She informed me she was only taking it on Wednesday  Informed her she is supposed to be taking one tablet on M,W,F  Pt is aware and will have BMP in 2 weeks which has been ordered and mailed to her home

## 2020-07-30 NOTE — PROGRESS NOTES
Daily Note     Today's date: 2020  Patient name: Debara Apley  : 1935  MRN: [id number]  Referring provider: Raina Jimenez DO  Dx:   Encounter Diagnosis     ICD-10-CM    1  Right lumbar radiculopathy M54 16    2  Chronic bilateral low back pain without sciatica M54 5     G89 29        Start Time: 1403  Stop Time: 1445  Total time in clinic (min): 42 minutes    Subjective: Pt states her back is aching today  Objective: See treatment diary below    Assessment: Pt responded well to TrA with LE march, but fatigued with this  She also fatigued with addition of band during hip abd  Pt will benefit from continued skilled outpatient PT to improve strength, to address therapy goals, to reduce pain, and improve function  Plan: Continue per plan of care  Progress treatment as tolerated  Precautions: hx of CVA,  hypertension; Hearing loss; ; Hiatal hernia; DM; RA; Neuropathy;hx of  cancer; Edema; CHF at risk for falls; Left breast mass; PVD; Restless leg syndrome; Irregular heart beat     FOTO   = 36/50    Manuals                                                             Neuro Re-Ed             TrA iso with post  Pelvic tilt 5"x15 5" 3x10 5" 3x10 5" 3x10 5" 3x10                     TrA with march    nv 5" 3x10        TrA +hip flex   iso w/ ball     nv                                               Ther Ex             B hams and piriformis stretch DS 30"x3 each DS 30"x3 each DS 30"x3 each DS 30"x3 each DS 30"x3 each        glute set with bridge 5"x15 HEP           Seated lumbar flex stretch 10"x10 HEP           Clamshell hooklying NV [place] 2x15 Plum 2x15 Plum 2x15 Plum 2x15        Standing hip abd NV 2x15 each x20 b/l x20 b/l [place] x20 b/l        VG  L5 10' L7 10' L7 10' L7 10'                                  Ther Activity             Sit to stands NV Hi-lo mat 2x10 Hi-lo mat 3x10 Hi-lo mat 2x15 nv                                                            Gait Training                                       Modalities

## 2020-08-03 NOTE — TELEPHONE ENCOUNTER
Called and left a detailed message with regards to most recent blood work from 08/03/2020 creatinine stable at baseline however potassium low advised patient to take 1 additional potassium pill daily and to get BMP done next week  Will mail out script for that  Advised patient to call with updates any questions or concerns

## 2020-08-03 NOTE — PROGRESS NOTES
Daily Note     Today's date: 8/3/2020  Patient name: Anel Glaser  : 1935  MRN: [id number]  Referring provider: Isaias Logan DO  Dx:   Encounter Diagnosis     ICD-10-CM    1  Right lumbar radiculopathy  M54 16    2  Chronic bilateral low back pain without sciatica  M54 5     G89 29                   Subjective: Pt states her back is aching today  Objective: See treatment diary below    Assessment: Pt responded well to TrA with LE march, but fatigued with this  She also fatigued with addition of band during hip abd  Pt will benefit from continued skilled outpatient PT to improve strength, to address therapy goals, to reduce pain, and improve function  Plan: Continue per plan of care  Progress treatment as tolerated  Precautions: hx of CVA,  hypertension; Hearing loss; ; Hiatal hernia; DM; RA; Neuropathy;hx of  cancer; Edema; CHF at risk for falls; Left breast mass; PVD; Restless leg syndrome; Irregular heart beat     FOTO   = 36/50    Manuals                                                             Neuro Re-Ed             TrA iso with post  Pelvic tilt 5"x15 5" 3x10 5" 3x10 5" 3x10 5" 3x10                     TrA with march    nv 5" 3x10        TrA +hip flex   iso w/ ball     nv                                               Ther Ex             B hams and piriformis stretch DS 30"x3 each DS 30"x3 each DS 30"x3 each DS 30"x3 each DS 30"x3 each        glute set with bridge 5"x15 HEP           Seated lumbar flex stretch 10"x10 HEP           Clamshell hooklying NV [place] 2x15 Plum 2x15 Plum 2x15 Plum 2x15        Standing hip abd NV 2x15 each x20 b/l x20 b/l [place] x20 b/l        VG  L5 10' L7 10' L7 10' L7 10'                                  Ther Activity             Sit to stands NV Hi-lo mat 2x10 Hi-lo mat 3x10 Hi-lo mat 2x15 nv                                                            Gait Training                                       Modalities

## 2020-08-07 NOTE — PROGRESS NOTES
Daily Note /Discharge summary    Today's date: 2020  Patient name: Moose Lira  : 1935  MRN: [id number]  Referring provider: Shivani Verdugo DO  Dx:   Encounter Diagnosis     ICD-10-CM    1  Right lumbar radiculopathy  M54 16    2  Chronic bilateral low back pain without sciatica  M54 5     G89 29        Start Time: 1030  Stop Time: 1113  Total time in clinic (min): 43 minutes    Subjective: Pt states she feels 30-40% improvement overall and would like to make today her last PT visit  Plans to go to the gym and keep up with her exercises      Objective: See treatment diary below    Lumbar AROM  Flexion:  Restriction level: minimal, pain free  Extension:  Restriction level:  Minimal, pain free  Left lateral flexion:  Restriction level: moderate pain free  Right lateral flexion:  with pain Restriction level: Moderate, pain free  Left rotation: minimal and pain free  Right rotation: minimal and pain free     Strength/Myotome Testing      Left Hip   Planes of Motion   Flexion: 3-  Extension: 3-  Abduction:3-      Right Hip   Planes of Motion   Flexion: 3-  Extension: 3-  Abduction: 3-     Left Knee   Flexion: 4  Extension:  4     Right Knee   Flexion: 4  Extension: 4     Left Ankle/Foot   Dorsiflexion: 4  Planter flexion:4     Right Ankle/Foot   Dorsiflexion: 4  Planter flexion:4          Assessment: Pt has participated in 6 therapy visits for chronic low back pain  She has made small gains in LE strenght and lumbar AROM  Overall she reports 30-40% improvement of sx's  At this time she requests D/C from therapy  Goals  Short term goals: to be met in 4 weeks  Pt independent with initial HEP, rationale, technique and frequency, for ROM and pain control  MET  Pt will report at least a 25% reduction in subjective pain complaints/symptoms to better manage ADLs and household chores    MET  Pt will improve FOTO score to better then expected outcome indicating an overall improvement in pain and function MET  Pt will be able to effectively isolate and recruit the TrA without Valsalva or global abdominal contraction to improve lumbopelvic stability  MET  Pt will manage 8-10 minutes of continuous activity for general conditioning and endorphin release for pain management using bike or treadmill MET    Long term goals: to be met in 8 weeks - UNMET  Pt will have WFL and pain free lumbar ROM to better manage ADLs and household chores  Pt will report a 75% or > reduction in subjective pain complaints/symptoms to better manage ADLs and household chores  Pt will improve FOTO score to better then expected outcome indicating an overall improvement in pain and function   Pt will be able to sleep through the night (6-8 hours) without waking secondary to pain  Pt will be able to perform ADLs, iADLS, and household duties with minimal pain/ restriction indicating return to PLOF  Pt independent with rationale, technique and plan for performance of advanced HEP to ensure independent self-management of symptoms upon discharge  Achieve pts therapy goal: solve the back problem and do more    Plan: Continue per plan of care  Progress treatment as tolerated  Precautions: hx of CVA,  hypertension; Hearing loss; ; Hiatal hernia; DM; RA; Neuropathy;hx of  cancer; Edema; CHF at risk for falls; Left breast mass; PVD;  Restless leg syndrome; Irregular heart beat     FOTO  7/13 = 36/50    Manuals 7/14 7/17 7/23 7/27 7/31 8/7                                                           Neuro Re-Ed             TrA iso with post  Pelvic tilt 5"x15 5" 3x10 5" 3x10 5" 3x10 5" 3x10 5" 3x10                    TrA with march    nv 5" 3x10 5" 3x10                                                           Ther Ex             B hams and piriformis stretch DS 30"x3 each DS 30"x3 each DS 30"x3 each DS 30"x3 each DS 30"x3 each DS 30"x3 each       glute set with bridge 5"x15 HEP           Seated lumbar flex stretch 10"x10 HEP           Clamshell hooklying NV [place] 2x15 Plum 2x15 Plum 2x15 Plum 2x15 Plum 2x15       Standing hip abd NV 2x15 each x20 b/l x20 b/l [place] x20 b/l np       VG  L5 10' L7 10' L7 10' L7 10' L7 15'                                 Ther Activity             Sit to stands NV Hi-lo mat 2x10 Hi-lo mat 3x10 Hi-lo mat 2x15 nv Hi-lo mat 2x10       RE/pt edu      DS                                              Gait Training                                       Modalities

## 2020-08-20 NOTE — TELEPHONE ENCOUNTER
Patient called and updated regarding most recent potassium of 3 3  Patient states she does forget to take her medication  Advised to take medications as prescribed and we will repeat BMP next week on 8/27 to ensure potassium is stable

## 2020-08-25 NOTE — PROGRESS NOTES
Advanced Heart Failure / Pulmonary Hypertension Outpatient Progress Note    Anel Glaser 81 y o  female   MRN: [id number]  Encounter: [id number]    Assessment / Plan:  Patient Active Problem List    Diagnosis Date Noted    Chronic acquired lymphedema 03/17/2019     Priority: Low    Anemia of chronic disease 03/17/2019     Priority: Low    Atherosclerosis of native artery of right lower extremity with intermittent claudication (HCC) 01/21/2019     Priority: Low    Abdominal wall hernia 01/02/2019     Priority: Low    Spontaneous dislocation of shoulder, left 09/18/2018     Priority: Low    Chronic diastolic heart failure ([address] ) 08/23/2018     Priority: Low    CKD (chronic kidney disease) stage 3, GFR 30-59 ml/min ([facility]) 08/12/2018     Priority: Low    Atrial fibrillation ([address] ) 08/11/2018     Priority: Low    Incisional hernia 06/28/2018     Priority: Low    History of nephroureterectomy 06/21/2018     Priority: Low    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 ([facility] ) 06/04/2018     Priority: Low    Pancreatic cyst 03/14/2018     Priority: Low    Iron deficiency anemia 03/14/2018     Priority: Low    Urothelial cancer ([address] ) 03/02/2018     Priority: Low    Lung nodule < 6cm on CT 03/02/2018     Priority: Low    Chronic bilateral thoracic back pain 02/12/2018     Priority: Low    Thoracic degenerative disc disease 02/12/2018     Priority: Low    Sinus arrhythmia 01/03/2018     Priority: Low    Peripheral neuropathy 11/09/2017     Priority: Low    Right lumbar radiculopathy 11/09/2017     Priority: Low    Primary osteoarthritis of left knee 10/13/2017     Priority: Low    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 ([address] ) 05/10/2017     Priority: Low    Essential hypertension 05/10/2017     Priority: Low    Rheumatoid arthritis ([address] ) 05/10/2017     Priority: Low    Sleep apnea 05/10/2017     Priority: Low    Acquired hypothyroidism 05/10/2017 Priority: Low    Chronic GERD 05/10/2017     Priority: Low    Restless leg syndrome 01/05/2016     Priority: Low    Sensorineural hearing loss 10/12/2014     Priority: Low    Hypercholesterolemia 08/29/2013     Priority: Low    Obesity 07/18/2013     Priority: Low    Edema 05/14/2013     Priority: Low    Secondary hyperparathyroidism of renal origin ([facility] ) 07/02/2020    Hypervolemia 04/17/2020    CKD (chronic kidney disease) stage 4, GFR 15-29 ml/min ([place] [address] ) 01/09/2020    Left knee pain 09/03/2019    Fall from bed 09/03/2019    Aortoiliac occlusive disease ([place] [address] ) 08/08/2019    Seborrheic keratoses 06/27/2019    Impaired fasting glucose 05/15/2019    PAD (peripheral artery disease) ([place] [address] ) 04/01/2019    Polyneuropathy associated with underlying disease ([place] [address] ) 03/26/2019    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 ([place] [address] ) 03/26/2019       Today's Plan:   Patient confused about her torsemide, metolazone, and potassium dosing  Has only been taking torsemide 40 mg daily (not taking 20 mg PM) and also not taking additional potassium with her MWF metolazone   Will restart PM torsemide 20 mg qPM and assess response   Will reach out and see if patient qualifies for home health or if community health worker can visit and assist with medications   BMP ordered; to be completed tomorrow  Chronic HFpEF; LVEF 60%; LVIDd 4 20 cm; NYHA II/III; ACC/AHA Stage B/C              Etiology: obesity /  HTN / Afib phenotype               TTE from 08/11/2018: LVEF 55%  LVIDd 3 08 cm  Normal RV size and function  Mild MR               TTE from 03/03/2019: LVEF 60%  LVIDd 4 20 cm  Normal RV size and function  Mild TR                 Reviewed importance of low sodium diet and fluid restriction               Weight in 230 lbs on 06/23(on home scale)  Today, weighs 228 lbs                QCIR recent BMP from 08/20/2020: sodium 140; potassium 3 3; BUN 29; creatinine 1 77; eGFR 26       Neurohormonal Blockade:  --Beta-Blocker: No   --ACEi, ARB or ARNi: losartan 25 mg daily  --Aldosterone Receptor Blocker: No   --SGLT2 Inhibitor: No   --Diuretic: torsemide 40 mg qAM and 20 mg qPM with metolazone 5 mg MWF and potassium 20 mEq TID      Sudden Cardiac Death Risk Reduction:  --LVEF 60%     Electrical Resynchronization:  --Candidacy for BiV device: narrow QRS     Advanced Therapies: Will continue to monitor       Atrial fibrillation              [id number] = 8 (age, sex, HTN, CHF, PAD, CVA)              Anticoagulation with Xarelto               Rates well controlled without BB      Hypertension              BP of 118/58 mmHg in office today               Continue medications as above      Chronic kidney disease, stage IV              S/p right nephrectomy in 2018  Baseline creatinine of 1 6-1 9               BMP from 06/18/2020: sodium 139; potassium 3 7; BUN 25; creatinine 1 72; eGFR 27  Most recent BMP from 08/20/2020: sodium 140; potassium 3 3; BUN 29; creatinine 1 77; eGFR 26       Hyperlipidemia              Most recent lipid panel from 10/16/2019: cholesterol 153; ; HDL 55; calculated LDL 74               Continue on pravastatin 80 mg daily      Lymphedema: continues to use lymphedema press/pump daily  Obstructive sleep apnea: compliant with CPAP for over 30 years  Anemia of chronic disease: Follows with outpatient hematology  Obesity: Body mass index is 44 45 kg/m²  Peripheral vascular disease  Rheumatoid arthritis: remains on methotrexate and Plaquenil    Hypothyroidism  History of urothelial cancer: s/p right nephroureterectomy  History of stroke: continue on [facility] [place] and statin as above      HPI:   Melanie Patel is an 46-year-old woman with chronic HFpEF, HTN, HLD, CVA/TIA, NICK (compliant with CPAP), DM2, CKD 3, urothelial cancer (s/p right nephrectomy in 2018), and lymphedema who is being contacted via telephone today for a follow-up visit      From office visit with [facility], [facility] on 04/15/2020: "Very pleasant 81 y  o  woman w/ PMHx of HTN, HLD, chronic LE edema/lymphedema, anemia of chronic disease p/f establishing care and abnormal ECG  She states she feels well  She was [facility] at [facility] and now lives closer to [place] [id number] so is transferring her care  Denies any cardiac symptoms  Uses lymphedema machine  She had a CVA in 5/2017 and received tPA  Overall feels well from that  She had a 1 week holter that did not show any arrhythmia and has been on NOAC ever since the stroke  Holter showed NSR w/ APCs        She comes in today 4/6/18, for f/u  She had noted hematuria  CT A/P noted a R renal pelvis multifocal papillary lesions with pathology showing low grade Ta urothelial cancer  She comes in for preop evaluation  She continues to deny cardiac symptoms  She is more limited by knee pains  She is able to get around her home without difficulty       Today 7/10/18, she returns for f/u  She had her right robotic nephroureterectomy with bladder cuff  She currently has VNA coming to the home  Moving around better  Feeling stronger  Walking is difficult 2/2 to rheumatoid arthritis       Today 8/24/2018, she returns for post hospital D/C  She was admitted twice since her last visit  In the first admission she presented with chest tightness and dyspnea  On 8/10/18 underwent nuclear stress test with normal perfusion  She was diuresed and felt improved  Then she was readmitted with dyspnea and volume overload and diuresed again  Today she feels well      Today, 10/17/18, she returns for f/u  She states she has developed a hernia on the R side  She does not want to pursue surgery currently  She decreased her diuretics to 20 mg PO daily as she was urinating too much and was having accidents  She dislocated her L shoulder  Denies SOB with ambulation using her walker  Walking better overall around the house  Unable to stand too long due to her spine       Today 2/5/19, she returns for f/u  She feels about the same as before  She does not feel limited by SOB, but more MSK as noted before       Today, 5/31/19, she returns for f/u  She was seen by Chikis Paz x2 since her last visit  She was admitted for fluid overload in 3/2019  She has been doing well since then  Weight today of 221 lbs (up from 218 lbs in February 2019)  Xarelto is very expensive for her; does have pills remaining at home  Requesting less expensive anticoagulation option  Provided Xarelto samples in interim, while pricing for other anticoagulation is underway  Continues with chronic LE edema, has improved some with OT home visits      Today, 8/7/19, she returns for f/u  She was admitted for hematuria  Her [address] and antiplatelet agent were held  She is back on them now  She feels at her baseline  Having urinary frequency and burning      11/22/19: feels well  Same as last visit  Going to PT for L arm       2/25/2020: Feels well  Her lymphedema is improving with therapy  She is doing PT 2x/week for her shoulder and it is much improved  Otherwise feels same as last visit  She had CT of the A/P that did not show any metastatic disease       Today, 4/15, patient is being evaluated via telephone visit  Called the office a few days ago due to weight gain and worsening LE edema  Her Torsemide was increased to 40 mg BID with Kdur 20 meq added  She reported minimal response to this regimen  Renal function is unchanged  Potassium 3 2  Today, she tells me her weight is now coming down  She is down 5 lbs in two days  Discussed we will continue on this regimen for now and reassess in 2 weeks  Will repeat lab work then as well "     04/29/2020: Patient contacted via telephone for telemedicine visit  She reports much improvement in her LE edema  Continues to use lymphedema press/pump daily  However, reports that her weight has not changed significantly with this ("up and down 2-3 lbs here and there")   Endorses increased urinary response with uptitration of torsemide  Upset to learn that her renal function had worsened on recent lab work; reviewed this in detail  Reports sticking to low sodium diet and is watching her fluid intake  Admits to feeling disconnected due to SARS-CoV-2 pandemic Will decrease torsemide to 20 mg BID       05/27/2020: "Her torsemide was increased to 40 mg PO BID since last visit for increasing LE edema and weight gain  States the swelling if improved, but weight is stable around low 230s "     06/23/2020: Patient contacted via telephone for 4 week follow-up  Since her last visit, torsemide dose increased to 60 mg BID for a few days by PCP  Now is taking torsemide 40 mg daily and stopped potassium on her own  Continues to watch her sodium and fluid intake  Weights remain stable in low 230 range  Denies worsening LE edema or SOB  Her main complaint today is that of fatigue  Reports taking more frequent naps, sometimes just hours after waking in AM  Also dozing off when watching her favorite TV programs  Discussed her recent lab results and how her low hemoglobin level is likely contributing/driving this  To see hematology in next few days  Also complaining of right abdominal hernia s/p right nephrectomy  Hernia is not painful and her bowel habits have not changed  Does feel that it is increasing in size  Seen by nephrology in 07/2020 and was started on metolazone 5 mg 3x weekly  08/26/2020: Patient presents for follow-up  Diagnosed with UTI earlier today  Has not been sleeping well due to abdominal pain from said UTI  Reviewed diuretic dosing in depth  Has not been taking PM dose of torsemide 20 mg and also has not been taking additional potassium with each dose of metolazone  Easily confused about her medications and does not seem to know the names of her meds  Weights fluctuating 1-2 lbs daily  Continues to use lymphedema pumps      Past Medical History:   Diagnosis Date    Anemia     Arthritis     rheumatoid    At risk for falls     Benign essential hypertension     CHF (congestive heart failure) (HCC)     Chronic cystitis     Chronic kidney disease     right kidney cancer - kidney removed    Chronic pain disorder     CPAP (continuous positive airway pressure) dependence     Diverticulitis of colon     Early satiety     Edema     GERD (gastroesophageal reflux disease)     Gout     Hearing aid worn     bilateral    Hearing loss     Hemorrhoids     Hiatal hernia     History of colonic polyps     Hyperlipidemia     Hypothyroidism     Irregular heart beat     Afib    Left breast mass     Microhematuria     Migraine     occular    Mobility impaired     left arm    Neuropathy     lower and upper extermities    Overactive bladder     Pneumonia of left lower lobe due to infectious organism     PVD (peripheral vascular disease) (HCC)     RA (rheumatoid arthritis) (HCC)     Restless leg syndrome     Sleep apnea     uses cpap    Stroke ([facility] )     5/2017    Type 2 diabetes mellitus ([facility]     Urinary frequency     Urinary urgency     Urothelial cancer ([facility] ) 04/23/2018    Uses walker        Review of Systems   Constitutional: Positive for fatigue  Negative for activity change, appetite change, fever and unexpected weight change  HENT: Negative for congestion, postnasal drip, rhinorrhea, sneezing and sore throat  Eyes: Negative  Respiratory: Negative for cough and shortness of breath  Cardiovascular: Positive for leg swelling  Negative for chest pain and palpitations  Gastrointestinal: Positive for abdominal distention and abdominal pain  Negative for diarrhea, nausea and vomiting  Endocrine: Negative  Genitourinary: Positive for dysuria and frequency  Negative for difficulty urinating and urgency  Musculoskeletal: Negative  Skin: Negative  Allergic/Immunologic: Negative  Neurological: Negative for dizziness, tremors, syncope, weakness, light-headedness and headaches  Hematological: Negative  Psychiatric/Behavioral: Positive for sleep disturbance  Negative for decreased concentration  The patient is not nervous/anxious  14-point ROS completed and negative except as stated above and/or in the HPI  Allergies   Allergen Reactions    Nitrofurantoin Hives     HIVES * pt denies  Other reaction(s): Unknown Allergic Reaction  Other reaction(s): Other (See Comments)  HIVES * pt denies    Atorvastatin      Other reaction(s): Muscle Pain, Myalgia    Acetazolamide Other (See Comments)     Other reaction(s): Unknown Allergic Reaction unknown reaction     Other Other (See Comments)     Adhesive tape : red and itching  Other reaction(s):  Other (See Comments)  red and itching    Shellfish-Derived Products Other (See Comments)     Patient got Gout following eating shell fish    Sulfa Antibiotics Other (See Comments)     unknown    Tramadol Diarrhea and Vomiting    Azithromycin Rash         Current Outpatient Medications:     acetaminophen (TYLENOL) 325 mg tablet, Take 650 mg by mouth 2 (two) times a day as needed for mild pain , Disp: , Rfl:     Calcium Citrate-Vitamin D (CALCIUM + D PO), Take 1 tablet by mouth 2 (two) times a day, Disp: , Rfl:     ciprofloxacin (CIPRO) 500 mg tablet, Take 1 tablet (500 mg total) by mouth every 24 hours for 5 days, Disp: 5 tablet, Rfl: 0    cyanocobalamin (VITAMIN B-12) 100 mcg tablet, Take 100 mcg by mouth daily , Disp: , Rfl:     Elastic Bandages & Supports (MEDICAL COMPRESSION STOCKINGS) MISC, by Does not apply route daily, Disp: 10 each, Rfl: 0    gabapentin (NEURONTIN) 100 mg capsule, Take 1 capsule in the morning, take 1 capsule in the afternoon, and take 1-3 capapsules at bedtime (Patient taking differently: Take 1 capsule in the morning and take 1-2 capsules at bedtime), Disp: 450 capsule, Rfl: 3    hydroxychloroquine (PLAQUENIL) 200 mg tablet, Take 200 mg by mouth 2 (two) times a day with meals, Disp: , Rfl:     levothyroxine 75 mcg tablet, Take 1 tablet (75 mcg total) by mouth daily, Disp: 90 tablet, Rfl: 3    losartan (COZAAR) 25 mg tablet, Take 1 tablet (25 mg total) by mouth daily, Disp: 90 tablet, Rfl: 3    methotrexate 2 5 mg tablet, Take 2 tablets weekly, Disp: 12 tablet, Rfl: 0    metolazone (ZAROXOLYN) 5 mg tablet, Take 5 mg by mouth 3 (three) times a week M, W, and F, Disp: , Rfl:     Multiple Vitamin (MULTIVITAMINS PO), Take 1 tablet by mouth daily, Disp: , Rfl:     omeprazole (PriLOSEC) 40 MG capsule, Take 1 capsule (40 mg total) by mouth daily, Disp: 90 capsule, Rfl: 3    potassium chloride (Klor-Con M20) 20 mEq tablet, Take 1 tablet (20 mEq total) by mouth daily (Patient taking differently: Take 20 mEq by mouth 3 (three) times a day ), Disp: , Rfl:     pravastatin (PRAVACHOL) 80 mg tablet, TAKE 1 TABLET BY MOUTH EVERY DAY, Disp: 90 tablet, Rfl: 3    rivaroxaban (Xarelto) 15 mg tablet, Take 1 tablet (15 mg total) by mouth daily with breakfast, Disp: 90 tablet, Rfl: 3    rOPINIRole (REQUIP) 0 5 mg tablet, 1 tab in the am and 2 tab at bedtime, Disp: 270 tablet, Rfl: 3    torsemide (DEMADEX) 20 mg tablet, Take 2 tablets (40 mg total) by mouth daily (Patient taking differently: Take 40 mg by mouth daily 40mg in am and 20mg in pm), Disp: 180 tablet, Rfl: 1    leucovorin (WELLCOVORIN) 5 mg tablet, Take 10 mg by mouth once a week , Disp: , Rfl:     metolazone (ZAROXOLYN) 5 mg tablet, Take 1 tablet (5 mg total) by mouth daily (Patient not taking: Reported on 8/26/2020), Disp: 30 tablet, Rfl: 1    polyethylene glycol (MIRALAX) 17 g packet, Take 17 g by mouth daily (Patient not taking: Reported on 8/26/2020), Disp: 14 each, Rfl: 0    Social History     Socioeconomic History    Marital status: /Civil Union     Spouse name: Not on file    Number of children: Not on file    Years of education: Not on file    Highest education level: Not on file   Occupational History    Not on file   Social Needs    Financial resource strain: Not hard at all   [employer] insecurity     Worry: Never true     Inability: Never true    Transportation needs     Medical: No     Non-medical: No   Tobacco Use    Smoking status: Never Smoker    Smokeless tobacco: Never Used   Substance and Sexual Activity    Alcohol use: Not Currently    Drug use: No    Sexual activity: Not Currently   Lifestyle    Physical activity     Days per week: 0 days     Minutes per session: 0 min    Stress: Only a little   Relationships    Social connections     Talks on phone: More than three times a week     Gets together: Once a week     Attends [employer]oration[employer] service: 1 to 4 times per year     Active member of club or organization: No     Attends meetings of clubs or organizations: Never     Relationship status:     Intimate partner violence     Fear of current or ex partner: No     Emotionally abused: No     Physically abused: No     Forced sexual activity: No   Other Topics Concern    Not on file   Social History Narrative    No caffeine use     Family History   Problem Relation Age of Onset    Other Mother         epilepsy    Early death Mother     Glaucoma Father     Stroke Father         silent    Other Brother         cardiac disorder    Rheum arthritis Son     No Known Problems Son     No Known Problems Daughter     No Known Problems Son        Vitals:   Blood pressure 118/58, pulse 79, temperature 97 5 °F (36 4 °C), temperature source Temporal, height 5' (1 524 m), weight 103 kg (227 lb 9 6 oz), SpO2 95 %  Body mass index is 44 45 kg/m²      Wt Readings from Last 3 Encounters:   08/26/20 103 kg (227 lb 9 6 oz)   08/26/20 103 kg (228 lb)   07/24/20 102 kg (225 lb)     Vitals:    08/26/20 1307   BP: 118/58   BP Location: Right arm   Patient Position: Sitting   Cuff Size: Large   Pulse: 79   Temp: 97 5 °F (36 4 °C)   TempSrc: Temporal   SpO2: 95%   Weight: 103 kg (227 lb 9 6 oz)   Height: 5' (1 524 m)       Physical Exam  Vitals signs reviewed  Constitutional:       Appearance: Normal appearance  She is well-developed  She is obese  Comments: Face mask present; intermittently resting below nose/chin   HENT:      Head: Normocephalic  Nose: Nose normal    Eyes:      General: No scleral icterus  Conjunctiva/sclera: Conjunctivae normal    Neck:      Musculoskeletal: Neck supple  Vascular: JVD present  Trachea: No tracheal deviation  Cardiovascular:      Rate and Rhythm: Normal rate and regular rhythm  No extrasystoles are present  Pulses: Normal pulses  Heart sounds: No murmur  Pulmonary:      Effort: Pulmonary effort is normal  No tachypnea, bradypnea or respiratory distress  Breath sounds: Normal air entry  Examination of the right-lower field reveals decreased breath sounds  Decreased breath sounds present  No wheezing or rales  Abdominal:      General: Bowel sounds are normal  There is distension  Palpations: Abdomen is soft  Musculoskeletal:      Right lower leg: Edema present  Left lower leg: Edema present  Skin:     General: Skin is warm and dry  Neurological:      Mental Status: She is alert and oriented to person, place, and time  Psychiatric:         Mood and Affect: Mood and affect normal          Behavior: Behavior normal  Behavior is cooperative           Judgment: Judgment normal      Labs & Results:  Lab Results   Component Value Date    WBC 10 25 (H) 07/27/2020    HGB 10 4 (L) 07/27/2020    HCT 32 2 (L) 07/27/2020     (H) 07/27/2020     07/27/2020     Lab Results   Component Value Date    SODIUM 140 08/20/2020    K 3 3 (L) 08/20/2020     08/20/2020    CO2 32 08/20/2020    BUN 29 (H) 08/20/2020    CREATININE 1 77 (H) 08/20/2020    GLUC 147 (H) 08/20/2020    CALCIUM 9 5 08/20/2020     Lab Results   Component Value Date    INR 1 45 (H) 09/03/2019    INR 1 32 (H) 06/08/2019    INR 1 53 (H) 01/09/2019    PROTIME 17 2 (H) 09/03/2019    PROTIME 16 5 (H) 06/08/2019    PROTIME 18 5 (H) 01/09/2019     Lab Results   Component Value Date    NTBNP 1,502 (H) 09/29/2019      EKG personally reviewed by Jocelynn Schultz PA-C  Counseling / Coordination of Care: Today, 25 minutes were spent with patient during which greater than 50% of this time was spent in counseling/coordination of care regarding low sodium diet, fluid restriction, daily weights, and importance of medication compliance  Thank you for the opportunity to participate in the care of this patient      Bishop Crowder PA-C

## 2020-08-25 NOTE — PATIENT INSTRUCTIONS
Begin taking torsemide 40 mg every morning and 20 mg torsemide in the afternoon  On Monday, Wednesday, and Friday when you take the little blue pill (metolazone), you MUST take an extra dose of potassium (1 tablet)  This means on MWF you will be taking a total of 4 potassium tablets  Complete blood work tomorrow or Friday to check on potassium level  Please weigh yourself every day, and contact the Heart Failure program at [phone] if you gain 3 lbs overnight or 5 lbs in 5-7 days  Limit daily sodium/salt intake to 6636-5822 mg daily to prevent fluid retention  Avoid canned foods, [place] food, and processed meat (hot dogs, lunch meat, and sausage etc )  Limit daily fluid intake to 2000 mL or 2L (about 60 ounces) daily  Bring complete list of medications to your follow-up appointment

## 2020-08-26 NOTE — PROGRESS NOTES
Assessment/Plan:      Diagnoses and all orders for this visit:    Urinary tract infection with hematuria, site unspecified  -     POCT urine dip  -     ciprofloxacin (CIPRO) 500 mg tablet; Take 1 tablet (500 mg total) by mouth every 24 hours for 5 days    Polyneuropathy associated with underlying disease ([address] )    Rheumatoid arthritis with positive rheumatoid factor, involving unspecified site (HCC)      urine dipstick positive for pyuria and hematuria  Will treat patient with 5 days of ciprofloxacin 500 mg daily  Patient was instructed to call me in 5 days with her progress  Will adjust treatment accordingly depending on her symptoms  Due to her frequent UTIs she may benefit from continues prophylaxis with antibiotics  If she will need that I will discuss it with her nephrologist     Subjective:     Patient ID: Samson Brown is a 81 y o  female  Patient came with complaints of pain with urination this started few days ago  She had recent UTI in July 2020 and treated with ciprofloxacin 500 mg for 7 days  She has stage IV CKD, she has only 1 kidney left due to history of renal cancer  Urine dipstick revealed red blood cells and pyuria  Review of Systems   Constitutional: Negative for chills and fever  Genitourinary: Positive for dysuria, frequency and urgency  Negative for flank pain  Musculoskeletal: Negative for back pain  Objective:     Physical Exam  Constitutional:       General: She is not in acute distress  Appearance: She is not ill-appearing, toxic-appearing or diaphoretic  Abdominal:      General: There is no distension  Tenderness: There is no abdominal tenderness  There is no right CVA tenderness or left CVA tenderness

## 2020-08-26 NOTE — PATIENT INSTRUCTIONS
Take ciprofloxacin 500 mg daily for 5 days  Call me after you finish antibiotics  [phone]      Get better      Dr Marci Brown

## 2020-08-27 NOTE — TELEPHONE ENCOUNTER
Called pt, she was given regimen by card to do kdur 40meq total mwf, 30meq on tts  Mailed her bmp for next wk  She just started this regimen  Has recently attended [facility]

## 2020-08-31 NOTE — PROGRESS NOTES
Patient was treated with 5 days of ciprofloxacin 500 mg b i d  Without any improvement of her symptoms  She still reports burning on urination  No fevers, no flank pain  Patient had UTI in July, sensitivities reviewed  Will switch patient to Vantin 100 mg b i d  For 7 days  Will repeat urinalysis and cultures

## 2020-09-02 NOTE — PROGRESS NOTES
In July patient was treated for Enterococcus faecalis UTI with ciprofloxacin  Right now she developed UTI she was treated with ciprofloxacin without any response, urinary cultures were repeated that showed Enterococcus faecalis which is resistant to fluoroquinolones but sensitive to amoxicillin  In the meantime patient was treated with venting while we was waiting for final cultures  I will switch her to amoxicillin 500 mg q 12 renally adjusted for total of 7 days

## 2020-09-08 NOTE — TELEPHONE ENCOUNTER
Pt called to let you know about the antibiotics for her uti   Pt said feeling better for the uti however got a yeast infection under her breast  Pt will be taking her last dose of meds on Wednesday

## 2020-09-09 NOTE — PROGRESS NOTES
Reported that she has developed fungal infection on her skin after she start antibiotics  Will use nystatin powder

## 2020-09-21 NOTE — TELEPHONE ENCOUNTER
Left message for patient letting her know her potassium refills are being handled by Cardiology and the patient should call Cardiology for refills to be sent to the pharmacy

## 2020-09-30 NOTE — TELEPHONE ENCOUNTER
Could you please confirm with the patient the medication and it is dosage, it seems like she was recently had Cardiology and was not sure of her medications that she is taking and since they have been adjusting her water pills, I want to be sure of what to prescribe thank you

## 2020-11-19 NOTE — SOCIAL WORK
HRR / OP CM referrals:     Patient identified as HRR per criteria - lace 81, hx CHF, CKD3  Call made to DC appointment hotline with information as required for CM support follow up  CM left voicemail with OP CM hotline  Opioid Counseling: I discussed with the patient the potential side effects of opioids including but not limited to addiction, altered mental status, and depression. I stressed avoiding alcohol, benzodiazepines, muscle relaxants and sleep aids unless specifically okayed by a physician. The patient verbalized understanding of the proper use and possible adverse effects of opioids. All of the patient's questions and concerns were addressed. They were instructed to flush the remaining pills down the toilet if they did not need them for pain.

## 2020-11-30 NOTE — PROGRESS NOTES
Progress Note - Michael Verma 1935, 81 y o  female MRN: [id number]    Unit/Bed#: [facility] 731-01 Encounter: [id number]    Primary Care Provider: Janey Granados MD   Date and time admitted to hospital: 8/9/2018  8:18 AM        * Chest pain   Assessment & Plan    · No clinical evidence of CHF per cardio, & no significant response to Lasix along with elevation in creatinine  Hold further diuretics today  Underwent nuclear stress test which is negative for ischemia  In AFib on the monitor, however not correlating with her symptoms  · Symptoms worse on lying flat and better on sitting up  2D echocardiogram EF 55% with DD, no pericardial effusion  · With leucocytosis & elevated procalcitonin, perfomed CT chest which was neg for PNA  · Now resolved        Leucocytosis   Assessment & Plan    · Recent outpatient urinalysis was negative but office dipstick had WBCs  She was treated as right lower extremity cellulitis and placed on oral Keflex on Monday which was held on admission as cellulitis was better & WBC normal  · Since then WBC going up  · Cellulitis resolved, CT chest neg, no other symptoms  · Will have her complete remaining 5 days of keflex  · Repeat WBC & procal in am  · Send blood cultures if fever spikes        New onset a-fib ([address] )   Assessment & Plan    · Holding BB as rate controlled 7BP low  · Already on xarelto with h/o CVA        Pain and swelling of lower extremity   Assessment & Plan    · As per the patient the redness is much better now  She was treated as cellulitis as outpatient in the last 3 days  Does have cough tenderness  · check lower extremity duplex        Hypertensive urgency   Assessment & Plan    · Resolved  Was briefly on nitroglycerin drip  Now off it    · Home losartan held to start metoprolol but since BP low holding both        History of nephroureterectomy   Assessment & Plan    In April for urothelial Ca        BRENNEN (acute kidney injury) [facility])   Assessment & Plan · Right neprho nephroureterectomy on 18 for urothelial Ca  · BL Cr 1-1 2  · Went up to 1 8 with IV lasix, now 1 6  · Cont holding lasix per cardio        Sleep apnea   Assessment & Plan    CPAP, reports compliance            [facility] Progress Note  Patient: Oswald Looney 81 y o  female   MRN: [id number]  PCP: Azeb Stubbs MD  Unit/Bed#: [facility] 731-01 Encounter: [id number]  Date Of Visit: 18    Assessment:    Principal Problem:    Chest pain  Active Problems:    Leucocytosis    Sleep apnea    Obesity    BRENN[facility] (acute kidney [facility]) ([facility] )    History of nephroureterectomy    Hypertensive urgency    Pain and swelling of lower extremity    [facility] )      VTE Pharmacologic Prophylaxis:   Pharmacologic: Rivaroxaban (Xarelto)  Mechanical VTE Prophylaxis in Place: Yes    Patient Centered Rounds: I have performed bedside rounds with nursing staff today  Discussions with Specialists or Other Care Team Provider: cardio    Education and Discussions with Family / Patient: patient    Time Spent for Care: 30 minutes  More than 50% of total time spent on counseling and coordination of care as described above  Current Length of Stay: 2 day(s)    Current Patient Status: Inpatient   Certification Statement: The patient will continue to require additional inpatient hospital stay due to monitor infeciton & BP    Discharge Plan / Estimated Discharge Date: 1-2 days    Code Status: Level 1 - Full Code      Subjective:   Feels better, CP resolved, walked in hallway  No dysuria, no cough /diarrhea    Objective:     Vitals:   Temp (24hrs), Av 8 °F (36 6 °C), Min:97 6 °F (36 4 °C), Max:98 2 °F (36 8 °C)    HR:  [82-92] 82  Resp:  [18] 18  BP: ()/(52-76) 94/52  SpO2:  [94 %-97 %] 97 %  Body mass index is 38 32 kg/m²  Input and Output Summary (last 24 hours):        Intake/Output Summary (Last 24 hours) at 18 1718  Last data filed at 18 0922   Gross per 24 hour Intake              300 ml   Output              825 ml   Net             -525 ml       Physical Exam:     Physical Exam   Constitutional: She is oriented to person, place, and time  She appears well-developed and well-nourished  HENT:   Head: Normocephalic and atraumatic  Eyes: Conjunctivae are normal  No scleral icterus  Cardiovascular: Normal rate, regular rhythm and normal heart sounds  Exam reveals no gallop and no friction rub  No murmur heard  Pulmonary/Chest: Effort normal and breath sounds normal  No respiratory distress  She has no wheezes  LLL crackle   Abdominal: Soft  She exhibits no distension  There is no tenderness  Musculoskeletal: She exhibits no edema  Neurological: She is alert and oriented to person, place, and time  Vitals reviewed  Additional Data:     Labs:      Results from last 7 days  Lab Units 08/11/18  0442   WBC Thousand/uL 13 72*   HEMOGLOBIN g/dL 8 7*   HEMATOCRIT % 27 8*   PLATELETS Thousands/uL 270   NEUTROS PCT % 72   LYMPHS PCT % 14   MONOS PCT % 10   EOS PCT % 3       Results from last 7 days  Lab Units 08/11/18  1509  08/09/18  0842   SODIUM mmol/L 135*  < > 136   POTASSIUM mmol/L 3 7  < > 3 8   CHLORIDE mmol/L 100  < > 104   CO2 mmol/L 27  < > 27   BUN mg/dL 27*  < > 19   CREATININE mg/dL 1 66*  < > 1 45*   CALCIUM mg/dL 9 0  < > 8 9   TOTAL PROTEIN g/dL  --   --  7 2   BILIRUBIN TOTAL mg/dL  --   --  0 76   ALK PHOS U/L  --   --  113   ALT U/L  --   --  17   AST U/L  --   --  14   GLUCOSE RANDOM mg/dL 125  < > 119   < > = values in this interval not displayed  * I Have Reviewed All Lab Data Listed Above  * Additional Pertinent Lab Tests Reviewed:  All Labs Within Last 24 Hours Reviewed    Imaging:    Imaging Reports Reviewed Today Include:   Imaging Personally Reviewed by Myself Includes:      Recent Cultures (last 7 days):       Results from last 7 days  Lab Units 08/06/18  1621   URINE CULTURE  No Growth <1000 cfu/mL       Last 24 Hours Medication List:     Current Facility-Administered Medications:  acetaminophen 650 mg Oral Q6H PRN Lissa Johnson MD   calcium carbonate 1 tablet Oral BID With Meals Lissa Johnson MD   cephalexin 250 mg Oral Q8H [facility] Ruslan Carter MD   cyanocobalamin 100 mcg Oral Daily Lissa Johnson MD   docusate sodium 100 mg Oral BID Lissa Johnson MD   gabapentin 100 mg Oral TID Lissa Johnson MD   hydroxychloroquine 200 mg Oral BID With Meals Lissa Johnson MD   leucovorin 10 mg Oral Weekly Lissa Johnson MD   levothyroxine 75 mcg Oral Daily Lissa Johnson MD   methotrexate 20 mg Oral Weekly Lissa Johnson MD   multivitamin-minerals 1 tablet Oral Daily Lissa Johnson MD   nitroglycerin 0 4 mg Sublingual Q5 Min PRN Felisa Epley Spironello, CRNP   pantoprazole 20 mg Oral Early Morning Lissa Johnson MD   pravastatin 80 mg Oral Daily Lissa Johnson MD   rivaroxaban 20 mg Oral Daily With Danae Cunningham MD   rOPINIRole 0 5 mg Oral BID Lissa Johnson MD     Facility-Administered Medications Ordered in Other Encounters:  acetaminophen 650 mg Oral Q6H PRN Mick Burnham PA-C        Today, Patient Was Seen By: Ruslan Carter MD    ** Please Note: This note has been constructed using a voice recognition system   ** Secondary Defect Length (In Cm): 5.5

## 2020-12-30 NOTE — ED PROCEDURE NOTE
Procedure  Intubation    Date/Time: 12/29/2020 8:02 PM  Performed by: Rm Kaba MD  Authorized by: Rm Kaba MD     Patient location:  ED  Other Assisting Provider: Yes (comment) (Dr Joel Romano and Dr Sandoval)    Consent:     Consent obtained:  Emergent situation  Pre-procedure details:     Patient status:  Unresponsive    Pretreatment medications:  Etomidate    Paralytics:  Succinylcholine  Indications:     Indications for intubation: respiratory failure, airway protection and hypoxemia    Procedure details:     Preoxygenation:  Bag valve mask    CPR in progress: no      Intubation method:  Oral    Oral intubation technique:  Glidescope    Tube size (mm):  7 5    Tube type:  Cuffed    Number of attempts:  1    Tube visualized through cords: yes    Placement assessment:     ETT to lip:  22 cm    Tube secured with:  ETT kelly    Breath sounds:  Diminished    Placement verification: chest rise, condensation, CXR verification, direct visualization and ETCO2 detector      CXR findings:  ETT in proper place                     Rm Kaba MD  12/29/20 3069

## 2020-12-30 NOTE — PROGRESS NOTES
Pastoral Care Progress Note    2020  Patient: Néstor Diaz : 1935  Admission Date & Time: 2020  MRN: [id number] [facility]: [id number]                     Chaplaincy Interventions Utilized:   Empowerment: Clarified, confirmed, or reviewed information from treatment team , Encouraged focus on present, Provided anxiety containment and Provided grief counseling    Exploration: Explored emotional needs & resources    Collaboration: Consulted with interdisciplinary team and Facilitated respect for spiritual/cultural practice during hospitalization    Relationship Building: Cultivated a relationship of care and support and Listened empathically    Ritual: Facilitated postmortem needs/rituals and Judit Oliveira provided sacrament of the sick    Chaplaincy Outcomes Achieved:  Debriefed/defused experience      Spiritual Coping Strategies Utilized:   Spiritual comfort

## 2020-12-30 NOTE — ED NOTES
Peever Dadds with pastoral care updated at this time  Asking family if they would like a  for end of life       Tay Villar RN  12/29/20 2127

## 2020-12-30 NOTE — RESPIRATORY THERAPY NOTE
RT Protocol Note  Honorio Dai 81 y o  female MRN: [id number]  Unit/Bed#: ED 25 Encounter: [id number]    Assessment    Active Problems:    * No active hospital problems   *      Home Pulmonary Medications:   none listed       Past Medical History:   Diagnosis Date    Anemia     Arthritis     rheumatoid    At risk for falls     Benign essential hypertension     CHF (congestive heart failure) ([facility])     Chronic cystitis     Chronic kidney disease     right kidney cancer - kidney removed    Chronic pain disorder     CPAP (continuous positive airway pressure) dependence     Diverticulitis of colon     Early satiety     Edema     GERD (gastroesophageal reflux disease)     Gout     Hearing aid worn     bilateral    Hearing loss     Hemorrhoids     Hiatal hernia     History of colonic polyps     Hyperlipidemia     Hypothyroidism     Irregular heart beat     Afib    Left breast mass     Microhematuria     Migraine     occular    Mobility impaired     left arm    Neuropathy     lower and upper extermities    Overactive bladder     Pneumonia of left lower lobe due to infectious organism     PVD (peripheral vascular disease) ([facility])     RA (rheumatoid arthritis) ([facility])     Restless leg syndrome     Sleep apnea     uses cpap    [facility]     5/2017    Type 2 diabetes mellitus ([facility])     Urinary frequency     Urinary urgency     [facility] 04/23/2018    Uses walker      Social History     Socioeconomic History    Marital status: /Civil Union     Spouse name: None    Number of children: None    Years of education: None    Highest education level: None   Occupational History    None   Social Needs    Financial resource strain: Not hard at all   [place] insecurity     Worry: Never true     Inability: Never true    Transportation needs     Medical: No     Non-medical: No   Tobacco Use    Smoking status: Never Smoker    Smokeless tobacco: Never Used   Substance and Sexual Activity    Alcohol use: Not Currently    Drug use: No    Sexual activity: Not Currently   Lifestyle    Physical activity     Days per week: 0 days     Minutes per session: 0 min    Stress: Only a little   Relationships    Social connections     Talks on phone: More than three times a week     Gets together: Once a week     Attends [employer] service: 1 to 4 times per year     Active member of club or organization: No     Attends meetings of clubs or organizations: Never     Relationship status:     Intimate partner violence     Fear of current or ex partner: No     Emotionally abused: No     Physically abused: No     Forced sexual activity: No   Other Topics Concern    None   Social History Narrative    No caffeine use       Subjective         Objective    Physical Exam:   Assessment Type: (P) Assess only  General Appearance: (P) Unresponsive  Respiratory Pattern: (P) Assisted  Chest Assessment: (P) Chest expansion symmetrical  Bilateral Breath Sounds: (P) Diminished    Vitals:  Blood pressure 115/58, pulse 60, resp  rate 20, SpO2 (!) 82 %  Imaging and other studies: I have personally reviewed pertinent reports  Plan    Respiratory Plan: Vent/NIV/HFNC        Resp Comments: (P) pt arrived from ems and is intubated at this time  pt placed on ventilator at current settings  ett secured at 21cm at the lip   pt has  no pulmonary history and does not take pulmonary medications  will continue to monitor per respiratory protocol

## 2020-12-30 NOTE — RESPIRATORY THERAPY NOTE
RT Ventilator Management Note  Renan Lazaro 81 y o  female MRN: [id number]  Unit/Bed#: ED 25 Encounter: [id number]      Daily Screen     No data found in the last 10 encounters  Physical Exam:   Assessment Type: Assess only  General Appearance: Unresponsive  Respiratory Pattern: Assisted  Chest Assessment: Chest expansion symmetrical  Bilateral Breath Sounds: Diminished      Resp Comments: pt arrived from ems and is intubated at this time  pt placed on ventilator at current settings  ett secured at 21cm at the lip   pt has  no pulmonary history and does not take pulmonary medications  will continue to monitor per respiratory protocol

## 2020-12-30 NOTE — ED NOTES
Dirty levo drip started at this time by Sharman Gottron RN through [facility] femoral line port       Vanessa Griffin, [address] [facility]  12/29/20 2021

## 2020-12-30 NOTE — ED NOTES
Multiple family members at bedside as patient enters terminal care  Family denies need for assistance at this time - awaiting one more family member before terminal extubation  Dr Gaspar Nyhan and this nurse as been in multiple times as well as Rich from [facility] care       Erin Simental RN  12/29/20 8151

## 2020-12-30 NOTE — ED NOTES
Contacted coffee kiosk with a request for a cart to offer family members at bedside      Tessy Maldonado, [facility]  12/29/20 0980

## 2020-12-30 NOTE — ED NOTES
Spoke to [employer] from [address] [facility] - patient is not a suitable candidate for tissue/organ donation     Laender Henley RN  12/30/20 4158

## 2020-12-30 NOTE — ED NOTES
[facility] non emergency line called and put out page to ree Simental RN  12/30/20 8455

## 2020-12-30 NOTE — ED PROVIDER NOTES
History  Chief Complaint   Patient presents with    Cardiac Arrest     Witnessed Cardiac arrest, pt found pulseless by EMS, compressions started and shocked x1  On arrival BVM used for airway  Per EMS, lots of food in her airway     HPI   52-year-old woman with history of CHF with pEF, paroxysmal atrial fibrillation on Xarelto, CKD, urothelial cancer, NICK, T2DM presenting after witnessed cardiac arrest by family  Patient had been feeling poorly all day  After dinner patient collapsed in front of family  They thought they detected a faint pulse but started CPR  When EMS arrived patient was found to be in ventricular fibrillation  They had return of spontaneous circulation after 1 shock  Patient has maintained pulses since that time  She was breathing spontaneously but had a large amount of food in her airway and has been hypoxic despite bagging  On arrival to ED patient demonstrating some intermittent purposeful movements but is otherwise somnolent, not following commands  Patient follows with cardiology for HFpEF  They have been recently increasing her dose of diuretic for weight gain  Prior to Admission Medications   Prescriptions Last Dose Informant Patient Reported? Taking?    Calcium Citrate-Vitamin D (CALCIUM + D PO)  Self Yes No   Sig: Take 1 tablet by mouth 2 (two) times a day   Elastic Bandages & Supports (MEDICAL COMPRESSION STOCKINGS) MISC  Self No No   Sig: by Does not apply route daily   Multiple Vitamin (MULTIVITAMINS PO)  Self Yes No   Sig: Take 1 tablet by mouth daily   acetaminophen (TYLENOL) 325 mg tablet  Self Yes No   Sig: Take 650 mg by mouth 2 (two) times a day as needed for mild pain    cyanocobalamin (VITAMIN B-12) 100 mcg tablet  Self Yes No   Sig: Take 100 mcg by mouth daily    gabapentin (NEURONTIN) 100 mg capsule  Self No No   Sig: Take 1 capsule in the morning, take 1 capsule in the afternoon, and take 1-3 capapsules at bedtime   Patient taking differently: Take 1 capsule in the morning and take 1-2 capsules at bedtime   hydroxychloroquine (PLAQUENIL) 200 mg tablet  Self Yes No   Sig: Take 200 mg by mouth 2 (two) times a day with meals   leucovorin (WELLCOVORIN) 5 mg tablet  Self Yes No   Sig: Take 10 mg by mouth once a week    levothyroxine 75 mcg tablet  Self No No   Sig: Take 1 tablet (75 mcg total) by mouth daily   losartan (COZAAR) 25 mg tablet  Self No No   Sig: Take 1 tablet (25 mg total) by mouth daily   methotrexate 2 5 mg tablet  Self No No   Sig: Take 2 tablets weekly   metolazone (ZAROXOLYN) 5 mg tablet  Self No No   Sig: Take 1 tablet (5 mg total) by mouth 3 (three) times a week M, W, and F   nystatin (MYCOSTATIN) powder  Self No No   Sig: Apply topically 3 (three) times a day Apply to affected areas     omeprazole (PriLOSEC) 40 MG capsule  Self No No   Sig: Take 1 capsule (40 mg total) by mouth daily   polyethylene glycol (MIRALAX) 17 g packet  Self No No   Sig: Take 17 g by mouth daily   potassium chloride (Klor-Con M20) 20 mEq tablet  Self No No   Sig: Take 2 tablets (40 mEq total) by mouth 2 (two) times a day   pravastatin (PRAVACHOL) 80 mg tablet  Self No No   Sig: TAKE 1 TABLET BY MOUTH EVERY DAY   rOPINIRole (REQUIP) 0 5 mg tablet  Self No No   Si tab in the am and 2 tab at bedtime   rivaroxaban (Xarelto) 15 mg tablet  Self No No   Sig: Take 1 tablet (15 mg total) by mouth daily with breakfast   torsemide (DEMADEX) 20 mg tablet  Self No No   Sig: Take 2 tablets (40 mg total) by mouth daily      Facility-Administered Medications: None       Past Medical History:   Diagnosis Date    Anemia     Arthritis     rheumatoid    At risk for falls     Benign essential hypertension     CHF (congestive heart failure) (HCC)     Chronic cystitis     Chronic kidney disease     right kidney cancer - kidney removed    Chronic pain disorder     CPAP (continuous positive airway pressure) dependence     Diverticulitis of colon     Early satiety     Edema     GERD (gastroesophageal reflux disease)     Gout     Hearing aid worn     bilateral    Hearing loss     Hemorrhoids     Hiatal hernia     History of colonic polyps     Hyperlipidemia     Hypothyroidism     Irregular heart beat     Afib    Left breast mass     Microhematuria     Migraine     occular    Mobility impaired     left arm    Neuropathy     lower and upper extermities    Overactive bladder     Pneumonia of left lower lobe due to infectious organism     PVD (peripheral vascular disease) (HCC)     RA [facility]oid arthritis) [facility]     Restless leg syndrome     Sleep apnea     uses cpap    Stroke ([facility] )     5/2017    Type 2 diabetes mellitus (HCC)     Urinary frequency     Urinary urgency     Urothelial cancer ([facility] ) 04/23/2018    Uses walker        Past Surgical History:   Procedure Laterality Date    APPENDECTOMY      ARTHROSCOPY WRIST Right     with release of transverse carpal ligament     BLADDER SURGERY      BLADDER SURGERY      BREAST SURGERY      left breast    BUNIONECTOMY Bilateral     CATARACT EXTRACTION Bilateral     CHOLECYSTECTOMY      COLONOSCOPY      CYSTOSCOPY      CYSTOSCOPY      EGD AND COLONOSCOPY N/A 12/20/2017    Procedure: EGD AND COLONOSCOPY;  Surgeon: Dionisio Paredes MD;  Location:  GI LAB;   Service: Gastroenterology    ESOPHAGOGASTRODUODENOSCOPY      diagnostic    FOREARM SURGERY Right     fracture repair    FRACTURE SURGERY Right     arm with hardware    HYSTERECTOMY      IR AORTAGRAM WITH RUN-OFF  1/24/2019    KNEE ARTHROSCOPY Left     KNEE SURGERY Left     meniscus tear    NEPHRECTOMY Right     NOSE SURGERY      IL CYSTO/URETERO W/LITHOTRIPSY &INDWELL STENT INSRT Right 2/28/2018    Procedure: CYSTOSCOPY RIGHT URETEROSCOPY, RIGHT RETROGRADE PYELOGRAM AND INSERTION  RIGHT STENT URETERAL, RIGHT RENAL PELVIC WASHING FOR CYTOLOGY;  Surgeon: Julio Avina MD;  [facility];  Service: Urology    IL CYSTOURETHROSCOPY,FULGUR 0 5-2 CM LESN N/A 5/21/2019    Procedure: BLADDER BIOPSY WITH FULGURATION;  Surgeon: Jennifer Bob MD;  [facility];  Service: Urology    DC NEPHRECTOMY, W/PART  URETECTOMY Right 4/23/2018    Procedure: ROBATIC ASSISTED NEPHRO-URETERECTOMY WITH BLADDER CUFF EXCISION;  Surgeon: Jennifer Bob MD;  [facility];  Service: Urology    DC Kiel Scott 3RD+ ORD SLCTV ABDL PEL/LXTR [facility] Right 1/24/2019    Procedure: RIGHT LEG ANGIOGRAM, BILATERAL FEMORAL ACCESS, STENTING OF RIGHT EXTERNAL ILIAC ARTERY WITH BALLOON ANGIOPLASTY;  Surgeon: Ivory Arteaga MD;  [facility];  Service: Vascular    REPLACEMENT TOTAL KNEE BILATERAL      URETEROSCOPY Right 3/20/2018    Procedure: CYSTOSCOPY, RETROGRADE PYELOGRAM, URETEROSCOPY, BIOPSY, BRUSH, FULGURATION, STENT PLACEMENT, BLADDER BIOPSY WITH FULGURATION;  Surgeon: Jennifer Bob MD;  [facility];  Service: Urology    VASCULAR SURGERY      stents       Family History   Problem Relation Age of Onset    Other Mother         epilepsy    Early death Mother    Valaria Reil Glaucoma Father     Stroke Father         silent    Other Brother         cardiac disorder    Rheum arthritis Son     No Known Problems Son     No Known Problems Daughter     No Known Problems Son      I have reviewed and agree with the history as documented      E-Cigarette/Vaping    E-Cigarette Use Never User      E-Cigarette/Vaping Substances    Nicotine No     THC No     CBD No     Flavoring No     Other No     Unknown No      Social History     Tobacco Use    Smoking status: Never Smoker    Smokeless tobacco: Never Used   Substance Use Topics    Alcohol use: Not Currently    Drug use: No        Review of Systems   Unable to perform ROS: Patient unresponsive       Physical Exam  ED Triage Vitals   Temperature Pulse Respirations Blood Pressure SpO2   12/29/20 2015 12/29/20 1921 12/29/20 1940 12/29/20 1927 12/29/20 1916   (!) 96 8 °F (36 °C) (!) 41 16 118/57 (!) 82 % Temp src Heart Rate Source Patient Position - Orthostatic VS BP Location FiO2 (%)   -- 12/29/20 1921 12/29/20 2015 12/29/20 2015 --    Monitor Lying Right arm       Pain Score       12/29/20 2350       Med Not Given for Pain - for MAR use only             Orthostatic Vital Signs  Vitals:    12/29/20 2110 12/29/20 2215 12/30/20 0000 12/30/20 0003   BP: 121/62   (!) 0/0   Pulse: 72 (!) 52 (!) 52 (!) 0   Patient Position - Orthostatic VS:           Physical Exam  Vitals signs and nursing note reviewed  Constitutional:       General: She is in acute distress  Appearance: She is well-developed  She is ill-appearing  HENT:      Head: Normocephalic and atraumatic  Mouth/Throat:      Comments: Food debris in airway  Eyes:      General: No scleral icterus  Conjunctiva/sclera: Conjunctivae normal    Cardiovascular:      Rate and Rhythm: Bradycardia present  Heart sounds: No murmur  No friction rub  No gallop  Comments: Irregular bradycardia  Pulmonary:      Effort: Respiratory distress present  Breath sounds: No wheezing or rales  Comments: Labored shallow breathing  Rales L>R,  Abdominal:      Palpations: Abdomen is soft  Tenderness: There is abdominal tenderness  Musculoskeletal:      Right lower leg: Edema present  Left lower leg: Edema present  Skin:     General: Skin is warm  Coloration: Skin is not pale  Findings: No erythema  Neurological:      Motor: No abnormal muscle tone  Comments: Not alert  Intermittent movements of upper extremities  Not following commands           ED Medications  Medications   etomidate (AMIDATE) 2 mg/mL injection 20 mg (20 mg Intravenous Given 12/29/20 1926)   Succinylcholine Chloride 100 mg/5 mL syringe 100 mg (100 mg Intravenous Given 12/29/20 1926)   LORazepam (ATIVAN) injection 2 mg (2 mg Intravenous Given 12/29/20 2216)   LORazepam (ATIVAN) injection 1 mg (1 mg Intravenous Given 12/29/20 2350)   morphine (PF) 10 mg/mL injection 6 mg (6 mg Intravenous Given 12/29/20 1360)       Diagnostic Studies  Results Reviewed     Procedure Component Value Units Date/Time    COVID19, Influenza A/B, RSV PCR, [facility] [[id number]]  (Normal) Collected: 12/29/20 1958    Lab Status: Final result Specimen: Nares from Nasopharyngeal Swab Updated: 12/29/20 2126     SARS-CoV-2 Negative     INFLUENZA A PCR Negative     INFLUENZA B PCR Negative     RSV PCR Negative    Narrative: This test has been authorized by FDA under an EUA (Emergency Use Assay) for use by authorized laboratories  Clinical caution and judgement should be used with the interpretation of these results with consideration of the clinical impression and other laboratory testing  Testing reported as "Positive" or "Negative" has been proven to be accurate according to standard laboratory validation requirements  All testing is performed with control materials showing appropriate reactivity at standard intervals  CBC and differential [[id number]]  (Abnormal) Collected: 12/29/20 1937    Lab Status: Final result Specimen: Blood from Arm, Right Updated: 12/29/20 2052     WBC 16 89 Thousand/uL      RBC 2 78 Million/uL      Hemoglobin 9 7 g/dL      Hematocrit 30 1 %       fL      MCH 34 9 pg      MCHC 32 2 g/dL      RDW 15 0 %      MPV 10 0 fL      Platelets 617 Thousands/uL      nRBC 0 /100 WBCs     Narrative: This is an appended report  These results have been appended to a previously verified report      Manual Differential(PHLEBS Do Not Order) [[id number]]  (Abnormal) Collected: 12/29/20 1937    Lab Status: Final result Specimen: Blood from Arm, Right Updated: 12/29/20 2052     Segmented % 65 %      Bands % 1 %      Lymphocytes % 26 %      Monocytes % 1 %      Eosinophils, % 0 %      Basophils % 1 %      Metamyelocytes% 1 %      Myelocytes % 1 %      Atypical Lymphocytes % 4 %      Absolute Neutrophils 11 15 Thousand/uL      Lymphocytes Absolute 4 39 Thousand/uL Monocytes Absolute 0 17 Thousand/uL      Eosinophils Absolute 0 00 Thousand/uL      Basophils Absolute 0 17 Thousand/uL      Total Counted --     RBC Morphology Present     Anisocytosis Present     Macrocytes Present     Polychromasia Present     Platelet Estimate Adequate    Lactic acid [[id number]]  (Abnormal) Collected: 12/29/20 1946    Lab Status: Final result Specimen: Blood from Arm, Left Updated: 12/29/20 2043     LACTIC ACID 8 4 mmol/L     Narrative:      Verified by repeat analysis  Result may be elevated if tourniquet was used during collection  Result may be elevated if tourniquet was used during collection      Comprehensive metabolic panel [[id number]]  (Abnormal) Collected: 12/29/20 1937    Lab Status: Final result Specimen: Blood from Arm, Right Updated: 12/29/20 2017     Sodium 139 mmol/L      Potassium 2 9 mmol/L      Chloride 100 mmol/L      CO2 26 mmol/L      ANION GAP 13 mmol/L      BUN 40 mg/dL      Creatinine 2 46 mg/dL      Glucose 329 mg/dL      Calcium 9 1 mg/dL       U/L      ALT 88 U/L      Alkaline Phosphatase 119 U/L      Total Protein 7 1 g/dL      Albumin 3 5 g/dL      Total Bilirubin 0 45 mg/dL      eGFR 17 ml/min/1 73sq m     Narrative:      [place] guidelines for Chronic Kidney Disease (CKD):     Stage 1 with normal or high GFR (GFR > 90 mL/min/1 73 square meters)    Stage 2 Mild CKD (GFR = 60-89 mL/min/1 73 square meters)    Stage 3A Moderate CKD (GFR = 45-59 mL/min/1 73 square meters)    Stage 3B Moderate CKD (GFR = 30-44 mL/min/1 73 square meters)    Stage 4 Severe CKD (GFR = 15-29 mL/min/1 73 square meters)    Stage 5 End Stage CKD (GFR <15 mL/min/1 73 square meters)  Note: GFR calculation is accurate only with a steady state creatinine    Troponin I [[id number]]  (Normal) Collected: 12/29/20 1937    Lab Status: Final result Specimen: Blood from Arm, Right Updated: 12/29/20 2015     Troponin I 0 03 ng/mL     POCT Blood Gas (CG8+) [[id number]] (Abnormal) Collected: 12/29/20 1927    Lab Status: Final result Specimen: Venous Updated: 12/29/20 1931     ph, Lefty ISTAT 7 273     pCO2, [place]ty i-STAT 54 2 mm HG      pO2, [place]ty i-STAT 44 0 mm HG      BE, i-STAT -2 mmol/L      HCO3, [place]ty i-STAT 25 1 mmol/L      CO2, i-STAT 27 mmol/L      O2 Sat, i-STAT 73 %      SODIUM, I-STAT 136 mmol/l      Potassium, i-STAT 3 2 mmol/L      Hct, i-STAT 30 %      Hgb, i-STAT 10 2 g/dl      Glucose, i-STAT 321 mg/dl      Specimen Type VENOUS                 CT head without contrast   Final Result by Georgiana Gilford, MD (12/29 2116)      No acute intracranial abnormality  Workstation performed: [id number]         CT spine cervical without contrast   Final Result by Georgiana Gilford, MD (12/29 2109)      No acute cervical spine fracture or traumatic malalignment  Workstation performed: [id number]         CT chest abdomen pelvis wo contrast   ED Interpretation by Татьяна Daley DO (12/29 2241)   Right pneumo, left lung with complete white out 2/2 food in main [facility]      Final Result by Georgiana Gilford, MD (12/29 2243)         1  Right apical and anterior pneumothorax of 25%  Few airspace opacities in the right upper middle and lower lobe suggest aspiration  2   Near complete collapse and airspace consolidation throughout the left lung likely secondary to aspiration  Possible underlying pneumonia  3   Fluid and debris throughout the left mainstem bronchus and proximal segmental bronchi throughout the left lung  4   Bilateral anterior rib fractures  5   No evidence of acute intra-abdominal or pelvic process  I personally discussed this study with John Pathak on 12/29/2020 at 9:32 PM                            Workstation performed: [id number]         XR chest 1 view portable   Final Result by Roma Peña MD (12/30 7991)      Moderate right-sided pneumothorax        Left lower lobe atelectasis with volume loss is noted on subsequent CT  Pneumonia is possible  Patient had                   Workstation performed: [id number]               Procedures  ECG 12 Lead Documentation Only    Date/Time: 2020 8:35 PM  Performed by: Bekah Ruvalcaba MD  Authorized by: Bekah Ruvalcaba MD     Indications / Diagnosis:  Post arrest  ECG reviewed by me, the ED Provider: yes    Patient location:  ED  Previous ECG:     Previous ECG:  Compared to current    Comparison ECG info:  Normal sinus rhythm    Similarity:  Changes noted    Comparison to cardiac monitor: Yes    Interpretation:     Interpretation: abnormal    Quality:     Tracing quality:  Limited by artifact  Rate:     ECG rate:  50    ECG rate assessment: bradycardic    Rhythm:     Rhythm: junctional    QRS:     QRS axis:  Normal    QRS intervals:   Wide  ST segments:     ST segments:  Non-specific  T waves:     T waves: normal            ED Course  ED Course as of Sincere 01 1016   Tue Dec 29, 2020   2043 Potassium(!): 2 9   204 LACTIC ACID(!!): 8 4           MDM  Number of Diagnoses or Management Options  Aspiration into airway, initial encounter: new and requires workup  Bradycardia: new and requires workup  Cardiac arrest with ventricular fibrillation [facility]): new and requires workup  Closed fracture of multiple ribs of both sides, initial encounter: new and requires workup  Hypotension: new and requires workup  Pneumothorax on right: new and requires workup     Amount and/or Complexity of Data Reviewed  Clinical lab tests: ordered and reviewed  Tests in the radiology section of CPT®: ordered and reviewed  Tests in the medicine section of CPT®: ordered and reviewed  Discussion of test results with the performing providers: yes  Decide to obtain previous medical records or to obtain history from someone other than the patient: yes  Obtain history from someone other than the patient: yes  Review and summarize past medical records: yes  Discuss the patient with other providers: yes  Independent visualization of images, tracings, or specimens: yes    Patient Progress  Patient progress: other (comment) ()     52-year-old woman here post cardiac arrest   Patient currently with spontaneous circulation, junctional bradycardia  She has labored breathing spontaneously but has food contamination in her airway  IV access established  The patient was intubated by Dr Steve Christianson  Left femoral central line placed by Dr Kelsey Perez  Patient noted to be hypotensive to the 85Y systolic  Norepinephrine infusion started and titrated up to 10 with systolic pressure improving to the 120s  Heart rate improving up to the 70s  Patient demonstrates some intermittent movement but not following commands  Will start fentanyl infusion for sedation  She has hypokalemia to 2 9  This is likely secondary to up titration of diuretics for CHF exacerbation  Will start potassium infusion  Goals of care discussion with family after their arrival to the ED  They informed that the patient is DNR/DNI  Family would like to continue critical care interventions pending further diagnostic workup and prognostication of patient's neurologic status, but no chest compressions if she re-arrests  Patient was pan scanned  She has a pneumothorax on the right side in addition to bilateral anterior rib fractures presumably from CPR  The patient's left lung is almost entirely non aerated secondary to severe aspiration  Findings were reviewed with patient's family by Dr Sofia Soares  Given the patient's age, DNR/DNI status, and CT findings the family would like to proceed with compassionate extubation/comfort care pending arrival of other family members  Comfort care medications ordered for patient  Patient extubated by RT and Dr Sofia Soares  Family at bedside at time of patient's death      Disposition  Final diagnoses:   Cardiac arrest with ventricular fibrillation ([facility] )   Aspiration into airway, initial encounter   Hypotension   Bradycardia   Closed fracture of multiple ribs of both sides, initial encounter   Pneumothorax on right     Time reflects when diagnosis was documented in both MDM as applicable and the Disposition within this note     Time User Action Codes Description Comment    2020  3:08 PM Nemiah Fake Add [I46 9,  I49 01] Cardiac arrest with ventricular fibrillation ([facility] [address] )     2020  3:09 PM Nemiah Fake Add [X32 573D] Aspiration into airway, initial encounter     2020  3:09 PM Nemiah Fake Add [I95 89,  E86 1] Hypotension due to hypovolemia     2020  3:09 PM Vallarie Ano [M69 38,  E86 1] Hypotension due to hypovolemia     2020  3:09 PM Nemiah Fake Add [I95 9] Hypotension     2020  3:09 PM Nemiah Fake Add [R00 1] Bradycardia     2020  3:12 PM Joey, [address] Closed fracture of multiple ribs of both sides, initial encounter     2020  3:12 PM Nemiah Fake Add [J93 9] Pneumothorax on right       ED Disposition     ED Disposition Condition Date/Time Comment      Wed Dec 30, 2020 12:07 AM       Follow-up Information    None       Date, Time and Cause of Death    Preliminary Cause of Death: Aspiration into airway       Discharge Medication List as of 2020  1:40 AM      CONTINUE these medications which have NOT CHANGED    Details   acetaminophen (TYLENOL) 325 mg tablet Take 650 mg by mouth 2 (two) times a day as needed for mild pain , Historical Med      Calcium Citrate-Vitamin D (CALCIUM + D PO) Take 1 tablet by mouth 2 (two) times a day, Historical Med      cyanocobalamin (VITAMIN B-12) 100 mcg tablet Take 100 mcg by mouth daily , Historical Med      Elastic Bandages & Supports ([address]) MISC by Does not apply route daily, Starting Sat 2019, Print      gabapentin (NEURONTIN) 100 mg capsule Take 1 capsule in the morning, take 1 capsule in the afternoon, and take 1-3 capapsules at bedtime, Normal      hydroxychloroquine (PLAQUENIL) 200 mg tablet Take 200 mg by mouth 2 (two) times a day with meals, Historical Med      leucovorin (WELLCOVORIN) 5 mg tablet Take 10 mg by mouth once a week , Starting Mon 4/27/2020, Historical Med      levothyroxine 75 mcg tablet Take 1 tablet (75 mcg total) by mouth daily, Starting Thu 8/6/2020, Normal      losartan (COZAAR) 25 mg tablet Take 1 tablet (25 mg total) by mouth daily, Starting Wed 9/30/2020, Normal      methotrexate 2 5 mg tablet Take 2 tablets weekly, No Print      metolazone (ZAROXOLYN) 5 mg tablet Take 1 tablet (5 mg total) by mouth 3 (three) times a week M, W, and F, Starting Fri 10/2/2020, Normal      Multiple Vitamin (MULTIVITAMINS PO) Take 1 tablet by mouth daily, Historical Med      nystatin (MYCOSTATIN) powder Apply topically 3 (three) times a day Apply to affected areas  , Starting Wed 9/9/2020, Normal      omeprazole (PriLOSEC) 40 MG capsule Take 1 capsule (40 mg total) by mouth daily, Starting Thu 8/6/2020, Normal      polyethylene glycol (MIRALAX) 17 g packet Take 17 g by mouth daily, Starting Fri 3/22/2019, Print      potassium chloride (Klor-Con M20) 20 mEq tablet Take 2 tablets (40 mEq total) by mouth 2 (two) times a day, Starting Fri 11/6/2020, Normal      pravastatin (PRAVACHOL) 80 mg tablet TAKE 1 TABLET BY MOUTH EVERY DAY, Normal      rivaroxaban (Xarelto) 15 mg tablet Take 1 tablet (15 mg total) by mouth daily with breakfast, Starting Tue 4/28/2020, Normal      rOPINIRole (REQUIP) 0 5 mg tablet 1 tab in the am and 2 tab at bedtime, Normal      torsemide (DEMADEX) 20 mg tablet Take 2 tablets (40 mg total) by mouth daily, Starting Mon 6/29/2020, Phone In           No discharge procedures on file  PDMP Review       Value Time User    PDMP Reviewed  Yes 12/3/2020  2:11 PM Philip Gurrola DO           ED Provider  Attending physically available and evaluated [address] [place] [address]   I managed the patient along with the ED Attending      Electronically Signed by         Suellen Green MD  01/01/21 1016

## 2020-12-30 NOTE — ED NOTES
Phone call to pharmacy requesting fentanyl drip as it is out of stock in department  Pharmacy will send MYRON Rubi RN  12/29/20 2006

## 2020-12-30 NOTE — ED NOTES
Patient pronounced  by Dr Ambar Leon, rhythym strips printed showing asystole in two leads     Tye Lund RN  20 [phone]

## 2020-12-30 NOTE — ED PROCEDURE NOTE
Procedure  Central Line    Date/Time: 12/29/2020 7:30 PM  Performed by: Jenny Juárez MD  Authorized by: Jenny Juárez MD     Patient location:  ED  Other Assisting Provider: Yes (comment)    Consent:     Consent obtained:  Emergent situation  Universal protocol:     Patient identity confirmed:  Arm band  Pre-procedure details:     Hand hygiene: Hand hygiene performed prior to insertion      Skin preparation:  2% chlorhexidine  Indications:     Central line indications: no peripheral vascular access    Anesthesia (see MAR for exact dosages): Anesthesia method:  None  Procedure details:     Location:  Left femoral    Vessel type: vein      Laterality:  Left    Approach: percutaneous technique used      Patient position:  Flat    Catheter type:  Triple lumen 20cm    Catheter size:  7 Fr    Landmarks identified: yes      Ultrasound guidance: no      Number of attempts:  1    Successful placement: yes    Post-procedure details:     Post-procedure:  Dressing applied and line sutured    Assessment:  Blood return through all ports and free fluid flow    Patient tolerance of procedure:   Tolerated well, no immediate complications  Comments:      Assist: Janelle Sanchez MD  12/29/20 1357

## 2020-12-30 NOTE — ED NOTES
Pt was given meds for terminal extubation and OG and ET tube were removed and patient taken off vent by ER physician Dr Emily Jennings and RT Jefe Monk with this nurse at bedside  Family promptly welcomed back into the room       Mechelle Leon RN  12/29/20 [id number]

## 2021-01-01 NOTE — ED ATTENDING ATTESTATION
2020  I, Maicol Davenport DO, saw and evaluated the patient  I have discussed the patient with the resident/non-physician practitioner and agree with the resident's/non-physician practitioner's findings, Plan of Care, and MDM as documented in the resident's/non-physician practitioner's note, except where noted  All available labs and Radiology studies were reviewed  I was present for key portions of any procedure(s) performed by the resident/non-physician practitioner and I was immediately available to provide assistance  At this point I agree with the current assessment done in the Emergency Department  I have conducted an independent evaluation of this patient a history and physical is as follows:    81 yof with cardiac arrest, s/p rosc  Was in kitchen, family heard thud  Cpr, ems called, vfib, shocked  Rosc, sinus rowena  Intubated upon arrival  palced on ventilator Patient difficult to bag  Central line placed  Initially disucssion with family and  resulted in pursuing medical therapy excluding cpr, however patient deteriorated, found to have pnuemothorax presumably from CPR, also aspiration in left lung  Did not want chest tube Long discussion over 45 minutes with family and was determined to make comfort care  Patient was extubated, family at bedside   Pt  at [address]     ED Course  ED Course as of  1545   Briana Mar Dec 29, 2020   2319 Waiting on brother for terminal extubation      Wed Dec 30, 2020   0007 Time of death 0003             Critical Care Time  CriticalCare Time  Performed by: Maicol Davenport DO  Authorized by: Maicol Davenport DO     Critical care provider statement:     Critical care time (minutes):  79    Critical care time was exclusive of:  Separately billable procedures and treating other patients and teaching time    Critical care was necessary to treat or prevent imminent or life-threatening deterioration of the following conditions:  Cardiac failure and respiratory failure    Critical care was time spent personally by me on the following activities:  Blood draw for specimens, obtaining history from patient or surrogate, re-evaluation of patient's condition, review of old charts, evaluation of patient's response to treatment, examination of patient, ordering and review of laboratory studies, ordering and performing treatments and interventions, development of treatment plan with patient or surrogate, ordering and review of radiographic studies and ventilator management

## 2022-01-22 NOTE — NURSING NOTE
Attempted to call report although charge nurse not available at this time and will try again in few minutes.       Sven Harris RN  01/22/22 8968 Received return call from [employer], notified him of call from radiology reading room regarding significant finding on postoperative CXR  No new orders received

## 2022-11-28 NOTE — TELEPHONE ENCOUNTER
Called patient and spoke with her letting her know that refill was sent 11/04/22 to [facility]. She will be checking with pharmacy. She never picked up refill.   Pt is having trouble w ADLs since dislocating her shoulder  Asking that [facility] be sent out to see what they can do to help her  Order entered for [facility]

## 2023-06-25 NOTE — TELEPHONE ENCOUNTER
Notes recorded by Thao De León MA on 8/12/2019 at 2:50 PM EDT  Pt was unreachable because  was admitted into hospital for 2 days  Pt was informed and given ph # for Dr Belgica Kirkland  Pt will call and make an appt with urogynecologist  Pt claims to be feeling better  ------    Notes recorded by Thao De León MA on 8/9/2019 at 4:54 PM EDT  [facility] called asking for ATB med  Informed pharm that pt will no longer need ATB and if they could give that message to pt as we have not been able to reach patient   Also, asked pharmacist to ask pt to contact us if they are able to reach pt   ------    Notes recorded by Thao De León MA on 8/9/2019 at 3:22 PM EDT  LM for pt to call back re: results  ------    Notes recorded by Teodoro Shah MD on 8/9/2019 at 2:54 PM EDT  Please tell her urine was negative for infection  Shelli Lazar may discontinue the antibiotic   She will set up appt with urogynecology for recurrent utI sx
No

## 2023-11-14 NOTE — PROGRESS NOTES
PT Evaluation     Today's date: 1/15/2020  Patient name: Alice Abbott  : 1935  MRN: [id number]  Referring provider: Nathalie Justice DO  Dx:   Encounter Diagnosis     ICD-10-CM    1  Lymphedema I89 0 Ambulatory referral to PT/OT lymphedema therapy                  Assessment  Impairments: abnormal gait, abnormal or restricted ROM, activity intolerance, impaired physical strength, lacks appropriate home exercise program, pain with function and poor posture   Other impairment: +3 pitting edema bilateral le's   Functional limitations: decreased tolerance to bending, walking , stair climbing, c/o fatigue,   c/o heaviness of le's with increased girth and redness fluctuating in nature  Understanding of Dx/Px/POC: good   Prognosis: good    Goals  STG 1  Independent in there exer program in two weeks           2  Reduction of girth by 2 cm in two weeks      LTG 1 Reduction of girth by 4 cm in 4 weeks  2 The pt shall be independent in donning and doffing compression garments      Plan  Patient would benefit from: PT eval, lymphedema eval and skilled physical therapy  Referral necessary: Yes  Other planned modality interventions: vasopneumatic pump trial bilateral le's 30 mmHG with elevation and home unit  Planned therapy interventions: manual therapy, massage, strengthening, stretching, therapeutic activities, therapeutic exercise, patient education, muscle pump exercises, neuromuscular re-education, compression and home exercise program  Frequency: 2x week  Duration in weeks: 8  Treatment plan discussed with: patient        Subjective Evaluation    History of Present Illness  Onset date: worsening over the past 1 year bilateral le lymphedema           Recurrent probem    Quality of life: fair    Pain  Current pain ratin  At best pain rating: 3  At worst pain ratin  Location: left distal tibia pain to palpation increased left redness in past 3 weeks   Quality: sharp, tight, pressure and dull ache  Relieving factors: change in position and rest  Aggravating factors: stair climbing, walking and standing  Progression: worsening    Social Support  Steps to enter house: yes (4 steps right railing)  Stairs in house: yes (1 step )   Lives in: condominium  Lives with: spouse    Employment status: not working  Exercise history: pt exercising at [address] [facility] PT facility 2 x week   Pt may be getting PT  to inquire if being bill for PT or just as fitness member    Treatments  Previous treatment: massage and physical therapy  Current treatment: massage and physical therapy  Patient Goals  Patient goals for therapy: decreased edema, decreased pain, increased strength, independence with ADLs/IADLs, return to sport/leisure activities and increased motion  Patient goal: reduction of bilateral le girth by 2 cm in 4 weeks with pain reduction to palpation        Objective    Flowsheet Rows      Most Recent Value   PT/OT G-Codes   Current Score  54   Projected Score  56   FOTO information reviewed  Yes   Assessment Type  Evaluation   G code set  Mobility: Walking & Moving Around   Mobility: Walking and Moving Around Current Status ()  CK   Mobility: Walking and Moving Around Goal Status ()  CJ             Precautions: med allergies:      Manual  1/15/2020             I eval            Mld massage and soft tissue mobilization bilateral le's             Monitor left greater than right distal tibial redness on le's                                           Exercise Diary              Nu step trial             Lymphedema le exer packet                                                                                                                                                                                                                                                           Modalities              Trial  Left le compression pump 30 mmHg left le with elevation Dapsone Pregnancy And Lactation Text: This medication is Pregnancy Category C and is not considered safe during pregnancy or breast feeding. Include Pregnancy/Lactation Warning?: No Erythromycin Pregnancy And Lactation Text: This medication is Pregnancy Category B and is considered safe during pregnancy. It is also excreted in breast milk. Azelaic Acid Counseling: Patient counseled that medicine may cause skin irritation and to avoid applying near the eyes.  In the event of skin irritation, the patient was advised to reduce the amount of the drug applied or use it less frequently.   The patient verbalized understanding of the proper use and possible adverse effects of azelaic acid.  All of the patient's questions and concerns were addressed. Doxycycline Pregnancy And Lactation Text: This medication is Pregnancy Category D and not consider safe during pregnancy. It is also excreted in breast milk but is considered safe for shorter treatment courses. Birth Control Pills Pregnancy And Lactation Text: This medication should be avoided if pregnant and for the first 30 days post-partum. Isotretinoin Pregnancy And Lactation Text: This medication is Pregnancy Category X and is considered extremely dangerous during pregnancy. It is unknown if it is excreted in breast milk. Bactrim Pregnancy And Lactation Text: This medication is Pregnancy Category D and is known to cause fetal risk.  It is also excreted in breast milk. Azithromycin Pregnancy And Lactation Text: This medication is considered safe during pregnancy and is also secreted in breast milk. Benzoyl Peroxide Counseling: Patient counseled that medicine may cause skin irritation and bleach clothing.  In the event of skin irritation, the patient was advised to reduce the amount of the drug applied or use it less frequently.   The patient verbalized understanding of the proper use and possible adverse effects of benzoyl peroxide.  All of the patient's questions and concerns were addressed. Detail Level: Zone Topical Retinoid counseling:  Patient advised to apply a pea-sized amount only at bedtime and wait 30 minutes after washing their face before applying.  If too drying, patient may add a non-comedogenic moisturizer. The patient verbalized understanding of the proper use and possible adverse effects of retinoids.  All of the patient's questions and concerns were addressed. High Dose Vitamin A Pregnancy And Lactation Text: High dose vitamin A therapy is contraindicated during pregnancy and breast feeding. Minocycline Pregnancy And Lactation Text: This medication is Pregnancy Category D and not consider safe during pregnancy. It is also excreted in breast milk. Tazorac Counseling:  Patient advised that medication is irritating and drying.  Patient may need to apply sparingly and wash off after an hour before eventually leaving it on overnight.  The patient verbalized understanding of the proper use and possible adverse effects of tazorac.  All of the patient's questions and concerns were addressed. Topical Clindamycin Counseling: Patient counseled that this medication may cause skin irritation or allergic reactions.  In the event of skin irritation, the patient was advised to reduce the amount of the drug applied or use it less frequently.   The patient verbalized understanding of the proper use and possible adverse effects of clindamycin.  All of the patient's questions and concerns were addressed. Topical Sulfur Applications Counseling: Topical Sulfur Counseling: Patient counseled that this medication may cause skin irritation or allergic reactions.  In the event of skin irritation, the patient was advised to reduce the amount of the drug applied or use it less frequently.   The patient verbalized understanding of the proper use and possible adverse effects of topical sulfur application.  All of the patient's questions and concerns were addressed. Winlevi Counseling:  I discussed with the patient the risks of topical clascoterone including but not limited to erythema, scaling, itching, and stinging. Patient voiced their understanding. Spironolactone Pregnancy And Lactation Text: This medication can cause feminization of the male fetus and should be avoided during pregnancy. The active metabolite is also found in breast milk. Doxycycline Counseling:  Patient counseled regarding possible photosensitivity and increased risk for sunburn.  Patient instructed to avoid sunlight, if possible.  When exposed to sunlight, patients should wear protective clothing, sunglasses, and sunscreen.  The patient was instructed to call the office immediately if the following severe adverse effects occur:  hearing changes, easy bruising/bleeding, severe headache, or vision changes.  The patient verbalized understanding of the proper use and possible adverse effects of doxycycline.  All of the patient's questions and concerns were addressed. Erythromycin Counseling:  I discussed with the patient the risks of erythromycin including but not limited to GI upset, allergic reaction, drug rash, diarrhea, increase in liver enzymes, and yeast infections. Azelaic Acid Pregnancy And Lactation Text: This medication is considered safe during pregnancy and breast feeding. Dapsone Counseling: I discussed with the patient the risks of dapsone including but not limited to hemolytic anemia, agranulocytosis, rashes, methemoglobinemia, kidney failure, peripheral neuropathy, headaches, GI upset, and liver toxicity.  Patients who start dapsone require monitoring including baseline LFTs and weekly CBCs for the first month, then every month thereafter.  The patient verbalized understanding of the proper use and possible adverse effects of dapsone.  All of the patient's questions and concerns were addressed. Birth Control Pills Counseling: Birth Control Pill Counseling: I discussed with the patient the potential side effects of OCPs including but not limited to increased risk of stroke, heart attack, thrombophlebitis, deep venous thrombosis, hepatic adenomas, breast changes, GI upset, headaches, and depression.  The patient verbalized understanding of the proper use and possible adverse effects of OCPs. All of the patient's questions and concerns were addressed. Aklief Pregnancy And Lactation Text: It is unknown if this medication is safe to use during pregnancy.  It is unknown if this medication is excreted in breast milk.  Breastfeeding women should use the topical cream on the smallest area of the skin for the shortest time needed while breastfeeding.  Do not apply to nipple and areola. Benzoyl Peroxide Pregnancy And Lactation Text: This medication is Pregnancy Category C. It is unknown if benzoyl peroxide is excreted in breast milk. Isotretinoin Counseling: Patient should get monthly blood tests, not donate blood, not drive at night if vision affected, not share medication, and not undergo elective surgery for 6 months after tx completed. Side effects reviewed, pt to contact office should one occur. Bactrim Counseling:  I discussed with the patient the risks of sulfa antibiotics including but not limited to GI upset, allergic reaction, drug rash, diarrhea, dizziness, photosensitivity, and yeast infections.  Rarely, more serious reactions can occur including but not limited to aplastic anemia, agranulocytosis, methemoglobinemia, blood dyscrasias, liver or kidney failure, lung infiltrates or desquamative/blistering drug rashes. Azithromycin Counseling:  I discussed with the patient the risks of azithromycin including but not limited to GI upset, allergic reaction, drug rash, diarrhea, and yeast infections. High Dose Vitamin A Counseling: Side effects reviewed, pt to contact office should one occur. Topical Retinoid Pregnancy And Lactation Text: This medication is Pregnancy Category C. It is unknown if this medication is excreted in breast milk. Tazorac Pregnancy And Lactation Text: This medication is not safe during pregnancy. It is unknown if this medication is excreted in breast milk. Sarecycline Counseling: Patient advised regarding possible photosensitivity and discoloration of the teeth, skin, lips, tongue and gums.  Patient instructed to avoid sunlight, if possible.  When exposed to sunlight, patients should wear protective clothing, sunglasses, and sunscreen.  The patient was instructed to call the office immediately if the following severe adverse effects occur:  hearing changes, easy bruising/bleeding, severe headache, or vision changes.  The patient verbalized understanding of the proper use and possible adverse effects of sarecycline.  All of the patient's questions and concerns were addressed. Minocycline Counseling: Patient advised regarding possible photosensitivity and discoloration of the teeth, skin, lips, tongue and gums.  Patient instructed to avoid sunlight, if possible.  When exposed to sunlight, patients should wear protective clothing, sunglasses, and sunscreen.  The patient was instructed to call the office immediately if the following severe adverse effects occur:  hearing changes, easy bruising/bleeding, severe headache, or vision changes.  The patient verbalized understanding of the proper use and possible adverse effects of minocycline.  All of the patient's questions and concerns were addressed. Spironolactone Counseling: Patient advised regarding risks of diarrhea, abdominal pain, hyperkalemia, birth defects (for female patients), liver toxicity and renal toxicity. The patient may need blood work to monitor liver and kidney function and potassium levels while on therapy. The patient verbalized understanding of the proper use and possible adverse effects of spironolactone.  All of the patient's questions and concerns were addressed. Topical Clindamycin Pregnancy And Lactation Text: This medication is Pregnancy Category B and is considered safe during pregnancy. It is unknown if it is excreted in breast milk. Tetracycline Counseling: Patient counseled regarding possible photosensitivity and increased risk for sunburn.  Patient instructed to avoid sunlight, if possible.  When exposed to sunlight, patients should wear protective clothing, sunglasses, and sunscreen.  The patient was instructed to call the office immediately if the following severe adverse effects occur:  hearing changes, easy bruising/bleeding, severe headache, or vision changes.  The patient verbalized understanding of the proper use and possible adverse effects of tetracycline.  All of the patient's questions and concerns were addressed. Patient understands to avoid pregnancy while on therapy due to potential birth defects. Topical Sulfur Applications Pregnancy And Lactation Text: This medication is Pregnancy Category C and has an unknown safety profile during pregnancy. It is unknown if this topical medication is excreted in breast milk. Winlevi Pregnancy And Lactation Text: This medication is considered safe during pregnancy and breastfeeding. Aklief counseling:  Patient advised to apply a pea-sized amount only at bedtime and wait 30 minutes after washing their face before applying.  If too drying, patient may add a non-comedogenic moisturizer.  The most commonly reported side effects including irritation, redness, scaling, dryness, stinging, burning, itching, and increased risk of sunburn.  The patient verbalized understanding of the proper use and possible adverse effects of retinoids.  All of the patient's questions and concerns were addressed.

## 2024-02-13 NOTE — ED NOTES
Diabetic Eye Exam Form    Date Requested: 24  Patient: Anival Iyer  Patient : 1986   Referring Provider: Corinna Novak MD      DIABETIC Eye Exam Date _______________________________      Type of Exam MUST be documented for Diabetic Eye Exams. Please CHECK ONE.     Retinal Exam       Dilated Retinal Exam       OCT       Optomap-Iris Exam      Fundus Photography       Left Eye - Please check Retinopathy or No Retinopathy        Exam did show retinopathy    Exam did not show retinopathy       Right Eye - Please check Retinopathy or No Retinopathy       Exam did show retinopathy    Exam did not show retinopathy       Comments ________________Patient had eye exam done on 2024.  Please clarify if patient had Diabetes Eye exam.  _________________________________________    Practice Providing Exam ______________________________________________    Exam Performed By (print name) _______________________________________      Provider Signature ___________________________________________________      These reports are needed for  compliance.  Please fax this completed form and a copy of the Diabetic Eye Exam report to our office located at [address] [place] [address] as soon as possible via Fax [phone] attention Althea: Phone [phone]  We thank you for your assistance in treating our mutual patient.       Patients bladder scanned with a post void residual of 27mL       Nicole Lopez RN  03/02/18 [id number]
